# Patient Record
Sex: MALE | Race: OTHER | NOT HISPANIC OR LATINO | URBAN - METROPOLITAN AREA
[De-identification: names, ages, dates, MRNs, and addresses within clinical notes are randomized per-mention and may not be internally consistent; named-entity substitution may affect disease eponyms.]

---

## 2019-09-21 ENCOUNTER — EMERGENCY (EMERGENCY)
Facility: HOSPITAL | Age: 59
LOS: 1 days | Discharge: HOME | End: 2019-09-21
Attending: EMERGENCY MEDICINE
Payer: COMMERCIAL

## 2019-09-21 VITALS
HEIGHT: 66 IN | OXYGEN SATURATION: 98 % | HEART RATE: 105 BPM | DIASTOLIC BLOOD PRESSURE: 78 MMHG | SYSTOLIC BLOOD PRESSURE: 110 MMHG | WEIGHT: 167.99 LBS | RESPIRATION RATE: 17 BRPM | TEMPERATURE: 100 F

## 2019-09-21 LAB
ALBUMIN SERPL ELPH-MCNC: 4.6 G/DL — SIGNIFICANT CHANGE UP (ref 3.3–5)
ALP SERPL-CCNC: 37 U/L — LOW (ref 40–120)
ALT FLD-CCNC: 7 U/L — LOW (ref 10–45)
ANION GAP SERPL CALC-SCNC: 12 MMOL/L — SIGNIFICANT CHANGE UP (ref 5–17)
AST SERPL-CCNC: 12 U/L — SIGNIFICANT CHANGE UP (ref 10–40)
BASOPHILS # BLD AUTO: 0 K/UL — SIGNIFICANT CHANGE UP (ref 0–0.2)
BASOPHILS NFR BLD AUTO: 0.1 % — SIGNIFICANT CHANGE UP (ref 0–2)
BILIRUB SERPL-MCNC: 0.6 MG/DL — SIGNIFICANT CHANGE UP (ref 0.2–1.2)
BUN SERPL-MCNC: 12 MG/DL — SIGNIFICANT CHANGE UP (ref 7–23)
CALCIUM SERPL-MCNC: 10.2 MG/DL — SIGNIFICANT CHANGE UP (ref 8.4–10.5)
CHLORIDE SERPL-SCNC: 96 MMOL/L — SIGNIFICANT CHANGE UP (ref 96–108)
CO2 SERPL-SCNC: 27 MMOL/L — SIGNIFICANT CHANGE UP (ref 22–31)
CREAT SERPL-MCNC: 0.9 MG/DL — SIGNIFICANT CHANGE UP (ref 0.5–1.3)
D DIMER BLD IA.RAPID-MCNC: 802 NG/ML DDU — HIGH
EOSINOPHIL # BLD AUTO: 0.1 K/UL — SIGNIFICANT CHANGE UP (ref 0–0.5)
EOSINOPHIL NFR BLD AUTO: 0.9 % — SIGNIFICANT CHANGE UP (ref 0–6)
GLUCOSE SERPL-MCNC: 97 MG/DL — SIGNIFICANT CHANGE UP (ref 70–99)
HCT VFR BLD CALC: 43.6 % — SIGNIFICANT CHANGE UP (ref 39–50)
HGB BLD-MCNC: 14.3 G/DL — SIGNIFICANT CHANGE UP (ref 13–17)
LYMPHOCYTES # BLD AUTO: 1.3 K/UL — SIGNIFICANT CHANGE UP (ref 1–3.3)
LYMPHOCYTES # BLD AUTO: 15.2 % — SIGNIFICANT CHANGE UP (ref 13–44)
MCHC RBC-ENTMCNC: 29.3 PG — SIGNIFICANT CHANGE UP (ref 27–34)
MCHC RBC-ENTMCNC: 32.8 GM/DL — SIGNIFICANT CHANGE UP (ref 32–36)
MCV RBC AUTO: 89.4 FL — SIGNIFICANT CHANGE UP (ref 80–100)
MONOCYTES # BLD AUTO: 0.8 K/UL — SIGNIFICANT CHANGE UP (ref 0–0.9)
MONOCYTES NFR BLD AUTO: 9.2 % — SIGNIFICANT CHANGE UP (ref 2–14)
NEUTROPHILS # BLD AUTO: 6.6 K/UL — SIGNIFICANT CHANGE UP (ref 1.8–7.4)
NEUTROPHILS NFR BLD AUTO: 74.7 % — SIGNIFICANT CHANGE UP (ref 43–77)
PLATELET # BLD AUTO: 258 K/UL — SIGNIFICANT CHANGE UP (ref 150–400)
POTASSIUM SERPL-MCNC: 4.2 MMOL/L — SIGNIFICANT CHANGE UP (ref 3.5–5.3)
POTASSIUM SERPL-SCNC: 4.2 MMOL/L — SIGNIFICANT CHANGE UP (ref 3.5–5.3)
PROT SERPL-MCNC: 8 G/DL — SIGNIFICANT CHANGE UP (ref 6–8.3)
RBC # BLD: 4.88 M/UL — SIGNIFICANT CHANGE UP (ref 4.2–5.8)
RBC # FLD: 12 % — SIGNIFICANT CHANGE UP (ref 10.3–14.5)
SODIUM SERPL-SCNC: 135 MMOL/L — SIGNIFICANT CHANGE UP (ref 135–145)
TROPONIN T, HIGH SENSITIVITY RESULT: <6 NG/L — SIGNIFICANT CHANGE UP (ref 0–51)
WBC # BLD: 8.8 K/UL — SIGNIFICANT CHANGE UP (ref 3.8–10.5)
WBC # FLD AUTO: 8.8 K/UL — SIGNIFICANT CHANGE UP (ref 3.8–10.5)

## 2019-09-21 PROCEDURE — 71046 X-RAY EXAM CHEST 2 VIEWS: CPT | Mod: 26

## 2019-09-21 PROCEDURE — 93010 ELECTROCARDIOGRAM REPORT: CPT

## 2019-09-21 PROCEDURE — 99218: CPT

## 2019-09-21 PROCEDURE — 71275 CT ANGIOGRAPHY CHEST: CPT | Mod: 26

## 2019-09-21 PROCEDURE — 70450 CT HEAD/BRAIN W/O DYE: CPT | Mod: 26

## 2019-09-21 RX ORDER — ACETAMINOPHEN 500 MG
975 TABLET ORAL ONCE
Refills: 0 | Status: COMPLETED | OUTPATIENT
Start: 2019-09-21 | End: 2019-09-21

## 2019-09-21 RX ORDER — SODIUM CHLORIDE 9 MG/ML
1000 INJECTION INTRAMUSCULAR; INTRAVENOUS; SUBCUTANEOUS ONCE
Refills: 0 | Status: COMPLETED | OUTPATIENT
Start: 2019-09-21 | End: 2019-09-21

## 2019-09-21 RX ADMIN — Medication 975 MILLIGRAM(S): at 15:30

## 2019-09-21 RX ADMIN — SODIUM CHLORIDE 1000 MILLILITER(S): 9 INJECTION INTRAMUSCULAR; INTRAVENOUS; SUBCUTANEOUS at 15:02

## 2019-09-21 RX ADMIN — Medication 975 MILLIGRAM(S): at 14:30

## 2019-09-21 NOTE — ED CDU PROVIDER DISPOSITION NOTE - ATTENDING CONTRIBUTION TO CARE
Attending MD Murphy:   I personally have seen and examined this patient.  Physician assistant note reviewed and agree on plan of care and except where noted.  See below for details.     Seen in CDU 1    58M with no reported PMH sent to the CDU after presenting to the ED with syncopal episode.  Denies complaints overnight.  Denies chest pain, shortness of breath, palpitations. Denies abdominal pain, nausea, vomiting, diarrhea, blood in stools. Denies dysuria, hematuria, change in urinary habits including frequency, urgency. Denies fevers, chills, dizziness, weakness. Reports has had a 70lb weight loss but reports has been making lifestyle modifications like decreasing carb (rice) intake, increasing vegetable intake.  On re-evaluation, patient now reports he thinks he fell asleep on the toilet.  Reports he did not urinate or defecate but was seated on the toilet.  Reports he may have fallen asleep.  On exam, NAD, head NCAT, PERRL, FROM at neck, no tenderness to midline palpation, no stepoffs along length of spine, lungs CTAB with good inspiratory effort, +S1S2, no m/r/g, abdomen soft with +BS, NT, ND, no CVAT, moving all extremities with 5/5 strength bilateral upper and lower extremities, good and equal  strength bilaterally, no calf tenderness, swelling, erythema or warmth; A/P: 58M s/p syncopal episode, result of echo, labs and rads discussed with patient.  Concern for cancer given weight loss discussed, possibly explained by diet but not ruled out.  Discussion had with patient and three family members.  Follow up instructions given, importance of follow up emphasized, return to ED parameters reviewed and patient verbalized understanding.  All questions answered, all concerns addressed.

## 2019-09-21 NOTE — ED CDU PROVIDER DISPOSITION NOTE - CLINICAL COURSE
57 y/o male with no sig PMH presents to ED s/p fall at 2am this morning. Patient walked to the bathroom and states that he was trying to have a BM and the next thing he remembers was his wife helping him off of the floor. +LOC, states his wife was in the house and states that she said he was down for at most 5 minutes. Patient has not had episode before. States that he has unintentionally lost 70lbs over the last 4 months, saw his PMD recently who didn't think too much of it, per patient. Decreased appetite.   	+Intermittent frontal/ right temporal headache, has been taking Motrin, does not want pain medication now, as he denies current pain   Denies chest pain, SOB, abdominal pain, dizziness, nausea, vomiting, neck pain, visual changes, urinary symptoms, history of seizures.  ED Course: Labs non-actionable including troponin of 6, CTA negative for PE, CTH without any acute pathology. Pt sent to CDU for tele, frequent eval, and TTE.  CDU Course: Repeat troponin <6. TTE showed____. 57 y/o male with no sig PMH presents to ED s/p fall at 2am this morning. Patient walked to the bathroom and states that he was trying to have a BM and the next thing he remembers was his wife helping him off of the floor. +LOC, states his wife was in the house and states that she said he was down for at most 5 minutes. Patient has not had episode before. States that he has unintentionally lost 70lbs over the last 4 months, saw his PMD recently who didn't think too much of it, per patient. Decreased appetite.   Denies chest pain, SOB, abdominal pain, dizziness, nausea, vomiting, neck pain, visual changes, urinary symptoms, history of seizures.  ED Course: Labs non-actionable including troponin of 6, CTA negative for PE, CTH without any acute pathology. Pt sent to CDU for tele, frequent eval, and TTE.  CDU Course: Repeat troponin <6. TTE unremarkable.  Pt stable for d/c to f/u with PCP/cards as outpatient.

## 2019-09-21 NOTE — ED ADULT NURSE REASSESSMENT NOTE - COMFORT CARE
ambulated to bathroom/repositioned/darkened lights/po fluids offered/warm blanket provided/plan of care explained

## 2019-09-21 NOTE — ED CDU PROVIDER DISPOSITION NOTE - NSFOLLOWUPINSTRUCTIONS_ED_ALL_ED_FT
1. Follow up with your PMD in 1-2 days. If you do not have a PMD you may follow up with our general internal medicine clinic ([phone]) for continued care. Additionally please follow up with a cardiologist or cardiology clinic ([phone]) within 2-3 days.     2. Show copies of your reports given to you. Recommend Aspirin 81mg over the counter daily until further evaluation.  Take all of your other medications as previously prescribed.     3. If you develop any worsening or continued chest pain, shortness of breath, palpitations, weakness, nausea/vomiting, lightheadedness, or for any other concerning symptoms please return to the ED immediately. 1. Follow up with your Primary care provider in 1-2 days. If you do not have a PCP you may follow up with our general internal medicine clinic ([phone]) for continued care. Additionally please follow up with a cardiologist or [facility] cardiology clinic ([phone]) within 2-3 days.     2. Show copies of your reports given to you. Take all of your other medications as previously prescribed.     3. If you develop any chest pain, shortness of breath, palpitations, weakness, nausea/vomiting, lightheadedness, passing out, or for any other concerning symptoms please return to the ED immediately.

## 2019-09-21 NOTE — ED CDU PROVIDER INITIAL DAY NOTE - ATTENDING CONTRIBUTION TO CARE
ATTENDING, Slick JOHNSON: I have personally performed a face to face diagnostic evaluation on this patient.  I have reviewed the ACP note and agree with the history, exam, and plan of care, except as noted here. Progress notes and further evaluation to be reviewed by observation and discharging attending.

## 2019-09-21 NOTE — ED PROVIDER NOTE - OBJECTIVE STATEMENT
57 y/o male with no sig PMH presents to ED s/p fall at 2am this morning. Patient walked to the bathroom and states that he was trying to have a BM and the next thing he remembers was his wife helping him off of the floor. +LOC, states his wife was in the house and states that she said he was down for at most 5 minutes. Patient has not had episode before. States that he has unintentionally lost 70lbs over the last 4 months, saw his PMD recently who didn't think too much of it, per patient. Decreased appetite.   +Intermittent frontal/ right temporal headache, has been taking Motrin, does not want pain medication now, as he denies current pain   Denies chest pain, SOB, abdominal pain, dizziness, nausea, vomiting, neck pain, visual changes, urinary symptoms, history of seizures.

## 2019-09-21 NOTE — ED CDU PROVIDER INITIAL DAY NOTE - PROGRESS NOTE DETAILS
Pt seen at bedside. Pt in NAD, comfortable. VS stable from last reading. No events on tele. Pt has no complaints at this time. Pt tolerating PO, feels well. Will continue to monitor.

## 2019-09-21 NOTE — ED PROVIDER NOTE - NS ED ROS FT
Constitutional: No fever or chills +weight loss  Eyes: No visual changes  CV: No chest pain or lower extremity edema  Resp: No SOB no cough  GI: No abd pain. No nausea or vomiting.   : No dysuria, hematuria.   MSK: No musculoskeletal pain  Skin: No rash  Psych: No complaints   Neuro: +headache. No numbness or tingling. No weakness.

## 2019-09-21 NOTE — ED PROVIDER NOTE - ATTENDING CONTRIBUTION TO CARE
wt loss / syncope on toilet. no cp/sob  rrr / well appearing / clear lungs  ro acs / pe. dissection unlikely. concern for ca or other cause of wt loss - ekg w small qrs / no s/s of effusion. will place in cdu if all is neg.

## 2019-09-21 NOTE — ED ADULT NURSE NOTE - OBJECTIVE STATEMENT
59 y/o male denies PMH presents to ED reporting fall last night at 2AM with LOC. Pt reports sitting on toilet then falling off, not remembering anything except wife picking up pt from floor. Pt also reports weight loss of 70 pounds in 6 months. Pt reports weakness for the past three weeks and headaches. On exam, AOx3, speaking in complete sentences. PERRL 3mm, equal strength and sensation in all 4 extremities. Lung sounds CTA, NAD. Abdomen soft, non-tender, non-distended, normoactive bowel sounds. Pt denies CP, SOB, n/v/d, fever/chills, blurry vision, dizziness and lightheadedness at this time. Awaiting evaluation by MD. 59 y/o male denies PMH presents to ED reporting fall last night at 2AM with LOC. Pt reports sitting on toilet then falling off, not remembering anything except wife picking up pt from floor. Pt also reports weight loss of 70 pounds in 6 months. Pt reports weakness for the past three weeks and headaches. On exam, AOx3, speaking in complete sentences. PERRL 3mm, equal strength and sensation in all 4 extremities. Lung sounds CTA, NAD. Abdomen soft, non-tender, non-distended, normoactive bowel sounds. Pt denies CP, SOB, n/v/d, fever/chills, blurry vision, dizziness and lightheadedness at this time. CM in place NSR HR 94. Awaiting evaluation by MD.

## 2019-09-21 NOTE — ED CDU PROVIDER DISPOSITION NOTE - PATIENT PORTAL LINK FT
You can access the FollowMyHealth Patient Portal offered by [facility] by registering at the following website: [email or web address]. By joining [employer]’s FollowMyHealth portal, you will also be able to view your health information using other applications (apps) compatible with our system.

## 2019-09-21 NOTE — ED CDU PROVIDER INITIAL DAY NOTE - DETAILS
Syncope   -TELE  -[facility]  -[facility] EVAL  -ECHO  -CASE D/W ATTENDING

## 2019-09-21 NOTE — ED ADULT NURSE NOTE - NSIMPLEMENTINTERV_GEN_ALL_ED
Implemented All Fall Risk Interventions:  [place] to call system. Call bell, personal items and telephone within reach. Instruct patient to call for assistance. Room bathroom lighting operational. Non-slip footwear when patient is off stretcher. Physically safe environment: no spills, clutter or unnecessary equipment. Stretcher in lowest position, wheels locked, appropriate side rails in place. Provide visual cue, wrist band, yellow gown, etc. Monitor gait and stability. Monitor for mental status changes and reorient to person, place, and time. Review medications for side effects contributing to fall risk. Reinforce activity limits and safety measures with patient and family.

## 2019-09-21 NOTE — ED CDU PROVIDER INITIAL DAY NOTE - PHYSICAL EXAMINATION
GEN: Well Appearing, Nontoxic, NAD  HEENT: NC/AT, Symm Facies. EOMI  CV: +S1S2, RRR w/o m/g/r  RESP: CTAB w/o w/r/r  ABD: Soft, nt/nd.  EXT/MSK: No lower extremity edema or calf tenderness. FROMx4  SKIN: No erythema, lesions or rash  Neuro: Grossly intact, AOX3 with normal speech, Sensation intact, motor equal.

## 2019-09-22 VITALS
OXYGEN SATURATION: 96 % | SYSTOLIC BLOOD PRESSURE: 122 MMHG | DIASTOLIC BLOOD PRESSURE: 85 MMHG | RESPIRATION RATE: 18 BRPM | HEART RATE: 84 BPM | TEMPERATURE: 98 F

## 2019-09-22 PROCEDURE — 82962 GLUCOSE BLOOD TEST: CPT

## 2019-09-22 PROCEDURE — 93005 ELECTROCARDIOGRAM TRACING: CPT

## 2019-09-22 PROCEDURE — 70450 CT HEAD/BRAIN W/O DYE: CPT

## 2019-09-22 PROCEDURE — G0378: CPT

## 2019-09-22 PROCEDURE — 85379 FIBRIN DEGRADATION QUANT: CPT

## 2019-09-22 PROCEDURE — 85027 COMPLETE CBC AUTOMATED: CPT

## 2019-09-22 PROCEDURE — 84484 ASSAY OF TROPONIN QUANT: CPT

## 2019-09-22 PROCEDURE — 71275 CT ANGIOGRAPHY CHEST: CPT

## 2019-09-22 PROCEDURE — 93306 TTE W/DOPPLER COMPLETE: CPT | Mod: 26

## 2019-09-22 PROCEDURE — 93306 TTE W/DOPPLER COMPLETE: CPT

## 2019-09-22 PROCEDURE — 99217: CPT

## 2019-09-22 PROCEDURE — 99284 EMERGENCY DEPT VISIT MOD MDM: CPT | Mod: 25

## 2019-09-22 PROCEDURE — 71046 X-RAY EXAM CHEST 2 VIEWS: CPT

## 2019-09-22 PROCEDURE — 83880 ASSAY OF NATRIURETIC PEPTIDE: CPT

## 2019-09-22 PROCEDURE — 80053 COMPREHEN METABOLIC PANEL: CPT

## 2019-09-22 RX ORDER — IBUPROFEN 200 MG
600 TABLET ORAL ONCE
Refills: 0 | Status: COMPLETED | OUTPATIENT
Start: 2019-09-22 | End: 2019-09-22

## 2019-09-22 RX ADMIN — Medication 600 MILLIGRAM(S): at 04:11

## 2019-09-22 RX ADMIN — Medication 600 MILLIGRAM(S): at 03:50

## 2019-09-22 NOTE — ED CDU PROVIDER SUBSEQUENT DAY NOTE - PROGRESS NOTE DETAILS
Pt seen at bedside. Pt febrile 101.2'F, BP 94/63. Pt appears well,non-toxic. Pt in NAD, comfortable. No events on tele. Pt has no complaints at this time. Denies lightheadedness, dizziness, CP, chills, HA, abd pain, SOB. Unremarkable PE, GCS15, no JVD, RRR clear distinct S1 S2, no m/g/r, lungs CTAB, 2+ radial pulses. Will give motrin for fever and continue to monitor. Patient seen and evaluated at bedside, resting in bed comfortably in NAD. No complaints. VSS. No events on telemetry monitor. Denies any fever/chills, recent illness, dizziness, sore throat, nasal congestion, cough, CP, SOB, abd pain, n/v/d, urinary symptoms. TTE performed at bedside in CDU. Patient resting in bed comfortably in NAD. Most recent VSS. No events on telemetry monitor. TTE unremarkable. Pt seen and evaluated by Dr. Murphy. Stable for d/c to f/u with PCP/cards as outpatient. Patient seen and evaluated at bedside, resting in bed comfortably in NAD. No complaints. Denies any fever/chills, recent illness, dizziness, sore throat, nasal congestion, cough, CP, SOB, abd pain, n/v/d, urinary symptoms. VSS. Exam unremarkable. No LE edema, no calf ttp. No events on telemetry monitor.  TTE performed at bedside in CDU.

## 2019-09-22 NOTE — ED CDU PROVIDER SUBSEQUENT DAY NOTE - ATTENDING CONTRIBUTION TO CARE
Attending MD Murphy:   I personally have seen and examined this patient.  Physician assistant note reviewed and agree on plan of care and except where noted.  See below for details.     Seen in CDU 1    58M with no reported PMH     sent to the CDU after presenting to the ED with syncopal episode. Attending MD Murphy:   I personally have seen and examined this patient.  Physician assistant note reviewed and agree on plan of care and except where noted.  See below for details.     Seen in CDU 1    58M with no reported PMH sent to the CDU after presenting to the ED with syncopal episode.  Denies complaints overnight.  Denies chest pain, shortness of breath, palpitations. Denies abdominal pain, nausea, vomiting, diarrhea, blood in stools. Denies dysuria, hematuria, change in urinary habits including frequency, urgency. Denies fevers, chills, dizziness, weakness. Reports has had a 70lb weight loss but reports has been making lifestyle modifications like decreasing carb (rice) intake, increasing vegetable intake.  On exam, NAD, head NCAT, PERRL, FROM at neck, no tenderness to midline palpation, no stepoffs along length of spine, lungs CTAB with good inspiratory effort, +S1S2, no m/r/g, abdomen soft with +BS, NT, ND, no CVAT, moving all extremities with 5/5 strength bilateral upper and lower extremities, good and equal  strength bilaterally, no calf tenderness, swelling, erythema or warmth; A/P: 58M s/p syncopal episode, pending echo

## 2019-09-22 NOTE — ED CDU PROVIDER SUBSEQUENT DAY NOTE - HISTORY
Pt sleeping. VS stable from last reading. No events on tele. Will continue to monitor. -Robles Herman PA-C

## 2019-09-22 NOTE — ED CDU PROVIDER SUBSEQUENT DAY NOTE - PHYSICAL EXAMINATION
GEN: Pt in NAD, A&O x3.  PSYCH: Affect appropriate.  EYES: Sclera white w/o injection.   ENT: Head NCAT. Nose w/o deformity. No auricular TTP. MMM. Neck supple FROM.   RESP: No chest wall tenderness, CTA b/l, no wheezes, rales, or rhonchi.   CARDIAC: RRR, clear distinct S1, S2, no appreciable murmurs.  ABD: Abdomen soft, non-tender.  VASC: 2+ radial and dorsalis pedis pulses b/l. No edema or calf tenderness.  SKIN: No rashes on the trunk.

## 2019-09-22 NOTE — ED ADULT NURSE REASSESSMENT NOTE - NS ED NURSE REASSESS COMMENT FT1
Pt awaiting CTA at this time. Pt aware of plan of care at this time.
Pt awaiting transfer to CDU. SUNIL Del Angel contacted, will make RN aware when pt can be transported to CDU.
Pt reports h/a at this time, requesting PO Tylenol, SUNIL Wilikns aware.
Pt taken to CT scan by transporter at this time.
report received from day RN Vivian DANIELSON. Pt found resting in semi-rivero position, non-labored respirations. Pt denies dizziness, lightheadedness, chest pain or discomfort, difficulty breathing at this time. VS documented. Pt to be transported to CDU for continued observation and treatment. Pt left in position of comfort, will reassess
report taken from Germania RHODES. states pt had good night with no complaints. Will continue to monitor.
Received pt from MEAGAN Martines , received pt alert and responsive, oriented x4, denies any respiratory distress, SOB, or difficulty breathing. Pt transferred to CDU for syncopal episode, pt is currently asymptomatic with no c/o chest pain, pressure, SOB, diaphoresis, dizziness, lightheadedness, N/V, F/C, heart palpitations. On telemetry pt is SR hr: 70's.  IV in place, patent and free of signs of infiltration. V/S stable, pt afebrile, Pt educated on unit and unit rules, instructed patient to notify RN of any needed assistance, Pt verbalizes understanding, Call bell placed within reach. Safety maintained. Will continue to monitor. Pending TTE in AM pt aware of plan.

## 2019-11-05 ENCOUNTER — EMERGENCY (EMERGENCY)
Facility: HOSPITAL | Age: 59
LOS: 1 days | Discharge: HOME | End: 2019-11-05
Attending: EMERGENCY MEDICINE
Payer: COMMERCIAL

## 2019-11-05 VITALS
HEART RATE: 87 BPM | HEIGHT: 66 IN | SYSTOLIC BLOOD PRESSURE: 122 MMHG | OXYGEN SATURATION: 97 % | RESPIRATION RATE: 16 BRPM | TEMPERATURE: 98 F | WEIGHT: 173.06 LBS | DIASTOLIC BLOOD PRESSURE: 86 MMHG

## 2019-11-05 VITALS
HEART RATE: 82 BPM | DIASTOLIC BLOOD PRESSURE: 80 MMHG | OXYGEN SATURATION: 99 % | SYSTOLIC BLOOD PRESSURE: 119 MMHG | RESPIRATION RATE: 18 BRPM

## 2019-11-05 LAB
ALBUMIN SERPL ELPH-MCNC: 3.9 G/DL — SIGNIFICANT CHANGE UP (ref 3.5–5)
ALP SERPL-CCNC: 38 U/L — LOW (ref 40–120)
ALT FLD-CCNC: 17 U/L DA — SIGNIFICANT CHANGE UP (ref 10–60)
ANION GAP SERPL CALC-SCNC: 5 MMOL/L — SIGNIFICANT CHANGE UP (ref 5–17)
AST SERPL-CCNC: 8 U/L — LOW (ref 10–40)
BASOPHILS # BLD AUTO: 0.01 K/UL — SIGNIFICANT CHANGE UP (ref 0–0.2)
BASOPHILS NFR BLD AUTO: 0.1 % — SIGNIFICANT CHANGE UP (ref 0–2)
BILIRUB SERPL-MCNC: 0.4 MG/DL — SIGNIFICANT CHANGE UP (ref 0.2–1.2)
BUN SERPL-MCNC: 21 MG/DL — HIGH (ref 7–18)
CALCIUM SERPL-MCNC: 9.4 MG/DL — SIGNIFICANT CHANGE UP (ref 8.4–10.5)
CHLORIDE SERPL-SCNC: 100 MMOL/L — SIGNIFICANT CHANGE UP (ref 96–108)
CO2 SERPL-SCNC: 29 MMOL/L — SIGNIFICANT CHANGE UP (ref 22–31)
CREAT SERPL-MCNC: 0.9 MG/DL — SIGNIFICANT CHANGE UP (ref 0.5–1.3)
EOSINOPHIL # BLD AUTO: 0.05 K/UL — SIGNIFICANT CHANGE UP (ref 0–0.5)
EOSINOPHIL NFR BLD AUTO: 0.5 % — SIGNIFICANT CHANGE UP (ref 0–6)
GLUCOSE SERPL-MCNC: 112 MG/DL — HIGH (ref 70–99)
HCT VFR BLD CALC: 38.1 % — LOW (ref 39–50)
HGB BLD-MCNC: 12.7 G/DL — LOW (ref 13–17)
IMM GRANULOCYTES NFR BLD AUTO: 0.9 % — SIGNIFICANT CHANGE UP (ref 0–1.5)
LYMPHOCYTES # BLD AUTO: 0.81 K/UL — LOW (ref 1–3.3)
LYMPHOCYTES # BLD AUTO: 7.7 % — LOW (ref 13–44)
MCHC RBC-ENTMCNC: 29.2 PG — SIGNIFICANT CHANGE UP (ref 27–34)
MCHC RBC-ENTMCNC: 33.3 GM/DL — SIGNIFICANT CHANGE UP (ref 32–36)
MCV RBC AUTO: 87.6 FL — SIGNIFICANT CHANGE UP (ref 80–100)
MONOCYTES # BLD AUTO: 0.69 K/UL — SIGNIFICANT CHANGE UP (ref 0–0.9)
MONOCYTES NFR BLD AUTO: 6.6 % — SIGNIFICANT CHANGE UP (ref 2–14)
NEUTROPHILS # BLD AUTO: 8.83 K/UL — HIGH (ref 1.8–7.4)
NEUTROPHILS NFR BLD AUTO: 84.2 % — HIGH (ref 43–77)
NRBC # BLD: 0 /100 WBCS — SIGNIFICANT CHANGE UP (ref 0–0)
PLATELET # BLD AUTO: 207 K/UL — SIGNIFICANT CHANGE UP (ref 150–400)
POTASSIUM SERPL-MCNC: 4.3 MMOL/L — SIGNIFICANT CHANGE UP (ref 3.5–5.3)
POTASSIUM SERPL-SCNC: 4.3 MMOL/L — SIGNIFICANT CHANGE UP (ref 3.5–5.3)
PROT SERPL-MCNC: 7.8 G/DL — SIGNIFICANT CHANGE UP (ref 6–8.3)
RBC # BLD: 4.35 M/UL — SIGNIFICANT CHANGE UP (ref 4.2–5.8)
RBC # FLD: 12.3 % — SIGNIFICANT CHANGE UP (ref 10.3–14.5)
SODIUM SERPL-SCNC: 134 MMOL/L — LOW (ref 135–145)
TROPONIN I SERPL-MCNC: <0.015 NG/ML — SIGNIFICANT CHANGE UP (ref 0–0.04)
WBC # BLD: 10.48 K/UL — SIGNIFICANT CHANGE UP (ref 3.8–10.5)
WBC # FLD AUTO: 10.48 K/UL — SIGNIFICANT CHANGE UP (ref 3.8–10.5)

## 2019-11-05 PROCEDURE — 93005 ELECTROCARDIOGRAM TRACING: CPT

## 2019-11-05 PROCEDURE — 99283 EMERGENCY DEPT VISIT LOW MDM: CPT

## 2019-11-05 PROCEDURE — 36415 COLL VENOUS BLD VENIPUNCTURE: CPT

## 2019-11-05 PROCEDURE — 84484 ASSAY OF TROPONIN QUANT: CPT

## 2019-11-05 PROCEDURE — 85027 COMPLETE CBC AUTOMATED: CPT

## 2019-11-05 PROCEDURE — 80053 COMPREHEN METABOLIC PANEL: CPT

## 2019-11-05 PROCEDURE — 99284 EMERGENCY DEPT VISIT MOD MDM: CPT

## 2019-11-05 RX ORDER — SODIUM CHLORIDE 9 MG/ML
1000 INJECTION INTRAMUSCULAR; INTRAVENOUS; SUBCUTANEOUS ONCE
Refills: 0 | Status: COMPLETED | OUTPATIENT
Start: 2019-11-05 | End: 2019-11-05

## 2019-11-05 RX ADMIN — SODIUM CHLORIDE 1000 MILLILITER(S): 9 INJECTION INTRAMUSCULAR; INTRAVENOUS; SUBCUTANEOUS at 18:17

## 2019-11-05 NOTE — ED PROVIDER NOTE - PHYSICAL EXAMINATION
Afebrile, hemodynamically stable, saturating well  NAD, well appearing  Head NCAT  Pupils equal, EOMI, anicteric  MMM  No JVD  RRR, nml S1/S2, no m/r/g  Lungs CTAB, no w/r/r  Abd soft, NT, ND, nml BS, no rebound or guarding  AAO, CN's 3-12 grossly intact, motor 5/5 in all extremities  HARPER spontaneously, no leg cyanosis or edema, no extremity TTP/stepoff/deformity  Skin warm, well perfused, no rashes or hives

## 2019-11-05 NOTE — ED ADULT NURSE NOTE - OBJECTIVE STATEMENT
pt came in for c/o syncope. pt stated he was feeling lightheaded, leaned against the wall & passed out. pt stated he hit his head lightly on the ground and endorses mild HA. Denies cp, sob, dizziness, blurry vision. breathing unlabored. NAD.

## 2019-11-05 NOTE — ED PROVIDER NOTE - NSFOLLOWUPINSTRUCTIONS_ED_ALL_ED_FT
Please follow up with a cardiologist in 1-2 days.  Please return to the emergency department if you have more passing out, chest pain, trouble breathing, or any other symptoms.  Please keep well hydrated.

## 2019-11-05 NOTE — ED PROVIDER NOTE - CLINICAL SUMMARY MEDICAL DECISION MAKING FREE TEXT BOX
No arrhythmia, signs of aortic outflow obstruction, anemia or bleeding, gross electrolyte abnormalities, risk factors for DVT, symptoms of ACS, or signs of infection. No e/o extremity or other trauma. Pt with no e/o head trauma though reports this, no e/o elevated ICP. He declines repeat CT head today, understands concern for possible ICH and is AAO, well appearing, hemodynamically stable, with no distracting injuries, and declines this. No arrhythmia, signs of aortic outflow obstruction, anemia or bleeding, gross electrolyte abnormalities, risk factors for DVT, symptoms of ACS, or signs of infection. No e/o extremity or other trauma. Pt with no e/o head trauma though reports this, no e/o elevated ICP. He declines repeat CT head today, understands concern for possible ICH and is AAO, well appearing, hemodynamically stable, with no distracting injuries, and declines this. Patient is well appearing, NAD, afebrile, hemodynamically stable. Discharged with instructions in further symptomatic care, return precautions, and need for cardiology f/u and list for this f/u.

## 2019-11-05 NOTE — ED PROVIDER NOTE - PATIENT PORTAL LINK FT
You can access the FollowMyHealth Patient Portal offered by [facility] by registering at the following website: [email or web address]. By joining [employer]’s FollowMyHealth portal, you will also be able to view your health information using other applications (apps) compatible with our system.

## 2019-11-05 NOTE — ED PROVIDER NOTE - OBJECTIVE STATEMENT
58yoM previously healthy, on no meds, presents with syncope. States today he felt lightheaded and leaned against a wall, and then he passed out. States he lightly hit his head and has a mild headache only. Denies any other symptoms surrounding the event or now including CP, SOB, vomiting, diarrhea, black or bloody stool, abdominal pain, leg pain or swelling, recent long distance travel, alcohol use, or any other symptoms.  has been keeping well hydrated though not eating healthily. Was seen at [facility] he states last week for the identical symptoms and had negative CT head, ECG, labs, followed up with his PMD and is scheduled to see a GI doctor. 58yoM previously healthy, on no meds, presents with syncope. States today he felt lightheaded and leaned against a wall, and then he passed out. States he lightly hit his head and has a mild headache only. Denies any other symptoms surrounding the event or now including CP, SOB, vomiting, diarrhea, black or bloody stool, abdominal pain, leg pain or swelling, recent long distance travel, alcohol use, or any other symptoms.  has been keeping well hydrated though not eating healthily. Was seen at [facility] he states 1 month ago for the identical symptoms and had negative CT head, ECG, labs, followed up with his PMD and is scheduled to see a GI doctor.

## 2019-11-05 NOTE — ED ADULT NURSE NOTE - NSIMPLEMENTINTERV_GEN_ALL_ED
Implemented All Universal Safety Interventions:  [place] to call system. Call bell, personal items and telephone within reach. Instruct patient to call for assistance. Room bathroom lighting operational. Non-slip footwear when patient is off stretcher. Physically safe environment: no spills, clutter or unnecessary equipment. Stretcher in lowest position, wheels locked, appropriate side rails in place.

## 2020-01-01 ENCOUNTER — EMERGENCY (EMERGENCY)
Facility: HOSPITAL | Age: 60
LOS: 1 days | Discharge: HOME | End: 2020-01-01
Attending: EMERGENCY MEDICINE
Payer: COMMERCIAL

## 2020-01-01 VITALS
SYSTOLIC BLOOD PRESSURE: 146 MMHG | OXYGEN SATURATION: 100 % | WEIGHT: 175.93 LBS | RESPIRATION RATE: 21 BRPM | HEIGHT: 66 IN | HEART RATE: 109 BPM | DIASTOLIC BLOOD PRESSURE: 79 MMHG | TEMPERATURE: 99 F

## 2020-01-01 VITALS
DIASTOLIC BLOOD PRESSURE: 75 MMHG | OXYGEN SATURATION: 100 % | SYSTOLIC BLOOD PRESSURE: 120 MMHG | RESPIRATION RATE: 20 BRPM | HEART RATE: 88 BPM

## 2020-01-01 LAB
ALBUMIN SERPL ELPH-MCNC: 4.6 G/DL — SIGNIFICANT CHANGE UP (ref 3.3–5)
ALP SERPL-CCNC: 50 U/L — SIGNIFICANT CHANGE UP (ref 40–120)
ALT FLD-CCNC: 15 U/L — SIGNIFICANT CHANGE UP (ref 10–45)
ANION GAP SERPL CALC-SCNC: 16 MMOL/L — SIGNIFICANT CHANGE UP (ref 5–17)
AST SERPL-CCNC: 13 U/L — SIGNIFICANT CHANGE UP (ref 10–40)
BASOPHILS # BLD AUTO: 0.02 K/UL — SIGNIFICANT CHANGE UP (ref 0–0.2)
BASOPHILS NFR BLD AUTO: 0.2 % — SIGNIFICANT CHANGE UP (ref 0–2)
BILIRUB SERPL-MCNC: 0.3 MG/DL — SIGNIFICANT CHANGE UP (ref 0.2–1.2)
BUN SERPL-MCNC: 16 MG/DL — SIGNIFICANT CHANGE UP (ref 7–23)
CALCIUM SERPL-MCNC: 10.1 MG/DL — SIGNIFICANT CHANGE UP (ref 8.4–10.5)
CHLORIDE SERPL-SCNC: 100 MMOL/L — SIGNIFICANT CHANGE UP (ref 96–108)
CO2 SERPL-SCNC: 21 MMOL/L — LOW (ref 22–31)
CREAT SERPL-MCNC: 0.84 MG/DL — SIGNIFICANT CHANGE UP (ref 0.5–1.3)
EOSINOPHIL # BLD AUTO: 0.16 K/UL — SIGNIFICANT CHANGE UP (ref 0–0.5)
EOSINOPHIL NFR BLD AUTO: 1.8 % — SIGNIFICANT CHANGE UP (ref 0–6)
GLUCOSE SERPL-MCNC: 163 MG/DL — HIGH (ref 70–99)
HCT VFR BLD CALC: 42.2 % — SIGNIFICANT CHANGE UP (ref 39–50)
HGB BLD-MCNC: 14.1 G/DL — SIGNIFICANT CHANGE UP (ref 13–17)
IMM GRANULOCYTES NFR BLD AUTO: 0.6 % — SIGNIFICANT CHANGE UP (ref 0–1.5)
LYMPHOCYTES # BLD AUTO: 1.46 K/UL — SIGNIFICANT CHANGE UP (ref 1–3.3)
LYMPHOCYTES # BLD AUTO: 16.1 % — SIGNIFICANT CHANGE UP (ref 13–44)
MCHC RBC-ENTMCNC: 29.3 PG — SIGNIFICANT CHANGE UP (ref 27–34)
MCHC RBC-ENTMCNC: 33.4 GM/DL — SIGNIFICANT CHANGE UP (ref 32–36)
MCV RBC AUTO: 87.6 FL — SIGNIFICANT CHANGE UP (ref 80–100)
MONOCYTES # BLD AUTO: 0.78 K/UL — SIGNIFICANT CHANGE UP (ref 0–0.9)
MONOCYTES NFR BLD AUTO: 8.6 % — SIGNIFICANT CHANGE UP (ref 2–14)
NEUTROPHILS # BLD AUTO: 6.62 K/UL — SIGNIFICANT CHANGE UP (ref 1.8–7.4)
NEUTROPHILS NFR BLD AUTO: 72.7 % — SIGNIFICANT CHANGE UP (ref 43–77)
NRBC # BLD: 0 /100 WBCS — SIGNIFICANT CHANGE UP (ref 0–0)
PLATELET # BLD AUTO: 267 K/UL — SIGNIFICANT CHANGE UP (ref 150–400)
POTASSIUM SERPL-MCNC: 3.6 MMOL/L — SIGNIFICANT CHANGE UP (ref 3.5–5.3)
POTASSIUM SERPL-SCNC: 3.6 MMOL/L — SIGNIFICANT CHANGE UP (ref 3.5–5.3)
PROT SERPL-MCNC: 8.4 G/DL — HIGH (ref 6–8.3)
RBC # BLD: 4.82 M/UL — SIGNIFICANT CHANGE UP (ref 4.2–5.8)
RBC # FLD: 12.4 % — SIGNIFICANT CHANGE UP (ref 10.3–14.5)
SODIUM SERPL-SCNC: 137 MMOL/L — SIGNIFICANT CHANGE UP (ref 135–145)
TROPONIN T, HIGH SENSITIVITY RESULT: <6 NG/L — SIGNIFICANT CHANGE UP (ref 0–51)
WBC # BLD: 9.09 K/UL — SIGNIFICANT CHANGE UP (ref 3.8–10.5)
WBC # FLD AUTO: 9.09 K/UL — SIGNIFICANT CHANGE UP (ref 3.8–10.5)

## 2020-01-01 PROCEDURE — 93010 ELECTROCARDIOGRAM REPORT: CPT

## 2020-01-01 PROCEDURE — 71046 X-RAY EXAM CHEST 2 VIEWS: CPT

## 2020-01-01 PROCEDURE — 80053 COMPREHEN METABOLIC PANEL: CPT

## 2020-01-01 PROCEDURE — 71046 X-RAY EXAM CHEST 2 VIEWS: CPT | Mod: 26

## 2020-01-01 PROCEDURE — 99283 EMERGENCY DEPT VISIT LOW MDM: CPT | Mod: 25

## 2020-01-01 PROCEDURE — 85027 COMPLETE CBC AUTOMATED: CPT

## 2020-01-01 PROCEDURE — 84484 ASSAY OF TROPONIN QUANT: CPT

## 2020-01-01 PROCEDURE — 93005 ELECTROCARDIOGRAM TRACING: CPT

## 2020-01-01 PROCEDURE — 99285 EMERGENCY DEPT VISIT HI MDM: CPT

## 2020-01-01 NOTE — ED PROVIDER NOTE - OBJECTIVE STATEMENT
59y male presenting with intermittent SOB. Started 3days ago, he states he feels like he cannot catch a breath. No other associated symptoms, No cough, chest pain, fevers, chills, diaphoresis, n/v/d, numbness, tingling, abdominal pain, urinary symptoms. 59y male presenting with intermittent SOB. Started 3days ago, he states he feels like he cannot catch a breath. No other associated symptoms, No cough, chest pain, fevers, chills, diaphoresis, n/v/d, numbness, tingling, abdominal pain, urinary symptoms. His wife states that he has been very anxious and stressed about work and that when he is feeling the stress he has the SOB symptoms, states they improve when she takes him outside for walks or drives. States that he has lost weight recently and because of work stress, wakes up in the night talking about work and having difficulty catching his breath. Was prescribed medication for anxiety symptoms, he took a few times but stopped.

## 2020-01-01 NOTE — ED ADULT NURSE NOTE - NS ED NURSE LEVEL OF CONSCIOUSNESS MENTAL STATUS
[facility] 


 


Mono % (Auto)  10.6    (2.0-11.0)  %


 


Eos % (Auto)  2.4    (0.0-4.0)  %


 


Baso % (Auto)  0.9    (0.2-1.2)  %


 


Sodium   139   (136-145)  mmol/L


 


Potassium   3.4 L   (3.5-5.1)  mmol/L


 


Chloride   103   ()  mmol/L


 


Carbon Dioxide   23   (21-32)  mmol/L


 


Anion Gap   16.4   (10-20)  mmol/L


 


BUN   9   (7-18)  mg/dL


 


Creatinine   0.7   (0.70-1.30)  mg/dL


 


Est Cr Clr Drug Dosing   TNP   


 


Estimated GFR (MDRD)   > 60   


 


Glucose   95   ()  mg/dL


 


Lactic Acid    2.0  (0.4-2.0)  mmol/L


 


Calcium   8.7   (8.5-10.1)  mg/dL


 


Corrected Calcium   9.18   (8.5-10.1)  mg/dL


 


Total Bilirubin   0.8   (0.2-1.0)  mg/dL


 


AST   16   (15-37)  U/L


 


ALT   31   (16-63)  U/L


 


Alkaline Phosphatase   59   ()  U/L


 


C-Reactive Protein   0.5   (<=0.9)  mg/dL


 


Total Protein   7.8   (6.4-8.2)  g/dL


 


Albumin   3.4   (3.4-5.0)  g/dL


 


Globulin   4.4   


 


Albumin/Globulin Ratio   0.77   


 


Amylase   50   ()  U/L


 


Lipase   246   ()  U/L


 


Urine Color     (YELLOW)  


 


Urine Appearance     (CLEAR)  


 


Urine pH     (5.0-8.0)  


 


Ur Specific Gravity     


 


 


Mono % (








- Discharge Information


*PRESCRIPTION DRUG MONITORING PROGRAM REVIEWED*: Not Applicable


*COPY OF PRESCRIPTION DRUG MONITORING REPORT IN PATIENT JUAN: Not Applicable


Instructions:  Managing Your Hypertension, Hypertension


Referrals: 


Jim Colindres 
Alert/Awake

## 2020-01-01 NOTE — ED PROVIDER NOTE - CLINICAL SUMMARY MEDICAL DECISION MAKING FREE TEXT BOX
59y male with difficulty catching his breath, associated with work stress. Recent work up including CTA chest and echo. Concern for possible acs, anxiety symptoms. Will get cbc, cmp, trop, cxr, ekg.

## 2020-01-01 NOTE — ED PROVIDER NOTE - PROGRESS NOTE DETAILS
Montana Singh, PGY-2: patient feeling much better, no longer having difficulty breathing, HR in the 80's. Will dc pending cxr. Attending MD Murphy: Patient re-evaluated and feeling improved.  No acute issues at  this time.  Lab and radiology tests reviewed with patient and family.  Patient to follow up with Dr. De La Vega (cards) in 1-3 days.  Patient to follow up with PMD/Psych re: anxiety.  Patient stable for discharge. Follow up instructions given, importance of follow up emphasized, return to ED parameters reviewed and patient verbalized understanding.  All questions answered, all concerns addressed.

## 2020-01-01 NOTE — ED ADULT NURSE NOTE - NSIMPLEMENTINTERV_GEN_ALL_ED
Implemented All Universal Safety Interventions:  [place] to call system. Call bell, personal items and telephone within reach. Instruct patient to call for assistance. Room bathroom lighting operational. Non-slip footwear when patient is off stretcher. Physically safe environment: no spills, clutter or unnecessary equipment. Stretcher in lowest position, wheels locked, appropriate side rails in place.

## 2020-01-01 NOTE — ED PROVIDER NOTE - NSFOLLOWUPINSTRUCTIONS_ED_ALL_ED_FT
Please follow up with your primary doctor in the next 5-7 days.     Return to the emergency room if you have new or worsening symptoms. Please follow up with your cardiologist Dr. De La Vega in the next 1-3 days    Please follow up with your primary doctor or psych in the next 5-7 days for follow up with your anxiety.    Return to the emergency room if you have new or worsening symptoms.

## 2020-01-01 NOTE — ED ADULT TRIAGE NOTE - ARRIVAL INFO ADDITIONAL COMMENTS
patient responding verbally & appropriate but most information obtained from wife. "he's in a lot of stress"

## 2020-01-01 NOTE — ED PROVIDER NOTE - ATTENDING CONTRIBUTION TO CARE
Attending MD Murphy: I personally have seen and examined this patient.  Resident note reviewed and agree on plan of care and except where noted.  See below for details.     Seen in [place] [facility], accompanied by wife and son    59M with no reported PMH/PSH presents to the ED with shortness of breath.  Reports for the last three days has had intermittent episodes of inability to "catch a breath".  Reports that he has had previous episodes of same.  Wife reports that she suspects panic attacks as he has had them in the past and has markedly increased levels of anxiety and stress related to work.  Patient denies associated chest pain, palpitations.  Denies cough.  Reports symptoms improve with distraction, going for walk.  Reports that he has been prescribed anxiolytic but reports took only a few doses and discontinued.  Denies recent travel, hemoptysis, leg swelling, immobilization.  Denies abdominal pain, nausea, vomiting, diarrhea, blood in stools. Denies fevers, chills, dizziness, weakness. Denies dysuria, hematuria, change in urinary habits including frequency, urgency. On exam, anxious, NAD, head NCAT, PERRL, FROM at neck, no tenderness to midline palpation, no stepoffs along length of spine, lungs CTAB with good inspiratory effort, no wheezing, no rhonchi, no rales, no increased work of breathing, accessory muscle or retractions noted, +S1S2, no m/r/g, abdomen soft with +BS, NT, ND, no CVAT, moving all extremities with 5/5 strength bilateral upper and lower extremities, good and equal  strength bilaterally, no calf tenderness, swelling, erythema or warmth; A/P: 59M with shortness of breath, suspect anxiety related, will check CXR, labs, EKG to evaluate for other possible causes such as PNA or unstable angina, but lower suspicion

## 2020-01-01 NOTE — ED ADULT NURSE NOTE - OBJECTIVE STATEMENT
59 year old male presents to the ED via walk in with c/o panic attacks x 2 weeks. Patient endorses waking up frequently in the middle of the night with feelings of anxiety over work, tremulous and inability to catch his breath, pt states he feels better once he walks outside for fresh air. Denies c/o pain or discomfort at this time. RSR-ST on cardiac monitor. Comfort and safety measures maintained.

## 2020-01-01 NOTE — ED PROVIDER NOTE - PATIENT PORTAL LINK FT
You can access the FollowMyHealth Patient Portal offered by [facility] by registering at the following website: [email or web address]. By joining "[employer]"’s FollowMyHealth portal, you will also be able to view your health information using other applications (apps) compatible with our system.

## 2020-08-19 NOTE — ED ADULT TRIAGE NOTE - WEIGHT IN LBS
RN CLOSING NOTE: PATIENT REMAINS IN BED. NO SIGNS OF ACUTE DISTRESS NOTED AT THIS TIME. 
SINUS BEBE IN THE 50S ON BEDSIDE MONITOR. SAFETY MEASURES IMPLEMENTED, BED IN LOWEST 
POSITION, LOCKED, SIDE RAILS UP, CALL LIGHT WITHIN REACH. ENDORSED TO ARTURO REYNA FOR 
CONTINUITY OF CARE. 175.9

## 2020-12-18 NOTE — ED PROVIDER NOTE - CONSTITUTIONAL, MLM
PD HPI NVD





- Stated complaint


Stated Complaint: HIGH BLOOD SUGAR





- Chief complaint


Chief Complaint: General





- History obtained from


History obtained from: Patient





- History of Present Illness


Timing - onset: Today


Timing - duration: Days (1)


Timing - details: Abrupt onset (she states her sugars are usually okay, in the 

100s with highest recently 170s. This morning noted BS to be 300s. Went to 

clinic for PT and sugar there was 400s. She was feeling okay otherwise. Referred

to ER.)


Associated symptoms: No: Fever, Chest pain, Loss of appetite, Dysuria (but 

having some frequency of urine.)


Contributing factors: Diabetes.  No: Sick contact, Bad food, Recent antibiotics


Improved by: No: Laying still


Worsened by: No: Moving, Breathing


Similar symptoms before: Has not had sx before


Recently seen: Not recently seen





Review of Systems


Constitutional: denies: Fever, Chills


Nose: denies: Rhinorrhea / runny nose, Congestion


Throat: denies: Sore throat


Cardiac: denies: Chest pain / pressure, Palpitations


Respiratory: denies: Dyspnea, Cough


GI: denies: Abdominal Pain, Nausea, Vomiting, Diarrhea


: reports: Frequency.  denies: Dysuria, Discharge


Skin: denies: Rash


Neurologic: denies: Generalized weakness, Focal weakness, Numbness, Near 

syncope, Headache





PD PAST MEDICAL HISTORY





- Past Medical History


Past Medical History: Yes


Cardiovascular: Hypertension, High cholesterol, Peripheral Vascular Disease


Respiratory: Shortness of breath


Neuro: None


Endocrine/Autoimmune: Type 2 diabetes


GI: Colon polyps


: Incontinence


HEENT: Chronic vision loss, Other


Psych: Depression


Musculoskeletal: Osteoarthritis, Gout, Chronic back pain


Derm: Other





- Past Surgical History


Past Surgical History: Yes


General: Cholecystectomy, Appendectomy, Other





- Present Medications


Home Medications: 


                                Ambulatory Orders











 Medication  Instructions  Recorded  Confirmed


 


Insulin Aspart (Vial) [NovoLOG] 15 - 40 units SUBQ TIDWM 03/14/14 12/18/20


 


allopurinoL [Allopurinol] 100 mg PO DAILY 12/31/15 12/18/20


 


Insulin Glargine [Lantus Solostar] 30 units SQ BID 05/31/16 12/18/20


 


Tolterodine Tartrate [Tolterodine 4 mg PO DAILY PM 07/15/19 12/18/20





Tartrate ER]   


 


Ascorbic Acid [Vitamin C] 500 mg PO DAILY 05/22/20 12/18/20


 


Dulaglutide [Trulicity] 1.5 mg SQ OAW 05/22/20 12/18/20


 


Amlodipine Besylate 5 mg PO DAILY 10/29/20 12/18/20


 


Aspirin Chewable [[place] 81 mg PO DAILY PM 10/29/20 12/18/20





Aspirin]   


 


Clopidogrel [Plavix] 75 mg PO DAILY 10/29/20 12/18/20


 


Cyanocobalamin (Vitamin B-12) 1,000 mcg PO DAILY 10/29/20 12/18/20





[Vitamin B-12]   


 


Gabapentin [Neurontin] 300 mg PO DAILY PM 10/29/20 12/18/20


 


Rosuvastatin Calcium 40 mg PO DAILY PM 10/29/20 12/18/20


 


Multivitamin 1 tab PO DAILY 12/18/20 12/18/20


 


Nitroglycerin [Nitrostat] 1 tab PO PRN PRN 12/18/20 12/18/20


 


Spironolactone [Aldactone] 1 tab PO DAILY 12/18/20 12/18/20














- Allergies


Allergies/Adverse Reactions: 


                                    Allergies











Allergy/AdvReac Type Severity Reaction Status Date / Time


 


succinylcholine Allergy Unknown Respiratory Verified 12/18/20 13:44


 


losartan [From Cozaar] Allergy  Unknown Verified 12/18/20 13:44


 


tramadol Allergy  Unknown Verified 12/18/20 13:44


 


bacitracin AdvReac Unknown Rash Verified 12/18/20 13:44





[From Neosporin     





([id number])]     


 


bacitracin zinc * AdvReac Unknown Rash Verified 12/18/20 13:44





[From Neosporin     





([id number])]     


 


diphenhydramine HCl * AdvReac Unknown Edema Verified 12/18/20 13:44





[From Benadryl]     


 


neomycin sulfate * AdvReac Unknown Rash Verified 12/18/20 13:44





[From Neosporin     





([id number])]     


 


polymyxin B AdvReac Unknown Rash Verified 12/18/20 13:44





[From Neosporin     





([id number])]     


 


Sulfa (Sulfonamide AdvReac Unknown Rash Verified 12/18/20 13:44





Antibiotics)     














- Social History


Does the pt smoke?: No


Smoking Status: Never smoker


Does the pt drink ETOH?: No


Does the pt have substance abuse?: No





- Immunizations


Immunizations are current?: Yes





- POLST


Patient has POLST: No





PD ED PE NORMAL





- Vitals


Vital signs reviewed: Yes





- General


General: Alert and oriented X 3, No acute distress, Well developed/nourished





- HEENT


HEENT: Moist mucous membranes, Pharynx benign





- Neck


Neck: Supple, no meningeal sign, No adenopathy





- Cardiac


Cardiac: RRR, No murmur





- Respiratory


Respiratory: Clear bilaterally





- Abdomen


Abdomen: Normal bowel sounds, Soft, Non tender, Non distended





- Back


Back: No CVA TTP





- Derm


Derm: Normal color, Warm and dry





- Extremities


Extremities: Normal ROM s pain, No edema, No calf tenderness / cord





- Neuro


Neuro: Alert and oriented X 3, No motor deficit, Normal speech





- Psych


Psych: Normal mood, Normal affect





Results





- Vitals


Vitals: 


                               Vital Signs - 24 hr











  12/18/20 12/18/20 12/18/20





  13:36 15:11 15:55


 


Temperature 36.8 C  


 


Heart Rate 69 62 63


 


Respiratory 17 16 18





Rate   


 


Blood Pressure 133/54 H 140/60 H 133/77 H


 


O2 Saturation 100 98 95








                                     Oxygen











O2 Source                      Room air

















- Labs


Labs: 


                                Laboratory Tests











  12/18/20 12/18/20 12/18/20





  13:43 14:08 14:08


 


WBC   7.5 


 


RBC   4.79 


 


Hgb   13.5 


 


Hct   42.5 


 


MCV   88.7 


 


MCH   28.2 


 


MCHC   31.8 L 


 


RDW   13.2 


 


Plt Count   265 


 


MPV   9.8 


 


Neut # (Auto)   4.8 


 


Lymph # (Auto)   1.8 


 


Mono # (Auto)   0.5 


 


Eos # (Auto)   0.3 


 


Baso # (Auto)   0.0 


 


Absolute Nucleated RBC   0.00 


 


Nucleated RBC %   0.0 


 


Sodium    134 L


 


Potassium    4.1


 


Chloride    99 L


 


Carbon Dioxide    23


 


Anion Gap    12.0


 


BUN    23 H


 


Creatinine    1.1 H


 


Estimated GFR (MDRD)    48 L


 


Glucose    320 H


 


POC Whole Bld Glucose  358 H  


 


Calcium    9.9


 


Magnesium    2.3


 


Urine Color   


 


Urine Clarity   


 


Urine pH   


 


Ur Specific Gravity   


 


Urine Protein   


 


Urine Glucose (UA)   


 


Urine Ketones   


 


Urine Occult Blood   


 


Urine Nitrite   


 


Urine Bilirubin   


 


Urine Urobilinogen   


 


Ur Leukocyte Esterase   


 


Ur Microscopic Review   


 


Urine Culture Comments   


 


Serum Ketones    NEGATIVE














  12/18/20





  14:10


 


WBC 


 


RBC 


 


Hgb 


 


Hct 


 


MCV 


 


MCH 


 


MCHC 


 


RDW 


 


Plt Count 


 


MPV 


 


Neut # (Auto) 


 


Lymph # (Auto) 


 


Mono # (Auto) 


 


Eos # (Auto) 


 


Baso # (Auto) 


 


Absolute Nucleated RBC 


 


Nucleated RBC % 


 


Sodium 


 


Potassium 


 


Chloride 


 


Carbon Dioxide 


 


Anion Gap 


 


BUN 


 


Creatinine 


 


Estimated GFR (MDRD) 


 


Glucose 


 


POC Whole Bld Glucose 


 


Calcium 


 


Magnesium 


 


Urine Color  YELLOW


 


Urine Clarity  CLEAR


 


Urine pH  7.0


 


Ur Specific Gravity  1.020


 


Urine Protein  NEGATIVE


 


Urine Glucose (UA)  500 H


 


Urine Ketones  NEGATIVE


 


Urine Occult Blood  NEGATIVE


 


Urine Nitrite  NEGATIVE


 


Urine Bilirubin  NEGATIVE


 


Urine Urobilinogen  0.2 (NORMAL)


 


Ur Leukocyte Esterase  NEGATIVE


 


Ur Microscopic Review  NOT INDICATED


 


Urine Culture Comments  NOT INDICATED


 


Serum Ketones 














PD MEDICAL DECISION MAKING





- ED course


Complexity details: reviewed results, re-evaluated patient (feeling okay and 

FSBS into 200s on recheck. No acute findings on labs/UA. ), considered 

differential (sugars are elevated without obvious cause. No change diet nor med 

dose. Denies infectious symptoms. Can check labs and UA. ), d/w patient





Departure





- Departure


Disposition: 01 Home, Self Care


Clinical Impression: 


 Hyperglycemia





Condition: Stable


Record reviewed to determine appropriate education?: Yes


Comments: 


Your urine test is normal.  Your blood tests are good except for the elevated 

sugar.  No signs of more significant problem nor an infection to be causing the 

sugar to be high at this time.





Continue usual medications.  See how your sugars run over the next few days.  

Return if any worsening symptoms overall.


Discharge Date/Time: 12/18/20 15:55 normal... Well appearing, awake, alert, oriented to person, place, time/situation and in no apparent distress.

## 2021-10-05 ENCOUNTER — EMERGENCY (EMERGENCY)
Facility: HOSPITAL | Age: 61
LOS: 1 days | Discharge: HOME | End: 2021-10-05
Attending: EMERGENCY MEDICINE
Payer: MEDICAID

## 2021-10-05 VITALS
DIASTOLIC BLOOD PRESSURE: 85 MMHG | TEMPERATURE: 98 F | SYSTOLIC BLOOD PRESSURE: 124 MMHG | WEIGHT: 171.08 LBS | HEIGHT: 66 IN | OXYGEN SATURATION: 95 % | RESPIRATION RATE: 18 BRPM | HEART RATE: 116 BPM

## 2021-10-05 LAB
ALBUMIN SERPL ELPH-MCNC: 4.5 G/DL — SIGNIFICANT CHANGE UP (ref 3.3–5)
ALP SERPL-CCNC: 58 U/L — SIGNIFICANT CHANGE UP (ref 40–120)
ALT FLD-CCNC: 15 U/L — SIGNIFICANT CHANGE UP (ref 10–45)
ANION GAP SERPL CALC-SCNC: 16 MMOL/L — SIGNIFICANT CHANGE UP (ref 5–17)
AST SERPL-CCNC: 13 U/L — SIGNIFICANT CHANGE UP (ref 10–40)
BASOPHILS # BLD AUTO: 0.02 K/UL — SIGNIFICANT CHANGE UP (ref 0–0.2)
BASOPHILS NFR BLD AUTO: 0.2 % — SIGNIFICANT CHANGE UP (ref 0–2)
BILIRUB SERPL-MCNC: 0.6 MG/DL — SIGNIFICANT CHANGE UP (ref 0.2–1.2)
BUN SERPL-MCNC: 22 MG/DL — SIGNIFICANT CHANGE UP (ref 7–23)
CALCIUM SERPL-MCNC: 10 MG/DL — SIGNIFICANT CHANGE UP (ref 8.4–10.5)
CHLORIDE SERPL-SCNC: 99 MMOL/L — SIGNIFICANT CHANGE UP (ref 96–108)
CO2 SERPL-SCNC: 24 MMOL/L — SIGNIFICANT CHANGE UP (ref 22–31)
CREAT SERPL-MCNC: 1.03 MG/DL — SIGNIFICANT CHANGE UP (ref 0.5–1.3)
EOSINOPHIL # BLD AUTO: 0.14 K/UL — SIGNIFICANT CHANGE UP (ref 0–0.5)
EOSINOPHIL NFR BLD AUTO: 1.7 % — SIGNIFICANT CHANGE UP (ref 0–6)
GLUCOSE SERPL-MCNC: 136 MG/DL — HIGH (ref 70–99)
HCT VFR BLD CALC: 43.6 % — SIGNIFICANT CHANGE UP (ref 39–50)
HGB BLD-MCNC: 14.4 G/DL — SIGNIFICANT CHANGE UP (ref 13–17)
HIV 1 & 2 AB SERPL IA.RAPID: SIGNIFICANT CHANGE UP
IMM GRANULOCYTES NFR BLD AUTO: 0.2 % — SIGNIFICANT CHANGE UP (ref 0–1.5)
LYMPHOCYTES # BLD AUTO: 1.08 K/UL — SIGNIFICANT CHANGE UP (ref 1–3.3)
LYMPHOCYTES # BLD AUTO: 13.3 % — SIGNIFICANT CHANGE UP (ref 13–44)
MAGNESIUM SERPL-MCNC: 2.1 MG/DL — SIGNIFICANT CHANGE UP (ref 1.6–2.6)
MCHC RBC-ENTMCNC: 28.7 PG — SIGNIFICANT CHANGE UP (ref 27–34)
MCHC RBC-ENTMCNC: 33 GM/DL — SIGNIFICANT CHANGE UP (ref 32–36)
MCV RBC AUTO: 86.9 FL — SIGNIFICANT CHANGE UP (ref 80–100)
MONOCYTES # BLD AUTO: 0.66 K/UL — SIGNIFICANT CHANGE UP (ref 0–0.9)
MONOCYTES NFR BLD AUTO: 8.1 % — SIGNIFICANT CHANGE UP (ref 2–14)
NEUTROPHILS # BLD AUTO: 6.21 K/UL — SIGNIFICANT CHANGE UP (ref 1.8–7.4)
NEUTROPHILS NFR BLD AUTO: 76.5 % — SIGNIFICANT CHANGE UP (ref 43–77)
NRBC # BLD: 0 /100 WBCS — SIGNIFICANT CHANGE UP (ref 0–0)
PHOSPHATE SERPL-MCNC: 2.2 MG/DL — LOW (ref 2.5–4.5)
PLATELET # BLD AUTO: 247 K/UL — SIGNIFICANT CHANGE UP (ref 150–400)
POTASSIUM SERPL-MCNC: 4.1 MMOL/L — SIGNIFICANT CHANGE UP (ref 3.5–5.3)
POTASSIUM SERPL-SCNC: 4.1 MMOL/L — SIGNIFICANT CHANGE UP (ref 3.5–5.3)
PROT SERPL-MCNC: 8.3 G/DL — SIGNIFICANT CHANGE UP (ref 6–8.3)
RBC # BLD: 5.02 M/UL — SIGNIFICANT CHANGE UP (ref 4.2–5.8)
RBC # FLD: 12.6 % — SIGNIFICANT CHANGE UP (ref 10.3–14.5)
SODIUM SERPL-SCNC: 139 MMOL/L — SIGNIFICANT CHANGE UP (ref 135–145)
WBC # BLD: 8.13 K/UL — SIGNIFICANT CHANGE UP (ref 3.8–10.5)
WBC # FLD AUTO: 8.13 K/UL — SIGNIFICANT CHANGE UP (ref 3.8–10.5)

## 2021-10-05 PROCEDURE — 84484 ASSAY OF TROPONIN QUANT: CPT

## 2021-10-05 PROCEDURE — 86703 HIV-1/HIV-2 1 RESULT ANTBDY: CPT

## 2021-10-05 PROCEDURE — 93005 ELECTROCARDIOGRAM TRACING: CPT

## 2021-10-05 PROCEDURE — 80053 COMPREHEN METABOLIC PANEL: CPT

## 2021-10-05 PROCEDURE — 99285 EMERGENCY DEPT VISIT HI MDM: CPT

## 2021-10-05 PROCEDURE — 85025 COMPLETE CBC W/AUTO DIFF WBC: CPT

## 2021-10-05 PROCEDURE — 84100 ASSAY OF PHOSPHORUS: CPT

## 2021-10-05 PROCEDURE — 99283 EMERGENCY DEPT VISIT LOW MDM: CPT

## 2021-10-05 PROCEDURE — 83735 ASSAY OF MAGNESIUM: CPT

## 2021-10-05 RX ORDER — CHLORPROMAZINE HCL 10 MG
50 TABLET ORAL ONCE
Refills: 0 | Status: COMPLETED | OUTPATIENT
Start: 2021-10-05 | End: 2021-10-05

## 2021-10-05 RX ORDER — CHLORPROMAZINE HCL 10 MG
1 TABLET ORAL
Qty: 15 | Refills: 0
Start: 2021-10-05 | End: 2021-10-09

## 2021-10-05 RX ADMIN — Medication 50 MILLIGRAM(S): at 13:17

## 2021-10-05 NOTE — ED ADULT NURSE NOTE - NSIMPLEMENTINTERV_GEN_ALL_ED
Implemented All Fall Risk Interventions:  [place] to call system. Call bell, personal items and telephone within reach. Instruct patient to call for assistance. Room bathroom lighting operational. Non-slip footwear when patient is off stretcher. Physically safe environment: no spills, clutter or unnecessary equipment. Stretcher in lowest position, wheels locked, appropriate side rails in place. Provide visual cue, wrist band, yellow gown, etc. Monitor gait and stability. Monitor for mental status changes and reorient to person, place, and time. Review medications for side effects contributing to fall risk. Reinforce activity limits and safety measures with patient and family.

## 2021-10-05 NOTE — ED PROVIDER NOTE - NSFOLLOWUPINSTRUCTIONS_ED_ALL_ED_FT
Home Care:  keep your follow up appointment with your primary care doctor  See your primary care doctor within 24-48 hours.    Call your doctor or return to the emergency department if worse or:    There is temperature greater than 100.4  The neck becomes stiff or painful  Blurred vision, double vision, or eye pain occurs.  Trouble walking or maintaining balance occurs.  Dizziness, weakness or passing out occurs.  Confusion occurs.  Vomiting occurs  No improvement occurs in 24- 48 hours.

## 2021-10-05 NOTE — ED PROVIDER NOTE - CLINICAL SUMMARY MEDICAL DECISION MAKING FREE TEXT BOX
Nemes - 59yo M pmhx cerebral ataxia? presents to ED c/o hiccups that has been occurring for 4 days, intermittent but numerous episodes that has been constant. Patient wife at bedside endorsing history, states has tried taking Famotidine and Reglan with slight improvement of symptoms but hasn't stopped hiccuping for significant amount of time. Patient states called PCP  Dr. Hooper who recommended patient come to the emergency room for administration of medication to stop hiccups. Patient denies feeling light headed, blurry vision,  SOB, CP, palpitations, edema, n/v, d/c, AP, fever, chills, or nasal congestion.      Cardiology: Dr. De La Vega last visit December  PCP Sandie seen approx 1 week ago. Delmeres - 61yo M pmhx cerebral ataxia? presents to ED c/o hiccups that has been occurring for 4 days, intermittent but numerous episodes that has been intermittent but numerous episodes throughout the day. Patient wife at bedside endorsing history, states has tried taking Famotidine and Reglan with slight improvement of symptoms (Rx by PMD) but hasn't stopped hiccuping Patient denies feeling light headed, blurry vision,  SOB, CP, palpitations, edema, n/v, d/c, AP, fever, chills, or nasal congestion.    VS wnl except tacy to 116, uncomfortable, actively hiccuping on exam. Moist mucosae, pink conjunctivae. Neck supple, neuro grossly intact. Lungs clear, cardiac wnl, no JVD. Abdomen soft/NT, no CVAT. No pedal edema, no calf TTP.  Low suspicion for cardiac etiology. Will get electrolytes, cardiac w/u, treat w Thorazine, reevaluate, likely DC w further comprehensive outpatient w/u

## 2021-10-05 NOTE — ED ADULT NURSE NOTE - OBJECTIVE STATEMENT
59 yo M aaox3 c/o of hiccups onset on Friday. Prescribed reglan and pepcid outpatient. Seen outpatient and was recommended for MRI for "Cerebral ataxia". Pt denies cp dizziness or sob. IV line placed labs drawn and sent.  No head trauma. Awaiting provider eval.

## 2021-10-05 NOTE — ED PROVIDER NOTE - PATIENT PORTAL LINK FT
You can access the FollowMyHealth Patient Portal offered by [facility] by registering at the following website: [email or web address]. By joining [facility]’s FollowMyHealth portal, you will also be able to view your health information using other applications (apps) compatible with our system.

## 2021-10-05 NOTE — ED PROVIDER NOTE - OBJECTIVE STATEMENT
61 y/o M pmhx cerebral ataxia? presents to ED c/o hiccups that has been occurring since 10/1/21 that has been constant. Patient wife at bedside endorsing history, states has tried taking Famotidine and Reglan with slight improvement of symptoms but hasn't stopped hiccuping for significant amount of time. Patient states called PCP  Dr. Hooper who recommended patient come to the emergency room for administration of medication to stop hiccups. Patient denies feeling light headed, blurry vision,  SOB, CP, palpitations, edema, n/v, d/c, AP, fever, chills, or nasal congestion.      Cardiology: Dr. De La Vega last visit December  PCP Sandie seen approx 1 week ago.

## 2022-04-17 ENCOUNTER — INPATIENT (INPATIENT)
Facility: HOSPITAL | Age: 62
LOS: 25 days | Discharge: NURSING, REHAB OR LONG-TERM CARE | DRG: 870 | End: 2022-05-13
Attending: INTERNAL MEDICINE | Admitting: STUDENT IN AN ORGANIZED HEALTH CARE EDUCATION/TRAINING PROGRAM
Payer: MEDICAID

## 2022-04-17 VITALS — HEART RATE: 113 BPM | RESPIRATION RATE: 18 BRPM | TEMPERATURE: 97 F | OXYGEN SATURATION: 97 % | HEIGHT: 66 IN

## 2022-04-17 LAB
ALBUMIN SERPL ELPH-MCNC: 4.5 G/DL — SIGNIFICANT CHANGE UP (ref 3.3–5)
ALP SERPL-CCNC: 71 U/L — SIGNIFICANT CHANGE UP (ref 40–120)
ALT FLD-CCNC: 25 U/L — SIGNIFICANT CHANGE UP (ref 10–45)
ANION GAP SERPL CALC-SCNC: 20 MMOL/L — HIGH (ref 5–17)
APPEARANCE UR: ABNORMAL
APTT BLD: 34.3 SEC — SIGNIFICANT CHANGE UP (ref 27.5–35.5)
AST SERPL-CCNC: 25 U/L — SIGNIFICANT CHANGE UP (ref 10–40)
BACTERIA # UR AUTO: NEGATIVE — SIGNIFICANT CHANGE UP
BASE EXCESS BLDV CALC-SCNC: 3.7 MMOL/L — HIGH (ref -2–2)
BASOPHILS # BLD AUTO: 0.02 K/UL — SIGNIFICANT CHANGE UP (ref 0–0.2)
BASOPHILS NFR BLD AUTO: 0.2 % — SIGNIFICANT CHANGE UP (ref 0–2)
BILIRUB SERPL-MCNC: 0.5 MG/DL — SIGNIFICANT CHANGE UP (ref 0.2–1.2)
BILIRUB UR-MCNC: NEGATIVE — SIGNIFICANT CHANGE UP
BLD GP AB SCN SERPL QL: NEGATIVE — SIGNIFICANT CHANGE UP
BUN SERPL-MCNC: 26 MG/DL — HIGH (ref 7–23)
CA-I SERPL-SCNC: 1.3 MMOL/L — SIGNIFICANT CHANGE UP (ref 1.15–1.33)
CALCIUM SERPL-MCNC: 11 MG/DL — HIGH (ref 8.4–10.5)
CHLORIDE BLDV-SCNC: 101 MMOL/L — SIGNIFICANT CHANGE UP (ref 96–108)
CHLORIDE SERPL-SCNC: 99 MMOL/L — SIGNIFICANT CHANGE UP (ref 96–108)
CO2 BLDV-SCNC: 33 MMOL/L — HIGH (ref 22–26)
CO2 SERPL-SCNC: 24 MMOL/L — SIGNIFICANT CHANGE UP (ref 22–31)
COLOR SPEC: YELLOW — SIGNIFICANT CHANGE UP
CREAT SERPL-MCNC: 1.05 MG/DL — SIGNIFICANT CHANGE UP (ref 0.5–1.3)
DIFF PNL FLD: ABNORMAL
EGFR: 81 ML/MIN/1.73M2 — SIGNIFICANT CHANGE UP
EOSINOPHIL # BLD AUTO: 0.07 K/UL — SIGNIFICANT CHANGE UP (ref 0–0.5)
EOSINOPHIL NFR BLD AUTO: 0.7 % — SIGNIFICANT CHANGE UP (ref 0–6)
EPI CELLS # UR: 0 /HPF — SIGNIFICANT CHANGE UP
GAS PNL BLDV: 137 MMOL/L — SIGNIFICANT CHANGE UP (ref 136–145)
GAS PNL BLDV: SIGNIFICANT CHANGE UP
GAS PNL BLDV: SIGNIFICANT CHANGE UP
GLUCOSE BLDV-MCNC: 107 MG/DL — HIGH (ref 70–99)
GLUCOSE SERPL-MCNC: 106 MG/DL — HIGH (ref 70–99)
GLUCOSE UR QL: NEGATIVE — SIGNIFICANT CHANGE UP
HCO3 BLDV-SCNC: 31 MMOL/L — HIGH (ref 22–29)
HCT VFR BLD CALC: 47.4 % — SIGNIFICANT CHANGE UP (ref 39–50)
HCT VFR BLDA CALC: 44 % — SIGNIFICANT CHANGE UP (ref 39–51)
HGB BLD CALC-MCNC: 14.7 G/DL — SIGNIFICANT CHANGE UP (ref 12.6–17.4)
HGB BLD-MCNC: 15.8 G/DL — SIGNIFICANT CHANGE UP (ref 13–17)
IMM GRANULOCYTES NFR BLD AUTO: 0.5 % — SIGNIFICANT CHANGE UP (ref 0–1.5)
INR BLD: 1.07 RATIO — SIGNIFICANT CHANGE UP (ref 0.88–1.16)
KETONES UR-MCNC: ABNORMAL
LACTATE BLDV-MCNC: 3.7 MMOL/L — HIGH (ref 0.7–2)
LEUKOCYTE ESTERASE UR-ACNC: NEGATIVE — SIGNIFICANT CHANGE UP
LYMPHOCYTES # BLD AUTO: 1.04 K/UL — SIGNIFICANT CHANGE UP (ref 1–3.3)
LYMPHOCYTES # BLD AUTO: 11.1 % — LOW (ref 13–44)
MCHC RBC-ENTMCNC: 28.5 PG — SIGNIFICANT CHANGE UP (ref 27–34)
MCHC RBC-ENTMCNC: 33.3 GM/DL — SIGNIFICANT CHANGE UP (ref 32–36)
MCV RBC AUTO: 85.6 FL — SIGNIFICANT CHANGE UP (ref 80–100)
MONOCYTES # BLD AUTO: 0.65 K/UL — SIGNIFICANT CHANGE UP (ref 0–0.9)
MONOCYTES NFR BLD AUTO: 6.9 % — SIGNIFICANT CHANGE UP (ref 2–14)
NEUTROPHILS # BLD AUTO: 7.56 K/UL — HIGH (ref 1.8–7.4)
NEUTROPHILS NFR BLD AUTO: 80.6 % — HIGH (ref 43–77)
NITRITE UR-MCNC: NEGATIVE — SIGNIFICANT CHANGE UP
NRBC # BLD: 0 /100 WBCS — SIGNIFICANT CHANGE UP (ref 0–0)
PCO2 BLDV: 56 MMHG — HIGH (ref 42–55)
PH BLDV: 7.35 — SIGNIFICANT CHANGE UP (ref 7.32–7.43)
PH UR: 6.5 — SIGNIFICANT CHANGE UP (ref 5–8)
PLATELET # BLD AUTO: 230 K/UL — SIGNIFICANT CHANGE UP (ref 150–400)
PO2 BLDV: 26 MMHG — SIGNIFICANT CHANGE UP (ref 25–45)
POTASSIUM BLDV-SCNC: 4.7 MMOL/L — SIGNIFICANT CHANGE UP (ref 3.5–5.1)
POTASSIUM SERPL-MCNC: 4 MMOL/L — SIGNIFICANT CHANGE UP (ref 3.5–5.3)
POTASSIUM SERPL-SCNC: 4 MMOL/L — SIGNIFICANT CHANGE UP (ref 3.5–5.3)
PROT SERPL-MCNC: 8.8 G/DL — HIGH (ref 6–8.3)
PROT UR-MCNC: ABNORMAL
PROTHROM AB SERPL-ACNC: 12.4 SEC — SIGNIFICANT CHANGE UP (ref 10.5–13.4)
RBC # BLD: 5.54 M/UL — SIGNIFICANT CHANGE UP (ref 4.2–5.8)
RBC # FLD: 13.6 % — SIGNIFICANT CHANGE UP (ref 10.3–14.5)
RBC CASTS # UR COMP ASSIST: 195 /HPF — HIGH (ref 0–4)
RH IG SCN BLD-IMP: POSITIVE — SIGNIFICANT CHANGE UP
SAO2 % BLDV: 40.4 % — LOW (ref 67–88)
SODIUM SERPL-SCNC: 143 MMOL/L — SIGNIFICANT CHANGE UP (ref 135–145)
SP GR SPEC: 1.03 — HIGH (ref 1.01–1.02)
TROPONIN T, HIGH SENSITIVITY RESULT: 13 NG/L — SIGNIFICANT CHANGE UP (ref 0–51)
UROBILINOGEN FLD QL: NEGATIVE — SIGNIFICANT CHANGE UP
WBC # BLD: 9.39 K/UL — SIGNIFICANT CHANGE UP (ref 3.8–10.5)
WBC # FLD AUTO: 9.39 K/UL — SIGNIFICANT CHANGE UP (ref 3.8–10.5)
WBC UR QL: 4 /HPF — SIGNIFICANT CHANGE UP (ref 0–5)

## 2022-04-17 PROCEDURE — 71260 CT THORAX DX C+: CPT | Mod: 26,MA

## 2022-04-17 PROCEDURE — 74177 CT ABD & PELVIS W/CONTRAST: CPT | Mod: 26,MA

## 2022-04-17 PROCEDURE — 70498 CT ANGIOGRAPHY NECK: CPT | Mod: 26,MA

## 2022-04-17 PROCEDURE — 99285 EMERGENCY DEPT VISIT HI MDM: CPT

## 2022-04-17 PROCEDURE — 70496 CT ANGIOGRAPHY HEAD: CPT | Mod: 26,MA

## 2022-04-17 RX ORDER — SODIUM CHLORIDE 9 MG/ML
1000 INJECTION, SOLUTION INTRAVENOUS ONCE
Refills: 0 | Status: COMPLETED | OUTPATIENT
Start: 2022-04-17 | End: 2022-04-17

## 2022-04-17 RX ORDER — ONDANSETRON 8 MG/1
4 TABLET, FILM COATED ORAL ONCE
Refills: 0 | Status: COMPLETED | OUTPATIENT
Start: 2022-04-17 | End: 2022-04-17

## 2022-04-17 RX ADMIN — SODIUM CHLORIDE 1000 MILLILITER(S): 9 INJECTION, SOLUTION INTRAVENOUS at 22:27

## 2022-04-17 RX ADMIN — ONDANSETRON 4 MILLIGRAM(S): 8 TABLET, FILM COATED ORAL at 22:20

## 2022-04-17 NOTE — ED PROVIDER NOTE - PROGRESS NOTE DETAILS
Ford, PGY3 - PVR 425cc - coronel placed for retention Ford, PGY3 - pt reassessed, appears more awake, showed me 2 fingers after I tapped his hand. not following more complex commands or answering questions. Ford, PGY3 - pt desaturated to 88% on RA, improved to mid 90s on 1L NC.

## 2022-04-17 NOTE — ED ADULT NURSE NOTE - OBJECTIVE STATEMENT
62 YO male with PMH cerebellar ataxia, via walk in presenting with complaints of weakness. As per patient's wife, pt has had weakness for the last 3 days, associated with nausea, multiple episodes of NBNB emesis.   CODE STROKE ACTIVATED, then cancelled.   Pt Axox1, PERRL 3 mm. Lungs clear throughout bilateral, respirations even, & non-labored. S1S2 heard, pulses strong and equal bilaterally. Abdomen soft, non-tender, non-distended. Skin warm, dry, and intact. Pt placed in position of comfort. Pt educated on call bell system and provided call bell. Bed in lowest position, wheels locked, appropriate side rails raised. Pt denies needs at this time.

## 2022-04-17 NOTE — ED PROVIDER NOTE - CARE PLAN
1 Principal Discharge DX:	Altered mental status  Secondary Diagnosis:	Inability to walk   Principal Discharge DX:	Altered mental status  Secondary Diagnosis:	Pneumonia

## 2022-04-17 NOTE — ED ADULT NURSE REASSESSMENT NOTE - NS ED NURSE REASSESS COMMENT FT1
Indwelling urinary "coronel" catheter inserted using sterile technique. Procedure, risks, and benefits of catheter explained to patient, patient verbalized understanding. Second RN present to confirm sterility. Pt tolerated well. Urinary catheter drained clear ishmael urine, no clots visualized. Bedside drainage to gravity. Stat lock in place.

## 2022-04-17 NOTE — ED ADULT NURSE REASSESSMENT NOTE - NS ED NURSE REASSESS COMMENT FT1
While pt in CT scanner, pt vomiting. Pt returned to GREEN A and to be assessed further and returned to CT at later time after reassessment.

## 2022-04-17 NOTE — ED ADULT NURSE NOTE - IS THE PATIENT ABLE TO BE SCREENED?
From: Dex Manzanares  To: Trever Meza MD  Sent: 11/27/2018 5:14 AM CST  Subject: Meloxicam refill    Could you please start sending 90 refills. A 30 day refill costs me about $7.00 and a 90 day refill only cost me $8.00.  Thank you
Medication:Meloxicam    Last Seen:10/29/2018    Last Refilled:10/29/2018    Next Appointment:No follow up on file
Recommended follow up 3months. 90 day supply sent.
No

## 2022-04-17 NOTE — ED PROVIDER NOTE - OBJECTIVE STATEMENT
history provided by pt's wife at bedside - pt awake, moving all extremities spontaneously, not following commands or answering questions     62 y/o M PMH cerebellar ataxia, takes no medications presents to ED from home c/o worsening mental status over the last 3 days. wife states pt ambulates slowly at baseline and needs assistance but over the last 3 days has not been walking around at all and had a few episodes of dark vomit. Denies f/c, cough, congestion, falls or head injuries at home.

## 2022-04-17 NOTE — ED PROVIDER NOTE - CLINICAL SUMMARY MEDICAL DECISION MAKING FREE TEXT BOX
60 y/o M PMH cerebellar ataxia, takes no medications presents to ED from home c/o worsening mental status over the last 3 days +dark vomiting at home per wife. concern for GIB, encephalopathy, CVA, uremia, anemia. plan labs CTs ekg admit

## 2022-04-17 NOTE — ED PROVIDER NOTE - PHYSICAL EXAMINATION
Gen: well developed male, ill appearing   HEENT: NCAT, EOMI, no nasal discharge, mucous membranes moist pupils 3mm equal and reactive b/l. no signs of head trauma   CV: tachycardic, +S1/S2, no M/R/G +distal pulses 2+ intact and equal b/l all extremities   Resp: CTAB, no W/R/R  GI: Abdomen soft nondistended +NTTP +lower abdominal fullness   MSK: No open wounds, no bruising, no LE edema  Neuro: A&Ox0, following commands, moving all four extremities spontaneously  skin: warm, cap refill <2s

## 2022-04-17 NOTE — ED ADULT NURSE REASSESSMENT NOTE - NS ED NURSE REASSESS COMMENT FT1
While preparing for straight catheter procedure, pt began urinating. Clean catch obtained. Urinalysis and urine culture obtained and sent to the laboratory. Approximately 350 mL on bladder scanner. Plan of care to place indwelling catheter.

## 2022-04-17 NOTE — ED PROVIDER NOTE - ATTENDING CONTRIBUTION TO CARE
I have personally seen and examined this patient.  I have fully participated in the care of this patient. I performed a substantive portion of the visit including all aspects of the medical decision making. I have reviewed all pertinent clinical information, including history, physical exam, plan and the Resident’s note and agree except as noted. - MD Neal.    60 yo M, with ataxia, unknown etiology, now completely unambulatory x 2-3 wks, chronic appearance, not considered acute stroke, but given no ambulation, needs medical admission and likely replacement to RH,  labs, ct, ua, metabolic work up, admission.

## 2022-04-18 DIAGNOSIS — J69.0 PNEUMONITIS DUE TO INHALATION OF FOOD AND VOMIT: ICD-10-CM

## 2022-04-18 DIAGNOSIS — R41.82 ALTERED MENTAL STATUS, UNSPECIFIED: ICD-10-CM

## 2022-04-18 DIAGNOSIS — G93.40 ENCEPHALOPATHY, UNSPECIFIED: ICD-10-CM

## 2022-04-18 DIAGNOSIS — R29.818 OTHER SYMPTOMS AND SIGNS INVOLVING THE NERVOUS SYSTEM: ICD-10-CM

## 2022-04-18 DIAGNOSIS — Z29.9 ENCOUNTER FOR PROPHYLACTIC MEASURES, UNSPECIFIED: ICD-10-CM

## 2022-04-18 DIAGNOSIS — K20.90 ESOPHAGITIS, UNSPECIFIED WITHOUT BLEEDING: ICD-10-CM

## 2022-04-18 DIAGNOSIS — A41.9 SEPSIS, UNSPECIFIED ORGANISM: ICD-10-CM

## 2022-04-18 LAB
A1C WITH ESTIMATED AVERAGE GLUCOSE RESULT: 5.5 % — SIGNIFICANT CHANGE UP (ref 4–5.6)
ALBUMIN SERPL ELPH-MCNC: 3.7 G/DL — SIGNIFICANT CHANGE UP (ref 3.3–5)
ALP SERPL-CCNC: 54 U/L — SIGNIFICANT CHANGE UP (ref 40–120)
ALT FLD-CCNC: 20 U/L — SIGNIFICANT CHANGE UP (ref 10–45)
ANION GAP SERPL CALC-SCNC: 13 MMOL/L — SIGNIFICANT CHANGE UP (ref 5–17)
AST SERPL-CCNC: 17 U/L — SIGNIFICANT CHANGE UP (ref 10–40)
BASE EXCESS BLDV CALC-SCNC: 3.8 MMOL/L — HIGH (ref -2–2)
BASOPHILS # BLD AUTO: 0.01 K/UL — SIGNIFICANT CHANGE UP (ref 0–0.2)
BASOPHILS NFR BLD AUTO: 0.1 % — SIGNIFICANT CHANGE UP (ref 0–2)
BILIRUB SERPL-MCNC: 0.6 MG/DL — SIGNIFICANT CHANGE UP (ref 0.2–1.2)
BUN SERPL-MCNC: 20 MG/DL — SIGNIFICANT CHANGE UP (ref 7–23)
CA-I SERPL-SCNC: 1.25 MMOL/L — SIGNIFICANT CHANGE UP (ref 1.15–1.33)
CALCIUM SERPL-MCNC: 9.6 MG/DL — SIGNIFICANT CHANGE UP (ref 8.4–10.5)
CHLORIDE BLDV-SCNC: 102 MMOL/L — SIGNIFICANT CHANGE UP (ref 96–108)
CHLORIDE SERPL-SCNC: 104 MMOL/L — SIGNIFICANT CHANGE UP (ref 96–108)
CO2 BLDV-SCNC: 32 MMOL/L — HIGH (ref 22–26)
CO2 SERPL-SCNC: 23 MMOL/L — SIGNIFICANT CHANGE UP (ref 22–31)
CREAT SERPL-MCNC: 0.8 MG/DL — SIGNIFICANT CHANGE UP (ref 0.5–1.3)
EGFR: 101 ML/MIN/1.73M2 — SIGNIFICANT CHANGE UP
EOSINOPHIL # BLD AUTO: 0.05 K/UL — SIGNIFICANT CHANGE UP (ref 0–0.5)
EOSINOPHIL NFR BLD AUTO: 0.5 % — SIGNIFICANT CHANGE UP (ref 0–6)
ESTIMATED AVERAGE GLUCOSE: 111 MG/DL — SIGNIFICANT CHANGE UP (ref 68–114)
GAS PNL BLDV: 136 MMOL/L — SIGNIFICANT CHANGE UP (ref 136–145)
GAS PNL BLDV: SIGNIFICANT CHANGE UP
GAS PNL BLDV: SIGNIFICANT CHANGE UP
GLUCOSE BLDV-MCNC: 102 MG/DL — HIGH (ref 70–99)
GLUCOSE SERPL-MCNC: 92 MG/DL — SIGNIFICANT CHANGE UP (ref 70–99)
HCO3 BLDV-SCNC: 30 MMOL/L — HIGH (ref 22–29)
HCT VFR BLD CALC: 39.5 % — SIGNIFICANT CHANGE UP (ref 39–50)
HCT VFR BLDA CALC: 40 % — SIGNIFICANT CHANGE UP (ref 39–51)
HGB BLD CALC-MCNC: 13.2 G/DL — SIGNIFICANT CHANGE UP (ref 12.6–17.4)
HGB BLD-MCNC: 13.3 G/DL — SIGNIFICANT CHANGE UP (ref 13–17)
IMM GRANULOCYTES NFR BLD AUTO: 0.3 % — SIGNIFICANT CHANGE UP (ref 0–1.5)
LACTATE BLDV-MCNC: 1.4 MMOL/L — SIGNIFICANT CHANGE UP (ref 0.7–2)
LYMPHOCYTES # BLD AUTO: 0.99 K/UL — LOW (ref 1–3.3)
LYMPHOCYTES # BLD AUTO: 10 % — LOW (ref 13–44)
MCHC RBC-ENTMCNC: 28.6 PG — SIGNIFICANT CHANGE UP (ref 27–34)
MCHC RBC-ENTMCNC: 33.7 GM/DL — SIGNIFICANT CHANGE UP (ref 32–36)
MCV RBC AUTO: 84.9 FL — SIGNIFICANT CHANGE UP (ref 80–100)
MONOCYTES # BLD AUTO: 0.7 K/UL — SIGNIFICANT CHANGE UP (ref 0–0.9)
MONOCYTES NFR BLD AUTO: 7.1 % — SIGNIFICANT CHANGE UP (ref 2–14)
NEUTROPHILS # BLD AUTO: 8.09 K/UL — HIGH (ref 1.8–7.4)
NEUTROPHILS NFR BLD AUTO: 82 % — HIGH (ref 43–77)
NRBC # BLD: 0 /100 WBCS — SIGNIFICANT CHANGE UP (ref 0–0)
PCO2 BLDV: 54 MMHG — SIGNIFICANT CHANGE UP (ref 42–55)
PH BLDV: 7.36 — SIGNIFICANT CHANGE UP (ref 7.32–7.43)
PLATELET # BLD AUTO: 161 K/UL — SIGNIFICANT CHANGE UP (ref 150–400)
PO2 BLDV: 30 MMHG — SIGNIFICANT CHANGE UP (ref 25–45)
POTASSIUM BLDV-SCNC: 3.9 MMOL/L — SIGNIFICANT CHANGE UP (ref 3.5–5.1)
POTASSIUM SERPL-MCNC: 3.8 MMOL/L — SIGNIFICANT CHANGE UP (ref 3.5–5.3)
POTASSIUM SERPL-SCNC: 3.8 MMOL/L — SIGNIFICANT CHANGE UP (ref 3.5–5.3)
PROT SERPL-MCNC: 6.8 G/DL — SIGNIFICANT CHANGE UP (ref 6–8.3)
RBC # BLD: 4.65 M/UL — SIGNIFICANT CHANGE UP (ref 4.2–5.8)
RBC # FLD: 13.5 % — SIGNIFICANT CHANGE UP (ref 10.3–14.5)
SAO2 % BLDV: 46.7 % — LOW (ref 67–88)
SARS-COV-2 RNA SPEC QL NAA+PROBE: SIGNIFICANT CHANGE UP
SODIUM SERPL-SCNC: 140 MMOL/L — SIGNIFICANT CHANGE UP (ref 135–145)
TROPONIN T, HIGH SENSITIVITY RESULT: 11 NG/L — SIGNIFICANT CHANGE UP (ref 0–51)
TSH SERPL-MCNC: 2.89 UIU/ML — SIGNIFICANT CHANGE UP (ref 0.27–4.2)
WBC # BLD: 9.87 K/UL — SIGNIFICANT CHANGE UP (ref 3.8–10.5)
WBC # FLD AUTO: 9.87 K/UL — SIGNIFICANT CHANGE UP (ref 3.8–10.5)

## 2022-04-18 PROCEDURE — 93010 ELECTROCARDIOGRAM REPORT: CPT

## 2022-04-18 PROCEDURE — 99223 1ST HOSP IP/OBS HIGH 75: CPT

## 2022-04-18 PROCEDURE — 99233 SBSQ HOSP IP/OBS HIGH 50: CPT

## 2022-04-18 PROCEDURE — 12345: CPT | Mod: NC

## 2022-04-18 PROCEDURE — 99255 IP/OBS CONSLTJ NEW/EST HI 80: CPT

## 2022-04-18 RX ORDER — ATORVASTATIN CALCIUM 80 MG/1
20 TABLET, FILM COATED ORAL AT BEDTIME
Refills: 0 | Status: DISCONTINUED | OUTPATIENT
Start: 2022-04-18 | End: 2022-04-29

## 2022-04-18 RX ORDER — HEPARIN SODIUM 5000 [USP'U]/ML
5000 INJECTION INTRAVENOUS; SUBCUTANEOUS EVERY 8 HOURS
Refills: 0 | Status: DISCONTINUED | OUTPATIENT
Start: 2022-04-18 | End: 2022-04-21

## 2022-04-18 RX ORDER — SODIUM CHLORIDE 9 MG/ML
1000 INJECTION, SOLUTION INTRAVENOUS
Refills: 0 | Status: DISCONTINUED | OUTPATIENT
Start: 2022-04-18 | End: 2022-04-19

## 2022-04-18 RX ORDER — PANTOPRAZOLE SODIUM 20 MG/1
40 TABLET, DELAYED RELEASE ORAL DAILY
Refills: 0 | Status: DISCONTINUED | OUTPATIENT
Start: 2022-04-18 | End: 2022-04-27

## 2022-04-18 RX ORDER — LANOLIN ALCOHOL/MO/W.PET/CERES
3 CREAM (GRAM) TOPICAL AT BEDTIME
Refills: 0 | Status: DISCONTINUED | OUTPATIENT
Start: 2022-04-18 | End: 2022-04-22

## 2022-04-18 RX ORDER — AMPICILLIN SODIUM AND SULBACTAM SODIUM 250; 125 MG/ML; MG/ML
3 INJECTION, POWDER, FOR SUSPENSION INTRAMUSCULAR; INTRAVENOUS EVERY 6 HOURS
Refills: 0 | Status: DISCONTINUED | OUTPATIENT
Start: 2022-04-18 | End: 2022-04-19

## 2022-04-18 RX ORDER — PIPERACILLIN AND TAZOBACTAM 4; .5 G/20ML; G/20ML
3.38 INJECTION, POWDER, LYOPHILIZED, FOR SOLUTION INTRAVENOUS ONCE
Refills: 0 | Status: COMPLETED | OUTPATIENT
Start: 2022-04-18 | End: 2022-04-18

## 2022-04-18 RX ORDER — AMPICILLIN SODIUM AND SULBACTAM SODIUM 250; 125 MG/ML; MG/ML
3 INJECTION, POWDER, FOR SUSPENSION INTRAMUSCULAR; INTRAVENOUS ONCE
Refills: 0 | Status: COMPLETED | OUTPATIENT
Start: 2022-04-18 | End: 2022-04-18

## 2022-04-18 RX ORDER — ACETAMINOPHEN 500 MG
650 TABLET ORAL EVERY 6 HOURS
Refills: 0 | Status: DISCONTINUED | OUTPATIENT
Start: 2022-04-18 | End: 2022-04-22

## 2022-04-18 RX ORDER — ONDANSETRON 8 MG/1
4 TABLET, FILM COATED ORAL EVERY 8 HOURS
Refills: 0 | Status: DISCONTINUED | OUTPATIENT
Start: 2022-04-18 | End: 2022-05-07

## 2022-04-18 RX ORDER — AMPICILLIN SODIUM AND SULBACTAM SODIUM 250; 125 MG/ML; MG/ML
INJECTION, POWDER, FOR SUSPENSION INTRAMUSCULAR; INTRAVENOUS
Refills: 0 | Status: DISCONTINUED | OUTPATIENT
Start: 2022-04-18 | End: 2022-04-19

## 2022-04-18 RX ORDER — SODIUM CHLORIDE 9 MG/ML
1000 INJECTION INTRAMUSCULAR; INTRAVENOUS; SUBCUTANEOUS ONCE
Refills: 0 | Status: COMPLETED | OUTPATIENT
Start: 2022-04-18 | End: 2022-04-18

## 2022-04-18 RX ADMIN — Medication 10 MILLIGRAM(S): at 12:35

## 2022-04-18 RX ADMIN — PIPERACILLIN AND TAZOBACTAM 200 GRAM(S): 4; .5 INJECTION, POWDER, LYOPHILIZED, FOR SOLUTION INTRAVENOUS at 00:43

## 2022-04-18 RX ADMIN — AMPICILLIN SODIUM AND SULBACTAM SODIUM 200 GRAM(S): 250; 125 INJECTION, POWDER, FOR SUSPENSION INTRAMUSCULAR; INTRAVENOUS at 08:06

## 2022-04-18 RX ADMIN — AMPICILLIN SODIUM AND SULBACTAM SODIUM 200 GRAM(S): 250; 125 INJECTION, POWDER, FOR SUSPENSION INTRAMUSCULAR; INTRAVENOUS at 17:17

## 2022-04-18 RX ADMIN — HEPARIN SODIUM 5000 UNIT(S): 5000 INJECTION INTRAVENOUS; SUBCUTANEOUS at 12:35

## 2022-04-18 RX ADMIN — PANTOPRAZOLE SODIUM 40 MILLIGRAM(S): 20 TABLET, DELAYED RELEASE ORAL at 12:35

## 2022-04-18 RX ADMIN — HEPARIN SODIUM 5000 UNIT(S): 5000 INJECTION INTRAVENOUS; SUBCUTANEOUS at 22:52

## 2022-04-18 RX ADMIN — PIPERACILLIN AND TAZOBACTAM 3.38 GRAM(S): 4; .5 INJECTION, POWDER, LYOPHILIZED, FOR SOLUTION INTRAVENOUS at 01:15

## 2022-04-18 RX ADMIN — SODIUM CHLORIDE 30 MILLILITER(S): 9 INJECTION, SOLUTION INTRAVENOUS at 18:06

## 2022-04-18 RX ADMIN — AMPICILLIN SODIUM AND SULBACTAM SODIUM 200 GRAM(S): 250; 125 INJECTION, POWDER, FOR SUSPENSION INTRAMUSCULAR; INTRAVENOUS at 23:50

## 2022-04-18 RX ADMIN — SODIUM CHLORIDE 1000 MILLILITER(S): 9 INJECTION INTRAMUSCULAR; INTRAVENOUS; SUBCUTANEOUS at 01:40

## 2022-04-18 RX ADMIN — SODIUM CHLORIDE 1000 MILLILITER(S): 9 INJECTION, SOLUTION INTRAVENOUS at 00:00

## 2022-04-18 RX ADMIN — AMPICILLIN SODIUM AND SULBACTAM SODIUM 200 GRAM(S): 250; 125 INJECTION, POWDER, FOR SUSPENSION INTRAMUSCULAR; INTRAVENOUS at 12:36

## 2022-04-18 RX ADMIN — SODIUM CHLORIDE 1000 MILLILITER(S): 9 INJECTION INTRAMUSCULAR; INTRAVENOUS; SUBCUTANEOUS at 00:40

## 2022-04-18 NOTE — PATIENT PROFILE ADULT - FALL HARM RISK - HARM RISK INTERVENTIONS
Assistance OOB with selected safe patient handling equipment/Communicate Risk of Fall with Harm to all staff/Monitor for mental status changes/Monitor gait and stability/Provide patient with walking aids - walker, cane, crutches/Reinforce activity limits and safety measures with patient and family/Tailored Fall Risk Interventions/Use of alarms - bed, chair and/or voice tab/Visual Cue: Yellow wristband and red socks/Bed in lowest position, wheels locked, appropriate side rails in place/Call bell, personal items and telephone in reach/Instruct patient to call for assistance before getting out of bed or chair/Non-slip footwear when patient is out of bed/[place] to call system/Physically safe environment - no spills, clutter or unnecessary equipment/Purposeful Proactive Rounding/Room/bathroom lighting operational, light cord in reach/Unable to comprehend

## 2022-04-18 NOTE — PATIENT PROFILE ADULT - VISION (WITH CORRECTIVE LENSES IF THE PATIENT USUALLY WEARS THEM):
Pt AOx0-1 and confused, MARE/Normal vision: sees adequately in most situations; can see medication labels, newsprint

## 2022-04-18 NOTE — PHYSICAL THERAPY INITIAL EVALUATION ADULT - STRENGTHENING, PT EVAL
GOAL: Pt will improve BUE/BLE strength by 1 grade to assist with functional mobility/ADLs in 2 weeks.

## 2022-04-18 NOTE — PROGRESS NOTE ADULT - PROBLEM SELECTOR PLAN 1
sepsis vs worsening neurological disorder (h/o cerebral ataxia) did not have MRI outpt  Neuro consulted   sepsis workup as below   Utox pending sepsis vs worsening neurological disorder (h/o cerebral ataxia) did not have MRI outpt  Neuro consulted   sepsis workup as below   Utox pending   unclear why coronel placed in ED, no h/o urinary retention TOV today

## 2022-04-18 NOTE — PHYSICAL THERAPY INITIAL EVALUATION ADULT - IMPAIRMENTS CONTRIBUTING IMPAIRED BED MOBILITY, REHAB EVAL
ataxic/impaired balance/cognition/impaired motor control/impaired postural control/decreased ROM/decreased strength

## 2022-04-18 NOTE — CONSULT NOTE ADULT - TIME BILLING
Examining patient, obtaining additional history from his wife, obtaining results of MRI, discussing plan with neuro team

## 2022-04-18 NOTE — PROVIDER CONTACT NOTE (CHANGE IN STATUS NOTIFICATION) - ASSESSMENT
Pt is AOx0-1, sometimes oriented to name. Pt presents as confused, dx w/altered mental status. BP 99/66, HR 63, RR 20, SpO2 96% on 1LNC. Pt reacts to voice and touch, with illogical speech at times, but unable to follow commands.

## 2022-04-18 NOTE — H&P ADULT - PROBLEM SELECTOR PLAN 2
- patient with radiographic evidence of right sided pneumonia  - suspect aspiration due to multiple episodes of emesis in the setting of AMS  - will start Unasyn  - keep NPO for now pending S/S eval   - remainder of plan as above

## 2022-04-18 NOTE — PROGRESS NOTE ADULT - SUBJECTIVE AND OBJECTIVE BOX
Patient is a 61y old  Male who presents with a chief complaint of AMS 2/2 Pneumonia (2022 06:19)      SUBJECTIVE / OVERNIGHT EVENTS: Patient seen and examined at bedside. States that he feels ok, he is alert but confused, not answering questions appropriately. No acute events overnight     ROS:  All other review of systems negative    Allergies    No Known Allergies    Intolerances        MEDICATIONS  (STANDING):  ampicillin/sulbactam  IVPB      ampicillin/sulbactam  IVPB 3 Gram(s) IV Intermittent every 6 hours  atorvastatin 20 milliGRAM(s) Oral at bedtime  heparin   Injectable 5000 Unit(s) SubCutaneous every 8 hours  pantoprazole  Injectable 40 milliGRAM(s) IV Push daily    MEDICATIONS  (PRN):  acetaminophen     Tablet .. 650 milliGRAM(s) Oral every 6 hours PRN Temp greater or equal to 38C (100.4F), Mild Pain (1 - 3)  aluminum hydroxide/magnesium hydroxide/simethicone Suspension 30 milliLiter(s) Oral every 4 hours PRN Dyspepsia  melatonin 3 milliGRAM(s) Oral at bedtime PRN Insomnia  ondansetron Injectable 4 milliGRAM(s) IV Push every 8 hours PRN Nausea and/or Vomiting      Vital Signs Last 24 Hrs  T(C): 98.1 (2022 09:29), Max: 98.1 (2022 09:29)  T(F): 208.5 (2022 09:29), Max: 208.5 (2022 09:29)  HR: 87 (2022 09:29) (63 - 113)  BP: 105/64 (2022 09:29) (94/78 - 106/80)  BP(mean): 86 (2022 02:15) (71 - 90)  RR: 18 (2022 09:29) (16 - 23)  SpO2: 99% (2022 09:29) (88% - 100%)  CAPILLARY BLOOD GLUCOSE  106 (2022 21:13)      POCT Blood Glucose.: 106 mg/dL (2022 21:01)    I&O's Summary    2022 07:01  -  2022 07:00  --------------------------------------------------------  IN: 2000 mL / OUT: 1400 mL / NET: 600 mL    2022 07:01  -  2022 13:23  --------------------------------------------------------  IN: 0 mL / OUT: 100 mL / NET: -100 mL        PHYSICAL EXAM:  GENERAL: NAD, well-developed+ Hiccups  HEAD:  Atraumatic, Normocephalic  EYES: EOMI, PERRLA, conjunctiva and sclera clear  NECK: Supple, No JVD  CHEST/LUNG: Clear to auscultation bilaterally; No wheeze  HEART: Regular rate and rhythm; No murmurs, rubs, or gallops  ABDOMEN: Soft, Nontender, Nondistended; Bowel sounds present + coronel   EXTREMITIES:  2+ Peripheral Pulses, No clubbing, cyanosis, or edema  NEUROLOGY: AAOx1 ( self), non-focal  PSYCH: calm  SKIN: No rashes or lesions    LABS:                        13.3   9.87  )-----------( 161      ( 2022 07:13 )             39.5     04-18    140  |  104  |  20  ----------------------------<  92  3.8   |  23  |  0.80    Ca    9.6      2022 07:13    TPro  6.8  /  Alb  3.7  /  TBili  0.6  /  DBili  x   /  AST  17  /  ALT  20  /  AlkPhos  54  04-18    PT/INR - ( 2022 21:55 )   PT: 12.4 sec;   INR: 1.07 ratio         PTT - ( 2022 21:55 )  PTT:34.3 sec  CARDIAC MARKERS ( 2022 21:56 )  x     / x     / 37 U/L / x     / x          Urinalysis Basic - ( 2022 21:55 )    Color: Yellow / Appearance: Slightly Turbid / S.026 / pH: x  Gluc: x / Ketone: Small  / Bili: Negative / Urobili: Negative   Blood: x / Protein: 30 mg/dL / Nitrite: Negative   Leuk Esterase: Negative / RBC: 195 /hpf / WBC 4 /HPF   Sq Epi: x / Non Sq Epi: 0 /hpf / Bacteria: Negative        RADIOLOGY & ADDITIONAL TESTS:    Care Discussed with Consultants/Other Providers: Medicine ACP     Np upated patient's wife at bedside this morning

## 2022-04-18 NOTE — H&P ADULT - NSHPLABSRESULTS_GEN_ALL_CORE
Personally reviewed available labs, imaging and ekg  [1]  CBC Full  -  ( 17 Apr 2022 21:55 )  WBC Count : 9.39 K/uL  RBC Count : 5.54 M/uL  Hemoglobin : 15.8 g/dL  Hematocrit : 47.4 %  Platelet Count - Automated : 230 K/uL  Mean Cell Volume : 85.6 fl  Mean Cell Hemoglobin : 28.5 pg  Mean Cell Hemoglobin Concentration : 33.3 gm/dL  Auto Neutrophil # : 7.56 K/uL  Auto Lymphocyte # : 1.04 K/uL  Auto Monocyte # : 0.65 K/uL  Auto Eosinophil # : 0.07 K/uL  Auto Basophil # : 0.02 K/uL  Auto Neutrophil % : 80.6 %  Auto Lymphocyte % : 11.1 %  Auto Monocyte % : 6.9 %  Auto Eosinophil % : 0.7 %  Auto Basophil % : 0.2 %    04-17    143  |  99  |  26<H>  ----------------------------<  106<H>  4.0   |  24  |  1.05    Ca    11.0<H>      17 Apr 2022 21:56    TPro  8.8<H>  /  Alb  4.5  /  TBili  0.5  /  DBili  x   /  AST  25  /  ALT  25  /  AlkPhos  71  04-17    PT/INR - ( 17 Apr 2022 21:55 )   PT: 12.4 sec;   INR: 1.07 ratio         PTT - ( 17 Apr 2022 21:55 )  PTT:34.3 sec    Imaging:  [ 2] I independently reviewed CT of the chest which revealed patchy opacifications with air bronchograms in the right lung     [ 2] I independently reviewed CT head and no territorial hemorrhage is appreciated    EKG:  [2] I independently reviewed EKG/cardiac tracing and NSR with first degree AV block is appreciated

## 2022-04-18 NOTE — PROGRESS NOTE ADULT - ASSESSMENT
Patient is a 60 yo M with PMHx of cerebral ataxia who was brought to the ED due to AMS, found to be septic 2/2 right sided pneumonia

## 2022-04-18 NOTE — PHYSICAL THERAPY INITIAL EVALUATION ADULT - PERTINENT HX OF CURRENT PROBLEM, REHAB EVAL
Patient is a 60 yo M with PMHx of cerebellar ataxia who was brought to the ED due to AMS, found to be septic 2/2 right sided pneumonia

## 2022-04-18 NOTE — H&P ADULT - HISTORY OF PRESENT ILLNESS
Patient is a 62 yo M with PMHx of cerebral ataxia who was brought to the ED due to AMS. History is obtained from chart review and from patients wife as patient is not responding to questions. History is extremely limited. Approximately 1 week prior to presentation, patient began to experience progressive fatigue/weakness. Patients weakness progressed and he had an episode of incontinence. several days later. Two days prior to presentation, patient began to experience hiccoughs. Patient has history of unexplained hiccoughs which usually progress to episodes of emesis. Patient underwent a barium swallow study recently which was unremarkable. On the day of presentation, patient began to have multiple episodes of emesis, which prompted his wife to bring him to the ED. At baseline, patient requires a walker to ambulate and is able to communicate normally.     In the ED, patient noted to be tachycardic to 113 and tachypneic to 23. Labs significant for initial lactate of 3.7. A code stroke was called which was negative for acute intracranial processes. A CT of the chest and A/P was performed which revealed findings concerning for right sided pneumonia and esophagitis. Patient was given 2L IVF and a dose of Zosyn. He was subsequently admitted to medicine for further management.

## 2022-04-18 NOTE — H&P ADULT - PROBLEM SELECTOR PLAN 3
- patient with progressive AMS and difficulty ambulating   - as per patients wife, patient has followed with multiple neurologist who have wanted to obtain an MRI of the brain, however patient has not been able to tolerate  - Neuro consult in the AM  - will discuss the utility of inpatient MRI with anesthesia

## 2022-04-18 NOTE — H&P ADULT - ASSESSMENT
Patient is a 62 yo M with PMHx of cerebral ataxia who was brought to the ED due to AMS, found to be septic 2/2 right sided pneumonia

## 2022-04-18 NOTE — H&P ADULT - PROBLEM SELECTOR PLAN 1
- patient meets sepsis criteria with tachycardia and tachypnea  - 2/2 right sided pneumonia  - f/u blood and urine cx   - elevated lactate on admission resolved on repeat VBG  - plan as below

## 2022-04-18 NOTE — CONSULT NOTE ADULT - SUBJECTIVE AND OBJECTIVE BOX
HPI:  Patient is a 60 yo M with PMHx of cerebellar ataxia who was brought to the ED due to AMS. History is obtained from chart review and from patients wife as patient is not responding to questions. History is extremely limited. Approximately 1 week prior to presentation, patient began to experience progressive fatigue/weakness. Patients weakness progressed and he had an episode of incontinence. several days later. Two days prior to presentation, patient began to experience hiccups. Patient has history of unexplained hiccups which usually progress to episodes of emesis. Patient underwent a barium swallow study recently which was unremarkable. On the day of presentation, patient began to have multiple episodes of emesis, which prompted his wife to bring him to the ED. At baseline, patient requires a walker to ambulate and is able to communicate normally.     In the ED, patient noted to be tachycardic to 113 and tachypneic to 23. Labs significant for initial lactate of 3.7. A code stroke was called which was negative for acute intracranial processes. A CT of the chest and A/P was performed which revealed findings concerning for right sided pneumonia and esophagitis. Patient was given 2L IVF and a dose of Zosyn. He was subsequently admitted to medicine for further management.  (2022 06:19)      Neurology consulted for concern for new ataxia. Hx limited due to pt's AMS, pt AAOx1 (to self). Pt following some commands on exam. Per nursing staff who had spoken to wife, pt with new onset AMS, AAOx2 for the last 2 days (usually AAOx3). 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 Currently being followed by an outpatient neurologist.          REVIEW OF SYSTEMS limited due to AMS.     A 10-system ROS was performed and is negative except for those items noted above and/or in the HPI.    PAST MEDICAL & SURGICAL HISTORY:  H/O cerebellar ataxia    No significant past surgical history      FAMILY HISTORY:  FH: dementia (Mother)    FH: type 2 diabetes (Mother)    Family history of CVA (Father)      SOCIAL HISTORY:   T/E/D:   Occupation:   Lives with:     MEDICATIONS (HOME):  Home Medications:  atorvastatin 20 mg oral tablet: 1 tab(s) orally once a day (2022 06:28)  mirtazapine 15 mg oral tablet: 1 tab(s) orally once a day (at bedtime), As Needed (2022 06:28)    MEDICATIONS  (STANDING):  ampicillin/sulbactam  IVPB      ampicillin/sulbactam  IVPB 3 Gram(s) IV Intermittent every 6 hours  atorvastatin 20 milliGRAM(s) Oral at bedtime  heparin   Injectable 5000 Unit(s) SubCutaneous every 8 hours  pantoprazole  Injectable 40 milliGRAM(s) IV Push daily    MEDICATIONS  (PRN):  acetaminophen     Tablet .. 650 milliGRAM(s) Oral every 6 hours PRN Temp greater or equal to 38C (100.4F), Mild Pain (1 - 3)  aluminum hydroxide/magnesium hydroxide/simethicone Suspension 30 milliLiter(s) Oral every 4 hours PRN Dyspepsia  melatonin 3 milliGRAM(s) Oral at bedtime PRN Insomnia  ondansetron Injectable 4 milliGRAM(s) IV Push every 8 hours PRN Nausea and/or Vomiting    ALLERGIES/INTOLERANCES:  Allergies  No Known Allergies    Intolerances    VITALS & EXAMINATION:  Vital Signs Last 24 Hrs  T(C): 36.4 (2022 13:17), Max: 98.1 (2022 09:29)  T(F): 97.6 (2022 13:17), Max: 208.5 (2022 09:29)  HR: 76 (2022 13:17) (63 - 113)  BP: 97/58 (2022 13:17) (94/78 - 106/80)  BP(mean): 86 (2022 02:15) (71 - 90)  RR: 18 (2022 13:17) (16 - 23)  SpO2: 98% (2022 13:17) (88% - 100%)  General:  Constitutional: Obese Male, appears stated age, in no apparent distress including pain  Head: Normocephalic & atraumatic.  ENT: Patent ear canals, intact TM, mucus membranes moist & pink, neck supple, no lymphadenopathy.   Respiratory: Patent airway. All lung fields are clear to auscultation bilaterally.  Extremities: No cyanosis, clubbing, or edema.  Skin: No rashes, bruising, or discoloration.    Neurological (>12):  MS: Awake, alert, oriented to person. Normal affect. Follows some commands    Language: Speech is mostly incomprehensible. unable to name objects    CNs: PERRLA (R = 3mm, L = 3mm). Unable to fully assess CNs due to AMS, uncooperativeness. EOMI no nystagmus, Inability to fully assess facial symmetry due to uncooperativeness.No facial asymmetry b/l, full eye closure strength b/l. Gag reflex deferred.     Fundoscopic:  deferred    Motor: Normal muscle bulk & tone. No noticeable tremor or seizure.               Deltoid	Biceps	Triceps	Wrist	Finger ABd	   R	5	5	5	5	5		5 	  L	5	5	5	5	5		5    	H-Flex	H-Ext	H-ABd	H-ADd	K-Flex	K-Ext	D-Flex	P-Flex  R	5	5	5	5	5	5	5	5 	   L	5	5	5	5	5	5	5	5	     Sensation: Intact to LT/PP/Temp/Vibration/Position b/l throughout.     Cortical: Extinction on DSS (neglect): none    Reflexes:              Biceps(C5)       BR(C6)     Triceps(C7)               Patellar(L4)    Achilles(S1)    Plantar Resp  R	2	          2	             2		        2		    2		Down   L	2	          2	             2		        2		    2		Down     Coordination: unable to perform rapid-alt movements. + dysmetria to FTN/HTS    Gait: did not assess    LABORATORY:  CBC                       13.3   9.87  )-----------( 161      ( 2022 07:13 )             39.5     Chem 04-18    140  |  104  |  20  ----------------------------<  92  3.8   |  23  |  0.80    Ca    9.6      2022 07:13    TPro  6.8  /  Alb  3.7  /  TBili  0.6  /  DBili  x   /  AST  17  /  ALT  20  /  AlkPhos  54  04-18    LFTs LIVER FUNCTIONS - ( 2022 07:13 )  Alb: 3.7 g/dL / Pro: 6.8 g/dL / ALK PHOS: 54 U/L / ALT: 20 U/L / AST: 17 U/L / GGT: x           Coagulopathy PT/INR - ( 2022 21:55 )   PT: 12.4 sec;   INR: 1.07 ratio         PTT - ( 2022 21:55 )  PTT:34.3 sec  Lipid Panel   A1c   Cardiac enzymes CARDIAC MARKERS ( 2022 21:56 )  x     / x     / 37 U/L / x     / x          U/A Urinalysis Basic - ( 2022 21:55 )    Color: Yellow / Appearance: Slightly Turbid / S.026 / pH: x  Gluc: x / Ketone: Small  / Bili: Negative / Urobili: Negative   Blood: x / Protein: 30 mg/dL / Nitrite: Negative   Leuk Esterase: Negative / RBC: 195 /hpf / WBC 4 /HPF   Sq Epi: x / Non Sq Epi: 0 /hpf / Bacteria: Negative      CSF  Immunological  Other    STUDIES & IMAGING:  Studies (EKG, EEG, EMG, etc):     Radiology (XR, CT, MR, U/S, TTE/CRYSTAL):    ACC: [id number] EXAM:  CT ANGIO BRAIN (W)AW IC                        ACC: [id number] EXAM:  CT ANGIO NECK (W) IC                        ACC: [id number] EXAM:  CT BRAIN                          PROCEDURE DATE:  2022          INTERPRETATION:  INDICATION: Weakness. Worsening mental status over the   past 3 days.    TECHNIQUE:  Noncontrast CT of the brain was performed. Axial plane images were   reconstructed via bone and soft tissue kernel algorithms. Sagittal and   coronal plane images were reformatted from the axial dataset. CT   angiography of the head and neck was performed. Following the   administration of intravenous contrast scanning was performed from arch   to vertex. Thin axial plane images were reconstructed from the raw data.   Multiplanar MIP and/or 3D images were reformatted from the axial dataset.    90 cc of Omnipaque 300 were administered intravenously; 10 cc were   discarded.    COMPARISON STUDY: CT head 2019.    FINDINGS:    HEAD:  The study is slightly limitedby patient motion artifact.    No acute intracranial hemorrhage. No midline shift or acute mass effect.   Mild age related cerebral atrophy.  There is patchy white matter   hypoattenuation likely reflecting chronic microvascular ischemic change.   There is a prominent perivascular space versus chronic lacunar infarct in   the left inferior basal ganglia.    The paranasal sinuses are clear. The tympanomastoid cavities are   adequately pneumatized. The calvarium is intact.    CT ANGIOGRAPHY NECK:  No hemodynamically significant stenosis or dissection of the extracranial   vertebral, common or internal carotid arteries, bilaterally.    No stenosis or occlusion of the skull base internal carotid arteries or   dural segments of the vertebral arteries, bilaterally.    Please refer to CT chest performed on same day for discussion of   intrathoracic findings.    There are multilevel degenerative changes in the cervical spine.    CT ANGIOGRAPHY BRAIN:  No hemodynamically significant stenosis or occlusion of the intracranial   ICAs, bilaterally    Bilateral fetal PCAs. No hemodynamically significant stenosis or   occlusion of the ACAs, MCAs, or PCAs, bilaterally.    The anterior and posterior communicating arteries are patent.    No hemodynamically significant stenosis or occlusion of the basilar or   intracranial vertebral arteries.    No intracranial aneurysm or vascular malformation.    No evidence for dural venous sinus thrombosis.      IMPRESSION:  CT HEAD: No acute intracranial hemorrhage or mass effect.    CTA NECK: No hemodynamically significant stenosis by NASCET criteria,   dissection, or pseudoaneurysm.    CTA HEAD: No large vessel occlusion, significant stenosis, dissection, or   saccular aneurysm.       HPI:  Patient is a 60 yo M with PMHx of cerebellar ataxia who was brought to the ED due to AMS. History is obtained from chart review and from patients wife as patient is not responding to questions. History is extremely limited. Approximately 1 week prior to presentation, patient began to experience progressive fatigue/weakness. Patients weakness progressed and he had an episode of incontinence. several days later. Two days prior to presentation, patient began to experience hiccups. Patient has history of unexplained hiccups which usually progress to episodes of emesis. Patient underwent a barium swallow study recently which was unremarkable. On the day of presentation, patient began to have multiple episodes of emesis, which prompted his wife to bring him to the ED. At baseline, patient requires a walker to ambulate and is able to communicate normally.     In the ED, patient noted to be tachycardic to 113 and tachypneic to 23. Labs significant for initial lactate of 3.7. A code stroke was called which was negative for acute intracranial processes. A CT of the chest and A/P was performed which revealed findings concerning for right sided pneumonia and esophagitis. Patient was given 2L IVF and a dose of Zosyn. He was subsequently admitted to medicine for further management.  (2022 06:19)      Neurology consulted for concern for new ataxia. Hx limited due to pt's AMS, pt AAOx1 (to self). Pt following some commands on exam. Per nursing staff who had spoken to wife, pt with new onset AMS, AAOx2 for the last 2 days (usually AAOx3). 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 Currently being followed by an outpatient neurologist.          REVIEW OF SYSTEMS limited due to AMS.     A 10-system ROS was performed and is negative except for those items noted above and/or in the HPI.    PAST MEDICAL & SURGICAL HISTORY:  H/O cerebellar ataxia    No significant past surgical history      FAMILY HISTORY:  FH: dementia (Mother)    FH: type 2 diabetes (Mother)    Family history of CVA (Father)      SOCIAL HISTORY:   T/E/D:   Occupation:   Lives with:     MEDICATIONS (HOME):  Home Medications:  atorvastatin 20 mg oral tablet: 1 tab(s) orally once a day (2022 06:28)  mirtazapine 15 mg oral tablet: 1 tab(s) orally once a day (at bedtime), As Needed (2022 06:28)    MEDICATIONS  (STANDING):  ampicillin/sulbactam  IVPB      ampicillin/sulbactam  IVPB 3 Gram(s) IV Intermittent every 6 hours  atorvastatin 20 milliGRAM(s) Oral at bedtime  heparin   Injectable 5000 Unit(s) SubCutaneous every 8 hours  pantoprazole  Injectable 40 milliGRAM(s) IV Push daily    MEDICATIONS  (PRN):  acetaminophen     Tablet .. 650 milliGRAM(s) Oral every 6 hours PRN Temp greater or equal to 38C (100.4F), Mild Pain (1 - 3)  aluminum hydroxide/magnesium hydroxide/simethicone Suspension 30 milliLiter(s) Oral every 4 hours PRN Dyspepsia  melatonin 3 milliGRAM(s) Oral at bedtime PRN Insomnia  ondansetron Injectable 4 milliGRAM(s) IV Push every 8 hours PRN Nausea and/or Vomiting    ALLERGIES/INTOLERANCES:  Allergies  No Known Allergies    Intolerances    VITALS & EXAMINATION:  Vital Signs Last 24 Hrs  T(C): 36.4 (2022 13:17), Max: 98.1 (2022 09:29)  T(F): 97.6 (2022 13:17), Max: 208.5 (2022 09:29)  HR: 76 (2022 13:17) (63 - 113)  BP: 97/58 (2022 13:17) (94/78 - 106/80)  BP(mean): 86 (2022 02:15) (71 - 90)  RR: 18 (2022 13:17) (16 - 23)  SpO2: 98% (2022 13:17) (88% - 100%)  General:  Constitutional: Obese Male, appears stated age, in no apparent distress including pain  Head: Normocephalic & atraumatic.  ENT: Patent ear canals, intact TM, mucus membranes moist & pink, neck supple, no lymphadenopathy.   Respiratory: Patent airway. All lung fields are clear to auscultation bilaterally.  Extremities: No cyanosis, clubbing, or edema.  Skin: No rashes, bruising, or discoloration.    Neurological (>12):  MS: Awake, alert, oriented to person. Normal affect. Follows some commands    Language: Speech is mostly incomprehensible. unable to name objects    CNs: PERRLA (R = 3mm, L = 3mm). Unable to fully assess CNs due to AMS, uncooperativeness. EOMI no nystagmus, Inability to fully assess facial symmetry due to uncooperativeness. Nml full eye closure strength b/l. Gag reflex deferred.     Fundoscopic:  deferred    Motor: Normal muscle bulk & tone. No noticeable tremor or seizure.               Deltoid	Biceps	Triceps	Wrist	Finger ABd	   R	5	5	5	5	5		5 	  L	5	5	5	5	5		5    	H-Flex	H-Ext	H-ABd	H-ADd	K-Flex	K-Ext	D-Flex	P-Flex  R	5	5	5	5	5	5	5	5 	   L	5	5	5	5	5	5	5	5	     Sensation: unable to assess 2/2 AMS.     Cortical: Extinction on DSS (neglect): none    Reflexes:              Biceps(C5)       BR(C6)     Triceps(C7)               Patellar(L4)    Achilles(S1)    Plantar Resp  R	2	          2	             2		        2		    2		Down   L	2	          2	             2		        2		    2		Down     Coordination: unable to perform rapid-alt movements. + dysmetria to FTN/HTS    Gait: did not assess    LABORATORY:  CBC                       13.3   9.87  )-----------( 161      ( 2022 07:13 )             39.5     Chem 04-18    140  |  104  |  20  ----------------------------<  92  3.8   |  23  |  0.80    Ca    9.6      2022 07:13    TPro  6.8  /  Alb  3.7  /  TBili  0.6  /  DBili  x   /  AST  17  /  ALT  20  /  AlkPhos  54  04-18    LFTs LIVER FUNCTIONS - ( 2022 07:13 )  Alb: 3.7 g/dL / Pro: 6.8 g/dL / ALK PHOS: 54 U/L / ALT: 20 U/L / AST: 17 U/L / GGT: x           Coagulopathy PT/INR - ( 2022 21:55 )   PT: 12.4 sec;   INR: 1.07 ratio         PTT - ( 2022 21:55 )  PTT:34.3 sec  Lipid Panel   A1c   Cardiac enzymes CARDIAC MARKERS ( 2022 21:56 )  x     / x     / 37 U/L / x     / x          U/A Urinalysis Basic - ( 2022 21:55 )    Color: Yellow / Appearance: Slightly Turbid / S.026 / pH: x  Gluc: x / Ketone: Small  / Bili: Negative / Urobili: Negative   Blood: x / Protein: 30 mg/dL / Nitrite: Negative   Leuk Esterase: Negative / RBC: 195 /hpf / WBC 4 /HPF   Sq Epi: x / Non Sq Epi: 0 /hpf / Bacteria: Negative      CSF  Immunological  Other    STUDIES & IMAGING:  Studies (EKG, EEG, EMG, etc):     Radiology (XR, CT, MR, U/S, TTE/CRYSTAL):    ACC: [id number] EXAM:  CT ANGIO BRAIN (W) IC                        ACC: [id number] EXAM:  CT ANGIO NECK (W)[facility]                        ACC: [id number] EXAM:  CT BRAIN                          PROCEDURE DATE:  2022          INTERPRETATION:  INDICATION: Weakness. Worsening mental status over the   past 3 days.    TECHNIQUE:  Noncontrast CT of the brain was performed. Axial plane images were   reconstructed via bone and soft tissue kernel algorithms. Sagittal and   coronal plane images were reformatted from the axial dataset. CT   angiography of the head and neck was performed. Following the   administration of intravenous contrast scanning was performed from arch   to vertex. Thin axial plane images were reconstructed from the raw data.   Multiplanar MIP and/or 3D images were reformatted from the axial dataset.    90 cc of Omnipaque 300 were administered intravenously; 10 cc were   discarded.    COMPARISON STUDY: CT head 2019.    FINDINGS:    HEAD:  The study is slightly limitedby patient motion artifact.    No acute intracranial hemorrhage. No midline shift or acute mass effect.   Mild age related cerebral atrophy.  There is patchy white matter   hypoattenuation likely reflecting chronic microvascular ischemic change.   There is a prominent perivascular space versus chronic lacunar infarct in   the left inferior basal ganglia.    The paranasal sinuses are clear. The tympanomastoid cavities are   adequately pneumatized. The calvarium is intact.    CT ANGIOGRAPHY NECK:  No hemodynamically significant stenosis or dissection of the extracranial   vertebral, common or internal carotid arteries, bilaterally.    No stenosis or occlusion of the skull base internal carotid arteries or   dural segments of the vertebral arteries, bilaterally.    Please refer to CT chest performed on same day for discussion of   intrathoracic findings.    There are multilevel degenerative changes in the cervical spine.    CT ANGIOGRAPHY BRAIN:  No hemodynamically significant stenosis or occlusion of the intracranial   ICAs, bilaterally    Bilateral fetal PCAs. No hemodynamically significant stenosis or   occlusion of the ACAs, MCAs, or PCAs, bilaterally.    The anterior and posterior communicating arteries are patent.    No hemodynamically significant stenosis or occlusion of the basilar or   intracranial vertebral arteries.    No intracranial aneurysm or vascular malformation.    No evidence for dural venous sinus thrombosis.      IMPRESSION:  CT HEAD: No acute intracranial hemorrhage or mass effect.    CTA NECK: No hemodynamically significant stenosis by NASCET criteria,   dissection, or pseudoaneurysm.    CTA HEAD: No large vessel occlusion, significant stenosis, dissection, or   saccular aneurysm.       HPI:  Patient is a 62 yo M with PMHx of cerebellar ataxia who was brought to the ED due to AMS. History is obtained from chart review and from patients wife as patient is not responding to questions. History is extremely limited. Approximately 1 week prior to presentation, patient began to experience progressive fatigue/weakness. Patients weakness progressed and he had an episode of incontinence. several days later. Two days prior to presentation, patient began to experience hiccups. Patient has history of unexplained hiccups which usually progress to episodes of emesis. Patient underwent a barium swallow study recently which was unremarkable. On the day of presentation, patient began to have multiple episodes of emesis, which prompted his wife to bring him to the ED. At baseline, patient requires a walker to ambulate and is able to communicate normally.     In the ED, patient noted to be tachycardic to 113 and tachypneic to 23. Labs significant for initial lactate of 3.7. A code stroke was called which was negative for acute intracranial processes. A CT of the chest and A/P was performed which revealed findings concerning for right sided pneumonia and esophagitis. Patient was given 2L IVF and a dose of Zosyn. He was subsequently admitted to medicine for further management.  (2022 06:19)      Neurology consulted for concern for new ataxia. Hx limited due to pt's AMS, pt AAOx1 (to self). Pt following some commands on exam. Per nursing staff who had spoken to wife, pt with new onset AMS, AAOx2 for the last 2 days (usually AAOx3). 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 Currently being followed by an outpatient neurologist.      Collateral from the wife: The patient has had periods of AMS since the summer of . At baseline she is able to understand him and he communicates with her, but she notes that he has been nonsensical. He also explains seeing people and talking to people that are not there. This has been since the summer as well. His ataxic symptoms have been going on for 2 years, seeing multiple neurologists over the past 2 years. He was vomiting uncontrollably on Saturday night which is why she originally brought him in to the ED.     REVIEW OF SYSTEMS limited due to AMS.     A 10-system ROS was performed and is negative except for those items noted above and/or in the HPI.    PAST MEDICAL & SURGICAL HISTORY:  H/O cerebellar ataxia    No significant past surgical history      FAMILY HISTORY:  FH: dementia (Mother)    FH: type 2 diabetes (Mother)    Family history of CVA (Father)      SOCIAL HISTORY:   T/E/D:   Occupation:   Lives with:     MEDICATIONS (HOME):  Home Medications:  atorvastatin 20 mg oral tablet: 1 tab(s) orally once a day (2022 06:28)  mirtazapine 15 mg oral tablet: 1 tab(s) orally once a day (at bedtime), As Needed (2022 06:28)    MEDICATIONS  (STANDING):  ampicillin/sulbactam  IVPB      ampicillin/sulbactam  IVPB 3 Gram(s) IV Intermittent every 6 hours  atorvastatin 20 milliGRAM(s) Oral at bedtime  heparin   Injectable 5000 Unit(s) SubCutaneous every 8 hours  pantoprazole  Injectable 40 milliGRAM(s) IV Push daily    MEDICATIONS  (PRN):  acetaminophen     Tablet .. 650 milliGRAM(s) Oral every 6 hours PRN Temp greater or equal to 38C (100.4F), Mild Pain (1 - 3)  aluminum hydroxide/magnesium hydroxide/simethicone Suspension 30 milliLiter(s) Oral every 4 hours PRN Dyspepsia  melatonin 3 milliGRAM(s) Oral at bedtime PRN Insomnia  ondansetron Injectable 4 milliGRAM(s) IV Push every 8 hours PRN Nausea and/or Vomiting    ALLERGIES/INTOLERANCES:  Allergies  No Known Allergies    Intolerances    VITALS & EXAMINATION:  Vital Signs Last 24 Hrs  T(C): 36.4 (2022 13:17), Max: 98.1 (2022 09:29)  T(F): 97.6 (2022 13:17), Max: 208.5 (2022 09:29)  HR: 76 (2022 13:17) (63 - 113)  BP: 97/58 (2022 13:17) (94/78 - 106/80)  BP(mean): 86 (2022 02:15) (71 - 90)  RR: 18 (2022 13:17) (16 - 23)  SpO2: 98% (2022 13:17) (88% - 100%)  General:  Constitutional: Obese Male, appears stated age, in no apparent distress including pain  Head: Normocephalic & atraumatic.  ENT: Patent ear canals, intact TM, mucus membranes moist & pink, neck supple, no lymphadenopathy.   Respiratory: Patent airway. All lung fields are clear to auscultation bilaterally.  Extremities: No cyanosis, clubbing, or edema.  Skin: No rashes, bruising, or discoloration.    Neurological (>12):  MS: Awake, alert, oriented to person. Normal affect. Follows some commands    Language: Speech is mostly incomprehensible. unable to name objects    CNs: PERRLA (R = 3mm, L = 3mm). Unable to fully assess CNs due to AMS, uncooperativeness. EOMI no nystagmus, Inability to fully assess facial symmetry due to uncooperativeness. Nml full eye closure strength b/l. Gag reflex deferred.     Fundoscopic:  deferred    Motor: Normal muscle bulk & tone. No noticeable tremor or seizure.               Deltoid	Biceps	Triceps	Wrist	Finger ABd	   R	5	5	5	5	5		5 	  L	5	5	5	5	5		5    	H-Flex	H-Ext	H-ABd	H-ADd	K-Flex	K-Ext	D-Flex	P-Flex  R	5	5	5	5	5	5	5	5 	   L	5	5	5	5	5	5	5	5	     Sensation: unable to assess 2/2 AMS.     Cortical: Extinction on DSS (neglect): none    Reflexes:              Biceps(C5)       BR(C6)     Triceps(C7)               Patellar(L4)    Achilles(S1)    Plantar Resp  R	2	          2	             2		        2		    2		Down   L	2	          2	             2		        2		    2		Down     Coordination: unable to perform rapid-alt movements. + dysmetria to FTN/HTS    Gait: did not assess    LABORATORY:  CBC                       13.3   9.87  )-----------( 161      ( 2022 07:13 )             39.5     Chem 04-18    140  |  104  |  20  ----------------------------<  92  3.8   |  23  |  0.80    Ca    9.6      2022 07:13    TPro  6.8  /  Alb  3.7  /  TBili  0.6  /  DBili  x   /  AST  17  /  ALT  20  /  AlkPhos  54  04-18    LFTs LIVER FUNCTIONS - ( 2022 07:13 )  Alb: 3.7 g/dL / Pro: 6.8 g/dL / ALK PHOS: 54 U/L / ALT: 20 U/L / AST: 17 U/L / GGT: x           Coagulopathy PT/INR - ( 2022 21:55 )   PT: 12.4 sec;   INR: 1.07 ratio         PTT - ( 2022 21:55 )  PTT:34.3 sec  Lipid Panel   A1c   Cardiac enzymes CARDIAC MARKERS ( 2022 21:56 )  x     / x     / 37 U/L / x     / x          U/A Urinalysis Basic - ( 2022 21:55 )    Color: Yellow / Appearance: Slightly Turbid / S.026 / pH: x  Gluc: x / Ketone: Small  / Bili: Negative / Urobili: Negative   Blood: x / Protein: 30 mg/dL / Nitrite: Negative   Leuk Esterase: Negative / RBC: 195 /hpf / WBC 4 /HPF   Sq Epi: x / Non Sq Epi: 0 /hpf / Bacteria: Negative      CSF  Immunological  Other    STUDIES & IMAGING:  Studies (EKG, EEG, EMG, etc):     Radiology (XR, CT, MR, U/S, TTE/CRYSTAL):    ACC: [id number] EXAM:  CT ANGIO BRAIN (W) IC                        ACC: [id number] EXAM:  CT ANGIO NECK (W) IC                        ACC: [id number] EXAM:  CT BRAIN                          PROCEDURE DATE:  2022          INTERPRETATION:  INDICATION: Weakness. Worsening mental status over the   past 3 days.    TECHNIQUE:  Noncontrast CT of the brain was performed. Axial plane images were   reconstructed via bone and soft tissue kernel algorithms. Sagittal and   coronal plane images were reformatted from the axial dataset. CT   angiography of the head and neck was performed. Following the   administration of intravenous contrast scanning was performed from arch   to vertex. Thin axial plane images were reconstructed from the raw data.   Multiplanar MIP and/or 3D images were reformatted from the axial dataset.    90 cc of Omnipaque 300 were administered intravenously; 10 cc were   discarded.    COMPARISON STUDY: CT head 2019.    FINDINGS:    HEAD:  The study is slightly limitedby patient motion artifact.    No acute intracranial hemorrhage. No midline shift or acute mass effect.   Mild age related cerebral atrophy.  There is patchy white matter   hypoattenuation likely reflecting chronic microvascular ischemic change.   There is a prominent perivascular space versus chronic lacunar infarct in   the left inferior basal ganglia.    The paranasal sinuses are clear. The tympanomastoid cavities are   adequately pneumatized. The calvarium is intact.    CT ANGIOGRAPHY NECK:  No hemodynamically significant stenosis or dissection of the extracranial   vertebral, common or internal carotid arteries, bilaterally.    No stenosis or occlusion of the skull base internal carotid arteries or   dural segments of the vertebral arteries, bilaterally.    Please refer to CT chest performed on same day for discussion of   intrathoracic findings.    There are multilevel degenerative changes in the cervical spine.    CT ANGIOGRAPHY BRAIN:  No hemodynamically significant stenosis or occlusion of the intracranial   ICAs, bilaterally    Bilateral fetal PCAs. No hemodynamically significant stenosis or   occlusion of the ACAs, MCAs, or PCAs, bilaterally.    The anterior and posterior communicating arteries are patent.    No hemodynamically significant stenosis or occlusion of the basilar or   intracranial vertebral arteries.    No intracranial aneurysm or vascular malformation.    No evidence for dural venous sinus thrombosis.      IMPRESSION:  CT HEAD: No acute intracranial hemorrhage or mass effect.    CTA NECK: No hemodynamically significant stenosis by NASCET criteria,   dissection, or pseudoaneurysm.    CTA HEAD: No large vessel occlusion, significant stenosis, dissection, or   saccular aneurysm.       HPI:  Patient is a 60 yo M with PMHx of cerebellar ataxia who was brought to the ED due to AMS. History is obtained from chart review and from patients wife as patient is not responding to questions. History is extremely limited. Approximately 1 week prior to presentation, patient began to experience progressive fatigue/weakness. Patients weakness progressed and he had an episode of incontinence. several days later. Two days prior to presentation, patient began to experience hiccups. Patient has history of unexplained hiccups which usually progress to episodes of emesis. Patient underwent a barium swallow study recently which was unremarkable. On the day of presentation, patient began to have multiple episodes of emesis, which prompted his wife to bring him to the ED. At baseline, patient requires a walker to ambulate and is able to communicate normally.     In the ED, patient noted to be tachycardic to 113 and tachypneic to 23. Labs significant for initial lactate of 3.7. A code stroke was called which was negative for acute intracranial processes. A CT of the chest and A/P was performed which revealed findings concerning for right sided pneumonia and esophagitis. Patient was given 2L IVF and a dose of Zosyn. He was subsequently admitted to medicine for further management.  (2022 06:19)      Neurology consulted for concern for new ataxia. Hx limited due to pt's AMS, pt AAOx1 (to self). Pt following some commands on exam. Per nursing staff who had spoken to wife, pt with new onset AMS, AAOx2 for the last 2 days (usually AAOx3). 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 Currently being followed by an outpatient neurologist.      Collateral from the wife: The patient has had periods of AMS since the summer of . At baseline she is able to understand him and he communicates with her, but she notes that he has been nonsensical. He also explains seeing people and talking to people that are not there. This has been since the summer as well. His ataxic symptoms have been going on for 2 years, seeing multiple neurologists over the past 2 years. He was vomiting uncontrollably on Saturday night which is why she originally brought him in to the ED.     REVIEW OF SYSTEMS limited due to AMS.     A 10-system ROS was performed and is negative except for those items noted above and/or in the HPI.    PAST MEDICAL & SURGICAL HISTORY:  H/O cerebellar ataxia    No significant past surgical history      FAMILY HISTORY:  FH: dementia (Mother)    FH: type 2 diabetes (Mother)    Family history of CVA (Father)      SOCIAL HISTORY:   T/E/D:   Occupation:   Lives with:     MEDICATIONS (HOME):  Home Medications:  atorvastatin 20 mg oral tablet: 1 tab(s) orally once a day (2022 06:28)  mirtazapine 15 mg oral tablet: 1 tab(s) orally once a day (at bedtime), As Needed (2022 06:28)    MEDICATIONS  (STANDING):  ampicillin/sulbactam  IVPB      ampicillin/sulbactam  IVPB 3 Gram(s) IV Intermittent every 6 hours  atorvastatin 20 milliGRAM(s) Oral at bedtime  heparin   Injectable 5000 Unit(s) SubCutaneous every 8 hours  pantoprazole  Injectable 40 milliGRAM(s) IV Push daily    MEDICATIONS  (PRN):  acetaminophen     Tablet .. 650 milliGRAM(s) Oral every 6 hours PRN Temp greater or equal to 38C (100.4F), Mild Pain (1 - 3)  aluminum hydroxide/magnesium hydroxide/simethicone Suspension 30 milliLiter(s) Oral every 4 hours PRN Dyspepsia  melatonin 3 milliGRAM(s) Oral at bedtime PRN Insomnia  ondansetron Injectable 4 milliGRAM(s) IV Push every 8 hours PRN Nausea and/or Vomiting    ALLERGIES/INTOLERANCES:  Allergies  No Known Allergies    Intolerances    VITALS & EXAMINATION:  Vital Signs Last 24 Hrs  T(C): 36.4 (2022 13:17), Max: 98.1 (2022 09:29)  T(F): 97.6 (2022 13:17), Max: 208.5 (2022 09:29)  HR: 76 (2022 13:17) (63 - 113)  BP: 97/58 (2022 13:17) (94/78 - 106/80)  BP(mean): 86 (2022 02:15) (71 - 90)  RR: 18 (2022 13:17) (16 - 23)  SpO2: 98% (2022 13:17) (88% - 100%)  General:  Constitutional: Obese Male, appears stated age, in no apparent distress including pain  Head: Normocephalic & atraumatic.  ENT: Patent ear canals, intact TM, mucus membranes moist & pink, neck supple, no lymphadenopathy.   Respiratory: Patent airway. All lung fields are clear to auscultation bilaterally.  Extremities: No cyanosis, clubbing, or edema.  Skin: No rashes, bruising, or discoloration.    Neurological (>12):  MS: Awake, alert, oriented to person. Normal affect. Follows some commands    Language: Speech is mostly incomprehensible. unable to name objects    CNs: PERRLA (R = 3mm, L = 3mm). Unable to fully assess CNs due to AMS, uncooperativeness. EOMI no nystagmus, some r nasiolabial flattening. Nml full eye closure strength b/l. Gag reflex deferred.     Fundoscopic:  deferred    Motor: Normal muscle bulk & tone. No noticeable tremor or seizure.               Deltoid	Biceps	Triceps	Wrist	Finger ABd	   R	5	5	5	5	5		5 	  L	5	5	5	5	5		5    	H-Flex	H-Ext	H-ABd	H-ADd	K-Flex	K-Ext	D-Flex	P-Flex  R	5	5	5	5	5	5	5	5 	   L	5	5	5	5	5	5	5	5	     Sensation: unable to assess 2/2 AMS.     Cortical: Extinction on DSS (neglect): none    Reflexes:              Biceps(C5)       BR(C6)     Triceps(C7)               Patellar(L4)    Achilles(S1)    Plantar Resp  R	2	          2	             2		        2		    2		Down   L	2	          2	             2		        2		    2		Down     Coordination: unable to perform rapid-alt movements. + dysmetria to FTN/HTS    Gait: did not assess    LABORATORY:  CBC                       13.3   9.87  )-----------( 161      ( 2022 07:13 )             39.5     Chem 04-18    140  |  104  |  20  ----------------------------<  92  3.8   |  23  |  0.80    Ca    9.6      2022 07:13    TPro  6.8  /  Alb  3.7  /  TBili  0.6  /  DBili  x   /  AST  17  /  ALT  20  /  AlkPhos  54  04-18    LFTs LIVER FUNCTIONS - ( 2022 07:13 )  Alb: 3.7 g/dL / Pro: 6.8 g/dL / ALK PHOS: 54 U/L / ALT: 20 U/L / AST: 17 U/L / GGT: x           Coagulopathy PT/INR - ( 2022 21:55 )   PT: 12.4 sec;   INR: 1.07 ratio         PTT - ( 2022 21:55 )  PTT:34.3 sec  Lipid Panel   A1c   Cardiac enzymes CARDIAC MARKERS ( 2022 21:56 )  x     / x     / 37 U/L / x     / x          U/A Urinalysis Basic - ( 2022 21:55 )    Color: Yellow / Appearance: Slightly Turbid / S.026 / pH: x  Gluc: x / Ketone: Small  / Bili: Negative / Urobili: Negative   Blood: x / Protein: 30 mg/dL / Nitrite: Negative   Leuk Esterase: Negative / RBC: 195 /hpf / WBC 4 /HPF   Sq Epi: x / Non Sq Epi: 0 /hpf / Bacteria: Negative      CSF  Immunological  Other    STUDIES & IMAGING:  Studies (EKG, EEG, EMG, etc):     Radiology (XR, CT, MR, U/S, TTE/CRYSTAL):    ACC: [id number] EXAM:  CT ANGIO BRAIN (W)AW IC                        ACC: [id number] EXAM:  CT ANGIO NECK (W) IC                        ACC: [id number] EXAM:  CT BRAIN                          PROCEDURE DATE:  2022          INTERPRETATION:  INDICATION: Weakness. Worsening mental status over the   past 3 days.    TECHNIQUE:  Noncontrast CT of the brain was performed. Axial plane images were   reconstructed via bone and soft tissue kernel algorithms. Sagittal and   coronal plane images were reformatted from the axial dataset. CT   angiography of the head and neck was performed. Following the   administration of intravenous contrast scanning was performed from arch   to vertex. Thin axial plane images were reconstructed from the raw data.   Multiplanar MIP and/or 3D images were reformatted from the axial dataset.    90 cc of Omnipaque 300 were administered intravenously; 10 cc were   discarded.    COMPARISON STUDY: CT head 2019.    FINDINGS:    HEAD:  The study is slightly limitedby patient motion artifact.    No acute intracranial hemorrhage. No midline shift or acute mass effect.   Mild age related cerebral atrophy.  There is patchy white matter   hypoattenuation likely reflecting chronic microvascular ischemic change.   There is a prominent perivascular space versus chronic lacunar infarct in   the left inferior basal ganglia.    The paranasal sinuses are clear. The tympanomastoid cavities are   adequately pneumatized. The calvarium is intact.    CT ANGIOGRAPHY NECK:  No hemodynamically significant stenosis or dissection of the extracranial   vertebral, common or internal carotid arteries, bilaterally.    No stenosis or occlusion of the skull base internal carotid arteries or   dural segments of the vertebral arteries, bilaterally.    Please refer to CT chest performed on same day for discussion of   intrathoracic findings.    There are multilevel degenerative changes in the cervical spine.    CT ANGIOGRAPHY BRAIN:  No hemodynamically significant stenosis or occlusion of the intracranial   ICAs, bilaterally    Bilateral fetal PCAs. No hemodynamically significant stenosis or   occlusion of the ACAs, MCAs, or PCAs, bilaterally.    The anterior and posterior communicating arteries are patent.    No hemodynamically significant stenosis or occlusion of the basilar or   intracranial vertebral arteries.    No intracranial aneurysm or vascular malformation.    No evidence for dural venous sinus thrombosis.      IMPRESSION:  CT HEAD: No acute intracranial hemorrhage or mass effect.    CTA NECK: No hemodynamically significant stenosis by NASCET criteria,   dissection, or pseudoaneurysm.    CTA HEAD: No large vessel occlusion, significant stenosis, dissection, or   saccular aneurysm.       HPI:  Patient is a 62 yo M with PMHx of cerebellar ataxia who was brought to the ED due to AMS. History is obtained from chart review and from patients wife as patient is not responding to questions. History is extremely limited. Approximately 1 week prior to presentation, patient began to experience progressive fatigue/weakness. Patients weakness progressed and he had an episode of incontinence. several days later. Two days prior to presentation, patient began to experience hiccups. Patient has history of unexplained hiccups which usually progress to episodes of emesis. Patient underwent a barium swallow study recently which was unremarkable. On the day of presentation, patient began to have multiple episodes of emesis, which prompted his wife to bring him to the ED. At baseline, patient requires a walker to ambulate and is able to communicate normally.     In the ED, patient noted to be tachycardic to 113 and tachypneic to 23. Labs significant for initial lactate of 3.7. A code stroke was called which was negative for acute intracranial processes. A CT of the chest and A/P was performed which revealed findings concerning for right sided pneumonia and esophagitis. Patient was given 2L IVF and a dose of Zosyn. He was subsequently admitted to medicine for further management.  (2022 06:19)      Neurology consulted for concern for new ataxia. Hx limited due to pt's AMS, pt AAOx1 (to self). Pt following some commands on exam. Per nursing staff who had spoken to wife, pt with new onset AMS, AAOx2 for the last 2 days (usually AAOx3). 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 Currently being followed by an outpatient neurologist.      Collateral from the wife: The patient has had periods of AMS since the summer of . At baseline she is able to understand him and he communicates with her, but she notes that he has been nonsensical. He also explains seeing people and talking to people that are not there. This has been since the summer as well. His ataxic symptoms have been going on for 2 years, seeing multiple neurologists over the past 2 years. He was vomiting uncontrollably on Saturday night which is why she originally brought him in to the ED.     REVIEW OF SYSTEMS limited due to AMS.     A 10-system ROS was performed and is negative except for those items noted above and/or in the HPI.    PAST MEDICAL & SURGICAL HISTORY:  H/O cerebellar ataxia    No significant past surgical history      FAMILY HISTORY:  FH: dementia (Mother)    FH: type 2 diabetes (Mother)    Family history of CVA (Father)      SOCIAL HISTORY:   T/E/D:   Occupation:   Lives with:     MEDICATIONS (HOME):  Home Medications:  atorvastatin 20 mg oral tablet: 1 tab(s) orally once a day (2022 06:28)  mirtazapine 15 mg oral tablet: 1 tab(s) orally once a day (at bedtime), As Needed (2022 06:28)    MEDICATIONS  (STANDING):  ampicillin/sulbactam  IVPB      ampicillin/sulbactam  IVPB 3 Gram(s) IV Intermittent every 6 hours  atorvastatin 20 milliGRAM(s) Oral at bedtime  heparin   Injectable 5000 Unit(s) SubCutaneous every 8 hours  pantoprazole  Injectable 40 milliGRAM(s) IV Push daily    MEDICATIONS  (PRN):  acetaminophen     Tablet .. 650 milliGRAM(s) Oral every 6 hours PRN Temp greater or equal to 38C (100.4F), Mild Pain (1 - 3)  aluminum hydroxide/magnesium hydroxide/simethicone Suspension 30 milliLiter(s) Oral every 4 hours PRN Dyspepsia  melatonin 3 milliGRAM(s) Oral at bedtime PRN Insomnia  ondansetron Injectable 4 milliGRAM(s) IV Push every 8 hours PRN Nausea and/or Vomiting    ALLERGIES/INTOLERANCES:  Allergies  No Known Allergies    Intolerances    VITALS & EXAMINATION:  Vital Signs Last 24 Hrs  T(C): 36.4 (2022 13:17), Max: 98.1 (2022 09:29)  T(F): 97.6 (2022 13:17), Max: 208.5 (2022 09:29)  HR: 76 (2022 13:17) (63 - 113)  BP: 97/58 (2022 13:17) (94/78 - 106/80)  BP(mean): 86 (2022 02:15) (71 - 90)  RR: 18 (2022 13:17) (16 - 23)  SpO2: 98% (2022 13:17) (88% - 100%)  General:  Constitutional: Obese Male, appears stated age, in no apparent distress including pain  Head: Normocephalic & atraumatic.  ENT: Patent ear canals, intact TM, mucus membranes moist & pink, neck supple, no lymphadenopathy.   Respiratory: Patent airway. All lung fields are clear to auscultation bilaterally.  Extremities: No cyanosis, clubbing, or edema.  Skin: No rashes, bruising, or discoloration.  Psychiatric: pt appears to be responding to internal stimuli      Neurological (>12):  MS: Awake, alert, oriented to person. Normal affect. Follows some commands    Language: Speech is mostly incomprehensible. unable to name objects    CNs: PERRLA (R = 3mm, L = 3mm). Unable to fully assess CNs due to AMS, uncooperativeness. EOMI no nystagmus, some r nasiolabial flattening. Nml full eye closure strength b/l. Gag reflex deferred.     Fundoscopic:  deferred    Motor: Normal muscle bulk & tone. No noticeable tremor or seizure.               Deltoid	Biceps	Triceps	Wrist	Finger ABd	   R	5	5	5	5	5		5 	  L	5	5	5	5	5		5    	H-Flex	H-Ext	H-ABd	H-ADd	K-Flex	K-Ext	D-Flex	P-Flex  R	5	5	5	5	5	5	5	5 	   L	5	5	5	5	5	5	5	5	     Sensation: unable to assess 2/2 AMS.     Cortical: Extinction on DSS (neglect): none    Reflexes:              Biceps(C5)       BR(C6)     Triceps(C7)               Patellar(L4)    Achilles(S1)    Plantar Resp  R	2	          2	             2		        2		    2		Down   L	2	          2	             2		        2		    2		Down     Coordination: unable to perform rapid-alt movements. + dysmetria to FTN/HTS    Gait: did not assess    LABORATORY:  CBC                       13.3   9.87  )-----------( 161      ( 2022 07:13 )             39.5     Chem 04-18    140  |  104  |  20  ----------------------------<  92  3.8   |  23  |  0.80    Ca    9.6      2022 07:13    TPro  6.8  /  Alb  3.7  /  TBili  0.6  /  DBili  x   /  AST  17  /  ALT  20  /  AlkPhos  54  04-18    LFTs LIVER FUNCTIONS - ( 2022 07:13 )  Alb: 3.7 g/dL / Pro: 6.8 g/dL / ALK PHOS: 54 U/L / ALT: 20 U/L / AST: 17 U/L / GGT: x           Coagulopathy PT/INR - ( 2022 21:55 )   PT: 12.4 sec;   INR: 1.07 ratio         PTT - ( 2022 21:55 )  PTT:34.3 sec  Lipid Panel   A1c   Cardiac enzymes CARDIAC MARKERS ( 2022 21:56 )  x     / x     / 37 U/L / x     / x          U/A Urinalysis Basic - ( 2022 21:55 )    Color: Yellow / Appearance: Slightly Turbid / S.026 / pH: x  Gluc: x / Ketone: Small  / Bili: Negative / Urobili: Negative   Blood: x / Protein: 30 mg/dL / Nitrite: Negative   Leuk Esterase: Negative / RBC: 195 /hpf / WBC 4 /HPF   Sq Epi: x / Non Sq Epi: 0 /hpf / Bacteria: Negative      CSF  Immunological  Other    STUDIES & IMAGING:  Studies (EKG, EEG, EMG, etc):     Radiology (XR, CT, MR, U/S, TTE/CRYSTAL):    ACC: [id number] EXAM:  CT ANGIO BRAIN (W) IC                        ACC: [id number] EXAM:  CT ANGIO NECK (W)[facility]                        ACC: [id number] EXAM:  CT BRAIN                          PROCEDURE DATE:  2022          INTERPRETATION:  INDICATION: Weakness. Worsening mental status over the   past 3 days.    TECHNIQUE:  Noncontrast CT of the brain was performed. Axial plane images were   reconstructed via bone and soft tissue kernel algorithms. Sagittal and   coronal plane images were reformatted from the axial dataset. CT   angiography of the head and neck was performed. Following the   administration of intravenous contrast scanning was performed from arch   to vertex. Thin axial plane images were reconstructed from the raw data.   Multiplanar MIP and/or 3D images were reformatted from the axial dataset.    90 cc of Omnipaque 300 were administered intravenously; 10 cc were   discarded.    COMPARISON STUDY: CT head 2019.    FINDINGS:    HEAD:  The study is slightly limitedby patient motion artifact.    No acute intracranial hemorrhage. No midline shift or acute mass effect.   Mild age related cerebral atrophy.  There is patchy white matter   hypoattenuation likely reflecting chronic microvascular ischemic change.   There is a prominent perivascular space versus chronic lacunar infarct in   the left inferior basal ganglia.    The paranasal sinuses are clear. The tympanomastoid cavities are   adequately pneumatized. The calvarium is intact.    CT ANGIOGRAPHY NECK:  No hemodynamically significant stenosis or dissection of the extracranial   vertebral, common or internal carotid arteries, bilaterally.    No stenosis or occlusion of the skull base internal carotid arteries or   dural segments of the vertebral arteries, bilaterally.    Please refer to CT chest performed on same day for discussion of   intrathoracic findings.    There are multilevel degenerative changes in the cervical spine.    CT ANGIOGRAPHY BRAIN:  No hemodynamically significant stenosis or occlusion of the intracranial   ICAs, bilaterally    Bilateral fetal PCAs. No hemodynamically significant stenosis or   occlusion of the ACAs, MCAs, or PCAs, bilaterally.    The anterior and posterior communicating arteries are patent.    No hemodynamically significant stenosis or occlusion of the basilar or   intracranial vertebral arteries.    No intracranial aneurysm or vascular malformation.    No evidence for dural venous sinus thrombosis.      IMPRESSION:  CT HEAD: No acute intracranial hemorrhage or mass effect.    CTA NECK: No hemodynamically significant stenosis by NASCET criteria,   dissection, or pseudoaneurysm.    CTA HEAD: No large vessel occlusion, significant stenosis, dissection, or   saccular aneurysm.

## 2022-04-18 NOTE — H&P ADULT - [id number]_ALL_CORE_FT
FAMILY HISTORY:  Father  Still living? Unknown  Family history of CVA, Age at diagnosis: Age Unknown    Mother  Still living? No  FH: dementia, Age at diagnosis: Age Unknown  FH: type 2 diabetes, Age at diagnosis: Age Unknown

## 2022-04-18 NOTE — CHART NOTE - NSCHARTNOTEFT_GEN_A_CORE
Medicine PA Note     EDD VALDIVIA  MRN-[id number]  Allergies    No Known Allergies    Intolerances         Notified by RN for T-92.7F upon arrival to floor. Pt seen and evaluated at bedside. Pt able to open eyes to name, however unable to follow any simple commands. Unable to assess ROS due to Pt's altered mental status.     Vital Signs Last 24 Hrs  T(C): 33.7 (04-18-22 @ 04:58), Max: 36.3 (04-17-22 @ 20:58)  T(F): 92.6 (04-18-22 @ 04:58), Max: 97.3 (04-17-22 @ 20:58)  HR: 63 (04-18-22 @ 03:06) (63 - 113)  BP: 99/66 (04-18-22 @ 03:06) (94/78 - 106/80)  BP(mean): 86 (04-18-22 @ 02:15) (71 - 90)  RR: 20 (04-18-22 @ 03:06) (16 - 23)  SpO2: 96% (04-18-22 @ 03:06) (88% - 100%)                        15.8   9.39  )-----------( 230      ( 17 Apr 2022 21:55 )             47.4     04-17    143  |  99  |  26<H>  ----------------------------<  106<H>  4.0   |  24  |  1.05    Ca    11.0<H>      17 Apr 2022 21:56    TPro  8.8<H>  /  Alb  4.5  /  TBili  0.5  /  DBili  x   /  AST  25  /  ALT  25  /  AlkPhos  71  04-17    PT/INR - ( 17 Apr 2022 21:55 )   PT: 12.4 sec;   INR: 1.07 ratio         PTT - ( 17 Apr 2022 21:55 )  PTT:34.3 sec      PHYSICAL EXAM:  GENERAL: NAD, on lloyd hugger, ill-appearing  CHEST/LUNG: Clear to auscultation bilaterally; No wheezes, rhonchi or rales appreciated  HEART: Regular rate and rhythm; No murmurs, rubs, or gallops  ABDOMEN: Soft, Nontender, Nondistended; Bowel sounds present. No rebound, or guarding noted.  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 wife states pt ambulates slowly at baseline and needs assistance but over the last 3 days has not been walking around at all and had a few episodes of dark vomit. Pt now admitted to floor with temp 92.7F likely due to PNA.    Hypothermia likely in setting of infection  > VS hemodynamically stable aside from T-92.7F  > Pt placed on lolyd hugger, check temp q1 hours until normothermic  > x1 zosyn given in ED, will likely c/w  > Follow up BCx2/UCx sent 4/17  > Follow up TSH  > CTH noted from 4/17 with no acute pathology  > CT Chest noted from 4/17 concerning for R PNA  > Consider ID c/s in AM  > [facility] made aware of above events, follow up H+P  > Will endorse to AM team, attending to follow    Aleisha Barrios PA-C,   Department of Medicine

## 2022-04-18 NOTE — PATIENT PROFILE ADULT - NSPRESCRALCFREQ_GEN_A_NUR
[facility] encounter  HEMATOLOGY/ONCOLOGY OFFICE VISIT    CHIEF COMPLAINT:   Cancer (Metastatic prostate cancer)      HISTORY OF PRESENT ILLNESS:  Casey Enriquez Sr. is a 69 year old male who presents for evaluation of metastatic prostate cancer presenting with a spinal cord compression status post surgical decompression of the mid thoracic spine 10/20/2016 and radiation to T2-T8. Casey is ambulatory using a four-wheeled walker mostly an d a wheelchair at times for long distances. He has good urinary and bowel function and good strength. Appetite is good. He does have numbness of the entire left leg since the surgery on 10/20/2016 with soreness. He saw orthopedics, Dr. Adame, on 10/03/17 who reported that he is doing well from the surgery and will see him if there are any further concerns. Patient also feels tightness on his anterior abdomen due to the SC injections.     Interval History: Patient states he is doing well denied concerns. Denied any new bone pain, chest pain, shortness of breath, fever, chills, nausea, vomiting. Patient states appetite has improved, unable to obtain weight in clinic today. The rest of the 12 point review of systems noted in the chart.     MEDICATIONS / ALLERGIES:  Medications and allergies were reviewed and updated.    Review of systems:  All other systems are reviewed and are negative except as documented in the history of present illness.    PHYSICAL EXAM:  Vital Signs:   Oncology Encounter Vitals [06/28/19 1352]   ONC OP Encounter Vitals Group      /71      Pulse 83      Resp 18      Temp       Temp src       SpO2 94 %      Weight       Height       Pain Score 3-4      Pain Location       Pain Education?       BSA (Calculated - m2) - [employer]       BMI (Calculated)      ECOG Performance Status: 3 - Capable of only limited self-care, confined to bed or chair more than 50% of waking hours.  General: The patient is alert, well-developed, well-nourished, no  distress.  Skin: Without rash.  Neck: Supple with no significant adenopathy.  Eyes: Normal conjunctivae and sclerae.  ENT: Mucous membranes are moist.  Cardiovascular: Regular rate and rhythm, no murmurs, gallops or rubs.  Respiratory: Normal respiratory effort. Clear to auscultation posterior lung fields. No wheezes, rales, or rhonchi.  Abdomen: Abdomen is soft and non-tender with active bowel sounds.  Extremities: No clubbing, no cyanosis, no edema. Normal muscle tone and development bilaterally.  Lymph: No cervical, supraclavicular lymphadenopathy.  Neurologic: Motor strength weak, mobility via wheelchair.   Psychiatric: Cooperative. Appropriate mood and affect. Normal judgment.    DATA REVIEWED:  Laboratory Data:  Recent Labs   Lab 06/28/19  1350   WBC 7.5   RBC 3.50*   HGB 9.7*   HCT 31.8*   MCV 90.9      Absolute Neutrophil 5.1   Absolute Lymph 1.4   Absolute Mono 0.7   Absolute Eos 0.1   Absolute Baso 0.0     Recent Labs   Lab 06/28/19  1350  07/21/18  2025   Glucose 143*   < >  --    Sodium 139   < >  --    Potassium 4.3   < >  --    Chloride 101   < >  --    Carbon Dioxide 28   < >  --    BUN 12   < >  --    Creatinine 0.65*   < >  --    CALCIUM 10.0   < >  --    MAGNESIUM  --   --  1.8   TOTAL PROTEIN 7.8   < >  --    Albumin 3.1*   < >  --    AST/SGOT 13   < >  --    ALK PHOSPHATASE 95   < >  --    ALT/SGPT 14   < >  --     < > = values in this interval not displayed.     Recent Labs   Lab 06/28/19  1350   Anion Gap 14   GLOBULIN 4.7*   TOTAL BILIRUBIN 0.5       ASSESSMENT AND PLAN:  This is a 69 year old male with metastatic prostate cancer. Scans and blood work reviewed previously.  - S/p radiation to the back 10/2016. Completed 6 cycles of palliative chemotherapy with Taxotere.   - Currently on bicalutamide 50 mg daily; Zometa 4 mg q. 28 days and  Degarelix 40 mg SQ q. 28 days. Last given on 05/03/19.Started on Abiraterone and Prednisone, taking as prescribed per patient report.  - Anemia;  Hemoglobin is better.   - Patient and son (POA) wish to continue active treatment.   - Has completed physical therapy at his facility. He uses wheelchair for mobility.  - X ray of the left knee showed arthritis.  - Ordered bone scan  -continue same treatment  Follow up in 4 weeks  - .        Pt AOx0-1 and confused, MARE

## 2022-04-18 NOTE — CONSULT NOTE ADULT - ATTENDING COMMENTS
Complicated history with apparently some chronic ataxia for the past two years, with hiccups, and since the summer for 2021 more confusion, nonsensical speech, insomnia, and visual hallucinations. Now admitted with hiccups and emesis and found to have PNA. According to his wife, he has had some neurological workup including an MRI brain in January 2021 which did not show any specific abnormalities (mild white matter disease, and increase flow through foramen of [place]). Also had a T-spine lesion seen on a more recent MRI and followed by neurosurgery.     The examination was significant for poor attention (or possible attention to internal stimuli). He was unable to answer specific questions, and occasionally attempted speech with incomplete and very dysarthric sentences. Did not follow commands. Ataxia on FNF with right hand within the limits of the exam. EOMI, no nystagmus. No weakness and reacted to touch throughout. DTRs 1+ symmetric. Unable to get him up as he pushed himself back on the bed.    Impression: Complicated history of ataxia and hiccups with more recent psychiatric symptoms and perhaps acute worsening in setting of PNA. Wide differential diagnosis, including stroke (acute on chronic), inflammatory disease, demyelinating disease (less likely given prior MRI), paraneoplastic disease, prion disease. Psychiatric cause of the hallucinations less likely in my judgement.    Recommend:  - MRI brain and cervical spine +/- contrast  - EEG  - lab work per resident note    Depending on results further workup may be indicated.

## 2022-04-18 NOTE — CONSULT NOTE ADULT - ASSESSMENT
Assessment:   60 yo M with PMHx of cerebellar ataxia who was brought to the ED due to AMS. History is obtained from chart review and from patients wife as patient is not responding to questions. History is extremely limited. Approximately 1 week prior to presentation, patient began to experience progressive fatigue/weakness. Patients weakness progressed and he had an episode of incontinence. On initial assessment, hx limited due to AMS. Pt responding to some commands, normal reflexes and tone.     Plan:  [ ] Will obtain collateral from wife  [ ]  Assessment:   62 yo M with PMHx of cerebellar ataxia who was brought to the ED due to AMS. History is obtained from chart review and from patients wife as patient is not responding to questions. History is extremely limited. Approximately 1 week prior to presentation, patient began to experience progressive fatigue/weakness. Patients weakness progressed and he had an episode of incontinence. On initial assessment, hx limited due to AMS. Pt responding to some commands, normal reflexes and tone. 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     Plan:  [ ] Will obtain collateral from wife  [ ] consider MRI though may defer to outpatient due to chronicity of ataxia.   [ ] AMS likely in the setting of infectious etiology  Assessment:   62 yo M with PMHx of cerebellar ataxia who was brought to the ED due to AMS. History is obtained from chart review and from patients wife as patient is not responding to questions. History is extremely limited. Approximately 1 week prior to presentation, patient began to experience progressive fatigue/weakness. Patients weakness progressed and he had an episode of incontinence. On initial assessment, hx limited due to AMS. Pt responding to some commands, normal reflexes and tone. 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         Plan:  [ ] Will obtain collateral from wife  [ ] consider MRI though may defer to outpatient due to chronicity of ataxia.   [ ] AMS likely in the setting of infectious etiology  Assessment:   62 yo M with PMHx of cerebellar ataxia who was brought to the ED due to AMS. History is obtained from chart review and from patients wife as patient is not responding to questions. History is extremely limited. Approximately 1 week prior to presentation, patient began to experience progressive fatigue/weakness. Patients weakness progressed and he had an episode of incontinence. On initial assessment, hx limited due to AMS. Pt responding to some commands, normal reflexes and tone. 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 Pt with underlying chronic process with work up done for the last 2 years from [facility]. Pt with MRI of the brain done in Jan 2022 with non-specific diffuse white matter changes. Also with MRI of spine done 3 weeks ago with spinal lesion to t-spine (seen by Dr. Forman, though no further interventions at that time). Pt likely with acute worsening of a chronic process, consider in the setting of recent infection though cannot r/o cerebral vascular infarct, inflammatory disease, seizures. Also would consider other causes of neurocognitive decline, including underlying prion disease given rapid onset.     DDx: infectious/metabolic, cerebral vascular infarct, inflammatory disease, seizure.  cannot r/o neurocognitive decline 2/2 underlying prion disease vs dementia vs nph     Recommendations:  [ ] Would recommend MRI of the brain w/ and w/o contrast  [ ] routine EEG to r/o seizures   [ ] Would obtain paraneoplastic panel from blood, ceruplasmin, ammonia, B12, RPR, HIV, folate, TSH, TPO antibody  [ ] Consider further imaging of the spine  [ ] Consider LP if findings above inconclusive    Assessment:   62 yo M with PMHx of cerebellar ataxia who was brought to the ED due to AMS. History is obtained from chart review and from patients wife as patient is not responding to questions. History is extremely limited. Approximately 1 week prior to presentation, patient began to experience progressive fatigue/weakness. Patients weakness progressed and he had an episode of incontinence. On initial assessment, hx limited due to AMS. Pt responding to some commands, normal reflexes and tone, though incomprehensible speech, pt responding to internal stimuli on exam (concerns for hallucinations). 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 Pt with underlying chronic process with work up done for the last 2 years though from multiple different neurologists (Dr. Temo Zepeda, Dr. Saunders). Pt with MRI of the brain done in Jan 2022 with non-specific diffuse white matter changes. Also with MRI of spine done 3 weeks ago with spinal lesion to t-spine (seen by Dr. Forman, though no further interventions at that time). Pt likely with acute worsening of a chronic process, consider in the setting of recent infection though cannot r/o cerebral vascular infarct, inflammatory disease, seizures. Also would consider other causes of neurocognitive decline, including underlying prion disease given rapid onset.     DDx: infectious/metabolic, cerebral vascular infarct, inflammatory disease, seizure.  cannot r/o neurocognitive decline 2/2 underlying prion disease vs dementia vs nph     Recommendations:  [ ] Would recommend MRI of the brain w/ and w/o contrast  [ ] routine EEG to r/o seizures   [ ] Would obtain paraneoplastic panel from blood, ceruplasmin, ammonia, B12, RPR, HIV, folate, TSH, TPO antibody  [ ] Consider further imaging of the spine  [ ] Consider LP if findings above inconclusive    Assessment:   60 yo M with PMHx of cerebellar ataxia who was brought to the ED due to AMS. History is obtained from chart review and from patients wife as patient is not responding to questions. History is extremely limited. Approximately 1 week prior to presentation, patient began to experience progressive fatigue/weakness. Patients weakness progressed and he had an episode of incontinence. On initial assessment, hx limited due to AMS. Pt responding to some commands, normal reflexes and tone, though incomprehensible speech, pt responding to internal stimuli on exam (concerns for hallucinations). 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 Pt with underlying chronic process with work up done for the last 2 years though from multiple different neurologists (Dr. Temo Zepeda, Dr. Saunders). Pt with MRI of the brain done in Jan 2022 with non-specific diffuse white matter changes. Also with MRI of spine done 3 weeks ago with spinal lesion to t-spine (seen by Dr. Forman, though no further interventions at that time). Pt likely with acute worsening of a chronic process, consider in the setting of recent infection though cannot r/o cerebral vascular infarct, inflammatory disease, seizures. Also would consider other causes of neurocognitive decline, including underlying prion disease given rapid onset.     DDx: infectious/metabolic, cerebral vascular infarct, inflammatory disease, seizure.  cannot r/o neurocognitive decline 2/2 underlying prion disease vs dementia vs nph     Recommendations:  [ ] Would recommend MRI of the brain w/ and w/o contrast, c spine likely would need sedation under anesthesia   [ ] routine EEG to r/o seizures   [ ] Would obtain paraneoplastic panel from blood, ceruplasmin, ammonia, B12, RPR, HIV, folate, TSH, TPO antibody  [ ] Consider LP if findings above inconclusive    Assessment:   60 yo M with PMHx of cerebellar ataxia who was brought to the ED due to AMS. History is obtained from chart review and from patients wife as patient is not responding to questions. History is extremely limited. Approximately 1 week prior to presentation, patient began to experience progressive fatigue/weakness. Patients weakness progressed and he had an episode of incontinence. On initial assessment, hx limited due to AMS. Pt responding to some commands, normal reflexes and tone, though incomprehensible speech, pt responding to internal stimuli on exam (concerns for hallucinations). 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 Pt with underlying chronic process with work up done for the last 2 years though from multiple different neurologists (Dr. Temo Zepeda, Dr. Saunders). Pt with MRI of the brain done in Jan 2022 with non-specific diffuse white matter changes. Also with MRI of spine done 3 weeks ago with spinal lesion to t-spine (seen by Dr. Forman, though no further interventions at that time). Pt likely with acute worsening of a chronic process, consider in the setting of recent infection though cannot r/o cerebral vascular infarct, inflammatory disease, seizures. Also would consider other causes of neurocognitive decline, including underlying prion disease given rapid onset.     MRI Brain w/o contrast done at [facility] on 1/14/2022:  Findings- Ventricles, cisterns, and sulci are diffusely prominent  compatible with mild to moderate atrophy. Cerebral aqueduct is patent. Hyperdynamic flow voids at the foramen of [place]ro. Patchy nonspecific white matter dz most likely related to mild-to-moderate small vessel disease. no masses, mass seffect, hemorrhage, or cortical infarcts. normal vascular flow voides. pituitary gland unremarkable. brainstem and cerebellum are unremarkable. orbits are unremarkable. sinuses, mastoid air cells are clear.     DDx: infectious/metabolic, cerebral vascular infarct, inflammatory disease, seizure.  cannot r/o neurocognitive decline 2/2 underlying prion disease vs dementia vs nph     Recommendations:  [ ] Would recommend MRI of the brain w/ and w/o contrast, c spine likely would need sedation under anesthesia   [ ] routine EEG to r/o seizures   [ ] Would obtain paraneoplastic panel from blood, ceruplasmin, ammonia, B12, RPR, HIV, folate, TSH, TPO antibody  [ ] Consider LP if findings above inconclusive       Case discussed with Dr. Lima.  Assessment:   62 yo M with PMHx of cerebellar ataxia who was brought to the ED due to AMS. History is obtained from chart review and from patients wife as patient is not responding to questions. History is extremely limited. Approximately 1 week prior to presentation, patient began to experience progressive fatigue/weakness. Patients weakness progressed and he had an episode of incontinence. On initial assessment, hx limited due to AMS. Pt responding to some commands, normal reflexes and tone, though incomprehensible speech, pt responding to internal stimuli on exam (concerns for hallucinations). 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 Pt with underlying chronic process with work up done for the last 2 years though from multiple different neurologists (Dr. Temo Zepeda, Dr. Saunders). Pt with MRI of the brain done in Jan 2022 with non-specific diffuse white matter changes. Also with MRI of spine done 3 weeks ago with spinal lesion to t-spine (seen by Dr. Forman, though no further interventions at that time). Pt likely with acute worsening of a chronic process, consider in the setting of recent infection though cannot r/o cerebral vascular infarct, inflammatory disease, seizures. Also would consider other causes of neurocognitive decline, including underlying prion disease given rapid onset.     MRI Brain w/o contrast done at [facility] on 1/14/2022:  Findings- Ventricles, cisterns, and sulci are diffusely prominent  compatible with mild to moderate atrophy. Cerebral aqueduct is patent. Hyperdynamic flow voids at the foramen of [place]ro. Patchy nonspecific white matter dz most likely related to mild-to-moderate small vessel disease. no masses, mass seffect, hemorrhage, or cortical infarcts. normal vascular flow voides. pituitary gland unremarkable. brainstem and cerebellum are unremarkable. orbits are unremarkable. sinuses, mastoid air cells are clear.     DDx: infectious/metabolic, cerebral vascular infarct, inflammatory disease, seizure.  cannot r/o neurocognitive decline 2/2 underlying prion disease vs dementia vs nph     Recommendations:  [ ] Would recommend MRI of the brain w/ and w/o contrast, c spine likely would need sedation under anesthesia   [ ] routine EEG to r/o seizures   [ ] paraneoplastic panel from blood, copper, ceruplasmin, ammonia, B12, RPR, HIV, folate, TSH, TPO antibody  [ ] Consider LP if findings above inconclusive       Case discussed with Dr. Lima.

## 2022-04-18 NOTE — H&P ADULT - PROBLEM SELECTOR PLAN 4
- CT revealing signs concerning for esophagitis  - will start IV Protonix 40mg daily  - GI consult in the AM   - obtain S/S eval   - maintain NPO for now

## 2022-04-18 NOTE — PROVIDER CONTACT NOTE (CHANGE IN STATUS NOTIFICATION) - ACTION/TREATMENT ORDERED:
SUNIL Moraes aware, ordered hyperthermic blanket. Other provider/hospitalist at bedside to assess pt, and ordered an EKG, EKG done filed into pt binder. Hyperthermic blanket placed on pt.

## 2022-04-19 LAB
AMMONIA BLD-MCNC: 23 UMOL/L — SIGNIFICANT CHANGE UP (ref 11–55)
CERULOPLASMIN SERPL-MCNC: 24 MG/DL — SIGNIFICANT CHANGE UP (ref 15–30)
CULTURE RESULTS: SIGNIFICANT CHANGE UP
FOLATE SERPL-MCNC: 3.4 NG/ML — LOW
HCV AB S/CO SERPL IA: 0.17 S/CO — SIGNIFICANT CHANGE UP (ref 0–0.99)
HCV AB SERPL-IMP: SIGNIFICANT CHANGE UP
HIV 1+2 AB+HIV1 P24 AG SERPL QL IA: SIGNIFICANT CHANGE UP
SPECIMEN SOURCE: SIGNIFICANT CHANGE UP
T PALLIDUM AB TITR SER: NEGATIVE — SIGNIFICANT CHANGE UP
THYROPEROXIDASE AB SERPL-ACNC: <10 IU/ML — SIGNIFICANT CHANGE UP
TSH SERPL-MCNC: 1.46 UIU/ML — SIGNIFICANT CHANGE UP (ref 0.27–4.2)
VIT B12 SERPL-MCNC: >2000 PG/ML — HIGH (ref 232–1245)

## 2022-04-19 PROCEDURE — 95720 EEG PHY/QHP EA INCR W/VEEG: CPT

## 2022-04-19 PROCEDURE — 99233 SBSQ HOSP IP/OBS HIGH 50: CPT

## 2022-04-19 PROCEDURE — 99232 SBSQ HOSP IP/OBS MODERATE 35: CPT

## 2022-04-19 RX ORDER — SODIUM CHLORIDE 9 MG/ML
500 INJECTION INTRAMUSCULAR; INTRAVENOUS; SUBCUTANEOUS ONCE
Refills: 0 | Status: COMPLETED | OUTPATIENT
Start: 2022-04-19 | End: 2022-04-20

## 2022-04-19 RX ORDER — SODIUM CHLORIDE 9 MG/ML
1000 INJECTION, SOLUTION INTRAVENOUS
Refills: 0 | Status: DISCONTINUED | OUTPATIENT
Start: 2022-04-19 | End: 2022-04-21

## 2022-04-19 RX ORDER — PIPERACILLIN AND TAZOBACTAM 4; .5 G/20ML; G/20ML
3.38 INJECTION, POWDER, LYOPHILIZED, FOR SOLUTION INTRAVENOUS EVERY 8 HOURS
Refills: 0 | Status: DISCONTINUED | OUTPATIENT
Start: 2022-04-19 | End: 2022-04-21

## 2022-04-19 RX ORDER — FOLIC ACID 0.8 MG
1 TABLET ORAL DAILY
Refills: 0 | Status: DISCONTINUED | OUTPATIENT
Start: 2022-04-19 | End: 2022-04-23

## 2022-04-19 RX ORDER — SODIUM CHLORIDE 9 MG/ML
500 INJECTION INTRAMUSCULAR; INTRAVENOUS; SUBCUTANEOUS ONCE
Refills: 0 | Status: COMPLETED | OUTPATIENT
Start: 2022-04-19 | End: 2022-04-19

## 2022-04-19 RX ORDER — PIPERACILLIN AND TAZOBACTAM 4; .5 G/20ML; G/20ML
3.38 INJECTION, POWDER, LYOPHILIZED, FOR SOLUTION INTRAVENOUS ONCE
Refills: 0 | Status: COMPLETED | OUTPATIENT
Start: 2022-04-19 | End: 2022-04-19

## 2022-04-19 RX ADMIN — PANTOPRAZOLE SODIUM 40 MILLIGRAM(S): 20 TABLET, DELAYED RELEASE ORAL at 12:11

## 2022-04-19 RX ADMIN — SODIUM CHLORIDE 80 MILLILITER(S): 9 INJECTION, SOLUTION INTRAVENOUS at 18:28

## 2022-04-19 RX ADMIN — HEPARIN SODIUM 5000 UNIT(S): 5000 INJECTION INTRAVENOUS; SUBCUTANEOUS at 22:12

## 2022-04-19 RX ADMIN — PIPERACILLIN AND TAZOBACTAM 200 GRAM(S): 4; .5 INJECTION, POWDER, LYOPHILIZED, FOR SOLUTION INTRAVENOUS at 13:18

## 2022-04-19 RX ADMIN — HEPARIN SODIUM 5000 UNIT(S): 5000 INJECTION INTRAVENOUS; SUBCUTANEOUS at 06:38

## 2022-04-19 RX ADMIN — HEPARIN SODIUM 5000 UNIT(S): 5000 INJECTION INTRAVENOUS; SUBCUTANEOUS at 12:13

## 2022-04-19 RX ADMIN — AMPICILLIN SODIUM AND SULBACTAM SODIUM 200 GRAM(S): 250; 125 INJECTION, POWDER, FOR SUSPENSION INTRAMUSCULAR; INTRAVENOUS at 06:32

## 2022-04-19 RX ADMIN — PIPERACILLIN AND TAZOBACTAM 25 GRAM(S): 4; .5 INJECTION, POWDER, LYOPHILIZED, FOR SOLUTION INTRAVENOUS at 18:28

## 2022-04-19 RX ADMIN — SODIUM CHLORIDE 1000 MILLILITER(S): 9 INJECTION INTRAMUSCULAR; INTRAVENOUS; SUBCUTANEOUS at 23:04

## 2022-04-19 NOTE — PROGRESS NOTE ADULT - PROBLEM SELECTOR PLAN 1
sepsis vs worsening neurological disorder (h/o cerebral ataxia) did not have MRI outpt  Neuro consult appreciated: MRI c& head pending, labs requested ordered, will need LP if current work up inconclusive   s/p EEG pending results   sepsis workup as below   Utox neg  TOV started

## 2022-04-19 NOTE — PROGRESS NOTE ADULT - ASSESSMENT
Assessment:   60 yo M with PMHx of cerebellar ataxia who was brought to the ED due to AMS. History is obtained from chart review and from patients wife as patient is not responding to questions. History is extremely limited. Approximately 1 week prior to presentation, patient began to experience progressive fatigue/weakness. Patients weakness progressed and he had an episode of incontinence. On initial assessment, hx limited due to AMS. Pt responding to some commands, normal reflexes and tone, though incomprehensible speech, pt responding to internal stimuli on exam (concerns for hallucinations). 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 Pt with underlying chronic process with work up done for the last 2 years though from multiple different neurologists (Dr. Temo Zepeda, Dr. Saunders). Pt with MRI of the brain done in Jan 2022 with non-specific diffuse white matter changes. Also with MRI of spine done 3 weeks ago with spinal lesion to t-spine (seen by Dr. Forman, though no further interventions at that time). Pt likely with acute worsening of a chronic process, consider in the setting of recent infection though cannot r/o cerebral vascular infarct, inflammatory disease, seizures. Also would consider other causes of neurocognitive decline, including underlying prion disease given rapid onset.     MRI Brain w/o contrast done at [facility] on 1/14/2022:  Findings- Ventricles, cisterns, and sulci are diffusely prominent  compatible with mild to moderate atrophy. Cerebral aqueduct is patent. Hyperdynamic flow voids at the foramen of Monro. Patchy nonspecific white matter dz most likely related to mild-to-moderate small vessel disease. no masses, mass seffect, hemorrhage, or cortical infarcts. normal vascular flow voides. pituitary gland unremarkable. brainstem and cerebellum are unremarkable. orbits are unremarkable. sinuses, mastoid air cells are clear.     4/19: pt more lethargic appearing, some waxing and waning in mental status.     DDx: infectious/metabolic, cerebral vascular infarct, inflammatory disease, seizure.  cannot r/o neurocognitive decline 2/2 underlying prion disease vs dementia vs nph     Recommendations:  [ ] Plan for video EEG to r/o seizures today  [ ] MRI of the brain, c-spine w/wo contrast, may need sedation  [ ] paraneoplastic panel from blood, RHIANNON panel, copper, ceruplasmin, B12, RPR, HIV, folate, TSH, TPO antibody. NH3 wnl.   [ ] Consider LP if findings above inconclusive       Case discussed with Dr. Lima.  Assessment:   62 yo M with PMHx of cerebellar ataxia who was brought to the ED due to AMS. History is obtained from chart review and from patients wife as patient is not responding to questions. History is extremely limited. Approximately 1 week prior to presentation, patient began to experience progressive fatigue/weakness. Patients weakness progressed and he had an episode of incontinence. On initial assessment, hx limited due to AMS. Pt responding to some commands, normal reflexes and tone, though incomprehensible speech, pt responding to internal stimuli on exam (concerns for hallucinations). 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 Pt with underlying chronic process with work up done for the last 2 years though from multiple different neurologists (Dr. Temo Zepeda, Dr. Saunders). Pt with MRI of the brain done in Jan 2022 with non-specific diffuse white matter changes. Also with MRI of spine done 3 weeks ago with spinal lesion to t-spine (seen by Dr. Forman, though no further interventions at that time). Pt likely with acute worsening of a chronic process, consider in the setting of recent infection though cannot r/o cerebral vascular infarct, inflammatory disease, seizures. Also would consider other causes of neurocognitive decline, including underlying prion disease given rapid onset.     MRI Brain w/o contrast done at [facility] on 1/14/2022:  Findings- Ventricles, cisterns, and sulci are diffusely prominent  compatible with mild to moderate atrophy. Cerebral aqueduct is patent. Hyperdynamic flow voids at the foramen of Monro. Patchy nonspecific white matter dz most likely related to mild-to-moderate small vessel disease. no masses, mass seffect, hemorrhage, or cortical infarcts. normal vascular flow voides. pituitary gland unremarkable. brainstem and cerebellum are unremarkable. orbits are unremarkable. sinuses, mastoid air cells are clear.     4/19: pt more lethargic appearing, some waxing and waning in mental status.     DDx: infectious/metabolic, cerebral vascular infarct, inflammatory disease, seizure.  cannot r/o neurocognitive decline 2/2 underlying prion disease vs dementia vs nph     Recommendations:  [ ] Plan for video EEG to r/o seizures today  [ ] MRI of the brain, c-spine w/wo contrast, may need sedation  [ ] paraneoplastic panel from blood, autoimmune encephalopathy panel, copper, ceruplasmin, B12, RPR, HIV, folate, TSH, TPO antibody. NH3 wnl.   [ ] Consider LP if findings above inconclusive   [ ] will attempt to discuss with pt's PCP regarding chronicity of sx, previous work-up, management       Case discussed with Dr. Lima.  Assessment:   60 yo M with PMHx of cerebellar ataxia who was brought to the ED due to AMS. History is obtained from chart review and from patients wife as patient is not responding to questions. History is extremely limited. Approximately 1 week prior to presentation, patient began to experience progressive fatigue/weakness. Patients weakness progressed and he had an episode of incontinence. On initial assessment, hx limited due to AMS. Pt responding to some commands, normal reflexes and tone, though incomprehensible speech, pt responding to internal stimuli on exam (concerns for hallucinations). 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 Pt with underlying chronic process with work up done for the last 2 years though from multiple different neurologists (Dr. Temo Zepeda, Dr. Saunders). Pt with MRI of the brain done in Jan 2022 with non-specific diffuse white matter changes. Also with MRI of spine done 3 weeks ago with spinal lesion to t-spine (seen by Dr. Forman, though no further interventions at that time). Pt likely with acute worsening of a chronic process, consider in the setting of recent infection though cannot r/o cerebral vascular infarct, inflammatory disease, seizures. Also would consider other causes of neurocognitive decline, including underlying prion disease given rapid onset.     MRI Brain w/o contrast done at [facility] on 1/14/2022:  Findings- Ventricles, cisterns, and sulci are diffusely prominent  compatible with mild to moderate atrophy. Cerebral aqueduct is patent. Hyperdynamic flow voids at the foramen of Monro. Patchy nonspecific white matter dz most likely related to mild-to-moderate small vessel disease. no masses, mass seffect, hemorrhage, or cortical infarcts. normal vascular flow voides. pituitary gland unremarkable. brainstem and cerebellum are unremarkable. orbits are unremarkable. sinuses, mastoid air cells are clear.     4/19: pt more lethargic appearing, some waxing and waning in mental status.     DDx: infectious/metabolic, cerebral vascular infarct, inflammatory disease, seizure.  cannot r/o neurocognitive decline 2/2 underlying prion disease vs dementia vs nph     Recommendations:  [ ] Plan for video EEG to r/o seizures today  [ ] MRI of the brain, c-spine w/wo contrast, may need sedation  [ ] paraneoplastic panel from blood, autoimmune encephalopathy panel, copper,  RPR, HIV, TPO antibody pending. NH3 wnl. folate 3.4, b12 elevated. Ceruloplasmin 24. TSH 1.46  [ ] Consider LP if findings above inconclusive   [ ] will attempt to discuss with pt's PCP regarding chronicity of sx, previous work-up, management       Case discussed with Dr. Lima.  Assessment:   60 yo M with PMHx of cerebellar ataxia who was brought to the ED due to AMS. History is obtained from chart review and from patients wife as patient is not responding to questions. History is extremely limited. Approximately 1 week prior to presentation, patient began to experience progressive fatigue/weakness. Patients weakness progressed and he had an episode of incontinence. On initial assessment, hx limited due to AMS. Pt responding to some commands, normal reflexes and tone, though incomprehensible speech, pt responding to internal stimuli on exam (concerns for hallucinations). 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 Pt with underlying chronic process with work up done for the last 2 years though from multiple different neurologists (Dr. Temo Zepeda, Dr. Saunders). Pt with MRI of the brain done in Jan 2022 with non-specific diffuse white matter changes. Also with MRI of spine done 3 weeks ago with spinal lesion to t-spine (seen by Dr. Forman, though no further interventions at that time). Pt likely with acute worsening of a chronic process, consider in the setting of recent infection though cannot r/o cerebral vascular infarct, inflammatory disease, seizures. Also would consider other causes of neurocognitive decline, including underlying prion disease given rapid onset.     MRI Brain w/o contrast done at [facility] on 1/14/2022:  Findings- Ventricles, cisterns, and sulci are diffusely prominent  compatible with mild to moderate atrophy. Cerebral aqueduct is patent. Hyperdynamic flow voids at the foramen of Monro. Patchy nonspecific white matter dz most likely related to mild-to-moderate small vessel disease. no masses, mass seffect, hemorrhage, or cortical infarcts. normal vascular flow voides. pituitary gland unremarkable. brainstem and cerebellum are unremarkable. orbits are unremarkable. sinuses, mastoid air cells are clear.     4/19: pt more lethargic appearing, some waxing and waning in mental status.     DDx: infectious/metabolic, cerebral vascular infarct, inflammatory disease, seizure.  cannot r/o neurocognitive decline 2/2 underlying prion disease vs dementia vs nph     Recommendations:  [ ] Plan for video EEG to r/o seizures today  [ ] MRI of the brain, c-spine w/wo contrast, may need sedation  [ ] recommend obtaining B1, B3, vitamin D, vitamin E, MMA, homocysteine, paraneoplastic panel from blood, autoimmune encephalopathy panel, copper,  RPR, HIV, TPO antibody pending.  [x]  NH3 wnl. folate 3.4, b12 elevated. Ceruloplasmin 24. TSH 1.46  [ ] Consider LP if findings above inconclusive   [ ] will attempt to discuss with pt's PCP regarding chronicity of sx, previous work-up, management       Case discussed with Dr. Lima.

## 2022-04-19 NOTE — SWALLOW BEDSIDE ASSESSMENT ADULT - COMMENTS
In the ED, patient noted to be tachycardic to 113 and tachypneic to 23. Labs significant for initial lactate of 3.7. A code stroke was called which was negative for acute intracranial processes. A CT of the chest and A/P was performed which revealed findings concerning for right sided pneumonia and esophagitis. Patient was given 2L IVF and a dose of Zosyn. He was subsequently admitted to medicine for further management. Found to be septic 2/2 right sided pneumonia.  suspect aspiration due to multiple episodes of emesis in the setting of AMS.   CT revealing signs concerning for esophagitis  - will start IV Protonix 40mg daily  - GI consult in the AM   - obtain S/S eval   - maintain NPO for now.     Neurology consulted as patient with progressive AMS and difficulty ambulating -> DDx: infectious/metabolic, cerebral vascular infarct, inflammatory disease, seizure. Cannot r/o neurocognitive decline 2/2 underlying prion disease vs dementia vs nph In the ED, patient noted to be tachycardic to 113 and tachypneic to 23. Labs significant for initial lactate of 3.7. A code stroke was called which was negative for acute intracranial processes. A CT of the chest and A/P was performed which revealed findings concerning for right sided pneumonia and esophagitis. Patient was given 2L IVF and a dose of Zosyn. He was subsequently admitted to medicine for further management. Found to be septic 2/2 right sided pneumonia.  suspect aspiration due to multiple episodes of emesis in the setting of AMS.   CT revealing signs concerning for esophagitis  - will start IV Protonix 40mg daily  - GI consult in the AM   - obtain S/S eval   - maintain NPO for now.     Neurology consulted as patient with progressive AMS and difficulty ambulating -> DDx: infectious/metabolic, cerebral vascular infarct, inflammatory disease, seizure. Cannot r/o neurocognitive decline 2/2 underlying prion disease vs dementia vs nph    Swallow history: Pt is new to this service

## 2022-04-19 NOTE — PROGRESS NOTE ADULT - SUBJECTIVE AND OBJECTIVE BOX
MRN-[id number]    Subjective: NAEO. Pt more lethargic overnight and this am, per nursing staff.    Hx limited this am due to AMS. Pt arousable to sternal rub, able to state his name. Remains predominantly uncooperative with examination, though able to follow some at times.       PAST MEDICAL & SURGICAL HISTORY:  H/O cerebellar ataxia    No significant past surgical history      FAMILY HISTORY:  FH: dementia (Mother)    FH: type 2 diabetes (Mother)    Family history of CVA (Father)      Social Hx:  Nonsmoker, no drug or alcohol use    Home Medications:  atorvastatin 20 mg oral tablet: 1 tab(s) orally once a day (2022 06:28)  mirtazapine 15 mg oral tablet: 1 tab(s) orally once a day (at bedtime), As Needed (2022 06:28)    MEDICATIONS  (STANDING):  ampicillin/sulbactam  IVPB      ampicillin/sulbactam  IVPB 3 Gram(s) IV Intermittent every 6 hours  atorvastatin 20 milliGRAM(s) Oral at bedtime  dextrose 5% + sodium chloride 0.45%. 1000 milliLiter(s) (30 mL/Hr) IV Continuous <Continuous>  heparin   Injectable 5000 Unit(s) SubCutaneous every 8 hours  pantoprazole  Injectable 40 milliGRAM(s) IV Push daily    MEDICATIONS  (PRN):  acetaminophen     Tablet .. 650 milliGRAM(s) Oral every 6 hours PRN Temp greater or equal to 38C (100.4F), Mild Pain (1 - 3)  aluminum hydroxide/magnesium hydroxide/simethicone Suspension 30 milliLiter(s) Oral every 4 hours PRN Dyspepsia  melatonin 3 milliGRAM(s) Oral at bedtime PRN Insomnia  ondansetron Injectable 4 milliGRAM(s) IV Push every 8 hours PRN Nausea and/or Vomiting    Allergies  No Known Allergies    Intolerances      REVIEW OF SYSTEMS:  Limited due to AMS.     ROS: Pertinent positives above, all other ROS were reviewed and are negative.      Vital Signs Last 24 Hrs  T(C): 36.9 (2022 10:15), Max: 36.9 (2022 10:15)  T(F): 98.4 (2022 10:15), Max: 98.4 (2022 10:15)  HR: 98 (2022 10:15) (69 - 98)  BP: 93/59 (2022 10:15) (90/57 - 104/67)  BP(mean): --  RR: 18 (2022 10:15) (18 - 18)  SpO2: 96% (2022 10:15) (96% - 99%)    GENERAL EXAM:  Constitutional: awake and alert. lethargic appearing. NAD  HEENT: PERRLA, EOMI  Neck: Supple  Extremities: no edema, no cyanosis  Musculoskeletal: no joint swelling/tenderness, no abnormal movements  Skin: no rashes    NEUROLOGICAL EXAM:  MS: AAOX1 (to self), attends mainly to the right side. unable to assess memory. attention, language and fund of knowledge all limited  CN:  EOMI, PERRL, t/p midline, SCM/trap intact. mild nasolabial flattening  Motor: strength assessment limited due to pt's AMS, uncooperativeness. no rigidity.  Tone: normal. Bulk: normal. DTR 2+ symm.  Plantar flex b/l. Sensation: unable to assess  Coordination: unable to assess.   Gait:  unable to assess    NIHSS  mRS    Labs:   cbc                      13.3   9.87  )-----------( 161      ( 2022 07:13 )             39.5     [id number]    140  |  104  |  20  ----------------------------<  92  3.8   |  23  |  0.80    Ca    9.6      2022 07:13    TPro  6.8  /  Alb  3.7  /  TBili  0.6  /  DBili  x   /  AST  17  /  ALT  20  /  AlkPhos  54  04-18    CoagsPT/INR - ( 2022 21:55 )   PT: 12.4 sec;   INR: 1.07 ratio         PTT - ( 2022 21:55 )  PTT:34.3 sec  Lipids  A1C  Cardiac MarkersCARDIAC MARKERS ( 2022 21:56 )  x     / x     / 37 U/L / x     / x          LIVER FUNCTIONS - ( 2022 07:13 )  Alb: 3.7 g/dL / Pro: 6.8 g/dL / ALK PHOS: 54 U/L / ALT: 20 U/L / AST: 17 U/L / GGT: x           UAUrinalysis Basic - ( 2022 21:55 )    Color: Yellow / Appearance: Slightly Turbid / S.026 / pH: x  Gluc: x / Ketone: Small  / Bili: Negative / Urobili: Negative   Blood: x / Protein: 30 mg/dL / Nitrite: Negative   Leuk Esterase: Negative / RBC: 195 /hpf / WBC 4 /HPF   Sq Epi: x / Non Sq Epi: 0 /hpf / Bacteria: Negative      CSF  Immunological Labs    Radiology:  -Imaging from [facility], MRI Brain in 2022 with no acute findings. T spine with contusion likely determined to be 2/2 from prior fall [id number]    Subjective: NAEO. Pt more lethargic overnight and this am, per nursing staff.    Hx limited this am due to AMS. Pt arousable to sternal rub, able to state his name. Remains predominantly uncooperative with examination, though able to follow some at times.       PAST MEDICAL & SURGICAL HISTORY:  H/O cerebellar ataxia    No significant past surgical history      FAMILY HISTORY:  FH: dementia (Mother)    FH: type 2 diabetes (Mother)    Family history of CVA (Father)      Social Hx:  Nonsmoker, no drug or alcohol use    Home Medications:  atorvastatin 20 mg oral tablet: 1 tab(s) orally once a day (2022 06:28)  mirtazapine 15 mg oral tablet: 1 tab(s) orally once a day (at bedtime), As Needed (2022 06:28)    MEDICATIONS  (STANDING):  ampicillin/sulbactam  IVPB      ampicillin/sulbactam  IVPB 3 Gram(s) IV Intermittent every 6 hours  atorvastatin 20 milliGRAM(s) Oral at bedtime  dextrose 5% + sodium chloride 0.45%. 1000 milliLiter(s) (30 mL/Hr) IV Continuous <Continuous>  heparin   Injectable 5000 Unit(s) SubCutaneous every 8 hours  pantoprazole  Injectable 40 milliGRAM(s) IV Push daily    MEDICATIONS  (PRN):  acetaminophen     Tablet .. 650 milliGRAM(s) Oral every 6 hours PRN Temp greater or equal to 38C (100.4F), Mild Pain (1 - 3)  aluminum hydroxide/magnesium hydroxide/simethicone Suspension 30 milliLiter(s) Oral every 4 hours PRN Dyspepsia  melatonin 3 milliGRAM(s) Oral at bedtime PRN Insomnia  ondansetron Injectable 4 milliGRAM(s) IV Push every 8 hours PRN Nausea and/or Vomiting    Allergies  No Known Allergies    Intolerances      REVIEW OF SYSTEMS:  Limited due to AMS.     ROS: Pertinent positives above, all other ROS were reviewed and are negative.      Vital Signs Last 24 Hrs  T(C): 36.9 (2022 10:15), Max: 36.9 (2022 10:15)  T(F): 98.4 (2022 10:15), Max: 98.4 (2022 10:15)  HR: 98 (2022 10:15) (69 - 98)  BP: 93/59 (2022 10:15) (90/57 - 104/67)  BP(mean): --  RR: 18 (2022 10:15) (18 - 18)  SpO2: 96% (2022 10:15) (96% - 99%)    GENERAL EXAM:  Constitutional: awake and alert. lethargic appearing. NAD  HEENT: PERRLA, EOMI  Neck: Supple  Extremities: no edema, no cyanosis  Musculoskeletal: no joint swelling/tenderness, no abnormal movements  Skin: no rashes    NEUROLOGICAL EXAM:  MS: AAOX1 (to self), attends mainly to the right side. unable to assess memory. attention, language and fund of knowledge all limited  CN: unable to fully assess.  EOMI, PERRL, mild nasolabial flattening  Motor: strength assessment limited due to pt's AMS, uncooperativeness. no rigidity.  Tone: normal. Bulk: normal. DTR 2+ symm.  Plantar flex b/l. Sensation: unable to assess  Coordination: unable to assess.   Gait:  unable to assess    NIHSS  mRS    Labs:   cbc                      13.3   9.87  )-----------( 161      ( 2022 07:13 )             39.5     [id number]    140  |  104  |  20  ----------------------------<  92  3.8   |  23  |  0.80    Ca    9.6      2022 07:13    TPro  6.8  /  Alb  3.7  /  TBili  0.6  /  DBili  x   /  AST  17  /  ALT  20  /  AlkPhos  54  04-18    CoagsPT/INR - ( 2022 21:55 )   PT: 12.4 sec;   INR: 1.07 ratio         PTT - ( 2022 21:55 )  PTT:34.3 sec  Lipids  A1C  Cardiac MarkersCARDIAC MARKERS ( 2022 21:56 )  x     / x     / 37 U/L / x     / x          LIVER FUNCTIONS - ( 2022 07:13 )  Alb: 3.7 g/dL / Pro: 6.8 g/dL / ALK PHOS: 54 U/L / ALT: 20 U/L / AST: 17 U/L / GGT: x           UAUrinalysis Basic - ( 2022 21:55 )    Color: Yellow / Appearance: Slightly Turbid / S.026 / pH: x  Gluc: x / Ketone: Small  / Bili: Negative / Urobili: Negative   Blood: x / Protein: 30 mg/dL / Nitrite: Negative   Leuk Esterase: Negative / RBC: 195 /hpf / WBC 4 /HPF   Sq Epi: x / Non Sq Epi: 0 /hpf / Bacteria: Negative      CSF  Immunological Labs    Radiology:  -Imaging from [facility], MRI Brain in 2022 with no acute findings. T spine with contusion likely determined to be 2/2 from prior fall [id number]    Subjective: NAEO. Pt more lethargic overnight and this am, per nursing staff.    Hx limited this am due to AMS. Pt arousable to sternal rub, able to state his name. Remains predominantly uncooperative with examination, though able to follow some at times.       PAST MEDICAL & SURGICAL HISTORY:  H/O cerebellar ataxia    No significant past surgical history      FAMILY HISTORY:  FH: dementia (Mother)    FH: type 2 diabetes (Mother)    Family history of CVA (Father)      Social Hx:  Nonsmoker, no drug or alcohol use    Home Medications:  atorvastatin 20 mg oral tablet: 1 tab(s) orally once a day (2022 06:28)  mirtazapine 15 mg oral tablet: 1 tab(s) orally once a day (at bedtime), As Needed (2022 06:28)    MEDICATIONS  (STANDING):  ampicillin/sulbactam  IVPB      ampicillin/sulbactam  IVPB 3 Gram(s) IV Intermittent every 6 hours  atorvastatin 20 milliGRAM(s) Oral at bedtime  dextrose 5% + sodium chloride 0.45%. 1000 milliLiter(s) (30 mL/Hr) IV Continuous <Continuous>  heparin   Injectable 5000 Unit(s) SubCutaneous every 8 hours  pantoprazole  Injectable 40 milliGRAM(s) IV Push daily    MEDICATIONS  (PRN):  acetaminophen     Tablet .. 650 milliGRAM(s) Oral every 6 hours PRN Temp greater or equal to 38C (100.4F), Mild Pain (1 - 3)  aluminum hydroxide/magnesium hydroxide/simethicone Suspension 30 milliLiter(s) Oral every 4 hours PRN Dyspepsia  melatonin 3 milliGRAM(s) Oral at bedtime PRN Insomnia  ondansetron Injectable 4 milliGRAM(s) IV Push every 8 hours PRN Nausea and/or Vomiting    Allergies  No Known Allergies    Intolerances      REVIEW OF SYSTEMS:  Limited due to AMS.     ROS: Pertinent positives above, all other ROS were reviewed and are negative.      Vital Signs Last 24 Hrs  T(C): 36.9 (2022 10:15), Max: 36.9 (2022 10:15)  T(F): 98.4 (2022 10:15), Max: 98.4 (2022 10:15)  HR: 98 (2022 10:15) (69 - 98)  BP: 93/59 (2022 10:15) (90/57 - 104/67)  BP(mean): --  RR: 18 (2022 10:15) (18 - 18)  SpO2: 96% (2022 10:15) (96% - 99%)    GENERAL EXAM:  Constitutional: awake and alert. lethargic appearing. NAD  HEENT: PERRLA, EOMI  Neck: Supple  Extremities: no edema, no cyanosis  Musculoskeletal: no joint swelling/tenderness, no abnormal movements  Skin: no rashes    NEUROLOGICAL EXAM:  MS: AAOX1 (to self), attends mainly to the right side. unable to assess memory. attention, language and fund of knowledge all limited. follows some commands (able to show two fingers).  CN: unable to fully assess.  EOMI, PERRL, mild nasolabial flattening  Motor: strength assessment limited due to pt's AMS, uncooperativeness. no rigidity.  Tone: normal. Bulk: normal. DTR 2+ symm.  Plantar flex b/l. Sensation: unable to assess  Coordination: unable to assess.   Gait:  unable to assess    NIHSS  mRS    Labs:   cbc                      13.3   9.87  )-----------( 161      ( 2022 07:13 )             39.5     [id number]    140  |  104  |  20  ----------------------------<  92  3.8   |  23  |  0.80    Ca    9.6      2022 07:13    TPro  6.8  /  Alb  3.7  /  TBili  0.6  /  DBili  x   /  AST  17  /  ALT  20  /  AlkPhos  54  04-18    CoagsPT/INR - ( 2022 21:55 )   PT: 12.4 sec;   INR: 1.07 ratio         PTT - ( 2022 21:55 )  PTT:34.3 sec  Lipids  A1C  Cardiac MarkersCARDIAC MARKERS ( 2022 21:56 )  x     / x     / 37 U/L / x     / x          LIVER FUNCTIONS - ( 2022 07:13 )  Alb: 3.7 g/dL / Pro: 6.8 g/dL / ALK PHOS: 54 U/L / ALT: 20 U/L / AST: 17 U/L / GGT: x           UAUrinalysis Basic - ( 2022 21:55 )    Color: Yellow / Appearance: Slightly Turbid / S.026 / pH: x  Gluc: x / Ketone: Small  / Bili: Negative / Urobili: Negative   Blood: x / Protein: 30 mg/dL / Nitrite: Negative   Leuk Esterase: Negative / RBC: 195 /hpf / WBC 4 /HPF   Sq Epi: x / Non Sq Epi: 0 /hpf / Bacteria: Negative      CSF  Immunological Labs    Radiology:  -Imaging from [facility], MRI Brain in 2022 with no acute findings. T spine with contusion likely determined to be 2/2 from prior fall [id number]    Subjective: NAEO. Pt more lethargic overnight and this am, per nursing staff.    Hx limited this am due to AMS. Pt arousable to sternal rub, able to state his name. Remains predominantly uncooperative with examination, though able to follow some at times.       PAST MEDICAL & SURGICAL HISTORY:  H/O cerebellar ataxia    No significant past surgical history      FAMILY HISTORY:  FH: dementia (Mother)    FH: type 2 diabetes (Mother)    Family history of CVA (Father)      Social Hx:  Nonsmoker, no drug or alcohol use    Home Medications:  atorvastatin 20 mg oral tablet: 1 tab(s) orally once a day (2022 06:28)  mirtazapine 15 mg oral tablet: 1 tab(s) orally once a day (at bedtime), As Needed (2022 06:28)    MEDICATIONS  (STANDING):  ampicillin/sulbactam  IVPB      ampicillin/sulbactam  IVPB 3 Gram(s) IV Intermittent every 6 hours  atorvastatin 20 milliGRAM(s) Oral at bedtime  dextrose 5% + sodium chloride 0.45%. 1000 milliLiter(s) (30 mL/Hr) IV Continuous <Continuous>  heparin   Injectable 5000 Unit(s) SubCutaneous every 8 hours  pantoprazole  Injectable 40 milliGRAM(s) IV Push daily    MEDICATIONS  (PRN):  acetaminophen     Tablet .. 650 milliGRAM(s) Oral every 6 hours PRN Temp greater or equal to 38C (100.4F), Mild Pain (1 - 3)  aluminum hydroxide/magnesium hydroxide/simethicone Suspension 30 milliLiter(s) Oral every 4 hours PRN Dyspepsia  melatonin 3 milliGRAM(s) Oral at bedtime PRN Insomnia  ondansetron Injectable 4 milliGRAM(s) IV Push every 8 hours PRN Nausea and/or Vomiting    Allergies  No Known Allergies    Intolerances      REVIEW OF SYSTEMS:  Limited due to AMS.     ROS: Pertinent positives above, all other ROS were reviewed and are negative.      Vital Signs Last 24 Hrs  T(C): 36.9 (2022 10:15), Max: 36.9 (2022 10:15)  T(F): 98.4 (2022 10:15), Max: 98.4 (2022 10:15)  HR: 98 (2022 10:15) (69 - 98)  BP: 93/59 (2022 10:15) (90/57 - 104/67)  BP(mean): --  RR: 18 (2022 10:15) (18 - 18)  SpO2: 96% (2022 10:15) (96% - 99%)    GENERAL EXAM:  Constitutional: awake and alert. lethargic appearing. NAD  HEENT: PERRLA, EOMI  Neck: Supple  Extremities: no edema, no cyanosis  Musculoskeletal: no joint swelling/tenderness, no abnormal movements  Skin: no rashes    NEUROLOGICAL EXAM:  MS: AAOX1 (to self), attends mainly to the right side. unable to assess memory. attention, language and fund of knowledge all limited. follows some commands (able to show two fingers with right hand).  CN: unable to fully assess.  EOMI, PERRL, mild nasolabial flattening  Motor: strength assessment limited due to pt's AMS, uncooperativeness. no rigidity.  Tone: normal. Bulk: normal. DTR 2+ symm.  Plantar flex b/l. Sensation: unable to assess  Coordination: unable to assess.   Gait:  unable to assess    NIHSS  mRS    Labs:   cbc                      13.3   9.87  )-----------( 161      ( 2022 07:13 )             39.5     [id number]    140  |  104  |  20  ----------------------------<  92  3.8   |  23  |  0.80    Ca    9.6      2022 07:13    TPro  6.8  /  Alb  3.7  /  TBili  0.6  /  DBili  x   /  AST  17  /  ALT  20  /  AlkPhos  54  04-18    CoagsPT/INR - ( 2022 21:55 )   PT: 12.4 sec;   INR: 1.07 ratio         PTT - ( 2022 21:55 )  PTT:34.3 sec  Lipids  A1C  Cardiac MarkersCARDIAC MARKERS ( 2022 21:56 )  x     / x     / 37 U/L / x     / x          LIVER FUNCTIONS - ( 2022 07:13 )  Alb: 3.7 g/dL / Pro: 6.8 g/dL / ALK PHOS: 54 U/L / ALT: 20 U/L / AST: 17 U/L / GGT: x           UAUrinalysis Basic - ( 2022 21:55 )    Color: Yellow / Appearance: Slightly Turbid / S.026 / pH: x  Gluc: x / Ketone: Small  / Bili: Negative / Urobili: Negative   Blood: x / Protein: 30 mg/dL / Nitrite: Negative   Leuk Esterase: Negative / RBC: 195 /hpf / WBC 4 /HPF   Sq Epi: x / Non Sq Epi: 0 /hpf / Bacteria: Negative      CSF  Immunological Labs    Radiology:  -Imaging from [facility], MRI Brain in 2022 with no acute findings. T spine with contusion likely determined to be 2/2 from prior fall

## 2022-04-19 NOTE — SWALLOW BEDSIDE ASSESSMENT ADULT - SLP PERTINENT HISTORY OF CURRENT PROBLEM
Patient is a 62 yo M with PMHx of cerebral ataxia who was brought to the ED due to AMS. History is obtained from chart review and from patients wife as patient is not responding to questions. History is extremely limited. Approximately 1 week prior to presentation, patient began to experience progressive fatigue/weakness. Patients weakness progressed and he had an episode of incontinence several days later. Two days prior to presentation, patient began to experience hiccoughs. Patient has history of unexplained hiccoughs which usually progress to episodes of emesis. Patient underwent a barium swallow study recently which was unremarkable. On the day of presentation, patient began to have multiple episodes of emesis, which prompted his wife to bring him to the ED. At baseline, patient requires a walker to ambulate and is able to communicate normally.

## 2022-04-19 NOTE — CHART NOTE - NSCHARTNOTEFT_GEN_A_CORE
PA Medicine Note   Notified by RN patient's most recent blood pressure: 82/58. Patient seen and examined at bedside, lethargic A&Ox0 not following commands as per baseline.     Vital Signs Last 24 Hrs  T(C): 36.3 (19 Apr 2022 22:12), Max: 36.9 (19 Apr 2022 10:15)  T(F): 97.4 (19 Apr 2022 22:12), Max: 98.4 (19 Apr 2022 10:15)  HR: 64 (19 Apr 2022 22:12) (64 - 100)  BP: 82/58 (19 Apr 2022 22:12) (82/58 - 102/70)  BP(mean): --  RR: 18 (19 Apr 2022 22:12) (18 - 18)  SpO2: 99% (19 Apr 2022 22:12) (95% - 99%)      Labs:                          13.3   9.87  )-----------( 161      ( 18 Apr 2022 07:13 )             39.5     04-18    140  |  104  |  20  ----------------------------<  92  3.8   |  23  |  0.80    Ca    9.6      18 Apr 2022 07:13    TPro  6.8  /  Alb  3.7  /  TBili  0.6  /  DBili  x   /  AST  17  /  ALT  20  /  AlkPhos  54  04-18    Assessment & Plan:  Patient is a 61 year old male with a PMHx of cerebral ataxia who was brought to the ED due to AMS, found to be septic 2/2 right sided pneumonia, admitted for AMS workup  Now presenting with hypotension  #Hypotension   -  -  - Continue IV Zosyn for aspiration PNA  -    Kendal Sosa PA-C  Dept. of Medicine [facility] Medicine Note   Notified by RN patient's most recent blood pressure: 82/58. Patient seen and examined at bedside, lethargic A&Ox0 not following commands as per baseline.     Vital Signs Last 24 Hrs  T(C): 36.3 (19 Apr 2022 22:12), Max: 36.9 (19 Apr 2022 10:15)  T(F): 97.4 (19 Apr 2022 22:12), Max: 98.4 (19 Apr 2022 10:15)  HR: 64 (19 Apr 2022 22:12) (64 - 100)  BP: 82/58 (19 Apr 2022 22:12) (82/58 - 102/70)  BP(mean): --  RR: 18 (19 Apr 2022 22:12) (18 - 18)  SpO2: 99% (19 Apr 2022 22:12) (95% - 99%)      Labs:                          13.3   9.87  )-----------( 161      ( 18 Apr 2022 07:13 )             39.5     04-18    140  |  104  |  20  ----------------------------<  92  3.8   |  23  |  0.80    Ca    9.6      18 Apr 2022 07:13    TPro  6.8  /  Alb  3.7  /  TBili  0.6  /  DBili  x   /  AST  17  /  ALT  20  /  AlkPhos  54  04-18    Assessment & Plan:  Patient is a 61 year old male with a PMHx of cerebral ataxia who was brought to the ED due to AMS, found to be septic 2/2 right sided pneumonia, admitted for AMS workup  Now presenting with hypotension  #Hypotension   - IV Fluids 500 cc bolus given, will repeat blood pressure    - Will consider another bolus of blood pressure does not respond well to fluids   - Continue IV Zosyn for aspiration PNA  - Continue to monitor vital signs   - Discussed plan with hospitalist covering overnight Dr. Gutierrez, will consider MICU consult if blood pressure does not resolve with IVF     Kendal Sosa PA-C  Department of Medicine [facility] Medicine Note   Notified by RN patient's most recent blood pressure: 82/58. Patient seen and examined at bedside, lethargic A&Ox0 not following commands as per baseline.     Vital Signs Last 24 Hrs  T(C): 36.3 (19 Apr 2022 22:12), Max: 36.9 (19 Apr 2022 10:15)  T(F): 97.4 (19 Apr 2022 22:12), Max: 98.4 (19 Apr 2022 10:15)  HR: 64 (19 Apr 2022 22:12) (64 - 100)  BP: 82/58 (19 Apr 2022 22:12) (82/58 - 102/70)  BP(mean): --  RR: 18 (19 Apr 2022 22:12) (18 - 18)  SpO2: 99% (19 Apr 2022 22:12) (95% - 99%)      Labs:                          13.3   9.87  )-----------( 161      ( 18 Apr 2022 07:13 )             39.5     04-18    140  |  104  |  20  ----------------------------<  92  3.8   |  23  |  0.80    Ca    9.6      18 Apr 2022 07:13    TPro  6.8  /  Alb  3.7  /  TBili  0.6  /  DBili  x   /  AST  17  /  ALT  20  /  AlkPhos  54  04-18    Assessment & Plan:  Patient is a 61 year old male with a PMHx of cerebral ataxia who was brought to the ED due to AMS, found to be septic 2/2 right sided pneumonia, admitted for AMS workup  Now presenting with hypotension  #Hypotension   - IV Fluids 500 cc bolus given, will repeat blood pressure    - Will consider another bolus of blood pressure does not respond well to fluids   - Continue IV Zosyn for aspiration PNA  - Continue to monitor vital signs   - Will endorse overnight events to AM Medicine team   - Discussed plan with hospitalist covering overnight Dr. Gutierrez, will consider MICU consult if blood pressure does not resolve with IVF     Kendal Sosa PA-C  Department of Medicine PA Medicine Note   Notified by RN patient's most recent blood pressure: 82/58. Patient seen and examined at bedside, lethargic A&Ox0 not following commands as per baseline.     Vital Signs Last 24 Hrs  T(C): 36.3 (19 Apr 2022 22:12), Max: 36.9 (19 Apr 2022 10:15)  T(F): 97.4 (19 Apr 2022 22:12), Max: 98.4 (19 Apr 2022 10:15)  HR: 64 (19 Apr 2022 22:12) (64 - 100)  BP: 82/58 (19 Apr 2022 22:12) (82/58 - 102/70)  BP(mean): --  RR: 18 (19 Apr 2022 22:12) (18 - 18)  SpO2: 99% (19 Apr 2022 22:12) (95% - 99%)      Labs:                          13.3   9.87  )-----------( 161      ( 18 Apr 2022 07:13 )             39.5     04-18    140  |  104  |  20  ----------------------------<  92  3.8   |  23  |  0.80    Ca    9.6      18 Apr 2022 07:13    TPro  6.8  /  Alb  3.7  /  TBili  0.6  /  DBili  x   /  AST  17  /  ALT  20  /  AlkPhos  54  04-18    Assessment & Plan:  Patient is a 61 year old male with a PMHx of cerebral ataxia who was brought to the ED due to AMS, found to be septic 2/2 right sided pneumonia, admitted for AMS workup  Now presenting with hypotension.     #Hypotension   - IV Fluids 500 cc bolus given, will repeat blood pressure    - Will consider another bolus of blood pressure does not respond well to fluids   - Continue dextrose 5%+ sodium chloride .45% at 80 cc/hr while NPO   - Continue IV Zosyn for aspiration pneumonia   - Repeat lactate, LDH ordered   - Continue to monitor vital signs   - Will endorse overnight events to AM Medicine team   - Discussed plan with hospitalist covering overnight Dr. Gutierrez, will consider MICU consult if blood pressure does not resolve with IVF     Kendal Sosa PA-C  Department of Medicine [facility] Medicine Note   Notified by RN patient's most recent blood pressure: 82/58. Patient seen and examined at bedside, lethargic A&Ox0 not following commands as per baseline.     Vital Signs Last 24 Hrs  T(C): 36.3 (19 Apr 2022 22:12), Max: 36.9 (19 Apr 2022 10:15)  T(F): 97.4 (19 Apr 2022 22:12), Max: 98.4 (19 Apr 2022 10:15)  HR: 64 (19 Apr 2022 22:12) (64 - 100)  BP: 82/58 (19 Apr 2022 22:12) (82/58 - 102/70)  BP(mean): --  RR: 18 (19 Apr 2022 22:12) (18 - 18)  SpO2: 99% (19 Apr 2022 22:12) (95% - 99%)      Labs:                          13.3   9.87  )-----------( 161      ( 18 Apr 2022 07:13 )             39.5     04-18    140  |  104  |  20  ----------------------------<  92  3.8   |  23  |  0.80    Ca    9.6      18 Apr 2022 07:13    TPro  6.8  /  Alb  3.7  /  TBili  0.6  /  DBili  x   /  AST  17  /  ALT  20  /  AlkPhos  54  04-18    Assessment & Plan:  Patient is a 61 year old male with a PMHx of cerebral ataxia who was brought to the ED due to AMS, found to be septic 2/2 right sided pneumonia, admitted for AMS workup  Now presenting with hypotension.     #Hypotension   - IV Fluids 500 cc bolus given, will repeat blood pressure    - Will consider another bolus of blood pressure does not respond well to fluids   - Continue dextrose 5%+ sodium chloride .45% at 80 cc/hr while NPO   - Continue IV Zosyn for aspiration pneumonia   - Repeat lactate, LDH ordered   - Continue to monitor vital signs   - Will endorse overnight events to AM Medicine team  - Vanco x 1 dose given    - Discussed plan with hospitalist covering overnight Dr. Gutierrez, will consider MICU consult if blood pressure does not resolve with IVF     Kendal Sosa PA-C  Department of Medicine    ADDENDUM:  On telemetry noted to have a 3.1 second pause from sinus bradycardia. Reached out to house cardiology Dr. Resendez, will call EP in the AM.    - 12 lead EKG ordered & reviewed with cardiology, sinus bradycardia HR: 50   - Will continue to monitor on telemetry   - CMP, CBC, TSH ordered stat PA Medicine Note   Notified by RN patient's most recent blood pressure: 82/58. Patient seen and examined at bedside, lethargic A&Ox0 not following commands as per baseline.     Vital Signs Last 24 Hrs  T(C): 36.3 (19 Apr 2022 22:12), Max: 36.9 (19 Apr 2022 10:15)  T(F): 97.4 (19 Apr 2022 22:12), Max: 98.4 (19 Apr 2022 10:15)  HR: 64 (19 Apr 2022 22:12) (64 - 100)  BP: 82/58 (19 Apr 2022 22:12) (82/58 - 102/70)  BP(mean): --  RR: 18 (19 Apr 2022 22:12) (18 - 18)  SpO2: 99% (19 Apr 2022 22:12) (95% - 99%)      Labs:                          13.3   9.87  )-----------( 161      ( 18 Apr 2022 07:13 )             39.5     04-18    140  |  104  |  20  ----------------------------<  92  3.8   |  23  |  0.80    Ca    9.6      18 Apr 2022 07:13    TPro  6.8  /  Alb  3.7  /  TBili  0.6  /  DBili  x   /  AST  17  /  ALT  20  /  AlkPhos  54  04-18    Assessment & Plan:  Patient is a 61 year old male with a PMHx of cerebral ataxia who was brought to the ED due to AMS, found to be septic 2/2 right sided pneumonia, admitted for AMS workup  Now presenting with hypotension.     #Hypotension   - IV Fluids 500 cc bolus given, will repeat blood pressure    - Will consider another bolus of blood pressure does not respond well to fluids   - Continue dextrose 5%+ sodium chloride .45% at 80 cc/hr while NPO   - Continue IV Zosyn for aspiration pneumonia   - Repeat lactate, LDH ordered   - Continue to monitor vital signs   - Will endorse overnight events to AM Medicine team  - Vanco x 1 dose given    - Discussed plan with hospitalist covering overnight Dr. Gutierrez, will consider MICU consult if blood pressure does not resolve with IVF     Kendal Sosa PA-C  Department of Medicine    ADDENDUM:  On telemetry noted to have a 3.1 second pause from sinus bradycardia. Reached out to house cardiology Dr. Resendez, will call EP in the AM.    - 12 lead EKG ordered & reviewed with cardiology, sinus bradycardia HR: 50   - Will continue to monitor on telemetry   - CMP, CBC, TSH ordered stat. Potassium was supplemented PA Medicine Note   Notified by RN patient's most recent blood pressure: 82/58. Patient seen and examined at bedside, lethargic A&Ox0 not following commands as per baseline.     Vital Signs Last 24 Hrs  T(C): 36.3 (19 Apr 2022 22:12), Max: 36.9 (19 Apr 2022 10:15)  T(F): 97.4 (19 Apr 2022 22:12), Max: 98.4 (19 Apr 2022 10:15)  HR: 64 (19 Apr 2022 22:12) (64 - 100)  BP: 82/58 (19 Apr 2022 22:12) (82/58 - 102/70)  BP(mean): --  RR: 18 (19 Apr 2022 22:12) (18 - 18)  SpO2: 99% (19 Apr 2022 22:12) (95% - 99%)      Labs:                          13.3   9.87  )-----------( 161      ( 18 Apr 2022 07:13 )             39.5     04-18    140  |  104  |  20  ----------------------------<  92  3.8   |  23  |  0.80    Ca    9.6      18 Apr 2022 07:13    TPro  6.8  /  Alb  3.7  /  TBili  0.6  /  DBili  x   /  AST  17  /  ALT  20  /  AlkPhos  54  04-18    Assessment & Plan:  Patient is a 61 year old male with a PMHx of cerebral ataxia who was brought to the ED due to AMS, found to be septic 2/2 right sided pneumonia, admitted for AMS workup  Now presenting with hypotension.     #Hypotension   - IV Fluids 500 cc bolus given, will repeat blood pressure    - Will consider another bolus of blood pressure does not respond well to fluids   - Continue dextrose 5%+ sodium chloride .45% at 80 cc/hr while NPO   - Continue IV Zosyn for aspiration pneumonia   - Repeat lactate, LDH ordered   - Continue to monitor vital signs   - Vanco x 1 dose given    - Discussed plan with hospitalist covering overnight Dr. Gutierrez  - SHIRA consulted, pending there recommendations. Will endorse to AM Medicine team.     Kendal Sosa PA-C  Department of Medicine    ADDENDUM:  On telemetry noted to have a 3.1 second pause from sinus bradycardia. Reached out to house cardiology Dr. Resendez, will call EP in the AM.    - 12 lead EKG ordered & reviewed with cardiology, sinus bradycardia HR: 50   - Will continue to monitor on telemetry   - CMP, CBC, TSH ordered stat. Potassium was supplemented PA Medicine Note   Notified by RN patient's most recent blood pressure: 82/58. Patient seen and examined at bedside, lethargic A&Ox0 not following commands as per baseline.     Vital Signs Last 24 Hrs  T(C): 36.3 (19 Apr 2022 22:12), Max: 36.9 (19 Apr 2022 10:15)  T(F): 97.4 (19 Apr 2022 22:12), Max: 98.4 (19 Apr 2022 10:15)  HR: 64 (19 Apr 2022 22:12) (64 - 100)  BP: 82/58 (19 Apr 2022 22:12) (82/58 - 102/70)  BP(mean): --  RR: 18 (19 Apr 2022 22:12) (18 - 18)  SpO2: 99% (19 Apr 2022 22:12) (95% - 99%)      Labs:                          13.3   9.87  )-----------( 161      ( 18 Apr 2022 07:13 )             39.5     04-18    140  |  104  |  20  ----------------------------<  92  3.8   |  23  |  0.80    Ca    9.6      18 Apr 2022 07:13    TPro  6.8  /  Alb  3.7  /  TBili  0.6  /  DBili  x   /  AST  17  /  ALT  20  /  AlkPhos  54  04-18    Assessment & Plan:  Patient is a 61 year old male with a PMHx of cerebral ataxia who was brought to the ED due to AMS, found to be septic 2/2 right sided pneumonia, admitted for AMS workup  Now presenting with hypotension.     #Hypotension   - IV Fluids 1000cc bolus given overnight    - Will consider another bolus of blood pressure does not respond well to fluids   - Continue dextrose 5%+ sodium chloride .45% at 80 cc/hr while NPO   - Continue IV Zosyn for aspiration pneumonia   - Repeat lactate, LDH ordered   - Continue to monitor vital signs   - Vanco x 1 dose given    - Discussed plan with hospitalist covering overnight Dr. Gutierrez  - SHIRA consulted, pending their recommendations. Will endorse to AM Medicine team.     Kendal Sosa PA-C  Department of Medicine    ADDENDUM:  On telemetry noted to have a 3.1 second pause from sinus bradycardia. Reached out to house cardiology Dr. Dipti, will call EP in the AM.    - 12 lead EKG ordered & reviewed with cardiology, sinus bradycardia HR: 50   - Will continue to monitor on telemetry   - CMP, CBC, TSH ordered stat. Potassium was supplemented PA Medicine Note   Notified by RN patient's most recent blood pressure: 82/58. Patient seen and examined at bedside, lethargic A&Ox0 not following commands as per baseline.     Vital Signs Last 24 Hrs  T(C): 36.3 (19 Apr 2022 22:12), Max: 36.9 (19 Apr 2022 10:15)  T(F): 97.4 (19 Apr 2022 22:12), Max: 98.4 (19 Apr 2022 10:15)  HR: 64 (19 Apr 2022 22:12) (64 - 100)  BP: 82/58 (19 Apr 2022 22:12) (82/58 - 102/70)  BP(mean): --  RR: 18 (19 Apr 2022 22:12) (18 - 18)  SpO2: 99% (19 Apr 2022 22:12) (95% - 99%)      Labs:                          13.3   9.87  )-----------( 161      ( 18 Apr 2022 07:13 )             39.5     04-18    140  |  104  |  20  ----------------------------<  92  3.8   |  23  |  0.80    Ca    9.6      18 Apr 2022 07:13    TPro  6.8  /  Alb  3.7  /  TBili  0.6  /  DBili  x   /  AST  17  /  ALT  20  /  AlkPhos  54  04-18    Assessment & Plan:  Patient is a 61 year old male with a PMHx of cerebral ataxia who was brought to the ED due to AMS, found to be septic 2/2 right sided pneumonia, admitted for AMS workup  Now presenting with hypotension.     #Hypotension   - IV Fluids 1000cc bolus given overnight    - Will consider another bolus if blood pressure does not respond well to fluids   - Continue dextrose 5%+ sodium chloride .45% at 80 cc/hr while NPO   - Continue IV Zosyn for aspiration pneumonia   - Repeat lactate, LDH ordered   - Continue to monitor vital signs   - Vanco x 1 dose given    - Discussed plan with hospitalist covering overnight Dr. Gutierrez  - SHIRA consulted, pending their recommendations. Will endorse to AM Medicine team.     Kendla Sosa PA-C  Department of Medicine    ADDENDUM:  On telemetry noted to have a 3.1 second pause from sinus bradycardia. Reached out to house cardiology Dr. Dipti, will call EP in the AM.    - 12 lead EKG ordered & reviewed with cardiology, sinus bradycardia HR: 50   - Will continue to monitor on telemetry   - CMP, CBC, TSH ordered stat. Potassium was supplemented

## 2022-04-19 NOTE — PROGRESS NOTE ADULT - SUBJECTIVE AND OBJECTIVE BOX
Patient is a 61y old  Male who presents with a chief complaint of AMS 2/2 Pneumonia (2022 10:04)      SUBJECTIVE / OVERNIGHT EVENTS: Patient seen and examined at bedside. He appears more lethargic today, was hypothermic over night, improved this morning, noted to have increased upper airway secretions     ROS:  All other review of systems negative    Allergies    No Known Allergies    Intolerances        MEDICATIONS  (STANDING):  atorvastatin 20 milliGRAM(s) Oral at bedtime  dextrose 5% + sodium chloride 0.45%. 1000 milliLiter(s) (30 mL/Hr) IV Continuous <Continuous>  folic acid 1 milliGRAM(s) Oral daily  heparin   Injectable 5000 Unit(s) SubCutaneous every 8 hours  pantoprazole  Injectable 40 milliGRAM(s) IV Push daily  piperacillin/tazobactam IVPB. 3.375 Gram(s) IV Intermittent once  piperacillin/tazobactam IVPB.. 3.375 Gram(s) IV Intermittent every 8 hours    MEDICATIONS  (PRN):  acetaminophen     Tablet .. 650 milliGRAM(s) Oral every 6 hours PRN Temp greater or equal to 38C (100.4F), Mild Pain (1 - 3)  aluminum hydroxide/magnesium hydroxide/simethicone Suspension 30 milliLiter(s) Oral every 4 hours PRN Dyspepsia  melatonin 3 milliGRAM(s) Oral at bedtime PRN Insomnia  ondansetron Injectable 4 milliGRAM(s) IV Push every 8 hours PRN Nausea and/or Vomiting      Vital Signs Last 24 Hrs  T(C): 36.9 (2022 10:15), Max: 36.9 (2022 10:15)  T(F): 98.4 (2022 10:15), Max: 98.4 (2022 10:15)  HR: 98 (2022 10:15) (69 - 98)  BP: 93/59 (2022 10:15) (90/57 - 104/67)  BP(mean): --  RR: 18 (2022 10:15) (18 - 18)  SpO2: 96% (2022 10:15) (96% - 99%)  CAPILLARY BLOOD GLUCOSE        I&O's Summary    2022 07:01  -  2022 07:00  --------------------------------------------------------  IN: 360 mL / OUT: 800 mL / NET: -440 mL        PHYSICAL EXAM:  GENERAL: NAD, well-developed + 2L NC  HEAD:  Atraumatic, Normocephalic, increased secretions   EYES: EOMI, PERRLA, conjunctiva and sclera clear  NECK: Supple, No JVD  CHEST/LUNG: Clear to auscultation bilaterally; No wheeze  HEART: Regular rate and rhythm; No murmurs, rubs, or gallops  ABDOMEN: Soft, Nontender, Nondistended; Bowel sounds present  EXTREMITIES:  2+ Peripheral Pulses, No clubbing, cyanosis, or edema  NEUROLOGY: AAOx0, no following commands   PSYCH: calm      LABS:                        13.3   9.87  )-----------( 161      ( 2022 07:13 )             39.5     04-18    140  |  104  |  20  ----------------------------<  92  3.8   |  23  |  0.80    Ca    9.6      2022 07:13    TPro  6.8  /  Alb  3.7  /  TBili  0.6  /  DBili  x   /  AST  17  /  ALT  20  /  AlkPhos  54  04-18    PT/INR - ( 2022 21:55 )   PT: 12.4 sec;   INR: 1.07 ratio         PTT - ( 2022 21:55 )  PTT:34.3 sec  CARDIAC MARKERS ( 2022 21:56 )  x     / x     / 37 U/L / x     / x          Urinalysis Basic - ( 2022 21:55 )    Color: Yellow / Appearance: Slightly Turbid / S.026 / pH: x  Gluc: x / Ketone: Small  / Bili: Negative / Urobili: Negative   Blood: x / Protein: 30 mg/dL / Nitrite: Negative   Leuk Esterase: Negative / RBC: 195 /hpf / WBC 4 /HPF   Sq Epi: x / Non Sq Epi: 0 /hpf / Bacteria: Negative        RADIOLOGY & ADDITIONAL TESTS:    Consultant(s) Notes Reviewed:  neurology rec noted     Care Discussed with Consultants/Other Providers: Medicine ACP

## 2022-04-19 NOTE — CHART NOTE - NSCHARTNOTEFT_GEN_A_CORE
[facility]    ** PRELIMINARY EEG reviewed until  11:50    - Generalized background slowing.  - No seizures recorded so far.      Final report to be produced after completion of the study tomorrow morning.    -----------------------------  Parker Eldridge MD, ZULY  Epilepsy Fellow    --------------------------------  EEG Reading Room: [phone]  (weekdays)  On Call Service After Hours: [phone]

## 2022-04-20 DIAGNOSIS — I45.5 OTHER SPECIFIED HEART BLOCK: ICD-10-CM

## 2022-04-20 LAB
ALBUMIN SERPL ELPH-MCNC: 3.1 G/DL — LOW (ref 3.3–5)
ALP SERPL-CCNC: 42 U/L — SIGNIFICANT CHANGE UP (ref 40–120)
ALT FLD-CCNC: 14 U/L — SIGNIFICANT CHANGE UP (ref 10–45)
AMPHET UR-MCNC: NEGATIVE — SIGNIFICANT CHANGE UP
ANION GAP SERPL CALC-SCNC: 12 MMOL/L — SIGNIFICANT CHANGE UP (ref 5–17)
ANION GAP SERPL CALC-SCNC: 12 MMOL/L — SIGNIFICANT CHANGE UP (ref 5–17)
APPEARANCE UR: ABNORMAL
AST SERPL-CCNC: 13 U/L — SIGNIFICANT CHANGE UP (ref 10–40)
BACTERIA # UR AUTO: NEGATIVE — SIGNIFICANT CHANGE UP
BARBITURATES, URINE.: NEGATIVE — SIGNIFICANT CHANGE UP
BENZODIAZ UR-MCNC: NEGATIVE — SIGNIFICANT CHANGE UP
BILIRUB SERPL-MCNC: 0.5 MG/DL — SIGNIFICANT CHANGE UP (ref 0.2–1.2)
BILIRUB UR-MCNC: NEGATIVE — SIGNIFICANT CHANGE UP
BUN SERPL-MCNC: 14 MG/DL — SIGNIFICANT CHANGE UP (ref 7–23)
BUN SERPL-MCNC: 16 MG/DL — SIGNIFICANT CHANGE UP (ref 7–23)
CALCIUM SERPL-MCNC: 8.6 MG/DL — SIGNIFICANT CHANGE UP (ref 8.4–10.5)
CALCIUM SERPL-MCNC: 8.7 MG/DL — SIGNIFICANT CHANGE UP (ref 8.4–10.5)
CHLORIDE SERPL-SCNC: 107 MMOL/L — SIGNIFICANT CHANGE UP (ref 96–108)
CHLORIDE SERPL-SCNC: 107 MMOL/L — SIGNIFICANT CHANGE UP (ref 96–108)
CO2 SERPL-SCNC: 21 MMOL/L — LOW (ref 22–31)
CO2 SERPL-SCNC: 22 MMOL/L — SIGNIFICANT CHANGE UP (ref 22–31)
COCAINE METAB.OTHER UR-MCNC: NEGATIVE — SIGNIFICANT CHANGE UP
COLOR SPEC: YELLOW — SIGNIFICANT CHANGE UP
CREAT SERPL-MCNC: 0.91 MG/DL — SIGNIFICANT CHANGE UP (ref 0.5–1.3)
CREAT SERPL-MCNC: 0.92 MG/DL — SIGNIFICANT CHANGE UP (ref 0.5–1.3)
CREATININE, URINE THERAPEUTIC: 245.5 MG/DL — SIGNIFICANT CHANGE UP
DIFF PNL FLD: ABNORMAL
EGFR: 95 ML/MIN/1.73M2 — SIGNIFICANT CHANGE UP
EGFR: 96 ML/MIN/1.73M2 — SIGNIFICANT CHANGE UP
EPI CELLS # UR: 1 /HPF — SIGNIFICANT CHANGE UP
GLUCOSE SERPL-MCNC: 100 MG/DL — HIGH (ref 70–99)
GLUCOSE SERPL-MCNC: 99 MG/DL — SIGNIFICANT CHANGE UP (ref 70–99)
GLUCOSE UR QL: NEGATIVE — SIGNIFICANT CHANGE UP
HCT VFR BLD CALC: 32 % — LOW (ref 39–50)
HCT VFR BLD CALC: 32.2 % — LOW (ref 39–50)
HGB BLD-MCNC: 10.5 G/DL — LOW (ref 13–17)
HGB BLD-MCNC: 10.6 G/DL — LOW (ref 13–17)
HYALINE CASTS # UR AUTO: 1 /LPF — SIGNIFICANT CHANGE UP (ref 0–2)
KETONES UR-MCNC: NEGATIVE — SIGNIFICANT CHANGE UP
LACTATE SERPL-SCNC: 0.9 MMOL/L — SIGNIFICANT CHANGE UP (ref 0.7–2)
LDH SERPL L TO P-CCNC: 106 U/L — SIGNIFICANT CHANGE UP (ref 50–242)
LEUKOCYTE ESTERASE UR-ACNC: NEGATIVE — SIGNIFICANT CHANGE UP
MAGNESIUM SERPL-MCNC: 1.6 MG/DL — SIGNIFICANT CHANGE UP (ref 1.6–2.6)
MCHC RBC-ENTMCNC: 28.2 PG — SIGNIFICANT CHANGE UP (ref 27–34)
MCHC RBC-ENTMCNC: 28.8 PG — SIGNIFICANT CHANGE UP (ref 27–34)
MCHC RBC-ENTMCNC: 32.6 GM/DL — SIGNIFICANT CHANGE UP (ref 32–36)
MCHC RBC-ENTMCNC: 33.1 GM/DL — SIGNIFICANT CHANGE UP (ref 32–36)
MCV RBC AUTO: 86.3 FL — SIGNIFICANT CHANGE UP (ref 80–100)
MCV RBC AUTO: 87 FL — SIGNIFICANT CHANGE UP (ref 80–100)
METHADONE UR-MCNC: NEGATIVE — SIGNIFICANT CHANGE UP
METHAQUALONE UR QL: NEGATIVE — SIGNIFICANT CHANGE UP
METHAQUALONE UR-MCNC: NEGATIVE — SIGNIFICANT CHANGE UP
NITRITE UR-MCNC: NEGATIVE — SIGNIFICANT CHANGE UP
NRBC # BLD: 0 /100 WBCS — SIGNIFICANT CHANGE UP (ref 0–0)
NRBC # BLD: 0 /100 WBCS — SIGNIFICANT CHANGE UP (ref 0–0)
OPIATES UR-MCNC: NEGATIVE — SIGNIFICANT CHANGE UP
PCP UR-MCNC: NEGATIVE — SIGNIFICANT CHANGE UP
PH UR: 7 — SIGNIFICANT CHANGE UP (ref 5–8)
PLATELET # BLD AUTO: 122 K/UL — LOW (ref 150–400)
PLATELET # BLD AUTO: 145 K/UL — LOW (ref 150–400)
POTASSIUM SERPL-MCNC: 3.2 MMOL/L — LOW (ref 3.5–5.3)
POTASSIUM SERPL-MCNC: 3.3 MMOL/L — LOW (ref 3.5–5.3)
POTASSIUM SERPL-SCNC: 3.2 MMOL/L — LOW (ref 3.5–5.3)
POTASSIUM SERPL-SCNC: 3.3 MMOL/L — LOW (ref 3.5–5.3)
PROCALCITONIN SERPL-MCNC: 0.06 NG/ML — SIGNIFICANT CHANGE UP (ref 0.02–0.1)
PROPOXYPH UR QL: NEGATIVE — SIGNIFICANT CHANGE UP
PROT SERPL-MCNC: 5.8 G/DL — LOW (ref 6–8.3)
PROT UR-MCNC: ABNORMAL
RBC # BLD: 3.68 M/UL — LOW (ref 4.2–5.8)
RBC # BLD: 3.73 M/UL — LOW (ref 4.2–5.8)
RBC # FLD: 13.7 % — SIGNIFICANT CHANGE UP (ref 10.3–14.5)
RBC # FLD: 13.8 % — SIGNIFICANT CHANGE UP (ref 10.3–14.5)
RBC CASTS # UR COMP ASSIST: 192 /HPF — HIGH (ref 0–4)
SODIUM SERPL-SCNC: 140 MMOL/L — SIGNIFICANT CHANGE UP (ref 135–145)
SODIUM SERPL-SCNC: 141 MMOL/L — SIGNIFICANT CHANGE UP (ref 135–145)
SP GR SPEC: 1.01 — SIGNIFICANT CHANGE UP (ref 1.01–1.02)
THC UR QL: NEGATIVE — SIGNIFICANT CHANGE UP
TROPONIN T, HIGH SENSITIVITY RESULT: 14 NG/L — SIGNIFICANT CHANGE UP (ref 0–51)
TSH SERPL-MCNC: 1.24 UIU/ML — SIGNIFICANT CHANGE UP (ref 0.27–4.2)
UROBILINOGEN FLD QL: NEGATIVE — SIGNIFICANT CHANGE UP
WBC # BLD: 3.94 K/UL — SIGNIFICANT CHANGE UP (ref 3.8–10.5)
WBC # BLD: 4.7 K/UL — SIGNIFICANT CHANGE UP (ref 3.8–10.5)
WBC # FLD AUTO: 3.94 K/UL — SIGNIFICANT CHANGE UP (ref 3.8–10.5)
WBC # FLD AUTO: 4.7 K/UL — SIGNIFICANT CHANGE UP (ref 3.8–10.5)
WBC UR QL: 3 /HPF — SIGNIFICANT CHANGE UP (ref 0–5)

## 2022-04-20 PROCEDURE — 99232 SBSQ HOSP IP/OBS MODERATE 35: CPT

## 2022-04-20 PROCEDURE — 72156 MRI NECK SPINE W/O & W/DYE: CPT | Mod: 26

## 2022-04-20 PROCEDURE — 95718 EEG PHYS/QHP 2-12 HR W/VEEG: CPT

## 2022-04-20 PROCEDURE — 71045 X-RAY EXAM CHEST 1 VIEW: CPT | Mod: 26,77

## 2022-04-20 PROCEDURE — 99253 IP/OBS CNSLTJ NEW/EST LOW 45: CPT

## 2022-04-20 PROCEDURE — 99233 SBSQ HOSP IP/OBS HIGH 50: CPT

## 2022-04-20 PROCEDURE — 70553 MRI BRAIN STEM W/O & W/DYE: CPT | Mod: 26

## 2022-04-20 PROCEDURE — 93010 ELECTROCARDIOGRAM REPORT: CPT

## 2022-04-20 PROCEDURE — 71045 X-RAY EXAM CHEST 1 VIEW: CPT | Mod: 26

## 2022-04-20 RX ORDER — POTASSIUM CHLORIDE 20 MEQ
10 PACKET (EA) ORAL
Refills: 0 | Status: COMPLETED | OUTPATIENT
Start: 2022-04-20 | End: 2022-04-20

## 2022-04-20 RX ORDER — VANCOMYCIN HCL 1 G
1000 VIAL (EA) INTRAVENOUS ONCE
Refills: 0 | Status: COMPLETED | OUTPATIENT
Start: 2022-04-20 | End: 2022-04-20

## 2022-04-20 RX ORDER — VANCOMYCIN HCL 1 G
1000 VIAL (EA) INTRAVENOUS EVERY 12 HOURS
Refills: 0 | Status: DISCONTINUED | OUTPATIENT
Start: 2022-04-20 | End: 2022-04-25

## 2022-04-20 RX ORDER — SODIUM CHLORIDE 9 MG/ML
500 INJECTION INTRAMUSCULAR; INTRAVENOUS; SUBCUTANEOUS ONCE
Refills: 0 | Status: COMPLETED | OUTPATIENT
Start: 2022-04-20 | End: 2022-04-20

## 2022-04-20 RX ADMIN — PANTOPRAZOLE SODIUM 40 MILLIGRAM(S): 20 TABLET, DELAYED RELEASE ORAL at 12:08

## 2022-04-20 RX ADMIN — SODIUM CHLORIDE 80 MILLILITER(S): 9 INJECTION, SOLUTION INTRAVENOUS at 08:15

## 2022-04-20 RX ADMIN — Medication 100 MILLIEQUIVALENT(S): at 06:19

## 2022-04-20 RX ADMIN — PIPERACILLIN AND TAZOBACTAM 25 GRAM(S): 4; .5 INJECTION, POWDER, LYOPHILIZED, FOR SOLUTION INTRAVENOUS at 01:30

## 2022-04-20 RX ADMIN — PIPERACILLIN AND TAZOBACTAM 25 GRAM(S): 4; .5 INJECTION, POWDER, LYOPHILIZED, FOR SOLUTION INTRAVENOUS at 08:29

## 2022-04-20 RX ADMIN — SODIUM CHLORIDE 1000 MILLILITER(S): 9 INJECTION INTRAMUSCULAR; INTRAVENOUS; SUBCUTANEOUS at 06:21

## 2022-04-20 RX ADMIN — Medication 100 MILLIEQUIVALENT(S): at 12:02

## 2022-04-20 RX ADMIN — PIPERACILLIN AND TAZOBACTAM 25 GRAM(S): 4; .5 INJECTION, POWDER, LYOPHILIZED, FOR SOLUTION INTRAVENOUS at 17:46

## 2022-04-20 RX ADMIN — Medication 250 MILLIGRAM(S): at 17:41

## 2022-04-20 RX ADMIN — HEPARIN SODIUM 5000 UNIT(S): 5000 INJECTION INTRAVENOUS; SUBCUTANEOUS at 06:20

## 2022-04-20 RX ADMIN — SODIUM CHLORIDE 1000 MILLILITER(S): 9 INJECTION INTRAMUSCULAR; INTRAVENOUS; SUBCUTANEOUS at 00:09

## 2022-04-20 RX ADMIN — Medication 250 MILLIGRAM(S): at 03:12

## 2022-04-20 RX ADMIN — HEPARIN SODIUM 5000 UNIT(S): 5000 INJECTION INTRAVENOUS; SUBCUTANEOUS at 21:59

## 2022-04-20 RX ADMIN — Medication 100 MILLIEQUIVALENT(S): at 08:15

## 2022-04-20 NOTE — CONSULT NOTE ADULT - SUBJECTIVE AND OBJECTIVE BOX
MICU CONSULT NOTE    REASON FOR CONSULT: Hypotension    HPI: 62 yo M with PMHx of cerebral ataxia who was brought to the ED due to AMS. History is obtained from chart review and from patients wife as patient is not responding to questions. History is extremely limited. Approximately 1 week prior to presentation, patient began to experience progressive fatigue/weakness. Patients weakness progressed and he had an episode of incontinence. several days later. Two days prior to presentation, patient began to experience hiccoughs. Patient has history of unexplained hiccoughs which usually progress to episodes of emesis. Patient underwent a barium swallow study recently which was unremarkable. On the day of presentation, patient began to have multiple episodes of emesis, which prompted his wife to bring him to the ED. At baseline, patient requires a walker to ambulate and is able to communicate normally.     In the ED, patient noted to be tachycardic to 113 and tachypneic to 23. Labs significant for initial lactate of 3.7. A code stroke was called which was negative for acute intracranial processes. A CT of the chest and A/P was performed which revealed findings concerning for right sided pneumonia and esophagitis. Patient was given 2L IVF and a dose of Zosyn. He was subsequently admitted to medicine for further management.     For the last two nights patient intermittently hypotensive and hypothermic.IV Fluids 1000cc bolus given overnight. Is currently being treated for aspiration PNA with zosyn. Got a dose of vancomycin last night.    PAST MEDICAL & SURGICAL HISTORY:  H/O cerebellar ataxia    No significant past surgical history        FAMILY HISTORY:  FH: dementia (Mother)    FH: type 2 diabetes (Mother)    Family history of CVA (Father)        Allergies    No Known Allergies    Intolerances        HOME MEDICATIONS:  Home Medications:  atorvastatin 20 mg oral tablet: 1 tab(s) orally once a day (18 Apr 2022 06:28)  mirtazapine 15 mg oral tablet: 1 tab(s) orally once a day (at bedtime), As Needed (18 Apr 2022 06:28)      REVIEW OF SYSTEMS:  Constitutional: [ ] negative [ ] fevers [ ] chills [ ] weight loss [ ] weight gain  HEENT: [ ] negative [ ] dry eyes [ ] eye irritation [ ] postnasal drip [ ] nasal congestion  CV: [ ] negative  [ ] chest pain [ ] orthopnea [ ] palpitations [ ] murmur  Resp: [ ] negative [ ] cough [ ] shortness of breath [ ] dyspnea [ ] wheezing [ ] sputum [ ] hemoptysis  GI: [ ] negative [ ] nausea [ ] vomiting [ ] diarrhea [ ] constipation [ ] abd pain [ ] dysphagia   : [ ] negative [ ] dysuria [ ] nocturia [ ] hematuria [ ] increased urinary frequency  Musculoskeletal: [ ] negative [ ] back pain [ ] myalgias [ ] arthralgias [ ] fracture  Skin: [ ] negative [ ] rash [ ] itch  Neurological: [ ] negative [ ] headache [ ] dizziness [ ] syncope [ ] weakness [ ] numbness  Psychiatric: [ ] negative [ ] anxiety [ ] depression  Endocrine: [ ] negative [ ] diabetes [ ] thyroid problem  Hematologic/Lymphatic: [ ] negative [ ] anemia [ ] bleeding problem  Allergic/Immunologic: [ ] negative [ ] itchy eyes [ ] nasal discharge [ ] hives [ ] angioedema  [ ] All other systems negative  [x] Unable to assess ROS because baseline nonverbal.    OBJECTIVE:  ICU Vital Signs Last 24 Hrs  T(C): 37.9 (20 Apr 2022 05:46), Max: 37.9 (20 Apr 2022 05:46)  T(F): 100.3 (20 Apr 2022 05:46), Max: 100.3 (20 Apr 2022 05:46)  HR: 78 (20 Apr 2022 05:46) (52 - 100)  BP: 89/53 (20 Apr 2022 06:59) (82/58 - 97/63)  BP(mean): --  ABP: --  ABP(mean): --  RR: 18 (20 Apr 2022 05:46) (18 - 18)  SpO2: 95% (20 Apr 2022 05:46) (95% - 99%)        04-19 @ 07:01  -  04-20 @ 07:00  --------------------------------------------------------  IN: 2460 mL / OUT: 0 mL / NET: 2460 mL      CAPILLARY BLOOD GLUCOSE          PHYSICAL EXAM:  GENERAL: NAD, well-developed on NC  HEAD:  Atraumatic, Normocephalic, increased secretions   EYES: EOMI, PERRLA, conjunctiva and sclera clear  NECK: Supple, No JVD  CHEST/LUNG: Clear to auscultation bilaterally; No wheeze  HEART: Regular rate and rhythm; No murmurs, rubs, or gallops  ABDOMEN: Soft, Nontender, Nondistended; Bowel sounds present  EXTREMITIES:  2+ Peripheral Pulses, No clubbing, cyanosis, or edema  NEUROLOGY: AAOx1 to name. Follows commands such as HARPER, nodding head slightly.    PSYCH: calm  POCUS: grossly normal systolic function. Possible Lower lung consolidation. IVC normal in size, not collapsing    LINES:     HOSPITAL MEDICATIONS:  Standing Meds:  atorvastatin 20 milliGRAM(s) Oral at bedtime  dextrose 5% + sodium chloride 0.45%. 1000 milliLiter(s) IV Continuous <Continuous>  folic acid 1 milliGRAM(s) Oral daily  heparin   Injectable 5000 Unit(s) SubCutaneous every 8 hours  pantoprazole  Injectable 40 milliGRAM(s) IV Push daily  piperacillin/tazobactam IVPB.. 3.375 Gram(s) IV Intermittent every 8 hours  potassium chloride  10 mEq/100 mL IVPB 10 milliEquivalent(s) IV Intermittent every 1 hour      PRN Meds:  acetaminophen     Tablet .. 650 milliGRAM(s) Oral every 6 hours PRN  aluminum hydroxide/magnesium hydroxide/simethicone Suspension 30 milliLiter(s) Oral every 4 hours PRN  melatonin 3 milliGRAM(s) Oral at bedtime PRN  ondansetron Injectable 4 milliGRAM(s) IV Push every 8 hours PRN      LABS:                        10.6   3.94  )-----------( 122      ( 20 Apr 2022 03:13 )             32.0     Hgb Trend: 10.6<--, 13.3<--, 15.8<--  04-20    141  |  107  |  16  ----------------------------<  99  3.2<L>   |  22  |  0.91    Ca    8.6      20 Apr 2022 03:13    TPro  5.8<L>  /  Alb  3.1<L>  /  TBili  0.5  /  DBili  x   /  AST  13  /  ALT  14  /  AlkPhos  42  04-20    Creatinine Trend: 0.91<--, 0.80<--, 1.05<--            MICROBIOLOGY:     Culture - Urine (collected 18 Apr 2022 02:16)  Source: Catheterized Catheterized  Final Report (19 Apr 2022 10:17):    >=3 organisms. Probable collection contamination.    Culture - Blood (collected 18 Apr 2022 01:49)  Source: .Blood Blood  Preliminary Report (19 Apr 2022 02:01):    No growth to date.    Culture - Blood (collected 18 Apr 2022 01:49)  Source: .Blood Blood  Preliminary Report (19 Apr 2022 02:01):    No growth to date.        RADIOLOGY:  [x] Reviewed and interpreted by me  < from: CT Angio Neck w/ IV Cont (04.17.22 @ 22:51) >    IMPRESSION:  CT HEAD: No acute intracranial hemorrhage or mass effect.    CTA NECK: No hemodynamically significant stenosis by NASCET criteria,   dissection, or pseudoaneurysm.    CTA HEAD: No large vessel occlusion, significant stenosis, dissection, or   saccular aneurysm.    < end of copied text >  < from: CT Abdomen and Pelvis w/ IV Cont (04.17.22 @ 22:51) >  IMPRESSION:  Patchy tree-in-bud nodularity in the right lung, nonspecific, although   likely infectious versus inflammatory.    Diffuse circumferential thickening of the esophagus, most concerning for   esophagitis, however recommend clinical correlation and consider   outpatient endoscopy.    Cholelithiasis without CT evidence for acute cholecystitis.    Nonobstructing bilateral nephrolithiasis.    < end of copied text >    EKG:

## 2022-04-20 NOTE — PROGRESS NOTE ADULT - SUBJECTIVE AND OBJECTIVE BOX
Patient is a 61y old  Male who presents with a chief complaint of AMS 2/2 Pneumonia (20 Apr 2022 10:47)      SUBJECTIVE / OVERNIGHT EVENTS: Patient seen and examined at bedside. He is more alert today, able to states his name this morning, overnight became hypotensive and hypothermic s/p MICU consult.     ROS:  All other review of systems negative    Allergies    No Known Allergies    Intolerances        MEDICATIONS  (STANDING):  atorvastatin 20 milliGRAM(s) Oral at bedtime  dextrose 5% + sodium chloride 0.45%. 1000 milliLiter(s) (80 mL/Hr) IV Continuous <Continuous>  folic acid 1 milliGRAM(s) Oral daily  heparin   Injectable 5000 Unit(s) SubCutaneous every 8 hours  pantoprazole  Injectable 40 milliGRAM(s) IV Push daily  piperacillin/tazobactam IVPB.. 3.375 Gram(s) IV Intermittent every 8 hours  potassium chloride  10 mEq/100 mL IVPB 10 milliEquivalent(s) IV Intermittent every 1 hour    MEDICATIONS  (PRN):  acetaminophen     Tablet .. 650 milliGRAM(s) Oral every 6 hours PRN Temp greater or equal to 38C (100.4F), Mild Pain (1 - 3)  aluminum hydroxide/magnesium hydroxide/simethicone Suspension 30 milliLiter(s) Oral every 4 hours PRN Dyspepsia  melatonin 3 milliGRAM(s) Oral at bedtime PRN Insomnia  ondansetron Injectable 4 milliGRAM(s) IV Push every 8 hours PRN Nausea and/or Vomiting      Vital Signs Last 24 Hrs  T(C): 36.8 (20 Apr 2022 10:11), Max: 37.9 (20 Apr 2022 05:46)  T(F): 98.2 (20 Apr 2022 10:11), Max: 100.3 (20 Apr 2022 05:46)  HR: 65 (20 Apr 2022 10:11) (52 - 100)  BP: 92/55 (20 Apr 2022 10:11) (82/58 - 97/63)  BP(mean): --  RR: 18 (20 Apr 2022 10:11) (18 - 18)  SpO2: 97% (20 Apr 2022 10:11) (95% - 99%)  CAPILLARY BLOOD GLUCOSE        I&O's Summary    19 Apr 2022 07:01  -  20 Apr 2022 07:00  --------------------------------------------------------  IN: 2460 mL / OUT: 0 mL / NET: 2460 mL    20 Apr 2022 07:01  -  20 Apr 2022 11:46  --------------------------------------------------------  IN: 0 mL / OUT: 500 mL / NET: -500 mL        PHYSICAL EXAM:  GENERAL: NAD, well-developed  HEAD:  Atraumatic, Normocephalic  EYES: EOMI, PERRLA, conjunctiva and sclera clear  NECK: Supple, No JVD  CHEST/LUNG: Clear to auscultation bilaterally; No wheeze  HEART: Regular rate and rhythm; No murmurs, rubs, or gallops  ABDOMEN: Soft, Nontender, Nondistended; Bowel sounds present  EXTREMITIES:  2+ Peripheral Pulses, No clubbing, cyanosis, or edema  NEUROLOGY: AAOx1, not following commands   PSYCH: calm  SKIN: No rashes or lesions    LABS:                        10.5   4.70  )-----------( 145      ( 20 Apr 2022 08:58 )             32.2     04-20    140  |  107  |  14  ----------------------------<  100<H>  3.3<L>   |  21<L>  |  0.92    Ca    8.7      20 Apr 2022 08:58  Mg     1.6     04-20    TPro  5.8<L>  /  Alb  3.1<L>  /  TBili  0.5  /  DBili  x   /  AST  13  /  ALT  14  /  AlkPhos  42  04-20              RADIOLOGY & ADDITIONAL TESTS:    Care Discussed with Consultants/Other Providers: Medicine ACP    Case Discussed with son at bedside

## 2022-04-20 NOTE — CONSULT NOTE ADULT - ASSESSMENT
60 yo M with PMHx of cerebral ataxia who was brought to the ED due to AMS. Hx limited d/t AMS, acquired from wife and previous records. Approximately 1 week prior to presentation, patient began to experience progressive fatigue/weakness. Progressing to incontinence and hiccups with emesis. Pt has prior history of unexplained hiccups/emesis. Pt started on ABX for Aspiration PNA. Upon admission, pt found to have 3.1 second pause on telemetry overnight. EP consulted for management.     1. Pause on tele    - Continue to monitor on tele while inpatient. Currently NSR 60-70s. Pt had one episode of 3.1 second pause likely during sleep. Reviewed with EP attending, APC and sinus pause. No acute EP intervention at this time. EP to sign off.       ALEJANDRA Terrazas PA-C  [phone]

## 2022-04-20 NOTE — CONSULT NOTE ADULT - SUBJECTIVE AND OBJECTIVE BOX
CHIEF COMPLAINT: Pause on Tele    HISTORY OF PRESENT ILLNESS: 62 yo M with PMHx of cerebral ataxia who was brought to the ED due to AMS. Hx limited d/t AMS, acquired from wife and previous records. Approximately 1 week prior to presentation, patient began to experience progressive fatigue/weakness. Progressing to incontinence and hiccups with emesis. Pt has prior history of unexplained hiccups/emesis. Pt started on ABX for Aspiration PNA. Upon admission, pt found to have 3.1 second pause on telemetry overnight. EP consulted for management.     ALLERGIES:  No Known Allergies    MEDICATIONS:  heparin   Injectable 5000 Unit(s) SubCutaneous every 8 hours  piperacillin/tazobactam IVPB.. 3.375 Gram(s) IV Intermittent every 8 hours  vancomycin  IVPB 1000 milliGRAM(s) IV Intermittent every 12 hours  acetaminophen     Tablet .. 650 milliGRAM(s) Oral every 6 hours PRN  melatonin 3 milliGRAM(s) Oral at bedtime PRN  ondansetron Injectable 4 milliGRAM(s) IV Push every 8 hours PRN  aluminum hydroxide/magnesium hydroxide/simethicone Suspension 30 milliLiter(s) Oral every 4 hours PRN  pantoprazole  Injectable 40 milliGRAM(s) IV Push daily  atorvastatin 20 milliGRAM(s) Oral at bedtime  dextrose 5% + sodium chloride 0.45%. 1000 milliLiter(s) IV Continuous <Continuous>  folic acid 1 milliGRAM(s) Oral daily    PAST MEDICAL & SURGICAL HISTORY:  H/O cerebellar ataxia  No significant past surgical history    FAMILY HISTORY:  FH: dementia (Mother)  FH: type 2 diabetes (Mother)  Family history of CVA (Father)    SOCIAL HISTORY:    [X] Non-smoker  [ ] Illicit Drugs - denies  [ ] Alcohol - denies    REVIEW OF SYSTEMS:  See HPI. Otherwise, 10 point ROS done and otherwise negative.    PHYSICAL EXAM:  T(C): 36.7 (04-20-22 @ 16:40), Max: 37.9 (04-20-22 @ 05:46)  HR: 50 (04-20-22 @ 16:40) (50 - 78)  BP: 108/56 (04-20-22 @ 16:40) (82/58 - 108/56)  RR: 12 (04-20-22 @ 16:40) (12 - 18)  SpO2: 98% (04-20-22 @ 16:40) (95% - 99%)  Wt(kg): --  I&O's Summary    19 Apr 2022 07:01  -  20 Apr 2022 07:00  --------------------------------------------------------  IN: 2460 mL / OUT: 0 mL / NET: 2460 mL    20 Apr 2022 07:01  -  20 Apr 2022 19:04  --------------------------------------------------------  IN: 0 mL / OUT: 950 mL / NET: -950 mL    Appearance: Alert. NAD	  Cardiovascular: +S1S2 RRR no m/g/r  Respiratory: CTA B/L	  Psychiatry: A & O x 1, Mood & affect appropriate  Gastrointestinal:  Soft, NT. ND., + BS	  Extremities: No edema BLE  Vascular: Peripheral pulses palpable 2+ bilaterally    LABS:	 	    CBC Full  -  ( 20 Apr 2022 08:58 )  WBC Count : 4.70 K/uL  Hemoglobin : 10.5 g/dL  Hematocrit : 32.2 %  Platelet Count - Automated : 145 K/uL  Mean Cell Volume : 86.3 fl  Mean Cell Hemoglobin : 28.2 pg  Mean Cell Hemoglobin Concentration : 32.6 gm/dL  Auto Neutrophil # : x  Auto Lymphocyte # : x  Auto Monocyte # : x  Auto Eosinophil # : x  Auto Basophil # : x  Auto Neutrophil % : x  Auto Lymphocyte % : x  Auto Monocyte % : x  Auto Eosinophil % : x  Auto Basophil % : x    04-20    140  |  107  |  14  ----------------------------<  100<H>  3.3<L>   |  21<L>  |  0.92  04-20    141  |  107  |  16  ----------------------------<  99  3.2<L>   |  22  |  0.91    Ca    8.7      20 Apr 2022 08:58  Ca    8.6      20 Apr 2022 03:13  Mg     1.6     04-20    TPro  5.8<L>  /  Alb  3.1<L>  /  TBili  0.5  /  DBili  x   /  AST  13  /  ALT  14  /  AlkPhos  42  04-20    TSH: Thyroid Stimulating Hormone, Serum: 1.24 uIU/mL (04-20 @ 07:42)    TELEMETRY: NSR 60-70s	    ECG: reviewed SR 1st degree 	    RADIOLOGY:  ACC: [id number] EXAM:  XR CHEST PORTABLE URGENT 1V                        PROCEDURE DATE:  04/20/2022      INTERPRETATION:  INDICATION: Aspiration pneumonia.    COMPARISON: 1/1/20 plain chest. 4/17/22 CT scan of chest.    FINDINGS:  The study limited due to patient rotation.  Heart/Vascular: Cardiomediastinal silhouette is within normal limits.  Pulmonary: Grossly clear left lung. Increasing airspace patchiness in mid   and lower right lung likely due to multifocal pneumonia. No pleural   effusion or pneumothorax.  Bones: Degenerative changes of the thoracic spine.    Impression:  Increasing mid and lower right lung pneumonia c/w 4/17/22 CT scan of   chest.    --- End of Report ---    CARMEN ESQUIVEL MD; Attending Radiologist  This document has been electronically signed. Apr 20 2022  2:43PM    Previous ECHO:  Patient name: EDD VALDIVIA  YOB: 1960   Age: 58 (M)   MR#: [id number]  Study Date: 9/22/2019  Location: O/PSonographer: [facility]  Study quality: Technically good  Referring Physician: Kory Kruger MD  Blood Pressure: 94/63 mmHg  Height: 168 cm  Weight: 76 kg  BSA: 1.9 m2  Heart Rate: 75 mmHg  ------------------------------------------------------------------------  PROCEDURE: Transthoracic echocardiogram with 2-D, M-Mode  and complete spectral and color flow Doppler.  INDICATION: Syncope and collapse (R55)  ------------------------------------------------------------------------  MEASUREMENTS: (See below)  ------------------------------------------------------------------------  OBSERVATIONS:  Mitral Valve: Normal mitral valve. No mitral valve  regurgitation seen.  Aortic Root: Aortic Root: 3.2 cm.  The aortic root is  normal in size.  The aortic arch= 2.9cm.  Aortic Valve: Trileaflet aortic valve. There is no aortic  stenosis. No aortic valve regurgitation seen.  Left Atrium: Normal left atrium.  LA volume index = 22  cc/m2.  Left Ventricle: Normal left ventricular systolic function.  No segmental wall motion abnormalities.  The LVEF= 60-65%.  No regional wall motion abnormalities. Normal left  ventricular internal dimensions and wall thicknesses.  Right Heart: Normal right atrium.  The right atrial area= 11cm2. Normal right ventricular size  and function. Normal tricuspid valve. Minimal tricuspid  regurgitation. Normal pulmonic valve. Minimalpulmonic  regurgitation.  Pericardium/PleuraThere is no pericardial effusion.  Hemodynamic: The IVC is normal in size. Estimated right  ventricular systolic pressure equals 21 mm Hg, assuming  right atrial pressure equals 8 mm Hg, consistent with  normal pulmonary pressures.  ------------------------------------------------------------------------  CONCLUSIONS:  Normal biventricular function.  There is no significant valvular regurgitation or stenosis.  There is no pericardial effusion.  *** No previous Echo exam.  ------------------------------------------------------------------------  PROCEDURE DESCRIPTION: Transthoracic echocardiogram with  2-D, M-Mode and complete spectral and color flow Doppler.  ------------------------------------------------------------------------  ECHOCARDIOGRAPHIC EXAMINATION:  AORTIC ROOT:  Aortic Root (Leaflet): 3.1 cm  Aortic Root Index: 0.9  LEFT ATRIUM:  AP Dimensions: 3.2 cm  LA Volume Index: 22.00 cc/sqm  LA Volume: 40.7 cc  LEFT VENTRICLE:  LVIDd: 4.1 cm  LVIDs: 1.5 cm  Fraction Short: 62 %  IVS: 1.00  ILWT: 0.9 cm  RWT: 0.47  LV Mass: 134.0 gm  LV Mass Index: 72 gm/sqm  EF (Visual Estimate): 60-65%  HR and BP:  HR: 75 bpm  BP: 94/63  BSA: 1.86  ------------------------------------------------------------------------  HEMODYNAMICS:  RA Pressure Estimate: 8  PAS: 21  IVRT: 109 ms  ------------------------------------------------------------------------  COLOR FLOW and SPECIAL DOPPLER:  AORTIC VALVE:  ------------------------------------------------------------------------  DIASTOLIC FUNCTION:  DT:99 ms  E/A: 0.71  e' Septal: 8.0 cm/s  E/e' Septal: 6.2  e' Lateral: 13.0 cm/s  E/e' Lateral: 3.8  MV E wave: 0.5 m/s  MV A wave: 0.7 m/s  Septal a': 12.0 cm/s  Lateral a': 8.0 cm/s  ------------------------------------------------------------------------  Confirmed on  9/22/2019 - 11:35:25 by Jane Ramos M.D.  ------------------------------------------------------------------------

## 2022-04-20 NOTE — CONSULT NOTE ADULT - ATTENDING COMMENTS
62 yo M with PMHx of cerebral ataxia who was brought to the ED due to AMS, found to be septic 2/2 right sided pneumonia 2/2 acute on chronic aspiration.    Patient admitted to the floors, hypotermic, first round of infectious work up negative but with 3> organism in the urine. BP on admission with SBP in the 90's, no lactate. Overnight called for hypotension with SBP in the mid 80's improved with IVF. Currently BP is 90/54.    # Sepsis/hypotermia  # Hypotension  - improved with IVC, POCUS with normal cardiac function,   - Agree with infectious work up including a CXR, UA/Culture, blood cultures and c/w with broad spectrum abc with vanc and zosyn.  - Can give another 500cc LR, NGT placement for midodrine 10mg TID.   - please check cortisol level  - Not a MICU candidate at this time. If recurrent hypotension please call back MICU for vasopressors.     Dae Hyeon Kim MD-PGY7  Pulmonary Critical Care Fellow  Pager [phone] 62 yo M with PMHx of cerebral ataxia who was brought to the ED due to AMS, found to be septic 2/2 right sided pneumonia 2/2 acute on chronic aspiration.    Patient admitted to the floors, hypotermic, first round of infectious work up negative but with 3> organism in the urine. BP on admission with SBP in the 90's, no lactate. Overnight called for hypotension with SBP in the mid 80's improved with IVF. Currently BP is 90/54.    # Sepsis/hypotermia  # Hypotension  - improved with IVC, POCUS with normal cardiac function,   - Agree with infectious work up including a CXR, UA/Culture, blood cultures and c/w with broad spectrum abc with vanc and zosyn.  - Can give another 500cc LR, NGT placement for midodrine 10mg TID.   - please check cortisol level, serum lactate.   - Not a MICU candidate at this time. If recurrent hypotension please call back MICU for vasopressors.     Dae Hyeon Kim MD-PGY7  Pulmonary Critical Care Fellow  Pager [phone]

## 2022-04-20 NOTE — CONSULT NOTE ADULT - ASSESSMENT
62 yo M with PMHx of cerebral ataxia who was brought to the ED due to AMS, found to be septic 2/2 right sided pneumonia 2/2 acute on chronic aspiration. MICU consulted for soft pressures    Recommendations:  - Not a MICU candidate currently.   - Hypotension. Broad differential favor possibly 2/2 infection, PNA. Hypovolemia is also a consideration as has been NPO, although is on fluids.  - Would redo infectious workup given hypothermia and softer pressures: Repeat BCx, UA/UCx (original sample with multiple organisms/contaminant). Sputum Cx if able. Would repeat CXR.  - Check TSH and AM Cortisol. Lower concern for cardiogenic given grossly normal systolic function on POCUS. Lower suspicion for obstructive shock. clinically.   - Would give another 500cc fluid bolus. Place NG tube for midodrine 10 TID to start. Please call again if continues to have softer pressures despite interventions MAP goal >65.  - Trend I+O's.  - Check MRSA swab and urine legionella. Would empirically cover for now with vancomycin and zosyn.   - Patient Hgb from 13.3 to 10.6 today. Likely a component of dilutional, given fluids. Continue to monitor for signs and symptoms of bleed, mainly melena, given some findings on CT of esophageal thickening. Would check iron studies    Mack Henry  PGY-3

## 2022-04-20 NOTE — PROGRESS NOTE ADULT - PROBLEM SELECTOR PLAN 1
sepsis vs worsening neurological disorder (h/o cerebral ataxia) did not have MRI outpt  Neuro consult appreciated: MRI c& head pending, labs requested ordered, will need LP if current work up inconclusive   s/p EEG unremarkable, penidng vEEG   sepsis workup as below   Utox neg  failed TOV, coronel placed  MICU consult appreciated, s/p bolus c/w IVF, if become hypotensive again will reconsult MICU for poss pressors

## 2022-04-20 NOTE — EEG REPORT - NS EEG TEXT BOX
[facility]   COMPREHENSIVE EPILEPSY CENTER   REPORT OF LONG-TERM VIDEO EEG     [facility]: [address], [place] [address], Ph#: [phone]  [facility]: [address] [address] [place] [address], Ph#: [phone]  [facility]: [address] [place] [address], Ph#: [phone]    Patient Name: EDD VALDIVIA  Age and : 61y (11)  MRN #: [id number]  Location: [facility]    Referring Physician: Elaine Petit    Start Time/Date: 10:58 on 22  End Time/Date: 08:00 on 22  Duration: 21 h 2 min    _____________________________________________________________  STUDY INFORMATION    EEG Recording Technique:  The patient underwent continuous Video-EEG monitoring, using Telemetry System hardware on the XLTek Digital System. EEG and video data were stored on a computer hard drive with important events saved in digital archive files. The material was reviewed by a physician (electroencephalographer / epileptologist) on a daily basis. Rafael and seizure detection algorithms were utilized and reviewed. An EEG Technician attended to the patient, and was available throughout daytime work hours.  The epilepsy center neurologist was available in person or on call 24-hours per day.    EEG Placement and Labeling of Electrodes:  The EEG was performed utilizing 20 channel referential EEG connections (coronal over temporal over parasagittal montage) using all standard 10-20 electrode placements with EKG, with additional electrodes placed in the inferior temporal region using the modified 10-10 montage electrode placements for elective admissions, or if deemed necessary. Recording was at a sampling rate of 256 samples per second per channel. Time synchronized digital video recording was done simultaneously with EEG recording. A low light infrared camera was used for low light recording.     _____________________________________________________________  HISTORY    Patient is a 61y old  Male who presents with a chief complaint of AMS 2/2 Pneumonia (2022 07:15)      PERTINENT MEDICATION:  MEDICATIONS  (STANDING):  atorvastatin 20 milliGRAM(s) Oral at bedtime  dextrose 5% + sodium chloride 0.45%. 1000 milliLiter(s) (80 mL/Hr) IV Continuous <Continuous>  folic acid 1 milliGRAM(s) Oral daily  heparin   Injectable 5000 Unit(s) SubCutaneous every 8 hours  pantoprazole  Injectable 40 milliGRAM(s) IV Push daily  piperacillin/tazobactam IVPB.. 3.375 Gram(s) IV Intermittent every 8 hours  potassium chloride  10 mEq/100 mL IVPB 10 milliEquivalent(s) IV Intermittent every 1 hour    _____________________________________________________________  STUDY INTERPRETATION    Findings: The background was continuous, spontaneously variable and minimally reactive. No posterior dominant rhythm seen.    Background Slowing:  Diffuse theta and predominantly polymorphic delta slowing.    Focal Slowing:   None were present.    Sleep Background:  Drowsiness was characterized by fragmentation, attenuation, and slowing of the background activity.    Sleep was characterized by the presence of vertex waves, symmetric low amplitude sleep spindles and K-complexes.    Other Non-Epileptiform Findings:  None were present.    Interictal Epileptiform Activity:   None were present.    Events:  Clinical events: None recorded.  Seizures: None recorded.    Activation Procedures:   Hyperventilation was not performed.    Photic stimulation was not performed.     Artifacts:  Intermittent myogenic and movement artifacts were noted.    ECG:  The heart rate on single channel ECG was predominantly between 80-90 BPM.    _____________________________________________________________  EEG SUMMARY/CLASSIFICATION    Abnormal EEG   - Moderate to severe generalized slowing.    _____________________________________________________________  EEG IMPRESSION/CLINICAL CORRELATE    Abnormal EEG study.  Moderate to severe nonspecific diffuse or multifocal cerebral dysfunction.   No epileptiform pattern or seizure seen.    _____________________________________________________________    Quin June DO  Attending Physician  [facility]       [facility]   COMPREHENSIVE EPILEPSY CENTER   REPORT OF LONG-TERM VIDEO EEG     [facility]: [address], [place] [address], Ph#: [phone]  [facility]: [address] [address] [place] [address], Ph#: [phone]  [facility]: [address] [place] [address], Ph#: [phone]    Patient Name: EDD VALDIVIA  Age and : 61y (62)  MRN #: [id number]  Location: [facility]  916   Referring Physician: Elaine Petit    Start Time/Date: 10:58 on 22  End Time/Date: 13:47 on 22  Duration: 26 h 40 min    _____________________________________________________________  STUDY INFORMATION    EEG Recording Technique:  The patient underwent continuous Video-EEG monitoring, using Telemetry System hardware on the XLTek Digital System. EEG and video data were stored on a computer hard drive with important events saved in digital archive files. The material was reviewed by a physician (electroencephalographer / epileptologist) on a daily basis. Rafael and seizure detection algorithms were utilized and reviewed. An EEG Technician attended to the patient, and was available throughout daytime work hours.  The epilepsy center neurologist was available in person or on call 24-hours per day.    EEG Placement and Labeling of Electrodes:  The EEG was performed utilizing 20 channel referential EEG connections (coronal over temporal over parasagittal montage) using all standard 10-20 electrode placements with EKG, with additional electrodes placed in the inferior temporal region using the modified 10-10 montage electrode placements for elective admissions, or if deemed necessary. Recording was at a sampling rate of 256 samples per second per channel. Time synchronized digital video recording was done simultaneously with EEG recording. A low light infrared camera was used for low light recording.     _____________________________________________________________  HISTORY    Patient is a 61y old  Male who presents with a chief complaint of AMS 2/2 Pneumonia (2022 07:15)      PERTINENT MEDICATION:  MEDICATIONS  (STANDING):  atorvastatin 20 milliGRAM(s) Oral at bedtime  dextrose 5% + sodium chloride 0.45%. 1000 milliLiter(s) (80 mL/Hr) IV Continuous <Continuous>  folic acid 1 milliGRAM(s) Oral daily  heparin   Injectable 5000 Unit(s) SubCutaneous every 8 hours  pantoprazole  Injectable 40 milliGRAM(s) IV Push daily  piperacillin/tazobactam IVPB.. 3.375 Gram(s) IV Intermittent every 8 hours  potassium chloride  10 mEq/100 mL IVPB 10 milliEquivalent(s) IV Intermittent every 1 hour    _____________________________________________________________  STUDY INTERPRETATION    Findings: The background was continuous, spontaneously variable and minimally reactive. No posterior dominant rhythm seen.    Background Slowing:  Diffuse theta and predominantly polymorphic delta slowing.    Focal Slowing:   None were present.    Sleep Background:  Drowsiness was characterized by fragmentation, attenuation, and slowing of the background activity.    Sleep was characterized by the presence of vertex waves, symmetric low amplitude sleep spindles and K-complexes.    Other Non-Epileptiform Findings:  None were present.    Interictal Epileptiform Activity:   None were present.    Events:  Clinical events: None recorded.  Seizures: None recorded.    Activation Procedures:   Hyperventilation was not performed.    Photic stimulation was not performed.     Artifacts:  Intermittent myogenic and movement artifacts were noted.    ECG:  The heart rate on single channel ECG was predominantly between 80-90 BPM.    _____________________________________________________________  EEG SUMMARY/CLASSIFICATION    Abnormal EEG   - Moderate to severe generalized slowing.    _____________________________________________________________  EEG IMPRESSION/CLINICAL CORRELATE    Abnormal EEG study.  Moderate to severe nonspecific diffuse or multifocal cerebral dysfunction.   No epileptiform pattern or seizure seen.    _____________________________________________________________    Quin June DO  Attending Physician  [facility]

## 2022-04-20 NOTE — PROGRESS NOTE ADULT - SUBJECTIVE AND OBJECTIVE BOX
MRN-[id number]    Subjective: Patient was hypotensive and hypothermic overnight, MICU consult was performed and pt deemed not to be MICU candidate at this time. Hx continues to be limited due to AMS. EEG performed yesterday was normal.       PAST MEDICAL & SURGICAL HISTORY:  H/O cerebellar ataxia    No significant past surgical history      FAMILY HISTORY:  FH: dementia (Mother)    FH: type 2 diabetes (Mother)    Family history of CVA (Father)      Social Hx:  Nonsmoker, no drug or alcohol use    Home Medications:  atorvastatin 20 mg oral tablet: 1 tab(s) orally once a day (18 Apr 2022 06:28)  mirtazapine 15 mg oral tablet: 1 tab(s) orally once a day (at bedtime), As Needed (18 Apr 2022 06:28)    MEDICATIONS  (STANDING):  atorvastatin 20 milliGRAM(s) Oral at bedtime  dextrose 5% + sodium chloride 0.45%. 1000 milliLiter(s) (80 mL/Hr) IV Continuous <Continuous>  folic acid 1 milliGRAM(s) Oral daily  heparin   Injectable 5000 Unit(s) SubCutaneous every 8 hours  pantoprazole  Injectable 40 milliGRAM(s) IV Push daily  piperacillin/tazobactam IVPB.. 3.375 Gram(s) IV Intermittent every 8 hours  potassium chloride  10 mEq/100 mL IVPB 10 milliEquivalent(s) IV Intermittent every 1 hour    MEDICATIONS  (PRN):  acetaminophen     Tablet .. 650 milliGRAM(s) Oral every 6 hours PRN Temp greater or equal to 38C (100.4F), Mild Pain (1 - 3)  aluminum hydroxide/magnesium hydroxide/simethicone Suspension 30 milliLiter(s) Oral every 4 hours PRN Dyspepsia  melatonin 3 milliGRAM(s) Oral at bedtime PRN Insomnia  ondansetron Injectable 4 milliGRAM(s) IV Push every 8 hours PRN Nausea and/or Vomiting    Allergies  No Known Allergies    Intolerances      REVIEW OF SYSTEMS  Neurological: Patient continues to hiccup	    ROS: Pertinent positives above, all other ROS were reviewed and are negative.      Vital Signs Last 24 Hrs  T(C): 36.8 (20 Apr 2022 10:11), Max: 37.9 (20 Apr 2022 05:46)  T(F): 98.2 (20 Apr 2022 10:11), Max: 100.3 (20 Apr 2022 05:46)  HR: 65 (20 Apr 2022 10:11) (52 - 100)  BP: 92/55 (20 Apr 2022 10:11) (82/58 - 97/63)  BP(mean): --  RR: 18 (20 Apr 2022 10:11) (18 - 18)  SpO2: 97% (20 Apr 2022 10:11) (95% - 99%)    GENERAL EXAM:  Constitutional: awake. Oriented to person, but not place or time.   HEENT: PERRLA, EOMI.   Neck: Supple  Musculoskeletal: no joint swelling, no abnormal movements  Skin: no rashes    NEUROLOGICAL EXAM:  MS: AAOX1, speech is dysarthric attends more favorably to the right, unable to assess memory; pt able to correctly name objects today.   CN: EOMI, PEARLA, Noticeable left facial droop.   Motor: unable to assess full strength. Pt able to hold arms out bilaterally. Pt did not hold legs up. Tone: normal. Bulk: normal. DTR 2+ symm. Sensation: patient grimaces to painful stimuli.   Coordination: +dysmetria on right hand to FNF, unable to assess left hand as patient would not follow command.   Gait:  Pt unable to walk at this time.     Labs:   cbc                      10.5   4.70  )-----------( 145      ( 20 Apr 2022 08:58 )             32.2     [id number]    140  |  107  |  14  ----------------------------<  100<H>  3.3<L>   |  21<L>  |  0.92    Ca    8.7      20 Apr 2022 08:58  Mg     1.6     04-20    TPro  5.8<L>  /  Alb  3.1<L>  /  TBili  0.5  /  DBili  x   /  AST  13  /  ALT  14  /  AlkPhos  42  04-20    LIVER FUNCTIONS - ( 20 Apr 2022 03:13 )  Alb: 3.1 g/dL / Pro: 5.8 g/dL / ALK PHOS: 42 U/L / ALT: 14 U/L / AST: 13 U/L / GGT: x           Radiology:  -Imaging from [facility], MRI Brain in Jan 2022 with no acute findings. T spine with contusion likely determined to be 2/2 from prior fall   MRN-[id number]    Subjective: Patient was hypotensive and hypothermic overnight, MICU consult was performed and pt deemed not to be MICU candidate at this time. Hx continues to be limited due to AMS.       PAST MEDICAL & SURGICAL HISTORY:  H/O cerebellar ataxia    No significant past surgical history      FAMILY HISTORY:  FH: dementia (Mother)    FH: type 2 diabetes (Mother)    Family history of CVA (Father)      Social Hx:  Nonsmoker, no drug or alcohol use    Home Medications:  atorvastatin 20 mg oral tablet: 1 tab(s) orally once a day (18 Apr 2022 06:28)  mirtazapine 15 mg oral tablet: 1 tab(s) orally once a day (at bedtime), As Needed (18 Apr 2022 06:28)    MEDICATIONS  (STANDING):  atorvastatin 20 milliGRAM(s) Oral at bedtime  dextrose 5% + sodium chloride 0.45%. 1000 milliLiter(s) (80 mL/Hr) IV Continuous <Continuous>  folic acid 1 milliGRAM(s) Oral daily  heparin   Injectable 5000 Unit(s) SubCutaneous every 8 hours  pantoprazole  Injectable 40 milliGRAM(s) IV Push daily  piperacillin/tazobactam IVPB.. 3.375 Gram(s) IV Intermittent every 8 hours  potassium chloride  10 mEq/100 mL IVPB 10 milliEquivalent(s) IV Intermittent every 1 hour    MEDICATIONS  (PRN):  acetaminophen     Tablet .. 650 milliGRAM(s) Oral every 6 hours PRN Temp greater or equal to 38C (100.4F), Mild Pain (1 - 3)  aluminum hydroxide/magnesium hydroxide/simethicone Suspension 30 milliLiter(s) Oral every 4 hours PRN Dyspepsia  melatonin 3 milliGRAM(s) Oral at bedtime PRN Insomnia  ondansetron Injectable 4 milliGRAM(s) IV Push every 8 hours PRN Nausea and/or Vomiting    Allergies  No Known Allergies    Intolerances      REVIEW OF SYSTEMS  Neurological: Patient continues to hiccup	    ROS: Pertinent positives above, all other ROS were reviewed and are negative.      Vital Signs Last 24 Hrs  T(C): 36.8 (20 Apr 2022 10:11), Max: 37.9 (20 Apr 2022 05:46)  T(F): 98.2 (20 Apr 2022 10:11), Max: 100.3 (20 Apr 2022 05:46)  HR: 65 (20 Apr 2022 10:11) (52 - 100)  BP: 92/55 (20 Apr 2022 10:11) (82/58 - 97/63)  BP(mean): --  RR: 18 (20 Apr 2022 10:11) (18 - 18)  SpO2: 97% (20 Apr 2022 10:11) (95% - 99%)    GENERAL EXAM:  Constitutional: awake. Oriented to person, but not place or time.   HEENT: PERRLA, EOMI.   Neck: Supple  Musculoskeletal: no joint swelling, no abnormal movements  Skin: no rashes    NEUROLOGICAL EXAM:  MS: AAOX1, speech is dysarthric attends more favorably to the right, unable to assess memory; pt able to correctly name objects today.   CN: EOMI, PEARLA, Noticeable left facial droop.   Motor: unable to assess full strength. Pt able to hold arms out bilaterally. Pt did not hold legs up. Tone: normal. Bulk: normal. DTR 2+ symm. Sensation: patient grimaces to painful stimuli.   Coordination: +dysmetria on right hand to FNF, unable to assess left hand as patient would not follow command.   Gait:  Pt unable to walk at this time.     Labs:   cbc                      10.5   4.70  )-----------( 145      ( 20 Apr 2022 08:58 )             32.2     [id number]    140  |  107  |  14  ----------------------------<  100<H>  3.3<L>   |  21<L>  |  0.92    Ca    8.7      20 Apr 2022 08:58  Mg     1.6     04-20    TPro  5.8<L>  /  Alb  3.1<L>  /  TBili  0.5  /  DBili  x   /  AST  13  /  ALT  14  /  AlkPhos  42  04-20    LIVER FUNCTIONS - ( 20 Apr 2022 03:13 )  Alb: 3.1 g/dL / Pro: 5.8 g/dL / ALK PHOS: 42 U/L / ALT: 14 U/L / AST: 13 U/L / GGT: x           Radiology:  -Imaging from [facility], MRI Brain in Jan 2022 with no acute findings. T spine with contusion likely determined to be 2/2 from prior fall   MRN-[id number]    Subjective: Patient was hypotensive and hypothermic overnight, MICU consult was performed and pt deemed not to be MICU candidate at this time. Hx continues to be limited due to AMS.     Pt more awake this morning, able to state his name and follow some commands, otherwise hx limited.     PAST MEDICAL & SURGICAL HISTORY:  H/O cerebellar ataxia    No significant past surgical history      FAMILY HISTORY:  FH: dementia (Mother)    FH: type 2 diabetes (Mother)    Family history of CVA (Father)      Social Hx:  Nonsmoker, no drug or alcohol use    Home Medications:  atorvastatin 20 mg oral tablet: 1 tab(s) orally once a day (18 Apr 2022 06:28)  mirtazapine 15 mg oral tablet: 1 tab(s) orally once a day (at bedtime), As Needed (18 Apr 2022 06:28)    MEDICATIONS  (STANDING):  atorvastatin 20 milliGRAM(s) Oral at bedtime  dextrose 5% + sodium chloride 0.45%. 1000 milliLiter(s) (80 mL/Hr) IV Continuous <Continuous>  folic acid 1 milliGRAM(s) Oral daily  heparin   Injectable 5000 Unit(s) SubCutaneous every 8 hours  pantoprazole  Injectable 40 milliGRAM(s) IV Push daily  piperacillin/tazobactam IVPB.. 3.375 Gram(s) IV Intermittent every 8 hours  potassium chloride  10 mEq/100 mL IVPB 10 milliEquivalent(s) IV Intermittent every 1 hour    MEDICATIONS  (PRN):  acetaminophen     Tablet .. 650 milliGRAM(s) Oral every 6 hours PRN Temp greater or equal to 38C (100.4F), Mild Pain (1 - 3)  aluminum hydroxide/magnesium hydroxide/simethicone Suspension 30 milliLiter(s) Oral every 4 hours PRN Dyspepsia  melatonin 3 milliGRAM(s) Oral at bedtime PRN Insomnia  ondansetron Injectable 4 milliGRAM(s) IV Push every 8 hours PRN Nausea and/or Vomiting    Allergies  No Known Allergies    Intolerances      REVIEW OF SYSTEMS  Neurological: Patient continues to hiccup	    ROS: Pertinent positives above, all other ROS were reviewed and are negative.      Vital Signs Last 24 Hrs  T(C): 36.8 (20 Apr 2022 10:11), Max: 37.9 (20 Apr 2022 05:46)  T(F): 98.2 (20 Apr 2022 10:11), Max: 100.3 (20 Apr 2022 05:46)  HR: 65 (20 Apr 2022 10:11) (52 - 100)  BP: 92/55 (20 Apr 2022 10:11) (82/58 - 97/63)  BP(mean): --  RR: 18 (20 Apr 2022 10:11) (18 - 18)  SpO2: 97% (20 Apr 2022 10:11) (95% - 99%)    GENERAL EXAM:  Constitutional: awake. Oriented to person, but not place or time.   HEENT: PERRLA, EOMI.   Neck: Supple  Musculoskeletal: no joint swelling, no abnormal movements  Skin: no rashes    NEUROLOGICAL EXAM:  MS: AAOX1, speech is dysarthric attends more favorably to the right, unable to assess memory; pt unable to name objects. able to follow some commands, more alert than yesterday   CN: EOMI, PEARLA, Noticeable left facial droop. no nystagmus   Motor: unable to assess full strength though 5/5  strength b/l.  Pt able to hold arms out bilaterally. Pt did not hold legs up. Tone: normal. Bulk: normal. DTR 2+ symm. Sensation: patient grimaces to painful stimuli.   Coordination: +dysmetria on right hand to FNF, unable to assess left hand as patient would not follow command.   Gait:  Pt unable to walk at this time.     Labs:   cbc                      10.5   4.70  )-----------( 145      ( 20 Apr 2022 08:58 )             32.2     [id number]    140  |  107  |  14  ----------------------------<  100<H>  3.3<L>   |  21<L>  |  0.92    Ca    8.7      20 Apr 2022 08:58  Mg     1.6     04-20    TPro  5.8<L>  /  Alb  3.1<L>  /  TBili  0.5  /  DBili  x   /  AST  13  /  ALT  14  /  AlkPhos  42  04-20    LIVER FUNCTIONS - ( 20 Apr 2022 03:13 )  Alb: 3.1 g/dL / Pro: 5.8 g/dL / ALK PHOS: 42 U/L / ALT: 14 U/L / AST: 13 U/L / GGT: x           Radiology:  -Imaging from [facility], MRI Brain in Jan 2022 with no acute findings. T spine with contusion likely determined to be 2/2 from prior fall   [id number]    Subjective: Patient was hypotensive and hypothermic overnight, MICU consult was performed and pt deemed not to be MICU candidate at this time. Hx continues to be limited due to AMS.     Pt more awake this morning, able to state his name and follow some commands, otherwise hx limited.     Additional collateral attained from the patient's son: Patient was last normal in 2018. At this time he weighed 240 pounds, worked daily as a  and communicated normally. Of note there was a large stressor in the patient's life in the beginning of 2019, having to do with a land dispute in his home country ([place]). In 2019 he began having frequent syncopal episodes at work. Customers were contacting the family out of concern. He visited the ED multiple times, where cardiac workups were nonrevealing. He also had some forgetfulness throughout this time. He stopped working in mid-2019 because of these symptoms. In 2020, he continued to decline cognitively, but was able to communicate and was always alert and oriented. He also started having issues with his gait. His son described it as "stiff to walk" and "dragging his legs." He denied ever seeing a wide based gait. Over this time his appetite decreased and he would often vomit. He went to different neurologists over this time who attempted to get imaging, however, the patient would become very claustrophobic and imaging was unsuccessful. In 2021, the patient moved to [place] to be with the son. The son explains that the patient was improving while he was down there. He was eating more, playing cards every night and even able to walk down stairs. However, the son does explain the patient was having visual hallucinations in the context of severe sleep deprivation. He would say the TV is speaking to him or that there are people sitting next to him when they were not. He also describes a lack of awareness to use the bathroom. He explains that his father would not go to the bathroom unless reminded. In September of 2021, he returned to [place] to live with the wife again secondary to some financial issues. The wife explains from September until about 2 weeks ago he was able to shower, brush his teeth and converse appropriately, however, he still had some slurred speech, did not walk much and had consistent visual hallucinations. Over the past 2 weeks, he became progressively more confused, was unable to walk and ate very little. When asked if there were any changes, the wife explains they had construction going on in the house so she kept him in the bedroom all day with the door shut, so as to keep the dust away. He had several episodes of vomiting and loss of continence on Saturday. When the wife noticed him coughing more than usual, she brought him to the ED.     PAST MEDICAL & SURGICAL HISTORY:  H/O cerebellar ataxia    No significant past surgical history      FAMILY HISTORY:  FH: dementia (Mother)    FH: type 2 diabetes (Mother)    Family history of CVA (Father)      Social Hx:  Nonsmoker, no drug or alcohol use    Home Medications:  atorvastatin 20 mg oral tablet: 1 tab(s) orally once a day (18 Apr 2022 06:28)  mirtazapine 15 mg oral tablet: 1 tab(s) orally once a day (at bedtime), As Needed (18 Apr 2022 06:28)    MEDICATIONS  (STANDING):  atorvastatin 20 milliGRAM(s) Oral at bedtime  dextrose 5% + sodium chloride 0.45%. 1000 milliLiter(s) (80 mL/Hr) IV Continuous <Continuous>  folic acid 1 milliGRAM(s) Oral daily  heparin   Injectable 5000 Unit(s) SubCutaneous every 8 hours  pantoprazole  Injectable 40 milliGRAM(s) IV Push daily  piperacillin/tazobactam IVPB.. 3.375 Gram(s) IV Intermittent every 8 hours  potassium chloride  10 mEq/100 mL IVPB 10 milliEquivalent(s) IV Intermittent every 1 hour    MEDICATIONS  (PRN):  acetaminophen     Tablet .. 650 milliGRAM(s) Oral every 6 hours PRN Temp greater or equal to 38C (100.4F), Mild Pain (1 - 3)  aluminum hydroxide/magnesium hydroxide/simethicone Suspension 30 milliLiter(s) Oral every 4 hours PRN Dyspepsia  melatonin 3 milliGRAM(s) Oral at bedtime PRN Insomnia  ondansetron Injectable 4 milliGRAM(s) IV Push every 8 hours PRN Nausea and/or Vomiting    Allergies  No Known Allergies    Intolerances      REVIEW OF SYSTEMS  Neurological: Patient continues to hiccup	    ROS: Pertinent positives above, all other ROS were reviewed and are negative.      Vital Signs Last 24 Hrs  T(C): 36.8 (20 Apr 2022 10:11), Max: 37.9 (20 Apr 2022 05:46)  T(F): 98.2 (20 Apr 2022 10:11), Max: 100.3 (20 Apr 2022 05:46)  HR: 65 (20 Apr 2022 10:11) (52 - 100)  BP: 92/55 (20 Apr 2022 10:11) (82/58 - 97/63)  BP(mean): --  RR: 18 (20 Apr 2022 10:11) (18 - 18)  SpO2: 97% (20 Apr 2022 10:11) (95% - 99%)    GENERAL EXAM:  Constitutional: awake. Oriented to person, but not place or time.   HEENT: PERRLA, EOMI.   Neck: Supple  Musculoskeletal: no joint swelling, no abnormal movements  Skin: no rashes    NEUROLOGICAL EXAM:  MS: AAOX1, speech is dysarthric attends more favorably to the right, unable to assess memory; pt unable to name objects. able to follow some commands, more alert than yesterday   CN: EOMI, PEARLA, Noticeable left facial droop. no nystagmus   Motor: unable to assess full strength though 5/5  strength b/l.  Pt able to hold arms out bilaterally. Pt did not hold legs up. Tone: normal. Bulk: normal. DTR 2+ symm. Sensation: patient grimaces to painful stimuli.   Coordination: +dysmetria on right hand to FNF, unable to assess left hand as patient would not follow command.   Gait:  Pt unable to walk at this time.     Labs:   cbc                      10.5   4.70  )-----------( 145      ( 20 Apr 2022 08:58 )             32.2     [id number]    140  |  107  |  14  ----------------------------<  100<H>  3.3<L>   |  21<L>  |  0.92    Ca    8.7      20 Apr 2022 08:58  Mg     1.6     04-20    TPro  5.8<L>  /  Alb  3.1<L>  /  TBili  0.5  /  DBili  x   /  AST  13  /  ALT  14  /  AlkPhos  42  04-20    LIVER FUNCTIONS - ( 20 Apr 2022 03:13 )  Alb: 3.1 g/dL / Pro: 5.8 g/dL / ALK PHOS: 42 U/L / ALT: 14 U/L / AST: 13 U/L / GGT: x           Radiology:  -Imaging from [facility], MRI Brain in Jan 2022 with no acute findings. T spine with contusion likely determined to be 2/2 from prior fall   MRN-[id number]    Subjective: Patient was hypotensive and hypothermic overnight, MICU consult was performed and pt deemed not to be MICU candidate at this time. Hx continues to be limited due to AMS.     Pt more awake this morning, able to state his name and follow some commands, otherwise hx limited.     Additional collateral attained from the patient's son: Patient was last normal in 2018. At this time he weighed 240 pounds, worked daily as a  and communicated normally. Of note there was a large stressor in the patient's life in the beginning of 2019, having to do with a land dispute in his home country ([place]). In 2019 he began having frequent syncopal episodes at work. Customers were contacting the family out of concern. He visited the ED multiple times, where cardiac workups were nonrevealing. He also had some forgetfulness throughout this time. He stopped working in mid-2019 because of these symptoms. In 2020, he continued to decline cognitively, but was able to communicate and was always alert and oriented. He also started having issues with his gait. His son described it as "stiff to walk" and "dragging his legs." He denied ever seeing a wide based gait. Over this time his appetite decreased and he would often vomit. He went to different neurologists over this time who attempted to get imaging, however, the patient would become very claustrophobic and imaging was unsuccessful. In 2021, the patient moved to [place] to be with the son. The son explains that the patient was improving while he was down there. He was eating more, playing cards every night and even able to walk down stairs. However, the son does explain the patient was having visual hallucinations in the context of severe sleep deprivation. He would say the TV is speaking to him or that there are people sitting next to him when they were not. He also describes a lack of awareness to use the bathroom. He explains that his father would not go to the bathroom unless reminded. In September of 2021, he returned to [place] to live with the wife again secondary to some financial issues. The wife explains from September until about 2 weeks ago he was able to shower, brush his teeth and converse appropriately, however, he still had some slurred speech, did not walk much and had consistent visual hallucinations. Over the past 2 weeks, he became progressively more confused, was unable to walk and ate very little. When asked if there were any changes, the wife explains they had construction going on in the house so she kept him in the bedroom all day with the door shut, so as to keep the dust away. He had several episodes of vomiting and loss of continence on Saturday. When the wife noticed him coughing more than usual, she brought him to the ED.     PAST MEDICAL & SURGICAL HISTORY:  H/O cerebellar ataxia    No significant past surgical history      FAMILY HISTORY:  FH: dementia (Mother)    FH: type 2 diabetes (Mother)    Family history of CVA (Father)      Social Hx:  Nonsmoker, no drug or alcohol use    Home Medications:  atorvastatin 20 mg oral tablet: 1 tab(s) orally once a day (18 Apr 2022 06:28)  mirtazapine 15 mg oral tablet: 1 tab(s) orally once a day (at bedtime), As Needed (18 Apr 2022 06:28)    MEDICATIONS  (STANDING):  atorvastatin 20 milliGRAM(s) Oral at bedtime  dextrose 5% + sodium chloride 0.45%. 1000 milliLiter(s) (80 mL/Hr) IV Continuous <Continuous>  folic acid 1 milliGRAM(s) Oral daily  heparin   Injectable 5000 Unit(s) SubCutaneous every 8 hours  pantoprazole  Injectable 40 milliGRAM(s) IV Push daily  piperacillin/tazobactam IVPB.. 3.375 Gram(s) IV Intermittent every 8 hours  potassium chloride  10 mEq/100 mL IVPB 10 milliEquivalent(s) IV Intermittent every 1 hour    MEDICATIONS  (PRN):  acetaminophen     Tablet .. 650 milliGRAM(s) Oral every 6 hours PRN Temp greater or equal to 38C (100.4F), Mild Pain (1 - 3)  aluminum hydroxide/magnesium hydroxide/simethicone Suspension 30 milliLiter(s) Oral every 4 hours PRN Dyspepsia  melatonin 3 milliGRAM(s) Oral at bedtime PRN Insomnia  ondansetron Injectable 4 milliGRAM(s) IV Push every 8 hours PRN Nausea and/or Vomiting    Allergies  No Known Allergies    Intolerances      REVIEW OF SYSTEMS  Neurological: Patient continues to hiccup	    ROS: Pertinent positives above, all other ROS were reviewed and are negative.      Vital Signs Last 24 Hrs  T(C): 36.8 (20 Apr 2022 10:11), Max: 37.9 (20 Apr 2022 05:46)  T(F): 98.2 (20 Apr 2022 10:11), Max: 100.3 (20 Apr 2022 05:46)  HR: 65 (20 Apr 2022 10:11) (52 - 100)  BP: 92/55 (20 Apr 2022 10:11) (82/58 - 97/63)  BP(mean): --  RR: 18 (20 Apr 2022 10:11) (18 - 18)  SpO2: 97% (20 Apr 2022 10:11) (95% - 99%)    GENERAL EXAM:  Constitutional: awake. Oriented to person, but not place or time.   HEENT: PERRLA, EOMI.   Neck: Supple  Musculoskeletal: no joint swelling, no abnormal movements  Skin: no rashes    NEUROLOGICAL EXAM:  MS: More alert than yesterday, awake in bed and attends more favorably to the right.,Able to state name, speech is dysarthric. unable to assess memory; pt unable to name objects. Able to follow some commands (show two fingers with right hand),   CN: EOMI, PERRL, Noticeable left facial droop. no nystagmus   Motor: unable to assess full strength though 5/5  strength b/l.  Pt able to hold arms out bilaterally. Pt did not hold legs up. Tone: normal. Bulk: normal. DTR 2+ symmetric. Sensation: patient grimaces to painful stimuli.   Coordination: +dysmetria on right hand to FNF, unable to assess left hand as patient would not follow command.   Gait:  Pt unable to test walking at this time. Attempted to sit him up, but unsuccessful.     Labs:   cbc                      10.5   4.70  )-----------( 145      ( 20 Apr 2022 08:58 )             32.2     [id number]    140  |  107  |  14  ----------------------------<  100<H>  3.3<L>   |  21<L>  |  0.92    Ca    8.7      20 Apr 2022 08:58  Mg     1.6     04-20    TPro  5.8<L>  /  Alb  3.1<L>  /  TBili  0.5  /  DBili  x   /  AST  13  /  ALT  14  /  AlkPhos  42  04-20    LIVER FUNCTIONS - ( 20 Apr 2022 03:13 )  Alb: 3.1 g/dL / Pro: 5.8 g/dL / ALK PHOS: 42 U/L / ALT: 14 U/L / AST: 13 U/L / GGT: x           Radiology:  -Imaging from [facility], MRI Brain in Jan 2022 with no acute findings. T spine with contusion likely determined to be 2/2 from prior fall

## 2022-04-20 NOTE — PROGRESS NOTE ADULT - ASSESSMENT
62 yo M with PMHx of cerebellar ataxia who was brought to the ED due to AMS. History is obtained from chart review and from patients wife as patient is not responding to questions. History is extremely limited. Approximately 1 week prior to presentation, patient began to experience progressive fatigue/weakness. Patients weakness progressed and he had an episode of incontinence. On initial assessment, hx limited due to AMS. Pt responding to some commands, normal reflexes and tone, though incomprehensible speech, pt responding to internal stimuli on exam (concerns for hallucinations). 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 Pt with underlying chronic process with work up done for the last 2 years though from multiple different neurologists (Dr. Temo Zepeda, Dr. Saunders). Pt with MRI of the brain done in Jan 2022 with non-specific diffuse white matter changes. Also with MRI of spine done 3 weeks ago with spinal lesion to t-spine (seen by Dr. Forman, though no further interventions at that time). Pt likely with acute worsening of a chronic process, consider in the setting of recent infection though cannot r/o cerebral vascular infarct, inflammatory disease, seizures. Also would consider other causes of neurocognitive decline, including underlying prion disease given rapid onset.     MRI Brain w/o contrast done at [facility] on 1/14/2022:  Findings- Ventricles, cisterns, and sulci are diffusely prominent  compatible with mild to moderate atrophy. Cerebral aqueduct is patent. Hyperdynamic flow voids at the foramen of Monro. Patchy nonspecific white matter dz most likely related to mild-to-moderate small vessel disease. no masses, mass seffect, hemorrhage, or cortical infarcts. normal vascular flow voides. pituitary gland unremarkable. brainstem and cerebellum are unremarkable. orbits are unremarkable. sinuses, mastoid air cells are clear.     4/20: pt more awake today and following some commands.     DDx: infectious/metabolic, cerebral vascular infarct, inflammatory disease, seizure.  cannot r/o neurocognitive decline 2/2 underlying prion disease vs dementia vs nph     Recommendations:  [X ] Video EEG normal  [ ] Plan for MRI of the brain, c-spine w/wo contrast today, may need sedation  [X] Follow up B1, B3, vitamin D, vitamin E, MMA, homocysteine, paraneoplastic panel from blood, autoimmune encephalopathy panel, copper,  RPR, HIV, TPO antibody pending.  [x]  NH3 wnl. folate 3.4, b12 elevated. Ceruloplasmin 24. TSH 1.46  [ ] Plan for LP if MRI inconclusive  [ ] will attempt to discuss with pt's PCP regarding chronicity of sx, previous work-up, management       Case discussed with Dr. Lima.  60 yo M with PMHx of cerebellar ataxia who was brought to the ED due to AMS. History is obtained from chart review and from patients wife as patient is not responding to questions. History is extremely limited. Approximately 1 week prior to presentation, patient began to experience progressive fatigue/weakness. Patients weakness progressed and he had an episode of incontinence. On initial assessment, hx limited due to AMS. Pt responding to some commands, normal reflexes and tone, though incomprehensible speech, pt responding to internal stimuli on exam (concerns for hallucinations). 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 Pt with underlying chronic process with work up done for the last 2 years though from multiple different neurologists (Dr. Temo Zepeda, Dr. Saunders). Pt with MRI of the brain done in Jan 2022 with non-specific diffuse white matter changes. Also with MRI of spine done 3 weeks ago with spinal lesion to t-spine (seen by Dr. Forman, though no further interventions at that time). Pt likely with acute worsening of a chronic process, consider in the setting of recent infection though cannot r/o cerebral vascular infarct, inflammatory disease, seizures. Also would consider other causes of neurocognitive decline, including underlying prion disease given rapid onset.     MRI Brain w/o contrast done at [facility] on 1/14/2022:  Findings- Ventricles, cisterns, and sulci are diffusely prominent  compatible with mild to moderate atrophy. Cerebral aqueduct is patent. Hyperdynamic flow voids at the foramen of Monro. Patchy nonspecific white matter dz most likely related to mild-to-moderate small vessel disease. no masses, mass seffect, hemorrhage, or cortical infarcts. normal vascular flow voides. pituitary gland unremarkable. brainstem and cerebellum are unremarkable. orbits are unremarkable. sinuses, mastoid air cells are clear.     4/20: pt more awake today and following some commands.     DDx: infectious/metabolic, cerebral vascular infarct, inflammatory disease, seizure.  cannot r/o neurocognitive decline 2/2 underlying prion disease vs dementia vs nph     Recommendations:  [X ] Preliminary Video EEG normal, F/U final read.   [ ] Plan for MRI of the brain, c-spine w/wo contrast today, may need sedation  [X] Follow up B1, B3, vitamin D, vitamin E, MMA, homocysteine, paraneoplastic panel from blood, autoimmune encephalopathy panel, copper,  RPR, HIV, TPO antibody pending.  [x]  NH3 wnl. folate 3.4, b12 elevated. Ceruloplasmin 24. TSH 1.46  [ ] Plan for LP if MRI inconclusive  [ ] will attempt to discuss with pt's PCP regarding chronicity of sx, previous work-up, management       Case discussed with Dr. Lima.  62 yo M with PMHx of cerebellar ataxia who was brought to the ED due to AMS. History is obtained from chart review and from patients wife as patient is not responding to questions. History is extremely limited. Approximately 1 week prior to presentation, patient began to experience progressive fatigue/weakness. Patients weakness progressed and he had an episode of incontinence. On initial assessment, hx limited due to AMS. Pt responding to some commands, normal reflexes and tone, though incomprehensible speech, pt responding to internal stimuli on exam (concerns for hallucinations). 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 Pt with underlying chronic process with work up done for the last 2 years though from multiple different neurologists (Dr. Temo Zepeda, Dr. Saunders). Pt with MRI of the brain done in Jan 2022 with non-specific diffuse white matter changes. Also with MRI of spine done 3 weeks ago with spinal lesion to t-spine (seen by Dr. Forman, though no further interventions at that time). Pt likely with acute worsening of a chronic process, consider in the setting of recent infection though cannot r/o cerebral vascular infarct, inflammatory disease, seizures. Also would consider other causes of neurocognitive decline, including underlying prion disease given rapid onset.     MRI Brain w/o contrast done at [facility] on 1/14/2022:  Findings- Ventricles, cisterns, and sulci are diffusely prominent  compatible with mild to moderate atrophy. Cerebral aqueduct is patent. Hyperdynamic flow voids at the foramen of Monro. Patchy nonspecific white matter dz most likely related to mild-to-moderate small vessel disease. no masses, mass seffect, hemorrhage, or cortical infarcts. normal vascular flow voides. pituitary gland unremarkable. brainstem and cerebellum are unremarkable. orbits are unremarkable. sinuses, mastoid air cells are clear.     4/20: pt more awake today and following some commands. able to state his name, hold two fingers up.     DDx: infectious/metabolic, cerebral vascular infarct, inflammatory disease, seizure.  cannot r/o neurocognitive decline 2/2 underlying prion disease vs dementia vs nph     Recommendations:  [X ] Preliminary Video EEG normal, F/U final read.   [ ] Plan for MRI of the brain, c-spine w/wo contrast today, may need sedation  [ ] Follow up B1, B3, vitamin D, vitamin E, MMA, homocysteine, paraneoplastic panel from blood, autoimmune encephalopathy panel, copper,  RPR, HIV, TPO antibody pending.  [x]  NH3 wnl. folate 3.4, b12 elevated. Ceruloplasmin 24. TSH 1.46  [ ] Plan for LP given unclear etiology of current presentation  [ ] will attempt to discuss with pt's PCP regarding chronicity of sx, previous work-up, management       Case discussed with Dr. Lima.

## 2022-04-21 LAB
ALBUMIN SERPL ELPH-MCNC: 2.2 G/DL — LOW (ref 3.3–5)
ALBUMIN SERPL ELPH-MCNC: 2.3 G/DL — LOW (ref 3.3–5)
ALBUMIN SERPL ELPH-MCNC: 2.8 G/DL — LOW (ref 3.3–5)
ALP SERPL-CCNC: 34 U/L — LOW (ref 40–120)
ALP SERPL-CCNC: 35 U/L — LOW (ref 40–120)
ALP SERPL-CCNC: 42 U/L — SIGNIFICANT CHANGE UP (ref 40–120)
ALT FLD-CCNC: 12 U/L — SIGNIFICANT CHANGE UP (ref 10–45)
ALT FLD-CCNC: 9 U/L — LOW (ref 10–45)
ALT FLD-CCNC: 9 U/L — LOW (ref 10–45)
ANION GAP SERPL CALC-SCNC: 10 MMOL/L — SIGNIFICANT CHANGE UP (ref 5–17)
ANION GAP SERPL CALC-SCNC: 11 MMOL/L — SIGNIFICANT CHANGE UP (ref 5–17)
ANION GAP SERPL CALC-SCNC: 12 MMOL/L — SIGNIFICANT CHANGE UP (ref 5–17)
ANION GAP SERPL CALC-SCNC: 8 MMOL/L — SIGNIFICANT CHANGE UP (ref 5–17)
APTT BLD: 32.1 SEC — SIGNIFICANT CHANGE UP (ref 27.5–35.5)
APTT BLD: 36.6 SEC — HIGH (ref 27.5–35.5)
AST SERPL-CCNC: 10 U/L — SIGNIFICANT CHANGE UP (ref 10–40)
AST SERPL-CCNC: 10 U/L — SIGNIFICANT CHANGE UP (ref 10–40)
AST SERPL-CCNC: 13 U/L — SIGNIFICANT CHANGE UP (ref 10–40)
BASE EXCESS BLDV CALC-SCNC: -3.5 MMOL/L — LOW (ref -2–2)
BILIRUB SERPL-MCNC: 0.4 MG/DL — SIGNIFICANT CHANGE UP (ref 0.2–1.2)
BILIRUB SERPL-MCNC: 0.4 MG/DL — SIGNIFICANT CHANGE UP (ref 0.2–1.2)
BILIRUB SERPL-MCNC: 0.6 MG/DL — SIGNIFICANT CHANGE UP (ref 0.2–1.2)
BUN SERPL-MCNC: 5 MG/DL — LOW (ref 7–23)
BUN SERPL-MCNC: 6 MG/DL — LOW (ref 7–23)
BUN SERPL-MCNC: 7 MG/DL — SIGNIFICANT CHANGE UP (ref 7–23)
BUN SERPL-MCNC: 9 MG/DL — SIGNIFICANT CHANGE UP (ref 7–23)
CA-I SERPL-SCNC: 1.19 MMOL/L — SIGNIFICANT CHANGE UP (ref 1.15–1.33)
CALCIUM SERPL-MCNC: 7.4 MG/DL — LOW (ref 8.4–10.5)
CALCIUM SERPL-MCNC: 7.8 MG/DL — LOW (ref 8.4–10.5)
CALCIUM SERPL-MCNC: 7.8 MG/DL — LOW (ref 8.4–10.5)
CALCIUM SERPL-MCNC: 8 MG/DL — LOW (ref 8.4–10.5)
CHLORIDE BLDV-SCNC: 106 MMOL/L — SIGNIFICANT CHANGE UP (ref 96–108)
CHLORIDE SERPL-SCNC: 105 MMOL/L — SIGNIFICANT CHANGE UP (ref 96–108)
CHLORIDE SERPL-SCNC: 106 MMOL/L — SIGNIFICANT CHANGE UP (ref 96–108)
CHLORIDE SERPL-SCNC: 107 MMOL/L — SIGNIFICANT CHANGE UP (ref 96–108)
CHLORIDE SERPL-SCNC: 108 MMOL/L — SIGNIFICANT CHANGE UP (ref 96–108)
CO2 BLDV-SCNC: 24 MMOL/L — SIGNIFICANT CHANGE UP (ref 22–26)
CO2 SERPL-SCNC: 17 MMOL/L — LOW (ref 22–31)
CO2 SERPL-SCNC: 18 MMOL/L — LOW (ref 22–31)
CO2 SERPL-SCNC: 20 MMOL/L — LOW (ref 22–31)
CO2 SERPL-SCNC: 20 MMOL/L — LOW (ref 22–31)
CORTIS AM PEAK SERPL-MCNC: 6.4 UG/DL — SIGNIFICANT CHANGE UP (ref 6–18.4)
CREAT SERPL-MCNC: 0.71 MG/DL — SIGNIFICANT CHANGE UP (ref 0.5–1.3)
CREAT SERPL-MCNC: 0.74 MG/DL — SIGNIFICANT CHANGE UP (ref 0.5–1.3)
CREAT SERPL-MCNC: 0.75 MG/DL — SIGNIFICANT CHANGE UP (ref 0.5–1.3)
CREAT SERPL-MCNC: 0.75 MG/DL — SIGNIFICANT CHANGE UP (ref 0.5–1.3)
EGFR: 103 ML/MIN/1.73M2 — SIGNIFICANT CHANGE UP
EGFR: 103 ML/MIN/1.73M2 — SIGNIFICANT CHANGE UP
EGFR: 104 ML/MIN/1.73M2 — SIGNIFICANT CHANGE UP
EGFR: 105 ML/MIN/1.73M2 — SIGNIFICANT CHANGE UP
GAS PNL BLDA: SIGNIFICANT CHANGE UP
GAS PNL BLDV: 133 MMOL/L — LOW (ref 136–145)
GAS PNL BLDV: SIGNIFICANT CHANGE UP
GLUCOSE BLDC GLUCOMTR-MCNC: 128 MG/DL — HIGH (ref 70–99)
GLUCOSE BLDC GLUCOMTR-MCNC: 133 MG/DL — HIGH (ref 70–99)
GLUCOSE BLDV-MCNC: 119 MG/DL — HIGH (ref 70–99)
GLUCOSE SERPL-MCNC: 125 MG/DL — HIGH (ref 70–99)
GLUCOSE SERPL-MCNC: 128 MG/DL — HIGH (ref 70–99)
GLUCOSE SERPL-MCNC: 140 MG/DL — HIGH (ref 70–99)
GLUCOSE SERPL-MCNC: 151 MG/DL — HIGH (ref 70–99)
HCO3 BLDV-SCNC: 23 MMOL/L — SIGNIFICANT CHANGE UP (ref 22–29)
HCT VFR BLD CALC: 27.3 % — LOW (ref 39–50)
HCT VFR BLD CALC: 27.9 % — LOW (ref 39–50)
HCT VFR BLD CALC: 31.1 % — LOW (ref 39–50)
HCT VFR BLD CALC: 31.6 % — LOW (ref 39–50)
HCT VFR BLDA CALC: 28 % — LOW (ref 39–51)
HGB BLD CALC-MCNC: 9.3 G/DL — LOW (ref 12.6–17.4)
HGB BLD-MCNC: 10.2 G/DL — LOW (ref 13–17)
HGB BLD-MCNC: 10.3 G/DL — LOW (ref 13–17)
HGB BLD-MCNC: 9 G/DL — LOW (ref 13–17)
HGB BLD-MCNC: 9.3 G/DL — LOW (ref 13–17)
INR BLD: 1.31 RATIO — HIGH (ref 0.88–1.16)
INR BLD: 1.32 RATIO — HIGH (ref 0.88–1.16)
LACTATE BLDV-MCNC: 1.1 MMOL/L — SIGNIFICANT CHANGE UP (ref 0.7–2)
LACTATE SERPL-SCNC: 1.4 MMOL/L — SIGNIFICANT CHANGE UP (ref 0.7–2)
LEGIONELLA AG UR QL: NEGATIVE — SIGNIFICANT CHANGE UP
MAGNESIUM SERPL-MCNC: 1.3 MG/DL — LOW (ref 1.6–2.6)
MAGNESIUM SERPL-MCNC: 1.7 MG/DL — SIGNIFICANT CHANGE UP (ref 1.6–2.6)
MAGNESIUM SERPL-MCNC: 1.7 MG/DL — SIGNIFICANT CHANGE UP (ref 1.6–2.6)
MAGNESIUM SERPL-MCNC: 1.8 MG/DL — SIGNIFICANT CHANGE UP (ref 1.6–2.6)
MCHC RBC-ENTMCNC: 28.5 PG — SIGNIFICANT CHANGE UP (ref 27–34)
MCHC RBC-ENTMCNC: 28.6 PG — SIGNIFICANT CHANGE UP (ref 27–34)
MCHC RBC-ENTMCNC: 28.8 PG — SIGNIFICANT CHANGE UP (ref 27–34)
MCHC RBC-ENTMCNC: 28.9 PG — SIGNIFICANT CHANGE UP (ref 27–34)
MCHC RBC-ENTMCNC: 32.6 GM/DL — SIGNIFICANT CHANGE UP (ref 32–36)
MCHC RBC-ENTMCNC: 32.8 GM/DL — SIGNIFICANT CHANGE UP (ref 32–36)
MCHC RBC-ENTMCNC: 33 GM/DL — SIGNIFICANT CHANGE UP (ref 32–36)
MCHC RBC-ENTMCNC: 33.3 GM/DL — SIGNIFICANT CHANGE UP (ref 32–36)
MCV RBC AUTO: 86.4 FL — SIGNIFICANT CHANGE UP (ref 80–100)
MCV RBC AUTO: 86.9 FL — SIGNIFICANT CHANGE UP (ref 80–100)
MCV RBC AUTO: 87.8 FL — SIGNIFICANT CHANGE UP (ref 80–100)
MCV RBC AUTO: 87.8 FL — SIGNIFICANT CHANGE UP (ref 80–100)
MRSA PCR RESULT.: SIGNIFICANT CHANGE UP
NRBC # BLD: 0 /100 WBCS — SIGNIFICANT CHANGE UP (ref 0–0)
PCO2 BLDV: 45 MMHG — SIGNIFICANT CHANGE UP (ref 42–55)
PH BLDV: 7.31 — LOW (ref 7.32–7.43)
PHOSPHATE SERPL-MCNC: 1.8 MG/DL — LOW (ref 2.5–4.5)
PHOSPHATE SERPL-MCNC: 2.2 MG/DL — LOW (ref 2.5–4.5)
PHOSPHATE SERPL-MCNC: 2.5 MG/DL — SIGNIFICANT CHANGE UP (ref 2.5–4.5)
PHOSPHATE SERPL-MCNC: 3 MG/DL — SIGNIFICANT CHANGE UP (ref 2.5–4.5)
PLATELET # BLD AUTO: 105 K/UL — LOW (ref 150–400)
PLATELET # BLD AUTO: 107 K/UL — LOW (ref 150–400)
PLATELET # BLD AUTO: 138 K/UL — LOW (ref 150–400)
PLATELET # BLD AUTO: 97 K/UL — LOW (ref 150–400)
PO2 BLDV: 34 MMHG — SIGNIFICANT CHANGE UP (ref 25–45)
POTASSIUM BLDV-SCNC: 3.5 MMOL/L — SIGNIFICANT CHANGE UP (ref 3.5–5.1)
POTASSIUM SERPL-MCNC: 3.2 MMOL/L — LOW (ref 3.5–5.3)
POTASSIUM SERPL-MCNC: 3.6 MMOL/L — SIGNIFICANT CHANGE UP (ref 3.5–5.3)
POTASSIUM SERPL-MCNC: 3.6 MMOL/L — SIGNIFICANT CHANGE UP (ref 3.5–5.3)
POTASSIUM SERPL-MCNC: 3.7 MMOL/L — SIGNIFICANT CHANGE UP (ref 3.5–5.3)
POTASSIUM SERPL-SCNC: 3.2 MMOL/L — LOW (ref 3.5–5.3)
POTASSIUM SERPL-SCNC: 3.6 MMOL/L — SIGNIFICANT CHANGE UP (ref 3.5–5.3)
POTASSIUM SERPL-SCNC: 3.6 MMOL/L — SIGNIFICANT CHANGE UP (ref 3.5–5.3)
POTASSIUM SERPL-SCNC: 3.7 MMOL/L — SIGNIFICANT CHANGE UP (ref 3.5–5.3)
PROT SERPL-MCNC: 4.6 G/DL — LOW (ref 6–8.3)
PROT SERPL-MCNC: 4.7 G/DL — LOW (ref 6–8.3)
PROT SERPL-MCNC: 5.6 G/DL — LOW (ref 6–8.3)
PROTHROM AB SERPL-ACNC: 15.1 SEC — HIGH (ref 10.5–13.4)
PROTHROM AB SERPL-ACNC: 15.3 SEC — HIGH (ref 10.5–13.4)
RBC # BLD: 3.11 M/UL — LOW (ref 4.2–5.8)
RBC # BLD: 3.23 M/UL — LOW (ref 4.2–5.8)
RBC # BLD: 3.58 M/UL — LOW (ref 4.2–5.8)
RBC # BLD: 3.6 M/UL — LOW (ref 4.2–5.8)
RBC # FLD: 13.4 % — SIGNIFICANT CHANGE UP (ref 10.3–14.5)
RBC # FLD: 13.5 % — SIGNIFICANT CHANGE UP (ref 10.3–14.5)
RBC # FLD: 13.6 % — SIGNIFICANT CHANGE UP (ref 10.3–14.5)
RBC # FLD: 13.7 % — SIGNIFICANT CHANGE UP (ref 10.3–14.5)
S AUREUS DNA NOSE QL NAA+PROBE: DETECTED
SAO2 % BLDV: 57.6 % — LOW (ref 67–88)
SODIUM SERPL-SCNC: 135 MMOL/L — SIGNIFICANT CHANGE UP (ref 135–145)
SODIUM SERPL-SCNC: 135 MMOL/L — SIGNIFICANT CHANGE UP (ref 135–145)
SODIUM SERPL-SCNC: 136 MMOL/L — SIGNIFICANT CHANGE UP (ref 135–145)
SODIUM SERPL-SCNC: 136 MMOL/L — SIGNIFICANT CHANGE UP (ref 135–145)
T3 SERPL-MCNC: 48 NG/DL — LOW (ref 80–200)
TSH SERPL-MCNC: 1.58 UIU/ML — SIGNIFICANT CHANGE UP (ref 0.27–4.2)
VANCOMYCIN TROUGH SERPL-MCNC: 13.3 UG/ML — SIGNIFICANT CHANGE UP (ref 10–20)
WBC # BLD: 4.03 K/UL — SIGNIFICANT CHANGE UP (ref 3.8–10.5)
WBC # BLD: 4.06 K/UL — SIGNIFICANT CHANGE UP (ref 3.8–10.5)
WBC # BLD: 4.33 K/UL — SIGNIFICANT CHANGE UP (ref 3.8–10.5)
WBC # BLD: 7.04 K/UL — SIGNIFICANT CHANGE UP (ref 3.8–10.5)
WBC # FLD AUTO: 4.03 K/UL — SIGNIFICANT CHANGE UP (ref 3.8–10.5)
WBC # FLD AUTO: 4.06 K/UL — SIGNIFICANT CHANGE UP (ref 3.8–10.5)
WBC # FLD AUTO: 4.33 K/UL — SIGNIFICANT CHANGE UP (ref 3.8–10.5)
WBC # FLD AUTO: 7.04 K/UL — SIGNIFICANT CHANGE UP (ref 3.8–10.5)

## 2022-04-21 PROCEDURE — 71045 X-RAY EXAM CHEST 1 VIEW: CPT | Mod: 26

## 2022-04-21 PROCEDURE — 99255 IP/OBS CONSLTJ NEW/EST HI 80: CPT

## 2022-04-21 PROCEDURE — 99233 SBSQ HOSP IP/OBS HIGH 50: CPT

## 2022-04-21 PROCEDURE — 99291 CRITICAL CARE FIRST HOUR: CPT

## 2022-04-21 PROCEDURE — 93010 ELECTROCARDIOGRAM REPORT: CPT

## 2022-04-21 PROCEDURE — 99232 SBSQ HOSP IP/OBS MODERATE 35: CPT

## 2022-04-21 RX ORDER — HYDROMORPHONE HYDROCHLORIDE 2 MG/ML
0.5 INJECTION INTRAMUSCULAR; INTRAVENOUS; SUBCUTANEOUS ONCE
Refills: 0 | Status: DISCONTINUED | OUTPATIENT
Start: 2022-04-21 | End: 2022-04-21

## 2022-04-21 RX ORDER — MAGNESIUM SULFATE 500 MG/ML
2 VIAL (ML) INJECTION ONCE
Refills: 0 | Status: COMPLETED | OUTPATIENT
Start: 2022-04-21 | End: 2022-04-21

## 2022-04-21 RX ORDER — MAGNESIUM SULFATE 500 MG/ML
1 VIAL (ML) INJECTION ONCE
Refills: 0 | Status: COMPLETED | OUTPATIENT
Start: 2022-04-21 | End: 2022-04-21

## 2022-04-21 RX ORDER — SODIUM,POTASSIUM PHOSPHATES 278-250MG
1 POWDER IN PACKET (EA) ORAL EVERY 4 HOURS
Refills: 0 | Status: COMPLETED | OUTPATIENT
Start: 2022-04-21 | End: 2022-04-22

## 2022-04-21 RX ORDER — CEFTRIAXONE 500 MG/1
2000 INJECTION, POWDER, FOR SOLUTION INTRAMUSCULAR; INTRAVENOUS EVERY 12 HOURS
Refills: 0 | Status: DISCONTINUED | OUTPATIENT
Start: 2022-04-21 | End: 2022-04-25

## 2022-04-21 RX ORDER — SODIUM CHLORIDE 9 MG/ML
500 INJECTION INTRAMUSCULAR; INTRAVENOUS; SUBCUTANEOUS ONCE
Refills: 0 | Status: COMPLETED | OUTPATIENT
Start: 2022-04-21 | End: 2022-04-21

## 2022-04-21 RX ORDER — POTASSIUM PHOSPHATE, MONOBASIC POTASSIUM PHOSPHATE, DIBASIC 236; 224 MG/ML; MG/ML
15 INJECTION, SOLUTION INTRAVENOUS ONCE
Refills: 0 | Status: COMPLETED | OUTPATIENT
Start: 2022-04-21 | End: 2022-04-21

## 2022-04-21 RX ORDER — NOREPINEPHRINE BITARTRATE/D5W 8 MG/250ML
0.05 PLASTIC BAG, INJECTION (ML) INTRAVENOUS
Qty: 8 | Refills: 0 | Status: DISCONTINUED | OUTPATIENT
Start: 2022-04-21 | End: 2022-04-22

## 2022-04-21 RX ORDER — AMPICILLIN TRIHYDRATE 250 MG
2 CAPSULE ORAL EVERY 4 HOURS
Refills: 0 | Status: DISCONTINUED | OUTPATIENT
Start: 2022-04-21 | End: 2022-04-21

## 2022-04-21 RX ORDER — THIAMINE MONONITRATE (VIT B1) 100 MG
100 TABLET ORAL DAILY
Refills: 0 | Status: DISCONTINUED | OUTPATIENT
Start: 2022-04-21 | End: 2022-05-07

## 2022-04-21 RX ORDER — CHLORHEXIDINE GLUCONATE 213 G/1000ML
1 SOLUTION TOPICAL
Refills: 0 | Status: DISCONTINUED | OUTPATIENT
Start: 2022-04-21 | End: 2022-04-26

## 2022-04-21 RX ORDER — POTASSIUM CHLORIDE 20 MEQ
10 PACKET (EA) ORAL
Refills: 0 | Status: COMPLETED | OUTPATIENT
Start: 2022-04-21 | End: 2022-04-21

## 2022-04-21 RX ORDER — SODIUM CHLORIDE 9 MG/ML
1000 INJECTION, SOLUTION INTRAVENOUS
Refills: 0 | Status: DISCONTINUED | OUTPATIENT
Start: 2022-04-21 | End: 2022-04-22

## 2022-04-21 RX ORDER — AMPICILLIN TRIHYDRATE 250 MG
2 CAPSULE ORAL ONCE
Refills: 0 | Status: DISCONTINUED | OUTPATIENT
Start: 2022-04-21 | End: 2022-04-21

## 2022-04-21 RX ORDER — ACYCLOVIR SODIUM 500 MG
600 VIAL (EA) INTRAVENOUS ONCE
Refills: 0 | Status: COMPLETED | OUTPATIENT
Start: 2022-04-21 | End: 2022-04-21

## 2022-04-21 RX ORDER — SODIUM CHLORIDE 9 MG/ML
1000 INJECTION, SOLUTION INTRAVENOUS ONCE
Refills: 0 | Status: COMPLETED | OUTPATIENT
Start: 2022-04-21 | End: 2022-04-21

## 2022-04-21 RX ORDER — ALBUMIN HUMAN 25 %
250 VIAL (ML) INTRAVENOUS ONCE
Refills: 0 | Status: COMPLETED | OUTPATIENT
Start: 2022-04-21 | End: 2022-04-21

## 2022-04-21 RX ORDER — HYDROCORTISONE 20 MG
100 TABLET ORAL ONCE
Refills: 0 | Status: COMPLETED | OUTPATIENT
Start: 2022-04-21 | End: 2022-04-21

## 2022-04-21 RX ORDER — MIDODRINE HYDROCHLORIDE 2.5 MG/1
10 TABLET ORAL THREE TIMES A DAY
Refills: 0 | Status: DISCONTINUED | OUTPATIENT
Start: 2022-04-21 | End: 2022-04-21

## 2022-04-21 RX ORDER — AMPICILLIN TRIHYDRATE 250 MG
CAPSULE ORAL
Refills: 0 | Status: DISCONTINUED | OUTPATIENT
Start: 2022-04-21 | End: 2022-04-25

## 2022-04-21 RX ORDER — AMPICILLIN TRIHYDRATE 250 MG
CAPSULE ORAL
Refills: 0 | Status: DISCONTINUED | OUTPATIENT
Start: 2022-04-21 | End: 2022-04-21

## 2022-04-21 RX ORDER — ACYCLOVIR SODIUM 500 MG
600 VIAL (EA) INTRAVENOUS EVERY 8 HOURS
Refills: 0 | Status: DISCONTINUED | OUTPATIENT
Start: 2022-04-21 | End: 2022-04-25

## 2022-04-21 RX ORDER — AMPICILLIN TRIHYDRATE 250 MG
2 CAPSULE ORAL EVERY 4 HOURS
Refills: 0 | Status: DISCONTINUED | OUTPATIENT
Start: 2022-04-21 | End: 2022-04-25

## 2022-04-21 RX ORDER — ACYCLOVIR SODIUM 500 MG
VIAL (EA) INTRAVENOUS
Refills: 0 | Status: DISCONTINUED | OUTPATIENT
Start: 2022-04-21 | End: 2022-04-25

## 2022-04-21 RX ORDER — HYDROCORTISONE 20 MG
100 TABLET ORAL EVERY 8 HOURS
Refills: 0 | Status: DISCONTINUED | OUTPATIENT
Start: 2022-04-21 | End: 2022-04-25

## 2022-04-21 RX ORDER — AMPICILLIN TRIHYDRATE 250 MG
2 CAPSULE ORAL ONCE
Refills: 0 | Status: COMPLETED | OUTPATIENT
Start: 2022-04-21 | End: 2022-04-21

## 2022-04-21 RX ADMIN — Medication 112 MILLIGRAM(S): at 17:30

## 2022-04-21 RX ADMIN — Medication 1 TABLET(S): at 17:16

## 2022-04-21 RX ADMIN — Medication 125 MILLILITER(S): at 12:20

## 2022-04-21 RX ADMIN — PIPERACILLIN AND TAZOBACTAM 25 GRAM(S): 4; .5 INJECTION, POWDER, LYOPHILIZED, FOR SOLUTION INTRAVENOUS at 09:24

## 2022-04-21 RX ADMIN — HYDROMORPHONE HYDROCHLORIDE 0.5 MILLIGRAM(S): 2 INJECTION INTRAMUSCULAR; INTRAVENOUS; SUBCUTANEOUS at 16:05

## 2022-04-21 RX ADMIN — Medication 6.47 MICROGRAM(S)/KG/MIN: at 17:15

## 2022-04-21 RX ADMIN — Medication 1 MILLIGRAM(S): at 17:16

## 2022-04-21 RX ADMIN — Medication 100 MILLIGRAM(S): at 12:26

## 2022-04-21 RX ADMIN — Medication 1 PACKET(S): at 22:32

## 2022-04-21 RX ADMIN — Medication 25 GRAM(S): at 01:55

## 2022-04-21 RX ADMIN — ATORVASTATIN CALCIUM 20 MILLIGRAM(S): 80 TABLET, FILM COATED ORAL at 22:32

## 2022-04-21 RX ADMIN — CHLORHEXIDINE GLUCONATE 1 APPLICATION(S): 213 SOLUTION TOPICAL at 22:36

## 2022-04-21 RX ADMIN — Medication 100 GRAM(S): at 18:25

## 2022-04-21 RX ADMIN — Medication 100 MILLIEQUIVALENT(S): at 04:50

## 2022-04-21 RX ADMIN — MIDODRINE HYDROCHLORIDE 10 MILLIGRAM(S): 2.5 TABLET ORAL at 11:50

## 2022-04-21 RX ADMIN — Medication 100 MILLIEQUIVALENT(S): at 07:06

## 2022-04-21 RX ADMIN — HEPARIN SODIUM 5000 UNIT(S): 5000 INJECTION INTRAVENOUS; SUBCUTANEOUS at 05:37

## 2022-04-21 RX ADMIN — Medication 100 MILLIGRAM(S): at 17:16

## 2022-04-21 RX ADMIN — Medication 200 GRAM(S): at 22:21

## 2022-04-21 RX ADMIN — SODIUM CHLORIDE 1000 MILLILITER(S): 9 INJECTION, SOLUTION INTRAVENOUS at 02:25

## 2022-04-21 RX ADMIN — POTASSIUM PHOSPHATE, MONOBASIC POTASSIUM PHOSPHATE, DIBASIC 62.5 MILLIMOLE(S): 236; 224 INJECTION, SOLUTION INTRAVENOUS at 03:08

## 2022-04-21 RX ADMIN — HYDROMORPHONE HYDROCHLORIDE 0.5 MILLIGRAM(S): 2 INJECTION INTRAMUSCULAR; INTRAVENOUS; SUBCUTANEOUS at 15:50

## 2022-04-21 RX ADMIN — Medication 250 MILLIGRAM(S): at 05:37

## 2022-04-21 RX ADMIN — PIPERACILLIN AND TAZOBACTAM 25 GRAM(S): 4; .5 INJECTION, POWDER, LYOPHILIZED, FOR SOLUTION INTRAVENOUS at 00:53

## 2022-04-21 RX ADMIN — SODIUM CHLORIDE 100 MILLILITER(S): 9 INJECTION, SOLUTION INTRAVENOUS at 17:15

## 2022-04-21 RX ADMIN — Medication 100 MILLIEQUIVALENT(S): at 03:06

## 2022-04-21 RX ADMIN — SODIUM CHLORIDE 1000 MILLILITER(S): 9 INJECTION INTRAMUSCULAR; INTRAVENOUS; SUBCUTANEOUS at 11:51

## 2022-04-21 RX ADMIN — PANTOPRAZOLE SODIUM 40 MILLIGRAM(S): 20 TABLET, DELAYED RELEASE ORAL at 14:10

## 2022-04-21 RX ADMIN — CEFTRIAXONE 100 MILLIGRAM(S): 500 INJECTION, POWDER, FOR SOLUTION INTRAMUSCULAR; INTRAVENOUS at 14:09

## 2022-04-21 RX ADMIN — Medication 250 MILLIGRAM(S): at 18:25

## 2022-04-21 RX ADMIN — SODIUM CHLORIDE 100 MILLILITER(S): 9 INJECTION, SOLUTION INTRAVENOUS at 09:24

## 2022-04-21 RX ADMIN — Medication 200 GRAM(S): at 16:22

## 2022-04-21 RX ADMIN — Medication 1 PACKET(S): at 17:16

## 2022-04-21 RX ADMIN — Medication 112 MILLIGRAM(S): at 22:20

## 2022-04-21 NOTE — CONSULT NOTE ADULT - ASSESSMENT
Assessment:  61 yr old male with stated hx significant for cerebral ataxia who presented with one week progressive weakness/fatigue accompanied by incontinence and emesis. Pt found to have RUL/RLL asp pneum. Course c/b recurrent episodes of HypoTN, bradycardia, hypothermia requiring recurrent RRT's , now refractory to IVF requiring norepi gtt      Consult called for and now admitted to MICU for septic shock, possible adrenal insufficiency secondary possibly due to Asp pneum      Plan:    #Neuro:  hx cerebral ataxia, presented with weakness/fatique    -Neuro checks q 2 hrs and prn for changes  -activity as tolerated  -physical therapy consult when stable  -MRI 4/    #Pulm:    #CV:    #GI/:    #ID:    #FEN/ENDO/HEME:           Assessment:  61 yr old male with stated hx significant for cerebral ataxia who presented with one week progressive weakness/fatigue accompanied by incontinence and emesis. Pt found to have RUL/RLL asp pneum. 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 vs infectious meningitis  -consider LP to eval for bacterial/viral infection    #Pulm:    -Supplemental O2 prn to maintain SPO2 > 92%  -Bronchodilators q 6 hrs prn for sob/wheezes  -HOB >/= 30 degree angle    #CV: sinus bradycardia, HypoTN secondary to septic shock    -ECG now and q am x3  -Cardiac Enzymes now and q 8 hr x 3  -Place on externa pacing pads  -Norepi gtt - titrate to MAP 60-65 mmHg  -obtain TTE to eval RVFx/LVFx    #GI/:     -strict I & O's - keep even  -ANYI feed tube  -continue protonix 40 mg IVP qd    #ID:  RUL/RLL pneum, MRI with leptomeningeal enhancement    -continue ceftriaxone 2 gms IV aq 12 hrs  -continue vanco 1 gm IV q 12 hrs   -titrate vanco to trough of 15 - 20  -consider LP    #FEN/ENDO/HEME:   r/o adrenal insufficiency     -obtain CMP/Mg++/PO--4/CBC w diff/PT/PTT/INR now and q. a.m.  -abg prn   -Hydrocortisone 100 mg IV q 8 hrs  -POC glucose with ISS q 4 hrs - maintain glucose 140-160  -f/u cortisol level  -obtain TSH/Thyroxine/T3 levels             Assessment:  61 yr old male with stated hx significant for cerebral ataxia who presented with one week progressive weakness/fatigue accompanied by incontinence and emesis. Pt found to have RUL/RLL asp pneum. 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 vs infectious meningitis  -consider LP to eval for bacterial/viral infection    #Pulm:    -Supplemental O2 prn to maintain SPO2 > 92%  -Bronchodilators q 6 hrs prn for sob/wheezes  -HOB >/= 30 degree angle    #CV: sinus bradycardia, HypoTN secondary to septic shock    -ECG now and q am x3  -Cardiac Enzymes now and q 8 hr x 3  -Place on externa pacing pads  -Norepi gtt - titrate to MAP 60-65 mmHg  -obtain TTE to eval RVFx/LVFx    #GI/:     -strict I & O's - keep even  -ANYI feed tube  -continue protonix 40 mg IVP qd    #ID:  RUL/RLL pneum, MRI with leptomeningeal enhancement    -continue ceftriaxone 2 gms IV aq 12 hrs  -continue vanco 1 gm IV q 12 hrs   -titrate vanco to trough of 15 - 20  -consider LP  -consider acyclovir therapy    #FEN/ENDO/HEME:   r/o adrenal insufficiency     -obtain CMP/Mg++/PO--4/CBC w diff/PT/PTT/INR now and q. a.m.  -abg prn   -Hydrocortisone 100 mg IV q 8 hrs  -POC glucose with ISS q 4 hrs - maintain glucose 140-160  -f/u cortisol level  -obtain TSH/Thyroxine/T3 levels

## 2022-04-21 NOTE — DIETITIAN INITIAL EVALUATION ADULT - PERTINENT MEDS FT
MEDICATIONS  (STANDING):  atorvastatin 20 milliGRAM(s) Oral at bedtime  dextrose 5% + sodium chloride 0.45%. 1000 milliLiter(s) (100 mL/Hr) IV Continuous <Continuous>  folic acid 1 milliGRAM(s) Oral daily  heparin   Injectable 5000 Unit(s) SubCutaneous every 8 hours  pantoprazole  Injectable 40 milliGRAM(s) IV Push daily  piperacillin/tazobactam IVPB.. 3.375 Gram(s) IV Intermittent every 8 hours  vancomycin  IVPB 1000 milliGRAM(s) IV Intermittent every 12 hours    MEDICATIONS  (PRN):  acetaminophen     Tablet .. 650 milliGRAM(s) Oral every 6 hours PRN Temp greater or equal to 38C (100.4F), Mild Pain (1 - 3)  aluminum hydroxide/magnesium hydroxide/simethicone Suspension 30 milliLiter(s) Oral every 4 hours PRN Dyspepsia  melatonin 3 milliGRAM(s) Oral at bedtime PRN Insomnia  ondansetron Injectable 4 milliGRAM(s) IV Push every 8 hours PRN Nausea and/or Vomiting

## 2022-04-21 NOTE — DIETITIAN INITIAL EVALUATION ADULT - OTHER INFO
Weight: Weights per [facility]: 175lbs (1/1/20), 171lbs (10/26/21). Current dosing weight is 152lbs. Bed weight by RD noted as 161lbs--? accuracy.

## 2022-04-21 NOTE — OCCUPATIONAL THERAPY INITIAL EVALUATION ADULT - TRANSFER TRAINING, PT EVAL
Patient will transfer to toilet with DME as needed with minimal assistance in 4 weeks. Pt will perform sit <-> stand transfers with min A within 4 weeks

## 2022-04-21 NOTE — DIETITIAN INITIAL EVALUATION ADULT - PERTINENT LABORATORY DATA
04-21    135  |  105  |  7   ----------------------------<  140<H>  3.7   |  20<L>  |  0.75    Ca    8.0<L>      21 Apr 2022 07:52  Phos  3.0     04-21  Mg     1.8     04-21    TPro  4.7<L>  /  Alb  2.3<L>  /  TBili  0.4  /  DBili  x   /  AST  10  /  ALT  9<L>  /  AlkPhos  35<L>  04-21  POCT Blood Glucose.: 133 mg/dL (04-21-22 @ 00:17)  A1C with Estimated Average Glucose Result: 5.5 % (04-18-22 @ 09:56)

## 2022-04-21 NOTE — CHART NOTE - NSCHARTNOTEFT_GEN_A_CORE
: Christiano Stubbs    INDICATION: Assessment    PROCEDURE:  [xxxx ] LIMITED ECHO  [xxxx ] LIMITED CHEST  [ ] LIMITED RETROPERITONEAL  [ ] LIMITED ABDOMINAL  [ ] LIMITED DVT  [ ] NEEDLE GUIDANCE VASCULAR  [ ] NEEDLE GUIDANCE THORACENTESIS  [ ] NEEDLE GUIDANCE PARACENTESIS  [ ] NEEDLE GUIDANCE PERICARDIOCENTESIS  [ ] OTHER    FINDINGS:  A line predominant with focal B lines concentrated in anterior Lt lung field, small bilateral pleural effusions, consolidation appreciated in RLL field    diminished LVFx, VTI 15, RV slight less then LV with slight flattening of septum noted, IVC 1.6    INTERPRETATION:  Consolidation in RLL consistent with RLL pneum  diminished LVFx, RV slight less then LV, IVC of 1.6 : Christiano Stubbs    INDICATION: Shock    PROCEDURE:  [X] LIMITED ECHO  [X] LIMITED CHEST  [ ] LIMITED RETROPERITONEAL  [ ] LIMITED ABDOMINAL  [ ] LIMITED DVT  [ ] NEEDLE GUIDANCE VASCULAR  [ ] NEEDLE GUIDANCE THORACENTESIS  [ ] NEEDLE GUIDANCE PARACENTESIS  [ ] NEEDLE GUIDANCE PERICARDIOCENTESIS  [ ] OTHER    FINDINGS:  A line predominant with focal B lines concentrated in anterior Lt lung field, small bilateral pleural effusions, consolidation appreciated in RLL field    diminished LVFx, VTI 15, RV slight less then LV with slight flattening of septum noted, IVC 1.6    INTERPRETATION:  Consolidation in RLL consistent with RLL pneum  diminished LVFx, RV slight less then LV, IVC of 1.6    Attending Note: Agree with above. I was present through out the scan.

## 2022-04-21 NOTE — RAPID RESPONSE TEAM SUMMARY - NSADDTLFINDINGSRRT_GEN_ALL_CORE
On arrrival, pt rectal temp 96.4 F, BP 70s/50s, HR 60s --> 40s, RR 12 POX 96% 2L NC. Concern for sepsis in the setting of known PNA, ?meningitis although leptomeningeal enhancement could be related to metastatic disease. Pt received 3L IVF bolus via pressure bag along with albumin 5% 250 cc with no improvement in blood pressure. Also given hydrocortisone given concern for adrenal insufficiency. Pt then started on levophed gtt to maintain MAP >65. MICU called and accepted patient for further monitoring. Family updated at bedside.  On arrrival, pt rectal temp 96.4 F, BP 70s/50s, HR 60s --> 40s, RR 12 POX 96% 2L NC, fingerstick WNL. Pt nonverbal but able to follow commands, no change in mental status since admission as per primary team. Concern for sepsis in the setting of known PNA, ?meningitis although leptomeningeal enhancement could be related to metastatic disease. Pt received 3L IVF bolus via pressure bag along with albumin 5% 250 cc with no improvement in blood pressure. Also given hydrocortisone given concern for adrenal insufficiency. Pt then started on levophed gtt to maintain MAP >65. MICU called and accepted patient for further monitoring. Family updated at bedside.

## 2022-04-21 NOTE — OCCUPATIONAL THERAPY INITIAL EVALUATION ADULT - PRECAUTIONS/LIMITATIONS, REHAB EVAL
fatigue/weakness. Patients weakness progressed and he had an episode of incontinence. several days later. Two days prior to presentation, patient began to experience hiccoughs. Patient has history of unexplained hiccoughs which usually progress to episodes of emesis. Patient underwent a barium swallow study recently which was unremarkable. On the day of presentation, patient began to have multiple episodes of emesis, which prompted his wife to bring him to the ED. At baseline, patient requires a walker to ambulate and is able to communicate normally./fall precautions

## 2022-04-21 NOTE — PROGRESS NOTE ADULT - PROBLEM SELECTOR PLAN 4
- patient with progressive AMS and difficulty ambulating   - as per patients wife, patient has followed with multiple neurologist who have wanted to obtain an MRI of the brain, however patient has not been able to tolerate  - Neuro consulted- see above
- patient with progressive AMS and difficulty ambulating   - as per patients wife, patient has followed with multiple neurologist who have wanted to obtain an MRI of the brain, however patient has not been able to tolerate  - Neuro consulted- will discuss the utility of inpatient MRI with anesthesia

## 2022-04-21 NOTE — DIETITIAN INITIAL EVALUATION ADULT - ENERGY INTAKE
Poor (<50%) Pt NPO since admission (x 3 days), seen by SLP on 4/19, recommended to remain NPO. Pt currently receiving D5W. NGT placed on 4/20. Plan to started NGT tube feeds. Electrolytes low (potassium, magnesium phosphorus) being related.

## 2022-04-21 NOTE — DIETITIAN INITIAL EVALUATION ADULT - ORAL INTAKE PTA/DIET HISTORY
Pt noted with AMS, spoke with wife at bedside. Wife reports pt at baseline with variable PO intake, eats better some days and poor other days. States pt with worsening appetite over the last week, consuming very little.  NKFA.

## 2022-04-21 NOTE — PROCEDURE NOTE - ADDITIONAL PROCEDURE DETAILS
Attempted procedure in the lateral recumbent position at the L4/L5 space. Multiple attempts made and no return of CSF. Concern for possible dry tap. Will consult neuroradiology for repeat lumbar puncture.

## 2022-04-21 NOTE — DIETITIAN INITIAL EVALUATION ADULT - NSFNSNUTRCHEWSWALLOWFT_GEN_A_CORE
Per Wife, pt consumes regular diet at home with no chewing/swallowing difficulty noted. Wife says she has been giving him more liquids such as soup this past week due to pt feeling unwell.

## 2022-04-21 NOTE — CONSULT NOTE ADULT - SUBJECTIVE AND OBJECTIVE BOX
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 Pt received CT C/A/P 22 that demonstrated esophagitis, mucoid impaction of LLL, patchy tree in bud in RUL/RLL concerning for aspiration pneum, started on zosyn therapy with vanco x1. Pt in addition has received MRI of brain/cervical spine that showed extensive/diffuse leptomeningeal enhancement concerning for metastatic dz vs infectious meningitis     This short hospital course has been c/b  episodes of HypoTN, hypothermia, sinus bradycardia requiring RRT and MICU consult a.m. of 22. As pt demonstrated improvement with IVF therapy, pt was deemed not MICU candidate at that time. Course however further c/b recurrent RRT x2 this morning (22) for recurrent HypoTN, sinus bradycardia, hypothermia. Pt initially received IVF bolus with standing therapy with improvement in SBP however now refractory to 3 liters IVF bolus, 5% 250 cc albumin x1 requiring norepi gtt. Pt has also received hydrocortisone 100 mg IVP x 1.        Consult called for and now admitted to MICU for septic shock, possible adrenal insufficiency secondary possibly due to Asp pneum          PAST MEDICAL & SURGICAL HISTORY:  H/O cerebellar ataxia    No significant past surgical history        FAMILY HISTORY:  FH: dementia (Mother)    FH: type 2 diabetes (Mother)    Family history of CVA (Father)        SOCIAL HISTORY:  Smoking: [ ] Never Smoked [ ] Former Smoker (__ packs x ___ years) [ ] Current Smoker  (__ packs x ___ years)  Substance Use: [ ] Never Used [ ] Used ____  EtOH Use:  Marital Status: [ ] Single [ ]  [ ]  [ ]   Sexual History:   Occupation:  Recent Travel:  Country of Birth:  Advance Directives:    Allergies    No Known Allergies    Intolerances        HOME MEDICATIONS:    REVIEW OF SYSTEMS:            OBJECTIVE:  ICU Vital Signs Last 24 Hrs  T(C): 36.4 (2022 10:40), Max: 36.8 (2022 06:13)  T(F): 97.6 (2022 10:40), Max: 98.2 (2022 06:13)  HR: 54 (2022 10:40) (50 - 79)  BP: 73/54 (2022 10:40) (72/41 - 108/56)  BP(mean): --  ABP: --  ABP(mean): --  RR: 18 (2022 10:40) (12 - 22)  SpO2: 100% (2022 10:40) (97% - 100%)         @ 07:01  -   @ 07:00  --------------------------------------------------------  IN: 3990 mL / OUT: 2325 mL / NET: 1665 mL     @ 07:  -   @ 12:46  --------------------------------------------------------  IN: 0 mL / OUT: 350 mL / NET: -350 mL      CAPILLARY BLOOD GLUCOSE      POCT Blood Glucose.: 128 mg/dL (2022 11:30)      PHYSICAL EXAM:        HOSPITAL MEDICATIONS:  MEDICATIONS  (STANDING):  atorvastatin 20 milliGRAM(s) Oral at bedtime  cefTRIAXone   IVPB 2000 milliGRAM(s) IV Intermittent every 12 hours  dextrose 5% + sodium chloride 0.45%. 1000 milliLiter(s) (100 mL/Hr) IV Continuous <Continuous>  folic acid 1 milliGRAM(s) Oral daily  multivitamin 1 Tablet(s) Oral daily  pantoprazole  Injectable 40 milliGRAM(s) IV Push daily  thiamine 100 milliGRAM(s) Oral daily  vancomycin  IVPB 1000 milliGRAM(s) IV Intermittent every 12 hours    MEDICATIONS  (PRN):  acetaminophen     Tablet .. 650 milliGRAM(s) Oral every 6 hours PRN Temp greater or equal to 38C (100.4F), Mild Pain (1 - 3)  aluminum hydroxide/magnesium hydroxide/simethicone Suspension 30 milliLiter(s) Oral every 4 hours PRN Dyspepsia  melatonin 3 milliGRAM(s) Oral at bedtime PRN Insomnia  ondansetron Injectable 4 milliGRAM(s) IV Push every 8 hours PRN Nausea and/or Vomiting      LABS:                        9.0    4.06  )-----------( 105      ( 2022 12:08 )             27.3     Hgb Trend: 9.0<--, 9.3<--, 10.2<--, 10.5<--, 10.6<--      136  |  108  |  6<L>  ----------------------------<  125<H>  3.6   |  20<L>  |  0.74    Ca    7.4<L>      2022 12:08  Phos  2.5       Mg     1.7         TPro  4.6<L>  /  Alb  2.2<L>  /  TBili  0.4  /  DBili  x   /  AST  10  /  ALT  9<L>  /  AlkPhos  34<L>      Creatinine Trend: 0.74<--, 0.75<--, 0.71<--, 0.92<--, 0.91<--, 0.80<--  PT/INR - ( 2022 12:08 )   PT: 15.3 sec;   INR: 1.32 ratio         PTT - ( 2022 12:08 )  PTT:36.6 sec  Urinalysis Basic - ( 2022 17:45 )    Color: Yellow / Appearance: Slightly Turbid / S.013 / pH: x  Gluc: x / Ketone: Negative  / Bili: Negative / Urobili: Negative   Blood: x / Protein: Trace / Nitrite: Negative   Leuk Esterase: Negative / RBC: 192 /hpf / WBC 3 /HPF   Sq Epi: x / Non Sq Epi: 1 /hpf / Bacteria: Negative      Arterial Blood Gas:   @ 00:27  7.40/33/139/20/100.0/-3.8  ABG lactate: --    Venous Blood Gas:   @ 12:05  7.31/45/34/23/57.6  VBG Lactate: 1.1      MICROBIOLOGY:     RADIOLOGY:  [ ] Reviewed and interpreted by me    EKG:        Dieter ANP-BC (ext. 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 Pt received CT C/A/P 22 that demonstrated esophagitis, mucoid impaction of LLL, patchy tree in bud in RUL/RLL concerning for aspiration pneum, started on zosyn therapy with vanco x1. Pt in addition has received MRI of brain/cervical spine that showed extensive/diffuse leptomeningeal enhancement concerning for metastatic dz vs infectious meningitis     This short hospital course has been c/b  episodes of HypoTN, hypothermia, sinus bradycardia requiring RRT and MICU consult a.m. of 22. As pt demonstrated improvement with IVF therapy, pt was deemed not MICU candidate at that time. Course however further c/b recurrent RRT x2 this morning (22) for recurrent HypoTN, sinus bradycardia, hypothermia. Pt initially received IVF bolus with standing therapy with improvement in SBP however now refractory to 3 liters IVF bolus, 5% 250 cc albumin x1 requiring norepi gtt. Pt has also received hydrocortisone 100 mg IVP x 1.        Consult called for and now admitted to MICU for septic shock, possible adrenal insufficiency secondary possibly due to Asp pneum          PAST MEDICAL & SURGICAL HISTORY:  H/O cerebellar ataxia    No significant past surgical history        FAMILY HISTORY:  FH: dementia (Mother)    FH: type 2 diabetes (Mother)    Family history of CVA (Father)        SOCIAL HISTORY:  Smoking: [ ] Never Smoked [ ] Former Smoker (__ packs x ___ years) [ ] Current Smoker  (__ packs x ___ years)  Substance Use: [ ] Never Used [ ] Used ____  EtOH Use:  Marital Status: [ ] Single [ ]  [ ]  [ ]   Sexual History:   Occupation:  Recent Travel:  Country of Birth:  Advance Directives:    Allergies    No Known Allergies    Intolerances        HOME MEDICATIONS:    REVIEW OF SYSTEMS:  pt non verbal however answers simple questions by shaking head yes/no. denies sob, chest pain          OBJECTIVE:  ICU Vital Signs Last 24 Hrs  T(C): 36.4 (2022 10:40), Max: 36.8 (2022 06:13)  T(F): 97.6 (2022 10:40), Max: 98.2 (2022 06:13)  HR: 54 (2022 10:40) (50 - 79)  BP: 73/54 (2022 10:40) (72/41 - 108/56)  BP(mean): --  ABP: --  ABP(mean): --  RR: 18 (2022 10:40) (12 - 22)  SpO2: 100% (2022 10:40) (97% - 100%)        04 @ 07:  -   @ 07:00  --------------------------------------------------------  IN: 3990 mL / OUT: 2325 mL / NET: 1665 mL     @ 07:  -   @ 12:46  --------------------------------------------------------  IN: 0 mL / OUT: 350 mL / NET: -350 mL      CAPILLARY BLOOD GLUCOSE      POCT Blood Glucose.: 128 mg/dL (2022 11:30)    VITAL SIGNS AT TIME OF CONSULT  BP: 80/50  ; HR: 46 (sinus bradycardia)  ; RR: 24   ; SPO2: 98% ( 3 liters N/C)  ; Temp: 34.8 (R)      PHYSICAL EXAM:  GENERAL:   NAD, well-groomed, well-developed    HEAD:    Atraumatic, Normocephalic    EYES:   EOMI, PERRLA 3 mm, conjunctiva and sclera clear    ENMT:   No oropharyngeal exudates, erythema or lesions,  Moist mucous membranes    NECK:   Supple, no cervical lymphadenopathy, no JVD    NERVOUS SYSTEM:  Responsive to verbal  stimuli, focuses upon examiner, answers simple questions yes and no by shaking head ,follows simple commands to demonstrate digits, extremities, Alert & Oriented X2, CN II-XII intact, has spontaneous purposeful movement of  all extremities  ; Upper extremities  4/5; Lower extremities 3-4/5, full sensation to light touch     CHEST/LUNG:   utilizing 2 liters N/C  .Breath sounds bilaterally,  No crackles, rhonchi, wheezing, or rubs.     HEART:   cardiac monitor sinus bradycardia   ; S1/S2 No murmurs, rubs, or gallops    ABDOMEN:   Soft, Nontender, Nondistended; Bowel sounds present, Bladder non distended, non palpable    EXTREMITIES:   Pulses palpable radial pulses 2+ bilat, DP/PT 1+/1+ bilat, without clubbing, cyanosis. Digits warm to touch with good cap refill < 3 secs    SKIN:   warm, dry, intact, normal color, no rash or abnormal lesions, without palpable nodes      HOSPITAL MEDICATIONS:  MEDICATIONS  (STANDING):  atorvastatin 20 milliGRAM(s) Oral at bedtime  cefTRIAXone   IVPB 2000 milliGRAM(s) IV Intermittent every 12 hours  dextrose 5% + sodium chloride 0.45%. 1000 milliLiter(s) (100 mL/Hr) IV Continuous <Continuous>  folic acid 1 milliGRAM(s) Oral daily  multivitamin 1 Tablet(s) Oral daily  pantoprazole  Injectable 40 milliGRAM(s) IV Push daily  thiamine 100 milliGRAM(s) Oral daily  vancomycin  IVPB 1000 milliGRAM(s) IV Intermittent every 12 hours    MEDICATIONS  (PRN):  acetaminophen     Tablet .. 650 milliGRAM(s) Oral every 6 hours PRN Temp greater or equal to 38C (100.4F), Mild Pain (1 - 3)  aluminum hydroxide/magnesium hydroxide/simethicone Suspension 30 milliLiter(s) Oral every 4 hours PRN Dyspepsia  melatonin 3 milliGRAM(s) Oral at bedtime PRN Insomnia  ondansetron Injectable 4 milliGRAM(s) IV Push every 8 hours PRN Nausea and/or Vomiting      LABS:                        9.0    4.06  )-----------( 105      ( 2022 12:08 )             27.3     Hgb Trend: 9.0<--, 9.3<--, 10.2<--, 10.5<--, 10.6<--  04-21    136  |  108  |  6<L>  ----------------------------<  125<H>  3.6   |  20<L>  |  0.74    Ca    7.4<L>      2022 12:08  Phos  2.5       Mg     1.7         TPro  4.6<L>  /  Alb  2.2<L>  /  TBili  0.4  /  DBili  x   /  AST  10  /  ALT  9<L>  /  AlkPhos  34<L>      Creatinine Trend: 0.74<--, 0.75<--, 0.71<--, 0.92<--, 0.91<--, 0.80<--  PT/INR - ( 2022 12:08 )   PT: 15.3 sec;   INR: 1.32 ratio         PTT - ( 2022 12:08 )  PTT:36.6 sec  Urinalysis Basic - ( 2022 17:45 )    Color: Yellow / Appearance: Slightly Turbid / S.013 / pH: x  Gluc: x / Ketone: Negative  / Bili: Negative / Urobili: Negative   Blood: x / Protein: Trace / Nitrite: Negative   Leuk Esterase: Negative / RBC: 192 /hpf / WBC 3 /HPF   Sq Epi: x / Non Sq Epi: 1 /hpf / Bacteria: Negative      Arterial Blood Gas:   @ 00:27  7.40/33/139/20/100.0/-3.8  ABG lactate: --    Venous Blood Gas:   @ 12:05  7.31/45/34/23/57.6  VBG Lactate: 1.1      MICROBIOLOGY:     RADIOLOGY:  [ ] Reviewed and interpreted by me    EKG:        Dieter ANP-BC (ext. [id number])           CHIEF COMPLAINT:    HPI:  61 yr old male with PMHx cerebral ataxia over 2 yr period with no other known medical hx, at baseline pt ambulates with walker and make needs known, recent outpt MRI of brain 2022 and MRI of spine 3/2022 that demonstrated non specific diffuse white matter changes and T-spine lesion who originally presented to hospital evening of 22 from home with one week progressive weakness/fatigue accompanied by episodes of incontinence and emesis. Pt received CT C/A/P 22 that demonstrated esophagitis, mucoid impaction of LLL, patchy tree in bud in RUL/RLL concerning for aspiration pneum, received zosyn, with change to ceftiraxone and vanco therapy. Pt in addition has received MRI 22 of brain/cervical spine that showed extensive/diffuse leptomeningeal enhancement concerning for metastatic dz vs infectious meningitis     This short hospital course has been c/b  episodes of HypoTN, hypothermia, sinus bradycardia with 3.1 second pause requiring RRT and MICU consult a.m. of 22. As pt demonstrated improvement with IVF therapy, pt was deemed not MICU candidate at that time. Course however further c/b recurrent RRT x2 this morning (22) for recurrent HypoTN, sinus bradycardia, hypothermia. Pt initially received IVF bolus with standing therapy with improvement in SBP however now refractory to 3 liters IVF bolus, 5% 250 cc albumin x1 requiring norepi gtt. Pt has also received hydrocortisone 100 mg IVP x 1.        Consult called for and now admitted to MICU for septic shock, possible adrenal insufficiency secondary possibly due to Asp pneum          PAST MEDICAL & SURGICAL HISTORY:  H/O cerebellar ataxia    No significant past surgical history        FAMILY HISTORY:  FH: dementia (Mother)    FH: type 2 diabetes (Mother)    Family history of CVA (Father)        SOCIAL HISTORY:  Smoking: [ ] Never Smoked [ ] Former Smoker (__ packs x ___ years) [ ] Current Smoker  (__ packs x ___ years)  Substance Use: [ ] Never Used [ ] Used ____  EtOH Use:  Marital Status: [ ] Single [ ]  [ ]  [ ]   Sexual History:   Occupation:  Recent Travel:  Country of Birth:  Advance Directives:    Allergies    No Known Allergies    Intolerances        HOME MEDICATIONS:    REVIEW OF SYSTEMS:  pt non verbal however answers simple questions by shaking head yes/no. denies sob, chest pain          OBJECTIVE:  ICU Vital Signs Last 24 Hrs  T(C): 36.4 (2022 10:40), Max: 36.8 (2022 06:13)  T(F): 97.6 (2022 10:40), Max: 98.2 (2022 06:13)  HR: 54 (2022 10:40) (50 - 79)  BP: 73/54 (2022 10:40) (72/41 - 108/56)  BP(mean): --  ABP: --  ABP(mean): --  RR: 18 (2022 10:40) (12 - 22)  SpO2: 100% (2022 10:40) (97% - 100%)         @ 07:01  -   @ 07:00  --------------------------------------------------------  IN: 3990 mL / OUT: 2325 mL / NET: 1665 mL     @ 07:  -   @ 12:46  --------------------------------------------------------  IN: 0 mL / OUT: 350 mL / NET: -350 mL      CAPILLARY BLOOD GLUCOSE      POCT Blood Glucose.: 128 mg/dL (2022 11:30)    VITAL SIGNS AT TIME OF CONSULT  BP: 80/50  ; HR: 46 (sinus bradycardia)  ; RR: 24   ; SPO2: 98% ( 3 liters N/C)  ; Temp: 34.8 (R)      PHYSICAL EXAM:  GENERAL:   NAD, well-groomed, well-developed    HEAD:    Atraumatic, Normocephalic    EYES:   EOMI, PERRLA 3 mm, conjunctiva and sclera clear    ENMT:   No oropharyngeal exudates, erythema or lesions,  Moist mucous membranes    NECK:   Supple, no cervical lymphadenopathy, no JVD    NERVOUS SYSTEM:  Responsive to verbal  stimuli, focuses upon examiner, answers simple questions yes and no by shaking head ,follows simple commands to demonstrate digits, extremities, Alert & Oriented X2, CN II-XII intact, has spontaneous purposeful movement of  all extremities  ; Upper extremities  4/5; Lower extremities 3-4/5, full sensation to light touch     CHEST/LUNG:   utilizing 2 liters N/C  .Breath sounds bilaterally,  No crackles, rhonchi, wheezing, or rubs.     HEART:   cardiac monitor sinus bradycardia   ; S1/S2 No murmurs, rubs, or gallops    ABDOMEN:   Soft, Nontender, Nondistended; Bowel sounds present, Bladder non distended, non palpable    EXTREMITIES:   Pulses palpable radial pulses 2+ bilat, DP/PT 1+/1+ bilat, without clubbing, cyanosis. Digits warm to touch with good cap refill < 3 secs    SKIN:   warm, dry, intact, normal color, no rash or abnormal lesions, without palpable nodes      HOSPITAL MEDICATIONS:  MEDICATIONS  (STANDING):  atorvastatin 20 milliGRAM(s) Oral at bedtime  cefTRIAXone   IVPB 2000 milliGRAM(s) IV Intermittent every 12 hours  dextrose 5% + sodium chloride 0.45%. 1000 milliLiter(s) (100 mL/Hr) IV Continuous <Continuous>  folic acid 1 milliGRAM(s) Oral daily  multivitamin 1 Tablet(s) Oral daily  pantoprazole  Injectable 40 milliGRAM(s) IV Push daily  thiamine 100 milliGRAM(s) Oral daily  vancomycin  IVPB 1000 milliGRAM(s) IV Intermittent every 12 hours    MEDICATIONS  (PRN):  acetaminophen     Tablet .. 650 milliGRAM(s) Oral every 6 hours PRN Temp greater or equal to 38C (100.4F), Mild Pain (1 - 3)  aluminum hydroxide/magnesium hydroxide/simethicone Suspension 30 milliLiter(s) Oral every 4 hours PRN Dyspepsia  melatonin 3 milliGRAM(s) Oral at bedtime PRN Insomnia  ondansetron Injectable 4 milliGRAM(s) IV Push every 8 hours PRN Nausea and/or Vomiting      LABS:                        9.0    4.06  )-----------( 105      ( 2022 12:08 )             27.3     Hgb Trend: 9.0<--, 9.3<--, 10.2<--, 10.5<--, 10.6<--  04-    136  |  108  |  6<L>  ----------------------------<  125<H>  3.6   |  20<L>  |  0.74    Ca    7.4<L>      2022 12:08  Phos  2.5       Mg     1.7         TPro  4.6<L>  /  Alb  2.2<L>  /  TBili  0.4  /  DBili  x   /  AST  10  /  ALT  9<L>  /  AlkPhos  34<L>      Creatinine Trend: 0.74<--, 0.75<--, 0.71<--, 0.92<--, 0.91<--, 0.80<--  PT/INR - ( 2022 12:08 )   PT: 15.3 sec;   INR: 1.32 ratio         PTT - ( 2022 12:08 )  PTT:36.6 sec  Urinalysis Basic - ( 2022 17:45 )    Color: Yellow / Appearance: Slightly Turbid / S.013 / pH: x  Gluc: x / Ketone: Negative  / Bili: Negative / Urobili: Negative   Blood: x / Protein: Trace / Nitrite: Negative   Leuk Esterase: Negative / RBC: 192 /hpf / WBC 3 /HPF   Sq Epi: x / Non Sq Epi: 1 /hpf / Bacteria: Negative      Arterial Blood Gas:   @ 00:27  7.40/33/139/20/100.0/-3.8  ABG lactate: --    Venous Blood Gas:   @ 12:05  7.31/45/34/23/57.6  VBG Lactate: 1.1      MICROBIOLOGY:     RADIOLOGY:  [ ] Reviewed and interpreted by me    EKG:        Dieter ANP-BC (ext. 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 Pt received CT C/A/P 22 that demonstrated esophagitis, mucoid impaction of LLL, patchy tree in bud in RUL/RLL concerning for aspiration pneum, received zosyn, with change to ceftiraxone and vanco therapy. Pt in addition has received MRI 22 of brain/cervical spine that showed extensive/diffuse leptomeningeal enhancement concerning for metastatic dz vs infectious meningitis     This short hospital course has been c/b  episodes of HypoTN, hypothermia, sinus bradycardia with 3.1 second pause requiring RRT and MICU consult a.m. of 22. As pt demonstrated improvement with IVF therapy, pt was deemed not MICU candidate at that time. Course however further c/b recurrent RRT x2 this morning (22) for recurrent HypoTN, sinus bradycardia, hypothermia. Pt initially received IVF bolus with standing therapy with improvement in SBP however now refractory to 3 liters IVF bolus, 5% 250 cc albumin x1 requiring norepi gtt. Pt has also received hydrocortisone 100 mg IVP x 1. Baseline SBP         Consult called for and now admitted to MICU for septic shock, possible adrenal insufficiency secondary possibly due to Asp pneum          PAST MEDICAL & SURGICAL HISTORY:  H/O cerebellar ataxia    No significant past surgical history        FAMILY HISTORY:  FH: dementia (Mother)    FH: type 2 diabetes (Mother)    Family history of CVA (Father)        SOCIAL HISTORY:  Smoking: [ ] Never Smoked [ ] Former Smoker (__ packs x ___ years) [ ] Current Smoker  (__ packs x ___ years)  Substance Use: [ ] Never Used [ ] Used ____  EtOH Use:  Marital Status: [ ] Single [ ]  [ ]  [ ]   Sexual History:   Occupation:  Recent Travel:  Country of Birth:  Advance Directives:    Allergies    No Known Allergies    Intolerances        HOME MEDICATIONS:    REVIEW OF SYSTEMS:  pt non verbal however answers simple questions by shaking head yes/no. denies sob, chest pain          OBJECTIVE:  ICU Vital Signs Last 24 Hrs  T(C): 36.4 (2022 10:40), Max: 36.8 (2022 06:13)  T(F): 97.6 (2022 10:40), Max: 98.2 (2022 06:13)  HR: 54 (2022 10:40) (50 - 79)  BP: 73/54 (2022 10:40) (72/41 - 108/56)  BP(mean): --  ABP: --  ABP(mean): --  RR: 18 (2022 10:40) (12 - 22)  SpO2: 100% (2022 10:40) (97% - 100%)         @ 07:01  -   @ 07:00  --------------------------------------------------------  IN: 3990 mL / OUT: 2325 mL / NET: 1665 mL     @ 07: @ 12:46  --------------------------------------------------------  IN: 0 mL / OUT: 350 mL / NET: -350 mL      CAPILLARY BLOOD GLUCOSE      POCT Blood Glucose.: 128 mg/dL (2022 11:30)    VITAL SIGNS AT TIME OF CONSULT  BP: 80/50  ; HR: 46 (sinus bradycardia)  ; RR: 24   ; SPO2: 98% ( 3 liters N/C)  ; Temp: 34.8 (R)      PHYSICAL EXAM:  GENERAL:   NAD, well-groomed, well-developed    HEAD:    Atraumatic, Normocephalic    EYES:   EOMI, PERRLA 3 mm, conjunctiva and sclera clear    ENMT:   No oropharyngeal exudates, erythema or lesions,  Moist mucous membranes    NECK:   Supple, no cervical lymphadenopathy, no JVD    NERVOUS SYSTEM:  Responsive to verbal  stimuli, focuses upon examiner, answers simple questions yes and no by shaking head ,follows simple commands to demonstrate digits, extremities, Alert & Oriented X2, CN II-XII intact, has spontaneous purposeful movement of  all extremities  ; Upper extremities  4/5; Lower extremities 3-4/5, full sensation to light touch     CHEST/LUNG:   utilizing 2 liters N/C  .Breath sounds bilaterally,  No crackles, rhonchi, wheezing, or rubs.     HEART:   cardiac monitor sinus bradycardia   ; S1/S2 No murmurs, rubs, or gallops    ABDOMEN:   Soft, Nontender, Nondistended; Bowel sounds present, Bladder non distended, non palpable    EXTREMITIES:   Pulses palpable radial pulses 2+ bilat, DP/PT 1+/1+ bilat, without clubbing, cyanosis. Digits warm to touch with good cap refill < 3 secs    SKIN:   warm, dry, intact, normal color, no rash or abnormal lesions, without palpable nodes      HOSPITAL MEDICATIONS:  MEDICATIONS  (STANDING):  atorvastatin 20 milliGRAM(s) Oral at bedtime  cefTRIAXone   IVPB 2000 milliGRAM(s) IV Intermittent every 12 hours  dextrose 5% + sodium chloride 0.45%. 1000 milliLiter(s) (100 mL/Hr) IV Continuous <Continuous>  folic acid 1 milliGRAM(s) Oral daily  multivitamin 1 Tablet(s) Oral daily  pantoprazole  Injectable 40 milliGRAM(s) IV Push daily  thiamine 100 milliGRAM(s) Oral daily  vancomycin  IVPB 1000 milliGRAM(s) IV Intermittent every 12 hours    MEDICATIONS  (PRN):  acetaminophen     Tablet .. 650 milliGRAM(s) Oral every 6 hours PRN Temp greater or equal to 38C (100.4F), Mild Pain (1 - 3)  aluminum hydroxide/magnesium hydroxide/simethicone Suspension 30 milliLiter(s) Oral every 4 hours PRN Dyspepsia  melatonin 3 milliGRAM(s) Oral at bedtime PRN Insomnia  ondansetron Injectable 4 milliGRAM(s) IV Push every 8 hours PRN Nausea and/or Vomiting      LABS:                        9.0    4.06  )-----------( 105      ( 2022 12:08 )             27.3     Hgb Trend: 9.0<--, 9.3<--, 10.2<--, 10.5<--, 10.6<--  04-21    136  |  108  |  6<L>  ----------------------------<  125<H>  3.6   |  20<L>  |  0.74    Ca    7.4<L>      2022 12:08  Phos  2.5       Mg     1.7         TPro  4.6<L>  /  Alb  2.2<L>  /  TBili  0.4  /  DBili  x   /  AST  10  /  ALT  9<L>  /  AlkPhos  34<L>      Creatinine Trend: 0.74<--, 0.75<--, 0.71<--, 0.92<--, 0.91<--, 0.80<--  PT/INR - ( 2022 12:08 )   PT: 15.3 sec;   INR: 1.32 ratio         PTT - ( 2022 12:08 )  PTT:36.6 sec  Urinalysis Basic - ( 2022 17:45 )    Color: Yellow / Appearance: Slightly Turbid / S.013 / pH: x  Gluc: x / Ketone: Negative  / Bili: Negative / Urobili: Negative   Blood: x / Protein: Trace / Nitrite: Negative   Leuk Esterase: Negative / RBC: 192 /hpf / WBC 3 /HPF   Sq Epi: x / Non Sq Epi: 1 /hpf / Bacteria: Negative      Arterial Blood Gas:   @ 00:27  7.40/33/139/20/100.0/-3.8  ABG lactate: --    Venous Blood Gas:   @ 12:05  7.31/45/34/23/57.6  VBG Lactate: 1.1      MICROBIOLOGY:     RADIOLOGY:  [ ] Reviewed and interpreted by me    EKG:        Dieter ANP-BC (ext. [id number])

## 2022-04-21 NOTE — RAPID RESPONSE TEAM SUMMARY - NSMEDICATIONSRRT_GEN_ALL_CORE
- NS 3L IVF bolus  - Hydrocortisone 100 mg IV   - Levophed gtt  - NS 3L IVF bolus  - Hydrocortisone 100 mg IV   - Levophed gtt   - Gave a dose of amipicillin (pt already on vanc/CTX, and given a dose of zosyn prior)

## 2022-04-21 NOTE — CONSULT NOTE ADULT - SUBJECTIVE AND OBJECTIVE BOX
HPI:  Patient is a 60 yo M with PMHx of cerebral ataxia who was brought to the ED due to AMS. History is obtained from chart review and from patients wife as patient is not responding to questions. History is extremely limited. Approximately 1 week prior to presentation, patient began to experience progressive fatigue/weakness. Patients weakness progressed and he had an episode of incontinence and emesis. Patient underwent a barium swallow study recently which was unremarkable. On the day of presentation, patient began to have multiple episodes of emesis, which prompted his wife to bring him to the ED. At baseline, patient requires a walker to ambulate and is able to communicate normally.     In the ED, patient noted to be tachycardic to 113 and tachypneic to 23. Labs significant for initial lactate of 3.7. A code stroke was called which was negative for acute intracranial processes. A CT of the chest and A/P was performed which revealed findings concerning for right sided pneumonia and esophagitis. Patient was given 2L IVF and a dose of Zosyn. He was subsequently admitted to medicine for further management.      Today RRT called for hypotension and bradycardic given atropine. MRI with diffuse leptomeningeal enhancement. Now transferred to MICU, LP pending. No fevers, WBC WNL.      PAST MEDICAL & SURGICAL HISTORY:  H/O cerebellar ataxia    No significant past surgical history        Allergies    No Known Allergies    Intolerances    ANTIMICROBIALS:  acyclovir IVPB    acyclovir IVPB 600 every 8 hours  ampicillin  IVPB    ampicillin  IVPB 2 every 4 hours  cefTRIAXone   IVPB 2000 every 12 hours  vancomycin  IVPB 1000 every 12 hours    OTHER MEDS:  acetaminophen     Tablet .. 650 milliGRAM(s) Oral every 6 hours PRN  aluminum hydroxide/magnesium hydroxide/simethicone Suspension 30 milliLiter(s) Oral every 4 hours PRN  atorvastatin 20 milliGRAM(s) Oral at bedtime  chlorhexidine 4% Liquid 1 Application(s) Topical <User Schedule>  dextrose 5% + sodium chloride 0.45%. 1000 milliLiter(s) IV Continuous <Continuous>  folic acid 1 milliGRAM(s) Oral daily  hydrocortisone sodium succinate Injectable 100 milliGRAM(s) IV Push every 8 hours  magnesium sulfate  IVPB 1 Gram(s) IV Intermittent once  melatonin 3 milliGRAM(s) Oral at bedtime PRN  multivitamin 1 Tablet(s) Oral daily  norepinephrine Infusion 0.05 MICROgram(s)/kG/Min IV Continuous <Continuous>  ondansetron Injectable 4 milliGRAM(s) IV Push every 8 hours PRN  pantoprazole  Injectable 40 milliGRAM(s) IV Push daily  potassium phosphate / sodium phosphate Powder (PHOS-NaK) 1 Packet(s) Oral every 4 hours  thiamine 100 milliGRAM(s) Oral daily    SOCIAL HISTORY: Per chart, no smoking, alcohol, drug use. Pt unable to provide history due to ams.    FAMILY HISTORY:  FH: dementia (Mother)    FH: type 2 diabetes (Mother)    Family history of CVA (Father)        Drug Dosing Weight  Height (cm): 167.6 (2022 13:33)  Weight (kg): 69 (2022 13:33)  BMI (kg/m2): 24.6 (2022 13:33)  BSA (m2): 1.78 (2022 13:33)    PE:    Vital Signs Last 24 Hrs  T(C): 36.6 (2022 17:00), Max: 36.8 (2022 06:13)  T(F): 97.9 (2022 17:00), Max: 98.2 (2022 06:13)  HR: 50 (2022 17:30) (40 - 86)  BP: 116/68 (2022 17:15) (72/41 - 116/68)  BP(mean): 87 (2022 17:15) (69 - 88)  RR: 25 (2022 17:30) (11 - 31)  SpO2: 100% (2022 17:30) (96% - 100%)    Gen: Followed some commands, but otherwise was staring away  HEENT: NGT  CV: S1+S2 normal  Resp: Clear bilat, no resp distress  Abd: Soft, nontender, +BS  Ext: No LE edema, no wounds  : [place]  IV/Skin: No thrombophlebitis  Msk: No low back pain, no arthralgias, no joint swelling  Neuro: closed his eyes on command, otherwise, was not following most commands.    LABS:                          10.3   7.04  )-----------( 138      ( 2022 14:36 )             31.6       04-21    136  |  107  |  5<L>  ----------------------------<  151<H>  3.6   |  17<L>  |  0.75    Ca    7.8<L>      2022 14:36  Phos  2.2       Mg     1.7         TPro  5.6<L>  /  Alb  2.8<L>  /  TBili  0.6  /  DBili  x   /  AST  13  /  ALT  12  /  AlkPhos  42        Urinalysis Basic - ( 2022 17:45 )    Color: Yellow / Appearance: Slightly Turbid / S.013 / pH: x  Gluc: x / Ketone: Negative  / Bili: Negative / Urobili: Negative   Blood: x / Protein: Trace / Nitrite: Negative   Leuk Esterase: Negative / RBC: 192 /hpf / WBC 3 /HPF   Sq Epi: x / Non Sq Epi: 1 /hpf / Bacteria: Negative    MICROBIOLOGY:  v  Catheterized Catheterized  22   >=3 organisms. Probable collection contamination.  --  --      .Blood Blood  22   No growth to date.  --  --    RADIOLOGY:    < from: Xray Chest 1 View- PORTABLE-Urgent (Xray Chest 1 View- PORTABLE-Urgent .) (22 @ 13:17) >    IMPRESSION:  Slightly decreased patchy airspace opacities within the right lung.    Enteric tube terminates in stomach.    < end of copied text >    < from: Xray Chest 1 View- PORTABLE-Urgent (Xray Chest 1 View- PORTABLE-Urgent .) (22 @ 18:37) >    FINDINGS/  IMPRESSION: There is a nasogastric tube with its tip at the GE junction.   This needs to be advanced. Unchanged patchy airspace opacities within the   right lung.    < end of copied text >    < from: MR Head w/wo IV Cont (22 @ 16:50) >  IMPRESSION:    MRI BRAIN:  Extensive, diffuse leptomeningeal enhancement. Differential diagnosis   primarily includes leptomeningeal metastases and infectious meningitis.    Abnormal ependymal enhancement involving the bilateral frontal horns,   bilateral ventricular bodies, left temporal and occipital horn, and   fourth ventricle. Differential diagnosis includes ependymal metastases   and infectious meningitis.    Possible small foci of restricted diffusion in the bilateral superior   cerebral hemispheres. These may represent small acute infarcts or regions   of encephalitis.    Edema noted within the cerebellar vermis and mesial bilateral cerebellar   hemispheres.    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    Multilevel degenerative changes.    < end of copied text >

## 2022-04-21 NOTE — CONSULT NOTE ADULT - ATTENDING COMMENTS
Mr. Marlow is a 60 yo M with pmhx of cerebellar ataxia? - ambulates with walker, admitted on 4/18/22 with progressive lethargy, generalized weakness and new aphasia.  Per wife patient also with decreased PO intake followed by several episodes of NBNB emesis. Finding of circumferential esophagitis on CT and possible early signs of aspiration for which had been treated empirically.  Developed bradycardia, refractory hypotension and hypothermia in setting of new neurologic symptoms.  MRI demonstrated leptomeningeal enhancement.  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  Appreciate ID input.  Currently on levophed, bear hugger for hypothermia. Check cortisol level (TSH 1.24), as well as T3/T4.  Placed external pacing pads. EP consulted/following for 3sec pause.  Check TTE.  Plan discussed with wife.

## 2022-04-21 NOTE — OCCUPATIONAL THERAPY INITIAL EVALUATION ADULT - LIVES WITH, PROFILE
Pt lives in a pvt home with spouse and son. 5 steps to enter with handrail. Bed and bath 1st floor. + Rolling walker x 2 months. Owns straight cane and quad cane. Assist for ADLs and functional transfers from family. (-) /children/spouse

## 2022-04-21 NOTE — OCCUPATIONAL THERAPY INITIAL EVALUATION ADULT - ORIENTATION, REHAB EVAL
minimally verbal; severely disarthric/unable to assess when provided yes/no choices (unable to verbalize, pt intubated)/person/place

## 2022-04-21 NOTE — DIETITIAN INITIAL EVALUATION ADULT - ADD RECOMMEND
1) Recommend Jevity 1.2 @ 10ml/hr and advance by 10ml/hr q6hrs or as tolerated to goal rate of 65ml/hr x 24hrs. Regimen at goal to provide 1560ml total volume, 1872Kcal, 86.5gm protein and 1259ml free-water. Meets 27Kcal/kg, 1.25gm protein based on dosing weight of 69Kg. Defer additional free-water to team.   2) Pt at risk for refeeding syndrome, monitor electrolytes and replete as needed.  3)  Recommend addition of multivitamin and thiamin supplements  4) RD to remain available and follow-up as medically appropriate.

## 2022-04-21 NOTE — OCCUPATIONAL THERAPY INITIAL EVALUATION ADULT - NS ASR FOLLOW COMMAND OT EVAL
25% of the time/able to follow single-step instructions/speech unintelligible orally intubated, unable to verbalize but able to answer  yes/no questions/75% of the time/able to follow single-step instructions

## 2022-04-21 NOTE — PROGRESS NOTE ADULT - ASSESSMENT
62 yo M with PMHx of cerebellar ataxia who was brought to the ED due to AMS. History is obtained from chart review and from patients wife as patient is not responding to questions. History is extremely limited. Approximately 1 week prior to presentation, patient began to experience progressive fatigue/weakness. Patients weakness progressed and he had an episode of incontinence. On initial assessment, hx limited due to AMS. Pt responding to some commands, normal reflexes and tone, though incomprehensible speech, pt responding to internal stimuli on exam (concerns for hallucinations). 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 Pt with underlying chronic process with work up done for the last 2 years though from multiple different neurologists (Dr. Temo Zepeda, Dr. Saunders). Pt with MRI of the brain done in Jan 2022 with non-specific diffuse white matter changes. Also with MRI of spine done 3 weeks ago with spinal lesion to t-spine (seen by Dr. Forman, though no further interventions at that time). Pt likely with acute worsening of a chronic process, consider in the setting of recent infection though cannot r/o cerebral vascular infarct, inflammatory disease, seizures. Also would consider other causes of neurocognitive decline, including underlying prion disease given rapid onset.     MRI Brain w/o contrast done at [facility] on 1/14/2022:  Findings- Ventricles, cisterns, and sulci are diffusely prominent  compatible with mild to moderate atrophy. Cerebral aqueduct is patent. Hyperdynamic flow voids at the foramen of Monro. Patchy nonspecific white matter dz most likely related to mild-to-moderate small vessel disease. no masses, mass seffect, hemorrhage, or cortical infarcts. normal vascular flow voides. pituitary gland unremarkable. brainstem and cerebellum are unremarkable. orbits are unremarkable. sinuses, mastoid air cells are clear.     4/21: pt more awake today and following some commands. Able to state his name, hold two fingers up, name a watch.     DDx: infectious/metabolic, cerebral vascular infarct, inflammatory disease, seizure.  cannot r/o neurocognitive decline 2/2 underlying prion disease vs dementia vs nph     Recommendations:  [X ] Video EEG showed moderate to severe nonspecific diffuse or multifocal cerebral dysfunction. No epileptiform pattern or seizure seen.  [ ] MRI of the brain, c-spine w/wo contrast today performed yesterday 4/20, f/u final read.   [ ] Follow up B1, B3, vitamin D, vitamin E, MMA, homocysteine, paraneoplastic panel from blood, autoimmune encephalopathy panel, copper,  RPR, HIV,   [ ] Follow up heavy metal screen   [X] TPO antibody <10   [x]  NH3 wnl. folate 3.4, b12 elevated. Ceruloplasmin 24. TSH 1.46  [ ] Plan for LP with IR today        Case discussed with Dr. Lima. 62 yo M with PMHx of cerebellar ataxia who was brought to the ED due to AMS. History is obtained from chart review and from patients wife as patient is not responding to questions. History is extremely limited. Approximately 1 week prior to presentation, patient began to experience progressive fatigue/weakness. Patients weakness progressed and he had an episode of incontinence. On initial assessment, hx limited due to AMS. Pt responding to some commands, normal reflexes and tone, though incomprehensible speech, pt responding to internal stimuli on exam (concerns for hallucinations). 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 Pt with underlying chronic process with work up done for the last 2 years though from multiple different neurologists (Dr. Temo Zepeda, Dr. Saunders). Pt with MRI of the brain done in Jan 2022 with non-specific diffuse white matter changes. Also with MRI of spine done 3 weeks ago with spinal lesion to t-spine (seen by Dr. Forman, though no further interventions at that time). Pt likely with acute worsening of a chronic process, consider in the setting of recent infection though cannot r/o cerebral vascular infarct, inflammatory disease, seizures. Also would consider other causes of neurocognitive decline, including underlying prion disease given rapid onset.     MRI Brain w/o contrast done at [facility] on 1/14/2022:  Findings- Ventricles, cisterns, and sulci are diffusely prominent  compatible with mild to moderate atrophy. Cerebral aqueduct is patent. Hyperdynamic flow voids at the foramen of Monro. Patchy nonspecific white matter dz most likely related to mild-to-moderate small vessel disease. no masses, mass seffect, hemorrhage, or cortical infarcts. normal vascular flow voides. pituitary gland unremarkable. brainstem and cerebellum are unremarkable. orbits are unremarkable. sinuses, mastoid air cells are clear.     4/21: pt more awake today and following some commands. Able to state his name, hold two fingers up, name a watch.     DDx: infectious/metabolic, cerebral vascular infarct, inflammatory disease, seizure.  cannot r/o neurocognitive decline 2/2 underlying prion disease vs dementia vs nph     Recommendations:  [X ] Video EEG showed moderate to severe nonspecific diffuse or multifocal cerebral dysfunction. No epileptiform pattern or seizure seen.  [ ] MRI of the brain, c-spine w/wo contrast today performed yesterday 4/20, f/u final read.   [ ] Follow up B1, B3, vitamin D, vitamin E, MMA, homocysteine, paraneoplastic panel from blood, autoimmune encephalopathy panel, copper,  RPR, HIV,  [ ] Consider CT chest, abdomen, pelvis to assess for paraneoplastic sources   [ ] ID consult   [ ] Follow up heavy metal screen   [X] TPO antibody <10   [x]  NH3 wnl. folate 3.4, b12 elevated. Ceruloplasmin 24. TSH 1.46  [ ] Plan for LP with IR today        Case discussed with Dr. Lima. 62 yo M with PMHx of cerebellar ataxia who was brought to the ED due to AMS. History is obtained from chart review and from patients wife as patient is not responding to questions. History is extremely limited. Approximately 1 week prior to presentation, patient began to experience progressive fatigue/weakness. Patients weakness progressed and he had an episode of incontinence. On initial assessment, hx limited due to AMS. Pt responding to some commands, normal reflexes and tone, though incomprehensible speech, pt responding to internal stimuli on exam (concerns for hallucinations). 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 Pt with underlying chronic process with work up done for the last 2 years though from multiple different neurologists (Dr. Temo Zepeda, Dr. Saunders). Pt with MRI of the brain done in Jan 2022 with non-specific diffuse white matter changes. Also with MRI of spine done 3 weeks ago with spinal lesion to t-spine (seen by Dr. Forman, though no further interventions at that time). Pt likely with acute worsening of a chronic process, consider in the setting of recent infection though cannot r/o cerebral vascular infarct, inflammatory disease, seizures. Also would consider other causes of neurocognitive decline, including underlying prion disease given rapid onset.     MRI Brain w/o contrast done at [facility] on 1/14/2022:  Findings- Ventricles, cisterns, and sulci are diffusely prominent  compatible with mild to moderate atrophy. Cerebral aqueduct is patent. Hyperdynamic flow voids at the foramen of Monro. Patchy nonspecific white matter dz most likely related to mild-to-moderate small vessel disease. no masses, mass seffect, hemorrhage, or cortical infarcts. normal vascular flow voides. pituitary gland unremarkable. brainstem and cerebellum are unremarkable. orbits are unremarkable. sinuses, mastoid air cells are clear.     4/21: pt more awake today and following some commands. Able to state his name, hold two fingers up, name a watch.     DDx: infectious/metabolic, cerebral vascular infarct, inflammatory disease, seizure.  cannot r/o neurocognitive decline 2/2 underlying prion disease vs dementia vs nph     Recommendations:  [X ] Video EEG showed moderate to severe nonspecific diffuse or multifocal cerebral dysfunction. No epileptiform pattern or seizure seen.  [ ] MRI of the brain, c-spine w/wo contrast today performed yesterday 4/20, f/u final read, though somewhat limited due to motion.   [ ] Follow up B1, B3, vitamin D, vitamin E, MMA, homocysteine, paraneoplastic panel from blood, autoimmune encephalopathy panel, copper,  RPR, HIV, heavy metal screen  [ ] Consider ID consult   [X] TPO antibody <10   [x]  NH3 wnl. folate 3.4, b12 elevated. Ceruloplasmin 24. TSH 1.46  [ ] Plan for LP with IR         Case discussed with Dr. Lima. 60 yo M with PMHx of cerebellar ataxia who was brought to the ED due to AMS. History is obtained from chart review and from patients wife as patient is not responding to questions. History is extremely limited. Approximately 1 week prior to presentation, patient began to experience progressive fatigue/weakness. Patients weakness progressed and he had an episode of incontinence. On initial assessment, hx limited due to AMS. Pt responding to some commands, normal reflexes and tone, though incomprehensible speech, pt responding to internal stimuli on exam (concerns for hallucinations). 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 Pt with underlying chronic process with work up done for the last 2 years though from multiple different neurologists (Dr. Temo Zepeda, Dr. Saunders). Pt with MRI of the brain done in Jan 2022 with non-specific diffuse white matter changes. Also with MRI of spine done 3 weeks ago with spinal lesion to t-spine (seen by Dr. Forman, though no further interventions at that time). Pt likely with acute worsening of a chronic process, consider in the setting of recent infection though cannot r/o cerebral vascular infarct, inflammatory disease, seizures. Also would consider other causes of neurocognitive decline, including underlying prion disease given rapid onset.     MRI Brain w/o contrast done at [facility] on 1/14/2022:  Findings- Ventricles, cisterns, and sulci are diffusely prominent  compatible with mild to moderate atrophy. Cerebral aqueduct is patent. Hyperdynamic flow voids at the foramen of Monro. Patchy nonspecific white matter dz most likely related to mild-to-moderate small vessel disease. no masses, mass seffect, hemorrhage, or cortical infarcts. normal vascular flow voides. pituitary gland unremarkable. brainstem and cerebellum are unremarkable. orbits are unremarkable. sinuses, mastoid air cells are clear.     4/21: pt more awake today and following some commands. Able to state his name, hold two fingers up, name a watch.     DDx: infectious/metabolic, cerebral vascular infarct, inflammatory disease, seizure.  cannot r/o neurocognitive decline 2/2 underlying prion disease vs dementia vs nph     Recommendations:  [X ] Video EEG showed moderate to severe nonspecific diffuse or multifocal cerebral dysfunction. No epileptiform pattern or seizure seen.  [ ] MRI of the brain, c-spine w/wo contrast today performed yesterday 4/20, f/u final read, though somewhat limited due to motion.   [ ] Follow up B1, B3, vitamin D, vitamin E, MMA, homocysteine, paraneoplastic panel from blood, autoimmune encephalopathy panel, copper,  RPR, HIV, heavy metal screen, HSV, ACE, Lyme, CRP / ESR, CK  [ ] Consider ID consult   [X] TPO antibody <10   [x]  NH3 wnl. folate 3.4, b12 elevated. Ceruloplasmin 24. TSH 1.46  [ ] Plan for LP with IR         Case discussed with Dr. Lima. 60 yo M with PMHx of cerebellar ataxia who was brought to the ED due to AMS. History is obtained from chart review and from patients wife as patient is not responding to questions. History is extremely limited. Approximately 1 week prior to presentation, patient began to experience progressive fatigue/weakness. Patients weakness progressed and he had an episode of incontinence. On initial assessment, hx limited due to AMS. Pt responding to some commands, normal reflexes and tone, though incomprehensible speech, pt responding to internal stimuli on exam (concerns for hallucinations). 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 Pt with underlying chronic process with work up done for the last 2 years though from multiple different neurologists (Dr. Temo Zepeda, Dr. Saunders). Pt with MRI of the brain done in Jan 2022 with non-specific diffuse white matter changes. Also with MRI of spine done 3 weeks ago with spinal lesion to t-spine (seen by Dr. Forman, though no further interventions at that time). Pt likely with acute worsening of a chronic process, consider in the setting of recent infection though cannot r/o cerebral vascular infarct, inflammatory disease, seizures. Also would consider other causes of neurocognitive decline, including underlying prion disease given rapid onset.     MRI Brain w/o contrast done at [facility] on 1/14/2022:  Findings- Ventricles, cisterns, and sulci are diffusely prominent  compatible with mild to moderate atrophy. Cerebral aqueduct is patent. Hyperdynamic flow voids at the foramen of Monro. Patchy nonspecific white matter dz most likely related to mild-to-moderate small vessel disease. no masses, mass seffect, hemorrhage, or cortical infarcts. normal vascular flow voides. pituitary gland unremarkable. brainstem and cerebellum are unremarkable. orbits are unremarkable. sinuses, mastoid air cells are clear.     4/21: pt more awake today and following some commands. Able to state his name, hold two fingers up, name a watch.     DDx: infectious/metabolic, cerebral vascular infarct, inflammatory disease, seizure, mass.  cannot r/o neurocognitive decline 2/2 underlying prion disease vs dementia vs nph     Recommendations:  [X ] Video EEG showed moderate to severe nonspecific diffuse or multifocal cerebral dysfunction. No epileptiform pattern or seizure seen.  [ ] MRI of the brain, c-spine w/wo contrast today performed yesterday 4/20, f/u final read, though somewhat limited due to motion.   [ ] Follow up B1, B3, B6, vitamin D, vitamin E, MMA, homocysteine, paraneoplastic panel from blood, autoimmune encephalopathy panel, copper,  RPR, HIV, heavy metal screen, HSV, ACE, Lyme, CRP / ESR, CK, quant gold  [ ] Consider ID consult   [X] TPO antibody <10   [x]  NH3 wnl. folate 3.4, b12 elevated. Ceruloplasmin 24. TSH 1.46  [ ] Plan for LP with IR, likely today. f/u CSF cytology. **Ok to restart heparin dvt ppx after LP**     Case discussed with Dr. Lima. 62 yo M with PMHx of cerebellar ataxia who was brought to the ED due to AMS. History is obtained from chart review and from patients wife as patient is not responding to questions. Patient experiencing ataxia, cognitive deficits weight loss over the past 2 years. Approximately 1 week prior to current presentation, patient began to experience progressive fatigue/weakness, AMS. Pt responding to some commands, normal reflexes and tone, though incomprehensible speech, pt responding to internal stimuli on exam (concerns for hallucinations). 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 Pt likely with acute worsening of a chronic process, consider in the setting of recent infection though cannot r/o cerebral vascular infarct, inflammatory disease, seizures. Also would consider other causes of neurocognitive decline, including underlying prion disease, paraneoplastic syndromes, autoimmune / toxic causes.    4/21: pt more awake today and following some commands. Able to state his name, hold two fingers up, name a watch.     DDx: infectious/metabolic, cerebral vascular infarct, inflammatory disease, seizure, paraneoplastic syndrome.  cannot r/o neurocognitive decline 2/2 underlying prion disease vs dementia vs nph     Recommendations:  [X ] Video EEG showed moderate to severe nonspecific diffuse or multifocal cerebral dysfunction. No epileptiform pattern or seizure seen.  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 folate 3.4, b12 elevated. Ceruloplasmin 24. TSH 1.46  [ ] Plan for LP with IR, likely today. f/u CSF cytology. **Ok to restart heparin dvt ppx after LP**     Case discussed with Dr. Lima. 60 yo M with PMHx of cerebellar ataxia who was brought to the ED due to AMS. History is obtained from chart review and from patients wife as patient is not responding to questions. Patient experiencing ataxia, cognitive deficits weight loss over the past 2 years. Approximately 1 week prior to current presentation, patient began to experience progressive fatigue/weakness, AMS. Pt responding to some commands, normal reflexes and tone, though incomprehensible speech, pt responding to internal stimuli on exam (concerns for hallucinations). 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 Pt likely with acute worsening of a chronic process, consider in the setting of recent infection though cannot r/o cerebral vascular infarct, inflammatory disease, seizures. Also would consider other causes of neurocognitive decline, including underlying prion disease, paraneoplastic syndromes, autoimmune / toxic causes.    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 AAOx1 though still with speech difficulties. Able to state his name, hold two fingers up, name objects.     DDx: infectious/metabolic, cerebral vascular infarct, inflammatory disease, seizure, paraneoplastic syndrome.  cannot r/o neurocognitive decline 2/2 underlying prion disease vs dementia vs nph     Recommendations:  [ ] Plan for LP with IR, likely today. f/u CSF cytology. 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   [x] Video EEG showed moderate to severe nonspecific diffuse or multifocal cerebral dysfunction. No epileptiform pattern or seizure seen.  [x]  NH3 wnl. folate 3.4, b12 elevated. Ceruloplasmin 24. TSH 1.46, TPO antibody <10       Case discussed with Dr. Lima. 60 yo M with PMHx of cerebellar ataxia who was brought to the ED due to AMS. History is obtained from chart review and from patients wife as patient is not responding to questions. Patient experiencing ataxia, cognitive deficits weight loss over the past 2 years. Approximately 1 week prior to current presentation, patient began to experience progressive fatigue/weakness, AMS. Pt responding to some commands, normal reflexes and tone, though incomprehensible speech, pt responding to internal stimuli on exam (concerns for hallucinations). 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 Pt likely with acute worsening of a chronic process, consider in the setting of recent infection though cannot r/o cerebral vascular infarct, inflammatory disease, seizures. Also would consider other causes of neurocognitive decline, including underlying prion disease, paraneoplastic syndromes, autoimmune / toxic causes.    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 AAOx1 though still with speech difficulties. Able to state his name, hold two fingers up, name objects.     DDx: infectious/metabolic, cerebral vascular infarct, brain metastases (though no primary source identified at this time), inflammatory disease, seizure, paraneoplastic syndrome.  cannot r/o neurocognitive decline 2/2 underlying prion disease vs dementia vs nph     Recommendations:  [ ] Plan for LP with IR, likely today. f/u CSF cytology. 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   [x] Video EEG showed moderate to severe nonspecific diffuse or multifocal cerebral dysfunction. No epileptiform pattern or seizure seen.  [x]  NH3 wnl. folate 3.4, b12 elevated. Ceruloplasmin 24. TSH 1.46, TPO antibody <10       Case discussed with Dr. Lima.

## 2022-04-21 NOTE — PROGRESS NOTE ADULT - SUBJECTIVE AND OBJECTIVE BOX
MRN-[id number]    Subjective: Rapid response was called on pt last night due to hypotension (60s/40s). Patient also hypothermic and bradycardic. Atropine was given and bp normalized. EEP saw pt and recommended no interventions at this time. NG tube was placed.     Pt continues to be more awake and alert today, able to state his name and follow some commands, otherwise hx limited.     PAST MEDICAL & SURGICAL HISTORY:  H/O cerebellar ataxia    No significant past surgical history      FAMILY HISTORY:  FH: dementia (Mother)    FH: type 2 diabetes (Mother)    Family history of CVA (Father)      Social Hx:  Nonsmoker, no drug or alcohol use    Home Medications:  atorvastatin 20 mg oral tablet: 1 tab(s) orally once a day (2022 06:28)  mirtazapine 15 mg oral tablet: 1 tab(s) orally once a day (at bedtime), As Needed (2022 06:28)    MEDICATIONS  (STANDING):  atorvastatin 20 milliGRAM(s) Oral at bedtime  dextrose 5% + sodium chloride 0.45%. 1000 milliLiter(s) (100 mL/Hr) IV Continuous <Continuous>  folic acid 1 milliGRAM(s) Oral daily  heparin   Injectable 5000 Unit(s) SubCutaneous every 8 hours  pantoprazole  Injectable 40 milliGRAM(s) IV Push daily  piperacillin/tazobactam IVPB.. 3.375 Gram(s) IV Intermittent every 8 hours  vancomycin  IVPB 1000 milliGRAM(s) IV Intermittent every 12 hours    MEDICATIONS  (PRN):  acetaminophen     Tablet .. 650 milliGRAM(s) Oral every 6 hours PRN Temp greater or equal to 38C (100.4F), Mild Pain (1 - 3)  aluminum hydroxide/magnesium hydroxide/simethicone Suspension 30 milliLiter(s) Oral every 4 hours PRN Dyspepsia  melatonin 3 milliGRAM(s) Oral at bedtime PRN Insomnia  ondansetron Injectable 4 milliGRAM(s) IV Push every 8 hours PRN Nausea and/or Vomiting    Allergies  No Known Allergies    Intolerances    ROS: Pertinent positives above, all other ROS were reviewed and are negative.      Vital Signs Last 24 Hrs  T(C): 36.8 (2022 06:13), Max: 36.8 (2022 10:11)  T(F): 98.2 (2022 06:13), Max: 98.2 (2022 10:11)  HR: 79 (2022 06:13) (50 - 79)  BP: 96/62 (2022 06:13) (72/41 - 108/56)  BP(mean): --  RR: 20 (2022 06:13) (12 - 22)  SpO2: 97% (2022 06:13) (97% - 99%)    GENERAL EXAM:  Constitutional: awake and alert. NAD  HEENT: PERRLA, EOMI  Neck: Supple  Respiratory: Breath sounds are clear bilaterally  Cardiovascular: S1 and S2, regular / irregular rhythm  Gastrointestinal: soft, nontender  Extremities: no edema, no cyanosis  Vascular: no carotid bruits  Musculoskeletal: no joint swelling/tenderness, no abnormal movements  Skin: no rashes    NEUROLOGICAL EXAM:  MS: Oriented to person, not place or time, speech is severely dysarthric, attends b/l today, improved from yesterday; pt able to name watch, but not pen. Pt able to hold up 2 fingers with both hands today.   CN: EOMI, PERRL, likely left facial droop, t/p midline  Motor: Strength: 5/5  strength bilaterally. 4/5 in upper extremities 2/5 in bilateral legs. Tone: normal. Bulk: normal. DTR 2+ symm. Sensation: intact to pain.  Coordination: Ataxic to FNF on right side. Pt not compliant with left side. Left arm was ataxic when held up.   Gait:  Unable to assess gait 2/2 pt's weakness.     Labs:   cbc                      9.3    4.33  )-----------( 107      ( 2022 07:52 )             27.9     [id number]    135  |  105  |  7   ----------------------------<  140<H>  3.7   |  20<L>  |  0.75    Ca    8.0<L>      2022 07:52  Phos  3.0     -  Mg     1.8         TPro  4.7<L>  /  Alb  2.3<L>  /  TBili  0.4  /  DBili  x   /  AST  10  /  ALT  9<L>  /  AlkPhos  35<L>      LIVER FUNCTIONS - ( 2022 00:27 )  Alb: 2.3 g/dL / Pro: 4.7 g/dL / ALK PHOS: 35 U/L / ALT: 9 U/L / AST: 10 U/L / GGT: x           UAUrinalysis Basic - ( 2022 17:45 )    Color: Yellow / Appearance: Slightly Turbid / S.013 / pH: x  Gluc: x / Ketone: Negative  / Bili: Negative / Urobili: Negative   Blood: x / Protein: Trace / Nitrite: Negative   Leuk Esterase: Negative / RBC: 192 /hpf / WBC 3 /HPF   Sq Epi: x / Non Sq Epi: 1 /hpf / Bacteria: Negative      Radiology: MRI final read pending.  [id number]    Subjective: Rapid response was called on pt last night due to hypotension (60s/40s). Patient also hypothermic and bradycardic. Atropine was given and bp normalized. EEP saw pt and recommended no interventions at this time. NG tube was placed.     Pt continues to be more awake and alert today, able to state his name and follow some commands, otherwise hx limited.     PAST MEDICAL & SURGICAL HISTORY:  H/O cerebellar ataxia    No significant past surgical history      FAMILY HISTORY:  FH: dementia (Mother)    FH: type 2 diabetes (Mother)    Family history of CVA (Father)      Social Hx:  Nonsmoker, no drug or alcohol use    Home Medications:  atorvastatin 20 mg oral tablet: 1 tab(s) orally once a day (2022 06:28)  mirtazapine 15 mg oral tablet: 1 tab(s) orally once a day (at bedtime), As Needed (2022 06:28)    MEDICATIONS  (STANDING):  atorvastatin 20 milliGRAM(s) Oral at bedtime  dextrose 5% + sodium chloride 0.45%. 1000 milliLiter(s) (100 mL/Hr) IV Continuous <Continuous>  folic acid 1 milliGRAM(s) Oral daily  heparin   Injectable 5000 Unit(s) SubCutaneous every 8 hours  pantoprazole  Injectable 40 milliGRAM(s) IV Push daily  piperacillin/tazobactam IVPB.. 3.375 Gram(s) IV Intermittent every 8 hours  vancomycin  IVPB 1000 milliGRAM(s) IV Intermittent every 12 hours    MEDICATIONS  (PRN):  acetaminophen     Tablet .. 650 milliGRAM(s) Oral every 6 hours PRN Temp greater or equal to 38C (100.4F), Mild Pain (1 - 3)  aluminum hydroxide/magnesium hydroxide/simethicone Suspension 30 milliLiter(s) Oral every 4 hours PRN Dyspepsia  melatonin 3 milliGRAM(s) Oral at bedtime PRN Insomnia  ondansetron Injectable 4 milliGRAM(s) IV Push every 8 hours PRN Nausea and/or Vomiting    Allergies  No Known Allergies    Intolerances    ROS: Pertinent positives above, all other ROS were reviewed and are negative.      Vital Signs Last 24 Hrs  T(C): 36.8 (2022 06:13), Max: 36.8 (2022 10:11)  T(F): 98.2 (2022 06:13), Max: 98.2 (2022 10:11)  HR: 79 (2022 06:13) (50 - 79)  BP: 96/62 (2022 06:13) (72/41 - 108/56)  BP(mean): --  RR: 20 (2022 06:13) (12 - 22)  SpO2: 97% (2022 06:13) (97% - 99%)    GENERAL EXAM:  Constitutional: awake and alert. NAD  HEENT: PERRLA, EOMI  Neck: Supple  Respiratory: Breath sounds are clear bilaterally  Cardiovascular: S1 and S2, regular / irregular rhythm  Gastrointestinal: soft, nontender  Extremities: no edema, no cyanosis  Vascular: no carotid bruits  Musculoskeletal: no joint swelling/tenderness, no abnormal movements  Skin: no rashes    NEUROLOGICAL EXAM:  MS: Oriented to person, not place or time, speech is severely dysarthric, attends b/l today, improved from yesterday; pt able to name watch, pen. Pt able to hold up 2 fingers with both hands today.   CN: EOMI, PERRL, likely left facial droop, t/p midline  Motor: Strength: 5/5  strength bilaterally. 4/5 in upper extremities 2/5 in bilateral legs. Tone: normal. Bulk: normal. DTR 2+ symm. Sensation: intact to pain.  Coordination: Ataxic to FNF on right side. Pt not compliant with left side. Left arm was ataxic when held up.   Gait:  Unable to assess gait 2/2 pt's weakness.     Labs:   cbc                      9.3    4.33  )-----------( 107      ( 2022 07:52 )             27.9     [id number]    135  |  105  |  7   ----------------------------<  140<H>  3.7   |  20<L>  |  0.75    Ca    8.0<L>      2022 07:52  Phos  3.0       Mg     1.8         TPro  4.7<L>  /  Alb  2.3<L>  /  TBili  0.4  /  DBili  x   /  AST  10  /  ALT  9<L>  /  AlkPhos  35<L>      LIVER FUNCTIONS - ( 2022 00:27 )  Alb: 2.3 g/dL / Pro: 4.7 g/dL / ALK PHOS: 35 U/L / ALT: 9 U/L / AST: 10 U/L / GGT: x           UAUrinalysis Basic - ( 2022 17:45 )    Color: Yellow / Appearance: Slightly Turbid / S.013 / pH: x  Gluc: x / Ketone: Negative  / Bili: Negative / Urobili: Negative   Blood: x / Protein: Trace / Nitrite: Negative   Leuk Esterase: Negative / RBC: 192 /hpf / WBC 3 /HPF   Sq Epi: x / Non Sq Epi: 1 /hpf / Bacteria: Negative      Radiology: MRI final read pending.  [id number]    Subjective: Rapid response was called on pt last night due to hypotension (60s/40s). Patient also hypothermic and bradycardic. Atropine was given and bp normalized. EEP saw pt and recommended no interventions at this time. NG tube was placed.     Pt continues to be more awake and alert today, able to state his name and follow some commands, otherwise hx limited.     PAST MEDICAL & SURGICAL HISTORY:  H/O cerebellar ataxia    No significant past surgical history      FAMILY HISTORY:  FH: dementia (Mother)    FH: type 2 diabetes (Mother)    Family history of CVA (Father)      Social Hx:  Nonsmoker, no drug or alcohol use    Home Medications:  atorvastatin 20 mg oral tablet: 1 tab(s) orally once a day (2022 06:28)  mirtazapine 15 mg oral tablet: 1 tab(s) orally once a day (at bedtime), As Needed (2022 06:28)    MEDICATIONS  (STANDING):  atorvastatin 20 milliGRAM(s) Oral at bedtime  dextrose 5% + sodium chloride 0.45%. 1000 milliLiter(s) (100 mL/Hr) IV Continuous <Continuous>  folic acid 1 milliGRAM(s) Oral daily  heparin   Injectable 5000 Unit(s) SubCutaneous every 8 hours  pantoprazole  Injectable 40 milliGRAM(s) IV Push daily  piperacillin/tazobactam IVPB.. 3.375 Gram(s) IV Intermittent every 8 hours  vancomycin  IVPB 1000 milliGRAM(s) IV Intermittent every 12 hours    MEDICATIONS  (PRN):  acetaminophen     Tablet .. 650 milliGRAM(s) Oral every 6 hours PRN Temp greater or equal to 38C (100.4F), Mild Pain (1 - 3)  aluminum hydroxide/magnesium hydroxide/simethicone Suspension 30 milliLiter(s) Oral every 4 hours PRN Dyspepsia  melatonin 3 milliGRAM(s) Oral at bedtime PRN Insomnia  ondansetron Injectable 4 milliGRAM(s) IV Push every 8 hours PRN Nausea and/or Vomiting    Allergies  No Known Allergies    Intolerances    ROS: Pertinent positives above, all other ROS were reviewed and are negative.      Vital Signs Last 24 Hrs  T(C): 36.8 (2022 06:13), Max: 36.8 (2022 10:11)  T(F): 98.2 (2022 06:13), Max: 98.2 (2022 10:11)  HR: 79 (2022 06:13) (50 - 79)  BP: 96/62 (2022 06:13) (72/41 - 108/56)  BP(mean): --  RR: 20 (2022 06:13) (12 - 22)  SpO2: 97% (2022 06:13) (97% - 99%)    GENERAL EXAM:  Constitutional: awake and alert. NAD  HEENT: PERRLA, EOMI  Neck: Supple  Respiratory: Breath sounds are clear bilaterally  Cardiovascular: S1 and S2, regular / irregular rhythm  Gastrointestinal: soft, nontender  Extremities: no edema, no cyanosis  Vascular: no carotid bruits  Musculoskeletal: no joint swelling/tenderness, no abnormal movements  Skin: no rashes    NEUROLOGICAL EXAM:  MS: Oriented to person, not place or time, speech is severely dysarthric, attends b/l today, improved from yesterday; pt able to name watch, pen. Pt able to hold up 2 fingers with both hands today.   CN: EOMI, PERRL, likely left facial droop, t/p midline  Motor: Strength: 5/5  strength bilaterally. 4/5 in upper extremities 2/5 in bilateral legs. Tone: normal. Bulk: normal. DTR 2+ symm. Sensation: intact to pain.  Coordination: Ataxic to FNF on right side. Pt not compliant with left side. Left arm was ataxic when held up.   Gait:  Unable to assess gait 2/2 pt's weakness.     Labs:   cbc                      9.3    4.33  )-----------( 107      ( 2022 07:52 )             27.9     [id number]    135  |  105  |  7   ----------------------------<  140<H>  3.7   |  20<L>  |  0.75    Ca    8.0<L>      2022 07:52  Phos  3.0       Mg     1.8         TPro  4.7<L>  /  Alb  2.3<L>  /  TBili  0.4  /  DBili  x   /  AST  10  /  ALT  9<L>  /  AlkPhos  35<L>      LIVER FUNCTIONS - ( 2022 00:27 )  Alb: 2.3 g/dL / Pro: 4.7 g/dL / ALK PHOS: 35 U/L / ALT: 9 U/L / AST: 10 U/L / GGT: x           UAUrinalysis Basic - ( 2022 17:45 )    Color: Yellow / Appearance: Slightly Turbid / S.013 / pH: x  Gluc: x / Ketone: Negative  / Bili: Negative / Urobili: Negative   Blood: x / Protein: Trace / Nitrite: Negative   Leuk Esterase: Negative / RBC: 192 /hpf / WBC 3 /HPF   Sq Epi: x / Non Sq Epi: 1 /hpf / Bacteria: Negative      Radiology: MRI final read pending.  [id number]    Subjective: Rapid response was called on pt last night due to hypotension (60s/40s). Patient also hypothermic and bradycardic. Atropine was given and bp normalized. EEP saw pt and recommended no interventions at this time. NG tube was placed.     Pt continues to be more awake and alert today, able to state his name and follow some commands, otherwise hx limited.     PAST MEDICAL & SURGICAL HISTORY:  H/O cerebellar ataxia    No significant past surgical history      FAMILY HISTORY:  FH: dementia (Mother)    FH: type 2 diabetes (Mother)    Family history of CVA (Father)      Social Hx:  Nonsmoker, no drug or alcohol use    Home Medications:  atorvastatin 20 mg oral tablet: 1 tab(s) orally once a day (2022 06:28)  mirtazapine 15 mg oral tablet: 1 tab(s) orally once a day (at bedtime), As Needed (2022 06:28)    MEDICATIONS  (STANDING):  atorvastatin 20 milliGRAM(s) Oral at bedtime  dextrose 5% + sodium chloride 0.45%. 1000 milliLiter(s) (100 mL/Hr) IV Continuous <Continuous>  folic acid 1 milliGRAM(s) Oral daily  heparin   Injectable 5000 Unit(s) SubCutaneous every 8 hours  pantoprazole  Injectable 40 milliGRAM(s) IV Push daily  piperacillin/tazobactam IVPB.. 3.375 Gram(s) IV Intermittent every 8 hours  vancomycin  IVPB 1000 milliGRAM(s) IV Intermittent every 12 hours    MEDICATIONS  (PRN):  acetaminophen     Tablet .. 650 milliGRAM(s) Oral every 6 hours PRN Temp greater or equal to 38C (100.4F), Mild Pain (1 - 3)  aluminum hydroxide/magnesium hydroxide/simethicone Suspension 30 milliLiter(s) Oral every 4 hours PRN Dyspepsia  melatonin 3 milliGRAM(s) Oral at bedtime PRN Insomnia  ondansetron Injectable 4 milliGRAM(s) IV Push every 8 hours PRN Nausea and/or Vomiting    Allergies  No Known Allergies    Intolerances    ROS: Pertinent positives above, all other ROS were reviewed and are negative.      Vital Signs Last 24 Hrs  T(C): 36.8 (2022 06:13), Max: 36.8 (2022 10:11)  T(F): 98.2 (2022 06:13), Max: 98.2 (2022 10:11)  HR: 79 (2022 06:13) (50 - 79)  BP: 96/62 (2022 06:13) (72/41 - 108/56)  BP(mean): --  RR: 20 (2022 06:13) (12 - 22)  SpO2: 97% (2022 06:13) (97% - 99%)    GENERAL EXAM:  Constitutional: awake and alert. NAD  HEENT: PERRLA, EOMI  Neck: Supple  Respiratory: Breath sounds are clear bilaterally  Cardiovascular: S1 and S2, regular / irregular rhythm  Gastrointestinal: soft, nontender  Extremities: no edema, no cyanosis  Vascular: no carotid bruits  Musculoskeletal: no joint swelling/tenderness, no abnormal movements  Skin: no rashes    NEUROLOGICAL EXAM:  MS: Oriented to person, not place or time, speech is severely dysarthric, attends b/l today, improved from yesterday; pt able to name watch, pen. Pt able to hold up 2 fingers with both hands today.   CN: EOMI, PERRL, likely left facial droop, t/p midline  Motor: Strength: 5/5  strength bilaterally. 4/5 in upper extremities 2/5 in bilateral legs. Tone: normal. Bulk: normal. DTR 2+ symm. Sensation: intact to pain.  Coordination: Ataxic to FNF on right side. Pt not compliant with left side. Left arm was ataxic when held up.   Gait:  Unable to assess gait 2/2 pt's weakness.     Labs:   cbc                      9.3    4.33  )-----------( 107      ( 2022 07:52 )             27.9     [id number]    135  |  105  |  7   ----------------------------<  140<H>  3.7   |  20<L>  |  0.75    Ca    8.0<L>      2022 07:52  Phos  3.0       Mg     1.8         TPro  4.7<L>  /  Alb  2.3<L>  /  TBili  0.4  /  DBili  x   /  AST  10  /  ALT  9<L>  /  AlkPhos  35<L>      LIVER FUNCTIONS - ( 2022 00:27 )  Alb: 2.3 g/dL / Pro: 4.7 g/dL / ALK PHOS: 35 U/L / ALT: 9 U/L / AST: 10 U/L / GGT: x           UAUrinalysis Basic - ( 2022 17:45 )    Color: Yellow / Appearance: Slightly Turbid / S.013 / pH: x  Gluc: x / Ketone: Negative  / Bili: Negative / Urobili: Negative   Blood: x / Protein: Trace / Nitrite: Negative   Leuk Esterase: Negative / RBC: 192 /hpf / WBC 3 /HPF   Sq Epi: x / Non Sq Epi: 1 /hpf / Bacteria: Negative      Radiology:   IMPRESSION:    MRI BRAIN:  Extensive, diffuse leptomeningeal enhancement. Differential diagnosis   primarily includes leptomeningeal metastases and infectious meningitis.    Abnormal ependymal enhancement involving the bilateral frontal horns,   bilateral ventricular bodies, left temporal and occipital horn, and   fourth ventricle. Differential diagnosis includes ependymal metastases   and infectious meningitis.    Possible small foci of restricted diffusion in the bilateral superior   cerebral hemispheres. These may represent small acute infarcts or regions   of encephalitis.    Edema noted within the cerebellar vermis and mesial bilateral cerebellar   hemispheres.

## 2022-04-21 NOTE — OCCUPATIONAL THERAPY INITIAL EVALUATION ADULT - ADL RETRAINING, OT EVAL
Patient will dress lower body with minimal assistance, AE as needed in 4 weeks. Patient will dress upper body with minimal assistance in 4 weeks. Pt will perform oral care (S), AE as needed, within 4 weeks.

## 2022-04-21 NOTE — OCCUPATIONAL THERAPY INITIAL EVALUATION ADULT - REFERRAL TO ANOTHER SERVICE NEEDED, OT EVAL
MRI BRAIN 4/20-Extensive, diffuse leptomeningeal enhancement. Differential diagnosis primarily includes leptomeningeal metastases and infectious meningitis. Abnormal ependymal enhancement involving the bilateral frontal horns, bilateral ventricular bodies, left temporal and occipital horn, and fourth ventricle. Differential diagnosis includes ependymal metastases and infectious meningitis. Possible small foci of restricted diffusion in the bilateral superior cerebral hemispheres. These may represent small acute infarcts or regions of encephalitis. Edema noted within the cerebellar vermis and mesial bilateral cerebellar hemispheres. MRI CERVICAL SPINE: Diffuse leptomeningeal enhancement. This may represent the presence of leptomeningeal metastases or leptomeningeal infection. Multilevel degenerative changes.

## 2022-04-21 NOTE — PROGRESS NOTE ADULT - PROBLEM SELECTOR PLAN 6
noted to have 3 sec pause on tele overnight, cardiology consulted overnight , rec EP consult this morning  EKG reviewed sinus bradycardia  EP consulted f/u rec
- Diet: NPO as above  - DVT ppx: HSQ  - PT consult
noted to have 3 sec pause on tele overnight, cardiology consulted overnight , rec EP consult this morning  EKG reviewed sinus bradycardia  EP consulted f/u rec
- Diet: NPO as above  - DVT ppx: HSQ  - PT consult

## 2022-04-21 NOTE — OCCUPATIONAL THERAPY INITIAL EVALUATION ADULT - PERTINENT HX OF CURRENT PROBLEM, REHAB EVAL
60 yo M with PMHx of cerebral ataxia who was brought to the ED due to AMS. History is obtained from chart review and from patients wife as patient is not responding to questions. Approximately 1 week prior to presentation, patient began to experience progressive fatigue/weakness.

## 2022-04-21 NOTE — CHART NOTE - NSCHARTNOTEFT_GEN_A_CORE
4/21	MICU and ID consult called for hypotension and meningitis respectively, Albumin 5% 250 cc over an hr, NS bolus 500 cc ordered, spoke to Attending, possible LP by IR per Neurol . RRT also was called at the same time and was evaluated , bolus given, started with Midodrine, family (wife, son) were discussed everything.   He was found to have meningitis and started with vanco, ceftr and ID consult was called.  started with  NGT feeding ON HOLD  until confirmed by CXR, earlier iv albumin 5% 250 cc over an hr ordered. He will be transferred to MICU when a bed is available  Mai Willis ([place]) [employer] # [id number]

## 2022-04-21 NOTE — RAPID RESPONSE TEAM SUMMARY - NSOTHERINTERVENTIONSRRT_GEN_ALL_CORE
- Labs obtained (CBC, CMP, coags, VBG with lactate, and cortisol)   - Bare hugger placed  - Labs obtained (CBC, CMP, coags, VBG with lactate, and cortisol)   - Bare hugger placed   - Consider LP for further infectious workup

## 2022-04-21 NOTE — CONSULT NOTE ADULT - ASSESSMENT
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 In the ED stroke code was called negative for intracranial process. CT Chest with concern for pneumonia and esophagitis. Today RRT called for hypotension and bradycardia given atropine, now in ICU pending LP. 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     Recommend:  #Leptomeningeal enhancement, AMS  -Concerning for infectious meningitis vs metastatic disease vs paraneoplastic  -Continue ceftriaxone 2 grams IV q 12 hours and vancomycin 1 gram IV q 12 hours. Add ampicillin 2 grams IV q 4 hours and acyclovir 600 mg IV q 8 hours  -Gentle hydration while on iv acyclovir to prevent crystalluria  -Agree with LP - send for cell count with diff, csf pcr, bacterial cx, fungal culture, afb culture, encephalitis panel, paraneoplastic panel, cytopath, 14-3-3 tau protein.  -F/U blood cultures  -Droplet isolation x 24 hours     #Apsiration pna  -Continue current abx regimen  -Aspiration precautions    Casey Hutson MD  Available through [employer] Teams  If no response, or after 5pm/weekends, call [phone]    Discussed plan with ICU team.

## 2022-04-21 NOTE — PROVIDER CONTACT NOTE (OTHER) - SITUATION
Pt noted to have frequent 2-3 seconds pauses on tele  on assessment blood pressure 78/52, hr 54, O2 97 on 2 liters and temp 94.8 Pt noted to have frequent 2-3 seconds and heart rate 29 pauses on tele  on assessment blood pressure 78/52, hr 54, O2 97 on 2 liters and temp 94.8

## 2022-04-21 NOTE — PROVIDER CONTACT NOTE (OTHER) - REASON
Pt noted to have frequent 2-3 seconds pauses on tele Pt noted to have frequent 2-3 seconds heart pauses and heart rate 29 on tele

## 2022-04-21 NOTE — RAPID RESPONSE TEAM SUMMARY - NSSITUATIONBACKGROUNDRRT_GEN_ALL_CORE
RRT for hypotension 60/40s. Pt observed to be bradycardic to 30s. Atropine given and 1L of LR given with normalization of BP. Pt mentating well and smiling sp IVF. Additional 1L recommended sp rrt. Recommend cardiology cs for sinus node dysfunction. Recommend bare hugger for hypothermia and follow up pan labs.

## 2022-04-21 NOTE — PROGRESS NOTE ADULT - SUBJECTIVE AND OBJECTIVE BOX
Patient is a 61y old  Male who presents with a chief complaint of AMS 2/2 Pneumonia (2022 12:46)      SUBJECTIVE / OVERNIGHT EVENTS: Patient seen and examined at bedside. remains confused, alter not following commnads had RRT overnight for hypotension and bradycardia     ROS:  All other review of systems negative    Allergies    No Known Allergies    Intolerances        MEDICATIONS  (STANDING):  acyclovir IVPB      ampicillin  IVPB      atorvastatin 20 milliGRAM(s) Oral at bedtime  cefTRIAXone   IVPB 2000 milliGRAM(s) IV Intermittent every 12 hours  chlorhexidine 4% Liquid 1 Application(s) Topical <User Schedule>  dextrose 5% + sodium chloride 0.45%. 1000 milliLiter(s) (100 mL/Hr) IV Continuous <Continuous>  folic acid 1 milliGRAM(s) Oral daily  hydrocortisone sodium succinate Injectable 100 milliGRAM(s) IV Push every 8 hours  multivitamin 1 Tablet(s) Oral daily  norepinephrine Infusion 0.05 MICROgram(s)/kG/Min (6.47 mL/Hr) IV Continuous <Continuous>  pantoprazole  Injectable 40 milliGRAM(s) IV Push daily  thiamine 100 milliGRAM(s) Oral daily  vancomycin  IVPB 1000 milliGRAM(s) IV Intermittent every 12 hours    MEDICATIONS  (PRN):  acetaminophen     Tablet .. 650 milliGRAM(s) Oral every 6 hours PRN Temp greater or equal to 38C (100.4F), Mild Pain (1 - 3)  aluminum hydroxide/magnesium hydroxide/simethicone Suspension 30 milliLiter(s) Oral every 4 hours PRN Dyspepsia  melatonin 3 milliGRAM(s) Oral at bedtime PRN Insomnia  ondansetron Injectable 4 milliGRAM(s) IV Push every 8 hours PRN Nausea and/or Vomiting      Vital Signs Last 24 Hrs  T(C): 35.2 (2022 14:00), Max: 36.8 (2022 06:13)  T(F): 95.4 (2022 14:00), Max: 98.2 (2022 06:13)  HR: 44 (2022 14:45) (40 - 86)  BP: 106/65 (2022 14:45) (72/41 - 114/61)  BP(mean): 81 (2022 14:45) (69 - 82)  RR: 20 (2022 14:45) (11 - 22)  SpO2: 100% (2022 14:45) (97% - 100%)  CAPILLARY BLOOD GLUCOSE      POCT Blood Glucose.: 128 mg/dL (2022 11:30)  POCT Blood Glucose.: 133 mg/dL (2022 00:17)    I&O's Summary    2022 07:01  -  2022 07:00  --------------------------------------------------------  IN: 3990 mL / OUT: 2325 mL / NET: 1665 mL    2022 07:01  -  2022 15:40  --------------------------------------------------------  IN: 359 mL / OUT: 650 mL / NET: -291 mL        PHYSICAL EXAM:  GENERAL: NAD,+NGT & + 2L NC  HEAD:  Atraumatic, Normocephalic  EYES: EOMI, PERRLA, conjunctiva and sclera clear  NECK: Supple, No JVD  CHEST/LUNG: Clear to auscultation bilaterally; No wheeze  HEART: Regular rate and rhythm; No murmurs, rubs, or gallops  ABDOMEN: Soft, Nontender, Nondistended; Bowel sounds present  EXTREMITIES:  2+ Peripheral Pulses, No clubbing, cyanosis, or edema  NEUROLOGY: AAOx0, not following commands   PSYCH: calm  SKIN: No rashes or lesions    LABS:                        10.3   7.04  )-----------( 138      ( 2022 14:36 )             31.6     04-21    136  |  107  |  5<L>  ----------------------------<  151<H>  3.6   |  17<L>  |  0.75    Ca    7.8<L>      2022 14:36  Phos  2.2     04-21  Mg     1.7     04-21    TPro  5.6<L>  /  Alb  2.8<L>  /  TBili  0.6  /  DBili  x   /  AST  13  /  ALT  12  /  AlkPhos  42  04-21    PT/INR - ( 2022 14:36 )   PT: 15.1 sec;   INR: 1.31 ratio         PTT - ( 2022 14:36 )  PTT:32.1 sec      Urinalysis Basic - ( 2022 17:45 )    Color: Yellow / Appearance: Slightly Turbid / S.013 / pH: x  Gluc: x / Ketone: Negative  / Bili: Negative / Urobili: Negative   Blood: x / Protein: Trace / Nitrite: Negative   Leuk Esterase: Negative / RBC: 192 /hpf / WBC 3 /HPF   Sq Epi: x / Non Sq Epi: 1 /hpf / Bacteria: Negative        RADIOLOGY & ADDITIONAL TESTS:    MRI brain malignancy vs meningis     Care Discussed with Consultants/Other Providers: Medicine ACP    Case Discussed with wife at bedside

## 2022-04-21 NOTE — DIETITIAN NUTRITION RISK NOTIFICATION - OTHER RISK FACTORS
NOTE TO PROVIDER REQUESTING MEDICATION ORDERS:    Ella Palacios glucose are consistently elevated fasting    Current diabetes medications:  yes:     Diabetes Medication(s)     Insulin       insulin aspart (NOVOLOG FLEXPEN) 100 UNIT/ML pen    Take 4 units with breakfast, 11 units with lunch, and up to 16 units at dinner.  Max TDD= 35 units with prime      .       Recommend starting 0.1 unit(s)/kg NPH insulin at bedtime OR 0-0-0-6 units Novolin N insulin.       Patient follow up plan cde to call patient 2/9/21.      Orders pended. If in agreement, please note approval and sign pended orders, otherwise please indicate an alternate plan. Route back to writer to notify the patient.       Flory Gutierrez MS, RD, LD, CDE              
Orders signed.    Thanks so much!  Isabel Chaparro MD    
Not applicable

## 2022-04-21 NOTE — RAPID RESPONSE TEAM SUMMARY - NSSITUATIONBACKGROUNDRRT_GEN_ALL_CORE
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 Pt received CT C/A/P 4/17/22 that demonstrated esophagitis, mucoid impaction of LLL, patchy tree in bud in RUL/RLL concerning for aspiration pneum, started on zosyn therapy with vanco x1. Pt in addition has received MRI of brain/cervical spine that showed extensive/diffuse leptomeningeal enhancement concerning for metastatic dz vs infectious meningitis.     RRT called for hypotension to 60s/40s.  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 Pt received CT C/A/P 4/17/22 that demonstrated esophagitis, mucoid impaction of LLL, patchy tree in bud in RUL/RLL concerning for aspiration pneum, started on zosyn therapy with vanco x1. Pt in addition has received MRI of brain/cervical spine that showed extensive/diffuse leptomeningeal enhancement concerning for metastatic dz vs infectious meningitis.     RRT called for hypotension to 60s/40s and hypothermia. Pt s/p earlier RRT for hypotension as well.

## 2022-04-21 NOTE — CHART NOTE - NSCHARTNOTEFT_GEN_A_CORE
HPI:  Patient is a 60 yo M with PMHx of cerebral ataxia who was brought to the ED due to AMS. History is obtained from chart review and from patients wife as patient is not responding to questions. History is extremely limited. Approximately 1 week prior to presentation, patient began to experience progressive fatigue/weakness. Patients weakness progressed and he had an episode of incontinence. several days later. Two days prior to presentation, patient began to experience hiccoughs. Patient has history of unexplained hiccoughs which usually progress to episodes of emesis. Patient underwent a barium swallow study recently which was unremarkable. On the day of presentation, patient began to have multiple episodes of emesis, which prompted his wife to bring him to the ED. At baseline, patient requires a walker to ambulate and is able to communicate normally.     In the ED, patient noted to be tachycardic to 113 and tachypneic to 23. Labs significant for initial lactate of 3.7. A code stroke was called which was negative for acute intracranial processes. A CT of the chest and A/P was performed which revealed findings concerning for right sided pneumonia and esophagitis. Patient was given 2L IVF and a dose of Zosyn. He was subsequently admitted to medicine for further management.  (18 Apr 2022 06:19)       Patient is s/p RRT for hypotension and bradycardia . See RRT note  Post RRT, patient remained on floor.  Will f/u w/ rapid labs.  Discussed with RN  Case discussed w/ cards fellow overnight. Bradycardia likely 2/2 to medical issues ongoing such as hypothermia.  Case also discussed w/ MICU. Aware of patient from previous consult.  Would reconsult MICU if pt develops bradycardia/hypotension again.  Continue to monitor  Will endorse to AM team      Eulogio Wren PA-C  #[id number]  Dept of Medicine

## 2022-04-21 NOTE — OCCUPATIONAL THERAPY INITIAL EVALUATION ADULT - RANGE OF MOTION EXAMINATION, UPPER EXTREMITY
bilateral UE Active Assistive ROM was WFL  (within functional limits) bilateral UE Active ROM was WFL  (within functional limits)

## 2022-04-21 NOTE — PROGRESS NOTE ADULT - PROBLEM SELECTOR PLAN 3
- patient with radiographic evidence of right sided pneumonia  - suspect aspiration due to multiple episodes of emesis in the setting of AMS  - c/w zosyn   - started on vancomycin, f/u vanc troph before 4th dose    - check MRSA and urine legionella   - repeat Blood cx and urine cx  - repeat CXR ordered   - keep NPO for now given poor mental status  - placed NGT today 4/20  - satting well on 2L NC
- patient with radiographic evidence of right sided pneumonia  - suspect aspiration due to multiple episodes of emesis in the setting of AMS  - Unasyn d/c'ed given worsening lethargy today, started on zosyn   - keep NPO for now pending S/S eval  - currently on 2L NC satting well wean as tolerated
- patient with radiographic evidence of right sided pneumonia  - suspect aspiration due to multiple episodes of emesis in the setting of AMS  - c/w Unasyn  - keep NPO for now pending S/S eval  - currently on 1L NC satting well wean as tolerated
- patient with radiographic evidence of right sided pneumonia  - suspect aspiration due to multiple episodes of emesis in the setting of AMS  - started on CTX for meningitis   - started on vancomycin, f/u vanc troph before 4th dose    - check MRSA and urine legionella   - repeat Blood cx and urine cx  - repeat CXR ordered   - keep NPO for now given poor mental status  - placed NGT, further advanced today f/u repeat CXR  - satting well on 2L NC

## 2022-04-21 NOTE — PROGRESS NOTE ADULT - PROBLEM SELECTOR PROBLEM 4
Progressive neurological disorder

## 2022-04-21 NOTE — OCCUPATIONAL THERAPY INITIAL EVALUATION ADULT - ASR WT BEARING STATUS EVAL
In the ED, patient noted to be tachycardic to 113 and tachypneic to 23. Labs significant for initial lactate of 3.7. A code stroke was called which was negative for acute intracranial processes. A CT of the chest and A/P was performed which revealed findings concerning for right sided pneumonia and esophagitis. Patient was given 2L IVF and a dose of Zosyn. He was subsequently admitted to medicine for further management./no weight-bearing restrictions In the ED, patient noted to be tachycardic to 113 and tachypneic to 23. Labs significant for initial lactate of 3.7. A code stroke was called which was negative for acute intracranial processes. A CT of the chest and A/P was performed which revealed findings concerning for right sided pneumonia and esophagitis. Patient was given 2L IVF and a dose of Zosyn. He was subsequently admitted to medicine for further management.  Pt intubated 4/23 for worsening AMS. Remains intubated however off sedation 4/29./no weight-bearing restrictions

## 2022-04-21 NOTE — PROGRESS NOTE ADULT - PROBLEM SELECTOR PLAN 2
- patient meets sepsis criteria with tachycardia and tachypnea  - 2/2 right sided pneumonia  - f/u blood and urine cx   - lactate now wnl  - plan as below
- patient meets sepsis criteria with tachycardia and tachypnea  - 2/2 right sided pneumonia and now poss Meningitis, per neurology plan for LP by IR today    - started on CTX 2 g BID and acyclovir   - f/u blood NGTD and urine cx neg  - lactate now wnl  - plan as below
- patient meets sepsis criteria with tachycardia and tachypnea  - 2/2 right sided pneumonia  - f/u blood NGTD and urine cx neg  - lactate now wnl  - plan as below
- patient meets sepsis criteria with tachycardia and tachypnea  - 2/2 right sided pneumonia  - f/u blood NGTD and urine cx neg  - lactate now wnl  - plan as below

## 2022-04-21 NOTE — DIETITIAN INITIAL EVALUATION ADULT - REASON FOR ADMISSION
Altered mental status    Chart Reviewed, events noted. This is a "60 yo M with PMHx of cerebral ataxia who was brought to the ED due to AMS, found to be septic 2/2 right sided pneumonia"

## 2022-04-21 NOTE — OCCUPATIONAL THERAPY INITIAL EVALUATION ADULT - IMPAIRMENTS CONTRIBUTING IMPAIRED BED MOBILITY, REHAB EVAL
cognition/impaired coordination/decreased ROM/decreased strength impaired balance/cognition/impaired coordination/impaired postural control/decreased ROM/decreased strength

## 2022-04-21 NOTE — OCCUPATIONAL THERAPY INITIAL EVALUATION ADULT - ADDITIONAL COMMENTS
4/17 CT HEAD: No acute intracranial hemorrhage or mass effect. CTA NECK: No hemodynamically significant stenosis by NASCET criteria, dissection, or pseudoaneurysm. CTA HEAD: No large vessel occlusion, significant stenosis, dissection, or saccular aneurysm.  CT chest abdomen/pelvis 3/17-Patchy tree-in-bud nodularity in the right lung, nonspecific, although likely infectious versus inflammatory. Diffuse circumferential thickening of the esophagus, most concerning for esophagitis, however recommend clinical correlation and consider outpatient endoscopy. Cholelithiasis without CT evidence for acute cholecystitis. Nonobstructing bilateral nephrolithiasis.

## 2022-04-21 NOTE — OCCUPATIONAL THERAPY INITIAL EVALUATION ADULT - GENERAL OBSERVATIONS, REHAB EVAL
Patient received semi-supine in bed, +tele, pulse ox, +IV, sequentials donned Patient received semi-supine in bed, +tele, pulse ox, +IV, +orally intubated, +condom cath

## 2022-04-21 NOTE — PROGRESS NOTE ADULT - PROBLEM SELECTOR PLAN 1
sepsis vs worsening neurological disorder (h/o cerebral ataxia) did not have MRI outpt  Neuro consult appreciated: MRI c& head pending, labs requested ordered, will need LP if current work up inconclusive   s/p EEG unremarkable, penidng vEEG   sepsis workup as below   Utox neg  failed TOV, coronel placed  MICU consult: accepted for MICU transfer

## 2022-04-21 NOTE — PROGRESS NOTE ADULT - PROBLEM SELECTOR PLAN 5
- CT revealing signs concerning for esophagitis  - c/w start IV Protonix 40mg daily  - maintain NPO for now, NGT placement today   - consider EGD / GI f/u outpt
- CT revealing signs concerning for esophagitis  - c/w start IV Protonix 40mg daily  - obtain S/S eval   - maintain NPO for now  - consider EGD / GI f/u outpt
- CT revealing signs concerning for esophagitis  - c/w start IV Protonix 40mg daily  - obtain S/S eval   - maintain NPO for now  - consider EGD / GI f/u outpt
- CT revealing signs concerning for esophagitis  - c/w start IV Protonix 40mg daily  - maintain NPO for now, NGT placement today   - consider EGD / GI f/u outpt

## 2022-04-21 NOTE — OCCUPATIONAL THERAPY INITIAL EVALUATION ADULT - SHORT TERM MEMORY, REHAB EVAL
Unable to assess due to cognitive impairment To be further assess when able to verbalize.  Able to recall month after 5 minutes after being reoriented

## 2022-04-21 NOTE — DIETITIAN INITIAL EVALUATION ADULT - WEIGHT FOR BMI (KG)
airway patent/breath sounds equal/good air movement/respirations non-labored/clear to auscultation bilaterally/no chest wall tenderness/no intercostal retractions/no rales/no rhonchi/no subcutaneous emphysema/no wheezes 69 breath sounds equal/good air movement/respirations non-labored/clear to auscultation bilaterally/no chest wall tenderness/no intercostal retractions/no rales/no rhonchi/no subcutaneous emphysema/no wheezes

## 2022-04-21 NOTE — PROGRESS NOTE ADULT - PROBLEM SELECTOR PLAN 7
- Diet: NPO as above  - DVT ppx: HSQ  - PT consult pending patient is more stable     I update patient's wife in detail today 4/21, state patient is full code
- Diet: NPO as above  - DVT ppx: HSQ  - PT consult pending patient is more stable     I update patient's son at bedside in detail about current medical issues all questions were answered. 4/20

## 2022-04-22 ENCOUNTER — RESULT REVIEW (OUTPATIENT)
Age: 62
End: 2022-04-22

## 2022-04-22 LAB
ALBUMIN SERPL ELPH-MCNC: 2.6 G/DL — LOW (ref 3.3–5)
ALBUMIN SERPL ELPH-MCNC: 2.7 G/DL — LOW (ref 3.3–5)
ALBUMIN SERPL ELPH-MCNC: 2.7 G/DL — LOW (ref 3.3–5)
ALP SERPL-CCNC: 33 U/L — LOW (ref 40–120)
ALP SERPL-CCNC: 37 U/L — LOW (ref 40–120)
ALP SERPL-CCNC: 39 U/L — LOW (ref 40–120)
ALT FLD-CCNC: 11 U/L — SIGNIFICANT CHANGE UP (ref 10–45)
ALT FLD-CCNC: 12 U/L — SIGNIFICANT CHANGE UP (ref 10–45)
ALT FLD-CCNC: 13 U/L — SIGNIFICANT CHANGE UP (ref 10–45)
ANION GAP SERPL CALC-SCNC: 11 MMOL/L — SIGNIFICANT CHANGE UP (ref 5–17)
ANION GAP SERPL CALC-SCNC: 12 MMOL/L — SIGNIFICANT CHANGE UP (ref 5–17)
ANION GAP SERPL CALC-SCNC: 13 MMOL/L — SIGNIFICANT CHANGE UP (ref 5–17)
APPEARANCE CSF: CLEAR — SIGNIFICANT CHANGE UP
APTT BLD: 22.8 SEC — LOW (ref 27.5–35.5)
APTT BLD: 28.9 SEC — SIGNIFICANT CHANGE UP (ref 27.5–35.5)
AST SERPL-CCNC: 12 U/L — SIGNIFICANT CHANGE UP (ref 10–40)
AST SERPL-CCNC: 13 U/L — SIGNIFICANT CHANGE UP (ref 10–40)
AST SERPL-CCNC: 15 U/L — SIGNIFICANT CHANGE UP (ref 10–40)
BASE EXCESS BLDV CALC-SCNC: -12.9 MMOL/L — LOW (ref -2–2)
BASE EXCESS BLDV CALC-SCNC: -5.5 MMOL/L — LOW (ref -2–2)
BASOPHILS # BLD AUTO: 0 K/UL — SIGNIFICANT CHANGE UP (ref 0–0.2)
BASOPHILS NFR BLD AUTO: 0 % — SIGNIFICANT CHANGE UP (ref 0–2)
BILIRUB SERPL-MCNC: 0.2 MG/DL — SIGNIFICANT CHANGE UP (ref 0.2–1.2)
BLD GP AB SCN SERPL QL: NEGATIVE — SIGNIFICANT CHANGE UP
BLOOD GAS VENOUS - CREATININE: SIGNIFICANT CHANGE UP MG/DL (ref 0.5–1.3)
BUN SERPL-MCNC: <4 MG/DL — LOW (ref 7–23)
BURR CELLS BLD QL SMEAR: PRESENT — SIGNIFICANT CHANGE UP
CA-I SERPL-SCNC: 1.19 MMOL/L — SIGNIFICANT CHANGE UP (ref 1.15–1.33)
CA-I SERPL-SCNC: 1.23 MMOL/L — SIGNIFICANT CHANGE UP (ref 1.15–1.33)
CALCIUM SERPL-MCNC: 7.7 MG/DL — LOW (ref 8.4–10.5)
CALCIUM SERPL-MCNC: 7.9 MG/DL — LOW (ref 8.4–10.5)
CALCIUM SERPL-MCNC: 8.4 MG/DL — SIGNIFICANT CHANGE UP (ref 8.4–10.5)
CHLORIDE BLDV-SCNC: 104 MMOL/L — SIGNIFICANT CHANGE UP (ref 96–108)
CHLORIDE BLDV-SCNC: 107 MMOL/L — SIGNIFICANT CHANGE UP (ref 96–108)
CHLORIDE SERPL-SCNC: 105 MMOL/L — SIGNIFICANT CHANGE UP (ref 96–108)
CHLORIDE SERPL-SCNC: 111 MMOL/L — HIGH (ref 96–108)
CHLORIDE SERPL-SCNC: 111 MMOL/L — HIGH (ref 96–108)
CK MB BLD-MCNC: 5.9 % — HIGH (ref 0–3.5)
CK MB CFR SERPL CALC: 1.9 NG/ML — SIGNIFICANT CHANGE UP (ref 0–6.7)
CK MB CFR SERPL CALC: 2 NG/ML — SIGNIFICANT CHANGE UP (ref 0–6.7)
CK SERPL-CCNC: 31 U/L — SIGNIFICANT CHANGE UP (ref 30–200)
CK SERPL-CCNC: 34 U/L — SIGNIFICANT CHANGE UP (ref 30–200)
CO2 BLDV-SCNC: 14 MMOL/L — LOW (ref 22–26)
CO2 BLDV-SCNC: 21 MMOL/L — LOW (ref 22–26)
CO2 SERPL-SCNC: 15 MMOL/L — LOW (ref 22–31)
CO2 SERPL-SCNC: 16 MMOL/L — LOW (ref 22–31)
CO2 SERPL-SCNC: 18 MMOL/L — LOW (ref 22–31)
COLOR CSF: SIGNIFICANT CHANGE UP
COPPER SERPL-MCNC: 108 UG/DL — SIGNIFICANT CHANGE UP (ref 69–132)
CREAT SERPL-MCNC: 0.58 MG/DL — SIGNIFICANT CHANGE UP (ref 0.5–1.3)
CREAT SERPL-MCNC: 0.63 MG/DL — SIGNIFICANT CHANGE UP (ref 0.5–1.3)
CREAT SERPL-MCNC: 0.71 MG/DL — SIGNIFICANT CHANGE UP (ref 0.5–1.3)
CRP SERPL-MCNC: 25 MG/L — HIGH (ref 0–4)
CULTURE RESULTS: NO GROWTH — SIGNIFICANT CHANGE UP
EGFR: 105 ML/MIN/1.73M2 — SIGNIFICANT CHANGE UP
EGFR: 109 ML/MIN/1.73M2 — SIGNIFICANT CHANGE UP
EGFR: 112 ML/MIN/1.73M2 — SIGNIFICANT CHANGE UP
EOSINOPHIL # BLD AUTO: 0 K/UL — SIGNIFICANT CHANGE UP (ref 0–0.5)
EOSINOPHIL NFR BLD AUTO: 0 % — SIGNIFICANT CHANGE UP (ref 0–6)
ERYTHROCYTE [SEDIMENTATION RATE] IN BLOOD: 26 MM/HR — HIGH (ref 0–20)
GAS PNL BLDV: 131 MMOL/L — LOW (ref 136–145)
GAS PNL BLDV: 138 MMOL/L — SIGNIFICANT CHANGE UP (ref 136–145)
GAS PNL BLDV: SIGNIFICANT CHANGE UP
GLUCOSE BLDC GLUCOMTR-MCNC: 117 MG/DL — HIGH (ref 70–99)
GLUCOSE BLDC GLUCOMTR-MCNC: 210 MG/DL — HIGH (ref 70–99)
GLUCOSE BLDC GLUCOMTR-MCNC: 277 MG/DL — HIGH (ref 70–99)
GLUCOSE BLDC GLUCOMTR-MCNC: 82 MG/DL — SIGNIFICANT CHANGE UP (ref 70–99)
GLUCOSE BLDV-MCNC: 335 MG/DL — HIGH (ref 70–99)
GLUCOSE BLDV-MCNC: 476 MG/DL — CRITICAL HIGH (ref 70–99)
GLUCOSE CSF-MCNC: 54 MG/DL — SIGNIFICANT CHANGE UP (ref 40–70)
GLUCOSE SERPL-MCNC: 197 MG/DL — HIGH (ref 70–99)
GLUCOSE SERPL-MCNC: 338 MG/DL — HIGH (ref 70–99)
GLUCOSE SERPL-MCNC: 675 MG/DL — CRITICAL HIGH (ref 70–99)
GRAM STN FLD: SIGNIFICANT CHANGE UP
HCO3 BLDV-SCNC: 13 MMOL/L — LOW (ref 22–29)
HCO3 BLDV-SCNC: 20 MMOL/L — LOW (ref 22–29)
HCT VFR BLD CALC: 29 % — LOW (ref 39–50)
HCT VFR BLD CALC: 29.8 % — LOW (ref 39–50)
HCT VFR BLDA CALC: 27 % — LOW (ref 39–51)
HCT VFR BLDA CALC: 32 % — LOW (ref 39–51)
HCYS SERPL-MCNC: 6.9 UMOL/L — SIGNIFICANT CHANGE UP
HGB BLD CALC-MCNC: 10.6 G/DL — LOW (ref 12.6–17.4)
HGB BLD CALC-MCNC: 9.9 G/DL — LOW (ref 12.6–17.4)
HGB BLD-MCNC: 10.2 G/DL — LOW (ref 13–17)
HGB BLD-MCNC: 9.6 G/DL — LOW (ref 13–17)
HIV 1+2 AB+HIV1 P24 AG SERPL QL IA: SIGNIFICANT CHANGE UP
HOROWITZ INDEX BLDV+IHG-RTO: 21 — SIGNIFICANT CHANGE UP
INR BLD: 1.23 RATIO — HIGH (ref 0.88–1.16)
INR BLD: 1.29 RATIO — HIGH (ref 0.88–1.16)
LACTATE BLDV-MCNC: 1.5 MMOL/L — SIGNIFICANT CHANGE UP (ref 0.7–2)
LACTATE BLDV-MCNC: 9.6 MMOL/L — CRITICAL HIGH (ref 0.7–2)
LDH CSF L TO P-CCNC: 30 U/L — SIGNIFICANT CHANGE UP
LDH FLD-CCNC: 30 U/L — SIGNIFICANT CHANGE UP
LYMPHOCYTES # BLD AUTO: 0.12 K/UL — LOW (ref 1–3.3)
LYMPHOCYTES # BLD AUTO: 2.6 % — LOW (ref 13–44)
LYMPHOCYTES # CSF: 61 % — SIGNIFICANT CHANGE UP (ref 40–80)
MAGNESIUM SERPL-MCNC: 1.7 MG/DL — SIGNIFICANT CHANGE UP (ref 1.6–2.6)
MAGNESIUM SERPL-MCNC: 1.7 MG/DL — SIGNIFICANT CHANGE UP (ref 1.6–2.6)
MAGNESIUM SERPL-MCNC: 2 MG/DL — SIGNIFICANT CHANGE UP (ref 1.6–2.6)
MANUAL SMEAR VERIFICATION: SIGNIFICANT CHANGE UP
MCHC RBC-ENTMCNC: 28.9 PG — SIGNIFICANT CHANGE UP (ref 27–34)
MCHC RBC-ENTMCNC: 29.1 PG — SIGNIFICANT CHANGE UP (ref 27–34)
MCHC RBC-ENTMCNC: 33.1 GM/DL — SIGNIFICANT CHANGE UP (ref 32–36)
MCHC RBC-ENTMCNC: 34.2 GM/DL — SIGNIFICANT CHANGE UP (ref 32–36)
MCV RBC AUTO: 84.9 FL — SIGNIFICANT CHANGE UP (ref 80–100)
MCV RBC AUTO: 87.3 FL — SIGNIFICANT CHANGE UP (ref 80–100)
MONOCYTES # BLD AUTO: 0.21 K/UL — SIGNIFICANT CHANGE UP (ref 0–0.9)
MONOCYTES NFR BLD AUTO: 4.4 % — SIGNIFICANT CHANGE UP (ref 2–14)
MONOS+MACROS NFR CSF: 22 % — SIGNIFICANT CHANGE UP (ref 15–45)
NEUTROPHILS # BLD AUTO: 4.38 K/UL — SIGNIFICANT CHANGE UP (ref 1.8–7.4)
NEUTROPHILS # CSF: 17 % — HIGH (ref 0–6)
NEUTROPHILS NFR BLD AUTO: 93 % — HIGH (ref 43–77)
NRBC # BLD: 0 /100 WBCS — SIGNIFICANT CHANGE UP (ref 0–0)
NRBC NFR CSF: 43 /UL — HIGH (ref 0–5)
OTHER CELLS CSF MANUAL: 4.6 ML/DL — LOW (ref 18–22)
PCO2 BLDV: 31 MMHG — LOW (ref 42–55)
PCO2 BLDV: 39 MMHG — LOW (ref 42–55)
PH BLDV: 7.24 — LOW (ref 7.32–7.43)
PH BLDV: 7.32 — SIGNIFICANT CHANGE UP (ref 7.32–7.43)
PHOSPHATE SERPL-MCNC: 1.8 MG/DL — LOW (ref 2.5–4.5)
PHOSPHATE SERPL-MCNC: 2.3 MG/DL — LOW (ref 2.5–4.5)
PHOSPHATE SERPL-MCNC: 2.4 MG/DL — LOW (ref 2.5–4.5)
PLAT MORPH BLD: NORMAL — SIGNIFICANT CHANGE UP
PLATELET # BLD AUTO: 103 K/UL — LOW (ref 150–400)
PLATELET # BLD AUTO: 125 K/UL — LOW (ref 150–400)
PO2 BLDV: 21 MMHG — LOW (ref 25–45)
PO2 BLDV: 52 MMHG — HIGH (ref 25–45)
POIKILOCYTOSIS BLD QL AUTO: SIGNIFICANT CHANGE UP
POTASSIUM BLDV-SCNC: 2.1 MMOL/L — CRITICAL LOW (ref 3.5–5.1)
POTASSIUM BLDV-SCNC: 3.1 MMOL/L — LOW (ref 3.5–5.1)
POTASSIUM SERPL-MCNC: 3 MMOL/L — LOW (ref 3.5–5.3)
POTASSIUM SERPL-MCNC: 3.3 MMOL/L — LOW (ref 3.5–5.3)
POTASSIUM SERPL-MCNC: 3.6 MMOL/L — SIGNIFICANT CHANGE UP (ref 3.5–5.3)
POTASSIUM SERPL-SCNC: 3 MMOL/L — LOW (ref 3.5–5.3)
POTASSIUM SERPL-SCNC: 3.3 MMOL/L — LOW (ref 3.5–5.3)
POTASSIUM SERPL-SCNC: 3.6 MMOL/L — SIGNIFICANT CHANGE UP (ref 3.5–5.3)
PROT CSF-MCNC: 126 MG/DL — HIGH (ref 15–45)
PROT SERPL-MCNC: 5.1 G/DL — LOW (ref 6–8.3)
PROT SERPL-MCNC: 5.3 G/DL — LOW (ref 6–8.3)
PROT SERPL-MCNC: 5.5 G/DL — LOW (ref 6–8.3)
PROTHROM AB SERPL-ACNC: 14.2 SEC — HIGH (ref 10.5–13.4)
PROTHROM AB SERPL-ACNC: 14.9 SEC — HIGH (ref 10.5–13.4)
RBC # BLD: 3.32 M/UL — LOW (ref 4.2–5.8)
RBC # BLD: 3.51 M/UL — LOW (ref 4.2–5.8)
RBC # CSF: 9 /UL — HIGH (ref 0–0)
RBC # FLD: 13.7 % — SIGNIFICANT CHANGE UP (ref 10.3–14.5)
RBC # FLD: 13.8 % — SIGNIFICANT CHANGE UP (ref 10.3–14.5)
RBC BLD AUTO: ABNORMAL
RH IG SCN BLD-IMP: POSITIVE — SIGNIFICANT CHANGE UP
RHEUMATOID FACT SERPL-ACNC: <10 IU/ML — SIGNIFICANT CHANGE UP (ref 0–13)
SAO2 % BLDV: 33.2 % — LOW (ref 67–88)
SAO2 % BLDV: 81.9 % — SIGNIFICANT CHANGE UP (ref 67–88)
SODIUM SERPL-SCNC: 133 MMOL/L — LOW (ref 135–145)
SODIUM SERPL-SCNC: 139 MMOL/L — SIGNIFICANT CHANGE UP (ref 135–145)
SODIUM SERPL-SCNC: 140 MMOL/L — SIGNIFICANT CHANGE UP (ref 135–145)
SPECIMEN SOURCE: SIGNIFICANT CHANGE UP
SPECIMEN SOURCE: SIGNIFICANT CHANGE UP
THYROPEROXIDASE AB SERPL-ACNC: 11 IU/ML — SIGNIFICANT CHANGE UP
TROPONIN T, HIGH SENSITIVITY RESULT: 12 NG/L — SIGNIFICANT CHANGE UP (ref 0–51)
TROPONIN T, HIGH SENSITIVITY RESULT: 9 NG/L — SIGNIFICANT CHANGE UP (ref 0–51)
TUBE TYPE: SIGNIFICANT CHANGE UP
VIT D25+D1,25 OH+D1,25 PNL SERPL-MCNC: 67.4 PG/ML — SIGNIFICANT CHANGE UP (ref 19.9–79.3)
WBC # BLD: 4.71 K/UL — SIGNIFICANT CHANGE UP (ref 3.8–10.5)
WBC # BLD: 5.94 K/UL — SIGNIFICANT CHANGE UP (ref 3.8–10.5)
WBC # FLD AUTO: 4.71 K/UL — SIGNIFICANT CHANGE UP (ref 3.8–10.5)
WBC # FLD AUTO: 5.94 K/UL — SIGNIFICANT CHANGE UP (ref 3.8–10.5)

## 2022-04-22 PROCEDURE — 93010 ELECTROCARDIOGRAM REPORT: CPT | Mod: 77

## 2022-04-22 PROCEDURE — 99232 SBSQ HOSP IP/OBS MODERATE 35: CPT

## 2022-04-22 PROCEDURE — 99291 CRITICAL CARE FIRST HOUR: CPT

## 2022-04-22 PROCEDURE — 99233 SBSQ HOSP IP/OBS HIGH 50: CPT

## 2022-04-22 PROCEDURE — 93010 ELECTROCARDIOGRAM REPORT: CPT

## 2022-04-22 PROCEDURE — 88108 CYTOPATH CONCENTRATE TECH: CPT | Mod: 26

## 2022-04-22 PROCEDURE — 62328 DX LMBR SPI PNXR W/FLUOR/CT: CPT

## 2022-04-22 PROCEDURE — 93306 TTE W/DOPPLER COMPLETE: CPT | Mod: 26

## 2022-04-22 RX ORDER — EPINEPHRINE 0.3 MG/.3ML
0.05 INJECTION INTRAMUSCULAR; SUBCUTANEOUS
Qty: 4 | Refills: 0 | Status: DISCONTINUED | OUTPATIENT
Start: 2022-04-22 | End: 2022-04-25

## 2022-04-22 RX ORDER — POTASSIUM PHOSPHATE, MONOBASIC POTASSIUM PHOSPHATE, DIBASIC 236; 224 MG/ML; MG/ML
15 INJECTION, SOLUTION INTRAVENOUS ONCE
Refills: 0 | Status: COMPLETED | OUTPATIENT
Start: 2022-04-22 | End: 2022-04-23

## 2022-04-22 RX ORDER — INSULIN LISPRO 100/ML
VIAL (ML) SUBCUTANEOUS EVERY 4 HOURS
Refills: 0 | Status: DISCONTINUED | OUTPATIENT
Start: 2022-04-22 | End: 2022-04-23

## 2022-04-22 RX ORDER — POTASSIUM CHLORIDE 20 MEQ
10 PACKET (EA) ORAL
Refills: 0 | Status: COMPLETED | OUTPATIENT
Start: 2022-04-22 | End: 2022-04-23

## 2022-04-22 RX ORDER — SODIUM CHLORIDE 9 MG/ML
1000 INJECTION, SOLUTION INTRAVENOUS
Refills: 0 | Status: DISCONTINUED | OUTPATIENT
Start: 2022-04-22 | End: 2022-04-23

## 2022-04-22 RX ORDER — DEXTROSE 50 % IN WATER 50 %
25 SYRINGE (ML) INTRAVENOUS ONCE
Refills: 0 | Status: COMPLETED | OUTPATIENT
Start: 2022-04-22 | End: 2022-04-22

## 2022-04-22 RX ORDER — SODIUM CHLORIDE 9 MG/ML
1000 INJECTION, SOLUTION INTRAVENOUS ONCE
Refills: 0 | Status: COMPLETED | OUTPATIENT
Start: 2022-04-22 | End: 2022-04-22

## 2022-04-22 RX ORDER — DOPAMINE HYDROCHLORIDE 40 MG/ML
2 INJECTION, SOLUTION, CONCENTRATE INTRAVENOUS
Qty: 400 | Refills: 0 | Status: DISCONTINUED | OUTPATIENT
Start: 2022-04-22 | End: 2022-04-23

## 2022-04-22 RX ORDER — POTASSIUM CHLORIDE 20 MEQ
40 PACKET (EA) ORAL ONCE
Refills: 0 | Status: COMPLETED | OUTPATIENT
Start: 2022-04-22 | End: 2022-04-23

## 2022-04-22 RX ORDER — SODIUM CHLORIDE 9 MG/ML
1000 INJECTION, SOLUTION INTRAVENOUS ONCE
Refills: 0 | Status: DISCONTINUED | OUTPATIENT
Start: 2022-04-22 | End: 2022-04-22

## 2022-04-22 RX ORDER — MAGNESIUM SULFATE 500 MG/ML
2 VIAL (ML) INJECTION ONCE
Refills: 0 | Status: COMPLETED | OUTPATIENT
Start: 2022-04-22 | End: 2022-04-23

## 2022-04-22 RX ORDER — POTASSIUM PHOSPHATE, MONOBASIC POTASSIUM PHOSPHATE, DIBASIC 236; 224 MG/ML; MG/ML
30 INJECTION, SOLUTION INTRAVENOUS ONCE
Refills: 0 | Status: COMPLETED | OUTPATIENT
Start: 2022-04-22 | End: 2022-04-22

## 2022-04-22 RX ORDER — NOREPINEPHRINE BITARTRATE/D5W 8 MG/250ML
0.05 PLASTIC BAG, INJECTION (ML) INTRAVENOUS
Qty: 8 | Refills: 0 | Status: DISCONTINUED | OUTPATIENT
Start: 2022-04-22 | End: 2022-04-22

## 2022-04-22 RX ORDER — INSULIN LISPRO 100/ML
VIAL (ML) SUBCUTANEOUS EVERY 4 HOURS
Refills: 0 | Status: DISCONTINUED | OUTPATIENT
Start: 2022-04-22 | End: 2022-04-22

## 2022-04-22 RX ORDER — FLUDROCORTISONE ACETATE 0.1 MG/1
0.1 TABLET ORAL DAILY
Refills: 0 | Status: DISCONTINUED | OUTPATIENT
Start: 2022-04-22 | End: 2022-04-23

## 2022-04-22 RX ORDER — ALBUMIN HUMAN 25 %
250 VIAL (ML) INTRAVENOUS ONCE
Refills: 0 | Status: COMPLETED | OUTPATIENT
Start: 2022-04-22 | End: 2022-04-22

## 2022-04-22 RX ADMIN — Medication 112 MILLIGRAM(S): at 06:43

## 2022-04-22 RX ADMIN — POTASSIUM PHOSPHATE, MONOBASIC POTASSIUM PHOSPHATE, DIBASIC 83.33 MILLIMOLE(S): 236; 224 INJECTION, SOLUTION INTRAVENOUS at 04:25

## 2022-04-22 RX ADMIN — Medication 250 MILLIGRAM(S): at 17:10

## 2022-04-22 RX ADMIN — Medication 125 MILLILITER(S): at 11:43

## 2022-04-22 RX ADMIN — Medication 6: at 21:56

## 2022-04-22 RX ADMIN — Medication 200 GRAM(S): at 02:36

## 2022-04-22 RX ADMIN — Medication 1 MILLIGRAM(S): at 11:04

## 2022-04-22 RX ADMIN — EPINEPHRINE 12.9 MICROGRAM(S)/KG/MIN: 0.3 INJECTION INTRAMUSCULAR; SUBCUTANEOUS at 21:55

## 2022-04-22 RX ADMIN — Medication 112 MILLIGRAM(S): at 17:33

## 2022-04-22 RX ADMIN — CEFTRIAXONE 100 MILLIGRAM(S): 500 INJECTION, POWDER, FOR SOLUTION INTRAMUSCULAR; INTRAVENOUS at 11:40

## 2022-04-22 RX ADMIN — Medication 250 MILLIGRAM(S): at 06:45

## 2022-04-22 RX ADMIN — Medication 100 MILLIGRAM(S): at 11:04

## 2022-04-22 RX ADMIN — Medication 1 TABLET(S): at 11:05

## 2022-04-22 RX ADMIN — Medication 100 MILLIGRAM(S): at 22:24

## 2022-04-22 RX ADMIN — Medication 200 GRAM(S): at 09:11

## 2022-04-22 RX ADMIN — SODIUM CHLORIDE 100 MILLILITER(S): 9 INJECTION, SOLUTION INTRAVENOUS at 00:33

## 2022-04-22 RX ADMIN — CHLORHEXIDINE GLUCONATE 1 APPLICATION(S): 213 SOLUTION TOPICAL at 06:45

## 2022-04-22 RX ADMIN — Medication 200 GRAM(S): at 17:07

## 2022-04-22 RX ADMIN — PANTOPRAZOLE SODIUM 40 MILLIGRAM(S): 20 TABLET, DELAYED RELEASE ORAL at 11:04

## 2022-04-22 RX ADMIN — Medication 100 MILLIGRAM(S): at 14:00

## 2022-04-22 RX ADMIN — ATORVASTATIN CALCIUM 20 MILLIGRAM(S): 80 TABLET, FILM COATED ORAL at 22:24

## 2022-04-22 RX ADMIN — SODIUM CHLORIDE 100 MILLILITER(S): 9 INJECTION, SOLUTION INTRAVENOUS at 05:47

## 2022-04-22 RX ADMIN — CEFTRIAXONE 100 MILLIGRAM(S): 500 INJECTION, POWDER, FOR SOLUTION INTRAMUSCULAR; INTRAVENOUS at 00:33

## 2022-04-22 RX ADMIN — Medication 200 GRAM(S): at 14:00

## 2022-04-22 RX ADMIN — Medication 2: at 09:12

## 2022-04-22 RX ADMIN — Medication 112 MILLIGRAM(S): at 21:55

## 2022-04-22 RX ADMIN — FLUDROCORTISONE ACETATE 0.1 MILLIGRAM(S): 0.1 TABLET ORAL at 17:08

## 2022-04-22 RX ADMIN — Medication 25 MILLILITER(S): at 14:32

## 2022-04-22 RX ADMIN — Medication 200 GRAM(S): at 21:55

## 2022-04-22 RX ADMIN — Medication 6.47 MICROGRAM(S)/KG/MIN: at 04:25

## 2022-04-22 RX ADMIN — Medication 100 MILLIGRAM(S): at 05:47

## 2022-04-22 RX ADMIN — Medication 1 PACKET(S): at 02:36

## 2022-04-22 RX ADMIN — Medication 200 GRAM(S): at 06:05

## 2022-04-22 NOTE — PROGRESS NOTE ADULT - ASSESSMENT
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 Pt found to have RUL/RLL asp pneum. 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 for neuro radiology LP  -acyclovir 600 mg IV q 8 hrs started 4/21    #Pulm:    -Supplemental O2 prn to maintain SPO2 > 92%  -Bronchodilators q 6 hrs prn for sob/wheezes  -HOB >/= 30 degree angle    #CV: sinus bradycardia, HypoTN secondary to septic shock    -ECG now and q am x3  -Cardiac Enzymes now and q 8 hr x 3  -Place on externa pacing pads  -Norepi gtt - titrate to MAP 60-65 mmHg  -obtain TTE to eval RVFx/LVFx    #GI/:     -strict I & O's - keep even  -ANYI feed tube  -continue protonix 40 mg IVP qd    #ID:  RUL/RLL pneum, MRI with leptomeningeal enhancement    -continue ceftriaxone 2 gms IV aq 12 hrs  -continue vanco 1 gm IV q 12 hrs   -titrate vanco to trough of 15 - 20  -consider LP  -consider acyclovir therapy    #FEN/ENDO/HEME:   r/o adrenal insufficiency     -obtain CMP/Mg++/PO--4/CBC w diff/PT/PTT/INR now and q. a.m.  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 Pt found to have RUL/RLL asp pneum. 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 a.m.  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 Pt found to have RUL/RLL asp pneum. 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 a.m.  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 Pt found to have RUL/RLL asp pneum. 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 a.m.  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 Pt found to have RUL/RLL asp pneum. 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 a.m.  -abg prn   -Hydrocortisone 100 mg IV q 8 hrs  -POC glucose with ISS q 4 hrs - maintain glucose 140-160  -cortisol level 4/21/22 of 6.4  -lactated ringers @ 75 cc/hr x 10 hrs           61 yr old male with stated hx significant for cerebral ataxia who presented with one week progressive weakness/fatigue accompanied by incontinence and emesis. Pt found to have RUL/RLL asp pneum. 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 a.m.  -abg prn   -Hydrocortisone 100 mg IV q 8 hrs  -will add florinef 0.1 mg NGT qd  -POC glucose with ISS q 4 hrs - maintain glucose 140-160  -cortisol level 4/21/22 of 6.4  -lactated ringers @ 75 cc/hr x 10 hrs

## 2022-04-22 NOTE — PROGRESS NOTE ADULT - SUBJECTIVE AND OBJECTIVE BOX
N-[id number]    Subjective:      PAST MEDICAL & SURGICAL HISTORY:  H/O cerebellar ataxia    No significant past surgical history      FAMILY HISTORY:  FH: dementia (Mother)    FH: type 2 diabetes (Mother)    Family history of CVA (Father)      Social Hx:  Nonsmoker, no drug or alcohol use    Home Medications:  atorvastatin 20 mg oral tablet: 1 tab(s) orally once a day (2022 06:28)  mirtazapine 15 mg oral tablet: 1 tab(s) orally once a day (at bedtime), As Needed (2022 06:28)    MEDICATIONS  (STANDING):  acyclovir IVPB      acyclovir IVPB 600 milliGRAM(s) IV Intermittent every 8 hours  ampicillin  IVPB      ampicillin  IVPB 2 Gram(s) IV Intermittent every 4 hours  atorvastatin 20 milliGRAM(s) Oral at bedtime  cefTRIAXone   IVPB 2000 milliGRAM(s) IV Intermittent every 12 hours  chlorhexidine 4% Liquid 1 Application(s) Topical <User Schedule>  dextrose 5% + sodium chloride 0.45%. 1000 milliLiter(s) (100 mL/Hr) IV Continuous <Continuous>  folic acid 1 milliGRAM(s) Oral daily  hydrocortisone sodium succinate Injectable 100 milliGRAM(s) IV Push every 8 hours  insulin lispro (ADMELOG) corrective regimen sliding scale   SubCutaneous every 4 hours  multivitamin 1 Tablet(s) Oral daily  norepinephrine Infusion 0.05 MICROgram(s)/kG/Min (6.47 mL/Hr) IV Continuous <Continuous>  pantoprazole  Injectable 40 milliGRAM(s) IV Push daily  thiamine 100 milliGRAM(s) Oral daily  vancomycin  IVPB 1000 milliGRAM(s) IV Intermittent every 12 hours    MEDICATIONS  (PRN):  acetaminophen     Tablet .. 650 milliGRAM(s) Oral every 6 hours PRN Temp greater or equal to 38C (100.4F), Mild Pain (1 - 3)  aluminum hydroxide/magnesium hydroxide/simethicone Suspension 30 milliLiter(s) Oral every 4 hours PRN Dyspepsia  melatonin 3 milliGRAM(s) Oral at bedtime PRN Insomnia  ondansetron Injectable 4 milliGRAM(s) IV Push every 8 hours PRN Nausea and/or Vomiting    Allergies  No Known Allergies    Intolerances      REVIEW OF SYSTEMS  General:	  Skin/Breast:	  Ophthalmologic:  ENMT:	  Respiratory and Thorax:	  Cardiovascular:	  Gastrointestinal:	  Genitourinary:	  Musculoskeletal:	  Neurological:	  Psychiatric:	  Hematology/Lymphatics:	  Endocrine:	  Allergic/Immunologic:	    ROS: Pertinent positives above, all other ROS were reviewed and are negative.      Vital Signs Last 24 Hrs  T(C): 36.4 (2022 07:00), Max: 38 (2022 23:00)  T(F): 97.5 (2022 07:00), Max: 100.4 (2022 23:00)  HR: 43 (2022 09:00) (40 - 86)  BP: 104/67 (2022 09:00) (73/54 - 129/55)  BP(mean): 80 (2022 09:00) (59 - 88)  RR: 33 (2022 09:00) (11 - 36)  SpO2: 97% (2022 09:00) (94% - 100%)    GENERAL EXAM:  Constitutional: awake and alert. NAD  HEENT: PERRLA, EOMI  Neck: Supple  Respiratory: Breath sounds are clear bilaterally  Cardiovascular: S1 and S2, regular / irregular rhythm  Gastrointestinal: soft, nontender  Extremities: no edema, no cyanosis  Vascular: no carotid bruits  Musculoskeletal: no joint swelling/tenderness, no abnormal movements  Skin: no rashes    NEUROLOGICAL EXAM:  MS: AAOX3, fluent, attends b/l; recent and remote memory intact; normal attention, language and fund of knowledge.   CN: VFF, EOMI, PERRL, no VINNIE, no APD,  V1-3 intact, no facial asymmetry, t/p midline, SCM/trap intact.  Eyes-Fundi: no papilledema.  Motor: Strength: 5/5 4x. Tone: normal. Bulk: normal. DTR 2+ symm.  Plantar flex b/l. Sensation: intact to LT/PP/Vibration/Position/Temperature 4x.   Coordination: intact 4x.   Gait:  Romberg negative, pull test negative; walks with narrow base, pivots in 2 steps.    NIHSS  mRS    Labs:   cbc                      10.2   4.71  )-----------( 125      ( 2022 02:09 )             29.8     [id number]    140  |  111<H>  |  <4<L>  ----------------------------<  197<H>  3.6   |  18<L>  |  0.71    Ca    8.4      2022 02:09  Phos  1.8     -  Mg     2.0     -    TPro  5.5<L>  /  Alb  2.7<L>  /  TBili  0.2  /  DBili  x   /  AST  13  /  ALT  11  /  AlkPhos  39<L>  -    CoagsPT/INR - ( 2022 02:09 )   PT: 14.2 sec;   INR: 1.23 ratio         PTT - ( 2022 02:09 )  PTT:28.9 sec  Lipids  A1C  Cardiac MarkersCARDIAC MARKERS ( 2022 02:09 )  x     / x     / 34 U/L / x     / 2.0 ng/mL      LIVER FUNCTIONS - ( 2022 02:09 )  Alb: 2.7 g/dL / Pro: 5.5 g/dL / ALK PHOS: 39 U/L / ALT: 11 U/L / AST: 13 U/L / GGT: x           UAUrinalysis Basic - ( 2022 17:45 )    Color: Yellow / Appearance: Slightly Turbid / S.013 / pH: x  Gluc: x / Ketone: Negative  / Bili: Negative / Urobili: Negative   Blood: x / Protein: Trace / Nitrite: Negative   Leuk Esterase: Negative / RBC: 192 /hpf / WBC 3 /HPF   Sq Epi: x / Non Sq Epi: 1 /hpf / Bacteria: Negative      CSF  Immunological Labs    Radiology: [id number]    Subjective: No acute events over night. LP was attempted yesterday, unable to retrieve fluid. Pt less responsive today and follow fewer commands.       PAST MEDICAL & SURGICAL HISTORY:  H/O cerebellar ataxia    No significant past surgical history      FAMILY HISTORY:  FH: dementia (Mother)    FH: type 2 diabetes (Mother)    Family history of CVA (Father)      Social Hx:  Nonsmoker, no drug or alcohol use    Home Medications:  atorvastatin 20 mg oral tablet: 1 tab(s) orally once a day (2022 06:28)  mirtazapine 15 mg oral tablet: 1 tab(s) orally once a day (at bedtime), As Needed (2022 06:28)    MEDICATIONS  (STANDING):  acyclovir IVPB      acyclovir IVPB 600 milliGRAM(s) IV Intermittent every 8 hours  ampicillin  IVPB      ampicillin  IVPB 2 Gram(s) IV Intermittent every 4 hours  atorvastatin 20 milliGRAM(s) Oral at bedtime  cefTRIAXone   IVPB 2000 milliGRAM(s) IV Intermittent every 12 hours  chlorhexidine 4% Liquid 1 Application(s) Topical <User Schedule>  dextrose 5% + sodium chloride 0.45%. 1000 milliLiter(s) (100 mL/Hr) IV Continuous <Continuous>  folic acid 1 milliGRAM(s) Oral daily  hydrocortisone sodium succinate Injectable 100 milliGRAM(s) IV Push every 8 hours  insulin lispro (ADMELOG) corrective regimen sliding scale   SubCutaneous every 4 hours  multivitamin 1 Tablet(s) Oral daily  norepinephrine Infusion 0.05 MICROgram(s)/kG/Min (6.47 mL/Hr) IV Continuous <Continuous>  pantoprazole  Injectable 40 milliGRAM(s) IV Push daily  thiamine 100 milliGRAM(s) Oral daily  vancomycin  IVPB 1000 milliGRAM(s) IV Intermittent every 12 hours    MEDICATIONS  (PRN):  acetaminophen     Tablet .. 650 milliGRAM(s) Oral every 6 hours PRN Temp greater or equal to 38C (100.4F), Mild Pain (1 - 3)  aluminum hydroxide/magnesium hydroxide/simethicone Suspension 30 milliLiter(s) Oral every 4 hours PRN Dyspepsia  melatonin 3 milliGRAM(s) Oral at bedtime PRN Insomnia  ondansetron Injectable 4 milliGRAM(s) IV Push every 8 hours PRN Nausea and/or Vomiting    Allergies  No Known Allergies    Intolerances      REVIEW OF SYSTEM    ROS: Pertinent positives above, all other ROS were reviewed and are negative.      Vital Signs Last 24 Hrs  T(C): 36.4 (2022 07:00), Max: 38 (2022 23:00)  T(F): 97.5 (2022 07:00), Max: 100.4 (2022 23:00)  HR: 43 (2022 09:00) (40 - 86)  BP: 104/67 (2022 09:00) (73/54 - 129/55)  BP(mean): 80 (:00) (59 - 88)  RR: 33 (2022 09:00) (11 - 36)  SpO2: 97% (2022 09:00) (94% - 100%)    GENERAL EXAM:  Constitutional: Patient arousal to verbal stimuli, unable to keep eyes open.     NEUROLOGICAL EXAM:  MS: AAOX0, speech dysarthric, attends b/l; Pt able to show 2 fingers in right hand. Unable to state name. Patient appears to be responding to internal stimuli, for example shakes hands with the air, albeit eyes are close.   CN: Unable to assess visual fields due to patient not following commands. PERRLA   Motor: Unable to assess strength. Tone: normal. Bulk: normal. DTR 2+ symm.  Plantar flex b/l.   Coordination: Patient not responsive to commands.   Gait:  Unable to assess gait.     Labs:   cbc                      10.2   4.71  )-----------( 125      ( 2022 02:09 )             29.8     [id number]    140  |  111<H>  |  <4<L>  ----------------------------<  197<H>  3.6   |  18<L>  |  0.71    Ca    8.4      2022 02:09  Phos  1.8     04-22  Mg     2.0     04-22    TPro  5.5<L>  /  Alb  2.7<L>  /  TBili  0.2  /  DBili  x   /  AST  13  /  ALT  11  /  AlkPhos  39<L>  04-22    CoagsPT/INR - ( 2022 02:09 )   PT: 14.2 sec;   INR: 1.23 ratio         PTT - ( 2022 02:09 )  PTT:28.9 sec  Lipids  A1C  Cardiac MarkersCARDIAC MARKERS ( 2022 02:09 )  x     / x     / 34 U/L / x     / 2.0 ng/mL      LIVER FUNCTIONS - ( 2022 02:09 )  Alb: 2.7 g/dL / Pro: 5.5 g/dL / ALK PHOS: 39 U/L / ALT: 11 U/L / AST: 13 U/L / GGT: x           UAUrinalysis Basic - ( 2022 17:45 )    Color: Yellow / Appearance: Slightly Turbid / S.013 / pH: x  Gluc: x / Ketone: Negative  / Bili: Negative / Urobili: Negative   Blood: x / Protein: Trace / Nitrite: Negative   Leuk Esterase: Negative / RBC: 192 /hpf / WBC 3 /HPF   Sq Epi: x / Non Sq Epi: 1 /hpf / Bacteria: Negative    Radiology:  MRI BRAIN:  Extensive, diffuse leptomeningeal enhancement. Differential diagnosis   primarily includes leptomeningeal metastases and infectious meningitis.    Abnormal ependymal enhancement involving the bilateral frontal horns,   bilateral ventricular bodies, left temporal and occipital horn, and   fourth ventricle. Differential diagnosis includes ependymal metastases   and infectious meningitis.    Possible small foci of restricted diffusion in the bilateral superior   cerebral hemispheres. These may represent small acute infarcts or regions   of encephalitis.    Edema noted within the cerebellar vermis and mesial bilateral cerebellar   hemispheres.    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    Multilevel degenerative changes.   MRN-[id number]    Subjective: No acute events over night. LP was attempted yesterday, unable to retrieve fluid. Pt less responsive today and follow fewer commands.       PAST MEDICAL & SURGICAL HISTORY:  H/O cerebellar ataxia    No significant past surgical history      FAMILY HISTORY:  FH: dementia (Mother)    FH: type 2 diabetes (Mother)    Family history of CVA (Father)      Social Hx:  Nonsmoker, no drug or alcohol use    Home Medications:  atorvastatin 20 mg oral tablet: 1 tab(s) orally once a day (2022 06:28)  mirtazapine 15 mg oral tablet: 1 tab(s) orally once a day (at bedtime), As Needed (2022 06:28)    MEDICATIONS  (STANDING):  acyclovir IVPB      acyclovir IVPB 600 milliGRAM(s) IV Intermittent every 8 hours  ampicillin  IVPB      ampicillin  IVPB 2 Gram(s) IV Intermittent every 4 hours  atorvastatin 20 milliGRAM(s) Oral at bedtime  cefTRIAXone   IVPB 2000 milliGRAM(s) IV Intermittent every 12 hours  chlorhexidine 4% Liquid 1 Application(s) Topical <User Schedule>  dextrose 5% + sodium chloride 0.45%. 1000 milliLiter(s) (100 mL/Hr) IV Continuous <Continuous>  folic acid 1 milliGRAM(s) Oral daily  hydrocortisone sodium succinate Injectable 100 milliGRAM(s) IV Push every 8 hours  insulin lispro (ADMELOG) corrective regimen sliding scale   SubCutaneous every 4 hours  multivitamin 1 Tablet(s) Oral daily  norepinephrine Infusion 0.05 MICROgram(s)/kG/Min (6.47 mL/Hr) IV Continuous <Continuous>  pantoprazole  Injectable 40 milliGRAM(s) IV Push daily  thiamine 100 milliGRAM(s) Oral daily  vancomycin  IVPB 1000 milliGRAM(s) IV Intermittent every 12 hours    MEDICATIONS  (PRN):  acetaminophen     Tablet .. 650 milliGRAM(s) Oral every 6 hours PRN Temp greater or equal to 38C (100.4F), Mild Pain (1 - 3)  aluminum hydroxide/magnesium hydroxide/simethicone Suspension 30 milliLiter(s) Oral every 4 hours PRN Dyspepsia  melatonin 3 milliGRAM(s) Oral at bedtime PRN Insomnia  ondansetron Injectable 4 milliGRAM(s) IV Push every 8 hours PRN Nausea and/or Vomiting    Allergies  No Known Allergies    Intolerances      REVIEW OF SYSTEM    ROS: Pertinent positives above, all other ROS were reviewed and are negative.      Vital Signs Last 24 Hrs  T(C): 36.4 (2022 07:00), Max: 38 (2022 23:00)  T(F): 97.5 (2022 07:00), Max: 100.4 (2022 23:00)  HR: 43 (2022 09:00) (40 - 86)  BP: 104/67 (2022 09:00) (73/54 - 129/55)  BP(mean): 80 (:00) (59 - 88)  RR: 33 (2022 09:00) (11 - 36)  SpO2: 97% (2022 09:00) (94% - 100%)    GENERAL EXAM:  Constitutional: Patient arousal to verbal stimuli, unable to keep eyes open.     NEUROLOGICAL EXAM:  MS: AAOX0, speech dysarthric, attends b/l; Pt able to show 2 fingers in right hand. Unable to state name. Patient appears to be responding to internal stimuli, for example shakes hands with the air, albeit eyes are close.   CN: Unable to assess visual fields due to patient not following commands. PERRLA   Motor: Unable to assess strength. Tone: normal. Bulk: normal. DTR 2+ symm.  Plantar flex b/l.   Coordination: Patient not responsive to commands.   Gait:  Unable to assess gait.     Labs:   cbc                      10.2   4.71  )-----------( 125      ( 2022 02:09 )             29.8     [id number]    140  |  111<H>  |  <4<L>  ----------------------------<  197<H>  3.6   |  18<L>  |  0.71    Ca    8.4      2022 02:09  Phos  1.8     04-22  Mg     2.0     04-22    TPro  5.5<L>  /  Alb  2.7<L>  /  TBili  0.2  /  DBili  x   /  AST  13  /  ALT  11  /  AlkPhos  39<L>  04-22    CoagsPT/INR - ( 2022 02:09 )   PT: 14.2 sec;   INR: 1.23 ratio         PTT - ( 2022 02:09 )  PTT:28.9 sec  Lipids  A1C  Cardiac MarkersCARDIAC MARKERS ( 2022 02:09 )  x     / x     / 34 U/L / x     / 2.0 ng/mL      LIVER FUNCTIONS - ( 2022 02:09 )  Alb: 2.7 g/dL / Pro: 5.5 g/dL / ALK PHOS: 39 U/L / ALT: 11 U/L / AST: 13 U/L / GGT: x           UAUrinalysis Basic - ( 2022 17:45 )    Color: Yellow / Appearance: Slightly Turbid / S.013 / pH: x  Gluc: x / Ketone: Negative  / Bili: Negative / Urobili: Negative   Blood: x / Protein: Trace / Nitrite: Negative   Leuk Esterase: Negative / RBC: 192 /hpf / WBC 3 /HPF   Sq Epi: x / Non Sq Epi: 1 /hpf / Bacteria: Negative    Radiology:  MRI BRAIN:  Extensive, diffuse leptomeningeal enhancement. Differential diagnosis   primarily includes leptomeningeal metastases and infectious meningitis.    Abnormal ependymal enhancement involving the bilateral frontal horns,   bilateral ventricular bodies, left temporal and occipital horn, and   fourth ventricle. Differential diagnosis includes ependymal metastases   and infectious meningitis.    Possible small foci of restricted diffusion in the bilateral superior   cerebral hemispheres. These may represent small acute infarcts or regions   of encephalitis.    Edema noted within the cerebellar vermis and mesial bilateral cerebellar   hemispheres.    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    Multilevel degenerative changes.   MRN-[id number]    Subjective: No acute events over night. Pt transferred to the MICU, multiple hypotensive episodes, bradycardic. LP was attempted yesterday, unable to retrieve fluid. Pt less responsive today and follow fewer commands.       PAST MEDICAL & SURGICAL HISTORY:  H/O cerebellar ataxia    No significant past surgical history      FAMILY HISTORY:  FH: dementia (Mother)    FH: type 2 diabetes (Mother)    Family history of CVA (Father)      Social Hx:  Nonsmoker, no drug or alcohol use    Home Medications:  atorvastatin 20 mg oral tablet: 1 tab(s) orally once a day (2022 06:28)  mirtazapine 15 mg oral tablet: 1 tab(s) orally once a day (at bedtime), As Needed (2022 06:28)    MEDICATIONS  (STANDING):  acyclovir IVPB      acyclovir IVPB 600 milliGRAM(s) IV Intermittent every 8 hours  ampicillin  IVPB      ampicillin  IVPB 2 Gram(s) IV Intermittent every 4 hours  atorvastatin 20 milliGRAM(s) Oral at bedtime  cefTRIAXone   IVPB 2000 milliGRAM(s) IV Intermittent every 12 hours  chlorhexidine 4% Liquid 1 Application(s) Topical <User Schedule>  dextrose 5% + sodium chloride 0.45%. 1000 milliLiter(s) (100 mL/Hr) IV Continuous <Continuous>  folic acid 1 milliGRAM(s) Oral daily  hydrocortisone sodium succinate Injectable 100 milliGRAM(s) IV Push every 8 hours  insulin lispro (ADMELOG) corrective regimen sliding scale   SubCutaneous every 4 hours  multivitamin 1 Tablet(s) Oral daily  norepinephrine Infusion 0.05 MICROgram(s)/kG/Min (6.47 mL/Hr) IV Continuous <Continuous>  pantoprazole  Injectable 40 milliGRAM(s) IV Push daily  thiamine 100 milliGRAM(s) Oral daily  vancomycin  IVPB 1000 milliGRAM(s) IV Intermittent every 12 hours    MEDICATIONS  (PRN):  acetaminophen     Tablet .. 650 milliGRAM(s) Oral every 6 hours PRN Temp greater or equal to 38C (100.4F), Mild Pain (1 - 3)  aluminum hydroxide/magnesium hydroxide/simethicone Suspension 30 milliLiter(s) Oral every 4 hours PRN Dyspepsia  melatonin 3 milliGRAM(s) Oral at bedtime PRN Insomnia  ondansetron Injectable 4 milliGRAM(s) IV Push every 8 hours PRN Nausea and/or Vomiting    Allergies  No Known Allergies    Intolerances      REVIEW OF SYSTEM    ROS: Pertinent positives above, all other ROS were reviewed and are negative.      Vital Signs Last 24 Hrs  T(C): 36.4 (2022 07:00), Max: 38 (2022 23:00)  T(F): 97.5 (2022 07:00), Max: 100.4 (2022 23:00)  HR: 43 (2022 09:00) (40 - 86)  BP: 104/67 (2022 09:00) (73/54 - 129/55)  BP(mean): 80 (:00) (59 - 88)  RR: 33 (:00) (11 - 36)  SpO2: 97% (2022 09:00) (94% - 100%)    GENERAL EXAM:  Constitutional: Patient arousal to verbal stimuli, unable to keep eyes open.     NEUROLOGICAL EXAM:  MS: AAOX0, speech dysarthric, attends b/l; Pt able to show 2 fingers in right hand. Patient appears to be responding to internal stimuli, for example shakes hands with the air, albeit eyes are close.   CN: Unable to assess visual fields due to patient not following commands. PERRLA   Motor: 5/5  strength. Tone: normal. Bulk: normal. DTR 2+ symm.  Plantar flex b/l.   Coordination: limited  Gait:  Unable to assess gait.     Labs:   cbc                      10.2   4.71  )-----------( 125      ( 2022 02:09 )             29.8     [id number]    140  |  111<H>  |  <4<L>  ----------------------------<  197<H>  3.6   |  18<L>  |  0.71    Ca    8.4      2022 02:09  Phos  1.8     -  Mg     2.0     -22    TPro  5.5<L>  /  Alb  2.7<L>  /  TBili  0.2  /  DBili  x   /  AST  13  /  ALT  11  /  AlkPhos  39<L>  22    CoagsPT/INR - ( 2022 02:09 )   PT: 14.2 sec;   INR: 1.23 ratio         PTT - ( 2022 02:09 )  PTT:28.9 sec  Lipids  A1C  Cardiac MarkersCARDIAC MARKERS ( 2022 02:09 )  x     / x     / 34 U/L / x     / 2.0 ng/mL      LIVER FUNCTIONS - ( 2022 02:09 )  Alb: 2.7 g/dL / Pro: 5.5 g/dL / ALK PHOS: 39 U/L / ALT: 11 U/L / AST: 13 U/L / GGT: x           UAUrinalysis Basic - ( 2022 17:45 )    Color: Yellow / Appearance: Slightly Turbid / S.013 / pH: x  Gluc: x / Ketone: Negative  / Bili: Negative / Urobili: Negative   Blood: x / Protein: Trace / Nitrite: Negative   Leuk Esterase: Negative / RBC: 192 /hpf / WBC 3 /HPF   Sq Epi: x / Non Sq Epi: 1 /hpf / Bacteria: Negative    Radiology:  MRI BRAIN:  Extensive, diffuse leptomeningeal enhancement. Differential diagnosis   primarily includes leptomeningeal metastases and infectious meningitis.    Abnormal ependymal enhancement involving the bilateral frontal horns,   bilateral ventricular bodies, left temporal and occipital horn, and   fourth ventricle. Differential diagnosis includes ependymal metastases   and infectious meningitis.    Possible small foci of restricted diffusion in the bilateral superior   cerebral hemispheres. These may represent small acute infarcts or regions   of encephalitis.    Edema noted within the cerebellar vermis and mesial bilateral cerebellar   hemispheres.    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    Multilevel degenerative changes.   MRN-[id number]    Subjective: No acute events over night. Pt transferred to the MICU, multiple hypotensive episodes, bradycardic. LP was attempted yesterday, unable to retrieve fluid. Pt less responsive today and follow fewer commands.       PAST MEDICAL & SURGICAL HISTORY:  H/O cerebellar ataxia    No significant past surgical history      FAMILY HISTORY:  FH: dementia (Mother)    FH: type 2 diabetes (Mother)    Family history of CVA (Father)         Social Hx:  Nonsmoker, no drug or alcohol use    Home Medications:  atorvastatin 20 mg oral tablet: 1 tab(s) orally once a day (2022 06:28)  mirtazapine 15 mg oral tablet: 1 tab(s) orally once a day (at bedtime), As Needed (2022 06:28)    MEDICATIONS  (STANDING):  acyclovir IVPB      acyclovir IVPB 600 milliGRAM(s) IV Intermittent every 8 hours  ampicillin  IVPB      ampicillin  IVPB 2 Gram(s) IV Intermittent every 4 hours  atorvastatin 20 milliGRAM(s) Oral at bedtime  cefTRIAXone   IVPB 2000 milliGRAM(s) IV Intermittent every 12 hours  chlorhexidine 4% Liquid 1 Application(s) Topical <User Schedule>  dextrose 5% + sodium chloride 0.45%. 1000 milliLiter(s) (100 mL/Hr) IV Continuous <Continuous>  folic acid 1 milliGRAM(s) Oral daily  hydrocortisone sodium succinate Injectable 100 milliGRAM(s) IV Push every 8 hours  insulin lispro (ADMELOG) corrective regimen sliding scale   SubCutaneous every 4 hours  multivitamin 1 Tablet(s) Oral daily  norepinephrine Infusion 0.05 MICROgram(s)/kG/Min (6.47 mL/Hr) IV Continuous <Continuous>  pantoprazole  Injectable 40 milliGRAM(s) IV Push daily  thiamine 100 milliGRAM(s) Oral daily  vancomycin  IVPB 1000 milliGRAM(s) IV Intermittent every 12 hours    MEDICATIONS  (PRN):  acetaminophen     Tablet .. 650 milliGRAM(s) Oral every 6 hours PRN Temp greater or equal to 38C (100.4F), Mild Pain (1 - 3)  aluminum hydroxide/magnesium hydroxide/simethicone Suspension 30 milliLiter(s) Oral every 4 hours PRN Dyspepsia  melatonin 3 milliGRAM(s) Oral at bedtime PRN Insomnia  ondansetron Injectable 4 milliGRAM(s) IV Push every 8 hours PRN Nausea and/or Vomiting    Allergies  No Known Allergies    Intolerances      REVIEW OF SYSTEM    ROS: Pertinent positives above, all other ROS were reviewed and are negative.      Vital Signs Last 24 Hrs  T(C): 36.4 (2022 07:00), Max: 38 (2022 23:00)  T(F): 97.5 (2022 07:00), Max: 100.4 (2022 23:00)  HR: 43 (2022 09:00) (40 - 86)  BP: 104/67 (2022 09:00) (73/54 - 129/55)  BP(mean): 80 (:00) (59 - 88)  RR: 33 (:00) (11 - 36)  SpO2: 97% (2022 09:00) (94% - 100%)    GENERAL EXAM:  Constitutional: Patient arousal to verbal stimuli, unable to keep eyes open.     NEUROLOGICAL EXAM:  MS: AAOX0, speech dysarthric, attends b/l; Pt able to show 2 fingers in right hand. Patient appears to be responding to internal stimuli, for example shakes hands with the air, albeit eyes are close.   CN: Unable to assess visual fields due to patient not following commands. PERRLA   Motor: 5/5  strength. Tone: normal. Bulk: normal. DTR 2+ symm.  Plantar flex b/l.   Coordination: limited  Gait:  Unable to assess gait.     Labs:   cbc                      10.2   4.71  )-----------( 125      ( 2022 02:09 )             29.8     [id number]    140  |  111<H>  |  <4<L>  ----------------------------<  197<H>  3.6   |  18<L>  |  0.71    Ca    8.4      2022 02:09  Phos  1.8     -  Mg     2.0     -22    TPro  5.5<L>  /  Alb  2.7<L>  /  TBili  0.2  /  DBili  x   /  AST  13  /  ALT  11  /  AlkPhos  39<L>  22    CoagsPT/INR - ( 2022 02:09 )   PT: 14.2 sec;   INR: 1.23 ratio         PTT - ( 2022 02:09 )  PTT:28.9 sec  Lipids  A1C  Cardiac MarkersCARDIAC MARKERS ( 2022 02:09 )  x     / x     / 34 U/L / x     / 2.0 ng/mL      LIVER FUNCTIONS - ( 2022 02:09 )  Alb: 2.7 g/dL / Pro: 5.5 g/dL / ALK PHOS: 39 U/L / ALT: 11 U/L / AST: 13 U/L / GGT: x           UAUrinalysis Basic - ( 2022 17:45 )    Color: Yellow / Appearance: Slightly Turbid / S.013 / pH: x  Gluc: x / Ketone: Negative  / Bili: Negative / Urobili: Negative   Blood: x / Protein: Trace / Nitrite: Negative   Leuk Esterase: Negative / RBC: 192 /hpf / WBC 3 /HPF   Sq Epi: x / Non Sq Epi: 1 /hpf / Bacteria: Negative    Radiology:  MRI BRAIN:  Extensive, diffuse leptomeningeal enhancement. Differential diagnosis   primarily includes leptomeningeal metastases and infectious meningitis.    Abnormal ependymal enhancement involving the bilateral frontal horns,   bilateral ventricular bodies, left temporal and occipital horn, and   fourth ventricle. Differential diagnosis includes ependymal metastases   and infectious meningitis.    Possible small foci of restricted diffusion in the bilateral superior   cerebral hemispheres. These may represent small acute infarcts or regions   of encephalitis.    Edema noted within the cerebellar vermis and mesial bilateral cerebellar   hemispheres.    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    Multilevel degenerative changes.

## 2022-04-22 NOTE — PROGRESS NOTE ADULT - ASSESSMENT
60 yo M with PMHx of cerebellar ataxia who was brought to the ED due to AMS. History is obtained from chart review and from patients wife as patient is not responding to questions. Patient experiencing ataxia, cognitive deficits weight loss over the past 2 years. Approximately 1 week prior to current presentation, patient began to experience progressive fatigue/weakness, AMS. Pt responding to some commands, normal reflexes and tone, though incomprehensible speech, pt responding to internal stimuli on exam (concerns for hallucinations). 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 Pt likely with acute worsening of a chronic process, consider in the setting of recent infection though cannot r/o cerebral vascular infarct, inflammatory disease, seizures. Also would consider other causes of neurocognitive decline, including underlying prion disease, paraneoplastic syndromes, autoimmune / toxic causes.    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 Unable to state name, only able to show 2 fingers with left hand, not following any other commands.     DDx: infectious/metabolic, cerebral vascular infarct, brain metastases (though no primary source identified at this time), inflammatory disease, seizure, paraneoplastic syndrome. Cannot r/o neurocognitive decline 2/2 underlying prion disease vs dementia vs nph     Recommendations:  [ ] Plan for LP with IR, likely today. f/u CSF cytology. 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   [x] Video EEG showed moderate to severe nonspecific diffuse or multifocal cerebral dysfunction. No epileptiform pattern or seizure seen.  [x]  NH3 wnl. folate 3.4, b12 elevated. Ceruloplasmin 24. TSH 1.46, TPO antibody <10, copper 108 wnl, quant gold wnl Homocysteine 6.9 wnl, CRP  elevated at 25, ESR elevated at 26, RPR wnl, HIV wnl, ck 34 wnl,         Case discussed with Dr. Lima. 60 yo M with PMHx of cerebellar ataxia who was brought to the ED due to AMS. History is obtained from chart review and from patients wife as patient is not responding to questions. Patient experiencing ataxia, cognitive deficits weight loss over the past 2 years. Approximately 1 week prior to current presentation, patient began to experience progressive fatigue/weakness, AMS. Pt responding to some commands, normal reflexes and tone, though incomprehensible speech, pt responding to internal stimuli on exam (concerns for hallucinations). 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 Pt likely with acute worsening of a chronic process, consider in the setting of recent infection though cannot r/o cerebral vascular infarct, inflammatory disease, seizures. Also would consider other causes of neurocognitive decline, including underlying prion disease, paraneoplastic syndromes, autoimmune / toxic causes.    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 Unable to state name, only able to show 2 fingers with left hand, good  strength, some concerns for possible visual hallucinations.     DDx: infectious/metabolic, cerebral vascular infarct, brain metastases (though no primary source identified at this time), inflammatory disease, seizure, paraneoplastic syndrome. Cannot r/o neurocognitive decline 2/2 underlying prion disease vs dementia vs nph     Recommendations:  [ ] Plan for repeat LP with IR, likely today. f/u CSF cytology. 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   [x] Video EEG showed moderate to severe nonspecific diffuse or multifocal cerebral dysfunction. No epileptiform pattern or seizure seen.  [x]  NH3 wnl. folate 3.4, b12 elevated. Ceruloplasmin 24. TSH 1.46, TPO antibody <10, copper 108 wnl, quant gold wnl Homocysteine 6.9 wnl, CRP elevated at 25, ESR elevated at 26, RPR wnl, HIV wnl, ck 34 wnl  [ ] Could consider addition of Seroquel 12.5 mg qHS if insomnia (monitor EKG with QTc < 500).          Case discussed with Dr. Lima.

## 2022-04-22 NOTE — PROGRESS NOTE ADULT - SUBJECTIVE AND OBJECTIVE BOX
Clinical Indication: Leptomeningeal enhancement    PREPROCEDURE:    Patient presents for diagnostic lumbar puncture.  Risks and benefits were discussed with patient and/or health care proxy.  Risks include but are not limited to headache, bleeding, infection and nerve damage.    Patient and/or health care proxy understands and consents to procedure.    POSTPROCEDURE:    Lumbar puncture was performed at the L3-4  level using a 20 gauge needle using fluoroscopic guidance. 16  cc of CSF  was collected and hand delivered to the lab.    Patient tolerated the procedure well and left the department in stable condition.

## 2022-04-22 NOTE — PROGRESS NOTE ADULT - SUBJECTIVE AND OBJECTIVE BOX
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 Pt received CT C/A/P 22 that demonstrated esophagitis, mucoid impaction of LLL, patchy tree in bud in RUL/RLL concerning for aspiration pneum, received zosyn, with change to ceftiraxone and vanco therapy. Pt in addition has received MRI 22 of brain/cervical spine that showed extensive/diffuse leptomeningeal enhancement concerning for metastatic dz vs infectious meningitis     This short hospital course has been c/b  episodes of HypoTN, hypothermia, sinus bradycardia with 3.1 second pause requiring RRT and MICU consult a.m. of 22. As pt demonstrated improvement with IVF therapy, pt was deemed not MICU candidate at that time. Course however further c/b recurrent RRT x2  morning of 22 for recurrent HypoTN, sinus bradycardia, hypothermia. Pt initially received IVF bolus with standing therapy with improvement in SBP however now refractory to 3 liters IVF bolus, 5% 250 cc albumin x1 requiring norepi gtt. Pt received hydrocortisone 100 mg IVP x 1 at that time as well. Baseline SBP         Pt admitted to MICU for septic shock, possible adrenal insufficiency secondary possibly due to Asp pneum        Interval Events:      REVIEW OF SYSTEMS:          OBJECTIVE:  ICU Vital Signs Last 24 Hrs  T(C): 36 (2022 03:00), Max: 38 (2022 23:00)  T(F): 96.8 (2022 03:00), Max: 100.4 (2022 23:00)  HR: 56 (2022 04:30) (40 - 86)  BP: 94/59 (2022 04:30) (73/54 - 129/55)  BP(mean): 72 (2022 04:30) (59 - 88)  ABP: --  ABP(mean): --  RR: 29 (2022 04:30) (11 - 31)  SpO2: 97% (2022 04:30) (96% - 100%)         @ 07:01  -   @ 07:00  --------------------------------------------------------  IN: 3990 mL / OUT: 2325 mL / NET: 1665 mL     @ 07: @ 06:17  --------------------------------------------------------  IN: 2480.8 mL / OUT: 3855 mL / NET: -1374.2 mL      CAPILLARY BLOOD GLUCOSE      POCT Blood Glucose.: 128 mg/dL (2022 11:30)          PHYSICAL EXAM:          HOSPITAL MEDICATIONS:  MEDICATIONS  (STANDING):  acyclovir IVPB      acyclovir IVPB 600 milliGRAM(s) IV Intermittent every 8 hours  ampicillin  IVPB      ampicillin  IVPB 2 Gram(s) IV Intermittent every 4 hours  atorvastatin 20 milliGRAM(s) Oral at bedtime  cefTRIAXone   IVPB 2000 milliGRAM(s) IV Intermittent every 12 hours  chlorhexidine 4% Liquid 1 Application(s) Topical <User Schedule>  dextrose 5% + sodium chloride 0.45%. 1000 milliLiter(s) (100 mL/Hr) IV Continuous <Continuous>  folic acid 1 milliGRAM(s) Oral daily  hydrocortisone sodium succinate Injectable 100 milliGRAM(s) IV Push every 8 hours  multivitamin 1 Tablet(s) Oral daily  norepinephrine Infusion 0.05 MICROgram(s)/kG/Min (6.47 mL/Hr) IV Continuous <Continuous>  pantoprazole  Injectable 40 milliGRAM(s) IV Push daily  thiamine 100 milliGRAM(s) Oral daily  vancomycin  IVPB 1000 milliGRAM(s) IV Intermittent every 12 hours    MEDICATIONS  (PRN):  acetaminophen     Tablet .. 650 milliGRAM(s) Oral every 6 hours PRN Temp greater or equal to 38C (100.4F), Mild Pain (1 - 3)  aluminum hydroxide/magnesium hydroxide/simethicone Suspension 30 milliLiter(s) Oral every 4 hours PRN Dyspepsia  melatonin 3 milliGRAM(s) Oral at bedtime PRN Insomnia  ondansetron Injectable 4 milliGRAM(s) IV Push every 8 hours PRN Nausea and/or Vomiting      LABS:                        10.2   4.71  )-----------( 125      ( 2022 02:09 )             29.8     Hgb Trend: 10.2<--, 10.3<--, 9.0<--, 9.3<--, 10.2<--      140  |  111<H>  |  <4<L>  ----------------------------<  197<H>  3.6   |  18<L>  |  0.71    Ca    8.4      2022 02:09  Phos  1.8       Mg     2.0         TPro  5.5<L>  /  Alb  2.7<L>  /  TBili  0.2  /  DBili  x   /  AST  13  /  ALT  11  /  AlkPhos  39<L>      LIVER FUNCTIONS - ( 2022 02:09 )  Alb: 2.7 g/dL / Pro: 5.5 g/dL / ALK PHOS: 39 U/L / ALT: 11 U/L / AST: 13 U/L / GGT: x           Creatinine Trend: 0.71<--, 0.75<--, 0.74<--, 0.75<--, 0.71<--, 0.92<--  PT/INR - ( 2022 02:09 )   PT: 14.2 sec;   INR: 1.23 ratio         PTT - ( 2022 02:09 )  PTT:28.9 sec  Urinalysis Basic - ( 2022 17:45 )    Color: Yellow / Appearance: Slightly Turbid / S.013 / pH: x  Gluc: x / Ketone: Negative  / Bili: Negative / Urobili: Negative   Blood: x / Protein: Trace / Nitrite: Negative   Leuk Esterase: Negative / RBC: 192 /hpf / WBC 3 /HPF   Sq Epi: x / Non Sq Epi: 1 /hpf / Bacteria: Negative      Arterial Blood Gas:   @ 00:27  7.40/33/139/20/100.0/-3.8  ABG lactate: --    Venous Blood Gas:   @ 02:01  7.41/33/91/21/97.7  VBG Lactate: 1.5  Venous Blood Gas:   @ 12:05  7.31/45/34/23/57.6  VBG Lactate: 1.1      MICROBIOLOGY:     RADIOLOGY:  [ ] Reviewed and interpreted by me    EKG:      [facility] (ext [id number]) CHIEF COMPLAINT:  Patient is a 61y old  Male who presents with a chief complaint of AMS 2/2 Pneumonia (2022 17:37)    HPI:  61 yr old male with 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 Pt received CT C/A/P 22 that demonstrated esophagitis, mucoid impaction of LLL, patchy tree in bud in RUL/RLL concerning for aspiration pneum, received zosyn, with change to ceftiraxone and vanco therapy. Pt in addition has received MRI 22 of brain/cervical spine that showed extensive/diffuse leptomeningeal enhancement concerning for metastatic dz vs infectious meningitis     This short hospital course has been c/b  episodes of HypoTN, hypothermia, sinus bradycardia with 3.1 second pause requiring RRT and MICU consult a.m. of 22. As pt demonstrated improvement with IVF therapy, pt was deemed not MICU candidate at that time. Course however further c/b recurrent RRT x2  morning of 22 for recurrent HypoTN, sinus bradycardia, hypothermia. Pt initially received IVF bolus with standing therapy with improvement in SBP however now refractory to 3 liters IVF bolus, 5% 250 cc albumin x1 requiring norepi gtt. Pt received hydrocortisone 100 mg IVP x 1 at that time as well. Baseline SBP         Pt admitted to MICU for septic shock, possible adrenal insufficiency secondary possibly due to Asp pneum        Interval Events:      REVIEW OF SYSTEMS:  pt non verbal, follows commands, answers simple questions yes no by shaking head. denies sob, chest pain, headache        OBJECTIVE:  ICU Vital Signs Last 24 Hrs  T(C): 36 (2022 03:00), Max: 38 (2022 23:00)  T(F): 96.8 (2022 03:00), Max: 100.4 (2022 23:00)  HR: 56 (2022 04:30) (40 - 86)  BP: 94/59 (2022 04:30) (73/54 - 129/55)  BP(mean): 72 (2022 04:30) (59 - 88)  ABP: --  ABP(mean): --  RR: 29 (2022 04:30) (11 - 31)  SpO2: 97% (2022 04:30) (96% - 100%)        04 @ 07:01  -   @ 07:00  --------------------------------------------------------  IN: 3990 mL / OUT: 2325 mL / NET: 1665 mL     @ 07:01  -   @ 06:17  --------------------------------------------------------  IN: 2480.8 mL / OUT: 3855 mL / NET: -1374.2 mL      CAPILLARY BLOOD GLUCOSE      POCT Blood Glucose.: 128 mg/dL (2022 11:30)          PHYSICAL EXAM:  GENERAL:   NAD, well-groomed, well-developed    HEAD:    Atraumatic, Normocephalic    EYES:   EOMI, PERRLA 3 mm, conjunctiva and sclera clear    ENMT:   No oropharyngeal exudates, erythema or lesions,  Moist mucous membranes    NECK:   Supple, no cervical lymphadenopathy, no JVD    NERVOUS SYSTEM:  Responsive to verbal stimuli, focuses upon examiner, answers simple questions yes and no by shaking head yes/no   ,follows simple commands to demonstrate digits and extremities , Alert & Oriented X1 -2, shakes head affirmatively to choices of hospital,  CN II-XII intact, has spontaneous purposeful movement of  all extremities; Upper extremities  3-4/5; Lower extremities 2-3/5, full sensation to light touch     CHEST/LUNG:   utilizing 2 liters N/C   .Breath sounds bilaterally,  bibasilar crackles to 1/4 up, without rhonchi, wheezing, or rubs. POCUS A line predominant with focal B lines concentrated in anterior Lt lung field, small bilateral pleural effusions, consolidation appreciated in RLL field    HEART:   cardiac monitor sinus bradycardia without ectopy   ; S1/S2 No murmurs, rubs, or gallops, POCUS diminished LVFx, VTI 15, RV slight less then LV with slight flattening of septum noted, IVC 1.6    ABDOMEN:   Soft, Nontender, slight distended; Bowel sounds present, Bladder non distended, non palpable    EXTREMITIES:   Pulses palpable radial pulses 2+ bilat, DP/PT 2+/1+ bilat, without clubbing, cyanosis. Digits warm to touch with good cap refill < 3 secs    SKIN:   warm, dry, intact, normal color, no rash or abnormal lesions, without palpable nodes        HOSPITAL MEDICATIONS:  MEDICATIONS  (STANDING):  acyclovir IVPB      acyclovir IVPB 600 milliGRAM(s) IV Intermittent every 8 hours  ampicillin  IVPB      ampicillin  IVPB 2 Gram(s) IV Intermittent every 4 hours  atorvastatin 20 milliGRAM(s) Oral at bedtime  cefTRIAXone   IVPB 2000 milliGRAM(s) IV Intermittent every 12 hours  chlorhexidine 4% Liquid 1 Application(s) Topical <User Schedule>  dextrose 5% + sodium chloride 0.45%. 1000 milliLiter(s) (100 mL/Hr) IV Continuous <Continuous>  folic acid 1 milliGRAM(s) Oral daily  hydrocortisone sodium succinate Injectable 100 milliGRAM(s) IV Push every 8 hours  multivitamin 1 Tablet(s) Oral daily  norepinephrine Infusion 0.05 MICROgram(s)/kG/Min (6.47 mL/Hr) IV Continuous <Continuous>  pantoprazole  Injectable 40 milliGRAM(s) IV Push daily  thiamine 100 milliGRAM(s) Oral daily  vancomycin  IVPB 1000 milliGRAM(s) IV Intermittent every 12 hours    MEDICATIONS  (PRN):  acetaminophen     Tablet .. 650 milliGRAM(s) Oral every 6 hours PRN Temp greater or equal to 38C (100.4F), Mild Pain (1 - 3)  aluminum hydroxide/magnesium hydroxide/simethicone Suspension 30 milliLiter(s) Oral every 4 hours PRN Dyspepsia  melatonin 3 milliGRAM(s) Oral at bedtime PRN Insomnia  ondansetron Injectable 4 milliGRAM(s) IV Push every 8 hours PRN Nausea and/or Vomiting      LABS:                        10.2   4.71  )-----------( 125      ( 2022 02:09 )             29.8     Hgb Trend: 10.2<--, 10.3<--, 9.0<--, 9.3<--, 10.2<--      140  |  111<H>  |  <4<L>  ----------------------------<  197<H>  3.6   |  18<L>  |  0.71    Ca    8.4      2022 02:09  Phos  1.8       Mg     2.0         TPro  5.5<L>  /  Alb  2.7<L>  /  TBili  0.2  /  DBili  x   /  AST  13  /  ALT  11  /  AlkPhos  39<L>      LIVER FUNCTIONS - ( 2022 02:09 )  Alb: 2.7 g/dL / Pro: 5.5 g/dL / ALK PHOS: 39 U/L / ALT: 11 U/L / AST: 13 U/L / GGT: x           Creatinine Trend: 0.71<--, 0.75<--, 0.74<--, 0.75<--, 0.71<--, 0.92<--  PT/INR - ( 2022 02:09 )   PT: 14.2 sec;   INR: 1.23 ratio         PTT - ( 2022 02:09 )  PTT:28.9 sec  Urinalysis Basic - ( 2022 17:45 )    Color: Yellow / Appearance: Slightly Turbid / S.013 / pH: x  Gluc: x / Ketone: Negative  / Bili: Negative / Urobili: Negative   Blood: x / Protein: Trace / Nitrite: Negative   Leuk Esterase: Negative / RBC: 192 /hpf / WBC 3 /HPF   Sq Epi: x / Non Sq Epi: 1 /hpf / Bacteria: Negative      Arterial Blood Gas:   @ 00:27  7.40/33/139/20/100.0/-3.8  ABG lactate: --    Venous Blood Gas:   @ 02:01  7.41/33/91/21/97.7  VBG Lactate: 1.5  Venous Blood Gas:   @ 12:05  7.31/45/34/23/57.6  VBG Lactate: 1.1      MICROBIOLOGY:     RADIOLOGY:  [ ] Reviewed and interpreted by me    EKG:      Dieter ANP-BC (ext [id number]) CHIEF COMPLAINT:  Patient is a 61y old  Male who presents with a chief complaint of AMS 2/2 Pneumonia (2022 17:37)    HPI:  61 yr old male with 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 Pt received CT C/A/P 22 that demonstrated esophagitis, mucoid impaction of LLL, patchy tree in bud in RUL/RLL concerning for aspiration pneum, received zosyn, with change to ceftiraxone and vanco therapy. Pt in addition has received MRI 22 of brain/cervical spine that showed extensive/diffuse leptomeningeal enhancement concerning for metastatic dz vs infectious meningitis     This short hospital course has been c/b  episodes of HypoTN, hypothermia, sinus bradycardia with 3.1 second pause requiring RRT and MICU consult a.m. of 22. As pt demonstrated improvement with IVF therapy, pt was deemed not MICU candidate at that time. Course however further c/b recurrent RRT x2  morning of 22 for recurrent HypoTN, sinus bradycardia, hypothermia. Pt initially received IVF bolus with standing therapy with improvement in SBP however now refractory to 3 liters IVF bolus, 5% 250 cc albumin x1 requiring norepi gtt. Pt received hydrocortisone 100 mg IVP x 1 at that time as well. Baseline SBP         Pt admitted to MICU for septic shock, possible adrenal insufficiency secondary possibly due to Asp pneum        Interval Events:      REVIEW OF SYSTEMS:  pt non verbal, follows commands, answers simple questions yes no by shaking head. denies sob, chest pain, headache        OBJECTIVE:  ICU Vital Signs Last 24 Hrs  T(C): 36 (2022 03:00), Max: 38 (2022 23:00)  T(F): 96.8 (2022 03:00), Max: 100.4 (2022 23:00)  HR: 56 (2022 04:30) (40 - 86)  BP: 94/59 (2022 04:30) (73/54 - 129/55)  BP(mean): 72 (2022 04:30) (59 - 88)  ABP: --  ABP(mean): --  RR: 29 (2022 04:30) (11 - 31)  SpO2: 97% (2022 04:30) (96% - 100%)        04 @ 07:01  -   @ 07:00  --------------------------------------------------------  IN: 3990 mL / OUT: 2325 mL / NET: 1665 mL     @ 07:01  -   @ 06:17  --------------------------------------------------------  IN: 2480.8 mL / OUT: 3855 mL / NET: -1374.2 mL      CAPILLARY BLOOD GLUCOSE      POCT Blood Glucose.: 128 mg/dL (2022 11:30)          PHYSICAL EXAM:  GENERAL:   NAD, well-groomed, well-developed    HEAD:    Atraumatic, Normocephalic    EYES:   EOMI, PERRLA 3 mm, conjunctiva and sclera clear    ENMT:   No oropharyngeal exudates, erythema or lesions,  Moist mucous membranes    NECK:   Supple, no cervical lymphadenopathy, no JVD    NERVOUS SYSTEM:  Responsive to verbal stimuli, focuses upon examiner, answers simple questions yes and no by shaking head yes/no   ,follows simple commands to demonstrate digits and extremities , Alert & Oriented X1 -2, shakes head affirmatively to choices of hospital,  CN II-XII intact, has spontaneous purposeful movement of  all extremities; Upper extremities  3-4/5; Lower extremities 2-3/5, full sensation to light touch     CHEST/LUNG:   utilizing 2 liters N/C   .Breath sounds bilaterally,  bibasilar crackles to 1/4 up, without rhonchi, wheezing, or rubs. POCUS A line predominant with focal B lines concentrated in anterior Lt lung field, small bilateral pleural effusions, consolidation appreciated in RLL field    HEART:   cardiac monitor sinus bradycardia without ectopy   ; S1/S2 No murmurs, rubs, or gallops, POCUS diminished LVFx, VTI 15, RV slight less then LV with slight flattening of septum noted, IVC 1.6    ABDOMEN:   Soft, Nontender, slight distended; Bowel sounds present, Bladder non distended, non palpable    EXTREMITIES:   Pulses palpable radial pulses 2+ bilat, DP/PT 2+/1+ bilat, without clubbing, cyanosis. Digits warm to touch with good cap refill < 3 secs    SKIN:   warm, dry, intact, normal color, no rash or abnormal lesions, without palpable nodes, lumbar area without swelling, drainage, tenderness, pain, redness        HOSPITAL MEDICATIONS:  MEDICATIONS  (STANDING):  acyclovir IVPB      acyclovir IVPB 600 milliGRAM(s) IV Intermittent every 8 hours  ampicillin  IVPB      ampicillin  IVPB 2 Gram(s) IV Intermittent every 4 hours  atorvastatin 20 milliGRAM(s) Oral at bedtime  cefTRIAXone   IVPB 2000 milliGRAM(s) IV Intermittent every 12 hours  chlorhexidine 4% Liquid 1 Application(s) Topical <User Schedule>  dextrose 5% + sodium chloride 0.45%. 1000 milliLiter(s) (100 mL/Hr) IV Continuous <Continuous>  folic acid 1 milliGRAM(s) Oral daily  hydrocortisone sodium succinate Injectable 100 milliGRAM(s) IV Push every 8 hours  multivitamin 1 Tablet(s) Oral daily  norepinephrine Infusion 0.05 MICROgram(s)/kG/Min (6.47 mL/Hr) IV Continuous <Continuous>  pantoprazole  Injectable 40 milliGRAM(s) IV Push daily  thiamine 100 milliGRAM(s) Oral daily  vancomycin  IVPB 1000 milliGRAM(s) IV Intermittent every 12 hours    MEDICATIONS  (PRN):  acetaminophen     Tablet .. 650 milliGRAM(s) Oral every 6 hours PRN Temp greater or equal to 38C (100.4F), Mild Pain (1 - 3)  aluminum hydroxide/magnesium hydroxide/simethicone Suspension 30 milliLiter(s) Oral every 4 hours PRN Dyspepsia  melatonin 3 milliGRAM(s) Oral at bedtime PRN Insomnia  ondansetron Injectable 4 milliGRAM(s) IV Push every 8 hours PRN Nausea and/or Vomiting      LABS:                        10.2   4.71  )-----------( 125      ( 2022 02:09 )             29.8     Hgb Trend: 10.2<--, 10.3<--, 9.0<--, 9.3<--, 10.2<--      140  |  111<H>  |  <4<L>  ----------------------------<  197<H>  3.6   |  18<L>  |  0.71    Ca    8.4      2022 02:09  Phos  1.8       Mg     2.0         TPro  5.5<L>  /  Alb  2.7<L>  /  TBili  0.2  /  DBili  x   /  AST  13  /  ALT  11  /  AlkPhos  39<L>      LIVER FUNCTIONS - ( 2022 02:09 )  Alb: 2.7 g/dL / Pro: 5.5 g/dL / ALK PHOS: 39 U/L / ALT: 11 U/L / AST: 13 U/L / GGT: x           Creatinine Trend: 0.71<--, 0.75<--, 0.74<--, 0.75<--, 0.71<--, 0.92<--  PT/INR - ( 2022 02:09 )   PT: 14.2 sec;   INR: 1.23 ratio         PTT - ( 2022 02:09 )  PTT:28.9 sec  Urinalysis Basic - ( 2022 17:45 )    Color: Yellow / Appearance: Slightly Turbid / S.013 / pH: x  Gluc: x / Ketone: Negative  / Bili: Negative / Urobili: Negative   Blood: x / Protein: Trace / Nitrite: Negative   Leuk Esterase: Negative / RBC: 192 /hpf / WBC 3 /HPF   Sq Epi: x / Non Sq Epi: 1 /hpf / Bacteria: Negative      Arterial Blood Gas:   @ 00:27  7.40/33/139/20/100.0/-3.8  ABG lactate: --    Venous Blood Gas:   @ 02:01  7.41/33/91/21/97.7  VBG Lactate: 1.5  Venous Blood Gas:   @ 12:05  7.31/45/34/23/57.6  VBG Lactate: 1.1      MICROBIOLOGY:     RADIOLOGY:  [ ] Reviewed and interpreted by me    EKG:      Dieter ANP-BC (ext [id number])

## 2022-04-22 NOTE — PROGRESS NOTE ADULT - ASSESSMENT
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 In the ED stroke code was called negative for intracranial process. CT Chest with concern for pneumonia and esophagitis. Today RRT called for hypotension and bradycardia given atropine, now in ICU pending LP. 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 s/p LP with neuroradiology today - 16cc csf fluid collected.    Recommend:  #Leptomeningeal enhancement, AMS  -Concerning for infectious meningitis vs metastatic disease vs paraneoplastic  -Continue ceftriaxone 2 grams IV q 12 hours and vancomycin 1 gram IV q 12 hours. Add ampicillin 2 grams IV q 4 hours and acyclovir 600 mg IV q 8 hours  -Gentle hydration while on iv acyclovir to prevent crystalluria  -F/U LP results  -F/U blood cultures so far no growth  -Droplet isolation x 24 hours   -Will tailor abx based on LP results    #Apsiration pna  -Continue current abx regimen  -Aspiration precautions    Casey Hutson MD  Available through MS Teams  If no response, or after 5pm/weekends, call [phone]

## 2022-04-22 NOTE — PROGRESS NOTE ADULT - SUBJECTIVE AND OBJECTIVE BOX
CC: Patient is a 61y old  Male who presents with a chief complaint of AMS 2/2 Pneumonia (2022 15:23)    ID Following for concern for meningitis, leptomeningeal enhancement    Interval History/ROS: Patient s/p LP with neuroradiology - 16 cc csf fluid collected. Afebrile. WBC WNL.     Rest of ROS negative.    Allergies  No Known Allergies      ANTIMICROBIALS:  acyclovir IVPB    acyclovir IVPB 600 every 8 hours  ampicillin  IVPB    ampicillin  IVPB 2 every 4 hours  cefTRIAXone   IVPB 2000 every 12 hours  vancomycin  IVPB 1000 every 12 hours      OTHER MEDS:  acetaminophen     Tablet .. 650 milliGRAM(s) Oral every 6 hours PRN  aluminum hydroxide/magnesium hydroxide/simethicone Suspension 30 milliLiter(s) Oral every 4 hours PRN  atorvastatin 20 milliGRAM(s) Oral at bedtime  chlorhexidine 4% Liquid 1 Application(s) Topical <User Schedule>  dextrose 5% + sodium chloride 0.45%. 1000 milliLiter(s) IV Continuous <Continuous>  fludroCORTISONE 0.1 milliGRAM(s) Oral daily  folic acid 1 milliGRAM(s) Oral daily  hydrocortisone sodium succinate Injectable 100 milliGRAM(s) IV Push every 8 hours  insulin lispro (ADMELOG) corrective regimen sliding scale   SubCutaneous every 4 hours  melatonin 3 milliGRAM(s) Oral at bedtime PRN  multivitamin 1 Tablet(s) Oral daily  norepinephrine Infusion 0.05 MICROgram(s)/kG/Min IV Continuous <Continuous>  ondansetron Injectable 4 milliGRAM(s) IV Push every 8 hours PRN  pantoprazole  Injectable 40 milliGRAM(s) IV Push daily  thiamine 100 milliGRAM(s) Oral daily    PE:    Vital Signs Last 24 Hrs  T(C): 36.1 (2022 11:00), Max: 38 (2022 23:00)  T(F): 97 (2022 11:00), Max: 100.4 (2022 23:00)  HR: 39 (2022 15:45) (38 - 78)  BP: 96/60 (2022 15:45) (77/49 - 129/55)  BP(mean): 74 (2022 15:45) (59 - 88)  RR: 25 (2022 15:45) (14 - 36)  SpO2: 96% (2022 15:45) (85% - 100%)    Gen: Awake, NAD  CV: S1+S2 normal, no murmurs  Resp: Clear bilat, no resp distress  Abd: Soft, nontender, +BS  Ext: No LE edema, no wounds  : No Barnett  IV/Skin: No thrombophlebitis  Neuro: follows simple commands    LABS:                          10.2   4.71  )-----------( 125      ( 2022 02:09 )             29.8           140  |  111<H>  |  <4<L>  ----------------------------<  197<H>  3.6   |  18<L>  |  0.71    Ca    8.4      2022 02:09  Phos  1.8       Mg     2.0         TPro  5.5<L>  /  Alb  2.7<L>  /  TBili  0.2  /  DBili  x   /  AST  13  /  ALT  11  /  AlkPhos  39<L>        Urinalysis Basic - ( 2022 17:45 )    Color: Yellow / Appearance: Slightly Turbid / S.013 / pH: x  Gluc: x / Ketone: Negative  / Bili: Negative / Urobili: Negative   Blood: x / Protein: Trace / Nitrite: Negative   Leuk Esterase: Negative / RBC: 192 /hpf / WBC 3 /HPF   Sq Epi: x / Non Sq Epi: 1 /hpf / Bacteria: Negative        MICROBIOLOGY:  Vancomycin Level, Trough: 13.3 ug/mL (22 @ 17:12)  v  .Blood Blood-Peripheral  22   No growth to date.  --  --      Clean Catch Clean Catch (Midstream)  22   No growth  --  --      .Blood Blood-Peripheral  22   No growth to date.  --  --      Catheterized Catheterized  22   >=3 organisms. Probable collection contamination.  --  --      .Blood Blood  22   No growth to date.  --  --    RADIOLOGY:    < from: Xray Lumbar Puncture, Diagnostic (22 @ 15:19) >  IMPRESSION: Successful fluoroscopically guided lumbar puncture.    Fluoroscopic time  for the procedure was measured at less than 6 seconds.    < end of copied text >

## 2022-04-23 DIAGNOSIS — E87.0 HYPEROSMOLALITY AND HYPERNATREMIA: ICD-10-CM

## 2022-04-23 LAB
ALBUMIN SERPL ELPH-MCNC: 2.9 G/DL — LOW (ref 3.3–5)
ALBUMIN SERPL ELPH-MCNC: 3 G/DL — LOW (ref 3.3–5)
ALBUMIN SERPL ELPH-MCNC: 3 G/DL — LOW (ref 3.3–5)
ALBUMIN SERPL ELPH-MCNC: 3.2 G/DL — LOW (ref 3.3–5)
ALP SERPL-CCNC: 43 U/L — SIGNIFICANT CHANGE UP (ref 40–120)
ALP SERPL-CCNC: 44 U/L — SIGNIFICANT CHANGE UP (ref 40–120)
ALP SERPL-CCNC: 44 U/L — SIGNIFICANT CHANGE UP (ref 40–120)
ALP SERPL-CCNC: 45 U/L — SIGNIFICANT CHANGE UP (ref 40–120)
ALP SERPL-CCNC: 47 U/L — SIGNIFICANT CHANGE UP (ref 40–120)
ALP SERPL-CCNC: 48 U/L — SIGNIFICANT CHANGE UP (ref 40–120)
ALT FLD-CCNC: 14 U/L — SIGNIFICANT CHANGE UP (ref 10–45)
ALT FLD-CCNC: 15 U/L — SIGNIFICANT CHANGE UP (ref 10–45)
ALT FLD-CCNC: 15 U/L — SIGNIFICANT CHANGE UP (ref 10–45)
ALT FLD-CCNC: 16 U/L — SIGNIFICANT CHANGE UP (ref 10–45)
ANION GAP SERPL CALC-SCNC: 11 MMOL/L — SIGNIFICANT CHANGE UP (ref 5–17)
ANION GAP SERPL CALC-SCNC: 14 MMOL/L — SIGNIFICANT CHANGE UP (ref 5–17)
ANION GAP SERPL CALC-SCNC: 16 MMOL/L — SIGNIFICANT CHANGE UP (ref 5–17)
ANION GAP SERPL CALC-SCNC: 20 MMOL/L — HIGH (ref 5–17)
ANION GAP SERPL CALC-SCNC: 24 MMOL/L — HIGH (ref 5–17)
APPEARANCE UR: CLEAR — SIGNIFICANT CHANGE UP
APPEARANCE UR: CLEAR — SIGNIFICANT CHANGE UP
APTT BLD: 24.2 SEC — LOW (ref 27.5–35.5)
AST SERPL-CCNC: 11 U/L — SIGNIFICANT CHANGE UP (ref 10–40)
AST SERPL-CCNC: 12 U/L — SIGNIFICANT CHANGE UP (ref 10–40)
AST SERPL-CCNC: 13 U/L — SIGNIFICANT CHANGE UP (ref 10–40)
AST SERPL-CCNC: 16 U/L — SIGNIFICANT CHANGE UP (ref 10–40)
B-OH-BUTYR SERPL-SCNC: 0.6 MMOL/L — HIGH
BACTERIA # UR AUTO: NEGATIVE — SIGNIFICANT CHANGE UP
BACTERIA # UR AUTO: NEGATIVE — SIGNIFICANT CHANGE UP
BASE EXCESS BLDV CALC-SCNC: -0.4 MMOL/L — SIGNIFICANT CHANGE UP (ref -2–2)
BASE EXCESS BLDV CALC-SCNC: -14.8 MMOL/L — LOW (ref -2–2)
BASE EXCESS BLDV CALC-SCNC: -3.1 MMOL/L — LOW (ref -2–2)
BILIRUB SERPL-MCNC: 0.1 MG/DL — LOW (ref 0.2–1.2)
BILIRUB SERPL-MCNC: 0.1 MG/DL — LOW (ref 0.2–1.2)
BILIRUB SERPL-MCNC: 0.2 MG/DL — SIGNIFICANT CHANGE UP (ref 0.2–1.2)
BILIRUB UR-MCNC: NEGATIVE — SIGNIFICANT CHANGE UP
BILIRUB UR-MCNC: NEGATIVE — SIGNIFICANT CHANGE UP
BUN SERPL-MCNC: <4 MG/DL — LOW (ref 7–23)
CA-I SERPL-SCNC: 1.23 MMOL/L — SIGNIFICANT CHANGE UP (ref 1.15–1.33)
CA-I SERPL-SCNC: 1.24 MMOL/L — SIGNIFICANT CHANGE UP (ref 1.15–1.33)
CA-I SERPL-SCNC: 1.26 MMOL/L — SIGNIFICANT CHANGE UP (ref 1.15–1.33)
CALCIUM SERPL-MCNC: 8.2 MG/DL — LOW (ref 8.4–10.5)
CALCIUM SERPL-MCNC: 8.7 MG/DL — SIGNIFICANT CHANGE UP (ref 8.4–10.5)
CALCIUM SERPL-MCNC: 8.7 MG/DL — SIGNIFICANT CHANGE UP (ref 8.4–10.5)
CALCIUM SERPL-MCNC: 8.8 MG/DL — SIGNIFICANT CHANGE UP (ref 8.4–10.5)
CALCIUM SERPL-MCNC: 8.9 MG/DL — SIGNIFICANT CHANGE UP (ref 8.4–10.5)
CALCIUM SERPL-MCNC: 9.1 MG/DL — SIGNIFICANT CHANGE UP (ref 8.4–10.5)
CHLORIDE BLDV-SCNC: 107 MMOL/L — SIGNIFICANT CHANGE UP (ref 96–108)
CHLORIDE BLDV-SCNC: 110 MMOL/L — HIGH (ref 96–108)
CHLORIDE BLDV-SCNC: 114 MMOL/L — HIGH (ref 96–108)
CHLORIDE SERPL-SCNC: 109 MMOL/L — HIGH (ref 96–108)
CHLORIDE SERPL-SCNC: 110 MMOL/L — HIGH (ref 96–108)
CHLORIDE SERPL-SCNC: 113 MMOL/L — HIGH (ref 96–108)
CHLORIDE SERPL-SCNC: 114 MMOL/L — HIGH (ref 96–108)
CHLORIDE UR-SCNC: 73 MMOL/L — SIGNIFICANT CHANGE UP
CO2 BLDV-SCNC: 12 MMOL/L — LOW (ref 22–26)
CO2 BLDV-SCNC: 22 MMOL/L — SIGNIFICANT CHANGE UP (ref 22–26)
CO2 BLDV-SCNC: 25 MMOL/L — SIGNIFICANT CHANGE UP (ref 22–26)
CO2 SERPL-SCNC: 10 MMOL/L — CRITICAL LOW (ref 22–31)
CO2 SERPL-SCNC: 10 MMOL/L — CRITICAL LOW (ref 22–31)
CO2 SERPL-SCNC: 11 MMOL/L — LOW (ref 22–31)
CO2 SERPL-SCNC: 14 MMOL/L — LOW (ref 22–31)
CO2 SERPL-SCNC: 18 MMOL/L — LOW (ref 22–31)
CO2 SERPL-SCNC: 21 MMOL/L — LOW (ref 22–31)
COLOR SPEC: ABNORMAL
COLOR SPEC: COLORLESS — SIGNIFICANT CHANGE UP
CREAT ?TM UR-MCNC: 40 MG/DL — SIGNIFICANT CHANGE UP
CREAT SERPL-MCNC: 0.68 MG/DL — SIGNIFICANT CHANGE UP (ref 0.5–1.3)
CREAT SERPL-MCNC: 0.72 MG/DL — SIGNIFICANT CHANGE UP (ref 0.5–1.3)
CREAT SERPL-MCNC: 0.73 MG/DL — SIGNIFICANT CHANGE UP (ref 0.5–1.3)
CREAT SERPL-MCNC: 0.76 MG/DL — SIGNIFICANT CHANGE UP (ref 0.5–1.3)
CREAT SERPL-MCNC: 0.77 MG/DL — SIGNIFICANT CHANGE UP (ref 0.5–1.3)
CREAT SERPL-MCNC: 0.77 MG/DL — SIGNIFICANT CHANGE UP (ref 0.5–1.3)
CRYPTOC AG CSF-ACNC: NEGATIVE — SIGNIFICANT CHANGE UP
CSF PCR RESULT: SIGNIFICANT CHANGE UP
CULTURE RESULTS: SIGNIFICANT CHANGE UP
CULTURE RESULTS: SIGNIFICANT CHANGE UP
DIFF PNL FLD: ABNORMAL
DIFF PNL FLD: ABNORMAL
EGFR: 102 ML/MIN/1.73M2 — SIGNIFICANT CHANGE UP
EGFR: 102 ML/MIN/1.73M2 — SIGNIFICANT CHANGE UP
EGFR: 103 ML/MIN/1.73M2 — SIGNIFICANT CHANGE UP
EGFR: 104 ML/MIN/1.73M2 — SIGNIFICANT CHANGE UP
EGFR: 105 ML/MIN/1.73M2 — SIGNIFICANT CHANGE UP
EGFR: 106 ML/MIN/1.73M2 — SIGNIFICANT CHANGE UP
EPI CELLS # UR: 0 /HPF — SIGNIFICANT CHANGE UP
EPI CELLS # UR: 1 /HPF — SIGNIFICANT CHANGE UP
GAS PNL BLDA: SIGNIFICANT CHANGE UP
GAS PNL BLDV: 138 MMOL/L — SIGNIFICANT CHANGE UP (ref 136–145)
GAS PNL BLDV: 141 MMOL/L — SIGNIFICANT CHANGE UP (ref 136–145)
GAS PNL BLDV: 142 MMOL/L — SIGNIFICANT CHANGE UP (ref 136–145)
GAS PNL BLDV: SIGNIFICANT CHANGE UP
GLUCOSE BLDC GLUCOMTR-MCNC: 183 MG/DL — HIGH (ref 70–99)
GLUCOSE BLDC GLUCOMTR-MCNC: 196 MG/DL — HIGH (ref 70–99)
GLUCOSE BLDC GLUCOMTR-MCNC: 200 MG/DL — HIGH (ref 70–99)
GLUCOSE BLDC GLUCOMTR-MCNC: 218 MG/DL — HIGH (ref 70–99)
GLUCOSE BLDC GLUCOMTR-MCNC: 221 MG/DL — HIGH (ref 70–99)
GLUCOSE BLDC GLUCOMTR-MCNC: 222 MG/DL — HIGH (ref 70–99)
GLUCOSE BLDC GLUCOMTR-MCNC: 228 MG/DL — HIGH (ref 70–99)
GLUCOSE BLDC GLUCOMTR-MCNC: 255 MG/DL — HIGH (ref 70–99)
GLUCOSE BLDC GLUCOMTR-MCNC: 267 MG/DL — HIGH (ref 70–99)
GLUCOSE BLDC GLUCOMTR-MCNC: 305 MG/DL — HIGH (ref 70–99)
GLUCOSE BLDC GLUCOMTR-MCNC: 312 MG/DL — HIGH (ref 70–99)
GLUCOSE BLDC GLUCOMTR-MCNC: 331 MG/DL — HIGH (ref 70–99)
GLUCOSE BLDC GLUCOMTR-MCNC: 343 MG/DL — HIGH (ref 70–99)
GLUCOSE BLDC GLUCOMTR-MCNC: 347 MG/DL — HIGH (ref 70–99)
GLUCOSE BLDC GLUCOMTR-MCNC: 348 MG/DL — HIGH (ref 70–99)
GLUCOSE BLDV-MCNC: 182 MG/DL — HIGH (ref 70–99)
GLUCOSE BLDV-MCNC: 261 MG/DL — HIGH (ref 70–99)
GLUCOSE BLDV-MCNC: 362 MG/DL — HIGH (ref 70–99)
GLUCOSE SERPL-MCNC: 186 MG/DL — HIGH (ref 70–99)
GLUCOSE SERPL-MCNC: 263 MG/DL — HIGH (ref 70–99)
GLUCOSE SERPL-MCNC: 264 MG/DL — HIGH (ref 70–99)
GLUCOSE SERPL-MCNC: 341 MG/DL — HIGH (ref 70–99)
GLUCOSE SERPL-MCNC: 366 MG/DL — HIGH (ref 70–99)
GLUCOSE SERPL-MCNC: 439 MG/DL — HIGH (ref 70–99)
GLUCOSE UR QL: ABNORMAL
GLUCOSE UR QL: ABNORMAL
HCO3 BLDV-SCNC: 12 MMOL/L — LOW (ref 22–29)
HCO3 BLDV-SCNC: 21 MMOL/L — LOW (ref 22–29)
HCO3 BLDV-SCNC: 24 MMOL/L — SIGNIFICANT CHANGE UP (ref 22–29)
HCT VFR BLD CALC: 28.6 % — LOW (ref 39–50)
HCT VFR BLD CALC: 29.2 % — LOW (ref 39–50)
HCT VFR BLD CALC: 30.4 % — LOW (ref 39–50)
HCT VFR BLD CALC: 30.9 % — LOW (ref 39–50)
HCT VFR BLDA CALC: 29 % — LOW (ref 39–51)
HCT VFR BLDA CALC: 29 % — LOW (ref 39–51)
HCT VFR BLDA CALC: 32 % — LOW (ref 39–51)
HGB BLD CALC-MCNC: 10.8 G/DL — LOW (ref 12.6–17.4)
HGB BLD CALC-MCNC: 9.6 G/DL — LOW (ref 12.6–17.4)
HGB BLD CALC-MCNC: 9.7 G/DL — LOW (ref 12.6–17.4)
HGB BLD-MCNC: 10.1 G/DL — LOW (ref 13–17)
HGB BLD-MCNC: 10.3 G/DL — LOW (ref 13–17)
HGB BLD-MCNC: 9.5 G/DL — LOW (ref 13–17)
HGB BLD-MCNC: 9.9 G/DL — LOW (ref 13–17)
HYALINE CASTS # UR AUTO: 10 /LPF — HIGH (ref 0–2)
HYALINE CASTS # UR AUTO: 2 /LPF — SIGNIFICANT CHANGE UP (ref 0–2)
INR BLD: 1.19 RATIO — HIGH (ref 0.88–1.16)
KETONES UR-MCNC: SIGNIFICANT CHANGE UP
KETONES UR-MCNC: SIGNIFICANT CHANGE UP
LABORATORY COMMENT REPORT: SIGNIFICANT CHANGE UP
LACTATE BLDV-MCNC: 11 MMOL/L — CRITICAL HIGH (ref 0.7–2)
LACTATE BLDV-MCNC: 2.5 MMOL/L — HIGH (ref 0.7–2)
LACTATE BLDV-MCNC: 4 MMOL/L — CRITICAL HIGH (ref 0.7–2)
LEUKOCYTE ESTERASE UR-ACNC: NEGATIVE — SIGNIFICANT CHANGE UP
LEUKOCYTE ESTERASE UR-ACNC: NEGATIVE — SIGNIFICANT CHANGE UP
MAGNESIUM SERPL-MCNC: 1.6 MG/DL — SIGNIFICANT CHANGE UP (ref 1.6–2.6)
MAGNESIUM SERPL-MCNC: 1.7 MG/DL — SIGNIFICANT CHANGE UP (ref 1.6–2.6)
MAGNESIUM SERPL-MCNC: 2 MG/DL — SIGNIFICANT CHANGE UP (ref 1.6–2.6)
MAGNESIUM SERPL-MCNC: 2.1 MG/DL — SIGNIFICANT CHANGE UP (ref 1.6–2.6)
MAGNESIUM SERPL-MCNC: 2.1 MG/DL — SIGNIFICANT CHANGE UP (ref 1.6–2.6)
MCHC RBC-ENTMCNC: 28.7 PG — SIGNIFICANT CHANGE UP (ref 27–34)
MCHC RBC-ENTMCNC: 28.7 PG — SIGNIFICANT CHANGE UP (ref 27–34)
MCHC RBC-ENTMCNC: 28.8 PG — SIGNIFICANT CHANGE UP (ref 27–34)
MCHC RBC-ENTMCNC: 28.9 PG — SIGNIFICANT CHANGE UP (ref 27–34)
MCHC RBC-ENTMCNC: 33.2 GM/DL — SIGNIFICANT CHANGE UP (ref 32–36)
MCHC RBC-ENTMCNC: 33.2 GM/DL — SIGNIFICANT CHANGE UP (ref 32–36)
MCHC RBC-ENTMCNC: 33.3 GM/DL — SIGNIFICANT CHANGE UP (ref 32–36)
MCHC RBC-ENTMCNC: 33.9 GM/DL — SIGNIFICANT CHANGE UP (ref 32–36)
MCV RBC AUTO: 85.1 FL — SIGNIFICANT CHANGE UP (ref 80–100)
MCV RBC AUTO: 86.1 FL — SIGNIFICANT CHANGE UP (ref 80–100)
MCV RBC AUTO: 86.4 FL — SIGNIFICANT CHANGE UP (ref 80–100)
MCV RBC AUTO: 86.7 FL — SIGNIFICANT CHANGE UP (ref 80–100)
MRSA PCR RESULT.: SIGNIFICANT CHANGE UP
NITRITE UR-MCNC: NEGATIVE — SIGNIFICANT CHANGE UP
NITRITE UR-MCNC: NEGATIVE — SIGNIFICANT CHANGE UP
NRBC # BLD: 0 /100 WBCS — SIGNIFICANT CHANGE UP (ref 0–0)
OSMOLALITY SERPL: 307 MOSMOL/KG — HIGH (ref 280–301)
OSMOLALITY UR: 184 MOS/KG — LOW (ref 300–900)
PCO2 BLDV: 28 MMHG — LOW (ref 42–55)
PCO2 BLDV: 32 MMHG — LOW (ref 42–55)
PCO2 BLDV: 38 MMHG — LOW (ref 42–55)
PH BLDV: 7.22 — LOW (ref 7.32–7.43)
PH BLDV: 7.41 — SIGNIFICANT CHANGE UP (ref 7.32–7.43)
PH BLDV: 7.42 — SIGNIFICANT CHANGE UP (ref 7.32–7.43)
PH UR: 5 — SIGNIFICANT CHANGE UP (ref 5–8)
PH UR: 5 — SIGNIFICANT CHANGE UP (ref 5–8)
PHOSPHATE 24H UR-MCNC: 16.7 MG/DL — SIGNIFICANT CHANGE UP
PHOSPHATE SERPL-MCNC: 1.1 MG/DL — LOW (ref 2.5–4.5)
PHOSPHATE SERPL-MCNC: 1.4 MG/DL — LOW (ref 2.5–4.5)
PHOSPHATE SERPL-MCNC: 2.1 MG/DL — LOW (ref 2.5–4.5)
PHOSPHATE SERPL-MCNC: 2.8 MG/DL — SIGNIFICANT CHANGE UP (ref 2.5–4.5)
PHOSPHATE SERPL-MCNC: 2.8 MG/DL — SIGNIFICANT CHANGE UP (ref 2.5–4.5)
PLATELET # BLD AUTO: 209 K/UL — SIGNIFICANT CHANGE UP (ref 150–400)
PLATELET # BLD AUTO: 224 K/UL — SIGNIFICANT CHANGE UP (ref 150–400)
PLATELET # BLD AUTO: 230 K/UL — SIGNIFICANT CHANGE UP (ref 150–400)
PLATELET # BLD AUTO: 248 K/UL — SIGNIFICANT CHANGE UP (ref 150–400)
PO2 BLDV: 45 MMHG — SIGNIFICANT CHANGE UP (ref 25–45)
PO2 BLDV: 58 MMHG — HIGH (ref 25–45)
PO2 BLDV: 75 MMHG — HIGH (ref 25–45)
POTASSIUM BLDV-SCNC: 2.1 MMOL/L — CRITICAL LOW (ref 3.5–5.1)
POTASSIUM BLDV-SCNC: 4.3 MMOL/L — SIGNIFICANT CHANGE UP (ref 3.5–5.1)
POTASSIUM BLDV-SCNC: 4.4 MMOL/L — SIGNIFICANT CHANGE UP (ref 3.5–5.1)
POTASSIUM SERPL-MCNC: 2.5 MMOL/L — CRITICAL LOW (ref 3.5–5.3)
POTASSIUM SERPL-MCNC: 3.6 MMOL/L — SIGNIFICANT CHANGE UP (ref 3.5–5.3)
POTASSIUM SERPL-MCNC: 3.7 MMOL/L — SIGNIFICANT CHANGE UP (ref 3.5–5.3)
POTASSIUM SERPL-MCNC: 3.7 MMOL/L — SIGNIFICANT CHANGE UP (ref 3.5–5.3)
POTASSIUM SERPL-MCNC: 3.9 MMOL/L — SIGNIFICANT CHANGE UP (ref 3.5–5.3)
POTASSIUM SERPL-MCNC: 4.5 MMOL/L — SIGNIFICANT CHANGE UP (ref 3.5–5.3)
POTASSIUM SERPL-SCNC: 2.5 MMOL/L — CRITICAL LOW (ref 3.5–5.3)
POTASSIUM SERPL-SCNC: 3.6 MMOL/L — SIGNIFICANT CHANGE UP (ref 3.5–5.3)
POTASSIUM SERPL-SCNC: 3.7 MMOL/L — SIGNIFICANT CHANGE UP (ref 3.5–5.3)
POTASSIUM SERPL-SCNC: 3.7 MMOL/L — SIGNIFICANT CHANGE UP (ref 3.5–5.3)
POTASSIUM SERPL-SCNC: 3.9 MMOL/L — SIGNIFICANT CHANGE UP (ref 3.5–5.3)
POTASSIUM SERPL-SCNC: 4.5 MMOL/L — SIGNIFICANT CHANGE UP (ref 3.5–5.3)
POTASSIUM UR-SCNC: 47 MMOL/L — SIGNIFICANT CHANGE UP
PROT SERPL-MCNC: 5.4 G/DL — LOW (ref 6–8.3)
PROT SERPL-MCNC: 5.5 G/DL — LOW (ref 6–8.3)
PROT SERPL-MCNC: 5.5 G/DL — LOW (ref 6–8.3)
PROT SERPL-MCNC: 5.9 G/DL — LOW (ref 6–8.3)
PROT SERPL-MCNC: 5.9 G/DL — LOW (ref 6–8.3)
PROT SERPL-MCNC: 6.1 G/DL — SIGNIFICANT CHANGE UP (ref 6–8.3)
PROT UR-MCNC: ABNORMAL
PROT UR-MCNC: NEGATIVE — SIGNIFICANT CHANGE UP
PROTHROM AB SERPL-ACNC: 13.8 SEC — HIGH (ref 10.5–13.4)
RAPID RVP RESULT: SIGNIFICANT CHANGE UP
RBC # BLD: 3.3 M/UL — LOW (ref 4.2–5.8)
RBC # BLD: 3.43 M/UL — LOW (ref 4.2–5.8)
RBC # BLD: 3.52 M/UL — LOW (ref 4.2–5.8)
RBC # BLD: 3.59 M/UL — LOW (ref 4.2–5.8)
RBC # FLD: 13.9 % — SIGNIFICANT CHANGE UP (ref 10.3–14.5)
RBC # FLD: 14 % — SIGNIFICANT CHANGE UP (ref 10.3–14.5)
RBC # FLD: 14.1 % — SIGNIFICANT CHANGE UP (ref 10.3–14.5)
RBC # FLD: 14.1 % — SIGNIFICANT CHANGE UP (ref 10.3–14.5)
RBC CASTS # UR COMP ASSIST: 7 /HPF — HIGH (ref 0–4)
RBC CASTS # UR COMP ASSIST: 885 /HPF — HIGH (ref 0–4)
S AUREUS DNA NOSE QL NAA+PROBE: DETECTED
SAO2 % BLDV: 80.8 % — SIGNIFICANT CHANGE UP (ref 67–88)
SAO2 % BLDV: 92.8 % — HIGH (ref 67–88)
SAO2 % BLDV: 96 % — HIGH (ref 67–88)
SARS-COV-2 RNA SPEC QL NAA+PROBE: SIGNIFICANT CHANGE UP
SODIUM SERPL-SCNC: 142 MMOL/L — SIGNIFICANT CHANGE UP (ref 135–145)
SODIUM SERPL-SCNC: 142 MMOL/L — SIGNIFICANT CHANGE UP (ref 135–145)
SODIUM SERPL-SCNC: 144 MMOL/L — SIGNIFICANT CHANGE UP (ref 135–145)
SODIUM SERPL-SCNC: 144 MMOL/L — SIGNIFICANT CHANGE UP (ref 135–145)
SODIUM SERPL-SCNC: 145 MMOL/L — SIGNIFICANT CHANGE UP (ref 135–145)
SODIUM SERPL-SCNC: 147 MMOL/L — HIGH (ref 135–145)
SODIUM UR-SCNC: 76 MMOL/L — SIGNIFICANT CHANGE UP
SOURCE HSV 1/2: SIGNIFICANT CHANGE UP
SP GR SPEC: 1.01 — SIGNIFICANT CHANGE UP (ref 1.01–1.02)
SP GR SPEC: 1.04 — HIGH (ref 1.01–1.02)
SPECIMEN SOURCE: SIGNIFICANT CHANGE UP
SPECIMEN SOURCE: SIGNIFICANT CHANGE UP
UROBILINOGEN FLD QL: NEGATIVE — SIGNIFICANT CHANGE UP
UROBILINOGEN FLD QL: NEGATIVE — SIGNIFICANT CHANGE UP
UUN UR-MCNC: 112 MG/DL — SIGNIFICANT CHANGE UP
VANCOMYCIN TROUGH SERPL-MCNC: 15.3 UG/ML — SIGNIFICANT CHANGE UP (ref 10–20)
VANCOMYCIN TROUGH SERPL-MCNC: 19.4 UG/ML — SIGNIFICANT CHANGE UP (ref 10–20)
WBC # BLD: 16.43 K/UL — HIGH (ref 3.8–10.5)
WBC # BLD: 17.19 K/UL — HIGH (ref 3.8–10.5)
WBC # BLD: 18.9 K/UL — HIGH (ref 3.8–10.5)
WBC # BLD: 19.48 K/UL — HIGH (ref 3.8–10.5)
WBC # FLD AUTO: 16.43 K/UL — HIGH (ref 3.8–10.5)
WBC # FLD AUTO: 17.19 K/UL — HIGH (ref 3.8–10.5)
WBC # FLD AUTO: 18.9 K/UL — HIGH (ref 3.8–10.5)
WBC # FLD AUTO: 19.48 K/UL — HIGH (ref 3.8–10.5)
WBC UR QL: 1 /HPF — SIGNIFICANT CHANGE UP (ref 0–5)
WBC UR QL: 8 /HPF — HIGH (ref 0–5)

## 2022-04-23 PROCEDURE — 95720 EEG PHY/QHP EA INCR W/VEEG: CPT

## 2022-04-23 PROCEDURE — 99253 IP/OBS CNSLTJ NEW/EST LOW 45: CPT | Mod: GC

## 2022-04-23 PROCEDURE — 71045 X-RAY EXAM CHEST 1 VIEW: CPT | Mod: 26

## 2022-04-23 PROCEDURE — 74177 CT ABD & PELVIS W/CONTRAST: CPT | Mod: 26

## 2022-04-23 PROCEDURE — 70450 CT HEAD/BRAIN W/O DYE: CPT | Mod: 26

## 2022-04-23 PROCEDURE — 99232 SBSQ HOSP IP/OBS MODERATE 35: CPT

## 2022-04-23 PROCEDURE — 36556 INSERT NON-TUNNEL CV CATH: CPT

## 2022-04-23 PROCEDURE — 31500 INSERT EMERGENCY AIRWAY: CPT

## 2022-04-23 PROCEDURE — 99232 SBSQ HOSP IP/OBS MODERATE 35: CPT | Mod: GC

## 2022-04-23 PROCEDURE — 88189 FLOWCYTOMETRY/READ 16 & >: CPT

## 2022-04-23 RX ORDER — MIDAZOLAM HYDROCHLORIDE 1 MG/ML
4 INJECTION, SOLUTION INTRAMUSCULAR; INTRAVENOUS ONCE
Refills: 0 | Status: DISCONTINUED | OUTPATIENT
Start: 2022-04-23 | End: 2022-04-23

## 2022-04-23 RX ORDER — KETAMINE HYDROCHLORIDE 100 MG/ML
0.03 INJECTION INTRAMUSCULAR; INTRAVENOUS
Qty: 200 | Refills: 0 | Status: DISCONTINUED | OUTPATIENT
Start: 2022-04-23 | End: 2022-04-23

## 2022-04-23 RX ORDER — CHLORHEXIDINE GLUCONATE 213 G/1000ML
15 SOLUTION TOPICAL EVERY 12 HOURS
Refills: 0 | Status: DISCONTINUED | OUTPATIENT
Start: 2022-04-23 | End: 2022-04-26

## 2022-04-23 RX ORDER — SODIUM CHLORIDE 9 MG/ML
10 INJECTION INTRAMUSCULAR; INTRAVENOUS; SUBCUTANEOUS
Refills: 0 | Status: DISCONTINUED | OUTPATIENT
Start: 2022-04-23 | End: 2022-04-23

## 2022-04-23 RX ORDER — SODIUM CHLORIDE 9 MG/ML
1000 INJECTION, SOLUTION INTRAVENOUS
Refills: 0 | Status: DISCONTINUED | OUTPATIENT
Start: 2022-04-23 | End: 2022-04-23

## 2022-04-23 RX ORDER — KETAMINE HYDROCHLORIDE 100 MG/ML
80 INJECTION INTRAMUSCULAR; INTRAVENOUS ONCE
Refills: 0 | Status: DISCONTINUED | OUTPATIENT
Start: 2022-04-23 | End: 2022-04-23

## 2022-04-23 RX ORDER — HEPARIN SODIUM 5000 [USP'U]/ML
5000 INJECTION INTRAVENOUS; SUBCUTANEOUS EVERY 12 HOURS
Refills: 0 | Status: DISCONTINUED | OUTPATIENT
Start: 2022-04-23 | End: 2022-04-30

## 2022-04-23 RX ORDER — INSULIN HUMAN 100 [IU]/ML
1 INJECTION, SOLUTION SUBCUTANEOUS
Qty: 100 | Refills: 0 | Status: DISCONTINUED | OUTPATIENT
Start: 2022-04-23 | End: 2022-04-24

## 2022-04-23 RX ORDER — MAGNESIUM SULFATE 500 MG/ML
2 VIAL (ML) INJECTION ONCE
Refills: 0 | Status: COMPLETED | OUTPATIENT
Start: 2022-04-23 | End: 2022-04-23

## 2022-04-23 RX ORDER — HUMAN INSULIN 100 [IU]/ML
12 INJECTION, SUSPENSION SUBCUTANEOUS EVERY 6 HOURS
Refills: 0 | Status: DISCONTINUED | OUTPATIENT
Start: 2022-04-23 | End: 2022-04-25

## 2022-04-23 RX ORDER — DEXMEDETOMIDINE HYDROCHLORIDE IN 0.9% SODIUM CHLORIDE 4 UG/ML
0.01 INJECTION INTRAVENOUS
Qty: 200 | Refills: 0 | Status: DISCONTINUED | OUTPATIENT
Start: 2022-04-23 | End: 2022-04-23

## 2022-04-23 RX ORDER — POTASSIUM CHLORIDE 20 MEQ
40 PACKET (EA) ORAL ONCE
Refills: 0 | Status: COMPLETED | OUTPATIENT
Start: 2022-04-23 | End: 2022-04-23

## 2022-04-23 RX ORDER — SODIUM CHLORIDE 9 MG/ML
1000 INJECTION, SOLUTION INTRAVENOUS
Refills: 0 | Status: DISCONTINUED | OUTPATIENT
Start: 2022-04-23 | End: 2022-04-25

## 2022-04-23 RX ORDER — DEXTROSE 50 % IN WATER 50 %
50 SYRINGE (ML) INTRAVENOUS
Refills: 0 | Status: DISCONTINUED | OUTPATIENT
Start: 2022-04-23 | End: 2022-05-13

## 2022-04-23 RX ORDER — INSULIN HUMAN 100 [IU]/ML
5 INJECTION, SOLUTION SUBCUTANEOUS
Qty: 100 | Refills: 0 | Status: DISCONTINUED | OUTPATIENT
Start: 2022-04-23 | End: 2022-04-23

## 2022-04-23 RX ORDER — HUMAN INSULIN 100 [IU]/ML
12 INJECTION, SUSPENSION SUBCUTANEOUS ONCE
Refills: 0 | Status: DISCONTINUED | OUTPATIENT
Start: 2022-04-23 | End: 2022-04-24

## 2022-04-23 RX ORDER — FENTANYL CITRATE 50 UG/ML
100 INJECTION INTRAVENOUS ONCE
Refills: 0 | Status: DISCONTINUED | OUTPATIENT
Start: 2022-04-23 | End: 2022-04-23

## 2022-04-23 RX ORDER — INSULIN HUMAN 100 [IU]/ML
5 INJECTION, SOLUTION SUBCUTANEOUS ONCE
Refills: 0 | Status: DISCONTINUED | OUTPATIENT
Start: 2022-04-23 | End: 2022-04-23

## 2022-04-23 RX ORDER — INSULIN HUMAN 100 [IU]/ML
7 INJECTION, SOLUTION SUBCUTANEOUS ONCE
Refills: 0 | Status: COMPLETED | OUTPATIENT
Start: 2022-04-23 | End: 2022-04-23

## 2022-04-23 RX ORDER — IPRATROPIUM/ALBUTEROL SULFATE 18-103MCG
3 AEROSOL WITH ADAPTER (GRAM) INHALATION EVERY 6 HOURS
Refills: 0 | Status: DISCONTINUED | OUTPATIENT
Start: 2022-04-23 | End: 2022-05-07

## 2022-04-23 RX ORDER — FOLIC ACID 0.8 MG
1 TABLET ORAL DAILY
Refills: 0 | Status: DISCONTINUED | OUTPATIENT
Start: 2022-04-23 | End: 2022-05-07

## 2022-04-23 RX ORDER — POTASSIUM CHLORIDE 20 MEQ
20 PACKET (EA) ORAL
Refills: 0 | Status: COMPLETED | OUTPATIENT
Start: 2022-04-23 | End: 2022-04-23

## 2022-04-23 RX ORDER — HUMAN INSULIN 100 [IU]/ML
15 INJECTION, SUSPENSION SUBCUTANEOUS EVERY 6 HOURS
Refills: 0 | Status: DISCONTINUED | OUTPATIENT
Start: 2022-04-23 | End: 2022-04-23

## 2022-04-23 RX ORDER — DEXTROSE 50 % IN WATER 50 %
25 SYRINGE (ML) INTRAVENOUS
Refills: 0 | Status: DISCONTINUED | OUTPATIENT
Start: 2022-04-23 | End: 2022-05-13

## 2022-04-23 RX ORDER — VASOPRESSIN 20 [USP'U]/ML
0.04 INJECTION INTRAVENOUS
Qty: 50 | Refills: 0 | Status: DISCONTINUED | OUTPATIENT
Start: 2022-04-23 | End: 2022-04-25

## 2022-04-23 RX ORDER — PROPOFOL 10 MG/ML
25 INJECTION, EMULSION INTRAVENOUS
Qty: 1000 | Refills: 0 | Status: DISCONTINUED | OUTPATIENT
Start: 2022-04-23 | End: 2022-04-24

## 2022-04-23 RX ORDER — HUMAN INSULIN 100 [IU]/ML
15 INJECTION, SUSPENSION SUBCUTANEOUS DAILY
Refills: 0 | Status: DISCONTINUED | OUTPATIENT
Start: 2022-04-23 | End: 2022-04-23

## 2022-04-23 RX ORDER — SODIUM BICARBONATE 1 MEQ/ML
0.2 SYRINGE (ML) INTRAVENOUS
Qty: 150 | Refills: 0 | Status: DISCONTINUED | OUTPATIENT
Start: 2022-04-23 | End: 2022-04-23

## 2022-04-23 RX ORDER — POTASSIUM PHOSPHATE, MONOBASIC POTASSIUM PHOSPHATE, DIBASIC 236; 224 MG/ML; MG/ML
30 INJECTION, SOLUTION INTRAVENOUS ONCE
Refills: 0 | Status: COMPLETED | OUTPATIENT
Start: 2022-04-23 | End: 2022-04-23

## 2022-04-23 RX ORDER — POTASSIUM CHLORIDE 20 MEQ
10 PACKET (EA) ORAL
Refills: 0 | Status: COMPLETED | OUTPATIENT
Start: 2022-04-23 | End: 2022-04-23

## 2022-04-23 RX ORDER — PROPOFOL 10 MG/ML
25 INJECTION, EMULSION INTRAVENOUS
Qty: 1000 | Refills: 0 | Status: DISCONTINUED | OUTPATIENT
Start: 2022-04-23 | End: 2022-04-23

## 2022-04-23 RX ORDER — POTASSIUM CHLORIDE 20 MEQ
20 PACKET (EA) ORAL
Refills: 0 | Status: DISCONTINUED | OUTPATIENT
Start: 2022-04-23 | End: 2022-04-23

## 2022-04-23 RX ORDER — POTASSIUM PHOSPHATE, MONOBASIC POTASSIUM PHOSPHATE, DIBASIC 236; 224 MG/ML; MG/ML
15 INJECTION, SOLUTION INTRAVENOUS ONCE
Refills: 0 | Status: COMPLETED | OUTPATIENT
Start: 2022-04-23 | End: 2022-04-23

## 2022-04-23 RX ADMIN — PANTOPRAZOLE SODIUM 40 MILLIGRAM(S): 20 TABLET, DELAYED RELEASE ORAL at 11:29

## 2022-04-23 RX ADMIN — FENTANYL CITRATE 100 MICROGRAM(S): 50 INJECTION INTRAVENOUS at 01:28

## 2022-04-23 RX ADMIN — Medication 1 TABLET(S): at 11:29

## 2022-04-23 RX ADMIN — Medication 200 GRAM(S): at 17:32

## 2022-04-23 RX ADMIN — Medication 1 MILLIGRAM(S): at 11:31

## 2022-04-23 RX ADMIN — Medication 112 MILLIGRAM(S): at 09:01

## 2022-04-23 RX ADMIN — FLUDROCORTISONE ACETATE 0.1 MILLIGRAM(S): 0.1 TABLET ORAL at 06:14

## 2022-04-23 RX ADMIN — Medication 100 MILLIGRAM(S): at 14:05

## 2022-04-23 RX ADMIN — ATORVASTATIN CALCIUM 20 MILLIGRAM(S): 80 TABLET, FILM COATED ORAL at 21:58

## 2022-04-23 RX ADMIN — SODIUM CHLORIDE 1000 MILLILITER(S): 9 INJECTION, SOLUTION INTRAVENOUS at 00:30

## 2022-04-23 RX ADMIN — Medication 3 MILLILITER(S): at 17:09

## 2022-04-23 RX ADMIN — Medication 50 MILLIEQUIVALENT(S): at 05:00

## 2022-04-23 RX ADMIN — Medication 100 MILLIEQUIVALENT(S): at 11:37

## 2022-04-23 RX ADMIN — Medication 100 MEQ/KG/HR: at 20:40

## 2022-04-23 RX ADMIN — KETAMINE HYDROCHLORIDE 80 MILLIGRAM(S): 100 INJECTION INTRAMUSCULAR; INTRAVENOUS at 00:50

## 2022-04-23 RX ADMIN — Medication 100 MILLIEQUIVALENT(S): at 10:50

## 2022-04-23 RX ADMIN — PROPOFOL 11.4 MICROGRAM(S)/KG/MIN: 10 INJECTION, EMULSION INTRAVENOUS at 20:41

## 2022-04-23 RX ADMIN — POTASSIUM PHOSPHATE, MONOBASIC POTASSIUM PHOSPHATE, DIBASIC 83.33 MILLIMOLE(S): 236; 224 INJECTION, SOLUTION INTRAVENOUS at 11:37

## 2022-04-23 RX ADMIN — Medication 50 MILLIEQUIVALENT(S): at 04:43

## 2022-04-23 RX ADMIN — Medication 50 MILLIEQUIVALENT(S): at 02:00

## 2022-04-23 RX ADMIN — Medication 25 GRAM(S): at 20:41

## 2022-04-23 RX ADMIN — Medication 112 MILLIGRAM(S): at 17:10

## 2022-04-23 RX ADMIN — INSULIN HUMAN 1 UNIT(S)/HR: 100 INJECTION, SOLUTION SUBCUTANEOUS at 20:41

## 2022-04-23 RX ADMIN — CHLORHEXIDINE GLUCONATE 15 MILLILITER(S): 213 SOLUTION TOPICAL at 05:23

## 2022-04-23 RX ADMIN — Medication 3 MILLILITER(S): at 23:27

## 2022-04-23 RX ADMIN — Medication 40 MILLIEQUIVALENT(S): at 00:00

## 2022-04-23 RX ADMIN — CEFTRIAXONE 100 MILLIGRAM(S): 500 INJECTION, POWDER, FOR SOLUTION INTRAMUSCULAR; INTRAVENOUS at 23:24

## 2022-04-23 RX ADMIN — EPINEPHRINE 12.9 MICROGRAM(S)/KG/MIN: 0.3 INJECTION INTRAMUSCULAR; SUBCUTANEOUS at 20:40

## 2022-04-23 RX ADMIN — Medication 40 MILLIEQUIVALENT(S): at 04:42

## 2022-04-23 RX ADMIN — CHLORHEXIDINE GLUCONATE 15 MILLILITER(S): 213 SOLUTION TOPICAL at 17:11

## 2022-04-23 RX ADMIN — PROPOFOL 11.4 MICROGRAM(S)/KG/MIN: 10 INJECTION, EMULSION INTRAVENOUS at 08:55

## 2022-04-23 RX ADMIN — EPINEPHRINE 12.9 MICROGRAM(S)/KG/MIN: 0.3 INJECTION INTRAMUSCULAR; SUBCUTANEOUS at 08:56

## 2022-04-23 RX ADMIN — Medication 100 MILLIEQUIVALENT(S): at 00:01

## 2022-04-23 RX ADMIN — Medication 200 GRAM(S): at 06:14

## 2022-04-23 RX ADMIN — Medication 200 GRAM(S): at 10:05

## 2022-04-23 RX ADMIN — SODIUM CHLORIDE 100 MILLILITER(S): 9 INJECTION, SOLUTION INTRAVENOUS at 00:00

## 2022-04-23 RX ADMIN — SODIUM CHLORIDE 100 MILLILITER(S): 9 INJECTION, SOLUTION INTRAVENOUS at 08:02

## 2022-04-23 RX ADMIN — Medication 3 MILLILITER(S): at 12:16

## 2022-04-23 RX ADMIN — SODIUM CHLORIDE 100 MILLILITER(S): 9 INJECTION, SOLUTION INTRAVENOUS at 20:39

## 2022-04-23 RX ADMIN — POTASSIUM PHOSPHATE, MONOBASIC POTASSIUM PHOSPHATE, DIBASIC 62.5 MILLIMOLE(S): 236; 224 INJECTION, SOLUTION INTRAVENOUS at 00:00

## 2022-04-23 RX ADMIN — FENTANYL CITRATE 100 MICROGRAM(S): 50 INJECTION INTRAVENOUS at 00:52

## 2022-04-23 RX ADMIN — Medication 100 MILLIEQUIVALENT(S): at 09:55

## 2022-04-23 RX ADMIN — Medication 200 GRAM(S): at 02:00

## 2022-04-23 RX ADMIN — INSULIN HUMAN 7 UNIT(S): 100 INJECTION, SOLUTION SUBCUTANEOUS at 07:00

## 2022-04-23 RX ADMIN — CHLORHEXIDINE GLUCONATE 1 APPLICATION(S): 213 SOLUTION TOPICAL at 06:23

## 2022-04-23 RX ADMIN — Medication 250 MILLIGRAM(S): at 05:24

## 2022-04-23 RX ADMIN — Medication 100 MILLIGRAM(S): at 11:29

## 2022-04-23 RX ADMIN — Medication 100 MILLIGRAM(S): at 21:58

## 2022-04-23 RX ADMIN — Medication 100 MILLIGRAM(S): at 06:15

## 2022-04-23 RX ADMIN — Medication 200 GRAM(S): at 21:57

## 2022-04-23 RX ADMIN — VASOPRESSIN 2.4 UNIT(S)/MIN: 20 INJECTION INTRAVENOUS at 20:41

## 2022-04-23 RX ADMIN — HEPARIN SODIUM 5000 UNIT(S): 5000 INJECTION INTRAVENOUS; SUBCUTANEOUS at 17:10

## 2022-04-23 RX ADMIN — POTASSIUM PHOSPHATE, MONOBASIC POTASSIUM PHOSPHATE, DIBASIC 62.5 MILLIMOLE(S): 236; 224 INJECTION, SOLUTION INTRAVENOUS at 20:40

## 2022-04-23 RX ADMIN — CEFTRIAXONE 100 MILLIGRAM(S): 500 INJECTION, POWDER, FOR SOLUTION INTRAMUSCULAR; INTRAVENOUS at 04:29

## 2022-04-23 RX ADMIN — VASOPRESSIN 2.4 UNIT(S)/MIN: 20 INJECTION INTRAVENOUS at 08:30

## 2022-04-23 RX ADMIN — Medication 25 GRAM(S): at 00:00

## 2022-04-23 RX ADMIN — MIDAZOLAM HYDROCHLORIDE 4 MILLIGRAM(S): 1 INJECTION, SOLUTION INTRAMUSCULAR; INTRAVENOUS at 00:54

## 2022-04-23 RX ADMIN — Medication 100 MILLIEQUIVALENT(S): at 09:01

## 2022-04-23 RX ADMIN — CEFTRIAXONE 100 MILLIGRAM(S): 500 INJECTION, POWDER, FOR SOLUTION INTRAMUSCULAR; INTRAVENOUS at 11:28

## 2022-04-23 RX ADMIN — Medication 250 MILLIGRAM(S): at 18:01

## 2022-04-23 RX ADMIN — Medication 200 GRAM(S): at 14:05

## 2022-04-23 RX ADMIN — Medication 100 MEQ/KG/HR: at 08:02

## 2022-04-23 NOTE — PROGRESS NOTE ADULT - SUBJECTIVE AND OBJECTIVE BOX
Patient is a 61y old  Male who presents with a chief complaint of AMS 2/2 Pneumonia (2022 09:30)    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        Interval history:  pt now in MICU  remains unresponsive  secretions more in mouth than in ET tube  No other acute events      PAST MEDICAL & SURGICAL HISTORY:  H/O cerebellar ataxia    No significant past surgical history      Allergies    No Known Allergies    Intolerances      Antimicrobials:    acyclovir IVPB      acyclovir IVPB 600 milliGRAM(s) IV Intermittent every 8 hours  ampicillin  IVPB      ampicillin  IVPB 2 Gram(s) IV Intermittent every 4 hours  cefTRIAXone   IVPB 2000 milliGRAM(s) IV Intermittent every 12 hours  vancomycin  IVPB 1000 milliGRAM(s) IV Intermittent every 12 hours    MEDICATIONS  (STANDING):  acyclovir IVPB      acyclovir IVPB 600 milliGRAM(s) IV Intermittent every 8 hours  ampicillin  IVPB      ampicillin  IVPB 2 Gram(s) IV Intermittent every 4 hours  atorvastatin 20 milliGRAM(s) Oral at bedtime  cefTRIAXone   IVPB 2000 milliGRAM(s) IV Intermittent every 12 hours  chlorhexidine 0.12% Liquid 15 milliLiter(s) Oral Mucosa every 12 hours  chlorhexidine 4% Liquid 1 Application(s) Topical <User Schedule>  dextrose 50% Injectable 50 milliLiter(s) IV Push every 15 minutes  dextrose 50% Injectable 25 milliLiter(s) IV Push every 15 minutes  EPINEPHrine    Infusion 0.05 MICROgram(s)/kG/Min (12.9 mL/Hr) IV Continuous <Continuous>  fludroCORTISONE 0.1 milliGRAM(s) Oral daily  folic acid 1 milliGRAM(s) Oral daily  hydrocortisone sodium succinate Injectable 100 milliGRAM(s) IV Push every 8 hours  insulin regular Infusion 8 Unit(s)/Hr (8 mL/Hr) IV Continuous <Continuous>  lactated ringers. 1000 milliLiter(s) (100 mL/Hr) IV Continuous <Continuous>  multivitamin 1 Tablet(s) Oral daily  pantoprazole  Injectable 40 milliGRAM(s) IV Push daily  potassium chloride  10 mEq/100 mL IVPB 10 milliEquivalent(s) IV Intermittent every 1 hour  propofol Infusion 25 MICROgram(s)/kG/Min (11.4 mL/Hr) IV Continuous <Continuous>  sodium bicarbonate  Infusion 0.197 mEq/kG/Hr (100 mL/Hr) IV Continuous <Continuous>  thiamine 100 milliGRAM(s) Oral daily  vancomycin  IVPB 1000 milliGRAM(s) IV Intermittent every 12 hours  vasopressin Infusion 0.04 Unit(s)/Min (2.4 mL/Hr) IV Continuous <Continuous>      Vital Signs Last 24 Hrs  T(C): 35.8 (22 @ 07:00),   T(F): 96.4 (22 @ 07:00), )  HR: 103 (22 @ 08:40) (33 - 129)  BP: 154/74 (22 @ 07:00) (80/55 - 154/74)  BP(mean): 83 (22 @ 07:00) (61 - 104)  RR: 28 (22 @ 07:00) (6 - 41)  SpO2: 98% (22 @ 08:40) (80% - 100%)    Physical Exam:    Constitutional intubated    HEENT pupils small but reactive    No oral exudate or erythema    Neck supple no JVD or LN    Chest Good AE,CTA    CVS S1 S2     Abd soft   Ext No cyanosis clubbing or edema    IV site no erythema tenderness or discharge      Lab Data:                          10.3   19.48 )-----------( 248      ( 2022 06:25 )             30.9           147<H>  |  113<H>  |  <4<L>  ----------------------------<  439<H>  3.7   |  10<LL>  |  0.77    Ca    9.1      2022 06:25  Phos  2.8       Mg     2.1         TPro  6.1  /  Alb  3.2<L>  /  TBili  0.2  /  DBili  x   /  AST  13  /  ALT  16  /  AlkPhos  48        Urinalysis Basic - ( 2022 06:24 )    Color: Colorless / Appearance: Clear / S.010 / pH: x  Gluc: x / Ketone: Trace  / Bili: Negative / Urobili: Negative   Blood: x / Protein: Negative / Nitrite: Negative   Leuk Esterase: Negative / RBC: 7 /hpf / WBC 1 /HPF   Sq Epi: x / Non Sq Epi: 0 /hpf / Bacteria: Negative        .CSF CSF  22 --  --    polymorphonuclear leukocytes per low power field  No organisms seen  by cytocentrifuge      .Blood Blood-Peripheral  22   No growth to date.  --  --      Clean Catch Clean Catch (Midstream)  22   No growth  --  --      .Blood Blood-Peripheral  22   No growth to date.  --  --      Catheterized Catheterized  22   >=3 organisms. Probable collection contamination.  --  --      .Blood Blood  22   No Growth Final  --  --      < from: MR Head w/wo IV Cont (22 @ 16:50) >  IMPRESSION:    MRI BRAIN:  Extensive, diffuse leptomeningeal enhancement. Differential diagnosis   primarily includes leptomeningeal metastases and infectious meningitis.    Abnormal ependymal enhancement involving the bilateral frontal horns,   bilateral ventricular bodies, left temporal and occipital horn, and   fourth ventricle. Differential diagnosis includes ependymal metastases   and infectious meningitis.    Possible small foci of restricted diffusion in the bilateral superior   cerebral hemispheres. These may represent small acute infarcts or regions   of encephalitis.    Edema noted within the cerebellar vermis and mesial bilateral cerebellar   hemispheres.    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    Multilevel degenerative changes.    Dr. Puente discussed these findings with Dr. Morocho on 2022 10:29 AM   with read back.        --- End of Report ---            WONG LUIS MD; Attending Radiologist  This document has been electronically signed. 2022 10:34AM    < end of copied text >      < from: CT Abdomen and Pelvis w/ IV Cont (22 @ 03:34) >  ******PRELIMINARY REPORT******      ******PRELIMINARY REPORT******       ACC: [id number] EXAM:  CT ABDOMEN AND PELVIS IC                          PROCEDURE DATE:  2022    ******PRELIMINARY REPORT******      ******PRELIMINARY REPORT******           INTERPRETATION:  No emergent findings. Follow up full/official report in   the morning.        ******PRELIMINARY REPORT******      ******PRELIMINARY REPORT******       HERMILO RUBIO MD; Resident Radiologist    < end of copied text >    < from: CT Head No Cont (22 @ 03:25) >    IMPRESSION:   Mild periventricular white matter ischemia with tiny old   lacunar infarction in the LEFT cerebellum.    --- End of Report ---    < end of copied text >      Vancomycin Level, Trough: 13.3 ug/mL (22 @ 17:12)      WBC Count: 19.48 (22 @ 06:25)  WBC Count: 18.90 (22 @ 00:28)  WBC Count: 5.94 (22 @ 19:36)  WBC Count: 4.71 (22 @ 02:09)  WBC Count: 7.04 (22 @ 14:36)  WBC Count: 4.06 (22 @ 12:08)  WBC Count: 4.33 (22 @ 07:52)  WBC Count: 4.03 (22 @ 00:27)  WBC Count: 4.70 (22 @ 08:58)  WBC Count: 3.94 (22 @ 03:13)  WBC Count: 9.87 (22 @ 07:13)  WBC Count: 9.39 (22 @ 21:55)    Protein, CSF (22 @ 16:07)    Protein, CSF: 126 mg/dL    Glucose, CSF (22 @ 16:07)    Glucose, CSF: 54 mg/dL      Cerebrospinal Fluid Cell Count-1 (22 @ 16:07)    Tube Type: Tube 4    Total Nucleated Cell Count, CSF: 43 /uL    RBC Count - Spinal Fluid: 9 /uL    CSF Color: No Color    CSF Appearance: Clear    CSF Lymphocytes: 61 %    CSF Monocytes/Macrophages: 22 %    CSF Segmented Neutrophils: 17 %           Patient is a 61y old  Male who presents with a chief complaint of AMS 2/2 Pneumonia (2022 09:30)    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        Interval history:  pt now in MICU  remains unresponsive  secretions more in mouth than in ET tube  No other acute events      PAST MEDICAL & SURGICAL HISTORY:  H/O cerebellar ataxia    No significant past surgical history      Allergies    No Known Allergies    Intolerances      Antimicrobials:    acyclovir IVPB      acyclovir IVPB 600 milliGRAM(s) IV Intermittent every 8 hours  ampicillin  IVPB      ampicillin  IVPB 2 Gram(s) IV Intermittent every 4 hours  cefTRIAXone   IVPB 2000 milliGRAM(s) IV Intermittent every 12 hours  vancomycin  IVPB 1000 milliGRAM(s) IV Intermittent every 12 hours    MEDICATIONS  (STANDING):  acyclovir IVPB      acyclovir IVPB 600 milliGRAM(s) IV Intermittent every 8 hours  ampicillin  IVPB      ampicillin  IVPB 2 Gram(s) IV Intermittent every 4 hours  atorvastatin 20 milliGRAM(s) Oral at bedtime  cefTRIAXone   IVPB 2000 milliGRAM(s) IV Intermittent every 12 hours  chlorhexidine 0.12% Liquid 15 milliLiter(s) Oral Mucosa every 12 hours  chlorhexidine 4% Liquid 1 Application(s) Topical <User Schedule>  dextrose 50% Injectable 50 milliLiter(s) IV Push every 15 minutes  dextrose 50% Injectable 25 milliLiter(s) IV Push every 15 minutes  EPINEPHrine    Infusion 0.05 MICROgram(s)/kG/Min (12.9 mL/Hr) IV Continuous <Continuous>  fludroCORTISONE 0.1 milliGRAM(s) Oral daily  folic acid 1 milliGRAM(s) Oral daily  hydrocortisone sodium succinate Injectable 100 milliGRAM(s) IV Push every 8 hours  insulin regular Infusion 8 Unit(s)/Hr (8 mL/Hr) IV Continuous <Continuous>  lactated ringers. 1000 milliLiter(s) (100 mL/Hr) IV Continuous <Continuous>  multivitamin 1 Tablet(s) Oral daily  pantoprazole  Injectable 40 milliGRAM(s) IV Push daily  potassium chloride  10 mEq/100 mL IVPB 10 milliEquivalent(s) IV Intermittent every 1 hour  propofol Infusion 25 MICROgram(s)/kG/Min (11.4 mL/Hr) IV Continuous <Continuous>  sodium bicarbonate  Infusion 0.197 mEq/kG/Hr (100 mL/Hr) IV Continuous <Continuous>  thiamine 100 milliGRAM(s) Oral daily  vancomycin  IVPB 1000 milliGRAM(s) IV Intermittent every 12 hours  vasopressin Infusion 0.04 Unit(s)/Min (2.4 mL/Hr) IV Continuous <Continuous>      Vital Signs Last 24 Hrs  T(C): 35.8 (22 @ 07:00),   T(F): 96.4 (22 @ 07:00), )  HR: 103 (22 @ 08:40) (33 - 129)  BP: 154/74 (22 @ 07:00) (80/55 - 154/74)  BP(mean): 83 (22 @ 07:00) (61 - 104)  RR: 28 (22 @ 07:00) (6 - 41)  SpO2: 98% (22 @ 08:40) (80% - 100%)    Physical Exam:    Constitutional intubated    HEENT pupils small but reactive    No oral exudate or erythema    Neck supple no JVD or LN    Chest Good AE,CTA    CVS S1 S2     Abd soft   Ext No cyanosis clubbing or edema    IV site no erythema tenderness or discharge      Lab Data:                          10.3   19.48 )-----------( 248      ( 2022 06:25 )             30.9           147<H>  |  113<H>  |  <4<L>  ----------------------------<  439<H>  3.7   |  10<LL>  |  0.77    Ca    9.1      2022 06:25  Phos  2.8       Mg     2.1         TPro  6.1  /  Alb  3.2<L>  /  TBili  0.2  /  DBili  x   /  AST  13  /  ALT  16  /  AlkPhos  48    Vancomycin Level, Trough (22 @ 17:12)    Vancomycin Level, Trough: 13.3: Vancomycin trough levels should be rapidly reached and maintained at  15-20 ug/ml for life threatening MRSA  infections such as sepsis, endocarditis, osteomyelitis and pneumonia. A  first trough level should be drawn  before the 3rd or 4th dose.  Risk of renal toxicity is increased for levels >15 ug/ml, in patients on  other nephrotoxic drugs, who are  hemodynamically unstable, have unstable renal function, or are on  Vancomycin therapy for >14 days. Renal function with  creatinine levels should be monitored for those patients. ug/mL        Urinalysis Basic - ( 2022 06:24 )    Color: Colorless / Appearance: Clear / S.010 / pH: x  Gluc: x / Ketone: Trace  / Bili: Negative / Urobili: Negative   Blood: x / Protein: Negative / Nitrite: Negative   Leuk Esterase: Negative / RBC: 7 /hpf / WBC 1 /HPF   Sq Epi: x / Non Sq Epi: 0 /hpf / Bacteria: Negative        .CSF CSF  22 --  --    polymorphonuclear leukocytes per low power field  No organisms seen  by cytocentrifuge      .Blood Blood-Peripheral  22   No growth to date.  --  --      Clean Catch Clean Catch (Midstream)  22   No growth  --  --      .Blood Blood-Peripheral  22   No growth to date.  --  --      Catheterized Catheterized  22   >=3 organisms. Probable collection contamination.  --  --      .Blood Blood  22   No Growth Final  --  --      < from: MR Head w/wo IV Cont (22 @ 16:50) >  IMPRESSION:    MRI BRAIN:  Extensive, diffuse leptomeningeal enhancement. Differential diagnosis   primarily includes leptomeningeal metastases and infectious meningitis.    Abnormal ependymal enhancement involving the bilateral frontal horns,   bilateral ventricular bodies, left temporal and occipital horn, and   fourth ventricle. Differential diagnosis includes ependymal metastases   and infectious meningitis.    Possible small foci of restricted diffusion in the bilateral superior   cerebral hemispheres. These may represent small acute infarcts or regions   of encephalitis.    Edema noted within the cerebellar vermis and mesial bilateral cerebellar   hemispheres.    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    Multilevel degenerative changes.    Dr. Puente discussed these findings with Dr. Morocho on 2022 10:29 AM   with read back.        --- End of Report ---            WONG LUIS MD; Attending Radiologist  This document has been electronically signed. 2022 10:34AM    < end of copied text >      < from: CT Abdomen and Pelvis w/ IV Cont (22 @ 03:34) >  ******PRELIMINARY REPORT******      ******PRELIMINARY REPORT******       ACC: [id number] EXAM:  CT ABDOMEN AND PELVIS IC                          PROCEDURE DATE:  2022    ******PRELIMINARY REPORT******      ******PRELIMINARY REPORT******           INTERPRETATION:  No emergent findings. Follow up full/official report in   the morning.        ******PRELIMINARY REPORT******      ******PRELIMINARY REPORT******       HERMILO RUBIO MD; Resident Radiologist    < end of copied text >    < from: CT Head No Cont (22 @ 03:25) >    IMPRESSION:   Mild periventricular white matter ischemia with tiny old   lacunar infarction in the LEFT cerebellum.    --- End of Report ---    < end of copied text >      Vancomycin Level, Trough: 13.3 ug/mL (22 @ 17:12)      WBC Count: 19.48 (22 @ 06:25)  WBC Count: 18.90 (22 @ 00:28)  WBC Count: 5.94 (22 @ 19:36)  WBC Count: 4.71 (22 @ 02:09)  WBC Count: 7.04 (22 @ 14:36)  WBC Count: 4.06 (22 @ 12:08)  WBC Count: 4.33 (22 @ 07:52)  WBC Count: 4.03 (22 @ 00:27)  WBC Count: 4.70 (22 @ 08:58)  WBC Count: 3.94 (22 @ 03:13)  WBC Count: 9.87 (22 @ 07:13)  WBC Count: 9.39 (22 @ 21:55)    Protein, CSF (22 @ 16:07)    Protein, CSF: 126 mg/dL    Glucose, CSF (22 @ 16:07)    Glucose, CSF: 54 mg/dL      Cerebrospinal Fluid Cell Count-1 (22 @ 16:07)    Tube Type: Tube 4    Total Nucleated Cell Count, CSF: 43 /uL    RBC Count - Spinal Fluid: 9 /uL    CSF Color: No Color    CSF Appearance: Clear    CSF Lymphocytes: 61 %    CSF Monocytes/Macrophages: 22 %    CSF Segmented Neutrophils: 17 %           Patient is a 61y old  Male who presents with a chief complaint of AMS 2/2 Pneumonia (2022 09:30)    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        Interval history:  pt now in MICU  remains unresponsive  secretions more in mouth than in ET tube  No other acute events      PAST MEDICAL & SURGICAL HISTORY:  H/O cerebellar ataxia    No significant past surgical history      Allergies    No Known Allergies    Intolerances      Antimicrobials:    acyclovir IVPB      acyclovir IVPB 600 milliGRAM(s) IV Intermittent every 8 hours  ampicillin  IVPB      ampicillin  IVPB 2 Gram(s) IV Intermittent every 4 hours  cefTRIAXone   IVPB 2000 milliGRAM(s) IV Intermittent every 12 hours  vancomycin  IVPB 1000 milliGRAM(s) IV Intermittent every 12 hours    MEDICATIONS  (STANDING):  acyclovir IVPB      acyclovir IVPB 600 milliGRAM(s) IV Intermittent every 8 hours  ampicillin  IVPB      ampicillin  IVPB 2 Gram(s) IV Intermittent every 4 hours  atorvastatin 20 milliGRAM(s) Oral at bedtime  cefTRIAXone   IVPB 2000 milliGRAM(s) IV Intermittent every 12 hours  chlorhexidine 0.12% Liquid 15 milliLiter(s) Oral Mucosa every 12 hours  chlorhexidine 4% Liquid 1 Application(s) Topical <User Schedule>  dextrose 50% Injectable 50 milliLiter(s) IV Push every 15 minutes  dextrose 50% Injectable 25 milliLiter(s) IV Push every 15 minutes  EPINEPHrine    Infusion 0.05 MICROgram(s)/kG/Min (12.9 mL/Hr) IV Continuous <Continuous>  fludroCORTISONE 0.1 milliGRAM(s) Oral daily  folic acid 1 milliGRAM(s) Oral daily  hydrocortisone sodium succinate Injectable 100 milliGRAM(s) IV Push every 8 hours  insulin regular Infusion 8 Unit(s)/Hr (8 mL/Hr) IV Continuous <Continuous>  lactated ringers. 1000 milliLiter(s) (100 mL/Hr) IV Continuous <Continuous>  multivitamin 1 Tablet(s) Oral daily  pantoprazole  Injectable 40 milliGRAM(s) IV Push daily  potassium chloride  10 mEq/100 mL IVPB 10 milliEquivalent(s) IV Intermittent every 1 hour  propofol Infusion 25 MICROgram(s)/kG/Min (11.4 mL/Hr) IV Continuous <Continuous>  sodium bicarbonate  Infusion 0.197 mEq/kG/Hr (100 mL/Hr) IV Continuous <Continuous>  thiamine 100 milliGRAM(s) Oral daily  vancomycin  IVPB 1000 milliGRAM(s) IV Intermittent every 12 hours  vasopressin Infusion 0.04 Unit(s)/Min (2.4 mL/Hr) IV Continuous <Continuous>      Vital Signs Last 24 Hrs  T(C): 35.8 (22 @ 07:00),   T(F): 96.4 (22 @ 07:00), )  HR: 103 (22 @ 08:40) (33 - 129)  BP: 154/74 (22 @ 07:00) (80/55 - 154/74)  BP(mean): 83 (22 @ 07:00) (61 - 104)  RR: 28 (22 @ 07:00) (6 - 41)  SpO2: 98% (22 @ 08:40) (80% - 100%)    Physical Exam:    Constitutional intubated    HEENT pupils small but reactive    No oral exudate or erythema    Neck supple no JVD or LN    Chest Good AE,CTA    CVS S1 S2     Abd soft   Ext No cyanosis clubbing or edema    IV site no erythema tenderness or discharge      Lab Data:                          10.3   19.48 )-----------( 248      ( 2022 06:25 )             30.9           147<H>  |  113<H>  |  <4<L>  ----------------------------<  439<H>  3.7   |  10<LL>  |  0.77    Ca    9.1      2022 06:25  Phos  2.8       Mg     2.1         TPro  6.1  /  Alb  3.2<L>  /  TBili  0.2  /  DBili  x   /  AST  13  /  ALT  16  /  AlkPhos  48    Vancomycin Level, Trough (22 @ 17:12)    Vancomycin Level, Trough: 13.3: Vancomycin trough levels should be rapidly reached and maintained at  15-20 ug/ml for life threatening MRSA  infections such as sepsis, endocarditis, osteomyelitis and pneumonia. A  first trough level should be drawn  before the 3rd or 4th dose.  Risk of renal toxicity is increased for levels >15 ug/ml, in patients on  other nephrotoxic drugs, who are  hemodynamically unstable, have unstable renal function, or are on  Vancomycin therapy for >14 days. Renal function with  creatinine levels should be monitored for those patients. ug/mL    Blood Gas Arterial, Lactate (22 @ 10:18)    Blood Gas Arterial, Lactate: 8.3: TYPE:(C=Critical, N=Notification, A=Abnormal) C  TESTS: LACTATEWB  DATE/TIME CALLED: 2022 10:21:50 EDT  CALLED TO: WONG TOMLINSON MD  READ BACK (2 Patient Identifiers)(Y/N): Y  READ BACK VALUES (Y/N): Y  CALLED BY: YOSEF ROSAS mmol/L        Urinalysis Basic - ( 2022 06:24 )    Color: Colorless / Appearance: Clear / S.010 / pH: x  Gluc: x / Ketone: Trace  / Bili: Negative / Urobili: Negative   Blood: x / Protein: Negative / Nitrite: Negative   Leuk Esterase: Negative / RBC: 7 /hpf / WBC 1 /HPF   Sq Epi: x / Non Sq Epi: 0 /hpf / Bacteria: Negative    HIV-1/2 Antigen/Antibody Screen by [facility] (22 @ 09:20)    HIV-1/2 Combo Result: Nonreact: The HIV Ag/Ab Combo test performed screens for HIV-1 p24 antigen,  antibodies to HIV-1 (group M and group O), and antibodies to HIV-2. All  specimens repeatedly reactive will reflex to an HIV 1/2 antibody  confirmation and differentiation test. This assay detects p24 antigen  which may be present prior to the development of HIV antibodies,  therefore a reactive result with a negative HIV 1/2 AB Confirmation  should be followed up with HIV-1 RNA, HIV-2 RNA and repeat testing in 4-8  weeks. A nonreactive result does not preclude previous exposure to or  infection with HIV-1 or HIV-2.        .CSF CSF  22 --  --    polymorphonuclear leukocytes per low power field  No organisms seen  by cytocentrifuge      .Blood Blood-Peripheral  22   No growth to date.  --  --      Clean Catch Clean Catch (Midstream)  22   No growth  --  --      .Blood Blood-Peripheral  22   No growth to date.  --  --      Catheterized Catheterized  22   >=3 organisms. Probable collection contamination.  --  --      .Blood Blood  22   No Growth Final  --  --      < from: MR Head w/wo IV Cont (22 @ 16:50) >  IMPRESSION:    MRI BRAIN:  Extensive, diffuse leptomeningeal enhancement. Differential diagnosis   primarily includes leptomeningeal metastases and infectious meningitis.    Abnormal ependymal enhancement involving the bilateral frontal horns,   bilateral ventricular bodies, left temporal and occipital horn, and   fourth ventricle. Differential diagnosis includes ependymal metastases   and infectious meningitis.    Possible small foci of restricted diffusion in the bilateral superior   cerebral hemispheres. These may represent small acute infarcts or regions   of encephalitis.    Edema noted within the cerebellar vermis and mesial bilateral cerebellar   hemispheres.    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    Multilevel degenerative changes.    Dr. Puente discussed these findings with Dr. Morocho on 2022 10:29 AM   with read back.        --- End of Report ---            WONG LUIS MD; Attending Radiologist  This document has been electronically signed. 2022 10:34AM    < end of copied text >      < from: CT Abdomen and Pelvis w/ IV Cont (22 @ 03:34) >  ******PRELIMINARY REPORT******      ******PRELIMINARY REPORT******       ACC: [id number] EXAM:  CT ABDOMEN AND PELVIS IC                          PROCEDURE DATE:  2022    ******PRELIMINARY REPORT******      ******PRELIMINARY REPORT******           INTERPRETATION:  No emergent findings. Follow up full/official report in   the morning.        ******PRELIMINARY REPORT******      ******PRELIMINARY REPORT******       HERMILO RUBIO MD; Resident Radiologist    < end of copied text >    < from: CT Head No Cont (22 @ 03:25) >    IMPRESSION:   Mild periventricular white matter ischemia with tiny old   lacunar infarction in the LEFT cerebellum.    --- End of Report ---    < end of copied text >      Vancomycin Level, Trough: 13.3 ug/mL (22 @ 17:12)      WBC Count: 19.48 (22 @ 06:25)  WBC Count: 18.90 (04-23-22 @ 00:28)  WBC Count: 5.94 (22 @ 19:36)  WBC Count: 4.71 (22 @ 02:09)  WBC Count: 7.04 (22 @ 14:36)  WBC Count: 4.06 (22 @ 12:08)  WBC Count: 4.33 (22 @ 07:52)  WBC Count: 4.03 (22 @ 00:27)  WBC Count: 4.70 (22 @ 08:58)  WBC Count: 3.94 (22 @ 03:13)  WBC Count: 9.87 (22 @ 07:13)  WBC Count: 9.39 (22 @ 21:55)    Protein, CSF (22 @ 16:07)    Protein, CSF: 126 mg/dL    Glucose, CSF (22 @ 16:07)    Glucose, CSF: 54 mg/dL      Cerebrospinal Fluid Cell Count-1 (22 @ 16:07)    Tube Type: Tube 4    Total Nucleated Cell Count, CSF: 43 /uL    RBC Count - Spinal Fluid: 9 /uL    CSF Color: No Color    CSF Appearance: Clear    CSF Lymphocytes: 61 %    CSF Monocytes/Macrophages: 22 %    CSF Segmented Neutrophils: 17 %

## 2022-04-23 NOTE — PROGRESS NOTE ADULT - ASSESSMENT
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 In the ED stroke code was called negative for intracranial process. CT Chest with concern for pneumonia and esophagitis. Today RRT called for hypotension and bradycardia given atropine, now in ICU pending LP. 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 s/p LP with neuroradiology today - 16cc csf fluid collected.    Recommend:  #Leptomeningeal enhancement, AMS  -Concerning for infectious meningitis vs metastatic disease vs paraneoplastic  -Continue ceftriaxone 2 grams IV q 12 hours and vancomycin 1 gram IV q 12 hours. Add ampicillin 2 grams IV q 4 hours and acyclovir 600 mg IV q 8 hours  -Gentle hydration while on iv acyclovir to prevent crystalluria  -F/U LP results  -F/U blood cultures so far no growth  -Droplet isolation x 24 hours   -Will tailor abx based on LP results  - await CSF PCR and multiple other studies - such as heavy metal screens, flow cytometry, paraneoplastic ...  - for EEG    #Apsiration pna  -Continue current abx regimen  -Aspiration precautions      Eve Prado M.D. ,   please reach via teams   If no answer, or after 5PM/ weekends,  then please call  [phone]    Assessment and plan discussed with the primary team .    ID service will be covering over the weekend. Please call for acute issues or questions. 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 In the ED stroke code was called negative for intracranial process. CT Chest with concern for pneumonia and esophagitis. Today RRT called for hypotension and bradycardia given atropine, now in ICU pending LP. 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 s/p LP with neuroradiology today - 16cc csf fluid collected.    Recommend:  #Leptomeningeal enhancement, AMS  -Concerning for infectious meningitis vs metastatic disease vs paraneoplastic  -Continue ceftriaxone 2 grams IV q 12 hours and vancomycin 1 gram IV q 12 hours. Add ampicillin 2 grams IV q 4 hours and acyclovir 600 mg IV q 8 hours ( follow vancomycin level)  -Gentle hydration while on iv acyclovir to prevent crystalluria  -F/U LP results  -F/U blood cultures so far no growth  -Droplet isolation x 24 hours   -Will tailor abx based on LP results  - await CSF PCR and multiple other studies - such as heavy metal screens, flow cytometry, paraneoplastic ...  - for EEG    #Apsiration pna  -Continue current abx regimen  -Aspiration precautions      Eve Prado M.D. ,   please reach via teams   If no answer, or after 5PM/ weekends,  then please call  [phone]    Assessment and plan discussed with the primary team .    ID service will be covering over the weekend. Please call for acute issues or questions. 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 In the ED stroke code was called negative for intracranial process. CT Chest with concern for pneumonia and esophagitis. Today RRT called for hypotension and bradycardia given atropine, now in ICU pending LP. 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 s/p LP with neuroradiology today - 16cc csf fluid collected.  Pt likely with worsening , found to have extensive leptomeningeal enhancement and cerebellar edema concerning for neoplastic vs. infectious process. Also would consider other causes of neurocognitive decline, including underlying prion disease, paraneoplastic syndromes, autoimmune / toxic causes.  Recommend:  #Leptomeningeal enhancement, AMS  -Concerning for infectious meningitis vs metastatic disease vs paraneoplastic  -Continue ceftriaxone 2 grams IV q 12 hours and vancomycin 1 gram IV q 12 hours. Add ampicillin 2 grams IV q 4 hours and acyclovir 600 mg IV q 8 hours ( follow vancomycin level)  -Gentle hydration while on iv acyclovir to prevent crystalluria  -F/U LP results  -F/U blood cultures so far no growth  -Droplet isolation x 24 hours   -Will tailor abx based on LP results  - await CSF PCR and multiple other studies - such as heavy metal screens, flow cytometry, paraneoplastic ...  - for EEG    #Apsiration pna  -Continue current abx regimen  -Aspiration precautions      Eve Prado M.D. ,   please reach via teams   If no answer, or after 5PM/ weekends,  then please call  [phone]    Assessment and plan discussed with the primary team .    ID service will be covering over the weekend. Please call for acute issues or questions. [phone]

## 2022-04-23 NOTE — PROGRESS NOTE ADULT - ASSESSMENT
61M with PMHx of cerebellar ataxia presents with AMS. Patient experiencing ataxia, cognitive deficits, weight loss over the past 2 years. Approximately 1 week prior to presentation, patient began to experience progressive fatigue/weakness, AMS. On initial exam, pt responding to some commands, normal reflexes and tone, though incomprehensible speech, pt responding to internal stimuli on exam (concerns for hallucinations). 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 Pt's hospital course complicated by multiple episodes of hypotension, hypothermia, and sinus bradycardia w/ 3.1 second pause requiring transfer to MICU for septic shock, possible adrenal insufficiency 2/2 possibly aspiration PNA. 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 Pt intubated on 4/23 for airway protection. LP 4/23 notable for TNC 43.     Impression: Pt likely with acute worsening of a chronic process, found to have extensive leptomeningeal enhancement and cerebellar edema concerning for neoplastic vs. infectious process. Also would consider other causes of neurocognitive decline, including underlying prion disease, paraneoplastic syndromes, autoimmune / toxic causes.    Recommendations:  [ ] repeat vEEG  [ ] f/u CSF results  [x] CSF results: Protein 126, glucose 54, TNC 43, RBC 9. 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   [x] Video EEG showed moderate to severe nonspecific diffuse or multifocal cerebral dysfunction. No epileptiform pattern or seizure seen.  [x]  NH3 wnl. folate 3.4, b12 elevated. Ceruloplasmin 24. TSH 1.46, TPO antibody <10, copper 108 wnl, quant gold wnl Homocysteine 6.9 wnl, CRP elevated at 25, ESR elevated at 26, RPR wnl, HIV wnl, ck 34 wnl     Case seen and discussed with Dr. Lima. 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 Patient experiencing ataxia, cognitive deficits, weight loss over the past 2 years. Approximately 1 week prior to presentation, patient began to experience worsening fatigue/weakness, AMS. On initial exam, pt responding to some commands, normal reflexes and tone, though incomprehensible speech, pt responding to internal stimuli on exam (concerns for hallucinations). 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 Pt's hospital course complicated by multiple episodes of hypotension, hypothermia, and sinus bradycardia w/ 3.1 second pause requiring transfer to MICU on 4/21 for septic shock, possible adrenal insufficiency 2/2 possibly aspiration PNA; started on steroids. 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 Pt intubated on 4/23 for airway protection. LP 4/23 notable for TNC 43.     Impression: Pt likely with acute worsening of a chronic process, found to have extensive leptomeningeal enhancement and cerebellar edema concerning for neoplastic vs. infectious process. Also would consider other causes of neurocognitive decline, including underlying prion disease, paraneoplastic syndromes, autoimmune / toxic causes.    Recommendations:  [ ] repeat vEEG  [ ] f/u CSF results  [x] CSF results: Protein 126, glucose 54, TNC 43, RBC 9. 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   [x] Video EEG showed moderate to severe nonspecific diffuse or multifocal cerebral dysfunction. No epileptiform pattern or seizure seen.  [x]  NH3 wnl. folate 3.4, b12 elevated. Ceruloplasmin 24. TSH 1.46, TPO antibody <10, copper 108 wnl, quant gold wnl Homocysteine 6.9 wnl, CRP elevated at 25, ESR elevated at 26, RPR wnl, HIV wnl, ck 34 wnl     Case seen and discussed with Dr. Lima. 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 Patient experiencing ataxia, cognitive deficits, weight loss over the past 2 years. Approximately 1 week prior to presentation, patient began to experience worsening fatigue/weakness, AMS. On initial exam, pt responding to some commands, normal reflexes and tone, though incomprehensible speech, pt responding to internal stimuli on exam (concerns for hallucinations). 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 Pt's hospital course complicated by multiple episodes of hypotension, hypothermia, and sinus bradycardia w/ 3.1 second pause requiring transfer to MICU on 4/21 for septic shock, possible adrenal insufficiency 2/2 possibly aspiration PNA; started on steroids. 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 Pt intubated on 4/23 for airway protection. LP 4/23 notable for TNC 43.     Impression: Pt likely with acute worsening of a chronic process, found to have extensive leptomeningeal enhancement and cerebellar edema concerning for neoplastic vs. infectious process. Also would consider other causes of neurocognitive decline, including underlying prion disease, paraneoplastic syndromes, autoimmune / toxic causes.    Recommendations:  [ ] repeat vEEG  [ ] wean sedation as tolerated  [ ] f/u CSF results  [x] CSF results: Protein 126, glucose 54, TNC 43, RBC 9. 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   [x] Video EEG showed moderate to severe nonspecific diffuse or multifocal cerebral dysfunction. No epileptiform pattern or seizure seen.  [x]  NH3 wnl. folate 3.4, b12 elevated. Ceruloplasmin 24. TSH 1.46, TPO antibody <10, copper 108 wnl, quant gold wnl Homocysteine 6.9 wnl, CRP elevated at 25, ESR elevated at 26, RPR wnl, HIV wnl, ck 34 wnl     Case seen and discussed with Dr. Lima.

## 2022-04-23 NOTE — CHART NOTE - NSCHARTNOTEFT_GEN_A_CORE
[facility]PILEPSY [facility]    ** PRELIMINARY EEG reviewed until  13:35    - Diffuse attenuation and slowing of background activity, which may in part be due to sedation.  - No seizures recorded so far.    ** Please note: While on sedation, EEG would be of limited utility. Suggest wean sedation if possible, otherwise consider re-connecting eeg when weaning.     [facility] neuro consult service.    Final report to be produced after completion of the study tomorrow morning.    -----------------------------  Parker Eldridge MD, ZULY  Epilepsy Fellow    --------------------------------  EEG Reading Room: [phone]  (weekdays)  On Call Service After Hours: [phone]

## 2022-04-23 NOTE — PROCEDURE NOTE - NSPROCDETAILS_GEN_ALL_CORE
patient pre-oxygenated, tube inserted, placement confirmed
location identified, draped/prepped, sterile technique used, needle inserted/introduced
guidewire recovered/lumen(s) aspirated and flushed/sterile dressing applied/sterile technique, catheter placed/ultrasound guidance with use of sterile gel and probe cove

## 2022-04-23 NOTE — PROGRESS NOTE ADULT - ASSESSMENT
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 Pt found to have RUL/RLL asp pneum. 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 a.m.  -abg prn   -Hydrocortisone 100 mg IV q 8 hrs  -will add florinef 0.1 mg NGT qd  -POC glucose with ISS q 4 hrs - maintain glucose 140-160  -cortisol level 4/21/22 of 6.4  -lactated ringers @ 75 cc/hr x 10 hrs     61 yr old male with stated hx significant for cerebral ataxia who presented with one week progressive weakness/fatigue accompanied by incontinence and emesis. Pt found to have RUL/RLL asp pneum. 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 did LP  -EEG 24Hr r/o underlying seizure activity    #Pulm:   Intubated for AMS / and decompensation  c/w titrating down  / weaning vent according to clinical status  chess       -Supplemental O2 prn to maintain SPO2 > 92%  -Bronchodilators q 6 hrs prn for sob/wheezes  -HOB >/= 30 degree angle    #CV: sinus bradycardia, HypoTN secondary to septic shock    -ECG now and q am x3  -Cardiac Enzymes now and q 8 hr x 3  -Place on external pacing pads  -Norepi gtt - titrate to MAP 60-65 mmHg  -obtain TTE to eval RVFx/LVFx    #GI/:     -strict I & O's - keep even  -tube feed as tolerated  -ANYI feed tube  -continue protonix 40 mg IVP qd    #ID:  RUL/RLL pneum, MRI with leptomeningeal enhancement 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 a.m.  -abg prn   -Hydrocortisone 100 mg IV q 8 hrs  -will add florinef 0.1 mg NGT qd  -POC glucose with ISS q 4 hrs - maintain glucose 140-160  -cortisol level 4/21/22 of 6.4  -lactated ringers @ 75 cc/hr x 10 hrs     61 yr old male with stated hx significant for cerebral ataxia who presented with one week progressive weakness/fatigue accompanied by incontinence and emesis. Pt found to have RUL/RLL asp pneum. Course c/b recurrent episodes of HypoTN, bradycardia, hypothermia requiring recurrent RRT. The pt was admitted to the MICU w worsening mental status, in septic shock, and continued decompensating overnight 4/22-23 with worsening +AGMA, lactic acidosis, hyperglycemia, w worsening leukocytosis, and went into DI.     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 did LP- infectious work up negative thus far  -EEG 24Hr r/o underlying seizure activity    #Pulm: Intubated for AMS / and decompensation of overall condition  -c/w titrating down  / weaning vent according to clinical status- vent changes as per clinical evidence - blood gases / clinical status  -chest PT  -duonebs q6H  -aspiration precautions    #CV:   Admitted to MICu Sinus bradycardia, HypoTN secondary to septic shock  -c/w epinephrine and vasopressin  -ECG now and q am x3  -if pt improves consider changing to norepinephrine   -obtain TTE to eval RVFx/LVFx    #GI  -Start trickle feeds  -bowel regimen  -c/w PPI- protonix   -CT of the abd noted cholelithiasis w/o cholecystitis- consider abd / US       #Renal / Pre-renal however pt went into DI overnight- likely central DI, +AGMA- Serum HCO3 <10- slowly improving with aid of NaHCO3 infusion  -strict I & O's - keep even  -c/w vasopressin - consider d/c as U/O decreases   -r/o adrenal insuffiencey- cortisol level done prior to steroids and had been started on Florinef however Solu-cortef stress dose started - consider d/c florinef for now in the setting of pt receiving mineral-corticoids via Solu-cortef- for now   -c/w NaHCO3 infusion / check BMP q 6H for now    #ID:  RUL/RLL pneum, MRI with leptomeningeal enhancement 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 , tx to MICU in septic shock  -Urine legionella 4/21/22 - Neg  -MSSA PCR 4/21/22 - detected  -Blood Cx 4/21/22 - NGTD  -Urine Cx 4/18/22 - contaminated  -Blood Cx 4/18/22 - NGTD  -COVID-19 4/17/22 - Neg  -continue ceftriaxone 2 gms IV aq 12 hrs (started 4/21/22)  -continue vanco 1 gm IV q 12 hrs (started 4/20/22)  -Vanco as per level  -acyclovir 600 mg IV q 8 hrs started 4/21/22- c/w IV hydration to prevent renal crystallization   -ampicillin 2 gms IV q 4 hrs started 4/21/22  -f/u LP done by IR Neuroradiology- infectious workup negative thus far- c/w following up      Endo:- Hyperglycemia- beta hydroxy 0.6  -c/w Insulin gtt  -BG slowing improving  -c/w fs q 1H  -add K to IVF / continual replacements of K and Phos during day

## 2022-04-23 NOTE — PROCEDURE NOTE - NSNUMATTEMPT_GEN_A_CORE
· Present on admission, patient unvaccinated  Reports shortness of breath and chest pain at home  + sick contact with wife at home    Reported 90% on RA by EMS    Currently on MILD COVID pathway:  · CXR on admit with bibasilar infiltrates consistent with COVID-19 pneumonia  · Continue O2 supplement, goal >88% O2 sat  · Continue IV remdesivir, day 2 of 5  · Continue IV Decadron 6 mg daily, day 2 of 10  · Baricitinib not indicated  · Antibiotics not indicated given normal procalcitonin x2  · Continue lovenox (ddimer 0 71)  · Mucinex, tessalon, albuterol prn  · Encourage patient to self-prone, incentive spirometer, OOB as appropriate  · Current oxygenation 93% on 3 L   · Monitor inflammatory markers q 2-3 days  · D-dimer 0 71, CRP 84 , NT-,  Procalcitonin 0 23 ->0 25  · Leukopenia likely secondary to viral illness, monitor 1

## 2022-04-23 NOTE — PROGRESS NOTE ADULT - SUBJECTIVE AND OBJECTIVE BOX
SUBJECTIVE/INTERVAL HISTORY: Pt intubated overnight. Pt with worsening AMS, decompensating s/p intubation, worsening lactic acidosis, leukocytosis, hyperglycemia, now with anion gap, also with large urine output with urine osmol of 182.    PAST MEDICAL & SURGICAL HISTORY:  H/O cerebellar ataxia    No significant past surgical history      FAMILY HISTORY:  FH: dementia (Mother)    FH: type 2 diabetes (Mother)    Family history of CVA (Father)        MEDICATIONS (HOME):  Home Medications:  atorvastatin 20 mg oral tablet: 1 tab(s) orally once a day (2022 06:28)  mirtazapine 15 mg oral tablet: 1 tab(s) orally once a day (at bedtime), As Needed (2022 06:28)    MEDICATIONS  (STANDING):  acyclovir IVPB      acyclovir IVPB 600 milliGRAM(s) IV Intermittent every 8 hours  ampicillin  IVPB      ampicillin  IVPB 2 Gram(s) IV Intermittent every 4 hours  atorvastatin 20 milliGRAM(s) Oral at bedtime  cefTRIAXone   IVPB 2000 milliGRAM(s) IV Intermittent every 12 hours  chlorhexidine 0.12% Liquid 15 milliLiter(s) Oral Mucosa every 12 hours  chlorhexidine 4% Liquid 1 Application(s) Topical <User Schedule>  dextrose 50% Injectable 50 milliLiter(s) IV Push every 15 minutes  dextrose 50% Injectable 25 milliLiter(s) IV Push every 15 minutes  EPINEPHrine    Infusion 0.05 MICROgram(s)/kG/Min (12.9 mL/Hr) IV Continuous <Continuous>  fludroCORTISONE 0.1 milliGRAM(s) Oral daily  folic acid 1 milliGRAM(s) Oral daily  hydrocortisone sodium succinate Injectable 100 milliGRAM(s) IV Push every 8 hours  insulin regular Infusion 8 Unit(s)/Hr (8 mL/Hr) IV Continuous <Continuous>  lactated ringers. 1000 milliLiter(s) (100 mL/Hr) IV Continuous <Continuous>  multivitamin 1 Tablet(s) Oral daily  pantoprazole  Injectable 40 milliGRAM(s) IV Push daily  potassium chloride  10 mEq/100 mL IVPB 10 milliEquivalent(s) IV Intermittent every 1 hour  propofol Infusion 25 MICROgram(s)/kG/Min (11.4 mL/Hr) IV Continuous <Continuous>  sodium bicarbonate  Infusion 0.197 mEq/kG/Hr (100 mL/Hr) IV Continuous <Continuous>  thiamine 100 milliGRAM(s) Oral daily  vancomycin  IVPB 1000 milliGRAM(s) IV Intermittent every 12 hours  vasopressin Infusion 0.04 Unit(s)/Min (2.4 mL/Hr) IV Continuous <Continuous>    MEDICATIONS  (PRN):  aluminum hydroxide/magnesium hydroxide/simethicone Suspension 30 milliLiter(s) Oral every 4 hours PRN Dyspepsia  ondansetron Injectable 4 milliGRAM(s) IV Push every 8 hours PRN Nausea and/or Vomiting  sodium chloride 0.9% lock flush 10 milliLiter(s) IV Push every 1 hour PRN Pre/post blood products, medications, blood draw, and to maintain line patency    ALLERGIES/INTOLERANCES:  Allergies  No Known Allergies    Intolerances    VITALS & EXAMINATION:  Vital Signs Last 24 Hrs  T(C): 35.8 (2022 07:00), Max: 43 (2022 19:45)  T(F): 96.4 (2022 07:00), Max: 109.4 (2022 19:45)  HR: 103 (2022 08:40) (33 - 129)  BP: 154/74 (2022 07:00) (80/55 - 154/74)  BP(mean): 83 (2022 07:00) (61 - 104)  RR: 28 (2022 07:00) (6 - 41)  SpO2: 98% (2022 08:40) (80% - 100%)    General:  Constitutional: Male, appears stated age, in no apparent distress including pain  Head: Normocephalic & atraumatic.  Respiratory: ibtubated  Extremities: No cyanosis, clubbing, or edema.  Skin: No rashes, bruising, or discoloration.    Neuro: examined on propofol  MS: no response to verbal or noxious stimuli  Language: intubated  CNs: corneal reflex intact, face symmetric, rest of cranial nerves exam is limited by mental status  Motor: no response to noxious stimulation 0/5 throughout  Sensory: no response to noxious stimulation  Reflexes: b/l +Babinski  Coordination: unable to assess due to mental status  Gait: unable to assess due to mental status      LABORATORY:  CBC                       10.3   19.48 )-----------( 248      ( 2022 06:25 )             30.9     Chem 04-23    147<H>  |  113<H>  |  <4<L>  ----------------------------<  439<H>  3.7   |  10<LL>  |  0.77    Ca    9.1      2022 06:25  Phos  2.8       Mg     2.1         TPro  6.1  /  Alb  3.2<L>  /  TBili  0.2  /  DBili  x   /  AST  13  /  ALT  16  /  AlkPhos  48      LFTs LIVER FUNCTIONS - ( 2022 06:25 )  Alb: 3.2 g/dL / Pro: 6.1 g/dL / ALK PHOS: 48 U/L / ALT: 16 U/L / AST: 13 U/L / GGT: x           Coagulopathy PT/INR - ( 2022 19:36 )   PT: 14.9 sec;   INR: 1.29 ratio         PTT - ( 2022 19:36 )  PTT:22.8 sec  Lipid Panel   A1c   Cardiac enzymes CARDIAC MARKERS ( 2022 19:36 )  x     / x     / 31 U/L / x     / 1.9 ng/mL  CARDIAC MARKERS ( 2022 02:09 )  x     / x     / 34 U/L / x     / 2.0 ng/mL      U/A Urinalysis Basic - ( 2022 06:24 )    Color: Colorless / Appearance: Clear / S.010 / pH: x  Gluc: x / Ketone: Trace  / Bili: Negative / Urobili: Negative   Blood: x / Protein: Negative / Nitrite: Negative   Leuk Esterase: Negative / RBC: 7 /hpf / WBC 1 /HPF   Sq Epi: x / Non Sq Epi: 0 /hpf / Bacteria: Negative    Culture - CSF with Gram Stain . (22 @ 21:48)   Gram Stain:   polymorphonuclear leukocytes per low power field   No organisms seen   by cytocentrifuge   Lactate Dehydrogenase, CSF: 30:  Protein, CSF: 126 mg/dL (22 @ 16:07)   Glucose, CSF: 54 mg/dL (22 @ 16:07)   Cerebrospinal Fluid Cell Count-1 (22 @ 16:07)   Tube Type: Tube 4   Total Nucleated Cell Count, CSF: 43 /uL   RBC Count - Spinal Fluid: 9 /uL   CSF Color: No Color   CSF Appearance: Clear   CSF Lymphocytes: 61 %   CSF Monocytes/Macrophages: 22 %   CSF Segmented Neutrophils: 17 %     STUDIES & IMAGING:    Radiology (XR, CT, MR, U/S, TTE/CRYSTAL):  < from: MR Head w/wo IV Cont (22 @ 16:50) >  IMPRESSION:    MRI BRAIN:  Extensive, diffuse leptomeningeal enhancement. Differential diagnosis   primarily includes leptomeningeal metastases and infectious meningitis.    Abnormal ependymal enhancement involving the bilateral frontal horns,   bilateral ventricular bodies, left temporal and occipital horn, and   fourth ventricle. Differential diagnosis includes ependymal metastases   and infectious meningitis.    Possible small foci of restricted diffusion in the bilateral superior   cerebral hemispheres. These may represent small acute infarcts or regions   of encephalitis.    Edema noted within the cerebellar vermis and mesial bilateral cerebellar   hemispheres.    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    Multilevel degenerative changes.    < end of copied text >

## 2022-04-23 NOTE — CONSULT NOTE ADULT - ATTENDING COMMENTS
Brianna Scott MD  Off: [phone]  contact me on teams    (After 5 pm or on weekends please page the on-call fellow/attending, can check [email or web address] for schedule. Login is debbie lange, schedule under [facility] medicine, psych, derm) Hypernatremia from osmotic diuresis  Normal ph  No need for bicarb gtt  Please control hyperglycemia   Urine output not consistent with DI at present  Please reconsult as needed    Brianna Scott MD  Off: [phone]  contact me on teams    (After 5 pm or on weekends please page the on-call fellow/attending, can check [email or web address] for schedule. Login is debbie lange, schedule under [facility] medicine, psych, derm)

## 2022-04-23 NOTE — PROGRESS NOTE ADULT - SUBJECTIVE AND OBJECTIVE BOX
CHIEF COMPLAINT: AMS / Sepsis - r/o PNA    Interval Events: Pt with worsening AMS, decompensating s/p intubation, worsening lactic acidosis, leukocytosis, hyperglycemia, now with +AGMA w a serum HCO3 of <10, and appears to be in DI w a U. Osmol of 182- U. Lytes and serum Osmol pending. A CT of the brain / abd / pelvis done- official reports pending    REVIEW OF SYSTEMS:  Constitutional: [ ] fevers [ ] chills [ ] weight loss [ ] weight gain  HEENT: [ ] dry eyes [ ] eye irritation [ ] postnasal drip [ ] nasal congestion  CV: [ ] chest pain [ ] orthopnea [ ] palpitations [ ] murmur  Resp: [ ] cough [ ] shortness of breath [ ] dyspnea [ ] wheezing [ ] sputum [ ] hemoptysis  GI: [ ] nausea [ ] vomiting [ ] diarrhea [ ] constipation [ ] abd pain [ ] dysphagia   : [ ] dysuria [ ] nocturia [ ] hematuria [ ] increased urinary frequency  Musculoskeletal: [ ] back pain [ ] myalgias [ ] arthralgias [ ] fracture  Skin: [ ] rash [ ] itch  Neurological: [ ] headache [ ] dizziness [ ] syncope [ ] weakness [ ] numbness  Psychiatric: [ ] anxiety [ ] depression  Endocrine: [ ] diabetes [ ] thyroid problem  Hematologic/Lymphatic: [ ] anemia [ ] bleeding problem  Allergic/Immunologic: [ ] itchy eyes [ ] nasal discharge [ ] hives [ ] angioedema  [ ] All other systems negative  [ ] Unable to assess ROS because ________    OBJECTIVE:  ICU Vital Signs Last 24 Hrs  T(C): 35.8 (2022 07:00), Max: 43 (2022 19:45)  T(F): 96.4 (2022 07:00), Max: 109.4 (2022 19:45)  HR: 96 (2022 07:00) (33 - 129)  BP: 154/74 (2022 07:00) (80/55 - 154/74)  BP(mean): 83 (2022 07:00) (61 - 104)  ABP: --  ABP(mean): --  RR: 28 (2022 07:00) (6 - 41)  SpO2: 99% (2022 07:00) (80% - 100%)    Mode: AC/ CMV (Assist Control/ Continuous Mandatory Ventilation), RR (machine): 20, TV (machine): 450, FiO2: 30, PEEP: 5, ITime: 1, MAP: 10, PIP: 24  I & O's     @ 07:01  -   @ 07:00  --------------------------------------------------------  IN: 5744.4 mL / OUT: 4245 mL / NET: 1499.4 mL      CAPILLARY BLOOD GLUCOSE      POCT Blood Glucose.: 348 mg/dL (2022 07:53)      PHYSICAL EXAM:  General:   HEENT:   Lymph Nodes:  Neck:   Respiratory:   Cardiovascular:   Abdomen:   Extremities:   Skin:   Neurological:  Psychiatry:    LINES:    HOSPITAL MEDICATIONS:  MEDICATIONS  (STANDING):  acyclovir IVPB      acyclovir IVPB 600 milliGRAM(s) IV Intermittent every 8 hours  ampicillin  IVPB      ampicillin  IVPB 2 Gram(s) IV Intermittent every 4 hours  atorvastatin 20 milliGRAM(s) Oral at bedtime  cefTRIAXone   IVPB 2000 milliGRAM(s) IV Intermittent every 12 hours  chlorhexidine 0.12% Liquid 15 milliLiter(s) Oral Mucosa every 12 hours  chlorhexidine 4% Liquid 1 Application(s) Topical <User Schedule>  dextrose 50% Injectable 50 milliLiter(s) IV Push every 15 minutes  dextrose 50% Injectable 25 milliLiter(s) IV Push every 15 minutes  EPINEPHrine    Infusion 0.05 MICROgram(s)/kG/Min (12.9 mL/Hr) IV Continuous <Continuous>  fludroCORTISONE 0.1 milliGRAM(s) Oral daily  folic acid 1 milliGRAM(s) Oral daily  hydrocortisone sodium succinate Injectable 100 milliGRAM(s) IV Push every 8 hours  insulin regular Infusion 7 Unit(s)/Hr (7 mL/Hr) IV Continuous <Continuous>  lactated ringers. 1000 milliLiter(s) (100 mL/Hr) IV Continuous <Continuous>  multivitamin 1 Tablet(s) Oral daily  pantoprazole  Injectable 40 milliGRAM(s) IV Push daily  potassium chloride  10 mEq/100 mL IVPB 10 milliEquivalent(s) IV Intermittent every 1 hour  propofol Infusion 25 MICROgram(s)/kG/Min (11.4 mL/Hr) IV Continuous <Continuous>  sodium bicarbonate  Infusion 0.197 mEq/kG/Hr (100 mL/Hr) IV Continuous <Continuous>  thiamine 100 milliGRAM(s) Oral daily  vancomycin  IVPB 1000 milliGRAM(s) IV Intermittent every 12 hours  vasopressin Infusion 0.04 Unit(s)/Min (2.4 mL/Hr) IV Continuous <Continuous>    MEDICATIONS  (PRN):  aluminum hydroxide/magnesium hydroxide/simethicone Suspension 30 milliLiter(s) Oral every 4 hours PRN Dyspepsia  ondansetron Injectable 4 milliGRAM(s) IV Push every 8 hours PRN Nausea and/or Vomiting  sodium chloride 0.9% lock flush 10 milliLiter(s) IV Push every 1 hour PRN Pre/post blood products, medications, blood draw, and to maintain line patency      LABS:                        10.3   48 )-----------( 248      ( 2022 06:25 )             30.9     Hgb Trend: 10.3<--, 10.1<--, 9.6<--, 10.2<--, 10.3<--  04-23    147<H>  |  113<H>  |  <4<L>  ----------------------------<  439<H>  3.7   |  10<LL>  |  0.77    Ca    9.1      2022 06:25  Phos  2.3     04-22  Mg     2.1     04-23    TPro  6.1  /  Alb  3.2<L>  /  TBili  0.2  /  DBili  x   /  AST  13  /  ALT  16  /  AlkPhos  48  04-23    Creatinine Trend: 0.77<--, 0.68<--, 0.63<--, 0.58<--, 0.71<--, 0.75<--  PT/INR - ( 2022 19:36 )   PT: 14.9 sec;   INR: 1.29 ratio         PTT - ( 2022 19:36 )  PTT:22.8 sec  Urinalysis Basic - ( 2022 06:24 )    Color: Colorless / Appearance: Clear / S.010 / pH: x  Gluc: x / Ketone: Trace  / Bili: Negative / Urobili: Negative   Blood: x / Protein: Negative / Nitrite: Negative   Leuk Esterase: Negative / RBC: 7 /hpf / WBC 1 /HPF   Sq Epi: x / Non Sq Epi: 0 /hpf / Bacteria: Negative      Arterial Blood Gas:   @ 06:46  7.33/20/94/10/99.5/-13.6  ABG lactate: --  Arterial Blood Gas:   @ 02:32  7.26/24/157/11/99.9/-14.6  ABG lactate: --  Arterial Blood Gas:   @ 00:20  7.33/20/129/10/99.3/-13.6  ABG lactate: --    Venous Blood Gas:   @ 01:40  7.22/28/75/12/96.0  VBG Lactate: 11.0  Venous Blood Gas:   @ 23:11  7.24/31/52/13/81.9  VBG Lactate: 9.6  Venous Blood Gas:   @ 19:31  7.32/39/21/20/33.2  VBG Lactate: 1.5  Venous Blood Gas:   @ 02:01  7.41/33/91/21/97.7  VBG Lactate: 1.5  Venous Blood Gas:   @ 12:05  7.31/45/34/23/57.6  VBG Lactate: 1.1      MICROBIOLOGY:     RADIOLOGY:  [ ] Reviewed and interpreted by me    EKG: CHIEF COMPLAINT: AMS / Sepsis - r/o PNA    Interval Events: Pt with worsening AMS, decompensating s/p intubation, worsening lactic acidosis, leukocytosis, hyperglycemia, now with +AGMA w a serum HCO3 of <10, and appears to be in DI w a U. Osmol of 182- U. Lytes and serum Osmol pending. A CT of the brain / abd / pelvis done- official reports pending    s [ ] nasal discharge [ ] hives [ ] angioedema  [ ] All other systems negative  [ x] Unable to assess ROS because ___AMS / Sedated lightly_____    OBJECTIVE:  ICU Vital Signs Last 24 Hrs  T(C): 35.8 (2022 07:00), Max: 43 (2022 19:45)  T(F): 96.4 (2022 07:00), Max: 109.4 (2022 19:45)  HR: 96 (2022 07:00) (33 - 129)  BP: 154/74 (2022 07:00) (80/55 - 154/74)  BP(mean): 83 (2022 07:00) (61 - 104)  ABP: --  ABP(mean): --  RR: 28 (2022 07:00) (6 - 41)  SpO2: 99% (2022 07:00) (80% - 100%)    Mode: AC/ CMV (Assist Control/ Continuous Mandatory Ventilation), RR (machine): 20, TV (machine): 450, FiO2: 30, PEEP: 5, ITime: 1, MAP: 10, PIP: 24  I & O's     @ 07:01  -  - @ 07:00  --------------------------------------------------------  IN: 5744.4 mL / OUT: 4245 mL / NET: 1499.4 mL      CAPILLARY BLOOD GLUCOSE  POCT Blood Glucose.: 348 mg/dL (2022 07:53)      PHYSICAL EXAM:  General:   HEENT:   Lymph Nodes:  Neck:   Respiratory:   Cardiovascular:   Abdomen:   Extremities:   Skin:   Neurological:  Psychiatry:    LINES:    HOSPITAL MEDICATIONS:  MEDICATIONS  (STANDING):  acyclovir IVPB      acyclovir IVPB 600 milliGRAM(s) IV Intermittent every 8 hours  ampicillin  IVPB      ampicillin  IVPB 2 Gram(s) IV Intermittent every 4 hours  atorvastatin 20 milliGRAM(s) Oral at bedtime  cefTRIAXone   IVPB 2000 milliGRAM(s) IV Intermittent every 12 hours  chlorhexidine 0.12% Liquid 15 milliLiter(s) Oral Mucosa every 12 hours  chlorhexidine 4% Liquid 1 Application(s) Topical <User Schedule>  dextrose 50% Injectable 50 milliLiter(s) IV Push every 15 minutes  dextrose 50% Injectable 25 milliLiter(s) IV Push every 15 minutes  EPINEPHrine    Infusion 0.05 MICROgram(s)/kG/Min (12.9 mL/Hr) IV Continuous <Continuous>  fludroCORTISONE 0.1 milliGRAM(s) Oral daily  folic acid 1 milliGRAM(s) Oral daily  hydrocortisone sodium succinate Injectable 100 milliGRAM(s) IV Push every 8 hours  insulin regular Infusion 7 Unit(s)/Hr (7 mL/Hr) IV Continuous <Continuous>  lactated ringers. 1000 milliLiter(s) (100 mL/Hr) IV Continuous <Continuous>  multivitamin 1 Tablet(s) Oral daily  pantoprazole  Injectable 40 milliGRAM(s) IV Push daily  potassium chloride  10 mEq/100 mL IVPB 10 milliEquivalent(s) IV Intermittent every 1 hour  propofol Infusion 25 MICROgram(s)/kG/Min (11.4 mL/Hr) IV Continuous <Continuous>  sodium bicarbonate  Infusion 0.197 mEq/kG/Hr (100 mL/Hr) IV Continuous <Continuous>  thiamine 100 milliGRAM(s) Oral daily  vancomycin  IVPB 1000 milliGRAM(s) IV Intermittent every 12 hours  vasopressin Infusion 0.04 Unit(s)/Min (2.4 mL/Hr) IV Continuous <Continuous>    MEDICATIONS  (PRN):  aluminum hydroxide/magnesium hydroxide/simethicone Suspension 30 milliLiter(s) Oral every 4 hours PRN Dyspepsia  ondansetron Injectable 4 milliGRAM(s) IV Push every 8 hours PRN Nausea and/or Vomiting  sodium chloride 0.9% lock flush 10 milliLiter(s) IV Push every 1 hour PRN Pre/post blood products, medications, blood draw, and to maintain line patency      LABS:                        10.3   19.48 )-----------( 248      ( 2022 06:25 )             30.9     Hgb Trend: 10.3<--, 10.1<--, 9.6<--, 10.2<--, 10.3<--      147<H>  |  113<H>  |  <4<L>  ----------------------------<  439<H>  3.7   |  10<LL>  |  0.77    Ca    9.1      2022 06:25  Phos  2.3       Mg     2.1         TPro  6.1  /  Alb  3.2<L>  /  TBili  0.2  /  DBili  x   /  AST  13  /  ALT  16  /  AlkPhos  48      Creatinine Trend: 0.77<--, 0.68<--, 0.63<--, 0.58<--, 0.71<--, 0.75<--  PT/INR - ( 2022 19:36 )   PT: 14.9 sec;   INR: 1.29 ratio         PTT - ( 2022 19:36 )  PTT:22.8 sec  Urinalysis Basic - ( 2022 06:24 )    Color: Colorless / Appearance: Clear / S.010 / pH: x  Gluc: x / Ketone: Trace  / Bili: Negative / Urobili: Negative   Blood: x / Protein: Negative / Nitrite: Negative   Leuk Esterase: Negative / RBC: 7 /hpf / WBC 1 /HPF   Sq Epi: x / Non Sq Epi: 0 /hpf / Bacteria: Negative      Arterial Blood Gas:   @ 06:46  7.33/20/94/10/99.5/-13.6  ABG lactate: --  Arterial Blood Gas:   @ 02:32  7.26/24/157/11/99.9/-14.6  ABG lactate: --  Arterial Blood Gas:   @ 00:20  7.33/20/129/10/99.3/-13.6  ABG lactate: --    Venous Blood Gas:   @ 01:40  7.22/28/75/12/96.0  VBG Lactate: 11.0  Venous Blood Gas:   @ 23:11  7.24/31/52/13/81.9  VBG Lactate: 9.6  Venous Blood Gas:   @ 19:31  7.32/39/21/20/33.2  VBG Lactate: 1.5  Venous Blood Gas:   @ 02:01  7.41/33/91/21/97.7  VBG Lactate: 1.5  Venous Blood Gas:   @ 12:05  7.31/45/34/23/57.6  VBG Lactate: 1.1      MICROBIOLOGY:     Culture - CSF with Gram Stain (collected 2022 21:48)  Source: .CSF CSF  Gram Stain (2022 23:01):    polymorphonuclear leukocytes per low power field    No organisms seen    by cytocentrifuge  Preliminary Report (2022 14:36):    No growth    Culture - Blood (collected 2022 00:43)  Source: .Blood Blood-Peripheral  Preliminary Report (2022 01:02):    No growth to date.    collected 2022 00:32)  Source: Clean Catch Clean Catch (Midstream)  Final Report (2022 11:42):    No growth    Culture - Blood (collected 2022 00:30)  Source: .Blood Blood-Peripheral  Preliminary Report (2022 01:02):    No growth to date.      RADIOLOGY:    Culture - Urine (c< from: CT Abdomen and Pelvis w/ IV Cont (22 @ 03:34) >    ACC: [id number] EXAM:  CT ABDOMEN AND PELVIS IC                          PROCEDURE DATE:  2022      INTERPRETATION:  CLINICAL INFORMATION: Lactic acidosis, assess for bowel   pathology.    COMPARISON: CT abdomen pelvis 2022    CONTRAST/COMPLICATIONS:  IV Contrast: Omnipaque 350  90 cc administered   10 cc discarded  Oral Contrast: NONE  Complications: None reported at time of study completion    PROCEDURE:  CT of the Abdomen and Pelvis was performed.  Sagittal and coronal reformatswere performed.    FINDINGS:  LOWER CHEST: Small bilateral pleural effusions with adjacent bilateral   lower lobe compressive subsegmental atelectasis.    LIVER: Within normal limits.  BILE DUCTS: Normal caliber.  GALLBLADDER: Cholelithiasis.  SPLEEN: Within normal limits.  PANCREAS: Within normal limits.  ADRENALS: Within normal limits.  KIDNEYS/URETERS: 1.4 cm left upper pole cyst. Multiple nonobstructing   bilateral renal calculi, the largest of which measures 8 mm at the right   UPJ and 7 mm at the left UPJ. No hydronephrosis.    BLADDER: Barnett catheter in place with minimal intravesicular air.  REPRODUCTIVE ORGANS: Prostate within normal limits.    BOWEL: No bowel obstruction. Enteric tube terminates in the stomach. A   rectal tube is inplace. Appendix is normal.  PERITONEUM: Nonspecific trace fluid in the left paracolic gutter and   pelvis.  VESSELS: Atherosclerotic changes.  RETROPERITONEUM/LYMPH NODES: No lymphadenopathy.  ABDOMINAL WALL: Fat-containing left inguinal hernia. Mildsubcutaneous   edema..  BONES: Within normal limits.    IMPRESSION:  Small bilateral pleural effusions and trace peritoneal free fluid, likely   reflecting fluid overload status.    Bilateral nonobstructing renal calculi.    Cholelithiasis.        < from: CT Head No Cont (22 @ 03:25) >  ACC: [id number] EXAM:  CT BRAIN                        PROCEDURE DATE:  2022    INTERPRETATION:  CT head without IV contrast    CLINICAL INFORMATION: Altered mental status    TECHNIQUE: Contiguous axial 5 mm sections were obtained through the head.   Sagittal and coronal 2-D reformatted images were also obtained.   This   scan was performed using automatic exposure control (radiation dose   reduction software) to obtain a diagnostic image quality scan with   patient dose as low as reasonably achievable.    FINDINGS:   CT dated 2022 available for review.    The brain demonstrates mild periventricular white matter ischemia with   tiny old lacunar infarction in the LEFT cerebellum.   No acute cerebral   cortical infarct is seen.  No intracranial hemorrhage is found.  No mass   effect is found in the brain.    The ventricles, sulci and basal cisterns appear unremarkable.    The orbits are unremarkable.  The paranasal sinuses are clear.  The nasal   cavity appears intact.  The nasopharynx is symmetric.  The central skull   base, petrous temporal bones and calvarium remain intact.      IMPRESSION:   Mild periventricular white matter ischemia with tiny old   lacunar infarction in the LEFT cerebellum.       CHIEF COMPLAINT: AMS / Sepsis - r/o PNA    Interval Events: Pt with worsening AMS, decompensating s/p intubation, worsening lactic acidosis, leukocytosis, hyperglycemia, now with +AGMA w a serum HCO3 of <10, and appears to be in DI w a U. Osmol of 182- U. Lytes and serum Osmol pending. A CT of the brain / abd / pelvis done- official reports pending    s [ ] nasal discharge [ ] hives [ ] angioedema  [ ] All other systems negative  [ x] Unable to assess ROS because ___AMS / Sedated lightly_____    OBJECTIVE:  ICU Vital Signs Last 24 Hrs  T(C): 35.8 (2022 07:00), Max: 43 (2022 19:45)  T(F): 96.4 (2022 07:00), Max: 109.4 (2022 19:45)  HR: 96 (2022 07:00) (33 - 129)  BP: 154/74 (2022 07:00) (80/55 - 154/74)  BP(mean): 83 (2022 07:00) (61 - 104)  ABP: --  ABP(mean): --  RR: 28 (2022 07:00) (6 - 41)  SpO2: 99% (2022 07:00) (80% - 100%)    Mode: AC/ CMV (Assist Control/ Continuous Mandatory Ventilation), RR (machine): 20, TV (machine): 450, FiO2: 30, PEEP: 5, ITime: 1, MAP: 10, PIP: 24  I & O's     @ 07:01  -  - @ 07:00  --------------------------------------------------------  IN: 5744.4 mL / OUT: 4245 mL / NET: 1499.4 mL      CAPILLARY BLOOD GLUCOSE  POCT Blood Glucose.: 348 mg/dL (2022 07:53)    PHYSICAL EXAM:  GENERAL:  well-groomed, well-developed  HEENT:   Head: Atraumatic, Normocephalic,   EYES: EOMI, PERRLA 3 mm, conjunctiva and sclera clear  ENMT: No oropharyngeal exudates, erythema or lesions,  Moist mucous membranes  NECK: Supple, no cervical lymphadenopathy, no JVD  NERVOUS SYSTEM: PERRL, neg nystagmus, not following commands or withdrawing - +sedated, unable to determine mental acuity   CHEST/LUNG: Orally intubated, minimal secretions, svetlana equal BS, / chest excursion, svetlana course rhonchi  HEART:  SR without ectopy   ; S1/S2 RRR- No murmurs, rubs, or gallops  ABDOMEN: non distended, +hypoactive BS x 4, non tender on deep palp  EXTREMITIES: Pulses palpable radial pulses 2+ bilat, DP/PT 2+/1+ bilat, without clubbing, cyanosis. Digits warm to touch with good cap refill < 3 secs  SKIN:warm, dry, intact, normal color, no rash or abnormal lesions, without palpable nodes, lumbar area without swelling, drainage, tenderness, pain, redness    LINES: [facility], [facility]    HOSPITAL MEDICATIONS:  MEDICATIONS  (STANDING):  acyclovir IVPB      acyclovir IVPB 600 milliGRAM(s) IV Intermittent every 8 hours  ampicillin  IVPB      ampicillin  IVPB 2 Gram(s) IV Intermittent every 4 hours  atorvastatin 20 milliGRAM(s) Oral at bedtime  cefTRIAXone   IVPB 2000 milliGRAM(s) IV Intermittent every 12 hours  chlorhexidine 0.12% Liquid 15 milliLiter(s) Oral Mucosa every 12 hours  chlorhexidine 4% Liquid 1 Application(s) Topical <User Schedule>  dextrose 50% Injectable 50 milliLiter(s) IV Push every 15 minutes  dextrose 50% Injectable 25 milliLiter(s) IV Push every 15 minutes  EPINEPHrine    Infusion 0.05 MICROgram(s)/kG/Min (12.9 mL/Hr) IV Continuous <Continuous>  fludroCORTISONE 0.1 milliGRAM(s) Oral daily  folic acid 1 milliGRAM(s) Oral daily  hydrocortisone sodium succinate Injectable 100 milliGRAM(s) IV Push every 8 hours  insulin regular Infusion 7 Unit(s)/Hr (7 mL/Hr) IV Continuous <Continuous>  lactated ringers. 1000 milliLiter(s) (100 mL/Hr) IV Continuous <Continuous>  multivitamin 1 Tablet(s) Oral daily  pantoprazole  Injectable 40 milliGRAM(s) IV Push daily  potassium chloride  10 mEq/100 mL IVPB 10 milliEquivalent(s) IV Intermittent every 1 hour  propofol Infusion 25 MICROgram(s)/kG/Min (11.4 mL/Hr) IV Continuous <Continuous>  sodium bicarbonate  Infusion 0.197 mEq/kG/Hr (100 mL/Hr) IV Continuous <Continuous>  thiamine 100 milliGRAM(s) Oral daily  vancomycin  IVPB 1000 milliGRAM(s) IV Intermittent every 12 hours  vasopressin Infusion 0.04 Unit(s)/Min (2.4 mL/Hr) IV Continuous <Continuous>    MEDICATIONS  (PRN):  aluminum hydroxide/magnesium hydroxide/simethicone Suspension 30 milliLiter(s) Oral every 4 hours PRN Dyspepsia  ondansetron Injectable 4 milliGRAM(s) IV Push every 8 hours PRN Nausea and/or Vomiting  sodium chloride 0.9% lock flush 10 milliLiter(s) IV Push every 1 hour PRN Pre/post blood products, medications, blood draw, and to maintain line patency      LABS:                        10.3   19.48 )-----------( 248      ( 2022 06:25 )             30.9     Hgb Trend: 10.3<--, 10.1<--, 9.6<--, 10.2<--, 10.3<--  04-23    147<H>  |  113<H>  |  <4<L>  ----------------------------<  439<H>  3.7   |  10<LL>  |  0.77    Ca    9.1      2022 06:25  Phos  2.3     04-  Mg     2.1     -23    TPro  6.1  /  Alb  3.2<L>  /  TBili  0.2  /  DBili  x   /  AST  13  /  ALT  16  /  AlkPhos  48  04-23    Creatinine Trend: 0.77<--, 0.68<--, 0.63<--, 0.58<--, 0.71<--, 0.75<--  PT/INR - ( 2022 19:36 )   PT: 14.9 sec;   INR: 1.29 ratio         PTT - ( 2022 19:36 )  PTT:22.8 sec  Urinalysis Basic - ( 2022 06:24 )    Color: Colorless / Appearance: Clear / S.010 / pH: x  Gluc: x / Ketone: Trace  / Bili: Negative / Urobili: Negative   Blood: x / Protein: Negative / Nitrite: Negative   Leuk Esterase: Negative / RBC: 7 /hpf / WBC 1 /HPF   Sq Epi: x / Non Sq Epi: 0 /hpf / Bacteria: Negative      Arterial Blood Gas:   @ 06:46  7.33/20/94/10/99.5/-13.6  ABG lactate: --  Arterial Blood Gas:   @ 02:32  7.26/24/157/11/99.9/-14.6  ABG lactate: --  Arterial Blood Gas:   @ 00:20  7.33/20/129/10/99.3/-13.6  ABG lactate: --    Venous Blood Gas:   @ 01:40  7.22/28/75/12/96.0  VBG Lactate: 11.0  Venous Blood Gas:   @ 23:11  7.24/31/52/13/81.9  VBG Lactate: 9.6  Venous Blood Gas:   @ 19:31  7.32/39/21/20/33.2  VBG Lactate: 1.5  Venous Blood Gas:   @ 02:01  7.41/33/91/21/97.7  VBG Lactate: 1.5  Venous Blood Gas:   @ 12:05  7.31/45/34/23/57.6  VBG Lactate: 1.1      MICROBIOLOGY:     Culture - CSF with Gram Stain (collected 2022 21:48)  Source: .CSF CSF  Gram Stain (2022 23:01):    polymorphonuclear leukocytes per low power field    No organisms seen    by cytocentrifuge  Preliminary Report (2022 14:36):    No growth    Culture - Blood (collected 2022 00:43)  Source: .Blood Blood-Peripheral  Preliminary Report (2022 01:02):    No growth to date.    collected 2022 00:32)  Source: Clean Catch Clean Catch (Midstream)  Final Report (2022 11:42):    No growth    Culture - Blood (collected 2022 00:30)  Source: .Blood Blood-Peripheral  Preliminary Report (2022 01:02):    No growth to date.      RADIOLOGY:    Culture - Urine (c< from: CT Abdomen and Pelvis w/ IV Cont (22 @ 03:34) >    ACC: [id number] EXAM:  CT ABDOMEN AND PELVIS IC                          PROCEDURE DATE:  2022      INTERPRETATION:  CLINICAL INFORMATION: Lactic acidosis, assess for bowel   pathology.    COMPARISON: CT abdomen pelvis 2022    CONTRAST/COMPLICATIONS:  IV Contrast: Omnipaque 350  90 cc administered   10 cc discarded  Oral Contrast: NONE  Complications: None reported at time of study completion    PROCEDURE:  CT of the Abdomen and Pelvis was performed.  Sagittal and coronal reformatswere performed.    FINDINGS:  LOWER CHEST: Small bilateral pleural effusions with adjacent bilateral   lower lobe compressive subsegmental atelectasis.    LIVER: Within normal limits.  BILE DUCTS: Normal caliber.  GALLBLADDER: Cholelithiasis.  SPLEEN: Within normal limits.  PANCREAS: Within normal limits.  ADRENALS: Within normal limits.  KIDNEYS/URETERS: 1.4 cm left upper pole cyst. Multiple nonobstructing   bilateral renal calculi, the largest of which measures 8 mm at the right   UPJ and 7 mm at the left UPJ. No hydronephrosis.    BLADDER: Barnett catheter in place with minimal intravesicular air.  REPRODUCTIVE ORGANS: Prostate within normal limits.    BOWEL: No bowel obstruction. Enteric tube terminates in the stomach. A   rectal tube is inplace. Appendix is normal.  PERITONEUM: Nonspecific trace fluid in the left paracolic gutter and   pelvis.  VESSELS: Atherosclerotic changes.  RETROPERITONEUM/LYMPH NODES: No lymphadenopathy.  ABDOMINAL WALL: Fat-containing left inguinal hernia. Mildsubcutaneous   edema..  BONES: Within normal limits.    IMPRESSION:  Small bilateral pleural effusions and trace peritoneal free fluid, likely   reflecting fluid overload status.    Bilateral nonobstructing renal calculi.    Cholelithiasis.      < from: CT Head No Cont (22 @ 03:25) >  ACC: [id number] EXAM:  CT BRAIN                        PROCEDURE DATE:  2022    INTERPRETATION:  CT head without IV contrast    CLINICAL INFORMATION: Altered mental status    TECHNIQUE: Contiguous axial 5 mm sections were obtained through the head.   Sagittal and coronal 2-D reformatted images were also obtained.   This   scan was performed using automatic exposure control (radiation dose   reduction software) to obtain a diagnostic image quality scan with   patient dose as low as reasonably achievable.    FINDINGS:   CT dated 2022 available for review.    The brain demonstrates mild periventricular white matter ischemia with   tiny old lacunar infarction in the LEFT cerebellum.   No acute cerebral   cortical infarct is seen.  No intracranial hemorrhage is found.  No mass   effect is found in the brain.    The ventricles, sulci and basal cisterns appear unremarkable.    The orbits are unremarkable.  The paranasal sinuses are clear.  The nasal   cavity appears intact.  The nasopharynx is symmetric.  The central skull   base, petrous temporal bones and calvarium remain intact.      IMPRESSION:   Mild periventricular white matter ischemia with tiny old   lacunar infarction in the LEFT cerebellum.       CHIEF COMPLAINT: AMS / Sepsis - r/o PNA,     Interval Events: Pt with worsening AMS, decompensating s/p intubation, worsening lactic acidosis, leukocytosis, hyperglycemia, now with +AGMA w a serum HCO3 of <10, and appears to be in DI w a U. Osmol of 182- U. Lytes and serum Osmol pending. A CT of the brain / abd / pelvis done- official reports pending      OBJECTIVE:  ICU Vital Signs Last 24 Hrs  T(C): 35.8 (2022 07:00), Max: 43 (2022 19:45)  T(F): 96.4 (2022 07:00), Max: 109.4 (2022 19:45)  HR: 96 (2022 07:00) (33 - 129)  BP: 154/74 (2022 07:00) (80/55 - 154/74)  BP(mean): 83 (2022 07:00) (61 - 104)  ABP: --  ABP(mean): --  RR: 28 (2022 07:00) (6 - 41)  SpO2: 99% (2022 07:00) (80% - 100%)    Mode: AC/ CMV (Assist Control/ Continuous Mandatory Ventilation), RR (machine): 20, TV (machine): 450, FiO2: 30, PEEP: 5, ITime: 1, MAP: 10, PIP: 24  I & O's     @ 07:01  -  04-23 @ 07:00  --------------------------------------------------------  IN: 5744.4 mL / OUT: 4245 mL / NET: 1499.4 mL      CAPILLARY BLOOD GLUCOSE  POCT Blood Glucose.: 348 mg/dL (2022 07:53)    PHYSICAL EXAM:  GENERAL:  well-groomed, well-developed  HEENT:   Head: Atraumatic, Normocephalic,   EYES: EOMI, PERRLA 3 mm, conjunctiva and sclera clear  ENMT: No oropharyngeal exudates, erythema or lesions,  Moist mucous membranes  NECK: Supple, no cervical lymphadenopathy, no JVD  NERVOUS SYSTEM: PERRL, neg nystagmus, not following commands or withdrawing - +sedated, unable to determine mental acuity   CHEST/LUNG: Orally intubated, minimal secretions, svetlana equal BS, / chest excursion, svetlana course rhonchi  HEART:  SR without ectopy   ; S1/S2 RRR- No murmurs, rubs, or gallops  ABDOMEN: non distended, +hypoactive BS x 4, non tender on deep palp  EXTREMITIES: Pulses palpable radial pulses 2+ bilat, DP/PT 2+/1+ bilat, without clubbing, cyanosis. Digits warm to touch with good cap refill < 3 secs  SKIN:warm, dry, intact, normal color, no rash or abnormal lesions, without palpable nodes, lumbar area without swelling, drainage, tenderness, pain, redness    LINES: [facility], [facility] MEDICATIONS:  MEDICATIONS  (STANDING):  acyclovir IVPB      acyclovir IVPB 600 milliGRAM(s) IV Intermittent every 8 hours  ampicillin  IVPB      ampicillin  IVPB 2 Gram(s) IV Intermittent every 4 hours  atorvastatin 20 milliGRAM(s) Oral at bedtime  cefTRIAXone   IVPB 2000 milliGRAM(s) IV Intermittent every 12 hours  chlorhexidine 0.12% Liquid 15 milliLiter(s) Oral Mucosa every 12 hours  chlorhexidine 4% Liquid 1 Application(s) Topical <User Schedule>  dextrose 50% Injectable 50 milliLiter(s) IV Push every 15 minutes  dextrose 50% Injectable 25 milliLiter(s) IV Push every 15 minutes  EPINEPHrine    Infusion 0.05 MICROgram(s)/kG/Min (12.9 mL/Hr) IV Continuous <Continuous>  fludroCORTISONE 0.1 milliGRAM(s) Oral daily  folic acid 1 milliGRAM(s) Oral daily  hydrocortisone sodium succinate Injectable 100 milliGRAM(s) IV Push every 8 hours  insulin regular Infusion 7 Unit(s)/Hr (7 mL/Hr) IV Continuous <Continuous>  lactated ringers. 1000 milliLiter(s) (100 mL/Hr) IV Continuous <Continuous>  multivitamin 1 Tablet(s) Oral daily  pantoprazole  Injectable 40 milliGRAM(s) IV Push daily  potassium chloride  10 mEq/100 mL IVPB 10 milliEquivalent(s) IV Intermittent every 1 hour  propofol Infusion 25 MICROgram(s)/kG/Min (11.4 mL/Hr) IV Continuous <Continuous>  sodium bicarbonate  Infusion 0.197 mEq/kG/Hr (100 mL/Hr) IV Continuous <Continuous>  thiamine 100 milliGRAM(s) Oral daily  vancomycin  IVPB 1000 milliGRAM(s) IV Intermittent every 12 hours  vasopressin Infusion 0.04 Unit(s)/Min (2.4 mL/Hr) IV Continuous <Continuous>    MEDICATIONS  (PRN):  aluminum hydroxide/magnesium hydroxide/simethicone Suspension 30 milliLiter(s) Oral every 4 hours PRN Dyspepsia  ondansetron Injectable 4 milliGRAM(s) IV Push every 8 hours PRN Nausea and/or Vomiting  sodium chloride 0.9% lock flush 10 milliLiter(s) IV Push every 1 hour PRN Pre/post blood products, medications, blood draw, and to maintain line patency      LABS:                        10.3   19.48 )-----------( 248      ( 2022 06:25 )             30.9     Hgb Trend: 10.3<--, 10.1<--, 9.6<--, 10.2<--, 10.3<--  04-23    147<H>  |  113<H>  |  <4<L>  ----------------------------<  439<H>  3.7   |  10<LL>  |  0.77    Ca    9.1      2022 06:25  Phos  2.3     04-22  Mg     2.1     04-23    TPro  6.1  /  Alb  3.2<L>  /  TBili  0.2  /  DBili  x   /  AST  13  /  ALT  16  /  AlkPhos  48  04-23    Creatinine Trend: 0.77<--, 0.68<--, 0.63<--, 0.58<--, 0.71<--, 0.75<--  PT/INR - ( 2022 19:36 )   PT: 14.9 sec;   INR: 1.29 ratio         PTT - ( 2022 19:36 )  PTT:22.8 sec  Urinalysis Basic - ( 2022 06:24 )    Color: Colorless / Appearance: Clear / S.010 / pH: x  Gluc: x / Ketone: Trace  / Bili: Negative / Urobili: Negative   Blood: x / Protein: Negative / Nitrite: Negative   Leuk Esterase: Negative / RBC: 7 /hpf / WBC 1 /HPF   Sq Epi: x / Non Sq Epi: 0 /hpf / Bacteria: Negative      Arterial Blood Gas:   @ 06:46  7.33/20/94/10/99.5/-13.6  ABG lactate: --  Arterial Blood Gas:   @ 02:32  7.26/24/157/11/99.9/-14.6  ABG lactate: --  Arterial Blood Gas:   @ 00:20  7.33/20/129/10/99.3/-13.6  ABG lactate: --    Venous Blood Gas:   @ 01:40  7.22/28/75/12/96.0  VBG Lactate: 11.0  Venous Blood Gas:   @ 23:11  7.24/31/52/13/81.9  VBG Lactate: 9.6  Venous Blood Gas:   @ 19:31  7.32/39/21/20/33.2  VBG Lactate: 1.5  Venous Blood Gas:   @ 02:01  7.41/33/91/21/97.7  VBG Lactate: 1.5  Venous Blood Gas:   @ 12:05  7.31/45/34/23/57.6  VBG Lactate: 1.1      MICROBIOLOGY:     Culture - CSF with Gram Stain (collected 2022 21:48)  Source: .CSF CSF  Gram Stain (2022 23:01):    polymorphonuclear leukocytes per low power field    No organisms seen    by cytocentrifuge  Preliminary Report (2022 14:36):    No growth    Culture - Blood (collected 2022 00:43)  Source: .Blood Blood-Peripheral  Preliminary Report (2022 01:02):    No growth to date.    collected 2022 00:32)  Source: Clean Catch Clean Catch (Midstream)  Final Report (2022 11:42):    No growth    Culture - Blood (collected 2022 00:30)  Source: .Blood Blood-Peripheral  Preliminary Report (2022 01:02):    No growth to date.      RADIOLOGY:    Culture - Urine (c< from: CT Abdomen and Pelvis w/ IV Cont (22 @ 03:34) >    ACC: [id number] EXAM:  CT ABDOMEN AND PELVIS IC                          PROCEDURE DATE:  2022      INTERPRETATION:  CLINICAL INFORMATION: Lactic acidosis, assess for bowel   pathology.    COMPARISON: CT abdomen pelvis 2022    CONTRAST/COMPLICATIONS:  IV Contrast: Omnipaque 350  90 cc administered   10 cc discarded  Oral Contrast: NONE  Complications: None reported at time of study completion    PROCEDURE:  CT of the Abdomen and Pelvis was performed.  Sagittal and coronal reformatswere performed.    FINDINGS:  LOWER CHEST: Small bilateral pleural effusions with adjacent bilateral   lower lobe compressive subsegmental atelectasis.    LIVER: Within normal limits.  BILE DUCTS: Normal caliber.  GALLBLADDER: Cholelithiasis.  SPLEEN: Within normal limits.  PANCREAS: Within normal limits.  ADRENALS: Within normal limits.  KIDNEYS/URETERS: 1.4 cm left upper pole cyst. Multiple nonobstructing   bilateral renal calculi, the largest of which measures 8 mm at the right   UPJ and 7 mm at the left UPJ. No hydronephrosis.    BLADDER: Barnett catheter in place with minimal intravesicular air.  REPRODUCTIVE ORGANS: Prostate within normal limits.    BOWEL: No bowel obstruction. Enteric tube terminates in the stomach. A   rectal tube is inplace. Appendix is normal.  PERITONEUM: Nonspecific trace fluid in the left paracolic gutter and   pelvis.  VESSELS: Atherosclerotic changes.  RETROPERITONEUM/LYMPH NODES: No lymphadenopathy.  ABDOMINAL WALL: Fat-containing left inguinal hernia. Mildsubcutaneous   edema..  BONES: Within normal limits.    IMPRESSION:  Small bilateral pleural effusions and trace peritoneal free fluid, likely   reflecting fluid overload status.    Bilateral nonobstructing renal calculi.    Cholelithiasis.      < from: CT Head No Cont (22 @ 03:25) >  ACC: [id number] EXAM:  CT BRAIN                        PROCEDURE DATE:  2022    INTERPRETATION:  CT head without IV contrast    CLINICAL INFORMATION: Altered mental status    TECHNIQUE: Contiguous axial 5 mm sections were obtained through the head.   Sagittal and coronal 2-D reformatted images were also obtained.   This   scan was performed using automatic exposure control (radiation dose   reduction software) to obtain a diagnostic image quality scan with   patient dose as low as reasonably achievable.    FINDINGS:   CT dated 2022 available for review.    The brain demonstrates mild periventricular white matter ischemia with   tiny old lacunar infarction in the LEFT cerebellum.   No acute cerebral   cortical infarct is seen.  No intracranial hemorrhage is found.  No mass   effect is found in the brain.    The ventricles, sulci and basal cisterns appear unremarkable.    The orbits are unremarkable.  The paranasal sinuses are clear.  The nasal   cavity appears intact.  The nasopharynx is symmetric.  The central skull   base, petrous temporal bones and calvarium remain intact.      IMPRESSION:   Mild periventricular white matter ischemia with tiny old   lacunar infarction in the LEFT cerebellum.

## 2022-04-23 NOTE — CONSULT NOTE ADULT - SUBJECTIVE AND OBJECTIVE BOX
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 Pt. diagnosed with aspiration pneumonia on admission and initiated on ABx. Hospital course complicated by AMS with concerns over meningoencephalitis. Pt. currently in MICU intubated and in septic shock requiring vasopressor support. Pt. currently on high dose steroids as well for suspect adrenal insufficiency. Nephrology consultation requested for concerns over DI and hypernatremia.    Pt. seen and examined in MICU with family present at bedside. History obtained from provider hand-off and chart review. Pt. with no prior history of kidney disease. Overnight UOP ~ 300-500/hr , however this afternoon ~ 50-75 cc/hr.    PAST HISTORY  --------------------------------------------------------------------------------  PAST MEDICAL & SURGICAL HISTORY:  H/O cerebellar ataxia  No significant past surgical history    FAMILY HISTORY:  FH: dementia (Mother)  FH: type 2 diabetes (Mother)  Family history of CVA (Father)    PAST SOCIAL HISTORY: No tobacco use    ALLERGIES & MEDICATIONS  --------------------------------------------------------------------------------  Allergies    No Known Allergies    Intolerances    Standing Inpatient Medications  acyclovir IVPB      acyclovir IVPB 600 milliGRAM(s) IV Intermittent every 8 hours  albuterol/ipratropium for Nebulization 3 milliLiter(s) Nebulizer every 6 hours  ampicillin  IVPB      ampicillin  IVPB 2 Gram(s) IV Intermittent every 4 hours  atorvastatin 20 milliGRAM(s) Oral at bedtime  cefTRIAXone   IVPB 2000 milliGRAM(s) IV Intermittent every 12 hours  chlorhexidine 0.12% Liquid 15 milliLiter(s) Oral Mucosa every 12 hours  chlorhexidine 4% Liquid 1 Application(s) Topical <User Schedule>  dextrose 50% Injectable 50 milliLiter(s) IV Push every 15 minutes  dextrose 50% Injectable 25 milliLiter(s) IV Push every 15 minutes  EPINEPHrine    Infusion 0.05 MICROgram(s)/kG/Min IV Continuous <Continuous>  folic acid 1 milliGRAM(s) Oral daily  hydrocortisone sodium succinate Injectable 100 milliGRAM(s) IV Push every 8 hours  insulin regular Infusion 10 Unit(s)/Hr IV Continuous <Continuous>  lactated ringers. 1000 milliLiter(s) IV Continuous <Continuous>  multivitamin 1 Tablet(s) Oral daily  pantoprazole  Injectable 40 milliGRAM(s) IV Push daily  propofol Infusion 25 MICROgram(s)/kG/Min IV Continuous <Continuous>  sodium bicarbonate  Infusion 0.197 mEq/kG/Hr IV Continuous <Continuous>  thiamine 100 milliGRAM(s) Oral daily  vancomycin  IVPB 1000 milliGRAM(s) IV Intermittent every 12 hours  vasopressin Infusion 0.04 Unit(s)/Min IV Continuous <Continuous>    REVIEW OF SYSTEMS  --------------------------------------------------------------------------------  Unable to obtain due to medical condition    VITALS/PHYSICAL EXAM  --------------------------------------------------------------------------------  T(C): 36 (04-23-22 @ 13:00), Max: 43 (04-22-22 @ 19:45)  HR: 89 (04-23-22 @ 13:30) (33 - 129)  BP: 102/55 (04-23-22 @ 13:30) (80/55 - 154/74)  RR: 20 (04-23-22 @ 13:30) (6 - 41)  SpO2: 97% (04-23-22 @ 13:30) (80% - 100%)  Wt(kg): --  Height (cm): 167.6 (04-22-22 @ 21:00)  Weight (kg): 76.2 (04-22-22 @ 21:00)  BMI (kg/m2): 27.1 (04-22-22 @ 21:00)  BSA (m2): 1.86 (04-22-22 @ 21:00)    04-22-22 @ 07:01  -  04-23-22 @ 07:00  --------------------------------------------------------  IN: 5744.4 mL / OUT: 4245 mL / NET: 1499.4 mL    04-23-22 @ 07:01  -  04-23-22 @ 14:10  --------------------------------------------------------  IN: 2662.6 mL / OUT: 420 mL / NET: 2242.6 mL    Physical Exam:  	Gen: Ill appearing  	HEENT: Intubated  	Pulm: Fair air entry  	CV: S1S2  	Abd: Soft, +BS   	Ext: No LE edema B/L  	Neuro: Sedated   	Skin: Warm and dry    LABS/STUDIES  --------------------------------------------------------------------------------              9.9    17.19 >-----------<  230      [04-23-22 @ 10:29]              29.2     144  |  113  |  <4  ----------------------------<  366      [04-23-22 @ 10:29]  3.6   |  11  |  0.77        Ca     8.9     [04-23-22 @ 10:29]      Mg     2.0     [04-23-22 @ 10:29]      Phos  1.1     [04-23-22 @ 10:29]    TPro  5.9  /  Alb  3.0  /  TBili  0.1  /  DBili  x   /  AST  13  /  ALT  15  /  AlkPhos  47  [04-23-22 @ 10:29]  CK 31      [04-22-22 @ 19:36]  Serum Osmolality 307      [04-23-22 @ 10:29]    Creatinine Trend:  SCr 0.77 [04-23 @ 10:29]  SCr 0.77 [04-23 @ 06:25]  SCr 0.68 [04-23 @ 00:28]  SCr 0.63 [04-22 @ 19:36]  SCr 0.58 [04-22 @ 18:25]    Urinalysis - [04-23-22 @ 10:35]      Color Light Orange / Appearance Clear / SG 1.042 / pH 5.0      Gluc 1000 mg/dL / Ketone Trace  / Bili Negative / Urobili Negative       Blood Large / Protein 30 mg/dL / Leuk Est Negative / Nitrite Negative       / WBC 8 / Hyaline 10 / Gran  / Sq Epi  / Non Sq Epi 1 / Bacteria Negative    Urine Creatinine 40      [04-23-22 @ 10:35]  Urine Sodium 76      [04-23-22 @ 10:35]  Urine Potassium 47      [04-23-22 @ 10:35]  Urine Chloride 73      [04-23-22 @ 10:35]  Urine Osmolality 184      [04-23-22 @ 02:37]    TSH 1.58      [04-21-22 @ 16:54]    HCV 0.17, Nonreact      [04-19-22 @ 10:05]  HIV Nonreact      [04-22-22 @ 09:20]  HIV Nonreact      [10-05-21 @ 12:34]    Rheumatoid Factor <10      [04-22-22 @ 08:54]  Syphilis Screen (Treponema Pallidum Ab) Negative      [04-19-22 @ 10:03]

## 2022-04-23 NOTE — CONSULT NOTE ADULT - PROBLEM SELECTOR RECOMMENDATION 9
Pt. with mild hypernatremia in the setting of osmotic diuresis related to uncontrolled hyperglycemia. SNa elevated at 147 overnight with increased UOP (~500cc/hr), improved with better glycemic control, and free water replacement. SNa 144 this afternoon. UOP now reassuring. Noted UOSm of 184 overnight, which has increased to 300s.   Monitor SNa and continue free water replacement    Of note, pt. with increased anion gap acidosis in the setting of normal renal function, likely related to septic shock with ? DKA.    Monitor labs and UOP    Rigo Coronado  Nephrology Fellow  [phone]  (After 5pm or on weekends please page the on-call fellow)

## 2022-04-24 LAB
ALBUMIN SERPL ELPH-MCNC: 2.4 G/DL — LOW (ref 3.3–5)
ALBUMIN SERPL ELPH-MCNC: 2.7 G/DL — LOW (ref 3.3–5)
ALP SERPL-CCNC: 43 U/L — SIGNIFICANT CHANGE UP (ref 40–120)
ALP SERPL-CCNC: 44 U/L — SIGNIFICANT CHANGE UP (ref 40–120)
ALT FLD-CCNC: 15 U/L — SIGNIFICANT CHANGE UP (ref 10–45)
ALT FLD-CCNC: 15 U/L — SIGNIFICANT CHANGE UP (ref 10–45)
ANION GAP SERPL CALC-SCNC: 10 MMOL/L — SIGNIFICANT CHANGE UP (ref 5–17)
ANION GAP SERPL CALC-SCNC: 11 MMOL/L — SIGNIFICANT CHANGE UP (ref 5–17)
APTT BLD: 26.6 SEC — LOW (ref 27.5–35.5)
APTT BLD: 28.7 SEC — SIGNIFICANT CHANGE UP (ref 27.5–35.5)
AST SERPL-CCNC: 10 U/L — SIGNIFICANT CHANGE UP (ref 10–40)
AST SERPL-CCNC: 13 U/L — SIGNIFICANT CHANGE UP (ref 10–40)
BASE EXCESS BLDV CALC-SCNC: -0.8 MMOL/L — SIGNIFICANT CHANGE UP (ref -2–2)
BASOPHILS # BLD AUTO: 0.04 K/UL — SIGNIFICANT CHANGE UP (ref 0–0.2)
BASOPHILS NFR BLD AUTO: 0.2 % — SIGNIFICANT CHANGE UP (ref 0–2)
BILIRUB SERPL-MCNC: 0.1 MG/DL — LOW (ref 0.2–1.2)
BILIRUB SERPL-MCNC: 0.1 MG/DL — LOW (ref 0.2–1.2)
BUN SERPL-MCNC: 4 MG/DL — LOW (ref 7–23)
BUN SERPL-MCNC: <4 MG/DL — LOW (ref 7–23)
CA-I SERPL-SCNC: 1.17 MMOL/L — SIGNIFICANT CHANGE UP (ref 1.15–1.33)
CALCIUM SERPL-MCNC: 8 MG/DL — LOW (ref 8.4–10.5)
CALCIUM SERPL-MCNC: 8.3 MG/DL — LOW (ref 8.4–10.5)
CHLORIDE BLDV-SCNC: 108 MMOL/L — SIGNIFICANT CHANGE UP (ref 96–108)
CHLORIDE SERPL-SCNC: 107 MMOL/L — SIGNIFICANT CHANGE UP (ref 96–108)
CHLORIDE SERPL-SCNC: 110 MMOL/L — HIGH (ref 96–108)
CO2 BLDV-SCNC: 24 MMOL/L — SIGNIFICANT CHANGE UP (ref 22–26)
CO2 SERPL-SCNC: 21 MMOL/L — LOW (ref 22–31)
CO2 SERPL-SCNC: 22 MMOL/L — SIGNIFICANT CHANGE UP (ref 22–31)
CREAT SERPL-MCNC: 0.67 MG/DL — SIGNIFICANT CHANGE UP (ref 0.5–1.3)
CREAT SERPL-MCNC: 0.67 MG/DL — SIGNIFICANT CHANGE UP (ref 0.5–1.3)
EGFR: 107 ML/MIN/1.73M2 — SIGNIFICANT CHANGE UP
EGFR: 107 ML/MIN/1.73M2 — SIGNIFICANT CHANGE UP
EOSINOPHIL # BLD AUTO: 0 K/UL — SIGNIFICANT CHANGE UP (ref 0–0.5)
EOSINOPHIL NFR BLD AUTO: 0 % — SIGNIFICANT CHANGE UP (ref 0–6)
GAMMA INTERFERON BACKGROUND BLD IA-ACNC: 0.01 IU/ML — SIGNIFICANT CHANGE UP
GAS PNL BLDV: 137 MMOL/L — SIGNIFICANT CHANGE UP (ref 136–145)
GAS PNL BLDV: SIGNIFICANT CHANGE UP
GLUCOSE BLDC GLUCOMTR-MCNC: 143 MG/DL — HIGH (ref 70–99)
GLUCOSE BLDC GLUCOMTR-MCNC: 164 MG/DL — HIGH (ref 70–99)
GLUCOSE BLDC GLUCOMTR-MCNC: 182 MG/DL — HIGH (ref 70–99)
GLUCOSE BLDC GLUCOMTR-MCNC: 195 MG/DL — HIGH (ref 70–99)
GLUCOSE BLDC GLUCOMTR-MCNC: 207 MG/DL — HIGH (ref 70–99)
GLUCOSE BLDC GLUCOMTR-MCNC: 226 MG/DL — HIGH (ref 70–99)
GLUCOSE BLDC GLUCOMTR-MCNC: 98 MG/DL — SIGNIFICANT CHANGE UP (ref 70–99)
GLUCOSE BLDV-MCNC: 166 MG/DL — HIGH (ref 70–99)
GLUCOSE SERPL-MCNC: 144 MG/DL — HIGH (ref 70–99)
GLUCOSE SERPL-MCNC: 263 MG/DL — HIGH (ref 70–99)
HCO3 BLDV-SCNC: 23 MMOL/L — SIGNIFICANT CHANGE UP (ref 22–29)
HCT VFR BLD CALC: 25.1 % — LOW (ref 39–50)
HCT VFR BLD CALC: 27.3 % — LOW (ref 39–50)
HCT VFR BLDA CALC: 28 % — LOW (ref 39–51)
HGB BLD CALC-MCNC: 9.3 G/DL — LOW (ref 12.6–17.4)
HGB BLD-MCNC: 8.4 G/DL — LOW (ref 13–17)
HGB BLD-MCNC: 9.4 G/DL — LOW (ref 13–17)
IMM GRANULOCYTES NFR BLD AUTO: 2.2 % — HIGH (ref 0–1.5)
INR BLD: 1.12 RATIO — SIGNIFICANT CHANGE UP (ref 0.88–1.16)
INR BLD: 1.19 RATIO — HIGH (ref 0.88–1.16)
LACTATE BLDV-MCNC: 1.5 MMOL/L — SIGNIFICANT CHANGE UP (ref 0.7–2)
LYMPHOCYTES # BLD AUTO: 1.03 K/UL — SIGNIFICANT CHANGE UP (ref 1–3.3)
LYMPHOCYTES # BLD AUTO: 5.3 % — LOW (ref 13–44)
M TB IFN-G BLD-IMP: ABNORMAL
M TB IFN-G CD4+ BCKGRND COR BLD-ACNC: -0.01 IU/ML — SIGNIFICANT CHANGE UP
M TB IFN-G CD4+CD8+ BCKGRND COR BLD-ACNC: -0.01 IU/ML — SIGNIFICANT CHANGE UP
MAGNESIUM SERPL-MCNC: 1.9 MG/DL — SIGNIFICANT CHANGE UP (ref 1.6–2.6)
MAGNESIUM SERPL-MCNC: 2 MG/DL — SIGNIFICANT CHANGE UP (ref 1.6–2.6)
MCHC RBC-ENTMCNC: 28.7 PG — SIGNIFICANT CHANGE UP (ref 27–34)
MCHC RBC-ENTMCNC: 29 PG — SIGNIFICANT CHANGE UP (ref 27–34)
MCHC RBC-ENTMCNC: 33.5 GM/DL — SIGNIFICANT CHANGE UP (ref 32–36)
MCHC RBC-ENTMCNC: 34.4 GM/DL — SIGNIFICANT CHANGE UP (ref 32–36)
MCV RBC AUTO: 83.5 FL — SIGNIFICANT CHANGE UP (ref 80–100)
MCV RBC AUTO: 86.6 FL — SIGNIFICANT CHANGE UP (ref 80–100)
MONOCYTES # BLD AUTO: 1.54 K/UL — HIGH (ref 0–0.9)
MONOCYTES NFR BLD AUTO: 7.9 % — SIGNIFICANT CHANGE UP (ref 2–14)
NEUTROPHILS # BLD AUTO: 16.4 K/UL — HIGH (ref 1.8–7.4)
NEUTROPHILS NFR BLD AUTO: 84.4 % — HIGH (ref 43–77)
NRBC # BLD: 0 /100 WBCS — SIGNIFICANT CHANGE UP (ref 0–0)
NRBC # BLD: 0 /100 WBCS — SIGNIFICANT CHANGE UP (ref 0–0)
PCO2 BLDV: 35 MMHG — LOW (ref 42–55)
PH BLDV: 7.43 — SIGNIFICANT CHANGE UP (ref 7.32–7.43)
PHOSPHATE SERPL-MCNC: 3.1 MG/DL — SIGNIFICANT CHANGE UP (ref 2.5–4.5)
PHOSPHATE SERPL-MCNC: 3.3 MG/DL — SIGNIFICANT CHANGE UP (ref 2.5–4.5)
PLATELET # BLD AUTO: 114 K/UL — LOW (ref 150–400)
PLATELET # BLD AUTO: 155 K/UL — SIGNIFICANT CHANGE UP (ref 150–400)
PO2 BLDV: 64 MMHG — HIGH (ref 25–45)
POTASSIUM BLDV-SCNC: 4 MMOL/L — SIGNIFICANT CHANGE UP (ref 3.5–5.1)
POTASSIUM SERPL-MCNC: 4.3 MMOL/L — SIGNIFICANT CHANGE UP (ref 3.5–5.3)
POTASSIUM SERPL-MCNC: 4.6 MMOL/L — SIGNIFICANT CHANGE UP (ref 3.5–5.3)
POTASSIUM SERPL-SCNC: 4.3 MMOL/L — SIGNIFICANT CHANGE UP (ref 3.5–5.3)
POTASSIUM SERPL-SCNC: 4.6 MMOL/L — SIGNIFICANT CHANGE UP (ref 3.5–5.3)
PROT SERPL-MCNC: 4.8 G/DL — LOW (ref 6–8.3)
PROT SERPL-MCNC: 5.2 G/DL — LOW (ref 6–8.3)
PROTHROM AB SERPL-ACNC: 12.9 SEC — SIGNIFICANT CHANGE UP (ref 10.5–13.4)
PROTHROM AB SERPL-ACNC: 13.8 SEC — HIGH (ref 10.5–13.4)
QUANT TB PLUS MITOGEN MINUS NIL: 0.22 IU/ML — SIGNIFICANT CHANGE UP
RBC # BLD: 2.9 M/UL — LOW (ref 4.2–5.8)
RBC # BLD: 3.27 M/UL — LOW (ref 4.2–5.8)
RBC # FLD: 14.3 % — SIGNIFICANT CHANGE UP (ref 10.3–14.5)
RBC # FLD: 14.3 % — SIGNIFICANT CHANGE UP (ref 10.3–14.5)
SAO2 % BLDV: 95.7 % — HIGH (ref 67–88)
SODIUM SERPL-SCNC: 139 MMOL/L — SIGNIFICANT CHANGE UP (ref 135–145)
SODIUM SERPL-SCNC: 142 MMOL/L — SIGNIFICANT CHANGE UP (ref 135–145)
WBC # BLD: 15.32 K/UL — HIGH (ref 3.8–10.5)
WBC # BLD: 19.44 K/UL — HIGH (ref 3.8–10.5)
WBC # FLD AUTO: 15.32 K/UL — HIGH (ref 3.8–10.5)
WBC # FLD AUTO: 19.44 K/UL — HIGH (ref 3.8–10.5)

## 2022-04-24 PROCEDURE — 99232 SBSQ HOSP IP/OBS MODERATE 35: CPT

## 2022-04-24 PROCEDURE — 99291 CRITICAL CARE FIRST HOUR: CPT | Mod: GC

## 2022-04-24 PROCEDURE — 95720 EEG PHY/QHP EA INCR W/VEEG: CPT

## 2022-04-24 RX ORDER — INSULIN LISPRO 100/ML
VIAL (ML) SUBCUTANEOUS EVERY 6 HOURS
Refills: 0 | Status: DISCONTINUED | OUTPATIENT
Start: 2022-04-24 | End: 2022-05-03

## 2022-04-24 RX ORDER — HUMAN INSULIN 100 [IU]/ML
5 INJECTION, SUSPENSION SUBCUTANEOUS ONCE
Refills: 0 | Status: COMPLETED | OUTPATIENT
Start: 2022-04-24 | End: 2022-04-24

## 2022-04-24 RX ADMIN — Medication 1 MILLIGRAM(S): at 12:20

## 2022-04-24 RX ADMIN — Medication 250 MILLIGRAM(S): at 05:07

## 2022-04-24 RX ADMIN — Medication 100 MILLIGRAM(S): at 15:14

## 2022-04-24 RX ADMIN — Medication 3 MILLILITER(S): at 17:08

## 2022-04-24 RX ADMIN — Medication 200 GRAM(S): at 02:16

## 2022-04-24 RX ADMIN — SODIUM CHLORIDE 100 MILLILITER(S): 9 INJECTION, SOLUTION INTRAVENOUS at 10:07

## 2022-04-24 RX ADMIN — CHLORHEXIDINE GLUCONATE 15 MILLILITER(S): 213 SOLUTION TOPICAL at 17:44

## 2022-04-24 RX ADMIN — Medication 1 TABLET(S): at 12:20

## 2022-04-24 RX ADMIN — Medication 250 MILLIGRAM(S): at 17:43

## 2022-04-24 RX ADMIN — Medication 200 GRAM(S): at 10:39

## 2022-04-24 RX ADMIN — HUMAN INSULIN 5 UNIT(S): 100 INJECTION, SUSPENSION SUBCUTANEOUS at 11:30

## 2022-04-24 RX ADMIN — Medication 3 MILLILITER(S): at 23:55

## 2022-04-24 RX ADMIN — SODIUM CHLORIDE 100 MILLILITER(S): 9 INJECTION, SOLUTION INTRAVENOUS at 01:12

## 2022-04-24 RX ADMIN — CHLORHEXIDINE GLUCONATE 15 MILLILITER(S): 213 SOLUTION TOPICAL at 05:08

## 2022-04-24 RX ADMIN — Medication 200 GRAM(S): at 15:15

## 2022-04-24 RX ADMIN — SODIUM CHLORIDE 100 MILLILITER(S): 9 INJECTION, SOLUTION INTRAVENOUS at 21:27

## 2022-04-24 RX ADMIN — Medication 100 MILLIGRAM(S): at 21:29

## 2022-04-24 RX ADMIN — PROPOFOL 11.4 MICROGRAM(S)/KG/MIN: 10 INJECTION, EMULSION INTRAVENOUS at 10:13

## 2022-04-24 RX ADMIN — EPINEPHRINE 12.9 MICROGRAM(S)/KG/MIN: 0.3 INJECTION INTRAMUSCULAR; SUBCUTANEOUS at 10:12

## 2022-04-24 RX ADMIN — Medication 200 GRAM(S): at 05:07

## 2022-04-24 RX ADMIN — VASOPRESSIN 2.4 UNIT(S)/MIN: 20 INJECTION INTRAVENOUS at 10:12

## 2022-04-24 RX ADMIN — CHLORHEXIDINE GLUCONATE 1 APPLICATION(S): 213 SOLUTION TOPICAL at 06:01

## 2022-04-24 RX ADMIN — Medication 112 MILLIGRAM(S): at 02:15

## 2022-04-24 RX ADMIN — HEPARIN SODIUM 5000 UNIT(S): 5000 INJECTION INTRAVENOUS; SUBCUTANEOUS at 05:08

## 2022-04-24 RX ADMIN — Medication 1: at 05:09

## 2022-04-24 RX ADMIN — HUMAN INSULIN 12 UNIT(S): 100 INJECTION, SUSPENSION SUBCUTANEOUS at 05:09

## 2022-04-24 RX ADMIN — CEFTRIAXONE 100 MILLIGRAM(S): 500 INJECTION, POWDER, FOR SOLUTION INTRAMUSCULAR; INTRAVENOUS at 12:20

## 2022-04-24 RX ADMIN — PANTOPRAZOLE SODIUM 40 MILLIGRAM(S): 20 TABLET, DELAYED RELEASE ORAL at 12:20

## 2022-04-24 RX ADMIN — Medication 100 MILLIGRAM(S): at 12:21

## 2022-04-24 RX ADMIN — HEPARIN SODIUM 5000 UNIT(S): 5000 INJECTION INTRAVENOUS; SUBCUTANEOUS at 17:44

## 2022-04-24 RX ADMIN — Medication 112 MILLIGRAM(S): at 17:43

## 2022-04-24 RX ADMIN — Medication 200 GRAM(S): at 17:43

## 2022-04-24 RX ADMIN — Medication 200 GRAM(S): at 21:27

## 2022-04-24 RX ADMIN — Medication 3 MILLILITER(S): at 11:24

## 2022-04-24 RX ADMIN — VASOPRESSIN 2.4 UNIT(S)/MIN: 20 INJECTION INTRAVENOUS at 21:27

## 2022-04-24 RX ADMIN — Medication 100 MILLIGRAM(S): at 05:08

## 2022-04-24 RX ADMIN — ATORVASTATIN CALCIUM 20 MILLIGRAM(S): 80 TABLET, FILM COATED ORAL at 21:29

## 2022-04-24 RX ADMIN — HUMAN INSULIN 12 UNIT(S): 100 INJECTION, SUSPENSION SUBCUTANEOUS at 01:11

## 2022-04-24 RX ADMIN — Medication 3 MILLILITER(S): at 06:14

## 2022-04-24 RX ADMIN — Medication 112 MILLIGRAM(S): at 10:06

## 2022-04-24 NOTE — PROGRESS NOTE ADULT - SUBJECTIVE AND OBJECTIVE BOX
CHIEF COMPLAINT:    Interval Events:    REVIEW OF SYSTEMS:  Constitutional: [ ] fevers [ ] chills [ ] weight loss [ ] weight gain  HEENT: [ ] dry eyes [ ] eye irritation [ ] postnasal drip [ ] nasal congestion  CV: [ ] chest pain [ ] orthopnea [ ] palpitations [ ] murmur  Resp: [ ] cough [ ] shortness of breath [ ] dyspnea [ ] wheezing [ ] sputum [ ] hemoptysis  GI: [ ] nausea [ ] vomiting [ ] diarrhea [ ] constipation [ ] abd pain [ ] dysphagia   : [ ] dysuria [ ] nocturia [ ] hematuria [ ] increased urinary frequency  Musculoskeletal: [ ] back pain [ ] myalgias [ ] arthralgias [ ] fracture  Skin: [ ] rash [ ] itch  Neurological: [ ] headache [ ] dizziness [ ] syncope [ ] weakness [ ] numbness  Psychiatric: [ ] anxiety [ ] depression  Endocrine: [ ] diabetes [ ] thyroid problem  Hematologic/Lymphatic: [ ] anemia [ ] bleeding problem  Allergic/Immunologic: [ ] itchy eyes [ ] nasal discharge [ ] hives [ ] angioedema  [ ] All other systems negative  [ ] Unable to assess ROS because ________    OBJECTIVE:  ICU Vital Signs Last 24 Hrs  T(C): 12 (2022 06:00), Max: 37.6 (2022 16:00)  T(F): 53.6 (2022 06:00), Max: 99.7 (2022 16:00)  HR: 80 (2022 07:15) (55 - 108)  BP: 127/78 (2022 07:15) (92/59 - 162/67)  BP(mean): 96 (2022 07:15) (69 - 108)  ABP: --  ABP(mean): --  RR: 12 (2022 07:15) (12 - 32)  SpO2: 98% (2022 07:15) (94% - 100%)    Mode: AC/ CMV (Assist Control/ Continuous Mandatory Ventilation), RR (machine): 12, TV (machine): 400, FiO2: 21, PEEP: 5, ITime: 1, MAP: 8, PIP: 21  I & O's    - @ 07:01  -   @ 07:00  --------------------------------------------------------  IN: 8383 mL / OUT: 1995 mL / NET: 6388 mL      CAPILLARY BLOOD GLUCOSE      POCT Blood Glucose.: 164 mg/dL (2022 05:02)      PHYSICAL EXAM:  General:   HEENT:   Lymph Nodes:  Neck:   Respiratory:   Cardiovascular:   Abdomen:   Extremities:   Skin:   Neurological:  Psychiatry:    LINES:    HOSPITAL MEDICATIONS:  MEDICATIONS  (STANDING):  acyclovir IVPB      acyclovir IVPB 600 milliGRAM(s) IV Intermittent every 8 hours  albuterol/ipratropium for Nebulization 3 milliLiter(s) Nebulizer every 6 hours  ampicillin  IVPB 2 Gram(s) IV Intermittent every 4 hours  ampicillin  IVPB      atorvastatin 20 milliGRAM(s) Oral at bedtime  cefTRIAXone   IVPB 2000 milliGRAM(s) IV Intermittent every 12 hours  chlorhexidine 0.12% Liquid 15 milliLiter(s) Oral Mucosa every 12 hours  chlorhexidine 4% Liquid 1 Application(s) Topical <User Schedule>  dextrose 50% Injectable 50 milliLiter(s) IV Push every 15 minutes  dextrose 50% Injectable 25 milliLiter(s) IV Push every 15 minutes  EPINEPHrine    Infusion 0.05 MICROgram(s)/kG/Min (12.9 mL/Hr) IV Continuous <Continuous>  folic acid 1 milliGRAM(s) Oral daily  heparin   Injectable 5000 Unit(s) SubCutaneous every 12 hours  hydrocortisone sodium succinate Injectable 100 milliGRAM(s) IV Push every 8 hours  insulin lispro (ADMELOG) corrective regimen sliding scale   SubCutaneous every 6 hours  insulin NPH human recombinant 12 Unit(s) SubCutaneous once  insulin NPH human recombinant 12 Unit(s) SubCutaneous every 6 hours  lactated ringers. 1000 milliLiter(s) (100 mL/Hr) IV Continuous <Continuous>  multivitamin 1 Tablet(s) Oral daily  pantoprazole  Injectable 40 milliGRAM(s) IV Push daily  propofol Infusion 25 MICROgram(s)/kG/Min (11.4 mL/Hr) IV Continuous <Continuous>  thiamine 100 milliGRAM(s) Oral daily  vancomycin  IVPB 1000 milliGRAM(s) IV Intermittent every 12 hours  vasopressin Infusion 0.04 Unit(s)/Min (2.4 mL/Hr) IV Continuous <Continuous>    MEDICATIONS  (PRN):  aluminum hydroxide/magnesium hydroxide/simethicone Suspension 30 milliLiter(s) Oral every 4 hours PRN Dyspepsia  ondansetron Injectable 4 milliGRAM(s) IV Push every 8 hours PRN Nausea and/or Vomiting      LABS:                        9.4    19.44 )-----------( 155      ( 2022 23:51 )             27.3     Hgb Trend: 9.4<--, 9.5<--, 9.9<--, 10.3<--, 10.1<--      142  |  110<H>  |  <4<L>  ----------------------------<  263<H>  4.6   |  21<L>  |  0.67    Ca    8.3<L>      2022 23:51  Phos  3.3       Mg     2.0         TPro  5.2<L>  /  Alb  2.7<L>  /  TBili  0.1<L>  /  DBili  x   /  AST  13  /  ALT  15  /  AlkPhos  43      Creatinine Trend: 0.67<--, 0.72<--, 0.76<--, 0.73<--, 0.77<--, 0.77<--  PT/INR - ( 2022 23:51 )   PT: 13.8 sec;   INR: 1.19 ratio         PTT - ( 2022 23:51 )  PTT:26.6 sec  Urinalysis Basic - ( 2022 10:35 )    Color: Light Orange / Appearance: Clear / S.042 / pH: x  Gluc: x / Ketone: Trace  / Bili: Negative / Urobili: Negative   Blood: x / Protein: 30 mg/dL / Nitrite: Negative   Leuk Esterase: Negative / RBC: 885 /hpf / WBC 8 /HPF   Sq Epi: x / Non Sq Epi: 1 /hpf / Bacteria: Negative      Arterial Blood Gas:   @ 23:47  7.48/32/111/24/98.8/0.6  ABG lactate: --  Arterial Blood Gas:   @ 14:03  7.44/24/157/16/100.0/-6.0  ABG lactate: --  Arterial Blood Gas:   @ 12:00  7.41/23/133/15/99.2/-8.6  ABG lactate: --  Arterial Blood Gas:   @ 10:18  7.40/20/162/12/99.3/-10.6  ABG lactate: --  Arterial Blood Gas:   @ 06:46  7.33/20/94/10/99.5/-13.6  ABG lactate: --  Arterial Blood Gas:   @ 02:32  7.26/24/157/11/99.9/-14.6  ABG lactate: --  Arterial Blood Gas:   @ 00:20  7.33/20/129/10/99.3/-13.6  ABG lactate: --    Venous Blood Gas:   @ 05:09  7.43/35/64/23/95.7  VBG Lactate: 1.5  Venous Blood Gas:   @ 22:17  7.41/38/45/24/80.8  VBG Lactate: 2.5  Venous Blood Gas:   @ 17:41  7.42/32/58/21/92.8  VBG Lactate: 4.0  Venous Blood Gas:   @ 01:40  7.22/28/75/12/96.0  VBG Lactate: 11.0  Venous Blood Gas:   @ 23:11  7.24/31/52/13/81.9  VBG Lactate: 9.6  Venous Blood Gas:   @ 19:31  7.32/39/21/20/33.2  VBG Lactate: 1.5      MICROBIOLOGY:     RADIOLOGY:  [ ] Reviewed and interpreted by me    EKG: CHIEF COMPLAINT: AMS / Septic Shock vs Neurogenic Shock    Interval Events: Improved overall physiological presentation compared to yesterday morning. Improved HCO3- off NaHCO3 gtt- serum HCO3 now 21 from <10, Insulin gtt off @ 0300 and transitioned to 12U NPH w low dose ISS, epi gtt c/w being weaned down no on 0.03/ remains hypothermic, RR decreased to 12.    This is a 61yoM w a 2Yr Hx of ataxia, admitted to [facility]  w c/o progressively worsening weakness, fatigue, vomiting, and incontinence- found to have aspiration PNA, and tx to MICU  overnight for what now appears to probably have been Neurogenic Shock w a possible component of septic shock.      REVIEW OF SYSTEMS:  [ x] Unable to assess ROS because _AMS / sedated_______    OBJECTIVE:  ICU Vital Signs Last 24 Hrs  T(C): 12 (2022 06:00), Max: 37.6 (2022 16:00)  T(F): 53.6 (2022 06:00), Max: 99.7 (2022 16:00)  HR: 80 (2022 07:15) (55 - 108)  BP: 127/78 (2022 07:15) (92/59 - 162/67)  BP(mean): 96 (2022 07:15) (69 - 108)  ABP: --  ABP(mean): --  RR: 12 (2022 07:15) (12 - 32)  SpO2: 98% (2022 07:15) (94% - 100%)    Mode: AC/ CMV (Assist Control/ Continuous Mandatory Ventilation), RR (machine): 12, TV (machine): 400, FiO2: 21, PEEP: 5, ITime: 1, MAP: 8, PIP: 21  I & O's    - @ 07:01  -  -24 @ 07:00  --------------------------------------------------------  IN: 8383 mL / OUT: 1995 mL / NET: 6388 mL    CAPILLARY BLOOD GLUCOSE  POCT Blood Glucose.: 164 mg/dL (2022 05:02)    PHYSICAL EXAM:  General:   HEENT:   Lymph Nodes:  Neck:   Respiratory:   Cardiovascular:   Abdomen:   Extremities:   Skin:   Neurological:  Psychiatry:    LINES:    HOSPITAL MEDICATIONS:  MEDICATIONS  (STANDING):  acyclovir IVPB      acyclovir IVPB 600 milliGRAM(s) IV Intermittent every 8 hours  albuterol/ipratropium for Nebulization 3 milliLiter(s) Nebulizer every 6 hours  ampicillin  IVPB 2 Gram(s) IV Intermittent every 4 hours  ampicillin  IVPB      atorvastatin 20 milliGRAM(s) Oral at bedtime  cefTRIAXone   IVPB 2000 milliGRAM(s) IV Intermittent every 12 hours  chlorhexidine 0.12% Liquid 15 milliLiter(s) Oral Mucosa every 12 hours  chlorhexidine 4% Liquid 1 Application(s) Topical <User Schedule>  dextrose 50% Injectable 50 milliLiter(s) IV Push every 15 minutes  dextrose 50% Injectable 25 milliLiter(s) IV Push every 15 minutes  EPINEPHrine    Infusion 0.05 MICROgram(s)/kG/Min (12.9 mL/Hr) IV Continuous <Continuous>  folic acid 1 milliGRAM(s) Oral daily  heparin   Injectable 5000 Unit(s) SubCutaneous every 12 hours  hydrocortisone sodium succinate Injectable 100 milliGRAM(s) IV Push every 8 hours  insulin lispro (ADMELOG) corrective regimen sliding scale   SubCutaneous every 6 hours  insulin NPH human recombinant 12 Unit(s) SubCutaneous once  insulin NPH human recombinant 12 Unit(s) SubCutaneous every 6 hours  lactated ringers. 1000 milliLiter(s) (100 mL/Hr) IV Continuous <Continuous>  multivitamin 1 Tablet(s) Oral daily  pantoprazole  Injectable 40 milliGRAM(s) IV Push daily  propofol Infusion 25 MICROgram(s)/kG/Min (11.4 mL/Hr) IV Continuous <Continuous>  thiamine 100 milliGRAM(s) Oral daily  vancomycin  IVPB 1000 milliGRAM(s) IV Intermittent every 12 hours  vasopressin Infusion 0.04 Unit(s)/Min (2.4 mL/Hr) IV Continuous <Continuous>    MEDICATIONS  (PRN):  aluminum hydroxide/magnesium hydroxide/simethicone Suspension 30 milliLiter(s) Oral every 4 hours PRN Dyspepsia  ondansetron Injectable 4 milliGRAM(s) IV Push every 8 hours PRN Nausea and/or Vomiting      LABS:                        9.4    19.44 )-----------( 155      ( 2022 23:51 )             27.3     Hgb Trend: 9.4<--, 9.5<--, 9.9<--, 10.3<--, 10.1<--      142  |  110<H>  |  <4<L>  ----------------------------<  263<H>  4.6   |  21<L>  |  0.67    Ca    8.3<L>      2022 23:51  Phos  3.3       Mg     2.0         TPro  5.2<L>  /  Alb  2.7<L>  /  TBili  0.1<L>  /  DBili  x   /  AST  13  /  ALT  15  /  AlkPhos  43      Creatinine Trend: 0.67<--, 0.72<--, 0.76<--, 0.73<--, 0.77<--, 0.77<--  PT/INR - ( 2022 23:51 )   PT: 13.8 sec;   INR: 1.19 ratio         PTT - ( 2022 23:51 )  PTT:26.6 sec  Urinalysis Basic - ( 2022 10:35 )    Color: Light Orange / Appearance: Clear / S.042 / pH: x  Gluc: x / Ketone: Trace  / Bili: Negative / Urobili: Negative   Blood: x / Protein: 30 mg/dL / Nitrite: Negative   Leuk Esterase: Negative / RBC: 885 /hpf / WBC 8 /HPF   Sq Epi: x / Non Sq Epi: 1 /hpf / Bacteria: Negative      Arterial Blood Gas:   @ 23:47  7.48/32/111/24/98.8/0.6  ABG lactate: --  Arterial Blood Gas:   @ 14:03  7.44/24/157/16/100.0/-6.0  ABG lactate: --  Arterial Blood Gas:   @ 12:00  7.41/23/133/15/99.2/-8.6  ABG lactate: --  Arterial Blood Gas:   @ 10:18  7.40/20/162/12/99.3/-10.6  ABG lactate: --  Arterial Blood Gas:   @ 06:46  7.33/20/94/10/99.5/-13.6  ABG lactate: --  Arterial Blood Gas:   @ 02:32  7.26/24/157/11/99.9/-14.6  ABG lactate: --  Arterial Blood Gas:   @ 00:20  7.33/20/129/10/99.3/-13.6  ABG lactate: --    Venous Blood Gas:   @ 05:09  7.43/35/64/23/95.7  VBG Lactate: 1.5  Venous Blood Gas:   @ 22:17  7.41/38/45/24/80.8  VBG Lactate: 2.5  Venous Blood Gas:   @ 17:41  7.42/32/58/21/92.8  VBG Lactate: 4.0  Venous Blood Gas:   @ 01:40  7.22/28/75/12/96.0  VBG Lactate: 11.0  Venous Blood Gas:   @ 23:11  7.24/31/52/13/81.9  VBG Lactate: 9.6  Venous Blood Gas:   @ 19:31  7.32/39/21/20/33.2  VBG Lactate: 1.5      MICROBIOLOGY:     RADIOLOGY:  [ ] Reviewed and interpreted by me    EKG: CHIEF COMPLAINT: AMS / Septic Shock vs Neurogenic Shock    Interval Events: Improved overall physiological presentation compared to yesterday morning. Improved HCO3- off NaHCO3 gtt- serum HCO3 now 21 from <10, Insulin gtt off @ 0300 and transitioned to 12U NPH w low dose ISS, epi gtt c/w being weaned down no on 0.03/ remains hypothermic, RR decreased to 12.    This is a 61yoM w a 2Yr Hx of ataxia, admitted to NS  w c/o progressively worsening weakness, fatigue, vomiting, and incontinence- found to have aspiration PNA, and tx to MICU  overnight for what now appears to probably have been Neurogenic Shock w a possible component of septic shock.      REVIEW OF SYSTEMS:  [ x] Unable to assess ROS because _AMS / sedated_______    OBJECTIVE:  ICU Vital Signs Last 24 Hrs  T(C): 12 (2022 06:00), Max: 37.6 (2022 16:00)  T(F): 53.6 (2022 06:00), Max: 99.7 (2022 16:00)  HR: 80 (2022 07:15) (55 - 108)  BP: 127/78 (2022 07:15) (92/59 - 162/67)  BP(mean): 96 (2022 07:15) (69 - 108)  ABP: --  ABP(mean): --  RR: 12 (2022 07:15) (12 - 32)  SpO2: 98% (2022 07:15) (94% - 100%)    Mode: AC/ CMV (Assist Control/ Continuous Mandatory Ventilation), RR (machine): 12, TV (machine): 400, FiO2: 21, PEEP: 5, ITime: 1, MAP: 8, PIP: 21  I & O's    - @ 07:01  -  -24 @ 07:00  --------------------------------------------------------  IN: 8383 mL / OUT: 1995 mL / NET: 6388 mL    CAPILLARY BLOOD GLUCOSE  POCT Blood Glucose.: 164 mg/dL (2022 05:02)    PHYSICAL EXAM:  General: Well groomed and nourished  HEENT: normocephalic, atraumatic, sclera white, conjunctiva clear, trachea midline, oral mucosa pink / moist  Lymph Nodes: non palp  Neck: supple, neg JVD  Respiratory: svetlana equal BS, orally intubated on full vent support (VC), minimal non tenacious secretions orally / bare minimal via ETT,  Equal chest expansion, BCTA  Cardiovascular: S1S2 RRR, all pulses palp 2+4  Abdomen: softly distended, +BS x 4 hypoactive, neg grimace on deep palp, neg masses palapated  Extremities: trace edema / generalized  Skin: warm, acyanotic, dr marycruz  Neurological: +PERRRL w sluggish reaction, lightly sedated, not responsive as of  yet  Psychiatry: calm    LINES:  [facility] MEDICATIONS:  MEDICATIONS  (STANDING):  acyclovir IVPB      acyclovir IVPB 600 milliGRAM(s) IV Intermittent every 8 hours  albuterol/ipratropium for Nebulization 3 milliLiter(s) Nebulizer every 6 hours  ampicillin  IVPB 2 Gram(s) IV Intermittent every 4 hours  ampicillin  IVPB      atorvastatin 20 milliGRAM(s) Oral at bedtime  cefTRIAXone   IVPB 2000 milliGRAM(s) IV Intermittent every 12 hours  chlorhexidine 0.12% Liquid 15 milliLiter(s) Oral Mucosa every 12 hours  chlorhexidine 4% Liquid 1 Application(s) Topical <User Schedule>  dextrose 50% Injectable 50 milliLiter(s) IV Push every 15 minutes  dextrose 50% Injectable 25 milliLiter(s) IV Push every 15 minutes  EPINEPHrine    Infusion 0.05 MICROgram(s)/kG/Min (12.9 mL/Hr) IV Continuous <Continuous>  folic acid 1 milliGRAM(s) Oral daily  heparin   Injectable 5000 Unit(s) SubCutaneous every 12 hours  hydrocortisone sodium succinate Injectable 100 milliGRAM(s) IV Push every 8 hours  insulin lispro (ADMELOG) corrective regimen sliding scale   SubCutaneous every 6 hours  insulin NPH human recombinant 12 Unit(s) SubCutaneous once  insulin NPH human recombinant 12 Unit(s) SubCutaneous every 6 hours  lactated ringers. 1000 milliLiter(s) (100 mL/Hr) IV Continuous <Continuous>  multivitamin 1 Tablet(s) Oral daily  pantoprazole  Injectable 40 milliGRAM(s) IV Push daily  propofol Infusion 25 MICROgram(s)/kG/Min (11.4 mL/Hr) IV Continuous <Continuous>  thiamine 100 milliGRAM(s) Oral daily  vancomycin  IVPB 1000 milliGRAM(s) IV Intermittent every 12 hours  vasopressin Infusion 0.04 Unit(s)/Min (2.4 mL/Hr) IV Continuous <Continuous>    MEDICATIONS  (PRN):  aluminum hydroxide/magnesium hydroxide/simethicone Suspension 30 milliLiter(s) Oral every 4 hours PRN Dyspepsia  ondansetron Injectable 4 milliGRAM(s) IV Push every 8 hours PRN Nausea and/or Vomiting      LABS:                        9.4    19.44 )-----------( 155      ( 2022 23:51 )             27.3     Hgb Trend: 9.4<--, 9.5<--, 9.9<--, 10.3<--, 10.1<--  0423    142  |  110<H>  |  <4<L>  ----------------------------<  263<H>  4.6   |  21<L>  |  0.67    Ca    8.3<L>      2022 23:51  Phos  3.3     -  Mg     2.0     23    TPro  5.2<L>  /  Alb  2.7<L>  /  TBili  0.1<L>  /  DBili  x   /  AST  13  /  ALT  15  /  AlkPhos  43  04-23    Creatinine Trend: 0.67<--, 0.72<--, 0.76<--, 0.73<--, 0.77<--, 0.77<--  PT/INR - ( 2022 23:51 )   PT: 13.8 sec;   INR: 1.19 ratio         PTT - ( 2022 23:51 )  PTT:26.6 sec  Urinalysis Basic - ( 2022 10:35 )    Color: Light Orange / Appearance: Clear / S.042 / pH: x  Gluc: x / Ketone: Trace  / Bili: Negative / Urobili: Negative   Blood: x / Protein: 30 mg/dL / Nitrite: Negative   Leuk Esterase: Negative / RBC: 885 /hpf / WBC 8 /HPF   Sq Epi: x / Non Sq Epi: 1 /hpf / Bacteria: Negative      Arterial Blood Gas:   @ 23:47  7.48/32/111/24/98.8/0.6  ABG lactate: --  Arterial Blood Gas:   @ 14:03  7.44/24/157/16/100.0/-6.0  ABG lactate: --  Arterial Blood Gas:   @ 12:00  7.41/23/133/15/99.2/-8.6  ABG lactate: --  Arterial Blood Gas:   @ 10:18  7.40/20/162/12/99.3/-10.6  ABG lactate: --  Arterial Blood Gas:   @ 06:46  7.33/20/94/10/99.5/-13.6  ABG lactate: --  Arterial Blood Gas:   @ 02:32  7.26/24/157/11/99.9/-14.6  ABG lactate: --  Arterial Blood Gas:   @ 00:20  7.33/20/129/10/99.3/-13.6  ABG lactate: --    Venous Blood Gas:   @ 05:09  7.43/35/64/23/95.7  VBG Lactate: 1.5  Venous Blood Gas:   @ 22:17  7.41/38/45/24/80.8  VBG Lactate: 2.5  Venous Blood Gas:   @ 17:41  7.42/32/58/21/92.8  VBG Lactate: 4.0  Venous Blood Gas:   @ 01:40  7.22/28/75/12/96.0  VBG Lactate: 11.0  Venous Blood Gas:   @ 23:11  7.24/31/52/13/81.9  VBG Lactate: 9.6  Venous Blood Gas:   @ 19:31  7.32/39/21/20/33.2  VBG Lactate: 1.5      MICROBIOLOGY:     Culture - Blood (collected 2022 04:13)  Source: .Blood Blood-Peripheral  Preliminary Report (2022 05:01):    No growth to date.    Culture - Blood (collected 2022 04:13)  Source: .Blood Blood-Peripheral  Preliminary Report (2022 05:01):    No growth to date.    Culture - CSF with Gram Stain (collected 2022 21:48)  Source: .CSF CSF  Gram Stain (2022 23:01):    polymorphonuclear leukocytes per low power field    No organisms seen    by cytocentrifuge  Preliminary Report (2022 14:36):    No growth      RADIOLOGY:  < from: CT Abdomen and Pelvis w/ IV Cont (22 @ 03:34) >    ACC: [id number] EXAM:  CT ABDOMEN AND PELVIS IC                          PROCEDURE DATE:  2022          INTERPRETATION:  CLINICAL INFORMATION: Lactic acidosis, assess for bowel   pathology.    COMPARISON: CT abdomen pelvis 2022    CONTRAST/COMPLICATIONS:  IV Contrast: Omnipaque 350  90 cc administered   10 cc discarded  Oral Contrast: NONE  Complications: None reported at time of study completion    PROCEDURE:  CT of the Abdomen and Pelvis was performed.  Sagittal and coronal reformatswere performed.    FINDINGS:  LOWER CHEST: Small bilateral pleural effusions with adjacent bilateral   lower lobe compressive subsegmental atelectasis.    LIVER: Within normal limits.  BILE DUCTS: Normal caliber.  GALLBLADDER: Cholelithiasis.  SPLEEN: Within normal limits.  PANCREAS: Within normal limits.  ADRENALS: Within normal limits.  KIDNEYS/URETERS: 1.4 cm left upper pole cyst. Multiple nonobstructing   bilateral renal calculi, the largest of which measures 8 mm at the right   UPJ and 7 mm at the left UPJ. No hydronephrosis.    BLADDER: Barnett catheter in place with minimal intravesicular air.  REPRODUCTIVE ORGANS: Prostate within normal limits.    BOWEL: No bowel obstruction. Enteric tube terminates in the stomach. A   rectal tube is inplace. Appendix is normal.  PERITONEUM: Nonspecific trace fluid in the left paracolic gutter and   pelvis.  VESSELS: Atherosclerotic changes.  RETROPERITONEUM/LYMPH NODES: No lymphadenopathy.  ABDOMINAL WALL: Fat-containing left inguinal hernia. Mildsubcutaneous   edema..  BONES: Within normal limits.    IMPRESSION:  Small bilateral pleural effusions and trace peritoneal free fluid, likely   reflecting fluid overload status.    Bilateral nonobstructing renal calculi.    Cholelithiasis.      < end of copied text >  < from: CT Head No Cont (22 @ 03:25) >    ACC: [id number] EXAM:  CT BRAIN                          PROCEDURE DATE:  2022          INTERPRETATION:  CT head without IV contrast    CLINICAL INFORMATION: Altered mental status    TECHNIQUE: Contiguous axial 5 mm sections were obtained through the head.   Sagittal and coronal 2-D reformatted images were also obtained.   This   scan was performed using automatic exposure control (radiation dose   reduction software) to obtain a diagnostic image quality scan with   patient dose as low as reasonably achievable.    FINDINGS:   CT dated 2022 available for review.    The brain demonstrates mild periventricular white matter ischemia with   tiny old lacunar infarction in the LEFT cerebellum.   No acute cerebral   cortical infarct is seen.  No intracranial hemorrhage is found.  No mass   effect is found in the brain.    The ventricles, sulci and basal cisterns appear unremarkable.    The orbits are unremarkable.  The paranasal sinuses are clear.  The nasal   cavity appears intact.  The nasopharynx is symmetric.  The central skull   base, petrous temporal bones and calvarium remain intact.      IMPRESSION:   Mild periventricular white matter ischemia with tiny old   lacunar infarction in the LEFT cerebellum.    --- End of Report ---      < end of copied text >  < from: MR Head w/wo IV Cont (22 @ 16:50) >    ACC: [id number] EXAM:  MR SPINE CERVICAL WAW IC                        ACC: [id number] EXAM:  MR BRAIN WAW IC                          PROCEDURE DATE:  2022          INTERPRETATION:  Contrast-enhanced MRI of the brain and cervical spine    CLINICAL INDICATION: Altered mental status, ataxia    TECHNIQUE: Multiplanar, multisequence MR images of the brain and cervical   spine were obtained before and after the intravenous administration of   7.5 cc of Gadavist. 0 cc were discarded.    COMPARISON: CT brain 2022. CTA head and neck 2022.    FINDINGS:    MRI BRAIN:    Study is limited by motion.    Extensive, diffuse leptomeningeal enhancement.    Abnormal ependymal enhancement involving the bilateral frontal horns,   bilateral ventricular bodies, left temporal and occipital horn, and   fourth ventricle.    Possible small foci of restricted diffusion in the bilateral superior   cerebral hemispheres.    Mild hydrocephalus. No midline shift or effacement of basal cisterns.    Edema noted within the cerebellar vermis and mesial bilateral cerebellar   hemispheres.    Mild white matter microvascular ischemic disease.    Signal voids are seen within the major intracranial vessels consistent   with their patency.    The visualized paranasal sinuses and mastoid air cells are clear.    Orbits are unremarkable.    MRI CERVICAL SPINE:    Vertebral body height, marrow signal homogeneity, and facet alignment are   maintained throughout the visualized spinal segments.    No intervertebral disc space narrowing. Cervicomedullary junction   unremarkable.    No prevertebral or paravertebral edema.    No spinal cord compression or abnormal intrinsic cord signal.    Diffuse leptomeningeal enhancement.    C2-C3: Central disc protrusion reaches the cord. No neural foraminal   narrowing.    C3-C4: Broad-based disc protrusion asymmetric to the left reaches the   cord. Mild left neural foraminal narrowing.    C4-C5: Broad-based disc protrusion deforms the ventral thecal sac. No   neural foraminal narrowing.    C5-C6: Broad-based disc protrusion asymmetric to the left nearly reaches   the cord. Bilateral uncinate hypertrophy. Moderate bilateral neural   foraminal narrowing.    C6-C7: Broad-based disc protrusion nearly reaches the cord. Left uncinate   hypertrophy and mild to moderate left neural foraminal narrowing.    C7-T1: No spinal canal stenosis or neural foraminal narrowing.    IMPRESSION:    MRI BRAIN:  Extensive, diffuse leptomeningeal enhancement. Differential diagnosis   primarily includes leptomeningeal metastases and infectious meningitis.    Abnormal ependymal enhancement involving the bilateral frontal horns,   bilateral ventricular bodies, left temporal and occipital horn, and   fourth ventricle. Differential diagnosis includes ependymal metastases   and infectious meningitis.    Possible small foci of restricted diffusion in the bilateral superior   cerebral hemispheres. These may represent small acute infarcts or regions   of encephalitis.    Edema noted within the cerebellar vermis and mesial bilateral cerebellar   hemispheres.    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    Multilevel degenerative changes.    Dr. Puente discussed these findings with Dr. Morocho on 2022 10:29 AM   with read back.          EKG: CHIEF COMPLAINT: AMS / Septic Shock vs Neurogenic Shock    Interval Events: Improved overall physiological presentation compared to yesterday morning. Improved HCO3- off NaHCO3 gtt- serum HCO3 now 21 from <10, Insulin gtt off @ 0300 and transitioned to 12U NPH w low dose ISS, epi gtt c/w being weaned down no on 0.03/ remains hypothermic, RR decreased to 12.    This is a 61yoM w a 2Yr Hx of ataxia, admitted to NS  w c/o progressively worsening weakness, fatigue, vomiting, and incontinence- found to have aspiration PNA, and tx to MICU  overnight for what now appears to probably have been Neurogenic Shock w a possible component of septic shock.      REVIEW OF SYSTEMS:  [ x] Unable to assess ROS because _AMS / sedated______    OBJECTIVE:  ICU Vital Signs Last 24 Hrs  T(C): 12 (2022 06:00), Max: 37.6 (2022 16:00)  T(F): 53.6 (2022 06:00), Max: 99.7 (2022 16:00)  HR: 80 (2022 07:15) (55 - 108)  BP: 127/78 (2022 07:15) (92/59 - 162/67)  BP(mean): 96 (2022 07:15) (69 - 108)  ABP: --  ABP(mean): --  RR: 12 (2022 07:15) (12 - 32)  SpO2: 98% (2022 07:15) (94% - 100%)    Mode: AC/ CMV (Assist Control/ Continuous Mandatory Ventilation), RR (machine): 12, TV (machine): 400, FiO2: 21, PEEP: 5, ITime: 1, MAP: 8, PIP: 21  I & O's    - @ 07:01  -  04-24 @ 07:00  --------------------------------------------------------  IN: 8383 mL / OUT: 1995 mL / NET: 6388 mL    CAPILLARY BLOOD GLUCOSE  POCT Blood Glucose.: 164 mg/dL (2022 05:02)    PHYSICAL EXAM:  General: Well groomed and nourished  HEENT: normocephalic, atraumatic, sclera white, conjunctiva clear, trachea midline, oral mucosa pink / moist  Lymph Nodes: non palp  Neck: supple, neg JVD  Respiratory: svetlana equal BS, orally intubated on full vent support (VC), minimal non tenacious secretions orally / bare minimal via ETT,  Equal chest expansion, BCTA  Cardiovascular: S1S2 RRR, all pulses palp 2+4  Abdomen: softly distended, +BS x 4 hypoactive, neg grimace on deep palp, neg masses palapated  Extremities: trace edema / generalized  Skin: warm, acyanotic, dr marycruz  Neurological: +PERRRL w sluggish reaction, lightly sedated, not responsive as of  yet  Psychiatry: calm    LINES:  [facility] / [facility] MEDICATIONS:  MEDICATIONS  (STANDING):  acyclovir IVPB      acyclovir IVPB 600 milliGRAM(s) IV Intermittent every 8 hours  albuterol/ipratropium for Nebulization 3 milliLiter(s) Nebulizer every 6 hours  ampicillin  IVPB 2 Gram(s) IV Intermittent every 4 hours  ampicillin  IVPB      atorvastatin 20 milliGRAM(s) Oral at bedtime  cefTRIAXone   IVPB 2000 milliGRAM(s) IV Intermittent every 12 hours  chlorhexidine 0.12% Liquid 15 milliLiter(s) Oral Mucosa every 12 hours  chlorhexidine 4% Liquid 1 Application(s) Topical <User Schedule>  dextrose 50% Injectable 50 milliLiter(s) IV Push every 15 minutes  dextrose 50% Injectable 25 milliLiter(s) IV Push every 15 minutes  EPINEPHrine    Infusion 0.05 MICROgram(s)/kG/Min (12.9 mL/Hr) IV Continuous <Continuous>  folic acid 1 milliGRAM(s) Oral daily  heparin   Injectable 5000 Unit(s) SubCutaneous every 12 hours  hydrocortisone sodium succinate Injectable 100 milliGRAM(s) IV Push every 8 hours  insulin lispro (ADMELOG) corrective regimen sliding scale   SubCutaneous every 6 hours  insulin NPH human recombinant 12 Unit(s) SubCutaneous once  insulin NPH human recombinant 12 Unit(s) SubCutaneous every 6 hours  lactated ringers. 1000 milliLiter(s) (100 mL/Hr) IV Continuous <Continuous>  multivitamin 1 Tablet(s) Oral daily  pantoprazole  Injectable 40 milliGRAM(s) IV Push daily  propofol Infusion 25 MICROgram(s)/kG/Min (11.4 mL/Hr) IV Continuous <Continuous>  thiamine 100 milliGRAM(s) Oral daily  vancomycin  IVPB 1000 milliGRAM(s) IV Intermittent every 12 hours  vasopressin Infusion 0.04 Unit(s)/Min (2.4 mL/Hr) IV Continuous <Continuous>    MEDICATIONS  (PRN):  aluminum hydroxide/magnesium hydroxide/simethicone Suspension 30 milliLiter(s) Oral every 4 hours PRN Dyspepsia  ondansetron Injectable 4 milliGRAM(s) IV Push every 8 hours PRN Nausea and/or Vomiting      LABS:                        9.4    19.44 )-----------( 155      ( 2022 23:51 )             27.3     Hgb Trend: 9.4<--, 9.5<--, 9.9<--, 10.3<--, 10.1<--  0423    142  |  110<H>  |  <4<L>  ----------------------------<  263<H>  4.6   |  21<L>  |  0.67    Ca    8.3<L>      2022 23:51  Phos  3.3     -  Mg     2.0     23    TPro  5.2<L>  /  Alb  2.7<L>  /  TBili  0.1<L>  /  DBili  x   /  AST  13  /  ALT  15  /  AlkPhos  43  04-23    Creatinine Trend: 0.67<--, 0.72<--, 0.76<--, 0.73<--, 0.77<--, 0.77<--  PT/INR - ( 2022 23:51 )   PT: 13.8 sec;   INR: 1.19 ratio         PTT - ( 2022 23:51 )  PTT:26.6 sec  Urinalysis Basic - ( 2022 10:35 )    Color: Light Orange / Appearance: Clear / S.042 / pH: x  Gluc: x / Ketone: Trace  / Bili: Negative / Urobili: Negative   Blood: x / Protein: 30 mg/dL / Nitrite: Negative   Leuk Esterase: Negative / RBC: 885 /hpf / WBC 8 /HPF   Sq Epi: x / Non Sq Epi: 1 /hpf / Bacteria: Negative      Arterial Blood Gas:   @ 23:47  7.48/32/111/24/98.8/0.6  ABG lactate: --  Arterial Blood Gas:   @ 14:03  7.44/24/157/16/100.0/-6.0  ABG lactate: --  Arterial Blood Gas:   @ 12:00  7.41/23/133/15/99.2/-8.6  ABG lactate: --  Arterial Blood Gas:   @ 10:18  7.40/20/162/12/99.3/-10.6  ABG lactate: --  Arterial Blood Gas:   @ 06:46  7.33/20/94/10/99.5/-13.6  ABG lactate: --  Arterial Blood Gas:   @ 02:32  7.26/24/157/11/99.9/-14.6  ABG lactate: --  Arterial Blood Gas:   @ 00:20  7.33/20/129/10/99.3/-13.6  ABG lactate: --    Venous Blood Gas:   @ 05:09  7.43/35/64/23/95.7  VBG Lactate: 1.5  Venous Blood Gas:   @ 22:17  7.41/38/45/24/80.8  VBG Lactate: 2.5  Venous Blood Gas:   @ 17:41  7.42/32/58/21/92.8  VBG Lactate: 4.0  Venous Blood Gas:   @ 01:40  7.22/28/75/12/96.0  VBG Lactate: 11.0  Venous Blood Gas:   @ 23:11  7.24/31/52/13/81.9  VBG Lactate: 9.6  Venous Blood Gas:   @ 19:31  7.32/39/21/20/33.2  VBG Lactate: 1.5      MICROBIOLOGY:     Culture - Blood (collected 2022 04:13)  Source: .Blood Blood-Peripheral  Preliminary Report (2022 05:01):    No growth to date.    Culture - Blood (collected 2022 04:13)  Source: .Blood Blood-Peripheral  Preliminary Report (2022 05:01):    No growth to date.    Culture - CSF with Gram Stain (collected 2022 21:48)  Source: .CSF CSF  Gram Stain (2022 23:01):    polymorphonuclear leukocytes per low power field    No organisms seen    by cytocentrifuge  Preliminary Report (2022 14:36):    No growth      RADIOLOGY:  < from: CT Abdomen and Pelvis w/ IV Cont (22 @ 03:34) >    ACC: [id number] EXAM:  CT ABDOMEN AND PELVIS IC                          PROCEDURE DATE:  2022          INTERPRETATION:  CLINICAL INFORMATION: Lactic acidosis, assess for bowel   pathology.    COMPARISON: CT abdomen pelvis 2022    CONTRAST/COMPLICATIONS:  IV Contrast: Omnipaque 350  90 cc administered   10 cc discarded  Oral Contrast: NONE  Complications: None reported at time of study completion    PROCEDURE:  CT of the Abdomen and Pelvis was performed.  Sagittal and coronal reformatswere performed.    FINDINGS:  LOWER CHEST: Small bilateral pleural effusions with adjacent bilateral   lower lobe compressive subsegmental atelectasis.    LIVER: Within normal limits.  BILE DUCTS: Normal caliber.  GALLBLADDER: Cholelithiasis.  SPLEEN: Within normal limits.  PANCREAS: Within normal limits.  ADRENALS: Within normal limits.  KIDNEYS/URETERS: 1.4 cm left upper pole cyst. Multiple nonobstructing   bilateral renal calculi, the largest of which measures 8 mm at the right   UPJ and 7 mm at the left UPJ. No hydronephrosis.    BLADDER: Barnett catheter in place with minimal intravesicular air.  REPRODUCTIVE ORGANS: Prostate within normal limits.    BOWEL: No bowel obstruction. Enteric tube terminates in the stomach. A   rectal tube is inplace. Appendix is normal.  PERITONEUM: Nonspecific trace fluid in the left paracolic gutter and   pelvis.  VESSELS: Atherosclerotic changes.  RETROPERITONEUM/LYMPH NODES: No lymphadenopathy.  ABDOMINAL WALL: Fat-containing left inguinal hernia. Mildsubcutaneous   edema..  BONES: Within normal limits.    IMPRESSION:  Small bilateral pleural effusions and trace peritoneal free fluid, likely   reflecting fluid overload status.    Bilateral nonobstructing renal calculi.    Cholelithiasis.      < end of copied text >  < from: CT Head No Cont (22 @ 03:25) >    ACC: [id number] EXAM:  CT BRAIN                          PROCEDURE DATE:  2022          INTERPRETATION:  CT head without IV contrast    CLINICAL INFORMATION: Altered mental status    TECHNIQUE: Contiguous axial 5 mm sections were obtained through the head.   Sagittal and coronal 2-D reformatted images were also obtained.   This   scan was performed using automatic exposure control (radiation dose   reduction software) to obtain a diagnostic image quality scan with   patient dose as low as reasonably achievable.    FINDINGS:   CT dated 2022 available for review.    The brain demonstrates mild periventricular white matter ischemia with   tiny old lacunar infarction in the LEFT cerebellum.   No acute cerebral   cortical infarct is seen.  No intracranial hemorrhage is found.  No mass   effect is found in the brain.    The ventricles, sulci and basal cisterns appear unremarkable.    The orbits are unremarkable.  The paranasal sinuses are clear.  The nasal   cavity appears intact.  The nasopharynx is symmetric.  The central skull   base, petrous temporal bones and calvarium remain intact.      IMPRESSION:   Mild periventricular white matter ischemia with tiny old   lacunar infarction in the LEFT cerebellum.    --- End of Report ---      < end of copied text >  < from: MR Head w/wo IV Cont (22 @ 16:50) >    ACC: [id number] EXAM:  MR SPINE CERVICAL WAW IC                        ACC: [id number] EXAM:  MR BRAIN WAW IC                          PROCEDURE DATE:  2022          INTERPRETATION:  Contrast-enhanced MRI of the brain and cervical spine    CLINICAL INDICATION: Altered mental status, ataxia    TECHNIQUE: Multiplanar, multisequence MR images of the brain and cervical   spine were obtained before and after the intravenous administration of   7.5 cc of Gadavist. 0 cc were discarded.    COMPARISON: CT brain 2022. CTA head and neck 2022.    FINDINGS:    MRI BRAIN:    Study is limited by motion.    Extensive, diffuse leptomeningeal enhancement.    Abnormal ependymal enhancement involving the bilateral frontal horns,   bilateral ventricular bodies, left temporal and occipital horn, and   fourth ventricle.    Possible small foci of restricted diffusion in the bilateral superior   cerebral hemispheres.    Mild hydrocephalus. No midline shift or effacement of basal cisterns.    Edema noted within the cerebellar vermis and mesial bilateral cerebellar   hemispheres.    Mild white matter microvascular ischemic disease.    Signal voids are seen within the major intracranial vessels consistent   with their patency.    The visualized paranasal sinuses and mastoid air cells are clear.    Orbits are unremarkable.    MRI CERVICAL SPINE:    Vertebral body height, marrow signal homogeneity, and facet alignment are   maintained throughout the visualized spinal segments.    No intervertebral disc space narrowing. Cervicomedullary junction   unremarkable.    No prevertebral or paravertebral edema.    No spinal cord compression or abnormal intrinsic cord signal.    Diffuse leptomeningeal enhancement.    C2-C3: Central disc protrusion reaches the cord. No neural foraminal   narrowing.    C3-C4: Broad-based disc protrusion asymmetric to the left reaches the   cord. Mild left neural foraminal narrowing.    C4-C5: Broad-based disc protrusion deforms the ventral thecal sac. No   neural foraminal narrowing.    C5-C6: Broad-based disc protrusion asymmetric to the left nearly reaches   the cord. Bilateral uncinate hypertrophy. Moderate bilateral neural   foraminal narrowing.    C6-C7: Broad-based disc protrusion nearly reaches the cord. Left uncinate   hypertrophy and mild to moderate left neural foraminal narrowing.    C7-T1: No spinal canal stenosis or neural foraminal narrowing.    IMPRESSION:    MRI BRAIN:  Extensive, diffuse leptomeningeal enhancement. Differential diagnosis   primarily includes leptomeningeal metastases and infectious meningitis.    Abnormal ependymal enhancement involving the bilateral frontal horns,   bilateral ventricular bodies, left temporal and occipital horn, and   fourth ventricle. Differential diagnosis includes ependymal metastases   and infectious meningitis.    Possible small foci of restricted diffusion in the bilateral superior   cerebral hemispheres. These may represent small acute infarcts or regions   of encephalitis.    Edema noted within the cerebellar vermis and mesial bilateral cerebellar   hemispheres.    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    Multilevel degenerative changes.    Dr. Puente discussed these findings with Dr. Morocho on 2022 10:29 AM   with read back.          EKG:

## 2022-04-24 NOTE — PROGRESS NOTE ADULT - ASSESSMENT
61yoM w a 2yr Hx of Ataxia, noted bilateral lacunar infarcts, now noted in the L cerebellum. 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 The pt was admitted 4/18 w c/w of worsening weakness/fatigue, vomiting along with incontinence and was being treated for an aspiration PNA. The pt was admitted to the MICU s/p RRT on 4/22 w likely Septic Shock, probable Neurogenic Shock. 4/23 the pt c/w with decompensating worsening AMS s/p Intubation for airway protection, had significant +AGMA, with lactic acidosis, central DI, and Hyperglycemia. However the pt status has been slowly improving..    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  -Neuro checks q 2 hrs and prn for changes  -MRI 4/20/22 of brain/cervical spine that showed extensive/diffuse leptomeningeal enhancement concerning for metastatic dz vs infectious meningitis   -Neuro radiology I.R. did LP- infectious work up negative  w exception of lymph pleocytosis  -EEG 24Hr r/o underlying seizure activity lymph pleocytosis    #Pulm: Intubated for AMS / and decompensation of overall condition  -c/w titrating down  / weaning vent according to clinical status- vent changes as per clinical evidence - blood gases / clinical status  -chest PT  -duonebs q6H  -aspiration precautions    #CV:   Admitted to MICu Sinus bradycardia, HypoTN secondary to septic shock / Neogenic shock  -off epinephrine   -c/w vasopressin  -if pt improves consider changing to norepinephrine     #GI: tolerating trickle feels  -Increase feeds to goal of 50cc/hr glucerna   -bowel regimen  -c/w PPI- protonix   -CT of the abd noted cholelithiasis w/o cholecystitis- consider abd / US       #Renal / Pre-renal however pt went into DI resoving 2/2 central DI, +AGMA- Serum HCO3 <10- serum NaHCO3 infusion discontinued Serum HCO3 us 21 this am  -strict I & O's - keep even  -c/w vasopressin - consider d/c as U/O decreases   -r/o adrenal insuffiencey- cortisol level 6/4 pm 4/21 done prior to steroids and had been started on Florinef- which was discontinued 4/23  however Solu-cortef stress dose started - consider d/c florinef for now in the setting of pt receiving mineral-corticoids via Solu-cortef- for now   -c/w NaHCO3 infusion / check BMP q 6H for now    #ID:  RUL/RLL pneum, MRI with leptomeningeal enhancement 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 , tx to MICU in septic shock  -Urine legionella 4/21/22 - Neg  -MSSA PCR 4/21/22 - detected  -Blood Cx 4/21/22 - NGTD  -Urine Cx 4/18/22 - contaminated  -Blood Cx 4/18/22 - NGTD  -COVID-19 4/17/22 - Neg  -continue ceftriaxone 2 gms IV aq 12 hrs (started 4/21/22)  -continue vanco 1 gm IV q 12 hrs (started 4/20/22)  -Vanco as per level  -acyclovir 600 mg IV q 8 hrs started 4/21/22- c/w IV hydration to prevent renal crystallization   -ampicillin 2 gms IV q 4 hrs started 4/21/22  -f/u LP done by IR Neuroradiology- infectious workup negative thus far- c/w following up      Endo:- Hyperglycemia- beta hydroxy 0.6  -c/w Insulin gtt  -BG slowing improving  -c/w fs q 1H  -add K to IVF / continual replacements of K and Phos during day      Attending comments: 62 y/o M with PMH as above here with progressive weakness, admitted to the MICU with septic shock requiring vasopressor support. Then the patient became more altered and unresponsive requiring intubation and mechanical ventilation for airway protection.  Cont vasopressor support with goal MAP >65, wean as tolerated. Cont with empiric broad spectrum abx at this time.  LP with lymph pleocytosis, follow up additional studies.  Aspiration pneumonia, cont abx.  Cont stress dose steroids for possible adrenal insufficiency. On vasopressin for pressor requirements and DI. EEG and CTH with no acute findings.  Monitor UOP, concern for DI.  Patient examined and case reviewed in detail on bedside rounds. Frequent bedside visits with therapy change today.  Prognosis guarded.             61yoM w a 2yr Hx of Ataxia, noted bilateral lacunar infarcts, now noted in the L cerebellum. 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 The pt was admitted 4/18 w c/w of worsening weakness/fatigue, vomiting along with incontinence and was being treated for an aspiration PNA. The pt was admitted to the MICU s/p RRT on 4/22 w likely Septic Shock, probable Neurogenic Shock. 4/23 the pt c/w with decompensating worsening AMS s/p Intubation for airway protection, had significant +AGMA, with lactic acidosis, central DI, and Hyperglycemia. However the pt status has been slowly improving..    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  -Neuro checks q 2 hrs and prn for changes  -MRI 4/20/22 of brain/cervical spine that showed extensive/diffuse leptomeningeal enhancement concerning for metastatic dz vs infectious meningitis   -Neuro radiology I.R. did LP- infectious work up negative  w exception of lymph pleocytosis  -EEG 24Hr r/o underlying seizure activity lymph pleocytosis    #Pulm: Intubated for AMS / and decompensation of overall condition  -c/w titrating down  / weaning vent according to clinical status- vent changes as per clinical evidence - blood gases / clinical status  -chest PT  -duonebs q6H  -aspiration precautions    #CV:   Admitted to MICu Sinus bradycardia, HypoTN secondary to septic shock / Neogenic shock  -off epinephrine - continue to monitor-  -c/w vasopressin  -if pt improves consider changing to norepinephrine     #GI: tolerating trickle feels  -Increase feeds to goal of 50cc/hr glucerna   -bowel regimen  -c/w PPI- protonix   -CT of the abd noted cholelithiasis w/o cholecystitis- consider abd / US       #Renal / Pre-renal however pt went into DI resoving 2/2 central DI, +AGMA- Serum HCO3 <10- serum NaHCO3 infusion discontinued Serum HCO3 improved to 21 this am  -strict I & O's - keep even  -c/w vasopressin - consider d/c as U/O decreases / DI resolved  -r/o adrenal insuffiencey- cortisol level 6/4 pm 4/21 done prior to steroids and had been started on Florinef- which was discontinued 4/23- will c/w  however Solu-cortef     #ID:  RUL/RLL pneum, MRI with leptomeningeal enhancement 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 r/o infectious process vs malignancy   -Urine legionella 4/21/22 - Neg  -MSSA PCR 4/21/22 - detected  -Blood Cx 4/21/22 - NGTD  -Urine Cx 4/18/22 - contaminated  -Blood Cx 4/18/22 - NGTD  -COVID-19 4/17/22 - Neg  -continue ceftriaxone 2 gms IV aq 12 hrs (started 4/21/22)  -continue vanco 1 gm IV q 12 hrs (started 4/20/22)  -Vanco as per level  -acyclovir 600 mg IV q 8 hrs started 4/21/22- c/w IV hydration to prevent renal crystallization   -ampicillin 2 gms IV q 4 hrs started 4/21/22  -f/u LP done by IR Neuroradiology- infectious workup negative thus far- c/w following up      Endo:- Hyperglycemia- beta hydroxy 0.6 s/p Insulin gtt d/c early 4/23 and transitioned to NPH / ISS  -decrease NPH to 5U q6 / on tube feeds  -c/w low insulin sliding scale

## 2022-04-24 NOTE — PROGRESS NOTE ADULT - SUBJECTIVE AND OBJECTIVE BOX
Interval events: No acute overnight events, PT still on sedation being weaned off.  Per nursing, PT withdrawing to noxious stimuli but less on the R. side.     MEDICATIONS (HOME):  Home Medications:  atorvastatin 20 mg oral tablet: 1 tab(s) orally once a day (18 Apr 2022 06:28)  mirtazapine 15 mg oral tablet: 1 tab(s) orally once a day (at bedtime), As Needed (18 Apr 2022 06:28)    MEDICATIONS  (STANDING):  acyclovir IVPB      acyclovir IVPB 600 milliGRAM(s) IV Intermittent every 8 hours  albuterol/ipratropium for Nebulization 3 milliLiter(s) Nebulizer every 6 hours  ampicillin  IVPB 2 Gram(s) IV Intermittent every 4 hours  ampicillin  IVPB      atorvastatin 20 milliGRAM(s) Oral at bedtime  cefTRIAXone   IVPB 2000 milliGRAM(s) IV Intermittent every 12 hours  chlorhexidine 0.12% Liquid 15 milliLiter(s) Oral Mucosa every 12 hours  chlorhexidine 4% Liquid 1 Application(s) Topical <User Schedule>  dextrose 50% Injectable 50 milliLiter(s) IV Push every 15 minutes  dextrose 50% Injectable 25 milliLiter(s) IV Push every 15 minutes  EPINEPHrine    Infusion 0.05 MICROgram(s)/kG/Min (12.9 mL/Hr) IV Continuous <Continuous>  folic acid 1 milliGRAM(s) Oral daily  heparin   Injectable 5000 Unit(s) SubCutaneous every 12 hours  hydrocortisone sodium succinate Injectable 100 milliGRAM(s) IV Push every 8 hours  insulin lispro (ADMELOG) corrective regimen sliding scale   SubCutaneous every 6 hours  insulin NPH human recombinant 12 Unit(s) SubCutaneous once  insulin NPH human recombinant 12 Unit(s) SubCutaneous every 6 hours  lactated ringers. 1000 milliLiter(s) (100 mL/Hr) IV Continuous <Continuous>  multivitamin 1 Tablet(s) Oral daily  pantoprazole  Injectable 40 milliGRAM(s) IV Push daily  propofol Infusion 25 MICROgram(s)/kG/Min (11.4 mL/Hr) IV Continuous <Continuous>  thiamine 100 milliGRAM(s) Oral daily  vancomycin  IVPB 1000 milliGRAM(s) IV Intermittent every 12 hours  vasopressin Infusion 0.04 Unit(s)/Min (2.4 mL/Hr) IV Continuous <Continuous>    MEDICATIONS  (PRN):  aluminum hydroxide/magnesium hydroxide/simethicone Suspension 30 milliLiter(s) Oral every 4 hours PRN Dyspepsia  ondansetron Injectable 4 milliGRAM(s) IV Push every 8 hours PRN Nausea and/or Vomiting    VITALS & EXAMINATION:  Vital Signs Last 24 Hrs  T(C): 35.4 (24 Apr 2022 09:00), Max: 37.6 (23 Apr 2022 16:00)  T(F): 95.7 (24 Apr 2022 09:00), Max: 99.7 (23 Apr 2022 16:00)  HR: 79 (24 Apr 2022 09:20) (55 - 108)  BP: 95/51 (24 Apr 2022 09:00) (90/52 - 162/67)  BP(mean): 70 (24 Apr 2022 09:00) (64 - 108)  RR: 12 (24 Apr 2022 09:00) (12 - 32)  SpO2: 96% (24 Apr 2022 09:20) (94% - 100%)  General:  Constitutional: Obese Male, appears stated age, in no apparent distress including pain  Head: Normocephalic & atraumatic.  Neurological (>12):  MS: Intubated, sedated, brief grimace to noxious stimuli on the left arm and leg.     CNs: PERRL (2mm-->1mm)   Motor: No spontaneous movement of any extremity.  Triple flexion of left leg to noxious stimuli.   Sensation: No grimace to noxious stimuli                           9.4    19.44 )-----------( 155      ( 23 Apr 2022 23:51 )             27.3   04-23    142  |  110<H>  |  <4<L>  ----------------------------<  263<H>  4.6   |  21<L>  |  0.67    Ca    8.3<L>      23 Apr 2022 23:51  Phos  3.3     04-23  Mg     2.0     04-23    TPro  5.2<L>  /  Alb  2.7<L>  /  TBili  0.1<L>  /  DBili  x   /  AST  13  /  ALT  15  /  AlkPhos  43  04-23  Ammonia, Serum: 23 umol/L (04.19.22 @ 07:24)   04/23/22  ** PRELIMINARY EEG reviewed until  13:35  - Diffuse attenuation and slowing of background activity, which may in part be due to sedation.  - No seizures recorded so far.    ** Please note: While on sedation, EEG would be of limited utility. Suggest wean sedation if possible, otherwise consider re-connecting eeg when weaning. Abnormal EEG study.    04/20/22  Moderate to severe nonspecific diffuse or multifocal cerebral dysfunction.  No epileptiform pattern or seizure seen.    04/17/2022    CT HEAD: No acute intracranial hemorrhage or mass effect.    CTA NECK: No hemodynamically significant stenosis by NASCET criteria,   dissection, or pseudoaneurysm.    CTA HEAD: No large vessel occlusion, significant stenosis, dissection, or   saccular aneurysm.    MRI Brain w/o contrast done at [facility] on 1/14/2022:  Findings- Ventricles, cisterns, and sulci are diffusely prominent  compatible with mild to moderate atrophy. Cerebral aqueduct is patent. Hyperdynamic flow voids at the foramen of Monro. Patchy nonspecific white matter dz most likely related to mild-to-moderate small vessel disease. no masses, mass seffect, hemorrhage, or cortical infarcts. normal vascular flow voides. pituitary gland unremarkable. brainstem and cerebellum are unremarkable. orbits are unremarkable. sinuses, mastoid air cells are clear.

## 2022-04-24 NOTE — EEG REPORT - NS EEG TEXT BOX
[facility]   COMPREHENSIVE EPILEPSY CENTER   REPORT OF LONG-TERM VIDEO EEG     [facility]: [address], [place] [address], Ph#: [phone]  LI: [id number] [address] [place] [address], Ph#: [phone]  [facility]: [address] [place] [address], Ph#: [phone]    Patient Name: EDD VALDIVIA  Age and : 61y (62)  MRN #: [id number]  Location: [address]  Referring Physician: Elaine Petit    Start Time/Date: 11:10 on 22  End Time/Date: 08:00 on 22  Duration: 20 hours 40 min  _____________________________________________________________  STUDY INFORMATION    EEG Recording Technique:  The patient underwent continuous Video-EEG monitoring, using Telemetry System hardware on the XLTek Digital System. EEG and video data were stored on a computer hard drive with important events saved in digital archive files. The material was reviewed by a physician (electroencephalographer / epileptologist) on a daily basis. Rafael and seizure detection algorithms were utilized and reviewed. An EEG Technician attended to the patient, and was available throughout daytime work hours.  The epilepsy center neurologist was available in person or on call 24-hours per day.    EEG Placement and Labeling of Electrodes:  The EEG was performed utilizing 20 channel referential EEG connections (coronal over temporal over parasagittal montage) using all standard 10-20 electrode placements with EKG, with additional electrodes placed in the inferior temporal region using the modified 10-10 montage electrode placements for elective admissions, or if deemed necessary. Recording was at a sampling rate of 256 samples per second per channel. Time synchronized digital video recording was done simultaneously with EEG recording. A low light infrared camera was used for low light recording.     _____________________________________________________________  HISTORY    Patient is a 61y old  Male who presents with a chief complaint of AMS 2/2 Pneumonia (2022 07:15)      PERTINENT MEDICATION:  none.    _____________________________________________________________  STUDY INTERPRETATION    Findings: The background was continuous however diffusely suppressed and minimally reactive, comprising of irregular excess delta. No posterior dominant rhythm seen.    Focal Slowing:   None were present.    Sleep Background:  Drowsiness and sleep transients were not seen.    Other Non-Epileptiform Findings:  None were present.    Interictal Epileptiform Activity:   None were present.    Events:  Clinical events: None recorded.  Seizures: None recorded.    Activation Procedures:   Hyperventilation was not performed.    Photic stimulation was not performed.     Artifacts:  Intermittent myogenic and movement artifacts were noted.    ECG:  The heart rate on single channel ECG was predominantly between 80-90 BPM.    _____________________________________________________________  EEG SUMMARY/CLASSIFICATION    Abnormal EEG     - Suppressed background     _____________________________________________________________  EEG IMPRESSION/CLINICAL CORRELATE    Abnormal EEG study.    Severe nonspecific diffuse or multifocal cerebral dysfunction.   In the absence of sedation, voltage suppression maybe seen with acute to subacute cortical injury  No epileptiform patterns or seizures recorded x 1 day.    *** PRELIMINARY REPORT - PENDING EPILEPSY ATTENDING REVIEW ***  _____________________________________________________________    Parker Eldridge MD, ZULY  Fellow | [facility]       [facility] EEG Reading Room Ph#: [phone]  Epilepsy Answering Service after 5PM and before 8:30AM: Ph#: [phone].         [facility]   COMPREHENSIVE EPILEPSY CENTER   REPORT OF LONG-TERM VIDEO EEG     [facility]: [address], [place] [address], Ph#: [phone]  LI: [address] [address] [place] [address], Ph#: [phone]  [facility]: [address] [place] [address], Ph#: [phone]    Patient Name: EDD VALDIVIA  Age and : 61y (62)  MRN #: [id number]  Location: [address] [facility]  Referring Physician: Elaine Petit    Start Time/Date: 11:10 on 22  End Time/Date: 08:00 on 22  Duration: 20 hours 40 min  _____________________________________________________________  STUDY INFORMATION    EEG Recording Technique:  The patient underwent continuous Video-EEG monitoring, using Telemetry System hardware on the XLTek Digital System. EEG and video data were stored on a computer hard drive with important events saved in digital archive files. The material was reviewed by a physician (electroencephalographer / epileptologist) on a daily basis. Rafael and seizure detection algorithms were utilized and reviewed. An EEG Technician attended to the patient, and was available throughout daytime work hours.  The epilepsy center neurologist was available in person or on call 24-hours per day.    EEG Placement and Labeling of Electrodes:  The EEG was performed utilizing 20 channel referential EEG connections (coronal over temporal over parasagittal montage) using all standard 10-20 electrode placements with EKG, with additional electrodes placed in the inferior temporal region using the modified 10-10 montage electrode placements for elective admissions, or if deemed necessary. Recording was at a sampling rate of 256 samples per second per channel. Time synchronized digital video recording was done simultaneously with EEG recording. A low light infrared camera was used for low light recording.     _____________________________________________________________  HISTORY    Patient is a 61y old  Male who presents with a chief complaint of AMS 2/2 Pneumonia (2022 07:15)      PERTINENT MEDICATION:  none.    _____________________________________________________________  STUDY INTERPRETATION    Findings: The background was continuous however diffusely suppressed and minimally reactive, comprising of irregular excess delta. No posterior dominant rhythm seen.    Focal Slowing:   None were present.    Sleep Background:  Drowsiness and sleep transients were not seen.    Other Non-Epileptiform Findings:  At increased sensitivity of 3uV/mm, a burst-attenuation patter of activity can be seen.     Interictal Epileptiform Activity:   None were present.    Events:  Clinical events: None recorded.  Seizures: None recorded.    Activation Procedures:   Hyperventilation was not performed.    Photic stimulation was not performed.     Artifacts:  Intermittent myogenic and movement artifacts were noted.    ECG:  The heart rate on single channel ECG was predominantly between 80-90 BPM.    _____________________________________________________________  EEG SUMMARY/CLASSIFICATION    Abnormal EEG     - Suppressed background   - burst attenuation pattern    _____________________________________________________________  EEG IMPRESSION/CLINICAL CORRELATE    Abnormal EEG study.    Severe nonspecific diffuse or multifocal cerebral dysfunction.   In the absence of sedation, voltage suppression maybe seen with acute to subacute cortical injury  No epileptiform patterns or seizures recorded x 1 day.  _____________________________________________________________    Parker Eldridge MD, ZULY  Fellow | [facility]     Hiram Sharma MD PhD  Director, Epilepsy Division, [facility] EEG Reading Room Ph#: [phone]  Epilepsy Answering Service after 5PM and before 8:30AM: Ph#: [phone]

## 2022-04-24 NOTE — PROGRESS NOTE ADULT - TIME BILLING
Examination and discussion of plan with neuro and ICU team
Examination and discussion of plan with neuro team
Examining and discussing the plane with neuro team
examination and discussion of plan with neuro team
Examination and discussion of plan
Examination, reviewing images, discussion of plan with neuro team

## 2022-04-24 NOTE — PROGRESS NOTE ADULT - ASSESSMENT
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     Impression: Underlying cerebellar ataxia w/ rapid neurocognitive decline and severe worsening in last several days of undetermined etiology r/o autoimmune, paraneoplastic or rapid degenerative disease.     Recommendations:  [] Continue vEEG.    [] Wean off sedation and re-assess once more off sedation.   [] Pending f/u of CSF labs.     Case discussed with Dr. Lima.

## 2022-04-25 LAB
ACE SERPL-CCNC: 24 U/L — SIGNIFICANT CHANGE UP (ref 14–82)
ALBUMIN SERPL ELPH-MCNC: 2.6 G/DL — LOW (ref 3.3–5)
ALBUMIN SERPL ELPH-MCNC: 2.7 G/DL — LOW (ref 3.3–5)
ALP SERPL-CCNC: 54 U/L — SIGNIFICANT CHANGE UP (ref 40–120)
ALP SERPL-CCNC: 55 U/L — SIGNIFICANT CHANGE UP (ref 40–120)
ALT FLD-CCNC: 15 U/L — SIGNIFICANT CHANGE UP (ref 10–45)
ALT FLD-CCNC: 15 U/L — SIGNIFICANT CHANGE UP (ref 10–45)
ANION GAP SERPL CALC-SCNC: 11 MMOL/L — SIGNIFICANT CHANGE UP (ref 5–17)
ANION GAP SERPL CALC-SCNC: 11 MMOL/L — SIGNIFICANT CHANGE UP (ref 5–17)
ANION GAP SERPL CALC-SCNC: 8 MMOL/L — SIGNIFICANT CHANGE UP (ref 5–17)
ANTI-RIBONUCLEAR PROTEIN: <0.2 AI — SIGNIFICANT CHANGE UP
APTT BLD: 26 SEC — LOW (ref 27.5–35.5)
APTT BLD: 28.3 SEC — SIGNIFICANT CHANGE UP (ref 27.5–35.5)
ARSENIC SERPL-MCNC: <1 UG/L — SIGNIFICANT CHANGE UP (ref 0–9)
AST SERPL-CCNC: 11 U/L — SIGNIFICANT CHANGE UP (ref 10–40)
AST SERPL-CCNC: 13 U/L — SIGNIFICANT CHANGE UP (ref 10–40)
B BURGDOR C6 AB SER-ACNC: NEGATIVE — SIGNIFICANT CHANGE UP
B BURGDOR IGG+IGM SER-ACNC: 0.6 INDEX — SIGNIFICANT CHANGE UP (ref 0.01–0.89)
BASE EXCESS BLDV CALC-SCNC: 4.1 MMOL/L — HIGH (ref -2–2)
BASOPHILS # BLD AUTO: 0.02 K/UL — SIGNIFICANT CHANGE UP (ref 0–0.2)
BASOPHILS NFR BLD AUTO: 0.1 % — SIGNIFICANT CHANGE UP (ref 0–2)
BILIRUB SERPL-MCNC: 0.1 MG/DL — LOW (ref 0.2–1.2)
BILIRUB SERPL-MCNC: 0.1 MG/DL — LOW (ref 0.2–1.2)
BUN SERPL-MCNC: 11 MG/DL — SIGNIFICANT CHANGE UP (ref 7–23)
BUN SERPL-MCNC: 11 MG/DL — SIGNIFICANT CHANGE UP (ref 7–23)
BUN SERPL-MCNC: 9 MG/DL — SIGNIFICANT CHANGE UP (ref 7–23)
CADMIUM SERPL-MCNC: <0.5 UG/L — SIGNIFICANT CHANGE UP (ref 0–1.2)
CALCIUM SERPL-MCNC: 8.2 MG/DL — LOW (ref 8.4–10.5)
CALCIUM SERPL-MCNC: 8.3 MG/DL — LOW (ref 8.4–10.5)
CALCIUM SERPL-MCNC: 8.5 MG/DL — SIGNIFICANT CHANGE UP (ref 8.4–10.5)
CHLORIDE SERPL-SCNC: 103 MMOL/L — SIGNIFICANT CHANGE UP (ref 96–108)
CHLORIDE SERPL-SCNC: 109 MMOL/L — HIGH (ref 96–108)
CHLORIDE SERPL-SCNC: 110 MMOL/L — HIGH (ref 96–108)
CO2 BLDV-SCNC: 30 MMOL/L — HIGH (ref 22–26)
CO2 SERPL-SCNC: 22 MMOL/L — SIGNIFICANT CHANGE UP (ref 22–31)
CO2 SERPL-SCNC: 24 MMOL/L — SIGNIFICANT CHANGE UP (ref 22–31)
CO2 SERPL-SCNC: 25 MMOL/L — SIGNIFICANT CHANGE UP (ref 22–31)
CREAT SERPL-MCNC: 0.63 MG/DL — SIGNIFICANT CHANGE UP (ref 0.5–1.3)
CREAT SERPL-MCNC: 0.71 MG/DL — SIGNIFICANT CHANGE UP (ref 0.5–1.3)
CREAT SERPL-MCNC: 0.76 MG/DL — SIGNIFICANT CHANGE UP (ref 0.5–1.3)
CULTURE RESULTS: NO GROWTH — SIGNIFICANT CHANGE UP
DSDNA AB FLD-ACNC: <0.2 AI — SIGNIFICANT CHANGE UP
EGFR: 103 ML/MIN/1.73M2 — SIGNIFICANT CHANGE UP
EGFR: 105 ML/MIN/1.73M2 — SIGNIFICANT CHANGE UP
EGFR: 109 ML/MIN/1.73M2 — SIGNIFICANT CHANGE UP
ENA SCL70 AB SER-ACNC: 0.8 AI — SIGNIFICANT CHANGE UP
ENA SS-A AB FLD IA-ACNC: <0.2 AI — SIGNIFICANT CHANGE UP
EOSINOPHIL # BLD AUTO: 0.02 K/UL — SIGNIFICANT CHANGE UP (ref 0–0.5)
EOSINOPHIL NFR BLD AUTO: 0.1 % — SIGNIFICANT CHANGE UP (ref 0–6)
GAS PNL BLDA: SIGNIFICANT CHANGE UP
GAS PNL BLDV: SIGNIFICANT CHANGE UP
GLUCOSE BLDC GLUCOMTR-MCNC: 105 MG/DL — HIGH (ref 70–99)
GLUCOSE BLDC GLUCOMTR-MCNC: 115 MG/DL — HIGH (ref 70–99)
GLUCOSE BLDC GLUCOMTR-MCNC: 125 MG/DL — HIGH (ref 70–99)
GLUCOSE SERPL-MCNC: 115 MG/DL — HIGH (ref 70–99)
GLUCOSE SERPL-MCNC: 119 MG/DL — HIGH (ref 70–99)
GLUCOSE SERPL-MCNC: 119 MG/DL — HIGH (ref 70–99)
HCO3 BLDV-SCNC: 28 MMOL/L — SIGNIFICANT CHANGE UP (ref 22–29)
HCT VFR BLD CALC: 26.7 % — LOW (ref 39–50)
HGB BLD-MCNC: 8.8 G/DL — LOW (ref 13–17)
HOROWITZ INDEX BLDV+IHG-RTO: 21 — SIGNIFICANT CHANGE UP
IMM GRANULOCYTES NFR BLD AUTO: 1.7 % — HIGH (ref 0–1.5)
INR BLD: 0.98 RATIO — SIGNIFICANT CHANGE UP (ref 0.88–1.16)
LEAD BLD-MCNC: <1 UG/DL — SIGNIFICANT CHANGE UP (ref 0–4)
LYMPHOCYTES # BLD AUTO: 0.82 K/UL — LOW (ref 1–3.3)
LYMPHOCYTES # BLD AUTO: 4.7 % — LOW (ref 13–44)
MAGNESIUM SERPL-MCNC: 1.9 MG/DL — SIGNIFICANT CHANGE UP (ref 1.6–2.6)
MAGNESIUM SERPL-MCNC: 2.2 MG/DL — SIGNIFICANT CHANGE UP (ref 1.6–2.6)
MAGNESIUM SERPL-MCNC: 2.2 MG/DL — SIGNIFICANT CHANGE UP (ref 1.6–2.6)
MCHC RBC-ENTMCNC: 28.6 PG — SIGNIFICANT CHANGE UP (ref 27–34)
MCHC RBC-ENTMCNC: 33 GM/DL — SIGNIFICANT CHANGE UP (ref 32–36)
MCV RBC AUTO: 86.7 FL — SIGNIFICANT CHANGE UP (ref 80–100)
MERCURY SERPL-MCNC: <1 UG/L — SIGNIFICANT CHANGE UP (ref 0–14.9)
MONOCYTES # BLD AUTO: 0.73 K/UL — SIGNIFICANT CHANGE UP (ref 0–0.9)
MONOCYTES NFR BLD AUTO: 4.2 % — SIGNIFICANT CHANGE UP (ref 2–14)
NEUTROPHILS # BLD AUTO: 15.46 K/UL — HIGH (ref 1.8–7.4)
NEUTROPHILS NFR BLD AUTO: 89.2 % — HIGH (ref 43–77)
NON-GYNECOLOGICAL CYTOLOGY STUDY: SIGNIFICANT CHANGE UP
NRBC # BLD: 0 /100 WBCS — SIGNIFICANT CHANGE UP (ref 0–0)
PCO2 BLDV: 41 MMHG — LOW (ref 42–55)
PH BLDV: 7.45 — HIGH (ref 7.32–7.43)
PHOSPHATE SERPL-MCNC: 2.8 MG/DL — SIGNIFICANT CHANGE UP (ref 2.5–4.5)
PHOSPHATE SERPL-MCNC: 3.1 MG/DL — SIGNIFICANT CHANGE UP (ref 2.5–4.5)
PHOSPHATE SERPL-MCNC: 3.3 MG/DL — SIGNIFICANT CHANGE UP (ref 2.5–4.5)
PLATELET # BLD AUTO: 106 K/UL — LOW (ref 150–400)
PO2 BLDV: 62 MMHG — HIGH (ref 25–45)
POTASSIUM SERPL-MCNC: 4.2 MMOL/L — SIGNIFICANT CHANGE UP (ref 3.5–5.3)
POTASSIUM SERPL-MCNC: 4.3 MMOL/L — SIGNIFICANT CHANGE UP (ref 3.5–5.3)
POTASSIUM SERPL-MCNC: 4.4 MMOL/L — SIGNIFICANT CHANGE UP (ref 3.5–5.3)
POTASSIUM SERPL-SCNC: 4.2 MMOL/L — SIGNIFICANT CHANGE UP (ref 3.5–5.3)
POTASSIUM SERPL-SCNC: 4.3 MMOL/L — SIGNIFICANT CHANGE UP (ref 3.5–5.3)
POTASSIUM SERPL-SCNC: 4.4 MMOL/L — SIGNIFICANT CHANGE UP (ref 3.5–5.3)
PROT SERPL-MCNC: 5 G/DL — LOW (ref 6–8.3)
PROT SERPL-MCNC: 5.3 G/DL — LOW (ref 6–8.3)
PROTHROM AB SERPL-ACNC: 11.4 SEC — SIGNIFICANT CHANGE UP (ref 10.5–13.4)
RBC # BLD: 3.08 M/UL — LOW (ref 4.2–5.8)
RBC # FLD: 14.3 % — SIGNIFICANT CHANGE UP (ref 10.3–14.5)
SAO2 % BLDV: 93.4 % — HIGH (ref 67–88)
SODIUM SERPL-SCNC: 136 MMOL/L — SIGNIFICANT CHANGE UP (ref 135–145)
SODIUM SERPL-SCNC: 143 MMOL/L — SIGNIFICANT CHANGE UP (ref 135–145)
SODIUM SERPL-SCNC: 144 MMOL/L — SIGNIFICANT CHANGE UP (ref 135–145)
SPECIMEN SOURCE: SIGNIFICANT CHANGE UP
T4 SERPL-MCNC: 5.2 UG/DL — SIGNIFICANT CHANGE UP
VANCOMYCIN TROUGH SERPL-MCNC: 17.3 UG/ML — SIGNIFICANT CHANGE UP (ref 10–20)
VDRL CSF-TITR: SIGNIFICANT CHANGE UP
WBC # BLD: 17.34 K/UL — HIGH (ref 3.8–10.5)
WBC # FLD AUTO: 17.34 K/UL — HIGH (ref 3.8–10.5)

## 2022-04-25 PROCEDURE — 99232 SBSQ HOSP IP/OBS MODERATE 35: CPT

## 2022-04-25 PROCEDURE — 99291 CRITICAL CARE FIRST HOUR: CPT

## 2022-04-25 PROCEDURE — 95718 EEG PHYS/QHP 2-12 HR W/VEEG: CPT

## 2022-04-25 PROCEDURE — 99233 SBSQ HOSP IP/OBS HIGH 50: CPT

## 2022-04-25 PROCEDURE — 93010 ELECTROCARDIOGRAM REPORT: CPT

## 2022-04-25 RX ORDER — NOREPINEPHRINE BITARTRATE/D5W 8 MG/250ML
0.05 PLASTIC BAG, INJECTION (ML) INTRAVENOUS
Qty: 8 | Refills: 0 | Status: DISCONTINUED | OUTPATIENT
Start: 2022-04-25 | End: 2022-04-25

## 2022-04-25 RX ORDER — CEFTRIAXONE 500 MG/1
1000 INJECTION, POWDER, FOR SOLUTION INTRAMUSCULAR; INTRAVENOUS EVERY 24 HOURS
Refills: 0 | Status: DISCONTINUED | OUTPATIENT
Start: 2022-04-25 | End: 2022-04-26

## 2022-04-25 RX ORDER — HYDROCORTISONE 20 MG
50 TABLET ORAL EVERY 8 HOURS
Refills: 0 | Status: DISCONTINUED | OUTPATIENT
Start: 2022-04-25 | End: 2022-04-28

## 2022-04-25 RX ORDER — POLYETHYLENE GLYCOL 3350 17 G/17G
17 POWDER, FOR SOLUTION ORAL DAILY
Refills: 0 | Status: DISCONTINUED | OUTPATIENT
Start: 2022-04-25 | End: 2022-05-06

## 2022-04-25 RX ORDER — HUMAN INSULIN 100 [IU]/ML
6 INJECTION, SUSPENSION SUBCUTANEOUS EVERY 6 HOURS
Refills: 0 | Status: DISCONTINUED | OUTPATIENT
Start: 2022-04-25 | End: 2022-04-25

## 2022-04-25 RX ORDER — SENNA PLUS 8.6 MG/1
5 TABLET ORAL AT BEDTIME
Refills: 0 | Status: DISCONTINUED | OUTPATIENT
Start: 2022-04-25 | End: 2022-05-06

## 2022-04-25 RX ADMIN — Medication 3 MILLILITER(S): at 17:25

## 2022-04-25 RX ADMIN — ATORVASTATIN CALCIUM 20 MILLIGRAM(S): 80 TABLET, FILM COATED ORAL at 21:08

## 2022-04-25 RX ADMIN — Medication 100 MILLIGRAM(S): at 05:35

## 2022-04-25 RX ADMIN — Medication 1 MILLIGRAM(S): at 11:36

## 2022-04-25 RX ADMIN — Medication 200 GRAM(S): at 05:34

## 2022-04-25 RX ADMIN — PANTOPRAZOLE SODIUM 40 MILLIGRAM(S): 20 TABLET, DELAYED RELEASE ORAL at 11:37

## 2022-04-25 RX ADMIN — Medication 100 MILLIGRAM(S): at 11:37

## 2022-04-25 RX ADMIN — CEFTRIAXONE 100 MILLIGRAM(S): 500 INJECTION, POWDER, FOR SOLUTION INTRAMUSCULAR; INTRAVENOUS at 11:33

## 2022-04-25 RX ADMIN — CHLORHEXIDINE GLUCONATE 15 MILLILITER(S): 213 SOLUTION TOPICAL at 05:34

## 2022-04-25 RX ADMIN — Medication 3 MILLILITER(S): at 11:21

## 2022-04-25 RX ADMIN — CHLORHEXIDINE GLUCONATE 1 APPLICATION(S): 213 SOLUTION TOPICAL at 05:35

## 2022-04-25 RX ADMIN — Medication 3 MILLILITER(S): at 23:34

## 2022-04-25 RX ADMIN — Medication 200 GRAM(S): at 10:34

## 2022-04-25 RX ADMIN — Medication 200 GRAM(S): at 01:14

## 2022-04-25 RX ADMIN — Medication 50 MILLIGRAM(S): at 13:14

## 2022-04-25 RX ADMIN — HUMAN INSULIN 12 UNIT(S): 100 INJECTION, SUSPENSION SUBCUTANEOUS at 00:29

## 2022-04-25 RX ADMIN — Medication 112 MILLIGRAM(S): at 00:30

## 2022-04-25 RX ADMIN — Medication 112 MILLIGRAM(S): at 09:22

## 2022-04-25 RX ADMIN — CEFTRIAXONE 100 MILLIGRAM(S): 500 INJECTION, POWDER, FOR SOLUTION INTRAMUSCULAR; INTRAVENOUS at 00:30

## 2022-04-25 RX ADMIN — Medication 200 GRAM(S): at 13:15

## 2022-04-25 RX ADMIN — Medication 50 MILLIGRAM(S): at 21:09

## 2022-04-25 RX ADMIN — Medication 1 TABLET(S): at 11:36

## 2022-04-25 RX ADMIN — POLYETHYLENE GLYCOL 3350 17 GRAM(S): 17 POWDER, FOR SOLUTION ORAL at 11:37

## 2022-04-25 RX ADMIN — HEPARIN SODIUM 5000 UNIT(S): 5000 INJECTION INTRAVENOUS; SUBCUTANEOUS at 05:35

## 2022-04-25 RX ADMIN — Medication 250 MILLIGRAM(S): at 07:37

## 2022-04-25 RX ADMIN — CHLORHEXIDINE GLUCONATE 15 MILLILITER(S): 213 SOLUTION TOPICAL at 17:34

## 2022-04-25 RX ADMIN — Medication 3 MILLILITER(S): at 06:18

## 2022-04-25 RX ADMIN — HEPARIN SODIUM 5000 UNIT(S): 5000 INJECTION INTRAVENOUS; SUBCUTANEOUS at 17:33

## 2022-04-25 NOTE — CHART NOTE - NSCHARTNOTEFT_GEN_A_CORE
Nutrition Follow Up Note  Patient seen for: initial malnutrition follow up and MICU assessment. Chart reviewed and events noted.     Per Chart: Pt is a 60 y/o M with a 2yr Hx of Ataxia, noted bilateral lacunar infarcts, now noted in the L cerebellum. 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 The pt was admitted  w c/w of worsening weakness/fatigue, and was being treated for aspiration PNA.  The pt was admitted to the MICU s/p RRT on  w likely Septic Shock, probable Neurogenic Shock.  the pt c/w with decompensating worsening AMS s/p Intubation for airway protection, had significant +AGMA, with lactic acidosis, central DI, and Hyperglycemia.     Source: [] Patient       [x] Medical Record        [x] RN        [] Family at bedside       [] Other:    -If unable to interview patient: [x] Trach/Vent/BiPAP  [] Disoriented/confused/inappropriate to interview    Nutrition-Related Events:   - Pressors:  [] Yes    [x] No   - Propofol:  [] Yes    [x] No         - Rate: __mL/hr. If maintained x 24 hours, propofol will provide:     Diet Order:   Diet, NPO with Tube Feed:   Tube Feeding Modality: Nasogastric  Glucerna 1.5 Jere (GLUCERNA1.5RTH)  Total Volume for 24 Hours (mL): 1080  Intermittent  Starting Tube Feed Rate {mL per Hour}: 10  Until Goal Tube Feed Rate (mL per Hour): 60  Tube Feeding Hours ON: 18  Tube Feeding OFF (Hours): 6  Tube Feed Start Time: 11:00 (22)    EN Order Provides:    - Total volume: 1080 mL    - Kcal:   1620    (21 kcal/kg based on dosing wt 76.2 kG)    - Gm Protein: 89  (1.17 g/kg based on dosing wt 76.2 kG)    - mL free water: 820  Protein Modular(s) Provides: N/A  Current Pump Rate: 40 mL/hr (prior to feeds held per MICU 18 hr policy)   2-Day Average: 540 mL -> increasing to goal    Is current diet order appropriate/adequate? [] Yes  []  No:     Nutrition-related concerns:  - Intubated .   - Hyperglycemia during admission - on steroid and received dextrose 5% On NPH and sliding scale of insulin   - Noted cholelithiasis w/o cholecystitis  - Diagnosed with malnutrition during RD initial assessment and identified as refeeding risk. Ordered for folic acid, thiamine, and multivitamin    GI: No known acute GI issues. Last BM .   Bowel Regimen? [] Yes   [x] No  -> On antibiotics     Weights:   Daily Weight in k.2 ()  Dosing wt in k.2  No other wts to address.   RD will continue to trend as new wts available/able.     MEDICATIONS  (STANDING):  acyclovir IVPB      acyclovir IVPB 600 milliGRAM(s) IV Intermittent every 8 hours  ampicillin  IVPB 2 Gram(s) IV Intermittent every 4 hours  ampicillin  IVPB      atorvastatin 20 milliGRAM(s) Oral at bedtime  cefTRIAXone   IVPB 2000 milliGRAM(s) IV Intermittent every 12 hours  dextrose 50% Injectable 50 milliLiter(s) IV Push every 15 minutes  dextrose 50% Injectable 25 milliLiter(s) IV Push every 15 minutes  folic acid 1 milliGRAM(s) Oral daily  heparin   Injectable 5000 Unit(s) SubCutaneous every 12 hours  hydrocortisone sodium succinate Injectable 100 milliGRAM(s) IV Push every 8 hours  insulin lispro (ADMELOG) corrective regimen sliding scale   SubCutaneous every 6 hours  insulin NPH human recombinant 6 Unit(s) SubCutaneous every 6 hours  multivitamin 1 Tablet(s) Oral daily  norepinephrine Infusion 0.05 MICROgram(s)/kG/Min (7.14 mL/Hr) IV Continuous <Continuous>  pantoprazole  Injectable 40 milliGRAM(s) IV Push daily  thiamine 100 milliGRAM(s) Oral daily  vancomycin  IVPB 1000 milliGRAM(s) IV Intermittent every 12 hours    Pertinent Labs:  @ 00:26: Na 136, BUN 9, Cr 0.63, <H>, K+ 4.3, Phos 2.8, Mg 1.9, Alk Phos 54, ALT/SGPT 15, AST/SGOT 13   @ 11:33: Na 139, BUN 4<L>, Cr 0.67, <H>, K+ 4.3, Phos 3.1, Mg 1.9, Alk Phos 44, ALT/SGPT 15, AST/SGOT 10    A1C with Estimated Average Glucose Result: 5.5 % (22 @ 09:56)    Finger Sticks:  POCT Blood Glucose.: 105 mg/dL ( @ 05:21)  POCT Blood Glucose.: 98 mg/dL ( @ 17:38)  POCT Blood Glucose.: 143 mg/dL ( @ 11:25)    Skin per nursing documentation: No pressure injuries noted.  Edema per nursing documentation: +2 Alia. leg +3 Alia. hand    Based on dosing wt 76.2 kG with consideration for intubation and malnutrition   Estimated Energy Needs: (20-25 kcals/kG) 1808-4385 kcals   Estimated Protein Needs: (1.2-1.6 gm/kG) 91.4-122 gm   Fluid needs deferred to provider.   Buddy State Equation: 1486 kcals ()    Previous Nutrition Diagnosis: Severe acute malnutrition   Nutrition Diagnosis is: [x] ongoing  [] resolved [] not applicable     Nutrition Care Plan:  [x] In Progress  [] Achieved  [] Not applicable    New Nutrition Diagnosis: [x] Not applicable    Nutrition Interventions:     Education Provided:       [] Yes:  [x] No: Not appropriate at this time    Recommendations:      1) Advance feeds to Glucerna 1.5 at 50 mL/hr x18 hrs increasing by 10 mL/hr q 12 hrs (refeed risk) until goal rate 70 mL/hr x18 hrs to provide total volume 1260 mL, 1890 kcals, 104 gm protein, 956 mL free water. Meets 24.8 kcals/kG and 1.36 gm protein/kG based on dosing wt 76.2 kG.   -> Continue to trend K, Phos, and Mg and replete as needed/able for refeeding risk.  -> As medically feasible, continue to provide folic acid, thiamine, and multivitamin for refeeding risk.    Monitoring and Evaluation:   Continue to monitor nutritional intake, tolerance to diet prescription, weights, labs, skin integrity    RD remains available upon request and will follow up per protocol  Jelly Louie, DEREJE, MS, CDN, [facility] Pager #[phone]

## 2022-04-25 NOTE — PROGRESS NOTE ADULT - ASSESSMENT
61yoM w a 2yr Hx of Ataxia, noted bilateral lacunar infarcts, now noted in the L cerebellum. 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 The pt was admitted 4/18 w c/w of worsening weakness/fatigue, and was being treated for aspiration PNA.  The pt was admitted to the MICU s/p RRT on 4/22 w likely Septic Shock, probable Neurogenic Shock. 4/23 the pt c/w with decompensating worsening AMS s/p Intubation for airway protection, had significant +AGMA, with lactic acidosis, central DI, and Hyperglycemia. However the pt status has been slowly improving..    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  -Neuro checks q 2 hrs and prn for changes  -MRI 4/20/22 of brain/cervical spine that showed extensive/diffuse leptomeningeal enhancement concerning for metastatic dz vs infectious meningitis   -Neuro radiology I.R. did LP- infectious work up negative  w exception of lymph pleocytosis  -EEG 24Hr r/o underlying seizure activity lymph pleocytosis    #Pulm: Intubated for AMS / and decompensation of overall condition  -c/w titrating down  / weaning vent according to clinical status- vent changes as per clinical evidence - blood gases / clinical status  -chest PT  -duonebs q6H  -aspiration precautions    #CV:   Admitted to MICu Sinus bradycardia, HypoTN secondary to septic shock / Neogenic shock  -off epinephrine - continue to monitor- if needed start norepinehrine  -off vasopressin- was being used for DI more than its vasopressor effects    #GI: tolerating trickle feels  -Increase feeds to goal of 50cc/hr glucerna changed to 18H overnight  -bowel regimen  -c/w PPI- protonix   -CT of the abd noted cholelithiasis w/o cholecystitis- consider abd / US       #Renal / Pre-renal however pt went into DI resoving 2/2 central DI, +AGMA- Serum HCO3 <10- serum NaHCO3 infusion discontinued Serum HCO3 improved to 21 4/24 and off [id number]  -strict I & O's - keep even  -off of vasopressin - r/u ?DI resolved  -r/o adrenal insuffiencey- cortisol level 6/4 pm 4/21 done prior to steroids and had been started on Florinef- which was discontinued 4/23- will c/w  however Solu-cortef    #ID:  RUL/RLL pneum, MRI with leptomeningeal enhancement 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 r/o infectious process vs malignancy   -Urine legionella 4/21/22 - Neg  -MSSA PCR 4/21/22 - detected  -Blood Cx 4/21/22 - NGTD  -Urine Cx 4/18/22 - contaminated  -Blood Cx 4/18/22 - NGTD  -COVID-19 4/17/22 - Neg  -continue ceftriaxone 2 gms IV aq 12 hrs (started 4/21/22)  -continue vanco 1 gm IV q 12 hrs (started 4/20/22)  -Vanco as per level  -acyclovir 600 mg IV q 8 hrs started 4/21/22- c/w IV hydration to prevent renal crystallization   -ampicillin 2 gms IV q 4 hrs started 4/21/22  -f/u LP done by IR Neuroradiology- infectious workup negative thus far- c/w following up      Endo:- Hyperglycemia- beta hydroxy 0.6 s/p Insulin gtt d/c early 4/23 and transitioned to NPH / ISS  -decrease NPH to 5U q6 / on tube feeds  -c/w low insulin sliding scale           61yoM w a 2yr Hx of Ataxia, noted bilateral lacunar infarcts, now noted in the L cerebellum. 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 The pt was admitted 4/18 w c/w of worsening weakness/fatigue, and was being treated for aspiration PNA.  The pt was admitted to the MICU s/p RRT on 4/22 w likely Septic Shock, probable Neurogenic Shock. 4/23 the pt c/w with decompensating worsening AMS s/p Intubation for airway protection, had significant +AGMA, with lactic acidosis, central DI, and Hyperglycemia. However the pt status has been slowly improving..    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  -Neuro checks q 2 hrs and prn for changes  -MRI 4/20/22 of brain/cervical spine that showed extensive/diffuse leptomeningeal enhancement concerning for metastatic dz vs infectious meningitis   -Neuro radiology I.R. did LP- infectious work up negative  w exception of lymph pleocytosis  -EEG 24Hr r/o underlying seizure activity lymph pleocytosis- neg for seizures- d/c EEG - clarified with neurology  -f/u Neuro - ? Neuro sx consult to ? [facility]     #Pulm: Intubated for AMS / and decompensation of overall condition  -c/w titrating down  / weaning vent according to clinical status- vent changes as per clinical evidence - blood gases / clinical status  -chest PT  -duonebs q6H  -PS trials  -aspiration precautions    #CV:   Admitted to MICu Sinus bradycardia, HypoTN secondary to ? septic shock / ? Neogenic shock  -off epinephrine - continue to monitor- if needed start norepinehrine  -off vasopressin- was being used for DI off since this early am- now w high U/O- f/u Na / serum osmol    #GI:  -tolerating tube feeds to goal of 50cc/hr Glucerna x 18H  -bowel regimen  -c/w PPI- protonix   -CT of the abd noted cholelithiasis w/o cholecystitis- consider abd / US       #Renal /  was Pre-renal however pt went into ? DI - resolved 2/2 ? 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 r/o infectious process vs malignancy - LP neg for organisms / infection thus far  -Urine legionella 4/21/22 - Neg  -MSSA PCR 4/21/22 - detected  -Blood Cx 4/21/22 - NGTD  -Urine Cx 4/18/22 - contaminated  -Blood Cx 4/18/22 - NGTD  -COVID-19 4/17/22 - Neg  -continue ceftriaxone 2 gms IV aq 12 hrs (started 4/21/22)  -continue vanco 1 gm IV q 12 hrs (started 4/20/22)  -Vanco as per level  -acyclovir 600 mg IV q 8 hrs started 4/21/22- c/w IV hydration to prevent renal crystallization   -ampicillin 2 gms IV q 4 hrs started 4/21/22  -f/u LP done by IR Neuroradiology- infectious workup negative thus far- c/w following up  -f/u ID ? d/c abx covering for meninginitis prophylactically       Endo:- Hyperglycemia- beta hydroxy 0.6 s/p Insulin gtt d/c early 4/23 and transitioned to NPH / ISS- now with low normal levels of nph  -decrease NPH to 5U q6 / on tube feeds  -c/w low insulin sliding scale

## 2022-04-25 NOTE — PROGRESS NOTE ADULT - SUBJECTIVE AND OBJECTIVE BOX
CHIEF COMPLAINT: Aspiration PNA / AMS    Interval Events: Remains off of Propofol x 24hours now / +HARPER, Off o    REVIEW OF SYSTEMS:  Constitutional: [ ] fevers [ ] chills [ ] weight loss [ ] weight gain  HEENT: [ ] dry eyes [ ] eye irritation [ ] postnasal drip [ ] nasal congestion  CV: [ ] chest pain [ ] orthopnea [ ] palpitations [ ] murmur  Resp: [ ] cough [ ] shortness of breath [ ] dyspnea [ ] wheezing [ ] sputum [ ] hemoptysis  GI: [ ] nausea [ ] vomiting [ ] diarrhea [ ] constipation [ ] abd pain [ ] dysphagia   : [ ] dysuria [ ] nocturia [ ] hematuria [ ] increased urinary frequency  Musculoskeletal: [ ] back pain [ ] myalgias [ ] arthralgias [ ] fracture  Skin: [ ] rash [ ] itch  Neurological: [ ] headache [ ] dizziness [ ] syncope [ ] weakness [ ] numbness  Psychiatric: [ ] anxiety [ ] depression  Endocrine: [ ] diabetes [ ] thyroid problem  Hematologic/Lymphatic: [ ] anemia [ ] bleeding problem  Allergic/Immunologic: [ ] itchy eyes [ ] nasal discharge [ ] hives [ ] angioedema  [ ] All other systems negative  [ ] Unable to assess ROS because ________    OBJECTIVE:  ICU Vital Signs Last 24 Hrs  T(C): 36.3 (2022 05:00), Max: 36.8 (2022 00:00)  T(F): 97.3 (2022 05:00), Max: 98.2 (2022 00:00)  HR: 118 (2022 06:18) (72 - 121)  BP: 100/58 (2022 06:00) (79/52 - 133/71)  BP(mean): 73 (2022 06:00) (62 - 96)  ABP: --  ABP(mean): --  RR: 12 (2022 06:00) (12 - 29)  SpO2: 94% (2022 06:18) (94% - 100%)    Mode: AC/ CMV (Assist Control/ Continuous Mandatory Ventilation), RR (machine): 12, TV (machine): 400, FiO2: 21, PEEP: 5, ITime: 1, MAP: 8, PIP: 20  I & O's     @ 07: @ 07:00  --------------------------------------------------------  IN: 4309.3 mL / OUT: 1915 mL / NET: 2394.3 mL      CAPILLARY BLOOD GLUCOSE      POCT Blood Glucose.: 105 mg/dL (2022 05:21)      PHYSICAL EXAM:  General:   HEENT:   Lymph Nodes:  Neck:   Respiratory:   Cardiovascular:   Abdomen:   Extremities:   Skin:   Neurological:  Psychiatry:    LINES:    HOSPITAL MEDICATIONS:  MEDICATIONS  (STANDING):  acyclovir IVPB      acyclovir IVPB 600 milliGRAM(s) IV Intermittent every 8 hours  albuterol/ipratropium for Nebulization 3 milliLiter(s) Nebulizer every 6 hours  ampicillin  IVPB      ampicillin  IVPB 2 Gram(s) IV Intermittent every 4 hours  atorvastatin 20 milliGRAM(s) Oral at bedtime  cefTRIAXone   IVPB 2000 milliGRAM(s) IV Intermittent every 12 hours  chlorhexidine 0.12% Liquid 15 milliLiter(s) Oral Mucosa every 12 hours  chlorhexidine 4% Liquid 1 Application(s) Topical <User Schedule>  dextrose 50% Injectable 50 milliLiter(s) IV Push every 15 minutes  dextrose 50% Injectable 25 milliLiter(s) IV Push every 15 minutes  folic acid 1 milliGRAM(s) Oral daily  heparin   Injectable 5000 Unit(s) SubCutaneous every 12 hours  hydrocortisone sodium succinate Injectable 100 milliGRAM(s) IV Push every 8 hours  insulin lispro (ADMELOG) corrective regimen sliding scale   SubCutaneous every 6 hours  insulin NPH human recombinant 6 Unit(s) SubCutaneous every 6 hours  multivitamin 1 Tablet(s) Oral daily  norepinephrine Infusion 0.05 MICROgram(s)/kG/Min (7.14 mL/Hr) IV Continuous <Continuous>  pantoprazole  Injectable 40 milliGRAM(s) IV Push daily  thiamine 100 milliGRAM(s) Oral daily  vancomycin  IVPB 1000 milliGRAM(s) IV Intermittent every 12 hours    MEDICATIONS  (PRN):  aluminum hydroxide/magnesium hydroxide/simethicone Suspension 30 milliLiter(s) Oral every 4 hours PRN Dyspepsia  ondansetron Injectable 4 milliGRAM(s) IV Push every 8 hours PRN Nausea and/or Vomiting      LABS:                        8.8    17.34 )-----------( 106      ( 2022 00:27 )             26.7     Hgb Trend: 8.8<--, 8.4<--, 9.4<--, 9.5<--, 9.9<--      136  |  103  |  9   ----------------------------<  119<H>  4.3   |  22  |  0.63    Ca    8.2<L>      2022 00:26  Phos  2.8       Mg     1.9         TPro  5.0<L>  /  Alb  2.6<L>  /  TBili  0.1<L>  /  DBili  x   /  AST  13  /  ALT  15  /  AlkPhos  54      Creatinine Trend: 0.63<--, 0.67<--, 0.67<--, 0.72<--, 0.76<--, 0.73<--  PT/INR - ( 2022 11:33 )   PT: 12.9 sec;   INR: 1.12 ratio         PTT - ( 2022 00:26 )  PTT:26.0 sec  Urinalysis Basic - ( 2022 10:35 )    Color: Light Orange / Appearance: Clear / S.042 / pH: x  Gluc: x / Ketone: Trace  / Bili: Negative / Urobili: Negative   Blood: x / Protein: 30 mg/dL / Nitrite: Negative   Leuk Esterase: Negative / RBC: 885 /hpf / WBC 8 /HPF   Sq Epi: x / Non Sq Epi: 1 /hpf / Bacteria: Negative      Arterial Blood Gas:   @ 00:15  7.46/37/132/26/98.8/2.4  ABG lactate: --  Arterial Blood Gas:   @ 23:47  7.48/32/111/24/98.8/0.6  ABG lactate: --  Arterial Blood Gas:   @ 14:03  7.44/24/157/16/100.0/-6.0  ABG lactate: --  Arterial Blood Gas:   @ 12:00  7.41/23/133/15/99.2/-8.6  ABG lactate: --  Arterial Blood Gas:   @ 10:18  7.40/20/162/12/99.3/-10.6  ABG lactate: --    Venous Blood Gas:   @ 05:09  7.43/35/64/23/95.7  VBG Lactate: 1.5  Venous Blood Gas:   @ 22:17  7.41/38/45/24/80.8  VBG Lactate: 2.5  Venous Blood Gas:   @ 17:41  7.42/32/58/21/92.8  VBG Lactate: 4.0      MICROBIOLOGY:     RADIOLOGY:  [ ] Reviewed and interpreted by me    EKG: CHIEF COMPLAINT: Aspiration PNA / AMS  Interval Events: Remains off of Propofol x 24hours now / +HARPER, Off Vasopressin- u/o had decreased to 20-30 cc/h, IVF d/c overnight    HPI: This is a 61yoM w a 2yr Hx of ataxia w chronic bilateral lacunar infarcts who was admitted to NS  w c/o of worsening weakness, fatigue, incontinence along w emesis, and was admitted to be treated for Aspiration PNA. A RRT was called  for hypotension/bradycardia/hypothermia, and worsening AMS and admitted to the MICU for further management. A MRI of the brain and C-spine noted Diffuse Leptomeningeal enhancement w a r/o of metastasis vs infection; a LP non infectious thus far further results pending. The pt c/w decompensating on  with worsening MS was intubated for airway protection, +severe AGMA s/p NaHCO3 gtt, went into DI and hyperglycemia s/p insulin gtt, of all which have and / or resolving.      OBJECTIVE:  ICU Vital Signs Last 24 Hrs  T(C): 36.3 (2022 05:00), Max: 36.8 (2022 00:00)  T(F): 97.3 (2022 05:00), Max: 98.2 (2022 00:00)  HR: 118 (2022 06:18) (72 - 121)  BP: 100/58 (2022 06:00) (79/52 - 133/71)  BP(mean): 73 (2022 06:00) (62 - 96)  ABP: --  ABP(mean): --  RR: 12 (2022 06:00) (12 - 29)  SpO2: 94% (2022 06:18) (94% - 100%)    Mode: AC/ CMV (Assist Control/ Continuous Mandatory Ventilation), RR (machine): 12, TV (machine): 400, FiO2: 21, PEEP: 5, ITime: 1, MAP: 8, PIP: 20  I & O's    - @ 07:01  -   @ 07:00  --------------------------------------------------------  IN: 4309.3 mL / OUT: 1915 mL / NET: 2394.3 mL    CAPILLARY BLOOD GLUCOSE  POCT Blood Glucose.: 105 mg/dL (2022 05:21)    PHYSICAL EXAM:  General:   HEENT:   Lymph Nodes:  Neck:   Respiratory:   Cardiovascular:   Abdomen:   Extremities:   Skin:   Neurological:  Psychiatry:    LINES:  L IJ TLC / PIV    HOSPITAL MEDICATIONS:  MEDICATIONS  (STANDING):  acyclovir IVPB      acyclovir IVPB 600 milliGRAM(s) IV Intermittent every 8 hours  albuterol/ipratropium for Nebulization 3 milliLiter(s) Nebulizer every 6 hours  ampicillin  IVPB      ampicillin  IVPB 2 Gram(s) IV Intermittent every 4 hours  atorvastatin 20 milliGRAM(s) Oral at bedtime  cefTRIAXone   IVPB 2000 milliGRAM(s) IV Intermittent every 12 hours  chlorhexidine 0.12% Liquid 15 milliLiter(s) Oral Mucosa every 12 hours  chlorhexidine 4% Liquid 1 Application(s) Topical <User Schedule>  dextrose 50% Injectable 50 milliLiter(s) IV Push every 15 minutes  dextrose 50% Injectable 25 milliLiter(s) IV Push every 15 minutes  folic acid 1 milliGRAM(s) Oral daily  heparin   Injectable 5000 Unit(s) SubCutaneous every 12 hours  hydrocortisone sodium succinate Injectable 100 milliGRAM(s) IV Push every 8 hours  insulin lispro (ADMELOG) corrective regimen sliding scale   SubCutaneous every 6 hours  insulin NPH human recombinant 6 Unit(s) SubCutaneous every 6 hours  multivitamin 1 Tablet(s) Oral daily  norepinephrine Infusion 0.05 MICROgram(s)/kG/Min (7.14 mL/Hr) IV Continuous <Continuous>  pantoprazole  Injectable 40 milliGRAM(s) IV Push daily  thiamine 100 milliGRAM(s) Oral daily  vancomycin  IVPB 1000 milliGRAM(s) IV Intermittent every 12 hours    MEDICATIONS  (PRN):  aluminum hydroxide/magnesium hydroxide/simethicone Suspension 30 milliLiter(s) Oral every 4 hours PRN Dyspepsia  ondansetron Injectable 4 milliGRAM(s) IV Push every 8 hours PRN Nausea and/or Vomiting      LABS:                        8.8    17.34 )-----------( 106      ( 2022 00:27 )             26.7     Hgb Trend: 8.8<--, 8.4<--, 9.4<--, 9.5<--, 9.9<--      136  |  103  |  9   ----------------------------<  119<H>  4.3   |  22  |  0.63    Ca    8.2<L>      2022 00:26  Phos  2.8       Mg     1.9         TPro  5.0<L>  /  Alb  2.6<L>  /  TBili  0.1<L>  /  DBili  x   /  AST  13  /  ALT  15  /  AlkPhos  54      Creatinine Trend: 0.63<--, 0.67<--, 0.67<--, 0.72<--, 0.76<--, 0.73<--  PT/INR - ( 2022 11:33 )   PT: 12.9 sec;   INR: 1.12 ratio         PTT - ( 2022 00:26 )  PTT:26.0 sec  Urinalysis Basic - ( 2022 10:35 )    Color: Light Orange / Appearance: Clear / S.042 / pH: x  Gluc: x / Ketone: Trace  / Bili: Negative / Urobili: Negative   Blood: x / Protein: 30 mg/dL / Nitrite: Negative   Leuk Esterase: Negative / RBC: 885 /hpf / WBC 8 /HPF   Sq Epi: x / Non Sq Epi: 1 /hpf / Bacteria: Negative      Arterial Blood Gas:   @ 00:15  7.46/37/132/26/98.8/2.4  ABG lactate: --  Arterial Blood Gas:   @ 23:47  7.48/32/111/24/98.8/0.6  ABG lactate: --  Arterial Blood Gas:   @ 14:03  7.44/24/157/16/100.0/-6.0  ABG lactate: --  Arterial Blood Gas:   @ 12:00  7.41/23/133/15/99.2/-8.6  ABG lactate: --  Arterial Blood Gas:   @ 10:18  7.40/20/162/12/99.3/-10.6  ABG lactate: --    Venous Blood Gas:   @ 05:09  7.43/35/64/23/95.7  VBG Lactate: 1.5  Venous Blood Gas:   @ 22:17  7.41/38/45/24/80.8  VBG Lactate: 2.5  Venous Blood Gas:   @ 17:41  7.42/32/58/21/92.8  VBG Lactate: 4.0      MICROBIOLOGY:     Culture - Blood (collected 2022 04:13)  Source: .Blood Blood-Peripheral  Preliminary Report (2022 05:01):    No growth to date.    Culture - Blood (collected 2022 04:13)  Source: .Blood Blood-Peripheral  Preliminary Report (2022 05:01):    No growth to date.    Culture - CSF with Gram Stain (collected 2022 21:48)  Source: .CSF CSF  Gram Stain (2022 23:01):    polymorphonuclear leukocytes per low power field    No organisms seen    by cytocentrifuge  Preliminary Report (2022 14:36):    No growth      RADIOLOGY:  < from: CT Abdomen and Pelvis w/ IV Cont (22 @ 03:34) >  ACC: [id number] EXAM:  CT ABDOMEN AND PELVIS IC                        PROCEDURE DATE:  2022    INTERPRETATION:  CLINICAL INFORMATION: Lactic acidosis, assess for bowel   pathology.    COMPARISON: CT abdomen pelvis 2022  FINDINGS:  LOWER CHEST: Small bilateral pleural effusions with adjacent bilateral   lower lobe compressive subsegmental atelectasis.    LIVER: Within normal limits.  BILE DUCTS: Normal caliber.  GALLBLADDER: Cholelithiasis.  SPLEEN: Within normal limits.  PANCREAS: Within normal limits.  ADRENALS: Within normal limits.  KIDNEYS/URETERS: 1.4 cm left upper pole cyst. Multiple nonobstructing   bilateral renal calculi, the largest of which measures 8 mm at the right   UPJ and 7 mm at the left UPJ. No hydronephrosis.    BLADDER: Barnett catheter in place with minimal intravesicular air.  REPRODUCTIVE ORGANS: Prostate within normal limits.    BOWEL: No bowel obstruction. Enteric tube terminates in the stomach. A   rectal tube is inplace. Appendix is normal.  PERITONEUM: Nonspecific trace fluid in the left paracolic gutter and   pelvis.  VESSELS: Atherosclerotic changes.  RETROPERITONEUM/LYMPH NODES: No lymphadenopathy.  ABDOMINAL WALL: Fat-containing left inguinal hernia. Mildsubcutaneous   edema..  BONES: Within normal limits.    IMPRESSION:  Small bilateral pleural effusions and trace peritoneal free fluid, likely   reflecting fluid overload status.    Bilateral nonobstructing renal calculi.    Cholelithiasis.    < from: CT Head No Cont (22 @ 03:25) >  ACC: [id number] EXAM:  CT BRAIN                        PROCEDURE DATE:  2022    INTERPRETATION:  CT head without IV contrast  CLINICAL INFORMATION: Altered mental status    FINDINGS:   CT dated 2022 available for review.    The brain demonstrates mild periventricular white matter ischemia with   tiny old lacunar infarction in the LEFT cerebellum.   No acute cerebral   cortical infarct is seen.  No intracranial hemorrhage is found.  No mass   effect is found in the brain.    The ventricles, sulci and basal cisterns appear unremarkable.    The orbits are unremarkable.  The paranasal sinuses are clear.  The nasal   cavity appears intact.  The nasopharynx is symmetric.  The central skull   base, petrous temporal bones and calvarium remain intact.    IMPRESSION:   Mild periventricular white matter ischemia with tiny old   lacunar infarction in the LEFT cerebellum.    < from: MR Head w/wo IV Cont (22 @ 16:50) >  ACC: [id number] EXAM:  MR SPINE CERVICAL WAW IC                        ACC: [id number] EXAM:  MR BRAIN WAW IC                          PROCEDURE DATE:  2022      INTERPRETATION:  Contrast-enhanced MRI of the brain and cervical spine    CLINICAL INDICATION: Altered mental status, ataxia    COMPARISON: CT brain 2022. CTA head and neck 2022.    FINDINGS:    MRI BRAIN:    Study is limited by motion.    Extensive, diffuse leptomeningeal enhancement.    Abnormal ependymal enhancement involving the bilateral frontal horns,   bilateral ventricular bodies, left temporal and occipital horn, and   fourth ventricle.    Possible small foci of restricted diffusion in the bilateral superior   cerebral hemispheres.    Mild hydrocephalus. No midline shift or effacement of basal cisterns.    Edema noted within the cerebellar vermis and mesial bilateral cerebellar   hemispheres.    Mild white matter microvascular ischemic disease.    Signal voids are seen within the major intracranial vessels consistent   with their patency.    The visualized paranasal sinuses and mastoid air cells are clear.    Orbits are unremarkable.    MRI CERVICAL SPINE:  Vertebral body height, marrow signal homogeneity, and facet alignment are   maintained throughout the visualized spinal segments.    No intervertebral disc space narrowing. Cervicomedullary junction   unremarkable.    No prevertebral or paravertebral edema.    No spinal cord compression or abnormal intrinsic cord signal.    Diffuse leptomeningeal enhancement.    C2-C3: Central disc protrusion reaches the cord. No neural foraminal   narrowing.    C3-C4: Broad-based disc protrusion asymmetric to the left reaches the   cord. Mild left neural foraminal narrowing.    C4-C5: Broad-based disc protrusion deforms the ventral thecal sac. No   neural foraminal narrowing.    C5-C6: Broad-based disc protrusion asymmetric to the left nearly reaches   the cord. Bilateral uncinate hypertrophy. Moderate bilateral neural   foraminal narrowing.    C6-C7: Broad-based disc protrusion nearly reaches the cord. Left uncinate   hypertrophy and mild to moderate left neural foraminal narrowing.    C7-T1: No spinal canal stenosis or neural foraminal narrowing.    IMPRESSION:  MRI BRAIN:  Extensive, diffuse leptomeningeal enhancement. Differential diagnosis   primarily includes leptomeningeal metastases and infectious meningitis.    Abnormal ependymal enhancement involving the bilateral frontal horns,   bilateral ventricular bodies, left temporal and occipital horn, and   fourth ventricle. Differential diagnosis includes ependymal metastases   and infectious meningitis.    Possible small foci of restricted diffusion in the bilateral superior   cerebral hemispheres. These may represent small acute infarcts or regions   of encephalitis.    Edema noted within the cerebellar vermis and mesial bilateral cerebellar   hemispheres.    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    Multilevel degenerative changes.    Dr. Puente discussed these findings with Dr. Morocho on 2022 10:29 AM   with read back.        < from: Transthoracic Echocardiogram (19 @ 07:45) >  OBSERVATIONS:  Mitral Valve: Normal mitral valve. No mitral valve  regurgitation seen.  Aortic Root: Aortic Root: 3.2 cm.  The aortic root is  normal in size.  The aortic arch= 2.9cm.  Aortic Valve: Trileaflet aortic valve. There is no aortic  stenosis. No aortic valve regurgitation seen.  Left Atrium: Normal left atrium.  LA volume index = 22  cc/m2.  Left Ventricle: Normal left ventricular systolic function.  No segmental wall motion abnormalities.  The LVEF= 60-65%.  No regional wall motion abnormalities. Normal left  ventricular internal dimensions and wall thicknesses.  Right Heart: Normal right atrium.  The right atrial area= 11cm2. Normal right ventricular size  and function. Normal tricuspid valve. Minimal tricuspid  regurgitation. Normal pulmonic valve. Minimalpulmonic  regurgitation.  Pericardium/PleuraThere is no pericardial effusion.  Hemodynamic: The IVC is normal in size. Estimated right  ventricular systolic pressure equals 21 mm Hg, assuming  right atrial pressure equals 8 mm Hg, consistent with  normal pulmonary pressures.  ------------------------------------------------------------------------  CONCLUSIONS:  Normal biventricular function.  There is no significant valvular regurgitation or stenosis.  There is no pericardial effusion.  *** No previous Echo exam.  ------------------------------------------------------------------------  PROCEDURE DESCRIPTION: Transthoracic echocardiogram with  2-D, M-Mode and complete spectral and color flow Doppler.  ------------------------------------------------------------------------  ECHOCARDIOGRAPHIC EXAMINATION:  AORTIC ROOT:  Aortic Root (Leaflet): 3.1 cm  Aortic Root Index: 0.9  LEFT ATRIUM:  AP Dimensions: 3.2 cm  LA Volume Index: 22.00 cc/sqm  LA Volume: 40.7 cc  LEFT VENTRICLE:  LVIDd: 4.1 cm  LVIDs: 1.5 cm  Fraction Short: 62 %  IVS: 1.00  ILWT: 0.9 cm  RWT: 0.47  LV Mass: 134.0 gm  LV Mass Index: 72 gm/sqm  EF (Visual Estimate): 60-65%  HR and BP:  HR: 75 bpm  BP: 94/63  BSA: 1.86  ------------------------------------------------------------------------  HEMODYNAMICS:  RA Pressure Estimate: 8  PAS: 21  IVRT: 109 ms  ------------------------------------------------------------------------  COLOR FLOW and SPECIAL DOPPLER:  AORTIC VALVE:  ------------------------------------------------------------------------  DIASTOLIC FUNCTION:  DT:99 ms  E/A: 0.71  e' Septal: 8.0 cm/s  E/e' Septal: 6.2  e' Lateral: 13.0 cm/s  E/e' Lateral: 3.8  MV E wave: 0.5 m/s  MV A wave: 0.7 m/s  Septal a': 12.0 cm/s  Lateral a': 8.0 cm/s       CHIEF COMPLAINT: Aspiration PNA / AMS  Interval Events: Remains off of Propofol x 24hours now / +HARPER, Off Vasopressin- u/o had decreased to 20-30 cc/h, IVF d/c overnight    HPI: This is a 61yoM w a 2yr Hx of ataxia w chronic bilateral lacunar infarcts who was admitted to NS  w c/o of worsening weakness, fatigue, incontinence along w emesis, and was admitted to be treated for Aspiration PNA. A RRT was called  for hypotension/bradycardia/hypothermia, and worsening AMS and admitted to the MICU for further management. A MRI of the brain and C-spine noted Diffuse Leptomeningeal enhancement w a r/o of metastasis vs infection; a LP non infectious thus far further results pending. The pt c/w decompensating on  with worsening MS was intubated for airway protection, +severe AGMA s/p NaHCO3 gtt, went into DI and hyperglycemia s/p insulin gtt, of all which have and / or resolving.      OBJECTIVE:  ICU Vital Signs Last 24 Hrs  T(C): 36.3 (2022 05:00), Max: 36.8 (2022 00:00)  T(F): 97.3 (2022 05:00), Max: 98.2 (2022 00:00)  HR: 118 (2022 06:18) (72 - 121)  BP: 100/58 (2022 06:00) (79/52 - 133/71)  BP(mean): 73 (2022 06:00) (62 - 96)  ABP: --  ABP(mean): --  RR: 12 (2022 06:00) (12 - 29)  SpO2: 94% (2022 06:18) (94% - 100%)    Mode: AC/ CMV (Assist Control/ Continuous Mandatory Ventilation), RR (machine): 12, TV (machine): 400, FiO2: 21, PEEP: 5, ITime: 1, MAP: 8, PIP: 20  I & O's    - @ 07:01  -   @ 07:00  --------------------------------------------------------  IN: 4309.3 mL / OUT: 1915 mL / NET: 2394.3 mL    CAPILLARY BLOOD GLUCOSE  POCT Blood Glucose.: 105 mg/dL (2022 05:21)    PHYSICAL EXAM:  General: well groom and well nourished  HEENT: normocephalic, atraumatic, sclera white, conjunctiva clear, trachea midline   Lymph Nodes: non palp  Neck: supple, neg JVD  Respiratory: svetlana equal BS, BCTA, orally intubated, minimal secretions via ETT  Cardiovascular: S1S2 RRR, all pulses palp 2+/4  Abdomen: Non distended, +BS- hypoactive x 4, non tender - neg grimace on deep palp  Extremities: generalized edema  Skin: warm, acyanotic  Neurological: +PERRL, opens eyes on command at times, follows commands intermittently to squeeze hands- done weakly, does not move feet on command however withdraws to noxious stim BLE  Psychiatry: calm     LINES:  L IJ TLC / PIV    HOSPITAL MEDICATIONS:  MEDICATIONS  (STANDING):  acyclovir IVPB      acyclovir IVPB 600 milliGRAM(s) IV Intermittent every 8 hours  albuterol/ipratropium for Nebulization 3 milliLiter(s) Nebulizer every 6 hours  ampicillin  IVPB      ampicillin  IVPB 2 Gram(s) IV Intermittent every 4 hours  atorvastatin 20 milliGRAM(s) Oral at bedtime  cefTRIAXone   IVPB 2000 milliGRAM(s) IV Intermittent every 12 hours  chlorhexidine 0.12% Liquid 15 milliLiter(s) Oral Mucosa every 12 hours  chlorhexidine 4% Liquid 1 Application(s) Topical <User Schedule>  dextrose 50% Injectable 50 milliLiter(s) IV Push every 15 minutes  dextrose 50% Injectable 25 milliLiter(s) IV Push every 15 minutes  folic acid 1 milliGRAM(s) Oral daily  heparin   Injectable 5000 Unit(s) SubCutaneous every 12 hours  hydrocortisone sodium succinate Injectable 100 milliGRAM(s) IV Push every 8 hours  insulin lispro (ADMELOG) corrective regimen sliding scale   SubCutaneous every 6 hours  insulin NPH human recombinant 6 Unit(s) SubCutaneous every 6 hours  multivitamin 1 Tablet(s) Oral daily  norepinephrine Infusion 0.05 MICROgram(s)/kG/Min (7.14 mL/Hr) IV Continuous <Continuous>  pantoprazole  Injectable 40 milliGRAM(s) IV Push daily  thiamine 100 milliGRAM(s) Oral daily  vancomycin  IVPB 1000 milliGRAM(s) IV Intermittent every 12 hours    MEDICATIONS  (PRN):  aluminum hydroxide/magnesium hydroxide/simethicone Suspension 30 milliLiter(s) Oral every 4 hours PRN Dyspepsia  ondansetron Injectable 4 milliGRAM(s) IV Push every 8 hours PRN Nausea and/or Vomiting      LABS:                        8.8    17.34 )-----------( 106      ( 2022 00:27 )             26.7     Hgb Trend: 8.8<--, 8.4<--, 9.4<--, 9.5<--, 9.9<--      136  |  103  |  9   ----------------------------<  119<H>  4.3   |  22  |  0.63    Ca    8.2<L>      2022 00:26  Phos  2.8       Mg     1.9         TPro  5.0<L>  /  Alb  2.6<L>  /  TBili  0.1<L>  /  DBili  x   /  AST  13  /  ALT  15  /  AlkPhos  54      Creatinine Trend: 0.63<--, 0.67<--, 0.67<--, 0.72<--, 0.76<--, 0.73<--  PT/INR - ( 2022 11:33 )   PT: 12.9 sec;   INR: 1.12 ratio         PTT - ( 2022 00:26 )  PTT:26.0 sec  Urinalysis Basic - ( 2022 10:35 )    Color: Light Orange / Appearance: Clear / S.042 / pH: x  Gluc: x / Ketone: Trace  / Bili: Negative / Urobili: Negative   Blood: x / Protein: 30 mg/dL / Nitrite: Negative   Leuk Esterase: Negative / RBC: 885 /hpf / WBC 8 /HPF   Sq Epi: x / Non Sq Epi: 1 /hpf / Bacteria: Negative      Arterial Blood Gas:   @ 00:15  7.46/37/132/26/98.8/2.4  ABG lactate: --  Arterial Blood Gas:   @ 23:47  7.48/32/111/24/98.8/0.6  ABG lactate: --  Arterial Blood Gas:   @ 14:03  7.44/24/157/16/100.0/-6.0  ABG lactate: --  Arterial Blood Gas:   @ 12:00  7.41/23/133/15/99.2/-8.6  ABG lactate: --  Arterial Blood Gas:   @ 10:18  7.40/20/162/12/99.3/-10.6  ABG lactate: --    Venous Blood Gas:   @ 05:09  7.43/35/64/23/95.7  VBG Lactate: 1.5  Venous Blood Gas:   @ 22:17  7.41/38/45/24/80.8  VBG Lactate: 2.5  Venous Blood Gas:   @ 17:41  7.42/32/58/21/92.8  VBG Lactate: 4.0      MICROBIOLOGY:     Culture - Blood (collected 2022 04:13)  Source: .Blood Blood-Peripheral  Preliminary Report (2022 05:01):    No growth to date.    Culture - Blood (collected 2022 04:13)  Source: .Blood Blood-Peripheral  Preliminary Report (2022 05:01):    No growth to date.    Culture - CSF with Gram Stain (collected 2022 21:48)  Source: .CSF CSF  Gram Stain (2022 23:01):    polymorphonuclear leukocytes per low power field    No organisms seen    by cytocentrifuge  Preliminary Report (2022 14:36):    No growth      RADIOLOGY:  < from: CT Abdomen and Pelvis w/ IV Cont (22 @ 03:34) >  ACC: [id number] EXAM:  CT ABDOMEN AND PELVIS IC                        PROCEDURE DATE:  2022    INTERPRETATION:  CLINICAL INFORMATION: Lactic acidosis, assess for bowel   pathology.    COMPARISON: CT abdomen pelvis 2022  FINDINGS:  LOWER CHEST: Small bilateral pleural effusions with adjacent bilateral   lower lobe compressive subsegmental atelectasis.    LIVER: Within normal limits.  BILE DUCTS: Normal caliber.  GALLBLADDER: Cholelithiasis.  SPLEEN: Within normal limits.  PANCREAS: Within normal limits.  ADRENALS: Within normal limits.  KIDNEYS/URETERS: 1.4 cm left upper pole cyst. Multiple nonobstructing   bilateral renal calculi, the largest of which measures 8 mm at the right   UPJ and 7 mm at the left UPJ. No hydronephrosis.    BLADDER: Barnett catheter in place with minimal intravesicular air.  REPRODUCTIVE ORGANS: Prostate within normal limits.    BOWEL: No bowel obstruction. Enteric tube terminates in the stomach. A   rectal tube is inplace. Appendix is normal.  PERITONEUM: Nonspecific trace fluid in the left paracolic gutter and   pelvis.  VESSELS: Atherosclerotic changes.  RETROPERITONEUM/LYMPH NODES: No lymphadenopathy.  ABDOMINAL WALL: Fat-containing left inguinal hernia. Mildsubcutaneous   edema..  BONES: Within normal limits.    IMPRESSION:  Small bilateral pleural effusions and trace peritoneal free fluid, likely   reflecting fluid overload status.    Bilateral nonobstructing renal calculi.    Cholelithiasis.    < from: CT Head No Cont (22 @ 03:25) >  ACC: [id number] EXAM:  CT BRAIN                        PROCEDURE DATE:  2022    INTERPRETATION:  CT head without IV contrast  CLINICAL INFORMATION: Altered mental status    FINDINGS:   CT dated 2022 available for review.    The brain demonstrates mild periventricular white matter ischemia with   tiny old lacunar infarction in the LEFT cerebellum.   No acute cerebral   cortical infarct is seen.  No intracranial hemorrhage is found.  No mass   effect is found in the brain.    The ventricles, sulci and basal cisterns appear unremarkable.    The orbits are unremarkable.  The paranasal sinuses are clear.  The nasal   cavity appears intact.  The nasopharynx is symmetric.  The central skull   base, petrous temporal bones and calvarium remain intact.    IMPRESSION:   Mild periventricular white matter ischemia with tiny old   lacunar infarction in the LEFT cerebellum.    < from: MR Head w/wo IV Cont (22 @ 16:50) >  ACC: [id number] EXAM:  MR SPINE CERVICAL WAW IC                        ACC: [id number] EXAM:  MR BRAIN WAW IC                          PROCEDURE DATE:  2022      INTERPRETATION:  Contrast-enhanced MRI of the brain and cervical spine    CLINICAL INDICATION: Altered mental status, ataxia    COMPARISON: CT brain 2022. CTA head and neck 2022.    FINDINGS:    MRI BRAIN:    Study is limited by motion.    Extensive, diffuse leptomeningeal enhancement.    Abnormal ependymal enhancement involving the bilateral frontal horns,   bilateral ventricular bodies, left temporal and occipital horn, and   fourth ventricle.    Possible small foci of restricted diffusion in the bilateral superior   cerebral hemispheres.    Mild hydrocephalus. No midline shift or effacement of basal cisterns.    Edema noted within the cerebellar vermis and mesial bilateral cerebellar   hemispheres.    Mild white matter microvascular ischemic disease.    Signal voids are seen within the major intracranial vessels consistent   with their patency.    The visualized paranasal sinuses and mastoid air cells are clear.    Orbits are unremarkable.    MRI CERVICAL SPINE:  Vertebral body height, marrow signal homogeneity, and facet alignment are   maintained throughout the visualized spinal segments.    No intervertebral disc space narrowing. Cervicomedullary junction   unremarkable.    No prevertebral or paravertebral edema.    No spinal cord compression or abnormal intrinsic cord signal.    Diffuse leptomeningeal enhancement.    C2-C3: Central disc protrusion reaches the cord. No neural foraminal   narrowing.    C3-C4: Broad-based disc protrusion asymmetric to the left reaches the   cord. Mild left neural foraminal narrowing.    C4-C5: Broad-based disc protrusion deforms the ventral thecal sac. No   neural foraminal narrowing.    C5-C6: Broad-based disc protrusion asymmetric to the left nearly reaches   the cord. Bilateral uncinate hypertrophy. Moderate bilateral neural   foraminal narrowing.    C6-C7: Broad-based disc protrusion nearly reaches the cord. Left uncinate   hypertrophy and mild to moderate left neural foraminal narrowing.    C7-T1: No spinal canal stenosis or neural foraminal narrowing.    IMPRESSION:  MRI BRAIN:  Extensive, diffuse leptomeningeal enhancement. Differential diagnosis   primarily includes leptomeningeal metastases and infectious meningitis.    Abnormal ependymal enhancement involving the bilateral frontal horns,   bilateral ventricular bodies, left temporal and occipital horn, and   fourth ventricle. Differential diagnosis includes ependymal metastases   and infectious meningitis.    Possible small foci of restricted diffusion in the bilateral superior   cerebral hemispheres. These may represent small acute infarcts or regions   of encephalitis.    Edema noted within the cerebellar vermis and mesial bilateral cerebellar   hemispheres.    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    Multilevel degenerative changes.    Dr. Puente discussed these findings with Dr. Morocho on 2022 10:29 AM   with read back.        < from: Transthoracic Echocardiogram (19 @ 07:45) >  OBSERVATIONS:  Mitral Valve: Normal mitral valve. No mitral valve  regurgitation seen.  Aortic Root: Aortic Root: 3.2 cm.  The aortic root is  normal in size.  The aortic arch= 2.9cm.  Aortic Valve: Trileaflet aortic valve. There is no aortic  stenosis. No aortic valve regurgitation seen.  Left Atrium: Normal left atrium.  LA volume index = 22  cc/m2.  Left Ventricle: Normal left ventricular systolic function.  No segmental wall motion abnormalities.  The LVEF= 60-65%.  No regional wall motion abnormalities. Normal left  ventricular internal dimensions and wall thicknesses.  Right Heart: Normal right atrium.  The right atrial area= 11cm2. Normal right ventricular size  and function. Normal tricuspid valve. Minimal tricuspid  regurgitation. Normal pulmonic valve. Minimalpulmonic  regurgitation.  Pericardium/PleuraThere is no pericardial effusion.  Hemodynamic: The IVC is normal in size. Estimated right  ventricular systolic pressure equals 21 mm Hg, assuming  right atrial pressure equals 8 mm Hg, consistent with  normal pulmonary pressures.  ------------------------------------------------------------------------  CONCLUSIONS:  Normal biventricular function.  There is no significant valvular regurgitation or stenosis.  There is no pericardial effusion.  *** No previous Echo exam.  ------------------------------------------------------------------------  PROCEDURE DESCRIPTION: Transthoracic echocardiogram with  2-D, M-Mode and complete spectral and color flow Doppler.  ------------------------------------------------------------------------  ECHOCARDIOGRAPHIC EXAMINATION:  AORTIC ROOT:  Aortic Root (Leaflet): 3.1 cm  Aortic Root Index: 0.9  LEFT ATRIUM:  AP Dimensions: 3.2 cm  LA Volume Index: 22.00 cc/sqm  LA Volume: 40.7 cc  LEFT VENTRICLE:  LVIDd: 4.1 cm  LVIDs: 1.5 cm  Fraction Short: 62 %  IVS: 1.00  ILWT: 0.9 cm  RWT: 0.47  LV Mass: 134.0 gm  LV Mass Index: 72 gm/sqm  EF (Visual Estimate): 60-65%  HR and BP:  HR: 75 bpm  BP: 94/63  BSA: 1.86  ------------------------------------------------------------------------  HEMODYNAMICS:  RA Pressure Estimate: 8  PAS: 21  IVRT: 109 ms  ------------------------------------------------------------------------  COLOR FLOW and SPECIAL DOPPLER:  AORTIC VALVE:  ------------------------------------------------------------------------  DIASTOLIC FUNCTION:  DT:99 ms  E/A: 0.71  e' Septal: 8.0 cm/s  E/e' Septal: 6.2  e' Lateral: 13.0 cm/s  E/e' Lateral: 3.8  MV E wave: 0.5 m/s  MV A wave: 0.7 m/s  Septal a': 12.0 cm/s  Lateral a': 8.0 cm/s

## 2022-04-25 NOTE — CHART NOTE - NSCHARTNOTEFT_GEN_A_CORE
Left subclavian TLC discontinued, lumen was intact, pressure applied, no overt bleeding, DSD applied. The pt tolerated the procedure well and w/o any c

## 2022-04-25 NOTE — PROGRESS NOTE ADULT - SUBJECTIVE AND OBJECTIVE BOX
CC: Patient is a 61y old  Male who presents with a chief complaint of AMS 2/2 Pneumonia (25 Apr 2022 10:13)    ID following for AMS    Interval History/ROS: Patient remains in MICU, intubated, opens his eyes when name called. FiO2 21%. On warming blanket. No pressors.    Rest of ROS negative.    Allergies  No Known Allergies    ANTIMICROBIALS:  cefTRIAXone   IVPB 1000 every 24 hours    OTHER MEDS:  albuterol/ipratropium for Nebulization 3 milliLiter(s) Nebulizer every 6 hours  aluminum hydroxide/magnesium hydroxide/simethicone Suspension 30 milliLiter(s) Oral every 4 hours PRN  atorvastatin 20 milliGRAM(s) Oral at bedtime  chlorhexidine 0.12% Liquid 15 milliLiter(s) Oral Mucosa every 12 hours  chlorhexidine 4% Liquid 1 Application(s) Topical <User Schedule>  dextrose 50% Injectable 50 milliLiter(s) IV Push every 15 minutes  dextrose 50% Injectable 25 milliLiter(s) IV Push every 15 minutes  folic acid 1 milliGRAM(s) Oral daily  heparin   Injectable 5000 Unit(s) SubCutaneous every 12 hours  hydrocortisone sodium succinate Injectable 50 milliGRAM(s) IV Push every 8 hours  insulin lispro (ADMELOG) corrective regimen sliding scale   SubCutaneous every 6 hours  multivitamin 1 Tablet(s) Oral daily  ondansetron Injectable 4 milliGRAM(s) IV Push every 8 hours PRN  pantoprazole  Injectable 40 milliGRAM(s) IV Push daily  polyethylene glycol 3350 17 Gram(s) Oral daily  senna Syrup 5 milliLiter(s) Oral at bedtime  thiamine 100 milliGRAM(s) Oral daily    PE:    Vital Signs Last 24 Hrs  T(C): 36.2 (25 Apr 2022 16:00), Max: 43 (25 Apr 2022 15:00)  T(F): 97.2 (25 Apr 2022 16:00), Max: 109.4 (25 Apr 2022 15:00)  HR: 109 (25 Apr 2022 17:00) (76 - 120)  BP: 100/53 (25 Apr 2022 17:00) (79/52 - 132/60)  BP(mean): 72 (25 Apr 2022 17:00) (62 - 87)  RR: 12 (25 Apr 2022 17:00) (12 - 24)  SpO2: 96% (25 Apr 2022 17:00) (94% - 100%)    Gen: intubated in ICU, vented  HEENT: ET tube  CV: S1+S2 normal, no murmurs  Resp: Coarse breath sounds  Abd: Soft, nontender, +BS  Ext: No LE edema, no wounds  : +Barnett  IV/Skin: No thrombophlebitis  Neuro: opens eyes when name called    LABS:                          8.8    17.34 )-----------( 106      ( 25 Apr 2022 00:27 )             26.7       04-25    144  |  109<H>  |  11  ----------------------------<  115<H>  4.4   |  24  |  0.71    Ca    8.3<L>      25 Apr 2022 14:45  Phos  3.3     04-25  Mg     2.2     04-25    TPro  5.3<L>  /  Alb  2.7<L>  /  TBili  0.1<L>  /  DBili  x   /  AST  11  /  ALT  15  /  AlkPhos  55  04-25          MICROBIOLOGY:  Vancomycin Level, Trough: 17.3 ug/mL (04-25-22 @ 05:38)  v  .Blood Blood-Peripheral  04-23-22   No growth to date.  --  --      .CSF CSF  04-22-22   No growth  --    polymorphonuclear leukocytes per low power field  No organisms seen  by cytocentrifuge      .Blood Blood-Peripheral  04-21-22   No growth to date.  --  --      Clean Catch Clean Catch (Midstream)  04-21-22   No growth  --  --      .Blood Blood-Peripheral  04-21-22   No growth to date.  --  --      Catheterized Catheterized  04-18-22   >=3 organisms. Probable collection contamination.  --  --      .Blood Blood  04-18-22   No Growth Final  --  --    Rapid RVP Result: NotDetec (04-23 @ 06:20)  HSV 1/2 PCR: NotDetec (04-22 @ 21:26)    RADIOLOGY:    < from: CT Abdomen and Pelvis w/ IV Cont (04.23.22 @ 03:34) >  IMPRESSION:  Small bilateral pleural effusions and trace peritoneal free fluid, likely   reflecting fluid overload status.    Bilateral nonobstructing renal calculi.    Cholelithiasis.    < end of copied text >

## 2022-04-25 NOTE — EEG REPORT - NS EEG TEXT BOX
[facility]   COMPREHENSIVE EPILEPSY CENTER   REPORT OF LONG-TERM VIDEO EEG     [facility]: [address], [place] [address], Ph#: [phone]  [facility]: [address] [address] [place] [address], Ph#: [phone]  [facility]: [address] [place] [address], Ph#: [phone]    Patient Name: EDD VALDIVIA  Age and : 61y ([birth year])  MRN #: [id number]  Location: [place] [address]  Referring Physician: Elaine Petit    Start Time/Date: 08:00 on 22  End Time/Date: 08:00 on 22  Duration: 24 hours   _____________________________________________________________  STUDY INFORMATION    EEG Recording Technique:  The patient underwent continuous Video-EEG monitoring, using Telemetry System hardware on the XLTek Digital System. EEG and video data were stored on a computer hard drive with important events saved in digital archive files. The material was reviewed by a physician (electroencephalographer / epileptologist) on a daily basis. Rafael and seizure detection algorithms were utilized and reviewed. An EEG Technician attended to the patient, and was available throughout daytime work hours.  The epilepsy center neurologist was available in person or on call 24-hours per day.    EEG Placement and Labeling of Electrodes:  The EEG was performed utilizing 20 channel referential EEG connections (coronal over temporal over parasagittal montage) using all standard 10-20 electrode placements with EKG, with additional electrodes placed in the inferior temporal region using the modified 10-10 montage electrode placements for elective admissions, or if deemed necessary. Recording was at a sampling rate of 256 samples per second per channel. Time synchronized digital video recording was done simultaneously with EEG recording. A low light infrared camera was used for low light recording.     _____________________________________________________________  HISTORY    Patient is a 61y old  Male who presents with a chief complaint of AMS 2/2 Pneumonia (2022 07:15)      PERTINENT MEDICATION:  MEDICATIONS  (STANDING):  acyclovir IVPB 600 milliGRAM(s) IV Intermittent every 8 hours  albuterol/ipratropium for Nebulization 3 milliLiter(s) Nebulizer every 6 hours  ampicillin  IVPB 2 Gram(s) IV Intermittent every 4 hours  atorvastatin 20 milliGRAM(s) Oral at bedtime  cefTRIAXone   IVPB 2000 milliGRAM(s) IV Intermittent every 12 hours  folic acid 1 milliGRAM(s) Oral daily  heparin   Injectable 5000 Unit(s) SubCutaneous every 12 hours  hydrocortisone sodium succinate Injectable 100 milliGRAM(s) IV Push every 8 hours  insulin lispro (ADMELOG) corrective regimen sliding scale   SubCutaneous every 6 hours  insulin NPH human recombinant 6 Unit(s) SubCutaneous every 6 hours  multivitamin 1 Tablet(s) Oral daily  norepinephrine Infusion 0.05 MICROgram(s)/kG/Min (7.14 mL/Hr) IV Continuous <Continuous>  pantoprazole  Injectable 40 milliGRAM(s) IV Push daily  thiamine 100 milliGRAM(s) Oral daily  vancomycin  IVPB 1000 milliGRAM(s) IV Intermittent every 12 hours      _____________________________________________________________  STUDY INTERPRETATION    Findings: The background was initially discontinuous with suppression x 2-4 sec bursts of delta x 2-4 sec   After 10:58 EEG with more continuous  irregular excess delta. No posterior dominant rhythm seen.  Superimposed GPDs near 1.5hz, most prominent after transition from burst suppression    Focal Slowing:   None     Sleep Background:  Sleep transients were not seen.  EEG more suppressed in clinical sleep    Interictal Epileptiform Activity:   Superimposed blunt GPDs with triphasic morphology near 1.5hz, most prominent after transition from burst suppression, more apparent with arousal, less conspicuous with clinical drowsiness    Events:  Clinical events: With arousal some right hand movements noted per staff at bedside.  Appeared irregular at times, not clearly time locked to GPDs.  Seizures: None recorded.    Activation Procedures:   Hyperventilation was not performed.    Photic stimulation was not performed.     Artifacts:  Intermittent myogenic and movement artifacts were noted.    ECG:  The heart rate on single channel ECG was predominantly between 60-90 BPM irregular.    _____________________________________________________________  EEG SUMMARY/CLASSIFICATION    Abnormal EEG   - Suppressed background initially, then more severe slowing with superimposed blunt GPDs with triphasic morphology near 1.5hz, most prominent after transition from burst suppression, more apparent with arousal, less conspicuous with clinical drowsiness  - Some right hand movements noted, no definite correlation with abnormal findings on EEG    _____________________________________________________________  EEG IMPRESSION/CLINICAL CORRELATE    Abnormal EEG study.  Severe nonspecific diffuse or multifocal cerebral dysfunction, improved vs prior day.   GPDs with triphasic morphology may be associated with an increased risk for seizure, but are typically seen in the context of a relatively severe metabolic encephalopathic process.  No definite electrographic seizures.  ECG irregular.    MD KT Castellano  Director, Continuous EEG Monitoring Program, [facility]   and Epilepsy Fellowship ,   Department of Neurology, [facility]    [facility] EEG Reading Room Ph#: [phone]  Epilepsy Answering Service after 5PM and before 8:30AM: Ph#: [phone]   [facility]   COMPREHENSIVE EPILEPSY CENTER   REPORT OF LONG-TERM VIDEO EEG     [facility]: [address], [place] [address], Ph#: [phone]  [facility]: [address] [address] [place] [address], Ph#: [phone]  [facility]: [address] [place] [address], Ph#: [phone]    Patient Name: EDD VALDIVIA  Age and : 61y ([birth year])  MRN #: [id number]  Location: [place] [address]  Referring Physician: Elaine Petit    Start Time/Date: 08:00 on 22  End Time/Date: 08:00 on 22  Duration: 24 hours   _____________________________________________________________  STUDY INFORMATION    EEG Recording Technique:  The patient underwent continuous Video-EEG monitoring, using Telemetry System hardware on the XLTek Digital System. EEG and video data were stored on a computer hard drive with important events saved in digital archive files. The material was reviewed by a physician (electroencephalographer / epileptologist) on a daily basis. Rafael and seizure detection algorithms were utilized and reviewed. An EEG Technician attended to the patient, and was available throughout daytime work hours.  The epilepsy center neurologist was available in person or on call 24-hours per day.    EEG Placement and Labeling of Electrodes:  The EEG was performed utilizing 20 channel referential EEG connections (coronal over temporal over parasagittal montage) using all standard 10-20 electrode placements with EKG, with additional electrodes placed in the inferior temporal region using the modified 10-10 montage electrode placements for elective admissions, or if deemed necessary. Recording was at a sampling rate of 256 samples per second per channel. Time synchronized digital video recording was done simultaneously with EEG recording. A low light infrared camera was used for low light recording.     _____________________________________________________________  HISTORY    Patient is a 61y old  Male who presents with a chief complaint of AMS 2/2 Pneumonia (2022 07:15)      PERTINENT MEDICATION:  MEDICATIONS  (STANDING):  acyclovir IVPB 600 milliGRAM(s) IV Intermittent every 8 hours  albuterol/ipratropium for Nebulization 3 milliLiter(s) Nebulizer every 6 hours  ampicillin  IVPB 2 Gram(s) IV Intermittent every 4 hours  atorvastatin 20 milliGRAM(s) Oral at bedtime  cefTRIAXone   IVPB 2000 milliGRAM(s) IV Intermittent every 12 hours  folic acid 1 milliGRAM(s) Oral daily  heparin   Injectable 5000 Unit(s) SubCutaneous every 12 hours  hydrocortisone sodium succinate Injectable 100 milliGRAM(s) IV Push every 8 hours  insulin lispro (ADMELOG) corrective regimen sliding scale   SubCutaneous every 6 hours  insulin NPH human recombinant 6 Unit(s) SubCutaneous every 6 hours  multivitamin 1 Tablet(s) Oral daily  norepinephrine Infusion 0.05 MICROgram(s)/kG/Min (7.14 mL/Hr) IV Continuous <Continuous>  pantoprazole  Injectable 40 milliGRAM(s) IV Push daily  thiamine 100 milliGRAM(s) Oral daily  vancomycin  IVPB 1000 milliGRAM(s) IV Intermittent every 12 hours      _____________________________________________________________  STUDY INTERPRETATION    Findings: The background was initially discontinuous with suppression x 2-4 sec bursts of delta x 2-4 sec   After 10:58 EEG with more continuous  irregular excess delta. No posterior dominant rhythm seen.  Superimposed GPDs near 1.5hz, most prominent after transition from burst suppression    Focal Slowing:   None     Sleep Background:  Sleep transients were not seen.  EEG more suppressed in clinical sleep    Interictal Epileptiform Activity:   Superimposed blunt GPDs with triphasic morphology near 1.5hz, most prominent after transition from burst suppression, more apparent with arousal, less conspicuous with clinical drowsiness. GPDs less conspicuous in latter portions.     Events:  Clinical events: With arousal some right hand movements noted per staff at bedside.  Appeared irregular at times, not clearly time locked to GPDs.  Seizures: None recorded.    Activation Procedures:   Hyperventilation was not performed.    Photic stimulation was not performed.     Artifacts:  Intermittent myogenic and movement artifacts were noted.    ECG:  The heart rate on single channel ECG was predominantly between 60-90 BPM irregular.    _____________________________________________________________  EEG SUMMARY/CLASSIFICATION    Abnormal EEG   - Suppressed background initially, then more severe slowing with superimposed blunt GPDs with triphasic morphology near 1.5hz, most prominent after transition from burst suppression, more apparent with arousal, less conspicuous with clinical drowsiness. GPDs less conspicuous in latter portions.   - Some right hand movements noted, no definite correlation with abnormal findings on EEG    _____________________________________________________________  EEG IMPRESSION/CLINICAL CORRELATE    Abnormal EEG study.  Severe nonspecific diffuse or multifocal cerebral dysfunction, improved vs prior day.   GPDs with triphasic morphology may be associated with an increased risk for seizure, but are typically seen in the context of a relatively severe metabolic encephalopathic process.  GPDs less conspicuous in latter portions.   No definite electrographic seizures.  ECG irregular.    Benjamín Cazares MD FAKATYA  Director, Continuous EEG Monitoring Program, [facility]   and Epilepsy Fellowship ,   Department of Neurology, [facility] EEG Reading Room Ph#: [phone]  Epilepsy Answering Service after 5PM and before 8:30AM: Ph#: [phone]

## 2022-04-25 NOTE — PROGRESS NOTE ADULT - ASSESSMENT
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 In the ED stroke code was called negative for intracranial process. CT Chest with concern for pneumonia and esophagitis. Today RRT called for hypotension and bradycardia given atropine, now in ICU pending LP. 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 s/p LP with neuroradiology today - 16cc csf fluid collected.  Pt likely with worsening , found to have extensive leptomeningeal enhancement and cerebellar edema concerning for neoplastic vs. infectious process. Also would consider other causes of neurocognitive decline, including underlying prion disease, paraneoplastic syndromes, autoimmune / toxic causes.    Recommend:  #Leptomeningeal enhancement, AMS  -Concerning for infectious meningitis vs metastatic disease vs paraneoplastic  -So far CSF PCR, CSF HSV PCR and cultures are negative - can dc ampicillin, acyclovir, and vancomycin  -MRSA swab negative  -Continue ceftriaxone 1 grams IV q 24 hours for now   -Blood cultures no growth  - await CSF studies - such as heavy metal screens, flow cytometry, paraneoplastic   - F/U EEG/ neurology     #Apsiration pna  -Continue ceftriaxone  -Aspiration precautions      Casey Hutson MD  Available through MS Teams  If no response, or after 5pm/weekends, call [phone]       Discussed plan with ICU team.

## 2022-04-25 NOTE — PROGRESS NOTE ADULT - ASSESSMENT
Assessment:  Assessment: 60 yo male with a PMHx of cerebellar ataxia who was brought to the ED on 4/17 due to AMS preceded by 1 week of fatigue/weakness and episodic incontinence. Patient now intubated for worsening clinical level depressed consciousness w/ concern of possible encephalitis or prion disease w/ rapid cognitive decline. Now off sedation.    Impression: Underlying cerebellar ataxia w/ rapid neurocognitive decline and severe worsening in last several days of undetermined etiology r/o autoimmune, paraneoplastic or rapid degenerative disease.    Recommendations:  [] Neuro checks Q1HR.  [] Can DC EEG.  [] No AEDs.  [] Empiric antibiotics/antivirals per ID.  [x] 4/22 s/p LP- CSF glucose 54, protein 126 <H>, TNC 43 <H>, PCR neg, Cryptococcal antigen neg, Culture NGTD. 14-3-3 Protein Tau, ACE, Cytopathology, Encephalopathy panel, EBV, Lyme, VDRL, West Nile, Flow Cytometry pending.  [] ANCA, EMILIA, Anti-Ribonuclear Protein, Lyme, Neutrophil Cytoplasmic Ab, Paraneoplastic Panel, Scleroderma Abs, Sjogren's Syndrome Abs, Vitamin E, ACE, Heavy Metal Screen, MMA, B1, B3, B6, and Zinc pending.  [x] ESR 26 <H>, CRP 25 <H>, Quant Gold TB indeterminant, Vitamin D 67.4, B12 > 2000, Homocysteine 6.9, CPK 31, TSH 1.58, HbA1c 5.5, Copper 108.  [] If CSF Cytopathology in negative/indeterminant, then would consult neurosurgery for possible biopsy.  [] Quant Gold TB was indeterminant. Would resend.  [] Rest of care per MICU team.    Case seen and discussed with neurology attending Dr. Arrieta.

## 2022-04-26 LAB
ALBUMIN SERPL ELPH-MCNC: 2.8 G/DL — LOW (ref 3.3–5)
ALBUMIN SERPL ELPH-MCNC: 2.8 G/DL — LOW (ref 3.3–5)
ALP SERPL-CCNC: 55 U/L — SIGNIFICANT CHANGE UP (ref 40–120)
ALP SERPL-CCNC: 60 U/L — SIGNIFICANT CHANGE UP (ref 40–120)
ALT FLD-CCNC: 22 U/L — SIGNIFICANT CHANGE UP (ref 10–45)
ALT FLD-CCNC: 30 U/L — SIGNIFICANT CHANGE UP (ref 10–45)
ANION GAP SERPL CALC-SCNC: 10 MMOL/L — SIGNIFICANT CHANGE UP (ref 5–17)
ANION GAP SERPL CALC-SCNC: 10 MMOL/L — SIGNIFICANT CHANGE UP (ref 5–17)
APTT BLD: 26.5 SEC — LOW (ref 27.5–35.5)
APTT BLD: 37.6 SEC — HIGH (ref 27.5–35.5)
AST SERPL-CCNC: 24 U/L — SIGNIFICANT CHANGE UP (ref 10–40)
AST SERPL-CCNC: 25 U/L — SIGNIFICANT CHANGE UP (ref 10–40)
BILIRUB SERPL-MCNC: 0.1 MG/DL — LOW (ref 0.2–1.2)
BILIRUB SERPL-MCNC: <0.1 MG/DL — LOW (ref 0.2–1.2)
BUN SERPL-MCNC: 15 MG/DL — SIGNIFICANT CHANGE UP (ref 7–23)
BUN SERPL-MCNC: 17 MG/DL — SIGNIFICANT CHANGE UP (ref 7–23)
CALCIUM SERPL-MCNC: 8.5 MG/DL — SIGNIFICANT CHANGE UP (ref 8.4–10.5)
CALCIUM SERPL-MCNC: 8.6 MG/DL — SIGNIFICANT CHANGE UP (ref 8.4–10.5)
CHLORIDE SERPL-SCNC: 111 MMOL/L — HIGH (ref 96–108)
CHLORIDE SERPL-SCNC: 111 MMOL/L — HIGH (ref 96–108)
CO2 SERPL-SCNC: 25 MMOL/L — SIGNIFICANT CHANGE UP (ref 22–31)
CO2 SERPL-SCNC: 26 MMOL/L — SIGNIFICANT CHANGE UP (ref 22–31)
CREAT SERPL-MCNC: 0.65 MG/DL — SIGNIFICANT CHANGE UP (ref 0.5–1.3)
CREAT SERPL-MCNC: 0.77 MG/DL — SIGNIFICANT CHANGE UP (ref 0.5–1.3)
CULTURE RESULTS: SIGNIFICANT CHANGE UP
CULTURE RESULTS: SIGNIFICANT CHANGE UP
EBV PCR: SIGNIFICANT CHANGE UP IU/ML
EGFR: 102 ML/MIN/1.73M2 — SIGNIFICANT CHANGE UP
EGFR: 108 ML/MIN/1.73M2 — SIGNIFICANT CHANGE UP
GAS PNL BLDA: SIGNIFICANT CHANGE UP
GLUCOSE BLDC GLUCOMTR-MCNC: 108 MG/DL — HIGH (ref 70–99)
GLUCOSE BLDC GLUCOMTR-MCNC: 130 MG/DL — HIGH (ref 70–99)
GLUCOSE BLDC GLUCOMTR-MCNC: 134 MG/DL — HIGH (ref 70–99)
GLUCOSE SERPL-MCNC: 119 MG/DL — HIGH (ref 70–99)
GLUCOSE SERPL-MCNC: 131 MG/DL — HIGH (ref 70–99)
HCT VFR BLD CALC: 28.7 % — LOW (ref 39–50)
HGB BLD-MCNC: 9.4 G/DL — LOW (ref 13–17)
INNER EAR 68KD AB FLD QL: 7.7 U/L — HIGH (ref 0–3.1)
INR BLD: 1.01 RATIO — SIGNIFICANT CHANGE UP (ref 0.88–1.16)
INR BLD: 1.03 RATIO — SIGNIFICANT CHANGE UP (ref 0.88–1.16)
MAGNESIUM SERPL-MCNC: 2.2 MG/DL — SIGNIFICANT CHANGE UP (ref 1.6–2.6)
MAGNESIUM SERPL-MCNC: 2.2 MG/DL — SIGNIFICANT CHANGE UP (ref 1.6–2.6)
MCHC RBC-ENTMCNC: 28.9 PG — SIGNIFICANT CHANGE UP (ref 27–34)
MCHC RBC-ENTMCNC: 32.8 GM/DL — SIGNIFICANT CHANGE UP (ref 32–36)
MCV RBC AUTO: 88.3 FL — SIGNIFICANT CHANGE UP (ref 80–100)
NRBC # BLD: 0 /100 WBCS — SIGNIFICANT CHANGE UP (ref 0–0)
PARANEOPLASTIC AB PNL SER: SIGNIFICANT CHANGE UP
PHOSPHATE SERPL-MCNC: 2.8 MG/DL — SIGNIFICANT CHANGE UP (ref 2.5–4.5)
PHOSPHATE SERPL-MCNC: 3 MG/DL — SIGNIFICANT CHANGE UP (ref 2.5–4.5)
PLATELET # BLD AUTO: 121 K/UL — LOW (ref 150–400)
POTASSIUM SERPL-MCNC: 3.6 MMOL/L — SIGNIFICANT CHANGE UP (ref 3.5–5.3)
POTASSIUM SERPL-MCNC: 4 MMOL/L — SIGNIFICANT CHANGE UP (ref 3.5–5.3)
POTASSIUM SERPL-SCNC: 3.6 MMOL/L — SIGNIFICANT CHANGE UP (ref 3.5–5.3)
POTASSIUM SERPL-SCNC: 4 MMOL/L — SIGNIFICANT CHANGE UP (ref 3.5–5.3)
PROT SERPL-MCNC: 5.2 G/DL — LOW (ref 6–8.3)
PROT SERPL-MCNC: 5.4 G/DL — LOW (ref 6–8.3)
PROTHROM AB SERPL-ACNC: 11.7 SEC — SIGNIFICANT CHANGE UP (ref 10.5–13.4)
PROTHROM AB SERPL-ACNC: 11.8 SEC — SIGNIFICANT CHANGE UP (ref 10.5–13.4)
RBC # BLD: 3.25 M/UL — LOW (ref 4.2–5.8)
RBC # FLD: 14.1 % — SIGNIFICANT CHANGE UP (ref 10.3–14.5)
SODIUM SERPL-SCNC: 146 MMOL/L — HIGH (ref 135–145)
SODIUM SERPL-SCNC: 147 MMOL/L — HIGH (ref 135–145)
SPECIMEN SOURCE: SIGNIFICANT CHANGE UP
SPECIMEN SOURCE: SIGNIFICANT CHANGE UP
WBC # BLD: 15.83 K/UL — HIGH (ref 3.8–10.5)
WBC # FLD AUTO: 15.83 K/UL — HIGH (ref 3.8–10.5)

## 2022-04-26 PROCEDURE — 99232 SBSQ HOSP IP/OBS MODERATE 35: CPT

## 2022-04-26 PROCEDURE — 93321 DOPPLER ECHO F-UP/LMTD STD: CPT | Mod: 26

## 2022-04-26 PROCEDURE — 93308 TTE F-UP OR LMTD: CPT | Mod: 26

## 2022-04-26 PROCEDURE — 99291 CRITICAL CARE FIRST HOUR: CPT | Mod: GC

## 2022-04-26 RX ORDER — CHLORHEXIDINE GLUCONATE 213 G/1000ML
15 SOLUTION TOPICAL EVERY 12 HOURS
Refills: 0 | Status: DISCONTINUED | OUTPATIENT
Start: 2022-04-26 | End: 2022-05-03

## 2022-04-26 RX ORDER — MUPIROCIN 20 MG/G
1 OINTMENT TOPICAL
Refills: 0 | Status: COMPLETED | OUTPATIENT
Start: 2022-04-26 | End: 2022-05-01

## 2022-04-26 RX ADMIN — ATORVASTATIN CALCIUM 20 MILLIGRAM(S): 80 TABLET, FILM COATED ORAL at 21:12

## 2022-04-26 RX ADMIN — HEPARIN SODIUM 5000 UNIT(S): 5000 INJECTION INTRAVENOUS; SUBCUTANEOUS at 05:04

## 2022-04-26 RX ADMIN — CEFTRIAXONE 100 MILLIGRAM(S): 500 INJECTION, POWDER, FOR SOLUTION INTRAMUSCULAR; INTRAVENOUS at 11:57

## 2022-04-26 RX ADMIN — Medication 1 MILLIGRAM(S): at 11:56

## 2022-04-26 RX ADMIN — Medication 3 MILLILITER(S): at 05:35

## 2022-04-26 RX ADMIN — Medication 50 MILLIGRAM(S): at 14:14

## 2022-04-26 RX ADMIN — CHLORHEXIDINE GLUCONATE 15 MILLILITER(S): 213 SOLUTION TOPICAL at 05:04

## 2022-04-26 RX ADMIN — Medication 50 MILLIGRAM(S): at 05:04

## 2022-04-26 RX ADMIN — CHLORHEXIDINE GLUCONATE 15 MILLILITER(S): 213 SOLUTION TOPICAL at 17:03

## 2022-04-26 RX ADMIN — CHLORHEXIDINE GLUCONATE 1 APPLICATION(S): 213 SOLUTION TOPICAL at 05:04

## 2022-04-26 RX ADMIN — MUPIROCIN 1 APPLICATION(S): 20 OINTMENT TOPICAL at 17:03

## 2022-04-26 RX ADMIN — Medication 3 MILLILITER(S): at 23:08

## 2022-04-26 RX ADMIN — HEPARIN SODIUM 5000 UNIT(S): 5000 INJECTION INTRAVENOUS; SUBCUTANEOUS at 17:03

## 2022-04-26 RX ADMIN — Medication 3 MILLILITER(S): at 17:23

## 2022-04-26 RX ADMIN — Medication 1 TABLET(S): at 11:56

## 2022-04-26 RX ADMIN — Medication 50 MILLIGRAM(S): at 21:12

## 2022-04-26 RX ADMIN — PANTOPRAZOLE SODIUM 40 MILLIGRAM(S): 20 TABLET, DELAYED RELEASE ORAL at 11:57

## 2022-04-26 RX ADMIN — Medication 3 MILLILITER(S): at 12:04

## 2022-04-26 RX ADMIN — Medication 100 MILLIGRAM(S): at 11:56

## 2022-04-26 NOTE — PROGRESS NOTE ADULT - ASSESSMENT
Assessment and Plan:   · Assessment	  61yoM w a 2yr Hx of Ataxia, noted bilateral lacunar infarcts, now noted in the L cerebellum. 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 The pt was admitted 4/18 w c/w of worsening weakness/fatigue, and was being treated for aspiration PNA.  The pt was admitted to the MICU s/p RRT on 4/22 w likely Septic Shock, probable Neurogenic Shock. 4/23 the pt c/w with decompensating worsening AMS s/p Intubation for airway protection, had significant +AGMA, with lactic acidosis, ? central DI vs hyperglycemic diuresis , and Hyperglycemia. However the pt status has been slowly improving, auto diuresing, but remains in fairly controlled Afib.    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   -MRI 4/20/22 of brain/cervical spine that showed extensive/diffuse leptomeningeal enhancement concerning for metastatic dz vs low suspicion now of  infectious meningitis vs para Neoplastic - LP neg for infectious process thus far with exception of +Protein and Lymph Pleocytosis.  Negative seizure activity- all 24H EEG's neg  -C/W Neuro checks as per protocol  -f/u immunological workup labs  -f/u Neurology - consider BX by Neurosurgery    #Pulm: Intubated for AMS / and decompensation of overall condition / status  Pt c/w failing PS trials as he remains with AMS / lethargic   -chest PT  -duonebs q6H  -PS trials- as tolerated   -aspiration precautions    #CV: Admitted to MICu Sinus bradycardia, HypoTN secondary to ? septic shock / ? Neogenic shock.  C/W new onset Afib- self rate controlled   -off epinephrine - continue to monitor- if needed start norepinehrine  -off vasopressin- was being used for DI vs hyper diuresis 2/2 Hyperglycemia -off since this early 4/25- high U/O 4/25 likely 2/2 auto diuresing -   -New Afib <48H / self rate control:  ECHO ordered  consider anticoagulation - consider repeat CT of the brain before starting AC       #GI:  -tolerating tube feeds to goal of 50cc/hr Glucerna x 18H  -bowel regimen  -c/w PPI- protonix   -CT of the abd noted cholelithiasis w/o cholecystitis- consider abd / US       #Renal /  was Pre-renal however pt went into ? DI - resolved 2/2 ? 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 r/o infectious process vs malignancy - LP neg for organisms / infection thus far  -Urine legionella 4/21/22 - Neg  -MSSA PCR 4/21/22 - detected  -Blood Cx 4/21/22 - NGTD  -Urine Cx 4/18/22 - contaminated  -Blood Cx 4/18/22 - NGTD  -COVID-19 4/17/22 - Neg  -continue ceftriaxone 2 gms IV aq 12 hrs (started 4/21/22)  -continue vanco 1 gm IV q 12 hrs (started 4/20/22)  -Vanco as per level  -acyclovir 600 mg IV q 8 hrs started 4/21/22- c/w IV hydration to prevent renal crystallization   -ampicillin 2 gms IV q 4 hrs started 4/21/22  -f/u LP done by IR Neuroradiology- infectious workup negative thus far- c/w following up  -f/u ID ? d/c abx covering for meninginitis prophylactically       Endo:- Hyperglycemia- beta hydroxy 0.6 s/p Insulin gtt d/c early 4/23 and transitioned to NPH / ISS- now with low normal levels of nph  -decrease NPH to 5U q6 / on tube feeds  -c/w low insulin sliding scale   Assessment and Plan:   · Assessment	  61yoM w a 2yr Hx of Ataxia, noted bilateral lacunar infarcts, now noted in the L cerebellum. 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 The pt was admitted 4/18 w c/w of worsening weakness/fatigue, and was being treated for aspiration PNA.  The pt was admitted to the MICU s/p RRT on 4/22 w likely Septic Shock, probable Neurogenic Shock. 4/23 the pt c/w with decompensating worsening AMS s/p Intubation for airway protection, had significant +AGMA, with lactic acidosis, ? central DI vs hyperglycemic diuresis , and Hyperglycemia. However the pt status has been slowly improving, auto diuresing, but remains in fairly controlled Afib.    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   -MRI 4/20/22 of brain/cervical spine that showed extensive/diffuse leptomeningeal enhancement concerning for metastatic dz vs low suspicion now of  infectious meningitis vs para Neoplastic - LP neg for infectious process thus far with exception of +Protein and Lymph Pleocytosis.  Negative seizure activity- all 24H EEG's neg  -C/W Neuro checks as per protocol  -f/u immunological workup labs  -f/u Neurology - consider BX by Neurosurgery    #Pulm: Intubated for AMS / and decompensation of overall condition / status  Pt c/w failing PS trials as he remains with AMS / lethargic   -chest PT  -duonebs q6H  -PS trials- as tolerated   -aspiration precautions    #CV: Admitted to MICu Sinus bradycardia, HypoTN secondary to ? septic shock / ? Neogenic shock.  C/W new onset Afib- self rate controlled   -off epinephrine - continue to monitor- if needed start norepinehrine  -off vasopressin- was being used for DI vs hyper diuresis 2/2 Hyperglycemia -off since this early 4/25- high U/O 4/25 likely 2/2 auto diuresing -   -New Afib <48H / self rate control:  ECHO ordered  consider anticoagulation - consider repeat CT of the brain before starting AC   -consider Cardiology Consult  -Doppler BLE    #GI:  -tolerating tube feeds to goal of 50cc/hr Glucerna x 18H  -bowel regimen  -c/w PPI- protonix   -CT of the abd noted cholelithiasis w/o cholecystitis- consider abd / US       #Renal /  Pt had been Pre-renal however pt went into ? DI vs Hyperglycemic hyperdiuresis / osmotic diuresis, resolved +AGMA- Serum HCO3 <10- serum NaHCO3 infusion discontinued 4/24   -strict I & O's - keep even  -off of vasopressin 4/25 - s/p auto- diuresing yesterday- now w u/o 50cc/h  -r/o ? 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

## 2022-04-26 NOTE — PROGRESS NOTE ADULT - ASSESSMENT
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 In the ED stroke code was called negative for intracranial process. CT Chest with concern for pneumonia and esophagitis. Today RRT called for hypotension and bradycardia given atropine, now in ICU pending LP. 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 s/p LP with neuroradiology today - 16cc csf fluid collected.  Pt likely with worsening , found to have extensive leptomeningeal enhancement and cerebellar edema concerning for neoplastic vs. infectious process. Also would consider other causes of neurocognitive decline, including underlying prion disease, paraneoplastic syndromes, autoimmune / toxic causes.    Recommend:  #Leptomeningeal enhancement, AMS  -Infectious meningitis so far negative thus far,  possibly metastatic disease vs paraneoplastic  -CSF PCR, CSF HSV PCR and cultures are negative  -MRSA swab negative  -Blood cultures no growth  - F/U CSF studies - such as heavy metal screens, flow cytometry, paraneoplastic   - May need repeat LP for flow cytometry of CSF to evaluate for lymphoproliferative process  - Neurosx following, evaluating for brain biopsy    #Apsiration pna  -Continue ceftriaxone to complete 7 days of abx today  -Aspiration precautions      Casey Hutson MD  Available through MS Teams  If no response, or after 5pm/weekends, call [phone]       Discussed plan with ICU team.

## 2022-04-26 NOTE — PROGRESS NOTE ADULT - SUBJECTIVE AND OBJECTIVE BOX
CC: Patient is a 61y old  Male who presents with a chief complaint of AMS 2/2 Pneumonia (26 Apr 2022 16:26)    ID following for AMS    Interval History/ROS: Patient remains in MICU, intubated, vented, awakes when name called. No fevers. FiO2 21%.    Rest of ROS negative.    Allergies  No Known Allergies    ANTIMICROBIALS:  cefTRIAXone   IVPB 1000 every 24 hours    OTHER MEDS:  albuterol/ipratropium for Nebulization 3 milliLiter(s) Nebulizer every 6 hours  aluminum hydroxide/magnesium hydroxide/simethicone Suspension 30 milliLiter(s) Oral every 4 hours PRN  atorvastatin 20 milliGRAM(s) Oral at bedtime  chlorhexidine 0.12% Liquid 15 milliLiter(s) Oral Mucosa every 12 hours  chlorhexidine 4% Liquid 1 Application(s) Topical <User Schedule>  dextrose 50% Injectable 50 milliLiter(s) IV Push every 15 minutes  dextrose 50% Injectable 25 milliLiter(s) IV Push every 15 minutes  folic acid 1 milliGRAM(s) Oral daily  heparin   Injectable 5000 Unit(s) SubCutaneous every 12 hours  hydrocortisone sodium succinate Injectable 50 milliGRAM(s) IV Push every 8 hours  insulin lispro (ADMELOG) corrective regimen sliding scale   SubCutaneous every 6 hours  multivitamin 1 Tablet(s) Oral daily  mupirocin 2% Ointment 1 Application(s) Both Nostrils two times a day  ondansetron Injectable 4 milliGRAM(s) IV Push every 8 hours PRN  pantoprazole  Injectable 40 milliGRAM(s) IV Push daily  polyethylene glycol 3350 17 Gram(s) Oral daily  senna Syrup 5 milliLiter(s) Oral at bedtime  thiamine 100 milliGRAM(s) Oral daily    PE:    Vital Signs Last 24 Hrs  T(C): 35.8 (26 Apr 2022 17:00), Max: 43 (26 Apr 2022 17:00)  T(F): 109.4 (26 Apr 2022 17:00), Max: 109.4 (26 Apr 2022 17:00)  HR: 85 (26 Apr 2022 17:25) (69 - 119)  BP: 94/65 (26 Apr 2022 17:00) (92/62 - 135/60)  BP(mean): 75 (26 Apr 2022 17:00) (71 - 99)  RR: 12 (26 Apr 2022 17:00) (12 - 36)  SpO2: 94% (26 Apr 2022 17:25) (93% - 100%)    Gen: intubated in ICU, vented  HEENT: ET tube  CV: S1+S2 normal, no murmurs  Resp: Coarse breath sounds  Abd: Soft, nontender, +BS  Ext: No LE edema, no wounds  : no Barnett  IV/Skin: No thrombophlebitis  Neuro: opens eyes when name called    LABS:                          9.4    15.83 )-----------( 121      ( 26 Apr 2022 00:17 )             28.7       04-26    147<H>  |  111<H>  |  17  ----------------------------<  119<H>  3.6   |  26  |  0.65    Ca    8.5      26 Apr 2022 12:25  Phos  3.0     04-26  Mg     2.2     04-26    TPro  5.2<L>  /  Alb  2.8<L>  /  TBili  0.1<L>  /  DBili  x   /  AST  24  /  ALT  30  /  AlkPhos  55  04-26          MICROBIOLOGY:  v  .Blood Blood-Peripheral  04-23-22   No growth to date.  --  --      .CSF CSF  04-22-22   No growth  --    polymorphonuclear leukocytes per low power field  No organisms seen  by cytocentrifuge      .Blood Blood-Peripheral  04-21-22   No Growth Final  --  --      Clean Catch Clean Catch (Midstream)  04-21-22   No growth  --  --      .Blood Blood-Peripheral  04-21-22   No Growth Final  --  --      Catheterized Catheterized  04-18-22   >=3 organisms. Probable collection contamination.  --  --      .Blood Blood  04-18-22   No Growth Final  --  --    Rapid RVP Result: NotDetec (04-23 @ 06:20)  EBV PCR: NotDetec IU/mL (04-23 @ 02:51)  HSV 1/2 PCR: NotDetec (04-22 @ 21:26)    RADIOLOGY:    < from: CT Abdomen and Pelvis w/ IV Cont (04.23.22 @ 03:34) >    IMPRESSION:  Small bilateral pleural effusions and trace peritoneal free fluid, likely   reflecting fluid overload status.    Bilateral nonobstructing renal calculi.    Cholelithiasis.    < end of copied text >

## 2022-04-26 NOTE — CONSULT NOTE ADULT - ASSESSMENT
61M PHx 2y cerebellar ataxia, cognitive decline p/w x1w worsening AMS/fatigue , was on floor w/ PNA, RRT called for hypotension now in MICU. MRI diffuse leptomeningeal enhancement. CT C/A/P no malignancy. LP  W43, gram stain no orgs, Cx no growth, flow/cytology nondiagnostic. Intubated, EOS, perrl, tracks, intermittent FC, ox1 w/ choices, BUE AG, moves toes spont, hyperreflexic BUE no clonus/hoffmans  -Neurosurgery consulted for consideration of brain biopsy   -Afebrile, WBC elevated however patient is on hydrocortisone  -S/p acyclovir, vanc, ampicillin, now Rocephin  -Flow cytology from first LP “cannot exclude lymphoproliferative disorder”, cytometry pending  -Recommend repeating LP (sending for all infectious and malignancy workup including cytology/cytometry) and tapping pleural effusion for possible malignancy if possible   -If tests non-diagnostic will consider brain biopsy further  -Recommend holding all AC at this time   -Please reconsult neurosurgery when testing completed [id number]

## 2022-04-26 NOTE — CONSULT NOTE ADULT - SUBJECTIVE AND OBJECTIVE BOX
p ([id number])     HPI:  Patient is a 62 yo M with PMHx of cerebral ataxia who was brought to the ED due to AMS. History is obtained from chart review and from patients wife as patient is not responding to questions. History is extremely limited. Approximately 1 week prior to presentation, patient began to experience progressive fatigue/weakness. Patients weakness progressed and he had an episode of incontinence. several days later. Two days prior to presentation, patient began to experience hiccoughs. Patient has history of unexplained hiccoughs which usually progress to episodes of emesis. Patient underwent a barium swallow study recently which was unremarkable. On the day of presentation, patient began to have multiple episodes of emesis, which prompted his wife to bring him to the ED. At baseline, patient requires a walker to ambulate and is able to communicate normally.     In the ED, patient noted to be tachycardic to 113 and tachypneic to 23. Labs significant for initial lactate of 3.7. A code stroke was called which was negative for acute intracranial processes. A CT of the chest and A/P was performed which revealed findings concerning for right sided pneumonia and esophagitis. Patient was given 2L IVF and a dose of Zosyn. He was subsequently admitted to medicine for further management.  (2022 06:19)    (NSGY) MRI showed diffuse leptomeningeal enhancement. CT chest/abdomen/pelvis showed no concern for malignancy. LP  W43, gram stain no orgs, Cx no growth, flow/cytology nondiagnostic.     Exam:  Intubated, EOS, perrl, tracks, intermittent FC, ox1 w/ choices, BUE AG, moves toes spont, hyperreflexic BUE no clonus/hoffmans    --Anticoagulation:  heparin   Injectable 5000 Unit(s) SubCutaneous every 12 hours    =====================  PAST MEDICAL HISTORY   H/O cerebellar ataxia      PAST SURGICAL HISTORY   No significant past surgical history          MEDICATIONS:  Antibiotics:  cefTRIAXone   IVPB 1000 milliGRAM(s) IV Intermittent every 24 hours    Neuro:  ondansetron Injectable 4 milliGRAM(s) IV Push every 8 hours PRN    Other:  albuterol/ipratropium for Nebulization 3 milliLiter(s) Nebulizer every 6 hours  aluminum hydroxide/magnesium hydroxide/simethicone Suspension 30 milliLiter(s) Oral every 4 hours PRN  atorvastatin 20 milliGRAM(s) Oral at bedtime  dextrose 50% Injectable 50 milliLiter(s) IV Push every 15 minutes  dextrose 50% Injectable 25 milliLiter(s) IV Push every 15 minutes  folic acid 1 milliGRAM(s) Oral daily  hydrocortisone sodium succinate Injectable 50 milliGRAM(s) IV Push every 8 hours  insulin lispro (ADMELOG) corrective regimen sliding scale   SubCutaneous every 6 hours  multivitamin 1 Tablet(s) Oral daily  pantoprazole  Injectable 40 milliGRAM(s) IV Push daily  polyethylene glycol 3350 17 Gram(s) Oral daily  senna Syrup 5 milliLiter(s) Oral at bedtime  thiamine 100 milliGRAM(s) Oral daily      SOCIAL HISTORY:   Occupation:   Marital Status:     FAMILY HISTORY:  FH: dementia (Mother)    FH: type 2 diabetes (Mother)    Family history of CVA (Father)        ROS: Negative except per HPI    LABS:  PT/INR - ( 2022 12:25 )   PT: 11.8 sec;   INR: 1.03 ratio         PTT - ( 2022 12:25 )  PTT:26.5 sec                        9.4    15.83 )-----------( 121      ( 2022 00:17 )             28.7     04-26    147<H>  |  111<H>  |  17  ----------------------------<  119<H>  3.6   |  26  |  0.65    Ca    8.5      2022 12:25  Phos  3.0     04-26  Mg     2.2     04-26    TPro  5.2<L>  /  Alb  2.8<L>  /  TBili  0.1<L>  /  DBili  x   /  AST  24  /  ALT  30  /  AlkPhos  55  04-26

## 2022-04-26 NOTE — PROGRESS NOTE ADULT - SUBJECTIVE AND OBJECTIVE BOX
CHIEF COMPLAINT:    Interval Events: Failed PS c/w AMS. Remains in Afib    REVIEW OF SYSTEMS:  Constitutional: [ ] fevers [ ] chills [ ] weight loss [ ] weight gain  HEENT: [ ] dry eyes [ ] eye irritation [ ] postnasal drip [ ] nasal congestion  CV: [ ] chest pain [ ] orthopnea [ ] palpitations [ ] murmur  Resp: [ ] cough [ ] shortness of breath [ ] dyspnea [ ] wheezing [ ] sputum [ ] hemoptysis  GI: [ ] nausea [ ] vomiting [ ] diarrhea [ ] constipation [ ] abd pain [ ] dysphagia   : [ ] dysuria [ ] nocturia [ ] hematuria [ ] increased urinary frequency  Musculoskeletal: [ ] back pain [ ] myalgias [ ] arthralgias [ ] fracture  Skin: [ ] rash [ ] itch  Neurological: [ ] headache [ ] dizziness [ ] syncope [ ] weakness [ ] numbness  Psychiatric: [ ] anxiety [ ] depression  Endocrine: [ ] diabetes [ ] thyroid problem  Hematologic/Lymphatic: [ ] anemia [ ] bleeding problem  Allergic/Immunologic: [ ] itchy eyes [ ] nasal discharge [ ] hives [ ] angioedema  [ ] All other systems negative  [ ] Unable to assess ROS because ________    OBJECTIVE:  ICU Vital Signs Last 24 Hrs  T(C): 36.9 (26 Apr 2022 04:00), Max: 43 (25 Apr 2022 15:00)  T(F): 98.4 (26 Apr 2022 04:00), Max: 109.4 (25 Apr 2022 15:00)  HR: 100 (26 Apr 2022 05:42) (98 - 119)  BP: 119/65 (26 Apr 2022 05:00) (91/53 - 135/60)  BP(mean): 86 (26 Apr 2022 05:00) (67 - 87)  ABP: --  ABP(mean): --  RR: 14 (26 Apr 2022 05:00) (12 - 21)  SpO2: 95% (26 Apr 2022 05:42) (94% - 99%)    Mode: AC/ CMV (Assist Control/ Continuous Mandatory Ventilation), RR (machine): 12, TV (machine): 400, FiO2: 21, PEEP: 5, ITime: 1, MAP: 8, PIP: 22  I & O's    04-24 @ 07:01  -  04-25 @ 07:00  --------------------------------------------------------  IN: 4559.3 mL / OUT: 2265 mL / NET: 2294.3 mL    04-25 @ 07:01  -  04-26 @ 06:00  --------------------------------------------------------  IN: 1770 mL / OUT: 2320 mL / NET: -550 mL      CAPILLARY BLOOD GLUCOSE  POCT Blood Glucose.: 134 mg/dL (26 Apr 2022 04:54)      PHYSICAL EXAM:  General:   HEENT:   Lymph Nodes:  Neck:   Respiratory:   Cardiovascular:   Abdomen:   Extremities:   Skin:   Neurological:  Psychiatry:    LINES:    HOSPITAL MEDICATIONS:  MEDICATIONS  (STANDING):  albuterol/ipratropium for Nebulization 3 milliLiter(s) Nebulizer every 6 hours  atorvastatin 20 milliGRAM(s) Oral at bedtime  cefTRIAXone   IVPB 1000 milliGRAM(s) IV Intermittent every 24 hours  chlorhexidine 0.12% Liquid 15 milliLiter(s) Oral Mucosa every 12 hours  chlorhexidine 4% Liquid 1 Application(s) Topical <User Schedule>  dextrose 50% Injectable 50 milliLiter(s) IV Push every 15 minutes  dextrose 50% Injectable 25 milliLiter(s) IV Push every 15 minutes  folic acid 1 milliGRAM(s) Oral daily  heparin   Injectable 5000 Unit(s) SubCutaneous every 12 hours  hydrocortisone sodium succinate Injectable 50 milliGRAM(s) IV Push every 8 hours  insulin lispro (ADMELOG) corrective regimen sliding scale   SubCutaneous every 6 hours  multivitamin 1 Tablet(s) Oral daily  pantoprazole  Injectable 40 milliGRAM(s) IV Push daily  polyethylene glycol 3350 17 Gram(s) Oral daily  senna Syrup 5 milliLiter(s) Oral at bedtime  thiamine 100 milliGRAM(s) Oral daily    MEDICATIONS  (PRN):  aluminum hydroxide/magnesium hydroxide/simethicone Suspension 30 milliLiter(s) Oral every 4 hours PRN Dyspepsia  ondansetron Injectable 4 milliGRAM(s) IV Push every 8 hours PRN Nausea and/or Vomiting      LABS:                        9.4    15.83 )-----------( 121      ( 26 Apr 2022 00:17 )             28.7     Hgb Trend: 9.4<--, 8.8<--, 8.4<--, 9.4<--, 9.5<--  04-26    146<H>  |  111<H>  |  15  ----------------------------<  131<H>  4.0   |  25  |  0.77    Ca    8.6      26 Apr 2022 00:17  Phos  2.8     04-26  Mg     2.2     04-26    TPro  5.4<L>  /  Alb  2.8<L>  /  TBili  <0.1<L>  /  DBili  x   /  AST  25  /  ALT  22  /  AlkPhos  60  04-26    Creatinine Trend: 0.77<--, 0.71<--, 0.76<--, 0.63<--, 0.67<--, 0.67<--  PT/INR - ( 26 Apr 2022 00:17 )   PT: 11.7 sec;   INR: 1.01 ratio         PTT - ( 26 Apr 2022 00:17 )  PTT:37.6 sec    Arterial Blood Gas:  04-26 @ 00:00  7.48/40/83/30/99.1/5.8  ABG lactate: --  Arterial Blood Gas:  04-25 @ 00:15  7.46/37/132/26/98.8/2.4  ABG lactate: --    Venous Blood Gas:  04-25 @ 10:21  7.45/41/62/28/93.4  VBG Lactate: --      MICROBIOLOGY:   Staph Aureus PCR Result: Detected (04.23.22 @ 09:39) Culture --------------------   Blood (04.23.22 @ 04:13) ---------------------------------  Specimen Source: .Blood Blood-Peripheral x2  Culture Results:   No growth to date. Culture -   CSF with Gram Stain . (04.22.22 @ 21:48) --------------------------------------  Gram Stain:   No organisms seen  Specimen Source: .CSF CSF ----------------------------------------------------                                        Culture Results:   No growth Herpes Simplex Virus 1/2 PCR, CSF (04.22.22 @ 21:26)   Source HSV 1/2: CSF   HSV 1/2 PCR: [facility]:     CSF PCR Panel (04.22.22 @ 21:26)   CSF PCR Result: [facility]: The meningitis/encephalitis (ME) panel (CSFPCR) is a PCR based assay that   screens for: Escherichia coli; Haemophilus influenzae; Listeria   monocytogenes; Neisseria meningitidis; Streptococcus agalactiae;   Streptococcus pneumoniae; CMV; Enterovirus; HSV-1; HSV-2; Human   herpesvirus 6; Parechovirus; VZV and Cryptococcus. Result should be   interpreted in context of clinical presentation, imaging and other lab   tests. Positive predictive value may be lower in patients with normal CSF   chemistry and cell count. Legionella pneumophila Antigen,   Urine (04.21.22 @ 01:16)   Legionella Antigen, Urine: Negative: --------------------------------  Staph Aureus PCR Result: Detected (04.21.22 @ 00:50) --------------------------    RADIOLOGY:  < from: CT Abdomen and Pelvis w/ IV Cont (04.23.22 @ 03:34) >    ACC: [id number] EXAM:  CT ABDOMEN AND PELVIS IC                          PROCEDURE DATE:  04/23/2022      INTERPRETATION:  CLINICAL INFORMATION: Lactic acidosis, assess for bowel   pathology.    COMPARISON: CT abdomen pelvis 4/17/2022    CONTRAST/COMPLICATIONS:  IV Contrast: Omnipaque 350  90 cc administered   10 cc discarded  Oral Contrast: NONE  Complications: None reported at time of study completion    PROCEDURE:  CT of the Abdomen and Pelvis was performed.  Sagittal and coronal reformatswere performed.    FINDINGS:  LOWER CHEST: Small bilateral pleural effusions with adjacent bilateral   lower lobe compressive subsegmental atelectasis.    LIVER: Within normal limits.  BILE DUCTS: Normal caliber.  GALLBLADDER: Cholelithiasis.  SPLEEN: Within normal limits.  PANCREAS: Within normal limits.  ADRENALS: Within normal limits.  KIDNEYS/URETERS: 1.4 cm left upper pole cyst. Multiple nonobstructing   bilateral renal calculi, the largest of which measures 8 mm at the right   UPJ and 7 mm at the left UPJ. No hydronephrosis.    BLADDER: Barnett catheter in place with minimal intravesicular air.  REPRODUCTIVE ORGANS: Prostate within normal limits.    BOWEL: No bowel obstruction. Enteric tube terminates in the stomach. A   rectal tube is inplace. Appendix is normal.  PERITONEUM: Nonspecific trace fluid in the left paracolic gutter and   pelvis.  VESSELS: Atherosclerotic changes.  RETROPERITONEUM/LYMPH NODES: No lymphadenopathy.  ABDOMINAL WALL: Fat-containing left inguinal hernia. Mildsubcutaneous   edema..  BONES: Within normal limits.    IMPRESSION:  Small bilateral pleural effusions and trace peritoneal free fluid, likely   reflecting fluid overload status.    Bilateral nonobstructing renal calculi.    Cholelithiasis.      < from: CT Head No Cont (04.23.22 @ 03:25) >-----------------    ACC: [id number] EXAM:  CT BRAIN                          PROCEDURE DATE:  04/23/2022      INTERPRETATION:  CT head without IV contrast    CLINICAL INFORMATION: Altered mental status    TECHNIQUE: Contiguous axial 5 mm sections were obtained through the head.   Sagittal and coronal 2-D reformatted images were also obtained.   This   scan was performed using automatic exposure control (radiation dose   reduction software) to obtain a diagnostic image quality scan with   patient dose as low as reasonably achievable.    FINDINGS:   CT dated 04/17/2022 available for review.    The brain demonstrates mild periventricular white matter ischemia with   tiny old lacunar infarction in the LEFT cerebellum.   No acute cerebral   cortical infarct is seen.  No intracranial hemorrhage is found.  No mass   effect is found in the brain.    The ventricles, sulci and basal cisterns appear unremarkable.    The orbits are unremarkable.  The paranasal sinuses are clear.  The nasal   cavity appears intact.  The nasopharynx is symmetric.  The central skull   base, petrous temporal bones and calvarium remain intact.      IMPRESSION:   Mild periventricular white matter ischemia with tiny old   lacunar infarction in the LEFT cerebellum.      < from: CT Angio Neck w/ IV Cont (04.17.22 @ 22:51) >---------------------------    ACC: [id number] EXAM:  CT ANGIO BRAIN (W) IC                        ACC: [id number] EXAM:  CT ANGIO NECK (W) IC                        ACC: [id number] EXAM:  CT BRAIN                          PROCEDURE DATE:  04/17/2022      INTERPRETATION:  INDICATION: Weakness. Worsening mental status over the   past 3 days.    TECHNIQUE:  Noncontrast CT of the brain was performed. Axial plane images were   reconstructed via bone and soft tissue kernel algorithms. Sagittal and   coronal plane images were reformatted from the axial dataset. CT   angiography of the head and neck was performed. Following the   administration of intravenous contrast scanning was performed from arch   to vertex. Thin axial plane images were reconstructed from the raw data.   Multiplanar MIP and/or 3D images were reformatted from the axial dataset.      COMPARISON STUDY: CT head 09/21/2019.    FINDINGS:    HEAD:  The study is slightly limitedby patient motion artifact.    No acute intracranial hemorrhage. No midline shift or acute mass effect.   Mild age related cerebral atrophy.  There is patchy white matter   hypoattenuation likely reflecting chronic microvascular ischemic change.   There is a prominent perivascular space versus chronic lacunar infarct in   the left inferior basal ganglia.    The paranasal sinuses are clear. The tympanomastoid cavities are   adequately pneumatized. The calvarium is intact.    CT ANGIOGRAPHY NECK:  No hemodynamically significant stenosis or dissection of the extracranial   vertebral, common or internal carotid arteries, bilaterally.    No stenosis or occlusion of the skull base internal carotid arteries or   dural segments of the vertebral arteries, bilaterally.    Please refer to CT chest performed on same day for discussion of   intrathoracic findings.    There are multilevel degenerative changes in the cervical spine.    CT ANGIOGRAPHY BRAIN:  No hemodynamically significant stenosis or occlusion of the intracranial   ICAs, bilaterally    Bilateral fetal PCAs. No hemodynamically significant stenosis or   occlusion of the ACAs, MCAs, or PCAs, bilaterally.    The anterior and posterior communicating arteries are patent.    No hemodynamically significant stenosis or occlusion of the basilar or   intracranial vertebral arteries.    No intracranial aneurysm or vascular malformation.    No evidence for dural venous sinus thrombosis.      IMPRESSION:  CT HEAD: No acute intracranial hemorrhage or mass effect.    CTA NECK: No hemodynamically significant stenosis by NASCET criteria,   dissection, or pseudoaneurysm.    CTA HEAD: No large vessel occlusion, significant stenosis, dissection, or   saccular aneurysm.      < end of copied text >  < from: CT Abdomen and Pelvis w/ IV Cont (04.17.22 @ 22:51) >    ACC: [id number] EXAM:  CT ABDOMEN AND PELVIS IC                        ACC: [id number] EXAM:  CT CHEST IC                          PROCEDURE DATE:  04/17/2022          INTERPRETATION:  CLINICAL INFORMATION: Evaluate for pneumonia/lung   injury. Evaluate for pyelonephritis/colitis.    COMPARISON: CTA of the chest from 9/21/2019.    CONTRAST/COMPLICATIONS:  IV Contrast: Omnipaque 350  90 cc administered   10 cc discarded  Oral Contrast: NONE  Complications: None reported at time of study completion    PROCEDURE:  CT of the Chest, Abdomen and Pelvis was performed.  Sagittal and coronal reformats were performed.    FINDINGS:  The examination is limited by patient respiratory motion artifact.    CHEST:  LUNGS AND LARGE AIRWAYS: Evaluation of the lung parenchyma is limited by   respiratory motion artifact. Patent central airways. There are two   adjacent nodular opacities in the left lower lobe posteriorly measuring   up to 1.1 cm, which appear to represent mucoid impacted, dilated bronchi.   There is patchy tree-in-bud nodularity in the right upper and right lower   lobes, nonspecific, although likely infectious versus inflammatory.  PLEURA: No pleural effusion.  VESSELS: Within normal limits.  HEART: Heart size is normal. No pericardial effusion.  MEDIASTINUM AND DAVID: No lymphadenopathy. Stable prominent, but   nonenlarged, mediastinal lymph nodes. Diffuse circumferential wall   thickening of the esophagus  CHEST WALL AND LOWER NECK: Within normal limits.    ABDOMEN AND PELVIS:  LIVER:Within normal limits.  BILE DUCTS: Normal caliber.  GALLBLADDER: Cholelithiasis.  SPLEEN: Within normal limits.  PANCREAS: Within normal limits.  ADRENALS: Within normal limits.  KIDNEYS/URETERS: There is bilateral nephrolithiasis, including a 6 mm   right renal pelvis stone and a 3 mm left renal pelvis stone without ricardo   hydronephrosis. Simple left renal cyst. Few additional too small to   characterize bilateral renal hypodensities.    BLADDER: Within normal limits.  REPRODUCTIVE ORGANS: Prostate within normal limits.    BOWEL: No bowel obstruction. Appendix is normal. Colonic diverticulosis.  PERITONEUM: No ascites.  VESSELS: Atherosclerotic changes.  RETROPERITONEUM/LYMPH NODES: No lymphadenopathy.  ABDOMINAL WALL: Within normal limits.  BONES: Degenerative changes.    IMPRESSION:  Patchy tree-in-bud nodularity in the right lung, nonspecific, although   likely infectious versus inflammatory.    Diffuse circumferential thickening of the esophagus, most concerning for   esophagitis, however recommend clinical correlation and consider   outpatient endoscopy.    Cholelithiasis without CT evidence for acute cholecystitis.    Nonobstructing bilateral nephrolithiasis.              EKG: CHIEF COMPLAINT: Aspiration PNA / AMS    Interval Events: Failed PS c/w AMS. Remains in Afib-    HPI: This is a 61yoM w a 2yr Hx of ataxia w chronic bilateral lacunar infarcts who was admitted to [facility] 4/18 w c/o of worsening weakness, fatigue, incontinence along w emesis, and was admitted to be treated for Aspiration PNA. A RRT was called 4/21 for hypotension/bradycardia/hypothermia, and worsening AMS and admitted to the MICU for further management. A MRI of the brain and C-spine noted Diffuse Leptomeningeal enhancement w a r/o of metastasis vs infection; a LP non infectious thus far further results pending. The pt c/w decompensating on 4/23 with worsening MS was intubated for airway protection, +severe AGMA s/p NaHCO3 gtt, went into DI and hyperglycemia s/p insulin gtt, of all which have and / or resolving.      REVIEW OF SYSTEMS:  [x ] Unable to assess ROS because - not interactive-    OBJECTIVE:  ICU Vital Signs Last 24 Hrs  T(C): 36.9 (26 Apr 2022 04:00), Max: 43 (25 Apr 2022 15:00)  T(F): 98.4 (26 Apr 2022 04:00), Max: 109.4 (25 Apr 2022 15:00)  HR: 100 (26 Apr 2022 05:42) (98 - 119)  BP: 119/65 (26 Apr 2022 05:00) (91/53 - 135/60)  BP(mean): 86 (26 Apr 2022 05:00) (67 - 87)  ABP: --  ABP(mean): --  RR: 14 (26 Apr 2022 05:00) (12 - 21)  SpO2: 95% (26 Apr 2022 05:42) (94% - 99%)    Mode: AC/ CMV (Assist Control/ Continuous Mandatory Ventilation), RR (machine): 12, TV (machine): 400, FiO2: 21, PEEP: 5, ITime: 1, MAP: 8, PIP: 22  I & O's    04-24 @ 07:01  -  04-25 @ 07:00  --------------------------------------------------------  IN: 4559.3 mL / OUT: 2265 mL / NET: 2294.3 mL    04-25 @ 07:01  -  04-26 @ 06:00  --------------------------------------------------------  IN: 1770 mL / OUT: 2320 mL / NET: -550 mL    CAPILLARY BLOOD GLUCOSE  POCT Blood Glucose.: 134 mg/dL (26 Apr 2022 04:54)    PHYSICAL EXAM:  General: well groom and well nourished  HEENT: normocephalic, atraumatic, sclera white, conjunctiva clear, trachea midline   Lymph Nodes: non palp  Neck: supple, neg JVD  Respiratory: svetlana equal BS, BCTA, orally intubated, minimal secretions via ETT  Cardiovascular: S1S2 RRR, all pulses palp 2+/4  Abdomen: Non distended, +BS- hypoactive x 4, non tender - neg grimace on deep palp  Extremities: generalized edema  Skin: warm, acyanotic  Neurological: +PERRL, opens eyes on command at times, follows commands intermittently to squeeze hands- done weakly, does not move feet on command however withdraws to noxious stim BLE  Psychiatry: calm     LINES: [facility] MEDICATIONS:  MEDICATIONS  (STANDING):  albuterol/ipratropium for Nebulization 3 milliLiter(s) Nebulizer every 6 hours  atorvastatin 20 milliGRAM(s) Oral at bedtime  cefTRIAXone   IVPB 1000 milliGRAM(s) IV Intermittent every 24 hours  chlorhexidine 0.12% Liquid 15 milliLiter(s) Oral Mucosa every 12 hours  chlorhexidine 4% Liquid 1 Application(s) Topical <User Schedule>  dextrose 50% Injectable 50 milliLiter(s) IV Push every 15 minutes  dextrose 50% Injectable 25 milliLiter(s) IV Push every 15 minutes  folic acid 1 milliGRAM(s) Oral daily  heparin   Injectable 5000 Unit(s) SubCutaneous every 12 hours  hydrocortisone sodium succinate Injectable 50 milliGRAM(s) IV Push every 8 hours  insulin lispro (ADMELOG) corrective regimen sliding scale   SubCutaneous every 6 hours  multivitamin 1 Tablet(s) Oral daily  pantoprazole  Injectable 40 milliGRAM(s) IV Push daily  polyethylene glycol 3350 17 Gram(s) Oral daily  senna Syrup 5 milliLiter(s) Oral at bedtime  thiamine 100 milliGRAM(s) Oral daily    MEDICATIONS  (PRN):  aluminum hydroxide/magnesium hydroxide/simethicone Suspension 30 milliLiter(s) Oral every 4 hours PRN Dyspepsia  ondansetron Injectable 4 milliGRAM(s) IV Push every 8 hours PRN Nausea and/or Vomiting    LABS:                        9.4    15.83 )-----------( 121      ( 26 Apr 2022 00:17 )             28.7     Hgb Trend: 9.4<--, 8.8<--, 8.4<--, 9.4<--, 9.5<--  04-26    146<H>  |  111<H>  |  15  ----------------------------<  131<H>  4.0   |  25  |  0.77    Ca    8.6      26 Apr 2022 00:17  Phos  2.8     04-26  Mg     2.2     04-26    TPro  5.4<L>  /  Alb  2.8<L>  /  TBili  <0.1<L>  /  DBili  x   /  AST  25  /  ALT  22  /  AlkPhos  60  04-26    Creatinine Trend: 0.77<--, 0.71<--, 0.76<--, 0.63<--, 0.67<--, 0.67<--  PT/INR - ( 26 Apr 2022 00:17 )   PT: 11.7 sec;   INR: 1.01 ratio    PTT - ( 26 Apr 2022 00:17 )  PTT:37.6 sec    Arterial Blood Gas:  04-26 @ 00:00  7.48/40/83/30/99.1/5.8  ABG lactate: --  Arterial Blood Gas:  04-25 @ 00:15  7.46/37/132/26/98.8/2.4  ABG lactate: --    Venous Blood Gas:  04-25 @ 10:21  7.45/41/62/28/93.4  VBG Lactate: --    MICROBIOLOGY:   Staph Aureus PCR Result: Detected (04.23.22 @ 09:39) Culture --------------------   Blood (04.23.22 @ 04:13) ---------------------------------  Specimen Source: .Blood Blood-Peripheral x2  Culture Results:   No growth to date. Culture -   CSF with Gram Stain . (04.22.22 @ 21:48) --------------------------------------  Gram Stain:   No organisms seen  Specimen Source: .CSF CSF ----------------------------------------------------                                        Culture Results:   No growth Herpes Simplex Virus 1/2 PCR, CSF (04.22.22 @ 21:26)   Source HSV 1/2: CSF   HSV 1/2 PCR: [facility]:     CSF PCR Panel (04.22.22 @ 21:26)   CSF PCR Result: [facility]: The meningitis/encephalitis (ME) panel (CSFPCR) is a PCR based assay that   screens for: Escherichia coli; Haemophilus influenzae; Listeria   monocytogenes; Neisseria meningitidis; Streptococcus agalactiae;   Streptococcus pneumoniae; CMV; Enterovirus; HSV-1; HSV-2; Human   herpesvirus 6; Parechovirus; VZV and Cryptococcus. Result should be   interpreted in context of clinical presentation, imaging and other lab   tests. Positive predictive value may be lower in patients with normal CSF   chemistry and cell count. Legionella pneumophila Antigen,   Urine (04.21.22 @ 01:16)   Legionella Antigen, Urine: Negative: --------------------------------  Staph Aureus PCR Result: Detected (04.21.22 @ 00:50) --------------------------    RADIOLOGY:  < from: CT Abdomen and Pelvis w/ IV Cont (04.23.22 @ 03:34) >    ACC: [id number] EXAM:  CT ABDOMEN AND PELVIS IC                          PROCEDURE DATE:  04/23/2022      INTERPRETATION:  CLINICAL INFORMATION: Lactic acidosis, assess for bowel   pathology.    COMPARISON: CT abdomen pelvis 4/17/2022    CONTRAST/COMPLICATIONS:  IV Contrast: Omnipaque 350  90 cc administered   10 cc discarded  Oral Contrast: NONE  Complications: None reported at time of study completion    PROCEDURE:  CT of the Abdomen and Pelvis was performed.  Sagittal and coronal reformatswere performed.    FINDINGS:  LOWER CHEST: Small bilateral pleural effusions with adjacent bilateral   lower lobe compressive subsegmental atelectasis.    LIVER: Within normal limits.  BILE DUCTS: Normal caliber.  GALLBLADDER: Cholelithiasis.  SPLEEN: Within normal limits.  PANCREAS: Within normal limits.  ADRENALS: Within normal limits.  KIDNEYS/URETERS: 1.4 cm left upper pole cyst. Multiple non obstructing  ************  bilateral renal calculi, the largest of which measures 8 mm at the right   UPJ and 7 mm at the left UPJ. No hydronephrosis.  BLADDER: Barnett catheter in place with minimal intravesicular air.  REPRODUCTIVE ORGANS: Prostate within normal limits.  BOWEL: No bowel obstruction. Enteric tube terminates in the stomach. A   rectal tube is in place. Appendix is normal.  PERITONEUM: Nonspecific trace fluid in the left paracolic gutter and   pelvis.  VESSELS: Atherosclerotic changes.  RETROPERITONEUM/LYMPH NODES: No lymphadenopathy.  ABDOMINAL WALL: Fat-containing left inguinal hernia. Mild subcutaneous   edema..  BONES: Within normal limits.    IMPRESSION:  Small bilateral pleural effusions and trace peritoneal free fluid, likely   reflecting fluid overload status.    Bilateral non obstructing renal calculi.    Cholelithiasis.      < from: CT Head No Cont (04.23.22 @ 03:25) >-----------------  ACC: [id number] EXAM:  CT BRAIN                        PROCEDURE DATE:  04/23/2022    INTERPRETATION:  CT head without IV contrast  CLINICAL INFORMATION: Altered mental status  FINDINGS:   CT dated 04/17/2022 available for review.    The brain demonstrates mild periventricular white matter ischemia with ****************************  tiny old lacunar infarction in the LEFT cerebellum.   No acute cerebral   cortical infarct is seen.  No intracranial hemorrhage is found.  No mass   effect is found in the brain.  The ventricles, sulci and basal cisterns appear unremarkable.  The orbits are unremarkable.  The paranasal sinuses are clear.  The nasal   cavity appears intact.  The nasopharynx is symmetric.  The central skull   base, petrous temporal bones and calvarium remain intact.    IMPRESSION:   Mild periventricular white matter ischemia with tiny old   lacunar infarction in the LEFT cerebellum.      < from: CT Angio Neck w/ IV Cont (04.17.22 @ 22:51) >---------------------------  ACC: [id number] EXAM:  CT ANGIO BRAIN (W)AW IC                        ACC: [id number] EXAM:  CT ANGIO NECK (W)AW IC                        ACC: [id number] EXAM:  CT BRAIN                        PROCEDURE DATE:  04/17/2022    INTERPRETATION:  INDICATION: Weakness. Worsening mental status over the   past 3 days.  COMPARISON STUDY: CT head 09/21/2019.  FINDINGS:    HEAD:  The study is slightly limited by patient motion artifact.  No acute intracranial hemorrhage. No midline shift or acute mass effect.   Mild age related cerebral atrophy.  There is patchy white matter   hypoattenuation likely reflecting chronic microvascular ischemic change. ***********************************  There is a prominent perivascular space versus chronic lacunar infarct in   the left inferior basal ganglia.    The paranasal sinuses are clear. The tympanomastoid cavities are   adequately pneumatized. The calvarium is intact.    CT ANGIOGRAPHY NECK:  No hemodynamically significant stenosis or dissection of the extracranial   vertebral, common or internal carotid arteries, bilaterally.  No stenosis or occlusion of the skull base internal carotid arteries or   dural segments of the vertebral arteries, bilaterally.  Please refer to CT chest performed on same day for discussion of   intrathoracic findings.  There are multilevel degenerative changes in the cervical spine.    CT ANGIOGRAPHY BRAIN:  No hemodynamically significant stenosis or occlusion of the intracranial   ICAs, bilaterally  Bilateral fetal PCAs. No hemodynamically significant stenosis or   occlusion of the ACAs, MCAs, or PCAs, bilaterally.  The anterior and posterior communicating arteries are patent.  No hemodynamically significant stenosis or occlusion of the basilar or   intracranial vertebral arteries.  No intracranial aneurysm or vascular malformation.  No evidence for dural venous sinus thrombosis.      IMPRESSION:  CT HEAD: No acute intracranial hemorrhage or mass effect.  CTA NECK: No hemodynamically significant stenosis by NASCET criteria,   dissection, or pseudoaneurysm.  CTA HEAD: No large vessel occlusion, significant stenosis, dissection, or   saccular aneurysm.      < from: CT Abdomen and Pelvis w/ IV Cont (04.17.22 @ 22:51) >  ACC: [id number] EXAM:  CT ABDOMEN AND PELVIS IC                        ACC: [id number] EXAM:  CT CHEST IC                        PROCEDURE DATE:  04/17/2022    INTERPRETATION:  CLINICAL INFORMATION: Evaluate for pneumonia/lung   injury. Evaluate for pyelonephritis/colitis.    COMPARISON: CTA of the chest from 9/21/2019.  PROCEDURE:  CT of the Chest, Abdomen and Pelvis was performed.  Sagittal and coronal reformats were performed.  FINDINGS:  The examination is limited by patient respiratory motion artifact.    CHEST:  LUNGS AND LARGE AIRWAYS: Evaluation of the lung parenchyma is limited by   respiratory motion artifact. Patent central airways. There are two   adjacent nodular opacities in the left lower lobe posteriorly measuring ********************  up to 1.1 cm, which appear to represent mucoid impacted, dilated bronchi.   There is patchy tree-in-bud nodularity in the right upper and right lower   lobes, nonspecific, although likely infectious versus inflammatory.  PLEURA: No pleural effusion.  VESSELS: Within normal limits.  HEART: Heart size is normal. No pericardial effusion.  MEDIASTINUM AND DAVID: No lymphadenopathy. Stable prominent, but   nonenlarged, mediastinal lymph nodes. Diffuse circumferential wall ***********************  thickening of the esophagus  CHEST WALL AND LOWER NECK: Within normal limits.    ABDOMEN AND PELVIS:  LIVER:Within normal limits.  BILE DUCTS: Normal caliber.  GALLBLADDER: Cholelithiasis.*************************  SPLEEN: Within normal limits.  PANCREAS: Within normal limits.  ADRENALS: Within normal limits.  KIDNEYS/URETERS: There is bilateral nephrolithiasis, including a 6 mm ***************************  right renal pelvis stone and a 3 mm left renal pelvis stone without ricardo   hydronephrosis. Simple left renal cyst. Few additional too small to   characterize bilateral renal hypodensities.    BLADDER: Within normal limits.  REPRODUCTIVE ORGANS: Prostate within normal limits.  BOWEL: No bowel obstruction. Appendix is normal. Colonic diverticulosis.  PERITONEUM: No ascites.  VESSELS: Atherosclerotic changes.  RETROPERITONEUM/LYMPH NODES: No lymphadenopathy.  ABDOMINAL WALL: Within normal limits.  BONES: Degenerative changes.    IMPRESSION:  Patchy tree-in-bud nodularity in the right lung, nonspecific, although   likely infectious versus inflammatory.    Diffuse circumferential thickening of the esophagus, most concerning for   esophagitis, however recommend clinical correlation and consider   outpatient endoscopy.    Cholelithiasis without CT evidence for acute cholecystitis.***********************    Non obstructing bilateral nephrolithiasis.****************************      < from: TTE with Doppler (w/Cont) (04.22.22 @ 17:39) >  Dimensions:    Normal Values:  LA:     3.2    2.0 - 4.0 cm  Ao:     2.8    2.0 - 3.8 cm  SEPTUM: 0.9    0.6 - 1.2 cm  PWT:    0.8    0.6 - 1.1 cm  LVIDd:  3.4    3.0 - 5.6 cm  LVIDs:  1.8    1.8 - 4.0 cm  Derived variables:  LVMI: 48 g/m2  RWT: 0.47  EF (Visual Estimate): 70 %  Doppler Peak Velocity (m/sec): AoV=0.8  ------------------------------------------------------------------------  Observations:  Mitral Valve: Normal mitral valve.  Aortic Valve/Aorta: Calcified aortic valve with normal  opening.  Normal aortic root size.  Left Atrium: Normal left atrium.  Left Ventricle: Endocardial visualization enhanced with  intravenous injection of Ultrasonic Enhancing Agent  (Lumason).  Normal left ventricular internal dimensions and wall  thickness.  Normal left ventricular systolic function. No segmental  wall motion abnormalities.  Normal diastolic function.  Right Heart: Normal right atrium. Normal right ventricular  size and function.  Normal tricuspid valve. Mild tricuspid regurgitation.  Normal pulmonic valve.  Pericardium/Pleura: Trace pericardial effusion.  Hemodynamic: No evidence of pulmonary hypertension.  ------------------------------------------------------------------------  Conclusions:  Endocardial visualization enhanced with intravenous  injection of Ultrasonic Enhancing Agent (Lumason).  Normal left ventricular systolic function. No segmental  wall motion abnormalities.  ------------------------------------------------------------------------

## 2022-04-26 NOTE — EEG REPORT - NS EEG TEXT BOX
Health system   COMPREHENSIVE EPILEPSY CENTER   REPORT OF LONG-TERM VIDEO EEG     [facility]: [address], [place] [address], Ph#: [phone]  [place]: [id number] [address] [place] [id number], Ph#: [phone]  [facility]: [address] [place] [address], Ph#: [phone]    Patient Name: EDD VALDIVIA  Age and : 61y ([birth year])  MRN #: [id number]  Location: [address]  Referring Physician: Elaine Petit    Start Time/Date: 08:00 on 22  End Time/Date: 12:32 on 22  Duration: 4.5 hours   _____________________________________________________________  STUDY INFORMATION    EEG Recording Technique:  The patient underwent continuous Video-EEG monitoring, using Telemetry System hardware on the XLTek Digital System. EEG and video data were stored on a computer hard drive with important events saved in digital archive files. The material was reviewed by a physician (electroencephalographer / epileptologist) on a daily basis. Rafael and seizure detection algorithms were utilized and reviewed. An EEG Technician attended to the patient, and was available throughout daytime work hours.  The epilepsy center neurologist was available in person or on call 24-hours per day.    EEG Placement and Labeling of Electrodes:  The EEG was performed utilizing 20 channel referential EEG connections (coronal over temporal over parasagittal montage) using all standard 10-20 electrode placements with EKG, with additional electrodes placed in the inferior temporal region using the modified 10-10 montage electrode placements for elective admissions, or if deemed necessary. Recording was at a sampling rate of 256 samples per second per channel. Time synchronized digital video recording was done simultaneously with EEG recording. A low light infrared camera was used for low light recording.     _____________________________________________________________  HISTORY  Patient is a 61y old  Male who presents with a chief complaint of AMS 2/2 Pneumonia (2022 07:15)    PERTINENT MEDICATION:  MEDICATIONS  (STANDING):  acyclovir IVPB 600 milliGRAM(s) IV Intermittent every 8 hours  albuterol/ipratropium for Nebulization 3 milliLiter(s) Nebulizer every 6 hours  ampicillin  IVPB 2 Gram(s) IV Intermittent every 4 hours  atorvastatin 20 milliGRAM(s) Oral at bedtime  cefTRIAXone   IVPB 2000 milliGRAM(s) IV Intermittent every 12 hours  folic acid 1 milliGRAM(s) Oral daily  heparin   Injectable 5000 Unit(s) SubCutaneous every 12 hours  hydrocortisone sodium succinate Injectable 100 milliGRAM(s) IV Push every 8 hours  insulin lispro (ADMELOG) corrective regimen sliding scale   SubCutaneous every 6 hours  insulin NPH human recombinant 6 Unit(s) SubCutaneous every 6 hours  multivitamin 1 Tablet(s) Oral daily  norepinephrine Infusion 0.05 MICROgram(s)/kG/Min (7.14 mL/Hr) IV Continuous <Continuous>  pantoprazole  Injectable 40 milliGRAM(s) IV Push daily  thiamine 100 milliGRAM(s) Oral daily  vancomycin  IVPB 1000 milliGRAM(s) IV Intermittent every 12 hours  _____________________________________________________________  STUDY INTERPRETATION  Findings: The background was more continuous  irregular excess delta. No posterior dominant rhythm seen.  Superimposed intermittent GRDA near 1-1.5hz    Focal Slowing:  None     Sleep Background:  Sleep transients were not seen.  EEG more suppressed in clinical sleep    Interictal Epileptiform Activity:   None    Events:  Seizures: None recorded.    Activation Procedures:   Hyperventilation was not performed.    Photic stimulation was not performed.     Artifacts:  Intermittent myogenic and movement artifacts were noted.    ECG:  The heart rate on single channel ECG was predominantly between 60-90 BPM irregular.    _____________________________________________________________  EEG SUMMARY/CLASSIFICATION  Abnormal EEG   Moderate to severe slowing, GRDA  _____________________________________________________________  EEG IMPRESSION/CLINICAL CORRELATE    Abnormal EEG study.  Moderate to severe nonspecific diffuse or multifocal cerebral dysfunction, improved vs prior day.   No electrographic seizures.  ECG irregular.    Benjamín Cazares MD FAKATYA  Director, Continuous EEG Monitoring Program, [facility]   and Epilepsy Fellowship ,   Department of Neurology, [employer]    [facility] EEG Reading Room Ph#: [phone]  Epilepsy Answering Service after 5PM and before 8:30AM: Ph#: [phone]

## 2022-04-27 LAB
ALBUMIN SERPL ELPH-MCNC: 2.9 G/DL — LOW (ref 3.3–5)
ALP SERPL-CCNC: 56 U/L — SIGNIFICANT CHANGE UP (ref 40–120)
ALT FLD-CCNC: 37 U/L — SIGNIFICANT CHANGE UP (ref 10–45)
ANA PAT FLD IF-IMP: ABNORMAL
ANA TITR SER: ABNORMAL
ANION GAP SERPL CALC-SCNC: 9 MMOL/L — SIGNIFICANT CHANGE UP (ref 5–17)
APTT BLD: 26.2 SEC — LOW (ref 27.5–35.5)
AST SERPL-CCNC: 34 U/L — SIGNIFICANT CHANGE UP (ref 10–40)
AUTO DIFF PNL BLD: NEGATIVE — SIGNIFICANT CHANGE UP
B BURGDOR DNA SPEC QL NAA+PROBE: NEGATIVE — SIGNIFICANT CHANGE UP
BILIRUB SERPL-MCNC: 0.1 MG/DL — LOW (ref 0.2–1.2)
BUN SERPL-MCNC: 21 MG/DL — SIGNIFICANT CHANGE UP (ref 7–23)
C-ANCA SER-ACNC: NEGATIVE — SIGNIFICANT CHANGE UP
CALCIUM SERPL-MCNC: 8.4 MG/DL — SIGNIFICANT CHANGE UP (ref 8.4–10.5)
CHLORIDE SERPL-SCNC: 108 MMOL/L — SIGNIFICANT CHANGE UP (ref 96–108)
CO2 SERPL-SCNC: 26 MMOL/L — SIGNIFICANT CHANGE UP (ref 22–31)
CREAT SERPL-MCNC: 0.71 MG/DL — SIGNIFICANT CHANGE UP (ref 0.5–1.3)
EGFR: 105 ML/MIN/1.73M2 — SIGNIFICANT CHANGE UP
GAS PNL BLDA: SIGNIFICANT CHANGE UP
GLUCOSE BLDC GLUCOMTR-MCNC: 110 MG/DL — HIGH (ref 70–99)
GLUCOSE BLDC GLUCOMTR-MCNC: 115 MG/DL — HIGH (ref 70–99)
GLUCOSE BLDC GLUCOMTR-MCNC: 84 MG/DL — SIGNIFICANT CHANGE UP (ref 70–99)
GLUCOSE SERPL-MCNC: 127 MG/DL — HIGH (ref 70–99)
HCT VFR BLD CALC: 27.6 % — LOW (ref 39–50)
HGB BLD-MCNC: 9.1 G/DL — LOW (ref 13–17)
MAGNESIUM SERPL-MCNC: 2.3 MG/DL — SIGNIFICANT CHANGE UP (ref 1.6–2.6)
MCHC RBC-ENTMCNC: 29.3 PG — SIGNIFICANT CHANGE UP (ref 27–34)
MCHC RBC-ENTMCNC: 33 GM/DL — SIGNIFICANT CHANGE UP (ref 32–36)
MCV RBC AUTO: 88.7 FL — SIGNIFICANT CHANGE UP (ref 80–100)
MPO AB + PR3 PNL SER: SIGNIFICANT CHANGE UP
NRBC # BLD: 0 /100 WBCS — SIGNIFICANT CHANGE UP (ref 0–0)
P-ANCA SER-ACNC: NEGATIVE — SIGNIFICANT CHANGE UP
PHOSPHATE SERPL-MCNC: 2.5 MG/DL — SIGNIFICANT CHANGE UP (ref 2.5–4.5)
PLATELET # BLD AUTO: 135 K/UL — LOW (ref 150–400)
POTASSIUM SERPL-MCNC: 3.7 MMOL/L — SIGNIFICANT CHANGE UP (ref 3.5–5.3)
POTASSIUM SERPL-SCNC: 3.7 MMOL/L — SIGNIFICANT CHANGE UP (ref 3.5–5.3)
PROT SERPL-MCNC: 5.2 G/DL — LOW (ref 6–8.3)
RBC # BLD: 3.11 M/UL — LOW (ref 4.2–5.8)
RBC # FLD: 14.2 % — SIGNIFICANT CHANGE UP (ref 10.3–14.5)
SODIUM SERPL-SCNC: 143 MMOL/L — SIGNIFICANT CHANGE UP (ref 135–145)
WBC # BLD: 14.3 K/UL — HIGH (ref 3.8–10.5)
WBC # FLD AUTO: 14.3 K/UL — HIGH (ref 3.8–10.5)

## 2022-04-27 PROCEDURE — 99291 CRITICAL CARE FIRST HOUR: CPT

## 2022-04-27 PROCEDURE — 93970 EXTREMITY STUDY: CPT | Mod: 26

## 2022-04-27 RX ORDER — FENTANYL CITRATE 50 UG/ML
50 INJECTION INTRAVENOUS ONCE
Refills: 0 | Status: DISCONTINUED | OUTPATIENT
Start: 2022-04-27 | End: 2022-04-27

## 2022-04-27 RX ORDER — DEXMEDETOMIDINE HYDROCHLORIDE IN 0.9% SODIUM CHLORIDE 4 UG/ML
0.2 INJECTION INTRAVENOUS
Qty: 200 | Refills: 0 | Status: DISCONTINUED | OUTPATIENT
Start: 2022-04-27 | End: 2022-04-28

## 2022-04-27 RX ADMIN — Medication 3 MILLILITER(S): at 11:44

## 2022-04-27 RX ADMIN — Medication 1 TABLET(S): at 11:11

## 2022-04-27 RX ADMIN — Medication 50 MILLIGRAM(S): at 05:09

## 2022-04-27 RX ADMIN — ATORVASTATIN CALCIUM 20 MILLIGRAM(S): 80 TABLET, FILM COATED ORAL at 22:40

## 2022-04-27 RX ADMIN — HEPARIN SODIUM 5000 UNIT(S): 5000 INJECTION INTRAVENOUS; SUBCUTANEOUS at 18:15

## 2022-04-27 RX ADMIN — Medication 3 MILLILITER(S): at 05:32

## 2022-04-27 RX ADMIN — CHLORHEXIDINE GLUCONATE 15 MILLILITER(S): 213 SOLUTION TOPICAL at 05:07

## 2022-04-27 RX ADMIN — POLYETHYLENE GLYCOL 3350 17 GRAM(S): 17 POWDER, FOR SOLUTION ORAL at 11:11

## 2022-04-27 RX ADMIN — Medication 50 MILLIGRAM(S): at 13:18

## 2022-04-27 RX ADMIN — Medication 3 MILLILITER(S): at 23:16

## 2022-04-27 RX ADMIN — FENTANYL CITRATE 50 MICROGRAM(S): 50 INJECTION INTRAVENOUS at 14:15

## 2022-04-27 RX ADMIN — FENTANYL CITRATE 50 MICROGRAM(S): 50 INJECTION INTRAVENOUS at 14:00

## 2022-04-27 RX ADMIN — Medication 1 MILLIGRAM(S): at 11:11

## 2022-04-27 RX ADMIN — MUPIROCIN 1 APPLICATION(S): 20 OINTMENT TOPICAL at 05:07

## 2022-04-27 RX ADMIN — Medication 50 MILLIGRAM(S): at 22:40

## 2022-04-27 RX ADMIN — MUPIROCIN 1 APPLICATION(S): 20 OINTMENT TOPICAL at 18:16

## 2022-04-27 RX ADMIN — Medication 3 MILLILITER(S): at 17:40

## 2022-04-27 RX ADMIN — Medication 100 MILLIGRAM(S): at 11:12

## 2022-04-27 RX ADMIN — CHLORHEXIDINE GLUCONATE 15 MILLILITER(S): 213 SOLUTION TOPICAL at 18:16

## 2022-04-27 NOTE — PROGRESS NOTE ADULT - ASSESSMENT
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 Course c/b septic shock (resolved) r/o adrenal insufficiency, AMS requiring intubation for airway protection.  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    -Neuro checks q 2 hrs and prn for changes  -activity as tolerated  -physical therapy consult when stable  -s/p LP (4/23/22) - f/u results recommend by neurology -  (PCR neg, Cryptococcal antigen neg, Culture NGTD)   -obtained neuro surg consult for brain bx - see recs  -consider repeat LP    #PULM:   AMS, intubated for airway protection, asp pneum    -Mechanical Vent Therapy - titrate to maintain ph 7.35-7.45; PCO2 35-45; SPO2 > 92%  -Bronchodilator therapy q 6 hrs prn sob/wheezes  -Lung protective therapy    #CV:  resolved septic shock,  new onset afib     -rate controlled  -continue atorvastatin   -s/p norepi gtt (d/c 4/24/22)  -TTE 4/22/22 - EF 70%, mild tricuspid regurg, Nl RV/LV  -continue to monitor    #GI/:     -continue TF as tolerated  -strict I & O 's - keep even  -will d/c protonix    #ID: asp pneu, resolved septic shock, leptomeningeal enhancement    -s/p acyclovir (4/21/22 - 4/25/22)  -s/p ceftriaxone (4/21/22 -4/26/22)  -s/p ampicillin (4/21/22- 4/26/22)  -s/p zosyn (4/18/22 - 4/21/22)  -s/p vanco (4/20/22 - 4/25/22)  -MRSA PCR 4/23/22 - ND  -MSSA PCR 4/23/22 - detected  -Blood Cx 4/23/22 - NGTD  -EBV 4/23/22 - ND  -CSF 4/22/22 - No organisms seen  -CSF PCR 4/22/22 - ND  -see neuro notes for further CSF result  -continue mupirocin ointment x 5 days - d/c May 1st    #FEN/ENDO/HEME:    r/o adrenal insufficiency, r/o DI (resolved)    -obtain CMP/Mg++/PO--4/CBC w diff/PT/PTT/INR  q. a.m.  -continue hydrocortisone 50 mg IV q 8 hrs, decrease to 25 mg IV q 12 on 4/28/22  -POC glucose with ISS q 6 hrs - maintain glucose 140 -180  -s/p fludrocortisone   DVT prophylaxis with heparin subq             Assessment:  61 yr old male with stated hx significant for cerebral ataxia, chronic left cerebellum lacunar infarcts who presented with progressive weakness/fatigue accompanied with NBNB emesis found to have asp pneum, MRI findings of leptomeningeal enhancement concerning for infectious vs malignancy. Course c/b septic shock (resolved) r/o adrenal insufficiency, AMS requiring intubation for airway protection.  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    -Neuro checks q 2 hrs and prn for changes  -activity as tolerated  -physical therapy consult when stable  -s/p LP (4/23/22) - f/u results recommend by neurology -  (PCR neg, Cryptococcal antigen neg, Culture NGTD)   -obtained neuro surg consult for brain bx - see recs  -consider repeat LP    #PULM:   AMS, intubated for airway protection, asp pneum    -Mechanical Vent Therapy - titrate to maintain ph 7.35-7.45; PCO2 35-45; SPO2 > 92%  -Bronchodilator therapy q 6 hrs prn sob/wheezes  -Lung protective therapy  -PSV trials as tolerated    #CV:  resolved septic shock,  new onset afib     -rate controlled  -continue atorvastatin   -s/p norepi gtt (d/c 4/24/22)  -TTE 4/22/22 - EF 70%, mild tricuspid regurg, Nl RV/LV  -continue to monitor    #GI/:     -continue TF as tolerated  -strict I & O 's - keep even  -will d/c protonix    #ID: asp pneu, resolved septic shock, leptomeningeal enhancement    -s/p acyclovir (4/21/22 - 4/25/22)  -s/p ceftriaxone (4/21/22 -4/26/22)  -s/p ampicillin (4/21/22- 4/26/22)  -s/p zosyn (4/18/22 - 4/21/22)  -s/p vanco (4/20/22 - 4/25/22)  -MRSA PCR 4/23/22 - ND  -MSSA PCR 4/23/22 - detected  -Blood Cx 4/23/22 - NGTD  -EBV 4/23/22 - ND  -CSF 4/22/22 - No organisms seen  -CSF PCR 4/22/22 - ND  -see neuro notes for further CSF result  -continue mupirocin ointment x 5 days - d/c May 1st    #FEN/ENDO/HEME:    r/o adrenal insufficiency, r/o DI (resolved)    -obtain CMP/Mg++/PO--4/CBC w diff/PT/PTT/INR  q. a.m.  -continue hydrocortisone 50 mg IV q 8 hrs, decrease to 25 mg IV q 12 on 4/28/22  -POC glucose with ISS q 6 hrs - maintain glucose 140 -180  -s/p fludrocortisone   DVT prophylaxis with heparin subq

## 2022-04-27 NOTE — CHART NOTE - NSCHARTNOTEFT_GEN_A_CORE
LP attempted via strict sterile technique . Pt was placed Rt lateral with knees to chest, area cleansed with betadine solution swabs.  Unsuccessful x 3 attempts. Pt tolerated procedure. site without redness, infiltration, drainage. Dry sterile dressing applied to site

## 2022-04-27 NOTE — PROGRESS NOTE ADULT - SUBJECTIVE AND OBJECTIVE BOX
CHIEF COMPLAINT:  Patient is a 61y old  Male who presents with a chief complaint of AMS 2/2 Pneumonia (26 Apr 2022 17:43)    HPI:          Interval Events:      REVIEW OF SYSTEMS:          OBJECTIVE:  ICU Vital Signs Last 24 Hrs  T(C): 37 (27 Apr 2022 04:00), Max: 43 (26 Apr 2022 17:00)  T(F): 98.6 (27 Apr 2022 04:00), Max: 109.4 (26 Apr 2022 17:00)  HR: 95 (27 Apr 2022 04:00) (69 - 105)  BP: 103/55 (27 Apr 2022 04:00) (87/57 - 124/83)  BP(mean): 73 (27 Apr 2022 04:00) (66 - 99)  ABP: --  ABP(mean): --  RR: 12 (27 Apr 2022 04:00) (12 - 36)  SpO2: 96% (27 Apr 2022 04:00) (93% - 100%)    Mode: AC/ CMV (Assist Control/ Continuous Mandatory Ventilation), RR (machine): 12, TV (machine): 400, FiO2: 21, PEEP: 5, ITime: 1, MAP: 8, PIP: 20    04-25 @ 07:01 - 04-26 @ 07:00  --------------------------------------------------------  IN: 2020 mL / OUT: 2415 mL / NET: -395 mL    04-26 @ 07:01 - 04-27 @ 04:50  --------------------------------------------------------  IN: 2130 mL / OUT: 820 mL / NET: 1310 mL      CAPILLARY BLOOD GLUCOSE      POCT Blood Glucose.: 130 mg/dL (26 Apr 2022 17:02)          PHYSICAL EXAM:          HOSPITAL MEDICATIONS:  MEDICATIONS  (STANDING):  albuterol/ipratropium for Nebulization 3 milliLiter(s) Nebulizer every 6 hours  atorvastatin 20 milliGRAM(s) Oral at bedtime  chlorhexidine 0.12% Liquid 15 milliLiter(s) Oral Mucosa every 12 hours  dextrose 50% Injectable 50 milliLiter(s) IV Push every 15 minutes  dextrose 50% Injectable 25 milliLiter(s) IV Push every 15 minutes  folic acid 1 milliGRAM(s) Oral daily  heparin   Injectable 5000 Unit(s) SubCutaneous every 12 hours  hydrocortisone sodium succinate Injectable 50 milliGRAM(s) IV Push every 8 hours  insulin lispro (ADMELOG) corrective regimen sliding scale   SubCutaneous every 6 hours  multivitamin 1 Tablet(s) Oral daily  mupirocin 2% Ointment 1 Application(s) Both Nostrils two times a day  pantoprazole  Injectable 40 milliGRAM(s) IV Push daily  polyethylene glycol 3350 17 Gram(s) Oral daily  senna Syrup 5 milliLiter(s) Oral at bedtime  thiamine 100 milliGRAM(s) Oral daily    MEDICATIONS  (PRN):  aluminum hydroxide/magnesium hydroxide/simethicone Suspension 30 milliLiter(s) Oral every 4 hours PRN Dyspepsia  ondansetron Injectable 4 milliGRAM(s) IV Push every 8 hours PRN Nausea and/or Vomiting      LABS:                        9.1    14.30 )-----------( 135      ( 27 Apr 2022 00:13 )             27.6     Hgb Trend: 9.1<--, 9.4<--, 8.8<--, 8.4<--, 9.4<--  04-27    143  |  108  |  21  ----------------------------<  127<H>  3.7   |  26  |  0.71    Ca    8.4      27 Apr 2022 00:13  Phos  2.5     04-27  Mg     2.3     04-27    TPro  5.2<L>  /  Alb  2.9<L>  /  TBili  0.1<L>  /  DBili  x   /  AST  34  /  ALT  37  /  AlkPhos  56  04-27    LIVER FUNCTIONS - ( 27 Apr 2022 00:13 )  Alb: 2.9 g/dL / Pro: 5.2 g/dL / ALK PHOS: 56 U/L / ALT: 37 U/L / AST: 34 U/L / GGT: x           Creatinine Trend: 0.71<--, 0.65<--, 0.77<--, 0.71<--, 0.76<--, 0.63<--  PT/INR - ( 26 Apr 2022 12:25 )   PT: 11.8 sec;   INR: 1.03 ratio         PTT - ( 27 Apr 2022 00:13 )  PTT:26.2 sec    Arterial Blood Gas:  04-27 @ 00:09  7.49/41/77/31/97.5/7.2  ABG lactate: --  Arterial Blood Gas:  04-26 @ 00:00  7.48/40/83/30/99.1/5.8  ABG lactate: --    Venous Blood Gas:  04-25 @ 10:21  7.45/41/62/28/93.4  VBG Lactate: --      MICROBIOLOGY:     RADIOLOGY:  [ ] Reviewed and interpreted by me    EKG:      [facility] (ext [phone]) CHIEF COMPLAINT:  Patient is a 61y old  Male who presents with a chief complaint of AMS 2/2 Pneumonia (26 Apr 2022 17:43)    HPI:  61 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 Pt found to have circumferential esophagitis, mucoid impaction of LLL, asp pneum RUL/RLL (CT C/A/P 4/17/22), chronic left cerebellum lacunar infarcts (CT  4/17/22; 4/23/22), diffuse leptomeningeal enhancement (MRI 4/20/22) concerning for infection vs metastatic dz.      This hospital course initially c/b episodes of hypotension/hypothermia/bradycardia refractory to IVF therapy requiring vasopressor therapy (d/c'ed 4/24/22) and transfer to MICU 4/22/22 for septic shock secondary to asp pneum vs adrenal insufficiency, tx with steroids/antibx therapy (ceftriaxone 4/26/22) in addition to prophylactically tx for suspected infectious meningitis, eventually d/c'ed after  LP from 4/23/22 demonstrated neg findings. Course further c/b worsening AMS with AGMA (resolved) requiring intubation for airway protection (4/23/22), large UOP concerning for DI (now resolved). Pt remains intubated, responsive, with continued workup for metastatic/autoimmune/infectious processes                  Interval Events:      REVIEW OF SYSTEMS:          OBJECTIVE:  ICU Vital Signs Last 24 Hrs  T(C): 37 (27 Apr 2022 04:00), Max: 43 (26 Apr 2022 17:00)  T(F): 98.6 (27 Apr 2022 04:00), Max: 109.4 (26 Apr 2022 17:00)  HR: 95 (27 Apr 2022 04:00) (69 - 105)  BP: 103/55 (27 Apr 2022 04:00) (87/57 - 124/83)  BP(mean): 73 (27 Apr 2022 04:00) (66 - 99)  ABP: --  ABP(mean): --  RR: 12 (27 Apr 2022 04:00) (12 - 36)  SpO2: 96% (27 Apr 2022 04:00) (93% - 100%)    Mode: AC/ CMV (Assist Control/ Continuous Mandatory Ventilation), RR (machine): 12, TV (machine): 400, FiO2: 21, PEEP: 5, ITime: 1, MAP: 8, PIP: 20    04-25 @ 07:01 - 04-26 @ 07:00  --------------------------------------------------------  IN: 2020 mL / OUT: 2415 mL / NET: -395 mL    04-26 @ 07:01 - 04-27 @ 04:50  --------------------------------------------------------  IN: 2130 mL / OUT: 820 mL / NET: 1310 mL      CAPILLARY BLOOD GLUCOSE      POCT Blood Glucose.: 130 mg/dL (26 Apr 2022 17:02)          PHYSICAL EXAM:          HOSPITAL MEDICATIONS:  MEDICATIONS  (STANDING):  albuterol/ipratropium for Nebulization 3 milliLiter(s) Nebulizer every 6 hours  atorvastatin 20 milliGRAM(s) Oral at bedtime  chlorhexidine 0.12% Liquid 15 milliLiter(s) Oral Mucosa every 12 hours  dextrose 50% Injectable 50 milliLiter(s) IV Push every 15 minutes  dextrose 50% Injectable 25 milliLiter(s) IV Push every 15 minutes  folic acid 1 milliGRAM(s) Oral daily  heparin   Injectable 5000 Unit(s) SubCutaneous every 12 hours  hydrocortisone sodium succinate Injectable 50 milliGRAM(s) IV Push every 8 hours  insulin lispro (ADMELOG) corrective regimen sliding scale   SubCutaneous every 6 hours  multivitamin 1 Tablet(s) Oral daily  mupirocin 2% Ointment 1 Application(s) Both Nostrils two times a day  pantoprazole  Injectable 40 milliGRAM(s) IV Push daily  polyethylene glycol 3350 17 Gram(s) Oral daily  senna Syrup 5 milliLiter(s) Oral at bedtime  thiamine 100 milliGRAM(s) Oral daily    MEDICATIONS  (PRN):  aluminum hydroxide/magnesium hydroxide/simethicone Suspension 30 milliLiter(s) Oral every 4 hours PRN Dyspepsia  ondansetron Injectable 4 milliGRAM(s) IV Push every 8 hours PRN Nausea and/or Vomiting      LABS:                        9.1    14.30 )-----------( 135      ( 27 Apr 2022 00:13 )             27.6     Hgb Trend: 9.1<--, 9.4<--, 8.8<--, 8.4<--, 9.4<--  04-27    143  |  108  |  21  ----------------------------<  127<H>  3.7   |  26  |  0.71    Ca    8.4      27 Apr 2022 00:13  Phos  2.5     04-27  Mg     2.3     04-27    TPro  5.2<L>  /  Alb  2.9<L>  /  TBili  0.1<L>  /  DBili  x   /  AST  34  /  ALT  37  /  AlkPhos  56  04-27    LIVER FUNCTIONS - ( 27 Apr 2022 00:13 )  Alb: 2.9 g/dL / Pro: 5.2 g/dL / ALK PHOS: 56 U/L / ALT: 37 U/L / AST: 34 U/L / GGT: x           Creatinine Trend: 0.71<--, 0.65<--, 0.77<--, 0.71<--, 0.76<--, 0.63<--  PT/INR - ( 26 Apr 2022 12:25 )   PT: 11.8 sec;   INR: 1.03 ratio         PTT - ( 27 Apr 2022 00:13 )  PTT:26.2 sec    Arterial Blood Gas:  04-27 @ 00:09  7.49/41/77/31/97.5/7.2  ABG lactate: --  Arterial Blood Gas:  04-26 @ 00:00  7.48/40/83/30/99.1/5.8  ABG lactate: --    Venous Blood Gas:  04-25 @ 10:21  7.45/41/62/28/93.4  VBG Lactate: --      MICROBIOLOGY:     RADIOLOGY:  [ ] Reviewed and interpreted by me    EKG:      [facility]-[facility] (ext [phone]) CHIEF COMPLAINT:  Patient is a 61y old  Male who presents with a chief complaint of AMS 2/2 Pneumonia (26 Apr 2022 17:43)    HPI:  61 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 Pt found to have circumferential esophagitis, mucoid impaction of LLL, asp pneum RUL/RLL (CT C/A/P 4/17/22), chronic left cerebellum lacunar infarcts (CT  4/17/22; 4/23/22), diffuse leptomeningeal enhancement (MRI 4/20/22) concerning for infection vs metastatic dz.      This hospital course initially c/b episodes of hypotension/hypothermia/bradycardia refractory to IVF therapy requiring vasopressor therapy (d/c'ed 4/24/22) and transfer to MICU 4/22/22 for septic shock secondary to asp pneum vs adrenal insufficiency, tx with steroids/antibx therapy (ceftriaxone 4/26/22) in addition to prophylactically tx for suspected infectious meningitis, eventually d/c'ed after  LP from 4/23/22 demonstrated neg findings. Course further c/b worsening AMS with AGMA (resolved) requiring intubation for airway protection (4/23/22), large UOP concerning for DI (now resolved). Pt remains intubated, responsive, with continued workup for metastatic/autoimmune/infectious processes                  Interval Events:      REVIEW OF SYSTEMS:  intubated, slow to respond, answers simple questions by shaking head yes/no. denies sob/chest pain        OBJECTIVE:  ICU Vital Signs Last 24 Hrs  T(C): 37 (27 Apr 2022 04:00), Max: 43 (26 Apr 2022 17:00)  T(F): 98.6 (27 Apr 2022 04:00), Max: 109.4 (26 Apr 2022 17:00)  HR: 95 (27 Apr 2022 04:00) (69 - 105)  BP: 103/55 (27 Apr 2022 04:00) (87/57 - 124/83)  BP(mean): 73 (27 Apr 2022 04:00) (66 - 99)  ABP: --  ABP(mean): --  RR: 12 (27 Apr 2022 04:00) (12 - 36)  SpO2: 96% (27 Apr 2022 04:00) (93% - 100%)    Mode: AC/ CMV (Assist Control/ Continuous Mandatory Ventilation), RR (machine): 12, TV (machine): 400, FiO2: 21, PEEP: 5, ITime: 1, MAP: 8, PIP: 20    04-25 @ 07:01 - 04-26 @ 07:00  --------------------------------------------------------  IN: 2020 mL / OUT: 2415 mL / NET: -395 mL    04-26 @ 07:01 - 04-27 @ 04:50  --------------------------------------------------------  IN: 2130 mL / OUT: 820 mL / NET: 1310 mL      CAPILLARY BLOOD GLUCOSE      POCT Blood Glucose.: 130 mg/dL (26 Apr 2022 17:02)          PHYSICAL EXAM:  GENERAL:   NAD, well-groomed, well-developed    HEAD:    Atraumatic, Normocephalic    EYES:   PERRL 4 mm, conjunctiva and sclera clear    ENMT:   No oropharyngeal exudates, erythema or lesions,  Moist mucous membranes    NECK:   Supple, no cervical lymphadenopathy, no JVD    NERVOUS SYSTEM:  Responsive to verbal stimuli, focuses upon examiner, answers simple questions yes and no by shaking head  ,follows simple commands of demonstrating digits and extremities , lethargic,, CN II-XII intact, has spontaneous purposeful movement of  all extremities ; Upper extremities  4-5/5; Lower extremities 3/5, full sensation to light touch     CHEST/LUNG:   orally intubated, triggers vent by 12  Breaths, Pip 24   Ppl 14  DP 9  .Breath sounds bilaterally,  No crackles, diffuse rhonchi, without wheezing, or rubs. POCUS A line predominant with focal B lines concentrated in LLL field, small bilat pleural effusions, small areas of consolidation/bronchograms appreciated in bilat lower lung fields    HEART:   cardiac monitor Afib   ; S1/S2 No murmurs, rubs, or gallops, POCUS good LVFx RV<LV, VTI of 19, IVC 1.6    ABDOMEN:   Soft, Nontender, distended; Bowel sounds present, Bladder non distended, non palpable    EXTREMITIES:   Pulses palpable radial pulses 2+ bilat, DP/PT 2+/1+ bilat, without clubbing, cyanosis. Digits warm to touch with good cap refill < 3 secs, 1+-2+ bilat lower extremity edema    SKIN:   warm, dry, intact, normal color, no rash or abnormal lesions, without palpable nodes        HOSPITAL MEDICATIONS:  MEDICATIONS  (STANDING):  albuterol/ipratropium for Nebulization 3 milliLiter(s) Nebulizer every 6 hours  atorvastatin 20 milliGRAM(s) Oral at bedtime  chlorhexidine 0.12% Liquid 15 milliLiter(s) Oral Mucosa every 12 hours  dextrose 50% Injectable 50 milliLiter(s) IV Push every 15 minutes  dextrose 50% Injectable 25 milliLiter(s) IV Push every 15 minutes  folic acid 1 milliGRAM(s) Oral daily  heparin   Injectable 5000 Unit(s) SubCutaneous every 12 hours  hydrocortisone sodium succinate Injectable 50 milliGRAM(s) IV Push every 8 hours  insulin lispro (ADMELOG) corrective regimen sliding scale   SubCutaneous every 6 hours  multivitamin 1 Tablet(s) Oral daily  mupirocin 2% Ointment 1 Application(s) Both Nostrils two times a day  pantoprazole  Injectable 40 milliGRAM(s) IV Push daily  polyethylene glycol 3350 17 Gram(s) Oral daily  senna Syrup 5 milliLiter(s) Oral at bedtime  thiamine 100 milliGRAM(s) Oral daily    MEDICATIONS  (PRN):  aluminum hydroxide/magnesium hydroxide/simethicone Suspension 30 milliLiter(s) Oral every 4 hours PRN Dyspepsia  ondansetron Injectable 4 milliGRAM(s) IV Push every 8 hours PRN Nausea and/or Vomiting      LABS:                        9.1    14.30 )-----------( 135      ( 27 Apr 2022 00:13 )             27.6     Hgb Trend: 9.1<--, 9.4<--, 8.8<--, 8.4<--, 9.4<--  04-27    143  |  108  |  21  ----------------------------<  127<H>  3.7   |  26  |  0.71    Ca    8.4      27 Apr 2022 00:13  Phos  2.5     04-27  Mg     2.3     04-27    TPro  5.2<L>  /  Alb  2.9<L>  /  TBili  0.1<L>  /  DBili  x   /  AST  34  /  ALT  37  /  AlkPhos  56  04-27    LIVER FUNCTIONS - ( 27 Apr 2022 00:13 )  Alb: 2.9 g/dL / Pro: 5.2 g/dL / ALK PHOS: 56 U/L / ALT: 37 U/L / AST: 34 U/L / GGT: x           Creatinine Trend: 0.71<--, 0.65<--, 0.77<--, 0.71<--, 0.76<--, 0.63<--  PT/INR - ( 26 Apr 2022 12:25 )   PT: 11.8 sec;   INR: 1.03 ratio         PTT - ( 27 Apr 2022 00:13 )  PTT:26.2 sec    Arterial Blood Gas:  04-27 @ 00:09  7.49/41/77/31/97.5/7.2  ABG lactate: --  Arterial Blood Gas:  04-26 @ 00:00  7.48/40/83/30/99.1/5.8  ABG lactate: --    Venous Blood Gas:  04-25 @ 10:21  7.45/41/62/28/93.4  VBG Lactate: --      MICROBIOLOGY:     RADIOLOGY:  [ ] Reviewed and interpreted by me    EKG:      [facility] (ext [phone]) CHIEF COMPLAINT:  Patient is a 61y old  Male who presents with a chief complaint of AMS 2/2 Pneumonia (26 Apr 2022 17:43)    HPI:  61 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 Pt found to have circumferential esophagitis, mucoid impaction of LLL, asp pneum RUL/RLL (CT C/A/P 4/17/22), chronic left cerebellum lacunar infarcts (CT  4/17/22; 4/23/22), diffuse leptomeningeal enhancement (MRI 4/20/22) concerning for infection vs metastatic dz.      This hospital course initially c/b episodes of hypotension/hypothermia/bradycardia refractory to IVF therapy requiring vasopressor therapy (d/c'ed 4/24/22) and transfer to MICU 4/22/22 for septic shock secondary to asp pneum vs adrenal insufficiency (cortisol 6.4 4/21/22), tx with steroids/antibx therapy (ceftriaxone 4/26/22) in addition to prophylactically tx for suspected infectious meningitis, eventually d/c'ed after  LP from 4/23/22 demonstrated neg findings. Course further c/b worsening AMS with AGMA (resolved) requiring intubation for airway protection (4/23/22), large UOP concerning for DI (now resolved). Pt remains intubated, responsive, with continued workup for metastatic/autoimmune/infectious processes                  Interval Events:      REVIEW OF SYSTEMS:  intubated, slow to respond, answers simple questions by shaking head yes/no. denies sob/chest pain        OBJECTIVE:  ICU Vital Signs Last 24 Hrs  T(C): 37 (27 Apr 2022 04:00), Max: 43 (26 Apr 2022 17:00)  T(F): 98.6 (27 Apr 2022 04:00), Max: 109.4 (26 Apr 2022 17:00)  HR: 95 (27 Apr 2022 04:00) (69 - 105)  BP: 103/55 (27 Apr 2022 04:00) (87/57 - 124/83)  BP(mean): 73 (27 Apr 2022 04:00) (66 - 99)  ABP: --  ABP(mean): --  RR: 12 (27 Apr 2022 04:00) (12 - 36)  SpO2: 96% (27 Apr 2022 04:00) (93% - 100%)    Mode: AC/ CMV (Assist Control/ Continuous Mandatory Ventilation), RR (machine): 12, TV (machine): 400, FiO2: 21, PEEP: 5, ITime: 1, MAP: 8, PIP: 20    04-25 @ 07:01  -  04-26 @ 07:00  --------------------------------------------------------  IN: 2020 mL / OUT: 2415 mL / NET: -395 mL    04-26 @ 07:01 - 04-27 @ 04:50  --------------------------------------------------------  IN: 2130 mL / OUT: 820 mL / NET: 1310 mL      CAPILLARY BLOOD GLUCOSE      POCT Blood Glucose.: 130 mg/dL (26 Apr 2022 17:02)          PHYSICAL EXAM:  GENERAL:   NAD, well-groomed, well-developed    HEAD:    Atraumatic, Normocephalic    EYES:   PERRL 4 mm, conjunctiva and sclera clear    ENMT:   No oropharyngeal exudates, erythema or lesions,  Moist mucous membranes    NECK:   Supple, no cervical lymphadenopathy, no JVD    NERVOUS SYSTEM:  Responsive to verbal stimuli, focuses upon examiner, answers simple questions yes and no by shaking head  ,follows simple commands of demonstrating digits and extremities , lethargic,, CN II-XII intact, has spontaneous purposeful movement of  all extremities ; Upper extremities  4-5/5; Lower extremities 3/5, full sensation to light touch     CHEST/LUNG:   orally intubated, triggers vent by 12  Breaths, Pip 24   Ppl 14  DP 9  .Breath sounds bilaterally,  No crackles, diffuse rhonchi, without wheezing, or rubs. POCUS A line predominant with focal B lines concentrated in LLL field, small bilat pleural effusions, small areas of consolidation/bronchograms appreciated in bilat lower lung fields    HEART:   cardiac monitor Afib   ; S1/S2 No murmurs, rubs, or gallops, POCUS good LVFx RV<LV, VTI of 19, IVC 1.6    ABDOMEN:   Soft, Nontender, distended; Bowel sounds present, Bladder non distended, non palpable    EXTREMITIES:   Pulses palpable radial pulses 2+ bilat, DP/PT 2+/1+ bilat, without clubbing, cyanosis. Digits warm to touch with good cap refill < 3 secs, 1+-2+ bilat lower extremity edema    SKIN:   warm, dry, intact, normal color, no rash or abnormal lesions, without palpable nodes        HOSPITAL MEDICATIONS:  MEDICATIONS  (STANDING):  albuterol/ipratropium for Nebulization 3 milliLiter(s) Nebulizer every 6 hours  atorvastatin 20 milliGRAM(s) Oral at bedtime  chlorhexidine 0.12% Liquid 15 milliLiter(s) Oral Mucosa every 12 hours  dextrose 50% Injectable 50 milliLiter(s) IV Push every 15 minutes  dextrose 50% Injectable 25 milliLiter(s) IV Push every 15 minutes  folic acid 1 milliGRAM(s) Oral daily  heparin   Injectable 5000 Unit(s) SubCutaneous every 12 hours  hydrocortisone sodium succinate Injectable 50 milliGRAM(s) IV Push every 8 hours  insulin lispro (ADMELOG) corrective regimen sliding scale   SubCutaneous every 6 hours  multivitamin 1 Tablet(s) Oral daily  mupirocin 2% Ointment 1 Application(s) Both Nostrils two times a day  pantoprazole  Injectable 40 milliGRAM(s) IV Push daily  polyethylene glycol 3350 17 Gram(s) Oral daily  senna Syrup 5 milliLiter(s) Oral at bedtime  thiamine 100 milliGRAM(s) Oral daily    MEDICATIONS  (PRN):  aluminum hydroxide/magnesium hydroxide/simethicone Suspension 30 milliLiter(s) Oral every 4 hours PRN Dyspepsia  ondansetron Injectable 4 milliGRAM(s) IV Push every 8 hours PRN Nausea and/or Vomiting      LABS:                        9.1    14.30 )-----------( 135      ( 27 Apr 2022 00:13 )             27.6     Hgb Trend: 9.1<--, 9.4<--, 8.8<--, 8.4<--, 9.4<--  04-27    143  |  108  |  21  ----------------------------<  127<H>  3.7   |  26  |  0.71    Ca    8.4      27 Apr 2022 00:13  Phos  2.5     04-27  Mg     2.3     04-27    TPro  5.2<L>  /  Alb  2.9<L>  /  TBili  0.1<L>  /  DBili  x   /  AST  34  /  ALT  37  /  AlkPhos  56  04-27    LIVER FUNCTIONS - ( 27 Apr 2022 00:13 )  Alb: 2.9 g/dL / Pro: 5.2 g/dL / ALK PHOS: 56 U/L / ALT: 37 U/L / AST: 34 U/L / GGT: x           Creatinine Trend: 0.71<--, 0.65<--, 0.77<--, 0.71<--, 0.76<--, 0.63<--  PT/INR - ( 26 Apr 2022 12:25 )   PT: 11.8 sec;   INR: 1.03 ratio         PTT - ( 27 Apr 2022 00:13 )  PTT:26.2 sec    Arterial Blood Gas:  04-27 @ 00:09  7.49/41/77/31/97.5/7.2  ABG lactate: --  Arterial Blood Gas:  04-26 @ 00:00  7.48/40/83/30/99.1/5.8  ABG lactate: --    Venous Blood Gas:  04-25 @ 10:21  7.45/41/62/28/93.4  VBG Lactate: --      MICROBIOLOGY:     RADIOLOGY:  [ ] Reviewed and interpreted by me    EKG:      [facility] (ext [phone])

## 2022-04-27 NOTE — CHART NOTE - NSCHARTNOTEFT_GEN_A_CORE
:  Christiano Stubbs    INDICATION:  assessment    PROCEDURE:  [xxxx ] LIMITED ECHO  [xxxx ] LIMITED CHEST  [ ] LIMITED RETROPERITONEAL  [ ] LIMITED ABDOMINAL  [ ] LIMITED DVT  [ ] NEEDLE GUIDANCE VASCULAR  [ ] NEEDLE GUIDANCE THORACENTESIS  [ ] NEEDLE GUIDANCE PARACENTESIS  [ ] NEEDLE GUIDANCE PERICARDIOCENTESIS  [ ] OTHER    FINDINGS:  A line predominant with focal B lines concentrated in LLL field, small bilat pleural effusions, small areas of consolidation/bronchograms appreciated in bilat lower lung fields    good LVFx RV<LV, VTI of 19, IVC 1.6    INTERPRETATION:  A line predominant, small bilat pleural effusions, bilat lower lung field bronchograms in setting of resolving asp pneum    Good LVFx

## 2022-04-28 ENCOUNTER — RESULT REVIEW (OUTPATIENT)
Age: 62
End: 2022-04-28

## 2022-04-28 LAB
ALBUMIN SERPL ELPH-MCNC: 2.9 G/DL — LOW (ref 3.3–5)
ALP SERPL-CCNC: 61 U/L — SIGNIFICANT CHANGE UP (ref 40–120)
ALT FLD-CCNC: 55 U/L — HIGH (ref 10–45)
ANION GAP SERPL CALC-SCNC: 11 MMOL/L — SIGNIFICANT CHANGE UP (ref 5–17)
APPEARANCE CSF: CLEAR — SIGNIFICANT CHANGE UP
APTT BLD: 26 SEC — LOW (ref 27.5–35.5)
AST SERPL-CCNC: 38 U/L — SIGNIFICANT CHANGE UP (ref 10–40)
BASOPHILS # BLD AUTO: 0.02 K/UL — SIGNIFICANT CHANGE UP (ref 0–0.2)
BASOPHILS NFR BLD AUTO: 0.2 % — SIGNIFICANT CHANGE UP (ref 0–2)
BILIRUB SERPL-MCNC: 0.1 MG/DL — LOW (ref 0.2–1.2)
BUN SERPL-MCNC: 24 MG/DL — HIGH (ref 7–23)
CALCIUM SERPL-MCNC: 8.3 MG/DL — LOW (ref 8.4–10.5)
CHLORIDE SERPL-SCNC: 106 MMOL/L — SIGNIFICANT CHANGE UP (ref 96–108)
CO2 SERPL-SCNC: 25 MMOL/L — SIGNIFICANT CHANGE UP (ref 22–31)
COLOR CSF: SIGNIFICANT CHANGE UP
CREAT SERPL-MCNC: 0.58 MG/DL — SIGNIFICANT CHANGE UP (ref 0.5–1.3)
CRYPTOC AG CSF-ACNC: NEGATIVE — SIGNIFICANT CHANGE UP
CSF PCR RESULT: SIGNIFICANT CHANGE UP
CULTURE RESULTS: SIGNIFICANT CHANGE UP
CULTURE RESULTS: SIGNIFICANT CHANGE UP
EGFR: 112 ML/MIN/1.73M2 — SIGNIFICANT CHANGE UP
EOSINOPHIL # BLD AUTO: 0.2 K/UL — SIGNIFICANT CHANGE UP (ref 0–0.5)
EOSINOPHIL NFR BLD AUTO: 1.5 % — SIGNIFICANT CHANGE UP (ref 0–6)
GAS PNL BLDA: SIGNIFICANT CHANGE UP
GLUCOSE BLDC GLUCOMTR-MCNC: 108 MG/DL — HIGH (ref 70–99)
GLUCOSE BLDC GLUCOMTR-MCNC: 109 MG/DL — HIGH (ref 70–99)
GLUCOSE BLDC GLUCOMTR-MCNC: 95 MG/DL — SIGNIFICANT CHANGE UP (ref 70–99)
GLUCOSE BLDC GLUCOMTR-MCNC: 98 MG/DL — SIGNIFICANT CHANGE UP (ref 70–99)
GLUCOSE CSF-MCNC: 32 MG/DL — LOW (ref 40–70)
GLUCOSE SERPL-MCNC: 117 MG/DL — HIGH (ref 70–99)
GRAM STN FLD: SIGNIFICANT CHANGE UP
HCT VFR BLD CALC: 30.1 % — LOW (ref 39–50)
HGB BLD-MCNC: 9.5 G/DL — LOW (ref 13–17)
IMM GRANULOCYTES NFR BLD AUTO: 4.4 % — HIGH (ref 0–1.5)
INR BLD: 1.02 RATIO — SIGNIFICANT CHANGE UP (ref 0.88–1.16)
LABORATORY COMMENT REPORT: SIGNIFICANT CHANGE UP
LDH CSF L TO P-CCNC: 20 U/L — SIGNIFICANT CHANGE UP
LDH FLD-CCNC: 20 U/L — SIGNIFICANT CHANGE UP
LYMPHOCYTES # BLD AUTO: 1.15 K/UL — SIGNIFICANT CHANGE UP (ref 1–3.3)
LYMPHOCYTES # BLD AUTO: 8.6 % — LOW (ref 13–44)
LYMPHOCYTES # CSF: 95 % — HIGH (ref 40–80)
MAGNESIUM SERPL-MCNC: 2.2 MG/DL — SIGNIFICANT CHANGE UP (ref 1.6–2.6)
MCHC RBC-ENTMCNC: 28.4 PG — SIGNIFICANT CHANGE UP (ref 27–34)
MCHC RBC-ENTMCNC: 31.6 GM/DL — LOW (ref 32–36)
MCV RBC AUTO: 89.9 FL — SIGNIFICANT CHANGE UP (ref 80–100)
MONOCYTES # BLD AUTO: 0.67 K/UL — SIGNIFICANT CHANGE UP (ref 0–0.9)
MONOCYTES NFR BLD AUTO: 5 % — SIGNIFICANT CHANGE UP (ref 2–14)
MONOS+MACROS NFR CSF: 4 % — LOW (ref 15–45)
NEUTROPHILS # BLD AUTO: 10.68 K/UL — HIGH (ref 1.8–7.4)
NEUTROPHILS # CSF: 1 % — SIGNIFICANT CHANGE UP (ref 0–6)
NEUTROPHILS NFR BLD AUTO: 80.3 % — HIGH (ref 43–77)
NIGHT BLUE STAIN TISS: SIGNIFICANT CHANGE UP
NRBC # BLD: 0 /100 WBCS — SIGNIFICANT CHANGE UP (ref 0–0)
NRBC NFR CSF: 22 /UL — HIGH (ref 0–5)
PHOSPHATE SERPL-MCNC: 3 MG/DL — SIGNIFICANT CHANGE UP (ref 2.5–4.5)
PLATELET # BLD AUTO: 152 K/UL — SIGNIFICANT CHANGE UP (ref 150–400)
POTASSIUM SERPL-MCNC: 3.5 MMOL/L — SIGNIFICANT CHANGE UP (ref 3.5–5.3)
POTASSIUM SERPL-SCNC: 3.5 MMOL/L — SIGNIFICANT CHANGE UP (ref 3.5–5.3)
PROT CSF-MCNC: 50 MG/DL — HIGH (ref 15–45)
PROT SERPL-MCNC: 5.6 G/DL — LOW (ref 6–8.3)
PROTHROM AB SERPL-ACNC: 11.8 SEC — SIGNIFICANT CHANGE UP (ref 10.5–13.4)
RBC # BLD: 3.35 M/UL — LOW (ref 4.2–5.8)
RBC # CSF: 1 /UL — HIGH (ref 0–0)
RBC # FLD: 14.2 % — SIGNIFICANT CHANGE UP (ref 10.3–14.5)
SODIUM SERPL-SCNC: 142 MMOL/L — SIGNIFICANT CHANGE UP (ref 135–145)
SOURCE HSV 1/2: SIGNIFICANT CHANGE UP
SPECIMEN SOURCE: SIGNIFICANT CHANGE UP
TUBE TYPE: SIGNIFICANT CHANGE UP
WBC # BLD: 13.31 K/UL — HIGH (ref 3.8–10.5)
WBC # FLD AUTO: 13.31 K/UL — HIGH (ref 3.8–10.5)
WNV IGG CSF IA-ACNC: SIGNIFICANT CHANGE UP
WNV IGM CSF IA-ACNC: NEGATIVE — SIGNIFICANT CHANGE UP
ZINC SERPL-MCNC: 46 UG/DL — SIGNIFICANT CHANGE UP (ref 44–115)

## 2022-04-28 PROCEDURE — 99291 CRITICAL CARE FIRST HOUR: CPT

## 2022-04-28 PROCEDURE — 88189 FLOWCYTOMETRY/READ 16 & >: CPT

## 2022-04-28 PROCEDURE — 99232 SBSQ HOSP IP/OBS MODERATE 35: CPT

## 2022-04-28 PROCEDURE — 88108 CYTOPATH CONCENTRATE TECH: CPT | Mod: 26

## 2022-04-28 PROCEDURE — 62328 DX LMBR SPI PNXR W/FLUOR/CT: CPT

## 2022-04-28 RX ORDER — HYDROCORTISONE 20 MG
25 TABLET ORAL EVERY 8 HOURS
Refills: 0 | Status: DISCONTINUED | OUTPATIENT
Start: 2022-04-28 | End: 2022-04-30

## 2022-04-28 RX ORDER — TAMSULOSIN HYDROCHLORIDE 0.4 MG/1
0.4 CAPSULE ORAL AT BEDTIME
Refills: 0 | Status: DISCONTINUED | OUTPATIENT
Start: 2022-04-28 | End: 2022-04-28

## 2022-04-28 RX ORDER — DOXAZOSIN MESYLATE 4 MG
2 TABLET ORAL AT BEDTIME
Refills: 0 | Status: DISCONTINUED | OUTPATIENT
Start: 2022-04-28 | End: 2022-05-06

## 2022-04-28 RX ADMIN — Medication 25 MILLIGRAM(S): at 23:03

## 2022-04-28 RX ADMIN — Medication 50 MILLIGRAM(S): at 05:20

## 2022-04-28 RX ADMIN — Medication 1 MILLIGRAM(S): at 11:27

## 2022-04-28 RX ADMIN — Medication 3 MILLILITER(S): at 17:02

## 2022-04-28 RX ADMIN — ATORVASTATIN CALCIUM 20 MILLIGRAM(S): 80 TABLET, FILM COATED ORAL at 23:03

## 2022-04-28 RX ADMIN — CHLORHEXIDINE GLUCONATE 15 MILLILITER(S): 213 SOLUTION TOPICAL at 17:17

## 2022-04-28 RX ADMIN — CHLORHEXIDINE GLUCONATE 15 MILLILITER(S): 213 SOLUTION TOPICAL at 05:20

## 2022-04-28 RX ADMIN — Medication 25 MILLIGRAM(S): at 15:00

## 2022-04-28 RX ADMIN — MUPIROCIN 1 APPLICATION(S): 20 OINTMENT TOPICAL at 17:16

## 2022-04-28 RX ADMIN — Medication 2 MILLIGRAM(S): at 23:03

## 2022-04-28 RX ADMIN — Medication 3 MILLILITER(S): at 11:54

## 2022-04-28 RX ADMIN — SENNA PLUS 5 MILLILITER(S): 8.6 TABLET ORAL at 23:01

## 2022-04-28 RX ADMIN — Medication 100 MILLIGRAM(S): at 11:27

## 2022-04-28 RX ADMIN — Medication 3 MILLILITER(S): at 05:43

## 2022-04-28 RX ADMIN — Medication 1 TABLET(S): at 11:27

## 2022-04-28 RX ADMIN — HEPARIN SODIUM 5000 UNIT(S): 5000 INJECTION INTRAVENOUS; SUBCUTANEOUS at 17:17

## 2022-04-28 RX ADMIN — Medication 3 MILLILITER(S): at 23:22

## 2022-04-28 RX ADMIN — MUPIROCIN 1 APPLICATION(S): 20 OINTMENT TOPICAL at 05:20

## 2022-04-28 NOTE — PROGRESS NOTE ADULT - CRITICAL CARE ATTENDING COMMENT
60 y/o M with PMH as above here with progressive weakness, admitted to the MICU with septic shock requiring vasopressor support.  Overnight, the patient became more altered and unresponsive requiring intubation and mechanical ventilation for airway protection.  Cont vasopressor support with goal MAP >65, wean as tolerated. Cont with empiric broad spectrum abx at this time.  LP with lymph pleocytosis, follow up additional studies.  Aspiration pneumonia, cont abx.  Cont stress dose steroids for possible adrenal insufficiency.  EEG and CTH with no acute findings.  Monitor UOP, concern for DI.  Patient examined and case reviewed in detail on bedside rounds. Frequent bedside visits with therapy change today.  Prognosis guarded.
60 y/o M with PMH as above here with progressive weakness, admitted to the MICU with septic shock requiring vasopressor support. Then the patient became more altered and unresponsive requiring intubation and mechanical ventilation for airway protection.  Cont vasopressor support with goal MAP >65, wean as tolerated. Cont with empiric broad spectrum abx at this time.  LP with lymph pleocytosis, follow up additional studies.  Aspiration pneumonia, cont abx.  Cont stress dose steroids for possible adrenal insufficiency. On vasopressin for pressor requirements and DI. EEG and CTH with no acute findings.  Monitor UOP, concern for DI.  Patient examined and case reviewed in detail on bedside rounds. Frequent bedside visits with therapy change today.  Prognosis guarded.
61M h/o ataxia, b/l lacunar infarcts, L cerebellar infarct with recent MRI showing diffuse leptomeningeal enhancements admitted 4/18 for worsening weakness/fatigue, transferred to MICU on 4/22 s/p RRT for likely septic shock with possible adrenal insufficiency, subsequently intubated the next day for worsening AMS with improved hemodynamics now more responsive off sedation, with waxing/waning mental status.  - mental status improved today, following commands and more alert  - Neuro IR consulted for repeat LP per neurosurg recs prior to possible biopsy  - solu-cortef to 25mg q8hr x3 days, then continue wean  - slightly improved performance on SBT though with prolonged apneic episodes - continue SBT as tolerates  - rate controlled pAFib noted - plan for full AC pending biopsy plan  - hold dvt ppx for planned LP with IR today
61M h/o ataxia, b/l lacunar infarcts, L cerebellar infarct with recent MRI showing diffuse leptomeningeal enhancements admitted 4/18 for worsening weakness/fatigue, transferred to MICU on 4/22 s/p RRT for likely septic shock with possible adrenal insufficiency, subsequently intubated the next day for worsening AMS with improved hemodynamics now more responsive off sedation, with waxing/waning mental status.  - intermittently following commands, taking spontanous breaths on ventilator, but not sustaining efforts for SBT   - plan to repeat LP per neurosurg recs; flow cytology from first LP can't r/o lymphoproliferative disorder, cytometry pending  - continue wean of stress dose steroids  - vent adjusted given slight alkalosis - RR decreaed to 380, RR to 10; pleural effusions appear to small to sample on POCUS  - rate controlled pAFib noted - plan for full AC pending biopsy plan  - dvt ppx with HSQ (to be resumed post-LP)

## 2022-04-28 NOTE — PROGRESS NOTE ADULT - SUBJECTIVE AND OBJECTIVE BOX
Status post lumbar puncture at the L2/L3  level utilizing a 20g spinal needle.      22cccc of clear cerebral spinal fluid was obtained.    No immediate complications.

## 2022-04-28 NOTE — PROGRESS NOTE ADULT - ASSESSMENT
61 yr old male with stated hx significant for cerebral ataxia, chronic left cerebellum lacunar infarcts who presented with progressive weakness/fatigue accompanied with NBNB emesis found to have asp pneum, MRI findings of leptomeningeal enhancement concerning for infectious vs malignancy. Course c/b septic shock (resolved) r/o adrenal insufficiency, AMS requiring intubation for airway protection.  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    -Neuro checks q 2 hrs and prn for changes  -activity as tolerated  -physical therapy consult when stable  -s/p LP (4/23/22) - f/u results recommend by neurology -  (PCR neg, Cryptococcal antigen neg, Culture NGTD)   -obtained neuro surg consult for brain bx - see recs  -failed LP attempt 4/27/22 - will consider repeat today  -precedex gtt - titrate to RASS of 0 to -2    #PULM:   AMS, intubated for airway protection, asp pneum    -Mechanical Vent Therapy - titrate to maintain ph 7.35-7.45; PCO2 35-45; SPO2 > 92%  -Bronchodilator therapy q 6 hrs prn sob/wheezes  -Lung protective therapy  -PSV trials as tolerated    #CV:  resolved septic shock,  new onset afib     -rate controlled  -continue atorvastatin   -s/p norepi gtt (d/c 4/24/22)  -TTE 4/22/22 - EF 70%, mild tricuspid regurg, Nl RV/LV  -continue to monitor    #GI/:      -large NGT residuals   -consider trickle TF as tolerated  -strict I & O 's - keep even    #ID: asp pneu, resolved septic shock, leptomeningeal enhancement    -s/p acyclovir (4/21/22 - 4/25/22)  -s/p ceftriaxone (4/21/22 -4/26/22)  -s/p ampicillin (4/21/22- 4/26/22)  -s/p zosyn (4/18/22 - 4/21/22)  -s/p vanco (4/20/22 - 4/25/22)  -MRSA PCR 4/23/22 - ND  -MSSA PCR 4/23/22 - detected  -Blood Cx 4/23/22 - NGTD  -EBV 4/23/22 - ND  -CSF 4/22/22 - No organisms seen  -CSF PCR 4/22/22 - ND  -see neuro notes for further CSF result  -continue mupirocin ointment x 5 days - d/c May 1st    #FEN/ENDO/HEME:    r/o adrenal insufficiency, r/o DI (resolved)    -obtain CMP/Mg++/PO--4/CBC w diff/PT/PTT/INR  q. a.m.  -continue hydrocortisone 50 mg IV q 8 hrs, decrease to 25 mg IV q 12 on 4/28/22  -POC glucose with ISS q 6 hrs - maintain glucose 140 -180  -s/p fludrocortisone   -DVT prophylaxis with heparin subq     61 yr old male with stated hx significant for cerebral ataxia, chronic left cerebellum lacunar infarcts who presented with progressive weakness/fatigue accompanied with NBNB emesis found to have asp pneum, MRI findings of leptomeningeal enhancement concerning for infectious vs malignancy. Course c/b septic shock (resolved) r/o adrenal insufficiency, AMS requiring intubation for airway protection.  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    -Neuro checks q 2 hrs and prn for changes  -activity as tolerated  -physical therapy consult when stable  -s/p LP (4/23/22) - f/u results recommend by neurology -  (PCR neg, Cryptococcal antigen neg, Culture NGTD)   -obtained neuro surg consult for brain bx - see recs  -failed LP attempt 4/27/22 - will consider repeat today  -precedex gtt - titrate to RASS of 0 to -2    #PULM:   AMS, intubated for airway protection, asp pneum    -Mechanical Vent Therapy - titrate to maintain ph 7.35-7.45; PCO2 35-45; SPO2 > 92%  -Bronchodilator therapy q 6 hrs prn sob/wheezes  -Lung protective therapy  -PSV trials as tolerated    #CV:  resolved septic shock,  new onset afib     -rate controlled  -continue atorvastatin   -s/p norepi gtt (d/c 4/24/22)  -TTE 4/22/22 - EF 70%, mild tricuspid regurg, Nl RV/LV  -continue to monitor    #GI/:      -large NGT residuals   -consider trickle TF as tolerated  -strict I & O 's - keep even    #ID: asp pneu, resolved septic shock, leptomeningeal enhancement    -s/p acyclovir (4/21/22 - 4/25/22)  -s/p ceftriaxone (4/21/22 -4/26/22)  -s/p ampicillin (4/21/22- 4/26/22)  -s/p zosyn (4/18/22 - 4/21/22)  -s/p vanco (4/20/22 - 4/25/22)  -MRSA PCR 4/23/22 - ND  -MSSA PCR 4/23/22 - detected  -Blood Cx 4/23/22 - NGTD  -EBV 4/23/22 - ND  -CSF 4/22/22 - No organisms seen  -CSF PCR 4/22/22 - ND  -see neuro notes for further CSF result  -continue mupirocin ointment x 5 days - d/c May 1st    #FEN/ENDO/HEME:    r/o adrenal insufficiency, r/o DI (resolved)    -obtain CMP/Mg++/PO--4/CBC w diff/PT/PTT/INR  q. a.m.  -continue hydrocortisone  decrease to 25 mg IV q 12 on 4/28/22  -POC glucose with ISS q 6 hrs - maintain glucose 140 -180  -s/p fludrocortisone   -DVT prophylaxis with heparin subq     61 yr old male with stated hx significant for cerebral ataxia, chronic left cerebellum lacunar infarcts who presented with progressive weakness/fatigue accompanied with NBNB emesis found to have asp pneum, MRI findings of leptomeningeal enhancement concerning for infectious vs malignancy. Course c/b septic shock (resolved) r/o adrenal insufficiency, AMS requiring intubation for airway protection.  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    -Neuro checks q 2 hrs and prn for changes  -activity as tolerated  -physical therapy consult when stable  -s/p LP (4/23/22) - f/u results recommend by neurology -  (PCR neg, Cryptococcal antigen neg, Culture NGTD)   -obtained neuro surg consult for brain bx - see recs  -failed LP attempt 4/27/22 - will consider repeat today  -precedex gtt - titrate to RASS of 0 to -2    #PULM:   AMS, intubated for airway protection, asp pneum    -Mechanical Vent Therapy - titrate to maintain ph 7.35-7.45; PCO2 35-45; SPO2 > 92%  -Bronchodilator therapy q 6 hrs prn sob/wheezes  -Lung protective therapy  -PSV trials as tolerated    #CV:  resolved septic shock,  new onset afib     -rate controlled  -continue atorvastatin   -s/p norepi gtt (d/c 4/24/22)  -TTE 4/22/22 - EF 70%, mild tricuspid regurg, Nl RV/LV  -continue to monitor    #GI/:      -large NGT residuals   -consider trickle TF as tolerated  -strict I & O 's - keep even    #ID: asp pneu, resolved septic shock, leptomeningeal enhancement    -s/p acyclovir (4/21/22 - 4/25/22)  -s/p ceftriaxone (4/21/22 -4/26/22)  -s/p ampicillin (4/21/22- 4/26/22)  -s/p zosyn (4/18/22 - 4/21/22)  -s/p vanco (4/20/22 - 4/25/22)  -MRSA PCR 4/23/22 - ND  -MSSA PCR 4/23/22 - detected  -Blood Cx 4/23/22 - NGTD  -EBV 4/23/22 - ND  -CSF 4/22/22 - No organisms seen  -CSF PCR 4/22/22 - ND  -see neuro notes for further CSF result  -continue mupirocin ointment x 5 days - d/c May 1st    #FEN/ENDO/HEME:    r/o adrenal insufficiency, r/o DI (resolved)    -obtain CMP/Mg++/PO--4/CBC w diff/PT/PTT/INR  q. a.m.  -continue hydrocortisone  decrease to 25 mg IV q 8 on 4/28/22 - will decrease to 25 mg IV q 12 on 4/30/22  -POC glucose with ISS q 6 hrs - maintain glucose 140 -180  -s/p fludrocortisone   -DVT prophylaxis with heparin subq

## 2022-04-28 NOTE — PROGRESS NOTE ADULT - SUBJECTIVE AND OBJECTIVE BOX
CC: Patient is a 61y old  Male who presents with a chief complaint of AMS 2/2 Pneumonia (28 Apr 2022 14:55)    ID Following for AMS    Interval History/ROS: patient s/p LP today with neuroradiology. Remains vented, intubated. Not on pressors. Not on sedation. Hypthermia. Blood cultures from 4/26 so far no growth.    Rest of ROS negative.    Allergies  No Known Allergies      ANTIMICROBIALS:      OTHER MEDS:  albuterol/ipratropium for Nebulization 3 milliLiter(s) Nebulizer every 6 hours  aluminum hydroxide/magnesium hydroxide/simethicone Suspension 30 milliLiter(s) Oral every 4 hours PRN  atorvastatin 20 milliGRAM(s) Oral at bedtime  chlorhexidine 0.12% Liquid 15 milliLiter(s) Oral Mucosa every 12 hours  dextrose 50% Injectable 50 milliLiter(s) IV Push every 15 minutes  dextrose 50% Injectable 25 milliLiter(s) IV Push every 15 minutes  doxazosin 2 milliGRAM(s) Oral at bedtime  folic acid 1 milliGRAM(s) Oral daily  heparin   Injectable 5000 Unit(s) SubCutaneous every 12 hours  hydrocortisone sodium succinate Injectable 25 milliGRAM(s) IV Push every 8 hours  insulin lispro (ADMELOG) corrective regimen sliding scale   SubCutaneous every 6 hours  multivitamin 1 Tablet(s) Oral daily  mupirocin 2% Ointment 1 Application(s) Both Nostrils two times a day  ondansetron Injectable 4 milliGRAM(s) IV Push every 8 hours PRN  polyethylene glycol 3350 17 Gram(s) Oral daily  senna Syrup 5 milliLiter(s) Oral at bedtime  thiamine 100 milliGRAM(s) Oral daily      PE:    Vital Signs Last 24 Hrs  T(C): 35.9 (28 Apr 2022 17:00), Max: 36.8 (27 Apr 2022 20:00)  T(F): 96.6 (28 Apr 2022 17:00), Max: 98.2 (27 Apr 2022 20:00)  HR: 92 (28 Apr 2022 17:17) (69 - 101)  BP: 98/64 (28 Apr 2022 17:00) (96/69 - 142/75)  BP(mean): 75 (28 Apr 2022 17:00) (73 - 103)  RR: 10 (28 Apr 2022 17:00) (10 - 23)  SpO2: 98% (28 Apr 2022 17:17) (94% - 99%)    Gen: intubated in ICU, vented  HEENT: ET tube  CV: S1+S2 normal, no murmurs  Resp: Coarse breath sounds  Abd: Soft, nontender, +BS  Ext: No LE edema, no wounds  : no Barnett  IV/Skin: No thrombophlebitis      LABS:                          9.5    13.31 )-----------( 152      ( 28 Apr 2022 00:17 )             30.1       04-28    142  |  106  |  24<H>  ----------------------------<  117<H>  3.5   |  25  |  0.58    Ca    8.3<L>      28 Apr 2022 00:16  Phos  3.0     04-28  Mg     2.2     04-28    TPro  5.6<L>  /  Alb  2.9<L>  /  TBili  0.1<L>  /  DBili  x   /  AST  38  /  ALT  55<H>  /  AlkPhos  61  04-28          MICROBIOLOGY:  v  .CSF CSF  04-28-22 --  --    No polymorphonuclear leukocytes seen  No organisms seen  by cytocentrifuge      .Blood Blood-Peripheral  04-26-22   No growth to date.  --  --      .Blood Blood-Peripheral  04-23-22   No Growth Final  --  --      .CSF CSF  04-22-22   No growth  --    polymorphonuclear leukocytes per low power field  No organisms seen  by cytocentrifuge      .Blood Blood-Peripheral  04-21-22   No Growth Final  --  --      Clean Catch Clean Catch (Midstream)  04-21-22   No growth  --  --      .Blood Blood-Peripheral  04-21-22   No Growth Final  --  --      Catheterized Catheterized  04-18-22   >=3 organisms. Probable collection contamination.  --  --      .Blood Blood  04-18-22   No Growth Final  --  --    Rapid RVP Result: NotDetec (04-23 @ 06:20)  EBV PCR: NotDetec IU/mL (04-23 @ 02:51)  HSV 1/2 PCR: NotDetec (04-22 @ 21:26)    RADIOLOGY:    < from: [facility] Duplex Lower Ext Vein Scan, Bilat (04.27.22 @ 13:42) >  IMPRESSION:  No evidence of deep venous thrombosis in either lower extremity.      < end of copied text >

## 2022-04-28 NOTE — PROGRESS NOTE ADULT - ASSESSMENT
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 In the ED stroke code was called negative for intracranial process. CT Chest with concern for pneumonia and esophagitis. Today RRT called for hypotension and bradycardia given atropine, now in ICU pending LP. 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 s/p LP with neuroradiology today - 16cc csf fluid collected.  Pt likely with worsening , found to have extensive leptomeningeal enhancement and cerebellar edema concerning for neoplastic vs. infectious process. Also would consider other causes of neurocognitive decline, including underlying prion disease, paraneoplastic syndromes, autoimmune / toxic causes.    Recommend:  #Leptomeningeal enhancement, AMS  -Infectious meningitis so far negative thus far,  possibly metastatic disease vs paraneoplastic  -CSF PCR, CSF HSV PCR and cultures are negative  -MRSA swab negative  -Blood cultures no growth  -Repeat LP today - F/U CSF studies, including flow cytometry of CSF to evaluate for lymphoproliferative process  - Neurosx following, evaluating for brain biopsy  -f/u repeat blood cultures    #Apsiration pna  -Completed 7 days of abx on 4/26    Casey Hutson MD  Available through MS Teams  If no response, or after 5pm/weekends, call [phone]

## 2022-04-28 NOTE — PROGRESS NOTE ADULT - SUBJECTIVE AND OBJECTIVE BOX
CLINICAL INDICATION:     Patient presents for fluoroscopically guided lumbar puncture and intrathecal chemotherapy administration.      ]Risks and benefits were discussed with the patient and/or patient health care proxy.  Risks discussed included bleeding, infection, nerve damage, and headache.

## 2022-04-29 LAB
ALBUMIN CSF-MCNC: 19.3 MG/DL — SIGNIFICANT CHANGE UP (ref 14–25)
ALBUMIN SERPL ELPH-MCNC: 2.6 G/DL — LOW (ref 3.3–5)
ALBUMIN SERPL ELPH-MCNC: 2485 MG/DL — LOW (ref 3500–5200)
ALP SERPL-CCNC: 50 U/L — SIGNIFICANT CHANGE UP (ref 40–120)
ALT FLD-CCNC: 40 U/L — SIGNIFICANT CHANGE UP (ref 10–45)
AMPA-R AB CBA, CSF: NEGATIVE — SIGNIFICANT CHANGE UP
AMPHIPHYSIN AB TITR CSF: NEGATIVE TITER — SIGNIFICANT CHANGE UP
ANION GAP SERPL CALC-SCNC: 8 MMOL/L — SIGNIFICANT CHANGE UP (ref 5–17)
APTT BLD: 21.1 SEC — LOW (ref 27.5–35.5)
AST SERPL-CCNC: 24 U/L — SIGNIFICANT CHANGE UP (ref 10–40)
B BURGDOR AB CSF-ACNC: REACTIVE
BASOPHILS # BLD AUTO: 0.01 K/UL — SIGNIFICANT CHANGE UP (ref 0–0.2)
BASOPHILS NFR BLD AUTO: 0.1 % — SIGNIFICANT CHANGE UP (ref 0–2)
BILIRUB SERPL-MCNC: 0.2 MG/DL — SIGNIFICANT CHANGE UP (ref 0.2–1.2)
BUN SERPL-MCNC: 19 MG/DL — SIGNIFICANT CHANGE UP (ref 7–23)
CALCIUM SERPL-MCNC: 8.3 MG/DL — LOW (ref 8.4–10.5)
CASPR2-IGG CBA, CSF: NEGATIVE — SIGNIFICANT CHANGE UP
CHLORIDE SERPL-SCNC: 105 MMOL/L — SIGNIFICANT CHANGE UP (ref 96–108)
CO2 SERPL-SCNC: 27 MMOL/L — SIGNIFICANT CHANGE UP (ref 22–31)
CREAT SERPL-MCNC: 0.71 MG/DL — SIGNIFICANT CHANGE UP (ref 0.5–1.3)
CV2 IGG TITR CSF: NEGATIVE TITER — SIGNIFICANT CHANGE UP
DPPX ANTIBODY IFA, CSF: NEGATIVE — SIGNIFICANT CHANGE UP
EGFR: 105 ML/MIN/1.73M2 — SIGNIFICANT CHANGE UP
EOSINOPHIL # BLD AUTO: 0.09 K/UL — SIGNIFICANT CHANGE UP (ref 0–0.5)
EOSINOPHIL NFR BLD AUTO: 1 % — SIGNIFICANT CHANGE UP (ref 0–6)
GABA-B-R AB CBA, CSF: NEGATIVE — SIGNIFICANT CHANGE UP
GAD65 AB CSF-SCNC: 0 NMOL/L — SIGNIFICANT CHANGE UP
GAS PNL BLDA: SIGNIFICANT CHANGE UP
GFAP IFA, CSF: NEGATIVE — SIGNIFICANT CHANGE UP
GLIAL NUC TYPE 1 AB TITR CSF: NEGATIVE TITER — SIGNIFICANT CHANGE UP
GLUCOSE BLDC GLUCOMTR-MCNC: 112 MG/DL — HIGH (ref 70–99)
GLUCOSE BLDC GLUCOMTR-MCNC: 113 MG/DL — HIGH (ref 70–99)
GLUCOSE BLDC GLUCOMTR-MCNC: 122 MG/DL — HIGH (ref 70–99)
GLUCOSE BLDC GLUCOMTR-MCNC: 86 MG/DL — SIGNIFICANT CHANGE UP (ref 70–99)
GLUCOSE SERPL-MCNC: 107 MG/DL — HIGH (ref 70–99)
HCT VFR BLD CALC: 27.1 % — LOW (ref 39–50)
HGB BLD-MCNC: 8.6 G/DL — LOW (ref 13–17)
HU1 AB TITR CSF IF: NEGATIVE TITER — SIGNIFICANT CHANGE UP
HU2 AB TITR CSF IF: NEGATIVE TITER — SIGNIFICANT CHANGE UP
HU3 AB TITR CSF: NEGATIVE TITER — SIGNIFICANT CHANGE UP
IGG CSF-MCNC: 62.3 MG/DL — HIGH
IGG FLD-MCNC: 931 MG/DL — SIGNIFICANT CHANGE UP (ref 610–1660)
IGG SYNTH RATE SER+CSF CALC-MRATE: 292 MG/DAY — HIGH
IGG/ALB CLEAR SER+CSF-RTO: 8.6 — HIGH
IGG/ALB CSF: 3.23 RATIO — HIGH
IGG/ALB SER: 0.37 RATIO — SIGNIFICANT CHANGE UP
IGLON5 IFA, CSF: NEGATIVE — SIGNIFICANT CHANGE UP
IMM GRANULOCYTES NFR BLD AUTO: 4.1 % — HIGH (ref 0–1.5)
IMMUNOLOGIST REVIEW: SIGNIFICANT CHANGE UP
INR BLD: 1.09 RATIO — SIGNIFICANT CHANGE UP (ref 0.88–1.16)
LGI1-IGG CBA, CSF: NEGATIVE — SIGNIFICANT CHANGE UP
LYMPHOCYTES # BLD AUTO: 0.92 K/UL — LOW (ref 1–3.3)
LYMPHOCYTES # BLD AUTO: 10.1 % — LOW (ref 13–44)
MAGNESIUM SERPL-MCNC: 2.2 MG/DL — SIGNIFICANT CHANGE UP (ref 1.6–2.6)
MCHC RBC-ENTMCNC: 28.7 PG — SIGNIFICANT CHANGE UP (ref 27–34)
MCHC RBC-ENTMCNC: 31.7 GM/DL — LOW (ref 32–36)
MCV RBC AUTO: 90.3 FL — SIGNIFICANT CHANGE UP (ref 80–100)
MGLUR1 AB IFA, CSF: NEGATIVE — SIGNIFICANT CHANGE UP
MONOCYTES # BLD AUTO: 0.44 K/UL — SIGNIFICANT CHANGE UP (ref 0–0.9)
MONOCYTES NFR BLD AUTO: 4.8 % — SIGNIFICANT CHANGE UP (ref 2–14)
NEUTROPHILS # BLD AUTO: 7.26 K/UL — SIGNIFICANT CHANGE UP (ref 1.8–7.4)
NEUTROPHILS NFR BLD AUTO: 79.9 % — HIGH (ref 43–77)
NIF IFA, CSF: NEGATIVE — SIGNIFICANT CHANGE UP
NMDA-R AB CBA, CSF: NEGATIVE — SIGNIFICANT CHANGE UP
NON-GYNECOLOGICAL CYTOLOGY STUDY: SIGNIFICANT CHANGE UP
NRBC # BLD: 0 /100 WBCS — SIGNIFICANT CHANGE UP (ref 0–0)
PCA-TR AB TITR CSF: NEGATIVE TITER — SIGNIFICANT CHANGE UP
PHOSPHATE SERPL-MCNC: 3.3 MG/DL — SIGNIFICANT CHANGE UP (ref 2.5–4.5)
PLATELET # BLD AUTO: 145 K/UL — LOW (ref 150–400)
POTASSIUM SERPL-MCNC: 3.5 MMOL/L — SIGNIFICANT CHANGE UP (ref 3.5–5.3)
POTASSIUM SERPL-SCNC: 3.5 MMOL/L — SIGNIFICANT CHANGE UP (ref 3.5–5.3)
PROT SERPL-MCNC: 5.1 G/DL — LOW (ref 6–8.3)
PROTHROM AB SERPL-ACNC: 12.5 SEC — SIGNIFICANT CHANGE UP (ref 10.5–13.4)
PURKINJE CELL CYTOPLASMIC AB TYPE 2: NEGATIVE TITER — SIGNIFICANT CHANGE UP
PURKINJE CELLS AB TITR CSF IF: NEGATIVE TITER — SIGNIFICANT CHANGE UP
RBC # BLD: 3 M/UL — LOW (ref 4.2–5.8)
RBC # FLD: 14 % — SIGNIFICANT CHANGE UP (ref 10.3–14.5)
REFLEX ADDED: SIGNIFICANT CHANGE UP
SODIUM SERPL-SCNC: 140 MMOL/L — SIGNIFICANT CHANGE UP (ref 135–145)
TM INTERPRETATION: SIGNIFICANT CHANGE UP
WBC # BLD: 9.09 K/UL — SIGNIFICANT CHANGE UP (ref 3.8–10.5)
WBC # FLD AUTO: 9.09 K/UL — SIGNIFICANT CHANGE UP (ref 3.8–10.5)

## 2022-04-29 PROCEDURE — 99291 CRITICAL CARE FIRST HOUR: CPT | Mod: GC

## 2022-04-29 PROCEDURE — 99232 SBSQ HOSP IP/OBS MODERATE 35: CPT

## 2022-04-29 RX ORDER — POTASSIUM CHLORIDE 20 MEQ
40 PACKET (EA) ORAL ONCE
Refills: 0 | Status: COMPLETED | OUTPATIENT
Start: 2022-04-29 | End: 2022-04-29

## 2022-04-29 RX ORDER — ATORVASTATIN CALCIUM 80 MG/1
20 TABLET, FILM COATED ORAL AT BEDTIME
Refills: 0 | Status: DISCONTINUED | OUTPATIENT
Start: 2022-04-29 | End: 2022-05-13

## 2022-04-29 RX ORDER — SODIUM CHLORIDE 9 MG/ML
500 INJECTION, SOLUTION INTRAVENOUS ONCE
Refills: 0 | Status: COMPLETED | OUTPATIENT
Start: 2022-04-29 | End: 2022-04-29

## 2022-04-29 RX ADMIN — Medication 25 MILLIGRAM(S): at 22:02

## 2022-04-29 RX ADMIN — MUPIROCIN 1 APPLICATION(S): 20 OINTMENT TOPICAL at 05:14

## 2022-04-29 RX ADMIN — MUPIROCIN 1 APPLICATION(S): 20 OINTMENT TOPICAL at 18:11

## 2022-04-29 RX ADMIN — HEPARIN SODIUM 5000 UNIT(S): 5000 INJECTION INTRAVENOUS; SUBCUTANEOUS at 05:17

## 2022-04-29 RX ADMIN — Medication 40 MILLIEQUIVALENT(S): at 02:30

## 2022-04-29 RX ADMIN — Medication 1 MILLIGRAM(S): at 11:54

## 2022-04-29 RX ADMIN — SODIUM CHLORIDE 1000 MILLILITER(S): 9 INJECTION, SOLUTION INTRAVENOUS at 21:41

## 2022-04-29 RX ADMIN — Medication 100 MILLIGRAM(S): at 11:54

## 2022-04-29 RX ADMIN — SENNA PLUS 5 MILLILITER(S): 8.6 TABLET ORAL at 22:08

## 2022-04-29 RX ADMIN — HEPARIN SODIUM 5000 UNIT(S): 5000 INJECTION INTRAVENOUS; SUBCUTANEOUS at 18:12

## 2022-04-29 RX ADMIN — Medication 2 MILLIGRAM(S): at 22:03

## 2022-04-29 RX ADMIN — Medication 1 TABLET(S): at 11:54

## 2022-04-29 RX ADMIN — CHLORHEXIDINE GLUCONATE 15 MILLILITER(S): 213 SOLUTION TOPICAL at 18:16

## 2022-04-29 RX ADMIN — Medication 3 MILLILITER(S): at 05:35

## 2022-04-29 RX ADMIN — Medication 3 MILLILITER(S): at 11:14

## 2022-04-29 RX ADMIN — Medication 25 MILLIGRAM(S): at 05:21

## 2022-04-29 RX ADMIN — Medication 3 MILLILITER(S): at 23:07

## 2022-04-29 RX ADMIN — ATORVASTATIN CALCIUM 20 MILLIGRAM(S): 80 TABLET, FILM COATED ORAL at 22:02

## 2022-04-29 RX ADMIN — CHLORHEXIDINE GLUCONATE 15 MILLILITER(S): 213 SOLUTION TOPICAL at 05:15

## 2022-04-29 RX ADMIN — Medication 25 MILLIGRAM(S): at 16:18

## 2022-04-29 RX ADMIN — Medication 3 MILLILITER(S): at 17:54

## 2022-04-29 RX ADMIN — SODIUM CHLORIDE 1000 MILLILITER(S): 9 INJECTION, SOLUTION INTRAVENOUS at 22:10

## 2022-04-29 RX ADMIN — POLYETHYLENE GLYCOL 3350 17 GRAM(S): 17 POWDER, FOR SOLUTION ORAL at 11:53

## 2022-04-29 NOTE — PROGRESS NOTE ADULT - SUBJECTIVE AND OBJECTIVE BOX
MRN-[id number]    Subjective: 60 yo male seen and examined at bedside. Off sedation. No events/seizures overnight.     PAST MEDICAL & SURGICAL HISTORY:  H/O cerebellar ataxia    No significant past surgical history    FAMILY HISTORY:  FH: dementia (Mother)    FH: type 2 diabetes (Mother)    Family history of CVA (Father)    Social Hx:  Nonsmoker, no drug or alcohol use    Home Medications:  atorvastatin 20 mg oral tablet: 1 tab(s) orally once a day (2022 06:28)  mirtazapine 15 mg oral tablet: 1 tab(s) orally once a day (at bedtime), As Needed (2022 06:28)    MEDICATIONS  (STANDING):  albuterol/ipratropium for Nebulization 3 milliLiter(s) Nebulizer every 6 hours  atorvastatin 20 milliGRAM(s) Oral at bedtime  chlorhexidine 0.12% Liquid 15 milliLiter(s) Oral Mucosa every 12 hours  dextrose 50% Injectable 50 milliLiter(s) IV Push every 15 minutes  dextrose 50% Injectable 25 milliLiter(s) IV Push every 15 minutes  doxazosin 2 milliGRAM(s) Oral at bedtime  folic acid 1 milliGRAM(s) Oral daily  heparin   Injectable 5000 Unit(s) SubCutaneous every 12 hours  hydrocortisone sodium succinate Injectable 25 milliGRAM(s) IV Push every 8 hours  insulin lispro (ADMELOG) corrective regimen sliding scale   SubCutaneous every 6 hours  multivitamin 1 Tablet(s) Oral daily  mupirocin 2% Ointment 1 Application(s) Both Nostrils two times a day  polyethylene glycol 3350 17 Gram(s) Oral daily  senna Syrup 5 milliLiter(s) Oral at bedtime  thiamine 100 milliGRAM(s) Oral daily    Allergies  No Known Allergies	    ROS: patient is intubated with some altered mental status unable to fully assess     ICU Vital Signs Last 24 Hrs  T(C): 36.7 (2022 08:00), Max: 37.8 (2022 20:00)  T(F): 98.1 (2022 08:00), Max: 100 (2022 20:00)  HR: 65 (2022 13:00) (64 - 123)  BP: 99/65 (2022 13:00) (87/54 - 106/58)  BP(mean): 78 (2022 13:00) (66 - 80)  RR: 10 (2022 13:00) (10 - 11)  SpO2: 100% (2022 13:00) (92% - 100%)      GENERAL EXAM:  Constitutional: Lying in bed, NAD.  HEENT: Normocephalic. No scleral icterus.  Extremities: No edema.    NEUROLOGICAL EXAM: (Off sedation)  MS: Eyes open to verbal stimuli. Attentive. Alert. No verbal output (intubated). Able to answer yes or no with head nod.  Pt able to follow some simple commands - stick out tongue, track penlight with eyes, squeeze hand. No neglect  CN: PERRL. EOMI, no nystagmus. No facial asymmetry. Tongue midline.   Motor: moving all 4 extremities equally 3-/5 b/l throughout   Dysmetria: unable to perform   Tremor: No resting, postural or action tremor.  No myoclonus.  Sensation: intact to light touch  Reflexes:   Bicep (C5/C6): R 1+ --- L 1+   Brachioradialis (C5/C6) : R 1+ --- L 1+   Patella (L3/L4) : R 1+ --- L 1+   Babinski: mute     LABS:  cret                        8.6    9.09  )-----------( 145      ( 2022 00:36 )             27.1     04-    140  |  105  |  19  ----------------------------<  107<H>  3.5   |  27  |  0.71    Ca    8.3<L>      2022 00:36  Phos  3.3       Mg     2.2         TPro  5.1<L>  /  Alb  2.6<L>  /  TBili  0.2  /  DBili  x   /  AST  24  /  ALT  40  /  AlkPhos  50  -29    PT/INR - ( 2022 00:36 )   PT: 12.5 sec;   INR: 1.09 ratio      PTT - ( 2022 00:36 )  PTT:21.1 sec    LIVER FUNCTIONS - ( 2022 00:36 )  Alb: 2.6 g/dL / Pro: 5.1 g/dL / ALK PHOS: 50 U/L / ALT: 40 U/L / AST: 24 U/L / GGT: x           UA: Urinalysis Basic - ( 2022 10:35 )    Color: Light Orange / Appearance: Clear / S.042 / pH: x  Gluc: x / Ketone: Trace  / Bili: Negative / Urobili: Negative   Blood: x / Protein: 30 mg/dL / Nitrite: Negative   Leuk Esterase: Negative / RBC: 885 /hpf / WBC 8 /HPF   Sq Epi: x / Non Sq Epi: 1 /hpf / Bacteria: Negative    Radiology/EEGs:  - CT HEAD: No acute intracranial hemorrhage or mass effect.  - CTA NECK: No hemodynamically significant stenosis by NASCET criteria, dissection, or pseudoaneurysm.  - CTA HEAD: No large vessel occlusion, significant stenosis, dissection, or saccular aneurysm.  - MRI BRAIN: Extensive, diffuse leptomeningeal enhancement. Differential diagnosis primarily includes leptomeningeal metastases and infectious meningitis. Abnormal ependymal enhancement involving the bilateral frontal horns, bilateral ventricular bodies, left temporal and occipital horn, and fourth ventricle. Differential diagnosis includes ependymal metastases and infectious meningitis. Possible small foci of restricted diffusion in the bilateral superior cerebral hemispheres. These may represent small acute infarcts or regions of encephalitis. Edema noted within the cerebellar vermis and mesial bilateral cerebellar hemispheres.  - MRI CERVICAL SPINE: Diffuse leptomeningeal enhancement. This may represent the presence of leptomeningeal metastases or leptomeningeal infection. Multilevel degenerative changes.    - EEG SUMMARY/CLASSIFICATION:  Abnormal EEG   - Moderate to severe generalized slowing.  _____________________________________________________________  EEG IMPRESSION/CLINICAL CORRELATE:  Abnormal EEG study.  Moderate to severe nonspecific diffuse or multifocal cerebral dysfunction.   No epileptiform pattern or seizure seen.    - EEG SUMMARY/CLASSIFICATION:  Abnormal EEG   - Suppressed background   - burst attenuation pattern  _____________________________________________________________  EEG IMPRESSION/CLINICAL CORRELATE:  Abnormal EEG study.  Severe nonspecific diffuse or multifocal cerebral dysfunction.   In the absence of sedation, voltage suppression maybe seen with acute to subacute cortical injury  No epileptiform patterns or seizures recorded x 1 day.    - EEG SUMMARY/CLASSIFICATION:  Abnormal EEG   - Suppressed background initially, then more severe slowing with superimposed blunt GPDs with triphasic morphology near 1.5hz, most prominent after transition from burst suppression, more apparent with arousal, less conspicuous with clinical drowsiness. GPDs less conspicuous in latter portions.   - Some right hand movements noted, no definite correlation with abnormal findings on EEG  _____________________________________________________________  EEG IMPRESSION/CLINICAL CORRELATE:  Abnormal EEG study.  Severe nonspecific diffuse or multifocal cerebral dysfunction, improved vs prior day.   GPDs with triphasic morphology may be associated with an increased risk for seizure, but are typically seen in the context of a relatively severe metabolic encephalopathic process.  GPDs less conspicuous in latter portions.   No definite electrographic seizures.  ECG irregular.     EEG IMPRESSION/CLINICAL CORRELATE:   Abnormal EEG study.  Moderate to severe nonspecific diffuse or multifocal cerebral dysfunction, improved vs prior day.   No electrographic seizures.  ECG irregular.

## 2022-04-29 NOTE — PROGRESS NOTE ADULT - ASSESSMENT
Assessment: 62 yo male with a PMHx of cerebellar ataxia who was brought to the ED on 4/17 due to AMS preceded by 1 week of fatigue/weakness and episodic incontinence. Patient now intubated for worsening clinical level depressed consciousness w/ concern of possible encephalitis or prion disease w/ rapid cognitive decline. Now off sedation.    Impression: Underlying cerebellar ataxia w/ rapid neurocognitive decline and severe worsening in last several days of undetermined etiology r/o autoimmune, paraneoplastic or rapid degenerative disease.    Recommendations:  [] Neuro checks Q1HR.  [] Can DC EEG.  [] No AEDs.  [] Empiric antibiotics/antivirals per ID.  [x] 4/22 s/p LP- CSF glucose 54, protein 126 <H>, TNC 43 <H>, PCR neg, Cryptococcal antigen neg, Culture NGTD. 14-3-3 Protein Tau, ACE, Cytopathology, Encephalopathy panel, EBV, Lyme, VDRL, West Nile, Flow Cytometry pending.  [] ANCA, EMILIA, Anti-Ribonuclear Protein, Lyme, Neutrophil Cytoplasmic Ab, Paraneoplastic Panel, Scleroderma Abs, Sjogren's Syndrome Abs, Vitamin E, ACE, Heavy Metal Screen, MMA, B1, B3, B6, and Zinc pending.  [x] ESR 26 <H>, CRP 25 <H>, Quant Gold TB indeterminant, Vitamin D 67.4, B12 > 2000, Homocysteine 6.9, CPK 31, TSH 1.58, HbA1c 5.5, Copper 108.  [] If CSF Cytopathology in negative/indeterminant, then would consult neurosurgery for possible biopsy.  [] Quant Gold TB was indeterminant. Would resend.  [] Rest of care per MICU team.    Case seen and discussed with neurology attending Dr. Arrieta.

## 2022-04-29 NOTE — CHART NOTE - NSCHARTNOTEFT_GEN_A_CORE
Nutrition Follow Up Note  Patient seen for: malnutrition follow up.    Chart reviewed, events noted. Pt is a 61 yr old male with stated hx significant for cerebral ataxia, chronic left cerebellum lacunar infarcts who presented with progressive weakness/fatigue accompanied with NBNB emesis found to have asp pneum, MRI findings of leptomeningeal enhancement concerning for infectious vs malignancy. Course c/b septic shock (resolved) r/o adrenal insufficiency, AMS requiring intubation for airway protection.  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    Source: [] Patient       [x] EMR        [x] RN        [] Family at bedside       [] Other:    -If unable to interview patient: [x] Trach/Vent/BiPAP  [] Disoriented/confused/inappropriate to interview    Diet Order:   Diet, NPO with Tube Feed:   Tube Feeding Modality: Nasogastric  Glucerna 1.5 Jere (GLUCERNA1.5RTH)  Total Volume for 24 Hours (mL): 360  Intermittent  Starting Tube Feed Rate {mL per Hour}: 10  Until Goal Tube Feed Rate (mL per Hour): 20  Tube Feeding Hours ON: 18  Tube Feeding OFF (Hours): 6  Tube Feed Start Time: 11:00 (22)    - Is current order appropriate/adequate? [] Yes  [x]  No:     - EN Provision Per Flowsheets:    - : 180ml (feeds held for IR procedure, trickle feeds started)   - : 770ml   - : 1330ml   - : 1210ml   - : 720ml  5-Day EN average:  842ml, 1263kcal, 69g protein    - Nutrition-related concerns:   - Pt intubated, TF currently infusing at rate of 20ml/hr. Noted per flow sheets pt with residuals of 700ml on . TF held and started at trickle rate. Pt appears to be tolerating thus far with no episodes of emesis.   - Micronutrient supplementation: multivitamin, thiamine, folic acid.    - Sliding scale insulin ordered for glycemic management. Noted pt receiving Solu-Cortef injections.     GI:  Last BM 4/28 x 4, 4/27 x 3.   Bowel Regimen? [x] Yes   [] No    Weights: Dosing Weight: 76.2kg.  Daily Weight in k.7 (), Weight in k.2 ()    Nutritionally Pertinent MEDICATIONS  (STANDING):  atorvastatin  dextrose 50% Injectable  dextrose 50% Injectable  doxazosin  folic acid  hydrocortisone sodium succinate Injectable  insulin lispro (ADMELOG) corrective regimen sliding scale  multivitamin  polyethylene glycol 3350  senna Syrup  thiamine    Pertinent Labs:  @ 00:36: Na 140, BUN 19, Cr 0.71, <H>, K+ 3.5, Phos 3.3, Mg 2.2, Alk Phos 50, ALT/SGPT 40, AST/SGOT 24, HbA1c --    A1C with Estimated Average Glucose Result: 5.5 % (22 @ 09:56)    Finger Sticks:  POCT Blood Glucose.: 86 mg/dL ( @ 12:01)  POCT Blood Glucose.: 113 mg/dL ( @ 05:12)  POCT Blood Glucose.: 109 mg/dL ( @ 23:47)  POCT Blood Glucose.: 98 mg/dL ( @ 17:22)    Skin per nursing documentation: No noted pressure injuries as per documentation.   Edema: +1 bilateral hand    Based on dosing wt 76.2 kG with consideration for intubation and malnutrition   Estimated Energy Needs: (20-25 kcals/kG) 2161-9182 kcals   Estimated Protein Needs: (1.2-1.6 gm/kG) 91.4-122 gm   Fluid needs deferred to provider.   [facility] Equation: 1486 kcal ()    Previous Nutrition Diagnosis: Severe acute malnutrition   Nutrition Diagnosis is: [x] ongoing  [] resolved [] not applicable     New Nutrition Diagnosis: [x] Not applicable    Nutrition Care Plan:  [x] In Progress  [] Achieved  [] Not applicable    Nutrition Interventions:     Education Provided:       [] Yes:  [x] No: Not appropriate at this time.    Recommendations:      1. Continue feeds of Glucerna 1.5 at 20ml/hr advancing as tolerated by 10ml/hr until goal rate of 70ml/hr x 18hr achievable. Regimen to provide 1260 mL, 1890 kcal, 104g protein, 956 mL free water. Meets 24.8 kcal/kg and 1.36 gm protein/kg based on dosing wt 76.2 kG. Defer additional free water flushes to team.   -> Continue to trend K, Phos, and Mg and replete as needed/able for refeeding risk.  -> As medically feasible, continue to provide folic acid, thiamine, and multivitamin for refeeding risk.    Monitoring and Evaluation:   Continue to monitor nutritional intake, tolerance to diet prescription, weights, labs, skin integrity    RD remains available upon request and will follow up per protocol    Michelle Rothman MS, RD, CDN, [facility] #[phone] Nutrition Follow Up Note  Patient seen for: malnutrition follow up.    Chart reviewed, events noted. Pt is a 61 yr old male with stated hx significant for cerebral ataxia, chronic left cerebellum lacunar infarcts who presented with progressive weakness/fatigue accompanied with NBNB emesis found to have asp pneum, MRI findings of leptomeningeal enhancement concerning for infectious vs malignancy. Course c/b septic shock (resolved) r/o adrenal insufficiency, AMS requiring intubation for airway protection.  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    Source: [] Patient       [x] EMR        [x] RN        [] Family at bedside       [] Other:    -If unable to interview patient: [x] Trach/Vent/BiPAP  [] Disoriented/confused/inappropriate to interview    Diet Order:   Diet, NPO with Tube Feed:   Tube Feeding Modality: Nasogastric  Glucerna 1.5 Jere (GLUCERNA1.5RTH)  Total Volume for 24 Hours (mL): 360  Intermittent  Starting Tube Feed Rate {mL per Hour}: 10  Until Goal Tube Feed Rate (mL per Hour): 20  Tube Feeding Hours ON: 18  Tube Feeding OFF (Hours): 6  Tube Feed Start Time: 11:00 (22)    - Is current order appropriate/adequate? [] Yes  [x]  No:     - EN Provision Per Flowsheets:    - : 180ml (feeds held for IR procedure, trickle feeds started)   - : 770ml   - : 1330ml   - : 1210ml   - : 720ml  5-Day EN average:  842ml, 1263kcal, 69g protein    - Nutrition-related concerns:   - Pt intubated, TF currently infusing at rate of 20ml/hr. Noted per flow sheets pt with residuals of 700ml on . TF held and started at trickle rate. Pt appears to be tolerating thus far with no episodes of emesis.   - Micronutrient supplementation: multivitamin, thiamine, folic acid.    - Sliding scale insulin ordered for glycemic management. Noted pt receiving Solu-Cortef injections.     GI:  Last BM 4/28 x 4, 4/27 x 3.   Bowel Regimen? [x] Yes   [] No    Weights: Dosing Weight: 76.2kg.  Daily Weight in k.7 (), Weight in k.2 ()    Nutritionally Pertinent MEDICATIONS  (STANDING):  atorvastatin  dextrose 50% Injectable  dextrose 50% Injectable  doxazosin  folic acid  hydrocortisone sodium succinate Injectable  insulin lispro (ADMELOG) corrective regimen sliding scale  multivitamin  polyethylene glycol 3350  senna Syrup  thiamine    Pertinent Labs:  @ 00:36: Na 140, BUN 19, Cr 0.71, <H>, K+ 3.5, Phos 3.3, Mg 2.2, Alk Phos 50, ALT/SGPT 40, AST/SGOT 24, HbA1c --    A1C with Estimated Average Glucose Result: 5.5 % (22 @ 09:56)    Finger Sticks:  POCT Blood Glucose.: 86 mg/dL ( @ 12:01)  POCT Blood Glucose.: 113 mg/dL ( @ 05:12)  POCT Blood Glucose.: 109 mg/dL ( @ 23:47)  POCT Blood Glucose.: 98 mg/dL ( @ 17:22)    Skin per nursing documentation: No noted pressure injuries as per documentation.   Edema: +1 bilateral hand    Based on dosing wt 76.2 kG with consideration for intubation and malnutrition   Estimated Energy Needs: (20-25 kcals/kG) 7160-2504 kcals   Estimated Protein Needs: (1.2-1.6 gm/kG) 91.4-122 gm   Fluid needs deferred to provider.   [facility] Equation: 1486 kcal ()    Previous Nutrition Diagnosis: Severe acute malnutrition   Nutrition Diagnosis is: [x] ongoing  [] resolved [] not applicable     New Nutrition Diagnosis: [x] Not applicable    Nutrition Care Plan:  [x] In Progress  [] Achieved  [] Not applicable    Nutrition Interventions:     Education Provided:       [] Yes:  [x] No: Not appropriate at this time.    Recommendations:      1. Continue feeds of Glucerna 1.5 at 20ml/hr advancing as tolerated by 10ml/hr until goal rate of 70ml/hr x 18hr achievable. Regimen to provide 1260 mL, 1890 kcal, 104g protein, 956 mL free water. Meets 24.8 kcal/kg and 1.36 gm protein/kg based on dosing wt 76.2 kG. Defer additional free water flushes to team.   -> Continue to trend K, Phos, and Mg and replete as needed/able for refeeding risk.  -> As medically feasible, continue to provide folic acid, thiamine, and multivitamin for refeeding risk.  2. Consider motility agent if not otherwise medically contraindicated.   3. Monitor GI tolerance to feeds, RD remains available to adjust TF regimen/formulary as needed/upon request.     Monitoring and Evaluation:   Continue to monitor nutritional intake, tolerance to diet prescription, weights, labs, skin integrity    RD remains available upon request and will follow up per protocol    Michelle Rothman MS, RD, CDN, [facility] #[phone] Nutrition Follow Up Note  Patient seen for: malnutrition follow up.    Chart reviewed, events noted. Pt is a 61 yr old male with stated hx significant for cerebral ataxia, chronic left cerebellum lacunar infarcts who presented with progressive weakness/fatigue accompanied with NBNB emesis found to have asp pneum, MRI findings of leptomeningeal enhancement concerning for infectious vs malignancy. Course c/b septic shock (resolved) r/o adrenal insufficiency, AMS requiring intubation for airway protection.  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    Source: [] Patient       [x] EMR        [x] RN        [] Family at bedside       [] Other:    -If unable to interview patient: [x] Trach/Vent/BiPAP  [] Disoriented/confused/inappropriate to interview    Diet Order:   Diet, NPO with Tube Feed:   Tube Feeding Modality: Nasogastric  Glucerna 1.5 Jere (GLUCERNA1.5RTH)  Total Volume for 24 Hours (mL): 360  Intermittent  Starting Tube Feed Rate {mL per Hour}: 10  Until Goal Tube Feed Rate (mL per Hour): 20  Tube Feeding Hours ON: 18  Tube Feeding OFF (Hours): 6  Tube Feed Start Time: 11:00 (22)    - Is current order appropriate/adequate? [] Yes  [x]  No:     - EN Provision Per Flowsheets:    - : 180ml (feeds held for IR procedure, trickle feeds started)   - : 770ml   - : 1330ml   - : 1210ml   - : 720ml  5-Day EN average:  842ml, 1263kcal, 69g protein    - Nutrition-related concerns:   - Pt intubated, TF currently infusing at rate of 20ml/hr. Noted per flow sheets pt with residuals of 700ml on . TF held and started at trickle rate. Pt appears to be tolerating thus far with no episodes of emesis.   - Micronutrient supplementation: multivitamin, thiamine, folic acid.    - Sliding scale insulin ordered for glycemic management. Noted pt receiving Solu-Cortef injections.     GI:  Last BM 4/28 x 4, 4/27 x 3.   Bowel Regimen? [x] Yes   [] No    Weights: Dosing Weight: 76.2kg.  Daily Weight in k.7 (), Weight in k.2 ()    Nutritionally Pertinent MEDICATIONS  (STANDING):  atorvastatin  dextrose 50% Injectable  dextrose 50% Injectable  doxazosin  folic acid  hydrocortisone sodium succinate Injectable  insulin lispro (ADMELOG) corrective regimen sliding scale  multivitamin  polyethylene glycol 3350  senna Syrup  thiamine    Pertinent Labs:  @ 00:36: Na 140, BUN 19, Cr 0.71, <H>, K+ 3.5, Phos 3.3, Mg 2.2, Alk Phos 50, ALT/SGPT 40, AST/SGOT 24, HbA1c --    A1C with Estimated Average Glucose Result: 5.5 % (22 @ 09:56)    Finger Sticks:  POCT Blood Glucose.: 86 mg/dL ( @ 12:01)  POCT Blood Glucose.: 113 mg/dL ( @ 05:12)  POCT Blood Glucose.: 109 mg/dL ( @ 23:47)  POCT Blood Glucose.: 98 mg/dL ( @ 17:22)    Skin per nursing documentation: No noted pressure injuries as per documentation.   Edema: +1 bilateral hand    Based on dosing wt 76.2 kG with consideration for intubation and malnutrition   Estimated Energy Needs: (20-25 kcals/kG) 9115-6572 kcals   Estimated Protein Needs: (1.2-1.6 gm/kG) 91.4-122 gm   Fluid needs deferred to provider.   [facility] Equation: 1486 kcal ()    Previous Nutrition Diagnosis: Severe acute malnutrition   Nutrition Diagnosis is: [x] ongoing  [] resolved [] not applicable     New Nutrition Diagnosis: [x] Not applicable    Nutrition Care Plan:  [x] In Progress  [] Achieved  [] Not applicable    Nutrition Interventions:     Education Provided:       [] Yes:  [x] No: Not appropriate at this time.    Recommendations:      1. Continue feeds of Glucerna 1.5 at 20ml/hr advancing as tolerated by 10ml/hr until goal rate of 70ml/hr x 18hr achievable. Regimen to provide 1260 mL, 1890 kcal, 104g protein, 956 mL free water. Meets 24.8 kcal/kg and 1.36 gm protein/kg based on dosing wt 76.2 kG. Defer additional free water flushes to team.   -> If pt shows signs of intolerance of current EN regimen, recommend initiating feeds of Vital 1.5 at 20ml/hr advancing as tolerated to goal rate of 70ml/hr x 18hr to provide 1260ml, 1890kcal, 85g protein, 963ml free water.   -> Continue to trend K, Phos, and Mg and replete as needed/able for refeeding risk.  -> As medically feasible, continue to provide folic acid, thiamine, and multivitamin for refeeding risk.  2. Consider motility agent if not otherwise medically contraindicated.   3. Monitor GI tolerance to feeds, RD remains available to adjust TF regimen/formulary as needed/upon request.     Monitoring and Evaluation:   Continue to monitor nutritional intake, tolerance to diet prescription, weights, labs, skin integrity    RD remains available upon request and will follow up per protocol    Michelle Rothman MS, RD, CDN, [facility] #[phone]

## 2022-04-29 NOTE — PROGRESS NOTE ADULT - SUBJECTIVE AND OBJECTIVE BOX
INTERVAL HPI/OVERNIGHT EVENTS: -NAEON    SUBJECTIVE: Patient seen and examined at bedside.     CONSTITUTIONAL: No weakness, fevers or chills  EYES/ENT: No visual changes;  No vertigo or throat pain   NECK: No pain or stiffness  RESPIRATORY: No cough, wheezing, hemoptysis; No shortness of breath  CARDIOVASCULAR: No chest pain or palpitations  GASTROINTESTINAL: No abdominal or epigastric pain. No nausea, vomiting, or hematemesis; No diarrhea or constipation. No melena or hematochezia.  GENITOURINARY: No dysuria, frequency or hematuria  NEUROLOGICAL: No numbness or weakness  SKIN: No itching, rashes    OBJECTIVE:    VITAL SIGNS:  ICU Vital Signs Last 24 Hrs  T(C): 36.7 (29 Apr 2022 04:00), Max: 37.8 (28 Apr 2022 20:00)  T(F): 98.1 (29 Apr 2022 04:00), Max: 100 (28 Apr 2022 20:00)  HR: 65 (29 Apr 2022 07:00) (65 - 123)  BP: 99/56 (29 Apr 2022 07:00) (87/54 - 142/75)  BP(mean): 72 (29 Apr 2022 07:00) (66 - 102)  ABP: --  ABP(mean): --  RR: 10 (29 Apr 2022 07:00) (10 - 18)  SpO2: 96% (29 Apr 2022 07:00) (92% - 99%)    Mode: AC/ CMV (Assist Control/ Continuous Mandatory Ventilation), RR (machine): 10, TV (machine): 380, FiO2: 21, PEEP: 5, ITime: 1, MAP: 7, PIP: 17    04-28 @ 07:01  -  04-29 @ 07:00  --------------------------------------------------------  IN: 470 mL / OUT: 700 mL / NET: -230 mL      CAPILLARY BLOOD GLUCOSE      POCT Blood Glucose.: 113 mg/dL (29 Apr 2022 05:12)      PHYSICAL EXAM:    General: NAD  HEENT: NC/AT; PERRL, clear conjunctiva  Neck: supple  Respiratory: CTA b/l  Cardiovascular: +S1/S2; RRR  Abdomen: soft, NT/ND; +BS x4  Extremities: WWP, 2+ peripheral pulses b/l; no LE edema  Skin: normal color and turgor; no rash  Neurological:    MEDICATIONS:  MEDICATIONS  (STANDING):  albuterol/ipratropium for Nebulization 3 milliLiter(s) Nebulizer every 6 hours  atorvastatin 20 milliGRAM(s) Oral at bedtime  chlorhexidine 0.12% Liquid 15 milliLiter(s) Oral Mucosa every 12 hours  dextrose 50% Injectable 50 milliLiter(s) IV Push every 15 minutes  dextrose 50% Injectable 25 milliLiter(s) IV Push every 15 minutes  doxazosin 2 milliGRAM(s) Oral at bedtime  folic acid 1 milliGRAM(s) Oral daily  heparin   Injectable 5000 Unit(s) SubCutaneous every 12 hours  hydrocortisone sodium succinate Injectable 25 milliGRAM(s) IV Push every 8 hours  insulin lispro (ADMELOG) corrective regimen sliding scale   SubCutaneous every 6 hours  multivitamin 1 Tablet(s) Oral daily  mupirocin 2% Ointment 1 Application(s) Both Nostrils two times a day  polyethylene glycol 3350 17 Gram(s) Oral daily  senna Syrup 5 milliLiter(s) Oral at bedtime  thiamine 100 milliGRAM(s) Oral daily    MEDICATIONS  (PRN):  aluminum hydroxide/magnesium hydroxide/simethicone Suspension 30 milliLiter(s) Oral every 4 hours PRN Dyspepsia  ondansetron Injectable 4 milliGRAM(s) IV Push every 8 hours PRN Nausea and/or Vomiting      ALLERGIES:  Allergies    No Known Allergies    Intolerances        LABS:                        8.6    9.09  )-----------( 145      ( 29 Apr 2022 00:36 )             27.1     04-29    140  |  105  |  19  ----------------------------<  107<H>  3.5   |  27  |  0.71    Ca    8.3<L>      29 Apr 2022 00:36  Phos  3.3     04-29  Mg     2.2     04-29    TPro  5.1<L>  /  Alb  2.6<L>  /  TBili  0.2  /  DBili  x   /  AST  24  /  ALT  40  /  AlkPhos  50  04-29    PT/INR - ( 29 Apr 2022 00:36 )   PT: 12.5 sec;   INR: 1.09 ratio         PTT - ( 29 Apr 2022 00:36 )  PTT:21.1 sec      RADIOLOGY & ADDITIONAL TESTS: Reviewed. INTERVAL HPI/OVERNIGHT EVENTS: -NAEON    SUBJECTIVE: Patient seen and examined at bedside. Limited ROS obtained due to intubation, however patient endorses no pain and is comfortable.     OBJECTIVE:    VITAL SIGNS:  ICU Vital Signs Last 24 Hrs  T(C): 36.7 (29 Apr 2022 04:00), Max: 37.8 (28 Apr 2022 20:00)  T(F): 98.1 (29 Apr 2022 04:00), Max: 100 (28 Apr 2022 20:00)  HR: 65 (29 Apr 2022 07:00) (65 - 123)  BP: 99/56 (29 Apr 2022 07:00) (87/54 - 142/75)  BP(mean): 72 (29 Apr 2022 07:00) (66 - 102)  ABP: --  ABP(mean): --  RR: 10 (29 Apr 2022 07:00) (10 - 18)  SpO2: 96% (29 Apr 2022 07:00) (92% - 99%)    Mode: AC/ CMV (Assist Control/ Continuous Mandatory Ventilation), RR (machine): 10, TV (machine): 380, FiO2: 21, PEEP: 5, ITime: 1, MAP: 7, PIP: 17    04-28 @ 07:01  -  04-29 @ 07:00  --------------------------------------------------------  IN: 470 mL / OUT: 700 mL / NET: -230 mL      CAPILLARY BLOOD GLUCOSE      POCT Blood Glucose.: 113 mg/dL (29 Apr 2022 05:12)      PHYSICAL EXAM:    General: NAD  HEENT: NC/AT; PERRL, clear conjunctiva  Neck: supple  Respiratory: CTA b/l  Cardiovascular: +S1/S2; RRR  Abdomen: soft, NT/ND; +BS x4  Extremities: WWP, 2+ peripheral pulses b/l; no LE edema  Skin: normal color and turgor; no rash  Neurological: Responding to commands. Awake and alert. Moving all extremities.     MEDICATIONS:  MEDICATIONS  (STANDING):  albuterol/ipratropium for Nebulization 3 milliLiter(s) Nebulizer every 6 hours  atorvastatin 20 milliGRAM(s) Oral at bedtime  chlorhexidine 0.12% Liquid 15 milliLiter(s) Oral Mucosa every 12 hours  dextrose 50% Injectable 50 milliLiter(s) IV Push every 15 minutes  dextrose 50% Injectable 25 milliLiter(s) IV Push every 15 minutes  doxazosin 2 milliGRAM(s) Oral at bedtime  folic acid 1 milliGRAM(s) Oral daily  heparin   Injectable 5000 Unit(s) SubCutaneous every 12 hours  hydrocortisone sodium succinate Injectable 25 milliGRAM(s) IV Push every 8 hours  insulin lispro (ADMELOG) corrective regimen sliding scale   SubCutaneous every 6 hours  multivitamin 1 Tablet(s) Oral daily  mupirocin 2% Ointment 1 Application(s) Both Nostrils two times a day  polyethylene glycol 3350 17 Gram(s) Oral daily  senna Syrup 5 milliLiter(s) Oral at bedtime  thiamine 100 milliGRAM(s) Oral daily    MEDICATIONS  (PRN):  aluminum hydroxide/magnesium hydroxide/simethicone Suspension 30 milliLiter(s) Oral every 4 hours PRN Dyspepsia  ondansetron Injectable 4 milliGRAM(s) IV Push every 8 hours PRN Nausea and/or Vomiting      ALLERGIES:  Allergies    No Known Allergies    Intolerances        LABS:                        8.6    9.09  )-----------( 145      ( 29 Apr 2022 00:36 )             27.1     04-29    140  |  105  |  19  ----------------------------<  107<H>  3.5   |  27  |  0.71    Ca    8.3<L>      29 Apr 2022 00:36  Phos  3.3     04-29  Mg     2.2     04-29    TPro  5.1<L>  /  Alb  2.6<L>  /  TBili  0.2  /  DBili  x   /  AST  24  /  ALT  40  /  AlkPhos  50  04-29    PT/INR - ( 29 Apr 2022 00:36 )   PT: 12.5 sec;   INR: 1.09 ratio         PTT - ( 29 Apr 2022 00:36 )  PTT:21.1 sec      RADIOLOGY & ADDITIONAL TESTS: Reviewed.

## 2022-04-29 NOTE — PROGRESS NOTE ADULT - ASSESSMENT
61 yr old male with stated hx significant for cerebral ataxia, chronic left cerebellum lacunar infarcts who presented with progressive weakness/fatigue accompanied with NBNB emesis found to have asp pneum, MRI findings of leptomeningeal enhancement concerning for infectious vs malignancy. Course c/b septic shock (resolved) r/o adrenal insufficiency, AMS requiring intubation for airway protection.  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    -Neuro checks q 2 hrs and prn for changes  -activity as tolerated  -physical therapy consult when stable  -s/p LP (4/23/22) - f/u results recommend by neurology -  (PCR neg, Cryptococcal antigen neg, Culture NGTD)   -obtained neuro surg consult for brain bx - see recs  -failed LP attempt 4/27/22 - will consider repeat today  -precedex gtt - titrate to RASS of 0 to -2    #PULM:   AMS, intubated for airway protection, asp pneum    -Mechanical Vent Therapy - titrate to maintain ph 7.35-7.45; PCO2 35-45; SPO2 > 92%  -Bronchodilator therapy q 6 hrs prn sob/wheezes  -Lung protective therapy  -PSV trials as tolerated    #CV:  resolved septic shock,  new onset afib     -rate controlled  -continue atorvastatin   -s/p norepi gtt (d/c 4/24/22)  -TTE 4/22/22 - EF 70%, mild tricuspid regurg, Nl RV/LV  -continue to monitor    #GI/:      -large NGT residuals   -consider trickle TF as tolerated  -strict I & O 's - keep even    #ID: asp pneu, resolved septic shock, leptomeningeal enhancement    -s/p acyclovir (4/21/22 - 4/25/22)  -s/p ceftriaxone (4/21/22 -4/26/22)  -s/p ampicillin (4/21/22- 4/26/22)  -s/p zosyn (4/18/22 - 4/21/22)  -s/p vanco (4/20/22 - 4/25/22)  -MRSA PCR 4/23/22 - ND  -MSSA PCR 4/23/22 - detected  -Blood Cx 4/23/22 - NGTD  -EBV 4/23/22 - ND  -CSF 4/22/22 - No organisms seen  -CSF PCR 4/22/22 - ND  -see neuro notes for further CSF result  -continue mupirocin ointment x 5 days - d/c May 1st    #FEN/ENDO/HEME:    r/o adrenal insufficiency, r/o DI (resolved)    -obtain CMP/Mg++/PO--4/CBC w diff/PT/PTT/INR  q. a.m.  -continue hydrocortisone  decrease to 25 mg IV q 8 on 4/28/22 - will decrease to 25 mg IV q 12 on 4/30/22  -POC glucose with ISS q 6 hrs - maintain glucose 140 -180  -s/p fludrocortisone   -DVT prophylaxis with heparin subq     61 yr old male with stated hx significant for cerebral ataxia, chronic left cerebellum lacunar infarcts who presented with progressive weakness/fatigue accompanied with NBNB emesis found to have asp pneum, MRI findings of leptomeningeal enhancement concerning for infectious vs malignancy. Course c/b septic shock (resolved) r/o adrenal insufficiency, AMS requiring intubation for airway protection.  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    -Neuro checks q 2 hrs and prn for changes  -activity as tolerated  -physical therapy consult when stable  -s/p LP (4/23/22) - f/u results recommend by neurology -  (PCR neg, Cryptococcal antigen neg, Culture NGTD)   -obtained neuro surg consult for brain bx - see recs  -failed LP attempt 4/27/22 - repeated by IR as requested by NSurg, if flow cytometry negative reconsult neuro for biopsy  -precedex gtt - titrate to RASS of 0 to -2    #PULM:   AMS, intubated for airway protection, asp pneum    -Mechanical Vent Therapy - titrate to maintain ph 7.35-7.45; PCO2 35-45; SPO2 > 92%  -Bronchodilator therapy q 6 hrs prn sob/wheezes  -Lung protective therapy  -PSV trials as tolerated    #CV:  resolved septic shock,  new onset afib     -rate controlled  -continue atorvastatin   -s/p norepi gtt (d/c 4/24/22)  -TTE 4/22/22 - EF 70%, mild tricuspid regurg, Nl RV/LV  -continue to monitor    #GI/:      -large NGT residuals   -consider trickle TF as tolerated  -strict I & O 's - keep even    #ID: asp pneu, resolved septic shock, leptomeningeal enhancement    -s/p acyclovir (4/21/22 - 4/25/22)  -s/p ceftriaxone (4/21/22 -4/26/22)  -s/p ampicillin (4/21/22- 4/26/22)  -s/p zosyn (4/18/22 - 4/21/22)  -s/p vanco (4/20/22 - 4/25/22)  -MRSA PCR 4/23/22 - ND  -MSSA PCR 4/23/22 - detected  -Blood Cx 4/23/22 - NGTD  -EBV 4/23/22 - ND  -CSF 4/22/22 - No organisms seen  -CSF PCR 4/22/22 - ND  -see neuro notes for further CSF result  -continue mupirocin ointment x 5 days - d/c May 1st    #FEN/ENDO/HEME:    r/o adrenal insufficiency, r/o DI (resolved)    -obtain CMP/Mg++/PO--4/CBC w diff/PT/PTT/INR  q. a.m.  -continue hydrocortisone  decrease to 25 mg IV q 8 on 4/28/22 - will decrease to 25 mg IV q 12 on 4/30/22  -POC glucose with ISS q 6 hrs - maintain glucose 140 -180  -s/p fludrocortisone   -DVT prophylaxis with heparin subq

## 2022-04-30 LAB
ALBUMIN SERPL ELPH-MCNC: 2.8 G/DL — LOW (ref 3.3–5)
ALP SERPL-CCNC: 47 U/L — SIGNIFICANT CHANGE UP (ref 40–120)
ALT FLD-CCNC: 35 U/L — SIGNIFICANT CHANGE UP (ref 10–45)
ANION GAP SERPL CALC-SCNC: 10 MMOL/L — SIGNIFICANT CHANGE UP (ref 5–17)
APTT BLD: 26.8 SEC — LOW (ref 27.5–35.5)
AST SERPL-CCNC: 19 U/L — SIGNIFICANT CHANGE UP (ref 10–40)
BILIRUB SERPL-MCNC: 0.2 MG/DL — SIGNIFICANT CHANGE UP (ref 0.2–1.2)
BUN SERPL-MCNC: 20 MG/DL — SIGNIFICANT CHANGE UP (ref 7–23)
CALCIUM SERPL-MCNC: 8.4 MG/DL — SIGNIFICANT CHANGE UP (ref 8.4–10.5)
CHLORIDE SERPL-SCNC: 106 MMOL/L — SIGNIFICANT CHANGE UP (ref 96–108)
CO2 SERPL-SCNC: 26 MMOL/L — SIGNIFICANT CHANGE UP (ref 22–31)
CREAT SERPL-MCNC: 0.67 MG/DL — SIGNIFICANT CHANGE UP (ref 0.5–1.3)
EGFR: 107 ML/MIN/1.73M2 — SIGNIFICANT CHANGE UP
GLUCOSE BLDC GLUCOMTR-MCNC: 121 MG/DL — HIGH (ref 70–99)
GLUCOSE BLDC GLUCOMTR-MCNC: 126 MG/DL — HIGH (ref 70–99)
GLUCOSE BLDC GLUCOMTR-MCNC: 89 MG/DL — SIGNIFICANT CHANGE UP (ref 70–99)
GLUCOSE BLDC GLUCOMTR-MCNC: 98 MG/DL — SIGNIFICANT CHANGE UP (ref 70–99)
GLUCOSE SERPL-MCNC: 134 MG/DL — HIGH (ref 70–99)
HCT VFR BLD CALC: 26.4 % — LOW (ref 39–50)
HGB BLD-MCNC: 8.5 G/DL — LOW (ref 13–17)
INR BLD: 1.06 RATIO — SIGNIFICANT CHANGE UP (ref 0.88–1.16)
MAGNESIUM SERPL-MCNC: 2.2 MG/DL — SIGNIFICANT CHANGE UP (ref 1.6–2.6)
MCHC RBC-ENTMCNC: 29.2 PG — SIGNIFICANT CHANGE UP (ref 27–34)
MCHC RBC-ENTMCNC: 32.2 GM/DL — SIGNIFICANT CHANGE UP (ref 32–36)
MCV RBC AUTO: 90.7 FL — SIGNIFICANT CHANGE UP (ref 80–100)
NRBC # BLD: 0 /100 WBCS — SIGNIFICANT CHANGE UP (ref 0–0)
PHOSPHATE SERPL-MCNC: 3 MG/DL — SIGNIFICANT CHANGE UP (ref 2.5–4.5)
PLATELET # BLD AUTO: 142 K/UL — LOW (ref 150–400)
POTASSIUM SERPL-MCNC: 3.7 MMOL/L — SIGNIFICANT CHANGE UP (ref 3.5–5.3)
POTASSIUM SERPL-SCNC: 3.7 MMOL/L — SIGNIFICANT CHANGE UP (ref 3.5–5.3)
PROT CSF-MCNC: 50 MG/DL — HIGH (ref 15–45)
PROT SERPL-MCNC: 5.3 G/DL — LOW (ref 6–8.3)
PROTHROM AB SERPL-ACNC: 12.3 SEC — SIGNIFICANT CHANGE UP (ref 10.5–13.4)
RBC # BLD: 2.91 M/UL — LOW (ref 4.2–5.8)
RBC # FLD: 14.1 % — SIGNIFICANT CHANGE UP (ref 10.3–14.5)
SODIUM SERPL-SCNC: 142 MMOL/L — SIGNIFICANT CHANGE UP (ref 135–145)
WBC # BLD: 9.72 K/UL — SIGNIFICANT CHANGE UP (ref 3.8–10.5)
WBC # FLD AUTO: 9.72 K/UL — SIGNIFICANT CHANGE UP (ref 3.8–10.5)

## 2022-04-30 PROCEDURE — 99291 CRITICAL CARE FIRST HOUR: CPT | Mod: GC

## 2022-04-30 RX ORDER — HYDROCORTISONE 20 MG
25 TABLET ORAL EVERY 12 HOURS
Refills: 0 | Status: DISCONTINUED | OUTPATIENT
Start: 2022-04-30 | End: 2022-05-02

## 2022-04-30 RX ORDER — HEPARIN SODIUM 5000 [USP'U]/ML
5000 INJECTION INTRAVENOUS; SUBCUTANEOUS EVERY 8 HOURS
Refills: 0 | Status: DISCONTINUED | OUTPATIENT
Start: 2022-04-30 | End: 2022-05-13

## 2022-04-30 RX ORDER — MIDODRINE HYDROCHLORIDE 2.5 MG/1
10 TABLET ORAL ONCE
Refills: 0 | Status: COMPLETED | OUTPATIENT
Start: 2022-04-30 | End: 2022-04-30

## 2022-04-30 RX ORDER — MIDODRINE HYDROCHLORIDE 2.5 MG/1
10 TABLET ORAL EVERY 8 HOURS
Refills: 0 | Status: DISCONTINUED | OUTPATIENT
Start: 2022-04-30 | End: 2022-05-01

## 2022-04-30 RX ADMIN — MIDODRINE HYDROCHLORIDE 10 MILLIGRAM(S): 2.5 TABLET ORAL at 20:15

## 2022-04-30 RX ADMIN — ATORVASTATIN CALCIUM 20 MILLIGRAM(S): 80 TABLET, FILM COATED ORAL at 22:00

## 2022-04-30 RX ADMIN — Medication 100 MILLIGRAM(S): at 12:14

## 2022-04-30 RX ADMIN — HEPARIN SODIUM 5000 UNIT(S): 5000 INJECTION INTRAVENOUS; SUBCUTANEOUS at 17:23

## 2022-04-30 RX ADMIN — Medication 2 MILLIGRAM(S): at 22:00

## 2022-04-30 RX ADMIN — CHLORHEXIDINE GLUCONATE 15 MILLILITER(S): 213 SOLUTION TOPICAL at 05:16

## 2022-04-30 RX ADMIN — MUPIROCIN 1 APPLICATION(S): 20 OINTMENT TOPICAL at 05:23

## 2022-04-30 RX ADMIN — CHLORHEXIDINE GLUCONATE 15 MILLILITER(S): 213 SOLUTION TOPICAL at 17:24

## 2022-04-30 RX ADMIN — HEPARIN SODIUM 5000 UNIT(S): 5000 INJECTION INTRAVENOUS; SUBCUTANEOUS at 22:00

## 2022-04-30 RX ADMIN — Medication 3 MILLILITER(S): at 05:49

## 2022-04-30 RX ADMIN — Medication 25 MILLIGRAM(S): at 17:23

## 2022-04-30 RX ADMIN — Medication 3 MILLILITER(S): at 17:13

## 2022-04-30 RX ADMIN — Medication 3 MILLILITER(S): at 23:29

## 2022-04-30 RX ADMIN — HEPARIN SODIUM 5000 UNIT(S): 5000 INJECTION INTRAVENOUS; SUBCUTANEOUS at 06:00

## 2022-04-30 RX ADMIN — Medication 25 MILLIGRAM(S): at 06:00

## 2022-04-30 RX ADMIN — Medication 1 MILLIGRAM(S): at 12:14

## 2022-04-30 RX ADMIN — Medication 1 TABLET(S): at 12:14

## 2022-04-30 RX ADMIN — Medication 3 MILLILITER(S): at 11:18

## 2022-04-30 NOTE — CHART NOTE - NSCHARTNOTEFT_GEN_A_CORE
IMCOMPLETE    Chart reviewed.    Impression: Underlying cerebellar ataxia w/ rapid neurocognitive decline, unclear etiology, found to have diffuse leptomeningeal disease, possibly infectious vs. neoplastic. Ddx includes neurosarcoidosis (CSF w/ elevated ACE), Lyme meningitis (+CSF lyme abs)     Recommendations:  [] nsgy consulted for meningeal bx  [] ID consulted, appreciate recs  [x] LP 4/22: ACE 7.7 <H>; + CSF lyme abs  [x] cytopathology 4/22 - Increased number of large mononuclear cells in a background of few small lymphocytes, monocytes and neutrophils, cannot exclude a lymphoproliferative disorder versus an inflammatory process.   [x] cytopathology 4/29 - significant increased number of small lymphocytes with rare monocytes  [x] s/p 4/29 LP: Glucose 32 <L>, Protein 50 <H>, TNC 22 <H>  [x] s/p 4/22 LP: Glucose 54, Protein 126 <H>, TNC 43 <H>; PCR neg, Cryptococcal antigen neg, Culture NGTD.   [] 14-3-3 Protein Tau, ACE, Encephalopathy panel, EBV, Lyme, VDRL, West Nile, Flow Cytometry pending.  [x] Notable serum labs: ESR 26 <H>, CRP 25 <H>, EMILIA 1:320 speckled  [x] Serum labs wnl: Quant Gold TB indeterminant, Vitamin D 67.4, B12 > 2000, Homocysteine 6.9, CPK 31, TSH 1.58, HbA1c 5.5, Copper 108, P/C/atypical ANCA neg, Anti-Ribonuclear Protein <2, Lyme PCR and IgG/IgM ab neg, Neutrophil Cytoplasmic Ab neg, Scleroderma Abs neg, anti SSA and SSA <0.2, Zinc 46, heavy metal screen neg, ACE 24, Paraneoplastic Panel neg,   [x] Utox neg  [] Monitor off AEDs and EEG  [] f/u serum labs: vitamin E, MMA, B1, B3, B6  [] Quant Gold TB was indeterminant. Would resend. IMCOMPLETE    Chart reviewed.    Impression: Underlying cerebellar ataxia w/ rapid neurocognitive decline, unclear etiology, found to have diffuse leptomeningeal disease, possibly infectious vs. neoplastic; r/o autoimmune, paraneoplastic, or rapid degnerative disease. Ddx includes neurosarcoidosis (CSF w/ elevated ACE), Lyme meningitis (+CSF lyme abs)     Recommendations:  [] nsgy consulted for meningeal bx  [] ID consulted, appreciate recs  [] Quant Gold TB was indeterminant. Would resend.  [] f/u serum labs: vitamin E, MMA, B1, B3, B6  [] f/u CSF labs: 14-3-3 Protein Tau, ACE, ADmark phos-tau/total-tau, Lyme PCR and ab, AIE Encephalopathy panel, EBV, histoplasma antigen, GISSEL virus, VDRL, West Nile, Flow Cytometry, MOG ab, NMO ab, MBP, fungal clx 4/28, clx 4/28,   [x] Notable LP labs 4/22: ACE 7.7 <H>; + CSF lyme abs; Protein 126 <H>, TNC 43 <H>  [x] Notable LP labs 4/28: Glucose 32 <L>, Protein 50 <H>, TNC 22 <H>, IgG index 8.6 <H>  [x] Notable serum labs: ESR 26 <H>, CRP 25 <H>, EMILIA 1:320 speckled  [x] s/p 4/28 LP: Glucose 32 <L>, Protein 50 <H>, TNC 22 <H>; Cryptococcal antigen neg, PCR neg, HSV 1/2 neg,   [x] s/p 4/22 LP: Glucose 54, Protein 126 <H>, TNC 43 <H>; PCR neg, Cryptococcal antigen neg, HSV1/2 neg, clx no growth, EBV neg, AIE encephalopathy panel neg, WNV neg, AFB <5cc  [x] CSF cytopathology 4/22 - increased number of large mononuclear cells in a background of few small lymphocytes, monocytes and neutrophils, cannot exclude a lymphoproliferative disorder versus an inflammatory process.   [x] CSF cytopathology 4/29 - significant increased number of small lymphocytes with rare monocytes  [x] Serum labs wnl: Quant Gold TB indeterminant, Vitamin D 67.4, B12 > 2000, Homocysteine 6.9, CPK 31, TSH 1.58, HbA1c 5.5, Copper 108, P/C/atypical ANCA neg, Anti-Ribonuclear Protein <2, Lyme PCR and IgG/IgM ab neg, Neutrophil Cytoplasmic Ab neg, Scleroderma Abs neg, anti SSA and SSA <0.2, Zinc 46, heavy metal screen neg, ACE 24, Paraneoplastic Panel neg.  [x] Utox neg, UA neg  [x] Monitor off AEDs and EEG  [] rest of care per MICU Chart reviewed.    Impression: Underlying cerebellar ataxia w/ rapid neurocognitive decline; found to be persistently hypothermic and bradycardic now s/p empiric abx. Unclear etiology, found to have diffuse leptomeningeal disease, possibly infectious vs. neoplastic; r/o autoimmune, paraneoplastic, or rapid degenerative disease. Ddx includes neurosarcoidosis (CSF w/ elevated ACE), Lyme meningitis (+CSF lyme abs)     Recommendations:  [] nsgy consulted for meningeal bx  [] ID consulted, appreciate recs  [] Quant Gold TB was indeterminant. Would resend.  [] f/u serum labs: vitamin E, MMA, B1, B3, B6  [] f/u CSF labs: 14-3-3 Protein Tau, ACE, ADmark phos-tau/total-tau, Lyme PCR and ab, AIE Encephalopathy panel, EBV, histoplasma antigen, GISSEL virus, VDRL, West Nile, Flow Cytometry, MOG ab, NMO ab, MBP, fungal clx 4/28, clx 4/28,   [x] Notable LP labs 4/22: ACE 7.7 <H>; + CSF lyme abs; Protein 126 <H>, TNC 43 <H>  [x] Notable LP labs 4/28: Glucose 32 <L>, Protein 50 <H>, TNC 22 <H>, IgG index 8.6 <H>  [x] Notable serum labs: ESR 26 <H>, CRP 25 <H>, EMILIA 1:320 speckled  [x] s/p 4/28 LP: Glucose 32 <L>, Protein 50 <H>, TNC 22 <H>; Cryptococcal antigen neg, PCR neg, HSV 1/2 neg,   [x] s/p 4/22 LP: Glucose 54, Protein 126 <H>, TNC 43 <H>; PCR neg, Cryptococcal antigen neg, HSV1/2 neg, clx no growth, EBV neg, AIE encephalopathy panel neg, WNV neg, AFB <5cc  [x] CSF cytopathology 4/22 - increased number of large mononuclear cells in a background of few small lymphocytes, monocytes and neutrophils, cannot exclude a lymphoproliferative disorder versus an inflammatory process.   [x] CSF cytopathology 4/29 - significant increased number of small lymphocytes with rare monocytes  [x] Serum labs wnl: Quant Gold TB indeterminant, Vitamin D 67.4, B12 > 2000, Homocysteine 6.9, CPK 31, TSH 1.58, HbA1c 5.5, Copper 108, P/C/atypical ANCA neg, Anti-Ribonuclear Protein <2, Lyme PCR and IgG/IgM ab neg, Neutrophil Cytoplasmic Ab neg, Scleroderma Abs neg, anti SSA and SSA <0.2, Zinc 46, heavy metal screen neg, ACE 24, Paraneoplastic Panel neg.  [x] Utox neg, UA neg  [x] Monitor off AEDs and EEG  [x] s/p acyclovir (4/21/22 - 4/25/22), ceftriaxone (4/21/22 -4/26/22), ampicillin (4/21/22- 4/26/22), zosyn (4/18/22 - 4/21/22), vanco (4/20/22 - 4/25/22)  [] rest of care per MICU Chart reviewed.    Impression: Underlying cerebellar ataxia w/ rapid neurocognitive decline; found to be persistently hypothermic and bradycardic now s/p empiric abx. Unclear etiology, found to have diffuse leptomeningeal disease, possibly infectious vs. neoplastic; r/o autoimmune, paraneoplastic, or rapid degenerative disease. Ddx includes neurosarcoidosis (CSF w/ elevated ACE), Lyme meningitis (+CSF lyme abs)     Recommendations:  [] nsgy consulted for meningeal bx  [] ID consulted, appreciate recs  [] Quant Gold TB was indeterminant. Would resend.  [] f/u serum labs: vitamin E, MMA, B1, B3, B6  [] f/u CSF labs: 14-3-3 Protein Tau, ACE, ADmark phos-tau/total-tau, Lyme PCR and ab, AIE Encephalopathy panel, EBV, histoplasma antigen, GISSEL virus, VDRL, West Nile, Flow Cytometry, MOG ab, NMO ab, MBP, fungal clx 4/28, clx 4/28,   [x] Notable LP labs 4/22: ACE 7.7 <H>; + CSF lyme abs; Protein 126 <H>, TNC 43 <H>  [x] Notable LP labs 4/28: Glucose 32 <L>, Protein 50 <H>, TNC 22 <H>, IgG index 8.6 <H>  [x] Notable serum labs: ESR 26 <H>, CRP 25 <H>, EMILIA 1:320 speckled, folate 3.4 (repleted)  [x] s/p 4/28 LP: Glucose 32 <L>, Protein 50 <H>, TNC 22 <H>; Cryptococcal antigen neg, PCR neg, HSV 1/2 neg,   [x] s/p 4/22 LP: Glucose 54, Protein 126 <H>, TNC 43 <H>; PCR neg, Cryptococcal antigen neg, HSV1/2 neg, clx no growth, EBV neg, AIE encephalopathy panel neg, WNV neg, AFB <5cc  [x] CSF cytopathology 4/22 - increased number of large mononuclear cells in a background of few small lymphocytes, monocytes and neutrophils, cannot exclude a lymphoproliferative disorder versus an inflammatory process.   [x] CSF cytopathology 4/29 - significant increased number of small lymphocytes with rare monocytes  [x] Serum labs wnl: Quant Gold TB indeterminant, Vitamin D 67.4, B12 > 2000, Homocysteine 6.9, CPK 31, TSH 1.58, HbA1c 5.5, Copper 108, P/C/atypical ANCA neg, Anti-Ribonuclear Protein <2, Lyme PCR and IgG/IgM ab neg, Neutrophil Cytoplasmic Ab neg, Scleroderma Abs neg, anti SSA and SSA <0.2, Zinc 46, heavy metal screen neg, ACE 24, Paraneoplastic Panel neg.  [x] Utox neg, UA neg  [x] Monitor off AEDs and EEG  [x] s/p acyclovir (4/21/22 - 4/25/22), ceftriaxone (4/21/22 -4/26/22), ampicillin (4/21/22- 4/26/22), zosyn (4/18/22 - 4/21/22), vanco (4/20/22 - 4/25/22)  [] rest of care per MICU

## 2022-04-30 NOTE — PROGRESS NOTE ADULT - ASSESSMENT
61 yr old male with stated hx significant for cerebral ataxia, chronic left cerebellum lacunar infarcts who presented with progressive weakness/fatigue accompanied with NBNB emesis found to have asp pneum, MRI findings of leptomeningeal enhancement concerning for infectious vs malignancy. Course c/b septic shock (resolved) r/o adrenal insufficiency, AMS requiring intubation for airway protection.  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    -Neuro checks q 2 hrs and prn for changes  -activity as tolerated  -physical therapy consult when stable  -s/p LP (4/23/22) - f/u results recommend by neurology -  (PCR neg, Cryptococcal antigen neg, Culture NGTD)   -obtained neuro surg consult for brain bx - see recs  -failed LP attempt 4/27/22 - repeated by IR as requested by NSurg, if flow cytometry negative reconsult neuro for biopsy      #PULM:   AMS, intubated for airway protection, asp pneum    -Mechanical Vent Therapy - titrate to maintain ph 7.35-7.45; PCO2 35-45; SPO2 > 92%  -Bronchodilator therapy q 6 hrs prn sob/wheezes  -Lung protective therapy  -PSV trials as tolerated    #CV:  resolved septic shock,  new onset afib     -rate controlled  -continue atorvastatin   -s/p norepi gtt (d/c 4/24/22)  -TTE 4/22/22 - EF 70%, mild tricuspid regurg, Nl RV/LV  -continue to monitor    #GI/:      -large NGT residuals   -consider trickle TF as tolerated  -strict I & O 's - keep even    #ID: asp pneu, resolved septic shock, leptomeningeal enhancement    -s/p acyclovir (4/21/22 - 4/25/22)  -s/p ceftriaxone (4/21/22 -4/26/22)  -s/p ampicillin (4/21/22- 4/26/22)  -s/p zosyn (4/18/22 - 4/21/22)  -s/p vanco (4/20/22 - 4/25/22)  -MRSA PCR 4/23/22 - ND  -MSSA PCR 4/23/22 - detected  -Blood Cx 4/23/22 - NGTD  -EBV 4/23/22 - ND  -CSF 4/22/22 - No organisms seen  -CSF PCR 4/22/22 - ND  -see neuro notes for further CSF result  -continue mupirocin ointment x 5 days - d/c May 1st    #FEN/ENDO/HEME:    r/o adrenal insufficiency, r/o DI (resolved)    -obtain CMP/Mg++/PO--4/CBC w diff/PT/PTT/INR  q. a.m.  -continue hydrocortisone  decrease to 25 mg IV q 8 on 4/28/22 - will decrease to 25 mg IV q 12 on 4/30/22  -POC glucose with ISS q 6 hrs - maintain glucose 140 -180  -s/p fludrocortisone   -DVT prophylaxis with heparin subq

## 2022-04-30 NOTE — PROGRESS NOTE ADULT - SUBJECTIVE AND OBJECTIVE BOX
INTERVAL HPI/OVERNIGHT EVENTS: -hypotensive to MAPs in the low 60s, given 2x 500cc fluid boluses and responded appropriately. No change in mentation    SUBJECTIVE: Patient seen and examined at bedside.     CONSTITUTIONAL: No weakness, fevers or chills  EYES/ENT: No visual changes;  No vertigo or throat pain   NECK: No pain or stiffness  RESPIRATORY: No cough, wheezing, hemoptysis; No shortness of breath  CARDIOVASCULAR: No chest pain or palpitations  GASTROINTESTINAL: No abdominal or epigastric pain. No nausea, vomiting, or hematemesis; No diarrhea or constipation. No melena or hematochezia.  GENITOURINARY: No dysuria, frequency or hematuria  NEUROLOGICAL: No numbness or weakness  SKIN: No itching, rashes    OBJECTIVE:    VITAL SIGNS:  ICU Vital Signs Last 24 Hrs  T(C): 36.7 (30 Apr 2022 03:00), Max: 37 (29 Apr 2022 21:00)  T(F): 98.1 (30 Apr 2022 03:00), Max: 98.6 (29 Apr 2022 21:00)  HR: 68 (30 Apr 2022 06:01) (54 - 79)  BP: 97/58 (30 Apr 2022 06:00) (80/50 - 106/69)  BP(mean): 73 (30 Apr 2022 06:00) (60 - 82)  ABP: --  ABP(mean): --  RR: 10 (30 Apr 2022 06:00) (10 - 11)  SpO2: 95% (30 Apr 2022 06:01) (94% - 100%)    Mode: AC/ CMV (Assist Control/ Continuous Mandatory Ventilation), RR (machine): 10, TV (machine): 350, FiO2: 21, PEEP: 5, ITime: 1, MAP: 6, PIP: 15    04-29 @ 07:01  -  04-30 @ 07:00  --------------------------------------------------------  IN: 1790 mL / OUT: 800 mL / NET: 990 mL      CAPILLARY BLOOD GLUCOSE  121 (30 Apr 2022 05:00)      POCT Blood Glucose.: 121 mg/dL (30 Apr 2022 05:25)      PHYSICAL EXAM:    General: NAD  HEENT: NC/AT; PERRL, clear conjunctiva  Neck: supple  Respiratory: CTA b/l  Cardiovascular: +S1/S2; RRR  Abdomen: soft, NT/ND; +BS x4  Extremities: WWP, 2+ peripheral pulses b/l; no LE edema  Skin: normal color and turgor; no rash  Neurological:    MEDICATIONS:  MEDICATIONS  (STANDING):  albuterol/ipratropium for Nebulization 3 milliLiter(s) Nebulizer every 6 hours  atorvastatin 20 milliGRAM(s) Oral at bedtime  chlorhexidine 0.12% Liquid 15 milliLiter(s) Oral Mucosa every 12 hours  dextrose 50% Injectable 50 milliLiter(s) IV Push every 15 minutes  dextrose 50% Injectable 25 milliLiter(s) IV Push every 15 minutes  doxazosin 2 milliGRAM(s) Oral at bedtime  folic acid 1 milliGRAM(s) Oral daily  heparin   Injectable 5000 Unit(s) SubCutaneous every 12 hours  hydrocortisone sodium succinate Injectable 25 milliGRAM(s) IV Push every 8 hours  insulin lispro (ADMELOG) corrective regimen sliding scale   SubCutaneous every 6 hours  multivitamin 1 Tablet(s) Oral daily  mupirocin 2% Ointment 1 Application(s) Both Nostrils two times a day  polyethylene glycol 3350 17 Gram(s) Oral daily  senna Syrup 5 milliLiter(s) Oral at bedtime  thiamine 100 milliGRAM(s) Oral daily    MEDICATIONS  (PRN):  aluminum hydroxide/magnesium hydroxide/simethicone Suspension 30 milliLiter(s) Oral every 4 hours PRN Dyspepsia  ondansetron Injectable 4 milliGRAM(s) IV Push every 8 hours PRN Nausea and/or Vomiting      ALLERGIES:  Allergies    No Known Allergies    Intolerances        LABS:                        8.5    9.72  )-----------( 142      ( 30 Apr 2022 00:19 )             26.4     04-30    142  |  106  |  20  ----------------------------<  134<H>  3.7   |  26  |  0.67    Ca    8.4      30 Apr 2022 00:19  Phos  3.0     04-30  Mg     2.2     04-30    TPro  5.3<L>  /  Alb  2.8<L>  /  TBili  0.2  /  DBili  x   /  AST  19  /  ALT  35  /  AlkPhos  47  04-30    PT/INR - ( 30 Apr 2022 00:19 )   PT: 12.3 sec;   INR: 1.06 ratio         PTT - ( 30 Apr 2022 00:19 )  PTT:26.8 sec      RADIOLOGY & ADDITIONAL TESTS: Reviewed. INTERVAL HPI/OVERNIGHT EVENTS: -hypotensive to MAPs in the low 60s, given 2x 500cc fluid boluses and responded appropriately. No change in mentation    SUBJECTIVE: Patient seen and examined at bedside.     CONSTITUTIONAL: No weakness, fevers or chills  EYES/ENT: No visual changes;  No vertigo or throat pain   NECK: No pain or stiffness  RESPIRATORY: No cough, wheezing, hemoptysis; No shortness of breath  CARDIOVASCULAR: No chest pain or palpitations  GASTROINTESTINAL: No abdominal or epigastric pain. No nausea, vomiting, or hematemesis; No diarrhea or constipation. No melena or hematochezia.  GENITOURINARY: No dysuria, frequency or hematuria  NEUROLOGICAL: No numbness or weakness  SKIN: No itching, rashes    OBJECTIVE:    VITAL SIGNS:  ICU Vital Signs Last 24 Hrs  T(C): 36.7 (30 Apr 2022 03:00), Max: 37 (29 Apr 2022 21:00)  T(F): 98.1 (30 Apr 2022 03:00), Max: 98.6 (29 Apr 2022 21:00)  HR: 68 (30 Apr 2022 06:01) (54 - 79)  BP: 97/58 (30 Apr 2022 06:00) (80/50 - 106/69)  BP(mean): 73 (30 Apr 2022 06:00) (60 - 82)  ABP: --  ABP(mean): --  RR: 10 (30 Apr 2022 06:00) (10 - 11)  SpO2: 95% (30 Apr 2022 06:01) (94% - 100%)    Mode: AC/ CMV (Assist Control/ Continuous Mandatory Ventilation), RR (machine): 10, TV (machine): 350, FiO2: 21, PEEP: 5, ITime: 1, MAP: 6, PIP: 15    04-29 @ 07:01  -  04-30 @ 07:00  --------------------------------------------------------  IN: 1790 mL / OUT: 800 mL / NET: 990 mL      CAPILLARY BLOOD GLUCOSE  121 (30 Apr 2022 05:00)      POCT Blood Glucose.: 121 mg/dL (30 Apr 2022 05:25)      PHYSICAL EXAM:    General: NAD  HEENT: NC/AT; PERRL, clear conjunctiva  Neck: supple  Respiratory: CTA b/l  Cardiovascular: +S1/S2; RRR  Abdomen: soft, NT/ND; +BS x4  Extremities: WWP, 2+ peripheral pulses b/l; no LE edema  Skin: normal color and turgor; no rash  Neurological: responding to commands, moving all extremities, awake and alert    MEDICATIONS:  MEDICATIONS  (STANDING):  albuterol/ipratropium for Nebulization 3 milliLiter(s) Nebulizer every 6 hours  atorvastatin 20 milliGRAM(s) Oral at bedtime  chlorhexidine 0.12% Liquid 15 milliLiter(s) Oral Mucosa every 12 hours  dextrose 50% Injectable 50 milliLiter(s) IV Push every 15 minutes  dextrose 50% Injectable 25 milliLiter(s) IV Push every 15 minutes  doxazosin 2 milliGRAM(s) Oral at bedtime  folic acid 1 milliGRAM(s) Oral daily  heparin   Injectable 5000 Unit(s) SubCutaneous every 12 hours  hydrocortisone sodium succinate Injectable 25 milliGRAM(s) IV Push every 8 hours  insulin lispro (ADMELOG) corrective regimen sliding scale   SubCutaneous every 6 hours  multivitamin 1 Tablet(s) Oral daily  mupirocin 2% Ointment 1 Application(s) Both Nostrils two times a day  polyethylene glycol 3350 17 Gram(s) Oral daily  senna Syrup 5 milliLiter(s) Oral at bedtime  thiamine 100 milliGRAM(s) Oral daily    MEDICATIONS  (PRN):  aluminum hydroxide/magnesium hydroxide/simethicone Suspension 30 milliLiter(s) Oral every 4 hours PRN Dyspepsia  ondansetron Injectable 4 milliGRAM(s) IV Push every 8 hours PRN Nausea and/or Vomiting      ALLERGIES:  Allergies    No Known Allergies    Intolerances        LABS:                        8.5    9.72  )-----------( 142      ( 30 Apr 2022 00:19 )             26.4     04-30    142  |  106  |  20  ----------------------------<  134<H>  3.7   |  26  |  0.67    Ca    8.4      30 Apr 2022 00:19  Phos  3.0     04-30  Mg     2.2     04-30    TPro  5.3<L>  /  Alb  2.8<L>  /  TBili  0.2  /  DBili  x   /  AST  19  /  ALT  35  /  AlkPhos  47  04-30    PT/INR - ( 30 Apr 2022 00:19 )   PT: 12.3 sec;   INR: 1.06 ratio         PTT - ( 30 Apr 2022 00:19 )  PTT:26.8 sec      RADIOLOGY & ADDITIONAL TESTS: Reviewed.

## 2022-05-01 LAB
ALBUMIN SERPL ELPH-MCNC: 2.8 G/DL — LOW (ref 3.3–5)
ALP SERPL-CCNC: 48 U/L — SIGNIFICANT CHANGE UP (ref 40–120)
ALT FLD-CCNC: 34 U/L — SIGNIFICANT CHANGE UP (ref 10–45)
ANION GAP SERPL CALC-SCNC: 8 MMOL/L — SIGNIFICANT CHANGE UP (ref 5–17)
APTT BLD: 26.5 SEC — LOW (ref 27.5–35.5)
APTT BLD: 26.5 SEC — LOW (ref 27.5–35.5)
AST SERPL-CCNC: 24 U/L — SIGNIFICANT CHANGE UP (ref 10–40)
BASOPHILS # BLD AUTO: 0.01 K/UL — SIGNIFICANT CHANGE UP (ref 0–0.2)
BASOPHILS # BLD AUTO: 0.01 K/UL — SIGNIFICANT CHANGE UP (ref 0–0.2)
BASOPHILS NFR BLD AUTO: 0.1 % — SIGNIFICANT CHANGE UP (ref 0–2)
BASOPHILS NFR BLD AUTO: 0.1 % — SIGNIFICANT CHANGE UP (ref 0–2)
BILIRUB SERPL-MCNC: 0.2 MG/DL — SIGNIFICANT CHANGE UP (ref 0.2–1.2)
BUN SERPL-MCNC: 19 MG/DL — SIGNIFICANT CHANGE UP (ref 7–23)
CALCIUM SERPL-MCNC: 8.3 MG/DL — LOW (ref 8.4–10.5)
CHLORIDE SERPL-SCNC: 107 MMOL/L — SIGNIFICANT CHANGE UP (ref 96–108)
CO2 SERPL-SCNC: 27 MMOL/L — SIGNIFICANT CHANGE UP (ref 22–31)
CREAT SERPL-MCNC: 0.63 MG/DL — SIGNIFICANT CHANGE UP (ref 0.5–1.3)
CULTURE RESULTS: SIGNIFICANT CHANGE UP
EGFR: 109 ML/MIN/1.73M2 — SIGNIFICANT CHANGE UP
EOSINOPHIL # BLD AUTO: 0.11 K/UL — SIGNIFICANT CHANGE UP (ref 0–0.5)
EOSINOPHIL # BLD AUTO: 0.11 K/UL — SIGNIFICANT CHANGE UP (ref 0–0.5)
EOSINOPHIL NFR BLD AUTO: 1.3 % — SIGNIFICANT CHANGE UP (ref 0–6)
EOSINOPHIL NFR BLD AUTO: 1.4 % — SIGNIFICANT CHANGE UP (ref 0–6)
GLUCOSE BLDC GLUCOMTR-MCNC: 93 MG/DL — SIGNIFICANT CHANGE UP (ref 70–99)
GLUCOSE BLDC GLUCOMTR-MCNC: 99 MG/DL — SIGNIFICANT CHANGE UP (ref 70–99)
GLUCOSE BLDC GLUCOMTR-MCNC: 99 MG/DL — SIGNIFICANT CHANGE UP (ref 70–99)
GLUCOSE SERPL-MCNC: 127 MG/DL — HIGH (ref 70–99)
HCT VFR BLD CALC: 26.4 % — LOW (ref 39–50)
HCT VFR BLD CALC: 26.8 % — LOW (ref 39–50)
HGB BLD-MCNC: 8.3 G/DL — LOW (ref 13–17)
HGB BLD-MCNC: 8.5 G/DL — LOW (ref 13–17)
IMM GRANULOCYTES NFR BLD AUTO: 3.1 % — HIGH (ref 0–1.5)
IMM GRANULOCYTES NFR BLD AUTO: 3.2 % — HIGH (ref 0–1.5)
INR BLD: 1.05 RATIO — SIGNIFICANT CHANGE UP (ref 0.88–1.16)
INR BLD: 1.08 RATIO — SIGNIFICANT CHANGE UP (ref 0.88–1.16)
LYMPHOCYTES # BLD AUTO: 0.63 K/UL — LOW (ref 1–3.3)
LYMPHOCYTES # BLD AUTO: 0.69 K/UL — LOW (ref 1–3.3)
LYMPHOCYTES # BLD AUTO: 7.4 % — LOW (ref 13–44)
LYMPHOCYTES # BLD AUTO: 8.5 % — LOW (ref 13–44)
MAGNESIUM SERPL-MCNC: 2.2 MG/DL — SIGNIFICANT CHANGE UP (ref 1.6–2.6)
MCHC RBC-ENTMCNC: 28.8 PG — SIGNIFICANT CHANGE UP (ref 27–34)
MCHC RBC-ENTMCNC: 29.3 PG — SIGNIFICANT CHANGE UP (ref 27–34)
MCHC RBC-ENTMCNC: 31.4 GM/DL — LOW (ref 32–36)
MCHC RBC-ENTMCNC: 31.7 GM/DL — LOW (ref 32–36)
MCV RBC AUTO: 91.7 FL — SIGNIFICANT CHANGE UP (ref 80–100)
MCV RBC AUTO: 92.4 FL — SIGNIFICANT CHANGE UP (ref 80–100)
MONOCYTES # BLD AUTO: 0.43 K/UL — SIGNIFICANT CHANGE UP (ref 0–0.9)
MONOCYTES # BLD AUTO: 0.49 K/UL — SIGNIFICANT CHANGE UP (ref 0–0.9)
MONOCYTES NFR BLD AUTO: 5.3 % — SIGNIFICANT CHANGE UP (ref 2–14)
MONOCYTES NFR BLD AUTO: 5.8 % — SIGNIFICANT CHANGE UP (ref 2–14)
NEUTROPHILS # BLD AUTO: 6.64 K/UL — SIGNIFICANT CHANGE UP (ref 1.8–7.4)
NEUTROPHILS # BLD AUTO: 6.97 K/UL — SIGNIFICANT CHANGE UP (ref 1.8–7.4)
NEUTROPHILS NFR BLD AUTO: 81.5 % — HIGH (ref 43–77)
NEUTROPHILS NFR BLD AUTO: 82.3 % — HIGH (ref 43–77)
NRBC # BLD: 0 /100 WBCS — SIGNIFICANT CHANGE UP (ref 0–0)
NRBC # BLD: 0 /100 WBCS — SIGNIFICANT CHANGE UP (ref 0–0)
PHOSPHATE SERPL-MCNC: 3 MG/DL — SIGNIFICANT CHANGE UP (ref 2.5–4.5)
PLATELET # BLD AUTO: 151 K/UL — SIGNIFICANT CHANGE UP (ref 150–400)
PLATELET # BLD AUTO: 158 K/UL — SIGNIFICANT CHANGE UP (ref 150–400)
POTASSIUM SERPL-MCNC: 3.6 MMOL/L — SIGNIFICANT CHANGE UP (ref 3.5–5.3)
POTASSIUM SERPL-SCNC: 3.6 MMOL/L — SIGNIFICANT CHANGE UP (ref 3.5–5.3)
PROT SERPL-MCNC: 5.4 G/DL — LOW (ref 6–8.3)
PROTHROM AB SERPL-ACNC: 12.2 SEC — SIGNIFICANT CHANGE UP (ref 10.5–13.4)
PROTHROM AB SERPL-ACNC: 12.5 SEC — SIGNIFICANT CHANGE UP (ref 10.5–13.4)
RBC # BLD: 2.88 M/UL — LOW (ref 4.2–5.8)
RBC # BLD: 2.9 M/UL — LOW (ref 4.2–5.8)
RBC # FLD: 15 % — HIGH (ref 10.3–14.5)
RBC # FLD: 15.7 % — HIGH (ref 10.3–14.5)
SODIUM SERPL-SCNC: 142 MMOL/L — SIGNIFICANT CHANGE UP (ref 135–145)
SPECIMEN SOURCE: SIGNIFICANT CHANGE UP
WBC # BLD: 8.14 K/UL — SIGNIFICANT CHANGE UP (ref 3.8–10.5)
WBC # BLD: 8.47 K/UL — SIGNIFICANT CHANGE UP (ref 3.8–10.5)
WBC # FLD AUTO: 8.14 K/UL — SIGNIFICANT CHANGE UP (ref 3.8–10.5)
WBC # FLD AUTO: 8.47 K/UL — SIGNIFICANT CHANGE UP (ref 3.8–10.5)

## 2022-05-01 PROCEDURE — 99291 CRITICAL CARE FIRST HOUR: CPT | Mod: GC

## 2022-05-01 RX ORDER — ALBUMIN HUMAN 25 %
250 VIAL (ML) INTRAVENOUS ONCE
Refills: 0 | Status: COMPLETED | OUTPATIENT
Start: 2022-05-01 | End: 2022-05-01

## 2022-05-01 RX ORDER — SODIUM CHLORIDE 9 MG/ML
500 INJECTION, SOLUTION INTRAVENOUS ONCE
Refills: 0 | Status: COMPLETED | OUTPATIENT
Start: 2022-05-01 | End: 2022-05-01

## 2022-05-01 RX ADMIN — CHLORHEXIDINE GLUCONATE 15 MILLILITER(S): 213 SOLUTION TOPICAL at 05:37

## 2022-05-01 RX ADMIN — Medication 2 MILLIGRAM(S): at 21:37

## 2022-05-01 RX ADMIN — MUPIROCIN 1 APPLICATION(S): 20 OINTMENT TOPICAL at 05:38

## 2022-05-01 RX ADMIN — CHLORHEXIDINE GLUCONATE 15 MILLILITER(S): 213 SOLUTION TOPICAL at 17:12

## 2022-05-01 RX ADMIN — Medication 3 MILLILITER(S): at 05:03

## 2022-05-01 RX ADMIN — HEPARIN SODIUM 5000 UNIT(S): 5000 INJECTION INTRAVENOUS; SUBCUTANEOUS at 06:00

## 2022-05-01 RX ADMIN — Medication 3 MILLILITER(S): at 23:16

## 2022-05-01 RX ADMIN — Medication 3 MILLILITER(S): at 17:21

## 2022-05-01 RX ADMIN — Medication 25 MILLIGRAM(S): at 17:12

## 2022-05-01 RX ADMIN — Medication 1 TABLET(S): at 11:14

## 2022-05-01 RX ADMIN — Medication 3 MILLILITER(S): at 11:51

## 2022-05-01 RX ADMIN — Medication 25 MILLIGRAM(S): at 06:00

## 2022-05-01 RX ADMIN — Medication 100 MILLIGRAM(S): at 11:13

## 2022-05-01 RX ADMIN — MIDODRINE HYDROCHLORIDE 10 MILLIGRAM(S): 2.5 TABLET ORAL at 04:00

## 2022-05-01 RX ADMIN — Medication 1 MILLIGRAM(S): at 11:14

## 2022-05-01 RX ADMIN — ATORVASTATIN CALCIUM 20 MILLIGRAM(S): 80 TABLET, FILM COATED ORAL at 21:37

## 2022-05-01 RX ADMIN — HEPARIN SODIUM 5000 UNIT(S): 5000 INJECTION INTRAVENOUS; SUBCUTANEOUS at 21:37

## 2022-05-01 RX ADMIN — Medication 125 MILLILITER(S): at 21:57

## 2022-05-01 RX ADMIN — HEPARIN SODIUM 5000 UNIT(S): 5000 INJECTION INTRAVENOUS; SUBCUTANEOUS at 13:10

## 2022-05-01 RX ADMIN — SODIUM CHLORIDE 1500 MILLILITER(S): 9 INJECTION, SOLUTION INTRAVENOUS at 18:20

## 2022-05-01 NOTE — PROGRESS NOTE ADULT - ASSESSMENT
61 yr old male with stated hx significant for cerebral ataxia, chronic left cerebellum lacunar infarcts who presented with progressive weakness/fatigue accompanied with NBNB emesis found to have asp pneum, MRI findings of leptomeningeal enhancement concerning for infectious vs malignancy. Course c/b septic shock (resolved) r/o adrenal insufficiency, AMS requiring intubation for airway protection.  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    -Neuro checks q 2 hrs and prn for changes  -activity as tolerated  -physical therapy consult when stable  -s/p LP (4/23/22) - f/u results recommend by neurology -  (PCR neg, Cryptococcal antigen neg, Culture NGTD)   -obtained neuro surg consult for brain bx - see recs  -failed LP attempt 4/27/22 - repeated by IR as requested by NSurg, if flow cytometry negative reconsult neuro for biopsy  -precedex gtt - titrate to RASS of 0 to -2    #PULM:   AMS, intubated for airway protection, asp pneum    -Mechanical Vent Therapy - titrate to maintain ph 7.35-7.45; PCO2 35-45; SPO2 > 92%  -Bronchodilator therapy q 6 hrs prn sob/wheezes  -Lung protective therapy  -PSV trials as tolerated    #CV:  resolved septic shock,  new onset afib     -rate controlled  -continue atorvastatin   -s/p norepi gtt (d/c 4/24/22)  -TTE 4/22/22 - EF 70%, mild tricuspid regurg, Nl RV/LV  -continue to monitor    #GI/:      -large NGT residuals   -consider trickle TF as tolerated  -strict I & O 's - keep even    #ID: asp pneu, resolved septic shock, leptomeningeal enhancement    -s/p acyclovir (4/21/22 - 4/25/22)  -s/p ceftriaxone (4/21/22 -4/26/22)  -s/p ampicillin (4/21/22- 4/26/22)  -s/p zosyn (4/18/22 - 4/21/22)  -s/p vanco (4/20/22 - 4/25/22)  -MRSA PCR 4/23/22 - ND  -MSSA PCR 4/23/22 - detected  -Blood Cx 4/23/22 - NGTD  -EBV 4/23/22 - ND  -CSF 4/22/22 - No organisms seen  -CSF PCR 4/22/22 - ND  -see neuro notes for further CSF result  -continue mupirocin ointment x 5 days - d/c May 1st    #FEN/ENDO/HEME:    r/o adrenal insufficiency, r/o DI (resolved)    -obtain CMP/Mg++/PO--4/CBC w diff/PT/PTT/INR  q. a.m.  -continue hydrocortisone  decrease to 25 mg IV q 8 on 4/28/22 - will decrease to 25 mg IV q 12 on 4/30/22  -POC glucose with ISS q 6 hrs - maintain glucose 140 -180  -s/p fludrocortisone   -DVT prophylaxis with heparin subq     61 yr old male with stated hx significant for cerebral ataxia, chronic left cerebellum lacunar infarcts who presented with progressive weakness/fatigue accompanied with NBNB emesis found to have asp pneum, MRI findings of leptomeningeal enhancement concerning for infectious vs malignancy. Course c/b septic shock (resolved) r/o adrenal insufficiency, AMS requiring intubation for airway protection.  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    -Neuro checks q 2 hrs and prn for changes  -activity as tolerated  -physical therapy consult when stable  -off sedation, still pending CSF flow cytometry, wife is differing biopsy at this time    #PULM:   AMS, intubated for airway protection, asp pneum    -Mechanical Vent Therapy - titrate to maintain ph 7.35-7.45; PCO2 35-45; SPO2 > 92%  -Bronchodilator therapy q 6 hrs prn sob/wheezes  -Lung protective therapy  -PSV trials as tolerated, has difficulty initiating breaths   -Consider trach eval during the week    #CV:  resolved septic shock,  new onset afib     -rate controlled  -continue atorvastatin   -s/p norepi gtt (d/c 4/24/22)  -TTE 4/22/22 - EF 70%, mild tricuspid regurg, Nl RV/LV  -continue to monitor  - hold midodrine due to bradycardia    #GI/:      -large NGT residuals   -consider trickle TF as tolerated  -strict I & O 's - keep even    #ID: asp pneu, resolved septic shock, leptomeningeal enhancement    -s/p acyclovir (4/21/22 - 4/25/22)  -s/p ceftriaxone (4/21/22 -4/26/22)  -s/p ampicillin (4/21/22- 4/26/22)  -s/p zosyn (4/18/22 - 4/21/22)  -s/p vanco (4/20/22 - 4/25/22)  -MRSA PCR 4/23/22 - ND  -MSSA PCR 4/23/22 - detected  -Blood Cx 4/23/22 - NGTD  -EBV 4/23/22 - ND  -CSF 4/22/22 - No organisms seen  -CSF PCR 4/22/22 - ND  -see neuro notes for further CSF result  -continue mupirocin ointment x 5 days - d/c May 1st    #FEN/ENDO/HEME:    r/o adrenal insufficiency, r/o DI (resolved)    -obtain CMP/Mg++/PO--4/CBC w diff/PT/PTT/INR  q. a.m.  -continue hydrocortisone  decrease to 25 mg IV q 8 on 4/28/22 - will decrease to 25 mg IV q 12 on 4/30/22  -POC glucose with ISS q 6 hrs - maintain glucose 140 -180  -s/p fludrocortisone   -DVT prophylaxis with heparin subq

## 2022-05-01 NOTE — PROGRESS NOTE ADULT - SUBJECTIVE AND OBJECTIVE BOX
INTERVAL HPI/OVERNIGHT EVENTS:  -Started on midodrine 10q8    SUBJECTIVE: Patient seen and examined at bedside.       VITAL SIGNS:  ICU Vital Signs Last 24 Hrs  T(C): 37 (01 May 2022 03:00), Max: 37.2 (30 Apr 2022 23:30)  T(F): 98.6 (01 May 2022 03:00), Max: 99 (30 Apr 2022 23:30)  HR: 67 (01 May 2022 06:00) (54 - 101)  BP: 93/61 (01 May 2022 06:00) (81/53 - 122/71)  BP(mean): 73 (01 May 2022 06:00) (65 - 89)  ABP: --  ABP(mean): --  RR: 10 (01 May 2022 06:00) (10 - 16)  SpO2: 97% (01 May 2022 06:00) (95% - 100%)    Mode: AC/ CMV (Assist Control/ Continuous Mandatory Ventilation), RR (machine): 10, TV (machine): 350, FiO2: 21, PEEP: 5, ITime: 1, MAP: 6, PIP: 17  Plateau pressure:   P/F ratio:     04-30 @ 07:01  -  05-01 @ 07:00  --------------------------------------------------------  IN: 1195 mL / OUT: 1400 mL / NET: -205 mL      CAPILLARY BLOOD GLUCOSE  99 (01 May 2022 06:00)      POCT Blood Glucose.: 99 mg/dL (01 May 2022 06:03)    ECG:    PHYSICAL EXAM:    General:   HEENT:   Neck:   Respiratory:   Cardiovascular:   Abdomen:   Extremities:  Neurological:    MEDICATIONS:  MEDICATIONS  (STANDING):  albuterol/ipratropium for Nebulization 3 milliLiter(s) Nebulizer every 6 hours  atorvastatin 20 milliGRAM(s) Oral at bedtime  chlorhexidine 0.12% Liquid 15 milliLiter(s) Oral Mucosa every 12 hours  dextrose 50% Injectable 50 milliLiter(s) IV Push every 15 minutes  dextrose 50% Injectable 25 milliLiter(s) IV Push every 15 minutes  doxazosin 2 milliGRAM(s) Oral at bedtime  folic acid 1 milliGRAM(s) Oral daily  heparin   Injectable 5000 Unit(s) SubCutaneous every 8 hours  hydrocortisone sodium succinate Injectable 25 milliGRAM(s) IV Push every 12 hours  insulin lispro (ADMELOG) corrective regimen sliding scale   SubCutaneous every 6 hours  midodrine 10 milliGRAM(s) Oral every 8 hours  multivitamin 1 Tablet(s) Oral daily  polyethylene glycol 3350 17 Gram(s) Oral daily  senna Syrup 5 milliLiter(s) Oral at bedtime  thiamine 100 milliGRAM(s) Oral daily    MEDICATIONS  (PRN):  aluminum hydroxide/magnesium hydroxide/simethicone Suspension 30 milliLiter(s) Oral every 4 hours PRN Dyspepsia  ondansetron Injectable 4 milliGRAM(s) IV Push every 8 hours PRN Nausea and/or Vomiting      ALLERGIES:  Allergies    No Known Allergies    Intolerances        LABS:                        8.5    8.47  )-----------( 151      ( 01 May 2022 00:30 )             26.8     05-01    142  |  107  |  19  ----------------------------<  127<H>  3.6   |  27  |  0.63    Ca    8.3<L>      01 May 2022 00:30  Phos  3.0     05-01  Mg     2.2     05-01    TPro  5.4<L>  /  Alb  2.8<L>  /  TBili  0.2  /  DBili  x   /  AST  24  /  ALT  34  /  AlkPhos  48  05-01    PT/INR - ( 01 May 2022 00:30 )   PT: 12.2 sec;   INR: 1.05 ratio         PTT - ( 01 May 2022 00:30 )  PTT:26.5 sec      RADIOLOGY & ADDITIONAL TESTS: Reviewed.   INTERVAL HPI/OVERNIGHT EVENTS:  -Started on midodrine 10q8    SUBJECTIVE: Patient seen and examined at bedside.       VITAL SIGNS:  ICU Vital Signs Last 24 Hrs  T(C): 37 (01 May 2022 03:00), Max: 37.2 (30 Apr 2022 23:30)  T(F): 98.6 (01 May 2022 03:00), Max: 99 (30 Apr 2022 23:30)  HR: 67 (01 May 2022 06:00) (54 - 101)  BP: 93/61 (01 May 2022 06:00) (81/53 - 122/71)  BP(mean): 73 (01 May 2022 06:00) (65 - 89)  ABP: --  ABP(mean): --  RR: 10 (01 May 2022 06:00) (10 - 16)  SpO2: 97% (01 May 2022 06:00) (95% - 100%)    Mode: AC/ CMV (Assist Control/ Continuous Mandatory Ventilation), RR (machine): 10, TV (machine): 350, FiO2: 21, PEEP: 5, ITime: 1, MAP: 6, PIP: 17  Plateau pressure:   P/F ratio:     04-30 @ 07:01  -  05-01 @ 07:00  --------------------------------------------------------  IN: 1195 mL / OUT: 1400 mL / NET: -205 mL      CAPILLARY BLOOD GLUCOSE  99 (01 May 2022 06:00)      POCT Blood Glucose.: 99 mg/dL (01 May 2022 06:03)      PHYSICAL EXAM:    General: lethargic, NAD  Neck: supple   Respiratory: CTA b/l, no wheeze, rales, rhonchi  Cardiovascular: RRR, no murmur  Abdomen: soft, NT, ND  Extremities: no edema  Neurological: lethargic, follows commands    MEDICATIONS:  MEDICATIONS  (STANDING):  albuterol/ipratropium for Nebulization 3 milliLiter(s) Nebulizer every 6 hours  atorvastatin 20 milliGRAM(s) Oral at bedtime  chlorhexidine 0.12% Liquid 15 milliLiter(s) Oral Mucosa every 12 hours  dextrose 50% Injectable 50 milliLiter(s) IV Push every 15 minutes  dextrose 50% Injectable 25 milliLiter(s) IV Push every 15 minutes  doxazosin 2 milliGRAM(s) Oral at bedtime  folic acid 1 milliGRAM(s) Oral daily  heparin   Injectable 5000 Unit(s) SubCutaneous every 8 hours  hydrocortisone sodium succinate Injectable 25 milliGRAM(s) IV Push every 12 hours  insulin lispro (ADMELOG) corrective regimen sliding scale   SubCutaneous every 6 hours  midodrine 10 milliGRAM(s) Oral every 8 hours  multivitamin 1 Tablet(s) Oral daily  polyethylene glycol 3350 17 Gram(s) Oral daily  senna Syrup 5 milliLiter(s) Oral at bedtime  thiamine 100 milliGRAM(s) Oral daily    MEDICATIONS  (PRN):  aluminum hydroxide/magnesium hydroxide/simethicone Suspension 30 milliLiter(s) Oral every 4 hours PRN Dyspepsia  ondansetron Injectable 4 milliGRAM(s) IV Push every 8 hours PRN Nausea and/or Vomiting      ALLERGIES:  Allergies    No Known Allergies    Intolerances        LABS:                        8.5    8.47  )-----------( 151      ( 01 May 2022 00:30 )             26.8     05-01    142  |  107  |  19  ----------------------------<  127<H>  3.6   |  27  |  0.63    Ca    8.3<L>      01 May 2022 00:30  Phos  3.0     05-01  Mg     2.2     05-01    TPro  5.4<L>  /  Alb  2.8<L>  /  TBili  0.2  /  DBili  x   /  AST  24  /  ALT  34  /  AlkPhos  48  05-01    PT/INR - ( 01 May 2022 00:30 )   PT: 12.2 sec;   INR: 1.05 ratio         PTT - ( 01 May 2022 00:30 )  PTT:26.5 sec      RADIOLOGY & ADDITIONAL TESTS: Reviewed.

## 2022-05-02 LAB
ALBUMIN SERPL ELPH-MCNC: 2.8 G/DL — LOW (ref 3.3–5)
ALP SERPL-CCNC: 47 U/L — SIGNIFICANT CHANGE UP (ref 40–120)
ALT FLD-CCNC: 32 U/L — SIGNIFICANT CHANGE UP (ref 10–45)
ANION GAP SERPL CALC-SCNC: 10 MMOL/L — SIGNIFICANT CHANGE UP (ref 5–17)
AST SERPL-CCNC: 21 U/L — SIGNIFICANT CHANGE UP (ref 10–40)
BILIRUB SERPL-MCNC: 0.2 MG/DL — SIGNIFICANT CHANGE UP (ref 0.2–1.2)
BUN SERPL-MCNC: 18 MG/DL — SIGNIFICANT CHANGE UP (ref 7–23)
CALCIUM SERPL-MCNC: 8.2 MG/DL — LOW (ref 8.4–10.5)
CHLORIDE SERPL-SCNC: 107 MMOL/L — SIGNIFICANT CHANGE UP (ref 96–108)
CO2 SERPL-SCNC: 25 MMOL/L — SIGNIFICANT CHANGE UP (ref 22–31)
CREAT SERPL-MCNC: 0.6 MG/DL — SIGNIFICANT CHANGE UP (ref 0.5–1.3)
EGFR: 111 ML/MIN/1.73M2 — SIGNIFICANT CHANGE UP
GLUCOSE BLDC GLUCOMTR-MCNC: 101 MG/DL — HIGH (ref 70–99)
GLUCOSE BLDC GLUCOMTR-MCNC: 111 MG/DL — HIGH (ref 70–99)
GLUCOSE BLDC GLUCOMTR-MCNC: 82 MG/DL — SIGNIFICANT CHANGE UP (ref 70–99)
GLUCOSE SERPL-MCNC: 135 MG/DL — HIGH (ref 70–99)
MAGNESIUM SERPL-MCNC: 2.1 MG/DL — SIGNIFICANT CHANGE UP (ref 1.6–2.6)
METHYLMALONATE SERPL-SCNC: 70 NMOL/L — SIGNIFICANT CHANGE UP (ref 0–378)
NICOTINAMIDE: 7.8 NG/ML — SIGNIFICANT CHANGE UP (ref 5.2–72.1)
NICOTINIC ACID: <5 NG/ML — SIGNIFICANT CHANGE UP (ref 0–5)
PHOSPHATE SERPL-MCNC: 3.1 MG/DL — SIGNIFICANT CHANGE UP (ref 2.5–4.5)
POTASSIUM SERPL-MCNC: 4 MMOL/L — SIGNIFICANT CHANGE UP (ref 3.5–5.3)
POTASSIUM SERPL-SCNC: 4 MMOL/L — SIGNIFICANT CHANGE UP (ref 3.5–5.3)
PROT SERPL-MCNC: 5.3 G/DL — LOW (ref 6–8.3)
SODIUM SERPL-SCNC: 142 MMOL/L — SIGNIFICANT CHANGE UP (ref 135–145)

## 2022-05-02 PROCEDURE — 99291 CRITICAL CARE FIRST HOUR: CPT

## 2022-05-02 PROCEDURE — 99233 SBSQ HOSP IP/OBS HIGH 50: CPT | Mod: GC

## 2022-05-02 RX ORDER — HYDROCORTISONE 20 MG
25 TABLET ORAL EVERY 12 HOURS
Refills: 0 | Status: DISCONTINUED | OUTPATIENT
Start: 2022-05-02 | End: 2022-05-04

## 2022-05-02 RX ORDER — NOREPINEPHRINE BITARTRATE/D5W 8 MG/250ML
0.05 PLASTIC BAG, INJECTION (ML) INTRAVENOUS
Qty: 8 | Refills: 0 | Status: DISCONTINUED | OUTPATIENT
Start: 2022-05-02 | End: 2022-05-02

## 2022-05-02 RX ADMIN — CHLORHEXIDINE GLUCONATE 15 MILLILITER(S): 213 SOLUTION TOPICAL at 05:15

## 2022-05-02 RX ADMIN — HEPARIN SODIUM 5000 UNIT(S): 5000 INJECTION INTRAVENOUS; SUBCUTANEOUS at 13:04

## 2022-05-02 RX ADMIN — Medication 1 MILLIGRAM(S): at 11:11

## 2022-05-02 RX ADMIN — Medication 3 MILLILITER(S): at 11:24

## 2022-05-02 RX ADMIN — Medication 3 MILLILITER(S): at 17:19

## 2022-05-02 RX ADMIN — Medication 3 MILLILITER(S): at 05:21

## 2022-05-02 RX ADMIN — CHLORHEXIDINE GLUCONATE 15 MILLILITER(S): 213 SOLUTION TOPICAL at 17:21

## 2022-05-02 RX ADMIN — HEPARIN SODIUM 5000 UNIT(S): 5000 INJECTION INTRAVENOUS; SUBCUTANEOUS at 05:15

## 2022-05-02 RX ADMIN — Medication 3 MILLILITER(S): at 23:10

## 2022-05-02 RX ADMIN — Medication 100 MILLIGRAM(S): at 11:11

## 2022-05-02 RX ADMIN — Medication 1 TABLET(S): at 11:10

## 2022-05-02 RX ADMIN — HEPARIN SODIUM 5000 UNIT(S): 5000 INJECTION INTRAVENOUS; SUBCUTANEOUS at 21:20

## 2022-05-02 RX ADMIN — Medication 25 MILLIGRAM(S): at 05:16

## 2022-05-02 RX ADMIN — Medication 7.14 MICROGRAM(S)/KG/MIN: at 00:23

## 2022-05-02 RX ADMIN — Medication 2 MILLIGRAM(S): at 21:20

## 2022-05-02 RX ADMIN — Medication 25 MILLIGRAM(S): at 17:21

## 2022-05-02 RX ADMIN — ATORVASTATIN CALCIUM 20 MILLIGRAM(S): 80 TABLET, FILM COATED ORAL at 21:20

## 2022-05-02 NOTE — PROGRESS NOTE ADULT - SUBJECTIVE AND OBJECTIVE BOX
MRN-[id number]    Subjective: 60 yo male seen and examined at bedside. Off mechanical vent. No acute events overnight.     PAST MEDICAL & SURGICAL HISTORY:  H/O cerebellar ataxia    No significant past surgical history    FAMILY HISTORY:  FH: dementia (Mother)    FH: type 2 diabetes (Mother)    Family history of CVA (Father)    Social Hx:  Nonsmoker, no drug or alcohol use    Home Medications:  atorvastatin 20 mg oral tablet: 1 tab(s) orally once a day (2022 06:28)  mirtazapine 15 mg oral tablet: 1 tab(s) orally once a day (at bedtime), As Needed (2022 06:28)    MEDICATIONS  (STANDING):  albuterol/ipratropium for Nebulization 3 milliLiter(s) Nebulizer every 6 hours  atorvastatin 20 milliGRAM(s) Oral at bedtime  chlorhexidine 0.12% Liquid 15 milliLiter(s) Oral Mucosa every 12 hours  dextrose 50% Injectable 50 milliLiter(s) IV Push every 15 minutes  dextrose 50% Injectable 25 milliLiter(s) IV Push every 15 minutes  doxazosin 2 milliGRAM(s) Oral at bedtime  folic acid 1 milliGRAM(s) Oral daily  heparin   Injectable 5000 Unit(s) SubCutaneous every 12 hours  hydrocortisone sodium succinate Injectable 25 milliGRAM(s) IV Push every 8 hours  insulin lispro (ADMELOG) corrective regimen sliding scale   SubCutaneous every 6 hours  multivitamin 1 Tablet(s) Oral daily  mupirocin 2% Ointment 1 Application(s) Both Nostrils two times a day  polyethylene glycol 3350 17 Gram(s) Oral daily  senna Syrup 5 milliLiter(s) Oral at bedtime  thiamine 100 milliGRAM(s) Oral daily    Allergies  No Known Allergies	    ROS: patient is intubated with some altered mental status unable to fully assess     Vital Signs Last 24 Hrs  T(C): 36.5 (02 May 2022 08:00), Max: 36.8 (02 May 2022 00:00)  T(F): 97.7 (02 May 2022 08:00), Max: 98.2 (02 May 2022 00:00)  HR: 62 (02 May 2022 11:30) (51 - 81)  BP: 102/66 (02 May 2022 11:00) (79/51 - 113/68)  BP(mean): 79 (02 May 2022 11:00) (60 - 87)  RR: 11 (02 May 2022 11:30) (10 - 17)  SpO2: 98% (02 May 2022 11:30) (95% - 100%)      GENERAL EXAM:  Constitutional: Lying in bed, NAD.  HEENT: Normocephalic  Extremities: No edema.    NEUROLOGICAL EXAM: (Off sedation)  MS: Eyes open to verbal stimuli. Attentive. Alert. No verbal output (intubated). Able to answer yes or no with head nod.  Pt able to follow some simple commands - stick out tongue, track penlight with eyes, squeeze hand.  CN: PERRL. EOMI, no nystagmus. No facial asymmetry.   Motor: moving all 4 extremities equally 3-/5 b/l throughout   Dysmetria: unable to perform   Tremor: No resting, postural or action tremor.  No myoclonus.  Sensation: intact to light touch  Reflexes:   2+ throughout in UE's b/l and LE's b/l. Positive L Babinski. Mute on R foot.     LABS:                                                8.3    8.14  )-----------( 158      ( 01 May 2022 23:31 )             26.4       05-    142  |  107  |  18  ----------------------------<  135<H>  4.0   |  25  |  0.60    Ca    8.2<L>      01 May 2022 23:31  Phos  3.1     05-  Mg     2.1     -    TPro  5.3<L>  /  Alb  2.8<L>  /  TBili  0.2  /  DBili  x   /  AST  21  /  ALT  32  /  AlkPhos  47  05-      UA: Urinalysis Basic - ( 2022 10:35 )    Color: Light Orange / Appearance: Clear / S.042 / pH: x  Gluc: x / Ketone: Trace  / Bili: Negative / Urobili: Negative   Blood: x / Protein: 30 mg/dL / Nitrite: Negative   Leuk Esterase: Negative / RBC: 885 /hpf / WBC 8 /HPF   Sq Epi: x / Non Sq Epi: 1 /hpf / Bacteria: Negative    Radiology/EEGs:  - CT HEAD: No acute intracranial hemorrhage or mass effect.  - CTA NECK: No hemodynamically significant stenosis by NASCET criteria, dissection, or pseudoaneurysm.  - CTA HEAD: No large vessel occlusion, significant stenosis, dissection, or saccular aneurysm.  - MRI BRAIN: Extensive, diffuse leptomeningeal enhancement. Differential diagnosis primarily includes leptomeningeal metastases and infectious meningitis. Abnormal ependymal enhancement involving the bilateral frontal horns, bilateral ventricular bodies, left temporal and occipital horn, and fourth ventricle. Differential diagnosis includes ependymal metastases and infectious meningitis. Possible small foci of restricted diffusion in the bilateral superior cerebral hemispheres. These may represent small acute infarcts or regions of encephalitis. Edema noted within the cerebellar vermis and mesial bilateral cerebellar hemispheres.  - MRI CERVICAL SPINE: Diffuse leptomeningeal enhancement. This may represent the presence of leptomeningeal metastases or leptomeningeal infection. Multilevel degenerative changes.    - EEG SUMMARY/CLASSIFICATION:  Abnormal EEG   - Moderate to severe generalized slowing.  _____________________________________________________________  EEG IMPRESSION/CLINICAL CORRELATE:  Abnormal EEG study.  Moderate to severe nonspecific diffuse or multifocal cerebral dysfunction.   No epileptiform pattern or seizure seen.    - EEG SUMMARY/CLASSIFICATION:  Abnormal EEG   - Suppressed background   - burst attenuation pattern  _____________________________________________________________  EEG IMPRESSION/CLINICAL CORRELATE:  Abnormal EEG study.  Severe nonspecific diffuse or multifocal cerebral dysfunction.   In the absence of sedation, voltage suppression maybe seen with acute to subacute cortical injury  No epileptiform patterns or seizures recorded x 1 day.     EEG SUMMARY/CLASSIFICATION:  Abnormal EEG   - Suppressed background initially, then more severe slowing with superimposed blunt GPDs with triphasic morphology near 1.5hz, most prominent after transition from burst suppression, more apparent with arousal, less conspicuous with clinical drowsiness. GPDs less conspicuous in latter portions.   - Some right hand movements noted, no definite correlation with abnormal findings on EEG  _____________________________________________________________  EEG IMPRESSION/CLINICAL CORRELATE:  Abnormal EEG study.  Severe nonspecific diffuse or multifocal cerebral dysfunction, improved vs prior day.   GPDs with triphasic morphology may be associated with an increased risk for seizure, but are typically seen in the context of a relatively severe metabolic encephalopathic process.  GPDs less conspicuous in latter portions.   No definite electrographic seizures.  ECG irregular.     EEG IMPRESSION/CLINICAL CORRELATE:   Abnormal EEG study.  Moderate to severe nonspecific diffuse or multifocal cerebral dysfunction, improved vs prior day.   No electrographic seizures.  ECG irregular.     MRN-[id number]    Subjective: 62 yo male seen and examined at bedside. Off mechanical vent. No acute events overnight.     PAST MEDICAL & SURGICAL HISTORY:  H/O cerebellar ataxia    No significant past surgical history    FAMILY HISTORY:  FH: dementia (Mother)    FH: type 2 diabetes (Mother)    Family history of CVA (Father)    Social Hx:  Nonsmoker, no drug or alcohol use    Home Medications:  atorvastatin 20 mg oral tablet: 1 tab(s) orally once a day (2022 06:28)  mirtazapine 15 mg oral tablet: 1 tab(s) orally once a day (at bedtime), As Needed (2022 06:28)    MEDICATIONS  (STANDING):  albuterol/ipratropium for Nebulization 3 milliLiter(s) Nebulizer every 6 hours  atorvastatin 20 milliGRAM(s) Oral at bedtime  chlorhexidine 0.12% Liquid 15 milliLiter(s) Oral Mucosa every 12 hours  dextrose 50% Injectable 50 milliLiter(s) IV Push every 15 minutes  dextrose 50% Injectable 25 milliLiter(s) IV Push every 15 minutes  doxazosin 2 milliGRAM(s) Oral at bedtime  folic acid 1 milliGRAM(s) Oral daily  heparin   Injectable 5000 Unit(s) SubCutaneous every 12 hours  hydrocortisone sodium succinate Injectable 25 milliGRAM(s) IV Push every 8 hours  insulin lispro (ADMELOG) corrective regimen sliding scale   SubCutaneous every 6 hours  multivitamin 1 Tablet(s) Oral daily  mupirocin 2% Ointment 1 Application(s) Both Nostrils two times a day  polyethylene glycol 3350 17 Gram(s) Oral daily  senna Syrup 5 milliLiter(s) Oral at bedtime  thiamine 100 milliGRAM(s) Oral daily    Allergies  No Known Allergies	    ROS: patient is intubated with some altered mental status unable to fully assess     Vital Signs Last 24 Hrs  T(C): 36.5 (02 May 2022 08:00), Max: 36.8 (02 May 2022 00:00)  T(F): 97.7 (02 May 2022 08:00), Max: 98.2 (02 May 2022 00:00)  HR: 62 (02 May 2022 11:30) (51 - 81)  BP: 102/66 (02 May 2022 11:00) (79/51 - 113/68)  BP(mean): 79 (02 May 2022 11:00) (60 - 87)  RR: 11 (02 May 2022 11:30) (10 - 17)  SpO2: 98% (02 May 2022 11:30) (95% - 100%)      GENERAL EXAM:  Constitutional: Lying in bed, NAD.  HEENT: Normocephalic  Extremities: No edema.    NEUROLOGICAL EXAM: (Off sedation)  MS: Eyes open to verbal stimuli. Attentive. Alert. No verbal output (intubated). Able to answer yes or no with head nod.  Pt able to follow some simple commands - stick out tongue, track penlight with eyes, squeeze hand.  CN: PERRL. EOMI, no nystagmus. No facial asymmetry.   Motor: Able to lift UE's b/l  Dysmetria: unable to perform   Tremor: No resting, postural or action tremor.  No myoclonus.  Sensation: intact to light touch  Reflexes:   2+ throughout in UE's b/l and LE's b/l. Positive L Babinski. Mute on R foot.     LABS:                                                8.3    8.14  )-----------( 158      ( 01 May 2022 23:31 )             26.4       05-    142  |  107  |  18  ----------------------------<  135<H>  4.0   |  25  |  0.60    Ca    8.2<L>      01 May 2022 23:31  Phos  3.1     05-  Mg     2.1     05-    TPro  5.3<L>  /  Alb  2.8<L>  /  TBili  0.2  /  DBili  x   /  AST  21  /  ALT  32  /  AlkPhos  47  05-      UA: Urinalysis Basic - ( 2022 10:35 )    Color: Light Orange / Appearance: Clear / S.042 / pH: x  Gluc: x / Ketone: Trace  / Bili: Negative / Urobili: Negative   Blood: x / Protein: 30 mg/dL / Nitrite: Negative   Leuk Esterase: Negative / RBC: 885 /hpf / WBC 8 /HPF   Sq Epi: x / Non Sq Epi: 1 /hpf / Bacteria: Negative    Radiology/EEGs:  - CT HEAD: No acute intracranial hemorrhage or mass effect.  - CTA NECK: No hemodynamically significant stenosis by NASCET criteria, dissection, or pseudoaneurysm.  - CTA HEAD: No large vessel occlusion, significant stenosis, dissection, or saccular aneurysm.  - MRI BRAIN: Extensive, diffuse leptomeningeal enhancement. Differential diagnosis primarily includes leptomeningeal metastases and infectious meningitis. Abnormal ependymal enhancement involving the bilateral frontal horns, bilateral ventricular bodies, left temporal and occipital horn, and fourth ventricle. Differential diagnosis includes ependymal metastases and infectious meningitis. Possible small foci of restricted diffusion in the bilateral superior cerebral hemispheres. These may represent small acute infarcts or regions of encephalitis. Edema noted within the cerebellar vermis and mesial bilateral cerebellar hemispheres.  - MRI CERVICAL SPINE: Diffuse leptomeningeal enhancement. This may represent the presence of leptomeningeal metastases or leptomeningeal infection. Multilevel degenerative changes.    - EEG SUMMARY/CLASSIFICATION:  Abnormal EEG   - Moderate to severe generalized slowing.  _____________________________________________________________  EEG IMPRESSION/CLINICAL CORRELATE:  Abnormal EEG study.  Moderate to severe nonspecific diffuse or multifocal cerebral dysfunction.   No epileptiform pattern or seizure seen.    - EEG SUMMARY/CLASSIFICATION:  Abnormal EEG   - Suppressed background   - burst attenuation pattern  _____________________________________________________________  EEG IMPRESSION/CLINICAL CORRELATE:  Abnormal EEG study.  Severe nonspecific diffuse or multifocal cerebral dysfunction.   In the absence of sedation, voltage suppression maybe seen with acute to subacute cortical injury  No epileptiform patterns or seizures recorded x 1 day.     EEG SUMMARY/CLASSIFICATION:  Abnormal EEG   - Suppressed background initially, then more severe slowing with superimposed blunt GPDs with triphasic morphology near 1.5hz, most prominent after transition from burst suppression, more apparent with arousal, less conspicuous with clinical drowsiness. GPDs less conspicuous in latter portions.   - Some right hand movements noted, no definite correlation with abnormal findings on EEG  _____________________________________________________________  EEG IMPRESSION/CLINICAL CORRELATE:  Abnormal EEG study.  Severe nonspecific diffuse or multifocal cerebral dysfunction, improved vs prior day.   GPDs with triphasic morphology may be associated with an increased risk for seizure, but are typically seen in the context of a relatively severe metabolic encephalopathic process.  GPDs less conspicuous in latter portions.   No definite electrographic seizures.  ECG irregular.     EEG IMPRESSION/CLINICAL CORRELATE:   Abnormal EEG study.  Moderate to severe nonspecific diffuse or multifocal cerebral dysfunction, improved vs prior day.   No electrographic seizures.  ECG irregular.

## 2022-05-02 NOTE — PROGRESS NOTE ADULT - SUBJECTIVE AND OBJECTIVE BOX
INTERVAL HPI/OVERNIGHT EVENTS: Overnight patient was given 500cc fluids and 250cc albumin for soft BPs. Midodrine was stopped due to bradycardia.     SUBJECTIVE: Patient seen and examined at bedside.     OBJECTIVE:    VITAL SIGNS:  ICU Vital Signs Last 24 Hrs  T(C): 36.7 (02 May 2022 04:00), Max: 36.8 (02 May 2022 00:00)  T(F): 98.1 (02 May 2022 04:00), Max: 98.2 (02 May 2022 00:00)  HR: 66 (02 May 2022 06:45) (48 - 81)  BP: 100/61 (02 May 2022 06:45) (79/51 - 121/72)  BP(mean): 76 (02 May 2022 06:45) (60 - 92)  ABP: --  ABP(mean): --  RR: 11 (02 May 2022 06:45) (10 - 17)  SpO2: 98% (02 May 2022 06:45) (95% - 100%)    Mode: AC/ CMV (Assist Control/ Continuous Mandatory Ventilation), RR (machine): 10, TV (machine): 350, FiO2: 21, PEEP: 5, ITime: 1, MAP: 6, PIP: 17    05-01 @ 07:01  -  05-02 @ 07:00  --------------------------------------------------------  IN: 1651.2 mL / OUT: 1950 mL / NET: -298.8 mL      CAPILLARY BLOOD GLUCOSE  99 (01 May 2022 06:00)      POCT Blood Glucose.: 111 mg/dL (02 May 2022 05:19)      PHYSICAL EXAM:    General: lethargic, NAD  Neck: supple   Respiratory: CTA b/l, no wheeze, rales, rhonchi  Cardiovascular: RRR, no murmur  Abdomen: soft, NT, ND  Extremities: no edema  Neurological: lethargic, follows commands    MEDICATIONS:  MEDICATIONS  (STANDING):  albuterol/ipratropium for Nebulization 3 milliLiter(s) Nebulizer every 6 hours  atorvastatin 20 milliGRAM(s) Oral at bedtime  chlorhexidine 0.12% Liquid 15 milliLiter(s) Oral Mucosa every 12 hours  dextrose 50% Injectable 50 milliLiter(s) IV Push every 15 minutes  dextrose 50% Injectable 25 milliLiter(s) IV Push every 15 minutes  doxazosin 2 milliGRAM(s) Oral at bedtime  folic acid 1 milliGRAM(s) Oral daily  heparin   Injectable 5000 Unit(s) SubCutaneous every 8 hours  hydrocortisone sodium succinate Injectable 25 milliGRAM(s) IV Push every 12 hours  insulin lispro (ADMELOG) corrective regimen sliding scale   SubCutaneous every 6 hours  multivitamin 1 Tablet(s) Oral daily  norepinephrine Infusion 0.05 MICROgram(s)/kG/Min (7.14 mL/Hr) IV Continuous <Continuous>  polyethylene glycol 3350 17 Gram(s) Oral daily  senna Syrup 5 milliLiter(s) Oral at bedtime  thiamine 100 milliGRAM(s) Oral daily    MEDICATIONS  (PRN):  aluminum hydroxide/magnesium hydroxide/simethicone Suspension 30 milliLiter(s) Oral every 4 hours PRN Dyspepsia  ondansetron Injectable 4 milliGRAM(s) IV Push every 8 hours PRN Nausea and/or Vomiting      ALLERGIES:  Allergies    No Known Allergies    Intolerances        LABS:                        8.3    8.14  )-----------( 158      ( 01 May 2022 23:31 )             26.4     Hemoglobin: 8.3 g/dL (05-01 @ 23:31)  Hemoglobin: 8.5 g/dL (05-01 @ 00:30)  Hemoglobin: 8.5 g/dL (04-30 @ 00:19)  Hemoglobin: 8.6 g/dL (04-29 @ 00:36)  Hemoglobin: 9.5 g/dL (04-28 @ 00:17)    CBC Full  -  ( 01 May 2022 23:31 )  WBC Count : 8.14 K/uL  RBC Count : 2.88 M/uL  Hemoglobin : 8.3 g/dL  Hematocrit : 26.4 %  Platelet Count - Automated : 158 K/uL  Mean Cell Volume : 91.7 fl  Mean Cell Hemoglobin : 28.8 pg  Mean Cell Hemoglobin Concentration : 31.4 gm/dL  Auto Neutrophil # : 6.64 K/uL  Auto Lymphocyte # : 0.69 K/uL  Auto Monocyte # : 0.43 K/uL  Auto Eosinophil # : 0.11 K/uL  Auto Basophil # : 0.01 K/uL  Auto Neutrophil % : 81.5 %  Auto Lymphocyte % : 8.5 %  Auto Monocyte % : 5.3 %  Auto Eosinophil % : 1.4 %  Auto Basophil % : 0.1 %    05-01    142  |  107  |  18  ----------------------------<  135<H>  4.0   |  25  |  0.60    Ca    8.2<L>      01 May 2022 23:31  Phos  3.1     05-01  Mg     2.1     05-01    TPro  5.3<L>  /  Alb  2.8<L>  /  TBili  0.2  /  DBili  x   /  AST  21  /  ALT  32  /  AlkPhos  47  05-01    Creatinine Trend: 0.60<--, 0.63<--, 0.67<--, 0.71<--, 0.58<--, 0.71<--  LIVER FUNCTIONS - ( 01 May 2022 23:31 )  Alb: 2.8 g/dL / Pro: 5.3 g/dL / ALK PHOS: 47 U/L / ALT: 32 U/L / AST: 21 U/L / GGT: x           PT/INR - ( 01 May 2022 23:31 )   PT: 12.5 sec;   INR: 1.08 ratio         PTT - ( 01 May 2022 23:31 )  PTT:26.5 sec    hs Troponin:              CSF:    Total Nucleated Cell Count, CSF: 22 /uL (04-28-22 @ 15:20)  RBC Count - Spinal Fluid: 1 /uL (04-28-22 @ 15:20)  Total Nucleated Cell Count, CSF: 43 /uL (04-22-22 @ 16:07)  RBC Count - Spinal Fluid: 9 /uL (04-22-22 @ 16:07)          Protein, CSF: 50 mg/dL (04-28-22 @ 17:34)  Glucose, CSF: 32 mg/dL (04-28-22 @ 15:12)  Protein, CSF: 50 mg/dL (04-28-22 @ 15:12)  Glucose, CSF: 54 mg/dL (04-22-22 @ 16:07)  Protein, CSF: 126 mg/dL (04-22-22 @ 16:07)            EKG:   MICROBIOLOGY:    IMAGING:      Labs, imaging, EKG personally reviewed    RADIOLOGY & ADDITIONAL TESTS: Reviewed. INTERVAL HPI/OVERNIGHT EVENTS: Overnight patient was given 500cc fluids and 250cc albumin for soft BPs.     SUBJECTIVE: Patient seen and examined at bedside. Patient nods yes and no to questions. Denies pain, chest pain, shortness of breath at this time. Able to move all extremities.     OBJECTIVE:    VITAL SIGNS:  ICU Vital Signs Last 24 Hrs  T(C): 36.7 (02 May 2022 04:00), Max: 36.8 (02 May 2022 00:00)  T(F): 98.1 (02 May 2022 04:00), Max: 98.2 (02 May 2022 00:00)  HR: 66 (02 May 2022 06:45) (48 - 81)  BP: 100/61 (02 May 2022 06:45) (79/51 - 121/72)  BP(mean): 76 (02 May 2022 06:45) (60 - 92)  ABP: --  ABP(mean): --  RR: 11 (02 May 2022 06:45) (10 - 17)  SpO2: 98% (02 May 2022 06:45) (95% - 100%)    Mode: AC/ CMV (Assist Control/ Continuous Mandatory Ventilation), RR (machine): 10, TV (machine): 350, FiO2: 21, PEEP: 5, ITime: 1, MAP: 6, PIP: 17    05-01 @ 07:01  -  05-02 @ 07:00  --------------------------------------------------------  IN: 1651.2 mL / OUT: 1950 mL / NET: -298.8 mL      CAPILLARY BLOOD GLUCOSE  99 (01 May 2022 06:00)      POCT Blood Glucose.: 111 mg/dL (02 May 2022 05:19)      PHYSICAL EXAM:    General: able to nod yes/no to questions, no acute distress  Neck: supple   Respiratory: CTA b/l, no wheeze, rales, rhonchi  Cardiovascular: RRR, no murmur  Abdomen: soft, NT, ND  Extremities: no edema  Neurological: follows commands, moves all extremities     MEDICATIONS:  MEDICATIONS  (STANDING):  albuterol/ipratropium for Nebulization 3 milliLiter(s) Nebulizer every 6 hours  atorvastatin 20 milliGRAM(s) Oral at bedtime  chlorhexidine 0.12% Liquid 15 milliLiter(s) Oral Mucosa every 12 hours  dextrose 50% Injectable 50 milliLiter(s) IV Push every 15 minutes  dextrose 50% Injectable 25 milliLiter(s) IV Push every 15 minutes  doxazosin 2 milliGRAM(s) Oral at bedtime  folic acid 1 milliGRAM(s) Oral daily  heparin   Injectable 5000 Unit(s) SubCutaneous every 8 hours  hydrocortisone sodium succinate Injectable 25 milliGRAM(s) IV Push every 12 hours  insulin lispro (ADMELOG) corrective regimen sliding scale   SubCutaneous every 6 hours  multivitamin 1 Tablet(s) Oral daily  norepinephrine Infusion 0.05 MICROgram(s)/kG/Min (7.14 mL/Hr) IV Continuous <Continuous>  polyethylene glycol 3350 17 Gram(s) Oral daily  senna Syrup 5 milliLiter(s) Oral at bedtime  thiamine 100 milliGRAM(s) Oral daily    MEDICATIONS  (PRN):  aluminum hydroxide/magnesium hydroxide/simethicone Suspension 30 milliLiter(s) Oral every 4 hours PRN Dyspepsia  ondansetron Injectable 4 milliGRAM(s) IV Push every 8 hours PRN Nausea and/or Vomiting      ALLERGIES:  Allergies    No Known Allergies    Intolerances        LABS:                        8.3    8.14  )-----------( 158      ( 01 May 2022 23:31 )             26.4     Hemoglobin: 8.3 g/dL (05-01 @ 23:31)  Hemoglobin: 8.5 g/dL (05-01 @ 00:30)  Hemoglobin: 8.5 g/dL (04-30 @ 00:19)  Hemoglobin: 8.6 g/dL (04-29 @ 00:36)  Hemoglobin: 9.5 g/dL (04-28 @ 00:17)    CBC Full  -  ( 01 May 2022 23:31 )  WBC Count : 8.14 K/uL  RBC Count : 2.88 M/uL  Hemoglobin : 8.3 g/dL  Hematocrit : 26.4 %  Platelet Count - Automated : 158 K/uL  Mean Cell Volume : 91.7 fl  Mean Cell Hemoglobin : 28.8 pg  Mean Cell Hemoglobin Concentration : 31.4 gm/dL  Auto Neutrophil # : 6.64 K/uL  Auto Lymphocyte # : 0.69 K/uL  Auto Monocyte # : 0.43 K/uL  Auto Eosinophil # : 0.11 K/uL  Auto Basophil # : 0.01 K/uL  Auto Neutrophil % : 81.5 %  Auto Lymphocyte % : 8.5 %  Auto Monocyte % : 5.3 %  Auto Eosinophil % : 1.4 %  Auto Basophil % : 0.1 %    05-01    142  |  107  |  18  ----------------------------<  135<H>  4.0   |  25  |  0.60    Ca    8.2<L>      01 May 2022 23:31  Phos  3.1     05-01  Mg     2.1     05-01    TPro  5.3<L>  /  Alb  2.8<L>  /  TBili  0.2  /  DBili  x   /  AST  21  /  ALT  32  /  AlkPhos  47  05-01    Creatinine Trend: 0.60<--, 0.63<--, 0.67<--, 0.71<--, 0.58<--, 0.71<--  LIVER FUNCTIONS - ( 01 May 2022 23:31 )  Alb: 2.8 g/dL / Pro: 5.3 g/dL / ALK PHOS: 47 U/L / ALT: 32 U/L / AST: 21 U/L / GGT: x           PT/INR - ( 01 May 2022 23:31 )   PT: 12.5 sec;   INR: 1.08 ratio         PTT - ( 01 May 2022 23:31 )  PTT:26.5 sec    hs Troponin:              CSF:    Total Nucleated Cell Count, CSF: 22 /uL (04-28-22 @ 15:20)  RBC Count - Spinal Fluid: 1 /uL (04-28-22 @ 15:20)  Total Nucleated Cell Count, CSF: 43 /uL (04-22-22 @ 16:07)  RBC Count - Spinal Fluid: 9 /uL (04-22-22 @ 16:07)          Protein, CSF: 50 mg/dL (04-28-22 @ 17:34)  Glucose, CSF: 32 mg/dL (04-28-22 @ 15:12)  Protein, CSF: 50 mg/dL (04-28-22 @ 15:12)  Glucose, CSF: 54 mg/dL (04-22-22 @ 16:07)  Protein, CSF: 126 mg/dL (04-22-22 @ 16:07)        Labs, imaging, EKG personally reviewed    RADIOLOGY & ADDITIONAL TESTS: Reviewed.

## 2022-05-02 NOTE — PROGRESS NOTE ADULT - ASSESSMENT
61 yr old male with stated hx significant for cerebral ataxia, chronic left cerebellum lacunar infarcts who presented 4/18/22 with progressive weakness/fatigue accompanied with NBNB emesis found to have asp pneum, MRI findings of leptomeningeal enhancement concerning for infectious vs malignancy. Course c/b septic shock (resolved) r/o adrenal insufficiency, AMS requiring intubation for airway protection.  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  Ddx includes neurosarcoidosis (CSF w/ elevated ACE), Lyme meningitis (+CSF lyme abs)     -Neuro checks   -activity as tolerated  -physical therapy consulted, patient will participate when able   -off sedation, still pending CSF flow cytometry, wife is differing biopsy at this time    #PULM:   AMS, intubated for airway protection, asp pneum    -Mechanical Vent Therapy - titrate to maintain ph 7.35-7.45; PCO2 35-45; SPO2 > 92%  -Bronchodilator therapy q 6 hrs prn sob/wheezes  -Lung protective therapy  -PSV trials as tolerated, has difficulty initiating breaths   -Consider trach eval    #CV:  resolved septic shock,  new onset afib     -rate controlled  -continue atorvastatin   -s/p norepi gtt  -TTE 4/22/22 - EF 70%, mild tricuspid regurg, Nl RV/LV  -continue to monitor  - hold midodrine due to bradycardia    #GI/:      -large NGT residuals   -continue with tube feeds   -strict I & O 's     #ID: asp pneu, resolved septic shock, leptomeningeal enhancement    -s/p acyclovir (4/21/22 - 4/25/22)  -s/p ceftriaxone (4/21/22 -4/26/22)  -s/p ampicillin (4/21/22- 4/26/22)  -s/p zosyn (4/18/22 - 4/21/22)  -s/p vanco (4/20/22 - 4/25/22)  -MRSA PCR 4/23/22 - ND  -MSSA PCR 4/23/22 - detected  -Blood Cx 4/23/22 - NGTD  -EBV 4/23/22 - ND  -CSF 4/22/22 - No organisms seen  -CSF PCR 4/22/22 - ND  -see neuro notes for further CSF result  -s/p mupirocin ointment x 5 days    #FEN/ENDO/HEME:    r/o adrenal insufficiency, r/o DI (resolved)    -obtain CMP/Mg++/PO--4/CBC w diff/PT/PTT/INR  q. a.m.  -continue hydrocortisone  decrease to 25 mg IV q 8 on 4/28/22 - decreased to 25 mg IV q 12 on 4/30/22  -POC glucose with ISS q 6 hrs - maintain glucose 140 -180  -s/p fludrocortisone   -DVT prophylaxis with heparin subq     61 yr old male with stated hx significant for cerebral ataxia, chronic left cerebellum lacunar infarcts who presented 4/18/22 with progressive weakness/fatigue accompanied with NBNB emesis found to have asp pneum, MRI findings of leptomeningeal enhancement concerning for infectious vs malignancy. Course c/b septic shock (resolved) r/o adrenal insufficiency, AMS requiring intubation for airway protection.  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      -Neuro checks   -activity as tolerated  -physical therapy following   -off sedation, still pending CSF flow cytometry, wife previously did not want to pursue biopsy, continuing conversations     #PULM:   AMS, intubated for airway protection, asp pneum    -Mechanical Vent Therapy - titrate to maintain ph 7.35-7.45; PCO2 35-45; SPO2 > 92%  -Bronchodilator therapy q 6 hrs prn sob/wheezes  -Lung protective therapy  - patient with apnea on previous spontaneous breathing trials   -will ultrasound neck today for trach eval and discuss with family     #CV:  resolved septic shock,  new onset afib     -rate controlled  -continue atorvastatin   -s/p norepi gtt  -TTE 4/22/22 - EF 70%, mild tricuspid regurg, Nl RV/LV  - hold midodrine due to bradycardia    #GI/:      -continue with tube feeds   -strict I & O 's     #ID: asp pneu, resolved septic shock, leptomeningeal enhancement    -s/p acyclovir (4/21/22 - 4/25/22)  -s/p ceftriaxone (4/21/22 -4/26/22)  -s/p ampicillin (4/21/22- 4/26/22)  -s/p zosyn (4/18/22 - 4/21/22)  -s/p vanco (4/20/22 - 4/25/22)  -MRSA PCR 4/23/22 - ND  -MSSA PCR 4/23/22 - detected  -Blood Cx 4/23/22 - NGTD  -EBV 4/23/22 - ND  -CSF 4/22/22 - No organisms seen  -CSF PCR 4/22/22 - ND  -see neuro notes for further CSF result  -s/p mupirocin ointment x 5 days    #FEN/ENDO/HEME:    r/o adrenal insufficiency, r/o DI (resolved)    -obtain CMP/Mg++/PO--4/CBC w diff/PT/PTT/INR  q. a.m.  -continue hydrocortisone  decrease to 25 mg IV q 8 on 4/28/22 - decreased to 25 mg IV q 12 on 4/30/22  - will continue with steroids for now   -POC glucose with ISS q 6 hrs - maintain glucose 140 -180  -s/p fludrocortisone   -DVT prophylaxis with heparin subq

## 2022-05-02 NOTE — PROGRESS NOTE ADULT - ASSESSMENT
Assessment: 60 yo male with a PMHx of cerebellar ataxia who was brought to the ED on 4/17 due to AMS preceded by 1 week of fatigue/weakness and episodic incontinence. Patient now intubated for worsening clinical level depressed consciousness w/ concern of possible encephalitis or prion disease w/ rapid cognitive decline. Now off sedation.    Impression: Underlying cerebellar ataxia w/ rapid neurocognitive decline and severe worsening in last several days of undetermined etiology r/o autoimmune, paraneoplastic or rapid degenerative disease.    Recommendations:  [] Neuro checks Q1HR.  [] nsgy consulted for meningeal bx, to be further discussed w/ family in terms of overall GOC  [] ID c/s, appreciate recs  [] Quant Gold TB was indeterminant. Would resend.  [] f/u serum labs: vitamin E, MMA, B1, B3, B6  [] f/u CSF labs: 14-3-3 Protein Tau, ACE, ADmark phos-tau/total-tau, Lyme PCR and ab, AIE Encephalopathy panel, EBV, histoplasma antigen, GISSEL virus, VDRL, West Nile, Flow Cytometry, MOG ab, NMO ab, MBP, fungal clx 4/28, clx 4/28,   [x] Notable LP labs 4/22: ACE 7.7 <H>; + CSF lyme abs; Protein 126 <H>, TNC 43 <H>  [x] Notable LP labs 4/28: Glucose 32 <L>, Protein 50 <H>, TNC 22 <H>, IgG index 8.6 <H>  [x] Notable serum labs: ESR 26 <H>, CRP 25 <H>, EMILIA 1:320 speckled, folate 3.4 (repleted)  [x] s/p 4/28 LP: Glucose 32 <L>, Protein 50 <H>, TNC 22 <H>; Cryptococcal antigen neg, PCR neg, HSV 1/2 neg,   [x] s/p 4/22 LP: Glucose 54, Protein 126 <H>, TNC 43 <H>; PCR neg, Cryptococcal antigen neg, HSV1/2 neg, clx no growth, EBV neg, AIE encephalopathy panel neg, WNV neg, AFB <5cc  [x] CSF cytopathology 4/22 - increased number of large mononuclear cells in a background of few small lymphocytes, monocytes and neutrophils, cannot exclude a lymphoproliferative disorder versus an inflammatory process.   [x] CSF cytopathology 4/29 - significant increased number of small lymphocytes with rare monocytes  [x] Serum labs wnl: Quant Gold TB indeterminant, Vitamin D 67.4, B12 > 2000, Homocysteine 6.9, CPK 31, TSH 1.58, HbA1c 5.5, Copper 108, P/C/atypical ANCA neg, Anti-Ribonuclear Protein <2, Lyme PCR and IgG/IgM ab neg, Neutrophil Cytoplasmic Ab neg, Scleroderma Abs neg, anti SSA and SSA <0.2, Zinc 46, heavy metal screen neg, ACE 24, Paraneoplastic Panel neg.  [x] Utox neg, UA neg  [x] Monitor off AEDs and EEG  [x] s/p acyclovir (4/21/22 - 4/25/22), ceftriaxone (4/21/22 -4/26/22), ampicillin (4/21/22- 4/26/22), zosyn (4/18/22 - 4/21/22), vanco (4/20/22 - 4/25/22)  [x] rest of care per MICU.    Case seen and discussed with neurology attending Dr. Guerin.

## 2022-05-03 LAB
ALBUMIN SERPL ELPH-MCNC: 3 G/DL — LOW (ref 3.3–5)
ALP SERPL-CCNC: 46 U/L — SIGNIFICANT CHANGE UP (ref 40–120)
ALT FLD-CCNC: 32 U/L — SIGNIFICANT CHANGE UP (ref 10–45)
ANION GAP SERPL CALC-SCNC: 10 MMOL/L — SIGNIFICANT CHANGE UP (ref 5–17)
APPEARANCE UR: ABNORMAL
APTT BLD: 25.9 SEC — LOW (ref 27.5–35.5)
AST SERPL-CCNC: 21 U/L — SIGNIFICANT CHANGE UP (ref 10–40)
BACTERIA # UR AUTO: NEGATIVE — SIGNIFICANT CHANGE UP
BASE EXCESS BLDV CALC-SCNC: 5.7 MMOL/L — HIGH (ref -2–2)
BILIRUB SERPL-MCNC: 0.2 MG/DL — SIGNIFICANT CHANGE UP (ref 0.2–1.2)
BILIRUB UR-MCNC: NEGATIVE — SIGNIFICANT CHANGE UP
BUN SERPL-MCNC: 15 MG/DL — SIGNIFICANT CHANGE UP (ref 7–23)
CA-I SERPL-SCNC: 1.27 MMOL/L — SIGNIFICANT CHANGE UP (ref 1.15–1.33)
CALCIUM SERPL-MCNC: 8.7 MG/DL — SIGNIFICANT CHANGE UP (ref 8.4–10.5)
CHLORIDE BLDV-SCNC: 106 MMOL/L — SIGNIFICANT CHANGE UP (ref 96–108)
CHLORIDE SERPL-SCNC: 103 MMOL/L — SIGNIFICANT CHANGE UP (ref 96–108)
CO2 BLDV-SCNC: 33 MMOL/L — HIGH (ref 22–26)
CO2 SERPL-SCNC: 27 MMOL/L — SIGNIFICANT CHANGE UP (ref 22–31)
COLOR SPEC: ABNORMAL
CREAT SERPL-MCNC: 0.66 MG/DL — SIGNIFICANT CHANGE UP (ref 0.5–1.3)
DIFF PNL FLD: ABNORMAL
EGFR: 107 ML/MIN/1.73M2 — SIGNIFICANT CHANGE UP
EPI CELLS # UR: 5 /HPF — SIGNIFICANT CHANGE UP
GAMMA INTERFERON BACKGROUND BLD IA-ACNC: 0.02 IU/ML — SIGNIFICANT CHANGE UP
GAS PNL BLDV: 137 MMOL/L — SIGNIFICANT CHANGE UP (ref 136–145)
GAS PNL BLDV: SIGNIFICANT CHANGE UP
GAS PNL BLDV: SIGNIFICANT CHANGE UP
GLUCOSE BLDC GLUCOMTR-MCNC: 106 MG/DL — HIGH (ref 70–99)
GLUCOSE BLDC GLUCOMTR-MCNC: 111 MG/DL — HIGH (ref 70–99)
GLUCOSE BLDV-MCNC: 137 MG/DL — HIGH (ref 70–99)
GLUCOSE SERPL-MCNC: 139 MG/DL — HIGH (ref 70–99)
GLUCOSE UR QL: NEGATIVE — SIGNIFICANT CHANGE UP
HCO3 BLDV-SCNC: 31 MMOL/L — HIGH (ref 22–29)
HCT VFR BLD CALC: 28 % — LOW (ref 39–50)
HCT VFR BLDA CALC: 28 % — LOW (ref 39–51)
HGB BLD CALC-MCNC: 9.2 G/DL — LOW (ref 12.6–17.4)
HGB BLD-MCNC: 8.9 G/DL — LOW (ref 13–17)
HOROWITZ INDEX BLDV+IHG-RTO: 21 — SIGNIFICANT CHANGE UP
HYALINE CASTS # UR AUTO: 6 /LPF — HIGH (ref 0–2)
INR BLD: 1.1 RATIO — SIGNIFICANT CHANGE UP (ref 0.88–1.16)
KETONES UR-MCNC: NEGATIVE — SIGNIFICANT CHANGE UP
LACTATE BLDV-MCNC: 1.1 MMOL/L — SIGNIFICANT CHANGE UP (ref 0.7–2)
LEUKOCYTE ESTERASE UR-ACNC: NEGATIVE — SIGNIFICANT CHANGE UP
M TB IFN-G BLD-IMP: ABNORMAL
M TB IFN-G CD4+ BCKGRND COR BLD-ACNC: 0.01 IU/ML — SIGNIFICANT CHANGE UP
M TB IFN-G CD4+CD8+ BCKGRND COR BLD-ACNC: 0.01 IU/ML — SIGNIFICANT CHANGE UP
MAGNESIUM SERPL-MCNC: 2.1 MG/DL — SIGNIFICANT CHANGE UP (ref 1.6–2.6)
MCHC RBC-ENTMCNC: 29.2 PG — SIGNIFICANT CHANGE UP (ref 27–34)
MCHC RBC-ENTMCNC: 31.8 GM/DL — LOW (ref 32–36)
MCV RBC AUTO: 91.8 FL — SIGNIFICANT CHANGE UP (ref 80–100)
NITRITE UR-MCNC: NEGATIVE — SIGNIFICANT CHANGE UP
NRBC # BLD: 0 /100 WBCS — SIGNIFICANT CHANGE UP (ref 0–0)
OLIGOCLONAL BANDS CSF ELPH-IMP: PRESENT
OLIGOCLONAL BANDS CSF ELPH-IMP: SIGNIFICANT CHANGE UP
PCO2 BLDV: 49 MMHG — SIGNIFICANT CHANGE UP (ref 42–55)
PH BLDV: 7.41 — SIGNIFICANT CHANGE UP (ref 7.32–7.43)
PH UR: 8 — SIGNIFICANT CHANGE UP (ref 5–8)
PHOSPHATE SERPL-MCNC: 3.3 MG/DL — SIGNIFICANT CHANGE UP (ref 2.5–4.5)
PLATELET # BLD AUTO: 172 K/UL — SIGNIFICANT CHANGE UP (ref 150–400)
PO2 BLDV: 48 MMHG — HIGH (ref 25–45)
POTASSIUM BLDV-SCNC: 4.1 MMOL/L — SIGNIFICANT CHANGE UP (ref 3.5–5.1)
POTASSIUM SERPL-MCNC: 4.1 MMOL/L — SIGNIFICANT CHANGE UP (ref 3.5–5.3)
POTASSIUM SERPL-SCNC: 4.1 MMOL/L — SIGNIFICANT CHANGE UP (ref 3.5–5.3)
PROT SERPL-MCNC: 5.6 G/DL — LOW (ref 6–8.3)
PROT UR-MCNC: ABNORMAL
PROTHROM AB SERPL-ACNC: 12.7 SEC — SIGNIFICANT CHANGE UP (ref 10.5–13.4)
QUANT TB PLUS MITOGEN MINUS NIL: 0.47 IU/ML — SIGNIFICANT CHANGE UP
RBC # BLD: 3.05 M/UL — LOW (ref 4.2–5.8)
RBC # FLD: 16 % — HIGH (ref 10.3–14.5)
RBC CASTS # UR COMP ASSIST: 68 /HPF — HIGH (ref 0–4)
SAO2 % BLDV: 83 % — SIGNIFICANT CHANGE UP (ref 67–88)
SODIUM SERPL-SCNC: 140 MMOL/L — SIGNIFICANT CHANGE UP (ref 135–145)
SP GR SPEC: 1.02 — SIGNIFICANT CHANGE UP (ref 1.01–1.02)
UROBILINOGEN FLD QL: NEGATIVE — SIGNIFICANT CHANGE UP
VDRL CSF-TITR: SIGNIFICANT CHANGE UP
WBC # BLD: 7.5 K/UL — SIGNIFICANT CHANGE UP (ref 3.8–10.5)
WBC # FLD AUTO: 7.5 K/UL — SIGNIFICANT CHANGE UP (ref 3.8–10.5)
WBC UR QL: 4 /HPF — SIGNIFICANT CHANGE UP (ref 0–5)

## 2022-05-03 PROCEDURE — 99291 CRITICAL CARE FIRST HOUR: CPT

## 2022-05-03 PROCEDURE — 71045 X-RAY EXAM CHEST 1 VIEW: CPT | Mod: 26

## 2022-05-03 RX ORDER — FUROSEMIDE 40 MG
20 TABLET ORAL ONCE
Refills: 0 | Status: COMPLETED | OUTPATIENT
Start: 2022-05-03 | End: 2022-05-03

## 2022-05-03 RX ADMIN — HEPARIN SODIUM 5000 UNIT(S): 5000 INJECTION INTRAVENOUS; SUBCUTANEOUS at 05:21

## 2022-05-03 RX ADMIN — Medication 20 MILLIGRAM(S): at 11:45

## 2022-05-03 RX ADMIN — Medication 25 MILLIGRAM(S): at 17:25

## 2022-05-03 RX ADMIN — Medication 3 MILLILITER(S): at 17:13

## 2022-05-03 RX ADMIN — Medication 3 MILLILITER(S): at 05:20

## 2022-05-03 RX ADMIN — Medication 1 TABLET(S): at 11:46

## 2022-05-03 RX ADMIN — Medication 3 MILLILITER(S): at 23:14

## 2022-05-03 RX ADMIN — Medication 100 MILLIGRAM(S): at 11:46

## 2022-05-03 RX ADMIN — Medication 25 MILLIGRAM(S): at 05:22

## 2022-05-03 RX ADMIN — Medication 3 MILLILITER(S): at 11:10

## 2022-05-03 RX ADMIN — ATORVASTATIN CALCIUM 20 MILLIGRAM(S): 80 TABLET, FILM COATED ORAL at 21:12

## 2022-05-03 RX ADMIN — Medication 1 MILLIGRAM(S): at 11:47

## 2022-05-03 RX ADMIN — HEPARIN SODIUM 5000 UNIT(S): 5000 INJECTION INTRAVENOUS; SUBCUTANEOUS at 13:24

## 2022-05-03 RX ADMIN — HEPARIN SODIUM 5000 UNIT(S): 5000 INJECTION INTRAVENOUS; SUBCUTANEOUS at 21:13

## 2022-05-03 RX ADMIN — CHLORHEXIDINE GLUCONATE 15 MILLILITER(S): 213 SOLUTION TOPICAL at 05:22

## 2022-05-03 NOTE — PROGRESS NOTE ADULT - SUBJECTIVE AND OBJECTIVE BOX
PROGRESS NOTE:     Patient is a 61y old  Male who presents with a chief complaint of AMS 2/2 Pneumonia (02 May 2022 11:38)      SUBJECTIVE / OVERNIGHT EVENTS:    ADDITIONAL REVIEW OF SYSTEMS:    MEDICATIONS  (STANDING):  albuterol/ipratropium for Nebulization 3 milliLiter(s) Nebulizer every 6 hours  atorvastatin 20 milliGRAM(s) Oral at bedtime  chlorhexidine 0.12% Liquid 15 milliLiter(s) Oral Mucosa every 12 hours  dextrose 50% Injectable 50 milliLiter(s) IV Push every 15 minutes  dextrose 50% Injectable 25 milliLiter(s) IV Push every 15 minutes  doxazosin 2 milliGRAM(s) Oral at bedtime  folic acid 1 milliGRAM(s) Oral daily  heparin   Injectable 5000 Unit(s) SubCutaneous every 8 hours  hydrocortisone sodium succinate Injectable 25 milliGRAM(s) IV Push every 12 hours  insulin lispro (ADMELOG) corrective regimen sliding scale   SubCutaneous every 6 hours  multivitamin 1 Tablet(s) Oral daily  polyethylene glycol 3350 17 Gram(s) Oral daily  senna Syrup 5 milliLiter(s) Oral at bedtime  thiamine 100 milliGRAM(s) Oral daily    MEDICATIONS  (PRN):  aluminum hydroxide/magnesium hydroxide/simethicone Suspension 30 milliLiter(s) Oral every 4 hours PRN Dyspepsia  ondansetron Injectable 4 milliGRAM(s) IV Push every 8 hours PRN Nausea and/or Vomiting      CAPILLARY BLOOD GLUCOSE      POCT Blood Glucose.: 111 mg/dL (03 May 2022 05:19)  POCT Blood Glucose.: 82 mg/dL (02 May 2022 17:00)  POCT Blood Glucose.: 101 mg/dL (02 May 2022 11:09)    I&O's Summary    02 May 2022 07:01  -  03 May 2022 07:00  --------------------------------------------------------  IN: 700 mL / OUT: 0 mL / NET: 700 mL        PHYSICAL EXAM:  Vital Signs Last 24 Hrs  T(C): 36.1 (03 May 2022 04:00), Max: 36.5 (02 May 2022 08:00)  T(F): 97 (03 May 2022 04:00), Max: 97.7 (02 May 2022 08:00)  HR: 55 (03 May 2022 07:00) (50 - 82)  BP: 104/61 (03 May 2022 07:00) (80/53 - 119/54)  BP(mean): 76 (03 May 2022 07:00) (64 - 85)  RR: 12 (03 May 2022 07:00) (10 - 19)  SpO2: 98% (03 May 2022 07:00) (96% - 100%)    CONSTITUTIONAL: NAD, well-developed  RESPIRATORY: Normal respiratory effort; lungs are clear to auscultation bilaterally  CARDIOVASCULAR: Regular rate and rhythm, normal S1 and S2, no murmur/rub/gallop; No lower extremity edema; Peripheral pulses are 2+ bilaterally  ABDOMEN: Nontender to palpation, normoactive bowel sounds, no rebound/guarding  MUSCULOSKELETAL: no clubbing or cyanosis of digits; no joint swelling or tenderness to palpation  PSYCH: A+O to person, place, and time; affect appropriate    LABS:                        8.9    7.50  )-----------( 172      ( 03 May 2022 00:32 )             28.0     05-03    140  |  103  |  15  ----------------------------<  139<H>  4.1   |  27  |  0.66    Ca    8.7      03 May 2022 00:32  Phos  3.3     05-03  Mg     2.1     05-03    TPro  5.6<L>  /  Alb  3.0<L>  /  TBili  0.2  /  DBili  x   /  AST  21  /  ALT  32  /  AlkPhos  46  05-03    PT/INR - ( 03 May 2022 00:33 )   PT: 12.7 sec;   INR: 1.10 ratio         PTT - ( 03 May 2022 00:33 )  PTT:25.9 sec            RADIOLOGY & ADDITIONAL TESTS:  Results Reviewed:   Imaging Personally Reviewed:  Electrocardiogram Personally Reviewed:    COORDINATION OF CARE:  Care Discussed with Consultants/Other Providers [Y/N]:  Prior or Outpatient Records Reviewed [Y/N]:       INTERVAL HPI/ OVERNIGHT EVENTS: Overnight patient hypothermic to 96.4. Blood cultures drawn.     SUBJECTIVE: Patient seen and examined at bedside. Patient nods that he feels well. No complaints at this time.     OBJECTIVE:     VITAL SIGNS:  ICU Vital Signs Last 24 Hrs  T(C): 36 (03 May 2022 12:00), Max: 36.1 (02 May 2022 16:00)  T(F): 96.8 (03 May 2022 12:00), Max: 97 (02 May 2022 16:00)  HR: 55 (03 May 2022 13:00) (50 - 82)  BP: 108/71 (03 May 2022 13:00) (80/53 - 121/74)  BP(mean): 85 (03 May 2022 13:00) (64 - 93)  ABP: --  ABP(mean): --  RR: 20 (03 May 2022 13:00) (10 - 20)  SpO2: 100% (03 May 2022 13:00) (96% - 100%)    Mode: CPAP with PS, FiO2: 21, PEEP: 5, PS: 5, MAP: 7     @ 07:  -  03 @ 07:00  --------------------------------------------------------  IN: 700 mL / OUT: 0 mL / NET: 700 mL    03 @ 07:  -  03 @ 13:45  --------------------------------------------------------  IN: 0 mL / OUT: 200 mL / NET: -200 mL      CAPILLARY BLOOD GLUCOSE      POCT Blood Glucose.: 106 mg/dL (03 May 2022 11:43)      PHYSICAL EXAM:    General: able to nod yes/no to questions, no acute distress  Neck: supple   Respiratory: CTA b/l, no wheeze, rales, rhonchi  Cardiovascular: RRR, no murmur  Abdomen: soft, NT, ND  Extremities: lower extremity edema, stable from prior   Neurological: follows commands, moves all extremities     MEDICATIONS:  MEDICATIONS  (STANDING):  albuterol/ipratropium for Nebulization 3 milliLiter(s) Nebulizer every 6 hours  atorvastatin 20 milliGRAM(s) Oral at bedtime  dextrose 50% Injectable 50 milliLiter(s) IV Push every 15 minutes  dextrose 50% Injectable 25 milliLiter(s) IV Push every 15 minutes  doxazosin 2 milliGRAM(s) Oral at bedtime  folic acid 1 milliGRAM(s) Oral daily  heparin   Injectable 5000 Unit(s) SubCutaneous every 8 hours  hydrocortisone sodium succinate Injectable 25 milliGRAM(s) IV Push every 12 hours  insulin lispro (ADMELOG) corrective regimen sliding scale   SubCutaneous every 6 hours  multivitamin 1 Tablet(s) Oral daily  polyethylene glycol 3350 17 Gram(s) Oral daily  senna Syrup 5 milliLiter(s) Oral at bedtime  thiamine 100 milliGRAM(s) Oral daily    MEDICATIONS  (PRN):  aluminum hydroxide/magnesium hydroxide/simethicone Suspension 30 milliLiter(s) Oral every 4 hours PRN Dyspepsia  ondansetron Injectable 4 milliGRAM(s) IV Push every 8 hours PRN Nausea and/or Vomiting      ALLERGIES:  Allergies    No Known Allergies    Intolerances        LABS:                        8.9    7.50  )-----------( 172      ( 03 May 2022 00:32 )             28.0     Hemoglobin: 8.9 g/dL ( @ 00:32)  Hemoglobin: 8.3 g/dL ( @ 23:31)  Hemoglobin: 8.5 g/dL ( @ 00:30)  Hemoglobin: 8.5 g/dL ( @ 00:19)  Hemoglobin: 8.6 g/dL ( @ 00:36)    CBC Full  -  ( 03 May 2022 00:32 )  WBC Count : 7.50 K/uL  RBC Count : 3.05 M/uL  Hemoglobin : 8.9 g/dL  Hematocrit : 28.0 %  Platelet Count - Automated : 172 K/uL  Mean Cell Volume : 91.8 fl  Mean Cell Hemoglobin : 29.2 pg  Mean Cell Hemoglobin Concentration : 31.8 gm/dL  Auto Neutrophil # : x  Auto Lymphocyte # : x  Auto Monocyte # : x  Auto Eosinophil # : x  Auto Basophil # : x  Auto Neutrophil % : x  Auto Lymphocyte % : x  Auto Monocyte % : x  Auto Eosinophil % : x  Auto Basophil % : x    05-    140  |  103  |  15  ----------------------------<  139<H>  4.1   |  27  |  0.66    Ca    8.7      03 May 2022 00:32  Phos  3.3       Mg     2.1         TPro  5.6<L>  /  Alb  3.0<L>  /  TBili  0.2  /  DBili  x   /  AST  21  /  ALT  32  /  AlkPhos  46      Creatinine Trend: 0.66<--, 0.60<--, 0.63<--, 0.67<--, 0.71<--, 0.58<--  LIVER FUNCTIONS - ( 03 May 2022 00:32 )  Alb: 3.0 g/dL / Pro: 5.6 g/dL / ALK PHOS: 46 U/L / ALT: 32 U/L / AST: 21 U/L / GGT: x           PT/INR - ( 03 May 2022 00:33 )   PT: 12.7 sec;   INR: 1.10 ratio         PTT - ( 03 May 2022 00:33 )  PTT:25.9 sec    hs Troponin:        00:21 - VBG - pH: 7.41  | pCO2: 49    | pO2: 48    | Lactate: 1.1        Urinalysis Basic - ( 03 May 2022 12:45 )    Color: Light Orange / Appearance: Turbid / S.018 / pH: x  Gluc: x / Ketone: Negative  / Bili: Negative / Urobili: Negative   Blood: x / Protein: 30 mg/dL / Nitrite: Negative   Leuk Esterase: Negative / RBC: 68 /hpf / WBC 4 /HPF   Sq Epi: x / Non Sq Epi: 5 /hpf / Bacteria: Negative      CSF:    Total Nucleated Cell Count, CSF: 22 /uL (22 @ 15:20)  RBC Count - Spinal Fluid: 1 /uL (22 @ 15:20)          Protein, CSF: 50 mg/dL (22 @ 17:34)  Glucose, CSF: 32 mg/dL (22 @ 15:12)  Protein, CSF: 50 mg/dL (22 @ 15:12)          Labs, imaging, EKG personally reviewed    RADIOLOGY & ADDITIONAL TESTS: Reviewed.

## 2022-05-03 NOTE — AIRWAY REMOVAL NOTE INFANT/ NICU - ARTIFICAL AIRWAY REMOVAL COMMENTS
Written order for extubation verified. The patient was identified by full name and birth date compared to the identification band. Present during the procedure was MEAGAN Hall.

## 2022-05-03 NOTE — PROGRESS NOTE ADULT - CRITICAL CARE SERVICES PROVIDED
Patient is critically ill, requiring critical care services.

## 2022-05-03 NOTE — PROGRESS NOTE ADULT - ASSESSMENT
61 yr old male with stated hx significant for cerebral ataxia, chronic left cerebellum lacunar infarcts who presented 4/18/22 with progressive weakness/fatigue accompanied with NBNB emesis found to have asp pneum, MRI findings of leptomeningeal enhancement concerning for infectious vs malignancy. Course c/b septic shock (resolved) r/o adrenal insufficiency, AMS requiring intubation for airway protection.  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      -Neuro checks   -activity as tolerated  -physical therapy following   -off sedation, still pending CSF flow cytometry, wife previously did not want to pursue biopsy, continuing conversations     #PULM:   AMS, intubated for airway protection, asp pneum    -Mechanical Vent Therapy - titrate to maintain ph 7.35-7.45; PCO2 35-45; SPO2 > 92%  -Bronchodilator therapy q 6 hrs prn sob/wheezes  -Lung protective therapy  - patient with apnea on previous spontaneous breathing trials   -will ultrasound neck today for trach eval and discuss with family     #CV:  resolved septic shock,  new onset afib     -rate controlled  -continue atorvastatin   -s/p norepi gtt  -TTE 4/22/22 - EF 70%, mild tricuspid regurg, Nl RV/LV  - hold midodrine due to bradycardia    #GI/:      -continue with tube feeds   -strict I & O 's     #ID: asp pneu, resolved septic shock, leptomeningeal enhancement    -s/p acyclovir (4/21/22 - 4/25/22)  -s/p ceftriaxone (4/21/22 -4/26/22)  -s/p ampicillin (4/21/22- 4/26/22)  -s/p zosyn (4/18/22 - 4/21/22)  -s/p vanco (4/20/22 - 4/25/22)  -MRSA PCR 4/23/22 - ND  -MSSA PCR 4/23/22 - detected  -Blood Cx 4/23/22 - NGTD  -EBV 4/23/22 - ND  -CSF 4/22/22 - No organisms seen  -CSF PCR 4/22/22 - ND  -see neuro notes for further CSF result  -s/p mupirocin ointment x 5 days    #FEN/ENDO/HEME:    r/o adrenal insufficiency, r/o DI (resolved)    -obtain CMP/Mg++/PO--4/CBC w diff/PT/PTT/INR  q. a.m.  -continue hydrocortisone  decrease to 25 mg IV q 8 on 4/28/22 - decreased to 25 mg IV q 12 on 4/30/22  - will continue with steroids for now   -POC glucose with ISS q 6 hrs - maintain glucose 140 -180  -s/p fludrocortisone   -DVT prophylaxis with heparin subq     61 yr old male with stated hx significant for cerebral ataxia, chronic left cerebellum lacunar infarcts who presented 4/18/22 with progressive weakness/fatigue accompanied with NBNB emesis found to have asp pneum, MRI findings of leptomeningeal enhancement concerning for infectious vs malignancy. Course c/b septic shock (resolved) r/o adrenal insufficiency, AMS requiring intubation for airway protection.  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      -Neuro checks   -activity as tolerated  -physical therapy following   -off sedation, still pending CSF flow cytometry, wife does not want to pursue brain biopsy, continuing conversations     #PULM:   AMS, intubated for airway protection, asp pneum    -Mechanical Vent Therapy - titrate to maintain ph 7.35-7.45; PCO2 35-45; SPO2 > 92%  -Bronchodilator therapy q 6 hrs prn sob/wheezes  -Lung protective therapy  - plan for extubation today, doing well on CPAP trial     #CV:  resolved septic shock,  new onset afib     -rate controlled  -continue atorvastatin   -TTE 4/22/22 - EF 70%, mild tricuspid regurg, Nl RV/LV    #GI/:    -continue with tube feeds   -strict I & O 's     #ID: asp pneu, resolved septic shock, leptomeningeal enhancement    - overnight patient hypothermic, blood cultures pending   - will obtain UA and chest x ray     #FEN/ENDO/HEME:    r/o adrenal insufficiency, r/o DI (resolved)    -obtain CMP/Mg++/PO--4/CBC w diff/PT/PTT/INR  q. a.m.  -continue hydrocortisone  decrease to 25 mg IV q 8 on 4/28/22 - decreased to 25 mg IV q 12 on 4/30/22  - will continue with steroids for now   -POC glucose with ISS q 6 hrs - maintain glucose 140 -180  -DVT prophylaxis with heparin subq

## 2022-05-04 LAB
14-3-3 AG CSF-MCNC: SIGNIFICANT CHANGE UP
A-TOCOPHEROL VIT E SERPL-MCNC: 6.2 MG/L — LOW (ref 9–29)
ALBUMIN SERPL ELPH-MCNC: 3 G/DL — LOW (ref 3.3–5)
ALP SERPL-CCNC: 46 U/L — SIGNIFICANT CHANGE UP (ref 40–120)
ALT FLD-CCNC: 36 U/L — SIGNIFICANT CHANGE UP (ref 10–45)
ANION GAP SERPL CALC-SCNC: 10 MMOL/L — SIGNIFICANT CHANGE UP (ref 5–17)
APTT BLD: 25.3 SEC — LOW (ref 27.5–35.5)
AST SERPL-CCNC: 27 U/L — SIGNIFICANT CHANGE UP (ref 10–40)
BASE EXCESS BLDV CALC-SCNC: 6.2 MMOL/L — HIGH (ref -2–2)
BASOPHILS # BLD AUTO: 0.01 K/UL — SIGNIFICANT CHANGE UP (ref 0–0.2)
BASOPHILS NFR BLD AUTO: 0.1 % — SIGNIFICANT CHANGE UP (ref 0–2)
BETA+GAMMA TOCOPHEROL SERPL-MCNC: 0.3 MG/L — LOW (ref 0.5–4.9)
BILIRUB SERPL-MCNC: 0.2 MG/DL — SIGNIFICANT CHANGE UP (ref 0.2–1.2)
BUN SERPL-MCNC: 15 MG/DL — SIGNIFICANT CHANGE UP (ref 7–23)
CA-I SERPL-SCNC: 1.24 MMOL/L — SIGNIFICANT CHANGE UP (ref 1.15–1.33)
CALCIUM SERPL-MCNC: 8.5 MG/DL — SIGNIFICANT CHANGE UP (ref 8.4–10.5)
CHLORIDE BLDV-SCNC: 102 MMOL/L — SIGNIFICANT CHANGE UP (ref 96–108)
CHLORIDE SERPL-SCNC: 104 MMOL/L — SIGNIFICANT CHANGE UP (ref 96–108)
CO2 BLDV-SCNC: 33 MMOL/L — HIGH (ref 22–26)
CO2 SERPL-SCNC: 26 MMOL/L — SIGNIFICANT CHANGE UP (ref 22–31)
CREAT SERPL-MCNC: 0.69 MG/DL — SIGNIFICANT CHANGE UP (ref 0.5–1.3)
EGFR: 106 ML/MIN/1.73M2 — SIGNIFICANT CHANGE UP
EOSINOPHIL # BLD AUTO: 0.16 K/UL — SIGNIFICANT CHANGE UP (ref 0–0.5)
EOSINOPHIL NFR BLD AUTO: 2.4 % — SIGNIFICANT CHANGE UP (ref 0–6)
GAS PNL BLDV: 139 MMOL/L — SIGNIFICANT CHANGE UP (ref 136–145)
GAS PNL BLDV: SIGNIFICANT CHANGE UP
GAS PNL BLDV: SIGNIFICANT CHANGE UP
GLUCOSE BLDC GLUCOMTR-MCNC: 80 MG/DL — SIGNIFICANT CHANGE UP (ref 70–99)
GLUCOSE BLDV-MCNC: 91 MG/DL — SIGNIFICANT CHANGE UP (ref 70–99)
GLUCOSE SERPL-MCNC: 100 MG/DL — HIGH (ref 70–99)
H CAPSUL AG CSF IA-MCNC: SIGNIFICANT CHANGE UP
HCO3 BLDV-SCNC: 32 MMOL/L — HIGH (ref 22–29)
HCT VFR BLD CALC: 28.6 % — LOW (ref 39–50)
HCT VFR BLDA CALC: 28 % — LOW (ref 39–51)
HGB BLD CALC-MCNC: 9.3 G/DL — LOW (ref 12.6–17.4)
HGB BLD-MCNC: 8.9 G/DL — LOW (ref 13–17)
HOROWITZ INDEX BLDV+IHG-RTO: 40 — SIGNIFICANT CHANGE UP
IMM GRANULOCYTES NFR BLD AUTO: 2.1 % — HIGH (ref 0–1.5)
INR BLD: 1.1 RATIO — SIGNIFICANT CHANGE UP (ref 0.88–1.16)
LACTATE BLDV-MCNC: 0.9 MMOL/L — SIGNIFICANT CHANGE UP (ref 0.7–2)
LYMPHOCYTES # BLD AUTO: 0.84 K/UL — LOW (ref 1–3.3)
LYMPHOCYTES # BLD AUTO: 12.4 % — LOW (ref 13–44)
MAGNESIUM SERPL-MCNC: 2.1 MG/DL — SIGNIFICANT CHANGE UP (ref 1.6–2.6)
MCHC RBC-ENTMCNC: 29.2 PG — SIGNIFICANT CHANGE UP (ref 27–34)
MCHC RBC-ENTMCNC: 31.1 GM/DL — LOW (ref 32–36)
MCV RBC AUTO: 93.8 FL — SIGNIFICANT CHANGE UP (ref 80–100)
MONOCYTES # BLD AUTO: 0.46 K/UL — SIGNIFICANT CHANGE UP (ref 0–0.9)
MONOCYTES NFR BLD AUTO: 6.8 % — SIGNIFICANT CHANGE UP (ref 2–14)
NEUTROPHILS # BLD AUTO: 5.14 K/UL — SIGNIFICANT CHANGE UP (ref 1.8–7.4)
NEUTROPHILS NFR BLD AUTO: 76.2 % — SIGNIFICANT CHANGE UP (ref 43–77)
NRBC # BLD: 0 /100 WBCS — SIGNIFICANT CHANGE UP (ref 0–0)
PCO2 BLDV: 50 MMHG — SIGNIFICANT CHANGE UP (ref 42–55)
PH BLDV: 7.41 — SIGNIFICANT CHANGE UP (ref 7.32–7.43)
PHOSPHATE SERPL-MCNC: 3.3 MG/DL — SIGNIFICANT CHANGE UP (ref 2.5–4.5)
PLATELET # BLD AUTO: 191 K/UL — SIGNIFICANT CHANGE UP (ref 150–400)
PO2 BLDV: 47 MMHG — HIGH (ref 25–45)
POTASSIUM BLDV-SCNC: 3.5 MMOL/L — SIGNIFICANT CHANGE UP (ref 3.5–5.1)
POTASSIUM SERPL-MCNC: 3.6 MMOL/L — SIGNIFICANT CHANGE UP (ref 3.5–5.3)
POTASSIUM SERPL-SCNC: 3.6 MMOL/L — SIGNIFICANT CHANGE UP (ref 3.5–5.3)
PROT SERPL-MCNC: 5.7 G/DL — LOW (ref 6–8.3)
PROTHROM AB SERPL-ACNC: 12.8 SEC — SIGNIFICANT CHANGE UP (ref 10.5–13.4)
RBC # BLD: 3.05 M/UL — LOW (ref 4.2–5.8)
RBC # FLD: 16.3 % — HIGH (ref 10.3–14.5)
SAO2 % BLDV: 81.8 % — SIGNIFICANT CHANGE UP (ref 67–88)
SODIUM SERPL-SCNC: 140 MMOL/L — SIGNIFICANT CHANGE UP (ref 135–145)
TM INTERPRETATION: SIGNIFICANT CHANGE UP
VIT B1 SERPL-MCNC: 75.6 NMOL/L — SIGNIFICANT CHANGE UP (ref 66.5–200)
WBC # BLD: 6.75 K/UL — SIGNIFICANT CHANGE UP (ref 3.8–10.5)
WBC # FLD AUTO: 6.75 K/UL — SIGNIFICANT CHANGE UP (ref 3.8–10.5)

## 2022-05-04 PROCEDURE — 99233 SBSQ HOSP IP/OBS HIGH 50: CPT

## 2022-05-04 RX ORDER — FUROSEMIDE 40 MG
40 TABLET ORAL ONCE
Refills: 0 | Status: COMPLETED | OUTPATIENT
Start: 2022-05-04 | End: 2022-05-04

## 2022-05-04 RX ORDER — HYDROCORTISONE 20 MG
25 TABLET ORAL EVERY 24 HOURS
Refills: 0 | Status: DISCONTINUED | OUTPATIENT
Start: 2022-05-04 | End: 2022-05-07

## 2022-05-04 RX ADMIN — HEPARIN SODIUM 5000 UNIT(S): 5000 INJECTION INTRAVENOUS; SUBCUTANEOUS at 05:02

## 2022-05-04 RX ADMIN — Medication 3 MILLILITER(S): at 17:36

## 2022-05-04 RX ADMIN — Medication 3 MILLILITER(S): at 11:33

## 2022-05-04 RX ADMIN — Medication 100 MILLIGRAM(S): at 11:59

## 2022-05-04 RX ADMIN — Medication 40 MILLIGRAM(S): at 12:31

## 2022-05-04 RX ADMIN — Medication 1 MILLIGRAM(S): at 11:59

## 2022-05-04 RX ADMIN — HEPARIN SODIUM 5000 UNIT(S): 5000 INJECTION INTRAVENOUS; SUBCUTANEOUS at 14:24

## 2022-05-04 RX ADMIN — Medication 25 MILLIGRAM(S): at 05:01

## 2022-05-04 RX ADMIN — Medication 3 MILLILITER(S): at 05:30

## 2022-05-04 RX ADMIN — Medication 1 TABLET(S): at 11:59

## 2022-05-04 RX ADMIN — HEPARIN SODIUM 5000 UNIT(S): 5000 INJECTION INTRAVENOUS; SUBCUTANEOUS at 21:55

## 2022-05-04 NOTE — CHART NOTE - NSCHARTNOTEFT_GEN_A_CORE
Nutrition Follow Up Note  Patient seen for: malnutrition follow up.    Chart reviewed, events noted. Pt is a 61 yr old male with stated hx significant for cerebral ataxia, chronic left cerebellum lacunar infarcts who presented 23 with progressive weakness/fatigue accompanied with NBNB emesis found to have asp pneum, MRI findings of leptomeningeal enhancement concerning for infectious vs malignancy. Course c/b septic shock (resolved) r/o adrenal insufficiency, AMS requiring intubation for airway protection.  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    Source: [x] Patient       [x] EMR        [x] RN        [x] Family at bedside       [] Other:    -If unable to interview patient: [] Trach/Vent/BiPAP  [] Disoriented/confused/inappropriate to interview    Diet, NPO:   Except Medications (22 @ 11:52)    - EN Provision Per Flow sheets: Pt previously receiving EN of Glucerna 1.5 at 70ml/hr x 18hr.   - 5/3: held for CPAP   - : 700ml   - : 1260ml   - : 905ml (held for possible extubation)   - : 670ml  5-Day EN average: 487ml, 731kcal, 40g protein    - Nutrition-related concerns:   - Pt extubated 5/3, previously receiving soft and bite sized diet - now NPO pending formal SLP evaluation. Currently on HFNC.    - Per son at bedside pt reported being hungry this afternoon.   - Micronutrient supplementation: multivitamin, thiamine, folic acid.    - Sliding scale insulin ordered for glycemic management. Noted pt receiving Solu-Cortef injections.     GI: Pt denies any GI distress. Last BM 5/3 x 1.   Bowel Regimen? [x] Yes   [] No    Weights: Dosing Weight: 76.2kg.  Daily Weight in k (), Weight in k.2 (), Weight in k.8 (), Daily Weight in k.7 (), Weight in k.2 ()  weight elevated likely 2/2 fluid retention, will continue to monitor    MEDICATIONS  (STANDING):  albuterol/ipratropium for Nebulization 3 milliLiter(s) Nebulizer every 6 hours  atorvastatin 20 milliGRAM(s) Oral at bedtime  dextrose 50% Injectable 50 milliLiter(s) IV Push every 15 minutes  dextrose 50% Injectable 25 milliLiter(s) IV Push every 15 minutes  doxazosin 2 milliGRAM(s) Oral at bedtime  folic acid 1 milliGRAM(s) Oral daily  heparin   Injectable 5000 Unit(s) SubCutaneous every 8 hours  hydrocortisone sodium succinate Injectable 25 milliGRAM(s) IV Push every 24 hours  multivitamin 1 Tablet(s) Oral daily  polyethylene glycol 3350 17 Gram(s) Oral daily  senna Syrup 5 milliLiter(s) Oral at bedtime  thiamine 100 milliGRAM(s) Oral daily    MEDICATIONS  (PRN):  aluminum hydroxide/magnesium hydroxide/simethicone Suspension 30 milliLiter(s) Oral every 4 hours PRN Dyspepsia  ondansetron Injectable 4 milliGRAM(s) IV Push every 8 hours PRN Nausea and/or Vomiting    Pertinent Labs:  @ 00:23: Na 140, BUN 15, Cr 0.69, <H>, K+ 3.6, Phos 3.3, Mg 2.1, Alk Phos 46, ALT/SGPT 36, AST/SGOT 27, HbA1c --    A1C with Estimated Average Glucose Result: 5.5 % (22 @ 09:56)    Finger Sticks: n/a    Skin per nursing documentation: No noted pressure injuries as per documentation.   Edema: +1 generalized    Based on dosing wt 76.2 kG adjusted with consideration for extubation and malnutrition   Estimated Energy Needs: (25-30 kcals/kG) 1905 - 2286kcals   Estimated Protein Needs: (1.2-1.6 gm/kG) 91.4-122 gm   Fluid needs deferred to provider.     Previous Nutrition Diagnosis: Severe acute malnutrition   Nutrition Diagnosis is: [x] ongoing  [] resolved [] not applicable     New Nutrition Diagnosis: [x] Not applicable    Nutrition Care Plan:  [x] In Progress  [] Achieved  [] Not applicable    Nutrition Interventions:     Education Provided:       [x] Yes: Discussed rationale/importance of swallow evaluation to assess chewing/swallowing capability with pt and son at bedside. Discussed option of oral nutrition supplements when/if pt able to tolerate PO - pt and son amenable to trialing when the time comes. Made aware RD remains available.     Recommendations:      1. Defer diet advancement to team/SLP. When/if pt able to take PO, recommend diet free of therapeutic restriction. Monitor BG levels for need to restrict carbohydrate.   - Monitor need for oral nutrition supplements. Consider Ensure Enlive 2x daily (700 desmond and 40 gm protein).  2. In case non-oral means of nutrition required, would recommend initiating feeds of Glucerna 1.5 with goal rate of 75ml x 18hr to provide 1350ml total volume, 2025kcal, 111g protein, 1025ml free water. Regimen to meet based on dosing wt 76.2k.5kcal/kg, 1.45g/kg protein.  3. Continue micronutrient supplementation as medically feasible.     Monitoring and Evaluation:   Continue to monitor nutritional intake, tolerance to diet prescription, weights, labs, skin integrity    RD remains available upon request and will follow up per protocol    Michelle Rothman MS, RD, CDN, [facility] #[phone]

## 2022-05-04 NOTE — PROGRESS NOTE ADULT - SUBJECTIVE AND OBJECTIVE BOX
PROGRESS NOTE:     Patient is a 61y old  Male who presents with a chief complaint of AMS 2/2 Pneumonia (03 May 2022 07:41)      SUBJECTIVE / OVERNIGHT EVENTS:    ADDITIONAL REVIEW OF SYSTEMS:    MEDICATIONS  (STANDING):  albuterol/ipratropium for Nebulization 3 milliLiter(s) Nebulizer every 6 hours  atorvastatin 20 milliGRAM(s) Oral at bedtime  dextrose 50% Injectable 50 milliLiter(s) IV Push every 15 minutes  dextrose 50% Injectable 25 milliLiter(s) IV Push every 15 minutes  doxazosin 2 milliGRAM(s) Oral at bedtime  folic acid 1 milliGRAM(s) Oral daily  heparin   Injectable 5000 Unit(s) SubCutaneous every 8 hours  hydrocortisone sodium succinate Injectable 25 milliGRAM(s) IV Push every 12 hours  multivitamin 1 Tablet(s) Oral daily  polyethylene glycol 3350 17 Gram(s) Oral daily  senna Syrup 5 milliLiter(s) Oral at bedtime  thiamine 100 milliGRAM(s) Oral daily    MEDICATIONS  (PRN):  aluminum hydroxide/magnesium hydroxide/simethicone Suspension 30 milliLiter(s) Oral every 4 hours PRN Dyspepsia  ondansetron Injectable 4 milliGRAM(s) IV Push every 8 hours PRN Nausea and/or Vomiting      CAPILLARY BLOOD GLUCOSE      POCT Blood Glucose.: 106 mg/dL (03 May 2022 11:43)    I&O's Summary    03 May 2022 07:01  -  04 May 2022 07:00  --------------------------------------------------------  IN: 110 mL / OUT: 200 mL / NET: -90 mL        PHYSICAL EXAM:  Vital Signs Last 24 Hrs  T(C): 36 (04 May 2022 04:00), Max: 36.6 (04 May 2022 00:00)  T(F): 96.8 (04 May 2022 04:00), Max: 97.9 (04 May 2022 00:00)  HR: 51 (04 May 2022 06:00) (48 - 74)  BP: 100/60 (04 May 2022 06:00) (82/54 - 121/74)  BP(mean): 74 (04 May 2022 06:00) (63 - 93)  RR: 18 (04 May 2022 06:00) (11 - 28)  SpO2: 100% (04 May 2022 06:00) (96% - 100%)    CONSTITUTIONAL: NAD, well-developed  RESPIRATORY: Normal respiratory effort; lungs are clear to auscultation bilaterally  CARDIOVASCULAR: Regular rate and rhythm, normal S1 and S2, no murmur/rub/gallop; No lower extremity edema; Peripheral pulses are 2+ bilaterally  ABDOMEN: Nontender to palpation, normoactive bowel sounds, no rebound/guarding  MUSCULOSKELETAL: no clubbing or cyanosis of digits; no joint swelling or tenderness to palpation  PSYCH: A+O to person, place, and time; affect appropriate    LABS:                        8.9    6.75  )-----------( 191      ( 04 May 2022 00:24 )             28.6     05-04    140  |  104  |  15  ----------------------------<  100<H>  3.6   |  26  |  0.69    Ca    8.5      04 May 2022 00:23  Phos  3.3     05-04  Mg     2.1     05-04    TPro  5.7<L>  /  Alb  3.0<L>  /  TBili  0.2  /  DBili  x   /  AST  27  /  ALT  36  /  AlkPhos  46  05-04    PT/INR - ( 04 May 2022 00:24 )   PT: 12.8 sec;   INR: 1.10 ratio         PTT - ( 04 May 2022 00:24 )  PTT:25.3 sec      Urinalysis Basic - ( 03 May 2022 12:45 )    Color: Light Orange / Appearance: Turbid / S.018 / pH: x  Gluc: x / Ketone: Negative  / Bili: Negative / Urobili: Negative   Blood: x / Protein: 30 mg/dL / Nitrite: Negative   Leuk Esterase: Negative / RBC: 68 /hpf / WBC 4 /HPF   Sq Epi: x / Non Sq Epi: 5 /hpf / Bacteria: Negative        Culture - Blood (collected 03 May 2022 00:01)  Source: .Blood Blood-Peripheral  Preliminary Report (04 May 2022 03:02):    No growth to date.    Culture - Blood (collected 03 May 2022 00:00)  Source: .Blood Blood-Peripheral  Preliminary Report (04 May 2022 03:02):    No growth to date.        RADIOLOGY & ADDITIONAL TESTS:  Results Reviewed:   Imaging Personally Reviewed:  Electrocardiogram Personally Reviewed:    COORDINATION OF CARE:  Care Discussed with Consultants/Other Providers [Y/N]:  Prior or Outpatient Records Reviewed [Y/N]:   PROGRESS NOTE:     Patient is a 61y old  Male who presents with a chief complaint of AMS 2/2 Pneumonia (03 May 2022 07:41)      SUBJECTIVE / OVERNIGHT EVENTS: No acute events overnight.     ADDITIONAL REVIEW OF SYSTEMS:    MEDICATIONS  (STANDING):  albuterol/ipratropium for Nebulization 3 milliLiter(s) Nebulizer every 6 hours  atorvastatin 20 milliGRAM(s) Oral at bedtime  dextrose 50% Injectable 50 milliLiter(s) IV Push every 15 minutes  dextrose 50% Injectable 25 milliLiter(s) IV Push every 15 minutes  doxazosin 2 milliGRAM(s) Oral at bedtime  folic acid 1 milliGRAM(s) Oral daily  heparin   Injectable 5000 Unit(s) SubCutaneous every 8 hours  hydrocortisone sodium succinate Injectable 25 milliGRAM(s) IV Push every 12 hours  multivitamin 1 Tablet(s) Oral daily  polyethylene glycol 3350 17 Gram(s) Oral daily  senna Syrup 5 milliLiter(s) Oral at bedtime  thiamine 100 milliGRAM(s) Oral daily    MEDICATIONS  (PRN):  aluminum hydroxide/magnesium hydroxide/simethicone Suspension 30 milliLiter(s) Oral every 4 hours PRN Dyspepsia  ondansetron Injectable 4 milliGRAM(s) IV Push every 8 hours PRN Nausea and/or Vomiting      CAPILLARY BLOOD GLUCOSE      POCT Blood Glucose.: 106 mg/dL (03 May 2022 11:43)    I&O's Summary    03 May 2022 07:01  -  04 May 2022 07:00  --------------------------------------------------------  IN: 110 mL / OUT: 200 mL / NET: -90 mL        PHYSICAL EXAM:  Vital Signs Last 24 Hrs  T(C): 36 (04 May 2022 04:00), Max: 36.6 (04 May 2022 00:00)  T(F): 96.8 (04 May 2022 04:00), Max: 97.9 (04 May 2022 00:00)  HR: 51 (04 May 2022 06:00) (48 - 74)  BP: 100/60 (04 May 2022 06:00) (82/54 - 121/74)  BP(mean): 74 (04 May 2022 06:00) (63 - 93)  RR: 18 (04 May 2022 06:00) (11 - 28)  SpO2: 100% (04 May 2022 06:00) (96% - 100%)    CONSTITUTIONAL: NAD, well-developed  RESPIRATORY: Normal respiratory effort; lungs are clear to auscultation bilaterally  CARDIOVASCULAR: Regular rate and rhythm, normal S1 and S2, no murmur/rub/gallop; No lower extremity edema; Peripheral pulses are 2+ bilaterally  ABDOMEN: Nontender to palpation, normoactive bowel sounds, no rebound/guarding  MUSCULOSKELETAL: no clubbing or cyanosis of digits; no joint swelling or tenderness to palpation  PSYCH: A+O to person, place, and time; affect appropriate    LABS:                        8.9    6.75  )-----------( 191      ( 04 May 2022 00:24 )             28.6     05-04    140  |  104  |  15  ----------------------------<  100<H>  3.6   |  26  |  0.69    Ca    8.5      04 May 2022 00:23  Phos  3.3     05-04  Mg     2.1     05-04    TPro  5.7<L>  /  Alb  3.0<L>  /  TBili  0.2  /  DBili  x   /  AST  27  /  ALT  36  /  AlkPhos  46  05-04    PT/INR - ( 04 May 2022 00:24 )   PT: 12.8 sec;   INR: 1.10 ratio         PTT - ( 04 May 2022 00:24 )  PTT:25.3 sec      Urinalysis Basic - ( 03 May 2022 12:45 )    Color: Light Orange / Appearance: Turbid / S.018 / pH: x  Gluc: x / Ketone: Negative  / Bili: Negative / Urobili: Negative   Blood: x / Protein: 30 mg/dL / Nitrite: Negative   Leuk Esterase: Negative / RBC: 68 /hpf / WBC 4 /HPF   Sq Epi: x / Non Sq Epi: 5 /hpf / Bacteria: Negative        Culture - Blood (collected 03 May 2022 00:01)  Source: .Blood Blood-Peripheral  Preliminary Report (04 May 2022 03:02):    No growth to date.    Culture - Blood (collected 03 May 2022 00:00)  Source: .Blood Blood-Peripheral  Preliminary Report (04 May 2022 03:02):    No growth to date.        RADIOLOGY & ADDITIONAL TESTS:  Results Reviewed:   Imaging Personally Reviewed:  Electrocardiogram Personally Reviewed:    COORDINATION OF CARE:  Care Discussed with Consultants/Other Providers [Y/N]:  Prior or Outpatient Records Reviewed [Y/N]:       INTERVAL HPI/OVERNIGHT EVENTS: No acute events overnight.     SUBJECTIVE: Patient seen and examined at bedside. Patient extubated to [facility]. Feels well this AM.     OBJECTIVE    VITAL SIGNS:  ICU Vital Signs Last 24 Hrs  T(C): 37.1 (04 May 2022 12:00), Max: 37.3 (04 May 2022 09:00)  T(F): 98.8 (04 May 2022 12:00), Max: 99.1 (04 May 2022 09:00)  HR: 53 (04 May 2022 12:02) (48 - 74)  BP: 89/56 (04 May 2022 12:00) (82/54 - 113/68)  BP(mean): 68 (04 May 2022 12:00) (63 - 86)  ABP: --  ABP(mean): --  RR: 20 (04 May 2022 12:00) (13 - 32)  SpO2: 100% (04 May 2022 12:02) (96% - 100%)        03 @ 07:  -  04 @ 07:00  --------------------------------------------------------  IN: 110 mL / OUT: 200 mL / NET: -90 mL     @ 07:01  -  -04 @ 13:35  --------------------------------------------------------  IN: 50 mL / OUT: 0 mL / NET: 50 mL      CAPILLARY BLOOD GLUCOSE      POCT Blood Glucose.: 106 mg/dL (03 May 2022 11:43)      PHYSICAL EXAM:    General: no acute distress  Neck: supple   Respiratory: CTA b/l, no wheeze, rales, rhonchi  Cardiovascular: RRR, no murmur  Abdomen: soft, NT, ND  Extremities: lower extremity edema, stable from prior   Neurological: follows commands, moves all extremities     MEDICATIONS:  MEDICATIONS  (STANDING):  albuterol/ipratropium for Nebulization 3 milliLiter(s) Nebulizer every 6 hours  atorvastatin 20 milliGRAM(s) Oral at bedtime  dextrose 50% Injectable 50 milliLiter(s) IV Push every 15 minutes  dextrose 50% Injectable 25 milliLiter(s) IV Push every 15 minutes  doxazosin 2 milliGRAM(s) Oral at bedtime  folic acid 1 milliGRAM(s) Oral daily  heparin   Injectable 5000 Unit(s) SubCutaneous every 8 hours  hydrocortisone sodium succinate Injectable 25 milliGRAM(s) IV Push every 24 hours  multivitamin 1 Tablet(s) Oral daily  polyethylene glycol 3350 17 Gram(s) Oral daily  senna Syrup 5 milliLiter(s) Oral at bedtime  thiamine 100 milliGRAM(s) Oral daily    MEDICATIONS  (PRN):  aluminum hydroxide/magnesium hydroxide/simethicone Suspension 30 milliLiter(s) Oral every 4 hours PRN Dyspepsia  ondansetron Injectable 4 milliGRAM(s) IV Push every 8 hours PRN Nausea and/or Vomiting      ALLERGIES:  Allergies    No Known Allergies    Intolerances        LABS:                        8.9    6.75  )-----------( 191      ( 04 May 2022 00:24 )             28.6     Hemoglobin: 8.9 g/dL ( @ 00:24)  Hemoglobin: 8.9 g/dL ( @ 00:32)  Hemoglobin: 8.3 g/dL ( @ 23:31)  Hemoglobin: 8.5 g/dL ( @ 00:30)  Hemoglobin: 8.5 g/dL ( @ 00:19)    CBC Full  -  ( 04 May 2022 00:24 )  WBC Count : 6.75 K/uL  RBC Count : 3.05 M/uL  Hemoglobin : 8.9 g/dL  Hematocrit : 28.6 %  Platelet Count - Automated : 191 K/uL  Mean Cell Volume : 93.8 fl  Mean Cell Hemoglobin : 29.2 pg  Mean Cell Hemoglobin Concentration : 31.1 gm/dL  Auto Neutrophil # : 5.14 K/uL  Auto Lymphocyte # : 0.84 K/uL  Auto Monocyte # : 0.46 K/uL  Auto Eosinophil # : 0.16 K/uL  Auto Basophil # : 0.01 K/uL  Auto Neutrophil % : 76.2 %  Auto Lymphocyte % : 12.4 %  Auto Monocyte % : 6.8 %  Auto Eosinophil % : 2.4 %  Auto Basophil % : 0.1 %        140  |  104  |  15  ----------------------------<  100<H>  3.6   |  26  |  0.69    Ca    8.5      04 May 2022 00:23  Phos  3.3     05-  Mg     2.1     05-    TPro  5.7<L>  /  Alb  3.0<L>  /  TBili  0.2  /  DBili  x   /  AST  27  /  ALT  36  /  AlkPhos  46  05-04    Creatinine Trend: 0.69<--, 0.66<--, 0.60<--, 0.63<--, 0.67<--, 0.71<--  LIVER FUNCTIONS - ( 04 May 2022 00:23 )  Alb: 3.0 g/dL / Pro: 5.7 g/dL / ALK PHOS: 46 U/L / ALT: 36 U/L / AST: 27 U/L / GGT: x           PT/INR - ( 04 May 2022 00:24 )   PT: 12.8 sec;   INR: 1.10 ratio         PTT - ( 04 May 2022 00:24 )  PTT:25.3 sec    hs Troponin:            Urinalysis Basic - ( 03 May 2022 12:45 )    Color: Light Orange / Appearance: Turbid / S.018 / pH: x  Gluc: x / Ketone: Negative  / Bili: Negative / Urobili: Negative   Blood: x / Protein: 30 mg/dL / Nitrite: Negative   Leuk Esterase: Negative / RBC: 68 /hpf / WBC 4 /HPF   Sq Epi: x / Non Sq Epi: 5 /hpf / Bacteria: Negative      CSF:    Total Nucleated Cell Count, CSF: 22 /uL (22 @ 15:20)  RBC Count - Spinal Fluid: 1 /uL (22 @ 15:20)          Protein, CSF: 50 mg/dL (22 @ 17:34)  Glucose, CSF: 32 mg/dL (22 @ 15:12)  Protein, CSF: 50 mg/dL (22 @ 15:12)            EKG:   MICROBIOLOGY:    Culture - Blood (collected 03 May 2022 00:01)  Source: .Blood Blood-Peripheral  Preliminary Report (04 May 2022 03:02):    No growth to date.    Culture - Blood (collected 03 May 2022 00:00)  Source: .Blood Blood-Peripheral  Preliminary Report (04 May 2022 03:02):    No growth to date.      IMAGING:      Labs, imaging, EKG personally reviewed    RADIOLOGY & ADDITIONAL TESTS: Reviewed.

## 2022-05-04 NOTE — PROGRESS NOTE ADULT - ASSESSMENT
61 yr old male with stated hx significant for cerebral ataxia, chronic left cerebellum lacunar infarcts who presented 4/18/22 with progressive weakness/fatigue accompanied with NBNB emesis found to have asp pneum, MRI findings of leptomeningeal enhancement concerning for infectious vs malignancy. Course c/b septic shock (resolved) r/o adrenal insufficiency, AMS requiring intubation for airway protection.  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      -Neuro checks   -activity as tolerated  -physical therapy following   -off sedation, still pending CSF flow cytometry, wife does not want to pursue brain biopsy, continuing conversations     #PULM:   AMS, intubated for airway protection, asp pneum    -Mechanical Vent Therapy - titrate to maintain ph 7.35-7.45; PCO2 35-45; SPO2 > 92%  -Bronchodilator therapy q 6 hrs prn sob/wheezes  -Lung protective therapy  - plan for extubation today, doing well on CPAP trial     #CV:  resolved septic shock,  new onset afib     -rate controlled  -continue atorvastatin   -TTE 4/22/22 - EF 70%, mild tricuspid regurg, Nl RV/LV    #GI/:    -continue with tube feeds   -strict I & O 's     #ID: asp pneu, resolved septic shock, leptomeningeal enhancement    - overnight patient hypothermic, blood cultures pending   - will obtain UA and chest x ray     #FEN/ENDO/HEME:    r/o adrenal insufficiency, r/o DI (resolved)    -obtain CMP/Mg++/PO--4/CBC w diff/PT/PTT/INR  q. a.m.  -continue hydrocortisone  decrease to 25 mg IV q 8 on 4/28/22 - decreased to 25 mg IV q 12 on 4/30/22  - will continue with steroids for now   -POC glucose with ISS q 6 hrs - maintain glucose 140 -180  -DVT prophylaxis with heparin subq     61 yr old male with stated hx significant for cerebral ataxia, chronic left cerebellum lacunar infarcts who presented 4/18/22 with progressive weakness/fatigue accompanied with NBNB emesis found to have asp pneum, MRI findings of leptomeningeal enhancement concerning for infectious vs malignancy. Course c/b septic shock (resolved) r/o adrenal insufficiency, AMS requiring intubation for airway protection.  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      -Neuro checks   -activity as tolerated  -physical therapy following   -wife does not want to pursue brain biopsy, continuing conversations   -quant gold indeterminate, repeating study   -flow cytometry from 4/28 pending     #PULM:   AMS, intubated for airway protection, asp pneum    -Extubated 5/4 to HFNC   -Bronchodilator therapy q 6 hrs prn sob/wheezes    #CV:  resolved septic shock,  new onset afib     -rate controlled  -continue atorvastatin   -TTE 4/22/22 - EF 70%, mild tricuspid regurg, Nl RV/LV    #GI/:    -soft and bite sized diet   - formal S+S ordered     #ID: asp pneu, resolved septic shock, leptomeningeal enhancement    - Patient hypothermic overnight 5/2-5/3   - blood cultures NGTD   - UA negative, CXR read pending     #FEN/ENDO/HEME:    r/o adrenal insufficiency, r/o DI (resolved)    -hydrocortisone decreased to 25 mg IV q 8 on 4/28/22 - decreased to 25 mg IV q 12 on 4/30/22  - will continue with steroids for now   -DVT prophylaxis with heparin subq     61 yr old male with stated hx significant for cerebral ataxia, chronic left cerebellum lacunar infarcts who presented 4/18/22 with progressive weakness/fatigue accompanied with NBNB emesis found to have asp pneum, MRI findings of leptomeningeal enhancement concerning for infectious vs malignancy. Course c/b septic shock (resolved) r/o adrenal insufficiency, AMS requiring intubation for airway protection.  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      -Neuro checks   -activity as tolerated  -physical therapy following   -wife does not want to pursue brain biopsy, continuing conversations   -quant gold indeterminate, repeating study   -flow cytometry from 4/28 pending     #PULM:   AMS, intubated for airway protection, asp pneum    -Extubated 5/4 to HFNC   -Bronchodilator therapy q 6 hrs prn sob/wheezes    #CV:  resolved septic shock,  new onset afib     -rate controlled  -continue atorvastatin   -TTE 4/22/22 - EF 70%, mild tricuspid regurg, Nl RV/LV    #GI/:    -NPO  - formal S+S ordered     #ID: asp pneu, resolved septic shock, leptomeningeal enhancement    - Patient hypothermic overnight 5/2-5/3   - blood cultures NGTD   - UA negative, CXR read pending     #FEN/ENDO/HEME:    r/o adrenal insufficiency, r/o DI (resolved)    -hydrocortisone decreased to 25 mg IV q 8 on 4/28/22 - decreased to 25 mg IV q 12 on 4/30/22  - decreased steroids to daily 5/4   -DVT prophylaxis with heparin subq

## 2022-05-05 LAB
ALBUMIN SERPL ELPH-MCNC: 3.3 G/DL — SIGNIFICANT CHANGE UP (ref 3.3–5)
ALP SERPL-CCNC: 50 U/L — SIGNIFICANT CHANGE UP (ref 40–120)
ALT FLD-CCNC: 47 U/L — HIGH (ref 10–45)
ANION GAP SERPL CALC-SCNC: 12 MMOL/L — SIGNIFICANT CHANGE UP (ref 5–17)
APTT BLD: 30 SEC — SIGNIFICANT CHANGE UP (ref 27.5–35.5)
AST SERPL-CCNC: 36 U/L — SIGNIFICANT CHANGE UP (ref 10–40)
BASE EXCESS BLDV CALC-SCNC: 10.7 MMOL/L — HIGH (ref -2–2)
BASOPHILS # BLD AUTO: 0.02 K/UL — SIGNIFICANT CHANGE UP (ref 0–0.2)
BASOPHILS NFR BLD AUTO: 0.3 % — SIGNIFICANT CHANGE UP (ref 0–2)
BILIRUB SERPL-MCNC: 0.3 MG/DL — SIGNIFICANT CHANGE UP (ref 0.2–1.2)
BUN SERPL-MCNC: 12 MG/DL — SIGNIFICANT CHANGE UP (ref 7–23)
CA-I SERPL-SCNC: 1.22 MMOL/L — SIGNIFICANT CHANGE UP (ref 1.15–1.33)
CALCIUM SERPL-MCNC: 9.2 MG/DL — SIGNIFICANT CHANGE UP (ref 8.4–10.5)
CHLORIDE BLDV-SCNC: 103 MMOL/L — SIGNIFICANT CHANGE UP (ref 96–108)
CHLORIDE SERPL-SCNC: 101 MMOL/L — SIGNIFICANT CHANGE UP (ref 96–108)
CO2 BLDV-SCNC: 38 MMOL/L — HIGH (ref 22–26)
CO2 SERPL-SCNC: 28 MMOL/L — SIGNIFICANT CHANGE UP (ref 22–31)
CREAT SERPL-MCNC: 0.74 MG/DL — SIGNIFICANT CHANGE UP (ref 0.5–1.3)
EGFR: 104 ML/MIN/1.73M2 — SIGNIFICANT CHANGE UP
EOSINOPHIL # BLD AUTO: 0.17 K/UL — SIGNIFICANT CHANGE UP (ref 0–0.5)
EOSINOPHIL NFR BLD AUTO: 2.8 % — SIGNIFICANT CHANGE UP (ref 0–6)
GAMMA INTERFERON BACKGROUND BLD IA-ACNC: 0.02 IU/ML — SIGNIFICANT CHANGE UP
GAS PNL BLDV: 137 MMOL/L — SIGNIFICANT CHANGE UP (ref 136–145)
GAS PNL BLDV: SIGNIFICANT CHANGE UP
GAS PNL BLDV: SIGNIFICANT CHANGE UP
GLUCOSE BLDC GLUCOMTR-MCNC: 102 MG/DL — HIGH (ref 70–99)
GLUCOSE BLDC GLUCOMTR-MCNC: 106 MG/DL — HIGH (ref 70–99)
GLUCOSE BLDC GLUCOMTR-MCNC: 152 MG/DL — HIGH (ref 70–99)
GLUCOSE BLDC GLUCOMTR-MCNC: 70 MG/DL — SIGNIFICANT CHANGE UP (ref 70–99)
GLUCOSE BLDC GLUCOMTR-MCNC: 84 MG/DL — SIGNIFICANT CHANGE UP (ref 70–99)
GLUCOSE BLDV-MCNC: 85 MG/DL — SIGNIFICANT CHANGE UP (ref 70–99)
GLUCOSE SERPL-MCNC: 88 MG/DL — SIGNIFICANT CHANGE UP (ref 70–99)
HCO3 BLDV-SCNC: 36 MMOL/L — HIGH (ref 22–29)
HCT VFR BLD CALC: 30.6 % — LOW (ref 39–50)
HCT VFR BLDA CALC: 29 % — LOW (ref 39–51)
HGB BLD CALC-MCNC: 9.6 G/DL — LOW (ref 12.6–17.4)
HGB BLD-MCNC: 9.5 G/DL — LOW (ref 13–17)
HOROWITZ INDEX BLDV+IHG-RTO: 21 — SIGNIFICANT CHANGE UP
IMM GRANULOCYTES NFR BLD AUTO: 1.8 % — HIGH (ref 0–1.5)
INR BLD: 1.15 RATIO — SIGNIFICANT CHANGE UP (ref 0.88–1.16)
LACTATE BLDV-MCNC: 0.7 MMOL/L — SIGNIFICANT CHANGE UP (ref 0.7–2)
LYMPHOCYTES # BLD AUTO: 1.15 K/UL — SIGNIFICANT CHANGE UP (ref 1–3.3)
LYMPHOCYTES # BLD AUTO: 19.2 % — SIGNIFICANT CHANGE UP (ref 13–44)
M TB IFN-G BLD-IMP: NEGATIVE — SIGNIFICANT CHANGE UP
M TB IFN-G CD4+ BCKGRND COR BLD-ACNC: 0.01 IU/ML — SIGNIFICANT CHANGE UP
M TB IFN-G CD4+CD8+ BCKGRND COR BLD-ACNC: 0.01 IU/ML — SIGNIFICANT CHANGE UP
MAGNESIUM SERPL-MCNC: 2.1 MG/DL — SIGNIFICANT CHANGE UP (ref 1.6–2.6)
MCHC RBC-ENTMCNC: 29.1 PG — SIGNIFICANT CHANGE UP (ref 27–34)
MCHC RBC-ENTMCNC: 31 GM/DL — LOW (ref 32–36)
MCV RBC AUTO: 93.6 FL — SIGNIFICANT CHANGE UP (ref 80–100)
MONOCYTES # BLD AUTO: 0.45 K/UL — SIGNIFICANT CHANGE UP (ref 0–0.9)
MONOCYTES NFR BLD AUTO: 7.5 % — SIGNIFICANT CHANGE UP (ref 2–14)
NEUTROPHILS # BLD AUTO: 4.09 K/UL — SIGNIFICANT CHANGE UP (ref 1.8–7.4)
NEUTROPHILS NFR BLD AUTO: 68.4 % — SIGNIFICANT CHANGE UP (ref 43–77)
NRBC # BLD: 0 /100 WBCS — SIGNIFICANT CHANGE UP (ref 0–0)
PCO2 BLDV: 52 MMHG — SIGNIFICANT CHANGE UP (ref 42–55)
PH BLDV: 7.45 — HIGH (ref 7.32–7.43)
PHOSPHATE SERPL-MCNC: 3.7 MG/DL — SIGNIFICANT CHANGE UP (ref 2.5–4.5)
PLATELET # BLD AUTO: 194 K/UL — SIGNIFICANT CHANGE UP (ref 150–400)
PO2 BLDV: 42 MMHG — SIGNIFICANT CHANGE UP (ref 25–45)
POTASSIUM BLDV-SCNC: 3.4 MMOL/L — LOW (ref 3.5–5.1)
POTASSIUM SERPL-MCNC: 3.4 MMOL/L — LOW (ref 3.5–5.3)
POTASSIUM SERPL-SCNC: 3.4 MMOL/L — LOW (ref 3.5–5.3)
PROT SERPL-MCNC: 6.2 G/DL — SIGNIFICANT CHANGE UP (ref 6–8.3)
PROTHROM AB SERPL-ACNC: 13.2 SEC — SIGNIFICANT CHANGE UP (ref 10.5–13.4)
QUANT TB PLUS MITOGEN MINUS NIL: 1.38 IU/ML — SIGNIFICANT CHANGE UP
RBC # BLD: 3.27 M/UL — LOW (ref 4.2–5.8)
RBC # FLD: 16.6 % — HIGH (ref 10.3–14.5)
SAO2 % BLDV: 75.2 % — SIGNIFICANT CHANGE UP (ref 67–88)
SODIUM SERPL-SCNC: 141 MMOL/L — SIGNIFICANT CHANGE UP (ref 135–145)
WBC # BLD: 5.99 K/UL — SIGNIFICANT CHANGE UP (ref 3.8–10.5)
WBC # FLD AUTO: 5.99 K/UL — SIGNIFICANT CHANGE UP (ref 3.8–10.5)

## 2022-05-05 PROCEDURE — 99233 SBSQ HOSP IP/OBS HIGH 50: CPT

## 2022-05-05 PROCEDURE — 99233 SBSQ HOSP IP/OBS HIGH 50: CPT | Mod: GC

## 2022-05-05 RX ORDER — SODIUM CHLORIDE 9 MG/ML
1000 INJECTION, SOLUTION INTRAVENOUS
Refills: 0 | Status: DISCONTINUED | OUTPATIENT
Start: 2022-05-05 | End: 2022-05-05

## 2022-05-05 RX ORDER — DEXTROSE 50 % IN WATER 50 %
25 SYRINGE (ML) INTRAVENOUS ONCE
Refills: 0 | Status: COMPLETED | OUTPATIENT
Start: 2022-05-05 | End: 2022-05-05

## 2022-05-05 RX ORDER — FUROSEMIDE 40 MG
20 TABLET ORAL ONCE
Refills: 0 | Status: COMPLETED | OUTPATIENT
Start: 2022-05-05 | End: 2022-05-05

## 2022-05-05 RX ORDER — POTASSIUM CHLORIDE 20 MEQ
10 PACKET (EA) ORAL
Refills: 0 | Status: COMPLETED | OUTPATIENT
Start: 2022-05-05 | End: 2022-05-05

## 2022-05-05 RX ORDER — POTASSIUM CHLORIDE 20 MEQ
40 PACKET (EA) ORAL ONCE
Refills: 0 | Status: DISCONTINUED | OUTPATIENT
Start: 2022-05-05 | End: 2022-05-05

## 2022-05-05 RX ORDER — POTASSIUM CHLORIDE 20 MEQ
10 PACKET (EA) ORAL ONCE
Refills: 0 | Status: COMPLETED | OUTPATIENT
Start: 2022-05-05 | End: 2022-05-05

## 2022-05-05 RX ADMIN — Medication 3 MILLILITER(S): at 17:40

## 2022-05-05 RX ADMIN — Medication 25 MILLILITER(S): at 06:15

## 2022-05-05 RX ADMIN — Medication 1 MILLIGRAM(S): at 12:35

## 2022-05-05 RX ADMIN — SODIUM CHLORIDE 50 MILLILITER(S): 9 INJECTION, SOLUTION INTRAVENOUS at 09:12

## 2022-05-05 RX ADMIN — HEPARIN SODIUM 5000 UNIT(S): 5000 INJECTION INTRAVENOUS; SUBCUTANEOUS at 22:40

## 2022-05-05 RX ADMIN — HEPARIN SODIUM 5000 UNIT(S): 5000 INJECTION INTRAVENOUS; SUBCUTANEOUS at 05:50

## 2022-05-05 RX ADMIN — Medication 1 TABLET(S): at 12:35

## 2022-05-05 RX ADMIN — Medication 100 MILLIEQUIVALENT(S): at 10:37

## 2022-05-05 RX ADMIN — Medication 20 MILLIGRAM(S): at 12:36

## 2022-05-05 RX ADMIN — HEPARIN SODIUM 5000 UNIT(S): 5000 INJECTION INTRAVENOUS; SUBCUTANEOUS at 15:49

## 2022-05-05 RX ADMIN — Medication 3 MILLILITER(S): at 11:56

## 2022-05-05 RX ADMIN — Medication 25 MILLIGRAM(S): at 05:49

## 2022-05-05 RX ADMIN — Medication 100 MILLIGRAM(S): at 12:35

## 2022-05-05 RX ADMIN — Medication 100 MILLIEQUIVALENT(S): at 09:11

## 2022-05-05 RX ADMIN — Medication 100 MILLIEQUIVALENT(S): at 08:06

## 2022-05-05 RX ADMIN — Medication 50 MILLIEQUIVALENT(S): at 02:32

## 2022-05-05 NOTE — PROGRESS NOTE ADULT - SUBJECTIVE AND OBJECTIVE BOX
INTERVAL HPI/OVERNIGHT EVENTS:    SUBJECTIVE: Patient seen and examined at bedside.     OBJECTIVE:    VITAL SIGNS:  ICU Vital Signs Last 24 Hrs  T(C): 37.2 (05 May 2022 06:00), Max: 43 (04 May 2022 18:00)  T(F): 99 (05 May 2022 06:00), Max: 109.4 (04 May 2022 18:00)  HR: 69 (05 May 2022 06:00) (45 - 70)  BP: 98/54 (05 May 2022 06:00) (81/54 - 107/63)  BP(mean): 71 (05 May 2022 06:00) (64 - 82)  ABP: --  ABP(mean): --  RR: 24 (05 May 2022 06:00) (14 - 32)  SpO2: 98% (05 May 2022 06:00) (98% - 100%)         @ 07:01  -  -05 @ 07:00  --------------------------------------------------------  IN: 150 mL / OUT: 3150 mL / NET: -3000 mL      CAPILLARY BLOOD GLUCOSE      POCT Blood Glucose.: 152 mg/dL (05 May 2022 06:33)      PHYSICAL EXAM:    General: NAD  HEENT: NC/AT; PERRL, clear conjunctiva  Neck: supple  Respiratory: CTA b/l  Cardiovascular: +S1/S2; RRR  Abdomen: soft, NT/ND; +BS x4  Extremities: WWP, 2+ peripheral pulses b/l; no LE edema  Skin: normal color and turgor; no rash  Neurological:    MEDICATIONS:  MEDICATIONS  (STANDING):  albuterol/ipratropium for Nebulization 3 milliLiter(s) Nebulizer every 6 hours  atorvastatin 20 milliGRAM(s) Oral at bedtime  dextrose 50% Injectable 50 milliLiter(s) IV Push every 15 minutes  dextrose 50% Injectable 25 milliLiter(s) IV Push every 15 minutes  doxazosin 2 milliGRAM(s) Oral at bedtime  folic acid 1 milliGRAM(s) Oral daily  heparin   Injectable 5000 Unit(s) SubCutaneous every 8 hours  hydrocortisone sodium succinate Injectable 25 milliGRAM(s) IV Push every 24 hours  multivitamin 1 Tablet(s) Oral daily  polyethylene glycol 3350 17 Gram(s) Oral daily  senna Syrup 5 milliLiter(s) Oral at bedtime  thiamine 100 milliGRAM(s) Oral daily    MEDICATIONS  (PRN):  aluminum hydroxide/magnesium hydroxide/simethicone Suspension 30 milliLiter(s) Oral every 4 hours PRN Dyspepsia  ondansetron Injectable 4 milliGRAM(s) IV Push every 8 hours PRN Nausea and/or Vomiting      ALLERGIES:  Allergies    No Known Allergies    Intolerances        LABS:                        9.5    5.99  )-----------( 194      ( 05 May 2022 01:24 )             30.6     Hemoglobin: 9.5 g/dL ( @ 01:24)  Hemoglobin: 8.9 g/dL ( @ 00:24)  Hemoglobin: 8.9 g/dL ( @ 00:32)  Hemoglobin: 8.3 g/dL ( @ 23:31)  Hemoglobin: 8.5 g/dL ( @ 00:30)    CBC Full  -  ( 05 May 2022 01:24 )  WBC Count : 5.99 K/uL  RBC Count : 3.27 M/uL  Hemoglobin : 9.5 g/dL  Hematocrit : 30.6 %  Platelet Count - Automated : 194 K/uL  Mean Cell Volume : 93.6 fl  Mean Cell Hemoglobin : 29.1 pg  Mean Cell Hemoglobin Concentration : 31.0 gm/dL  Auto Neutrophil # : 4.09 K/uL  Auto Lymphocyte # : 1.15 K/uL  Auto Monocyte # : 0.45 K/uL  Auto Eosinophil # : 0.17 K/uL  Auto Basophil # : 0.02 K/uL  Auto Neutrophil % : 68.4 %  Auto Lymphocyte % : 19.2 %  Auto Monocyte % : 7.5 %  Auto Eosinophil % : 2.8 %  Auto Basophil % : 0.3 %        141  |  101  |  12  ----------------------------<  88  3.4<L>   |  28  |  0.74    Ca    9.2      05 May 2022 01:24  Phos  3.7     05-05  Mg     2.1     05-05    TPro  6.2  /  Alb  3.3  /  TBili  0.3  /  DBili  x   /  AST  36  /  ALT  47<H>  /  AlkPhos  50      Creatinine Trend: 0.74<--, 0.69<--, 0.66<--, 0.60<--, 0.63<--, 0.67<--  LIVER FUNCTIONS - ( 05 May 2022 01:24 )  Alb: 3.3 g/dL / Pro: 6.2 g/dL / ALK PHOS: 50 U/L / ALT: 47 U/L / AST: 36 U/L / GGT: x           PT/INR - ( 05 May 2022 01:24 )   PT: 13.2 sec;   INR: 1.15 ratio         PTT - ( 05 May 2022 01:24 )  PTT:30.0 sec    hs Troponin:        :05 - VBG - pH: 7.45  | pCO2: 52    | pO2: 42    | Lactate: 0.7        Urinalysis Basic - ( 03 May 2022 12:45 )    Color: Light Orange / Appearance: Turbid / S.018 / pH: x  Gluc: x / Ketone: Negative  / Bili: Negative / Urobili: Negative   Blood: x / Protein: 30 mg/dL / Nitrite: Negative   Leuk Esterase: Negative / RBC: 68 /hpf / WBC 4 /HPF   Sq Epi: x / Non Sq Epi: 5 /hpf / Bacteria: Negative      CSF:    Total Nucleated Cell Count, CSF: 22 /uL (22 @ 15:20)  RBC Count - Spinal Fluid: 1 /uL (22 @ 15:20)          Protein, CSF: 50 mg/dL (22 @ 17:34)  Glucose, CSF: 32 mg/dL (22 @ 15:12)  Protein, CSF: 50 mg/dL (22 @ 15:12)            EKG:   MICROBIOLOGY:    Culture - Blood (collected 03 May 2022 00:01)  Source: .Blood Blood-Peripheral  Preliminary Report (04 May 2022 03:02):    No growth to date.    Culture - Blood (collected 03 May 2022 00:00)  Source: .Blood Blood-Peripheral  Preliminary Report (04 May 2022 03:02):    No growth to date.      IMAGING:      Labs, imaging, EKG personally reviewed    RADIOLOGY & ADDITIONAL TESTS: Reviewed. INTERVAL HPI/OVERNIGHT EVENTS: D50 overnight for glucose of 70.     SUBJECTIVE: Patient seen and examined at bedside. Patient alert this AM, no complaints at this time.     OBJECTIVE    VITAL SIGNS:  ICU Vital Signs Last 24 Hrs  T(C): 37.2 (05 May 2022 06:00), Max: 43 (04 May 2022 18:00)  T(F): 99 (05 May 2022 06:00), Max: 109.4 (04 May 2022 18:00)  HR: 69 (05 May 2022 06:00) (45 - 70)  BP: 98/54 (05 May 2022 06:00) (81/54 - 107/63)  BP(mean): 71 (05 May 2022 06:00) (64 - 82)  ABP: --  ABP(mean): --  RR: 24 (05 May 2022 06:00) (14 - 32)  SpO2: 98% (05 May 2022 06:00) (98% - 100%)        -04 @ 07:01  -  05-05 @ 07:00  --------------------------------------------------------  IN: 150 mL / OUT: 3150 mL / NET: -3000 mL      CAPILLARY BLOOD GLUCOSE      POCT Blood Glucose.: 152 mg/dL (05 May 2022 06:33)      PHYSICAL EXAM:    General: NAD  General: no acute distress  Neck: supple   Respiratory: CTA b/l, no wheeze, rales, rhonchi  Cardiovascular: RRR, no murmur  Abdomen: soft, NT, ND  Extremities: lower extremity edema  Neurological: follows commands, moves all extremities     MEDICATIONS:  MEDICATIONS  (STANDING):  albuterol/ipratropium for Nebulization 3 milliLiter(s) Nebulizer every 6 hours  atorvastatin 20 milliGRAM(s) Oral at bedtime  dextrose 50% Injectable 50 milliLiter(s) IV Push every 15 minutes  dextrose 50% Injectable 25 milliLiter(s) IV Push every 15 minutes  doxazosin 2 milliGRAM(s) Oral at bedtime  folic acid 1 milliGRAM(s) Oral daily  heparin   Injectable 5000 Unit(s) SubCutaneous every 8 hours  hydrocortisone sodium succinate Injectable 25 milliGRAM(s) IV Push every 24 hours  multivitamin 1 Tablet(s) Oral daily  polyethylene glycol 3350 17 Gram(s) Oral daily  senna Syrup 5 milliLiter(s) Oral at bedtime  thiamine 100 milliGRAM(s) Oral daily    MEDICATIONS  (PRN):  aluminum hydroxide/magnesium hydroxide/simethicone Suspension 30 milliLiter(s) Oral every 4 hours PRN Dyspepsia  ondansetron Injectable 4 milliGRAM(s) IV Push every 8 hours PRN Nausea and/or Vomiting      ALLERGIES:  Allergies    No Known Allergies    Intolerances        LABS:                        9.5    5.99  )-----------( 194      ( 05 May 2022 01:24 )             30.6     Hemoglobin: 9.5 g/dL ( @ 01:24)  Hemoglobin: 8.9 g/dL ( @ 00:24)  Hemoglobin: 8.9 g/dL ( @ 00:32)  Hemoglobin: 8.3 g/dL ( @ 23:31)  Hemoglobin: 8.5 g/dL ( @ 00:30)    CBC Full  -  ( 05 May 2022 01:24 )  WBC Count : 5.99 K/uL  RBC Count : 3.27 M/uL  Hemoglobin : 9.5 g/dL  Hematocrit : 30.6 %  Platelet Count - Automated : 194 K/uL  Mean Cell Volume : 93.6 fl  Mean Cell Hemoglobin : 29.1 pg  Mean Cell Hemoglobin Concentration : 31.0 gm/dL  Auto Neutrophil # : 4.09 K/uL  Auto Lymphocyte # : 1.15 K/uL  Auto Monocyte # : 0.45 K/uL  Auto Eosinophil # : 0.17 K/uL  Auto Basophil # : 0.02 K/uL  Auto Neutrophil % : 68.4 %  Auto Lymphocyte % : 19.2 %  Auto Monocyte % : 7.5 %  Auto Eosinophil % : 2.8 %  Auto Basophil % : 0.3 %        141  |  101  |  12  ----------------------------<  88  3.4<L>   |  28  |  0.74    Ca    9.2      05 May 2022 01:24  Phos  3.7     05-05  Mg     2.1     05-05    TPro  6.2  /  Alb  3.3  /  TBili  0.3  /  DBili  x   /  AST  36  /  ALT  47<H>  /  AlkPhos  50      Creatinine Trend: 0.74<--, 0.69<--, 0.66<--, 0.60<--, 0.63<--, 0.67<--  LIVER FUNCTIONS - ( 05 May 2022 01:24 )  Alb: 3.3 g/dL / Pro: 6.2 g/dL / ALK PHOS: 50 U/L / ALT: 47 U/L / AST: 36 U/L / GGT: x           PT/INR - ( 05 May 2022 01:24 )   PT: 13.2 sec;   INR: 1.15 ratio         PTT - ( 05 May 2022 01:24 )  PTT:30.0 sec    hs Troponin:        :05 - VBG - pH: 7.45  | pCO2: 52    | pO2: 42    | Lactate: 0.7        Urinalysis Basic - ( 03 May 2022 12:45 )    Color: Light Orange / Appearance: Turbid / S.018 / pH: x  Gluc: x / Ketone: Negative  / Bili: Negative / Urobili: Negative   Blood: x / Protein: 30 mg/dL / Nitrite: Negative   Leuk Esterase: Negative / RBC: 68 /hpf / WBC 4 /HPF   Sq Epi: x / Non Sq Epi: 5 /hpf / Bacteria: Negative      CSF:    Total Nucleated Cell Count, CSF: 22 /uL (22 @ 15:20)  RBC Count - Spinal Fluid: 1 /uL (22 @ 15:20)          Protein, CSF: 50 mg/dL (22 @ 17:34)  Glucose, CSF: 32 mg/dL (22 @ 15:12)  Protein, CSF: 50 mg/dL (22 @ 15:12)            EKG:   MICROBIOLOGY:    Culture - Blood (collected 03 May 2022 00:01)  Source: .Blood Blood-Peripheral  Preliminary Report (04 May 2022 03:02):    No growth to date.    Culture - Blood (collected 03 May 2022 00:00)  Source: .Blood Blood-Peripheral  Preliminary Report (04 May 2022 03:02):    No growth to date.      IMAGING:      Labs, imaging, EKG personally reviewed    RADIOLOGY & ADDITIONAL TESTS: Reviewed.

## 2022-05-05 NOTE — SWALLOW BEDSIDE ASSESSMENT ADULT - ASR SWALLOW RECOMMEND DIAG
MD, PLEASE ENTER ORDER FOR MODIFIED BARIUM SWALLOW STUDY (ENTER UNDER RADIOLOGY EXAMS THE FOLLOWING WAY: GO TO THE BROWSER AND TYPE IN XRAY, THEN HIT THE SPACEBAR, AND TYPE IN THE LETTER M.)  WILL SCHEDULE EXAM UPON RECEIPT IN RADIOLOGY./VFSS/MBS
Not a candidate for instrumental exam at this time

## 2022-05-05 NOTE — CHART NOTE - NSCHARTNOTEFT_GEN_A_CORE
Endocrine consulted for hypothermia and bradycardia with normal TSH and persistent symptoms despite having been on high dose steroids. Based on this history there is no evidence of adrenal or thyroid cause to hypothermia and bradycardia. No further endocrine workup recommended at this time.      Alfredo Navarrete D.O  [phone]

## 2022-05-05 NOTE — PROGRESS NOTE ADULT - ATTENDING SUPERVISION STATEMENT
Resident
Resident/Student
Resident
Resident

## 2022-05-05 NOTE — PROGRESS NOTE ADULT - ATTENDING COMMENTS
Interval History as per resident note, personally verified by me. Patient has been extubated without acute events. Wife is declining meningeal biopsy at this time. No new focal neurologic deficits reported.    GTT's: None    MS - AAO x2 (person, place only), speech with bradyphrenia and hypophonia but otherwise fluent without dysarthria, follows simple commands, rep/naming intact. Attn/conc, recent and remote memory, fund of knowledge dec  CN - PERRL, EOMI, VFF, face sens/str intact b/l except for R NLFF, hearing intact to conversation b/l, tongue/palate midline, trap 5/5 b/l  Motor - Normal bulk and tone. BUE's at least 4-4+/5, BLE's 5/5 and symmetric  Sens - LT/temp intact all  DTR's - 2+ all except for 0+ AJ b/l, neutral R and upgoing L plantar response  Coord - Grossly appropriate for level of strength  Gait and station - Due to fall risk/safety concerns did not assess    A/P:  Encephalopathy  Leptomeningeal disease  PNA  Cerebellar ataxia    - Leptomeningeal inflammation/disease (with active enhancement) is more consistent with neoplastic process as opposed to infectious at this point. Work-up thus far has been unrevealing but pending cytology/flow cytometry results from repeat LP on 4/28. Clinically he has improved without new focal neurologic deficits but unable to be extubated due to recurrent apneic events  - Flow cytometry results non-specific and unrevealing. Wife currently declining meningeal biopsy. Would have primary team continue to further discuss with her to determine GOC  - Appreciate neurosurgery input. Oncology as needed (would likely only be helpful once definitive diagnosis made, likely with biopsy)  - Continue to address above medical problems, as you are doing  - Will sign off, please call with additional questions or concerns
61M h/o ataxia, b/l lacunar infarcts, L cerebellar infarct with recent MRI showing diffuse leptomeningeal enhancements admitted 4/18 for worsening weakness/fatigue, transferred to MICU on 4/22 s/p RRT for likely septic shock with possible adrenal insufficiency, subsequently intubated 4/23 for worsening AMS now with improving mental status off sedation though difficulty weaning from vent 2/2 likely central apnea   - mental status continues to improve, alert and interactive  - s/p repeat LP yesterday, f/u studies and neurosurg recs  - initial LP with +ACE, serum ACE wnl - per neuro would still require biopsy to r/o sarcoid  - ongoing stress dose steroid wean  - slightly improved performance on SBT though with prolonged apneic episodes, takes breaths on command but then has prolonged time of apnea, likely central origin - continue SBT as tolerates though broached topic of potential trach with wife if remains unable to further wean    - dvt ppx with hsq  - PT for mobilization
Interval History as per resident note, personally verified by me. Patient remains intubated and is apneic during episodes of SBT but improved off sedation. Wife is declining meningeal biopsy at this time. No new focal neurologic deficits reported.    GTT's: None    MS - Intubated, not sedated, AO x1, no speech output but can nod head appropriate to most "yes"/"no" questions, follows simple commands. Due to clinical condition unable to assess (MARE) rep/naming, attn/conc, recent and remote memory, fund of knowledge  CN - PERRL, EOMI, VFF, face sens/str intact b/l, hearing intact to conversation b/l, tongue midline, trap 5/5 b/l. MARE palate  Motor - Normal bulk and tone. BUE's at least 4/5, BLE's at least 4+/5  Sens - LT/temp intact all  DTR's - 2+ all except for 0+ AJ b/l, neutral R and upgoing L plantar response  Coord - Grossly appropriate for level of strength  Gait and station - MARE    A/P:  Encephalopathy  Leptomeningeal disease  PNA  Cerebellar ataxia    - Leptomeningeal inflammation/disease (with active enhancement) is more consistent with neoplastic process as opposed to infectious at this point. Work-up thus far has been unrevealing but pending cytology/flow cytometry results from repeat LP on 4/28. Clinically he has improved without new focal neurologic deficits but unable to be extubated due to recurrent apneic events  - Wife currently declining meningeal biopsy. Would have primary team further discuss with her to determine GOC  - Appreciate neurosurgery input. Oncology as needed (would likely only be helpful once definitive diagnosis made, likely with biopsy)  - Continue to address above medical problems, as you are doing  - Will continue to follow patient peripherally with you, please call with additional questions or concerns
1. Acute hypoxemic respiratory failure. Pt intubated for change of mental status. Continue  current AC vent  settings. Pt still with periods off apnea. Pt will need tracheostomy. Discussed with pt and his wife. They have  agreed.  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 . Pt with periods of apnea. Repeat LP negative to date. Await flow cytology. May need brain bx but pt's wife opposed at this point.  3. FEN : continue tube feeds.
Progressive disorder with ataxia and hallucinations, now with acute decline. MRI shows posterior fossa leptomeningeal enhancement, LP consistent with inflammatory or neoplastic disease, and some infections. Most CSF studies are pending.  Exam limited by sedation, minimize as much as tolerated. Antimicrobial treatment per ID.
62 y/o man with hx for 2 years of ataxia and cognitive decline p/w acute worsening of mental status. Intubated for airway protection. Pt off sedation, he wakens to tactile stimuli, does not maintain eyes, some of this is volitional. He moves hands and feet, reflexes are brisk throughout, with toes downgoing bilaterally. His MRI brain showed diffuse leptomeningeal enhancement. LP performed with elevated cell count and protein, pending results of cytology and flow to look for malignant cells. There is no sign of other occult malignancy on screening CT scans of the body. As long as he is not worsening he will likely need a biopsy to make the diagnosis.  EEG with pattern consistent with encephalopathy.  Discussed with the MICU team.  Can discontinue EEG.
Mr. Rojas is a 62 yo M with PMHx cerebellar ataxia admitted with acute onset of FTT and progressive neurological deficits including weakness and aphasia progressing to respiratory failure with deteriorating mental status.  CT head demonstrated leptomeningeal enhancement concerning for infectious vs malignant process.   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 Neurology recommending brain bx. Question benefit of repeat Brain MRI to evaluate for persistent LM enhancement? Will discuss with neuro.  Also, would send peripheral flow cytometry.  #Adrenal insufficiency - very slow taper of Hydrocortisone; consult Endocrinology given very low cortisol level, persistent bradycardia and hypothermia suggestive of persistent AI, may benefit from stim test  #Weakness - started PT/OT and actively participating able to speak softly/smile with me post-extubation which is evidence of improving strength, improving distal/proximal strength B/L UEs and distal LEs . Notable right facial droop, right proximal LE weakness but decreased sensation over LUE/LLE relative to right  #Volume overload - continue gentle diuresis for today  #Nutrition - needs official swallow eval,currently NPO with aspiration precautions.    Would stepdown to telemetry floor.
Mr. Rojas is a 62 yo M with PMHx cerebellar ataxia admitted with acute onset of FTT and progressive neurological deficits including weakness and aphasia progressing to respiratory failure with deteriorating mental status.  CT head demonstrated leptomeningeal enhancement concerning for infectious vs malignant process.   #Acute hypoxemic respiratory failure - remains on AC vent  settings. Pt still with periods off apnea. Pt will need tracheostomy. Discussed with pt and his wife. They have  agreed.  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 . Pt with periods of apnea. Repeat LP negative to date. Await flow cytology. Neurology recommending brain bx but pt's wife opposed at this point - will discuss further to determine goals of care.  #Adrenal insufficiency - continue HC for now given borderline low BPs and evidence of volume overload on exam (no further boluses).  #Weakness - start PT/OT
Patient with seeming acute worsening of a chronic process, found to have extensive leptomeningeal enhancement and cerebellar edema concerning for neoplastic vs. infectious process. Also would consider other causes of neurocognitive decline, including underlying prion disease, paraneoplastic syndromes, autoimmune / toxic causes. LP shows lymphocytic pleocytosis, other studies pending. He also has become medically unstable, I am uncertain if this is part of the same process.    Would repeat EEG, treat infection as you are, minimize sedation.
1. Acute hypoxemic respiratory failure. Pt intubated for change of mental status. Continue  current AC vent  settings. Pt still with periods off apnea. Pt will need tracheostomy. Discussed with pt and his wife. They have  agreed.  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 . Pt with periods of apnea. Repeat LP negative to date. Await flow cytology. May need brain bx but pt's wife opposed at this point.  3. FEN : continue tube feeds.
Mr. Rojas is a 60 yo M with PMHx cerebellar ataxia admitted with acute onset of FTT and progressive neurological deficits including weakness and aphasia progressing to respiratory failure with deteriorating mental status.  CT head demonstrated leptomeningeal enhancement concerning for infectious vs malignant process.   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 Neurology recommending brain bx - discussed further with wife and 1 of 3 sons that we will await flow cytometry results and additional autoimmune workup and if inconclusive should consider risks/benefits of brain biopsy for a potentially intervenable diagnosis pending clinical status of patient  #Adrenal insufficiency - continue HC for now given borderline low BPs and evidence of volume overload on exam (no further boluses).  #Weakness - started PT/OT and actively participating using distal UEs, able to speak softly/smile with me post-extubation which is evidence of improving strength
Today he appeared more lethargic than yesterday, but not seemingly hallucinating. EOMI, no nystagmus. Able to state name, but spoke no other sentences today, and show two fingers, though he only did with right hand. No clear ataxia today. Persistent facial asymmetry. Strength symmetric. Too lethargic to get up.     Lab studies with low folate, otherwise unremarkable. Hypothermia to 94.     This remains a challenging case with broad differential. Would expand lab studies to include autoimmune encephalitis and nutritional labs. MRI pending for tomorrow, will get EEG today
Mr. Rojas is a 62 yo M with PMHx cerebellar ataxia admitted with acute onset of FTT and progressive neurological deficits including weakness and aphasia progressing to respiratory failure with deteriorating mental status.  CT head demonstrated leptomeningeal enhancement concerning for infectious vs malignant process.   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 Neurology recommending brain bx - discussed further with wife and 1 of 3 sons that we will await flow cytometry results and additional autoimmune workup and if inconclusive should consider risks/benefits of brain biopsy for a potentially intervenable diagnosis pending clinical status of patient  #Adrenal insufficiency - taper Hydrocortisone  #Weakness - started PT/OT and actively participating able to speak softly/smile with me post-extubation which is evidence of improving strength, improving distal/proximal strength B/L UEs and distal LEs .   #Volume overload - diurese  #Nutrition - needs official swallow eval,currently NPO with aspiration precautions.    If clinical status continues to improved should be ready for stepdown to telemetry unit.
See additional history obtained from family. There appears to be a gait disorder with ataxia  and syncope for the past 4 years, with more recent visual hallucinations. That history might be suggestive of a synucleinopathy such as MSA-C, but would not explain the apparent relative hemineglect or the transient improvement in 2021. If all the currently planned workup is negative, dopaminergic imaging might be a next step, but this would not be done as inpatient. First, will need MR imaging and LP.  He also may have some nutritional deficiencies as he has not been eating much. Would recommend supplementing with banana bag (and check thiamine levels first).
Encephalopathy, ataxia, and hallucinations of unclear etiology. MRI shows diffuse posterior fossa enhancement. More lethargic today, in setting of infection. Attends to left less again.     LP to be done under IR. If safe from a cardiac point, can add quetiapine 12.5 mg at night to help sleep and perhaps hallucinations
More alert and interactive today. Followed commands with both sides, and attended to both sides. Able to state name and name some objects, but very hypophonic. Ataxia in both arms (which was harder to evaluate on the left before due to the neglect).     MRI does show posterior fossa leptomeningeal enhancement. While infection is a possibility, that is hard to reconcile with the time course of months to years (unless this is a new process now), and the improving mentals status. I am concerned that his is neoplastic/paraneoplastic or inflammatory process. Will need LP as outlined above

## 2022-05-05 NOTE — SWALLOW BEDSIDE ASSESSMENT ADULT - ASR SWALLOW ASPIRATION MONITOR
Monitor for s/s aspiration/laryngeal penetration. If noted:  D/C p.o. intake, provide non-oral nutrition/hydration/meds, and contact this service @ [phone]/change of breathing pattern/cough/gurgly voice/fever/pneumonia/throat clearing/upper respiratory infection
change of breathing pattern/cough/gurgly voice/fever/pneumonia/throat clearing/upper respiratory infection

## 2022-05-05 NOTE — SWALLOW BEDSIDE ASSESSMENT ADULT - ADDITIONAL RECOMMENDATIONS
Maintain good oral hygiene.
Goals:  1. Pt will tolerate recommended diet with no overt, clinical s/s of aspiration.

## 2022-05-05 NOTE — SWALLOW BEDSIDE ASSESSMENT ADULT - ASPIRATION PRECAUTIONS
Strict aspiration and reflux precautions!/yes
Aspiration precautions for secretions/ if enteral feeds are initiated/yes

## 2022-05-05 NOTE — SWALLOW BEDSIDE ASSESSMENT ADULT - SPECIFY REASON(S)
To assess swallow function and rule out dysphagia. Passed dysphagia screen 5/3.
To subjectively assess swallow function, r/o dysphagia

## 2022-05-05 NOTE — CHART NOTE - NSCHARTNOTEFT_GEN_A_CORE
MAR MICU TRANSFER NOTE    Please refer to MICU transfer note for full details.    Briefly, this is a 61 yr old male with stated hx significant for cerebral ataxia, chronic left cerebellum lacunar infarcts who presented 4/18/22 with progressive weakness/fatigue accompanied with NBNB emesis found to have asp pneum, MRI findings of leptomeningeal enhancement concerning for infectious vs malignancy. Course c/b septic shock (resolved), adrenal insufficiency, AMS requiring intubation for airway protection.   While in the MICU patient has had 2 LPs which were unrevealing and per neurology flow cytometry was nonspecific. Neurology would like to proceed with meningeal biopsy but family would not like to pursue biopsy. Patient was extubated on 5/3 to [facility] and is now saturating well on room air. Speech and swallow evaluation pending.         Vital Signs Last 24 Hrs  T(C): 35.8 (05 May 2022 18:00), Max: 37.3 (05 May 2022 07:00)  T(F): 96.4 (05 May 2022 18:00), Max: 99.1 (05 May 2022 07:00)  HR: 53 (05 May 2022 17:15) (53 - 70)  BP: 101/61 (05 May 2022 17:00) (81/54 - 118/68)  BP(mean): 75 (05 May 2022 17:00) (64 - 86)  RR: 17 (05 May 2022 17:00) (14 - 25)  SpO2: 100% (05 May 2022 17:15) (98% - 100%)  I&O's Summary    04 May 2022 07:01  -  05 May 2022 07:00  --------------------------------------------------------  IN: 150 mL / OUT: 3150 mL / NET: -3000 mL    05 May 2022 07:01  -  05 May 2022 18:03  --------------------------------------------------------  IN: 650 mL / OUT: 2000 mL / NET: -1350 mL      Allergies    No Known Allergies    Intolerances      MEDICATIONS  (STANDING):  albuterol/ipratropium for Nebulization 3 milliLiter(s) Nebulizer every 6 hours  atorvastatin 20 milliGRAM(s) Oral at bedtime  dextrose 50% Injectable 50 milliLiter(s) IV Push every 15 minutes  dextrose 50% Injectable 25 milliLiter(s) IV Push every 15 minutes  doxazosin 2 milliGRAM(s) Oral at bedtime  folic acid 1 milliGRAM(s) Oral daily  heparin   Injectable 5000 Unit(s) SubCutaneous every 8 hours  hydrocortisone sodium succinate Injectable 25 milliGRAM(s) IV Push every 24 hours  multivitamin 1 Tablet(s) Oral daily  polyethylene glycol 3350 17 Gram(s) Oral daily  senna Syrup 5 milliLiter(s) Oral at bedtime  thiamine 100 milliGRAM(s) Oral daily    MEDICATIONS  (PRN):  aluminum hydroxide/magnesium hydroxide/simethicone Suspension 30 milliLiter(s) Oral every 4 hours PRN Dyspepsia  ondansetron Injectable 4 milliGRAM(s) IV Push every 8 hours PRN Nausea and/or Vomiting                                  9.5    5.99  )-----------( 194      ( 05 May 2022 01:24 )             30.6     05-05    141  |  101  |  12  ----------------------------<  88  3.4<L>   |  28  |  0.74    Ca    9.2      05 May 2022 01:24  Phos  3.7     05-05  Mg     2.1     05-05    TPro  6.2  /  Alb  3.3  /  TBili  0.3  /  DBili  x   /  AST  36  /  ALT  47<H>  /  AlkPhos  50  05-05    PT/INR - ( 05 May 2022 01:24 )   PT: 13.2 sec;   INR: 1.15 ratio         PTT - ( 05 May 2022 01:24 )  PTT:30.0 sec        ASSESSMENT & PLAN:   61 yr old male with stated hx significant for cerebral ataxia, chronic left cerebellum lacunar infarcts who presented 4/18/22 with progressive weakness/fatigue accompanied with NBNB emesis found to have asp pneum, MRI findings of leptomeningeal enhancement concerning for infectious vs malignancy. Course c/b septic shock (resolved), adrenal insufficiency, AMS requiring intubation for airway protection.      #NEURO: cerebral ataxia, Lt cerebellar chronic lacunar infarcts, leptomeningeal enhancement concerning for infectious/metastatic disease.      -Neuro checks   -physical therapy following   -wife does not want to pursue brain biopsy, continuing conversations   -quant gold indeterminate, awaiting repeat study   -per neuro flow cytometry results nonspecific     #PULM: AMS, intubated for airway protection, asp pneum    -Extubated 5/4 to HFNC, now on room air   -Bronchodilator therapy q 6 hrs prn sob/wheezes    #CV: resolved septic shock, new onset afib, now sinus     -continue atorvastatin   -TTE 4/22/22 - EF 70%, mild tricuspid regurg, Nl RV/LV    #GI/:    -NPO  - formal S+S ordered     #ID: asp pneu, resolved septic shock, leptomeningeal enhancement    - Patient hypothermic overnight 5/2-5/3   - blood cultures NGTD   - UA negative, CXR clear lungs     #FEN/ENDO/HEME:    r/o adrenal insufficiency, r/o DI (resolved)    - hydrocortisone decreased to 25 mg IV q 8 on 4/28/22 - decreased to 25 mg IV q 12 on 4/30/22  - decreased steroids to daily 5/4   -DVT prophylaxis with heparin subq        FOR FOLLOW UP:  [ ] MBS 5/6   [ ] family currently would not like to pursue brain biopsy, continuing conversations.              Mack Henry MD  PGY-3 | Internal Medicine

## 2022-05-05 NOTE — SWALLOW BEDSIDE ASSESSMENT ADULT - PHARYNGEAL PHASE
latent swallow response, palpable hyolaryngeal elevation. Delayed throat clearing s/p mildly thick liquids. No further overt s/s aspiration observed.

## 2022-05-05 NOTE — PROGRESS NOTE ADULT - SUBJECTIVE AND OBJECTIVE BOX
MRN-[id number]    Subjective: 60 yo male seen and examined at bedside. Extubated. No acute events overnight.     PAST MEDICAL & SURGICAL HISTORY:  H/O cerebellar ataxia    No significant past surgical history    FAMILY HISTORY:  FH: dementia (Mother)    FH: type 2 diabetes (Mother)    Family history of CVA (Father)    Social Hx:  Nonsmoker, no drug or alcohol use    Home Medications:  atorvastatin 20 mg oral tablet: 1 tab(s) orally once a day (2022 06:28)  mirtazapine 15 mg oral tablet: 1 tab(s) orally once a day (at bedtime), As Needed (2022 06:28)    MEDICATIONS  (STANDING):  albuterol/ipratropium for Nebulization 3 milliLiter(s) Nebulizer every 6 hours  atorvastatin 20 milliGRAM(s) Oral at bedtime  chlorhexidine 0.12% Liquid 15 milliLiter(s) Oral Mucosa every 12 hours  dextrose 50% Injectable 50 milliLiter(s) IV Push every 15 minutes  dextrose 50% Injectable 25 milliLiter(s) IV Push every 15 minutes  doxazosin 2 milliGRAM(s) Oral at bedtime  folic acid 1 milliGRAM(s) Oral daily  heparin   Injectable 5000 Unit(s) SubCutaneous every 12 hours  hydrocortisone sodium succinate Injectable 25 milliGRAM(s) IV Push every 8 hours  insulin lispro (ADMELOG) corrective regimen sliding scale   SubCutaneous every 6 hours  multivitamin 1 Tablet(s) Oral daily  mupirocin 2% Ointment 1 Application(s) Both Nostrils two times a day  polyethylene glycol 3350 17 Gram(s) Oral daily  senna Syrup 5 milliLiter(s) Oral at bedtime  thiamine 100 milliGRAM(s) Oral daily    Allergies  No Known Allergies	    ROS: patient is intubated with some altered mental status unable to fully assess     Vital Signs Last 24 Hrs  T(C): 37.2 (05 May 2022 08:00), Max: 43 (04 May 2022 18:00)  T(F): 99 (05 May 2022 08:00), Max: 109.4 (04 May 2022 18:00)  HR: 60 (05 May 2022 10:00) (45 - 70)  BP: 114/68 (05 May 2022 10:00) (81/54 - 118/68)  BP(mean): 86 (05 May 2022 10:00) (64 - 86)  RR: 20 (05 May 2022 10:00) (14 - 32)  SpO2: 100% (05 May 2022 10:00) (98% - 100%)      GENERAL EXAM:  Constitutional: Lying in bed, NAD.  HEENT: Normocephalic    NEUROLOGICAL EXAM:  MS: Extubated. Awake. Attentive. Alert. Able to answer 'hospital.'  Pt able to follow some simple commands w/ strength testing in  and PF b/l  CN: PERRL. No facial asymmetry.   Motor: Able to lift UE's b/l  Tremor: No resting, postural or action tremor.  No myoclonus.  Sensation: intact to light touch      LABS:                                                                     9.5    5.99  )-----------( 194      ( 05 May 2022 01:24 )             30.6     05-    141  |  101  |  12  ----------------------------<  88  3.4<L>   |  28  |  0.74    Ca    9.2      05 May 2022 01:24  Phos  3.7     05-  Mg     2.1     -    TPro  6.2  /  Alb  3.3  /  TBili  0.3  /  DBili  x   /  AST  36  /  ALT  47<H>  /  AlkPhos  50  05-05        UA: Urinalysis Basic - ( 2022 10:35 )    Color: Light Orange / Appearance: Clear / S.042 / pH: x  Gluc: x / Ketone: Trace  / Bili: Negative / Urobili: Negative   Blood: x / Protein: 30 mg/dL / Nitrite: Negative   Leuk Esterase: Negative / RBC: 885 /hpf / WBC 8 /HPF   Sq Epi: x / Non Sq Epi: 1 /hpf / Bacteria: Negative    Radiology/EEGs:  - CT HEAD: No acute intracranial hemorrhage or mass effect.  - CTA NECK: No hemodynamically significant stenosis by NASCET criteria, dissection, or pseudoaneurysm.  - CTA HEAD: No large vessel occlusion, significant stenosis, dissection, or saccular aneurysm.  - MRI BRAIN: Extensive, diffuse leptomeningeal enhancement. Differential diagnosis primarily includes leptomeningeal metastases and infectious meningitis. Abnormal ependymal enhancement involving the bilateral frontal horns, bilateral ventricular bodies, left temporal and occipital horn, and fourth ventricle. Differential diagnosis includes ependymal metastases and infectious meningitis. Possible small foci of restricted diffusion in the bilateral superior cerebral hemispheres. These may represent small acute infarcts or regions of encephalitis. Edema noted within the cerebellar vermis and mesial bilateral cerebellar hemispheres.  - MRI CERVICAL SPINE: Diffuse leptomeningeal enhancement. This may represent the presence of leptomeningeal metastases or leptomeningeal infection. Multilevel degenerative changes.    - EEG SUMMARY/CLASSIFICATION:  Abnormal EEG   - Moderate to severe generalized slowing.  _____________________________________________________________  EEG IMPRESSION/CLINICAL CORRELATE:  Abnormal EEG study.  Moderate to severe nonspecific diffuse or multifocal cerebral dysfunction.   No epileptiform pattern or seizure seen.    - EEG SUMMARY/CLASSIFICATION:  Abnormal EEG   - Suppressed background   - burst attenuation pattern  _____________________________________________________________  EEG IMPRESSION/CLINICAL CORRELATE:  Abnormal EEG study.  Severe nonspecific diffuse or multifocal cerebral dysfunction.   In the absence of sedation, voltage suppression maybe seen with acute to subacute cortical injury  No epileptiform patterns or seizures recorded x 1 day.    - EEG SUMMARY/CLASSIFICATION:  Abnormal EEG   - Suppressed background initially, then more severe slowing with superimposed blunt GPDs with triphasic morphology near 1.5hz, most prominent after transition from burst suppression, more apparent with arousal, less conspicuous with clinical drowsiness. GPDs less conspicuous in latter portions.   - Some right hand movements noted, no definite correlation with abnormal findings on EEG  _____________________________________________________________  EEG IMPRESSION/CLINICAL CORRELATE:  Abnormal EEG study.  Severe nonspecific diffuse or multifocal cerebral dysfunction, improved vs prior day.   GPDs with triphasic morphology may be associated with an increased risk for seizure, but are typically seen in the context of a relatively severe metabolic encephalopathic process.  GPDs less conspicuous in latter portions.   No definite electrographic seizures.  ECG irregular.     EEG IMPRESSION/CLINICAL CORRELATE:   Abnormal EEG study.  Moderate to severe nonspecific diffuse or multifocal cerebral dysfunction, improved vs prior day.   No electrographic seizures.  ECG irregular.     [id number]    Subjective: 62 yo male seen and examined at bedside. Extubated. No acute events overnight.     PAST MEDICAL & SURGICAL HISTORY:  H/O cerebellar ataxia    No significant past surgical history    FAMILY HISTORY:  FH: dementia (Mother)    FH: type 2 diabetes (Mother)    Family history of CVA (Father)    Social Hx:  Nonsmoker, no drug or alcohol use    Home Medications:  atorvastatin 20 mg oral tablet: 1 tab(s) orally once a day (2022 06:28)  mirtazapine 15 mg oral tablet: 1 tab(s) orally once a day (at bedtime), As Needed (2022 06:28)    MEDICATIONS  (STANDING):  albuterol/ipratropium for Nebulization 3 milliLiter(s) Nebulizer every 6 hours  atorvastatin 20 milliGRAM(s) Oral at bedtime  chlorhexidine 0.12% Liquid 15 milliLiter(s) Oral Mucosa every 12 hours  dextrose 50% Injectable 50 milliLiter(s) IV Push every 15 minutes  dextrose 50% Injectable 25 milliLiter(s) IV Push every 15 minutes  doxazosin 2 milliGRAM(s) Oral at bedtime  folic acid 1 milliGRAM(s) Oral daily  heparin   Injectable 5000 Unit(s) SubCutaneous every 12 hours  hydrocortisone sodium succinate Injectable 25 milliGRAM(s) IV Push every 8 hours  insulin lispro (ADMELOG) corrective regimen sliding scale   SubCutaneous every 6 hours  multivitamin 1 Tablet(s) Oral daily  mupirocin 2% Ointment 1 Application(s) Both Nostrils two times a day  polyethylene glycol 3350 17 Gram(s) Oral daily  senna Syrup 5 milliLiter(s) Oral at bedtime  thiamine 100 milliGRAM(s) Oral daily    Allergies  No Known Allergies	    ROS: All systems negative except as documented in Subjective/Interval History    Vital Signs Last 24 Hrs  T(C): 37.2 (05 May 2022 08:00), Max: 43 (04 May 2022 18:00)  T(F): 99 (05 May 2022 08:00), Max: 109.4 (04 May 2022 18:00)  HR: 60 (05 May 2022 10:00) (45 - 70)  BP: 114/68 (05 May 2022 10:00) (81/54 - 118/68)  BP(mean): 86 (05 May 2022 10:00) (64 - 86)  RR: 20 (05 May 2022 10:00) (14 - 32)  SpO2: 100% (05 May 2022 10:00) (98% - 100%)      GENERAL EXAM:  Constitutional: Lying in bed, NAD.  HEENT: Normocephalic    NEUROLOGICAL EXAM:  MS: Extubated. Awake. Attentive. Alert. Able to answer 'hospital.'  Pt able to follow some simple commands w/ strength testing in  and PF b/l  CN: PERRL. No facial asymmetry.   Motor: Able to lift UE's b/l  Tremor: No resting, postural or action tremor.  No myoclonus.  Sensation: intact to light touch      LABS:                                                                     9.5    5.99  )-----------( 194      ( 05 May 2022 01:24 )             30.6     05-    141  |  101  |  12  ----------------------------<  88  3.4<L>   |  28  |  0.74    Ca    9.2      05 May 2022 01:24  Phos  3.7     05-  Mg     2.1     -    TPro  6.2  /  Alb  3.3  /  TBili  0.3  /  DBili  x   /  AST  36  /  ALT  47<H>  /  AlkPhos  50  05-05        UA: Urinalysis Basic - ( 2022 10:35 )    Color: Light Orange / Appearance: Clear / S.042 / pH: x  Gluc: x / Ketone: Trace  / Bili: Negative / Urobili: Negative   Blood: x / Protein: 30 mg/dL / Nitrite: Negative   Leuk Esterase: Negative / RBC: 885 /hpf / WBC 8 /HPF   Sq Epi: x / Non Sq Epi: 1 /hpf / Bacteria: Negative    Radiology/EEGs:  - CT HEAD: No acute intracranial hemorrhage or mass effect.  - CTA NECK: No hemodynamically significant stenosis by NASCET criteria, dissection, or pseudoaneurysm.  - CTA HEAD: No large vessel occlusion, significant stenosis, dissection, or saccular aneurysm.  - MRI BRAIN: Extensive, diffuse leptomeningeal enhancement. Differential diagnosis primarily includes leptomeningeal metastases and infectious meningitis. Abnormal ependymal enhancement involving the bilateral frontal horns, bilateral ventricular bodies, left temporal and occipital horn, and fourth ventricle. Differential diagnosis includes ependymal metastases and infectious meningitis. Possible small foci of restricted diffusion in the bilateral superior cerebral hemispheres. These may represent small acute infarcts or regions of encephalitis. Edema noted within the cerebellar vermis and mesial bilateral cerebellar hemispheres.  - MRI CERVICAL SPINE: Diffuse leptomeningeal enhancement. This may represent the presence of leptomeningeal metastases or leptomeningeal infection. Multilevel degenerative changes.    - EEG SUMMARY/CLASSIFICATION:  Abnormal EEG   - Moderate to severe generalized slowing.  _____________________________________________________________  EEG IMPRESSION/CLINICAL CORRELATE:  Abnormal EEG study.  Moderate to severe nonspecific diffuse or multifocal cerebral dysfunction.   No epileptiform pattern or seizure seen.    - EEG SUMMARY/CLASSIFICATION:  Abnormal EEG   - Suppressed background   - burst attenuation pattern  _____________________________________________________________  EEG IMPRESSION/CLINICAL CORRELATE:  Abnormal EEG study.  Severe nonspecific diffuse or multifocal cerebral dysfunction.   In the absence of sedation, voltage suppression maybe seen with acute to subacute cortical injury  No epileptiform patterns or seizures recorded x 1 day.    - EEG SUMMARY/CLASSIFICATION:  Abnormal EEG   - Suppressed background initially, then more severe slowing with superimposed blunt GPDs with triphasic morphology near 1.5hz, most prominent after transition from burst suppression, more apparent with arousal, less conspicuous with clinical drowsiness. GPDs less conspicuous in latter portions.   - Some right hand movements noted, no definite correlation with abnormal findings on EEG  _____________________________________________________________  EEG IMPRESSION/CLINICAL CORRELATE:  Abnormal EEG study.  Severe nonspecific diffuse or multifocal cerebral dysfunction, improved vs prior day.   GPDs with triphasic morphology may be associated with an increased risk for seizure, but are typically seen in the context of a relatively severe metabolic encephalopathic process.  GPDs less conspicuous in latter portions.   No definite electrographic seizures.  ECG irregular.     EEG IMPRESSION/CLINICAL CORRELATE:   Abnormal EEG study.  Moderate to severe nonspecific diffuse or multifocal cerebral dysfunction, improved vs prior day.   No electrographic seizures.  ECG irregular.

## 2022-05-05 NOTE — SWALLOW BEDSIDE ASSESSMENT ADULT - SWALLOW EVAL: DIAGNOSIS
61 yr old male with PMHx of cerebral ataxia, chronic left cerebellum lacunar infarcts who presented 4/18/22 with progressive weakness/fatigue accompanied with NBNB emesis found to have asp pneum, MRI findings of leptomeningeal enhancement concerning for infectious vs malignancy. Patient presents with evidence of an oropharyngeal dysphagia remarkable for reduced bolus formation, latent swallow response, palpable hyolaryngeal elevation, and delayed throat clearing s/p mildly thick liquids. No further overt s/s aspiration observed throughout trials administered.
60 yo M with PMHx of cerebral ataxia who was brought to the ED due to AMS, found to be septic 2/2 right sided pneumonia, suspect aspiration due to multiple episodes of emesis. Pt presents with insufficient mentation to support PO intake, as pt unable to sustain alertness despite verbal/ tactile stimulation.

## 2022-05-05 NOTE — SWALLOW BEDSIDE ASSESSMENT ADULT - COMMENTS
History continued:  4/23 CT HEAD IMPRESSION: Mild periventricular white matter ischemia with tiny old lacunar infarction in the LEFT cerebellum.  4/23 CT ABDOMEN IMPRESSION: Small bilateral pleural effusions and trace peritoneal free fluid, likely reflecting fluid overload status. Bilateral nonobstructing renal calculi. Cholelithiasis.  5/3 CXR IMPRESSION: Clear lungs.  5/5 WBC 5.99    *Seen History continued:  Neurology impression: Underlying cerebellar ataxia w/ rapid neurocognitive decline of undetermined etiology r/o autoimmune, paraneoplastic or rapid degenerative disease.   4/23 CT HEAD IMPRESSION: Mild periventricular white matter ischemia with tiny old lacunar infarction in the LEFT cerebellum.  4/23 CT ABDOMEN IMPRESSION: Small bilateral pleural effusions and trace peritoneal free fluid, likely reflecting fluid overload status. Bilateral nonobstructing renal calculi. Cholelithiasis.  5/3 CXR IMPRESSION: Clear lungs.  5/5 WBC 5.99    *Seen by this service 4/19 for bedside swallow evaluation while on medicine floor, recommended NPO, with non-oral nutrition/hydration/medications due to insufficient mentation to support PO intake, as pt unable to sustain alertness despite verbal/ tactile stimulation.

## 2022-05-05 NOTE — PROGRESS NOTE ADULT - ASSESSMENT
61 yr old male with stated hx significant for cerebral ataxia, chronic left cerebellum lacunar infarcts who presented 4/18/22 with progressive weakness/fatigue accompanied with NBNB emesis found to have asp pneum, MRI findings of leptomeningeal enhancement concerning for infectious vs malignancy. Course c/b septic shock (resolved) r/o adrenal insufficiency, AMS requiring intubation for airway protection.  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      -Neuro checks   -activity as tolerated  -physical therapy following   -wife does not want to pursue brain biopsy, continuing conversations   -quant gold indeterminate  -awaiting neuro comments on flow cytometry results     #PULM:   AMS, intubated for airway protection, asp pneum    -Extubated 5/4 to HFNC, now on room air   -Bronchodilator therapy q 6 hrs prn sob/wheezes    #CV:  resolved septic shock,  new onset afib     -rate controlled  -continue atorvastatin   -TTE 4/22/22 - EF 70%, mild tricuspid regurg, Nl RV/LV    #GI/:    -NPO  - formal S+S ordered     #ID: asp pneu, resolved septic shock, leptomeningeal enhancement    - Patient hypothermic overnight 5/2-5/3   - blood cultures NGTD   - UA negative, CXR clear lungs     #FEN/ENDO/HEME:    r/o adrenal insufficiency, r/o DI (resolved)    -hydrocortisone decreased to 25 mg IV q 8 on 4/28/22 - decreased to 25 mg IV q 12 on 4/30/22  - decreased steroids to daily 5/4   -DVT prophylaxis with heparin subq     61 yr old male with stated hx significant for cerebral ataxia, chronic left cerebellum lacunar infarcts who presented 4/18/22 with progressive weakness/fatigue accompanied with NBNB emesis found to have asp pneum, MRI findings of leptomeningeal enhancement concerning for infectious vs malignancy. Course c/b septic shock (resolved) r/o adrenal insufficiency, AMS requiring intubation for airway protection.  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      -Neuro checks   -physical therapy following   -wife does not want to pursue brain biopsy, continuing conversations   -quant gold indeterminate, awaiting repeat study   -per neuro flow cytometry results nonspecific     #PULM:   AMS, intubated for airway protection, asp pneum    -Extubated 5/4 to HFNC, now on room air   -Bronchodilator therapy q 6 hrs prn sob/wheezes    #CV:  resolved septic shock,  new onset afib     -rate controlled  -continue atorvastatin   -TTE 4/22/22 - EF 70%, mild tricuspid regurg, Nl RV/LV    #GI/:    -NPO  - formal S+S ordered     #ID: asp pneu, resolved septic shock, leptomeningeal enhancement    - Patient hypothermic overnight 5/2-5/3   - blood cultures NGTD   - UA negative, CXR clear lungs     #FEN/ENDO/HEME:    r/o adrenal insufficiency, r/o DI (resolved)    -hydrocortisone decreased to 25 mg IV q 8 on 4/28/22 - decreased to 25 mg IV q 12 on 4/30/22  - decreased steroids to daily 5/4   - will obtain endocrinology consult given hypothermia and bradycardia   -DVT prophylaxis with heparin subq     61 yr old male with stated hx significant for cerebral ataxia, chronic left cerebellum lacunar infarcts who presented 4/18/22 with progressive weakness/fatigue accompanied with NBNB emesis found to have asp pneum, MRI findings of leptomeningeal enhancement concerning for infectious vs malignancy. Course c/b septic shock (resolved) r/o adrenal insufficiency, AMS requiring intubation for airway protection.  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    #NEURO: cerebral ataxia, Lt cerebellar chronic lacunar infarcts, leptomeningeal enhancement concerning for infectious/metastatic disease.      -Neuro checks   -physical therapy following   -wife does not want to pursue brain biopsy, continuing conversations   -quant gold indeterminate, awaiting repeat study   -per neuro flow cytometry results nonspecific     #PULM: AMS, intubated for airway protection, asp pneum    -Extubated 5/4 to HFNC, now on room air   -Bronchodilator therapy q 6 hrs prn sob/wheezes    #CV: resolved septic shock, new onset afib, now sinus     -continue atorvastatin   -TTE 4/22/22 - EF 70%, mild tricuspid regurg, Nl RV/LV    #GI/:    -NPO  - formal S+S ordered     #ID: asp pneu, resolved septic shock, leptomeningeal enhancement    - Patient hypothermic overnight 5/2-5/3   - blood cultures NGTD   - UA negative, CXR clear lungs     #FEN/ENDO/HEME:    r/o adrenal insufficiency, r/o DI (resolved)    - hydrocortisone decreased to 25 mg IV q 8 on 4/28/22 - decreased to 25 mg IV q 12 on 4/30/22  - decreased steroids to daily 5/4   -DVT prophylaxis with heparin subq

## 2022-05-05 NOTE — SWALLOW BEDSIDE ASSESSMENT ADULT - H & P REVIEW
yes
61 yr old male with stated hx significant for cerebral ataxia, chronic left cerebellum lacunar infarcts who presented 4/18/22 with progressive weakness/fatigue accompanied with NBNB emesis found to have asp pneum, MRI findings of leptomeningeal enhancement concerning for infectious vs malignancy. Course c/b septic shock (resolved) r/o adrenal insufficiency, 4/21 s/p x2 RRT for hypotension and hypothermia, transferred to MICU. AMS requiring intubation for airway protection 4/23 - Extubated 5/3. 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 Neurology: cerebral ataxia, Lt cerebellar chronic lacunar infarcts, leptomeningeal enhancement concerning for infectious/metastatic disease, Neuro checks, physical therapy following, wife does not want to pursue brain biopsy, continuing conversations. Pulmonology: AMS, intubated for airway protection, asp pneum, Extubated 5/4 to [facility], now on room air, Bronchodilator therapy q 6 hrs prn sob/wheezes. CV:  resolved septic shock,  new onset afib, rate controlled, continue atorvastatin, TTE 4/22/22 - EF 70%, mild tricuspid regurg, Nl RV/LV. GI/: NPO, formal S+S ordered. ID: asp pneu, resolved septic shock, leptomeningeal enhancement, Patient hypothermic overnight 5/2-5/3, blood cultures NGTD, UA negative, CXR clear lungs. FEN/ENDO/HEME:  r/o adrenal insufficiency, r/o DI (resolved), hydrocortisone decreased to 25 mg IV q 8 on 4/28/22 - decreased to 25 mg IV q 12 on 4/30/22, decreased steroids to daily 5/4, will obtain endocrinology consult given hypothermia and bradycardia, DVT prophylaxis with heparin subq./yes

## 2022-05-05 NOTE — SWALLOW BEDSIDE ASSESSMENT ADULT - SWALLOW EVAL: CURRENT DIET
NPO except medications (Started on soft-bite sized/thin liquids on 5/3 s/p passing dysphagia screen - NPO except medications (Started on soft-bite sized/thin liquids on 5/3 s/p passing dysphagia screen, no reason documentation for change to NPO) NPO except medications (Started on soft-bite sized/thin liquids on 5/3 s/p passing dysphagia screen, although made NPO as team requesting formal swallow evaluation)

## 2022-05-05 NOTE — SWALLOW BEDSIDE ASSESSMENT ADULT - SLP GENERAL OBSERVATIONS
Pt encountered in bed, asleep, on 2L NC. vEEG tech at bedside. Pt lethargic, briefly rouses to sternal rub but does not sustain alertness. Nonverbal. Oriented x0. Unable to follow commands/ participate with this assessment.
Patient encountered upright in bed, on room air, awake/alert, + icu monitoring VSS, hypophonic at start of evaluation but eventually achieved adequate phonation with intermittent hypophonia, ?at baseline due to neuro history. Patient with strong volitional cough prior to PO intake, slightly wet, although cleared with volitional cough and re-swallow. Not re-duplicated throughout evaluation. Patient able to follow 80% of directives for purpose of exam and answer simple questions.

## 2022-05-05 NOTE — CHART NOTE - NSCHARTNOTEFT_GEN_A_CORE
MICU Transfer Note    Transfer from: MICU    Transfer to: (  ) Medicine    (X) Telemetry     (   ) RCU        (    ) Palliative         (   ) Stroke Unit          (   ) __________________    Accepting Physician: Dr. Romero   Signout given to:     HPI/CICU COURSE:          ASSESSMENT & PLAN:             FOR FOLLOW UP: MICU Transfer Note    Transfer from: MICU    Transfer to: (  ) Medicine    (X) Telemetry     (   ) RCU        (    ) Palliative         (   ) Stroke Unit          (   ) __________________    Accepting Physician: Dr. Romero   Signout given to:     HPI/CICU COURSE:    61 yr old male with stated hx significant for cerebral ataxia, chronic left cerebellum lacunar infarcts who presented 4/18/22 with progressive weakness/fatigue accompanied with NBNB emesis found to have asp pneum, MRI findings of leptomeningeal enhancement concerning for infectious vs malignancy. Course c/b septic shock (resolved) r/o adrenal insufficiency, AMS requiring intubation for airway protection.  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 Patient has had 2 LPs which were unrevealing and per neurology flow cytometry was nonspecific. Neurology would like to proceed with meningeal biopsy but family would not like to pursue biopsy           ASSESSMENT & PLAN:             FOR FOLLOW UP: MICU Transfer Note    Transfer from: MICU    Transfer to: (  ) Medicine    (X) Telemetry     (   ) RCU        (    ) Palliative         (   ) Stroke Unit          (   ) __________________    Accepting Physician: Dr. Romero   Signout given to:     HPI/CICU COURSE:    61 yr old male with stated hx significant for cerebral ataxia, chronic left cerebellum lacunar infarcts who presented 4/18/22 with progressive weakness/fatigue accompanied with NBNB emesis found to have asp pneum, MRI findings of leptomeningeal enhancement concerning for infectious vs malignancy. Course c/b septic shock (resolved) r/o adrenal insufficiency, AMS requiring intubation for airway protection.  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 Patient has had 2 LPs which were unrevealing and per neurology flow cytometry was nonspecific. Neurology would like to proceed with meningeal biopsy but family would not like to pursue biopsy. Patient was extubated on 5/3 to HFNC and is now saturating well on room air. Speech and swallow evaluation pending.       ASSESSMENT & PLAN:   61 yr old male with stated hx significant for cerebral ataxia, chronic left cerebellum lacunar infarcts who presented 4/18/22 with progressive weakness/fatigue accompanied with NBNB emesis found to have asp pneum, MRI findings of leptomeningeal enhancement concerning for infectious vs malignancy. Course c/b septic shock (resolved) r/o adrenal insufficiency, AMS requiring intubation for airway protection.  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    #NEURO: cerebral ataxia, Lt cerebellar chronic lacunar infarcts, leptomeningeal enhancement concerning for infectious/metastatic disease.      -Neuro checks   -physical therapy following   -wife does not want to pursue brain biopsy, continuing conversations   -quant gold indeterminate, awaiting repeat study   -per neuro flow cytometry results nonspecific     #PULM: AMS, intubated for airway protection, asp pneum    -Extubated 5/4 to HFNC, now on room air   -Bronchodilator therapy q 6 hrs prn sob/wheezes    #CV: resolved septic shock, new onset afib     -rate controlled  -continue atorvastatin   -TTE 4/22/22 - EF 70%, mild tricuspid regurg, Nl RV/LV    #GI/:    -NPO  - formal S+S ordered     #ID: asp pneu, resolved septic shock, leptomeningeal enhancement    - Patient hypothermic overnight 5/2-5/3   - blood cultures NGTD   - UA negative, CXR clear lungs     #FEN/ENDO/HEME:    r/o adrenal insufficiency, r/o DI (resolved)    -hydrocortisone decreased to 25 mg IV q 8 on 4/28/22 - decreased to 25 mg IV q 12 on 4/30/22  - decreased steroids to daily 5/4   - will obtain endocrinology consult given hypothermia and bradycardia   -DVT prophylaxis with heparin subq        FOR FOLLOW UP:  [ ] Speech and swallow   [ ] family currently would not like to pursue brain biopsy, continuing conversations MICU Transfer Note    Transfer from: MICU    Transfer to: (  ) Medicine    (X) Telemetry     (   ) RCU        (    ) Palliative         (   ) Stroke Unit          (   ) __________________    Accepting Physician: Dr. Romero   Signout given to:     HPI/CICU COURSE:    61 yr old male with stated hx significant for cerebral ataxia, chronic left cerebellum lacunar infarcts who presented 4/18/22 with progressive weakness/fatigue accompanied with NBNB emesis found to have asp pneum, MRI findings of leptomeningeal enhancement concerning for infectious vs malignancy. Course c/b septic shock (resolved), adrenal insufficiency, AMS requiring intubation for airway protection.   While in the MICU patient has had 2 LPs which were unrevealing and per neurology flow cytometry was nonspecific. Neurology would like to proceed with meningeal biopsy but family would not like to pursue biopsy. Patient was extubated on 5/3 to HFNC and is now saturating well on room air. Speech and swallow evaluation pending.       ASSESSMENT & PLAN:   61 yr old male with stated hx significant for cerebral ataxia, chronic left cerebellum lacunar infarcts who presented 4/18/22 with progressive weakness/fatigue accompanied with NBNB emesis found to have asp pneum, MRI findings of leptomeningeal enhancement concerning for infectious vs malignancy. Course c/b septic shock (resolved), adrenal insufficiency, AMS requiring intubation for airway protection.      #NEURO: cerebral ataxia, Lt cerebellar chronic lacunar infarcts, leptomeningeal enhancement concerning for infectious/metastatic disease.      -Neuro checks   -physical therapy following   -wife does not want to pursue brain biopsy, continuing conversations   -quant gold indeterminate, awaiting repeat study   -per neuro flow cytometry results nonspecific     #PULM: AMS, intubated for airway protection, asp pneum    -Extubated 5/4 to HFNC, now on room air   -Bronchodilator therapy q 6 hrs prn sob/wheezes    #CV: resolved septic shock, new onset afib, now sinus     -continue atorvastatin   -TTE 4/22/22 - EF 70%, mild tricuspid regurg, Nl RV/LV    #GI/:    -NPO  - formal S+S ordered     #ID: asp pneu, resolved septic shock, leptomeningeal enhancement    - Patient hypothermic overnight 5/2-5/3   - blood cultures NGTD   - UA negative, CXR clear lungs     #FEN/ENDO/HEME:    r/o adrenal insufficiency, r/o DI (resolved)    - hydrocortisone decreased to 25 mg IV q 8 on 4/28/22 - decreased to 25 mg IV q 12 on 4/30/22  - decreased steroids to daily 5/4   -DVT prophylaxis with heparin subq        FOR FOLLOW UP:  [ ] Speech and swallow   [ ] family currently would not like to pursue brain biopsy, continuing conversations MICU Transfer Note    Transfer from: MICU    Transfer to: (  ) Medicine    (X) Telemetry     (   ) RCU        (    ) Palliative         (   ) Stroke Unit          (   ) __________________    Accepting Physician: Dr. Romero   Signout given to:     HPI/CICU COURSE:    61 yr old male with stated hx significant for cerebral ataxia, chronic left cerebellum lacunar infarcts who presented 4/18/22 with progressive weakness/fatigue accompanied with NBNB emesis found to have asp pneum, MRI findings of leptomeningeal enhancement concerning for infectious vs malignancy. Course c/b septic shock (resolved), adrenal insufficiency, AMS requiring intubation for airway protection.   While in the MICU patient has had 2 LPs which were unrevealing and per neurology flow cytometry was nonspecific. Neurology would like to proceed with meningeal biopsy but family would not like to pursue biopsy. Patient was extubated on 5/3 to HFNC and is now saturating well on room air. Speech and swallow evaluation pending.       ASSESSMENT & PLAN:   61 yr old male with stated hx significant for cerebral ataxia, chronic left cerebellum lacunar infarcts who presented 4/18/22 with progressive weakness/fatigue accompanied with NBNB emesis found to have asp pneum, MRI findings of leptomeningeal enhancement concerning for infectious vs malignancy. Course c/b septic shock (resolved), adrenal insufficiency, AMS requiring intubation for airway protection.      #NEURO: cerebral ataxia, Lt cerebellar chronic lacunar infarcts, leptomeningeal enhancement concerning for infectious/metastatic disease.      -Neuro checks   -physical therapy following   -wife does not want to pursue brain biopsy, continuing conversations   -quant gold indeterminate, awaiting repeat study   -per neuro flow cytometry results nonspecific     #PULM: AMS, intubated for airway protection, asp pneum    -Extubated 5/4 to HFNC, now on room air   -Bronchodilator therapy q 6 hrs prn sob/wheezes    #CV: resolved septic shock, new onset afib, now sinus     -continue atorvastatin   -TTE 4/22/22 - EF 70%, mild tricuspid regurg, Nl RV/LV    #GI/:    -NPO  - formal S+S ordered     #ID: asp pneu, resolved septic shock, leptomeningeal enhancement    - Patient hypothermic overnight 5/2-5/3   - blood cultures NGTD   - UA negative, CXR clear lungs     #FEN/ENDO/HEME:    r/o adrenal insufficiency, r/o DI (resolved)    - hydrocortisone decreased to 25 mg IV q 8 on 4/28/22 - decreased to 25 mg IV q 12 on 4/30/22  - decreased steroids to daily 5/4   -DVT prophylaxis with heparin subq        FOR FOLLOW UP:  [ ] PAULA 5/6   [ ] family currently would not like to pursue brain biopsy, continuing conversations

## 2022-05-05 NOTE — PROGRESS NOTE ADULT - ASSESSMENT
Assessment: 60 yo male with a PMHx of cerebellar ataxia who was brought to the ED on 4/17 due to AMS preceded by 1 week of fatigue/weakness and episodic incontinence. Patient now intubated for worsening clinical level depressed consciousness w/ concern of possible encephalitis or prion disease w/ rapid cognitive decline. Now off sedation.    Impression: Underlying cerebellar ataxia w/ rapid neurocognitive decline of undetermined etiology r/o autoimmune, paraneoplastic or rapid degenerative disease.     Recommendations:  [x] No further workup at this time from a neurology standpoint  [x] Repeat flow cytometry 4/28 showing nonspecific results 'degenerated sample, small lymphocytes'  [x] nsgy consulted for meningeal bx, family refusing at this time  [x] ID c/s, appreciate recs  [x] Quant Gold TB was indeterminant. Would resend.  [x] f/u serum labs: vitamin E, MMA, B1, B3, B6  [x] f/u CSF labs: 14-3-3 Protein Tau, ACE, ADmark phos-tau/total-tau, Lyme PCR and ab, AIE Encephalopathy panel, EBV, histoplasma antigen, GISSEL virus, VDRL, West Nile, Flow Cytometry, MOG ab, NMO ab, MBP, fungal clx 4/28, clx 4/28,   [x] Notable LP labs 4/22: ACE 7.7 <H>; + CSF lyme abs; Protein 126 <H>, TNC 43 <H>  [x] Notable LP labs 4/28: Glucose 32 <L>, Protein 50 <H>, TNC 22 <H>, IgG index 8.6 <H>  [x] Notable serum labs: ESR 26 <H>, CRP 25 <H>, EMILIA 1:320 speckled, folate 3.4 (repleted)  [x] s/p 4/28 LP: Glucose 32 <L>, Protein 50 <H>, TNC 22 <H>; Cryptococcal antigen neg, PCR neg, HSV 1/2 neg,   [x] s/p 4/22 LP: Glucose 54, Protein 126 <H>, TNC 43 <H>; PCR neg, Cryptococcal antigen neg, HSV1/2 neg, clx no growth, EBV neg, AIE encephalopathy panel neg, WNV neg, AFB <5cc  [x] CSF cytopathology 4/22 - increased number of large mononuclear cells in a background of few small lymphocytes, monocytes and neutrophils, cannot exclude a lymphoproliferative disorder versus an inflammatory process.   [x] CSF cytopathology 4/29 - significant increased number of small lymphocytes with rare monocytes  [x] Serum labs wnl: Quant Gold TB indeterminant, Vitamin D 67.4, B12 > 2000, Homocysteine 6.9, CPK 31, TSH 1.58, HbA1c 5.5, Copper 108, P/C/atypical ANCA neg, Anti-Ribonuclear Protein <2, Lyme PCR and IgG/IgM ab neg, Neutrophil Cytoplasmic Ab neg, Scleroderma Abs neg, anti SSA and SSA <0.2, Zinc 46, heavy metal screen neg, ACE 24, Paraneoplastic Panel neg.  [x] Utox neg, UA neg  [x] Monitor off AEDs and EEG  [x] s/p acyclovir (4/21/22 - 4/25/22), ceftriaxone (4/21/22 -4/26/22), ampicillin (4/21/22- 4/26/22), zosyn (4/18/22 - 4/21/22), vanco (4/20/22 - 4/25/22)  [x] rest of care per MICU    Case seen and discussed with neurology attending Dr. Guerin.

## 2022-05-05 NOTE — SWALLOW BEDSIDE ASSESSMENT ADULT - SWALLOW EVAL: RECOMMENDED DIET
NPO, with non-oral nutrition/hydration/medications. Consider short-term alternate means of nutrition/hydration/medications
Puree/moderately thick liquids

## 2022-05-05 NOTE — SWALLOW BEDSIDE ASSESSMENT ADULT - SWALLOW EVAL: RECOMMENDED FEEDING/EATING TECHNIQUES
check mouth frequently for oral residue/pocketing/crush medication (when feasible)/maintain upright posture during/after eating for 30 mins/no straws/oral hygiene/small sips/bites

## 2022-05-06 LAB
ALBUMIN SERPL ELPH-MCNC: 3.4 G/DL — SIGNIFICANT CHANGE UP (ref 3.3–5)
ALP SERPL-CCNC: 49 U/L — SIGNIFICANT CHANGE UP (ref 40–120)
ALT FLD-CCNC: 45 U/L — SIGNIFICANT CHANGE UP (ref 10–45)
ANION GAP SERPL CALC-SCNC: 12 MMOL/L — SIGNIFICANT CHANGE UP (ref 5–17)
APTT BLD: 35.8 SEC — HIGH (ref 27.5–35.5)
AST SERPL-CCNC: 27 U/L — SIGNIFICANT CHANGE UP (ref 10–40)
BASOPHILS # BLD AUTO: 0.02 K/UL — SIGNIFICANT CHANGE UP (ref 0–0.2)
BASOPHILS NFR BLD AUTO: 0.4 % — SIGNIFICANT CHANGE UP (ref 0–2)
BILIRUB SERPL-MCNC: 0.4 MG/DL — SIGNIFICANT CHANGE UP (ref 0.2–1.2)
BUN SERPL-MCNC: 9 MG/DL — SIGNIFICANT CHANGE UP (ref 7–23)
CALCIUM SERPL-MCNC: 9.2 MG/DL — SIGNIFICANT CHANGE UP (ref 8.4–10.5)
CHLORIDE SERPL-SCNC: 98 MMOL/L — SIGNIFICANT CHANGE UP (ref 96–108)
CO2 SERPL-SCNC: 28 MMOL/L — SIGNIFICANT CHANGE UP (ref 22–31)
CREAT SERPL-MCNC: 0.63 MG/DL — SIGNIFICANT CHANGE UP (ref 0.5–1.3)
EGFR: 109 ML/MIN/1.73M2 — SIGNIFICANT CHANGE UP
EOSINOPHIL # BLD AUTO: 0.2 K/UL — SIGNIFICANT CHANGE UP (ref 0–0.5)
EOSINOPHIL NFR BLD AUTO: 4 % — SIGNIFICANT CHANGE UP (ref 0–6)
GLUCOSE BLDC GLUCOMTR-MCNC: 86 MG/DL — SIGNIFICANT CHANGE UP (ref 70–99)
GLUCOSE BLDC GLUCOMTR-MCNC: 91 MG/DL — SIGNIFICANT CHANGE UP (ref 70–99)
GLUCOSE BLDC GLUCOMTR-MCNC: 91 MG/DL — SIGNIFICANT CHANGE UP (ref 70–99)
GLUCOSE BLDC GLUCOMTR-MCNC: 92 MG/DL — SIGNIFICANT CHANGE UP (ref 70–99)
GLUCOSE SERPL-MCNC: 79 MG/DL — SIGNIFICANT CHANGE UP (ref 70–99)
HCT VFR BLD CALC: 33.8 % — LOW (ref 39–50)
HGB BLD-MCNC: 10.8 G/DL — LOW (ref 13–17)
IMM GRANULOCYTES NFR BLD AUTO: 1.6 % — HIGH (ref 0–1.5)
INR BLD: 1.1 RATIO — SIGNIFICANT CHANGE UP (ref 0.88–1.16)
LYMPHOCYTES # BLD AUTO: 0.86 K/UL — LOW (ref 1–3.3)
LYMPHOCYTES # BLD AUTO: 17.2 % — SIGNIFICANT CHANGE UP (ref 13–44)
MAGNESIUM SERPL-MCNC: 2.1 MG/DL — SIGNIFICANT CHANGE UP (ref 1.6–2.6)
MCHC RBC-ENTMCNC: 29.8 PG — SIGNIFICANT CHANGE UP (ref 27–34)
MCHC RBC-ENTMCNC: 32 GM/DL — SIGNIFICANT CHANGE UP (ref 32–36)
MCV RBC AUTO: 93.1 FL — SIGNIFICANT CHANGE UP (ref 80–100)
MONOCYTES # BLD AUTO: 0.42 K/UL — SIGNIFICANT CHANGE UP (ref 0–0.9)
MONOCYTES NFR BLD AUTO: 8.4 % — SIGNIFICANT CHANGE UP (ref 2–14)
NEUTROPHILS # BLD AUTO: 3.42 K/UL — SIGNIFICANT CHANGE UP (ref 1.8–7.4)
NEUTROPHILS NFR BLD AUTO: 68.4 % — SIGNIFICANT CHANGE UP (ref 43–77)
NRBC # BLD: 0 /100 WBCS — SIGNIFICANT CHANGE UP (ref 0–0)
PHOSPHATE SERPL-MCNC: 3.6 MG/DL — SIGNIFICANT CHANGE UP (ref 2.5–4.5)
PLATELET # BLD AUTO: 191 K/UL — SIGNIFICANT CHANGE UP (ref 150–400)
POTASSIUM SERPL-MCNC: 3.4 MMOL/L — LOW (ref 3.5–5.3)
POTASSIUM SERPL-SCNC: 3.4 MMOL/L — LOW (ref 3.5–5.3)
PROT SERPL-MCNC: 6.1 G/DL — SIGNIFICANT CHANGE UP (ref 6–8.3)
PROTHROM AB SERPL-ACNC: 12.7 SEC — SIGNIFICANT CHANGE UP (ref 10.5–13.4)
RBC # BLD: 3.63 M/UL — LOW (ref 4.2–5.8)
RBC # FLD: 16.7 % — HIGH (ref 10.3–14.5)
SARS-COV-2 RNA SPEC QL NAA+PROBE: SIGNIFICANT CHANGE UP
SODIUM SERPL-SCNC: 138 MMOL/L — SIGNIFICANT CHANGE UP (ref 135–145)
WBC # BLD: 5 K/UL — SIGNIFICANT CHANGE UP (ref 3.8–10.5)
WBC # FLD AUTO: 5 K/UL — SIGNIFICANT CHANGE UP (ref 3.8–10.5)

## 2022-05-06 PROCEDURE — 99232 SBSQ HOSP IP/OBS MODERATE 35: CPT

## 2022-05-06 PROCEDURE — 88189 FLOWCYTOMETRY/READ 16 & >: CPT

## 2022-05-06 PROCEDURE — 99233 SBSQ HOSP IP/OBS HIGH 50: CPT

## 2022-05-06 PROCEDURE — 74230 X-RAY XM SWLNG FUNCJ C+: CPT | Mod: 26

## 2022-05-06 PROCEDURE — 99254 IP/OBS CNSLTJ NEW/EST MOD 60: CPT

## 2022-05-06 RX ORDER — POTASSIUM CHLORIDE 20 MEQ
40 PACKET (EA) ORAL ONCE
Refills: 0 | Status: DISCONTINUED | OUTPATIENT
Start: 2022-05-06 | End: 2022-05-06

## 2022-05-06 RX ORDER — SODIUM CHLORIDE 9 MG/ML
1000 INJECTION INTRAMUSCULAR; INTRAVENOUS; SUBCUTANEOUS
Refills: 0 | Status: DISCONTINUED | OUTPATIENT
Start: 2022-05-06 | End: 2022-05-08

## 2022-05-06 RX ORDER — POTASSIUM CHLORIDE 20 MEQ
10 PACKET (EA) ORAL
Refills: 0 | Status: COMPLETED | OUTPATIENT
Start: 2022-05-06 | End: 2022-05-06

## 2022-05-06 RX ORDER — PANTOPRAZOLE SODIUM 20 MG/1
40 TABLET, DELAYED RELEASE ORAL DAILY
Refills: 0 | Status: DISCONTINUED | OUTPATIENT
Start: 2022-05-06 | End: 2022-05-07

## 2022-05-06 RX ADMIN — HEPARIN SODIUM 5000 UNIT(S): 5000 INJECTION INTRAVENOUS; SUBCUTANEOUS at 06:33

## 2022-05-06 RX ADMIN — Medication 100 MILLIEQUIVALENT(S): at 15:00

## 2022-05-06 RX ADMIN — Medication 100 MILLIEQUIVALENT(S): at 12:36

## 2022-05-06 RX ADMIN — Medication 3 MILLILITER(S): at 06:34

## 2022-05-06 RX ADMIN — SODIUM CHLORIDE 80 MILLILITER(S): 9 INJECTION INTRAMUSCULAR; INTRAVENOUS; SUBCUTANEOUS at 06:34

## 2022-05-06 RX ADMIN — Medication 100 MILLIEQUIVALENT(S): at 17:23

## 2022-05-06 RX ADMIN — Medication 1 TABLET(S): at 17:27

## 2022-05-06 RX ADMIN — PANTOPRAZOLE SODIUM 40 MILLIGRAM(S): 20 TABLET, DELAYED RELEASE ORAL at 17:43

## 2022-05-06 RX ADMIN — Medication 3 MILLILITER(S): at 17:25

## 2022-05-06 RX ADMIN — Medication 100 MILLIGRAM(S): at 17:26

## 2022-05-06 RX ADMIN — Medication 25 MILLIGRAM(S): at 06:33

## 2022-05-06 RX ADMIN — HEPARIN SODIUM 5000 UNIT(S): 5000 INJECTION INTRAVENOUS; SUBCUTANEOUS at 21:34

## 2022-05-06 RX ADMIN — Medication 1 MILLIGRAM(S): at 17:26

## 2022-05-06 RX ADMIN — SODIUM CHLORIDE 80 MILLILITER(S): 9 INJECTION INTRAMUSCULAR; INTRAVENOUS; SUBCUTANEOUS at 21:34

## 2022-05-06 RX ADMIN — Medication 3 MILLILITER(S): at 01:59

## 2022-05-06 RX ADMIN — ATORVASTATIN CALCIUM 20 MILLIGRAM(S): 80 TABLET, FILM COATED ORAL at 21:34

## 2022-05-06 NOTE — CONSULT NOTE ADULT - ASSESSMENT
Patient is a 62 yo M with PMHx of cerebral ataxia who was brought to the ED due to AMS. History is obtained from chart review and from patients wife as patient is not responding to questions. History is extremely limited. Approximately 1 week prior to presentation, patient began to experience progressive fatigue/weakness. Patients weakness progressed and he had an episode of incontinence. several days later. Two days prior to presentation, patient began to experience hiccoughs. Patient has history of unexplained hiccoughs which usually progress to episodes of emesis. Patient underwent a barium swallow study recently which was unremarkable. On the day of presentation, patient began to have multiple episodes of emesis, which prompted his wife to bring him to the ED. At baseline, patient requires a walker to ambulate and is able to communicate normally.   In the ED, patient noted to be tachycardic to 113 and tachypneic to 23. Labs significant for initial lactate of 3.7. A code stroke was called which was negative for acute intracranial processes. A CT of the chest and A/P was performed which revealed findings concerning for right sided pneumonia and esophagitis. Patient was given 2L IVF and a dose of Zosyn. He was subsequently admitted to medicine for further management  pt with sig medical and cardiac hx.  events has noted  neuro and id noted  may need AC if no contraindication with a.fib as far as if cleared by neuro  will adjust cardiac meds  abx

## 2022-05-06 NOTE — CONSULT NOTE ADULT - CONSULT REQUESTED BY NAME
Rock
Dr. Moreno
Medicine NP
Kendal BARBER
MICU
Omari Fair
med team
ANT Wilkerson MD
Medicine
dr corcoran

## 2022-05-06 NOTE — PROGRESS NOTE ADULT - SUBJECTIVE AND OBJECTIVE BOX
CC: Patient is a 61y old  Male who presents with a chief complaint of AMS 2/2 Pneumonia (06 May 2022 16:03)    ID following for AMS    Interval History/ROS: Patient now transferred to floors, no new complaints. No fevers.     Rest of ROS negative.    Allergies  No Known Allergies    ANTIMICROBIALS:      OTHER MEDS:  albuterol/ipratropium for Nebulization 3 milliLiter(s) Nebulizer every 6 hours  atorvastatin 20 milliGRAM(s) Oral at bedtime  dextrose 50% Injectable 50 milliLiter(s) IV Push every 15 minutes  dextrose 50% Injectable 25 milliLiter(s) IV Push every 15 minutes  folic acid 1 milliGRAM(s) Oral daily  heparin   Injectable 5000 Unit(s) SubCutaneous every 8 hours  hydrocortisone sodium succinate Injectable 25 milliGRAM(s) IV Push every 24 hours  multivitamin 1 Tablet(s) Oral daily  ondansetron Injectable 4 milliGRAM(s) IV Push every 8 hours PRN  pantoprazole  Injectable 40 milliGRAM(s) IV Push daily  potassium chloride  10 mEq/100 mL IVPB 10 milliEquivalent(s) IV Intermittent every 1 hour  sodium chloride 0.9%. 1000 milliLiter(s) IV Continuous <Continuous>  thiamine 100 milliGRAM(s) Oral daily    PE:    Vital Signs Last 24 Hrs  T(C): 37 (06 May 2022 16:15), Max: 43 (05 May 2022 18:00)  T(F): 98.6 (06 May 2022 16:15), Max: 109.4 (05 May 2022 18:00)  HR: 91 (06 May 2022 16:15) (47 - 91)  BP: 99/66 (06 May 2022 16:15) (90/52 - 132/84)  BP(mean): 66 (05 May 2022 18:00) (66 - 75)  RR: 18 (06 May 2022 16:15) (17 - 18)  SpO2: 99% (06 May 2022 16:15) (97% - 100%)    Gen: Awake, alert, NAD  CV: S1+S2 normal, no murmurs  Resp: Clear bilat, no resp distress  Abd: Soft, nontender, +BS  Ext: No LE edema, no wounds  : No Barnett  IV/Skin: No thrombophlebitis  Neuro: no focal deficits    LABS:                          10.8   5.00  )-----------( 191      ( 06 May 2022 07:37 )             33.8       05-06    138  |  98  |  9   ----------------------------<  79  3.4<L>   |  28  |  0.63    Ca    9.2      06 May 2022 07:35  Phos  3.6     05-06  Mg     2.1     05-06    TPro  6.1  /  Alb  3.4  /  TBili  0.4  /  DBili  x   /  AST  27  /  ALT  45  /  AlkPhos  49  05-06          MICROBIOLOGY:  v  .Blood Blood-Peripheral  05-03-22   No growth to date.  --  --      .Blood Blood-Peripheral  05-03-22   No growth to date.  --  --      .CSF CSF  04-28-22   Culture is being performed.  --    No polymorphonuclear leukocytes seen  No organisms seen  by cytocentrifuge      .Blood Blood-Peripheral  04-26-22   No Growth Final  --  --      .Blood Blood-Peripheral  04-23-22   No Growth Final  --  --      .CSF CSF  04-22-22   No growth  --    polymorphonuclear leukocytes per low power field  No organisms seen  by cytocentrifuge      .Blood Blood-Peripheral  04-21-22   No Growth Final  --  --      Clean Catch Clean Catch (Midstream)  04-21-22   No growth  --  --      .Blood Blood-Peripheral  04-21-22   No Growth Final  --  --      Catheterized Catheterized  04-18-22   >=3 organisms. Probable collection contamination.  --  --      .Blood Blood  04-18-22   No Growth Final  --  --    RADIOLOGY:    < from: Xray Chest 1 View- PORTABLE-Urgent (Xray Chest 1 View- PORTABLE-Urgent .) (05.03.22 @ 12:40) >  IMPRESSION:  Clear lungs.    < end of copied text >

## 2022-05-06 NOTE — CONSULT NOTE ADULT - CONSULT REASON
Consideration for brain biopsy
hypotension
AMS
bradycardia
anemia, dysphagia
Pause on Tele
new ataxia
Acidosis, Hypernatremia
recurrent HypoTN
Abnormal MRI

## 2022-05-06 NOTE — SWALLOW VFSS/MBS ASSESSMENT ADULT - ORAL PHASE COMMENTS
Oral phase significant for intermittent lingual pumping and prolonged oral transit time. Oral phase significant for intermittent lingual pumping and prolonged oral transit time.   *Of note, improved control of less viscosities, ?related to behavioral component Oral phase significant for intermittent lingual pumping and prolonged oral transit time.  *Of note, improved control of less viscosities, ?related to behavioral component

## 2022-05-06 NOTE — SWALLOW VFSS/MBS ASSESSMENT ADULT - RECOMMENDED FEEDING/EATING TECHNIQUES
allow for swallow between intakes/check mouth frequently for oral residue/pocketing/crush medication (when feasible)/maintain upright posture during/after eating for 30 mins/oral hygiene/position upright (90 degrees)/provide rest periods between swallows/small sips/bites

## 2022-05-06 NOTE — CONSULT NOTE ADULT - PROVIDER SPECIALTY LIST ADULT
MICU
Neurology
Heme/Onc
MICU
Neurosurgery
Cardiology
Electrophysiology
Gastroenterology
Infectious Disease
Nephrology

## 2022-05-06 NOTE — SWALLOW VFSS/MBS ASSESSMENT ADULT - DIAGNOSTIC IMPRESSIONS
61 yr old male with PMHx of cerebellar ataxia, chronic left cerebellum lacunar infarcts who presented 4/18/22 with progressive weakness/fatigue accompanied with NBNB emesis found to have asp pneum, MRI findings of leptomeningeal enhancement concerning for infectious vs malignancy. Patient seen for an MBS today to rule out aspiration. Patient presents with a moderate oral phase and mild pharyngeal phase dysphagia, likely acute-on-chronic, related to cerebellar ataxia + chronic left cerebellum lacunar infarcts, intubation x10 days, admission for asp PNA suspect related to emesis, and overall deconditioning. The oral phase is significant for intermittent lingual pumping, intermittent prolonged oral transit time, and reduced bolus control resulting in spillover for more viscous textures, ?related to behavioral component. The pharyngeal phase is significant for a mild latent swallow response, reduced hyolaryngeal elevation, full epiglottic retroflexion, and trace-mild silent laryngeal penetration during the swallow for all consistencies administered. However, due to patient's waxing/wanning levels of alertness, cognitive deficits, and prolonged oral phase, would recommend a puree and mildly thick liquids at this time. Patient will benefit from breaks in between trials due to prolonged oral phase. Patient is a candidate for diet advancement at the bedside as overall mentation improves. Swallow prognosis is good given age, ability to intermittently feed self, and good family support. 61 yr old male with PMHx of cerebellar ataxia, chronic left cerebellum lacunar infarcts who presented 4/18/22 with progressive weakness/fatigue accompanied with NBNB emesis found to have asp pneum, MRI findings of leptomeningeal enhancement concerning for infectious vs malignancy. Patient seen for an MBS today to rule out aspiration. Patient presents with a moderate oral phase and mild pharyngeal phase dysphagia, likely acute-on-chronic, related to cerebellar ataxia + chronic left cerebellum lacunar infarcts, intubation x10 days, admission for asp PNA suspect related to emesis, and overall deconditioning. The oral phase is significant for intermittent lingual pumping, intermittent prolonged oral transit time, and reduced bolus control resulting in spillover for more viscous textures, ?related to behavioral component. The pharyngeal phase is significant for a mild latent swallow response, reduced hyolaryngeal elevation, full epiglottic retroflexion, and trace-mild silent laryngeal penetration during the swallow for all consistencies administered. However, due to patient's waxing/wanning levels of alertness, cognitive deficits, and prolonged oral phase, would recommend a puree and mildly thick liquids at this time. Patient will benefit from breaks in between trials due to prolonged oral phase. Patient is a candidate for diet advancement at the bedside as overall mentation improves. Swallow prognosis is good given age, ability to intermittently feed self, and good family support.    Disorders: reduced lingual strength/control, reduced hyolaryngeal elevation, reduced supraglottic sensation.

## 2022-05-06 NOTE — SWALLOW VFSS/MBS ASSESSMENT ADULT - ADDITIONAL RECOMMENDATIONS
Goals:  1. Pt/family/caregiver will demonstrate understanding and carryover of dysphagia management (safe swallow guidelines, compensatory strategies, dysphagia diet).  2. Pt will tolerate recommended diet with no overt, clinical s/s of aspiration.

## 2022-05-06 NOTE — CONSULT NOTE ADULT - SUBJECTIVE AND OBJECTIVE BOX
Patient is a 61y old  Male who presents with a chief complaint of AMS 2/2 Pneumonia (05 May 2022 10:50)      HPI:  60 y/o M--followed by his PCP above--patient initially admitted to the Hospitalist Service on 4/18/22 in he setting of an undifferentiated cerebral ataxia, history of chronic LEFT cerebellum lacunar infarcts with N/V with a RIGHT lower lobe pneumonia and undifferentiated esophagitis and cholelithiasis on CTT chest with initial CTT head /angiogram nondiagnostic, nondiagnostic EEG, with evaluation on 4/20/22 following persistent fluctuating delirium, and episodes of hypotension, seen on 4/20/22 by the MICU and not accepted to the MICU at that time--MRI brain with diffuse leptomeningeal disease, EPS evaluation for a transient pause on telemetry, on 4/21/22 with two RRT episodes for hypotension, hypothermia and bradycardia, accepted to the MICU with antibiotic coverage broadened for bacterial meningitis, following ID evaluation. on 4/22/22 seen by Neuroradiology for LP following unsuccessful attempts on the MICU, seen by nephrology for an osmotic diuresis and hypernatremia, and patient was intubated at 3AM on 4/23/22 for airway protection in the MICU.  ON 4/25/22 patient's ampicillin, acyclovir and vancomycin were discontinued per ID and maintained on IV Rocephin for pneumonia.  Patient was seen on 4/26/22 by NS for consideration for a biopsy in the setting of the leptomeningeal enhancement, but spouse declining as such.    Patient maintained off midodrine due to bradycardia.  S/P extubation on 5/3/22 with patient on tube feedings via NGT until 5/4/22 with NGT discontinued in the MICU, seen 5/5/22 by S/S with patient placed on thickened PO intake.    Patient seen by endocrinology and felt NOT to be adrenal insufficiency.  Patient's primary RN at bedside reports that patient does not tolerate patient's medications with the thickened pureed.   Unable to obtain history from patient.       Seen as follow up by SLP 5/5 with Puree diet and plans for MBS    PAST MEDICAL & SURGICAL HISTORY:  H/O cerebellar ataxia        Allergies  No Known Allergies      MEDICATIONS  (STANDING):  albuterol/ipratropium for Nebulization 3 milliLiter(s) Nebulizer every 6 hours  atorvastatin 20 milliGRAM(s) Oral at bedtime  dextrose 50% Injectable 50 milliLiter(s) IV Push every 15 minutes  dextrose 50% Injectable 25 milliLiter(s) IV Push every 15 minutes  folic acid 1 milliGRAM(s) Oral daily  heparin   Injectable 5000 Unit(s) SubCutaneous every 8 hours  hydrocortisone sodium succinate Injectable 25 milliGRAM(s) IV Push every 24 hours  multivitamin 1 Tablet(s) Oral daily  potassium chloride    Tablet ER 40 milliEquivalent(s) Oral once  sodium chloride 0.9%. 1000 milliLiter(s) (80 mL/Hr) IV Continuous <Continuous>  thiamine 100 milliGRAM(s) Oral daily    MEDICATIONS  (PRN):  ondansetron Injectable 4 milliGRAM(s) IV Push every 8 hours PRN Nausea and/or Vomiting      Social History:    no reported tobacco use      Family History   IBD (  ) Yes   ( X ) No  GI Malignancy (  )  Yes    ( X ) No  FH: dementia (Mother)  FH: type 2 diabetes (Mother)  Family history of CVA (Father)      Advanced Directives: (  X   ) None    (      ) DNR    (     ) DNI    (     ) Health Care Proxy:     Review of Systems:  unable to obtain from pt      Vital Signs Last 24 Hrs  T(C): 36.3 (06 May 2022 10:00), Max: 43 (05 May 2022 18:00) ?error  VS reviewed   T(F): 97.3 (06 May 2022 10:00), Max: 109.4 (05 May 2022 18:00)  HR: 47 (06 May 2022 10:00) (47 - 68)  BP: 100/62 (06 May 2022 10:00) (90/52 - 132/84)  BP(mean): 66 (05 May 2022 18:00) (66 - 85)  RR: 18 (06 May 2022 10:00) (14 - 22)  SpO2: 98% (06 May 2022 10:00) (97% - 100%)    PHYSICAL EXAM:    Constitutional: appears comfortable briefly opens eyes to voice/touch  smiles.  no verbal responses  Neck: No LAD, no JVD  Respiratory: grossly clear   no accessory muscle use  Cardiovascular: S1 and S2, lukas no murmur  Gastrointestinal: BS+, soft, NT/ND, neg HSM  Extremities: No peripheral edema, neg clubbing, cyanosis  Vascular: 2+ peripheral pulses  Neurological: briefly opens eyes to voice/touch  smiles.  no verbal responses no focal asymmetry  Skin: No rashes        LABS:                        10.8   5.00  )-----------( 191      ( 06 May 2022 07:37 )             33.8     05-06    138  |  98  |  9   ----------------------------<  79  3.4<L>   |  28  |  0.63    Ca    9.2      06 May 2022 07:35  Phos  3.6     05-06  Mg     2.1     05-06    TPro  6.1  /  Alb  3.4  /  TBili  0.4  /  DBili  x   /  AST  27  /  ALT  45  /  AlkPhos  49  05-06    PT/INR - ( 06 May 2022 07:30 )   PT: 12.7 sec;   INR: 1.10 ratio         PTT - ( 06 May 2022 07:30 )  PTT:35.8 sec    Thyroid Stimulating Hormone, Serum: 1.58 uIU/mL (04.21.22 @ 16:54)   Folate, Serum: 3.4 ng/mL (04.19.22 @ 09:27)  Vitamin B12, Serum: >2000 pg/mL (04.19.22 @ 09:27)   Ceruloplasmin, Serum: 24 mg/dL (04.19.22 @ 09:27)       RADIOLOGY & ADDITIONAL TESTS:  ACC: [id number] EXAM:  XR CHEST PORTABLE URGENT 1V                        PROCEDURE DATE:  05/03/2022        INTERPRETATION:  EXAMINATION: XR CHEST URGENT    CLINICAL INDICATION: Hypothermia    TECHNIQUE: Single frontal portable view of the chestfrom 5/3/2022 12:40   PM    COMPARISON: Chest x-ray 4/23/2022.    FINDINGS:  Enteric tube, coursing below the diaphragm, with tip in the proximal   stomach.    The heart size is normal.  The lungs are clear.  There is no pneumothorax or pleural effusion.    IMPRESSION:  Clear lungs.      ACC: [id number] EXAM:  MR SPINE CERVICAL WAW IC                        ACC: [id number] EXAM:  MR BRAIN WAW IC                          PROCEDURE DATE:  04/20/2022          INTERPRETATION:  Contrast-enhanced MRI of the brain and cervical spine    CLINICAL INDICATION: Altered mental status, ataxia    TECHNIQUE: Multiplanar, multisequence MR images of the brain and cervical   spine were obtained before and after the intravenous administration of   7.5 cc of Gadavist. 0 cc were discarded.    COMPARISON: CT brain 4/17/2022. CTA head and neck 4/17/2022.    FINDINGS:    MRI BRAIN:    Study is limited by motion.    Extensive, diffuse leptomeningeal enhancement.    Abnormal ependymal enhancement involving the bilateral frontal horns,   bilateral ventricular bodies, left temporal and occipital horn, and   fourth ventricle.    Possible small foci of restricted diffusion in the bilateral superior   cerebral hemispheres.    Mild hydrocephalus. No midline shift or effacement of basal cisterns.    Edema noted within the cerebellar vermis and mesial bilateral cerebellar   hemispheres.    Mild white matter microvascular ischemic disease.    Signal voids are seen within the major intracranial vessels consistent   with their patency.    The visualized paranasal sinuses and mastoid air cells are clear.    Orbits are unremarkable.    MRI CERVICAL SPINE:    Vertebral body height, marrow signal homogeneity, and facet alignment are   maintained throughout the visualized spinal segments.    No intervertebral disc space narrowing. Cervicomedullary junction   unremarkable.    No prevertebral or paravertebral edema.    No spinal cord compression or abnormal intrinsic cord signal.    Diffuse leptomeningeal enhancement.    C2-C3: Central disc protrusion reaches the cord. No neural foraminal   narrowing.    C3-C4: Broad-based disc protrusion asymmetric to the left reaches the   cord. Mild left neural foraminal narrowing.    C4-C5: Broad-based disc protrusion deforms the ventral thecal sac. No   neural foraminal narrowing.    C5-C6: Broad-based disc protrusion asymmetric to the left nearly reaches   the cord. Bilateral uncinate hypertrophy. Moderate bilateral neural   foraminal narrowing.    C6-C7: Broad-based disc protrusion nearly reaches the cord. Left uncinate   hypertrophy and mild to moderate left neural foraminal narrowing.    C7-T1: No spinal canal stenosis or neural foraminal narrowing.    IMPRESSION:    MRI BRAIN:  Extensive, diffuse leptomeningeal enhancement. Differential diagnosis   primarily includes leptomeningeal metastases and infectious meningitis.    Abnormal ependymal enhancement involving the bilateral frontal horns,   bilateral ventricular bodies, left temporal and occipital horn, and   fourth ventricle. Differential diagnosis includes ependymal metastases   and infectious meningitis.    Possible small foci of restricted diffusion in the bilateral superior   cerebral hemispheres. These may represent small acute infarcts or regions   of encephalitis.    Edema noted within the cerebellar vermis and mesial bilateral cerebellar   hemispheres.    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    Multilevel degenerative changes.      ACC: [id number] EXAM:  CT ABDOMEN AND PELVIS IC                        PROCEDURE DATE:  04/23/2022      INTERPRETATION:  CLINICAL INFORMATION: Lactic acidosis, assess for bowel   pathology.    COMPARISON: CT abdomen pelvis 4/17/2022    CONTRAST/COMPLICATIONS:  IV Contrast: Omnipaque 350  90 cc administered   10 cc discarded  Oral Contrast: NONE  Complications: None reported at time of study completion    PROCEDURE:  CT of the Abdomen and Pelvis was performed.  Sagittal and coronal reformatswere performed.    FINDINGS:  LOWER CHEST: Small bilateral pleural effusions with adjacent bilateral   lower lobe compressive subsegmental atelectasis.    LIVER: Within normal limits.  BILE DUCTS: Normal caliber.  GALLBLADDER: Cholelithiasis.  SPLEEN: Within normal limits.  PANCREAS: Within normal limits.  ADRENALS: Within normal limits.  KIDNEYS/URETERS: 1.4 cm left upper pole cyst. Multiple nonobstructing   bilateral renal calculi, the largest of which measures 8 mm at the right   UPJ and 7 mm at the left UPJ. No hydronephrosis.    BLADDER: Barnett catheter in place with minimal intravesicular air.  REPRODUCTIVE ORGANS: Prostate within normal limits.    BOWEL: No bowel obstruction. Enteric tube terminates in the stomach. A   rectal tube is inplace. Appendix is normal.  PERITONEUM: Nonspecific trace fluid in the left paracolic gutter and   pelvis.  VESSELS: Atherosclerotic changes.  RETROPERITONEUM/LYMPH NODES: No lymphadenopathy.  ABDOMINAL WALL: Fat-containing left inguinal hernia. Mildsubcutaneous   edema..  BONES: Within normal limits.    IMPRESSION:  Small bilateral pleural effusions and trace peritoneal free fluid, likely   reflecting fluid overload status.    Bilateral nonobstructing renal calculi.    Cholelithiasis.

## 2022-05-06 NOTE — PROVIDER CONTACT NOTE (OTHER) - SITUATION
Patient is on tele monitoring and has been bradycardic this shift. PA told me to notify for bradycardia less than 45. patient went lukas to 44 - he is asymptomatic and responsive upon assessment .

## 2022-05-06 NOTE — PROGRESS NOTE ADULT - ASSESSMENT
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 In the ED stroke code was called negative for intracranial process. CT Chest with concern for pneumonia and esophagitis. Today RRT called for hypotension and bradycardia given atropine, now in ICU pending LP. 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 s/p LP with neuroradiology today - 16cc csf fluid collected.  Pt likely with worsening , found to have extensive leptomeningeal enhancement and cerebellar edema concerning for neoplastic vs. infectious process. Also would consider other causes of neurocognitive decline, including underlying prion disease, paraneoplastic syndromes, autoimmune / toxic causes. Family declined brain biopsy with neurosurgery.    Recommend:  #Leptomeningeal enhancement, AMS  -Infectious meningitis so far negative thus far,  possibly metastatic disease vs paraneoplastic - flow cytometry pending  -Blood cultures no growth    #Apsiration pna  -Completed 7 days of abx on 4/26    Casey Hutson MD  Available through MS Teams  If no response, or after 5pm/weekends, call [phone]    PLease call with questions.

## 2022-05-06 NOTE — CHART NOTE - NSCHARTNOTESELECT_GEN_ALL_CORE
Hospital Medicine Transfer Accept Note/Somatosensory Potential Hospital Medicine Transfer Accept Note/Transfer Note

## 2022-05-06 NOTE — SWALLOW VFSS/MBS ASSESSMENT ADULT - PHARYNGEAL PHASE COMMENTS
*Of note, single cough noted off fluoroscopy, when turned back on - no material in laryngeal vestibule or below vocal cords. Suspect unrelated to PO.

## 2022-05-06 NOTE — PROVIDER CONTACT NOTE (OTHER) - SITUATION
Patient was transferred from MICU today. patient is currently on tele monitoring and was bradycardic 48/49.

## 2022-05-06 NOTE — SWALLOW VFSS/MBS ASSESSMENT ADULT - ORAL PHASE
Mild oral stasis/Delayed oral transit time/Uncontrolled bolus / spillover in erik-pharynx/Uncontrolled bolus / spillover in hypopharynx Mild oral stasis/Uncontrolled bolus / spillover in erik-pharynx/Uncontrolled bolus / spillover in hypopharynx Mild oral stasis

## 2022-05-06 NOTE — CHART NOTE - NSCHARTNOTEFT_GEN_A_CORE
Informed by RN earlier today that pt with brief episodes of bradycardia 40-50. Pt seen laying in bed asymptomatic.  Vital Signs Last 24 Hrs  T(C): 37 (06 May 2022 16:15), Max: 37 (06 May 2022 16:15)  T(F): 98.6 (06 May 2022 16:15), Max: 98.6 (06 May 2022 16:15)  HR: 91 (06 May 2022 16:15) (47 - 91)  BP: 99/66 (06 May 2022 16:15) (99/66 - 132/84)  BP(mean): --  RR: 18 (06 May 2022 16:15) (17 - 18)  SpO2: 99% (06 May 2022 16:15) (97% - 99%)  In discussion with Dr. Wilkerson, will continue to monitor for now. Pt is not on any cardiac medications.   NUNU Pratt NP

## 2022-05-06 NOTE — PROVIDER CONTACT NOTE (OTHER) - SITUATION
patient was transferred from the MICU today. patient previously had NG tube and diet was told to be MOD THICK liquids - patient unable to swallow, scheduled for MBS tomorrow

## 2022-05-06 NOTE — CONSULT NOTE ADULT - REASON FOR ADMISSION
AMS 2/2 Pneumonia

## 2022-05-06 NOTE — SWALLOW VFSS/MBS ASSESSMENT ADULT - LARYNGEAL PENETRATION DURING THE SWALLOW - SILENT
Trace shallow silent laryngeal penetration during the swallow over the AE folds with complete retrieval. Trace-mild shallow silent laryngeal penetration during the swallow over the AE folds and intermittently over the laryngeal surface of the epiglottis with complete retrieval. Mild silent laryngeal penetration over the laryngeal surface of the epiglottis and AE folds with complete retrieval.

## 2022-05-06 NOTE — CONSULT NOTE ADULT - CONSULT REQUESTED DATE/TIME
23-Apr-2022 12:09
06-May-2022 16:03
20-Apr-2022 16:00
06-May-2022 10:59
06-May-2022
21-Apr-2022 12:46
21-Apr-2022 17:38
26-Apr-2022 16:26
20-Apr-2022 07:16
18-Apr-2022 14:27

## 2022-05-06 NOTE — SWALLOW VFSS/MBS ASSESSMENT ADULT - COMMENTS
History continued:  Neurology impression: Underlying cerebellar ataxia w/ rapid neurocognitive decline of undetermined etiology r/o autoimmune, paraneoplastic or rapid degenerative disease.   4/23 CT HEAD IMPRESSION: Mild periventricular white matter ischemia with tiny old lacunar infarction in the LEFT cerebellum.  4/23 CT ABDOMEN IMPRESSION: Small bilateral pleural effusions and trace peritoneal free fluid, likely reflecting fluid overload status. Bilateral nonobstructing renal calculi. Cholelithiasis.  5/3 CXR IMPRESSION: Clear lungs.  5/5 WBC 5.99    *Seen by this service 4/19 for bedside swallow evaluation while on medicine floor, recommended NPO, with non-oral nutrition/hydration/medications due to insufficient mentation to support PO intake, as pt unable to sustain alertness despite verbal/ tactile stimulation.  Seen 5/5 for a repeat bedside swallow evaluation with recommendations for puree/moderately thick liquids and MBS to rule out aspiration.

## 2022-05-06 NOTE — SWALLOW VFSS/MBS ASSESSMENT ADULT - H & P REVIEW
61 yr old male with stated hx significant for cerebral ataxia, chronic left cerebellum lacunar infarcts who presented 4/18/22 with progressive weakness/fatigue accompanied with NBNB emesis found to have asp pneum, MRI findings of leptomeningeal enhancement concerning for infectious vs malignancy. Course c/b septic shock (resolved) r/o adrenal insufficiency, 4/21 s/p x2 RRT for hypotension and hypothermia, transferred to MICU. AMS requiring intubation for airway protection 4/23 - Extubated 5/3. 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 Neurology: cerebral ataxia, Lt cerebellar chronic lacunar infarcts, leptomeningeal enhancement concerning for infectious/metastatic disease, Neuro checks, physical therapy following, wife does not want to pursue brain biopsy, continuing conversations. Pulmonology: AMS, intubated for airway protection, asp pneum, Extubated 5/4 to [facility], now on room air, Bronchodilator therapy q 6 hrs prn sob/wheezes. CV:  resolved septic shock,  new onset afib, rate controlled, continue atorvastatin, TTE 4/22/22 - EF 70%, mild tricuspid regurg, Nl RV/LV. GI/: NPO, formal S+S ordered. ID: asp pneu, resolved septic shock, leptomeningeal enhancement, Patient hypothermic overnight 5/2-5/3, blood cultures NGTD, UA negative, CXR clear lungs. FEN/ENDO/HEME:  r/o adrenal insufficiency, r/o DI (resolved), hydrocortisone decreased to 25 mg IV q 8 on 4/28/22 - decreased to 25 mg IV q 12 on 4/30/22, decreased steroids to daily 5/4, will obtain endocrinology consult given hypothermia and bradycardia, DVT prophylaxis with heparin subq./yes

## 2022-05-06 NOTE — CONSULT NOTE ADULT - ASSESSMENT
Assessment: 60 yo male with a PMHx of cerebellar ataxia who was brought to the ED on 4/17 due to AMS preceded by 1 week of fatigue/weakness and episodic incontinence. Patient now intubated for worsening clinical level depressed consciousness w/ concern of possible encephalitis or prion disease w/ rapid cognitive decline. Now off sedation.    Impression: Underlying cerebellar ataxia w/ rapid neurocognitive decline of undetermined etiology r/o autoimmune, paraneoplastic or rapid degenerative disease.     Recommendations:  [x] No further workup at this time from a neurology standpoint  [x] Repeat flow cytometry 4/28 showing nonspecific results 'degenerated sample, small lymphocytes'  [x] nsgy consulted for meningeal bx, family refusing at this time 61 year old male brought in on 4/18/22 with AMS, cerebral ataxia b and emesis at home, usually requires a walker and was able to communicate at home. In the ED stroke code was called negative for intracranial process. CT Chest with concern for pneumonia and esophagitis. Today RRT called for hypotension and bradycardia given atropine, was in MICU and needed intubation. Now extubated and transferred to floor.  MRI brain had extensive, diffuse leptomeningeal enhancement, and ependymal mets vs infectious meningitis, small acute infarcts or regions of encephalitis. Had LP by neuroradiology. Results as detailed were non diagnostic including with flowcytometry. CT chest abdomen pelvis was also non contributory for dx of malignancy. Id followed and infectious meningitis w/u was negative.   Family declined brain biopsy with neurosurgery.  Impression: Underlying cerebellar ataxia w/ rapid neurocognitive decline of undetermined etiology r/o autoimmune, paraneoplastic/neoplastic or rapid degenerative disease.   Since family has refused NS bx, further evaluation on hold.  If patient actually has (looks unlikely) malignant leptomeningeal disease, prognosis is dismal even with treatment.   Hence management to continue as per medicine, neurology, ID

## 2022-05-06 NOTE — CONSULT NOTE ADULT - NS ATTEND AMEND GEN_ALL_CORE FT
Agree with above note. Briefly, Pt. is a 61 yr old male with stated hx significant for cerebral ataxia, chronic left cerebellum lacunar infarcts who presented 4/18/22 with progressive weakness/fatigue accompanied with NBNB emesis found to have asp pneum, MRI findings of leptomeningeal enhancement concerning for infectious vs malignancy. Course c/b septic shock (resolved), adrenal insufficiency, AMS requiring intubation for airway protection.  We were consulted for anemia, and dysphagia.  His anemia is normocytic, without evidence of overt GI blood loss. Please check iron indices, b12 and folate levels. Also check H. pylori stool test and celiac serologies vs. malabsorption from malignancy.      Austin Skaggs MD  [facility]
seen and agree  asymptomatic pasues while sleeping  no indication for pacemaker

## 2022-05-06 NOTE — CHART NOTE - NSCHARTNOTEFT_GEN_A_CORE
5/6/2022    0436  Hospital Medicine Transfer Accept Note-- NIGHT HOSPITALIST: Yolanda Peralta MD (PCP)     5/6/2022    0436  Hospital Medicine Transfer Accept Note-- NIGHT HOSPITALIST:  Patient UNKNOWN to me previously, assigned to me at this point by the MICU to accept transfer to the medical [facility] for this 60 y/o M--followed by his PCP above--patient initially admitted to the Hospitalist Service on 4/18/22 in he setting of an undifferentiated cerebral ataxia, history of chronic LEFT cerebellum lacunar infarcts with N/V with a RIGHT lower lobe pneumonia and undifferentiated esophagitis and cholelithiasis on CTT chest with initial CTT head /angiogram nondiagnostic, nondiagnostic EEG, with evaluation on 4/20/22 following persistent fluctuating delirium, and episodes of hypotension, seen on 4/20/22 by the MICU and not accepted to the MICU at that time--MRI brain with diffuse leptomeningeal disease, EPS evaluation for a transient pause on telemetry, on 4/21/22 with two RRT episodes for hypotension, hypothermia and bradycardia, accepted to the MICU with antibiotic coverage broadened for bacterial meningitis, following ID evaluation. on 4/22/22 seen by Neuroradiology for LP following unsuccessful attempts on the MICU, seen by nephrology for an osmotic diuresis and hypernatremia, and patient was intubated at 3AM on 4/23/22 for airway protection in the MICU.  ON 4/25/22 patient's ampicillin, acyclovir and vancomycin were discontinued per ID and maintained on IV Rocephin for pneumonia.  Patient was seen on 4/26/22 by NS for consideratio for a biopsy in the setting of the leptomeningeal enhancement, but spouse declining as such.    Patient maintained off midodrine due to bradycardia.  S/P extubation on 5/3/22 with patient on tube feedings via NGT until 5/4/22 with NGT discontinued in the MICU, seen 5/5/22 by S/S with patient placed on thickened PO intake.  Patient seen by endocrinology and felt NOT to be adrenal insufficiency.  Patient's primary RN at bedside reports that patient does not tolerate patient's medications with the thickened pureed.   Unable to obtain history from patient.   The patient awakens with examiner with voice, light touch.   Interacts with examiner with simple affirmative or negative responses but with poor short term recall.    Unable to obtain ROS due to confusional state. Yolanda Peralta MD (PCP)     5/6/2022    0436  Hospital Medicine Transfer Accept Note-- NIGHT HOSPITALIST:  Patient UNKNOWN to me previously, assigned to me at this point by the MICU to accept transfer to the medical [facility] for this 62 y/o M--followed by his PCP above--patient initially admitted to the Hospitalist Service on 4/18/22 in he setting of an undifferentiated cerebral ataxia, history of chronic LEFT cerebellum lacunar infarcts with N/V with a RIGHT lower lobe pneumonia and undifferentiated esophagitis and cholelithiasis on CTT chest with initial CTT head /angiogram nondiagnostic, nondiagnostic EEG, with evaluation on 4/20/22 following persistent fluctuating delirium, and episodes of hypotension, seen on 4/20/22 by the MICU and not accepted to the MICU at that time--MRI brain with diffuse leptomeningeal disease, EPS evaluation for a transient pause on telemetry, on 4/21/22 with two RRT episodes for hypotension, hypothermia and bradycardia, accepted to the MICU with antibiotic coverage broadened for bacterial meningitis, following ID evaluation. on 4/22/22 seen by Neuroradiology for LP following unsuccessful attempts on the MICU, seen by nephrology for an osmotic diuresis and hypernatremia, and patient was intubated at 3AM on 4/23/22 for airway protection in the MICU.  ON 4/25/22 patient's ampicillin, acyclovir and vancomycin were discontinued per ID and maintained on IV Rocephin for pneumonia.  Patient was seen on 4/26/22 by NS for consideratio for a biopsy in the setting of the leptomeningeal enhancement, but spouse declining as such.    Patient maintained off midodrine due to bradycardia.  S/P extubation on 5/3/22 with patient on tube feedings via NGT until 5/4/22 with NGT discontinued in the MICU, seen 5/5/22 by S/S with patient placed on thickened PO intake.  Patient seen by endocrinology and felt NOT to be adrenal insufficiency.  Patient's primary RN at bedside reports that patient does not tolerate patient's medications with the thickened pureed.   Unable to obtain history from patient.   The patient awakens with examiner with voice, light touch.   Interacts with examiner with simple affirmative or negative responses but with poor short term recall.    Unable to obtain ROS due to confusional state.    Medex:  --Heparin 5000U SQ Q8H  --Hydrocortisone 25 mg q24H IV  --Lipitor 20 mg via NGT   --Cardura 2 mg QHS via NGT  --Folate  --MVI  --Thiamine 100 mg via NGT    Physical exam with a middle aged, chronically ill, nontoxic cachectic M Yolanda Peralta MD (PCP)     5/6/2022    0436  Hospital Medicine Transfer Accept Note-- NIGHT HOSPITALIST:  Patient UNKNOWN to me previously, assigned to me at this point by the MICU to accept transfer to the medical [facility] for this 62 y/o M--followed by his PCP above--patient initially admitted to the Hospitalist Service on 4/18/22 in he setting of an undifferentiated cerebral ataxia, history of chronic LEFT cerebellum lacunar infarcts with N/V with a RIGHT lower lobe pneumonia and undifferentiated esophagitis and cholelithiasis on CTT chest with initial CTT head /angiogram nondiagnostic, nondiagnostic EEG, with evaluation on 4/20/22 following persistent fluctuating delirium, and episodes of hypotension, seen on 4/20/22 by the MICU and not accepted to the MICU at that time--MRI brain with diffuse leptomeningeal disease, EPS evaluation for a transient pause on telemetry, on 4/21/22 with two RRT episodes for hypotension, hypothermia and bradycardia, accepted to the MICU with antibiotic coverage broadened for bacterial meningitis, following ID evaluation. on 4/22/22 seen by Neuroradiology for LP following unsuccessful attempts on the MICU, seen by nephrology for an osmotic diuresis and hypernatremia, and patient was intubated at 3AM on 4/23/22 for airway protection in the MICU.  ON 4/25/22 patient's ampicillin, acyclovir and vancomycin were discontinued per ID and maintained on IV Rocephin for pneumonia.  Patient was seen on 4/26/22 by NS for consideratio for a biopsy in the setting of the leptomeningeal enhancement, but spouse declining as such.    Patient maintained off midodrine due to bradycardia.  S/P extubation on 5/3/22 with patient on tube feedings via NGT until 5/4/22 with NGT discontinued in the MICU, seen 5/5/22 by S/S with patient placed on thickened PO intake.  Patient seen by endocrinology and felt NOT to be adrenal insufficiency.  Patient's primary RN at bedside reports that patient does not tolerate patient's medications with the thickened pureed.   Unable to obtain history from patient.   The patient awakens with examiner with voice, light touch.   Interacts with examiner with simple affirmative or negative responses but with poor short term recall.    Unable to obtain ROS due to confusional state.    Medex:  --Heparin 5000U SQ Q8H  --Hydrocortisone 25 mg q24H IV  --Lipitor 20 mg via NGT   --Cardura 2 mg QHS via NGT  --Folate  --MVI  --Thiamine 100 mg via NGT    Physical exam with a middle aged, chronically ill, nontoxic cachectic M    Tm 97.4F.    HR  50     RR 14   /67    98% on RA  HEENT<PERRL< EOMI  Neck supple, no JVD  Chest poor effort, grossly clear  Cor bradycardia, no rub.  Abdomen soft, scaphoid, normal bowel sounds, no rebound, nontender  skin dry  Ext poor nail hygiene, no ulcers, diffuse sarcopenia.  Neurologic Alert to self, needs prompting.  Poor short term recall.  Follows simple commands.  No gross dysphonia.  UE/LE 5/5.  Gait NOT tested.    labs from 5/5/22:  WBC 5.9   normal differential  Hgb 9.5  Platelets of 194K.    Random glucose of 88  K+  3.4   Cr 0.7  Alb 3.3    4/17/22  COVID-19 PCR>>negative.  Blood cultures negative x 2  4/25/22 echo>>nondiagnostic.    Chest radiograph from 5/3/22 reviewed with enteric tube in stomach, no acute infiltrate or effusion.    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  Seen by NS with spouse currently reluctant to proceed with possible biopsy.   Patient's RN reports that patient is NOT tolerating current pureed PO diet>>NPO for now    Will have S/S reevaluate.  Follow FS.   May need NGT replaced.    Unclear to the ongoing indication for Cardura (risk for hypotension)>>will HOLD.    Would consider formal cardiology evaluation for persistent bradycardia.    R2 pads at bedside.    Spouse aware of course and agrees with plan/care to date.   Given patient's comorbidities, patient's long term prognosis is guarded. Yolanda Peralta MD (PCP)     5/6/2022    0436  Hospital Medicine Transfer Accept Note-- NIGHT HOSPITALIST:  Patient UNKNOWN to me previously, assigned to me at this point by the MICU to accept transfer to the medical [facility]ales for this 60 y/o M--followed by his PCP above--patient initially admitted to the Hospitalist Service on 4/18/22 in he setting of an undifferentiated cerebral ataxia, history of chronic LEFT cerebellum lacunar infarcts with N/V with a RIGHT lower lobe pneumonia and undifferentiated esophagitis and cholelithiasis on CTT chest with initial CTT head /angiogram nondiagnostic, nondiagnostic EEG, with evaluation on 4/20/22 following persistent fluctuating delirium, and episodes of hypotension, seen on 4/20/22 by the MICU and not accepted to the MICU at that time--MRI brain with diffuse leptomeningeal disease, EPS evaluation for a transient pause on telemetry, on 4/21/22 with two RRT episodes for hypotension, hypothermia and bradycardia, accepted to the MICU with antibiotic coverage broadened for bacterial meningitis, following ID evaluation. on 4/22/22 seen by Neuroradiology for LP following unsuccessful attempts on the MICU, seen by nephrology for an osmotic diuresis and hypernatremia, and patient was intubated at 3AM on 4/23/22 for airway protection in the MICU.  ON 4/25/22 patient's ampicillin, acyclovir and vancomycin were discontinued per ID and maintained on IV Rocephin for pneumonia.  Patient was seen on 4/26/22 by NS for consideratio for a biopsy in the setting of the leptomeningeal enhancement, but spouse declining as such.    Patient maintained off midodrine due to bradycardia.  S/P extubation on 5/3/22 with patient on tube feedings via NGT until 5/4/22 with NGT discontinued in the MICU, seen 5/5/22 by S/S with patient placed on thickened PO intake.  Patient seen by endocrinology and felt NOT to be adrenal insufficiency.  Patient's primary RN at bedside reports that patient does not tolerate patient's medications with the thickened pureed.   Unable to obtain history from patient.   The patient awakens with examiner with voice, light touch.   Interacts with examiner with simple affirmative or negative responses but with poor short term recall.    Unable to obtain ROS due to confusional state.    Medex:  --Heparin 5000U SQ Q8H  --Hydrocortisone 25 mg q24H IV  --Lipitor 20 mg via NGT   --Cardura 2 mg QHS via NGT  --Folate  --MVI  --Thiamine 100 mg via NGT    Physical exam with a middle aged, chronically ill, nontoxic cachectic M    Tm 97.4F.    HR  50     RR 14   /67    98% on RA  HEENT<PERRL< EOMI  Neck supple, no JVD  Chest poor effort, grossly clear  Cor bradycardia, no rub.  Abdomen soft, scaphoid, normal bowel sounds, no rebound, nontender  skin dry  Ext poor nail hygiene, no ulcers, diffuse sarcopenia.  Neurologic Alert to self, needs prompting.  Poor short term recall.  Follows simple commands.  No gross dysphonia.  UE/LE 5/5.  Gait NOT tested.    labs from 5/5/22:  WBC 5.9   normal differential  Hgb 9.5  Platelets of 194K.    Random glucose of 88  K+  3.4   Cr 0.7  Alb 3.3    4/17/22  COVID-19 PCR>>negative.  Blood cultures negative x 2  4/25/22 echo>>nondiagnostic.    Chest radiograph from 5/3/22 reviewed with enteric tube in stomach, no acute infiltrate or effusion.    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  Seen by NS with spouse currently reluctant to proceed with possible biopsy.   Patient's RN reports that patient is NOT tolerating current pureed PO diet>>NPO for now    Will have S/S reevaluate.  Follow FS.   May need NGT replaced.    Unclear to the ongoing indication for Cardura (risk for hypotension)>>will HOLD.  Will provide maintenance IVF NS for now.    Would consider formal cardiology evaluation in the AM for persistent bradycardia.    R2 pads at bedside.    Spouse aware of course and agrees with plan/care to date.   Given patient's comorbidities, patient's long term prognosis is guarded.    Plan:  1--    Delirium--undifferentiated leptomeningeal disease.   Spouse still apparently reluctant for a definitive diagnosis.  Day Provider to review with spouse further considerations with patient still does not have a definitive diagnosis.    2--  For MBS.  NPO for now.  FS S/S.    S/S following.    Follow FS to monitor for hypoglycemia.    3--  S/P septic shock.   Unclear to the ongoing indications for Cardura with risk for hypotension>>will HOLD.  IVF NS for maintenance.    4--  On pharmacologic DVT prophylaxis.    NIGHT HOSPITALIST:   Spouse aware of course and agrees with plan/care to date.  Given patient's comorbidities, patient's long term prognosis is guarded.   Care reviewed with covering NP/PA for endorsement to my physician colleagues this AM.    Clay Gonzalez MD  Available on [employer] Teams until 5/6/22  0800

## 2022-05-06 NOTE — CONSULT NOTE ADULT - SUBJECTIVE AND OBJECTIVE BOX
CHIEF COMPLAINT:Patient is a 61y old  Male who presents with a chief complaint of AMS 2/2 Pneumonia (06 May 2022 16:27)      HPI:  Patient is a 62 yo M with PMHx of cerebral ataxia who was brought to the ED due to AMS. History is obtained from chart review and from patients wife as patient is not responding to questions. History is extremely limited. Approximately 1 week prior to presentation, patient began to experience progressive fatigue/weakness. Patients weakness progressed and he had an episode of incontinence. several days later. Two days prior to presentation, patient began to experience hiccoughs. Patient has history of unexplained hiccoughs which usually progress to episodes of emesis. Patient underwent a barium swallow study recently which was unremarkable. On the day of presentation, patient began to have multiple episodes of emesis, which prompted his wife to bring him to the ED. At baseline, patient requires a walker to ambulate and is able to communicate normally.   In the ED, patient noted to be tachycardic to 113 and tachypneic to 23. Labs significant for initial lactate of 3.7. A code stroke was called which was negative for acute intracranial processes. A CT of the chest and A/P was performed which revealed findings concerning for right sided pneumonia and esophagitis. Patient was given 2L IVF and a dose of Zosyn. He was subsequently admitted to medicine for further management        PAST MEDICAL & SURGICAL HISTORY:  H/O cerebellar ataxia    No significant past surgical history        MEDICATIONS  (STANDING):  albuterol/ipratropium for Nebulization 3 milliLiter(s) Nebulizer every 6 hours  atorvastatin 20 milliGRAM(s) Oral at bedtime  dextrose 50% Injectable 50 milliLiter(s) IV Push every 15 minutes  dextrose 50% Injectable 25 milliLiter(s) IV Push every 15 minutes  folic acid 1 milliGRAM(s) Oral daily  heparin   Injectable 5000 Unit(s) SubCutaneous every 8 hours  hydrocortisone sodium succinate Injectable 25 milliGRAM(s) IV Push every 24 hours  multivitamin 1 Tablet(s) Oral daily  pantoprazole  Injectable 40 milliGRAM(s) IV Push daily  sodium chloride 0.9%. 1000 milliLiter(s) (80 mL/Hr) IV Continuous <Continuous>  thiamine 100 milliGRAM(s) Oral daily    MEDICATIONS  (PRN):  ondansetron Injectable 4 milliGRAM(s) IV Push every 8 hours PRN Nausea and/or Vomiting      FAMILY HISTORY:  FH: dementia (Mother)    FH: type 2 diabetes (Mother)    Family history of CVA (Father)        SOCIAL HISTORY:    [x ] Non-smoker  [ ] Smoker  [ ] Alcohol    Allergies    No Known Allergies    Intolerances    	    REVIEW OF SYSTEMS:  CONSTITUTIONAL: No fever, weight loss, or fatigue  EYES: No eye pain, visual disturbances, or discharge  ENT:  No difficulty hearing, tinnitus, vertigo; No sinus or throat pain  NECK: No pain or stiffness  RESPIRATORY: No cough, wheezing, chills or hemoptysis; +Shortness of Breath  CARDIOVASCULAR: No chest pain, palpitations, passing out, dizziness, or leg swelling  GASTROINTESTINAL: No abdominal or epigastric pain. No nausea, vomiting, or hematemesis; No diarrhea or constipation. No melena or hematochezia.  GENITOURINARY: No dysuria, frequency, hematuria, or incontinence  NEUROLOGICAL: No headaches, memory loss, loss of strength, numbness, or tremors  SKIN: No itching, burning, rashes, or lesions   LYMPH Nodes: No enlarged glands  ENDOCRINE: No heat or cold intolerance; No hair loss  MUSCULOSKELETAL: No joint pain or swelling; No muscle, back, or extremity pain  PSYCHIATRIC: No depression, anxiety, mood swings, or difficulty sleeping  HEME/LYMPH: No easy bruising, or bleeding gums  ALLERGY AND IMMUNOLOGIC: No hives or eczema	    [ ] All others negative	  [ ] Unable to obtain    PHYSICAL EXAM:  T(C): 37 (05-06-22 @ 16:15), Max: 37 (05-06-22 @ 16:15)  HR: 91 (05-06-22 @ 16:15) (47 - 91)  BP: 99/66 (05-06-22 @ 16:15) (99/66 - 132/84)  RR: 18 (05-06-22 @ 16:15) (17 - 18)  SpO2: 99% (05-06-22 @ 16:15) (97% - 99%)  Wt(kg): --  I&O's Summary    05 May 2022 07:01  -  06 May 2022 07:00  --------------------------------------------------------  IN: 650 mL / OUT: 2275 mL / NET: -1625 mL    06 May 2022 07:01  -  06 May 2022 18:51  --------------------------------------------------------  IN: 240 mL / OUT: 0 mL / NET: 240 mL        Appearance: Normal	  HEENT:   Normal oral mucosa, PERRL, EOMI	  Lymphatic: No lymphadenopathy  Cardiovascular: Normal S1 S2, No JVD, + murmurs, No edema  Respiratory: rhonchi  Psychiatry: A & O x 3, Mood & affect appropriate  Gastrointestinal:  Soft, Non-tender, + BS	  Skin: No rashes, No ecchymoses, No cyanosis	  Neurologic: Non-focal  Extremities: Normal range of motion, No clubbing, cyanosis or edema  Vascular: Peripheral pulses palpable 2+ bilaterally    TELEMETRY: 	    ECG:  	  RADIOLOGY:  OTHER: 	  	  LABS:	 	    CARDIAC MARKERS:                              10.8   5.00  )-----------( 191      ( 06 May 2022 07:37 )             33.8     05-06    138  |  98  |  9   ----------------------------<  79  3.4<L>   |  28  |  0.63    Ca    9.2      06 May 2022 07:35  Phos  3.6     05-06  Mg     2.1     05-06    TPro  6.1  /  Alb  3.4  /  TBili  0.4  /  DBili  x   /  AST  27  /  ALT  45  /  AlkPhos  49  05-06    proBNP:   Lipid Profile:   HgA1c:   TSH:   PT/INR - ( 06 May 2022 07:30 )   PT: 12.7 sec;   INR: 1.10 ratio         PTT - ( 06 May 2022 07:30 )  PTT:35.8 sec    PREVIOUS DIAGNOSTIC TESTING:    < from: 12 Lead ECG (04.25.22 @ 16:47) >  Diagnosis Line ATRIAL FIBRILLATION WITH RAPID VENTRICULAR RESPONSE  LOW VOLTAGE QRS  T WAVE ABNORMALITY, CONSIDER ANTERIOR ISCHEMIA  ABNORMAL ECG  WHEN COMPARED WITH ECG OF 22-APR-2022 19:25,  SIGNIFICANT CHANGES HAVE OCCURRED  RHYTHM HAS CHANGED    < from: Limited Transthoracic Echo (w/Cont) (04.26.22 @ 17:36) >  Mitral Valve: Normal mitral valve.  Aortic Valve/Aorta:  Normal aortic root.  Left Atrium: Normal left atrium.  Left Ventricle: Normal left ventricular systolic function.   Endocardial visualization enhanced with intravenous  injection of Ultrasonic Enhancing Agent (Lumason). No left  ventricular thrombus.  Right Heart: Normal right ventricular size and function.  Pericardium/Pleura: No pericardial effusion seen.  Hemodynamic: Estimated right ventricular systolic pressure  equals 25 mm Hg, assuming right atrial pressure equals 8 mm  Hg, consistent with normal pulmonary pressures.  ------------------------------------------------------------------------  Conclusions:  1. Normal left ventricular systolic function.   Endocardial  visualization enhanced with intravenous injection of  Ultrasonic Enhancing Agent (Lumason).  2. Normal right ventricular size and function.    Thyroid Stimulating Hormone, Serum (04.21.22 @ 16:54)    Thyroid Stimulating Hormone, Serum: 1.58 uIU/mL    < from: CT Abdomen and Pelvis w/ IV Cont (04.23.22 @ 03:34) >  Small bilateral pleural effusions and trace peritoneal free fluid, likely   reflecting fluid overload status.    Bilateral nonobstructing renal calculi.    Cholelithiasis.    While in the MICU patient has had 2 LPs which were unrevealing and per neurology flow cytometry was nonspecific. Neurology would like to proceed with meningeal biopsy but family would not like to pursue biopsy. Patient was extubated on 5/3 to [facility] and is now saturating well on room air.     < from: MR Head w/wo IV Cont (04.20.22 @ 16:50) >  Diffuse leptomeningeal enhancement. This may represent the presence of   leptomeningeal metastases or leptomeningeal infection.    Multilevel degenerative changes.

## 2022-05-06 NOTE — CONSULT NOTE ADULT - ASSESSMENT
61 yr old male with stated hx significant for cerebral ataxia, chronic left cerebellum lacunar infarcts who presented 4/18/22 with progressive weakness/fatigue accompanied with NBNB emesis found to have asp pneum, MRI findings of leptomeningeal enhancement concerning for infectious vs malignancy. Course c/b septic shock (resolved), adrenal insufficiency, AMS requiring intubation for airway protection.      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    wife does not want to pursue brain biopsy, continuing conversations   Infectious meningitis so far negative thus far,  possibly metastatic disease vs paraneoplastic  -w/u and management per primary team and Neuro    #Aspiration pneum -Completed 7 days of abx on 4/26 per ID recs  Extubated 5/4 to [facility], now on room air   -diet per SLP recs  -Await MBS. If fails, will need to replace NGT and have discussion with family re: possibility of PEG vs comfort feeds    #Anemia (normocytic) without overt/brisk GI bleed  +Folate deficiency  CT AP this admission without acute GI pathology  no B12 deficiency  -can check iron indices (will order)  -continue folic acid supplementation  -add PPI given pt on IV steroids (will order)    Please call over weekend prn with acute GI concerns   GI service : [phone]    Will follow with you  Thank you for the courtesy of this consult.    Justin Abreu PA-C    [facility]  [phone]  After hours and weekend coverage [phone]

## 2022-05-06 NOTE — CONSULT NOTE ADULT - SUBJECTIVE AND OBJECTIVE BOX
HPI:  Patient is a 60 yo M with PMHx of cerebral ataxia who was admitted 4/18/22 due to AMS. Approximately 1 week prior to presentation, patient began to experience progressive fatigue/weakness. Patients weakness progressed and he had an episode of incontinence. several days later. Two days prior to presentation, patient began to experience hiccoughs. Patient has history of unexplained hiccoughs which usually progress to episodes of emesis. Patient underwent a barium swallow study recently which was unremarkable. On the day of presentation, patient began to have multiple episodes of emesis, which prompted his wife to bring him to the ED. At baseline, patient requires a walker to ambulate and is able to communicate normally.     In the ED, patient noted to be tachycardic to 113 and tachypneic to 23. Labs significant for initial lactate of 3.7. A code stroke was called which was negative for acute intracranial processes. A CT of the chest and A/P was performed which revealed findings concerning for right sided pneumonia and esophagitis. Patient was given 2L IVF and a dose of Zosyn. He was subsequently admitted to medicine for further management.    Hospital course: -patient initially admitted to the Hospitalist Service on 4/18/22 in he setting of an undifferentiated cerebral ataxia, history of chronic LEFT cerebellum lacunar infarcts with N/V with a RIGHT lower lobe pneumonia and undifferentiated esophagitis and cholelithiasis on CTT chest with initial CTT head /angiogram nondiagnostic, nondiagnostic EEG, with evaluation on 4/20/22 following persistent fluctuating delirium, and episodes of hypotension, seen on 4/20/22 by the MICU and not accepted to the MICU at that time--MRI brain with diffuse leptomeningeal disease, EPS evaluation for a transient pause on telemetry, on 4/21/22 with two RRT episodes for hypotension, hypothermia and bradycardia, accepted to the MICU with antibiotic coverage broadened for bacterial meningitis, following ID evaluation. on 4/22/22 seen by Neuroradiology for LP following unsuccessful attempts on the MICU, seen by nephrology for an osmotic diuresis and hypernatremia, and patient was intubated at 3AM on 4/23/22 for airway protection in the MICU.  ON 4/25/22 patient's ampicillin, acyclovir and vancomycin were discontinued per ID and maintained on IV Rocephin for pneumonia.  Patient was seen on 4/26/22 by NS for consideration for a biopsy in the setting of the leptomeningeal enhancement, but spouse declining as such.    Patient maintained off midodrine due to bradycardia.  S/P extubation on 5/3/22 with patient on tube feedings via NGT until 5/4/22 with NGT discontinued in the MICU, seen 5/5/22 by S/S with patient placed on thickened PO intake.    Patient seen by endocrinology and felt NOT to be adrenal insufficiency.  Patient's primary RN at bedside reports that patient does not tolerate patient's medications with the thickened pureed.   Unable to obtain history from patient.     PAST MEDICAL & SURGICAL HISTORY:  H/O cerebellar ataxia    No significant past surgical history        No Known Allergies      FAMILY HISTORY:  FH: dementia (Mother)    FH: type 2 diabetes (Mother)    Family history of CVA (Father)      Social History:  Patients wife denies any history of tobacco, EtOH, or recreational drug use. (18 Apr 2022 06:19)    Medications:  albuterol/ipratropium for Nebulization 3 milliLiter(s) Nebulizer every 6 hours  atorvastatin 20 milliGRAM(s) Oral at bedtime  dextrose 50% Injectable 50 milliLiter(s) IV Push every 15 minutes  dextrose 50% Injectable 25 milliLiter(s) IV Push every 15 minutes  folic acid 1 milliGRAM(s) Oral daily  heparin   Injectable 5000 Unit(s) SubCutaneous every 8 hours  hydrocortisone sodium succinate Injectable 25 milliGRAM(s) IV Push every 24 hours  multivitamin 1 Tablet(s) Oral daily  ondansetron Injectable 4 milliGRAM(s) IV Push every 8 hours PRN Nausea and/or Vomiting  pantoprazole  Injectable 40 milliGRAM(s) IV Push daily  potassium chloride  10 mEq/100 mL IVPB 10 milliEquivalent(s) IV Intermittent every 1 hour  sodium chloride 0.9%. 1000 milliLiter(s) IV Continuous <Continuous>  thiamine 100 milliGRAM(s) Oral daily    Labs:                        10.8   5.00  )-----------( 191      ( 06 May 2022 07:37 )             33.8     05-06    138  |  98  |  9   ----------------------------<  79  3.4<L>   |  28  |  0.63    Ca    9.2      06 May 2022 07:35  Phos  3.6     05-06  Mg     2.1     05-06    TPro  6.1  /  Alb  3.4  /  TBili  0.4  /  DBili  x   /  AST  27  /  ALT  45  /  AlkPhos  49  05-06    Radiology:             ROS:  No pain, no fever  No lumps in neck or pain  No Sob or chest pain  No palpitations   No abdominal pain. No change in bowel habit. No blood in stools  No weakness in extremities  No leg swelling  Rest of comprehensive ROS was negative    Vital Signs Last 24 Hrs  T(C): 36.3 (06 May 2022 10:00), Max: 43 (05 May 2022 18:00)  T(F): 97.3 (06 May 2022 10:00), Max: 109.4 (05 May 2022 18:00)  HR: 47 (06 May 2022 10:00) (47 - 65)  BP: 100/62 (06 May 2022 10:00) (90/52 - 132/84)  BP(mean): 66 (05 May 2022 18:00) (66 - 75)  RR: 18 (06 May 2022 10:00) (17 - 18)  SpO2: 98% (06 May 2022 10:00) (97% - 100%)    Physical Exam:  Patient comfortable  AXOX3  Neck supple, no LN's  Chest: bilateral breath sounds, no wheeze or rales  CVS: regular heart rate without murmur  Abdomen: soft, BS+, no massess or organomegaly  CNS: no gross deficit  Musculoskeletal: Normal range of motion  Skin: no rash       HPI:  Patient is a 62 yo M with PMHx of cerebral ataxia who was admitted 4/18/22 due to AMS. Approximately 1 week prior to presentation, patient began to experience progressive fatigue/weakness. Patients weakness progressed and he had an episode of incontinence. several days later. Two days prior to presentation, patient began to experience hiccoughs. Patient has history of unexplained hiccoughs which usually progress to episodes of emesis. Patient underwent a barium swallow study recently which was unremarkable. On the day of presentation, patient began to have multiple episodes of emesis, which prompted his wife to bring him to the ED. At baseline, patient requires a walker to ambulate and is able to communicate normally.     In the ED, patient noted to be tachycardic to 113 and tachypneic to 23. Labs significant for initial lactate of 3.7. A code stroke was called which was negative for acute intracranial processes. A CT of the chest and A/P was performed which revealed findings concerning for right sided pneumonia and esophagitis. Patient was given 2L IVF and a dose of Zosyn. He was subsequently admitted to medicine for further management.    Hospital course: -patient initially admitted to the Hospitalist Service on 4/18/22 in he setting of an undifferentiated cerebral ataxia, history of chronic LEFT cerebellum lacunar infarcts with N/V with a RIGHT lower lobe pneumonia and undifferentiated esophagitis and cholelithiasis on CTT chest with initial CTT head /angiogram nondiagnostic, nondiagnostic EEG, with evaluation on 4/20/22 following persistent fluctuating delirium, and episodes of hypotension, seen on 4/20/22 by the MICU and not accepted to the MICU at that time--MRI brain with diffuse leptomeningeal disease, EPS evaluation for a transient pause on telemetry, on 4/21/22 with two RRT episodes for hypotension, hypothermia and bradycardia, accepted to the MICU with antibiotic coverage broadened for bacterial meningitis, following ID evaluation. on 4/22/22 seen by Neuroradiology for LP following unsuccessful attempts on the MICU, seen by nephrology for an osmotic diuresis and hypernatremia, and patient was intubated at 3AM on 4/23/22 for airway protection in the MICU.  ON 4/25/22 patient's ampicillin, acyclovir and vancomycin were discontinued per ID and maintained on IV Rocephin for pneumonia.  Patient was seen on 4/26/22 by NS for consideration for a biopsy in the setting of the leptomeningeal enhancement, but spouse declining as such.    Patient maintained off midodrine due to bradycardia.  S/P extubation on 5/3/22 with patient on tube feedings via NGT until 5/4/22 with NGT discontinued in the MICU, seen 5/5/22 by S/S with patient placed on thickened PO intake.    Patient seen by endocrinology and felt NOT to be adrenal insufficiency.  Patient's primary RN at bedside reports that patient does not tolerate patient's medications with the thickened pureed.   Unable to obtain history from patient.     PAST MEDICAL & SURGICAL HISTORY:  H/O cerebellar ataxia    No significant past surgical history        No Known Allergies      FAMILY HISTORY:  FH: dementia (Mother)    FH: type 2 diabetes (Mother)    Family history of CVA (Father)      Social History:  Patients wife denies any history of tobacco, EtOH, or recreational drug use. (18 Apr 2022 06:19)    Medications:  albuterol/ipratropium for Nebulization 3 milliLiter(s) Nebulizer every 6 hours  atorvastatin 20 milliGRAM(s) Oral at bedtime  dextrose 50% Injectable 50 milliLiter(s) IV Push every 15 minutes  dextrose 50% Injectable 25 milliLiter(s) IV Push every 15 minutes  folic acid 1 milliGRAM(s) Oral daily  heparin   Injectable 5000 Unit(s) SubCutaneous every 8 hours  hydrocortisone sodium succinate Injectable 25 milliGRAM(s) IV Push every 24 hours  multivitamin 1 Tablet(s) Oral daily  ondansetron Injectable 4 milliGRAM(s) IV Push every 8 hours PRN Nausea and/or Vomiting  pantoprazole  Injectable 40 milliGRAM(s) IV Push daily  potassium chloride  10 mEq/100 mL IVPB 10 milliEquivalent(s) IV Intermittent every 1 hour  sodium chloride 0.9%. 1000 milliLiter(s) IV Continuous <Continuous>  thiamine 100 milliGRAM(s) Oral daily    Labs:                        10.8   5.00  )-----------( 191      ( 06 May 2022 07:37 )             33.8     05-06    138  |  98  |  9   ----------------------------<  79  3.4<L>   |  28  |  0.63    Ca    9.2      06 May 2022 07:35  Phos  3.6     05-06  Mg     2.1     05-06    TPro  6.1  /  Alb  3.4  /  TBili  0.4  /  DBili  x   /  AST  27  /  ALT  45  /  AlkPhos  49  05-06  SIGNIFICANT FINDINGS. 4/28/22 LPProtein Electrophoresis, CSF (04.28.22 @ 17:34)   Protein, CSF: 50 mg/dL   Oligoclonal Bands, CSF: Present: Number of unique CSF oligoclonal bands = 4   Criteria for the PRESENCE of CSF Oligoclonal Bands:   Isoelectric focusing/immunofixation revealed two or more unique   oligoclonal bands in the CSF which are not present in the serum.   CSF is used in the diagnosis of multiple sclerosis (MS) by identifying   increased intrathecal IgG synthesis qualitatively (i.e., oligoclonal   bands). Oligoclonal bands are present in approximately 95 percent of   patients with multiple sclerosis but may also be present in the CSF from   patients with other neurologic diseases including infectious,   inflammatory, cerebrovascular, and paraneoplastic disorders.   CSF Interpretation: Oligoclonal bands present in cerebrospinal fluid indicative of   intrathecal IgG synthesis; no band(s) or non-matching band(s) detected in   serum.   Cytology slide shows significant increased number of small lymphocytes   with rare monocytes.   Recommend clinical correlation and repeat sample including material for   flow cytometry studies   as clinically indicated.   DIAGNOSIS:   - Cerebrospinal fluid:   - The lymphocyte immunophenotypic findings show predominantly heterogeneous population of T   cells and rare polytypic B cells   nsgy consulted for meningeal bx, family refusing at this time  Radiology:     < from: MR Cervical Spine w/wo IV Cont (04.20.22 @ 16:49) >  IMPRESSION:    MRI BRAIN:  Extensive, diffuse leptomeningeal enhancement. Differential diagnosis   primarily includes leptomeningeal metastases and infectious meningitis.    Abnormal ependymal enhancement involving the bilateral frontal horns,   bilateral ventricular bodies, left temporal and occipital horn, and   fourth ventricle. Differential diagnosis includes ependymal metastases   and infectious meningitis.    Possible small foci of restricted diffusion in the bilateral superior   cerebral hemispheres. These may represent small acute infarcts or regions   of encephalitis.    Edema noted within the cerebellar vermis and mesial bilateral cerebellar   hemispheres.    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    Multilevel degenerative changes.    Dr. Puente discussed these findings with Dr. Morocho on 4/21/2022 10:29 AM   with read back.    < end of copied text >    < from: CT Chest w/ IV Cont (04.17.22 @ 22:51) >  IMPRESSION:  Patchy tree-in-bud nodularity in the right lung, nonspecific, although   likely infectious versus inflammatory.    Diffuse circumferential thickening of the esophagus, most concerning for   esophagitis, however recommend clinical correlation and consider   outpatient endoscopy.    Cholelithiasis without CT evidence for acute cholecystitis.    Nonobstructing bilateral nephrolithiasis.        < end of copied text >        ROS:  No pain, no fever  No lumps in neck or pain  No Sob or chest pain  No palpitations   No abdominal pain. No change in bowel habit. No blood in stools  No weakness in extremities  No leg swelling  Rest of comprehensive ROS was negative    Vital Signs Last 24 Hrs  T(C): 36.3 (06 May 2022 10:00), Max: 43 (05 May 2022 18:00)  T(F): 97.3 (06 May 2022 10:00), Max: 109.4 (05 May 2022 18:00)  HR: 47 (06 May 2022 10:00) (47 - 65)  BP: 100/62 (06 May 2022 10:00) (90/52 - 132/84)  BP(mean): 66 (05 May 2022 18:00) (66 - 75)  RR: 18 (06 May 2022 10:00) (17 - 18)  SpO2: 98% (06 May 2022 10:00) (97% - 100%)    Physical Exam:  Patient comfortable  AXOX3  Neck supple, no LN's  Chest: bilateral breath sounds, no wheeze or rales  CVS: regular heart rate without murmur  Abdomen: soft, BS+, no massess or organomegaly  CNS: no gross deficit  Musculoskeletal: Normal range of motion  Skin: no rash       HPI:  Patient is a 60 yo M with PMHx of cerebral ataxia who was admitted 4/18/22 due to AMS.  At baseline, patient requires a walker to ambulate and is able to communicate normally.   In the ED, patient noted to be tachycardic to 113 and tachypneic to 23. Labs significant for initial lactate of 3.7. A code stroke was called which was negative for acute intracranial processes. A CT of the chest and A/P was performed which revealed findings concerning for right sided pneumonia and esophagitis. Patient was given 2L IVF and a dose of Zosyn. He was subsequently admitted to medicine for further management.    Hospital course: -patient initially admitted to the Hospitalist Service on 4/18/22 in he setting of an undifferentiated cerebral ataxia, history of chronic LEFT cerebellum lacunar infarcts with N/V with a RIGHT lower lobe pneumonia and undifferentiated esophagitis and cholelithiasis on CTT chest with initial CTT head /angiogram nondiagnostic, nondiagnostic EEG, with evaluation on 4/20/22 following persistent fluctuating delirium, and episodes of hypotension, seen on 4/20/22 by the MICU and not accepted to the MICU at that time--MRI brain with diffuse leptomeningeal disease, EPS evaluation for a transient pause on telemetry, on 4/21/22 with two RRT episodes for hypotension, hypothermia and bradycardia, accepted to the MICU with antibiotic coverage broadened for bacterial meningitis, following ID evaluation. on 4/22/22 seen by Neuroradiology for LP following unsuccessful attempts on the MICU, seen by nephrology for an osmotic diuresis and hypernatremia, and patient was intubated at 3AM on 4/23/22 for airway protection in the MICU.  ON 4/25/22 patient's ampicillin, acyclovir and vancomycin were discontinued per ID and maintained on IV Rocephin for pneumonia.  Patient was seen on 4/26/22 by NS for consideration for a biopsy in the setting of the leptomeningeal enhancement, but spouse declining as such.    Patient maintained off midodrine due to bradycardia.  S/P extubation on 5/3/22 with patient on tube feedings via NGT until 5/4/22 with NGT discontinued in the MICU, seen 5/5/22 by S/S with patient placed on thickened PO intake.      PAST MEDICAL & SURGICAL HISTORY:  H/O cerebellar ataxia    No significant past surgical history        No Known Allergies      FAMILY HISTORY:  FH: dementia (Mother)    FH: type 2 diabetes (Mother)    Family history of CVA (Father)      Social History:  Patients wife denies any history of tobacco, EtOH, or recreational drug use. (18 Apr 2022 06:19)    Medications:  albuterol/ipratropium for Nebulization 3 milliLiter(s) Nebulizer every 6 hours  atorvastatin 20 milliGRAM(s) Oral at bedtime  dextrose 50% Injectable 50 milliLiter(s) IV Push every 15 minutes  dextrose 50% Injectable 25 milliLiter(s) IV Push every 15 minutes  folic acid 1 milliGRAM(s) Oral daily  heparin   Injectable 5000 Unit(s) SubCutaneous every 8 hours  hydrocortisone sodium succinate Injectable 25 milliGRAM(s) IV Push every 24 hours  multivitamin 1 Tablet(s) Oral daily  ondansetron Injectable 4 milliGRAM(s) IV Push every 8 hours PRN Nausea and/or Vomiting  pantoprazole  Injectable 40 milliGRAM(s) IV Push daily  potassium chloride  10 mEq/100 mL IVPB 10 milliEquivalent(s) IV Intermittent every 1 hour  sodium chloride 0.9%. 1000 milliLiter(s) IV Continuous <Continuous>  thiamine 100 milliGRAM(s) Oral daily    Labs:                        10.8   5.00  )-----------( 191      ( 06 May 2022 07:37 )             33.8     05-06    138  |  98  |  9   ----------------------------<  79  3.4<L>   |  28  |  0.63    Ca    9.2      06 May 2022 07:35  Phos  3.6     05-06  Mg     2.1     05-06    TPro  6.1  /  Alb  3.4  /  TBili  0.4  /  DBili  x   /  AST  27  /  ALT  45  /  AlkPhos  49  05-06  SIGNIFICANT FINDINGS. 4/28/22 LPProtein Electrophoresis, CSF (04.28.22 @ 17:34)   Protein, CSF: 50 mg/dL   Oligoclonal Bands, CSF: Present: Number of unique CSF oligoclonal bands = 4   Criteria for the PRESENCE of CSF Oligoclonal Bands:   Isoelectric focusing/immunofixation revealed two or more unique   oligoclonal bands in the CSF which are not present in the serum.   CSF is used in the diagnosis of multiple sclerosis (MS) by identifying   increased intrathecal IgG synthesis qualitatively (i.e., oligoclonal   bands). Oligoclonal bands are present in approximately 95 percent of   patients with multiple sclerosis but may also be present in the CSF from   patients with other neurologic diseases including infectious,   inflammatory, cerebrovascular, and paraneoplastic disorders.   CSF Interpretation: Oligoclonal bands present in cerebrospinal fluid indicative of   intrathecal IgG synthesis; no band(s) or non-matching band(s) detected in   serum.   Cytology slide shows significant increased number of small lymphocytes   with rare monocytes.   Recommend clinical correlation and repeat sample including material for   flow cytometry studies   as clinically indicated.   DIAGNOSIS:   - Cerebrospinal fluid:   - The lymphocyte immunophenotypic findings show predominantly heterogeneous population of T   cells and rare polytypic B cells   nsgy consulted for meningeal bx, family refusing at this time  Radiology:     < from: MR Cervical Spine w/wo IV Cont (04.20.22 @ 16:49) >  IMPRESSION:    MRI BRAIN:  Extensive, diffuse leptomeningeal enhancement. Differential diagnosis   primarily includes leptomeningeal metastases and infectious meningitis.    Abnormal ependymal enhancement involving the bilateral frontal horns,   bilateral ventricular bodies, left temporal and occipital horn, and   fourth ventricle. Differential diagnosis includes ependymal metastases   and infectious meningitis.    Possible small foci of restricted diffusion in the bilateral superior   cerebral hemispheres. These may represent small acute infarcts or regions   of encephalitis.    Edema noted within the cerebellar vermis and mesial bilateral cerebellar   hemispheres.    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    Multilevel degenerative changes.    Dr. Puente discussed these findings with Dr. Morocho on 4/21/2022 10:29 AM   with read back.    < end of copied text >    < from: CT Chest w/ IV Cont (04.17.22 @ 22:51) >  IMPRESSION:  Patchy tree-in-bud nodularity in the right lung, nonspecific, although   likely infectious versus inflammatory.    Diffuse circumferential thickening of the esophagus, most concerning for   esophagitis, however recommend clinical correlation and consider   outpatient endoscopy.    Cholelithiasis without CT evidence for acute cholecystitis.    Nonobstructing bilateral nephrolithiasis.        < end of copied text >        ROS:  No pain, no fever  No lumps in neck or pain  No Sob or chest pain  No palpitations   No abdominal pain. No change in bowel habit. No blood in stools  General weakness. Patient answers simple questions  No leg swelling  Rest of comprehensive ROS was negative    Vital Signs Last 24 Hrs  T(C): 36.3 (06 May 2022 10:00), Max: 43 (05 May 2022 18:00)  T(F): 97.3 (06 May 2022 10:00), Max: 109.4 (05 May 2022 18:00)  HR: 47 (06 May 2022 10:00) (47 - 65)  BP: 100/62 (06 May 2022 10:00) (90/52 - 132/84)  BP(mean): 66 (05 May 2022 18:00) (66 - 75)  RR: 18 (06 May 2022 10:00) (17 - 18)  SpO2: 98% (06 May 2022 10:00) (97% - 100%)    Physical Exam:  Patient comfortable  Alert  Neck supple, no LN's  Chest: bilateral breath sounds, no wheeze or rales  CVS: S1S2  Abdomen: soft, BS+, no massess or organomegaly  CNS: Answers simple question, moves extremities  Musculoskeletal: Normal range of motion  Skin: no rash

## 2022-05-06 NOTE — SWALLOW VFSS/MBS ASSESSMENT ADULT - MODE OF PRESENTATION
cup/spoon/fed by clinician cup/spoon/straw/self fed/fed by clinician cup/spoon/self fed/fed by clinician spoon/fed by clinician

## 2022-05-06 NOTE — SWALLOW VFSS/MBS ASSESSMENT ADULT - NS SWALLOW VFSS REC ASPIR MON
Monitor for s/s aspiration/laryngeal penetration. If noted:  D/C p.o. intake, provide non-oral nutrition/hydration/meds, and contact this service @ [phone]/change of breathing pattern/cough/gurgly voice/fever/pneumonia/throat clearing/upper respiratory infection

## 2022-05-07 LAB
ANION GAP SERPL CALC-SCNC: 11 MMOL/L — SIGNIFICANT CHANGE UP (ref 5–17)
APPEARANCE UR: CLEAR — SIGNIFICANT CHANGE UP
BILIRUB UR-MCNC: NEGATIVE — SIGNIFICANT CHANGE UP
BUN SERPL-MCNC: 8 MG/DL — SIGNIFICANT CHANGE UP (ref 7–23)
CALCIUM SERPL-MCNC: 9.1 MG/DL — SIGNIFICANT CHANGE UP (ref 8.4–10.5)
CHLORIDE SERPL-SCNC: 104 MMOL/L — SIGNIFICANT CHANGE UP (ref 96–108)
CO2 SERPL-SCNC: 24 MMOL/L — SIGNIFICANT CHANGE UP (ref 22–31)
COLOR SPEC: SIGNIFICANT CHANGE UP
CREAT SERPL-MCNC: 0.51 MG/DL — SIGNIFICANT CHANGE UP (ref 0.5–1.3)
DIFF PNL FLD: NEGATIVE — SIGNIFICANT CHANGE UP
EGFR: 116 ML/MIN/1.73M2 — SIGNIFICANT CHANGE UP
FERRITIN SERPL-MCNC: 382 NG/ML — SIGNIFICANT CHANGE UP (ref 30–400)
GLUCOSE BLDC GLUCOMTR-MCNC: 100 MG/DL — HIGH (ref 70–99)
GLUCOSE BLDC GLUCOMTR-MCNC: 103 MG/DL — HIGH (ref 70–99)
GLUCOSE BLDC GLUCOMTR-MCNC: 78 MG/DL — SIGNIFICANT CHANGE UP (ref 70–99)
GLUCOSE BLDC GLUCOMTR-MCNC: 99 MG/DL — SIGNIFICANT CHANGE UP (ref 70–99)
GLUCOSE SERPL-MCNC: 96 MG/DL — SIGNIFICANT CHANGE UP (ref 70–99)
GLUCOSE UR QL: NEGATIVE — SIGNIFICANT CHANGE UP
HCT VFR BLD CALC: 32.7 % — LOW (ref 39–50)
HGB BLD-MCNC: 10.6 G/DL — LOW (ref 13–17)
IRON SATN MFR SERPL: 35 % — SIGNIFICANT CHANGE UP (ref 16–55)
IRON SATN MFR SERPL: 77 UG/DL — SIGNIFICANT CHANGE UP (ref 45–165)
KETONES UR-MCNC: NEGATIVE — SIGNIFICANT CHANGE UP
LEUKOCYTE ESTERASE UR-ACNC: NEGATIVE — SIGNIFICANT CHANGE UP
MCHC RBC-ENTMCNC: 30 PG — SIGNIFICANT CHANGE UP (ref 27–34)
MCHC RBC-ENTMCNC: 32.4 GM/DL — SIGNIFICANT CHANGE UP (ref 32–36)
MCV RBC AUTO: 92.6 FL — SIGNIFICANT CHANGE UP (ref 80–100)
NITRITE UR-MCNC: NEGATIVE — SIGNIFICANT CHANGE UP
NRBC # BLD: 0 /100 WBCS — SIGNIFICANT CHANGE UP (ref 0–0)
PH UR: 8 — SIGNIFICANT CHANGE UP (ref 5–8)
PLATELET # BLD AUTO: 173 K/UL — SIGNIFICANT CHANGE UP (ref 150–400)
POTASSIUM SERPL-MCNC: 3.7 MMOL/L — SIGNIFICANT CHANGE UP (ref 3.5–5.3)
POTASSIUM SERPL-SCNC: 3.7 MMOL/L — SIGNIFICANT CHANGE UP (ref 3.5–5.3)
PROT UR-MCNC: NEGATIVE — SIGNIFICANT CHANGE UP
PYRIDOXAL PHOS SERPL-MCNC: 1.9 UG/L — LOW (ref 3.4–65.2)
RBC # BLD: 3.53 M/UL — LOW (ref 4.2–5.8)
RBC # FLD: 16.8 % — HIGH (ref 10.3–14.5)
SODIUM SERPL-SCNC: 139 MMOL/L — SIGNIFICANT CHANGE UP (ref 135–145)
SP GR SPEC: 1.01 — LOW (ref 1.01–1.02)
TIBC SERPL-MCNC: 222 UG/DL — SIGNIFICANT CHANGE UP (ref 220–430)
UIBC SERPL-MCNC: 146 UG/DL — SIGNIFICANT CHANGE UP (ref 110–370)
UROBILINOGEN FLD QL: NEGATIVE — SIGNIFICANT CHANGE UP
WBC # BLD: 4.09 K/UL — SIGNIFICANT CHANGE UP (ref 3.8–10.5)
WBC # FLD AUTO: 4.09 K/UL — SIGNIFICANT CHANGE UP (ref 3.8–10.5)

## 2022-05-07 PROCEDURE — 71045 X-RAY EXAM CHEST 1 VIEW: CPT | Mod: 26

## 2022-05-07 RX ORDER — ACETAMINOPHEN 500 MG
650 TABLET ORAL EVERY 6 HOURS
Refills: 0 | Status: DISCONTINUED | OUTPATIENT
Start: 2022-05-07 | End: 2022-05-13

## 2022-05-07 RX ADMIN — HEPARIN SODIUM 5000 UNIT(S): 5000 INJECTION INTRAVENOUS; SUBCUTANEOUS at 06:55

## 2022-05-07 RX ADMIN — SODIUM CHLORIDE 80 MILLILITER(S): 9 INJECTION INTRAMUSCULAR; INTRAVENOUS; SUBCUTANEOUS at 15:20

## 2022-05-07 RX ADMIN — HEPARIN SODIUM 5000 UNIT(S): 5000 INJECTION INTRAVENOUS; SUBCUTANEOUS at 15:13

## 2022-05-07 RX ADMIN — HEPARIN SODIUM 5000 UNIT(S): 5000 INJECTION INTRAVENOUS; SUBCUTANEOUS at 21:25

## 2022-05-07 RX ADMIN — SODIUM CHLORIDE 80 MILLILITER(S): 9 INJECTION INTRAMUSCULAR; INTRAVENOUS; SUBCUTANEOUS at 21:25

## 2022-05-07 RX ADMIN — Medication 25 MILLIGRAM(S): at 06:56

## 2022-05-07 RX ADMIN — Medication 3 MILLILITER(S): at 06:56

## 2022-05-07 RX ADMIN — ATORVASTATIN CALCIUM 20 MILLIGRAM(S): 80 TABLET, FILM COATED ORAL at 21:24

## 2022-05-07 NOTE — PROVIDER CONTACT NOTE (OTHER) - BACKGROUND
pmhx of cerebral ataxia, chronic left cerebellum infarcts, presents with progressive weakness, MRI - leptomeningeal enhancement infectious vs malignancy, lyme meningitis, aspiration pneumonia
Patient pmhx of cerebral ataxia, chronic left cerebellum lacunar infarcts, MR findings of leptomeningeal enhancement concerning for infectious vs malignancy, course c/b septic shock. lyme meningitis
Pt admitted for AMS and decreased PO intake. PMH of cerebellar Ataxia.
patient pmhx cerebral ataxia, chronic left cerebellum lacunar infarcts. found to have aspiration pneumonia. MRI findings leptomeningeal enhancement infectious vs. malignancy, AMS requiring intubation.
pmhx of cerebral ataxia, chronic left cerebellum lacunar infarcts, aspiration pneumonia, AMS requiring intubation for airway protection. 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
Right lower lobe pneumonia, altered mental status
Admitted for AMS and decreased PO Intake. PMH of Cerebellar Ataxia.
Orthopedic

## 2022-05-07 NOTE — PROGRESS NOTE ADULT - SUBJECTIVE AND OBJECTIVE BOX
temp noted    REVIEW OF SYSTEMS:  GEN: no fever,    no chills  RESP: no SOB,   no cough  CVS: no chest pain,   no palpitations  GI: no abdominal pain,   no nausea,   no vomiting,   no constipation,   no diarrhea  : no dysuria,   no frequency  NEURO: no headache,   no dizziness  PSYCH: no depression,   not anxious  Derm : no rash    MEDICATIONS  (STANDING):  albuterol/ipratropium for Nebulization 3 milliLiter(s) Nebulizer every 6 hours  atorvastatin 20 milliGRAM(s) Oral at bedtime  dextrose 50% Injectable 50 milliLiter(s) IV Push every 15 minutes  dextrose 50% Injectable 25 milliLiter(s) IV Push every 15 minutes  folic acid 1 milliGRAM(s) Oral daily  heparin   Injectable 5000 Unit(s) SubCutaneous every 8 hours  hydrocortisone sodium succinate Injectable 25 milliGRAM(s) IV Push every 24 hours  multivitamin 1 Tablet(s) Oral daily  pantoprazole  Injectable 40 milliGRAM(s) IV Push daily  sodium chloride 0.9%. 1000 milliLiter(s) (80 mL/Hr) IV Continuous <Continuous>  thiamine 100 milliGRAM(s) Oral daily    MEDICATIONS  (PRN):  ondansetron Injectable 4 milliGRAM(s) IV Push every 8 hours PRN Nausea and/or Vomiting      Vital Signs Last 24 Hrs  T(C): 34.7 (07 May 2022 05:00), Max: 37 (06 May 2022 16:15)  T(F): 94.5 (07 May 2022 05:00), Max: 98.6 (06 May 2022 16:15)  HR: 49 (07 May 2022 04:31) (47 - 91)  BP: 105/70 (07 May 2022 04:31) (99/66 - 105/70)  BP(mean): --  RR: 17 (07 May 2022 04:31) (17 - 18)  SpO2: 98% (07 May 2022 04:31) (98% - 99%)  CAPILLARY BLOOD GLUCOSE      POCT Blood Glucose.: 100 mg/dL (07 May 2022 06:02)  POCT Blood Glucose.: 103 mg/dL (07 May 2022 00:24)  POCT Blood Glucose.: 86 mg/dL (06 May 2022 17:22)  POCT Blood Glucose.: 91 mg/dL (06 May 2022 11:42)    I&O's Summary    06 May 2022 07:01  -  07 May 2022 07:00  --------------------------------------------------------  IN: 2300 mL / OUT: 1100 mL / NET: 1200 mL        PHYSICAL EXAM:  HEAD:  Atraumatic, Normocephalic  NECK: Supple, No   JVD  CHEST/LUNG:   no     rales,     no,    rhonchi  HEART: Regular rate and rhythm;         murmur  ABDOMEN: Soft, Nontender, ;   EXTREMITIES:  no      edema  NEUROLOGY:  alert    LABS:                        10.6   4.09  )-----------( 173      ( 07 May 2022 06:50 )             32.7     05-06    138  |  98  |  9   ----------------------------<  79  3.4<L>   |  28  |  0.63    Ca    9.2      06 May 2022 07:35  Phos  3.6     05-06  Mg     2.1     05-06    TPro  6.1  /  Alb  3.4  /  TBili  0.4  /  DBili  x   /  AST  27  /  ALT  45  /  AlkPhos  49  05-06    PT/INR - ( 06 May 2022 07:30 )   PT: 12.7 sec;   INR: 1.10 ratio         PTT - ( 06 May 2022 07:30 )  PTT:35.8 sec      Urinalysis Basic - ( 07 May 2022 06:49 )    Color: Light Yellow / Appearance: Clear / S.009 / pH: x  Gluc: x / Ketone: Negative  / Bili: Negative / Urobili: Negative   Blood: x / Protein: Negative / Nitrite: Negative   Leuk Esterase: Negative / RBC: x / WBC x   Sq Epi: x / Non Sq Epi: x / Bacteria: x              Thyroid Stimulating Hormone, Serum: 1.58 uIU/mL ( @ 16:54)          Consultant(s) Notes Reviewed:      Care Discussed with Consultants/Other Providers:

## 2022-05-07 NOTE — PROGRESS NOTE ADULT - SUBJECTIVE AND OBJECTIVE BOX
CARDIOLOGY     PROGRESS  NOTE   ________________________________________________    CHIEF COMPLAINT:Patient is a 61y old  Male who presents with a chief complaint of AMS 2/2 Pneumonia (07 May 2022 07:24)  no complain.  	  REVIEW OF SYSTEMS:  CONSTITUTIONAL: No fever, weight loss, or fatigue  EYES: No eye pain, visual disturbances, or discharge  ENT:  No difficulty hearing, tinnitus, vertigo; No sinus or throat pain  NECK: No pain or stiffness  RESPIRATORY: No cough, wheezing, chills or hemoptysis; No Shortness of Breath  CARDIOVASCULAR: No chest pain, palpitations, passing out, dizziness, or leg swelling  GASTROINTESTINAL: No abdominal or epigastric pain. No nausea, vomiting, or hematemesis; No diarrhea or constipation. No melena or hematochezia.  GENITOURINARY: No dysuria, frequency, hematuria, or incontinence  NEUROLOGICAL: No headaches, memory loss, loss of strength, numbness, or tremors  SKIN: No itching, burning, rashes, or lesions   LYMPH Nodes: No enlarged glands  ENDOCRINE: No heat or cold intolerance; No hair loss  MUSCULOSKELETAL: No joint pain or swelling; No muscle, back, or extremity pain  PSYCHIATRIC: No depression, anxiety, mood swings, or difficulty sleeping  HEME/LYMPH: No easy bruising, or bleeding gums  ALLERGY AND IMMUNOLOGIC: No hives or eczema	    [ ] All others negative	  [x ] Unable to obtain    PHYSICAL EXAM:  T(C): 36.2 (05-07-22 @ 07:59), Max: 37 (05-06-22 @ 16:15)  HR: 56 (05-07-22 @ 07:59) (49 - 91)  BP: 102/61 (05-07-22 @ 07:59) (97/59 - 105/70)  RR: 18 (05-07-22 @ 07:59) (17 - 18)  SpO2: 96% (05-07-22 @ 07:59) (96% - 99%)  Wt(kg): --  I&O's Summary    06 May 2022 07:01  -  07 May 2022 07:00  --------------------------------------------------------  IN: 2300 mL / OUT: 1100 mL / NET: 1200 mL        Appearance: Normal	  HEENT:   Normal oral mucosa, PERRL, EOMI	  Lymphatic: No lymphadenopathy  Cardiovascular: Normal S1 S2, No JVD, + murmurs, No edema  Respiratory: Lungs clear to auscultation	  Psychiatry: A & O x 3, Mood & affect appropriate  Gastrointestinal:  Soft, Non-tender, + BS	  Skin: No rashes, No ecchymoses, No cyanosis	  Neurologic: Non-focal  Extremities: Normal range of motion, No clubbing, cyanosis or edema  Vascular: Peripheral pulses palpable 2+ bilaterally    MEDICATIONS  (STANDING):  atorvastatin 20 milliGRAM(s) Oral at bedtime  dextrose 50% Injectable 50 milliLiter(s) IV Push every 15 minutes  dextrose 50% Injectable 25 milliLiter(s) IV Push every 15 minutes  heparin   Injectable 5000 Unit(s) SubCutaneous every 8 hours  sodium chloride 0.9%. 1000 milliLiter(s) (80 mL/Hr) IV Continuous <Continuous>      TELEMETRY: 	    ECG:  	  RADIOLOGY:  OTHER: 	  	  LABS:	 	    CARDIAC MARKERS:                                10.6   4.09  )-----------( 173      ( 07 May 2022 06:50 )             32.7     05-07    139  |  104  |  8   ----------------------------<  96  3.7   |  24  |  0.51    Ca    9.1      07 May 2022 06:50  Phos  3.6     05-06  Mg     2.1     05-06    TPro  6.1  /  Alb  3.4  /  TBili  0.4  /  DBili  x   /  AST  27  /  ALT  45  /  AlkPhos  49  05-06    proBNP:   Lipid Profile:   HgA1c:   TSH: Thyroid Stimulating Hormone, Serum: 1.58 uIU/mL (04-21 @ 16:54)  Thyroid Stimulating Hormone, Serum: 1.24 uIU/mL (04-20 @ 07:42)  Thyroid Stimulating Hormone, Serum: 1.46 uIU/mL (04-19 @ 09:27)  Thyroid Stimulating Hormone, Serum: 2.89 uIU/mL (04-18 @ 01:39)    PT/INR - ( 06 May 2022 07:30 )   PT: 12.7 sec;   INR: 1.10 ratio         PTT - ( 06 May 2022 07:30 )  PTT:35.8 sec      Assessment and plan  ---------------------------  Patient is a 60 yo M with PMHx of cerebral ataxia who was brought to the ED due to AMS. History is obtained from chart review and from patients wife as patient is not responding to questions. History is extremely limited. Approximately 1 week prior to presentation, patient began to experience progressive fatigue/weakness. Patients weakness progressed and he had an episode of incontinence. several days later. Two days prior to presentation, patient began to experience hiccoughs. Patient has history of unexplained hiccoughs which usually progress to episodes of emesis. Patient underwent a barium swallow study recently which was unremarkable. On the day of presentation, patient began to have multiple episodes of emesis, which prompted his wife to bring him to the ED. At baseline, patient requires a walker to ambulate and is able to communicate normally.   In the ED, patient noted to be tachycardic to 113 and tachypneic to 23. Labs significant for initial lactate of 3.7. A code stroke was called which was negative for acute intracranial processes. A CT of the chest and A/P was performed which revealed findings concerning for right sided pneumonia and esophagitis. Patient was given 2L IVF and a dose of Zosyn. He was subsequently admitted to medicine for further management  pt with sig medical and cardiac hx.  events has noted  neuro and id noted  may need AC if no contraindication with a.fib as far as if cleared by neuro  will adjust cardiac meds  abx

## 2022-05-07 NOTE — PROVIDER CONTACT NOTE (OTHER) - SITUATION
Patient this AM- temp taken rectally is 94.5 and hr 45. Patient sleeping but does open eyes to verbal cues. Patient continues to be on the lloyd hugger. NP updated on patient status.

## 2022-05-07 NOTE — CHART NOTE - NSCHARTNOTEFT_GEN_A_CORE
EDD VALDIVIA    Notified by RN patient with rectal temperature 94.5.        Interventions taken     Pan culture, UA and CXR stat to r/o sepsis.  warm blanket  may need ID consult   f/u labs result  cont assess and monitor  f/u with am team.                    Janae Vincent ANP-BC  Spectralink #[id number] EDD VALDIVIA    Notified by RN patient with rectal temperature 94.5. Seen at bed side in NAD. Lethargic and orientated to name and place, no c/o cp or sob. SBP 90's.        Interventions taken     Pan culture, UA and CXR stat to r/o sepsis.  warm blanket  may need ID consult   f/u labs result  cont assess and monitor  f/u with am team.                    Janae Vincent ANP-BC  Spectralink #[id number]

## 2022-05-07 NOTE — PROGRESS NOTE ADULT - ASSESSMENT
62 yo male      with a PMHx of cerebellar ataxia     who was brought to the ED on 4/17 due to AMS preceded by 1 week of fatigue/weakness and episodic incontinence.      was  intubated for worsening clinical level depressed consciousness w/ concern of possible encephalitis or prion disease w/ rapid cognitive decline.     per  neuro,  pt has underlying cerebellar ataxia w/ rapid neurocognitive decline of undetermined etiology. and  infectious  and  malignancy  was  ruled  outt/  and  per   neuro  note ,   No further workup at this time from a neurology standpoint   flow cytometry 4/28 showing nonspecific results 'degenerated sample, small lymphocytes'   nsgy consulted for meningeal bx, family refusing    CSF cytopathology 4/22 - increased number of large mononuclear cells in a background of few small lymphocytes, monocytes and neutrophils, cannot exclude a lymphoproliferative disorder versus an inflammatory process.    CSF cytopathology 4/29 - significant increased number of small lymphocytes with rare monocytes     h/o  cerebellar  ataxia,  with  no defined  cause  found  pt  is  alert,  ablke  to  speak and  move  his  limbs   pt  with  bradycardia, ?  central, seen  by  card/  echo  on 4/2022,  normal  ef    now  with  hypothermia, follow  bcx,   f/p  by  house  ID  prior  CT  c/ap,  no  malignant  process   most  of  these  issues, do  not  have   a good  rx   plan,  if  cx  are negative,  then  start   d/c plallning next  week         rad           62 yo male      with a PMHx of cerebellar ataxia     who was brought to the ED on 4/17 due to AMS preceded by 1 week of fatigue/weakness and episodic incontinence.      was  intubated for worsening clinical level depressed consciousness w/ concern of possible encephalitis or prion disease w/ rapid cognitive decline.     per  neuro,  pt has underlying cerebellar ataxia w/ rapid neurocognitive decline of undetermined etiology. and  infectious  and  malignancy  was  ruled  outt/  and  per   neuro  note ,   No further workup at this time from a neurology standpoint   flow cytometry 4/28 showing nonspecific results 'degenerated sample, small lymphocytes'   nsgy consulted for meningeal bx, family refusing    CSF cytopathology 4/22 - increased number of large mononuclear cells in a background of few small lymphocytes, monocytes and neutrophils, cannot exclude a lymphoproliferative disorder versus an inflammatory process.    CSF cytopathology 4/29 - significant increased number of small lymphocytes with rare monocytes     h/o  cerebellar  ataxia,  with  no defined  cause  found  had  extensive  w/p  in icu.  with normal  cortisol  level  pt  is  alert,  ablke  to  speak and  move  his  limbs   pt  with  bradycardia, ?  central, seen  by  card/  echo  on 4/2022,  normal  ef    now  with  hypothermia, follow  bcx,   f/p  by  house  ID  prior  CT  c/ap,  no  malignant  process   most  of  these  issues, do  not  have   a good  rx   plan,  if  cx  are negative,  then  start   d/c planning next  week         rad

## 2022-05-08 LAB
CULTURE RESULTS: SIGNIFICANT CHANGE UP
CULTURE RESULTS: SIGNIFICANT CHANGE UP
GLUCOSE BLDC GLUCOMTR-MCNC: 101 MG/DL — HIGH (ref 70–99)
GLUCOSE BLDC GLUCOMTR-MCNC: 102 MG/DL — HIGH (ref 70–99)
GLUCOSE BLDC GLUCOMTR-MCNC: 76 MG/DL — SIGNIFICANT CHANGE UP (ref 70–99)
GLUCOSE BLDC GLUCOMTR-MCNC: 76 MG/DL — SIGNIFICANT CHANGE UP (ref 70–99)
GLUCOSE BLDC GLUCOMTR-MCNC: 96 MG/DL — SIGNIFICANT CHANGE UP (ref 70–99)
SPECIMEN SOURCE: SIGNIFICANT CHANGE UP
SPECIMEN SOURCE: SIGNIFICANT CHANGE UP

## 2022-05-08 RX ORDER — SODIUM CHLORIDE 9 MG/ML
1000 INJECTION, SOLUTION INTRAVENOUS
Refills: 0 | Status: DISCONTINUED | OUTPATIENT
Start: 2022-05-08 | End: 2022-05-13

## 2022-05-08 RX ADMIN — ATORVASTATIN CALCIUM 20 MILLIGRAM(S): 80 TABLET, FILM COATED ORAL at 21:30

## 2022-05-08 RX ADMIN — HEPARIN SODIUM 5000 UNIT(S): 5000 INJECTION INTRAVENOUS; SUBCUTANEOUS at 05:18

## 2022-05-08 RX ADMIN — SODIUM CHLORIDE 80 MILLILITER(S): 9 INJECTION, SOLUTION INTRAVENOUS at 09:53

## 2022-05-08 RX ADMIN — HEPARIN SODIUM 5000 UNIT(S): 5000 INJECTION INTRAVENOUS; SUBCUTANEOUS at 14:02

## 2022-05-08 RX ADMIN — HEPARIN SODIUM 5000 UNIT(S): 5000 INJECTION INTRAVENOUS; SUBCUTANEOUS at 21:30

## 2022-05-08 RX ADMIN — SODIUM CHLORIDE 80 MILLILITER(S): 9 INJECTION, SOLUTION INTRAVENOUS at 21:36

## 2022-05-08 NOTE — PROGRESS NOTE ADULT - ASSESSMENT
62 yo male      with a PMHx of cerebellar ataxia     who was brought to the ED on 4/17 due to AMS preceded by 1 week of fatigue/weakness and episodic incontinence.      was  intubated for worsening clinical level depressed consciousness w/ cognitive decline.     per  neuro,  pt has underlying cerebellar ataxia w/ rapid neurocognitive decline of undetermined etiology.   and  infectious  and  malignancy  was  ruled  outt/  and  per   neuro  note ,no further workup at this time from a neurology standpoint   flow cytometry 4/28 showing nonspecific results 'degenerated sample, small lymphocytes'   nsgy consulted for meningeal bx, family refusing    CSF cytopathology 4/22 - increased number of large mononuclear cells in a background of few small lymphocytes, monocytes and neutrophils, cannot exclude a lymphoproliferative disorder versus an inflammatory process.    CSF cytopathology 4/29 - significant increased number of small lymphocytes with rare monocytes/ seen by  oncology  dr de leon,  no  intervention     h/o  cerebellar  ataxia,  with  no defined  cause  found  had  extensive  w/p  in icu.  with normal  cortisol  level  pt  is  alert,  able   to  speak,  comprehend  and  move  his  limbs   pt  with  bradycardia, ?  central, seen  by  card/  echo  on 4/2022,  normal  ef   now  with  hypothermia, follow  bcx,   f/p  by  house  ID  prior  CT  c/ap,  no  malignant  process   most  of  these  issues, do  not  have   a good  rx  per  swallow  eval, on  modified  diet/  fs  noted  endo  dr ovalle  bcx  still  pending   plan,   discussed  with wife  at  length,  if  cx  are negative,  then  start   d/c planning next  week/  wige  wants  rehab

## 2022-05-08 NOTE — PROGRESS NOTE ADULT - SUBJECTIVE AND OBJECTIVE BOX
no  fevefrs    REVIEW OF SYSTEMS:  GEN: no fever,    no chills  RESP: no SOB,   no cough  CVS: no chest pain,   no palpitations  GI: no abdominal pain,   no nausea,   no vomiting,   no constipation,   no diarrhea  : no dysuria,   no frequency  NEURO: no headache,   no dizziness  PSYCH: no depression,   not anxious  Derm : no rash    MEDICATIONS  (STANDING):  atorvastatin 20 milliGRAM(s) Oral at bedtime  dextrose 5% + sodium chloride 0.9%. 1000 milliLiter(s) (80 mL/Hr) IV Continuous <Continuous>  dextrose 50% Injectable 50 milliLiter(s) IV Push every 15 minutes  dextrose 50% Injectable 25 milliLiter(s) IV Push every 15 minutes  heparin   Injectable 5000 Unit(s) SubCutaneous every 8 hours    MEDICATIONS  (PRN):  acetaminophen     Tablet .. 650 milliGRAM(s) Oral every 6 hours PRN Temp greater or equal to 38C (100.4F), Mild Pain (1 - 3)      Vital Signs Last 24 Hrs  T(C): 36.4 (08 May 2022 06:00), Max: 36.9 (07 May 2022 20:38)  T(F): 97.6 (08 May 2022 06:00), Max: 98.4 (07 May 2022 20:38)  HR: 59 (07 May 2022 23:58) (45 - 77)  BP: 102/68 (07 May 2022 23:58) (102/68 - 109/75)  BP(mean): 66 (07 May 2022 16:29) (66 - 66)  RR: 18 (07 May 2022 23:58) (18 - 18)  SpO2: 98% (07 May 2022 20:38) (98% - 99%)  CAPILLARY BLOOD GLUCOSE      POCT Blood Glucose.: 76 mg/dL (08 May 2022 05:51)  POCT Blood Glucose.: 78 mg/dL (07 May 2022 23:50)  POCT Blood Glucose.: 99 mg/dL (07 May 2022 11:59)    I&O's Summary    07 May 2022 07:01  -  08 May 2022 07:00  --------------------------------------------------------  IN: 1120 mL / OUT: 3200 mL / NET: -2080 mL        PHYSICAL EXAM:  HEAD:  Atraumatic, Normocephalic  NECK: Supple, No   JVD  CHEST/LUNG:   no     rales,     no,    rhonchi  HEART: Regular rate and rhythm;         murmur  ABDOMEN: Soft, Nontender, ;   EXTREMITIES:   no     edema  NEUROLOGY:  alert    LABS:                        10.6   4.09  )-----------( 173      ( 07 May 2022 06:50 )             32.7     05-    139  |  104  |  8   ----------------------------<  96  3.7   |  24  |  0.51    Ca    9.1      07 May 2022 06:50            Urinalysis Basic - ( 07 May 2022 06:49 )    Color: Light Yellow / Appearance: Clear / S.009 / pH: x  Gluc: x / Ketone: Negative  / Bili: Negative / Urobili: Negative   Blood: x / Protein: Negative / Nitrite: Negative   Leuk Esterase: Negative / RBC: x / WBC x   Sq Epi: x / Non Sq Epi: x / Bacteria: x              Thyroid Stimulating Hormone, Serum: 1.58 uIU/mL ( @ 16:54)          Consultant(s) Notes Reviewed:      Care Discussed with Consultants/Other Providers:

## 2022-05-08 NOTE — PROGRESS NOTE ADULT - SUBJECTIVE AND OBJECTIVE BOX
CARDIOLOGY     PROGRESS  NOTE   ________________________________________________    CHIEF COMPLAINT:Patient is a 61y old  Male who presents with a chief complaint of AMS 2/2 Pneumonia (08 May 2022 08:23)  no complain.  	  REVIEW OF SYSTEMS:  CONSTITUTIONAL: No fever, weight loss, or fatigue  EYES: No eye pain, visual disturbances, or discharge  ENT:  No difficulty hearing, tinnitus, vertigo; No sinus or throat pain  NECK: No pain or stiffness  RESPIRATORY: No cough, wheezing, chills or hemoptysis; No Shortness of Breath  CARDIOVASCULAR: No chest pain, palpitations, passing out, dizziness, or leg swelling  GASTROINTESTINAL: No abdominal or epigastric pain. No nausea, vomiting, or hematemesis; No diarrhea or constipation. No melena or hematochezia.  GENITOURINARY: No dysuria, frequency, hematuria, or incontinence  NEUROLOGICAL: No headaches, memory loss, loss of strength, numbness, or tremors  SKIN: No itching, burning, rashes, or lesions   LYMPH Nodes: No enlarged glands  ENDOCRINE: No heat or cold intolerance; No hair loss  MUSCULOSKELETAL: No joint pain or swelling; No muscle, back, or extremity pain  PSYCHIATRIC: No depression, anxiety, mood swings, or difficulty sleeping  HEME/LYMPH: No easy bruising, or bleeding gums  ALLERGY AND IMMUNOLOGIC: No hives or eczema	    [ ] All others negative	  [ ] Unable to obtain    PHYSICAL EXAM:  T(C): 36.5 (05-08-22 @ 10:00), Max: 36.9 (05-07-22 @ 20:38)  HR: 65 (05-08-22 @ 09:05) (59 - 77)  BP: 107/70 (05-08-22 @ 09:05) (102/68 - 109/75)  RR: 18 (05-08-22 @ 09:05) (18 - 18)  SpO2: 97% (05-08-22 @ 09:05) (97% - 99%)  Wt(kg): --  I&O's Summary    07 May 2022 07:01  -  08 May 2022 07:00  --------------------------------------------------------  IN: 1120 mL / OUT: 3200 mL / NET: -2080 mL        Appearance: Normal	  HEENT:   Normal oral mucosa, PERRL, EOMI	  Lymphatic: No lymphadenopathy  Cardiovascular: Normal S1 S2, No JVD, + murmurs, No edema  Respiratory: Lungs clear to auscultation	  Psychiatry: A & O x 3, Mood & affect appropriate  Gastrointestinal:  Soft, Non-tender, + BS	  Skin: No rashes, No ecchymoses, No cyanosis	  Neurologic: Non-focal  Extremities: Normal range of motion, No clubbing, cyanosis or edema  Vascular: Peripheral pulses palpable 2+ bilaterally    MEDICATIONS  (STANDING):  atorvastatin 20 milliGRAM(s) Oral at bedtime  dextrose 5% + sodium chloride 0.9%. 1000 milliLiter(s) (80 mL/Hr) IV Continuous <Continuous>  dextrose 50% Injectable 50 milliLiter(s) IV Push every 15 minutes  dextrose 50% Injectable 25 milliLiter(s) IV Push every 15 minutes  heparin   Injectable 5000 Unit(s) SubCutaneous every 8 hours      TELEMETRY: 	    ECG:  	  RADIOLOGY:  OTHER: 	  	  LABS:	 	    CARDIAC MARKERS:                                10.6   4.09  )-----------( 173      ( 07 May 2022 06:50 )             32.7     05-07    139  |  104  |  8   ----------------------------<  96  3.7   |  24  |  0.51    Ca    9.1      07 May 2022 06:50      proBNP:   Lipid Profile:   HgA1c:   TSH: Thyroid Stimulating Hormone, Serum: 1.58 uIU/mL (04-21 @ 16:54)  Thyroid Stimulating Hormone, Serum: 1.24 uIU/mL (04-20 @ 07:42)  Thyroid Stimulating Hormone, Serum: 1.46 uIU/mL (04-19 @ 09:27)  Thyroid Stimulating Hormone, Serum: 2.89 uIU/mL (04-18 @ 01:39)      < from: Limited Transthoracic Echo (w/Cont) (04.26.22 @ 17:36) >  Mitral Valve: Normal mitral valve.  Aortic Valve/Aorta:  Normal aortic root.  Left Atrium: Normal left atrium.  Left Ventricle: Normal left ventricular systolic function.   Endocardial visualization enhanced with intravenous  injection of Ultrasonic Enhancing Agent (Lumason). No left  ventricular thrombus.  Right Heart: Normal right ventricular size and function.  Pericardium/Pleura: No pericardial effusion seen.  Hemodynamic: Estimated right ventricular systolic pressure  equals 25 mm Hg, assuming right atrial pressure equals 8 mm  Hg, consistent with normal pulmonary pressures.  ------------------------------------------------------------------------  Conclusions:  1. Normal left ventricular systolic function.   Endocardial  visualization enhanced with intravenous injection of  Ultrasonic Enhancing Agent (Lumason).  2. Normal right ventricular size and function   < from: 12 Lead ECG (04.25.22 @ 16:47) >  Diagnosis Line ATRIAL FIBRILLATION WITH RAPID VENTRICULAR RESPONSE  LOW VOLTAGE QRS  T WAVE ABNORMALITY, CONSIDER ANTERIOR ISCHEMIA  ABNORMAL ECG  WHEN COMPARED WITH ECG OF 22-APR-2022 19:25,  SIGNIFICANT CHANGES HAVE OCCURRED  RHYTHM HAS CHANGED        < from: 12 Lead ECG (04.22.22 @ 19:25) >  Diagnosis Line MARKED SINUS BRADYCARDIA WITH 1ST DEGREE A-V BLOCK  NONSPECIFIC T WAVE ABNORMALITY  ABNORMAL ECG  WHEN COMPARED WITH ECG OF 22-APR-2022 03:39 NO SIGNIFICANT CHANGE WAS FOUND        Assessment and plan  ---------------------------  Patient is a 62 yo M with PMHx of cerebral ataxia who was brought to the ED due to AMS. History is obtained from chart review and from patients wife as patient is not responding to questions. History is extremely limited. Approximately 1 week prior to presentation, patient began to experience progressive fatigue/weakness. Patients weakness progressed and he had an episode of incontinence. several days later. Two days prior to presentation, patient began to experience hiccoughs. Patient has history of unexplained hiccoughs which usually progress to episodes of emesis. Patient underwent a barium swallow study recently which was unremarkable. On the day of presentation, patient began to have multiple episodes of emesis, which prompted his wife to bring him to the ED. At baseline, patient requires a walker to ambulate and is able to communicate normally.   In the ED, patient noted to be tachycardic to 113 and tachypneic to 23. Labs significant for initial lactate of 3.7. A code stroke was called which was negative for acute intracranial processes. A CT of the chest and A/P was performed which revealed findings concerning for right sided pneumonia and esophagitis. Patient was given 2L IVF and a dose of Zosyn. He was subsequently admitted to medicine for further management  pt with sig medical and cardiac hx.  events has noted  neuro and id noted  may need AC if no contraindication with a.fib as far as if cleared by neuro  will adjust cardiac meds  no aggressive work up as per wife

## 2022-05-09 LAB
GLUCOSE BLDC GLUCOMTR-MCNC: 110 MG/DL — HIGH (ref 70–99)
GLUCOSE BLDC GLUCOMTR-MCNC: 117 MG/DL — HIGH (ref 70–99)
GLUCOSE BLDC GLUCOMTR-MCNC: 96 MG/DL — SIGNIFICANT CHANGE UP (ref 70–99)
GLUCOSE BLDC GLUCOMTR-MCNC: 97 MG/DL — SIGNIFICANT CHANGE UP (ref 70–99)
MOG AB CSF QL CBA IFA: NEGATIVE — SIGNIFICANT CHANGE UP

## 2022-05-09 PROCEDURE — 99232 SBSQ HOSP IP/OBS MODERATE 35: CPT

## 2022-05-09 RX ORDER — SODIUM CHLORIDE 9 MG/ML
1000 INJECTION, SOLUTION INTRAVENOUS
Refills: 0 | Status: DISCONTINUED | OUTPATIENT
Start: 2022-05-09 | End: 2022-05-13

## 2022-05-09 RX ORDER — DEXTROSE 50 % IN WATER 50 %
15 SYRINGE (ML) INTRAVENOUS ONCE
Refills: 0 | Status: DISCONTINUED | OUTPATIENT
Start: 2022-05-09 | End: 2022-05-13

## 2022-05-09 RX ORDER — GLUCAGON INJECTION, SOLUTION 0.5 MG/.1ML
1 INJECTION, SOLUTION SUBCUTANEOUS ONCE
Refills: 0 | Status: DISCONTINUED | OUTPATIENT
Start: 2022-05-09 | End: 2022-05-13

## 2022-05-09 RX ORDER — INSULIN LISPRO 100/ML
VIAL (ML) SUBCUTANEOUS
Refills: 0 | Status: DISCONTINUED | OUTPATIENT
Start: 2022-05-09 | End: 2022-05-13

## 2022-05-09 RX ADMIN — HEPARIN SODIUM 5000 UNIT(S): 5000 INJECTION INTRAVENOUS; SUBCUTANEOUS at 16:05

## 2022-05-09 RX ADMIN — ATORVASTATIN CALCIUM 20 MILLIGRAM(S): 80 TABLET, FILM COATED ORAL at 21:54

## 2022-05-09 RX ADMIN — SODIUM CHLORIDE 80 MILLILITER(S): 9 INJECTION, SOLUTION INTRAVENOUS at 21:54

## 2022-05-09 RX ADMIN — HEPARIN SODIUM 5000 UNIT(S): 5000 INJECTION INTRAVENOUS; SUBCUTANEOUS at 06:26

## 2022-05-09 RX ADMIN — SODIUM CHLORIDE 80 MILLILITER(S): 9 INJECTION, SOLUTION INTRAVENOUS at 12:35

## 2022-05-09 RX ADMIN — HEPARIN SODIUM 5000 UNIT(S): 5000 INJECTION INTRAVENOUS; SUBCUTANEOUS at 21:54

## 2022-05-09 NOTE — PROGRESS NOTE ADULT - SUBJECTIVE AND OBJECTIVE BOX
CARDIOLOGY     PROGRESS  NOTE   ________________________________________________    CHIEF COMPLAINT:Patient is a 61y old  Male who presents with a chief complaint of AMS 2/2 Pneumonia (09 May 2022 09:50)  no complain.  	  REVIEW OF SYSTEMS:  CONSTITUTIONAL: No fever, weight loss, or fatigue  EYES: No eye pain, visual disturbances, or discharge  ENT:  No difficulty hearing, tinnitus, vertigo; No sinus or throat pain  NECK: No pain or stiffness  RESPIRATORY: No cough, wheezing, chills or hemoptysis; No Shortness of Breath  CARDIOVASCULAR: No chest pain, palpitations, passing out, dizziness, or leg swelling  GASTROINTESTINAL: No abdominal or epigastric pain. No nausea, vomiting, or hematemesis; No diarrhea or constipation. No melena or hematochezia.  GENITOURINARY: No dysuria, frequency, hematuria, or incontinence  NEUROLOGICAL: No headaches, memory loss, loss of strength, numbness, or tremors  SKIN: No itching, burning, rashes, or lesions   LYMPH Nodes: No enlarged glands  ENDOCRINE: No heat or cold intolerance; No hair loss  MUSCULOSKELETAL: No joint pain or swelling; No muscle, back, or extremity pain  PSYCHIATRIC: No depression, anxiety, mood swings, or difficulty sleeping  HEME/LYMPH: No easy bruising, or bleeding gums  ALLERGY AND IMMUNOLOGIC: No hives or eczema	    [ ] All others negative	  [x ] Unable to obtain    PHYSICAL EXAM:  T(C): 36.4 (05-09-22 @ 09:43), Max: 36.8 (05-08-22 @ 11:00)  HR: 53 (05-09-22 @ 09:43) (53 - 70)  BP: 122/64 (05-09-22 @ 09:43) (111/73 - 126/83)  RR: 18 (05-09-22 @ 09:43) (18 - 18)  SpO2: 99% (05-09-22 @ 09:43) (98% - 100%)  Wt(kg): --  I&O's Summary    08 May 2022 07:01  -  09 May 2022 07:00  --------------------------------------------------------  IN: 960 mL / OUT: 1750 mL / NET: -790 mL        Appearance: Normal	  HEENT:   Normal oral mucosa, PERRL, EOMI	  Lymphatic: No lymphadenopathy  Cardiovascular: Normal S1 S2, No JVD, + murmurs, No edema  Respiratory: rhonchi  Psychiatry: A & O x 3, Mood & affect appropriate  Gastrointestinal:  Soft, Non-tender, + BS	  Skin: No rashes, No ecchymoses, No cyanosis	  Neurologic: Non-focal  Extremities: Normal range of motion, No clubbing, cyanosis or edema  Vascular: Peripheral pulses palpable 2+ bilaterally    MEDICATIONS  (STANDING):  atorvastatin 20 milliGRAM(s) Oral at bedtime  dextrose 5% + sodium chloride 0.9%. 1000 milliLiter(s) (80 mL/Hr) IV Continuous <Continuous>  dextrose 50% Injectable 50 milliLiter(s) IV Push every 15 minutes  dextrose 50% Injectable 25 milliLiter(s) IV Push every 15 minutes  heparin   Injectable 5000 Unit(s) SubCutaneous every 8 hours      TELEMETRY: 	    ECG:  	  RADIOLOGY:  OTHER: 	  	  LABS:	 	    CARDIAC MARKERS:                  proBNP:   Lipid Profile:   HgA1c:   TSH: Thyroid Stimulating Hormone, Serum: 1.58 uIU/mL (04-21 @ 16:54)  Thyroid Stimulating Hormone, Serum: 1.24 uIU/mL (04-20 @ 07:42)  Thyroid Stimulating Hormone, Serum: 1.46 uIU/mL (04-19 @ 09:27)  Thyroid Stimulating Hormone, Serum: 2.89 uIU/mL (04-18 @ 01:39)          Assessment and plan  ---------------------------  Patient is a 60 yo M with PMHx of cerebral ataxia who was brought to the ED due to AMS. History is obtained from chart review and from patients wife as patient is not responding to questions. History is extremely limited. Approximately 1 week prior to presentation, patient began to experience progressive fatigue/weakness. Patients weakness progressed and he had an episode of incontinence. several days later. Two days prior to presentation, patient began to experience hiccoughs. Patient has history of unexplained hiccoughs which usually progress to episodes of emesis. Patient underwent a barium swallow study recently which was unremarkable. On the day of presentation, patient began to have multiple episodes of emesis, which prompted his wife to bring him to the ED. At baseline, patient requires a walker to ambulate and is able to communicate normally.   In the ED, patient noted to be tachycardic to 113 and tachypneic to 23. Labs significant for initial lactate of 3.7. A code stroke was called which was negative for acute intracranial processes. A CT of the chest and A/P was performed which revealed findings concerning for right sided pneumonia and esophagitis. Patient was given 2L IVF and a dose of Zosyn. He was subsequently admitted to medicine for further management  pt with sig medical and cardiac hx.  events has noted  neuro and id noted  may need AC if no contraindication with a.fib as far as if cleared by neuro  will adjust cardiac meds  no aggressive work up as per wife  dc planning as per medicine

## 2022-05-09 NOTE — PROGRESS NOTE ADULT - ASSESSMENT
61 yr old male with stated hx significant for cerebral ataxia, chronic left cerebellum lacunar infarcts who presented 4/18/22 with progressive weakness/fatigue accompanied with NBNB emesis found to have asp pneum, MRI findings of leptomeningeal enhancement concerning for infectious vs malignancy. Course c/b septic shock (resolved), adrenal insufficiency, AMS requiring intubation for airway protection.      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    wife does not want to pursue brain biopsy, continuing conversations   Infectious meningitis so far negative thus far,  possibly metastatic disease vs paraneoplastic  -w/u and management per primary team and Neuro    #Aspiration pneum -Completed 7 days of abx on 4/26 per ID recs  Extubated 5/4 to HFNC, now on room air   -diet per SLP recs; assist with feeds and aspiration precautions    #Anemia (normocytic) without overt/brisk GI bleed  +Folate deficiency  CT AP this admission without acute GI pathology  no B12 deficiency  iron indices not c/w Iron deficiency  -continue folic acid supplementation    Stable GI issues at this time  Will Sign off care. Please recall prn for GI concerns.  Thank You.      Justin Abreu PA-C    [facility]  [phone]  After hours and weekend coverage [phone]

## 2022-05-09 NOTE — PROGRESS NOTE ADULT - NS ATTEND AMEND GEN_ALL_CORE FT
61 y M with ataxia, septicemia and no further GI issues  agree with DC plan from GI point
Agree with note as above.
61M h/o ataxia, b/l lacunar infarcts, L cerebellar infarct with recent MRI showing diffuse leptomeningeal enhancements admitted 4/18 for worsening weakness/fatigue, transferred to MICU on 4/22 s/p RRT for likely septic shock with possible adrenal insufficiency, subsequently intubated the next day for worsening AMS with improved hemodynamics but remains minimally responsive off sedation with EEG c/w encpehalopathy.  - intermittently following commands, remains apneic on SBT - will f/u with neuro regarding thoughts regarding biopsy and any additional imaging given no return of mental status off propofol > 48hrs; f/u remainder of paraneoplsatic studies a  - continue wean of stress dose steroids, will further decrease dose tomorrow  - rate controlled pAFib noted - plan for full AC pending biopsy plan  - intubated for airway protection 4/23, again with no spontaneous breaths on SBT today, continue trials as tolerated  - UOP now normalized, Na slightly uptrending - started on free water, trend daily  - dvt ppx with HSQ
61M h/o ataxia x2 yrs, b/l lacunar infarcts, L cerebellar infarct with recent MRI showing diffuse leptomeningeal enhancements admitted 4/18 for worsening weakness/fatigue, transferred to MICU on 4/22 s/p RRT for likely septic shock with possible adrenal insufficiency, subsequently intubated the next day for worsening AMS with improved hemodynamics but remains minimally responsive off sedation with EEG c/w encpehalopathy.  - EEG c/w encephalopathy, no further evaluation required per neuro  - s/p LP, awaiting results of full paraneoplastic panel; if negative and wakes up may require biopsy with NSx per neuro recs  - continue emperic coverage for viral/bacterial meningitis for now, will d/w ID overall length of course  - hemodynamically stable off pressor support, begin weaning stress dose steroids  - intubated for airway protection 4/23, no spontaneous breaths on SBT this AM, continue trials as tolerated  - episode of DI now resolved, UOP now again noted to be 200-300 this AM - trend BMP q8hr today, monitor UOP - consider ddavp if continues to be elevated  - dvt ppx with HSQ

## 2022-05-09 NOTE — PROGRESS NOTE ADULT - SUBJECTIVE AND OBJECTIVE BOX
INTERVAL HPI/OVERNIGHT EVENTS:  more alert  tolerating PO puree with mildly thickened fluids per SLP recs per nursing and family report - assistance with all feeds  awaiting rehab placement    no fever or chills    MEDICATIONS  (STANDING):  atorvastatin 20 milliGRAM(s) Oral at bedtime  dextrose 5% + sodium chloride 0.9%. 1000 milliLiter(s) (80 mL/Hr) IV Continuous <Continuous>  dextrose 50% Injectable 50 milliLiter(s) IV Push every 15 minutes  dextrose 50% Injectable 25 milliLiter(s) IV Push every 15 minutes  heparin   Injectable 5000 Unit(s) SubCutaneous every 8 hours    MEDICATIONS  (PRN):  acetaminophen     Tablet .. 650 milliGRAM(s) Oral every 6 hours PRN Temp greater or equal to 38C (100.4F), Mild Pain (1 - 3)      Allergies  No Known Allergies        Review of Systems:  limited, see HPI    Vital Signs Last 24 Hrs  T(C): 36.4 (09 May 2022 09:43), Max: 36.8 (08 May 2022 15:34)  T(F): 97.6 (09 May 2022 09:43), Max: 98.3 (08 May 2022 15:34)  HR: 66 (09 May 2022 13:45) (53 - 70)  BP: 120/68 (09 May 2022 13:45) (111/73 - 126/83)  BP(mean): --  RR: 18 (09 May 2022 09:43) (18 - 18)  SpO2: 99% (09 May 2022 09:43) (98% - 100%)    PHYSICAL EXAM:  Constitutional: appears comfortable opens eyes and smiles, nods appropriately to questions; son at bedside.  Neck: No LAD, no JVD  Respiratory: grossly clear   no accessory muscle use  Cardiovascular: S1 and S2, lukas no murmur  Gastrointestinal: BS+, soft, NT/ND, neg HSM  Extremities: No peripheral edema, neg clubbing, cyanosis  Vascular: 2+ peripheral pulses  Neurological: opens eyes and smiles, nods appropriately to questions.  no verbal responses no focal asymmetry  Skin: No rashes    LABS:        Iron with Total Binding Capacity in AM (05.07.22 @ 06:50)   Iron - Total Binding Capacity.: 222 ug/dL   % Saturation, Iron: 35 %   Iron Total, Serum: 77 ug/dL   Unsaturated Iron Binding Capacity: 146 ug/dL   Ferritin, Serum: 382 ng/mL (05.07.22 @ 06:50)   Vitamin B12, Serum: >2000 pg/mL (04.19.22 @ 09:27)   Folate, Serum: 3.4 ng/mL (04.19.22 @ 09:27)       RADIOLOGY & ADDITIONAL TESTS:

## 2022-05-09 NOTE — PROGRESS NOTE ADULT - SUBJECTIVE AND OBJECTIVE BOX
afberile    REVIEW OF SYSTEMS:  GEN: no fever,    no chills  RESP: no SOB,   no cough  CVS: no chest pain,   no palpitations  GI: no abdominal pain,   no nausea,   no vomiting,   no constipation,   no diarrhea  : no dysuria,   no frequency  NEURO: no headache,   no dizziness  PSYCH: no depression,   not anxious  Derm : no rash    MEDICATIONS  (STANDING):  atorvastatin 20 milliGRAM(s) Oral at bedtime  dextrose 5% + sodium chloride 0.9%. 1000 milliLiter(s) (80 mL/Hr) IV Continuous <Continuous>  dextrose 50% Injectable 50 milliLiter(s) IV Push every 15 minutes  dextrose 50% Injectable 25 milliLiter(s) IV Push every 15 minutes  heparin   Injectable 5000 Unit(s) SubCutaneous every 8 hours    MEDICATIONS  (PRN):  acetaminophen     Tablet .. 650 milliGRAM(s) Oral every 6 hours PRN Temp greater or equal to 38C (100.4F), Mild Pain (1 - 3)      Vital Signs Last 24 Hrs  T(C): 36.4 (09 May 2022 09:43), Max: 36.8 (08 May 2022 11:00)  T(F): 97.6 (09 May 2022 09:43), Max: 98.3 (08 May 2022 11:00)  HR: 53 (09 May 2022 09:43) (53 - 70)  BP: 122/64 (09 May 2022 09:43) (111/73 - 126/83)  BP(mean): --  RR: 18 (09 May 2022 09:43) (18 - 18)  SpO2: 99% (09 May 2022 09:43) (98% - 100%)  CAPILLARY BLOOD GLUCOSE      POCT Blood Glucose.: 117 mg/dL (09 May 2022 07:41)  POCT Blood Glucose.: 101 mg/dL (08 May 2022 21:51)  POCT Blood Glucose.: 102 mg/dL (08 May 2022 16:48)  POCT Blood Glucose.: 96 mg/dL (08 May 2022 11:45)  POCT Blood Glucose.: 76 mg/dL (08 May 2022 09:58)    I&O's Summary    08 May 2022 07:01  -  09 May 2022 07:00  --------------------------------------------------------  IN: 960 mL / OUT: 1750 mL / NET: -790 mL        PHYSICAL EXAM:  HEAD:  Atraumatic, Normocephalic  NECK: Supple, No   JVD  CHEST/LUNG:   no     rales,     no,    rhonchi  HEART: Regular rate and rhythm;         murmur  ABDOMEN: Soft, Nontender, ;   EXTREMITIES:  no      edema  NEUROLOGY:  alert    LABS:                          Thyroid Stimulating Hormone, Serum: 1.58 uIU/mL (04-21 @ 16:54)          Consultant(s) Notes Reviewed:      Care Discussed with Consultants/Other Providers:

## 2022-05-10 LAB
APPEARANCE UR: CLEAR — SIGNIFICANT CHANGE UP
BILIRUB UR-MCNC: NEGATIVE — SIGNIFICANT CHANGE UP
COLOR SPEC: SIGNIFICANT CHANGE UP
DIFF PNL FLD: NEGATIVE — SIGNIFICANT CHANGE UP
GLUCOSE BLDC GLUCOMTR-MCNC: 110 MG/DL — HIGH (ref 70–99)
GLUCOSE BLDC GLUCOMTR-MCNC: 113 MG/DL — HIGH (ref 70–99)
GLUCOSE BLDC GLUCOMTR-MCNC: 89 MG/DL — SIGNIFICANT CHANGE UP (ref 70–99)
GLUCOSE BLDC GLUCOMTR-MCNC: 89 MG/DL — SIGNIFICANT CHANGE UP (ref 70–99)
GLUCOSE UR QL: NEGATIVE — SIGNIFICANT CHANGE UP
HCT VFR BLD CALC: 34.2 % — LOW (ref 39–50)
HGB BLD-MCNC: 11 G/DL — LOW (ref 13–17)
KETONES UR-MCNC: NEGATIVE — SIGNIFICANT CHANGE UP
LEUKOCYTE ESTERASE UR-ACNC: NEGATIVE — SIGNIFICANT CHANGE UP
MCHC RBC-ENTMCNC: 29.5 PG — SIGNIFICANT CHANGE UP (ref 27–34)
MCHC RBC-ENTMCNC: 32.2 GM/DL — SIGNIFICANT CHANGE UP (ref 32–36)
MCV RBC AUTO: 91.7 FL — SIGNIFICANT CHANGE UP (ref 80–100)
NITRITE UR-MCNC: NEGATIVE — SIGNIFICANT CHANGE UP
NRBC # BLD: 0 /100 WBCS — SIGNIFICANT CHANGE UP (ref 0–0)
PH UR: 7.5 — SIGNIFICANT CHANGE UP (ref 5–8)
PLATELET # BLD AUTO: 148 K/UL — LOW (ref 150–400)
PROT UR-MCNC: NEGATIVE — SIGNIFICANT CHANGE UP
RBC # BLD: 3.73 M/UL — LOW (ref 4.2–5.8)
RBC # FLD: 17.2 % — HIGH (ref 10.3–14.5)
SP GR SPEC: 1.01 — LOW (ref 1.01–1.02)
UROBILINOGEN FLD QL: NEGATIVE — SIGNIFICANT CHANGE UP
WBC # BLD: 3.57 K/UL — LOW (ref 3.8–10.5)
WBC # FLD AUTO: 3.57 K/UL — LOW (ref 3.8–10.5)

## 2022-05-10 PROCEDURE — 99232 SBSQ HOSP IP/OBS MODERATE 35: CPT | Mod: GC

## 2022-05-10 RX ADMIN — HEPARIN SODIUM 5000 UNIT(S): 5000 INJECTION INTRAVENOUS; SUBCUTANEOUS at 21:25

## 2022-05-10 RX ADMIN — HEPARIN SODIUM 5000 UNIT(S): 5000 INJECTION INTRAVENOUS; SUBCUTANEOUS at 05:37

## 2022-05-10 RX ADMIN — SODIUM CHLORIDE 80 MILLILITER(S): 9 INJECTION, SOLUTION INTRAVENOUS at 05:37

## 2022-05-10 RX ADMIN — SODIUM CHLORIDE 80 MILLILITER(S): 9 INJECTION, SOLUTION INTRAVENOUS at 21:25

## 2022-05-10 RX ADMIN — ATORVASTATIN CALCIUM 20 MILLIGRAM(S): 80 TABLET, FILM COATED ORAL at 21:25

## 2022-05-10 RX ADMIN — SODIUM CHLORIDE 80 MILLILITER(S): 9 INJECTION, SOLUTION INTRAVENOUS at 12:19

## 2022-05-10 RX ADMIN — HEPARIN SODIUM 5000 UNIT(S): 5000 INJECTION INTRAVENOUS; SUBCUTANEOUS at 14:48

## 2022-05-10 NOTE — PROGRESS NOTE ADULT - ASSESSMENT
61 m with cerebral ataxia, admitted 4/18 for AMS and emesis, CT Chest with concern for pneumonia and esophagitis, had RRT for hypotension, bradycardia and MICU admission, has had intermittent hypothermia and bradycardia since admission  MRI brain with extensive, diffuse leptomeningeal enhancement, and ependymal mets vs infectious meningitis, small acute infarcts or regions of encephalitis. s/p LP which was negative for infectious causes, pending cytology, as per neuro more likely to be neoplastic but wife declined meningeal biopsy for now  s/p a course of ceftriaxone for possible aspiration now off with intermittent hypothermia and bradycardia      #Leptomeningeal enhancement, AMS, s/p LP low glucose 32  but lymphocytic, positive oligoclonal bands, negative PCR and AIE panel, lyme CSF Ab positive but serum Ab negative ?neoplastic, wife declined meningeal biopsy  bradycardia and hypothermia which has been present intermittently since admission, all cultures negative and last CXR also negative    * f/u the blood cx  * monitor off antibiotics    The above assessment and plan was discussed with the primary team    Jessi Shi MD  contact on teams  After 5pm and on weekends call [phone]

## 2022-05-10 NOTE — PROGRESS NOTE ADULT - SUBJECTIVE AND OBJECTIVE BOX
afberile  REVIEW OF SYSTEMS:  GEN: no fever,    no chills  RESP: no SOB,   no cough  CVS: no chest pain,   no palpitations  GI: no abdominal pain,   no nausea,   no vomiting,   no constipation,   no diarrhea  : no dysuria,   no frequency  NEURO: no headache,   no dizziness  PSYCH: no depression,   not anxious  Derm : no rash    MEDICATIONS  (STANDING):  atorvastatin 20 milliGRAM(s) Oral at bedtime  dextrose 5% + sodium chloride 0.9%. 1000 milliLiter(s) (80 mL/Hr) IV Continuous <Continuous>  dextrose 5%. 1000 milliLiter(s) (50 mL/Hr) IV Continuous <Continuous>  dextrose 5%. 1000 milliLiter(s) (100 mL/Hr) IV Continuous <Continuous>  dextrose 50% Injectable 50 milliLiter(s) IV Push every 15 minutes  dextrose 50% Injectable 25 milliLiter(s) IV Push every 15 minutes  glucagon  Injectable 1 milliGRAM(s) IntraMuscular once  heparin   Injectable 5000 Unit(s) SubCutaneous every 8 hours  insulin lispro (ADMELOG) corrective regimen sliding scale   SubCutaneous three times a day before meals    MEDICATIONS  (PRN):  acetaminophen     Tablet .. 650 milliGRAM(s) Oral every 6 hours PRN Temp greater or equal to 38C (100.4F), Mild Pain (1 - 3)  dextrose Oral Gel 15 Gram(s) Oral once PRN Blood Glucose LESS THAN 70 milliGRAM(s)/deciliter      Vital Signs Last 24 Hrs  T(C): 35.4 (10 May 2022 06:00), Max: 36.4 (09 May 2022 09:43)  T(F): 95.7 (10 May 2022 06:00), Max: 97.6 (09 May 2022 09:43)  HR: 59 (10 May 2022 05:00) (51 - 75)  BP: 104/69 (10 May 2022 05:00) (104/69 - 159/74)  BP(mean): --  RR: 18 (10 May 2022 05:00) (18 - 18)  SpO2: 100% (10 May 2022 05:00) (99% - 100%)  CAPILLARY BLOOD GLUCOSE      POCT Blood Glucose.: 113 mg/dL (10 May 2022 07:59)  POCT Blood Glucose.: 110 mg/dL (09 May 2022 21:20)  POCT Blood Glucose.: 97 mg/dL (09 May 2022 16:52)  POCT Blood Glucose.: 96 mg/dL (09 May 2022 11:42)    I&O's Summary    09 May 2022 07:01  -  10 May 2022 07:00  --------------------------------------------------------  IN: 1160 mL / OUT: 1710 mL / NET: -550 mL        PHYSICAL EXAM:  HEAD:  Atraumatic, Normocephalic  NECK: Supple, No   JVD  CHEST/LUNG:   no     rales,     no,    rhonchi  HEART: Regular rate and rhythm;         murmur  ABDOMEN: Soft, Nontender, ;   EXTREMITIES:   no     edema  NEUROLOGY:  alert    LABS:                Urinalysis Basic - ( 10 May 2022 06:26 )    Color: Light Yellow / Appearance: Clear / S.009 / pH: x  Gluc: x / Ketone: Negative  / Bili: Negative / Urobili: Negative   Blood: x / Protein: Negative / Nitrite: Negative   Leuk Esterase: Negative / RBC: x / WBC x   Sq Epi: x / Non Sq Epi: x / Bacteria: x              Thyroid Stimulating Hormone, Serum: 1.58 uIU/mL ( @ 16:54)          Consultant(s) Notes Reviewed:      Care Discussed with Consultants/Other Providers:

## 2022-05-10 NOTE — PROGRESS NOTE ADULT - ASSESSMENT
62 yo male      with a PMHx of cerebellar ataxia     who was brought to the ED on 4/17 due to AMS preceded by 1 week of fatigue/weakness and episodic incontinence.      was  intubated for worsening clinical level depressed consciousness w/ cognitive decline.     per  neuro,  pt has underlying cerebellar ataxia w/ rapid neurocognitive decline of undetermined etiology.   and  infectious  and  malignancy  was  ruled  outt/  and  per   neuro  note ,no further workup at this time from a neurology standpoint   flow cytometry 4/28 showing nonspecific results 'degenerated sample, small lymphocytes'   nsgy consulted for meningeal bx, family refusing    CSF cytopathology 4/22 - increased number of large mononuclear cells in a background of few small lymphocytes, monocytes and neutrophils, cannot exclude a lymphoproliferative disorder versus an inflammatory process.    CSF cytopathology 4/29 - significant increased number of small lymphocytes with rare monocytes/ seen by  oncology  dr de leon,  no  intervention     h/o  cerebellar  ataxia,  with  no defined  cause  found  had  extensive  w/p  in icu.  with normal  cortisol  level  pt  is  alert,  able   to  speak,  comprehend  and  move  his  limbs   pt  with  bradycardia, ?  central, seen  by  card/  echo  on 4/2022,  normal  ef   now  with  hypothermia, follow  bcx,   f/p  by  house  ID  prior  CT  c/ap,  no  malignant  process   most  of  these  issues, do  not  have   a good  rx  per  swallow  eval, on  modified  diet/  fs  noted  endo  dr ovalle  bcx   are negative   plan,   discussed  with wife  at  length,    wife  wants  rehab   [facility] gracia ,and  then proceed  with   d/c  to rehab/ team  aware   pt with intermittent  periods   of hypothermia,  suspect  from central process.  not infectious  mediated/  with  no  good  rx for this

## 2022-05-10 NOTE — PROVIDER CONTACT NOTE (OTHER) - SITUATION
PCA unable to read a temp. rectal temp taken. pt rectal temp 94.3. received pt from day shift with no bear hugger on . pt last temp was 97.1 oral at 00:23

## 2022-05-10 NOTE — PROVIDER CONTACT NOTE (OTHER) - RECOMMENDATIONS
Make provider aware. Provider should asses the patient and order EKG.
[facility]
Make provider aware. Order fluid bolus. Consult ICU.

## 2022-05-10 NOTE — PROGRESS NOTE ADULT - SUBJECTIVE AND OBJECTIVE BOX
Follow Up:  hypothermia    Interval History: pt again with hypothermia and bradycardia but no other symptoms    ROS:      All other systems negative    Constitutional: no fever  Cardiovascular:  no chest pain  Respiratory:  no SOB, no cough  GI:  no abd pain, no vomiting, no diarrhea  urinary: no dysuria, no hematuria, no flank pain  musculoskeletal:  no joint pain, no joint swelling  skin:  no rash  neurology:  no headache, no seizure      Allergies  No Known Allergies        ANTIMICROBIALS:      OTHER MEDS:  acetaminophen     Tablet .. 650 milliGRAM(s) Oral every 6 hours PRN  atorvastatin 20 milliGRAM(s) Oral at bedtime  dextrose 5% + sodium chloride 0.9%. 1000 milliLiter(s) IV Continuous <Continuous>  dextrose 5%. 1000 milliLiter(s) IV Continuous <Continuous>  dextrose 5%. 1000 milliLiter(s) IV Continuous <Continuous>  dextrose 50% Injectable 50 milliLiter(s) IV Push every 15 minutes  dextrose 50% Injectable 25 milliLiter(s) IV Push every 15 minutes  dextrose Oral Gel 15 Gram(s) Oral once PRN  glucagon  Injectable 1 milliGRAM(s) IntraMuscular once  heparin   Injectable 5000 Unit(s) SubCutaneous every 8 hours  insulin lispro (ADMELOG) corrective regimen sliding scale   SubCutaneous three times a day before meals      Vital Signs Last 24 Hrs  T(C): 35.8 (10 May 2022 12:01), Max: 36.3 (09 May 2022 20:17)  T(F): 96.5 (10 May 2022 12:01), Max: 97.4 (09 May 2022 20:17)  HR: 51 (10 May 2022 12:01) (47 - 75)  BP: 109/76 (10 May 2022 12:01) (96/56 - 159/74)  BP(mean): --  RR: 18 (10 May 2022 12:01) (18 - 18)  SpO2: 99% (10 May 2022 12:01) (97% - 100%)    Physical Exam:  General:    NAD,  non toxic  Cardio:  lukas   Respiratory:    clear b/l,    no wheezing  abd:     soft,   BS +,   no tenderness  :   no CVAT,  no suprapubic tenderness,   no  coronel  Musculoskeletal:   no joint swelling  vascular: no phlebitis  Skin:    no rash                          11.0   3.57  )-----------( 148      ( 10 May 2022 07:14 )             34.2               Urinalysis Basic - ( 10 May 2022 06:26 )    Color: Light Yellow / Appearance: Clear / S.009 / pH: x  Gluc: x / Ketone: Negative  / Bili: Negative / Urobili: Negative   Blood: x / Protein: Negative / Nitrite: Negative   Leuk Esterase: Negative / RBC: x / WBC x   Sq Epi: x / Non Sq Epi: x / Bacteria: x        MICROBIOLOGY:  v  .Blood Blood-Peripheral  22   No growth to date.  --  --      .Blood Blood-Peripheral  22   No growth to date.  --  --      .Blood Blood-Peripheral  22   No Growth Final  --  --      .Blood Blood-Peripheral  22   No Growth Final  --  --      .CSF CSF  22   No growth at 1 week.  --    No polymorphonuclear leukocytes seen  No organisms seen  by cytocentrifuge      .Blood Blood-Peripheral  22   No Growth Final  --  --      .Blood Blood-Peripheral  22   No Growth Final  --  --      .CSF CSF  22   No growth  --    polymorphonuclear leukocytes per low power field  No organisms seen  by cytocentrifuge      .Blood Blood-Peripheral  22   No Growth Final  --  --      Clean Catch Clean Catch (Midstream)  22   No growth  --  --      .Blood Blood-Peripheral  22   No Growth Final  --  --      Catheterized Catheterized  22   >=3 organisms. Probable collection contamination.  --  --      .Blood Blood  22   No Growth Final  --  --                RADIOLOGY:  Images independently visualized and reviewed personally, findings as below  < from: Xray Chest 1 View- PORTABLE-Urgent (Xray Chest 1 View- PORTABLE-Urgent .) (22 @ 05:47) >    Findings/  Impression: The heart is unremarkable. The lungs are clear. No acute   osseous abnormality.    < end of copied text >

## 2022-05-10 NOTE — PROGRESS NOTE ADULT - SUBJECTIVE AND OBJECTIVE BOX
CARDIOLOGY     PROGRESS  NOTE   ________________________________________________    CHIEF COMPLAINT:Patient is a 61y old  Male who presents with a chief complaint of AMS 2/2 Pneumonia (10 May 2022 08:25)  no complain.  	  REVIEW OF SYSTEMS:  CONSTITUTIONAL: No fever, weight loss, or fatigue  EYES: No eye pain, visual disturbances, or discharge  ENT:  No difficulty hearing, tinnitus, vertigo; No sinus or throat pain  NECK: No pain or stiffness  RESPIRATORY: No cough, wheezing, chills or hemoptysis; No Shortness of Breath  CARDIOVASCULAR: No chest pain, palpitations, passing out, dizziness, or leg swelling  GASTROINTESTINAL: No abdominal or epigastric pain. No nausea, vomiting, or hematemesis; No diarrhea or constipation. No melena or hematochezia.  GENITOURINARY: No dysuria, frequency, hematuria, or incontinence  NEUROLOGICAL: No headaches, memory loss, loss of strength, numbness, or tremors  SKIN: No itching, burning, rashes, or lesions   LYMPH Nodes: No enlarged glands  ENDOCRINE: No heat or cold intolerance; No hair loss  MUSCULOSKELETAL: No joint pain or swelling; No muscle, back, or extremity pain  PSYCHIATRIC: No depression, anxiety, mood swings, or difficulty sleeping  HEME/LYMPH: No easy bruising, or bleeding gums  ALLERGY AND IMMUNOLOGIC: No hives or eczema	    [ ] All others negative	  [ ] Unable to obtain    PHYSICAL EXAM:  T(C): 35.4 (05-10-22 @ 06:00), Max: 36.4 (05-09-22 @ 09:43)  HR: 59 (05-10-22 @ 05:00) (51 - 75)  BP: 104/69 (05-10-22 @ 05:00) (104/69 - 159/74)  RR: 18 (05-10-22 @ 05:00) (18 - 18)  SpO2: 100% (05-10-22 @ 05:00) (99% - 100%)  Wt(kg): --  I&O's Summary    09 May 2022 07:01  -  10 May 2022 07:00  --------------------------------------------------------  IN: 1160 mL / OUT: 1710 mL / NET: -550 mL        Appearance: Normal	  HEENT:   Normal oral mucosa, PERRL, EOMI	  Lymphatic: No lymphadenopathy  Cardiovascular: Normal S1 S2, No JVD, + murmurs, No edema  Respiratory: rhonchi  Gastrointestinal:  Soft, Non-tender, + BS	  Skin: No rashes, No ecchymoses, No cyanosis	  Neurologic: Non-focal  Extremities: Normal range of motion, No clubbing, cyanosis or edema  Vascular: Peripheral pulses palpable 2+ bilaterally    MEDICATIONS  (STANDING):  atorvastatin 20 milliGRAM(s) Oral at bedtime  dextrose 5% + sodium chloride 0.9%. 1000 milliLiter(s) (80 mL/Hr) IV Continuous <Continuous>  dextrose 5%. 1000 milliLiter(s) (50 mL/Hr) IV Continuous <Continuous>  dextrose 5%. 1000 milliLiter(s) (100 mL/Hr) IV Continuous <Continuous>  dextrose 50% Injectable 50 milliLiter(s) IV Push every 15 minutes  dextrose 50% Injectable 25 milliLiter(s) IV Push every 15 minutes  glucagon  Injectable 1 milliGRAM(s) IntraMuscular once  heparin   Injectable 5000 Unit(s) SubCutaneous every 8 hours  insulin lispro (ADMELOG) corrective regimen sliding scale   SubCutaneous three times a day before meals      TELEMETRY: 	    ECG:  	  RADIOLOGY:  OTHER: 	  	  LABS:	 	    CARDIAC MARKERS:                                11.0   3.57  )-----------( 148      ( 10 May 2022 07:14 )             34.2           proBNP:   Lipid Profile:   HgA1c:   TSH: Thyroid Stimulating Hormone, Serum: 1.58 uIU/mL (04-21 @ 16:54)  Thyroid Stimulating Hormone, Serum: 1.24 uIU/mL (04-20 @ 07:42)  Thyroid Stimulating Hormone, Serum: 1.46 uIU/mL (04-19 @ 09:27)  Thyroid Stimulating Hormone, Serum: 2.89 uIU/mL (04-18 @ 01:39)          Assessment and plan  ---------------------------  Patient is a 60 yo M with PMHx of cerebral ataxia who was brought to the ED due to AMS. History is obtained from chart review and from patients wife as patient is not responding to questions. History is extremely limited. Approximately 1 week prior to presentation, patient began to experience progressive fatigue/weakness. Patients weakness progressed and he had an episode of incontinence. several days later. Two days prior to presentation, patient began to experience hiccoughs. Patient has history of unexplained hiccoughs which usually progress to episodes of emesis. Patient underwent a barium swallow study recently which was unremarkable. On the day of presentation, patient began to have multiple episodes of emesis, which prompted his wife to bring him to the ED. At baseline, patient requires a walker to ambulate and is able to communicate normally.   In the ED, patient noted to be tachycardic to 113 and tachypneic to 23. Labs significant for initial lactate of 3.7. A code stroke was called which was negative for acute intracranial processes. A CT of the chest and A/P was performed which revealed findings concerning for right sided pneumonia and esophagitis. Patient was given 2L IVF and a dose of Zosyn. He was subsequently admitted to medicine for further management  pt with sig medical and cardiac hx.  events has noted  neuro and id noted  may need AC if no contraindication with a.fib as far as if cleared by neuro  will adjust cardiac meds  no aggressive work up as per wife  dc planning as per medicine

## 2022-05-11 ENCOUNTER — TRANSCRIPTION ENCOUNTER (OUTPATIENT)
Age: 62
End: 2022-05-11

## 2022-05-11 LAB
GLUCOSE BLDC GLUCOMTR-MCNC: 103 MG/DL — HIGH (ref 70–99)
GLUCOSE BLDC GLUCOMTR-MCNC: 105 MG/DL — HIGH (ref 70–99)
GLUCOSE BLDC GLUCOMTR-MCNC: 84 MG/DL — SIGNIFICANT CHANGE UP (ref 70–99)
GLUCOSE BLDC GLUCOMTR-MCNC: 91 MG/DL — SIGNIFICANT CHANGE UP (ref 70–99)
TM INTERPRETATION: SIGNIFICANT CHANGE UP

## 2022-05-11 PROCEDURE — 99232 SBSQ HOSP IP/OBS MODERATE 35: CPT

## 2022-05-11 RX ADMIN — HEPARIN SODIUM 5000 UNIT(S): 5000 INJECTION INTRAVENOUS; SUBCUTANEOUS at 13:31

## 2022-05-11 RX ADMIN — SODIUM CHLORIDE 80 MILLILITER(S): 9 INJECTION, SOLUTION INTRAVENOUS at 06:19

## 2022-05-11 RX ADMIN — ATORVASTATIN CALCIUM 20 MILLIGRAM(S): 80 TABLET, FILM COATED ORAL at 22:09

## 2022-05-11 RX ADMIN — HEPARIN SODIUM 5000 UNIT(S): 5000 INJECTION INTRAVENOUS; SUBCUTANEOUS at 06:19

## 2022-05-11 RX ADMIN — HEPARIN SODIUM 5000 UNIT(S): 5000 INJECTION INTRAVENOUS; SUBCUTANEOUS at 22:09

## 2022-05-11 RX ADMIN — SODIUM CHLORIDE 80 MILLILITER(S): 9 INJECTION, SOLUTION INTRAVENOUS at 22:08

## 2022-05-11 RX ADMIN — SODIUM CHLORIDE 80 MILLILITER(S): 9 INJECTION, SOLUTION INTRAVENOUS at 13:31

## 2022-05-11 NOTE — DISCHARGE NOTE PROVIDER - DETAILS OF MALNUTRITION DIAGNOSIS/DIAGNOSES
This patient has been assessed with a concern for Malnutrition and was treated during this hospitalization for the following Nutrition diagnosis/diagnoses:     -  04/21/2022: Severe protein-calorie malnutrition

## 2022-05-11 NOTE — PROGRESS NOTE ADULT - ASSESSMENT
61 m with cerebral ataxia, admitted 4/18 for AMS and emesis, CT Chest with concern for pneumonia and esophagitis, had RRT for hypotension, bradycardia and MICU admission, has had intermittent hypothermia and bradycardia since admission  MRI brain with extensive, diffuse leptomeningeal enhancement, and ependymal mets vs infectious meningitis, small acute infarcts or regions of encephalitis. s/p LP which was negative for infectious causes, pending cytology, as per neuro more likely to be neoplastic but wife declined meningeal biopsy for now  s/p a course of ceftriaxone for possible aspiration now off with intermittent hypothermia and bradycardia      #Leptomeningeal enhancement, AMS, s/p LP low glucose 32  but lymphocytic, positive oligoclonal bands, negative PCR and AIE panel, lyme CSF Ab positive but serum Ab negative ?neoplastic, wife declined meningeal biopsy  bradycardia and hypothermia which has been present intermittently since admission, all cultures negative and last CXR also negative    * blood cx 5/7 negative and repeat 5/10 also negative  * monitor off antibiotics    The above assessment and plan was discussed with the primary team    Jessi Shi MD  contact on teams  After 5pm and on weekends call [phone]

## 2022-05-11 NOTE — PROGRESS NOTE ADULT - SUBJECTIVE AND OBJECTIVE BOX
[facility]  REVIEW OF SYSTEMS:  GEN: no fever,    no chills  RESP: no SOB,   no cough  CVS: no chest pain,   no palpitations  GI: no abdominal pain,   no nausea,   no vomiting,   no constipation,   no diarrhea  : no dysuria,   no frequency  NEURO: no headache,   no dizziness  PSYCH: no depression,   not anxious  Derm : no rash    MEDICATIONS  (STANDING):  atorvastatin 20 milliGRAM(s) Oral at bedtime  dextrose 5% + sodium chloride 0.9%. 1000 milliLiter(s) (80 mL/Hr) IV Continuous <Continuous>  dextrose 5%. 1000 milliLiter(s) (50 mL/Hr) IV Continuous <Continuous>  dextrose 5%. 1000 milliLiter(s) (100 mL/Hr) IV Continuous <Continuous>  dextrose 50% Injectable 50 milliLiter(s) IV Push every 15 minutes  dextrose 50% Injectable 25 milliLiter(s) IV Push every 15 minutes  glucagon  Injectable 1 milliGRAM(s) IntraMuscular once  heparin   Injectable 5000 Unit(s) SubCutaneous every 8 hours  insulin lispro (ADMELOG) corrective regimen sliding scale   SubCutaneous three times a day before meals    MEDICATIONS  (PRN):  acetaminophen     Tablet .. 650 milliGRAM(s) Oral every 6 hours PRN Temp greater or equal to 38C (100.4F), Mild Pain (1 - 3)  dextrose Oral Gel 15 Gram(s) Oral once PRN Blood Glucose LESS THAN 70 milliGRAM(s)/deciliter      Vital Signs Last 24 Hrs  T(C): 36.6 (11 May 2022 06:23), Max: 36.9 (10 May 2022 20:00)  T(F): 97.9 (11 May 2022 06:23), Max: 98.4 (10 May 2022 20:00)  HR: 53 (11 May 2022 04:36) (47 - 63)  BP: 118/63 (11 May 2022 04:36) (96/56 - 118/63)  BP(mean): --  RR: 16 (11 May 2022 04:36) (16 - 18)  SpO2: 99% (11 May 2022 04:36) (92% - 100%)  CAPILLARY BLOOD GLUCOSE      POCT Blood Glucose.: 103 mg/dL (11 May 2022 07:51)  POCT Blood Glucose.: 89 mg/dL (10 May 2022 21:17)  POCT Blood Glucose.: 89 mg/dL (10 May 2022 17:02)  POCT Blood Glucose.: 110 mg/dL (10 May 2022 12:09)    I&O's Summary    10 May 2022 07:01  -  11 May 2022 07:00  --------------------------------------------------------  IN: 0 mL / OUT: 1500 mL / NET: -1500 mL        PHYSICAL EXAM:  HEAD:  Atraumatic, Normocephalic  NECK: Supple, No   JVD  CHEST/LUNG:   no     rales,     no,    rhonchi  HEART: Regular rate and rhythm;         murmur  ABDOMEN: Soft, Nontender, ;   EXTREMITIES:    no    edema  NEUROLOGY:  alert    LABS:                        11.0   3.57  )-----------( 148      ( 10 May 2022 07:14 )             34.2                 Urinalysis Basic - ( 10 May 2022 06:26 )    Color: Light Yellow / Appearance: Clear / S.009 / pH: x  Gluc: x / Ketone: Negative  / Bili: Negative / Urobili: Negative   Blood: x / Protein: Negative / Nitrite: Negative   Leuk Esterase: Negative / RBC: x / WBC x   Sq Epi: x / Non Sq Epi: x / Bacteria: x              Thyroid Stimulating Hormone, Serum: 1.58 uIU/mL ( @ 16:54)          Consultant(s) Notes Reviewed:      Care Discussed with Consultants/Other Providers:

## 2022-05-11 NOTE — PROGRESS NOTE ADULT - SUBJECTIVE AND OBJECTIVE BOX
Follow Up:  hypothermia    Interval History: blood cx negative, no acute issuses    ROS:      All other systems negative    Constitutional: no fever  Cardiovascular:  no chest pain  Respiratory:  no SOB, no cough  GI:  no abd pain, no vomiting, no diarrhea  urinary: no dysuria, no hematuria, no flank pain  musculoskeletal:  no joint pain, no joint swelling  skin:  no rash  neurology:  no headache, no seizure      Allergies  No Known Allergies        ANTIMICROBIALS:      OTHER MEDS:  acetaminophen     Tablet .. 650 milliGRAM(s) Oral every 6 hours PRN  atorvastatin 20 milliGRAM(s) Oral at bedtime  dextrose 5% + sodium chloride 0.9%. 1000 milliLiter(s) IV Continuous <Continuous>  dextrose 5%. 1000 milliLiter(s) IV Continuous <Continuous>  dextrose 5%. 1000 milliLiter(s) IV Continuous <Continuous>  dextrose 50% Injectable 50 milliLiter(s) IV Push every 15 minutes  dextrose 50% Injectable 25 milliLiter(s) IV Push every 15 minutes  dextrose Oral Gel 15 Gram(s) Oral once PRN  glucagon  Injectable 1 milliGRAM(s) IntraMuscular once  heparin   Injectable 5000 Unit(s) SubCutaneous every 8 hours  insulin lispro (ADMELOG) corrective regimen sliding scale   SubCutaneous three times a day before meals      Vital Signs Last 24 Hrs  T(C): 36.8 (11 May 2022 08:56), Max: 36.9 (10 May 2022 20:00)  T(F): 98.3 (11 May 2022 08:56), Max: 98.4 (10 May 2022 20:00)  HR: 58 (11 May 2022 08:56) (53 - 63)  BP: 93/62 (11 May 2022 08:56) (93/62 - 118/63)  BP(mean): --  RR: 18 (11 May 2022 08:56) (16 - 18)  SpO2: 98% (11 May 2022 08:56) (92% - 99%)    Physical Exam:  General:    NAD,  non toxic  Cardio:  lukas   Respiratory:    clear b/l,    no wheezing  abd:     soft,   BS +,   no tenderness  :   no CVAT,  no suprapubic tenderness,   no  coronel  Musculoskeletal:   no joint swelling  vascular: no phlebitis  Skin:    no rash                        11.0   3.57  )-----------( 148      ( 10 May 2022 07:14 )             34.2               Urinalysis Basic - ( 10 May 2022 06:26 )    Color: Light Yellow / Appearance: Clear / S.009 / pH: x  Gluc: x / Ketone: Negative  / Bili: Negative / Urobili: Negative   Blood: x / Protein: Negative / Nitrite: Negative   Leuk Esterase: Negative / RBC: x / WBC x   Sq Epi: x / Non Sq Epi: x / Bacteria: x        MICROBIOLOGY:  v  .Blood [facility]  05-10-22   No growth to date.  --  --      .Blood Blood-Peripheral  22   No growth to date.  --  --      .Blood Blood-Peripheral  22   No growth to date.  --  --      .Blood Blood-Peripheral  22   No Growth Final  --  --      .Blood Blood-Peripheral  22   No Growth Final  --  --      .CSF CSF  22   No growth at 1 week.  --    No polymorphonuclear leukocytes seen  No organisms seen  by cytocentrifuge      .Blood Blood-Peripheral  22   No Growth Final  --  --      .Blood Blood-Peripheral  22   No Growth Final  --  --      .CSF CSF  22   No growth  --    polymorphonuclear leukocytes per low power field  No organisms seen  by cytocentrifuge      .Blood Blood-Peripheral  22   No Growth Final  --  --      Clean Catch Clean Catch (Midstream)  22   No growth  --  --      .Blood Blood-Peripheral  22   No Growth Final  --  --      Catheterized Catheterized  22   >=3 organisms. Probable collection contamination.  --  --      .Blood Blood  22   No Growth Final  --  --                RADIOLOGY:  Images independently visualized and reviewed personally, findings as below  < from: Xray Chest 1 View- PORTABLE-Urgent (Xray Chest 1 View- PORTABLE-Urgent .) (22 @ 05:47) >  Findings/  Impression: The heart is unremarkable. The lungs are clear. No acute   osseous abnormality.    < end of copied text >

## 2022-05-11 NOTE — DISCHARGE NOTE PROVIDER - CARE PROVIDER_API CALL
Yolanda Peralta)  Internal Medicine  [address] [place] [address]  Phone: [phone]  Fax: [phone]  Follow Up Time:

## 2022-05-11 NOTE — PROGRESS NOTE ADULT - SUBJECTIVE AND OBJECTIVE BOX
CARDIOLOGY     PROGRESS  NOTE   ________________________________________________    CHIEF COMPLAINT:Patient is a 61y old  Male who presents with a chief complaint of AMS 2/2 Pneumonia (11 May 2022 08:32)  no complain.  	  REVIEW OF SYSTEMS:  CONSTITUTIONAL: No fever, weight loss, or fatigue  EYES: No eye pain, visual disturbances, or discharge  ENT:  No difficulty hearing, tinnitus, vertigo; No sinus or throat pain  NECK: No pain or stiffness  RESPIRATORY: No cough, wheezing, chills or hemoptysis; No Shortness of Breath  CARDIOVASCULAR: No chest pain, palpitations, passing out, dizziness, or leg swelling  GASTROINTESTINAL: No abdominal or epigastric pain. No nausea, vomiting, or hematemesis; No diarrhea or constipation. No melena or hematochezia.  GENITOURINARY: No dysuria, frequency, hematuria, or incontinence  NEUROLOGICAL: No headaches, memory loss, loss of strength, numbness, or tremors  SKIN: No itching, burning, rashes, or lesions   LYMPH Nodes: No enlarged glands  ENDOCRINE: No heat or cold intolerance; No hair loss  MUSCULOSKELETAL: No joint pain or swelling; No muscle, back, or extremity pain  PSYCHIATRIC: No depression, anxiety, mood swings, or difficulty sleeping  HEME/LYMPH: No easy bruising, or bleeding gums  ALLERGY AND IMMUNOLOGIC: No hives or eczema	    [ ] All others negative	  [ ] Unable to obtain    PHYSICAL EXAM:  T(C): 36.8 (05-11-22 @ 08:56), Max: 36.9 (05-10-22 @ 20:00)  HR: 58 (05-11-22 @ 08:56) (50 - 63)  BP: 93/62 (05-11-22 @ 08:56) (93/62 - 118/63)  RR: 18 (05-11-22 @ 08:56) (16 - 18)  SpO2: 98% (05-11-22 @ 08:56) (92% - 99%)  Wt(kg): --  I&O's Summary    10 May 2022 07:01  -  11 May 2022 07:00  --------------------------------------------------------  IN: 0 mL / OUT: 1500 mL / NET: -1500 mL        Appearance: Normal	  HEENT:   Normal oral mucosa, PERRL, EOMI	  Lymphatic: No lymphadenopathy  Cardiovascular: Normal S1 S2, No JVD, + murmurs, No edema  Respiratory:rhonchi  Psychiatry: A & O x 3, Mood & affect appropriate  Gastrointestinal:  Soft, Non-tender, + BS	  Skin: No rashes, No ecchymoses, No cyanosis	  Neurologic: Non-focal  Extremities: Normal range of motion, No clubbing, cyanosis or edema  Vascular: Peripheral pulses palpable 2+ bilaterally    MEDICATIONS  (STANDING):  atorvastatin 20 milliGRAM(s) Oral at bedtime  dextrose 5% + sodium chloride 0.9%. 1000 milliLiter(s) (80 mL/Hr) IV Continuous <Continuous>  dextrose 5%. 1000 milliLiter(s) (50 mL/Hr) IV Continuous <Continuous>  dextrose 5%. 1000 milliLiter(s) (100 mL/Hr) IV Continuous <Continuous>  dextrose 50% Injectable 50 milliLiter(s) IV Push every 15 minutes  dextrose 50% Injectable 25 milliLiter(s) IV Push every 15 minutes  glucagon  Injectable 1 milliGRAM(s) IntraMuscular once  heparin   Injectable 5000 Unit(s) SubCutaneous every 8 hours  insulin lispro (ADMELOG) corrective regimen sliding scale   SubCutaneous three times a day before meals      TELEMETRY: 	    ECG:  	  RADIOLOGY:  OTHER: 	  	  LABS:	 	    CARDIAC MARKERS:                                11.0   3.57  )-----------( 148      ( 10 May 2022 07:14 )             34.2           proBNP:   Lipid Profile:   HgA1c:   TSH: Thyroid Stimulating Hormone, Serum: 1.58 uIU/mL (04-21 @ 16:54)  Thyroid Stimulating Hormone, Serum: 1.24 uIU/mL (04-20 @ 07:42)  Thyroid Stimulating Hormone, Serum: 1.46 uIU/mL (04-19 @ 09:27)  Thyroid Stimulating Hormone, Serum: 2.89 uIU/mL (04-18 @ 01:39)    Notes: Chart reviewed. Pt discussed in rounds as prepare for dc. Spoke with  spouse Antwan to discuss [facility]. per spouse requested [facility]. Advised spouse kimberly  send referral to facility but will have to send put a global for [facility]. Spose  verbalized understanding. Pt medically accepted to [facility].  per spouse will review facilities with [facility] and will let  know  later today. Will request auth once family confirms facility.  will  remain available.      Assessment and plan  ---------------------------  Patient is a 62 yo M with PMHx of cerebral ataxia who was brought to the ED due to AMS. History is obtained from chart review and from patients wife as patient is not responding to questions. History is extremely limited. Approximately 1 week prior to presentation, patient began to experience progressive fatigue/weakness. Patients weakness progressed and he had an episode of incontinence. several days later. Two days prior to presentation, patient began to experience hiccoughs. Patient has history of unexplained hiccoughs which usually progress to episodes of emesis. Patient underwent a barium swallow study recently which was unremarkable. On the day of presentation, patient began to have multiple episodes of emesis, which prompted his wife to bring him to the ED. At baseline, patient requires a walker to ambulate and is able to communicate normally.   In the ED, patient noted to be tachycardic to 113 and tachypneic to 23. Labs significant for initial lactate of 3.7. A code stroke was called which was negative for acute intracranial processes. A CT of the chest and A/P was performed which revealed findings concerning for right sided pneumonia and esophagitis. Patient was given 2L IVF and a dose of Zosyn. He was subsequently admitted to medicine for further management  pt with sig medical and cardiac hx.  events has noted  neuro and id noted  may need AC if no contraindication with a.fib as far as if cleared by neuro  will adjust cardiac meds  no aggressive work up as per wife  dc planning as per medicine

## 2022-05-11 NOTE — DISCHARGE NOTE PROVIDER - HOSPITAL COURSE
· Assessment	    62 yo male      with a PMHx of cerebellar ataxia     who was brought to the ED on 4/17 due to AMS preceded by 1 week of fatigue/weakness and episodic incontinence.      was  intubated for worsening clinical level depressed consciousness w/ cognitive decline.     per  neuro,  pt has underlying cerebellar ataxia w/ rapid neurocognitive decline of undetermined etiology.   and  infectious  and  malignancy  was  ruled  outt/  and  per   neuro  note ,no further workup at this time from a neurology standpoint   flow cytometry 4/28 showing nonspecific results 'degenerated sample, small lymphocytes'   nsgy consulted for meningeal bx, family refusing    CSF cytopathology 4/22 - increased number of large mononuclear cells in a background of few small lymphocytes, monocytes and neutrophils, cannot exclude a lymphoproliferative disorder versus an inflammatory process.    CSF cytopathology 4/29 - significant increased number of small lymphocytes with rare monocytes/ seen by  oncology  dr de leon,  no  intervention     h/o  cerebellar  ataxia,  with  no defined  cause  found  had  extensive  w/p  in icu.  with normal  cortisol  level  pt  is  alert,  able   to  speak,  comprehend  and  move  his  limbs   pt  with  bradycardia, ?  central, seen  by  card/  echo  on 4/2022,  normal  ef   now  with  hypothermia, follow  bcx,   f/p  by  house  ID  prior  CT  c/ap,  no  malignant  process   most  of  these  issues, do  not  have   a good  rx  per  swallow  eval, on  modified  diet/  fs  noted  endo  dr ovalle  bcx   are negative   plan,   discussed  with wife  at  length,    wife  wants  rehab  pt with intermittent  periods   of hypothermia,  suspect  from central process.  not infectious  mediated/  with  no  good  rx for this  cleared  by ID.  may proceed  with   d/c   to  rehab    d/c  celso grajeda                     · Assessment	    62 yo male      with a PMHx of cerebellar ataxia     who was brought to the ED on 4/17 due to AMS preceded by 1 week of fatigue/weakness and episodic incontinence.      was  intubated for worsening clinical level depressed consciousness w/ cognitive decline.     per  neuro,  pt has underlying cerebellar ataxia w/ rapid neurocognitive decline of undetermined etiology.   and  infectious  and  malignancy  was  ruled  outt/  and  per   neuro  note ,no further workup at this time from a neurology standpoint   flow cytometry 4/28 showing nonspecific results 'degenerated sample, small lymphocytes'   nsgy consulted for meningeal bx, family refusing    CSF cytopathology 4/22 - increased number of large mononuclear cells in a background of few small lymphocytes, monocytes and neutrophils, cannot exclude a lymphoproliferative disorder versus an inflammatory process.    CSF cytopathology 4/29 - significant increased number of small lymphocytes with rare monocytes/ seen by  oncology  dr de leon,  no  intervention     h/o  cerebellar  ataxia,  with  no defined  cause  found  had  extensive  w/p  in icu.  with normal  cortisol  level  pt  is  alert,  able   to  speak,  comprehend  and  move  his  limbs   pt  with  bradycardia, ?  central, seen  by  card/  echo  on 4/2022,  normal  ef   now  with  hypothermia, follow  bcx,   f/p  by  house  ID  prior  CT  c/ap,  no  malignant  process   most  of  these  issues, do  not  have   a good  rx  per  swallow  eval, on  modified  diet/  fs  noted  endo  dr ovalle  bcx   are negative   plan,   discussed  with wife  at  length,    wife  wants  rehab  pt with intermittent  periods   of hypothermia,  suspect  from central process.  not infectious  mediated/  with  no  good  rx for this  cleared  by ID.  may proceed  with   d/c   to  rehab    d/c  orde r written  EEG was abnormal with severe cerebral dysfunction, extensive w/u done , he was seen by neurol, NS, Neuroradiology, MICU, ID, OT, GI, Card and renal. He is cleared by Attending to d/c to [facility] when bed is available and authorized , spoke to Attending.                 	    60 yo male with a PMHx of cerebellar ataxia who was brought to the ED on 4/17 due to AMS preceded by 1 week of fatigue/weakness and episodic   incontinence.   was  intubated for worsening clinical level depressed consciousness w/ cognitive decline.   per  neuro,  pt has underlying cerebellar ataxia w/ rapid neurocognitive decline of undetermined etiology and  infectious  and  malignancy  was    ruled  out and  per   neuro  note ,no further workup at this time from a neurology standpoint .flow cytometry 4/28 showing nonspecific results   'degenerated sample, small lymphocytes'. Neuro Sx  consulted for meningeal bx, but family refused .   CSF cytopathology 4/22 - increased number of large mononuclear cells in a background of few small lymphocytes, monocytes and neutrophils,   cannot exclude a lymphoproliferative disorder versus an inflammatory process.    CSF cytopathology 4/29 - significant increased number of small lymphocytes with rare monocytes/ seen by  oncology  dr de leon,  no  intervention.     H/O  cerebellar  ataxia,  with  no defined  cause  found  had  extensive  w/p  in icu.  with normal  cortisol  level  pt  is  alert,  able   to  speak,  comprehend  and  move  his  limbs   pt  with  bradycardia, ?  central, seen  by  card/  echo  on 4/2022,  normal  ef   now  with  hypothermia, follow  bcx,   f/p  by  house  ID  prior  CT  c/ap,  no  malignant  process   most  of  these  issues, do  not  have   a good  rx  per  swallow  eval, on  modified  diet/  fs  noted  endo  dr ovalle  bcx   are negative   plan,   discussed  with wife  at  length,    wife  wants  rehab  pt with intermittent  periods   of hypothermia,  suspect  from central process.  not infectious  mediated/  with  no  good  rx for this  cleared  by ID.  may proceed  with   d/c   to  rehab  EEG was abnormal with severe cerebral dysfunction, extensive w/u done , he was seen by neurol, NS, Neuroradiology, MICU, ID, OT, GI, Card and   renal. He is cleared by Attending to d/c to [facility] when bed is available and authorized , spoke to Attending.

## 2022-05-11 NOTE — PROGRESS NOTE ADULT - ASSESSMENT
60 yo male      with a PMHx of cerebellar ataxia     who was brought to the ED on 4/17 due to AMS preceded by 1 week of fatigue/weakness and episodic incontinence.      was  intubated for worsening clinical level depressed consciousness w/ cognitive decline.     per  neuro,  pt has underlying cerebellar ataxia w/ rapid neurocognitive decline of undetermined etiology.   and  infectious  and  malignancy  was  ruled  outt/  and  per   neuro  note ,no further workup at this time from a neurology standpoint   flow cytometry 4/28 showing nonspecific results 'degenerated sample, small lymphocytes'   nsgy consulted for meningeal bx, family refusing    CSF cytopathology 4/22 - increased number of large mononuclear cells in a background of few small lymphocytes, monocytes and neutrophils, cannot exclude a lymphoproliferative disorder versus an inflammatory process.    CSF cytopathology 4/29 - significant increased number of small lymphocytes with rare monocytes/ seen by  oncology  dr de leon,  no  intervention     h/o  cerebellar  ataxia,  with  no defined  cause  found  had  extensive  w/p  in icu.  with normal  cortisol  level  pt  is  alert,  able   to  speak,  comprehend  and  move  his  limbs   pt  with  bradycardia, ?  central, seen  by  card/  echo  on 4/2022,  normal  ef   now  with  hypothermia, follow  bcx,   f/p  by  house  ID  prior  CT  c/ap,  no  malignant  process   most  of  these  issues, do  not  have   a good  rx  per  swallow  eval, on  modified  diet/  fs  noted  endo  dr ovalle  bcx   are negative   plan,   discussed  with wife  at  length,    wife  wants  rehab  pt with intermittent  periods   of hypothermia,  suspect  from central process.  not infectious  mediated/  with  no  good  rx for this  cleared  by ID.  may proceed  with   d/c   to  rehab    d/c  celso grajeda

## 2022-05-11 NOTE — DISCHARGE NOTE PROVIDER - NSDCCPCAREPLAN_GEN_ALL_CORE_FT
Wound Care  Date/Time: 5/21/2020 6:32 PM  Performed by: Oly Martins RN  Authorized by: Jermain Tejada MD     Dressing:     Dressing applied:  Telfa pad, tube gauze and Gelfoam small  Comments:      bacatracin oint  Gelfoam adaptic and tube dressing applied    To the finger instructed on wound care    And follow up  As directed       PRINCIPAL DISCHARGE DIAGNOSIS  Diagnosis: Altered mental status  Assessment and Plan of Treatment: stable, d/c to rehab      SECONDARY DISCHARGE DIAGNOSES  Diagnosis: Cerebral ataxia  Assessment and Plan of Treatment: stable    Diagnosis: Pneumonia  Assessment and Plan of Treatment: resolved     PRINCIPAL DISCHARGE DIAGNOSIS  Diagnosis: Altered mental status  Assessment and Plan of Treatment: stable, d/c to rehab      SECONDARY DISCHARGE DIAGNOSES  Diagnosis: Pneumonia  Assessment and Plan of Treatment: resolved    Diagnosis: Pneumonia, aspiration  Assessment and Plan of Treatment:     Diagnosis: Cerebral ataxia  Assessment and Plan of Treatment: stable    Diagnosis: Hypothermia due to non-environmental cause  Assessment and Plan of Treatment:      PRINCIPAL DISCHARGE DIAGNOSIS  Diagnosis: Altered mental status  Assessment and Plan of Treatment: 62 yo male with a PMHx of cerebellar ataxia who was brought to the ED on 4/17 due to AMS preceded by 1 week of fatigue/weakness and episodic incontinence.   was  intubated for worsening clinical level depressed consciousness w/ cognitive decline. Extubated on 5/4.   per  neuro,  pt has underlying cerebellar ataxia w/ rapid neurocognitive decline of undetermined etiology and  infectious  and  malignancy  was    ruled  out and  per   neuro  note ,no further workup at this time from a neurology standpoint .flow cytometry 4/28 showing nonspecific results   'degenerated sample, small lymphocytes'. Neuro Sx  consulted for meningeal bx, but family refused .   CSF cytopathology 4/22 - increased number of large mononuclear cells in a background of few small lymphocytes, monocytes and neutrophils,   cannot exclude a lymphoproliferative disorder versus an inflammatory process.    CSF cytopathology 4/29 - significant increased number of small lymphocytes with rare monocytes/ seen by  oncology  dr de leon,  no  intervention.   H/O  cerebellar  ataxia,  with  no defined  cause  found.      SECONDARY DISCHARGE DIAGNOSES  Diagnosis: Pneumonia  Assessment and Plan of Treatment: S/P 7 day course of Abx    Diagnosis: Cerebral ataxia  Assessment and Plan of Treatment: stable    Diagnosis: Hypothermia due to non-environmental cause  Assessment and Plan of Treatment: hypothermia of unknown origin.  as per ID no infection.   prior  CT  Chest/A/P  no  malignant  process

## 2022-05-11 NOTE — DISCHARGE NOTE PROVIDER - NSDCMRMEDTOKEN_GEN_ALL_CORE_FT
atorvastatin 20 mg oral tablet: 1 tab(s) orally once a day  mirtazapine 15 mg oral tablet: 1 tab(s) orally once a day (at bedtime), As Needed   atorvastatin 20 mg oral tablet: 1 tab(s) orally once a day

## 2022-05-12 DIAGNOSIS — G11.9 HEREDITARY ATAXIA, UNSPECIFIED: ICD-10-CM

## 2022-05-12 LAB
AQP4 H2O CHANNEL IGG CSF QL: NEGATIVE — SIGNIFICANT CHANGE UP
C NEOFORM RRNA SPEC NAA+PROBE-ACNC: SIGNIFICANT CHANGE UP
CMV DNA CSF QL NAA+PROBE: SIGNIFICANT CHANGE UP
CULTURE RESULTS: SIGNIFICANT CHANGE UP
CULTURE RESULTS: SIGNIFICANT CHANGE UP
E COLI K1 DNA CSF QL NAA+NON-PROBE: SIGNIFICANT CHANGE UP
ESCHERICHIA COLI K1: SIGNIFICANT CHANGE UP
EV RNA CSF QL NAA+PROBE: SIGNIFICANT CHANGE UP
GLUCOSE BLDC GLUCOMTR-MCNC: 100 MG/DL — HIGH (ref 70–99)
GLUCOSE BLDC GLUCOMTR-MCNC: 101 MG/DL — HIGH (ref 70–99)
GLUCOSE BLDC GLUCOMTR-MCNC: 106 MG/DL — HIGH (ref 70–99)
GLUCOSE BLDC GLUCOMTR-MCNC: 95 MG/DL — SIGNIFICANT CHANGE UP (ref 70–99)
GP B STREP DNA SPEC QL NAA+PROBE: SIGNIFICANT CHANGE UP
HAEM INFLU DNA SPEC QL NAA+PROBE: SIGNIFICANT CHANGE UP
HHV6 DNA CSF QL NAA+PROBE: SIGNIFICANT CHANGE UP
HSV1 DNA CSF QL NAA+PROBE: SIGNIFICANT CHANGE UP
HSV2 DNA CSF QL NAA+PROBE: SIGNIFICANT CHANGE UP
IMMUNOLOGIST REVIEW: SIGNIFICANT CHANGE UP
L MONOCYTOG DNA SPEC QL NAA+PROBE: SIGNIFICANT CHANGE UP
N MEN DNA SPEC QL NAA+PROBE: SIGNIFICANT CHANGE UP
PARECHOVIRUS A RNA SPEC QL NAA+PROBE: SIGNIFICANT CHANGE UP
S PNEUM DNA SPEC QL NAA+PROBE: SIGNIFICANT CHANGE UP
SARS-COV-2 RNA SPEC QL NAA+PROBE: SIGNIFICANT CHANGE UP
SPECIMEN SOURCE: SIGNIFICANT CHANGE UP
SPECIMEN SOURCE: SIGNIFICANT CHANGE UP
VZV DNA CSF QL NAA+PROBE: SIGNIFICANT CHANGE UP

## 2022-05-12 RX ORDER — MIRTAZAPINE 45 MG/1
1 TABLET, ORALLY DISINTEGRATING ORAL
Qty: 0 | Refills: 0 | DISCHARGE

## 2022-05-12 RX ORDER — CHLORHEXIDINE GLUCONATE 213 G/1000ML
1 SOLUTION TOPICAL DAILY
Refills: 0 | Status: DISCONTINUED | OUTPATIENT
Start: 2022-05-12 | End: 2022-05-13

## 2022-05-12 RX ADMIN — HEPARIN SODIUM 5000 UNIT(S): 5000 INJECTION INTRAVENOUS; SUBCUTANEOUS at 21:31

## 2022-05-12 RX ADMIN — CHLORHEXIDINE GLUCONATE 1 APPLICATION(S): 213 SOLUTION TOPICAL at 11:26

## 2022-05-12 RX ADMIN — SODIUM CHLORIDE 80 MILLILITER(S): 9 INJECTION, SOLUTION INTRAVENOUS at 11:27

## 2022-05-12 RX ADMIN — ATORVASTATIN CALCIUM 20 MILLIGRAM(S): 80 TABLET, FILM COATED ORAL at 21:30

## 2022-05-12 RX ADMIN — SODIUM CHLORIDE 80 MILLILITER(S): 9 INJECTION, SOLUTION INTRAVENOUS at 05:21

## 2022-05-12 RX ADMIN — SODIUM CHLORIDE 80 MILLILITER(S): 9 INJECTION, SOLUTION INTRAVENOUS at 21:31

## 2022-05-12 RX ADMIN — HEPARIN SODIUM 5000 UNIT(S): 5000 INJECTION INTRAVENOUS; SUBCUTANEOUS at 05:21

## 2022-05-12 NOTE — PROVIDER CONTACT NOTE (OTHER) - NAME OF MD/NP/PA/DO NOTIFIED:
MARIA D EJESUS Toro
SUNIL Soler
SUNIL Willis
NP Cindy Reyes
SUNIL Gutierrez
SUNIL Sosa
SUNIL Brooks
SUNIL Sosa
SUNIL Vincent

## 2022-05-12 NOTE — PROGRESS NOTE ADULT - ASSESSMENT
60 yo male      with a PMHx of cerebellar ataxia     who was brought to the ED on 4/17 due to AMS preceded by 1 week of fatigue/weakness and episodic incontinence.      was  intubated for worsening clinical level depressed consciousness w/ cognitive decline.     per  neuro,  pt has underlying cerebellar ataxia w/ rapid neurocognitive decline of undetermined etiology.   and  infectious  and  malignancy  was  ruled  outt/  and  per   neuro  note ,no further workup at this time from a neurology standpoint   flow cytometry 4/28 showing nonspecific results 'degenerated sample, small lymphocytes'   nsgy consulted for meningeal bx, family refusing    CSF cytopathology 4/22 - increased number of large mononuclear cells in a background of few small lymphocytes, monocytes and neutrophils, cannot exclude a lymphoproliferative disorder versus an inflammatory process.    CSF cytopathology 4/29 - significant increased number of small lymphocytes with rare monocytes/ seen by  oncology  dr de leon,  no  intervention     h/o  cerebellar  ataxia,  with  no defined  cause  found  had  extensive  w/p  in icu.  with normal  cortisol  level  pt  is  alert,  able   to  speak,  comprehend  and  move  his  limbs   pt  with  bradycardia, ?  central, seen  by  card/  echo  on 4/2022,  normal  ef   now  with  hypothermia, follow  bcx,   f/p  by  house  ID  prior  CT  c/ap,  no  malignant  process   most  of  these  issues, do  not  have   a good  rx  per  swallow  eval, on  modified  diet/  fs  noted  endo  dr ovalle  bcx   are negative   plan,   discussed  with wife  at  length,    wife  wants  rehab  pt with intermittent  periods   of hypothermia,  suspect  from central process.  not infectious  mediated/  with  no  good  rx for this  cleared  by ID.  may proceed  with   d/c   to  rehab    d/c  celso grajeda.  still  awiating  d/c

## 2022-05-12 NOTE — PROGRESS NOTE ADULT - SUBJECTIVE AND OBJECTIVE BOX
afebrile  REVIEW OF SYSTEMS:  GEN: no fever,    no chills  RESP: no SOB,   no cough  CVS: no chest pain,   no palpitations  GI: no abdominal pain,   no nausea,   no vomiting,   no constipation,   no diarrhea  : no dysuria,   no frequency  NEURO: no headache,   no dizziness  PSYCH: no depression,   not anxious  Derm : no rash    MEDICATIONS  (STANDING):  atorvastatin 20 milliGRAM(s) Oral at bedtime  dextrose 5% + sodium chloride 0.9%. 1000 milliLiter(s) (80 mL/Hr) IV Continuous <Continuous>  dextrose 5%. 1000 milliLiter(s) (50 mL/Hr) IV Continuous <Continuous>  dextrose 5%. 1000 milliLiter(s) (100 mL/Hr) IV Continuous <Continuous>  dextrose 50% Injectable 50 milliLiter(s) IV Push every 15 minutes  dextrose 50% Injectable 25 milliLiter(s) IV Push every 15 minutes  glucagon  Injectable 1 milliGRAM(s) IntraMuscular once  heparin   Injectable 5000 Unit(s) SubCutaneous every 8 hours  insulin lispro (ADMELOG) corrective regimen sliding scale   SubCutaneous three times a day before meals    MEDICATIONS  (PRN):  acetaminophen     Tablet .. 650 milliGRAM(s) Oral every 6 hours PRN Temp greater or equal to 38C (100.4F), Mild Pain (1 - 3)  dextrose Oral Gel 15 Gram(s) Oral once PRN Blood Glucose LESS THAN 70 milliGRAM(s)/deciliter      Vital Signs Last 24 Hrs  T(C): 36.6 (12 May 2022 04:18), Max: 37.4 (11 May 2022 23:34)  T(F): 97.9 (12 May 2022 04:18), Max: 99.3 (11 May 2022 23:34)  HR: 55 (12 May 2022 04:18) (55 - 68)  BP: 110/72 (12 May 2022 04:18) (93/62 - 127/67)  BP(mean): --  RR: 18 (12 May 2022 04:18) (17 - 18)  SpO2: 97% (12 May 2022 04:18) (95% - 98%)  CAPILLARY BLOOD GLUCOSE      POCT Blood Glucose.: 91 mg/dL (11 May 2022 21:47)  POCT Blood Glucose.: 84 mg/dL (11 May 2022 16:48)  POCT Blood Glucose.: 105 mg/dL (11 May 2022 11:58)  POCT Blood Glucose.: 103 mg/dL (11 May 2022 07:51)    I&O's Summary    11 May 2022 07:01  -  12 May 2022 07:00  --------------------------------------------------------  IN: 960 mL / OUT: 2450 mL / NET: -1490 mL        PHYSICAL EXAM:  HEAD:  Atraumatic, Normocephalic  NECK: Supple, No   JVD  CHEST/LUNG:   no     rales,     no,    rhonchi  HEART: Regular rate and rhythm;         murmur  ABDOMEN: Soft, Nontender, ;   EXTREMITIES:     no   edema  NEUROLOGY:  alert    LABS:                          Thyroid Stimulating Hormone, Serum: 1.58 uIU/mL (04-21 @ 16:54)          Consultant(s) Notes Reviewed:      Care Discussed with Consultants/Other Providers:

## 2022-05-12 NOTE — PROVIDER CONTACT NOTE (OTHER) - DATE AND TIME:
07-May-2022 09:30
07-May-2022 05:00
10-May-2022 05:20
12-May-2022 14:32
05-May-2022 22:00
06-May-2022 03:30
06-May-2022 21:30
19-Apr-2022 22:10
11-May-2022 23:40
21-Apr-2022 11:30
20-Apr-2022 01:34

## 2022-05-12 NOTE — PROVIDER CONTACT NOTE (OTHER) - ACTION/TREATMENT ORDERED:
SUNIL Gutierrez says we can take the pt off the warming blanket for 1 hour and then resume the blanket
SUNIL Sosa was made aware and came to asses the patient. EKG was ordered and thermo blanket was applied. IV Potassium was also ordered due to low Potassium levels. Pt is on remote tele.
MARIA DE JESUS Toro notified that pt was placed back on bear hugger to maintain temp. Blood cultures ordered as well as CBC and UA.
SUNIL Brooks notified and aware - approved it is okay to not administer 22:00 medications
SUNIL Brooks notified and aware. PA came to assess and did not implement further interventions at this time - will continue to monitor on Tele
SUNIL Brooks notified and aware/ no interventions initiated - I will continue to monitor
SUNIL Sosa was made aware and ordered 500cc fluid bolus. Will reassess Pt.
RRT  Cardiology consult
SUNIL Vincent notified and aware - STAT labs ordered and sent - warming blanket applied to patient and will continue to monitor temperature

## 2022-05-12 NOTE — PROGRESS NOTE ADULT - SUBJECTIVE AND OBJECTIVE BOX
CARDIOLOGY     PROGRESS  NOTE   ________________________________________________    CHIEF COMPLAINT:Patient is a 61y old  Male who presents with a chief complaint of AMS 2/2 Pneumonia (12 May 2022 07:23)  no complain.  	  REVIEW OF SYSTEMS:  CONSTITUTIONAL: No fever, weight loss, or fatigue  EYES: No eye pain, visual disturbances, or discharge  ENT:  No difficulty hearing, tinnitus, vertigo; No sinus or throat pain  NECK: No pain or stiffness  RESPIRATORY: No cough, wheezing, chills or hemoptysis; No Shortness of Breath  CARDIOVASCULAR: No chest pain, palpitations, passing out, dizziness, or leg swelling  GASTROINTESTINAL: No abdominal or epigastric pain. No nausea, vomiting, or hematemesis; No diarrhea or constipation. No melena or hematochezia.  GENITOURINARY: No dysuria, frequency, hematuria, or incontinence  NEUROLOGICAL: No headaches, memory loss, loss of strength, numbness, or tremors  SKIN: No itching, burning, rashes, or lesions   LYMPH Nodes: No enlarged glands  ENDOCRINE: No heat or cold intolerance; No hair loss  MUSCULOSKELETAL: No joint pain or swelling; No muscle, back, or extremity pain  PSYCHIATRIC: No depression, anxiety, mood swings, or difficulty sleeping  HEME/LYMPH: No easy bruising, or bleeding gums  ALLERGY AND IMMUNOLOGIC: No hives or eczema	    [ ] All others negative	  [x ] Unable to obtain    PHYSICAL EXAM:  T(C): 36.6 (05-12-22 @ 04:18), Max: 37.4 (05-11-22 @ 23:34)  HR: 55 (05-12-22 @ 04:18) (55 - 68)  BP: 110/72 (05-12-22 @ 04:18) (96/61 - 127/67)  RR: 18 (05-12-22 @ 04:18) (17 - 18)  SpO2: 97% (05-12-22 @ 04:18) (95% - 98%)  Wt(kg): --  I&O's Summary    11 May 2022 07:01  -  12 May 2022 07:00  --------------------------------------------------------  IN: 2120 mL / OUT: 2850 mL / NET: -730 mL        Appearance: Normal	  HEENT:   Normal oral mucosa, PERRL, EOMI	  Lymphatic: No lymphadenopathy  Cardiovascular: Normal S1 S2, No JVD, = murmurs, No edema  Respiratory: Lungs clear to auscultation	  Psychiatry: A & O x 3, Mood & affect appropriate  Gastrointestinal:  Soft, Non-tender, + BS	  Skin: No rashes, No ecchymoses, No cyanosis	  Neurologic: Non-focal  Extremities: Normal range of motion, No clubbing, cyanosis or edema  Vascular: Peripheral pulses palpable 2+ bilaterally    MEDICATIONS  (STANDING):  atorvastatin 20 milliGRAM(s) Oral at bedtime  chlorhexidine 2% Cloths 1 Application(s) Topical daily  dextrose 5% + sodium chloride 0.9%. 1000 milliLiter(s) (80 mL/Hr) IV Continuous <Continuous>  dextrose 5%. 1000 milliLiter(s) (50 mL/Hr) IV Continuous <Continuous>  dextrose 5%. 1000 milliLiter(s) (100 mL/Hr) IV Continuous <Continuous>  dextrose 50% Injectable 50 milliLiter(s) IV Push every 15 minutes  dextrose 50% Injectable 25 milliLiter(s) IV Push every 15 minutes  glucagon  Injectable 1 milliGRAM(s) IntraMuscular once  heparin   Injectable 5000 Unit(s) SubCutaneous every 8 hours  insulin lispro (ADMELOG) corrective regimen sliding scale   SubCutaneous three times a day before meals      TELEMETRY: 	    ECG:  	  RADIOLOGY:  OTHER: 	  	  LABS:	 	    CARDIAC MARKERS:                  proBNP:   Lipid Profile:   HgA1c:   TSH: Thyroid Stimulating Hormone, Serum: 1.58 uIU/mL (04-21 @ 16:54)  Thyroid Stimulating Hormone, Serum: 1.24 uIU/mL (04-20 @ 07:42)  Thyroid Stimulating Hormone, Serum: 1.46 uIU/mL (04-19 @ 09:27)  Thyroid Stimulating Hormone, Serum: 2.89 uIU/mL (04-18 @ 01:39)          Assessment and plan  ---------------------------  Patient is a 62 yo M with PMHx of cerebral ataxia who was brought to the ED due to AMS. History is obtained from chart review and from patients wife as patient is not responding to questions. History is extremely limited. Approximately 1 week prior to presentation, patient began to experience progressive fatigue/weakness. Patients weakness progressed and he had an episode of incontinence. several days later. Two days prior to presentation, patient began to experience hiccoughs. Patient has history of unexplained hiccoughs which usually progress to episodes of emesis. Patient underwent a barium swallow study recently which was unremarkable. On the day of presentation, patient began to have multiple episodes of emesis, which prompted his wife to bring him to the ED. At baseline, patient requires a walker to ambulate and is able to communicate normally.   In the ED, patient noted to be tachycardic to 113 and tachypneic to 23. Labs significant for initial lactate of 3.7. A code stroke was called which was negative for acute intracranial processes. A CT of the chest and A/P was performed which revealed findings concerning for right sided pneumonia and esophagitis. Patient was given 2L IVF and a dose of Zosyn. He was subsequently admitted to medicine for further management  pt with sig medical and cardiac hx.  events has noted  neuro and id noted  may need AC if no contraindication with a.fib as far as if cleared by neuro  will adjust cardiac meds  no aggressive work up as per wife  dc planning as per medicine  fu as out pt

## 2022-05-12 NOTE — PROVIDER CONTACT NOTE (OTHER) - ASSESSMENT
Pt is AOx1 and lethargic. BP is 82/58. All other VS stable.
patient is asymptomatic bradycardic. patient's vital signs are stable> patient is alert and oriented
patient was transferred from the MICU today. patient previously had NG tube and diet was told to be MOD THICK liquids - patient unable to swallow meds in apple sauce, he is scheduled for MBS tomorrow
patient went lukas to 44 - he is asymptomatic and responsive upon assessment .
VSS, pt currently afebrile. pt denies SOB, chest pain and n/v/d
all other VSS. pt denies SOB, chest pain and n/v/d
patient is alert and responsive when spoken to. patients vitals are stable except temperature. patient is asymptomatic and has no complaints.
No sign or symptoms of chest pain or respiratory distress noted.   on assessment blood pressure 78/52, hr 54, O2 97 on 2 liters and temp 94.8
Pt is AOX1. Pt is lethargic and requires stimulation to arouse but this has been patients baseline. BP is 90/60, HR in the 50's and rectal temp shows 95.5.

## 2022-05-13 ENCOUNTER — TRANSCRIPTION ENCOUNTER (OUTPATIENT)
Age: 62
End: 2022-05-13

## 2022-05-13 VITALS
HEART RATE: 64 BPM | RESPIRATION RATE: 18 BRPM | SYSTOLIC BLOOD PRESSURE: 111 MMHG | OXYGEN SATURATION: 100 % | DIASTOLIC BLOOD PRESSURE: 77 MMHG | TEMPERATURE: 99 F

## 2022-05-13 LAB
GLUCOSE BLDC GLUCOMTR-MCNC: 103 MG/DL — HIGH (ref 70–99)
GLUCOSE BLDC GLUCOMTR-MCNC: 116 MG/DL — HIGH (ref 70–99)
GLUCOSE BLDC GLUCOMTR-MCNC: 82 MG/DL — SIGNIFICANT CHANGE UP (ref 70–99)

## 2022-05-13 RX ADMIN — HEPARIN SODIUM 5000 UNIT(S): 5000 INJECTION INTRAVENOUS; SUBCUTANEOUS at 05:27

## 2022-05-13 RX ADMIN — CHLORHEXIDINE GLUCONATE 1 APPLICATION(S): 213 SOLUTION TOPICAL at 14:10

## 2022-05-13 RX ADMIN — HEPARIN SODIUM 5000 UNIT(S): 5000 INJECTION INTRAVENOUS; SUBCUTANEOUS at 14:11

## 2022-05-13 NOTE — DISCHARGE NOTE NURSING/CASE MANAGEMENT/SOCIAL WORK - PATIENT PORTAL LINK FT
You can access the FollowMyHealth Patient Portal offered by [facility] by registering at the following website: [email or web address]. By joining [employer]’s FollowMyHealth portal, you will also be able to view your health information using other applications (apps) compatible with our system.

## 2022-05-13 NOTE — DISCHARGE NOTE NURSING/CASE MANAGEMENT/SOCIAL WORK - NSDCPEFALRISK_GEN_ALL_CORE
For information on Fall & Injury Prevention, visit: [email or web address] OR  [email or web address] OR  https://www.cdc.gov/steadi/patient.html

## 2022-05-13 NOTE — DISCHARGE NOTE NURSING/CASE MANAGEMENT/SOCIAL WORK - HISTORY OF COVID-19 VACCINATION
Would not check [facility] if on OCPs. Defer to OBGYN.   Thanks.    no changes at this time. Vaccine status unknown

## 2022-05-13 NOTE — PROGRESS NOTE ADULT - SUBJECTIVE AND OBJECTIVE BOX
afberile  REVIEW OF SYSTEMS:  GEN: no fever,    no chills  RESP: no SOB,   no cough  CVS: no chest pain,   no palpitations  GI: no abdominal pain,   no nausea,   no vomiting,   no constipation,   no diarrhea  : no dysuria,   no frequency  NEURO: no headache,   no dizziness  PSYCH: no depression,   not anxious  Derm : no rash    MEDICATIONS  (STANDING):  atorvastatin 20 milliGRAM(s) Oral at bedtime  chlorhexidine 2% Cloths 1 Application(s) Topical daily  dextrose 5% + sodium chloride 0.9%. 1000 milliLiter(s) (80 mL/Hr) IV Continuous <Continuous>  dextrose 5%. 1000 milliLiter(s) (50 mL/Hr) IV Continuous <Continuous>  dextrose 5%. 1000 milliLiter(s) (100 mL/Hr) IV Continuous <Continuous>  dextrose 50% Injectable 50 milliLiter(s) IV Push every 15 minutes  dextrose 50% Injectable 25 milliLiter(s) IV Push every 15 minutes  glucagon  Injectable 1 milliGRAM(s) IntraMuscular once  heparin   Injectable 5000 Unit(s) SubCutaneous every 8 hours  insulin lispro (ADMELOG) corrective regimen sliding scale   SubCutaneous three times a day before meals    MEDICATIONS  (PRN):  acetaminophen     Tablet .. 650 milliGRAM(s) Oral every 6 hours PRN Temp greater or equal to 38C (100.4F), Mild Pain (1 - 3)  dextrose Oral Gel 15 Gram(s) Oral once PRN Blood Glucose LESS THAN 70 milliGRAM(s)/deciliter      Vital Signs Last 24 Hrs  T(C): 36.5 (13 May 2022 05:02), Max: 37.7 (13 May 2022 00:00)  T(F): 97.7 (13 May 2022 05:02), Max: 99.9 (13 May 2022 00:00)  HR: 56 (13 May 2022 05:02) (56 - 61)  BP: 91/62 (13 May 2022 05:02) (91/60 - 112/74)  BP(mean): --  RR: 18 (13 May 2022 05:02) (18 - 18)  SpO2: 98% (13 May 2022 05:02) (96% - 100%)  CAPILLARY BLOOD GLUCOSE      POCT Blood Glucose.: 116 mg/dL (13 May 2022 08:05)  POCT Blood Glucose.: 95 mg/dL (12 May 2022 21:19)  POCT Blood Glucose.: 106 mg/dL (12 May 2022 16:49)  POCT Blood Glucose.: 101 mg/dL (12 May 2022 11:46)    I&O's Summary    12 May 2022 07:01  -  13 May 2022 07:00  --------------------------------------------------------  IN: 480 mL / OUT: 2200 mL / NET: -1720 mL        PHYSICAL EXAM:  HEAD:  Atraumatic, Normocephalic  NECK: Supple, No   JVD  CHEST/LUNG:   no     rales,     no,    rhonchi  HEART: Regular rate and rhythm;         murmur  ABDOMEN: Soft, Nontender, ;   EXTREMITIES:    no    edema  NEUROLOGY:  alert    LABS:                          Thyroid Stimulating Hormone, Serum: 1.58 uIU/mL (04-21 @ 16:54)          Consultant(s) Notes Reviewed:      Care Discussed with Consultants/Other Providers:

## 2022-05-13 NOTE — PROGRESS NOTE ADULT - ASSESSMENT
60 yo male      with a PMHx of cerebellar ataxia     who was brought to the ED on 4/17 due to AMS preceded by 1 week of fatigue/weakness and episodic incontinence.      was  intubated for worsening clinical level depressed consciousness w/ cognitive decline.     per  neuro,  pt has underlying cerebellar ataxia w/ rapid neurocognitive decline of undetermined etiology.   and  infectious  and  malignancy  was  ruled  outt/  and  per   neuro  note ,no further workup at this time from a neurology standpoint   flow cytometry 4/28 showing nonspecific results 'degenerated sample, small lymphocytes'   nsgy consulted for meningeal bx, family refusing    CSF cytopathology 4/22 - increased number of large mononuclear cells in a background of few small lymphocytes, monocytes and neutrophils, cannot exclude a lymphoproliferative disorder versus an inflammatory process.    CSF cytopathology 4/29 - significant increased number of small lymphocytes with rare monocytes/ seen by  oncology  dr de leon,  no  intervention     h/o  cerebellar  ataxia,  with  no defined  cause  found  had  extensive  w/p  in icu.  with normal  cortisol  level  pt  is  alert,  able   to  speak,  comprehend  and  move  his  limbs   pt  with  bradycardia, ?  central, seen  by  card/  echo  on 4/2022,  normal  ef   now  with  hypothermia, follow  bcx,   f/p  by  house  ID  prior  CT  c/ap,  no  malignant  process   most  of  these  issues, do  not  have   a good  rx  per  swallow  eval, on  modified  diet/  fs  noted  endo  dr ovalle  bcx   are negative   plan,   discussed  with wife  at  length,    wife  wants  rehab  pt with intermittent  periods   of hypothermia,  suspect  from central process.  not infectious  mediated/  with  no  good  rx for this  cleared  by ID.  may proceed  with   d/c   to  rehab    d/c  orde r written.  still  awaiting   d/c/  team rodolfo                   60 yo male      with a PMHx of cerebellar ataxia     who was brought to the ED on 4/17 due to AMS preceded by 1 week of fatigue/weakness and episodic incontinence.      was  intubated for worsening clinical level depressed consciousness w/ cognitive decline.     per  neuro,  pt has underlying cerebellar ataxia w/ rapid neurocognitive decline of undetermined etiology.   and  infectious  and  malignancy  was  ruled  outt/  and  per   neuro  note ,no further workup at this time from a neurology standpoint   flow cytometry 4/28 showing nonspecific results 'degenerated sample, small lymphocytes'   nsgy consulted for meningeal bx, family refusing    CSF cytopathology 4/22 - increased number of large mononuclear cells in a background of few small lymphocytes, monocytes and neutrophils, cannot exclude a lymphoproliferative disorder versus an inflammatory process.    CSF cytopathology 4/29 - significant increased number of small lymphocytes with rare monocytes/ seen by  oncology  dr de leon,  no  intervention     h/o  cerebellar  ataxia,  with  no defined  cause  found  had  extensive  w/p  in icu.  with normal  cortisol  level  pt  is  alert,  able   to  speak,  comprehend  and  move  his  limbs   pt  with  bradycardia, ?  central, seen  by  card/  echo  on 4/2022,  normal  ef   now  with  hypothermia, follow  bcx,   f/p  by  house  ID  prior  CT  c/ap,  no  malignant  process   most  of  these  issues, do  not  have   a good  rx  per  swallow  eval, on  modified  diet/  fs  noted  endo  dr ovalle  bcx   are negative   plan,   discussed  with wife  at  length,    wife  wants  rehab  pt with intermittent  periods   of hypothermia,  suspect  from central process.  not infectious  mediated/  with  no  good  rx for this  cleared  by ID.  pt may proceed  with   d/c   to  rehab    d/c  order  written.  still  awaiting   d/c/  team aware   per  . awiating auth

## 2022-05-13 NOTE — PROGRESS NOTE ADULT - PROVIDER SPECIALTY LIST ADULT
Cardiology
Hospitalist
Internal Medicine
Internal Medicine
MICU
Neurology
Neurology
Cardiology
Infectious Disease
Internal Medicine
MIC[facility]
Neurology
Neuroradiology
Cardiology
Cardiology
Infectious Disease
Internal Medicine
MICU
Neurology
Gastroenterology
Infectious Disease
Infectious Disease
Internal Medicine
MICU
Internal Medicine
Internal Medicine
MICU
MICU
Neurology
Hospitalist

## 2022-05-13 NOTE — PROGRESS NOTE ADULT - NUTRITIONAL ASSESSMENT
This patient has been assessed with a concern for Malnutrition and has been determined to have a diagnosis/diagnoses of Severe protein-calorie malnutrition.    This patient is being managed with:   Diet NPO with Tube Feed-  Tube Feeding Modality: Nasogastric  Glucerna 1.5 Jere (GLUCERNA1.5RTH)  Total Volume for 24 Hours (mL): 1260  Intermittent  Starting Tube Feed Rate {mL per Hour}: 10  Until Goal Tube Feed Rate (mL per Hour): 70  Tube Feeding Hours ON: 18  Tube Feeding OFF (Hours): 6  Tube Feed Start Time: 11:00  Entered: Apr 25 2022  3:06PM    
This patient has been assessed with a concern for Malnutrition and has been determined to have a diagnosis/diagnoses of Severe protein-calorie malnutrition.    This patient is being managed with:   Diet NPO with Tube Feed-  Tube Feeding Modality: Nasogastric  Glucerna 1.5 Jere (GLUCERNA1.5RTH)  Total Volume for 24 Hours (mL): 1260  Intermittent  Starting Tube Feed Rate {mL per Hour}: 20  Increase Tube Feed Rate by (mL): 10    Every 6 hours  Until Goal Tube Feed Rate (mL per Hour): 70  Tube Feeding Hours ON: 18  Tube Feeding OFF (Hours): 6  Tube Feed Start Time: 15:00  Entered: Apr 29 2022  3:26PM    
This patient has been assessed with a concern for Malnutrition and has been determined to have a diagnosis/diagnoses of Severe protein-calorie malnutrition.    This patient is being managed with:   Diet NPO with Tube Feed-  Tube Feeding Modality: Nasogastric  Glucerna 1.5 Jere (GLUCERNA1.5RTH)  Total Volume for 24 Hours (mL): 1260  Intermittent  Starting Tube Feed Rate {mL per Hour}: 20  Increase Tube Feed Rate by (mL): 10    Every 6 hours  Until Goal Tube Feed Rate (mL per Hour): 70  Tube Feeding Hours ON: 18  Tube Feeding OFF (Hours): 6  Tube Feed Start Time: 15:00  Entered: Apr 29 2022  3:26PM    
This patient has been assessed with a concern for Malnutrition and has been determined to have a diagnosis/diagnoses of Severe protein-calorie malnutrition.    This patient is being managed with:   Diet Pureed-  Mildly Thick Liquids (MILDTHICKLIQS)  Entered: May  6 2022  4:10PM    
This patient has been assessed with a concern for Malnutrition and has been determined to have a diagnosis/diagnoses of Severe protein-calorie malnutrition.    This patient is being managed with:   Diet Pureed-  Mildly Thick Liquids (MILDTHICKLIQS)  Entered: May  6 2022  4:10PM    
This patient has been assessed with a concern for Malnutrition and has been determined to have a diagnosis/diagnoses of Severe protein-calorie malnutrition.    This patient is being managed with:   Diet NPO with Tube Feed-  Tube Feeding Modality: Nasogastric  Glucerna 1.5 Jere (GLUCERNA1.5RTH)  Total Volume for 24 Hours (mL): 1260  Intermittent  Starting Tube Feed Rate {mL per Hour}: 10  Until Goal Tube Feed Rate (mL per Hour): 70  Tube Feeding Hours ON: 18  Tube Feeding OFF (Hours): 6  Tube Feed Start Time: 11:00  Entered: Apr 25 2022  3:06PM    
This patient has been assessed with a concern for Malnutrition and has been determined to have a diagnosis/diagnoses of Severe protein-calorie malnutrition.    This patient is being managed with:   Diet NPO with Tube Feed-  Tube Feeding Modality: Nasogastric  Glucerna 1.5 Jere (GLUCERNA1.5RTH)  Total Volume for 24 Hours (mL): 1260  Intermittent  Starting Tube Feed Rate {mL per Hour}: 20  Increase Tube Feed Rate by (mL): 10    Every 6 hours  Until Goal Tube Feed Rate (mL per Hour): 70  Tube Feeding Hours ON: 18  Tube Feeding OFF (Hours): 6  Tube Feed Start Time: 15:00  Entered: Apr 29 2022  3:26PM    
This patient has been assessed with a concern for Malnutrition and has been determined to have a diagnosis/diagnoses of Severe protein-calorie malnutrition.    This patient is being managed with:   Diet NPO with Tube Feed-  Tube Feeding Modality: Nasogastric  Glucerna 1.5 Jere (GLUCERNA1.5RTH)  Total Volume for 24 Hours (mL): 360  Intermittent  Starting Tube Feed Rate {mL per Hour}: 10  Until Goal Tube Feed Rate (mL per Hour): 20  Tube Feeding Hours ON: 18  Tube Feeding OFF (Hours): 6  Tube Feed Start Time: 11:00  Entered: Apr 28 2022  7:44PM    
This patient has been assessed with a concern for Malnutrition and has been determined to have a diagnosis/diagnoses of Severe protein-calorie malnutrition.    This patient is being managed with:   Diet NPO with Tube Feed-  Tube Feeding Modality: Nasogastric  Glucerna 1.5 Jere (GLUCERNA1.5RTH)  Total Volume for 24 Hours (mL): 360  Intermittent  Starting Tube Feed Rate {mL per Hour}: 10  Until Goal Tube Feed Rate (mL per Hour): 20  Tube Feeding Hours ON: 18  Tube Feeding OFF (Hours): 6  Tube Feed Start Time: 11:00  Entered: Apr 28 2022  7:44PM    
This patient has been assessed with a concern for Malnutrition and has been determined to have a diagnosis/diagnoses of Severe protein-calorie malnutrition.    This patient is being managed with:   Diet Pureed-  Mildly Thick Liquids (MILDTHICKLIQS)  Entered: May  6 2022  4:10PM    
This patient has been assessed with a concern for Malnutrition and has been determined to have a diagnosis/diagnoses of Severe protein-calorie malnutrition.    This patient is being managed with:   Diet NPO with Tube Feed-  Tube Feeding Modality: Nasogastric  Glucerna 1.5 Jere (GLUCERNA1.5RTH)  Total Volume for 24 Hours (mL): 1260  Intermittent  Starting Tube Feed Rate {mL per Hour}: 20  Increase Tube Feed Rate by (mL): 10    Every 6 hours  Until Goal Tube Feed Rate (mL per Hour): 70  Tube Feeding Hours ON: 18  Tube Feeding OFF (Hours): 6  Tube Feed Start Time: 15:00  Entered: Apr 29 2022  3:26PM    
This patient has been assessed with a concern for Malnutrition and has been determined to have a diagnosis/diagnoses of Severe protein-calorie malnutrition.    This patient is being managed with:   Diet NPO with Tube Feed-  Tube Feeding Modality: Nasogastric  Glucerna 1.5 Jere (GLUCERNA1.5RTH)  Total Volume for 24 Hours (mL): 480  Continuous  Starting Tube Feed Rate {mL per Hour}: 10  Increase Tube Feed Rate by (mL): 10     Every 4 hours  Until Goal Tube Feed Rate (mL per Hour): 20  Tube Feed Duration (in Hours): 24  Tube Feed Start Time: 13:00  Entered: Apr 23 2022 12:11PM    
This patient has been assessed with a concern for Malnutrition and has been determined to have a diagnosis/diagnoses of Severe protein-calorie malnutrition.    This patient is being managed with:   Diet NPO-  Except Medications  Entered: Apr 23 2022  6:35AM    
This patient has been assessed with a concern for Malnutrition and has been determined to have a diagnosis/diagnoses of Severe protein-calorie malnutrition.    This patient is being managed with:   Diet Pureed-  Mildly Thick Liquids (MILDTHICKLIQS)  Entered: May  6 2022  4:10PM    
This patient has been assessed with a concern for Malnutrition and has been determined to have a diagnosis/diagnoses of Severe protein-calorie malnutrition.    This patient is being managed with:   Diet NPO with Tube Feed-  Tube Feeding Modality: Nasogastric  Glucerna 1.5 Jere (GLUCERNA1.5RTH)  Total Volume for 24 Hours (mL): 1080  Intermittent  Starting Tube Feed Rate {mL per Hour}: 10  Until Goal Tube Feed Rate (mL per Hour): 60  Tube Feeding Hours ON: 18  Tube Feeding OFF (Hours): 6  Tube Feed Start Time: 11:00  Entered: Apr 25 2022  4:46AM    
This patient has been assessed with a concern for Malnutrition and has been determined to have a diagnosis/diagnoses of Severe protein-calorie malnutrition.    This patient is being managed with:   Diet NPO with Tube Feed-  Tube Feeding Modality: Nasogastric  Glucerna 1.5 Jere (GLUCERNA1.5RTH)  Total Volume for 24 Hours (mL): 1260  Intermittent  Starting Tube Feed Rate {mL per Hour}: 10  Until Goal Tube Feed Rate (mL per Hour): 70  Tube Feeding Hours ON: 18  Tube Feeding OFF (Hours): 6  Tube Feed Start Time: 11:00  Entered: Apr 25 2022  3:06PM    
This patient has been assessed with a concern for Malnutrition and has been determined to have a diagnosis/diagnoses of Severe protein-calorie malnutrition.    This patient is being managed with:   Diet Pureed-  Mildly Thick Liquids (MILDTHICKLIQS)  Entered: May  6 2022  4:10PM    
This patient has been assessed with a concern for Malnutrition and has been determined to have a diagnosis/diagnoses of Severe protein-calorie malnutrition.    This patient is being managed with:   Diet Soft and Bite Sized-  Entered: May  3 2022  1:43PM    
This patient has been assessed with a concern for Malnutrition and has been determined to have a diagnosis/diagnoses of Severe protein-calorie malnutrition.    This patient is being managed with:   Diet NPO-  Entered: Apr 21 2022 12:21PM    
This patient has been assessed with a concern for Malnutrition and has been determined to have a diagnosis/diagnoses of Severe protein-calorie malnutrition.    This patient is being managed with:   Diet NPO-  Except Medications  Entered: May  4 2022 11:52AM    
This patient has been assessed with a concern for Malnutrition and has been determined to have a diagnosis/diagnoses of Severe protein-calorie malnutrition.    This patient is being managed with:   Diet Pureed-  Mildly Thick Liquids (MILDTHICKLIQS)  Entered: May  6 2022  4:10PM    
This patient has been assessed with a concern for Malnutrition and has been determined to have a diagnosis/diagnoses of Severe protein-calorie malnutrition.    This patient is being managed with:   Diet Pureed-  Mildly Thick Liquids (MILDTHICKLIQS)  Entered: May  6 2022  4:10PM    
This patient has been assessed with a concern for Malnutrition and has been determined to have a diagnosis/diagnoses of Severe protein-calorie malnutrition.    This patient is being managed with:   Diet NPO-  Entered: Apr 21 2022 12:21PM

## 2022-05-13 NOTE — PROGRESS NOTE ADULT - REASON FOR ADMISSION
AMS 2/2 Pneumonia

## 2022-05-13 NOTE — PROGRESS NOTE ADULT - SUBJECTIVE AND OBJECTIVE BOX
CARDIOLOGY     PROGRESS  NOTE   ________________________________________________    CHIEF COMPLAINT:Patient is a 61y old  Male who presents with a chief complaint of AMS 2/2 Pneumonia (13 May 2022 08:24)  no complain.  	  REVIEW OF SYSTEMS:  CONSTITUTIONAL: No fever, weight loss, or fatigue  EYES: No eye pain, visual disturbances, or discharge  ENT:  No difficulty hearing, tinnitus, vertigo; No sinus or throat pain  NECK: No pain or stiffness  RESPIRATORY: No cough, wheezing, chills or hemoptysis; No Shortness of Breath  CARDIOVASCULAR: No chest pain, palpitations, passing out, dizziness, or leg swelling  GASTROINTESTINAL: No abdominal or epigastric pain. No nausea, vomiting, or hematemesis; No diarrhea or constipation. No melena or hematochezia.  GENITOURINARY: No dysuria, frequency, hematuria, or incontinence  NEUROLOGICAL: No headaches, memory loss, loss of strength, numbness, or tremors  SKIN: No itching, burning, rashes, or lesions   LYMPH Nodes: No enlarged glands  ENDOCRINE: No heat or cold intolerance; No hair loss  MUSCULOSKELETAL: No joint pain or swelling; No muscle, back, or extremity pain  PSYCHIATRIC: No depression, anxiety, mood swings, or difficulty sleeping  HEME/LYMPH: No easy bruising, or bleeding gums  ALLERGY AND IMMUNOLOGIC: No hives or eczema	    [ ] All others negative	  [x ] Unable to obtain    PHYSICAL EXAM:  T(C): 36.5 (05-13-22 @ 05:02), Max: 37.7 (05-13-22 @ 00:00)  HR: 56 (05-13-22 @ 05:02) (56 - 61)  BP: 91/62 (05-13-22 @ 05:02) (91/60 - 112/74)  RR: 18 (05-13-22 @ 05:02) (18 - 18)  SpO2: 98% (05-13-22 @ 05:02) (96% - 100%)  Wt(kg): --  I&O's Summary    12 May 2022 07:01  -  13 May 2022 07:00  --------------------------------------------------------  IN: 480 mL / OUT: 2200 mL / NET: -1720 mL        Appearance: Normal	  HEENT:   Normal oral mucosa, PERRL, EOMI	  Lymphatic: No lymphadenopathy  Cardiovascular: Normal S1 S2, No JVD, + murmurs, No edema  Respiratory: Lungs clear to auscultation	  Psychiatry: A & O x 3, Mood & affect appropriate  Gastrointestinal:  Soft, Non-tender, + BS	  Skin: No rashes, No ecchymoses, No cyanosis	  Neurologic: Non-focal  Extremities: Normal range of motion, No clubbing, cyanosis or edema  Vascular: Peripheral pulses palpable 2+ bilaterally    MEDICATIONS  (STANDING):  atorvastatin 20 milliGRAM(s) Oral at bedtime  chlorhexidine 2% Cloths 1 Application(s) Topical daily  dextrose 5% + sodium chloride 0.9%. 1000 milliLiter(s) (80 mL/Hr) IV Continuous <Continuous>  dextrose 5%. 1000 milliLiter(s) (50 mL/Hr) IV Continuous <Continuous>  dextrose 5%. 1000 milliLiter(s) (100 mL/Hr) IV Continuous <Continuous>  dextrose 50% Injectable 50 milliLiter(s) IV Push every 15 minutes  dextrose 50% Injectable 25 milliLiter(s) IV Push every 15 minutes  glucagon  Injectable 1 milliGRAM(s) IntraMuscular once  heparin   Injectable 5000 Unit(s) SubCutaneous every 8 hours  insulin lispro (ADMELOG) corrective regimen sliding scale   SubCutaneous three times a day before meals      TELEMETRY: 	    ECG:  	  RADIOLOGY:  OTHER: 	  	  LABS:	 	    CARDIAC MARKERS:                  proBNP:   Lipid Profile:   HgA1c:   TSH: Thyroid Stimulating Hormone, Serum: 1.58 uIU/mL (04-21 @ 16:54)  Thyroid Stimulating Hormone, Serum: 1.24 uIU/mL (04-20 @ 07:42)  Thyroid Stimulating Hormone, Serum: 1.46 uIU/mL (04-19 @ 09:27)  Thyroid Stimulating Hormone, Serum: 2.89 uIU/mL (04-18 @ 01:39)          Assessment and plan  ---------------------------  Patient is a 62 yo M with PMHx of cerebral ataxia who was brought to the ED due to AMS. History is obtained from chart review and from patients wife as patient is not responding to questions. History is extremely limited. Approximately 1 week prior to presentation, patient began to experience progressive fatigue/weakness. Patients weakness progressed and he had an episode of incontinence. several days later. Two days prior to presentation, patient began to experience hiccoughs. Patient has history of unexplained hiccoughs which usually progress to episodes of emesis. Patient underwent a barium swallow study recently which was unremarkable. On the day of presentation, patient began to have multiple episodes of emesis, which prompted his wife to bring him to the ED. At baseline, patient requires a walker to ambulate and is able to communicate normally.   In the ED, patient noted to be tachycardic to 113 and tachypneic to 23. Labs significant for initial lactate of 3.7. A code stroke was called which was negative for acute intracranial processes. A CT of the chest and A/P was performed which revealed findings concerning for right sided pneumonia and esophagitis. Patient was given 2L IVF and a dose of Zosyn. He was subsequently admitted to medicine for further management  pt with sig medical and cardiac hx.  events has noted  neuro and id noted  may need AC if no contraindication with a.fib as far as if cleared by neuro  will adjust cardiac meds  no aggressive work up as per wife  dc planning as per medicine  fu as out pt

## 2022-05-15 LAB
CULTURE RESULTS: SIGNIFICANT CHANGE UP
CULTURE RESULTS: SIGNIFICANT CHANGE UP
SPECIMEN SOURCE: SIGNIFICANT CHANGE UP
SPECIMEN SOURCE: SIGNIFICANT CHANGE UP

## 2022-05-28 LAB
CULTURE RESULTS: SIGNIFICANT CHANGE UP
SPECIMEN SOURCE: SIGNIFICANT CHANGE UP

## 2022-06-15 ENCOUNTER — INPATIENT (INPATIENT)
Facility: HOSPITAL | Age: 62
LOS: 11 days | Discharge: HOME HEALTH | DRG: 689 | End: 2022-06-27
Attending: INTERNAL MEDICINE | Admitting: INTERNAL MEDICINE
Payer: MEDICAID

## 2022-06-15 VITALS
HEIGHT: 66 IN | TEMPERATURE: 98 F | HEART RATE: 92 BPM | DIASTOLIC BLOOD PRESSURE: 80 MMHG | OXYGEN SATURATION: 96 % | WEIGHT: 149.91 LBS | SYSTOLIC BLOOD PRESSURE: 108 MMHG | RESPIRATION RATE: 18 BRPM

## 2022-06-15 DIAGNOSIS — Z29.9 ENCOUNTER FOR PROPHYLACTIC MEASURES, UNSPECIFIED: ICD-10-CM

## 2022-06-15 DIAGNOSIS — R05.9 COUGH, UNSPECIFIED: ICD-10-CM

## 2022-06-15 DIAGNOSIS — R09.89 OTHER SPECIFIED SYMPTOMS AND SIGNS INVOLVING THE CIRCULATORY AND RESPIRATORY SYSTEMS: ICD-10-CM

## 2022-06-15 DIAGNOSIS — G11.9 HEREDITARY ATAXIA, UNSPECIFIED: ICD-10-CM

## 2022-06-15 DIAGNOSIS — R07.9 CHEST PAIN, UNSPECIFIED: ICD-10-CM

## 2022-06-15 DIAGNOSIS — G93.40 ENCEPHALOPATHY, UNSPECIFIED: ICD-10-CM

## 2022-06-15 DIAGNOSIS — R41.82 ALTERED MENTAL STATUS, UNSPECIFIED: ICD-10-CM

## 2022-06-15 LAB
ALBUMIN SERPL ELPH-MCNC: 4.3 G/DL — SIGNIFICANT CHANGE UP (ref 3.3–5)
ALP SERPL-CCNC: 75 U/L — SIGNIFICANT CHANGE UP (ref 40–120)
ALT FLD-CCNC: 29 U/L — SIGNIFICANT CHANGE UP (ref 10–45)
AMMONIA BLD-MCNC: 22 UMOL/L — SIGNIFICANT CHANGE UP (ref 11–55)
ANION GAP SERPL CALC-SCNC: 15 MMOL/L — SIGNIFICANT CHANGE UP (ref 5–17)
APPEARANCE UR: ABNORMAL
AST SERPL-CCNC: 24 U/L — SIGNIFICANT CHANGE UP (ref 10–40)
BASOPHILS # BLD AUTO: 0.01 K/UL — SIGNIFICANT CHANGE UP (ref 0–0.2)
BASOPHILS NFR BLD AUTO: 0.2 % — SIGNIFICANT CHANGE UP (ref 0–2)
BILIRUB SERPL-MCNC: 0.5 MG/DL — SIGNIFICANT CHANGE UP (ref 0.2–1.2)
BILIRUB UR-MCNC: NEGATIVE — SIGNIFICANT CHANGE UP
BUN SERPL-MCNC: 16 MG/DL — SIGNIFICANT CHANGE UP (ref 7–23)
CALCIUM SERPL-MCNC: 10.9 MG/DL — HIGH (ref 8.4–10.5)
CHLORIDE SERPL-SCNC: 100 MMOL/L — SIGNIFICANT CHANGE UP (ref 96–108)
CO2 SERPL-SCNC: 25 MMOL/L — SIGNIFICANT CHANGE UP (ref 22–31)
COLOR SPEC: SIGNIFICANT CHANGE UP
CREAT SERPL-MCNC: 0.76 MG/DL — SIGNIFICANT CHANGE UP (ref 0.5–1.3)
DIFF PNL FLD: ABNORMAL
EGFR: 103 ML/MIN/1.73M2 — SIGNIFICANT CHANGE UP
EOSINOPHIL # BLD AUTO: 0.15 K/UL — SIGNIFICANT CHANGE UP (ref 0–0.5)
EOSINOPHIL NFR BLD AUTO: 2.6 % — SIGNIFICANT CHANGE UP (ref 0–6)
GAS PNL BLDV: SIGNIFICANT CHANGE UP
GLUCOSE SERPL-MCNC: 83 MG/DL — SIGNIFICANT CHANGE UP (ref 70–99)
GLUCOSE UR QL: NEGATIVE — SIGNIFICANT CHANGE UP
HCT VFR BLD CALC: 48.7 % — SIGNIFICANT CHANGE UP (ref 39–50)
HGB BLD-MCNC: 15.3 G/DL — SIGNIFICANT CHANGE UP (ref 13–17)
IMM GRANULOCYTES NFR BLD AUTO: 0.4 % — SIGNIFICANT CHANGE UP (ref 0–1.5)
KETONES UR-MCNC: NEGATIVE — SIGNIFICANT CHANGE UP
LEUKOCYTE ESTERASE UR-ACNC: NEGATIVE — SIGNIFICANT CHANGE UP
LYMPHOCYTES # BLD AUTO: 1.04 K/UL — SIGNIFICANT CHANGE UP (ref 1–3.3)
LYMPHOCYTES # BLD AUTO: 18.3 % — SIGNIFICANT CHANGE UP (ref 13–44)
MAGNESIUM SERPL-MCNC: 1.8 MG/DL — SIGNIFICANT CHANGE UP (ref 1.6–2.6)
MCHC RBC-ENTMCNC: 27.9 PG — SIGNIFICANT CHANGE UP (ref 27–34)
MCHC RBC-ENTMCNC: 31.4 GM/DL — LOW (ref 32–36)
MCV RBC AUTO: 88.7 FL — SIGNIFICANT CHANGE UP (ref 80–100)
MONOCYTES # BLD AUTO: 0.73 K/UL — SIGNIFICANT CHANGE UP (ref 0–0.9)
MONOCYTES NFR BLD AUTO: 12.8 % — SIGNIFICANT CHANGE UP (ref 2–14)
NEUTROPHILS # BLD AUTO: 3.74 K/UL — SIGNIFICANT CHANGE UP (ref 1.8–7.4)
NEUTROPHILS NFR BLD AUTO: 65.7 % — SIGNIFICANT CHANGE UP (ref 43–77)
NITRITE UR-MCNC: NEGATIVE — SIGNIFICANT CHANGE UP
NRBC # BLD: 0 /100 WBCS — SIGNIFICANT CHANGE UP (ref 0–0)
PCP SPEC-MCNC: SIGNIFICANT CHANGE UP
PH UR: 7 — SIGNIFICANT CHANGE UP (ref 5–8)
PHOSPHATE SERPL-MCNC: 3.3 MG/DL — SIGNIFICANT CHANGE UP (ref 2.5–4.5)
PLATELET # BLD AUTO: 141 K/UL — LOW (ref 150–400)
POTASSIUM SERPL-MCNC: 4.3 MMOL/L — SIGNIFICANT CHANGE UP (ref 3.5–5.3)
POTASSIUM SERPL-SCNC: 4.3 MMOL/L — SIGNIFICANT CHANGE UP (ref 3.5–5.3)
PROT SERPL-MCNC: 8.8 G/DL — HIGH (ref 6–8.3)
PROT UR-MCNC: NEGATIVE — SIGNIFICANT CHANGE UP
RAPID RVP RESULT: SIGNIFICANT CHANGE UP
RBC # BLD: 5.49 M/UL — SIGNIFICANT CHANGE UP (ref 4.2–5.8)
RBC # FLD: 14.2 % — SIGNIFICANT CHANGE UP (ref 10.3–14.5)
SARS-COV-2 RNA SPEC QL NAA+PROBE: SIGNIFICANT CHANGE UP
SODIUM SERPL-SCNC: 140 MMOL/L — SIGNIFICANT CHANGE UP (ref 135–145)
SP GR SPEC: 1.01 — SIGNIFICANT CHANGE UP (ref 1.01–1.02)
UROBILINOGEN FLD QL: NEGATIVE — SIGNIFICANT CHANGE UP
WBC # BLD: 5.69 K/UL — SIGNIFICANT CHANGE UP (ref 3.8–10.5)
WBC # FLD AUTO: 5.69 K/UL — SIGNIFICANT CHANGE UP (ref 3.8–10.5)

## 2022-06-15 PROCEDURE — 70450 CT HEAD/BRAIN W/O DYE: CPT | Mod: 26,MA

## 2022-06-15 PROCEDURE — 71045 X-RAY EXAM CHEST 1 VIEW: CPT | Mod: 26

## 2022-06-15 PROCEDURE — 99285 EMERGENCY DEPT VISIT HI MDM: CPT

## 2022-06-15 PROCEDURE — 71250 CT THORAX DX C-: CPT | Mod: 26

## 2022-06-15 PROCEDURE — 93010 ELECTROCARDIOGRAM REPORT: CPT

## 2022-06-15 PROCEDURE — 99223 1ST HOSP IP/OBS HIGH 75: CPT

## 2022-06-15 RX ORDER — SODIUM CHLORIDE 9 MG/ML
1000 INJECTION, SOLUTION INTRAVENOUS ONCE
Refills: 0 | Status: COMPLETED | OUTPATIENT
Start: 2022-06-15 | End: 2022-06-15

## 2022-06-15 RX ORDER — ACETAMINOPHEN 500 MG
650 TABLET ORAL EVERY 6 HOURS
Refills: 0 | Status: DISCONTINUED | OUTPATIENT
Start: 2022-06-15 | End: 2022-06-27

## 2022-06-15 RX ORDER — ENOXAPARIN SODIUM 100 MG/ML
40 INJECTION SUBCUTANEOUS EVERY 24 HOURS
Refills: 0 | Status: DISCONTINUED | OUTPATIENT
Start: 2022-06-15 | End: 2022-06-27

## 2022-06-15 RX ORDER — LANOLIN ALCOHOL/MO/W.PET/CERES
3 CREAM (GRAM) TOPICAL AT BEDTIME
Refills: 0 | Status: DISCONTINUED | OUTPATIENT
Start: 2022-06-15 | End: 2022-06-16

## 2022-06-15 RX ORDER — ONDANSETRON 8 MG/1
4 TABLET, FILM COATED ORAL EVERY 8 HOURS
Refills: 0 | Status: DISCONTINUED | OUTPATIENT
Start: 2022-06-15 | End: 2022-06-16

## 2022-06-15 RX ORDER — ASPIRIN/CALCIUM CARB/MAGNESIUM 324 MG
81 TABLET ORAL DAILY
Refills: 0 | Status: DISCONTINUED | OUTPATIENT
Start: 2022-06-15 | End: 2022-06-15

## 2022-06-15 RX ADMIN — SODIUM CHLORIDE 1000 MILLILITER(S): 9 INJECTION, SOLUTION INTRAVENOUS at 21:03

## 2022-06-15 NOTE — ED PROVIDER NOTE - CLINICAL SUMMARY MEDICAL DECISION MAKING FREE TEXT BOX
61M PMH cerebellar atixa recent admit 4/18-5/13 iso decline in neurocognitive fn, weakness, including MICU intubated for airway protection seemingly w/o clear dx, dc'd to [facility] now returning iso cough, worsened mental status and weakness. VS wnl, exam notable for AOx1 (self), weak b/l LE, mildly dry appearing MM. Will obtain labs, imaging (CXR, CTH), EKG, and giving 1L LR. 61M PMH cerebellar ataxia recent admit 4/18-5/13 iso decline in neurocognitive fn, weakness, including MICU intubated for airway protection seemingly w/o clear dx, dc'd to [facility] now returning iso cough, worsened mental status, dec po and weakness. VS wnl, exam notable for AOx1 (self), weak b/l LE, mildly dry appearing MM. Possibly infection, aspiration, electrolyte abnormality, progression of undefined neurocognitive d/o. Will obtain labs, imaging (CXR, CTH), EKG, and giving 1L LR. TBA.

## 2022-06-15 NOTE — ED ADULT NURSE NOTE - NSIMPLEMENTINTERV_GEN_ALL_ED
Implemented All Fall Risk Interventions:  [place] to call system. Call bell, personal items and telephone within reach. Instruct patient to call for assistance. Room bathroom lighting operational. Non-slip footwear when patient is off stretcher. Physically safe environment: no spills, clutter or unnecessary equipment. Stretcher in lowest position, wheels locked, appropriate side rails in place. Provide visual cue, wrist band, yellow gown, etc. Monitor gait and stability. Monitor for mental status changes and reorient to person, place, and time. Review medications for side effects contributing to fall risk. Reinforce activity limits and safety measures with patient and family.

## 2022-06-15 NOTE — ED PROVIDER NOTE - PROGRESS NOTE DETAILS
MD Meek PGY-2: thrombocytopenia 141, VBG 7.36/66/29/37 w/ lactate wnl 1.5, hyperCa2+ 10.9, sprotein 8.8; CTH chronic ischemic changes in b/l hemispheres, no acute infarct, hemorrhage, or mass. Ventricles prominent raising possibility communicating hydrocephalus (similar appearance prior CT). Recs for MR further assessment; CXR clear lungs. Endorsed to hospitalist Dr. Deidre Wilkerson, who accepts pt to medicine service for further eval/mgt. Case and plan d/w Dr. Oconnell

## 2022-06-15 NOTE — ED ADULT NURSE REASSESSMENT NOTE - NS ED NURSE REASSESS COMMENT FT1
Patient straight cathed for urine using sterile technique. Second RN Chely present to confirm sterility. Explained procedure as it was being done - Pt tolerated procedure well. Sterile specimens collected and sent to lab as ordered. Drained aprox 250 ml of urine- yellow color with visible sediment. Patient cleaned, turned, and repositioned. Comfort and safety provided. Straight cath verbally ordered by MD Foster at bedside. Patient straight cathed for urine using sterile technique. Second RN Chely present to confirm sterility. Explained procedure as it was being done - Pt tolerated procedure well. Sterile specimens collected and sent to lab as ordered. Drained aprox 250 ml of urine- yellow color with visible sediment. Patient cleaned, turned, and repositioned. Comfort and safety provided.

## 2022-06-15 NOTE — ED PROVIDER NOTE - PHYSICAL EXAMINATION
PHYSICAL EXAM:  GENERAL: Thin-appearing man, resting comfortably in ED stretcher in NAD, well-groomed, participates in exam to limited extent  HEAD: Atraumatic, Normocephalic +black discoloration of head (wife attributes to hair dye she applied recently)  EYES: PERRL, conjunctiva and sclera clear however difficult to assess iso pt participation  ENMT: No tonsillar erythema, exudates, or enlargement; +MM mildly dry  NECK: Supple  NERVOUS SYSTEM: Alert & Oriented X1 (self), somnolent appearing, UE strength 5/5, LE strength 4/5   CHEST/LUNG: No rales, rhonchi, wheezing, or rubs +course breath sounds b/l  HEART: Regular rate and rhythm; No murmurs, rubs, or gallops +distant sounds  ABDOMEN: Soft, Nontender, Nondistended; Bowel sounds present. No guarding, rebound tenderness, or rigidity.  EXTREMITIES: 2+ Peripheral Pulses, No edema

## 2022-06-15 NOTE — ED ADULT NURSE NOTE - OBJECTIVE STATEMENT
62 yo male presents to ED from nursing home d/t increased weakness, cough, and AMS.   Pt was in MICU one month ago for cerebellar ataxia and pneumonia- and was intubated during stay. Wife states patient became increasingly confused and weak 3 days ago. Wife states prior to 3 days ago, pt was verbal and AOX4. Pt AOX1, dysarthric, visibly lethargic, breathing spontaneous and unlabored on room air, connected to cardiac monitor (NSR 70s/80s). Abdomen soft, nontender, nondistended. Pt appears restless and attempting to pull out wires. Wife at bedside involved in care. Bed locked and lowered. Comfort and safety maintained. 62 yo male PMH cerebral ataxia, CVA, HLD presents to ED from [facility] d/t increased weakness, cough, and AMS. Wife at bedside states patient became increasingly confused and weak for past 3 days. Per wife, pt AOX4 at baseline. Pt was intubated in MICU one month ago for pneumonia.. Pt AOX1, dysarthric, lethargic, breathing spontaneous and unlabored on room air, connected to cardiac monitor (NSR 70s/80s). Abdomen soft, nontender, nondistended. Pt appears restless and attempting to pull out wires. Skin clean, dry, and intact. Wife at bedside involved in care. Bed locked and lowered. Comfort and safety maintained. 62 yo male PMH cerebral ataxia, CVA, HLD presents to ED from [facility] d/t increased weakness, cough, and AMS. Wife at bedside states patient became increasingly confused and weak for past 3 days. Per wife, pt AOX4 at baseline. Pt was intubated in MICU one month ago for pneumonia. Pt AOX1, dysarthric, , breathing spontaneous and unlabored on room air, connected to cardiac monitor (NSR 70s/80s). Abdomen soft, nontender, nondistended. Pt appears restless and attempting to pull out wires. Skin clean, dry, and intact. Wife at bedside involved in care. Bed locked and lowered. Comfort and safety maintained. 60 yo male PMH cerebral ataxia, CVA, HLD presents to ED from [facility] d/t increased weakness, cough, and AMS. Wife at bedside states patient became increasingly confused and weak for past 3 days. Per wife, pt AOX4 at baseline. Pt was intubated in MICU one month ago for pneumonia. Pt AOX1, dysarthric, struggles to follow commands, breathing spontaneous and unlabored on room air, connected to cardiac monitor (NSR 70s/80s). Abdomen soft, nontender, nondistended. Pt appears restless and attempting to pull out wires. Skin clean, dry, and intact. Wife at bedside involved in care. Bed locked and lowered. Comfort and safety maintained.

## 2022-06-15 NOTE — H&P ADULT - PROBLEM SELECTOR PLAN 3
No defined cause. Recent extensive neurologic, ID and endocrine work up on prior admission. Wife declined brain biopsy that admission.  -Consider neurology consult  -see above

## 2022-06-15 NOTE — H&P ADULT - PROBLEM SELECTOR PLAN 2
CXR w/o clear etiology. Concern for aspiration given mental status changes.  -Will order CT chest non-con  -F/u procalcitonin  -F/u RVP  -Trend cbc and fever curve  -Speech and swallow consult

## 2022-06-15 NOTE — ED PROVIDER NOTE - NS ED ROS FT
REVIEW OF SYSTEMS: (per wife at bedside, given pt alteration in mental status)  CONSTITUTIONAL: No fever or chills  EYES: No visual disturbances or eye pain  ENMT: No sinus or throat pain  RESPIRATORY: No shortness of breath +cough  CARDIOVASCULAR: No chest pain, palpitations, or dizziness  GASTROINTESTINAL: No abdominal or epigastric pain. No diarrhea or constipation. No melena or hematochezia.   GENITOURINARY: No dysuria or hematuria  NEUROLOGICAL: No headaches, numbness +weakness, alteration in mental status (less conversive)  MUSCULOSKELETAL: No joint pain or swelling; No muscle, back, or extremity pain

## 2022-06-15 NOTE — ED PROVIDER NOTE - OBJECTIVE STATEMENT
61M PMH cerebellar ataxia recent admit 4/18-5/13 iso AMS, weakness, incontinence course c/b MICU stay intubated for airway protection. Condition seemingly attributed to rapid neurocognitive decline of cerebellar ataxia, family refused meningeal bx at that time, as well as bradycardia, hypothermia presumed central in etiology, dc'd to [facility] now returning iso cough, weakness, decline in mental status per wife at bedside. Otherwise denies complaints. 61M PMH cerebellar ataxia recent admit 4/18-5/13 iso AMS, weakness, incontinence course c/b MICU stay intubated for airway protection. Condition seemingly attributed to rapid neurocognitive decline of cerebellar ataxia, family refused meningeal bx at that time, as well as bradycardia, hypothermia presumed central in etiology, dc'd to [facility] now returning iso cough, weakness, dec po, decline in baseline of poor mental status per wife at bedside. Otherwise denies complaints.

## 2022-06-15 NOTE — H&P ADULT - NSHPADDITIONALINFOADULT_GEN_ALL_CORE
I was asked to see this patient by the hospitalist in charge. Dr. Wilkerson to assume care for patient in AM and thereafter

## 2022-06-15 NOTE — H&P ADULT - PROBLEM SELECTOR PLAN 1
Pt awake and alert but not responding to questions, slurring speech. Note baseline CT head. Will cont. work up for possible metabolic etiologies. UA also negative  -CT chest non-con ordered  -Continuous pulse oximetry given recent admission to MICU after intubation for airway protection  -Consider neurology consult in AM  -Aspiration and falls precautions  -F/u urine culture and syphilis work up.

## 2022-06-15 NOTE — H&P ADULT - ASSESSMENT
61M w/ hx of Cerebellar ataxia recent prolonged admission including MICU stay/ intubation from 4/18-5/13 for AMS and airway protection p/w acute encephalopathy and cough

## 2022-06-15 NOTE — H&P ADULT - NSHPPHYSICALEXAM_GEN_ALL_CORE
Vital Signs Last 24 Hrs  T(C): 36.4 (06-15-22 @ 19:05), Max: 36.4 (06-15-22 @ 19:05)  T(F): 97.6 (06-15-22 @ 19:05), Max: 97.6 (06-15-22 @ 19:05)  HR: 73 (06-15-22 @ 21:07) (73 - 92)  BP: 114/90 (06-15-22 @ 21:07) (108/80 - 114/90)  BP(mean): --  RR: 16 (06-15-22 @ 21:07) (16 - 18)  SpO2: 97% (06-15-22 @ 21:07) (96% - 97%)

## 2022-06-16 LAB
ALBUMIN SERPL ELPH-MCNC: 4.1 G/DL — SIGNIFICANT CHANGE UP (ref 3.3–5)
ALP SERPL-CCNC: 68 U/L — SIGNIFICANT CHANGE UP (ref 40–120)
ALT FLD-CCNC: 24 U/L — SIGNIFICANT CHANGE UP (ref 10–45)
ANION GAP SERPL CALC-SCNC: 12 MMOL/L — SIGNIFICANT CHANGE UP (ref 5–17)
AST SERPL-CCNC: 20 U/L — SIGNIFICANT CHANGE UP (ref 10–40)
BASOPHILS # BLD AUTO: 0.02 K/UL — SIGNIFICANT CHANGE UP (ref 0–0.2)
BASOPHILS NFR BLD AUTO: 0.2 % — SIGNIFICANT CHANGE UP (ref 0–2)
BILIRUB SERPL-MCNC: 0.6 MG/DL — SIGNIFICANT CHANGE UP (ref 0.2–1.2)
BUN SERPL-MCNC: 17 MG/DL — SIGNIFICANT CHANGE UP (ref 7–23)
CALCIUM SERPL-MCNC: 10.4 MG/DL — SIGNIFICANT CHANGE UP (ref 8.4–10.5)
CHLORIDE SERPL-SCNC: 100 MMOL/L — SIGNIFICANT CHANGE UP (ref 96–108)
CO2 SERPL-SCNC: 27 MMOL/L — SIGNIFICANT CHANGE UP (ref 22–31)
CREAT SERPL-MCNC: 0.73 MG/DL — SIGNIFICANT CHANGE UP (ref 0.5–1.3)
EGFR: 104 ML/MIN/1.73M2 — SIGNIFICANT CHANGE UP
EOSINOPHIL # BLD AUTO: 0.12 K/UL — SIGNIFICANT CHANGE UP (ref 0–0.5)
EOSINOPHIL NFR BLD AUTO: 1.3 % — SIGNIFICANT CHANGE UP (ref 0–6)
GLUCOSE BLDC GLUCOMTR-MCNC: 111 MG/DL — HIGH (ref 70–99)
GLUCOSE BLDC GLUCOMTR-MCNC: 126 MG/DL — HIGH (ref 70–99)
GLUCOSE BLDC GLUCOMTR-MCNC: 77 MG/DL — SIGNIFICANT CHANGE UP (ref 70–99)
GLUCOSE BLDC GLUCOMTR-MCNC: 83 MG/DL — SIGNIFICANT CHANGE UP (ref 70–99)
GLUCOSE SERPL-MCNC: 85 MG/DL — SIGNIFICANT CHANGE UP (ref 70–99)
HCT VFR BLD CALC: 43 % — SIGNIFICANT CHANGE UP (ref 39–50)
HGB BLD-MCNC: 13.9 G/DL — SIGNIFICANT CHANGE UP (ref 13–17)
IMM GRANULOCYTES NFR BLD AUTO: 0.3 % — SIGNIFICANT CHANGE UP (ref 0–1.5)
LYMPHOCYTES # BLD AUTO: 1.57 K/UL — SIGNIFICANT CHANGE UP (ref 1–3.3)
LYMPHOCYTES # BLD AUTO: 17.2 % — SIGNIFICANT CHANGE UP (ref 13–44)
MAGNESIUM SERPL-MCNC: 1.6 MG/DL — SIGNIFICANT CHANGE UP (ref 1.6–2.6)
MCHC RBC-ENTMCNC: 28.4 PG — SIGNIFICANT CHANGE UP (ref 27–34)
MCHC RBC-ENTMCNC: 32.3 GM/DL — SIGNIFICANT CHANGE UP (ref 32–36)
MCV RBC AUTO: 87.8 FL — SIGNIFICANT CHANGE UP (ref 80–100)
MONOCYTES # BLD AUTO: 0.91 K/UL — HIGH (ref 0–0.9)
MONOCYTES NFR BLD AUTO: 10 % — SIGNIFICANT CHANGE UP (ref 2–14)
NEUTROPHILS # BLD AUTO: 6.47 K/UL — SIGNIFICANT CHANGE UP (ref 1.8–7.4)
NEUTROPHILS NFR BLD AUTO: 71 % — SIGNIFICANT CHANGE UP (ref 43–77)
NRBC # BLD: 0 /100 WBCS — SIGNIFICANT CHANGE UP (ref 0–0)
PLATELET # BLD AUTO: 153 K/UL — SIGNIFICANT CHANGE UP (ref 150–400)
POTASSIUM SERPL-MCNC: 4 MMOL/L — SIGNIFICANT CHANGE UP (ref 3.5–5.3)
POTASSIUM SERPL-SCNC: 4 MMOL/L — SIGNIFICANT CHANGE UP (ref 3.5–5.3)
PROCALCITONIN SERPL-MCNC: 0.07 NG/ML — SIGNIFICANT CHANGE UP (ref 0.02–0.1)
PROT SERPL-MCNC: 8 G/DL — SIGNIFICANT CHANGE UP (ref 6–8.3)
RBC # BLD: 4.9 M/UL — SIGNIFICANT CHANGE UP (ref 4.2–5.8)
RBC # FLD: 14.3 % — SIGNIFICANT CHANGE UP (ref 10.3–14.5)
SODIUM SERPL-SCNC: 139 MMOL/L — SIGNIFICANT CHANGE UP (ref 135–145)
T PALLIDUM AB TITR SER: NEGATIVE — SIGNIFICANT CHANGE UP
WBC # BLD: 9.12 K/UL — SIGNIFICANT CHANGE UP (ref 3.8–10.5)
WBC # FLD AUTO: 9.12 K/UL — SIGNIFICANT CHANGE UP (ref 3.8–10.5)

## 2022-06-16 PROCEDURE — 99232 SBSQ HOSP IP/OBS MODERATE 35: CPT

## 2022-06-16 RX ORDER — SODIUM CHLORIDE 9 MG/ML
1000 INJECTION, SOLUTION INTRAVENOUS
Refills: 0 | Status: DISCONTINUED | OUTPATIENT
Start: 2022-06-16 | End: 2022-06-18

## 2022-06-16 RX ORDER — SODIUM CHLORIDE 9 MG/ML
500 INJECTION INTRAMUSCULAR; INTRAVENOUS; SUBCUTANEOUS ONCE
Refills: 0 | Status: COMPLETED | OUTPATIENT
Start: 2022-06-16 | End: 2022-06-16

## 2022-06-16 RX ORDER — INFLUENZA VIRUS VACCINE 15; 15; 15; 15 UG/.5ML; UG/.5ML; UG/.5ML; UG/.5ML
0.5 SUSPENSION INTRAMUSCULAR ONCE
Refills: 0 | Status: DISCONTINUED | OUTPATIENT
Start: 2022-06-16 | End: 2022-06-27

## 2022-06-16 RX ADMIN — SODIUM CHLORIDE 80 MILLILITER(S): 9 INJECTION, SOLUTION INTRAVENOUS at 08:12

## 2022-06-16 RX ADMIN — SODIUM CHLORIDE 500 MILLILITER(S): 9 INJECTION INTRAMUSCULAR; INTRAVENOUS; SUBCUTANEOUS at 06:32

## 2022-06-16 RX ADMIN — ENOXAPARIN SODIUM 40 MILLIGRAM(S): 100 INJECTION SUBCUTANEOUS at 06:14

## 2022-06-16 RX ADMIN — ONDANSETRON 4 MILLIGRAM(S): 8 TABLET, FILM COATED ORAL at 06:14

## 2022-06-16 NOTE — ED ADULT NURSE REASSESSMENT NOTE - NS ED NURSE REASSESS COMMENT FT1
Pt placed on bear hugger warming blanket. Pt placed on bear hugger warming blanket and connected to continuous pulse ox.

## 2022-06-16 NOTE — PROGRESS NOTE ADULT - SUBJECTIVE AND OBJECTIVE BOX
afberile    REVIEW OF SYSTEMS:  GEN: no fever,    no chills  RESP: no SOB,   no cough  CVS: no chest pain,   no palpitations  GI: no abdominal pain,   no nausea,   no vomiting,   no constipation,   no diarrhea  : no dysuria,   no frequency  NEURO: no headache,   no dizziness  PSYCH: no depression,   not anxious  Derm : no rash    MEDICATIONS  (STANDING):  enoxaparin Injectable 40 milliGRAM(s) SubCutaneous every 24 hours    MEDICATIONS  (PRN):  acetaminophen     Tablet .. 650 milliGRAM(s) Oral every 6 hours PRN Temp greater or equal to 38C (100.4F), Mild Pain (1 - 3)  melatonin 3 milliGRAM(s) Oral at bedtime PRN Insomnia  ondansetron Injectable 4 milliGRAM(s) IV Push every 8 hours PRN Nausea and/or Vomiting      Vital Signs Last 24 Hrs  T(C): 37.2 (2022 05:55), Max: 37.2 (2022 05:55)  T(F): 99 (2022 05:55), Max: 99 (2022 05:55)  HR: 125 (2022 05:55) (73 - 125)  BP: 105/79 (2022 05:40) (103/74 - 131/71)  BP(mean): 84 (2022 02:25) (84 - 84)  RR: 18 (2022 05:40) (16 - 22)  SpO2: 95% (2022 05:40) (95% - 98%)  CAPILLARY BLOOD GLUCOSE      POCT Blood Glucose.: 94 mg/dL (15 Benigno 2022 19:23)    I&O's Summary      PHYSICAL EXAM:  HEAD:  Atraumatic, Normocephalic  NECK: Supple, No   JVD  CHEST/LUNG:   no     rales,     no,    rhonchi  HEART: Regular rate and rhythm;         murmur  ABDOMEN: Soft, Nontender, ;   EXTREMITIES:     no   edema  NEUROLOGY:  alert    LABS:                        13.9   9.12  )-----------( 153      ( 2022 07:00 )             43.0     06-15    140  |  100  |  16  ----------------------------<  83  4.3   |  25  |  0.76    Ca    10.9<H>      15 Benigno 2022 20:20  Phos  3.3     06-15  Mg     1.8     -15    TPro  8.8<H>  /  Alb  4.3  /  TBili  0.5  /  DBili  x   /  AST  24  /  ALT  29  /  AlkPhos  75  -15          Urinalysis Basic - ( 15 Benigno 2022 20:17 )    Color: Light Yellow / Appearance: Turbid / S.015 / pH: x  Gluc: x / Ketone: Negative  / Bili: Negative / Urobili: Negative   Blood: x / Protein: Negative / Nitrite: Negative   Leuk Esterase: Negative / RBC: 10 /hpf / WBC 1 /HPF   Sq Epi: x / Non Sq Epi: 0 /hpf / Bacteria: Negative          06-15 @ 19:55  4.3  29      Thyroid Stimulating Hormone, Serum: 1.58 uIU/mL ( @ 16:54)          Consultant(s) Notes Reviewed:      Care Discussed with Consultants/Other Providers:

## 2022-06-16 NOTE — SWALLOW BEDSIDE ASSESSMENT ADULT - SLP PERTINENT HISTORY OF CURRENT PROBLEM
61M w/ hx of Cerebellar ataxia recent prolonged admission including MICU stay/ intubation from 4/18-5/13 for AMS and airway protection p/w AMS and cough. There was component of rapid neurocognitive decline w/ cerebellar ataxia and possible meningeal findings. Family refused meningeal biopsy. Endocrine work up for hypothermia that admission also unremarkable. As per wife, pt seemed closer to baseline and was able to answer appropriately by time of discharge to rehab. Pt has since been at rehab, starting roughly 4 days ago pt's wife noticed pt becoming more altered and lethargic. Has had decreased PO on pureed diet from discharge. Wife has also noticed wet cough around some time period. When wife felt these symptoms continued to worsen and rehab continued to make no interventions she sent him to hospital for further evaluation.

## 2022-06-16 NOTE — SWALLOW BEDSIDE ASSESSMENT ADULT - PHARYNGEAL PHASE
Delayed pharyngeal swallow/Decreased laryngeal elevation wet vocal quality and cough x 1 upon conclusion of PO trials (s/p thick puree tspns); increased delay of pharyngeal swallow perceived with thick purees/Delayed pharyngeal swallow/Decreased laryngeal elevation

## 2022-06-16 NOTE — SWALLOW BEDSIDE ASSESSMENT ADULT - ADDITIONAL RECOMMENDATIONS
Swallowing therapy f/u to determine Pt's candidacy for MBS vs FEES to objectively assess pharyngeal swallow and r/o aspiration as Pt's mentation improves.  If MD wishes to provide PO meds, suggest meds are crushed and provided with tspn of applesauce.

## 2022-06-16 NOTE — PATIENT PROFILE ADULT - FALL HARM RISK - HARM RISK INTERVENTIONS

## 2022-06-16 NOTE — SWALLOW BEDSIDE ASSESSMENT ADULT - COMMENTS
6/15/22 CT head; IMPRESSION: 1) chronic ischemic changes in both hemispheres. No acute infarct, hemorrhage, or mass.  2) ventricles are prominent, raising the possibility of communicating hydrocephalus. Similar appearance was noted on the prior CT.  Follow-up MR imaging of the brain recommended for further assessment.  Per H&P: CXR w/o clear etiology. Concern for aspiration given mental status changes. MD to order CT chest non-con. Cerebellar ataxia: No defined cause. Recent extensive neurologic, ID and endocrine work up on prior admission. Wife declined brain biopsy that admission.     Pt known to this dept from previous admission; May 6, 2022; MBS recd puree with mildly thickened liquids.

## 2022-06-16 NOTE — CONSULT NOTE ADULT - SUBJECTIVE AND OBJECTIVE BOX
CHIEF COMPLAINT:Patient is a 61y old  Male who presents with a chief complaint of AMS (16 Jun 2022 07:21)      HPI:  61M w/ hx of Cerebellar ataxia recent prolonged admission including MICU stay/ intubation from 4/18-5/13 for AMS and airway protection p/w AMS and cough. There was component of rapid neurocognitive decline w/ cerebellar ataxia and possible meningeal findings. Family refused meningeal biopsy. Endocrine work up for hypothermia that admission also unremarkable. As per wife, pt seemed closer to baseline and was able to answer appropriately by time of discharge to rehab. Pt has since been at rehab, starting roughly 4 days ago pt's wife noticed pt becoming more altered and lethargic. Has had decreased PO on pureed diet from discharge. Wife has also noticed wet cough around some time period. When wife felt these symptoms continued to worsen and rehab continued to make no interventions she sent him to hospital for further evaluation.  In ER: Given 1L LR.       PAST MEDICAL & SURGICAL HISTORY:  H/O cerebellar ataxia      No significant past surgical history          MEDICATIONS  (STANDING):  dextrose 5% + sodium chloride 0.45%. 1000 milliLiter(s) (80 mL/Hr) IV Continuous <Continuous>  enoxaparin Injectable 40 milliGRAM(s) SubCutaneous every 24 hours    MEDICATIONS  (PRN):  acetaminophen     Tablet .. 650 milliGRAM(s) Oral every 6 hours PRN Temp greater or equal to 38C (100.4F), Mild Pain (1 - 3)      FAMILY HISTORY:  FH: dementia (Mother)    FH: type 2 diabetes (Mother)    Family history of CVA (Father)        SOCIAL HISTORY:    [x ] Non-smoker  [ ] Smoker  [ ] Alcohol    Allergies    No Known Allergies    Intolerances    	    REVIEW OF SYSTEMS:  CONSTITUTIONAL: No fever, weight loss, or fatigue  EYES: No eye pain, visual disturbances, or discharge  ENT:  No difficulty hearing, tinnitus, vertigo; No sinus or throat pain  NECK: No pain or stiffness  RESPIRATORY: No cough, wheezing, chills or hemoptysis; No Shortness of Breath  CARDIOVASCULAR: No chest pain, palpitations, passing out, dizziness, or leg swelling  GASTROINTESTINAL: No abdominal or epigastric pain. No nausea, vomiting, or hematemesis; No diarrhea or constipation. No melena or hematochezia.  GENITOURINARY: No dysuria, frequency, hematuria, or incontinence  NEUROLOGICAL: No headaches, memory loss, loss of strength, numbness, or tremors  SKIN: No itching, burning, rashes, or lesions   LYMPH Nodes: No enlarged glands  ENDOCRINE: No heat or cold intolerance; No hair loss  MUSCULOSKELETAL: No joint pain or swelling; No muscle, back, or extremity pain  PSYCHIATRIC: No depression, anxiety, mood swings, or difficulty sleeping  HEME/LYMPH: No easy bruising, or bleeding gums  ALLERGY AND IMMUNOLOGIC: No hives or eczema	    [ ] All others negative	  [x ] Unable to obtain    PHYSICAL EXAM:  T(C): 34.5 (06-16-22 @ 17:02), Max: 37.2 (06-16-22 @ 05:55)  HR: 60 (06-16-22 @ 17:02) (60 - 125)  BP: 106/71 (06-16-22 @ 17:02) (103/74 - 131/71)  RR: 18 (06-16-22 @ 17:02) (16 - 22)  SpO2: 95% (06-16-22 @ 17:02) (94% - 98%)  Wt(kg): --  I&O's Summary      Appearance: Normal	  HEENT:   Normal oral mucosa, PERRL, EOMI	  Lymphatic: No lymphadenopathy  Cardiovascular: Normal S1 S2, No JVD, + murmurs, No edema  Respiratory: Lungs clear to auscultation	  Psychiatry: ?  Gastrointestinal:  Soft, Non-tender, + BS	  Skin: No rashes, No ecchymoses, No cyanosis	  Neurologic: ?  Extremities: Normal range of motion, No clubbing, cyanosis or edema  Vascular: Peripheral pulses palpable 2+ bilaterally    TELEMETRY: 	    ECG:  	  RADIOLOGY:  OTHER: 	  	  LABS:	 	    CARDIAC MARKERS:                              13.9   9.12  )-----------( 153      ( 16 Jun 2022 07:00 )             43.0     06-16    139  |  100  |  17  ----------------------------<  85  4.0   |  27  |  0.73    Ca    10.4      16 Jun 2022 07:00  Phos  3.3     06-15  Mg     1.6     06-16    TPro  8.0  /  Alb  4.1  /  TBili  0.6  /  DBili  x   /  AST  20  /  ALT  24  /  AlkPhos  68  06-16    proBNP:   Lipid Profile:   HgA1c:   TSH:       PREVIOUS DIAGNOSTIC TESTING:     < from: 12 Lead ECG (04.25.22 @ 16:47) >  Diagnosis Line ATRIAL FIBRILLATION WITH RAPID VENTRICULAR RESPONSE  LOW VOLTAGE QRS  T WAVE ABNORMALITY, CONSIDER ANTERIOR ISCHEMIA  ABNORMAL ECG  WHEN COMPARED WITH ECG OF 22-APR-2022 19:25,  SIGNIFICANT CHANGES HAVE OCCURRED  RHYTHM HAS CHANGED    < from: TTE with Doppler (w/Cont) (04.22.22 @ 17:39) >  Mitral Valve: Normal mitral valve.  Aortic Valve/Aorta: Calcified aortic valve with normal  opening.  Normal aortic root size.  Left Atrium: Normal left atrium.  Left Ventricle: Endocardial visualization enhanced with  intravenous injection of Ultrasonic Enhancing Agent  (Lumason).  Normal left ventricular internal dimensions and wall  thickness.  Normal left ventricular systolic function. No segmental  wall motion abnormalities.  Normal diastolic function.  Right Heart: Normal right atrium. Normal right ventricular  size and function.  Normal tricuspid valve. Mild tricuspid regurgitation.  Normal pulmonic valve.  Pericardium/Pleura: Trace pericardial effusion.  Hemodynamic: No evidence of pulmonary hypertension.  ------------------------------------------------------------------------  Conclusions:  Endocardial visualization enhanced with intravenous  injection of Ultrasonic Enhancing Agent (Lumason).  Normal leftventricular systolic function. No segmental  wall motion abnormalities.    < from: CT Chest No Cont (06.15.22 @ 23:45) >  IMPRESSION:  Bilateral lower lobe tree-in-bud opacities, right greater than left,   differential includes inflammatory versus infectious bronchiolitis.    Stable likely enlarged intrathoracic lymph nodes.      pt with sig medical and cardiac hx.  events has noted  neuro and id noted  may need AC if no contraindication with a.fib as far as if cleared by neuro  will adjust cardiac meds  no aggressive work up as per wife  dc planning as per medicine  fu as out pt

## 2022-06-16 NOTE — SWALLOW BEDSIDE ASSESSMENT ADULT - SWALLOW EVAL: DIAGNOSIS
Pt is a 62 y/o male with hx of Cerebellar ataxia and dysphagia, recent prolonged admission including MICU stay/ intubation from 4/18-5/13 for AMS and airway protection,  now adm w/ acute encephalopathy and cough who presents with oropharyngeal dysphagia. Orientation to bolus and oral awareness are impaired.  Oral grading, formation and control of bolus are impaired with suspected a-p spillage of bolus in setting of delayed pharyngeal swallow (up to 30 seconds).  Laryngeal elevation palpated and may be decreased.  Wet vocal quality with cough noted upon initial phonation prior to PO trials and may be indicative of reduced management of secretions.  In addition, +s/s of laryngeal penetration/aspiration evident following puree trials x 1.  Pt also presents with cognitive linguistic deficits.

## 2022-06-16 NOTE — PROVIDER CONTACT NOTE (OTHER) - ACTION/TREATMENT ORDERED:
PA aware, do rectal temp as per PA. [[id number]] [[id number]],[[id number]],[[id number]],[[id number]],[[id number]]

## 2022-06-16 NOTE — PROVIDER CONTACT NOTE (OTHER) - ACTION/TREATMENT ORDERED:
PA aware, keep on bear hugger and recheck rectal temp in 1 hour. Continue to monitor and notify of any changes PA aware, keep on bear hugger until temp is 98F, recheck rectal temp in 1 hour. Continue to monitor and notify of any changes

## 2022-06-16 NOTE — PROGRESS NOTE ADULT - ASSESSMENT
60 yo male      with a PMHx of cerebellar ataxia    prior  visit  to  ED on 4/17/22 due to AMS preceded by 1 week of fatigue/weakness and episodic incontinence.      was  intubated for worsening clinical level depressed consciousness w/ cognitive decline.  per  neuro,  pt has underlying cerebellar ataxia w/ rapid neurocognitive decline of undetermined etiology.   and  infectious  and  malignancy  was  ruled  outt/  and  per   neuro  note ,no further workup at this time from a neurology standpoint   flow cytometry 4/28 showing nonspecific results 'degenerated sample, small lymphocytes'   nsgy consulted for meningeal bx, family refusing    CSF cytopathology 4/22 - increased number of large mononuclear cells in a background of few small lymphocytes, monocytes and neutrophils, cannot exclude a lymphoproliferative disorder versus an inflammatory process.    CSF cytopathology 4/29 - significant increased number of small lymphocytes with rare monocytes/ seen by  oncology  dr de leon,  no  intervention      now  admitted    c/o  ams,  a s begore   h/o  cerebellar  ataxia,  with  no defined  cause  found   pt  with  bradycardia, ?  central, seen  by  card/  echo  on 4/2022,  normal  ef   now  with  hypothermia,   as  before  pt  has  h/o intermittent   hypothermia,  not reated  to  any   infectious   process.  rather  , it  seems to be  dysautonomia/  with  h/o normal  cortosol level  prior  CT  c/ap,  no  malignant  process   most  of  these  issues, do  not  have   a good  rx  per  swallow  eval, was  on  modified  diet/  fs  noted  endo  dr ovalle  pt with intermittent  periods   of hypothermia,  suspect  from central process.  not infectious  mediated/  with  no  good  rx for this  normal  wbc/  npo  per  admitting team/  swallow  eval/ normal  cxr                     62 yo male      with a PMHx of cerebellar ataxia    prior  visit  to  ED on 4/17/22 due to AMS preceded by 1 week of fatigue/weakness and episodic incontinence.      was  intubated for worsening clinical level depressed consciousness w/ cognitive decline.  per  neuro,  pt has underlying cerebellar ataxia w/ rapid neurocognitive decline of undetermined etiology.   and  infectious  and  malignancy  was  ruled  outt/  and  per   neuro  note ,no further workup at this time from a neurology standpoint   flow cytometry 4/28 showing nonspecific results 'degenerated sample, small lymphocytes'   nsgy consulted for meningeal bx, family refusing    CSF cytopathology 4/22 - increased number of large mononuclear cells in a background of few small lymphocytes, monocytes and neutrophils, cannot exclude a lymphoproliferative disorder versus an inflammatory process.    CSF cytopathology 4/29 - significant increased number of small lymphocytes with rare monocytes/ seen by  oncology  dr de leon,  no  intervention      now  admitted    c/o  ams,  a s begore   h/o  cerebellar  ataxia,  with  no defined  cause  found   pt  with  bradycardia, ?  central, seen  by  card/  echo  on 4/2022,  normal  ef   now  with  hypothermia,   as  before  pt  has  h/o intermittent   hypothermia,  not reated  to  any   infectious   process.  rather  , it  seems to be  dysautonomia/  with  h/o normal  cortosol level  prior  CT  c/ap,  no  malignant  process   most  of  these  issues, do  not  have   a good  rx  per  swallow  eval, was  on  modified  diet/  fs  noted  endo  dr ovalle  pt with intermittent  periods   of hypothermia,  suspect  from central process.  not infectious  mediated/  with  no  good  rx for this  normal  wbc/  npo  per  admitting team/  swallow  eval/ normal  cxr  arousable, not communicative/   which is  about his  base line  with few   alert periods

## 2022-06-16 NOTE — SWALLOW BEDSIDE ASSESSMENT ADULT - DIET PRIOR TO ADMI
Pt wife reports the Pt has not eaten since 6/13/33 when he ate a few tspns of pureed food; +mildly thickened liquids pta.

## 2022-06-16 NOTE — ED ADULT NURSE REASSESSMENT NOTE - NS ED NURSE REASSESS COMMENT FT1
Pt found to have rectal temp 93.7. YOSELIN [place] [id number] called and made aware. To order bear hugger warming blanket.

## 2022-06-16 NOTE — SWALLOW BEDSIDE ASSESSMENT ADULT - SLP GENERAL OBSERVATIONS
Pt asleep on stretcher in [facility] and easily aroused to verbal stimulation; SP02 98% on room air.  Pt remained primarily non-verbal although he was stimulable to produce a few words and to imitate some words.  Upon initial phonation, wet vocal quality perceived with coughing noted.  Pt followed simple self centered commands inconsistently and remained cooperative during assessment.

## 2022-06-16 NOTE — CHART NOTE - NSCHARTNOTEFT_GEN_A_CORE
CC: temperature 93.7F      HPI:  Called by RN to evaluate patient for temperature 93.7F rectally. Patient seen and assessed at bedside in [facility] section of ED, bed 11. Patient is alert, awake, NAD. Of note, patient is unable to appropriatley respond to questions secondary to underlying dementia but is able to follow commands.         ROS:  Unable to get accurate ROS due to dementia      PAST MEDICAL & SURGICAL HISTORY:  H/O cerebellar ataxia  No significant past surgical history          Vital Signs Last 24 Hrs  T(C): 34.3 (16 Jun 2022 00:04), Max: 36.4 (15 Benigno 2022 19:05)  T(F): 93.7 (16 Jun 2022 00:04), Max: 97.6 (15 Benigno 2022 19:05)  HR: 75 (16 Jun 2022 00:04) (73 - 92)  BP: 112/78 (16 Jun 2022 00:04) (108/80 - 114/90)  RR: 22 (16 Jun 2022 00:04) (16 - 22)  SpO2: 97% (16 Jun 2022 00:04) (96% - 97%)      Physical Exam:  General: WN/WD male laying in bed, NAD, nontoxic appearing, following commands  Head:  NC/AT  CV: RRR, S1S2   Respiratory: CTA B/L, nonlabored on room air  MSK: No BLLE edema, + peripheral pulses  Skin: (+) warm, dry         Labs:                        15.3   5.69  )-----------( 141      ( 15 Benigno 2022 20:20 )             48.7     06-15    140  |  100  |  16  ----------------------------<  83  4.3   |  25  |  0.76    Ca    10.9<H>      15 Jun 2022 20:20  Phos  3.3     06-15  Mg     1.8     06-15    TPro  8.8<H>  /  Alb  4.3  /  TBili  0.5  /  DBili  x   /  AST  24  /  ALT  29  /  AlkPhos  75  06-15      Urinalysis Basic - ( 06-15 @ 20:17 )  Color: Negative / Appearance: Negative / SG: Many amorphous precipitates seen / pH: Negative  Gluc: Trace / Ketone: Light Yellow  / Bili: Few / Urobili: 10   Blood: 0 / Protein: 3 / Nitrite: Negative   Leuk Esterase: Negative / RBC: 1.015 / WBC --   Sq Epi: Negative / Non Sq Epi: 10 / Bacteria: 7.0          Radiology:  < from: CT Head No Cont (06.15.22 @ 20:33) >  1)  chronic ischemic changes in both hemispheres. No acute infarct,   hemorrhage, or mass.  2)  ventricles are prominent, raising the possibility of communicating   hydrocephalus. Similar appearance was noted on the prior CT.  Follow-up MR imaging of the brain recommended for further assessment.  < end of copied text >              Assessment & Plan:  61M PMH cerebellar ataxia recent admit 4/18-5/13 iso AMS, weakness, incontinence course c/b MICU stay intubated for airway protection. Condition seemingly attributed to rapid neurocognitive decline of cerebellar ataxia, family refused meningeal bx at that time, as well as bradycardia, hypothermia presumed central in etiology, dc'd to [facility] now returning iso cough, weakness, decline in mental status per wife at bedside.  Patient now presenting acutely with temperature 93.7F rectally. Chart review noted with hypothermia episodes last admission.       #Hypothermia of unclear etiology- r/o infectious etiology  -Vital signs hemodynamically stable  -Labs without leukocytosis, lactate 1.5   -Mendel hugger initiated  -BCx x2 ordered  -UCx collected 6/15, in lab  -COVID PCR/RVP (6/15) negative  -CXR (6/15) with "clear lungs"  -Will monitor off antibiotics at this time; plan to initiate broad-spectrum antibiotics if patient becomes hemodynamically unstable  -Vital signs Q4h  -Consider ID evaluation if indicated  -Will continue to closely monitor patient/vitals  -Will discuss with day team, attending to follow       Ida Toro PA-C  Dept of Medicine  [id number]

## 2022-06-17 DIAGNOSIS — R68.0 HYPOTHERMIA, NOT ASSOCIATED WITH LOW ENVIRONMENTAL TEMPERATURE: ICD-10-CM

## 2022-06-17 DIAGNOSIS — Z29.9 ENCOUNTER FOR PROPHYLACTIC MEASURES, UNSPECIFIED: ICD-10-CM

## 2022-06-17 LAB
APPEARANCE UR: CLEAR — SIGNIFICANT CHANGE UP
BACTERIA # UR AUTO: ABNORMAL
BILIRUB UR-MCNC: NEGATIVE — SIGNIFICANT CHANGE UP
COLOR SPEC: SIGNIFICANT CHANGE UP
CORTIS AM PEAK SERPL-MCNC: 15.6 UG/DL — SIGNIFICANT CHANGE UP (ref 6–18.4)
CULTURE RESULTS: NO GROWTH — SIGNIFICANT CHANGE UP
DIFF PNL FLD: ABNORMAL
EPI CELLS # UR: 1 — SIGNIFICANT CHANGE UP
FOLATE SERPL-MCNC: 15.2 NG/ML — SIGNIFICANT CHANGE UP
GLUCOSE BLDC GLUCOMTR-MCNC: 110 MG/DL — HIGH (ref 70–99)
GLUCOSE BLDC GLUCOMTR-MCNC: 132 MG/DL — HIGH (ref 70–99)
GLUCOSE BLDC GLUCOMTR-MCNC: 69 MG/DL — LOW (ref 70–99)
GLUCOSE UR QL: NEGATIVE — SIGNIFICANT CHANGE UP
HYALINE CASTS # UR AUTO: 0 /LPF — SIGNIFICANT CHANGE UP (ref 0–2)
KETONES UR-MCNC: NEGATIVE — SIGNIFICANT CHANGE UP
LEUKOCYTE ESTERASE UR-ACNC: ABNORMAL
NITRITE UR-MCNC: NEGATIVE — SIGNIFICANT CHANGE UP
PH UR: 8 — SIGNIFICANT CHANGE UP (ref 5–8)
PROT UR-MCNC: ABNORMAL
RBC CASTS # UR COMP ASSIST: >50 /HPF — HIGH (ref 0–4)
SP GR SPEC: 1.02 — SIGNIFICANT CHANGE UP (ref 1.01–1.02)
SPECIMEN SOURCE: SIGNIFICANT CHANGE UP
UROBILINOGEN FLD QL: NEGATIVE — SIGNIFICANT CHANGE UP
VIT B12 SERPL-MCNC: >2000 PG/ML — HIGH (ref 232–1245)
WBC UR QL: >50

## 2022-06-17 PROCEDURE — 99255 IP/OBS CONSLTJ NEW/EST HI 80: CPT

## 2022-06-17 PROCEDURE — 99233 SBSQ HOSP IP/OBS HIGH 50: CPT

## 2022-06-17 RX ORDER — CEFTRIAXONE 500 MG/1
INJECTION, POWDER, FOR SOLUTION INTRAMUSCULAR; INTRAVENOUS
Refills: 0 | Status: COMPLETED | OUTPATIENT
Start: 2022-06-17 | End: 2022-06-21

## 2022-06-17 RX ORDER — SODIUM CHLORIDE 9 MG/ML
1000 INJECTION, SOLUTION INTRAVENOUS
Refills: 0 | Status: DISCONTINUED | OUTPATIENT
Start: 2022-06-17 | End: 2022-06-27

## 2022-06-17 RX ORDER — DEXTROSE 50 % IN WATER 50 %
15 SYRINGE (ML) INTRAVENOUS ONCE
Refills: 0 | Status: DISCONTINUED | OUTPATIENT
Start: 2022-06-17 | End: 2022-06-27

## 2022-06-17 RX ORDER — DEXTROSE 50 % IN WATER 50 %
25 SYRINGE (ML) INTRAVENOUS ONCE
Refills: 0 | Status: DISCONTINUED | OUTPATIENT
Start: 2022-06-17 | End: 2022-06-27

## 2022-06-17 RX ORDER — CEFTRIAXONE 500 MG/1
1000 INJECTION, POWDER, FOR SOLUTION INTRAMUSCULAR; INTRAVENOUS EVERY 24 HOURS
Refills: 0 | Status: COMPLETED | OUTPATIENT
Start: 2022-06-18 | End: 2022-06-20

## 2022-06-17 RX ORDER — DEXTROSE 50 % IN WATER 50 %
12.5 SYRINGE (ML) INTRAVENOUS ONCE
Refills: 0 | Status: DISCONTINUED | OUTPATIENT
Start: 2022-06-17 | End: 2022-06-27

## 2022-06-17 RX ORDER — CEFTRIAXONE 500 MG/1
1000 INJECTION, POWDER, FOR SOLUTION INTRAMUSCULAR; INTRAVENOUS ONCE
Refills: 0 | Status: COMPLETED | OUTPATIENT
Start: 2022-06-17 | End: 2022-06-17

## 2022-06-17 RX ORDER — GLUCAGON INJECTION, SOLUTION 0.5 MG/.1ML
1 INJECTION, SOLUTION SUBCUTANEOUS ONCE
Refills: 0 | Status: DISCONTINUED | OUTPATIENT
Start: 2022-06-17 | End: 2022-06-27

## 2022-06-17 RX ADMIN — SODIUM CHLORIDE 80 MILLILITER(S): 9 INJECTION, SOLUTION INTRAVENOUS at 11:36

## 2022-06-17 RX ADMIN — CEFTRIAXONE 100 MILLIGRAM(S): 500 INJECTION, POWDER, FOR SOLUTION INTRAMUSCULAR; INTRAVENOUS at 17:25

## 2022-06-17 RX ADMIN — ENOXAPARIN SODIUM 40 MILLIGRAM(S): 100 INJECTION SUBCUTANEOUS at 06:01

## 2022-06-17 NOTE — CONSULT NOTE ADULT - ATTENDING COMMENTS
61 M w/ hx of Cerebellar ataxia recent prolonged admission including MICU stay/ intubation from 4/18-5/13 for AMS and airway protection p/w AMS and cough  Hypothermia, no leukocytosis  On prior eval had multiple episodes of hypothermia  UA+, UCX neg  Complains of dysuria--unclear reliability  Overall,  1) Hypothermia  - Unclear if metabolic or if due to underlying infection; longstanding, chronic process  - Trend temperatures  2) Abnormal UA  - UCX NGTD, complains of dysuria  - Ceftriaxone 1g q 24  - F/U pending repeat UCX  3) AMS  - Uncertain baseline, AOx2?  - Monitor to normal    Isaias Buckley MD  Contact on TEAMS messaging from 9am - 5pm  From 5pm-9am, and on weekends call [phone] 61 M w/ hx of Cerebellar ataxia recent prolonged admission including MICU stay/ intubation from 4/18-5/13 for AMS and airway protection p/w AMS and cough  Hypothermia, no leukocytosis  On prior eval had multiple episodes of hypothermia  UA+, UCX neg  Complains of dysuria--unclear reliability  Overall,  1) Hypothermia  - Unclear if metabolic or if due to underlying infection; longstanding, chronic process  - Trend temperatures  2) Abnormal UA  - UCX NGTD, complains of dysuria  - Ceftriaxone 1g q 24  - F/U pending repeat UCX  3) AMS  - Uncertain baseline, AOx2?  - Monitor to normal    Isaias Buckley MD  Contact on TEAMS messaging from 9am - 5pm  From 5pm-9am, and on weekends call [phone]    I was physically present for the key portions of the evaluation and management service provided. I saw and examined the patient. I agree with the above history, physical, and plan except for any discrepancies which I have documented in “Attending Statements.” Please refer to “Attending Statements” for final plan.

## 2022-06-17 NOTE — CONSULT NOTE ADULT - ASSESSMENT
61M w/ hx of Cerebellar ataxia recent prolonged admission including MICU stay/ intubation from 4/18-5/13 for AMS and airway protection p/w AMS and cough. There was component of rapid neurocognitive decline w/ cerebellar ataxia and possible meningeal findings. Family refused meningeal biopsy. Endocrine work up for hypothermia that admission also unremarkable. As per wife, pt seemed closer to baseline and was able to answer appropriately by time of discharge to rehab. Pt has since been at rehab, starting roughly 4 days ago pt's wife noticed pt becoming more altered and lethargic. Has had decreased PO on pureed diet from discharge. Wife has also noticed wet cough around some time period. When wife felt these symptoms continued to worsen and rehab continued to make no interventions she sent him to hospital for further evaluation.  In ER: Given 1L LR.   pt is well known to me from the previous admission with hx of a.fib who presented with similar complain and change of mental status  will call wife to get a history  pt refused meningeal biopsy  agree with hydration  will add midodrine if needed  neuro consult  cortisol level  will check old record  hx of a.fib ?AC  
62 y/o M PMHx cerebellar ataxia with recent prolonged admission including MICU with intubation, presented with AMS. Intermittent hypothermia since admission. Now endorsing some dysuria, with UA showing pyuria.     Impression:  #Dysuria, Pyuria - will treat for UTI  #Hypothermia - unlikely to be related to infection, has had long-standing hypothermia    Recs:  - start ceftriaxone 1G IV q24H for possible UTI  - f/u urine culture  - monitor temp    Kyle Aponte MD  Infectious Disease Fellow  Available on [employer] Teams  Before 9AM or after 5PM: [phone]

## 2022-06-17 NOTE — PROGRESS NOTE ADULT - PROBLEM SELECTOR PLAN 1
Pt awake and alert but not responding to questions, slurring speech. Note baseline CT head. Will cont. work up for possible metabolic etiologies. UA also negative  -CT chest showed b/l lower lobe tree-in-bud opacities R>L- inflammatory vs infectiou bronchiolitis, stable enlarged intrathoracic lymph nodes  -Continuous pulse oximetry given recent admission to MICU after intubation for airway protection  -Aspiration and falls precautions  - F/u B12, folate, TSH  -F/u urine culture, BCx Pt awake and alert but not responding to questions, slurring speech. Note baseline CT head. Will cont. work up for possible metabolic etiologies. UA also negative  -CT chest showed b/l lower lobe tree-in-bud opacities R>L- inflammatory vs infectiou bronchiolitis, stable enlarged intrathoracic lymph nodes  -Continuous pulse oximetry given recent admission to MICU after intubation for airway protection  -Aspiration and falls precautions  - Syphillis screen negative   - F/u B12, folate, TSH  -F/u urine culture, BCx Pt awake and alert but not responding to questions, slurring speech. Note baseline CT head. Will cont. work up for possible metabolic etiologies. UA also negative  -CT chest showed b/l lower lobe tree-in-bud opacities R>L- inflammatory vs infectiou bronchiolitis, stable enlarged intrathoracic lymph nodes  -Continuous pulse oximetry given recent admission to MICU after intubation for airway protection  -Aspiration and falls precautions  - Syphillis screen negative   - F/u B12, folate, TSH  - UCx negative, BCx NGTD

## 2022-06-17 NOTE — PROGRESS NOTE ADULT - PROBLEM SELECTOR PLAN 2
CXR w/o clear etiology. Concern for aspiration given mental status changes.  -CT chest non-con showed b/l lower lobe tree-in-bud opacities R>L- inflammatory vs infectiou bronchiolitis, stable enlarged intrathoracic lymph nodes  -Procal 0.07  -Rapid RVP wnl  -Trend cbc and fever curve  -NPO for now per S+S CXR w/o clear etiology. Concern for aspiration given mental status changes.  - cough improving  -CT chest non-con showed b/l lower lobe tree-in-bud opacities R>L- inflammatory vs infectiou bronchiolitis, stable enlarged intrathoracic lymph nodes  -Procal 0.07  -Rapid RVP wnl  -Trend cbc and fever curve  -NPO for now per S+S CXR w/o clear etiology. Concern for aspiration given mental status changes.  - cough improving  -CT chest non-con showed b/l lower lobe tree-in-bud opacities R>L- inflammatory vs infectiou bronchiolitis, stable enlarged intrathoracic lymph nodes  -Procal 0.07  -Rapid RVP wnl  -Trend cbc and fever curve  -Diet: Pureed with mildly thick liquids

## 2022-06-17 NOTE — PROGRESS NOTE ADULT - ASSESSMENT
61M w/ hx of Cerebellar ataxia recent prolonged admission including MICU stay/ intubation from 4/18-5/13 for AMS and airway protection p/w acute encephalopathy and cough, with known Hx of hypothermia thought be dysautonomia, cardiology following

## 2022-06-17 NOTE — PROGRESS NOTE ADULT - SUBJECTIVE AND OBJECTIVE BOX
CARDIOLOGY     PROGRESS  NOTE   ________________________________________________    CHIEF COMPLAINT:Patient is a 61y old  Male who presents with a chief complaint of AMS (17 Jun 2022 06:25)  no complain.  	  REVIEW OF SYSTEMS:  CONSTITUTIONAL: No fever, weight loss, or fatigue  EYES: No eye pain, visual disturbances, or discharge  ENT:  No difficulty hearing, tinnitus, vertigo; No sinus or throat pain  NECK: No pain or stiffness  RESPIRATORY: No cough, wheezing, chills or hemoptysis; No Shortness of Breath  CARDIOVASCULAR: No chest pain, palpitations, passing out, dizziness, or leg swelling  GASTROINTESTINAL: No abdominal or epigastric pain. No nausea, vomiting, or hematemesis; No diarrhea or constipation. No melena or hematochezia.  GENITOURINARY: No dysuria, frequency, hematuria, or incontinence  NEUROLOGICAL: No headaches, memory loss, loss of strength, numbness, or tremors  SKIN: No itching, burning, rashes, or lesions   LYMPH Nodes: No enlarged glands  ENDOCRINE: No heat or cold intolerance; No hair loss  MUSCULOSKELETAL: No joint pain or swelling; No muscle, back, or extremity pain  PSYCHIATRIC: No depression, anxiety, mood swings, or difficulty sleeping  HEME/LYMPH: No easy bruising, or bleeding gums  ALLERGY AND IMMUNOLOGIC: No hives or eczema	    [ ] All others negative	  [ x] Unable to obtain    PHYSICAL EXAM:  T(C): 36.2 (06-17-22 @ 04:45), Max: 37 (06-16-22 @ 21:43)  HR: 62 (06-17-22 @ 04:45) (60 - 111)  BP: 105/68 (06-17-22 @ 04:45) (100/61 - 112/69)  RR: 18 (06-17-22 @ 04:45) (18 - 18)  SpO2: 96% (06-17-22 @ 04:45) (94% - 96%)  Wt(kg): --  I&O's Summary      Appearance: Normal	  HEENT:   Normal oral mucosa, PERRL, EOMI	  Lymphatic: No lymphadenopathy  Cardiovascular: Normal S1 S2, No JVD, + murmurs, No edema  Respiratory: Lungs clear to auscultation	  Psychiatry: sleeping  Gastrointestinal:  Soft, Non-tender, + BS	  Skin: No rashes, No ecchymoses, No cyanosis	  Extremities: Normal range of motion, No clubbing, cyanosis or edema  Vascular: Peripheral pulses palpable 2+ bilaterally    MEDICATIONS  (STANDING):  dextrose 5% + sodium chloride 0.45%. 1000 milliLiter(s) (80 mL/Hr) IV Continuous <Continuous>  dextrose 5%. 1000 milliLiter(s) (100 mL/Hr) IV Continuous <Continuous>  dextrose 5%. 1000 milliLiter(s) (50 mL/Hr) IV Continuous <Continuous>  dextrose 50% Injectable 25 Gram(s) IV Push once  dextrose 50% Injectable 12.5 Gram(s) IV Push once  dextrose 50% Injectable 25 Gram(s) IV Push once  enoxaparin Injectable 40 milliGRAM(s) SubCutaneous every 24 hours  glucagon  Injectable 1 milliGRAM(s) IntraMuscular once  influenza   Vaccine 0.5 milliLiter(s) IntraMuscular once      TELEMETRY: 	    ECG:  	  RADIOLOGY:  OTHER: 	  	  LABS:	 	    CARDIAC MARKERS:                                13.9   9.12  )-----------( 153      ( 16 Jun 2022 07:00 )             43.0     06-16    139  |  100  |  17  ----------------------------<  85  4.0   |  27  |  0.73    Ca    10.4      16 Jun 2022 07:00  Phos  3.3     06-15  Mg     1.6     06-16    TPro  8.0  /  Alb  4.1  /  TBili  0.6  /  DBili  x   /  AST  20  /  ALT  24  /  AlkPhos  68  06-16    proBNP:   Lipid Profile:   HgA1c:   TSH:         Assessment and plan  ---------------------------  61M w/ hx of Cerebellar ataxia recent prolonged admission including MICU stay/ intubation from 4/18-5/13 for AMS and airway protection p/w AMS and cough. There was component of rapid neurocognitive decline w/ cerebellar ataxia and possible meningeal findings. Family refused meningeal biopsy. Endocrine work up for hypothermia that admission also unremarkable. As per wife, pt seemed closer to baseline and was able to answer appropriately by time of discharge to rehab. Pt has since been at rehab, starting roughly 4 days ago pt's wife noticed pt becoming more altered and lethargic. Has had decreased PO on pureed diet from discharge. Wife has also noticed wet cough around some time period. When wife felt these symptoms continued to worsen and rehab continued to make no interventions she sent him to hospital for further evaluation.  In ER: Given 1L LR.   pt is well known to me from the previous admission with hx of a.fib who presented with similar complain and change of mental status  will call wife to get a history  pt refused meningeal biopsy  agree with hydration  will add midodrine if needed  neuro consult  cortisol level  will check old record  hx of a.fib ?AC    	         Admitted

## 2022-06-17 NOTE — PROGRESS NOTE ADULT - NSPROGADDITIONALINFOA_GEN_ALL_CORE
Agree with above plan. Resident note reviewed in full. Agree with above plan. Resident note reviewed in full.  Monitor on Puree diet.

## 2022-06-17 NOTE — PROGRESS NOTE ADULT - PROBLEM SELECTOR PLAN 3
- Patient with hypothermia, known to be hypothermic previously  - Likely central process, dysautonomia   - Warming blankets as needed  - F/u infectious w/u  - Monitor off Antibiotics

## 2022-06-17 NOTE — CHART NOTE - NSCHARTNOTEFT_GEN_A_CORE
**FULL NOTE TO FOLLOW **FULL NOTE TO FOLLOW  61M w/ hx of Cerebellar ataxia recent prolonged admission including MICU stay/ intubation from 4/18-5/13 for AMS and airway protection p/w acute encephalopathy and cough, with known Hx of hypothermia thought be dysautonomia.  CXR w/o clear etiology. Concern for aspiration given mental status changes.  - cough improving  -CT chest non-con showed b/l lower lobe tree-in-bud opacities R>L- inflammatory vs infectiou bronchiolitis, stable enlarged intrathoracic lymph nodes    Pt is known to this service. Seen for MBS on 5/6/22, with no aspiration observed, however, "due to patient's waxing/wanning levels of alertness, cognitive deficits, and prolonged oral phase, recommend a puree and mildly thick liquids. Patient will benefit from breaks in between trials due to prolonged oral phase."     On this admission, seen for initial bedside swallow evaluation on 6/16/22, with recommendations for NPO, with non-oral nutrition/hydration/medications.     Pt seen for re-evaluation of swallow function on this date. Encountered patient in bed, awake, on room air. VSS. Pt alert, oriented to self, place, and current month, though easily distractible and frequently with latency in response time. Able to follow some simple directives. Oral cavity clear/ clean. No signs of reduced secretion management. Pt provided with PO trials of thin puree, thick puree, and mildly thick liquids via teaspoon. Swallow function characterized by adequate orientation to the feeding task/ stripping of the utensil, delayed oral transit with intermittent bolus holding (~30 seconds) and suspected delay in pharyngeal swallow trigger. No overt signs of laryngeal penetration/aspiration. No oral residue or pocketing. Pt left in NAD.     Impressions: Pt presents with a moderate oral and suspected mild pharyngeal dysphagia, superimposed upon cognitive deficits. Pt appears safe to tolerate a conservative dysphagia diet, however remains at risk for malnutrition/dehydration given inefficiency of swallow.     Recommendations:  1. Initiate a puree diet with mildly thick liquids via teaspoon.  2. 100% assistance - allow time between swallows/ ensure patient has swallowed before presenting a bolus   3. Monitor for changet in mentation and/ or s/s aspiration/laryngeal penetration. If noted:  D/C p.o. intake, provide non-oral nutrition/hydration/meds, and contact this service @ [phone]   4. Consult Registered Dietician/ consider calorie count   5. This service will continue to follow     Angela Schaefer CCC-SLP pgr #[phone] 61M w/ hx of Cerebellar ataxia recent prolonged admission including MICU stay/ intubation from 4/18-5/13 for AMS and airway protection p/w acute encephalopathy and cough, with known Hx of hypothermia thought be dysautonomia.  CXR w/o clear etiology. Concern for aspiration given mental status changes.  - cough improving  -CT chest non-con showed b/l lower lobe tree-in-bud opacities R>L- inflammatory vs infectiou bronchiolitis, stable enlarged intrathoracic lymph nodes    Pt is known to this service. Seen for MBS on 5/6/22, with no aspiration observed, however, "due to patient's waxing/wanning levels of alertness, cognitive deficits, and prolonged oral phase, recommend a puree and mildly thick liquids. Patient will benefit from breaks in between trials due to prolonged oral phase."     On this admission, seen for initial bedside swallow evaluation on 6/16/22, with recommendations for NPO, with non-oral nutrition/hydration/medications.     Pt seen for re-evaluation of swallow function on this date. Encountered patient in bed, awake, on room air. VSS. Pt alert, oriented to self, place, and current month, though easily distractible and frequently with latency in response time. Able to follow some simple directives. Oral cavity clear/ clean. No signs of reduced secretion management. Pt provided with PO trials of thin puree, thick puree, and mildly thick liquids via teaspoon. Swallow function characterized by adequate orientation to the feeding task/ stripping of the utensil, delayed oral transit with intermittent bolus holding (~30 seconds) and suspected delay in pharyngeal swallow trigger. No overt signs of laryngeal penetration/aspiration. No oral residue or pocketing. Pt left in NAD.     Impressions: Pt presents with a moderate oral and suspected mild pharyngeal dysphagia, superimposed upon cognitive deficits. Pt appears safe to tolerate a conservative dysphagia diet, however remains at risk for malnutrition/dehydration given inefficiency of swallow.     Recommendations:  1. Initiate a puree diet with mildly thick liquids via teaspoon.  2. 100% assistance - allow time between swallows/ ensure patient has swallowed before presenting a bolus   3. Monitor for changet in mentation and/ or s/s aspiration/laryngeal penetration. If noted:  D/C p.o. intake, provide non-oral nutrition/hydration/meds, and contact this service @ [phone]   4. Consult Registered Dietician/ consider calorie count   5. This service will continue to follow     MD Gastelum notified via Teams     Angela Schaefer, CCC-SLP Teams or [facility] #[phone]

## 2022-06-17 NOTE — CONSULT NOTE ADULT - SUBJECTIVE AND OBJECTIVE BOX
Patient is a 61y old  Male who presents with a chief complaint of AMS (2022 07:47)    HPI:  61M w/ hx of Cerebellar ataxia recent prolonged admission including MICU stay/ intubation from - for AMS and airway protection p/w AMS and cough. There was component of rapid neurocognitive decline w/ cerebellar ataxia and possible meningeal findings. Family refused meningeal biopsy. Endocrine work up for hypothermia that admission also unremarkable. As per wife, pt seemed closer to baseline and was able to answer appropriately by time of discharge to rehab. Pt has since been at rehab, starting roughly 4 days ago pt's wife noticed pt becoming more altered and lethargic. Has had decreased PO on pureed diet from discharge. Wife has also noticed wet cough around some time period. When wife felt these symptoms continued to worsen and rehab continued to make no interventions she sent him to hospital for further evaluation.    In ER: Given 1L LR (15 Benigno 2022 22:41)       REVIEW OF SYSTEMS  [  ] ROS unobtainable because:    [  ] All other systems negative except as noted below    Constitutional:  [ ] fever [ ] chills  [ ] weight loss  [ ]night sweat  [ ]poor appetite/PO intake [ ]fatigue   Skin:  [ ] rash [ ] phlebitis	  Eyes: [ ] icterus [ ] pain  [ ] discharge	  ENMT: [ ] sore throat  [ ] thrush [ ] ulcers [ ] exudates [ ]anosmia  Respiratory: [ ] dyspnea [ ] hemoptysis [ ] cough [ ] sputum	  Cardiovascular:  [ ] chest pain [ ] palpitations [ ] edema	  Gastrointestinal:  [ ] nausea [ ] vomiting [ ] diarrhea [ ] constipation [ ] pain	  Genitourinary:  [ ] dysuria [ ] frequency [ ] hematuria [ ] discharge [ ] flank pain  [ ] incontinence  Musculoskeletal:  [ ] myalgias [ ] arthralgias [ ] arthritis  [ ] back pain  Neurological:  [ ] headache [ ] weakness [ ] seizures  [ ] confusion/altered mental status    prior hospital charts reviewed [V]  primary team notes reviewed [V]  other consultant notes reviewed [V]    PAST MEDICAL & SURGICAL HISTORY:  H/O cerebellar ataxia      No significant past surgical history          FAMILY HISTORY:  FH: dementia (Mother)    FH: type 2 diabetes (Mother)    Family history of CVA (Father)        SOCIAL HISTORY:  - Denied smoking/vaping/alcohol/recreational drug use    Allergies  No Known Allergies        ANTIMICROBIALS:      ANTIMICROBIALS (past 90 days):   MEDICATIONS  (STANDING):        MEDICATIONS  (STANDING):  acetaminophen     Tablet .. 650 every 6 hours PRN  dextrose 50% Injectable 25 once  dextrose 50% Injectable 12.5 once  dextrose 50% Injectable 25 once  dextrose Oral Gel 15 once PRN  enoxaparin Injectable 40 every 24 hours  glucagon  Injectable 1 once  influenza   Vaccine 0.5 once      VITALS:  Vital Signs Last 24 Hrs  T(F): 93.3 (22 @ 12:52), Max: 99 (22 @ 05:55)  Vital Signs Last 24 Hrs  HR: 56 (22 @ 12:52) (56 - 91)  BP: 106/72 (22 @ 12:52) (100/61 - 107/75)  RR: 18 (22 @ 12:52)  SpO2: 100% (22 @ 12:52) (95% - 100%)  Wt(kg): --    PHYSICAL EXAM:  Constitutional: non-toxic, no distress  HEAD/EYES: anicteric, no conjunctival injection  ENT:  supple, no thrush  Cardiovascular:   normal S1/S2, no murmur, no edema  Respiratory:  clear BS bilaterally, no wheezes, no rales  GI:  soft, non-tender, normal bowel sounds  :  no coronel, no CVA tenderness  Musculoskeletal:  no synovitis, normal ROM  Neurologic: awake and alert,  no focal findings  Skin:  no rash, no erythema, no phlebitis  Heme/Onc: no lymphadenopathy   Psychiatric:  awake, alert, appropriate mood      Labs:                        13.9   9.12  )-----------( 153      ( 2022 07:00 )             43.0         139  |  100  |  17  ----------------------------<  85  4.0   |  27  |  0.73    Ca    10.4      2022 07:00  Phos  3.3     06-15  Mg     1.6         TPro  8.0  /  Alb  4.1  /  TBili  0.6  /  DBili  x   /  AST  20  /  ALT  24  /  AlkPhos  68        WBC Trend:  WBC Count: 9.12 (22 @ 07:00)  WBC Count: 5.69 (06-15-22 @ 20:20)    Auto Neutrophil #: 6.47 K/uL (22 @ 07:00)  Auto Neutrophil #: 3.74 K/uL (06-15-22 @ 20:20)  Auto Neutrophil #: 3.42 K/uL (22 @ 07:37)  Auto Neutrophil #: 4.09 K/uL (22 @ 01:24)  Auto Neutrophil #: 5.14 K/uL (22 @ 00:24)    Auto Eosinophil %: 1.3 % (22 @ 07:00)  Auto Eosinophil %: 2.6 % (06-15-22 @ 20:20)    Urinalysis Basic - ( 2022 11:28 )    Color: LIGHT BROWN / Appearance: Clear / S.017 / pH: x  Gluc: x / Ketone: Negative  / Bili: Negative / Urobili: Negative   Blood: x / Protein: 300 mg/dL / Nitrite: Negative   Leuk Esterase: Moderate / RBC: >50 /hpf / WBC >50   Sq Epi: x / Non Sq Epi: 1 / Bacteria: Many        MICROBIOLOGY:    MRSA PCR Result.: NotDetec (22 @ 09:39)  MRSA PCR Result.: NotDetec (22 @ 00:50)      Culture - Blood (collected 2022 00:30)  Source: .Blood Blood-Peripheral  Preliminary Report:    No growth to date.    Culture - Blood (collected 2022 00:15)  Source: .Blood Blood-Peripheral  Preliminary Report:    No growth to date.    Culture - Urine (collected 15 Benigno 2022 20:20)  Source: Clean Catch Clean Catch (Midstream)  Final Report:    No growth    Culture - Blood (collected 10 May 2022 07:19)  Source: .Blood Blood-Peripheral  Final Report:    No Growth Final    Culture - Blood (collected 10 May 2022 07:19)  Source: .Blood Blood-Peripheral  Final Report:    No Growth Final    Culture - Blood (collected 07 May 2022 10:29)  Source: .Blood Blood-Peripheral  Final Report:    No Growth Final    Culture - Blood (collected 07 May 2022 05:56)  Source: .Blood Blood-Peripheral  Final Report:    No Growth Final    Culture - Blood (collected 03 May 2022 00:01)  Source: .Blood Blood-Peripheral  Final Report:    No Growth Final    Culture - Blood (collected 03 May 2022 00:00)  Source: .Blood Blood-Peripheral  Final Report:    No Growth Final    Culture - Fungal, CSF (collected 2022 17:56)  Source: .CSF CSF  Final Report:    No fungus isolated after 4 weeks.      HIV-1/2 Combo Result: Nonreact (22 @ 09:20)  HIV-1/2 Combo Result: Nonreact (22 @ 09:16)        Treponema Pallidum Antibody Interpretation: Negative (06-15-22 @ 20:20)              Rapid RVP Result: NotDetec (06-15 @ 21:02)    COVID-19 PCR: NotDetec (22 @ 07:20)  COVID-19 PCR: NotDetec (22 @ 08:39)          RADIOLOGY:  imaging below personally reviewed   Patient is a 61y old  Male who presents with a chief complaint of AMS (2022 07:47)    HPI:  61M w/ hx of Cerebellar ataxia recent prolonged admission including MICU stay/ intubation from - for AMS and airway protection p/w AMS and cough. There was component of rapid neurocognitive decline w/ cerebellar ataxia and possible meningeal findings. Family refused meningeal biopsy. Endocrine work up for hypothermia that admission also unremarkable. As per wife, pt seemed closer to baseline and was able to answer appropriately by time of discharge to rehab. Pt has since been at rehab, starting roughly 4 days ago pt's wife noticed pt becoming more altered and lethargic. Has had decreased PO on pureed diet from discharge. Wife has also noticed wet cough around some time period. When wife felt these symptoms continued to worsen and rehab continued to make no interventions she sent him to hospital for further evaluation.  In ER: Given 1L LR (15 Benigno 2022 22:41)    ID consulted for hypothermia and positive UA. Endorses mild dysuria, unclear chronicity as patient is poor historian. No diarrhea, abd pain, N/V, or SOB.        REVIEW OF SYSTEMS  [  ] ROS unobtainable because:    [ x ] All other systems negative except as noted below    Constitutional:  [ ] fever [ ] chills  [ ] weight loss  [ ]night sweat  [ ]poor appetite/PO intake [ ]fatigue   Skin:  [ ] rash [ ] phlebitis	  Eyes: [ ] icterus [ ] pain  [ ] discharge	  ENMT: [ ] sore throat  [ ] thrush [ ] ulcers [ ] exudates [ ]anosmia  Respiratory: [ ] dyspnea [ ] hemoptysis [ ] cough [ ] sputum	  Cardiovascular:  [ ] chest pain [ ] palpitations [ ] edema	  Gastrointestinal:  [ ] nausea [ ] vomiting [ ] diarrhea [ ] constipation [ ] pain	  Genitourinary:  [ ] dysuria [ ] frequency [ ] hematuria [ ] discharge [ ] flank pain  [ ] incontinence  Musculoskeletal:  [ ] myalgias [ ] arthralgias [ ] arthritis  [ ] back pain  Neurological:  [ ] headache [ ] weakness [ ] seizures  [ ] confusion/altered mental status    prior hospital charts reviewed [V]  primary team notes reviewed [V]  other consultant notes reviewed [V]    PAST MEDICAL & SURGICAL HISTORY:  H/O cerebellar ataxia  No significant past surgical history    FAMILY HISTORY:  FH: dementia (Mother)  FH: type 2 diabetes (Mother)  Family history of CVA (Father)    SOCIAL HISTORY:  presented from rehab    Allergies  No Known Allergies      ANTIMICROBIALS:      ANTIMICROBIALS (past 90 days):   MEDICATIONS  (STANDING):      MEDICATIONS  (STANDING):  acetaminophen     Tablet .. 650 every 6 hours PRN  dextrose 50% Injectable 25 once  dextrose 50% Injectable 12.5 once  dextrose 50% Injectable 25 once  dextrose Oral Gel 15 once PRN  enoxaparin Injectable 40 every 24 hours  glucagon  Injectable 1 once  influenza   Vaccine 0.5 once      VITALS:  Vital Signs Last 24 Hrs  T(F): 93.3 (22 @ 12:52), Max: 99 (22 @ 05:55)  Vital Signs Last 24 Hrs  HR: 56 (22 @ 12:52) (56 - 91)  BP: 106/72 (22 @ 12:52) (100/61 - 107/75)  RR: 18 (22 @ 12:52)  SpO2: 100% (22 @ 12:52) (95% - 100%)  Wt(kg): --    PHYSICAL EXAM:  Constitutional: non-toxic, no distress  HEAD/EYES: anicteric, no conjunctival injection  ENT:  no thrush  Cardiovascular:   normal S1/S2  Respiratory:  clear BS bilaterally  GI:  soft, non-tender, +bowel sounds  :  no coronel, no CVA tenderness  Musculoskeletal:  contracted UEs  Neurologic: awake, verbal, AAOx2  Skin:  no rash, no erythema, no phlebitis  Heme/Onc: no lymphadenopathy   Psychiatric:  paucity of speech      Labs:                        13.9   9.12  )-----------( 153      ( 2022 07:00 )             43.0         139  |  100  |  17  ----------------------------<  85  4.0   |  27  |  0.73    Ca    10.4      2022 07:00  Phos  3.3     -15  Mg     1.6         TPro  8.0  /  Alb  4.1  /  TBili  0.6  /  DBili  x   /  AST  20  /  ALT  24  /  AlkPhos  68  -      WBC Trend:  WBC Count: 9.12 (22 @ 07:00)  WBC Count: 5.69 (06-15-22 @ 20:20)    Auto Neutrophil #: 6.47 K/uL (22 @ 07:00)  Auto Neutrophil #: 3.74 K/uL (06-15-22 @ 20:20)  Auto Neutrophil #: 3.42 K/uL (22 @ 07:37)  Auto Neutrophil #: 4.09 K/uL (22 @ 01:24)  Auto Neutrophil #: 5.14 K/uL (22 @ 00:24)    Auto Eosinophil %: 1.3 % (22 @ 07:00)  Auto Eosinophil %: 2.6 % (06-15-22 @ 20:20)    Urinalysis Basic - ( 2022 11:28 )    Color: LIGHT BROWN / Appearance: Clear / S.017 / pH: x  Gluc: x / Ketone: Negative  / Bili: Negative / Urobili: Negative   Blood: x / Protein: 300 mg/dL / Nitrite: Negative   Leuk Esterase: Moderate / RBC: >50 /hpf / WBC >50   Sq Epi: x / Non Sq Epi: 1 / Bacteria: Many        MICROBIOLOGY:    MRSA PCR Result.: NotDetec (22 @ 09:39)  MRSA PCR Result.: NotDetec (22 @ 00:50)      Culture - Blood (collected 2022 00:30)  Source: .Blood Blood-Peripheral  Preliminary Report:    No growth to date.    Culture - Blood (collected 2022 00:15)  Source: .Blood Blood-Peripheral  Preliminary Report:    No growth to date.    Culture - Urine (collected 15 Benigno 2022 20:20)  Source: Clean Catch Clean Catch (Midstream)  Final Report:    No growth    Culture - Blood (collected 10 May 2022 07:19)  Source: .Blood Blood-Peripheral  Final Report:    No Growth Final    Culture - Blood (collected 10 May 2022 07:19)  Source: .Blood Blood-Peripheral  Final Report:    No Growth Final    Culture - Blood (collected 07 May 2022 10:29)  Source: .Blood Blood-Peripheral  Final Report:    No Growth Final    Culture - Blood (collected 07 May 2022 05:56)  Source: .Blood Blood-Peripheral  Final Report:    No Growth Final    Culture - Blood (collected 03 May 2022 00:01)  Source: .Blood Blood-Peripheral  Final Report:    No Growth Final    Culture - Blood (collected 03 May 2022 00:00)  Source: .Blood Blood-Peripheral  Final Report:    No Growth Final    Culture - Fungal, CSF (collected 2022 17:56)  Source: .CSF CSF  Final Report:    No fungus isolated after 4 weeks.    HIV-1/2 Combo Result: Nonreact (22 @ 09:20)  HIV-1/2 Combo Result: Nonreact (22 @ 09:16)    Treponema Pallidum Antibody Interpretation: Negative (06-15-22 @ 20:20)    Rapid RVP Result: NotDetec (06-15 @ 21:02)    COVID-19 PCR: NotDetec (22 @ 07:20)  COVID-19 PCR: NotDetec (22 @ 08:39)      RADIOLOGY:  imaging below personally reviewed  < from: CT Chest No Cont (06.15.22 @ 23:45) >  IMPRESSION:  Bilateral lower lobe tree-in-bud opacities, right greater than left,   differential includes inflammatory versus infectious bronchiolitis.  Stable likely enlarged intrathoracic lymph nodes.  < end of copied text >

## 2022-06-17 NOTE — PROGRESS NOTE ADULT - PROBLEM SELECTOR PLAN 4
No defined cause. Recent extensive neurologic, ID and endocrine work up on prior admission. Wife declined brain biopsy that admission.

## 2022-06-17 NOTE — PROGRESS NOTE ADULT - PROBLEM SELECTOR PLAN 5
DVT PPx: Lovenox    Diet: NPO per S+S    Dispo: Pending clinical course DVT PPx: Lovenox    Diet: Pureed with mildly thick liquids    Dispo: Pending clinical course

## 2022-06-18 LAB
ANION GAP SERPL CALC-SCNC: 10 MMOL/L — SIGNIFICANT CHANGE UP (ref 5–17)
BUN SERPL-MCNC: 8 MG/DL — SIGNIFICANT CHANGE UP (ref 7–23)
CALCIUM SERPL-MCNC: 9.3 MG/DL — SIGNIFICANT CHANGE UP (ref 8.4–10.5)
CHLORIDE SERPL-SCNC: 99 MMOL/L — SIGNIFICANT CHANGE UP (ref 96–108)
CO2 SERPL-SCNC: 25 MMOL/L — SIGNIFICANT CHANGE UP (ref 22–31)
CREAT SERPL-MCNC: 0.63 MG/DL — SIGNIFICANT CHANGE UP (ref 0.5–1.3)
CULTURE RESULTS: SIGNIFICANT CHANGE UP
CULTURE RESULTS: SIGNIFICANT CHANGE UP
EGFR: 109 ML/MIN/1.73M2 — SIGNIFICANT CHANGE UP
GLUCOSE SERPL-MCNC: 104 MG/DL — HIGH (ref 70–99)
HCT VFR BLD CALC: 35.6 % — LOW (ref 39–50)
HGB BLD-MCNC: 11.9 G/DL — LOW (ref 13–17)
MAGNESIUM SERPL-MCNC: 1.4 MG/DL — LOW (ref 1.6–2.6)
MCHC RBC-ENTMCNC: 28.4 PG — SIGNIFICANT CHANGE UP (ref 27–34)
MCHC RBC-ENTMCNC: 33.4 GM/DL — SIGNIFICANT CHANGE UP (ref 32–36)
MCV RBC AUTO: 85 FL — SIGNIFICANT CHANGE UP (ref 80–100)
NRBC # BLD: 0 /100 WBCS — SIGNIFICANT CHANGE UP (ref 0–0)
PHOSPHATE SERPL-MCNC: 2.4 MG/DL — LOW (ref 2.5–4.5)
PLATELET # BLD AUTO: 118 K/UL — LOW (ref 150–400)
POTASSIUM SERPL-MCNC: 3.3 MMOL/L — LOW (ref 3.5–5.3)
POTASSIUM SERPL-SCNC: 3.3 MMOL/L — LOW (ref 3.5–5.3)
RBC # BLD: 4.19 M/UL — LOW (ref 4.2–5.8)
RBC # FLD: 14.1 % — SIGNIFICANT CHANGE UP (ref 10.3–14.5)
SODIUM SERPL-SCNC: 134 MMOL/L — LOW (ref 135–145)
SPECIMEN SOURCE: SIGNIFICANT CHANGE UP
SPECIMEN SOURCE: SIGNIFICANT CHANGE UP
TSH SERPL-MCNC: 1.6 UIU/ML — SIGNIFICANT CHANGE UP (ref 0.27–4.2)
WBC # BLD: 9.53 K/UL — SIGNIFICANT CHANGE UP (ref 3.8–10.5)
WBC # FLD AUTO: 9.53 K/UL — SIGNIFICANT CHANGE UP (ref 3.8–10.5)

## 2022-06-18 RX ORDER — MAGNESIUM SULFATE 500 MG/ML
2 VIAL (ML) INJECTION ONCE
Refills: 0 | Status: COMPLETED | OUTPATIENT
Start: 2022-06-18 | End: 2022-06-18

## 2022-06-18 RX ORDER — POTASSIUM PHOSPHATE, MONOBASIC POTASSIUM PHOSPHATE, DIBASIC 236; 224 MG/ML; MG/ML
30 INJECTION, SOLUTION INTRAVENOUS ONCE
Refills: 0 | Status: COMPLETED | OUTPATIENT
Start: 2022-06-18 | End: 2022-06-18

## 2022-06-18 RX ADMIN — POTASSIUM PHOSPHATE, MONOBASIC POTASSIUM PHOSPHATE, DIBASIC 83.33 MILLIMOLE(S): 236; 224 INJECTION, SOLUTION INTRAVENOUS at 15:13

## 2022-06-18 RX ADMIN — ENOXAPARIN SODIUM 40 MILLIGRAM(S): 100 INJECTION SUBCUTANEOUS at 06:18

## 2022-06-18 RX ADMIN — Medication 25 GRAM(S): at 12:58

## 2022-06-18 RX ADMIN — CEFTRIAXONE 100 MILLIGRAM(S): 500 INJECTION, POWDER, FOR SOLUTION INTRAMUSCULAR; INTRAVENOUS at 16:22

## 2022-06-18 NOTE — PHYSICAL THERAPY INITIAL EVALUATION ADULT - ADDITIONAL COMMENTS
Prior to admission pt was in rehab. pt lives at home with wife Antwan contacted at [phone]. In rehab wife stated he requires assistance to help him from bed to wheelchair, was unable to walk at rehab and used a wheelchair as primary mobility.

## 2022-06-18 NOTE — PROGRESS NOTE ADULT - ASSESSMENT
61M w/ hx of Cerebellar ataxia recent prolonged admission including MICU stay/ intubation from 4/18-5/13 for AMS and airway protection p/w acute encephalopathy and cough, with known Hx of hypothermia thought be dysautonomia, cardiology following, found to have UA c/f UTI, repeat UCx pending, on CTX, ID following

## 2022-06-18 NOTE — PROGRESS NOTE ADULT - NSPROGADDITIONALINFOA_GEN_ALL_CORE
Agree with above. IV Ceftriaxone for cyctitis. Puree diet. Discharge planning after weekend. Agree with above. IV Ceftriaxone for cyctitis for three days total. Puree diet. To rehab early next week. Stop IVF. No labs necessary on Sunday.

## 2022-06-18 NOTE — PROGRESS NOTE ADULT - PROBLEM SELECTOR PLAN 2
CXR w/o clear etiology. Concern for aspiration given mental status changes.  - cough improving  -CT chest non-con showed b/l lower lobe tree-in-bud opacities R>L- inflammatory vs infectiou bronchiolitis, stable enlarged intrathoracic lymph nodes  -Procal 0.07  -Rapid RVP wnl  -Trend cbc and fever curve  -Diet: Pureed with mildly thick liquids

## 2022-06-18 NOTE — PHYSICAL THERAPY INITIAL EVALUATION ADULT - DISCHARGE DISPOSITION, PT EVAL
anticipating [facility] pending further functional assessment/rehabilitation facility [facility]/rehabilitation facility

## 2022-06-18 NOTE — PROGRESS NOTE ADULT - PROBLEM SELECTOR PLAN 3
- Patient with hypothermia, known to be hypothermic previously  - Likely central process, dysautonomia   - Warming blankets as needed  - UCx negative, BCx NGTD  - UA suggestive of UTI  - Start CTX per ID 6/17-   - F/u repeat UCx

## 2022-06-18 NOTE — PHYSICAL THERAPY INITIAL EVALUATION ADULT - PERTINENT HX OF CURRENT PROBLEM, REHAB EVAL
61M w/ hx of Cerebellar ataxia recent prolonged admission including MICU stay/ intubation from 4/18-5/13 for AMS and airway protection p/w acute encephalopathy and cough. Hosp course: 6/15 chronic ischemic changes in both hemispheres. No acute infarct, hemorrhage, or mass. ventricles are prominent, raising the possibility of communicating hydrocephalus. CXR negative. CT Chest Bilateral lower lobe tree-in-bud opacities, right greater than left.

## 2022-06-18 NOTE — PROGRESS NOTE ADULT - SUBJECTIVE AND OBJECTIVE BOX
CARDIOLOGY     PROGRESS  NOTE   ________________________________________________    CHIEF COMPLAINT:Patient is a 61y old  Male who presents with a chief complaint of AMS (18 Jun 2022 06:35)  no complain.  	  REVIEW OF SYSTEMS:  CONSTITUTIONAL: No fever, weight loss, or fatigue  EYES: No eye pain, visual disturbances, or discharge  ENT:  No difficulty hearing, tinnitus, vertigo; No sinus or throat pain  NECK: No pain or stiffness  RESPIRATORY: No cough, wheezing, chills or hemoptysis; No Shortness of Breath  CARDIOVASCULAR: No chest pain, palpitations, passing out, dizziness, or leg swelling  GASTROINTESTINAL: No abdominal or epigastric pain. No nausea, vomiting, or hematemesis; No diarrhea or constipation. No melena or hematochezia.  GENITOURINARY: No dysuria, frequency, hematuria, or incontinence  NEUROLOGICAL: No headaches, memory loss, loss of strength, numbness, or tremors  SKIN: No itching, burning, rashes, or lesions   LYMPH Nodes: No enlarged glands  ENDOCRINE: No heat or cold intolerance; No hair loss  MUSCULOSKELETAL: No joint pain or swelling; No muscle, back, or extremity pain  PSYCHIATRIC: No depression, anxiety, mood swings, or difficulty sleeping  HEME/LYMPH: No easy bruising, or bleeding gums  ALLERGY AND IMMUNOLOGIC: No hives or eczema	    [ ] All others negative	  [ ] Unable to obtain    PHYSICAL EXAM:  T(C): 36.2 (06-18-22 @ 03:28), Max: 36.7 (06-17-22 @ 18:40)  HR: 78 (06-18-22 @ 03:28) (56 - 84)  BP: 102/61 (06-18-22 @ 03:28) (98/61 - 107/73)  RR: 18 (06-18-22 @ 03:28) (16 - 18)  SpO2: 97% (06-18-22 @ 03:28) (96% - 100%)  Wt(kg): --  I&O's Summary    17 Jun 2022 07:01  -  18 Jun 2022 07:00  --------------------------------------------------------  IN: 0 mL / OUT: 700 mL / NET: -700 mL        Appearance: Normal	  HEENT:   Normal oral mucosa, PERRL, EOMI	  Lymphatic: No lymphadenopathy  Cardiovascular: Normal S1 S2, No JVD, + murmurs, No edema  Respiratory: Lungs clear to auscultation	  Gastrointestinal:  Soft, Non-tender, + BS	  Skin: No rashes, No ecchymoses, No cyanosis	  Neurologic: Non-focal  Extremities: Normal range of motion, No clubbing, cyanosis or edema  Vascular: Peripheral pulses palpable 2+ bilaterally    MEDICATIONS  (STANDING):  cefTRIAXone   IVPB      cefTRIAXone   IVPB 1000 milliGRAM(s) IV Intermittent every 24 hours  dextrose 5% + sodium chloride 0.45%. 1000 milliLiter(s) (80 mL/Hr) IV Continuous <Continuous>  dextrose 5%. 1000 milliLiter(s) (100 mL/Hr) IV Continuous <Continuous>  dextrose 5%. 1000 milliLiter(s) (50 mL/Hr) IV Continuous <Continuous>  dextrose 50% Injectable 25 Gram(s) IV Push once  dextrose 50% Injectable 12.5 Gram(s) IV Push once  dextrose 50% Injectable 25 Gram(s) IV Push once  enoxaparin Injectable 40 milliGRAM(s) SubCutaneous every 24 hours  glucagon  Injectable 1 milliGRAM(s) IntraMuscular once  influenza   Vaccine 0.5 milliLiter(s) IntraMuscular once      TELEMETRY: 	    ECG:  	  RADIOLOGY:  OTHER: 	  	  LABS:	 	    CARDIAC MARKERS:                  proBNP:   Lipid Profile:   HgA1c:   TSH:         Assessment and plan  ---------------------------  61M w/ hx of Cerebellar ataxia recent prolonged admission including MICU stay/ intubation from 4/18-5/13 for AMS and airway protection p/w AMS and cough. There was component of rapid neurocognitive decline w/ cerebellar ataxia and possible meningeal findings. Family refused meningeal biopsy. Endocrine work up for hypothermia that admission also unremarkable. As per wife, pt seemed closer to baseline and was able to answer appropriately by time of discharge to rehab. Pt has since been at rehab, starting roughly 4 days ago pt's wife noticed pt becoming more altered and lethargic. Has had decreased PO on pureed diet from discharge. Wife has also noticed wet cough around some time period. When wife felt these symptoms continued to worsen and rehab continued to make no interventions she sent him to hospital for further evaluation.  In ER: Given 1L LR.   pt is well known to me from the previous admission with hx of a.fib who presented with similar complain and change of mental status  will call wife to get a history  pt refused meningeal biopsy  agree with hydration  will add midodrine if needed  cortisol level  will check old record  hx of a.fib ?AC  doing better continue current meds

## 2022-06-18 NOTE — PHYSICAL THERAPY INITIAL EVALUATION ADULT - ACTIVE RANGE OF MOTION EXAMINATION, REHAB EVAL
MARE BLE as pt not following commands/bilateral upper extremity Active ROM was WFL (within functional limits)

## 2022-06-18 NOTE — PHYSICAL THERAPY INITIAL EVALUATION ADULT - TRANSFER TRAINING, PT EVAL
GOAL: Patient will perform sit to stand transfers with modA with least restrictive assistive device in 2 weeks with proper hand placement

## 2022-06-18 NOTE — PHYSICAL THERAPY INITIAL EVALUATION ADULT - PATIENT/FAMILY/SIGNIFICANT OTHER GOALS STATEMENT, PT EVAL
[facility] Emergency Department  [facility] [address] [id number]  Phone: [phone]  Fax: [phone]               May 7, 2019    Patient: Ellie Hernandez   YOB: 1980   Date of Visit: 5/7/2019       To Whom It May Concern:    Meaghan Schumacher was seen and treated in our emergency department on 5/7/2019. He may return back to work 5/9/19.       Sincerely,       Nurse,        Signature:__________________________________
get stronger

## 2022-06-18 NOTE — CHART NOTE - NSCHARTNOTEFT_GEN_A_CORE
Based on patient's diagnoses of Cerebellar Ataxia and deconditioning, patient needs claudia lift to transfer from bed to chair and chair to bed    Miguel Angel Gastelum MD

## 2022-06-18 NOTE — PROGRESS NOTE ADULT - PROBLEM SELECTOR PLAN 1
Pt awake and alert but not responding to questions, slurring speech. Note baseline CT head. Will cont. work up for possible metabolic etiologies. UA also negative  -CT chest showed b/l lower lobe tree-in-bud opacities R>L- inflammatory vs infectiou bronchiolitis, stable enlarged intrathoracic lymph nodes  -Continuous pulse oximetry given recent admission to MICU after intubation for airway protection  -Aspiration and falls precautions  - Syphillis screen negative   - F/u B12, folate, TSH  - UCx negative, BCx NGTD  - UA suggestive of UTI, on CTX per ID  - F/u repeat UCx

## 2022-06-18 NOTE — PROGRESS NOTE ADULT - SUBJECTIVE AND OBJECTIVE BOX
DATE OF SERVICE: 22 @ 06:35    Patient is a 61y old  Male who presents with a chief complaint of AMS (2022 14:32)      SUBJECTIVE / OVERNIGHT EVENTS: NAEO. Patient afebrile o/n. Patient AOx1 this AM. Denies CP, SOB, fevers/chills, N/V/D, dysuria.     MEDICATIONS  (STANDING):  cefTRIAXone   IVPB      cefTRIAXone   IVPB 1000 milliGRAM(s) IV Intermittent every 24 hours  dextrose 5% + sodium chloride 0.45%. 1000 milliLiter(s) (80 mL/Hr) IV Continuous <Continuous>  dextrose 5%. 1000 milliLiter(s) (100 mL/Hr) IV Continuous <Continuous>  dextrose 5%. 1000 milliLiter(s) (50 mL/Hr) IV Continuous <Continuous>  dextrose 50% Injectable 25 Gram(s) IV Push once  dextrose 50% Injectable 12.5 Gram(s) IV Push once  dextrose 50% Injectable 25 Gram(s) IV Push once  enoxaparin Injectable 40 milliGRAM(s) SubCutaneous every 24 hours  glucagon  Injectable 1 milliGRAM(s) IntraMuscular once  influenza   Vaccine 0.5 milliLiter(s) IntraMuscular once    MEDICATIONS  (PRN):  acetaminophen     Tablet .. 650 milliGRAM(s) Oral every 6 hours PRN Temp greater or equal to 38C (100.4F), Mild Pain (1 - 3)  dextrose Oral Gel 15 Gram(s) Oral once PRN Blood Glucose LESS THAN 70 milliGRAM(s)/deciliter      Vital Signs Last 24 Hrs  T(C): 36.2 (2022 03:28), Max: 36.7 (2022 18:40)  T(F): 97.2 (2022 03:28), Max: 98.1 (2022 18:40)  HR: 78 (2022 03:28) (56 - 84)  BP: 102/61 (2022 03:28) (98/61 - 107/73)  BP(mean): --  RR: 18 (2022 03:28) (16 - 18)  SpO2: 97% (2022 03:28) (96% - 100%)  CAPILLARY BLOOD GLUCOSE      POCT Blood Glucose.: 132 mg/dL (2022 12:17)  POCT Blood Glucose.: 110 mg/dL (2022 06:39)    I&O's Summary    2022 07:01  -  2022 06:35  --------------------------------------------------------  IN: 0 mL / OUT: 700 mL / NET: -700 mL        PHYSICAL EXAM:  GENERAL: NAD  HEAD:  Atraumatic, Normocephalic  EYES: EOMI, PERRLA, conjunctiva and sclera clear  NECK: Supple, No JVD  CHEST/LUNG: Clear to auscultation bilaterally; No wheeze  HEART: Regular rate and rhythm; No murmurs, rubs, or gallops  ABDOMEN: Soft, Nontender, Nondistended; Bowel sounds present  EXTREMITIES:  2+ Peripheral Pulses, No clubbing, cyanosis, or edema  PSYCH: AAOx1  NEUROLOGY: non-focal, reflexes intact b/l, negative Valverde's sign, down going toes w/ Babinski  SKIN: No rashes or lesions      LABS:                        13.9   9.12  )-----------( 153      ( 2022 07:00 )             43.0     06-16    139  |  100  |  17  ----------------------------<  85  4.0   |  27  |  0.73    Ca    10.4      2022 07:00  Mg     1.6     -    TPro  8.0  /  Alb  4.1  /  TBili  0.6  /  DBili  x   /  AST  20  /  ALT  24  /  AlkPhos  68  06-16          Urinalysis Basic - ( 2022 11:28 )    Color: LIGHT BROWN / Appearance: Clear / S.017 / pH: x  Gluc: x / Ketone: Negative  / Bili: Negative / Urobili: Negative   Blood: x / Protein: 300 mg/dL / Nitrite: Negative   Leuk Esterase: Moderate / RBC: >50 /hpf / WBC >50   Sq Epi: x / Non Sq Epi: 1 / Bacteria: Many        RADIOLOGY & ADDITIONAL TESTS:    Imaging Personally Reviewed:    Consultant(s) Notes Reviewed:      Care Discussed with Consultants/Other Providers:

## 2022-06-19 RX ORDER — MIDODRINE HYDROCHLORIDE 2.5 MG/1
2.5 TABLET ORAL EVERY 8 HOURS
Refills: 0 | Status: DISCONTINUED | OUTPATIENT
Start: 2022-06-19 | End: 2022-06-19

## 2022-06-19 RX ORDER — THIAMINE MONONITRATE (VIT B1) 100 MG
100 TABLET ORAL DAILY
Refills: 0 | Status: DISCONTINUED | OUTPATIENT
Start: 2022-06-19 | End: 2022-06-27

## 2022-06-19 RX ORDER — MIDODRINE HYDROCHLORIDE 2.5 MG/1
5 TABLET ORAL EVERY 8 HOURS
Refills: 0 | Status: DISCONTINUED | OUTPATIENT
Start: 2022-06-19 | End: 2022-06-20

## 2022-06-19 RX ADMIN — ENOXAPARIN SODIUM 40 MILLIGRAM(S): 100 INJECTION SUBCUTANEOUS at 05:41

## 2022-06-19 RX ADMIN — CEFTRIAXONE 100 MILLIGRAM(S): 500 INJECTION, POWDER, FOR SOLUTION INTRAMUSCULAR; INTRAVENOUS at 16:47

## 2022-06-19 RX ADMIN — Medication 1 TABLET(S): at 16:47

## 2022-06-19 RX ADMIN — MIDODRINE HYDROCHLORIDE 5 MILLIGRAM(S): 2.5 TABLET ORAL at 13:17

## 2022-06-19 RX ADMIN — MIDODRINE HYDROCHLORIDE 5 MILLIGRAM(S): 2.5 TABLET ORAL at 21:19

## 2022-06-19 RX ADMIN — Medication 100 MILLIGRAM(S): at 16:46

## 2022-06-19 NOTE — DIETITIAN INITIAL EVALUATION ADULT - REASON FOR ADMISSION
"61M w/ hx of Cerebellar ataxia recent prolonged admission including MICU stay/ intubation from 4/18-5/13 for AMS and airway protection p/w acute encephalopathy and cough, with known Hx of hypothermia thought be dysautonomia, cardiology following, found to have UA c/f UTI, repeat UCx pending, on CTX, ID following "

## 2022-06-19 NOTE — PROGRESS NOTE ADULT - SUBJECTIVE AND OBJECTIVE BOX
CARDIOLOGY     PROGRESS  NOTE   ________________________________________________    CHIEF COMPLAINT:Patient is a 61y old  Male who presents with a chief complaint of AMS (19 Jun 2022 08:36)  no complain/ sleeping.  	  REVIEW OF SYSTEMS:  CONSTITUTIONAL: No fever, weight loss, or fatigue  EYES: No eye pain, visual disturbances, or discharge  ENT:  No difficulty hearing, tinnitus, vertigo; No sinus or throat pain  NECK: No pain or stiffness  RESPIRATORY: No cough, wheezing, chills or hemoptysis; No Shortness of Breath  CARDIOVASCULAR: No chest pain, palpitations, passing out, dizziness, or leg swelling  GASTROINTESTINAL: No abdominal or epigastric pain. No nausea, vomiting, or hematemesis; No diarrhea or constipation. No melena or hematochezia.  GENITOURINARY: No dysuria, frequency, hematuria, or incontinence  NEUROLOGICAL: No headaches, memory loss, loss of strength, numbness, or tremors  SKIN: No itching, burning, rashes, or lesions   LYMPH Nodes: No enlarged glands  ENDOCRINE: No heat or cold intolerance; No hair loss  MUSCULOSKELETAL: No joint pain or swelling; No muscle, back, or extremity pain  PSYCHIATRIC: No depression, anxiety, mood swings, or difficulty sleeping  HEME/LYMPH: No easy bruising, or bleeding gums  ALLERGY AND IMMUNOLOGIC: No hives or eczema	    [ ] All others negative	  [x] Unable to obtain    PHYSICAL EXAM:  T(C): 36.8 (06-19-22 @ 04:37), Max: 37.1 (06-18-22 @ 14:00)  HR: 77 (06-19-22 @ 04:37) (61 - 100)  BP: 91/62 (06-19-22 @ 04:37) (91/62 - 120/85)  RR: 16 (06-19-22 @ 04:37) (16 - 18)  SpO2: 97% (06-19-22 @ 04:37) (97% - 100%)  Wt(kg): --  I&O's Summary    18 Jun 2022 07:01  -  19 Jun 2022 07:00  --------------------------------------------------------  IN: 332 mL / OUT: 900 mL / NET: -568 mL        Appearance: Normal	  HEENT:   Normal oral mucosa, PERRL, EOMI	  Lymphatic: No lymphadenopathy  Cardiovascular: Normal S1 S2, No JVD, + murmurs, No edema  Respiratory: Lungs clear to auscultation	  Gastrointestinal:  Soft, Non-tender, + BS	  Skin: No rashes, No ecchymoses, No cyanosis	  Neurologic: Non-focal  Extremities: Normal range of motion, No clubbing, cyanosis or edema  Vascular: Peripheral pulses palpable 2+ bilaterally    MEDICATIONS  (STANDING):  cefTRIAXone   IVPB      cefTRIAXone   IVPB 1000 milliGRAM(s) IV Intermittent every 24 hours  dextrose 5%. 1000 milliLiter(s) (100 mL/Hr) IV Continuous <Continuous>  dextrose 5%. 1000 milliLiter(s) (50 mL/Hr) IV Continuous <Continuous>  dextrose 50% Injectable 25 Gram(s) IV Push once  dextrose 50% Injectable 12.5 Gram(s) IV Push once  dextrose 50% Injectable 25 Gram(s) IV Push once  enoxaparin Injectable 40 milliGRAM(s) SubCutaneous every 24 hours  glucagon  Injectable 1 milliGRAM(s) IntraMuscular once  influenza   Vaccine 0.5 milliLiter(s) IntraMuscular once  midodrine 2.5 milliGRAM(s) Oral every 8 hours      TELEMETRY: 	    ECG:  	  RADIOLOGY:  OTHER: 	  	  LABS:	 	    CARDIAC MARKERS:                                11.9   9.53  )-----------( 118      ( 18 Jun 2022 11:19 )             35.6     06-18    134<L>  |  99  |  8   ----------------------------<  104<H>  3.3<L>   |  25  |  0.63    Ca    9.3      18 Jun 2022 11:19  Phos  2.4     06-18  Mg     1.4     06-18      proBNP:   Lipid Profile:   HgA1c:   TSH: Thyroid Stimulating Hormone, Serum: 1.60 uIU/mL (06-18 @ 11:19)          Assessment and plan  ---------------------------  61M w/ hx of Cerebellar ataxia recent prolonged admission including MICU stay/ intubation from 4/18-5/13 for AMS and airway protection p/w AMS and cough. There was component of rapid neurocognitive decline w/ cerebellar ataxia and possible meningeal findings. Family refused meningeal biopsy. Endocrine work up for hypothermia that admission also unremarkable. As per wife, pt seemed closer to baseline and was able to answer appropriately by time of discharge to rehab. Pt has since been at rehab, starting roughly 4 days ago pt's wife noticed pt becoming more altered and lethargic. Has had decreased PO on pureed diet from discharge. Wife has also noticed wet cough around some time period. When wife felt these symptoms continued to worsen and rehab continued to make no interventions she sent him to hospital for further evaluation.  In ER: Given 1L LR.   pt is well known to me from the previous admission with hx of a.fib who presented with similar complain and change of mental status  will call wife to get a history  pt refused meningeal biopsy  agree with hydration  will add midodrine if needed  cortisol level  will check old record  hx of a.fib ?AC  doing better continue current meds  increase midodrine to 5 mg tid

## 2022-06-19 NOTE — PROGRESS NOTE ADULT - SUBJECTIVE AND OBJECTIVE BOX
PROGRESS NOTE:     Patient is a 61y old  Male who presents with a chief complaint of AMS (2022 08:35)      SUBJECTIVE / OVERNIGHT EVENTS:     REVIEW OF SYSTEMS:    CONSTITUTIONAL: No weakness, fevers or chills  EYES/ENT: No visual changes;  No vertigo or throat pain   NECK: No pain or stiffness  RESPIRATORY: No cough, wheezing, hemoptysis; No shortness of breath  CARDIOVASCULAR: No chest pain or palpitations  GASTROINTESTINAL: No abdominal or epigastric pain. No nausea, vomiting, or hematemesis; No diarrhea or constipation. No melena or hematochezia.  GENITOURINARY: No dysuria, frequency or hematuria  NEUROLOGICAL: No numbness or weakness  SKIN: No itching, rashes    MEDICATIONS  (STANDING):  cefTRIAXone   IVPB      cefTRIAXone   IVPB 1000 milliGRAM(s) IV Intermittent every 24 hours  dextrose 5%. 1000 milliLiter(s) (100 mL/Hr) IV Continuous <Continuous>  dextrose 5%. 1000 milliLiter(s) (50 mL/Hr) IV Continuous <Continuous>  dextrose 50% Injectable 25 Gram(s) IV Push once  dextrose 50% Injectable 12.5 Gram(s) IV Push once  dextrose 50% Injectable 25 Gram(s) IV Push once  enoxaparin Injectable 40 milliGRAM(s) SubCutaneous every 24 hours  glucagon  Injectable 1 milliGRAM(s) IntraMuscular once  influenza   Vaccine 0.5 milliLiter(s) IntraMuscular once    MEDICATIONS  (PRN):  acetaminophen     Tablet .. 650 milliGRAM(s) Oral every 6 hours PRN Temp greater or equal to 38C (100.4F), Mild Pain (1 - 3)  dextrose Oral Gel 15 Gram(s) Oral once PRN Blood Glucose LESS THAN 70 milliGRAM(s)/deciliter      CAPILLARY BLOOD GLUCOSE        I&O's Summary    2022 07:01  -  2022 07:00  --------------------------------------------------------  IN: 332 mL / OUT: 900 mL / NET: -568 mL        PHYSICAL EXAM:  Vital Signs Last 24 Hrs  T(C): 36.8 (2022 04:37), Max: 37.1 (2022 14:00)  T(F): 98.3 (2022 04:37), Max: 98.7 (2022 14:00)  HR: 77 (2022 04:37) (61 - 100)  BP: 91/62 (2022 04:37) (91/62 - 120/85)  BP(mean): --  RR: 16 (2022 04:37) (16 - 18)  SpO2: 97% (2022 04:37) (97% - 100%)    CONSTITUTIONAL: NAD, well-developed  RESPIRATORY: Normal respiratory effort; lungs are clear to auscultation bilaterally  CARDIOVASCULAR: Regular rate and rhythm, normal S1 and S2, no murmur/rub/gallop; No lower extremity edema; Peripheral pulses are 2+ bilaterally  ABDOMEN: Nontender to palpation, normoactive bowel sounds, no rebound/guarding; No hepatosplenomegaly  MUSCLOSKELETAL: no clubbing or cyanosis of digits; no joint swelling or tenderness to palpation  PSYCH: A+O to person, place, and time; affect appropriate  NEURO: Non-focal, no tremors  SKIN: No rashes    LABS:                        11.9   9.53  )-----------( 118      ( 2022 11:19 )             35.6     06-18    134<L>  |  99  |  8   ----------------------------<  104<H>  3.3<L>   |  25  |  0.63    Ca    9.3      2022 11:19  Phos  2.4     06-18  Mg     1.4     06-18            Urinalysis Basic - ( 2022 11:28 )    Color: LIGHT BROWN / Appearance: Clear / S.017 / pH: x  Gluc: x / Ketone: Negative  / Bili: Negative / Urobili: Negative   Blood: x / Protein: 300 mg/dL / Nitrite: Negative   Leuk Esterase: Moderate / RBC: >50 /hpf / WBC >50   Sq Epi: x / Non Sq Epi: 1 / Bacteria: Many        Culture - Urine (collected 2022 11:28)  Source: Clean Catch Clean Catch (Midstream)  Final Report (2022 17:03):    Culture grew 3 or more types of organisms which indicate    collection contamination; consider recollection only if clinically    indicated.        RADIOLOGY & ADDITIONAL TESTS:  No new imaging or tests    COORDINATION OF CARE:  Care Discussed with Consultants/Other Providers [Y/N]:  Prior or Outpatient Records Reviewed [Y/N]:   PROGRESS NOTE:     Patient is a 61y old  Male who presents with a chief complaint of AMS (2022 08:35)      SUBJECTIVE / OVERNIGHT EVENTS: Hypothermic overnight, and was given warming blanket.  Unable to obtain ROS due to patient's poor mental status.       MEDICATIONS  (STANDING):  cefTRIAXone   IVPB      cefTRIAXone   IVPB 1000 milliGRAM(s) IV Intermittent every 24 hours  dextrose 5%. 1000 milliLiter(s) (100 mL/Hr) IV Continuous <Continuous>  dextrose 5%. 1000 milliLiter(s) (50 mL/Hr) IV Continuous <Continuous>  dextrose 50% Injectable 25 Gram(s) IV Push once  dextrose 50% Injectable 12.5 Gram(s) IV Push once  dextrose 50% Injectable 25 Gram(s) IV Push once  enoxaparin Injectable 40 milliGRAM(s) SubCutaneous every 24 hours  glucagon  Injectable 1 milliGRAM(s) IntraMuscular once  influenza   Vaccine 0.5 milliLiter(s) IntraMuscular once    MEDICATIONS  (PRN):  acetaminophen     Tablet .. 650 milliGRAM(s) Oral every 6 hours PRN Temp greater or equal to 38C (100.4F), Mild Pain (1 - 3)  dextrose Oral Gel 15 Gram(s) Oral once PRN Blood Glucose LESS THAN 70 milliGRAM(s)/deciliter      CAPILLARY BLOOD GLUCOSE        I&O's Summary    2022 07:01  -  2022 07:00  --------------------------------------------------------  IN: 332 mL / OUT: 900 mL / NET: -568 mL        PHYSICAL EXAM:  Vital Signs Last 24 Hrs  T(C): 36.8 (2022 04:37), Max: 37.1 (2022 14:00)  T(F): 98.3 (2022 04:37), Max: 98.7 (2022 14:00)  HR: 77 (2022 04:37) (61 - 100)  BP: 91/62 (2022 04:37) (91/62 - 120/85)  BP(mean): --  RR: 16 (2022 04:37) (16 - 18)  SpO2: 97% (2022 04:37) (97% - 100%)    CONSTITUTIONAL: Sleeping  RESPIRATORY: Normal respiratory effort; lungs are clear to auscultation bilaterally except coarse upper airway breath sounds  CARDIOVASCULAR: Regular rate and rhythm, normal S1 and S2, no murmur/rub/gallop; No lower extremity edema  ABDOMEN: Nontender to palpation, normoactive bowel sounds, no rebound/guarding; No hepatosplenomegaly  MUSCULOSKELETAL: no clubbing or cyanosis of digits; no joint swelling or tenderness to palpation  PSYCH: Sleeping, and not following commands  NEURO: Unable to fully assess due to mental status. Grossly non-focal  SKIN: No rashes    LABS:                        11.9   9.53  )-----------( 118      ( 2022 11:19 )             35.6     06-18    134<L>  |  99  |  8   ----------------------------<  104<H>  3.3<L>   |  25  |  0.63    Ca    9.3      2022 11:19  Phos  2.4     06-18  Mg     1.4     06-18            Urinalysis Basic - ( 2022 11:28 )    Color: LIGHT BROWN / Appearance: Clear / S.017 / pH: x  Gluc: x / Ketone: Negative  / Bili: Negative / Urobili: Negative   Blood: x / Protein: 300 mg/dL / Nitrite: Negative   Leuk Esterase: Moderate / RBC: >50 /hpf / WBC >50   Sq Epi: x / Non Sq Epi: 1 / Bacteria: Many        Culture - Urine (collected 2022 11:28)  Source: Clean Catch Clean Catch (Midstream)  Final Report (2022 17:03):    Culture grew 3 or more types of organisms which indicate    collection contamination; consider recollection only if clinically    indicated.

## 2022-06-19 NOTE — PROGRESS NOTE ADULT - PROBLEM SELECTOR PLAN 1
Pt awake and alert but not responding to questions, slurring speech. Note baseline CT head. Will cont. work up for possible metabolic etiologies. UA also negative  -CT chest showed b/l lower lobe tree-in-bud opacities R>L- inflammatory vs infectiou bronchiolitis, stable enlarged intrathoracic lymph nodes  -Continuous pulse oximetry given recent admission to MICU after intubation for airway protection  -Aspiration and falls precautions  - Syphillis screen negative   - F/u B12, folate, TSH  - UCx negative, BCx NGTD  - UA suggestive of UTI, on CTX per ID  - F/u repeat UCx Pt awake and alert but not responding to questions, slurring speech. Note baseline CT head. Will cont. work up for possible metabolic etiologies. UA also negative  -CT chest showed b/l lower lobe tree-in-bud opacities R>L- inflammatory vs infectiou bronchiolitis, stable enlarged intrathoracic lymph nodes  -Continuous pulse oximetry given recent admission to MICU after intubation for airway protection  -Aspiration and falls precautions  - Syphillis screen negative   - F/u B12, folate, TSH  - UCx negative, BCx NGTD  - UA suggestive of UTI, on CTX per ID  - Repeat UCx contaminated

## 2022-06-19 NOTE — DIETITIAN INITIAL EVALUATION ADULT - OTHER INFO
Weight hx: Per prior RD note (4/21/22), "Weights per [facility]: 175lbs (1/1/20), 171lbs (10/26/21)," dosing wt during last admission was 152 lbs. Dosing wt this admission is 138 lbs, indicating significant wt loss x2 months.    Pt is at risk per aspiration per documentation.     Pt with hypokalemia, hypomagnesemia, hypophosphatemia on 6/18 s/p electrolyte supplementation. No new labs available today. Weight hx: Per prior RD note (4/21/22), "Weights per [facility]: 175lbs (1/1/20), 171lbs (10/26/21)," dosing wt during last admission was 152 lbs. Dosing wt this admission is 138 lbs, indicating significant wt loss x2 months. Wife endorses wt loss as well.    Pt is at risk per aspiration per documentation.     Pt with hypokalemia, hypomagnesemia, hypophosphatemia on 6/18 s/p electrolyte supplementation. No new labs available today.

## 2022-06-19 NOTE — DIETITIAN INITIAL EVALUATION ADULT - ENERGY INTAKE
Poor (<50%) Pt seen by SLP this admission with most recent recommendation for purees, with mildly thick liquids on 6/17.  Per RN, the pt has not been swallowing food today, or yesterday. He holds foods in his mouth and they fall out. Did swallow very little food yesterday.  Pt seen by SLP this admission with most recent recommendation for purees, with mildly thick liquids on 6/17.  Per RN, the pt has not been swallowing food today, or yesterday. He holds foods in his mouth and they fall out. Obtained pt's food preferences from his wife to help increase PO intake.

## 2022-06-19 NOTE — DIETITIAN INITIAL EVALUATION ADULT - PERTINENT LABORATORY DATA
06-18    134<L>  |  99  |  8   ----------------------------<  104<H>  3.3<L>   |  25  |  0.63    Ca    9.3      18 Jun 2022 11:19  Phos  2.4     06-18  Mg     1.4     06-18    A1C with Estimated Average Glucose Result: 5.5 % (04-18-22 @ 09:56)

## 2022-06-19 NOTE — PROGRESS NOTE ADULT - PROBLEM SELECTOR PLAN 3
- Patient with hypothermia, known to be hypothermic previously  - Likely central process, dysautonomia   - Warming blankets as needed  - UCx negative, BCx NGTD  - UA suggestive of UTI  - Start CTX per ID 6/17-   - F/u repeat UCx - Patient with hypothermia, known to be hypothermic previously  - Likely central process, dysautonomia   - Warming blankets as needed  - UCx negative, BCx NGTD  - UA suggestive of UTI  - Start CTX per ID 6/17-   - Repeat Ucx contaminated

## 2022-06-19 NOTE — DIETITIAN INITIAL EVALUATION ADULT - ORAL INTAKE PTA/DIET HISTORY
Pt admitted from [facility] with poor PO intake. Pt admitted from [facility] with poor PO intake. Pt's wife reports pt dislikes pureed foods and has not been eating them. He was drinking the Ensure provided to him at [facility] and sometimes eats pureed foods brought from home. She states he is a very slow eater, needs encouragement. MBS performed on 5/6 with recommendation for Puree/mildly thick liquids, so pt has been on this diet x6 weeks.

## 2022-06-19 NOTE — DIETITIAN NUTRITION RISK NOTIFICATION - TREATMENT: THE FOLLOWING DIET HAS BEEN RECOMMENDED
Diet, Pureed:   Mildly Thick Liquids (MILDTHICKLIQS)  Supplement Feeding Modality:  Oral  Ensure Enlive Cans or Servings Per Day:  1       Frequency:  Three Times a day  Ensure Pudding Cans or Servings Per Day:  1       Frequency:  Three Times a day (06-19-22 @ 15:31) [Pending Verification By Attending]  Diet, Regular:   Pureed (PUREED)  Mildly Thick Liquids (MILDTHICKLIQS) (06-17-22 @ 11:28) [Active]

## 2022-06-19 NOTE — DIETITIAN INITIAL EVALUATION ADULT - ADD RECOMMEND
1) At risk for refeeding syndrome. Please add Multivitamin and thiamine 100mg daily supplementation. 2) Textures of diet as per SLP. Continue no therapeutic dietary restrictions. Please add Ensure Enlive TID, and Ensure Pudding TID. 3) RD to add: fortified cream of rice, yogurts, mashed sweet potato to trays, other dietary preferences taken and adjusted in CBORD. 4) If poor PO intake of pureed foods continues, would consider discussion of NGT placement with family, and if within GOC, calorie count. Pt with poor PO intake >1 month. 5) Monitor PO intake, GI tolerance, skin integrity and labs. RD remains available if needed.

## 2022-06-19 NOTE — PROGRESS NOTE ADULT - ASSESSMENT
60 yo male      PMHx of cerebellar ataxia    prior  visit  to  ED on 4/17/22 due to AMS ,  was  intubated for depressed consciousness w/ cognitive decline.  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    infectious  and  malignancy  was  ruled  outt/  and  per   neuro  note ,no further workup     flow cytometry 4/28 showing nonspecific results 'degenerated sample, small lymphocytes'   nsgy consulted for meningeal bx, family refusing    CSF cytopathology 4/22 - increased number of large mononuclear cells in a background of few small lymphocytes, monocytes and neutrophils, cannot exclude a lymphoproliferative disorder versus an inflammatory process.    CSF cytopathology 4/29 - significant increased number of small lymphocytes with rare monocytes/ seen by  oncology  dr de leon,  no  intervention      now  admitted    c/o  ams, as  before   h/o  cerebellar  ataxia,  with  no defined  cause  found   pt  with  bradycardia, ?  central, seen  by  card/  echo  on 4/2022,  normal  ef   now  with  hypothermia,   as  before  pt  has  h/o intermittent   hypothermia,  not related  to  any   infectious   process.  rather  , it  seems to be  dysautonomia/  with  h/o normal  cortisol level  prior  CT  c/ap,  no  malignant  process   most  of  these  issues, do  not  have   a good  rx  per  swallow  eval, was  on  modified  diet/  fs  noted  endo  dr ovalle  on feeds  per  swallow  eval   arousable, not communicative/   which is  about his  base line  with few  periods  of  alertness  on iv  rocephin per  dr mclean/ remains  aspiration  risk.  given  bed bound   status  and  altered  baseline  mental  status

## 2022-06-19 NOTE — DIETITIAN INITIAL EVALUATION ADULT - PERTINENT MEDS FT
MEDICATIONS  (STANDING):  cefTRIAXone   IVPB      cefTRIAXone   IVPB 1000 milliGRAM(s) IV Intermittent every 24 hours  dextrose 5%. 1000 milliLiter(s) (50 mL/Hr) IV Continuous <Continuous>  dextrose 5%. 1000 milliLiter(s) (100 mL/Hr) IV Continuous <Continuous>  dextrose 50% Injectable 25 Gram(s) IV Push once  dextrose 50% Injectable 12.5 Gram(s) IV Push once  dextrose 50% Injectable 25 Gram(s) IV Push once  enoxaparin Injectable 40 milliGRAM(s) SubCutaneous every 24 hours  glucagon  Injectable 1 milliGRAM(s) IntraMuscular once  influenza   Vaccine 0.5 milliLiter(s) IntraMuscular once  midodrine 5 milliGRAM(s) Oral every 8 hours    MEDICATIONS  (PRN):  acetaminophen     Tablet .. 650 milliGRAM(s) Oral every 6 hours PRN Temp greater or equal to 38C (100.4F), Mild Pain (1 - 3)  dextrose Oral Gel 15 Gram(s) Oral once PRN Blood Glucose LESS THAN 70 milliGRAM(s)/deciliter

## 2022-06-19 NOTE — PROGRESS NOTE ADULT - SUBJECTIVE AND OBJECTIVE BOX
[facility]  REVIEW OF SYSTEMS:  GEN: no fever,    no chills  RESP: no SOB,   no cough  CVS: no chest pain,   no palpitations  GI: no abdominal pain,   no nausea,   no vomiting,   no constipation,   no diarrhea  : no dysuria,   no frequency  NEURO: no headache,   no dizziness  PSYCH: no depression,   not anxious  Derm : no rash    MEDICATIONS  (STANDING):  cefTRIAXone   IVPB      cefTRIAXone   IVPB 1000 milliGRAM(s) IV Intermittent every 24 hours  dextrose 5%. 1000 milliLiter(s) (100 mL/Hr) IV Continuous <Continuous>  dextrose 5%. 1000 milliLiter(s) (50 mL/Hr) IV Continuous <Continuous>  dextrose 50% Injectable 25 Gram(s) IV Push once  dextrose 50% Injectable 12.5 Gram(s) IV Push once  dextrose 50% Injectable 25 Gram(s) IV Push once  enoxaparin Injectable 40 milliGRAM(s) SubCutaneous every 24 hours  glucagon  Injectable 1 milliGRAM(s) IntraMuscular once  influenza   Vaccine 0.5 milliLiter(s) IntraMuscular once    MEDICATIONS  (PRN):  acetaminophen     Tablet .. 650 milliGRAM(s) Oral every 6 hours PRN Temp greater or equal to 38C (100.4F), Mild Pain (1 - 3)  dextrose Oral Gel 15 Gram(s) Oral once PRN Blood Glucose LESS THAN 70 milliGRAM(s)/deciliter      Vital Signs Last 24 Hrs  T(C): 36.8 (2022 04:37), Max: 37.1 (2022 14:00)  T(F): 98.3 (2022 04:37), Max: 98.7 (2022 14:00)  HR: 77 (2022 04:37) (61 - 100)  BP: 91/62 (2022 04:37) (91/62 - 120/85)  BP(mean): --  RR: 16 (2022 04:37) (16 - 18)  SpO2: 97% (2022 04:37) (97% - 100%)  CAPILLARY BLOOD GLUCOSE        I&O's Summary    2022 07:01  -  2022 07:00  --------------------------------------------------------  IN: 332 mL / OUT: 900 mL / NET: -568 mL        PHYSICAL EXAM:  HEAD:  Atraumatic, Normocephalic  NECK: Supple, No   JVD  CHEST/LUNG:   no     rales,     no,    rhonchi  HEART: Regular rate and rhythm;         murmur  ABDOMEN: Soft, Nontender, ;   EXTREMITIES:     no   edema  NEUROLOGY:  alert    LABS:                        11.9   9.53  )-----------( 118      ( 2022 11:19 )             35.6     -18    134<L>  |  99  |  8   ----------------------------<  104<H>  3.3<L>   |  25  |  0.63    Ca    9.3      2022 11:19  Phos  2.4       Mg     1.4     18            Urinalysis Basic - ( 2022 11:28 )    Color: LIGHT BROWN / Appearance: Clear / S.017 / pH: x  Gluc: x / Ketone: Negative  / Bili: Negative / Urobili: Negative   Blood: x / Protein: 300 mg/dL / Nitrite: Negative   Leuk Esterase: Moderate / RBC: >50 /hpf / WBC >50   Sq Epi: x / Non Sq Epi: 1 / Bacteria: Many              Thyroid Stimulating Hormone, Serum: 1.60 uIU/mL ( @ 11:19)          Consultant(s) Notes Reviewed:      Care Discussed with Consultants/Other Providers:

## 2022-06-19 NOTE — DIETITIAN INITIAL EVALUATION ADULT - REASON INDICATOR FOR ASSESSMENT
"At risk for malnutrition, delayed swallow, evaluated by speech and swallow..."  Source: RN, EMR, prior RD notes "At risk for malnutrition, delayed swallow, evaluated by speech and swallow..."  Source: RN, EMR, prior RD notes, pt's wife Antwan [phone]

## 2022-06-20 LAB
ANION GAP SERPL CALC-SCNC: 13 MMOL/L — SIGNIFICANT CHANGE UP (ref 5–17)
BUN SERPL-MCNC: 6 MG/DL — LOW (ref 7–23)
CALCIUM SERPL-MCNC: 9.8 MG/DL — SIGNIFICANT CHANGE UP (ref 8.4–10.5)
CHLORIDE SERPL-SCNC: 100 MMOL/L — SIGNIFICANT CHANGE UP (ref 96–108)
CO2 SERPL-SCNC: 23 MMOL/L — SIGNIFICANT CHANGE UP (ref 22–31)
CREAT SERPL-MCNC: 0.58 MG/DL — SIGNIFICANT CHANGE UP (ref 0.5–1.3)
EGFR: 112 ML/MIN/1.73M2 — SIGNIFICANT CHANGE UP
GLUCOSE SERPL-MCNC: 66 MG/DL — LOW (ref 70–99)
HCT VFR BLD CALC: 40.4 % — SIGNIFICANT CHANGE UP (ref 39–50)
HGB BLD-MCNC: 13.4 G/DL — SIGNIFICANT CHANGE UP (ref 13–17)
MAGNESIUM SERPL-MCNC: 1.8 MG/DL — SIGNIFICANT CHANGE UP (ref 1.6–2.6)
MCHC RBC-ENTMCNC: 28.5 PG — SIGNIFICANT CHANGE UP (ref 27–34)
MCHC RBC-ENTMCNC: 33.2 GM/DL — SIGNIFICANT CHANGE UP (ref 32–36)
MCV RBC AUTO: 85.8 FL — SIGNIFICANT CHANGE UP (ref 80–100)
NRBC # BLD: 0 /100 WBCS — SIGNIFICANT CHANGE UP (ref 0–0)
PHOSPHATE SERPL-MCNC: 2.9 MG/DL — SIGNIFICANT CHANGE UP (ref 2.5–4.5)
PLATELET # BLD AUTO: 135 K/UL — LOW (ref 150–400)
POTASSIUM SERPL-MCNC: 4 MMOL/L — SIGNIFICANT CHANGE UP (ref 3.5–5.3)
POTASSIUM SERPL-SCNC: 4 MMOL/L — SIGNIFICANT CHANGE UP (ref 3.5–5.3)
RBC # BLD: 4.71 M/UL — SIGNIFICANT CHANGE UP (ref 4.2–5.8)
RBC # FLD: 14.1 % — SIGNIFICANT CHANGE UP (ref 10.3–14.5)
SODIUM SERPL-SCNC: 136 MMOL/L — SIGNIFICANT CHANGE UP (ref 135–145)
WBC # BLD: 5.12 K/UL — SIGNIFICANT CHANGE UP (ref 3.8–10.5)
WBC # FLD AUTO: 5.12 K/UL — SIGNIFICANT CHANGE UP (ref 3.8–10.5)

## 2022-06-20 PROCEDURE — 99232 SBSQ HOSP IP/OBS MODERATE 35: CPT

## 2022-06-20 RX ORDER — MIDODRINE HYDROCHLORIDE 2.5 MG/1
5 TABLET ORAL EVERY 8 HOURS
Refills: 0 | Status: DISCONTINUED | OUTPATIENT
Start: 2022-06-20 | End: 2022-06-20

## 2022-06-20 RX ORDER — MIDODRINE HYDROCHLORIDE 2.5 MG/1
5 TABLET ORAL EVERY 8 HOURS
Refills: 0 | Status: DISCONTINUED | OUTPATIENT
Start: 2022-06-20 | End: 2022-06-24

## 2022-06-20 RX ORDER — SODIUM CHLORIDE 9 MG/ML
1000 INJECTION, SOLUTION INTRAVENOUS
Refills: 0 | Status: COMPLETED | OUTPATIENT
Start: 2022-06-20 | End: 2022-06-20

## 2022-06-20 RX ADMIN — MIDODRINE HYDROCHLORIDE 5 MILLIGRAM(S): 2.5 TABLET ORAL at 22:19

## 2022-06-20 RX ADMIN — ENOXAPARIN SODIUM 40 MILLIGRAM(S): 100 INJECTION SUBCUTANEOUS at 05:38

## 2022-06-20 RX ADMIN — MIDODRINE HYDROCHLORIDE 5 MILLIGRAM(S): 2.5 TABLET ORAL at 14:56

## 2022-06-20 RX ADMIN — Medication 100 MILLIGRAM(S): at 11:50

## 2022-06-20 RX ADMIN — SODIUM CHLORIDE 75 MILLILITER(S): 9 INJECTION, SOLUTION INTRAVENOUS at 11:00

## 2022-06-20 RX ADMIN — CEFTRIAXONE 100 MILLIGRAM(S): 500 INJECTION, POWDER, FOR SOLUTION INTRAMUSCULAR; INTRAVENOUS at 17:46

## 2022-06-20 RX ADMIN — Medication 1 TABLET(S): at 11:49

## 2022-06-20 RX ADMIN — Medication 100 MILLIGRAM(S): at 14:56

## 2022-06-20 NOTE — CHART NOTE - NSCHARTNOTEFT_GEN_A_CORE
61M w/ hx of Cerebellar ataxia recent prolonged admission including MICU stay/ intubation from 4/18-5/13 for AMS and airway protection p/w acute encephalopathy and cough, with known Hx of hypothermia thought be dysautonomia.  CXR w/o clear etiology. Concern for aspiration given mental status changes.  - cough improving  -CT chest non-con showed b/l lower lobe tree-in-bud opacities R>L- inflammatory vs infectiou bronchiolitis, stable enlarged intrathoracic lymph nodes    Pt is known to this service. Seen for MBS on 5/6/22, with no aspiration observed, however, "due to patient's waxing/waning levels of alertness, cognitive deficits, and prolonged oral phase, recommend a puree and mildly thick liquids. Patient will benefit from breaks in between trials due to prolonged oral phase."     On this admission, seen most recently for bedside swallow evaluation on 6/17/22, with recommendations for a puree diet with moderately thick liquids.     MD Gastelum requesting follow-up on this date as patient reportedly with wet cough during meals, diet changed to NPO except medications.    Pt seen for re-evaluation. Encountered patient in bed, awake, on room air. VSS. Pt alert, oriented to self, place, and current month, though easily distractible and occasionally with latency in response time. Able to follow some simple directives. Oral cavity clear/ clean. No signs of reduced secretion management. Pt provided with PO trials of thin puree, thick puree, moderately thick, and mildly thick liquids via teaspoon/ cup/ straw. Pt able to self-administer PO trials with minimal assitance. Swallow function characterized by adequate orientation to the feeding task/ stripping of the utensil, delayed oral transit with intermittent/ inconsistent bolus holding (~30 seconds) and suspected delay in pharyngeal swallow trigger. No overt signs of laryngeal penetration/aspiration. Pt appeared to benefit from verbal cues to swallow. No oral residue or pocketing. Pt left in NAD.     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 While pt appears safe to tolerate a conservative dysphagia diet, he remains at risk for malnutrition/dehydration given inefficiency of swallow; additionally, he requires careful, slow handing feeding to safely tolerate PO intake.    Recommendations:  1. Puree diet with moderately thick liquids via teaspoon.  2. 100% assistance - allow time between swallows/ ensure patient has swallowed before presenting a bolus - provide verbal cues to swallow, encourage self-feeding as able   3. Monitor for changes in mentation and/ or s/s aspiration/laryngeal penetration. If noted:  D/C p.o. intake, provide non-oral nutrition/hydration/meds, and contact this service @ [phone]   4. Consider calorie count to determine whether patient is able to meet his nutritional needs by mouth   5. This service will continue to follow. May consider MBS if there is further concern for aspiration.     Angela Schaefer CCC-SLP pgr #[phone] 61M w/ hx of Cerebellar ataxia recent prolonged admission including MICU stay/ intubation from 4/18-5/13 for AMS and airway protection p/w acute encephalopathy and cough, with known Hx of hypothermia thought be dysautonomia.  CXR w/o clear etiology. Concern for aspiration given mental status changes.  - cough improving  -CT chest non-con showed b/l lower lobe tree-in-bud opacities R>L- inflammatory vs infectiou bronchiolitis, stable enlarged intrathoracic lymph nodes    Pt is known to this service. Seen for MBS on 5/6/22, with no aspiration observed, however, "due to patient's waxing/waning levels of alertness, cognitive deficits, and prolonged oral phase, recommend a puree and mildly thick liquids. Patient will benefit from breaks in between trials due to prolonged oral phase."     On this admission, seen most recently for bedside swallow evaluation on 6/17/22, with recommendations for a puree diet with moderately thick liquids.     MD Gastelum requesting follow-up on this date as patient reportedly with wet cough during meals, diet changed to NPO except medications.    Pt seen for re-evaluation. Encountered patient in bed, awake, on room air. VSS. Pt alert, oriented to self, place, and current month, though easily distractible and occasionally with latency in response time. Able to follow some simple directives. Oral cavity clear/ clean. No signs of reduced secretion management. Pt provided with PO trials of thin puree, thick puree, moderately thick, and mildly thick liquids via teaspoon/ cup/ straw. Pt able to self-administer PO trials with minimal assitance. Swallow function characterized by adequate orientation to the feeding task/ stripping of the utensil, delayed oral transit with intermittent/ inconsistent bolus holding (~30 seconds) and suspected delay in pharyngeal swallow trigger. No overt signs of laryngeal penetration/aspiration. Pt appeared to benefit from verbal cues to swallow. No oral residue or pocketing. Pt left in NAD.     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 While pt appears safe to tolerate a conservative dysphagia diet, he remains at risk for malnutrition/dehydration given inefficiency of swallow; additionally, he requires careful, slow handing feeding to safely tolerate PO intake.    Recommendations:  1. Puree diet with moderately thick liquids via teaspoon.  2. 100% assistance - allow time between swallows/ ensure patient has swallowed before presenting a bolus - provide verbal cues to swallow, encourage self-feeding as able   3. Monitor for changes in mentation and/ or s/s aspiration/laryngeal penetration. If noted:  D/C p.o. intake, provide non-oral nutrition/hydration/meds, and contact this service @ [phone]   4. Consider calorie count to determine whether patient is able to meet his nutritional needs by mouth   5. This service will continue to follow. May consider MBS if there is further concern for aspiration.     Findings/recs communicated to MD Nirav Schaefer, CCC-SLP pgr #[phone] 61M w/ hx of Cerebellar ataxia recent prolonged admission including MICU stay/ intubation from 4/18-5/13 for AMS and airway protection p/w acute encephalopathy and cough, with known Hx of hypothermia thought be dysautonomia.  CXR w/o clear etiology. Concern for aspiration given mental status changes.  - cough improving  -CT chest non-con showed b/l lower lobe tree-in-bud opacities R>L- inflammatory vs infectiou bronchiolitis, stable enlarged intrathoracic lymph nodes    Pt is known to this service. Seen for MBS on 5/6/22, with no aspiration observed, however, "due to patient's waxing/waning levels of alertness, cognitive deficits, and prolonged oral phase, recommend a puree and mildly thick liquids. Patient will benefit from breaks in between trials due to prolonged oral phase."     On this admission, seen most recently for bedside swallow evaluation on 6/17/22, with recommendations for a puree diet with moderately thick liquids.     MD Gastelum requesting follow-up on this date as patient reportedly with wet cough during meals, diet changed to NPO except medications.    Pt seen for re-evaluation. Encountered patient in bed, awake, on room air. VSS. Pt alert, oriented to self, place, and current month, though easily distractible and occasionally with latency in response time. Able to follow some simple directives. Oral cavity clear/ clean. No signs of reduced secretion management. Pt provided with PO trials of thin puree, thick puree, moderately thick, and mildly thick liquids via teaspoon/ cup/ straw. Pt able to self-administer PO trials with minimal assitance. Swallow function characterized by adequate orientation to the feeding task/ stripping of the utensil, delayed oral transit with intermittent/ inconsistent bolus holding (~30 seconds) and suspected delay in pharyngeal swallow trigger. No overt signs of laryngeal penetration/aspiration. Pt appeared to benefit from verbal cues to swallow. No oral residue or pocketing. Pt left in NAD.     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 While pt appears safe to tolerate a conservative dysphagia diet, he remains at risk for malnutrition/dehydration given inefficiency of swallow; additionally, he requires careful, slow handing feeding to safely tolerate PO intake.    Recommendations:  1. Puree diet with mildly thick liquids via teaspoon.  2. 100% assistance - allow time between swallows/ ensure patient has swallowed before presenting a bolus - provide verbal cues to swallow, encourage self-feeding as able   3. Monitor for changes in mentation and/ or s/s aspiration/laryngeal penetration. If noted:  D/C p.o. intake, provide non-oral nutrition/hydration/meds, and contact this service @ [phone]   4. Consider calorie count to determine whether patient is able to meet his nutritional needs by mouth   5. This service will continue to follow. May consider MBS if there is further concern for aspiration.     Findings/recs communicated to MD Nirav Schaefer, CCC-SLP pgr #[phone] 61M w/ hx of Cerebellar ataxia recent prolonged admission including MICU stay/ intubation from 4/18-5/13 for AMS and airway protection p/w acute encephalopathy and cough, with known Hx of hypothermia thought be dysautonomia.  CXR w/o clear etiology. Concern for aspiration given mental status changes.  - cough improving  -CT chest non-con showed b/l lower lobe tree-in-bud opacities R>L- inflammatory vs infectiou bronchiolitis, stable enlarged intrathoracic lymph nodes    Pt is known to this service. Seen for MBS on 5/6/22, with no aspiration observed, however, "due to patient's waxing/waning levels of alertness, cognitive deficits, and prolonged oral phase, recommend a puree and mildly thick liquids. Patient will benefit from breaks in between trials due to prolonged oral phase."     On this admission, seen most recently for bedside swallow evaluation on 6/17/22, with recommendations for a puree diet with mildly thick liquids.     MD Gastelum requesting follow-up on this date as patient reportedly with wet cough during meals, diet changed to NPO except medications.    Pt seen for re-evaluation. Encountered patient in bed, awake, on room air. VSS. Pt alert, oriented to self, place, and current month, though easily distractible and occasionally with latency in response time. Able to follow some simple directives. Oral cavity clear/ clean. No signs of reduced secretion management. Pt provided with PO trials of thin puree, thick puree, moderately thick, and mildly thick liquids via teaspoon/ cup/ straw. Pt able to self-administer PO trials with minimal assitance. Swallow function characterized by adequate orientation to the feeding task/ stripping of the utensil, delayed oral transit with intermittent/ inconsistent bolus holding (~30 seconds) and suspected delay in pharyngeal swallow trigger. No overt signs of laryngeal penetration/aspiration. Pt appeared to benefit from verbal cues to swallow. No oral residue or pocketing. Pt left in NAD.     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 While pt appears safe to tolerate a conservative dysphagia diet, he remains at risk for malnutrition/dehydration given inefficiency of swallow; additionally, he requires careful, slow handing feeding to safely tolerate PO intake.    Recommendations:  1. Puree diet with mildly thick liquids via teaspoon.  2. 100% assistance - allow time between swallows/ ensure patient has swallowed before presenting a bolus - provide verbal cues to swallow, encourage self-feeding as able   3. Monitor for changes in mentation and/ or s/s aspiration/laryngeal penetration. If noted:  D/C p.o. intake, provide non-oral nutrition/hydration/meds, and contact this service @ [phone]   4. Consider calorie count to determine whether patient is able to meet his nutritional needs by mouth   5. This service will continue to follow. May consider MBS if there is further concern for aspiration.     Findings/recs communicated to MD Nirav Schaefer, CCC-SLP pgr #[phone]

## 2022-06-20 NOTE — PROGRESS NOTE ADULT - PROBLEM SELECTOR PLAN 3
- Patient with hypothermia, known to be hypothermic previously  - Likely central process, dysautonomia   - Warming blankets as needed  - UCx negative, BCx NGTD  - UA suggestive of UTI  - Start CTX per ID 6/17-   - Repeat Ucx contaminated

## 2022-06-20 NOTE — PROGRESS NOTE ADULT - ASSESSMENT
61 M w/ hx of Cerebellar ataxia recent prolonged admission including MICU stay/ intubation from 4/18-5/13 for AMS and airway protection p/w AMS and cough  Hypothermia, no leukocytosis  On prior eval had multiple episodes of hypothermia  UA+, UCX neg  Complains of dysuria--unclear reliability  Overall,  1) Hypothermia  - Unclear if metabolic or if due to underlying infection; longstanding, chronic process  - Trend temperatures  2) Abnormal UA  - UCX NGTD, complains of dysuria  - Ceftriaxone 1g q 24, 5-7 days then DC  - Monitor for clinical response  3) AMS  - Uncertain baseline, AOx2?  - Monitor to normal    Isaias Buckley MD  Contact on TEAMS messaging from 9am - 5pm  From 5pm-9am, and on weekends call [phone]

## 2022-06-20 NOTE — PROGRESS NOTE ADULT - SUBJECTIVE AND OBJECTIVE BOX
DATE OF SERVICE: 06-20-22 @ 06:39    Patient is a 61y old  Male who presents with a chief complaint of AMS (20 Jun 2022 06:24)      SUBJECTIVE / OVERNIGHT EVENTS: NAEO. Patient afebrile o/n, hypothermic to 94F o/n. Patient feels    MEDICATIONS  (STANDING):  cefTRIAXone   IVPB      cefTRIAXone   IVPB 1000 milliGRAM(s) IV Intermittent every 24 hours  dextrose 5%. 1000 milliLiter(s) (100 mL/Hr) IV Continuous <Continuous>  dextrose 5%. 1000 milliLiter(s) (50 mL/Hr) IV Continuous <Continuous>  dextrose 50% Injectable 25 Gram(s) IV Push once  dextrose 50% Injectable 12.5 Gram(s) IV Push once  dextrose 50% Injectable 25 Gram(s) IV Push once  enoxaparin Injectable 40 milliGRAM(s) SubCutaneous every 24 hours  glucagon  Injectable 1 milliGRAM(s) IntraMuscular once  influenza   Vaccine 0.5 milliLiter(s) IntraMuscular once  midodrine 5 milliGRAM(s) Oral every 8 hours  multivitamin 1 Tablet(s) Oral daily  thiamine 100 milliGRAM(s) Oral daily    MEDICATIONS  (PRN):  acetaminophen     Tablet .. 650 milliGRAM(s) Oral every 6 hours PRN Temp greater or equal to 38C (100.4F), Mild Pain (1 - 3)  dextrose Oral Gel 15 Gram(s) Oral once PRN Blood Glucose LESS THAN 70 milliGRAM(s)/deciliter      Vital Signs Last 24 Hrs  T(C): 36.3 (20 Jun 2022 04:30), Max: 36.8 (19 Jun 2022 14:45)  T(F): 97.3 (20 Jun 2022 04:30), Max: 98.2 (19 Jun 2022 14:45)  HR: 56 (20 Jun 2022 05:44) (50 - 68)  BP: 113/73 (20 Jun 2022 04:30) (98/66 - 113/73)  BP(mean): --  RR: 18 (20 Jun 2022 04:30) (18 - 18)  SpO2: 98% (20 Jun 2022 04:30) (95% - 98%)  CAPILLARY BLOOD GLUCOSE        I&O's Summary    18 Jun 2022 07:01  -  19 Jun 2022 07:00  --------------------------------------------------------  IN: 332 mL / OUT: 900 mL / NET: -568 mL    19 Jun 2022 07:01  -  20 Jun 2022 06:39  --------------------------------------------------------  IN: 0 mL / OUT: 1450 mL / NET: -1450 mL        PHYSICAL EXAM:  GENERAL: NAD  HEAD:  Atraumatic, Normocephalic  EYES: EOMI, PERRLA, conjunctiva and sclera clear  NECK: Supple, No JVD  CHEST/LUNG: Clear to auscultation bilaterally; No wheeze  HEART: Regular rate and rhythm; No murmurs, rubs, or gallops  ABDOMEN: Soft, Nontender, Nondistended; Bowel sounds present  EXTREMITIES:  2+ Peripheral Pulses, No clubbing, cyanosis, or edema  PSYCH: AAOx1  NEUROLOGY: non-focal, reflexes intact b/l, negative Valverde's sign, down going toes w/ Babinski  SKIN: No rashes or lesions    LABS:                        11.9   9.53  )-----------( 118      ( 18 Jun 2022 11:19 )             35.6     06-18    134<L>  |  99  |  8   ----------------------------<  104<H>  3.3<L>   |  25  |  0.63    Ca    9.3      18 Jun 2022 11:19  Phos  2.4     06-18  Mg     1.4     06-18                RADIOLOGY & ADDITIONAL TESTS:    Imaging Personally Reviewed:    Consultant(s) Notes Reviewed:      Care Discussed with Consultants/Other Providers:   DATE OF SERVICE: 06-20-22 @ 06:39    Patient is a 61y old  Male who presents with a chief complaint of AMS (20 Jun 2022 06:24)      SUBJECTIVE / OVERNIGHT EVENTS: NAEO. Patient afebrile o/n, hypothermic to 94F o/n. Patient feels "good" this AM. AOx2 to self, place "hospital." Denies CP, fevers/chills, N/V/D.     MEDICATIONS  (STANDING):  cefTRIAXone   IVPB      cefTRIAXone   IVPB 1000 milliGRAM(s) IV Intermittent every 24 hours  dextrose 5%. 1000 milliLiter(s) (100 mL/Hr) IV Continuous <Continuous>  dextrose 5%. 1000 milliLiter(s) (50 mL/Hr) IV Continuous <Continuous>  dextrose 50% Injectable 25 Gram(s) IV Push once  dextrose 50% Injectable 12.5 Gram(s) IV Push once  dextrose 50% Injectable 25 Gram(s) IV Push once  enoxaparin Injectable 40 milliGRAM(s) SubCutaneous every 24 hours  glucagon  Injectable 1 milliGRAM(s) IntraMuscular once  influenza   Vaccine 0.5 milliLiter(s) IntraMuscular once  midodrine 5 milliGRAM(s) Oral every 8 hours  multivitamin 1 Tablet(s) Oral daily  thiamine 100 milliGRAM(s) Oral daily    MEDICATIONS  (PRN):  acetaminophen     Tablet .. 650 milliGRAM(s) Oral every 6 hours PRN Temp greater or equal to 38C (100.4F), Mild Pain (1 - 3)  dextrose Oral Gel 15 Gram(s) Oral once PRN Blood Glucose LESS THAN 70 milliGRAM(s)/deciliter      Vital Signs Last 24 Hrs  T(C): 36.3 (20 Jun 2022 04:30), Max: 36.8 (19 Jun 2022 14:45)  T(F): 97.3 (20 Jun 2022 04:30), Max: 98.2 (19 Jun 2022 14:45)  HR: 56 (20 Jun 2022 05:44) (50 - 68)  BP: 113/73 (20 Jun 2022 04:30) (98/66 - 113/73)  BP(mean): --  RR: 18 (20 Jun 2022 04:30) (18 - 18)  SpO2: 98% (20 Jun 2022 04:30) (95% - 98%)  CAPILLARY BLOOD GLUCOSE        I&O's Summary    18 Jun 2022 07:01  -  19 Jun 2022 07:00  --------------------------------------------------------  IN: 332 mL / OUT: 900 mL / NET: -568 mL    19 Jun 2022 07:01  -  20 Jun 2022 06:39  --------------------------------------------------------  IN: 0 mL / OUT: 1450 mL / NET: -1450 mL        PHYSICAL EXAM:  GENERAL: NAD  HEAD:  Atraumatic, Normocephalic  EYES: EOMI, PERRLA, conjunctiva and sclera clear  NECK: Supple, No JVD  CHEST/LUNG: Clear to auscultation bilaterally; No wheeze  HEART: Regular rate and rhythm; No murmurs, rubs, or gallops  ABDOMEN: Soft, Nontender, Nondistended; Bowel sounds present  EXTREMITIES:  2+ Peripheral Pulses, No clubbing, cyanosis, or edema  PSYCH: AAOx1  NEUROLOGY: non-focal, reflexes intact b/l, negative Valverde's sign, down going toes w/ Babinski  SKIN: No rashes or lesions    LABS:                        11.9   9.53  )-----------( 118      ( 18 Jun 2022 11:19 )             35.6     06-18    134<L>  |  99  |  8   ----------------------------<  104<H>  3.3<L>   |  25  |  0.63    Ca    9.3      18 Jun 2022 11:19  Phos  2.4     06-18  Mg     1.4     06-18                RADIOLOGY & ADDITIONAL TESTS:    Imaging Personally Reviewed:    Consultant(s) Notes Reviewed:      Care Discussed with Consultants/Other Providers:

## 2022-06-20 NOTE — PROGRESS NOTE ADULT - SUBJECTIVE AND OBJECTIVE BOX
CARDIOLOGY     PROGRESS  NOTE   ________________________________________________    CHIEF COMPLAINT:Patient is a 61y old  Male who presents with a chief complaint of AMS (20 Jun 2022 06:39)  no complain.  	  REVIEW OF SYSTEMS:  CONSTITUTIONAL: No fever, weight loss, or fatigue  EYES: No eye pain, visual disturbances, or discharge  ENT:  No difficulty hearing, tinnitus, vertigo; No sinus or throat pain  NECK: No pain or stiffness  RESPIRATORY: No cough, wheezing, chills or hemoptysis; No Shortness of Breath  CARDIOVASCULAR: No chest pain, palpitations, passing out, dizziness, or leg swelling  GASTROINTESTINAL: No abdominal or epigastric pain. No nausea, vomiting, or hematemesis; No diarrhea or constipation. No melena or hematochezia.  GENITOURINARY: No dysuria, frequency, hematuria, or incontinence  NEUROLOGICAL: No headaches, memory loss, loss of strength, numbness, or tremors  SKIN: No itching, burning, rashes, or lesions   LYMPH Nodes: No enlarged glands  ENDOCRINE: No heat or cold intolerance; No hair loss  MUSCULOSKELETAL: No joint pain or swelling; No muscle, back, or extremity pain  PSYCHIATRIC: No depression, anxiety, mood swings, or difficulty sleeping  HEME/LYMPH: No easy bruising, or bleeding gums  ALLERGY AND IMMUNOLOGIC: No hives or eczema	    [ ] All others negative	  [x ] Unable to obtain    PHYSICAL EXAM:  T(C): 36.3 (06-20-22 @ 04:30), Max: 36.8 (06-19-22 @ 14:45)  HR: 56 (06-20-22 @ 05:44) (50 - 68)  BP: 113/73 (06-20-22 @ 04:30) (98/66 - 113/73)  RR: 18 (06-20-22 @ 04:30) (18 - 18)  SpO2: 98% (06-20-22 @ 04:30) (95% - 98%)  Wt(kg): --  I&O's Summary    19 Jun 2022 07:01  -  20 Jun 2022 07:00  --------------------------------------------------------  IN: 0 mL / OUT: 1450 mL / NET: -1450 mL        Appearance: Normal	  HEENT:   Normal oral mucosa, PERRL, EOMI	  Lymphatic: No lymphadenopathy  Cardiovascular: Normal S1 S2, No JVD, + murmurs, No edema  Respiratory: Lungs clear to auscultation	  Psychiatry: A & O x 3, Mood & affect appropriate  Gastrointestinal:  Soft, Non-tender, + BS	  Skin: No rashes, No ecchymoses, No cyanosis	  Neurologic: Non-focal  Extremities: Normal range of motion, No clubbing, cyanosis or edema  Vascular: Peripheral pulses palpable 2+ bilaterally    MEDICATIONS  (STANDING):  cefTRIAXone   IVPB      cefTRIAXone   IVPB 1000 milliGRAM(s) IV Intermittent every 24 hours  dextrose 5%. 1000 milliLiter(s) (100 mL/Hr) IV Continuous <Continuous>  dextrose 5%. 1000 milliLiter(s) (50 mL/Hr) IV Continuous <Continuous>  dextrose 50% Injectable 25 Gram(s) IV Push once  dextrose 50% Injectable 12.5 Gram(s) IV Push once  dextrose 50% Injectable 25 Gram(s) IV Push once  enoxaparin Injectable 40 milliGRAM(s) SubCutaneous every 24 hours  glucagon  Injectable 1 milliGRAM(s) IntraMuscular once  influenza   Vaccine 0.5 milliLiter(s) IntraMuscular once  midodrine 5 milliGRAM(s) Oral every 8 hours  multivitamin 1 Tablet(s) Oral daily  thiamine 100 milliGRAM(s) Oral daily      TELEMETRY: 	    ECG:  	  RADIOLOGY:  OTHER: 	  	  LABS:	 	    CARDIAC MARKERS:                                13.4   5.12  )-----------( 135      ( 20 Jun 2022 07:21 )             40.4     06-20    136  |  100  |  6<L>  ----------------------------<  66<L>  4.0   |  23  |  0.58    Ca    9.8      20 Jun 2022 07:29  Phos  2.9     06-20  Mg     1.8     06-20      proBNP:   Lipid Profile:   HgA1c:   TSH: Thyroid Stimulating Hormone, Serum: 1.60 uIU/mL (06-18 @ 11:19)          Assessment and plan  ---------------------------  61M w/ hx of Cerebellar ataxia recent prolonged admission including MICU stay/ intubation from 4/18-5/13 for AMS and airway protection p/w AMS and cough. There was component of rapid neurocognitive decline w/ cerebellar ataxia and possible meningeal findings. Family refused meningeal biopsy. Endocrine work up for hypothermia that admission also unremarkable. As per wife, pt seemed closer to baseline and was able to answer appropriately by time of discharge to rehab. Pt has since been at rehab, starting roughly 4 days ago pt's wife noticed pt becoming more altered and lethargic. Has had decreased PO on pureed diet from discharge. Wife has also noticed wet cough around some time period. When wife felt these symptoms continued to worsen and rehab continued to make no interventions she sent him to hospital for further evaluation.  In ER: Given 1L LR.   pt is well known to me from the previous admission with hx of a.fib who presented with similar complain and change of mental status  will call wife to get a history  pt refused meningeal biopsy  agree with hydration  will add midodrine if needed  cortisol level  will check old record  hx of a.fib ?AC  doing better continue current meds  increase midodrine to 5 mg tid

## 2022-06-20 NOTE — PROGRESS NOTE ADULT - PROBLEM SELECTOR PLAN 1
Pt awake and alert but not responding to questions, slurring speech. Note baseline CT head. Will cont. work up for possible metabolic etiologies. UA also negative  -CT chest showed b/l lower lobe tree-in-bud opacities R>L- inflammatory vs infectiou bronchiolitis, stable enlarged intrathoracic lymph nodes  -Continuous pulse oximetry given recent admission to MICU after intubation for airway protection  -Aspiration and falls precautions  - Syphillis screen negative   - F/u B12, folate, TSH  - UCx negative, BCx NGTD  - UA suggestive of UTI, on CTX per ID  - Repeat UCx contaminated Pt awake and alert but not responding to questions, slurring speech. Note baseline CT head. Will cont. work up for possible metabolic etiologies. UA also negative  -CT chest showed b/l lower lobe tree-in-bud opacities R>L- inflammatory vs infectiou bronchiolitis, stable enlarged intrathoracic lymph nodes  -Continuous pulse oximetry given recent admission to MICU after intubation for airway protection  -Aspiration and falls precautions  - Syphillis screen negative   - F/u B12, folate, TSH  - UCx negative, BCx NGTD  - UA suggestive of UTI, on CTX per ID 6/18-6/21  - Repeat UCx contaminated Pt awake and alert but not responding to questions, slurring speech. Note baseline CT head. Will cont. work up for possible metabolic etiologies. UA also negative  -CT chest showed b/l lower lobe tree-in-bud opacities R>L- inflammatory vs infectiou bronchiolitis, stable enlarged intrathoracic lymph nodes  -Continuous pulse oximetry given recent admission to MICU after intubation for airway protection  -Aspiration and falls precautions  - Syphillis screen negative   - F/u B12, folate, TSH  - UCx negative, BCx NGTD  - UA suggestive of UTI, on CTX per ID 6/17-6/21  - Repeat UCx contaminated

## 2022-06-20 NOTE — PROGRESS NOTE ADULT - SUBJECTIVE AND OBJECTIVE BOX
afberile    REVIEW OF SYSTEMS:  GEN: no fever,    no chills  RESP: no SOB,   no cough  CVS: no chest pain,   no palpitations  GI: no abdominal pain,   no nausea,   no vomiting,   no constipation,   no diarrhea  : no dysuria,   no frequency  NEURO: no headache,   no dizziness  PSYCH: no depression,   not anxious  Derm : no rash    MEDICATIONS  (STANDING):  cefTRIAXone   IVPB      cefTRIAXone   IVPB 1000 milliGRAM(s) IV Intermittent every 24 hours  dextrose 5%. 1000 milliLiter(s) (100 mL/Hr) IV Continuous <Continuous>  dextrose 5%. 1000 milliLiter(s) (50 mL/Hr) IV Continuous <Continuous>  dextrose 50% Injectable 25 Gram(s) IV Push once  dextrose 50% Injectable 12.5 Gram(s) IV Push once  dextrose 50% Injectable 25 Gram(s) IV Push once  enoxaparin Injectable 40 milliGRAM(s) SubCutaneous every 24 hours  glucagon  Injectable 1 milliGRAM(s) IntraMuscular once  influenza   Vaccine 0.5 milliLiter(s) IntraMuscular once  midodrine 5 milliGRAM(s) Oral every 8 hours  multivitamin 1 Tablet(s) Oral daily  thiamine 100 milliGRAM(s) Oral daily    MEDICATIONS  (PRN):  acetaminophen     Tablet .. 650 milliGRAM(s) Oral every 6 hours PRN Temp greater or equal to 38C (100.4F), Mild Pain (1 - 3)  dextrose Oral Gel 15 Gram(s) Oral once PRN Blood Glucose LESS THAN 70 milliGRAM(s)/deciliter      Vital Signs Last 24 Hrs  T(C): 36.3 (20 Jun 2022 04:30), Max: 36.8 (19 Jun 2022 14:45)  T(F): 97.3 (20 Jun 2022 04:30), Max: 98.2 (19 Jun 2022 14:45)  HR: 56 (20 Jun 2022 05:44) (50 - 68)  BP: 113/73 (20 Jun 2022 04:30) (98/66 - 113/73)  BP(mean): --  RR: 18 (20 Jun 2022 04:30) (18 - 18)  SpO2: 98% (20 Jun 2022 04:30) (95% - 98%)  CAPILLARY BLOOD GLUCOSE        I&O's Summary    18 Jun 2022 07:01  -  19 Jun 2022 07:00  --------------------------------------------------------  IN: 332 mL / OUT: 900 mL / NET: -568 mL    19 Jun 2022 07:01  -  20 Jun 2022 06:25  --------------------------------------------------------  IN: 0 mL / OUT: 1250 mL / NET: -1250 mL        PHYSICAL EXAM:  HEAD:  Atraumatic, Normocephalic  NECK: Supple, No   JVD  CHEST/LUNG:   no     rales,     no,    rhonchi  HEART: Regular rate and rhythm;         murmur  ABDOMEN: Soft, Nontender, ;   EXTREMITIES:   no     edema  NEUROLOGY:  somnolent    LABS:                        11.9   9.53  )-----------( 118      ( 18 Jun 2022 11:19 )             35.6     06-18    134<L>  |  99  |  8   ----------------------------<  104<H>  3.3<L>   |  25  |  0.63    Ca    9.3      18 Jun 2022 11:19  Phos  2.4     06-18  Mg     1.4     06-18                      Thyroid Stimulating Hormone, Serum: 1.60 uIU/mL (06-18 @ 11:19)          Consultant(s) Notes Reviewed:      Care Discussed with Consultants/Other Providers:

## 2022-06-20 NOTE — PROGRESS NOTE ADULT - PROBLEM SELECTOR PLAN 4
VIRTUAL NURSE:  Cued into patient's room.  Patient not responding to introduction and permission inquiry.  Patient resting comfortably in bed with eyes closed; respirations even and unlabored.  No distress noted.   No defined cause. Recent extensive neurologic, ID and endocrine work up on prior admission. Wife declined brain biopsy that admission.

## 2022-06-20 NOTE — PROGRESS NOTE ADULT - SUBJECTIVE AND OBJECTIVE BOX
CC: F/U for UTI    Saw/spoke to patient. No fevers, no chills. No new complaints.    Allergies  No Known Allergies    ANTIMICROBIALS:  cefTRIAXone   IVPB    cefTRIAXone   IVPB 1000 every 24 hours    PE:    Vital Signs Last 24 Hrs  T(C): 36.3 (20 Jun 2022 04:30), Max: 36.7 (19 Jun 2022 21:01)  T(F): 97.3 (20 Jun 2022 04:30), Max: 98 (19 Jun 2022 21:01)  HR: 57 (20 Jun 2022 14:59) (50 - 68)  BP: 117/79 (20 Jun 2022 14:59) (105/78 - 117/79)  RR: 18 (20 Jun 2022 14:25) (18 - 18)  SpO2: 98% (20 Jun 2022 14:25) (95% - 98%)    Gen: AOx3, NAD, non-toxic  Resp: Breathing comfortably, RA    LABS:                        13.4   5.12  )-----------( 135      ( 20 Jun 2022 07:21 )             40.4     06-20    136  |  100  |  6<L>  ----------------------------<  66<L>  4.0   |  23  |  0.58    Ca    9.8      20 Jun 2022 07:29  Phos  2.9     06-20  Mg     1.8     06-20    MICROBIOLOGY:    Clean Catch Clean Catch (Midstream)  06-17-22   Culture grew 3 or more types of organisms which indicate  collection contamination; consider recollection only if clinically  indicated.  --  --    .Blood Blood-Peripheral  06-16-22   No growth to date.  --  --    .Blood Blood-Peripheral  06-16-22   No growth to date.  --  --    Clean Catch Clean Catch (Midstream)  06-15-22   No growth  --  --    Rapid RVP Result: NotDetec (06-15 @ 21:02)    (otherwise reviewed)    RADIOLOGY:    6/15 CT:    IMPRESSION:  Bilateral lower lobe tree-in-bud opacities, right greater than left,   differential includes inflammatory versus infectious bronchiolitis.    Stable likely enlarged intrathoracic lymph nodes.

## 2022-06-20 NOTE — PROGRESS NOTE ADULT - ASSESSMENT
61M w/ hx of Cerebellar ataxia recent prolonged admission including MICU stay/ intubation from 4/18-5/13 for AMS and airway protection p/w acute encephalopathy and cough, with known Hx of hypothermia thought be dysautonomia, cardiology following, found to have UA c/f UTI, repeat UCx showed >3 organsisms likely contaminated, on CTX, ID following

## 2022-06-20 NOTE — PROGRESS NOTE ADULT - ASSESSMENT
62 yo male      PMHx of cerebellar ataxia    prior  visit  to  ED on 4/17/22 due to AMS ,  was  intubated for depressed consciousness w/ cognitive decline.  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    infectious  and  malignancy  was  ruled  outt/  and  per   neuro  note ,no further workup     flow cytometry 4/28 showing nonspecific results 'degenerated sample, small lymphocytes'   nsgy consulted for meningeal bx, family refusing    CSF cytopathology 4/22 - increased number of large mononuclear cells in a background of few small lymphocytes, monocytes and neutrophils, cannot exclude a lymphoproliferative disorder versus an inflammatory process.    CSF cytopathology 4/29 - significant increased number of small lymphocytes with rare monocytes/ seen by  oncology  dr de leon,  no  intervention      now  admitted    c/o  ams, as  before   h/o  cerebellar  ataxia,  with  no defined  cause  found   pt  with  bradycardia, ?  central, seen  by  card/  echo  on 4/2022,  normal  ef   now  with  hypothermia,   as  before  pt  has  h/o intermittent   hypothermia,  not related  to  any   infectious   process.  rather  , it  seems to be  dysautonomia/  with  h/o normal  cortisol level  prior  CT  c/ap,  no  malignant  process   most  of  these  issues, do  not  have   a good  rx  per  swallow  eval, was  on  modified  diet/  fs  noted  endo  dr ovalle  on feeds  per  swallow  eval   arousable, not communicative/   which is  about his  base line  with few  periods  of  alertness  on iv  rocephin per  dr mclean/ remains  aspiration  risk.  given  bed bound   status  and  altered  baseline  mental  status  bcx and  ucx are  negative/ arousable, ans   sometimes  able  to verbalize. /  baseline

## 2022-06-21 ENCOUNTER — TRANSCRIPTION ENCOUNTER (OUTPATIENT)
Age: 62
End: 2022-06-21

## 2022-06-21 LAB
ALBUMIN SERPL ELPH-MCNC: 3.7 G/DL — SIGNIFICANT CHANGE UP (ref 3.3–5)
ALP SERPL-CCNC: 68 U/L — SIGNIFICANT CHANGE UP (ref 40–120)
ALT FLD-CCNC: 18 U/L — SIGNIFICANT CHANGE UP (ref 10–45)
ANION GAP SERPL CALC-SCNC: 10 MMOL/L — SIGNIFICANT CHANGE UP (ref 5–17)
AST SERPL-CCNC: 13 U/L — SIGNIFICANT CHANGE UP (ref 10–40)
BASOPHILS # BLD AUTO: 0.01 K/UL — SIGNIFICANT CHANGE UP (ref 0–0.2)
BASOPHILS NFR BLD AUTO: 0.2 % — SIGNIFICANT CHANGE UP (ref 0–2)
BILIRUB SERPL-MCNC: 0.3 MG/DL — SIGNIFICANT CHANGE UP (ref 0.2–1.2)
BUN SERPL-MCNC: 9 MG/DL — SIGNIFICANT CHANGE UP (ref 7–23)
CALCIUM SERPL-MCNC: 10.1 MG/DL — SIGNIFICANT CHANGE UP (ref 8.4–10.5)
CHLORIDE SERPL-SCNC: 99 MMOL/L — SIGNIFICANT CHANGE UP (ref 96–108)
CO2 SERPL-SCNC: 27 MMOL/L — SIGNIFICANT CHANGE UP (ref 22–31)
CREAT SERPL-MCNC: 0.7 MG/DL — SIGNIFICANT CHANGE UP (ref 0.5–1.3)
CULTURE RESULTS: SIGNIFICANT CHANGE UP
CULTURE RESULTS: SIGNIFICANT CHANGE UP
EGFR: 105 ML/MIN/1.73M2 — SIGNIFICANT CHANGE UP
EOSINOPHIL # BLD AUTO: 0.14 K/UL — SIGNIFICANT CHANGE UP (ref 0–0.5)
EOSINOPHIL NFR BLD AUTO: 3.1 % — SIGNIFICANT CHANGE UP (ref 0–6)
GLUCOSE SERPL-MCNC: 71 MG/DL — SIGNIFICANT CHANGE UP (ref 70–99)
HCT VFR BLD CALC: 41.3 % — SIGNIFICANT CHANGE UP (ref 39–50)
HGB BLD-MCNC: 13.7 G/DL — SIGNIFICANT CHANGE UP (ref 13–17)
IMM GRANULOCYTES NFR BLD AUTO: 0.4 % — SIGNIFICANT CHANGE UP (ref 0–1.5)
LYMPHOCYTES # BLD AUTO: 0.77 K/UL — LOW (ref 1–3.3)
LYMPHOCYTES # BLD AUTO: 17.3 % — SIGNIFICANT CHANGE UP (ref 13–44)
MCHC RBC-ENTMCNC: 28.2 PG — SIGNIFICANT CHANGE UP (ref 27–34)
MCHC RBC-ENTMCNC: 33.2 GM/DL — SIGNIFICANT CHANGE UP (ref 32–36)
MCV RBC AUTO: 85 FL — SIGNIFICANT CHANGE UP (ref 80–100)
MONOCYTES # BLD AUTO: 0.44 K/UL — SIGNIFICANT CHANGE UP (ref 0–0.9)
MONOCYTES NFR BLD AUTO: 9.9 % — SIGNIFICANT CHANGE UP (ref 2–14)
NEUTROPHILS # BLD AUTO: 3.08 K/UL — SIGNIFICANT CHANGE UP (ref 1.8–7.4)
NEUTROPHILS NFR BLD AUTO: 69.1 % — SIGNIFICANT CHANGE UP (ref 43–77)
NRBC # BLD: 0 /100 WBCS — SIGNIFICANT CHANGE UP (ref 0–0)
PLATELET # BLD AUTO: 140 K/UL — LOW (ref 150–400)
POTASSIUM SERPL-MCNC: 4.3 MMOL/L — SIGNIFICANT CHANGE UP (ref 3.5–5.3)
POTASSIUM SERPL-SCNC: 4.3 MMOL/L — SIGNIFICANT CHANGE UP (ref 3.5–5.3)
PROT SERPL-MCNC: 7.7 G/DL — SIGNIFICANT CHANGE UP (ref 6–8.3)
RBC # BLD: 4.86 M/UL — SIGNIFICANT CHANGE UP (ref 4.2–5.8)
RBC # FLD: 14 % — SIGNIFICANT CHANGE UP (ref 10.3–14.5)
SODIUM SERPL-SCNC: 136 MMOL/L — SIGNIFICANT CHANGE UP (ref 135–145)
SPECIMEN SOURCE: SIGNIFICANT CHANGE UP
SPECIMEN SOURCE: SIGNIFICANT CHANGE UP
WBC # BLD: 4.46 K/UL — SIGNIFICANT CHANGE UP (ref 3.8–10.5)
WBC # FLD AUTO: 4.46 K/UL — SIGNIFICANT CHANGE UP (ref 3.8–10.5)

## 2022-06-21 PROCEDURE — 99232 SBSQ HOSP IP/OBS MODERATE 35: CPT

## 2022-06-21 RX ORDER — SODIUM CHLORIDE 9 MG/ML
1000 INJECTION, SOLUTION INTRAVENOUS
Refills: 0 | Status: DISCONTINUED | OUTPATIENT
Start: 2022-06-21 | End: 2022-06-22

## 2022-06-21 RX ORDER — CEFTRIAXONE 500 MG/1
1000 INJECTION, POWDER, FOR SOLUTION INTRAMUSCULAR; INTRAVENOUS EVERY 24 HOURS
Refills: 0 | Status: COMPLETED | OUTPATIENT
Start: 2022-06-21 | End: 2022-06-21

## 2022-06-21 RX ORDER — SODIUM CHLORIDE 9 MG/ML
1000 INJECTION, SOLUTION INTRAVENOUS
Refills: 0 | Status: DISCONTINUED | OUTPATIENT
Start: 2022-06-21 | End: 2022-06-21

## 2022-06-21 RX ADMIN — SODIUM CHLORIDE 75 MILLILITER(S): 9 INJECTION, SOLUTION INTRAVENOUS at 20:21

## 2022-06-21 RX ADMIN — CEFTRIAXONE 100 MILLIGRAM(S): 500 INJECTION, POWDER, FOR SOLUTION INTRAMUSCULAR; INTRAVENOUS at 17:40

## 2022-06-21 RX ADMIN — SODIUM CHLORIDE 75 MILLILITER(S): 9 INJECTION, SOLUTION INTRAVENOUS at 08:17

## 2022-06-21 RX ADMIN — SODIUM CHLORIDE 75 MILLILITER(S): 9 INJECTION, SOLUTION INTRAVENOUS at 22:44

## 2022-06-21 RX ADMIN — ENOXAPARIN SODIUM 40 MILLIGRAM(S): 100 INJECTION SUBCUTANEOUS at 06:06

## 2022-06-21 RX ADMIN — Medication 1 TABLET(S): at 12:18

## 2022-06-21 RX ADMIN — Medication 100 MILLIGRAM(S): at 12:17

## 2022-06-21 NOTE — PROGRESS NOTE ADULT - SUBJECTIVE AND OBJECTIVE BOX
CARDIOLOGY     PROGRESS  NOTE   ________________________________________________    CHIEF COMPLAINT:Patient is a 61y old  Male who presents with a chief complaint of AMS (21 Jun 2022 06:55)  no complain.  	  REVIEW OF SYSTEMS:  CONSTITUTIONAL: No fever, weight loss, or fatigue  EYES: No eye pain, visual disturbances, or discharge  ENT:  No difficulty hearing, tinnitus, vertigo; No sinus or throat pain  NECK: No pain or stiffness  RESPIRATORY: No cough, wheezing, chills or hemoptysis; No Shortness of Breath  CARDIOVASCULAR: No chest pain, palpitations, passing out, dizziness, or leg swelling  GASTROINTESTINAL: No abdominal or epigastric pain. No nausea, vomiting, or hematemesis; No diarrhea or constipation. No melena or hematochezia.  GENITOURINARY: No dysuria, frequency, hematuria, or incontinence  NEUROLOGICAL: No headaches, memory loss, loss of strength, numbness, or tremors  SKIN: No itching, burning, rashes, or lesions   LYMPH Nodes: No enlarged glands  ENDOCRINE: No heat or cold intolerance; No hair loss  MUSCULOSKELETAL: No joint pain or swelling; No muscle, back, or extremity pain  PSYCHIATRIC: No depression, anxiety, mood swings, or difficulty sleeping  HEME/LYMPH: No easy bruising, or bleeding gums  ALLERGY AND IMMUNOLOGIC: No hives or eczema	    [ ] All others negative	  [x ] Unable to obtain    PHYSICAL EXAM:  T(C): 36.3 (06-21-22 @ 04:28), Max: 36.8 (06-20-22 @ 21:24)  HR: 50 (06-21-22 @ 06:05) (49 - 58)  BP: 114/75 (06-21-22 @ 06:05) (105/78 - 117/79)  RR: 18 (06-21-22 @ 06:05) (18 - 18)  SpO2: 98% (06-21-22 @ 06:05) (98% - 100%)  Wt(kg): --  I&O's Summary    20 Jun 2022 07:01  -  21 Jun 2022 07:00  --------------------------------------------------------  IN: 1025 mL / OUT: 0 mL / NET: 1025 mL        Appearance: Normal	  HEENT:   Normal oral mucosa, PERRL, EOMI	  Lymphatic: No lymphadenopathy  Cardiovascular: Normal S1 S2, No JVD, +murmurs, No edema  Respiratory: Lungs clear to auscultation	  Gastrointestinal:  Soft, Non-tender, + BS	  Skin: No rashes, No ecchymoses, No cyanosis	  Extremities: Normal range of motion, No clubbing, cyanosis or edema  Vascular: Peripheral pulses palpable 2+ bilaterally    MEDICATIONS  (STANDING):  dextrose 5% + sodium chloride 0.9%. 1000 milliLiter(s) (75 mL/Hr) IV Continuous <Continuous>  dextrose 5%. 1000 milliLiter(s) (100 mL/Hr) IV Continuous <Continuous>  dextrose 5%. 1000 milliLiter(s) (50 mL/Hr) IV Continuous <Continuous>  dextrose 50% Injectable 25 Gram(s) IV Push once  dextrose 50% Injectable 12.5 Gram(s) IV Push once  dextrose 50% Injectable 25 Gram(s) IV Push once  enoxaparin Injectable 40 milliGRAM(s) SubCutaneous every 24 hours  glucagon  Injectable 1 milliGRAM(s) IntraMuscular once  influenza   Vaccine 0.5 milliLiter(s) IntraMuscular once  midodrine 5 milliGRAM(s) Oral every 8 hours  multivitamin 1 Tablet(s) Oral daily  thiamine 100 milliGRAM(s) Oral daily      TELEMETRY: 	    ECG:  	  RADIOLOGY:  OTHER: 	  	  LABS:	 	    CARDIAC MARKERS:                                13.4   5.12  )-----------( 135      ( 20 Jun 2022 07:21 )             40.4     06-20    136  |  100  |  6<L>  ----------------------------<  66<L>  4.0   |  23  |  0.58    Ca    9.8      20 Jun 2022 07:29  Phos  2.9     06-20  Mg     1.8     06-20      proBNP:   Lipid Profile:   HgA1c:   TSH: Thyroid Stimulating Hormone, Serum: 1.60 uIU/mL (06-18 @ 11:19)      Cortisol AM, Serum . (06.17.22 @ 08:45)    Cortisol AM, Serum: 15.6 ug/dL        Assessment and plan  ---------------------------  61M w/ hx of Cerebellar ataxia recent prolonged admission including MICU stay/ intubation from 4/18-5/13 for AMS and airway protection p/w AMS and cough. There was component of rapid neurocognitive decline w/ cerebellar ataxia and possible meningeal findings. Family refused meningeal biopsy. Endocrine work up for hypothermia that admission also unremarkable. As per wife, pt seemed closer to baseline and was able to answer appropriately by time of discharge to rehab. Pt has since been at rehab, starting roughly 4 days ago pt's wife noticed pt becoming more altered and lethargic. Has had decreased PO on pureed diet from discharge. Wife has also noticed wet cough around some time period. When wife felt these symptoms continued to worsen and rehab continued to make no interventions she sent him to hospital for further evaluation.  In ER: Given 1L LR.   pt is well known to me from the previous admission with hx of a.fib who presented with similar complain and change of mental status  will call wife to get a history  pt refused meningeal biopsy  agree with hydration  will add midodrine if needed  cortisol level noted, normal  will check old record  hx of a.fib ?AC  doing better continue current meds  increase midodrine to 5 mg tid

## 2022-06-21 NOTE — DISCHARGE NOTE PROVIDER - DETAILS OF MALNUTRITION DIAGNOSIS/DIAGNOSES
This patient has been assessed with a concern for Malnutrition and was treated during this hospitalization for the following Nutrition diagnosis/diagnoses:     -  06/19/2022: Severe protein-calorie malnutrition

## 2022-06-21 NOTE — DISCHARGE NOTE PROVIDER - NSDCFUSCHEDAPPT_GEN_ALL_CORE_FT
Abimael Daigle  [facility]  UROLOGY [address]  Scheduled Appointment: 08/08/2022    Pablito Trotter  [facility]  [facility] [address] [facility]  Scheduled Appointment: 10/07/2022

## 2022-06-21 NOTE — PROGRESS NOTE ADULT - SUBJECTIVE AND OBJECTIVE BOX
CC: F/U for UTI    Saw/spoke to patient. No fevers, no chills. No new complaints.    Allergies  No Known Allergies    ANTIMICROBIALS:  cefTRIAXone   IVPB 1000 every 24 hours    PE:    Vital Signs Last 24 Hrs  T(C): 36.7 (21 Jun 2022 13:56), Max: 36.8 (20 Jun 2022 21:24)  T(F): 98.1 (21 Jun 2022 13:56), Max: 98.2 (20 Jun 2022 21:24)  HR: 54 (21 Jun 2022 13:58) (49 - 58)  BP: 104/64 (21 Jun 2022 13:58) (93/61 - 115/74)  RR: 18 (21 Jun 2022 13:56) (18 - 18)  SpO2: 98% (21 Jun 2022 13:56) (98% - 100%)    Gen: AOx1-2, NAD, non-toxic  Resp: Breathing comfortably, RA    LABS:                        13.7   4.46  )-----------( 140      ( 21 Jun 2022 07:20 )             41.3     06-21    136  |  99  |  9   ----------------------------<  71  4.3   |  27  |  0.70    Ca    10.1      21 Jun 2022 07:20  Phos  2.9     06-20  Mg     1.8     06-20    TPro  7.7  /  Alb  3.7  /  TBili  0.3  /  DBili  x   /  AST  13  /  ALT  18  /  AlkPhos  68  06-21    MICROBIOLOGY:    Clean Catch Clean Catch (Midstream)  06-17-22   Culture grew 3 or more types of organisms which indicate  collection contamination; consider recollection only if clinically  indicated.  --  --    .Blood Blood-Peripheral  06-16-22 No Growth Final  --  --    .Blood Blood-Peripheral  06-16-22   No Growth Final  --  --    Clean Catch Clean Catch (Midstream)  06-15-22   No growth  --  --    Rapid RVP Result: NotDetec (06-15 @ 21:02)    RADIOLOGY:    CT    IMPRESSION:  Bilateral lower lobe tree-in-bud opacities, right greater than left,   differential includes inflammatory versus infectious bronchiolitis.    Stable likely enlarged intrathoracic lymph nodes.

## 2022-06-21 NOTE — PROGRESS NOTE ADULT - SUBJECTIVE AND OBJECTIVE BOX
afberile  REVIEW OF SYSTEMS:  GEN: no fever,    no chills  RESP: no SOB,   no cough  CVS: no chest pain,   no palpitations  GI: no abdominal pain,   no nausea,   no vomiting,   no constipation,   no diarrhea  : no dysuria,   no frequency  NEURO: no headache,   no dizziness  PSYCH: no depression,   not anxious  Derm : no rash    MEDICATIONS  (STANDING):  dextrose 5% + sodium chloride 0.9%. 1000 milliLiter(s) (75 mL/Hr) IV Continuous <Continuous>  dextrose 5%. 1000 milliLiter(s) (100 mL/Hr) IV Continuous <Continuous>  dextrose 5%. 1000 milliLiter(s) (50 mL/Hr) IV Continuous <Continuous>  dextrose 50% Injectable 25 Gram(s) IV Push once  dextrose 50% Injectable 12.5 Gram(s) IV Push once  dextrose 50% Injectable 25 Gram(s) IV Push once  enoxaparin Injectable 40 milliGRAM(s) SubCutaneous every 24 hours  glucagon  Injectable 1 milliGRAM(s) IntraMuscular once  influenza   Vaccine 0.5 milliLiter(s) IntraMuscular once  midodrine 5 milliGRAM(s) Oral every 8 hours  multivitamin 1 Tablet(s) Oral daily  thiamine 100 milliGRAM(s) Oral daily    MEDICATIONS  (PRN):  acetaminophen     Tablet .. 650 milliGRAM(s) Oral every 6 hours PRN Temp greater or equal to 38C (100.4F), Mild Pain (1 - 3)  dextrose Oral Gel 15 Gram(s) Oral once PRN Blood Glucose LESS THAN 70 milliGRAM(s)/deciliter      Vital Signs Last 24 Hrs  T(C): 36.3 (21 Jun 2022 04:28), Max: 36.8 (20 Jun 2022 21:24)  T(F): 97.4 (21 Jun 2022 04:28), Max: 98.2 (20 Jun 2022 21:24)  HR: 50 (21 Jun 2022 06:05) (49 - 58)  BP: 114/75 (21 Jun 2022 06:05) (105/78 - 117/79)  BP(mean): --  RR: 18 (21 Jun 2022 06:05) (18 - 18)  SpO2: 98% (21 Jun 2022 06:05) (98% - 100%)  CAPILLARY BLOOD GLUCOSE        I&O's Summary    19 Jun 2022 07:01  -  20 Jun 2022 07:00  --------------------------------------------------------  IN: 0 mL / OUT: 1450 mL / NET: -1450 mL    20 Jun 2022 07:01  -  21 Jun 2022 06:55  --------------------------------------------------------  IN: 1025 mL / OUT: 0 mL / NET: 1025 mL        PHYSICAL EXAM:  HEAD:  Atraumatic, Normocephalic  NECK: Supple, No   JVD  CHEST/LUNG:   no     rales,     no,    rhonchi  HEART: Regular rate and rhythm;         murmur  ABDOMEN: Soft, Nontender, ;   EXTREMITIES:     no   edema  NEUROLOGY:  alert    LABS:                        13.4   5.12  )-----------( 135      ( 20 Jun 2022 07:21 )             40.4     06-20    136  |  100  |  6<L>  ----------------------------<  66<L>  4.0   |  23  |  0.58    Ca    9.8      20 Jun 2022 07:29  Phos  2.9     06-20  Mg     1.8     06-20                      Thyroid Stimulating Hormone, Serum: 1.60 uIU/mL (06-18 @ 11:19)          Consultant(s) Notes Reviewed:      Care Discussed with Consultants/Other Providers:

## 2022-06-21 NOTE — DISCHARGE NOTE PROVIDER - HOSPITAL COURSE
61M w/ hx of Cerebellar ataxia recent prolonged admission including MICU stay/ intubation from 4/18-5/13 for AMS and airway protection p/w AMS and cough. There was component of rapid neurocognitive decline w/ cerebellar ataxia and possible meningeal findings. Family refused meningeal biopsy. Endocrine work up for hypothermia that admission also unremarkable. As per wife, pt seemed closer to baseline and was able to answer appropriately by time of discharge to rehab. Pt has since been at rehab, starting roughly 4 days ago pt's wife noticed pt becoming more altered and lethargic. Has had decreased PO on pureed diet from discharge. Wife has also noticed wet cough around some time period. When wife felt these symptoms continued to worsen and rehab continued to make no interventions she sent him to hospital for further evaluation.  .  In ER: Given 1L LR    Hospital Course:  Patient had Urinalysis suggestive of UTI, ID recommended CTX x 5 days. Course of Abx completed, patient remained afebrile and without hypothermia x 48 hrs. Midodrine was started for orthostatic hypotension per cardiology. The patient's mental status showed improvement and patient became more conversive AOx2.    On day of discharge, patient was clinically stable, to be discharged home w/ home health. Patient to follow up with PCP after discharge.   61M w/ hx of Cerebellar ataxia recent prolonged admission including MICU stay/ intubation from 4/18-5/13 for AMS and airway protection p/w AMS and cough. There was component of rapid neurocognitive decline w/ cerebellar ataxia and possible meningeal findings. Family refused meningeal biopsy. Endocrine work up for hypothermia that admission also unremarkable. As per wife, pt seemed closer to baseline and was able to answer appropriately by time of discharge to rehab. Pt has since been at rehab, starting roughly 4 days ago pt's wife noticed pt becoming more altered and lethargic. Has had decreased PO on pureed diet from discharge. Wife has also noticed wet cough around some time period. When wife felt these symptoms continued to worsen and rehab continued to make no interventions she sent him to hospital for further evaluation.  .  In ER: Given 1L LR    Hospital Course:  Patient had Urinalysis suggestive of UTI, ID recommended CTX x 5 days. Course of Abx completed, patient remained afebrile and without hypothermia x 48 hrs. Midodrine was started for orthostatic hypotension per cardiology. BP improved and it was discontinued before discharged. The patient's mental status showed improvement and patient became more conversive AOx2.    On day of discharge, patient was clinically stable, to be discharged home w/ home health. Patient to follow up with PCP after discharge.   61 year old male PMH of Cerebellar ataxia recent prolonged admission including MICU stay/ intubation from 4/18-5/13 for AMS and airway protection p/w AMS and cough. There was component of rapid neurocognitive decline w/ cerebellar ataxia and possible meningeal findings. Family refused meningeal biopsy. Endocrine work up for hypothermia that admission also unremarkable. As per wife, pt seemed closer to baseline and was able to answer appropriately by time of discharge to rehab. Pt has since been at rehab, starting roughly 4 days ago pt's wife noticed pt becoming more altered and lethargic. Has had decreased PO on pureed diet from discharge. Wife has also noticed wet cough around some time period. When wife felt these symptoms continued to worsen and rehab continued to make no interventions she sent him to hospital for further evaluation.  .  In ER: Given 1L LR    Hospital Course:  Patient had Urinalysis suggestive of UTI, ID recommended CTX x 5 days. Course of Abx completed, patient remained afebrile and without hypothermia x 48 hrs. Midodrine was started for orthostatic hypotension per cardiology. BP improved and it was discontinued before discharged. The patient's mental status showed improvement and patient became more conversive AOx2.    On day of discharge, patient was clinically stable, to be discharged home w/ home health. Patient to follow up with PCP after discharge. See med list.    61 year old male PMH of Cerebellar ataxia recent prolonged admission including MICU stay/ intubation from 4/18-5/13 for AMS and airway protection p/w AMS and cough. There was component of rapid neurocognitive decline w/ cerebellar ataxia and possible meningeal findings. Family refused meningeal biopsy. Endocrine work up for hypothermia that admission also unremarkable. As per wife, pt seemed closer to baseline and was able to answer appropriately by time of discharge to rehab. Pt has since been at rehab, starting roughly 4 days ago pt's wife noticed pt becoming more altered and lethargic. Has had decreased PO on pureed diet from discharge. Wife has also noticed wet cough around some time period. When wife felt these symptoms continued to worsen and rehab continued to make no interventions she sent him to hospital for further evaluation.  .  In ER: Given 1L LR    Hospital Course:  Patient had Urinalysis suggestive of UTI, ID recommended CTX x 5 days. Course of Abx completed, patient remained afebrile and without hypothermia x 48 hrs. Midodrine was started for orthostatic hypotension per cardiology. BP improved and it was discontinued before discharged. The patient's mental status showed improvement and patient became more conversive AOx2.    On day of discharge, patient was clinically stable, to be discharged home w/ home health. Patient to follow up with PCP after discharge. See med list. Addendum: Patient is functional quadraplegic.

## 2022-06-21 NOTE — DISCHARGE NOTE PROVIDER - NSDCCPCAREPLAN_GEN_ALL_CORE_FT
PRINCIPAL DISCHARGE DIAGNOSIS  Diagnosis: Altered mental status  Assessment and Plan of Treatment: You came from rehab because you had become confused, and were having wet cough. There are many reasons a person can become confused including infection, neurologic problems, being in unfamiliar environements. We tested your urine, and the analysis was suggestive of infection. The infectious disease doctors recommended antibiotics. You completed your course of antibiotics and started to feel a little better. The speech pathologists saw you and recommended continuing your pureed diet. Please continue taking your medications as prescribed. Please follow up with your primary care provider within 2 weeks.       PRINCIPAL DISCHARGE DIAGNOSIS  Diagnosis: Altered mental status  Assessment and Plan of Treatment: You came from rehab because you had become confused, and were having wet cough. There are many reasons a person can become confused including infection, neurologic problems, being in unfamiliar environements. We tested your urine, and the analysis was suggestive of infection. The infectious disease doctors recommended antibiotics. You completed your course of antibiotics and started to feel a little better. The speech pathologists saw you and recommended continuing your pureed diet. Please continue taking your medications as prescribed. Please follow up with your primary care provider within 2 weeks.      SECONDARY DISCHARGE DIAGNOSES  Diagnosis: Orthostatic hypotension  Assessment and Plan of Treatment: You were found to have low blood pressures during your hospital stay. You have a history of cerebellar ataxia. Sometimes problems in the brain can cause an impaired regulation of blood pressure. We started a medication called midodrine to help maintain your blood pressures. Please take your medications as prescribed.     PRINCIPAL DISCHARGE DIAGNOSIS  Diagnosis: Altered mental status  Assessment and Plan of Treatment: You came from rehab because you had become confused, and were having wet cough. There are many reasons a person can become confused including infection, neurologic problems, being in unfamiliar environements. We tested your urine, and the analysis was suggestive of infection. The infectious disease doctors recommended antibiotics. You completed your course of antibiotics and started to feel a little better. The speech pathologists saw you and recommended continuing your pureed diet. Please continue taking your medications as prescribed. Please follow up with your primary care provider within 2 weeks.      SECONDARY DISCHARGE DIAGNOSES  Diagnosis: Orthostatic hypotension  Assessment and Plan of Treatment: You were found to have low blood pressures during your hospital stay. You have a history of cerebellar ataxia. Sometimes problems in the brain can cause an impaired regulation of blood pressure. We started a medication called midodrine to help maintain your blood pressures briefly. Midodrine was discontinued on discharge. Please follow-up with PCP for further management of your orthostatic hypotension

## 2022-06-21 NOTE — DISCHARGE NOTE PROVIDER - NSDCMRMEDTOKEN_GEN_ALL_CORE_FT
aspirin 81 mg oral delayed release tablet: 1 tab(s) orally once a day  atorvastatin 10 mg oral tablet: 1 tab(s) orally once a day  Commode: Commode    Dx: Cerebellar Ataxia  ICD-10 G32.81  Vicente Lift: Vicente Lift    Dx: Cerebellar Ataxia  ICD-10 G32.81  Multiple Vitamins oral tablet: 1 tab(s) orally once a day   aspirin 81 mg oral delayed release tablet: 1 tab(s) orally once a day  atorvastatin 10 mg oral tablet: 1 tab(s) orally once a day  Commode: Commode    Dx: Cerebellar Ataxia  ICD-10 G32.81  Vicente Lift: Vicente Lift    Dx: Cerebellar Ataxia  ICD-10 G32.81  midodrine 5 mg oral tablet: 1 tab(s) orally every 8 hours  Multiple Vitamins oral tablet: 1 tab(s) orally once a day  thiamine 100 mg oral tablet: 1 tab(s) orally once a day   aspirin 81 mg oral delayed release tablet: 1 tab(s) orally once a day  atorvastatin 10 mg oral tablet: 1 tab(s) orally once a day  Commode: Commode    Dx: Cerebellar Ataxia  ICD-10 G32.81  Vicente Lift: Vicente Lift    Dx: Cerebellar Ataxia  ICD-10 G32.81  Multiple Vitamins oral tablet: 1 tab(s) orally once a day  thiamine 100 mg oral tablet: 1 tab(s) orally once a day   ascorbic acid 500 mg oral tablet: 1 tab(s) orally once a day  fludrocortisone 0.1 mg oral tablet: 1 tab(s) orally 2 times a day  midodrine 5 mg oral tablet: 1 tab(s) orally 3 times a day  Multiple Vitamins oral tablet: 1 tab(s) orally once a day  senna leaf extract oral tablet: 2 tab(s) orally once a day (at bedtime)  thiamine 100 mg oral tablet: 1 tab(s) orally once a day

## 2022-06-21 NOTE — PROGRESS NOTE ADULT - PROBLEM SELECTOR PLAN 5
DVT PPx: Lovenox    Diet: Pureed with mildly thick liquids per S+S    Dispo: Pending clinical course

## 2022-06-21 NOTE — PROGRESS NOTE ADULT - ASSESSMENT
61 M w/ hx of Cerebellar ataxia recent prolonged admission including MICU stay/ intubation from 4/18-5/13 for AMS and airway protection p/w AMS and cough  Hypothermia, no leukocytosis  On prior eval had multiple episodes of hypothermia  UA+, UCX neg  Complains of dysuria--unclear reliability  Overall,  1) Hypothermia  - Unclear if metabolic or if due to underlying infection; longstanding, chronic process  - Trend temperatures  2) Abnormal UA  - UCX NGTD, complains of dysuria  - Ceftriaxone 1g q 24, 5-7 days then DC  - Monitor for clinical response  3) AMS  - Uncertain baseline, AOx2?  - Monitor to normal    Isaias Buckley MD  Contact on TEAMS messaging from 9am - 5pm  From 5pm-9am, and on weekends call [phone]

## 2022-06-21 NOTE — PROGRESS NOTE ADULT - SUBJECTIVE AND OBJECTIVE BOX
DATE OF SERVICE: 06-21-22 @ 06:47    Patient is a 61y old  Male who presents with a chief complaint of AMS (20 Jun 2022 09:08)      SUBJECTIVE / OVERNIGHT EVENTS: NAEO. Patient afebrile o/n. Patient AOx1 this AM to self. Patient denies CP, SOB, fevers/chills, N/V/D    MEDICATIONS  (STANDING):  dextrose 5% + sodium chloride 0.9%. 1000 milliLiter(s) (75 mL/Hr) IV Continuous <Continuous>  dextrose 5%. 1000 milliLiter(s) (100 mL/Hr) IV Continuous <Continuous>  dextrose 5%. 1000 milliLiter(s) (50 mL/Hr) IV Continuous <Continuous>  dextrose 50% Injectable 25 Gram(s) IV Push once  dextrose 50% Injectable 12.5 Gram(s) IV Push once  dextrose 50% Injectable 25 Gram(s) IV Push once  enoxaparin Injectable 40 milliGRAM(s) SubCutaneous every 24 hours  glucagon  Injectable 1 milliGRAM(s) IntraMuscular once  influenza   Vaccine 0.5 milliLiter(s) IntraMuscular once  midodrine 5 milliGRAM(s) Oral every 8 hours  multivitamin 1 Tablet(s) Oral daily  thiamine 100 milliGRAM(s) Oral daily    MEDICATIONS  (PRN):  acetaminophen     Tablet .. 650 milliGRAM(s) Oral every 6 hours PRN Temp greater or equal to 38C (100.4F), Mild Pain (1 - 3)  dextrose Oral Gel 15 Gram(s) Oral once PRN Blood Glucose LESS THAN 70 milliGRAM(s)/deciliter      Vital Signs Last 24 Hrs  T(C): 36.3 (21 Jun 2022 04:28), Max: 36.8 (20 Jun 2022 21:24)  T(F): 97.4 (21 Jun 2022 04:28), Max: 98.2 (20 Jun 2022 21:24)  HR: 50 (21 Jun 2022 06:05) (49 - 58)  BP: 114/75 (21 Jun 2022 06:05) (105/78 - 117/79)  BP(mean): --  RR: 18 (21 Jun 2022 06:05) (18 - 18)  SpO2: 98% (21 Jun 2022 06:05) (98% - 100%)  CAPILLARY BLOOD GLUCOSE        I&O's Summary    19 Jun 2022 07:01  -  20 Jun 2022 07:00  --------------------------------------------------------  IN: 0 mL / OUT: 1450 mL / NET: -1450 mL    20 Jun 2022 07:01  -  21 Jun 2022 06:47  --------------------------------------------------------  IN: 1025 mL / OUT: 0 mL / NET: 1025 mL        PHYSICAL EXAM:  GENERAL: NAD  HEAD:  Atraumatic, Normocephalic  EYES: EOMI, PERRLA, conjunctiva and sclera clear  NECK: Supple, No JVD  CHEST/LUNG: Clear to auscultation bilaterally; No wheeze  HEART: Regular rate and rhythm; No murmurs, rubs, or gallops  ABDOMEN: Soft, Nontender, Nondistended; Bowel sounds present  EXTREMITIES:  2+ Peripheral Pulses, No clubbing, cyanosis, or edema  PSYCH: AAOx1  NEUROLOGY: non-focal, reflexes intact b/l, negative Valverde's sign, down going toes w/ Babinski  SKIN: No rashes or lesions    LABS:                        13.4   5.12  )-----------( 135      ( 20 Jun 2022 07:21 )             40.4     06-20    136  |  100  |  6<L>  ----------------------------<  66<L>  4.0   |  23  |  0.58    Ca    9.8      20 Jun 2022 07:29  Phos  2.9     06-20  Mg     1.8     06-20                RADIOLOGY & ADDITIONAL TESTS:    Imaging Personally Reviewed:    Consultant(s) Notes Reviewed:      Care Discussed with Consultants/Other Providers:

## 2022-06-21 NOTE — PROGRESS NOTE ADULT - ASSESSMENT
60 yo male      PMHx of cerebellar ataxia    prior  visit  to  ED on 4/17/22 due to AMS ,  was  intubated for depressed consciousness w/ cognitive decline.  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    infectious  and  malignancy  was  ruled  outt/  and  per   neuro  note ,no further workup     flow cytometry 4/28 showing nonspecific results 'degenerated sample, small lymphocytes'   nsgy consulted for meningeal bx, family refusing    CSF cytopathology 4/22 - increased number of large mononuclear cells in a background of few small lymphocytes, monocytes and neutrophils, cannot exclude a lymphoproliferative disorder versus an inflammatory process.    CSF cytopathology 4/29 - significant increased number of small lymphocytes with rare monocytes/ seen by  oncology  dr de leon,  no  intervention      now  admitted    c/o  ams, as  before   h/o  cerebellar  ataxia,  with  no defined  cause  found   pt  with  bradycardia, ?  central, seen  by  card/  echo  on 4/2022,  normal  ef   now  with  hypothermia,   as  before  pt  has  h/o intermittent   hypothermia,  not related  to  any   infectious   process.  rather  , it  seems to be  dysautonomia/  with  h/o normal  cortisol level  prior  CT  c/ap,  no  malignant  process   most  of  these  issues, do  not  have   a good  rx  per  swallow  eval, was  on  modified  diet/  fs  noted  endo  dr ovalle  on feeds  per  swallow  eval   arousable, not communicative/   which is  about his  base line  with few  periods  of  alertness    was  iv  rocephin per  dr mclean/ remains  aspiration  risk.  given  bed bound   status  and  altered  baseline  mental  status  bcx and  ucx are  negative/ arousable, ans   sometimes  able  to verbalize. /this is hid   baseline  start   d/c [planning                     62 yo male      PMHx of cerebellar ataxia    prior  visit  to  ED on 4/17/22 due to AMS ,  was  intubated for depressed consciousness w/ cognitive decline.  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    infectious  and  malignancy  was  ruled  outt/  and  per   neuro  note ,no further workup     flow cytometry 4/28 showing nonspecific results 'degenerated sample, small lymphocytes'   nsgy consulted for meningeal bx, family refusing    CSF cytopathology 4/22 - increased number of large mononuclear cells in a background of few small lymphocytes, monocytes and neutrophils, cannot exclude a lymphoproliferative disorder versus an inflammatory process.    CSF cytopathology 4/29 - significant increased number of small lymphocytes with rare monocytes/ seen by  oncology  dr de leon,  no  intervention      now  admitted    c/o  ams, as  before   h/o  cerebellar  ataxia,  with  no defined  cause  found   pt  with  bradycardia, ?  central, seen  by  card/  echo  on 4/2022,  normal  ef   now  with  hypothermia,   as  before  pt  has  h/o intermittent   hypothermia,  not related  to  any   infectious   process.  rather  , it  seems to be  dysautonomia/  with  h/o normal  cortisol level  prior  CT  c/ap,  no  malignant  process   most  of  these  issues, do  not  have   a good  rx  per  swallow  eval, was  on  modified  diet/  fs  noted  endo  dr ovalle  on feeds  per  swallow  eval   arousable, not communicative/   which is  about his  base line  with few  periods  of  alertness    was  iv  rocephin per  dr mclean/ remains  aspiration  risk.  given  bed bound   status  and  altered  baseline  mental  status  bcx and  ucx are  negative/ arousable, ans   sometimes  able  to verbalize. /this is hid   baseline  labs  pending /  not hypothermic  today

## 2022-06-21 NOTE — PROGRESS NOTE ADULT - PROBLEM SELECTOR PLAN 1
Pt awake and alert but not responding to questions, slurring speech. Note baseline CT head. Will cont. work up for possible metabolic etiologies. UA also negative  -CT chest showed b/l lower lobe tree-in-bud opacities R>L- inflammatory vs infectiou bronchiolitis, stable enlarged intrathoracic lymph nodes  -Continuous pulse oximetry given recent admission to MICU after intubation for airway protection  -Aspiration and falls precautions  - Syphillis screen negative   - F/u B12, folate, TSH  - UCx negative, BCx NGTD  - UA suggestive of UTI, on CTX per ID 6/17-6/21  - Repeat UCx contaminated

## 2022-06-22 LAB — SARS-COV-2 RNA SPEC QL NAA+PROBE: SIGNIFICANT CHANGE UP

## 2022-06-22 PROCEDURE — 99232 SBSQ HOSP IP/OBS MODERATE 35: CPT

## 2022-06-22 RX ORDER — THIAMINE MONONITRATE (VIT B1) 100 MG
1 TABLET ORAL
Qty: 30 | Refills: 0
Start: 2022-06-22 | End: 2022-07-21

## 2022-06-22 RX ORDER — MIDODRINE HYDROCHLORIDE 2.5 MG/1
1 TABLET ORAL
Qty: 90 | Refills: 0
Start: 2022-06-22 | End: 2022-07-21

## 2022-06-22 RX ADMIN — MIDODRINE HYDROCHLORIDE 5 MILLIGRAM(S): 2.5 TABLET ORAL at 15:03

## 2022-06-22 RX ADMIN — Medication 1 TABLET(S): at 12:47

## 2022-06-22 RX ADMIN — Medication 100 MILLIGRAM(S): at 12:47

## 2022-06-22 RX ADMIN — ENOXAPARIN SODIUM 40 MILLIGRAM(S): 100 INJECTION SUBCUTANEOUS at 05:58

## 2022-06-22 RX ADMIN — MIDODRINE HYDROCHLORIDE 5 MILLIGRAM(S): 2.5 TABLET ORAL at 22:08

## 2022-06-22 RX ADMIN — MIDODRINE HYDROCHLORIDE 5 MILLIGRAM(S): 2.5 TABLET ORAL at 05:58

## 2022-06-22 NOTE — PROGRESS NOTE ADULT - ASSESSMENT
61M w/ hx of Cerebellar ataxia recent prolonged admission including MICU stay/ intubation from 4/18-5/13 for AMS and airway protection p/w acute encephalopathy and cough, with known Hx of hypothermia thought be dysautonomia, cardiology following, found to have UA c/f UTI, repeat UCx showed >3 organsisms likely contaminated, s/p 5 day course of CTX

## 2022-06-22 NOTE — PROGRESS NOTE ADULT - ASSESSMENT
61 M w/ hx of Cerebellar ataxia recent prolonged admission including MICU stay/ intubation from 4/18-5/13 for AMS and airway protection p/w AMS and cough  Hypothermia, no leukocytosis  On prior eval had multiple episodes of hypothermia  UA+, UCX neg  Complains of dysuria--unclear reliability  Improved, back to baseline  Overall,  1) Hypothermia  - Unclear if metabolic or if due to underlying infection; longstanding, chronic process  - Trend temperatures  2) Abnormal UA  - UCX NGTD, complains of dysuria  - S/p 5 day Ceftriaxone  - Monitor for clinical response  3) AMS  - Uncertain baseline, AOx2?  - Monitor to normal    Signing off. Please call with further questions or change in status.    Isaias Buckley MD  Contact on TEAMS messaging from 9am - 5pm  From 5pm-9am, and on weekends call [phone]

## 2022-06-22 NOTE — PROGRESS NOTE ADULT - NSPROGADDITIONALINFOA_GEN_ALL_CORE
Stable on 5 René. Completed ABX for UTI. Stop IVF, advance Puree diet, VSS, Needs rehab. Stable on 5 René. Completed ABX for UTI. Stop IVF, advance Puree diet, VSS, Needs home with hoya lift and cammode.     Spoke to house staff. No coronel in place.

## 2022-06-22 NOTE — PROGRESS NOTE ADULT - SUBJECTIVE AND OBJECTIVE BOX
CC: F/U for UTI    Saw/spoke to patient. Unchanged. No new events.    Allergies  No Known Allergies    ANTIMICROBIALS:      PE:    Vital Signs Last 24 Hrs  T(C): 36.7 (22 Jun 2022 12:13), Max: 36.8 (21 Jun 2022 22:11)  T(F): 98 (22 Jun 2022 12:13), Max: 98.2 (21 Jun 2022 22:11)  HR: 52 (22 Jun 2022 12:13) (51 - 57)  BP: 120/77 (22 Jun 2022 12:13) (93/61 - 120/77)  RR: 18 (22 Jun 2022 12:13) (18 - 18)  SpO2: 100% (22 Jun 2022 12:13) (98% - 100%)    Gen: AOx3, NAD, non-toxic  Resp: Breathing comfortably, RA    LABS:                        13.7   4.46  )-----------( 140      ( 21 Jun 2022 07:20 )             41.3     06-21    136  |  99  |  9   ----------------------------<  71  4.3   |  27  |  0.70    Ca    10.1      21 Jun 2022 07:20    TPro  7.7  /  Alb  3.7  /  TBili  0.3  /  DBili  x   /  AST  13  /  ALT  18  /  AlkPhos  68  06-21    MICROBIOLOGY:    Clean Catch Clean Catch (Midstream)  06-17-22   Culture grew 3 or more types of organisms which indicate  collection contamination; consider recollection only if clinically  indicated.  --  --    Rapid RVP Result: NotDetec (06-15 @ 21:02)    (otherwise reviewed)    RADIOLOGY:    6/15 CT:    IMPRESSION:  Bilateral lower lobe tree-in-bud opacities, right greater than left,   differential includes inflammatory versus infectious bronchiolitis.    Stable likely enlarged intrathoracic lymph nodes.

## 2022-06-22 NOTE — PROGRESS NOTE ADULT - SUBJECTIVE AND OBJECTIVE BOX
DATE OF SERVICE: 06-22-22 @ 08:53    Patient is a 61y old  Male who presents with a chief complaint of AMS (22 Jun 2022 08:14)      SUBJECTIVE / OVERNIGHT EVENTS: NAEO. Patient afebrile o/n. Patient AOx2 this AM to [facility], hospital. Denies CP, SOB, fevers/chills, N/V/D.     MEDICATIONS  (STANDING):  dextrose 5% + sodium chloride 0.9%. 1000 milliLiter(s) (75 mL/Hr) IV Continuous <Continuous>  dextrose 5%. 1000 milliLiter(s) (100 mL/Hr) IV Continuous <Continuous>  dextrose 5%. 1000 milliLiter(s) (50 mL/Hr) IV Continuous <Continuous>  dextrose 50% Injectable 25 Gram(s) IV Push once  dextrose 50% Injectable 12.5 Gram(s) IV Push once  dextrose 50% Injectable 25 Gram(s) IV Push once  enoxaparin Injectable 40 milliGRAM(s) SubCutaneous every 24 hours  glucagon  Injectable 1 milliGRAM(s) IntraMuscular once  influenza   Vaccine 0.5 milliLiter(s) IntraMuscular once  midodrine 5 milliGRAM(s) Oral every 8 hours  multivitamin 1 Tablet(s) Oral daily  thiamine 100 milliGRAM(s) Oral daily    MEDICATIONS  (PRN):  acetaminophen     Tablet .. 650 milliGRAM(s) Oral every 6 hours PRN Temp greater or equal to 38C (100.4F), Mild Pain (1 - 3)  dextrose Oral Gel 15 Gram(s) Oral once PRN Blood Glucose LESS THAN 70 milliGRAM(s)/deciliter      Vital Signs Last 24 Hrs  T(C): 36.7 (22 Jun 2022 04:53), Max: 36.8 (21 Jun 2022 22:11)  T(F): 98.1 (22 Jun 2022 04:53), Max: 98.2 (21 Jun 2022 22:11)  HR: 51 (22 Jun 2022 04:53) (51 - 57)  BP: 117/64 (22 Jun 2022 04:53) (93/61 - 117/64)  BP(mean): --  RR: 18 (22 Jun 2022 04:53) (18 - 18)  SpO2: 100% (22 Jun 2022 04:53) (98% - 100%)  CAPILLARY BLOOD GLUCOSE        I&O's Summary    21 Jun 2022 07:01  -  22 Jun 2022 07:00  --------------------------------------------------------  IN: 750 mL / OUT: 500 mL / NET: 250 mL        PHYSICAL EXAM:  GENERAL: NAD  HEAD:  Atraumatic, Normocephalic  EYES: EOMI, PERRLA, conjunctiva and sclera clear  NECK: Supple, No JVD  CHEST/LUNG: Clear to auscultation bilaterally; No wheeze  HEART: Regular rate and rhythm; No murmurs, rubs, or gallops  ABDOMEN: Soft, Nontender, Nondistended; Bowel sounds present  EXTREMITIES:  2+ Peripheral Pulses, No clubbing, cyanosis, or edema  PSYCH: AAOx2 (self, hospital)  NEUROLOGY: non-focal, reflexes intact b/l, negative Valverde's sign, down going toes w/ Babinski  SKIN: No rashes or lesions    LABS:                        13.7   4.46  )-----------( 140      ( 21 Jun 2022 07:20 )             41.3     06-21    136  |  99  |  9   ----------------------------<  71  4.3   |  27  |  0.70    Ca    10.1      21 Jun 2022 07:20    TPro  7.7  /  Alb  3.7  /  TBili  0.3  /  DBili  x   /  AST  13  /  ALT  18  /  AlkPhos  68  06-21              RADIOLOGY & ADDITIONAL TESTS:    Imaging Personally Reviewed:    Consultant(s) Notes Reviewed:      Care Discussed with Consultants/Other Providers:

## 2022-06-22 NOTE — PROGRESS NOTE ADULT - SUBJECTIVE AND OBJECTIVE BOX
CARDIOLOGY     PROGRESS  NOTE   ________________________________________________    CHIEF COMPLAINT:Patient is a 61y old  Male who presents with a chief complaint of AMS (21 Jun 2022 10:12)  no complain.  	  REVIEW OF SYSTEMS:  CONSTITUTIONAL: No fever, weight loss, or fatigue  EYES: No eye pain, visual disturbances, or discharge  ENT:  No difficulty hearing, tinnitus, vertigo; No sinus or throat pain  NECK: No pain or stiffness  RESPIRATORY: No cough, wheezing, chills or hemoptysis; No Shortness of Breath  CARDIOVASCULAR: No chest pain, palpitations, passing out, dizziness, or leg swelling  GASTROINTESTINAL: No abdominal or epigastric pain. No nausea, vomiting, or hematemesis; No diarrhea or constipation. No melena or hematochezia.  GENITOURINARY: No dysuria, frequency, hematuria, or incontinence  NEUROLOGICAL: No headaches, memory loss, loss of strength, numbness, or tremors  SKIN: No itching, burning, rashes, or lesions   LYMPH Nodes: No enlarged glands  ENDOCRINE: No heat or cold intolerance; No hair loss  MUSCULOSKELETAL: No joint pain or swelling; No muscle, back, or extremity pain  PSYCHIATRIC: No depression, anxiety, mood swings, or difficulty sleeping  HEME/LYMPH: No easy bruising, or bleeding gums  ALLERGY AND IMMUNOLOGIC: No hives or eczema	    [ ] All others negative	  [ ] Unable to obtain    PHYSICAL EXAM:  T(C): 36.7 (06-22-22 @ 04:53), Max: 36.8 (06-21-22 @ 22:11)  HR: 51 (06-22-22 @ 04:53) (51 - 57)  BP: 117/64 (06-22-22 @ 04:53) (93/61 - 117/64)  RR: 18 (06-22-22 @ 04:53) (18 - 18)  SpO2: 100% (06-22-22 @ 04:53) (98% - 100%)  Wt(kg): --  I&O's Summary    21 Jun 2022 07:01  -  22 Jun 2022 07:00  --------------------------------------------------------  IN: 750 mL / OUT: 500 mL / NET: 250 mL        Appearance: Normal	  HEENT:   Normal oral mucosa, PERRL, EOMI	  Lymphatic: No lymphadenopathy  Cardiovascular: Normal S1 S2, No JVD,  + murmurs, No edema  Respiratory: Lungs clear to auscultation	  Gastrointestinal:  Soft, Non-tender, + BS	  Skin: No rashes, No ecchymoses, No cyanosis	  Neurologic: Non-focal  Extremities: Normal range of motion, No clubbing, cyanosis or edema  Vascular: Peripheral pulses palpable 2+ bilaterally    MEDICATIONS  (STANDING):  dextrose 5% + sodium chloride 0.9%. 1000 milliLiter(s) (75 mL/Hr) IV Continuous <Continuous>  dextrose 5%. 1000 milliLiter(s) (100 mL/Hr) IV Continuous <Continuous>  dextrose 5%. 1000 milliLiter(s) (50 mL/Hr) IV Continuous <Continuous>  dextrose 50% Injectable 25 Gram(s) IV Push once  dextrose 50% Injectable 12.5 Gram(s) IV Push once  dextrose 50% Injectable 25 Gram(s) IV Push once  enoxaparin Injectable 40 milliGRAM(s) SubCutaneous every 24 hours  glucagon  Injectable 1 milliGRAM(s) IntraMuscular once  influenza   Vaccine 0.5 milliLiter(s) IntraMuscular once  midodrine 5 milliGRAM(s) Oral every 8 hours  multivitamin 1 Tablet(s) Oral daily  thiamine 100 milliGRAM(s) Oral daily      TELEMETRY: 	    ECG:  	  RADIOLOGY:  OTHER: 	  	  LABS:	 	    CARDIAC MARKERS:                                13.7   4.46  )-----------( 140      ( 21 Jun 2022 07:20 )             41.3     06-21    136  |  99  |  9   ----------------------------<  71  4.3   |  27  |  0.70    Ca    10.1      21 Jun 2022 07:20    TPro  7.7  /  Alb  3.7  /  TBili  0.3  /  DBili  x   /  AST  13  /  ALT  18  /  AlkPhos  68  06-21    proBNP:   Lipid Profile:   HgA1c:   TSH: Thyroid Stimulating Hormone, Serum: 1.60 uIU/mL (06-18 @ 11:19)          Assessment and plan  ---------------------------  61M w/ hx of Cerebellar ataxia recent prolonged admission including MICU stay/ intubation from 4/18-5/13 for AMS and airway protection p/w AMS and cough. There was component of rapid neurocognitive decline w/ cerebellar ataxia and possible meningeal findings. Family refused meningeal biopsy. Endocrine work up for hypothermia that admission also unremarkable. As per wife, pt seemed closer to baseline and was able to answer appropriately by time of discharge to rehab. Pt has since been at rehab, starting roughly 4 days ago pt's wife noticed pt becoming more altered and lethargic. Has had decreased PO on pureed diet from discharge. Wife has also noticed wet cough around some time period. When wife felt these symptoms continued to worsen and rehab continued to make no interventions she sent him to hospital for further evaluation.  In ER: Given 1L LR.   pt is well known to me from the previous admission with hx of a.fib who presented with similar complain and change of mental status  will call wife to get a history  pt refused meningeal biopsy  agree with hydration  will add midodrine if needed  cortisol level noted, normal  will check old record  hx of a.fib ?AC  doing better continue current meds  increase midodrine to 5 mg tid, bp is much better controlled

## 2022-06-23 LAB
GLUCOSE BLDC GLUCOMTR-MCNC: 101 MG/DL — HIGH (ref 70–99)
GLUCOSE BLDC GLUCOMTR-MCNC: 117 MG/DL — HIGH (ref 70–99)
GLUCOSE BLDC GLUCOMTR-MCNC: 77 MG/DL — SIGNIFICANT CHANGE UP (ref 70–99)

## 2022-06-23 RX ORDER — SODIUM CHLORIDE 9 MG/ML
1000 INJECTION, SOLUTION INTRAVENOUS
Refills: 0 | Status: COMPLETED | OUTPATIENT
Start: 2022-06-23 | End: 2022-06-24

## 2022-06-23 RX ADMIN — ENOXAPARIN SODIUM 40 MILLIGRAM(S): 100 INJECTION SUBCUTANEOUS at 05:41

## 2022-06-23 RX ADMIN — Medication 100 MILLIGRAM(S): at 22:18

## 2022-06-23 RX ADMIN — MIDODRINE HYDROCHLORIDE 5 MILLIGRAM(S): 2.5 TABLET ORAL at 22:18

## 2022-06-23 RX ADMIN — SODIUM CHLORIDE 100 MILLILITER(S): 9 INJECTION, SOLUTION INTRAVENOUS at 22:18

## 2022-06-23 RX ADMIN — SODIUM CHLORIDE 100 MILLILITER(S): 9 INJECTION, SOLUTION INTRAVENOUS at 16:11

## 2022-06-23 NOTE — PROGRESS NOTE ADULT - PROBLEM SELECTOR PLAN 2
CXR w/o clear etiology. Concern for aspiration given mental status changes.  - cough improving  -CT chest non-con showed b/l lower lobe tree-in-bud opacities R>L- inflammatory vs infectious bronchiolitis, stable enlarged intrathoracic lymph nodes  -Procal 0.07  -Rapid RVP wnl  -Trend cbc and fever curve  -Diet: Pureed with mildly thick liquids

## 2022-06-23 NOTE — PROGRESS NOTE ADULT - SUBJECTIVE AND OBJECTIVE BOX
CARDIOLOGY     PROGRESS  NOTE   ________________________________________________    CHIEF COMPLAINT:Patient is a 61y old  Male who presents with a chief complaint of AMS (22 Jun 2022 08:53)  no complain.  	  REVIEW OF SYSTEMS:  CONSTITUTIONAL: No fever, weight loss, or fatigue  EYES: No eye pain, visual disturbances, or discharge  ENT:  No difficulty hearing, tinnitus, vertigo; No sinus or throat pain  NECK: No pain or stiffness  RESPIRATORY: No cough, wheezing, chills or hemoptysis; No Shortness of Breath  CARDIOVASCULAR: No chest pain, palpitations, passing out, dizziness, or leg swelling  GASTROINTESTINAL: No abdominal or epigastric pain. No nausea, vomiting, or hematemesis; No diarrhea or constipation. No melena or hematochezia.  GENITOURINARY: No dysuria, frequency, hematuria, or incontinence  NEUROLOGICAL: No headaches, memory loss, loss of strength, numbness, or tremors  SKIN: No itching, burning, rashes, or lesions   LYMPH Nodes: No enlarged glands  ENDOCRINE: No heat or cold intolerance; No hair loss  MUSCULOSKELETAL: No joint pain or swelling; No muscle, back, or extremity pain  PSYCHIATRIC: No depression, anxiety, mood swings, or difficulty sleeping  HEME/LYMPH: No easy bruising, or bleeding gums  ALLERGY AND IMMUNOLOGIC: No hives or eczema	    [ ] All others negative	  [ ] Unable to obtain    PHYSICAL EXAM:  T(C): 34.3 (06-23-22 @ 06:18), Max: 36.8 (06-22-22 @ 21:56)  HR: 55 (06-23-22 @ 04:01) (52 - 56)  BP: 124/94 (06-23-22 @ 04:01) (103/66 - 124/94)  RR: 18 (06-23-22 @ 04:01) (18 - 18)  SpO2: 99% (06-23-22 @ 04:01) (97% - 100%)  Wt(kg): --  I&O's Summary    22 Jun 2022 07:01  -  23 Jun 2022 07:00  --------------------------------------------------------  IN: 380 mL / OUT: 700 mL / NET: -320 mL        Appearance: Normal	  HEENT:   Normal oral mucosa, PERRL, EOMI	  Lymphatic: No lymphadenopathy  Cardiovascular: Normal S1 S2, No JVD, + murmurs, No edema  Respiratory: Lungs clear to auscultation	  Psychiatry: A & O x 3, Mood & affect appropriate  Gastrointestinal:  Soft, Non-tender, + BS	  Skin: No rashes, No ecchymoses, No cyanosis	  Neurologic: Non-focal  Extremities: Normal range of motion, No clubbing, cyanosis or edema  Vascular: Peripheral pulses palpable 2+ bilaterally    MEDICATIONS  (STANDING):  dextrose 5%. 1000 milliLiter(s) (100 mL/Hr) IV Continuous <Continuous>  dextrose 5%. 1000 milliLiter(s) (50 mL/Hr) IV Continuous <Continuous>  dextrose 50% Injectable 25 Gram(s) IV Push once  dextrose 50% Injectable 12.5 Gram(s) IV Push once  dextrose 50% Injectable 25 Gram(s) IV Push once  enoxaparin Injectable 40 milliGRAM(s) SubCutaneous every 24 hours  glucagon  Injectable 1 milliGRAM(s) IntraMuscular once  influenza   Vaccine 0.5 milliLiter(s) IntraMuscular once  midodrine 5 milliGRAM(s) Oral every 8 hours  multivitamin 1 Tablet(s) Oral daily  thiamine 100 milliGRAM(s) Oral daily      TELEMETRY: 	    ECG:  	  RADIOLOGY:  OTHER: 	  	  LABS:	 	    CARDIAC MARKERS:                  proBNP:   Lipid Profile:   HgA1c:   TSH: Thyroid Stimulating Hormone, Serum: 1.60 uIU/mL (06-18 @ 11:19)          Assessment and plan  ---------------------------  61M w/ hx of Cerebellar ataxia recent prolonged admission including MICU stay/ intubation from 4/18-5/13 for AMS and airway protection p/w AMS and cough. There was component of rapid neurocognitive decline w/ cerebellar ataxia and possible meningeal findings. Family refused meningeal biopsy. Endocrine work up for hypothermia that admission also unremarkable. As per wife, pt seemed closer to baseline and was able to answer appropriately by time of discharge to rehab. Pt has since been at rehab, starting roughly 4 days ago pt's wife noticed pt becoming more altered and lethargic. Has had decreased PO on pureed diet from discharge. Wife has also noticed wet cough around some time period. When wife felt these symptoms continued to worsen and rehab continued to make no interventions she sent him to hospital for further evaluation.  In ER: Given 1L LR.   pt is well known to me from the previous admission with hx of a.fib who presented with similar complain and change of mental status  will call wife to get a history  pt refused meningeal biopsy  agree with hydration  will add midodrine if needed  cortisol level noted, normal  will check old record  hx of a.fib ?AC  doing better continue current meds  increase midodrine to 5 mg tid, bp is much better controlled, do not increase the dose  bradycardia asymptomatic

## 2022-06-23 NOTE — PROGRESS NOTE ADULT - PROBLEM SELECTOR PLAN 1
Pt awake and alert but not responding to questions, slurring speech. Note baseline CT head. Will cont. work up for possible metabolic etiologies. UA also negative  -CT chest showed b/l lower lobe tree-in-bud opacities R>L- inflammatory vs infectious bronchiolitis, stable enlarged intrathoracic lymph nodes  -Continuous pulse oximetry given recent admission to MICU after intubation for airway protection  -Aspiration and falls precautions  - Syphillis screen negative   - F/u B12, folate, TSH  - UCx negative, BCx NGTD  - UA suggestive of UTI, completed 5 day course of CTX   - Repeat UCx contaminated

## 2022-06-23 NOTE — PROGRESS NOTE ADULT - SUBJECTIVE AND OBJECTIVE BOX
Patient is a 61y old  Male who presents with a chief complaint of AMS (23 Jun 2022 07:25)      SUBJECTIVE / OVERNIGHT EVENTS: No acute events overnight.     MEDICATIONS  (STANDING):  dextrose 5%. 1000 milliLiter(s) (100 mL/Hr) IV Continuous <Continuous>  dextrose 5%. 1000 milliLiter(s) (50 mL/Hr) IV Continuous <Continuous>  dextrose 50% Injectable 25 Gram(s) IV Push once  dextrose 50% Injectable 12.5 Gram(s) IV Push once  dextrose 50% Injectable 25 Gram(s) IV Push once  enoxaparin Injectable 40 milliGRAM(s) SubCutaneous every 24 hours  glucagon  Injectable 1 milliGRAM(s) IntraMuscular once  influenza   Vaccine 0.5 milliLiter(s) IntraMuscular once  midodrine 5 milliGRAM(s) Oral every 8 hours  multivitamin 1 Tablet(s) Oral daily  thiamine 100 milliGRAM(s) Oral daily    MEDICATIONS  (PRN):  acetaminophen     Tablet .. 650 milliGRAM(s) Oral every 6 hours PRN Temp greater or equal to 38C (100.4F), Mild Pain (1 - 3)  dextrose Oral Gel 15 Gram(s) Oral once PRN Blood Glucose LESS THAN 70 milliGRAM(s)/deciliter      Vital Signs Last 24 Hrs  T(C): 34.3 (23 Jun 2022 06:18), Max: 36.8 (22 Jun 2022 21:56)  T(F): 93.8 (23 Jun 2022 06:18), Max: 98.3 (22 Jun 2022 21:56)  HR: 55 (23 Jun 2022 04:01) (52 - 56)  BP: 124/94 (23 Jun 2022 04:01) (103/66 - 124/94)  BP(mean): --  RR: 18 (23 Jun 2022 04:01) (18 - 18)  SpO2: 99% (23 Jun 2022 04:01) (97% - 100%)  CAPILLARY BLOOD GLUCOSE        I&O's Summary    22 Jun 2022 07:01  -  23 Jun 2022 07:00  --------------------------------------------------------  IN: 380 mL / OUT: 700 mL / NET: -320 mL        PHYSICAL EXAM:  GENERAL: NAD  HEAD:  Atraumatic, Normocephalic  EYES: EOMI, PERRLA, conjunctiva and sclera clear  NECK: Supple, No JVD  CHEST/LUNG: Clear to auscultation bilaterally; No wheeze  HEART: Regular rate and rhythm; No murmurs, rubs, or gallops  ABDOMEN: Soft, Nontender, Nondistended; Bowel sounds present  EXTREMITIES:  2+ Peripheral Pulses, No clubbing, cyanosis, or edema  PSYCH: AAOx2 (self, hospital)  NEUROLOGY: non-focal, reflexes intact b/l, negative Valverde's sign, down going toes w/ Babinski  SKIN: No rashes or lesions     LABS:                        RADIOLOGY & ADDITIONAL TESTS:    Imaging Personally Reviewed:    Consultant(s) Notes Reviewed:      Care Discussed with Consultants/Other Providers:

## 2022-06-23 NOTE — PROGRESS NOTE ADULT - NSPROGADDITIONALINFOA_GEN_ALL_CORE
Stable for discharge home, waiting for DME to be delivered. No active infections,  alert, comfortable. No coronel in place.

## 2022-06-24 LAB
ALBUMIN SERPL ELPH-MCNC: 3.7 G/DL — SIGNIFICANT CHANGE UP (ref 3.3–5)
ALP SERPL-CCNC: 64 U/L — SIGNIFICANT CHANGE UP (ref 40–120)
ALT FLD-CCNC: 21 U/L — SIGNIFICANT CHANGE UP (ref 10–45)
ANION GAP SERPL CALC-SCNC: 10 MMOL/L — SIGNIFICANT CHANGE UP (ref 5–17)
AST SERPL-CCNC: 18 U/L — SIGNIFICANT CHANGE UP (ref 10–40)
BASOPHILS # BLD AUTO: 0.02 K/UL — SIGNIFICANT CHANGE UP (ref 0–0.2)
BASOPHILS NFR BLD AUTO: 0.3 % — SIGNIFICANT CHANGE UP (ref 0–2)
BILIRUB SERPL-MCNC: 0.3 MG/DL — SIGNIFICANT CHANGE UP (ref 0.2–1.2)
BUN SERPL-MCNC: 7 MG/DL — SIGNIFICANT CHANGE UP (ref 7–23)
CALCIUM SERPL-MCNC: 10.4 MG/DL — SIGNIFICANT CHANGE UP (ref 8.4–10.5)
CHLORIDE SERPL-SCNC: 99 MMOL/L — SIGNIFICANT CHANGE UP (ref 96–108)
CO2 SERPL-SCNC: 30 MMOL/L — SIGNIFICANT CHANGE UP (ref 22–31)
CREAT SERPL-MCNC: 0.63 MG/DL — SIGNIFICANT CHANGE UP (ref 0.5–1.3)
EGFR: 109 ML/MIN/1.73M2 — SIGNIFICANT CHANGE UP
EOSINOPHIL # BLD AUTO: 0.13 K/UL — SIGNIFICANT CHANGE UP (ref 0–0.5)
EOSINOPHIL NFR BLD AUTO: 1.8 % — SIGNIFICANT CHANGE UP (ref 0–6)
GLUCOSE BLDC GLUCOMTR-MCNC: 75 MG/DL — SIGNIFICANT CHANGE UP (ref 70–99)
GLUCOSE BLDC GLUCOMTR-MCNC: 83 MG/DL — SIGNIFICANT CHANGE UP (ref 70–99)
GLUCOSE BLDC GLUCOMTR-MCNC: 85 MG/DL — SIGNIFICANT CHANGE UP (ref 70–99)
GLUCOSE BLDC GLUCOMTR-MCNC: 94 MG/DL — SIGNIFICANT CHANGE UP (ref 70–99)
GLUCOSE SERPL-MCNC: 70 MG/DL — SIGNIFICANT CHANGE UP (ref 70–99)
HCT VFR BLD CALC: 41.8 % — SIGNIFICANT CHANGE UP (ref 39–50)
HGB BLD-MCNC: 14 G/DL — SIGNIFICANT CHANGE UP (ref 13–17)
IMM GRANULOCYTES NFR BLD AUTO: 0.8 % — SIGNIFICANT CHANGE UP (ref 0–1.5)
LYMPHOCYTES # BLD AUTO: 1.25 K/UL — SIGNIFICANT CHANGE UP (ref 1–3.3)
LYMPHOCYTES # BLD AUTO: 17.6 % — SIGNIFICANT CHANGE UP (ref 13–44)
MCHC RBC-ENTMCNC: 28.2 PG — SIGNIFICANT CHANGE UP (ref 27–34)
MCHC RBC-ENTMCNC: 33.5 GM/DL — SIGNIFICANT CHANGE UP (ref 32–36)
MCV RBC AUTO: 84.3 FL — SIGNIFICANT CHANGE UP (ref 80–100)
MONOCYTES # BLD AUTO: 0.67 K/UL — SIGNIFICANT CHANGE UP (ref 0–0.9)
MONOCYTES NFR BLD AUTO: 9.4 % — SIGNIFICANT CHANGE UP (ref 2–14)
NEUTROPHILS # BLD AUTO: 4.96 K/UL — SIGNIFICANT CHANGE UP (ref 1.8–7.4)
NEUTROPHILS NFR BLD AUTO: 70.1 % — SIGNIFICANT CHANGE UP (ref 43–77)
NRBC # BLD: 0 /100 WBCS — SIGNIFICANT CHANGE UP (ref 0–0)
PLATELET # BLD AUTO: 153 K/UL — SIGNIFICANT CHANGE UP (ref 150–400)
POTASSIUM SERPL-MCNC: 3.9 MMOL/L — SIGNIFICANT CHANGE UP (ref 3.5–5.3)
POTASSIUM SERPL-SCNC: 3.9 MMOL/L — SIGNIFICANT CHANGE UP (ref 3.5–5.3)
PROT SERPL-MCNC: 7.4 G/DL — SIGNIFICANT CHANGE UP (ref 6–8.3)
RBC # BLD: 4.96 M/UL — SIGNIFICANT CHANGE UP (ref 4.2–5.8)
RBC # FLD: 14.5 % — SIGNIFICANT CHANGE UP (ref 10.3–14.5)
SODIUM SERPL-SCNC: 139 MMOL/L — SIGNIFICANT CHANGE UP (ref 135–145)
WBC # BLD: 7.09 K/UL — SIGNIFICANT CHANGE UP (ref 3.8–10.5)
WBC # FLD AUTO: 7.09 K/UL — SIGNIFICANT CHANGE UP (ref 3.8–10.5)

## 2022-06-24 RX ORDER — LANOLIN ALCOHOL/MO/W.PET/CERES
3 CREAM (GRAM) TOPICAL AT BEDTIME
Refills: 0 | Status: DISCONTINUED | OUTPATIENT
Start: 2022-06-24 | End: 2022-06-27

## 2022-06-24 RX ORDER — MIDODRINE HYDROCHLORIDE 2.5 MG/1
10 TABLET ORAL THREE TIMES A DAY
Refills: 0 | Status: DISCONTINUED | OUTPATIENT
Start: 2022-06-24 | End: 2022-06-27

## 2022-06-24 RX ADMIN — Medication 100 MILLIGRAM(S): at 11:26

## 2022-06-24 RX ADMIN — MIDODRINE HYDROCHLORIDE 10 MILLIGRAM(S): 2.5 TABLET ORAL at 18:45

## 2022-06-24 RX ADMIN — ENOXAPARIN SODIUM 40 MILLIGRAM(S): 100 INJECTION SUBCUTANEOUS at 06:34

## 2022-06-24 RX ADMIN — MIDODRINE HYDROCHLORIDE 5 MILLIGRAM(S): 2.5 TABLET ORAL at 06:34

## 2022-06-24 RX ADMIN — Medication 1 TABLET(S): at 11:26

## 2022-06-24 RX ADMIN — MIDODRINE HYDROCHLORIDE 10 MILLIGRAM(S): 2.5 TABLET ORAL at 12:59

## 2022-06-24 RX ADMIN — Medication 3 MILLIGRAM(S): at 21:54

## 2022-06-24 NOTE — PROGRESS NOTE ADULT - SUBJECTIVE AND OBJECTIVE BOX
CARDIOLOGY     PROGRESS  NOTE   ________________________________________________    CHIEF COMPLAINT:Patient is a 61y old  Male who presents with a chief complaint of AMS (24 Jun 2022 06:52)  no complain.  	  REVIEW OF SYSTEMS:  CONSTITUTIONAL: No fever, weight loss, or fatigue  EYES: No eye pain, visual disturbances, or discharge  ENT:  No difficulty hearing, tinnitus, vertigo; No sinus or throat pain  NECK: No pain or stiffness  RESPIRATORY: No cough, wheezing, chills or hemoptysis; No Shortness of Breath  CARDIOVASCULAR: No chest pain, palpitations, passing out, dizziness, or leg swelling  GASTROINTESTINAL: No abdominal or epigastric pain. No nausea, vomiting, or hematemesis; No diarrhea or constipation. No melena or hematochezia.  GENITOURINARY: No dysuria, frequency, hematuria, or incontinence  NEUROLOGICAL: No headaches, memory loss, loss of strength, numbness, or tremors  SKIN: No itching, burning, rashes, or lesions   LYMPH Nodes: No enlarged glands  ENDOCRINE: No heat or cold intolerance; No hair loss  MUSCULOSKELETAL: No joint pain or swelling; No muscle, back, or extremity pain  PSYCHIATRIC: No depression, anxiety, mood swings, or difficulty sleeping  HEME/LYMPH: No easy bruising, or bleeding gums  ALLERGY AND IMMUNOLOGIC: No hives or eczema	    [ ] All others negative	  [ ] Unable to obtain    PHYSICAL EXAM:  T(C): 36.2 (06-24-22 @ 06:56), Max: 36.8 (06-23-22 @ 16:17)  HR: 63 (06-24-22 @ 04:11) (60 - 84)  BP: 111/73 (06-24-22 @ 04:11) (97/68 - 115/67)  RR: 18 (06-24-22 @ 04:11) (16 - 18)  SpO2: 96% (06-24-22 @ 04:11) (96% - 99%)  Wt(kg): --  I&O's Summary    23 Jun 2022 07:01  -  24 Jun 2022 07:00  --------------------------------------------------------  IN: 440 mL / OUT: 1550 mL / NET: -1110 mL        Appearance: Normal	  HEENT:   Normal oral mucosa, PERRL, EOMI	  Lymphatic: No lymphadenopathy  Cardiovascular: Normal S1 S2, No JVD, + murmurs, No edema  Respiratory: Lungs clear to auscultation	  Psychiatry: A & O x 3, Mood & affect appropriate  Gastrointestinal:  Soft, Non-tender, + BS	  Skin: No rashes, No ecchymoses, No cyanosis	  Neurologic: Non-focal  Extremities: Normal range of motion, No clubbing, cyanosis or edema  Vascular: Peripheral pulses palpable 2+ bilaterally    MEDICATIONS  (STANDING):  dextrose 5% + lactated ringers. 1000 milliLiter(s) (100 mL/Hr) IV Continuous <Continuous>  dextrose 5%. 1000 milliLiter(s) (100 mL/Hr) IV Continuous <Continuous>  dextrose 5%. 1000 milliLiter(s) (50 mL/Hr) IV Continuous <Continuous>  dextrose 50% Injectable 25 Gram(s) IV Push once  dextrose 50% Injectable 12.5 Gram(s) IV Push once  dextrose 50% Injectable 25 Gram(s) IV Push once  enoxaparin Injectable 40 milliGRAM(s) SubCutaneous every 24 hours  glucagon  Injectable 1 milliGRAM(s) IntraMuscular once  influenza   Vaccine 0.5 milliLiter(s) IntraMuscular once  midodrine 5 milliGRAM(s) Oral every 8 hours  multivitamin 1 Tablet(s) Oral daily  thiamine 100 milliGRAM(s) Oral daily      TELEMETRY: 	    ECG:  	  RADIOLOGY:  OTHER: 	  	  LABS:	 	    CARDIAC MARKERS:                                14.0   7.09  )-----------( 153      ( 24 Jun 2022 07:01 )             41.8           proBNP:   Lipid Profile:   HgA1c:   TSH: Thyroid Stimulating Hormone, Serum: 1.60 uIU/mL (06-18 @ 11:19)          Assessment and plan  ---------------------------  61M w/ hx of Cerebellar ataxia recent prolonged admission including MICU stay/ intubation from 4/18-5/13 for AMS and airway protection p/w AMS and cough. There was component of rapid neurocognitive decline w/ cerebellar ataxia and possible meningeal findings. Family refused meningeal biopsy. Endocrine work up for hypothermia that admission also unremarkable. As per wife, pt seemed closer to baseline and was able to answer appropriately by time of discharge to rehab. Pt has since been at rehab, starting roughly 4 days ago pt's wife noticed pt becoming more altered and lethargic. Has had decreased PO on pureed diet from discharge. Wife has also noticed wet cough around some time period. When wife felt these symptoms continued to worsen and rehab continued to make no interventions she sent him to hospital for further evaluation.  In ER: Given 1L LR.   pt is well known to me from the previous admission with hx of a.fib who presented with similar complain and change of mental status  will call wife to get a history  pt refused meningeal biopsy  agree with hydration  will add midodrine if needed  cortisol level noted, normal  will check old record  hx of a.fib ?AC  doing better continue current meds  increase midodrine to 5 mg tid, bp is much better controlled, do not increase the dose  bradycardia asymptomatic  may dc cardiac wise

## 2022-06-24 NOTE — PROGRESS NOTE ADULT - NSPROGADDITIONALINFOA_GEN_ALL_CORE
Stable for discharge once all DME is delivered. No coronel in place, family wants home PT. Stable for discharge once all DME is delivered. No coronel in place, family wants home PT. Can increase midodrine to 10 mg tid.

## 2022-06-24 NOTE — PROGRESS NOTE ADULT - SUBJECTIVE AND OBJECTIVE BOX
DATE OF SERVICE: 06-24-22 @ 06:53    Patient is a 61y old  Male who presents with a chief complaint of AMS (23 Jun 2022 08:16)      SUBJECTIVE / OVERNIGHT EVENTS: NAEO. Patient afebrile o/n. Patient AOx2 this Am to person, hospital. Denies CP, SOB, fevers/chills, N/V/D    MEDICATIONS  (STANDING):  dextrose 5% + lactated ringers. 1000 milliLiter(s) (100 mL/Hr) IV Continuous <Continuous>  dextrose 5%. 1000 milliLiter(s) (100 mL/Hr) IV Continuous <Continuous>  dextrose 5%. 1000 milliLiter(s) (50 mL/Hr) IV Continuous <Continuous>  dextrose 50% Injectable 25 Gram(s) IV Push once  dextrose 50% Injectable 12.5 Gram(s) IV Push once  dextrose 50% Injectable 25 Gram(s) IV Push once  enoxaparin Injectable 40 milliGRAM(s) SubCutaneous every 24 hours  glucagon  Injectable 1 milliGRAM(s) IntraMuscular once  influenza   Vaccine 0.5 milliLiter(s) IntraMuscular once  midodrine 5 milliGRAM(s) Oral every 8 hours  multivitamin 1 Tablet(s) Oral daily  thiamine 100 milliGRAM(s) Oral daily    MEDICATIONS  (PRN):  acetaminophen     Tablet .. 650 milliGRAM(s) Oral every 6 hours PRN Temp greater or equal to 38C (100.4F), Mild Pain (1 - 3)  dextrose Oral Gel 15 Gram(s) Oral once PRN Blood Glucose LESS THAN 70 milliGRAM(s)/deciliter  guaiFENesin Oral Liquid (Sugar-Free) 100 milliGRAM(s) Oral every 6 hours PRN Cough      Vital Signs Last 24 Hrs  T(C): 35.7 (24 Jun 2022 05:28), Max: 36.8 (23 Jun 2022 16:17)  T(F): 96.2 (24 Jun 2022 05:28), Max: 98.2 (23 Jun 2022 16:17)  HR: 63 (24 Jun 2022 04:11) (60 - 84)  BP: 111/73 (24 Jun 2022 04:11) (97/68 - 115/67)  BP(mean): --  RR: 18 (24 Jun 2022 04:11) (16 - 18)  SpO2: 96% (24 Jun 2022 04:11) (96% - 99%)  CAPILLARY BLOOD GLUCOSE      POCT Blood Glucose.: 117 mg/dL (23 Jun 2022 21:48)  POCT Blood Glucose.: 101 mg/dL (23 Jun 2022 17:06)  POCT Blood Glucose.: 77 mg/dL (23 Jun 2022 14:06)    I&O's Summary    22 Jun 2022 07:01  -  23 Jun 2022 07:00  --------------------------------------------------------  IN: 380 mL / OUT: 700 mL / NET: -320 mL    23 Jun 2022 07:01  -  24 Jun 2022 06:53  --------------------------------------------------------  IN: 440 mL / OUT: 1550 mL / NET: -1110 mL        PHYSICAL EXAM:  GENERAL: NAD  HEAD:  Atraumatic, Normocephalic  EYES: EOMI, PERRLA, conjunctiva and sclera clear  NECK: Supple, No JVD  CHEST/LUNG: Clear to auscultation bilaterally; No wheeze  HEART: Regular rate and rhythm; No murmurs, rubs, or gallops  ABDOMEN: Soft, Nontender, Nondistended; Bowel sounds present  EXTREMITIES:  2+ Peripheral Pulses, No clubbing, cyanosis, or edema  PSYCH: AAOx2 (self, hospital)  NEUROLOGY: non-focal, reflexes intact b/l, negative Valverde's sign, down going toes w/ Babinski  SKIN: No rashes or lesions    LABS:                    RADIOLOGY & ADDITIONAL TESTS:    Imaging Personally Reviewed:    Consultant(s) Notes Reviewed:      Care Discussed with Consultants/Other Providers:

## 2022-06-24 NOTE — PROGRESS NOTE ADULT - ASSESSMENT
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

## 2022-06-24 NOTE — PROGRESS NOTE ADULT - PROBLEM SELECTOR PLAN 5
DVT PPx: Lovenox    Diet: Pureed with mildly thick liquids per S+S    Dispo: Pending DME delivery to home

## 2022-06-25 LAB — GLUCOSE BLDC GLUCOMTR-MCNC: 71 MG/DL — SIGNIFICANT CHANGE UP (ref 70–99)

## 2022-06-25 PROCEDURE — 71045 X-RAY EXAM CHEST 1 VIEW: CPT | Mod: 26

## 2022-06-25 RX ADMIN — MIDODRINE HYDROCHLORIDE 10 MILLIGRAM(S): 2.5 TABLET ORAL at 17:54

## 2022-06-25 RX ADMIN — ENOXAPARIN SODIUM 40 MILLIGRAM(S): 100 INJECTION SUBCUTANEOUS at 05:16

## 2022-06-25 RX ADMIN — Medication 3 MILLIGRAM(S): at 22:00

## 2022-06-25 RX ADMIN — Medication 100 MILLIGRAM(S): at 04:33

## 2022-06-25 RX ADMIN — Medication 100 MILLIGRAM(S): at 18:48

## 2022-06-25 RX ADMIN — Medication 100 MILLIGRAM(S): at 11:04

## 2022-06-25 RX ADMIN — MIDODRINE HYDROCHLORIDE 10 MILLIGRAM(S): 2.5 TABLET ORAL at 04:33

## 2022-06-25 RX ADMIN — MIDODRINE HYDROCHLORIDE 10 MILLIGRAM(S): 2.5 TABLET ORAL at 11:05

## 2022-06-25 RX ADMIN — Medication 1 TABLET(S): at 11:05

## 2022-06-25 NOTE — PROGRESS NOTE ADULT - ASSESSMENT
62 yo male      PMHx of cerebellar ataxia    prior  visit  to  ED on 4/17/22 due to AMS ,  was  intubated for depressed consciousness w/ cognitive decline.  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    infectious  and  malignancy  was  ruled  outt/  and  per   neuro  note ,no further workup     flow cytometry 4/28 showing nonspecific results 'degenerated sample, small lymphocytes'   nsgy consulted for meningeal bx, family refusing    CSF cytopathology 4/22 - increased number of large mononuclear cells in a background of few small lymphocytes, monocytes and neutrophils, cannot exclude a lymphoproliferative disorder versus an inflammatory process.    CSF cytopathology 4/29 - significant increased number of small lymphocytes with rare monocytes/ seen by  oncology  dr de leon,  no  intervention      now  admitted    c/o  ams, as  before   h/o  cerebellar  ataxia,  with  no defined  cause  found   pt  with  bradycardia, ?  central, seen  by  card/  echo  on 4/2022,  normal  ef   now  with  hypothermia,   as  before  pt  has  h/o intermittent   hypothermia,  not related  to  any   infectious   process.  rather  , it  seems to be  dysautonomia/  with  h/o normal  cortisol level  prior  CT  c/ap,  no  malignant  process   most  of  these  issues, do  not  have   a good  rx  per  swallow  eval, was  on  modified  diet/  fs  noted  endo  dr ovalle  on feeds  per  swallow  eval   arousable, not communicative/   which is  about his  base line  with few  periods  of  alertness    was  iv  rocephin per  dr mclean/ remains  aspiration  risk.  given  bed bound   status  and  altered  baseline  mental  status  bcx and  ucx are  negative/ arousable, ans   sometimes  able  to verbalize. /this is hid   baseline   pt stable/  start  d/c  planning                     60 yo male      PMHx of cerebellar ataxia    prior  visit  to  ED on 4/17/22 due to AMS ,  was  intubated for depressed consciousness w/ cognitive decline.  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    infectious  and  malignancy  was  ruled  outt/  and  per   neuro  note ,no further workup     flow cytometry 4/28 showing nonspecific results 'degenerated sample, small lymphocytes'   nsgy consulted for meningeal bx, family refusing    CSF cytopathology 4/22 - increased number of large mononuclear cells in a background of few small lymphocytes, monocytes and neutrophils, cannot exclude a lymphoproliferative disorder versus an inflammatory process.    CSF cytopathology 4/29 - significant increased number of small lymphocytes with rare monocytes/ seen by  oncology  dr de leon,  no  intervention      now  admitted    c/o  ams, as  before   h/o  cerebellar  ataxia,  with  no defined  cause  found   pt  with  bradycardia, ?  central, seen  by  card/  echo  on 4/2022,  normal  ef   now  with  hypothermia,   as  before  pt  has  h/o intermittent   hypothermia,  not related  to  any   infectious   process.  rather  , it  seems to be  dysautonomia/  with  h/o normal  cortisol level  prior  CT  c/ap,  no  malignant  process   most  of  these  issues, do  not  have   a good  rx  per  swallow  eval, was  on  modified  diet/  fs  noted  endo  dr ovalle  on feeds  per  swallow  eval   arousable, not communicative/   which is  about his  base line  with few  periods  of  alertness    was  iv  rocephin per  dr mclean/ remains  aspiration  risk.  given  bed bound   status  and  altered  baseline  mental  status  bcx and  ucx are  negative/ arousable, ans   sometimes  able  to verbalize. /this is hid   baseline  wife  wants  ng tube  feeds. per  dr latham/  vera s not  want peg

## 2022-06-25 NOTE — PROGRESS NOTE ADULT - SUBJECTIVE AND OBJECTIVE BOX
PROGRESS NOTE:   Authored by Dr. Aaron Badillo MD (PGY-1). Pager [facility] [phone] / [facility]     Patient is a 61y old  Male who presents with a chief complaint of AMS (24 Jun 2022 07:47)      SUBJECTIVE / OVERNIGHT EVENTS:  No acute events overnight.     ADDITIONAL REVIEW OF SYSTEMS:  Patient denies fevers, chills, chest pain, shortness of breath, nausea, abdominal pain, diarrhea, constipation, dysuria, leg swelling, headache, light headedness.    MEDICATIONS  (STANDING):  dextrose 5% + lactated ringers. 1000 milliLiter(s) (100 mL/Hr) IV Continuous <Continuous>  dextrose 5%. 1000 milliLiter(s) (100 mL/Hr) IV Continuous <Continuous>  dextrose 5%. 1000 milliLiter(s) (50 mL/Hr) IV Continuous <Continuous>  dextrose 50% Injectable 25 Gram(s) IV Push once  dextrose 50% Injectable 12.5 Gram(s) IV Push once  dextrose 50% Injectable 25 Gram(s) IV Push once  enoxaparin Injectable 40 milliGRAM(s) SubCutaneous every 24 hours  glucagon  Injectable 1 milliGRAM(s) IntraMuscular once  influenza   Vaccine 0.5 milliLiter(s) IntraMuscular once  melatonin 3 milliGRAM(s) Oral at bedtime  midodrine. 10 milliGRAM(s) Oral three times a day  multivitamin 1 Tablet(s) Oral daily  thiamine 100 milliGRAM(s) Oral daily    MEDICATIONS  (PRN):  acetaminophen     Tablet .. 650 milliGRAM(s) Oral every 6 hours PRN Temp greater or equal to 38C (100.4F), Mild Pain (1 - 3)  dextrose Oral Gel 15 Gram(s) Oral once PRN Blood Glucose LESS THAN 70 milliGRAM(s)/deciliter  guaiFENesin Oral Liquid (Sugar-Free) 100 milliGRAM(s) Oral every 6 hours PRN Cough      CAPILLARY BLOOD GLUCOSE      POCT Blood Glucose.: 94 mg/dL (24 Jun 2022 22:08)  POCT Blood Glucose.: 83 mg/dL (24 Jun 2022 17:37)  POCT Blood Glucose.: 85 mg/dL (24 Jun 2022 13:27)  POCT Blood Glucose.: 75 mg/dL (24 Jun 2022 09:07)    I&O's Summary    23 Jun 2022 07:01  -  24 Jun 2022 07:00  --------------------------------------------------------  IN: 440 mL / OUT: 1550 mL / NET: -1110 mL    24 Jun 2022 07:01  -  25 Jun 2022 06:50  --------------------------------------------------------  IN: 220 mL / OUT: 650 mL / NET: -430 mL        PHYSICAL EXAM:  Vital Signs Last 24 Hrs  T(C): 36.3 (25 Jun 2022 04:09), Max: 37.1 (24 Jun 2022 13:44)  T(F): 97.4 (25 Jun 2022 04:09), Max: 98.8 (24 Jun 2022 13:44)  HR: 73 (25 Jun 2022 06:39) (67 - 81)  BP: 97/75 (25 Jun 2022 06:39) (89/66 - 105/67)  BP(mean): --  RR: 18 (25 Jun 2022 04:09) (18 - 18)  SpO2: 95% (25 Jun 2022 04:09) (95% - 98%)    CONSTITUTIONAL: NAD, well-developed  RESPIRATORY: Normal respiratory effort; lungs are clear to auscultation bilaterally  CARDIOVASCULAR: Regular rate and rhythm, normal S1 and S2, no murmur/rub/gallop; No lower extremity edema; Peripheral pulses are 2+ bilaterally  ABDOMEN: Nontender to palpation, normoactive bowel sounds, no rebound/guarding; No hepatosplenomegaly  MUSCLOSKELETAL: no clubbing or cyanosis of digits; no joint swelling or tenderness to palpation  PSYCH: A+O to person, place, and time; affect appropriate    LABS:                        14.0   7.09  )-----------( 153      ( 24 Jun 2022 07:01 )             41.8     06-24    139  |  99  |  7   ----------------------------<  70  3.9   |  30  |  0.63    Ca    10.4      24 Jun 2022 07:03    TPro  7.4  /  Alb  3.7  /  TBili  0.3  /  DBili  x   /  AST  18  /  ALT  21  /  AlkPhos  64  06-24                Tele Reviewed:    RADIOLOGY & ADDITIONAL TESTS:  Results Reviewed:   Imaging Personally Reviewed:  Electrocardiogram Personally Reviewed:     PROGRESS NOTE:   Authored by Dr. Aaron Badillo MD (PGY-1). Pager [facility] [phone] / [facility]     Patient is a 61y old  Male who presents with a chief complaint of AMS (24 Jun 2022 07:47)      SUBJECTIVE / OVERNIGHT EVENTS:  Midodrine given early due to blood pressure systolic 86. NG tube placed today, position confirmed. Will start tube feeds tomorrow    ADDITIONAL REVIEW OF SYSTEMS:  Patient denies fevers, chills, chest pain, shortness of breath, nausea, abdominal pain, diarrhea, constipation, dysuria, leg swelling, headache, light headedness.    MEDICATIONS  (STANDING):  dextrose 5% + lactated ringers. 1000 milliLiter(s) (100 mL/Hr) IV Continuous <Continuous>  dextrose 5%. 1000 milliLiter(s) (100 mL/Hr) IV Continuous <Continuous>  dextrose 5%. 1000 milliLiter(s) (50 mL/Hr) IV Continuous <Continuous>  dextrose 50% Injectable 25 Gram(s) IV Push once  dextrose 50% Injectable 12.5 Gram(s) IV Push once  dextrose 50% Injectable 25 Gram(s) IV Push once  enoxaparin Injectable 40 milliGRAM(s) SubCutaneous every 24 hours  glucagon  Injectable 1 milliGRAM(s) IntraMuscular once  influenza   Vaccine 0.5 milliLiter(s) IntraMuscular once  melatonin 3 milliGRAM(s) Oral at bedtime  midodrine. 10 milliGRAM(s) Oral three times a day  multivitamin 1 Tablet(s) Oral daily  thiamine 100 milliGRAM(s) Oral daily    MEDICATIONS  (PRN):  acetaminophen     Tablet .. 650 milliGRAM(s) Oral every 6 hours PRN Temp greater or equal to 38C (100.4F), Mild Pain (1 - 3)  dextrose Oral Gel 15 Gram(s) Oral once PRN Blood Glucose LESS THAN 70 milliGRAM(s)/deciliter  guaiFENesin Oral Liquid (Sugar-Free) 100 milliGRAM(s) Oral every 6 hours PRN Cough      CAPILLARY BLOOD GLUCOSE      POCT Blood Glucose.: 94 mg/dL (24 Jun 2022 22:08)  POCT Blood Glucose.: 83 mg/dL (24 Jun 2022 17:37)  POCT Blood Glucose.: 85 mg/dL (24 Jun 2022 13:27)  POCT Blood Glucose.: 75 mg/dL (24 Jun 2022 09:07)    I&O's Summary    23 Jun 2022 07:01  -  24 Jun 2022 07:00  --------------------------------------------------------  IN: 440 mL / OUT: 1550 mL / NET: -1110 mL    24 Jun 2022 07:01  -  25 Jun 2022 06:50  --------------------------------------------------------  IN: 220 mL / OUT: 650 mL / NET: -430 mL        PHYSICAL EXAM:  Vital Signs Last 24 Hrs  T(C): 36.3 (25 Jun 2022 04:09), Max: 37.1 (24 Jun 2022 13:44)  T(F): 97.4 (25 Jun 2022 04:09), Max: 98.8 (24 Jun 2022 13:44)  HR: 73 (25 Jun 2022 06:39) (67 - 81)  BP: 97/75 (25 Jun 2022 06:39) (89/66 - 105/67)  BP(mean): --  RR: 18 (25 Jun 2022 04:09) (18 - 18)  SpO2: 95% (25 Jun 2022 04:09) (95% - 98%)    CONSTITUTIONAL: NAD, well-developed  RESPIRATORY: Normal respiratory effort; lungs are clear to auscultation bilaterally  CARDIOVASCULAR: Regular rate and rhythm, normal S1 and S2, no murmur/rub/gallop; No lower extremity edema; Peripheral pulses are 2+ bilaterally  ABDOMEN: Nontender to palpation, normoactive bowel sounds, no rebound/guarding; No hepatosplenomegaly  MUSCLOSKELETAL: no clubbing or cyanosis of digits; no joint swelling or tenderness to palpation  PSYCH: A+O to person, place, and time; affect appropriate    LABS:                        14.0   7.09  )-----------( 153      ( 24 Jun 2022 07:01 )             41.8     06-24    139  |  99  |  7   ----------------------------<  70  3.9   |  30  |  0.63    Ca    10.4      24 Jun 2022 07:03    TPro  7.4  /  Alb  3.7  /  TBili  0.3  /  DBili  x   /  AST  18  /  ALT  21  /  AlkPhos  64  06-24

## 2022-06-25 NOTE — PROGRESS NOTE ADULT - SUBJECTIVE AND OBJECTIVE BOX
CARDIOLOGY     PROGRESS  NOTE   ________________________________________________    CHIEF COMPLAINT:Patient is a 61y old  Male who presents with a chief complaint of AMS (25 Jun 2022 06:50)  no complain.  	  REVIEW OF SYSTEMS:  CONSTITUTIONAL: No fever, weight loss, or fatigue  EYES: No eye pain, visual disturbances, or discharge  ENT:  No difficulty hearing, tinnitus, vertigo; No sinus or throat pain  NECK: No pain or stiffness  RESPIRATORY: No cough, wheezing, chills or hemoptysis; No Shortness of Breath  CARDIOVASCULAR: No chest pain, palpitations, passing out, dizziness, or leg swelling  GASTROINTESTINAL: No abdominal or epigastric pain. No nausea, vomiting, or hematemesis; No diarrhea or constipation. No melena or hematochezia.  GENITOURINARY: No dysuria, frequency, hematuria, or incontinence  NEUROLOGICAL: No headaches, memory loss, loss of strength, numbness, or tremors  SKIN: No itching, burning, rashes, or lesions   LYMPH Nodes: No enlarged glands  ENDOCRINE: No heat or cold intolerance; No hair loss  MUSCULOSKELETAL: No joint pain or swelling; No muscle, back, or extremity pain  PSYCHIATRIC: No depression, anxiety, mood swings, or difficulty sleeping  HEME/LYMPH: No easy bruising, or bleeding gums  ALLERGY AND IMMUNOLOGIC: No hives or eczema	    [ ] All others negative	  [ ] Unable to obtain    PHYSICAL EXAM:  T(C): 36.3 (06-25-22 @ 04:09), Max: 37.1 (06-24-22 @ 13:44)  HR: 73 (06-25-22 @ 06:39) (70 - 81)  BP: 97/75 (06-25-22 @ 06:39) (89/66 - 99/68)  RR: 18 (06-25-22 @ 04:09) (18 - 18)  SpO2: 95% (06-25-22 @ 04:09) (95% - 98%)  Wt(kg): --  I&O's Summary    24 Jun 2022 07:01  -  25 Jun 2022 07:00  --------------------------------------------------------  IN: 220 mL / OUT: 650 mL / NET: -430 mL        Appearance: Normal	  HEENT:   Normal oral mucosa, PERRL, EOMI	  Lymphatic: No lymphadenopathy  Cardiovascular: Normal S1 S2, No JVD, + murmurs, No edema  Respiratory: rhonchi  Psychiatry: A & O x 3, Mood & affect appropriate  Gastrointestinal:  Soft, Non-tender, + BS	  Skin: No rashes, No ecchymoses, No cyanosis	  Neurologic: Non-focal  Extremities: Normal range of motion, No clubbing, cyanosis or edema  Vascular: Peripheral pulses palpable 2+ bilaterally    MEDICATIONS  (STANDING):  dextrose 5% + lactated ringers. 1000 milliLiter(s) (100 mL/Hr) IV Continuous <Continuous>  dextrose 5%. 1000 milliLiter(s) (100 mL/Hr) IV Continuous <Continuous>  dextrose 5%. 1000 milliLiter(s) (50 mL/Hr) IV Continuous <Continuous>  dextrose 50% Injectable 25 Gram(s) IV Push once  dextrose 50% Injectable 12.5 Gram(s) IV Push once  dextrose 50% Injectable 25 Gram(s) IV Push once  enoxaparin Injectable 40 milliGRAM(s) SubCutaneous every 24 hours  glucagon  Injectable 1 milliGRAM(s) IntraMuscular once  influenza   Vaccine 0.5 milliLiter(s) IntraMuscular once  melatonin 3 milliGRAM(s) Oral at bedtime  midodrine. 10 milliGRAM(s) Oral three times a day  multivitamin 1 Tablet(s) Oral daily  thiamine 100 milliGRAM(s) Oral daily      TELEMETRY: 	    ECG:  	  RADIOLOGY:  OTHER: 	  	  LABS:	 	    CARDIAC MARKERS:                                14.0   7.09  )-----------( 153      ( 24 Jun 2022 07:01 )             41.8     06-24    139  |  99  |  7   ----------------------------<  70  3.9   |  30  |  0.63    Ca    10.4      24 Jun 2022 07:03    TPro  7.4  /  Alb  3.7  /  TBili  0.3  /  DBili  x   /  AST  18  /  ALT  21  /  AlkPhos  64  06-24    proBNP:   Lipid Profile:   HgA1c:   TSH: Thyroid Stimulating Hormone, Serum: 1.60 uIU/mL (06-18 @ 11:19)          Assessment and plan  ---------------------------  61M w/ hx of Cerebellar ataxia recent prolonged admission including MICU stay/ intubation from 4/18-5/13 for AMS and airway protection p/w AMS and cough. There was component of rapid neurocognitive decline w/ cerebellar ataxia and possible meningeal findings. Family refused meningeal biopsy. Endocrine work up for hypothermia that admission also unremarkable. As per wife, pt seemed closer to baseline and was able to answer appropriately by time of discharge to rehab. Pt has since been at rehab, starting roughly 4 days ago pt's wife noticed pt becoming more altered and lethargic. Has had decreased PO on pureed diet from discharge. Wife has also noticed wet cough around some time period. When wife felt these symptoms continued to worsen and rehab continued to make no interventions she sent him to hospital for further evaluation.  In ER: Given 1L LR.   pt is well known to me from the previous admission with hx of a.fib who presented with similar complain and change of mental status  will call wife to get a history  pt refused meningeal biopsy  agree with hydration  will add midodrine if needed  cortisol level noted, normal  will check old record  hx of a.fib ?AC  doing better continue current meds  increase midodrine to 10 mg tid, bp is much better controlled  bradycardia asymptomatic  pt with sig deconditioning  increase fluid/ salt intake

## 2022-06-25 NOTE — PROGRESS NOTE ADULT - PROBLEM SELECTOR PLAN 3
- Patient with hypothermia, known to be hypothermic previously  - Likely central process, dysautonomia   - Warming blankets as needed  - UCx negative, BCx NGTD  - UA suggestive of UTI  - Start CTX per ID 6/17-   - Repeat Ucx contaminated - Patient with hypothermia, known to be hypothermic previously  - Likely central process, dysautonomia   - Warming blankets as needed  - UCx negative, BCx NGTD  - UA 6/17 suggestive of contamination

## 2022-06-25 NOTE — PROGRESS NOTE ADULT - SUBJECTIVE AND OBJECTIVE BOX
afberile  REVIEW OF SYSTEMS:  GEN: no fever,    no chills  RESP: no SOB,   no cough  CVS: no chest pain,   no palpitations  GI: no abdominal pain,   no nausea,   no vomiting,   no constipation,   no diarrhea  : no dysuria,   no frequency  NEURO: no headache,   no dizziness  PSYCH: no depression,   not anxious  Derm : no rash    MEDICATIONS  (STANDING):  dextrose 5% + lactated ringers. 1000 milliLiter(s) (100 mL/Hr) IV Continuous <Continuous>  dextrose 5%. 1000 milliLiter(s) (100 mL/Hr) IV Continuous <Continuous>  dextrose 5%. 1000 milliLiter(s) (50 mL/Hr) IV Continuous <Continuous>  dextrose 50% Injectable 25 Gram(s) IV Push once  dextrose 50% Injectable 12.5 Gram(s) IV Push once  dextrose 50% Injectable 25 Gram(s) IV Push once  enoxaparin Injectable 40 milliGRAM(s) SubCutaneous every 24 hours  glucagon  Injectable 1 milliGRAM(s) IntraMuscular once  influenza   Vaccine 0.5 milliLiter(s) IntraMuscular once  melatonin 3 milliGRAM(s) Oral at bedtime  midodrine. 10 milliGRAM(s) Oral three times a day  multivitamin 1 Tablet(s) Oral daily  thiamine 100 milliGRAM(s) Oral daily    MEDICATIONS  (PRN):  acetaminophen     Tablet .. 650 milliGRAM(s) Oral every 6 hours PRN Temp greater or equal to 38C (100.4F), Mild Pain (1 - 3)  dextrose Oral Gel 15 Gram(s) Oral once PRN Blood Glucose LESS THAN 70 milliGRAM(s)/deciliter  guaiFENesin Oral Liquid (Sugar-Free) 100 milliGRAM(s) Oral every 6 hours PRN Cough      Vital Signs Last 24 Hrs  T(C): 36.2 (25 Jun 2022 11:01), Max: 37.1 (24 Jun 2022 13:44)  T(F): 97.1 (25 Jun 2022 11:01), Max: 98.8 (24 Jun 2022 13:44)  HR: 77 (25 Jun 2022 11:01) (70 - 81)  BP: 100/68 (25 Jun 2022 11:01) (89/66 - 100/68)  BP(mean): --  RR: 16 (25 Jun 2022 11:01) (16 - 18)  SpO2: 96% (25 Jun 2022 11:01) (95% - 98%)  CAPILLARY BLOOD GLUCOSE      POCT Blood Glucose.: 94 mg/dL (24 Jun 2022 22:08)  POCT Blood Glucose.: 83 mg/dL (24 Jun 2022 17:37)  POCT Blood Glucose.: 85 mg/dL (24 Jun 2022 13:27)    I&O's Summary    24 Jun 2022 07:01  -  25 Jun 2022 07:00  --------------------------------------------------------  IN: 220 mL / OUT: 650 mL / NET: -430 mL        PHYSICAL EXAM:  HEAD:  Atraumatic, Normocephalic  NECK: Supple, No   JVD  CHEST/LUNG:   no     rales,     no,    rhonchi  HEART: Regular rate and rhythm;         murmur  ABDOMEN: Soft, Nontender, ;   EXTREMITIES:  no      edema  NEUROLOGY:  alert    LABS:                        14.0   7.09  )-----------( 153      ( 24 Jun 2022 07:01 )             41.8     06-24    139  |  99  |  7   ----------------------------<  70  3.9   |  30  |  0.63    Ca    10.4      24 Jun 2022 07:03    TPro  7.4  /  Alb  3.7  /  TBili  0.3  /  DBili  x   /  AST  18  /  ALT  21  /  AlkPhos  64  06-24                    Thyroid Stimulating Hormone, Serum: 1.60 uIU/mL (06-18 @ 11:19)          Consultant(s) Notes Reviewed:      Care Discussed with Consultants/Other Providers:

## 2022-06-25 NOTE — PROGRESS NOTE ADULT - PROBLEM SELECTOR PLAN 2
CXR w/o clear etiology. Concern for aspiration given mental status changes.  - cough improving  -CT chest non-con showed b/l lower lobe tree-in-bud opacities R>L- inflammatory vs infectious bronchiolitis, stable enlarged intrathoracic lymph nodes  -Procal 0.07  -Rapid RVP wnl  -Trend cbc and fever curve  -Diet: Pureed with mildly thick liquids CXR w/o clear etiology. Concern for aspiration given mental status changes.  - cough improving  -CT chest non-con showed b/l lower lobe tree-in-bud opacities R>L- inflammatory vs infectious bronchiolitis, stable enlarged intrathoracic lymph nodes  -Procal 0.07  -Rapid RVP wnl  -Trend cbc and fever curve  -Diet: Tube feeds as per nutrition recs to be started 6/26. Free water as well. NG tube for 5 days  - Guaifenesin for cough

## 2022-06-26 LAB
ANION GAP SERPL CALC-SCNC: 10 MMOL/L — SIGNIFICANT CHANGE UP (ref 5–17)
BUN SERPL-MCNC: 15 MG/DL — SIGNIFICANT CHANGE UP (ref 7–23)
CALCIUM SERPL-MCNC: 10.4 MG/DL — SIGNIFICANT CHANGE UP (ref 8.4–10.5)
CHLORIDE SERPL-SCNC: 97 MMOL/L — SIGNIFICANT CHANGE UP (ref 96–108)
CO2 SERPL-SCNC: 30 MMOL/L — SIGNIFICANT CHANGE UP (ref 22–31)
CREAT SERPL-MCNC: 0.82 MG/DL — SIGNIFICANT CHANGE UP (ref 0.5–1.3)
EGFR: 101 ML/MIN/1.73M2 — SIGNIFICANT CHANGE UP
GLUCOSE BLDC GLUCOMTR-MCNC: 114 MG/DL — HIGH (ref 70–99)
GLUCOSE BLDC GLUCOMTR-MCNC: 121 MG/DL — HIGH (ref 70–99)
GLUCOSE BLDC GLUCOMTR-MCNC: 77 MG/DL — SIGNIFICANT CHANGE UP (ref 70–99)
GLUCOSE BLDC GLUCOMTR-MCNC: 78 MG/DL — SIGNIFICANT CHANGE UP (ref 70–99)
GLUCOSE BLDC GLUCOMTR-MCNC: 79 MG/DL — SIGNIFICANT CHANGE UP (ref 70–99)
GLUCOSE SERPL-MCNC: 82 MG/DL — SIGNIFICANT CHANGE UP (ref 70–99)
HCT VFR BLD CALC: 43 % — SIGNIFICANT CHANGE UP (ref 39–50)
HGB BLD-MCNC: 14.1 G/DL — SIGNIFICANT CHANGE UP (ref 13–17)
MAGNESIUM SERPL-MCNC: 1.8 MG/DL — SIGNIFICANT CHANGE UP (ref 1.6–2.6)
MCHC RBC-ENTMCNC: 28.1 PG — SIGNIFICANT CHANGE UP (ref 27–34)
MCHC RBC-ENTMCNC: 32.8 GM/DL — SIGNIFICANT CHANGE UP (ref 32–36)
MCV RBC AUTO: 85.8 FL — SIGNIFICANT CHANGE UP (ref 80–100)
NRBC # BLD: 0 /100 WBCS — SIGNIFICANT CHANGE UP (ref 0–0)
PHOSPHATE SERPL-MCNC: 3.6 MG/DL — SIGNIFICANT CHANGE UP (ref 2.5–4.5)
PLATELET # BLD AUTO: 181 K/UL — SIGNIFICANT CHANGE UP (ref 150–400)
POTASSIUM SERPL-MCNC: 3.9 MMOL/L — SIGNIFICANT CHANGE UP (ref 3.5–5.3)
POTASSIUM SERPL-SCNC: 3.9 MMOL/L — SIGNIFICANT CHANGE UP (ref 3.5–5.3)
RBC # BLD: 5.01 M/UL — SIGNIFICANT CHANGE UP (ref 4.2–5.8)
RBC # FLD: 13.9 % — SIGNIFICANT CHANGE UP (ref 10.3–14.5)
SODIUM SERPL-SCNC: 137 MMOL/L — SIGNIFICANT CHANGE UP (ref 135–145)
WBC # BLD: 9.26 K/UL — SIGNIFICANT CHANGE UP (ref 3.8–10.5)
WBC # FLD AUTO: 9.26 K/UL — SIGNIFICANT CHANGE UP (ref 3.8–10.5)

## 2022-06-26 PROCEDURE — 71045 X-RAY EXAM CHEST 1 VIEW: CPT | Mod: 26

## 2022-06-26 RX ORDER — SODIUM CHLORIDE 9 MG/ML
1000 INJECTION, SOLUTION INTRAVENOUS
Refills: 0 | Status: DISCONTINUED | OUTPATIENT
Start: 2022-06-26 | End: 2022-06-27

## 2022-06-26 RX ADMIN — SODIUM CHLORIDE 75 MILLILITER(S): 9 INJECTION, SOLUTION INTRAVENOUS at 18:30

## 2022-06-26 RX ADMIN — ENOXAPARIN SODIUM 40 MILLIGRAM(S): 100 INJECTION SUBCUTANEOUS at 05:48

## 2022-06-26 RX ADMIN — Medication 100 MILLIGRAM(S): at 12:27

## 2022-06-26 RX ADMIN — MIDODRINE HYDROCHLORIDE 10 MILLIGRAM(S): 2.5 TABLET ORAL at 12:26

## 2022-06-26 RX ADMIN — Medication 3 MILLIGRAM(S): at 21:02

## 2022-06-26 RX ADMIN — Medication 1 TABLET(S): at 12:26

## 2022-06-26 RX ADMIN — SODIUM CHLORIDE 100 MILLILITER(S): 9 INJECTION, SOLUTION INTRAVENOUS at 12:19

## 2022-06-26 RX ADMIN — MIDODRINE HYDROCHLORIDE 10 MILLIGRAM(S): 2.5 TABLET ORAL at 17:45

## 2022-06-26 RX ADMIN — MIDODRINE HYDROCHLORIDE 10 MILLIGRAM(S): 2.5 TABLET ORAL at 05:47

## 2022-06-26 NOTE — PROVIDER CONTACT NOTE (OTHER) - ASSESSMENT
Pt AOx1, no distress noted. temp 98.5 orally. Bear hugger removed. Pt HR sustaining 125. Other VSS.
Pt AOx1, opens eyes spontaneously. Pt already on bear hugger. Rectal temp previously 93.7 in ED, no 94.5 rectally. Other VSS, in no acute distress. No chills noted.
cough and poor po intake during breakfast
pt coughing and has hiccups
no s/s of distress
Rectal temp - 94.1, other VSS. Denies symptoms.

## 2022-06-26 NOTE — PROGRESS NOTE ADULT - ASSESSMENT
62 yo male      PMHx of cerebellar ataxia    prior  visit  to  ED on 4/17/22 due to AMS ,  was  intubated for depressed consciousness w/ cognitive decline.  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    infectious  and  malignancy  was  ruled  outt/  and  per   neuro  note ,no further workup     flow cytometry 4/28 showing nonspecific results 'degenerated sample, small lymphocytes'   nsgy consulted for meningeal bx, family refusing    CSF cytopathology 4/22 - increased number of large mononuclear cells in a background of few small lymphocytes, monocytes and neutrophils, cannot exclude a lymphoproliferative disorder versus an inflammatory process.    CSF cytopathology 4/29 - significant increased number of small lymphocytes with rare monocytes/ seen by  oncology  dr de leon,  no  intervention      now  admitted    c/o  ams, as  before   h/o  cerebellar  ataxia,  with  no defined  cause  found   pt  with  bradycardia, ?  central, seen  by  card/  echo  on 4/2022,  normal  ef   now  with  hypothermia,   as  before  pt  has  h/o intermittent   hypothermia,  not related  to  any   infectious   process.  rather  , it  seems to be  dysautonomia/  with  h/o normal  cortisol level  prior  CT  c/ap,  no  malignant  process   most  of  these  issues, do  not  have   a good  rx  per  swallow  eval, was  on  modified  diet/  fs  noted  endo  dr ovalle  on feeds  per  swallow  eval   arousable, not communicative/   which is  about his  base line  with few  periods  of  alertness    was  iv  rocephin per  dr mclean/ remains  aspiration  risk.  given  bed bound   status  and  altered  baseline  mental  status  bcx and  ucx are  negative/ arousable, ans   sometimes  able  to verbalize. /this is hid   baseline  wife  wants  ng tube  feeds. per  dr latham/  vera s not  want peg   on ng tube  feeds/  per  wife

## 2022-06-26 NOTE — PROVIDER CONTACT NOTE (OTHER) - NAME OF MD/NP/PA/DO NOTIFIED:
Dr. Gibson
Kyle JOHNSON
MD Miguel Angel Gastelum
SUNIL Prieto
SUNIL Prieto
Callie JOHNSON
SUNIL Blevins

## 2022-06-26 NOTE — PROGRESS NOTE ADULT - SUBJECTIVE AND OBJECTIVE BOX
CARDIOLOGY     PROGRESS  NOTE   ________________________________________________    CHIEF COMPLAINT:Patient is a 61y old  Male who presents with a chief complaint of AMS (26 Jun 2022 06:39)  no complain.  	  REVIEW OF SYSTEMS:  CONSTITUTIONAL: No fever, weight loss, or fatigue  EYES: No eye pain, visual disturbances, or discharge  ENT:  No difficulty hearing, tinnitus, vertigo; No sinus or throat pain  NECK: No pain or stiffness  RESPIRATORY: No cough, wheezing, chills or hemoptysis; No Shortness of Breath  CARDIOVASCULAR: No chest pain, palpitations, passing out, dizziness, or leg swelling  GASTROINTESTINAL: No abdominal or epigastric pain. No nausea, vomiting, or hematemesis; No diarrhea or constipation. No melena or hematochezia.  GENITOURINARY: No dysuria, frequency, hematuria, or incontinence  NEUROLOGICAL: No headaches, memory loss, loss of strength, numbness, or tremors  SKIN: No itching, burning, rashes, or lesions   LYMPH Nodes: No enlarged glands  ENDOCRINE: No heat or cold intolerance; No hair loss  MUSCULOSKELETAL: No joint pain or swelling; No muscle, back, or extremity pain  PSYCHIATRIC: No depression, anxiety, mood swings, or difficulty sleeping  HEME/LYMPH: No easy bruising, or bleeding gums  ALLERGY AND IMMUNOLOGIC: No hives or eczema	    [ ] All others negative	  [ ] Unable to obtain    PHYSICAL EXAM:  T(C): 36.4 (06-26-22 @ 04:18), Max: 36.9 (06-25-22 @ 21:41)  HR: 75 (06-26-22 @ 04:18) (65 - 77)  BP: 93/64 (06-26-22 @ 04:18) (93/64 - 104/67)  RR: 18 (06-26-22 @ 04:18) (16 - 18)  SpO2: 98% (06-26-22 @ 04:18) (96% - 98%)  Wt(kg): --  I&O's Summary    25 Jun 2022 07:01  -  26 Jun 2022 07:00  --------------------------------------------------------  IN: 0 mL / OUT: 150 mL / NET: -150 mL        Appearance: Normal	  HEENT:   Normal oral mucosa, PERRL, EOMI	  Lymphatic: No lymphadenopathy  Cardiovascular: Normal S1 S2, No JVD, + murmurs, No edema  Respiratory: rhonchi  Psychiatry: A & O x 3, Mood & affect appropriate  Gastrointestinal:  Soft, Non-tender, + BS	  Skin: No rashes, No ecchymoses, No cyanosis	  Neurologic: Non-focal  Extremities: Normal range of motion, No clubbing, cyanosis or edema  Vascular: Peripheral pulses palpable 2+ bilaterally    MEDICATIONS  (STANDING):  dextrose 5% + lactated ringers. 1000 milliLiter(s) (100 mL/Hr) IV Continuous <Continuous>  dextrose 5%. 1000 milliLiter(s) (100 mL/Hr) IV Continuous <Continuous>  dextrose 5%. 1000 milliLiter(s) (50 mL/Hr) IV Continuous <Continuous>  dextrose 50% Injectable 25 Gram(s) IV Push once  dextrose 50% Injectable 12.5 Gram(s) IV Push once  dextrose 50% Injectable 25 Gram(s) IV Push once  enoxaparin Injectable 40 milliGRAM(s) SubCutaneous every 24 hours  glucagon  Injectable 1 milliGRAM(s) IntraMuscular once  influenza   Vaccine 0.5 milliLiter(s) IntraMuscular once  melatonin 3 milliGRAM(s) Oral at bedtime  midodrine. 10 milliGRAM(s) Oral three times a day  multivitamin 1 Tablet(s) Oral daily  thiamine 100 milliGRAM(s) Oral daily      TELEMETRY: 	    ECG:  	  RADIOLOGY:  OTHER: 	  	  LABS:	 	    CARDIAC MARKERS:                                14.1   9.26  )-----------( 181      ( 26 Jun 2022 06:39 )             43.0     06-26    137  |  97  |  15  ----------------------------<  82  3.9   |  30  |  0.82    Ca    10.4      26 Jun 2022 06:34  Phos  3.6     06-26  Mg     1.8     06-26      proBNP:   Lipid Profile:   HgA1c:   TSH: Thyroid Stimulating Hormone, Serum: 1.60 uIU/mL (06-18 @ 11:19)          Assessment and plan  ---------------------------  61M w/ hx of Cerebellar ataxia recent prolonged admission including MICU stay/ intubation from 4/18-5/13 for AMS and airway protection p/w AMS and cough. There was component of rapid neurocognitive decline w/ cerebellar ataxia and possible meningeal findings. Family refused meningeal biopsy. Endocrine work up for hypothermia that admission also unremarkable. As per wife, pt seemed closer to baseline and was able to answer appropriately by time of discharge to rehab. Pt has since been at rehab, starting roughly 4 days ago pt's wife noticed pt becoming more altered and lethargic. Has had decreased PO on pureed diet from discharge. Wife has also noticed wet cough around some time period. When wife felt these symptoms continued to worsen and rehab continued to make no interventions she sent him to hospital for further evaluation.  In ER: Given 1L LR.   pt is well known to me from the previous admission with hx of a.fib who presented with similar complain and change of mental status  will call wife to get a history  pt refused meningeal biopsy  agree with hydration  will add midodrine if needed  cortisol level noted, normal  will check old record  hx of a.fib ?AC  doing better continue current meds  increase midodrine to 10 mg tid, bp is much better controlled  bradycardia asymptomatic  pt with sig deconditioning  increase fluid/ salt intake  check cortisol level

## 2022-06-26 NOTE — PROGRESS NOTE ADULT - PROBLEM SELECTOR PLAN 1
Pt awake and alert but not responding to questions, slurring speech. Note baseline CT head. Will cont. work up for possible metabolic etiologies. UA also negative  -CT chest showed b/l lower lobe tree-in-bud opacities R>L- inflammatory vs infectious bronchiolitis, stable enlarged intrathoracic lymph nodes  -Continuous pulse oximetry given recent admission to MICU after intubation for airway protection  -Aspiration and falls precautions  - Syphillis screen negative   - F/u B12, folate, TSH  - UCx negative, BCx NGTD  - UA suggestive of UTI, completed 5 day course of CTX   - Repeat UCx contaminated Pt arousable. Difficult to understand with slurred speech Able to follow commands. Note baseline CT head. Will cont. work up for possible metabolic etiologies. UA also negative  -CT chest showed b/l lower lobe tree-in-bud opacities R>L- inflammatory vs infectious bronchiolitis, stable enlarged intrathoracic lymph nodes  -Continuous pulse oximetry given recent admission to MICU after intubation for airway protection  -Aspiration and falls precautions  - Syphillis screen negative   - F/u B12, folate, TSH  - UCx negative, BCx NGTD  - UA suggestive of UTI, completed 5 day course of CTX   - Repeat UCx contaminated Pt arousable. Difficult to understand with slurred speech Able to follow commands. Note baseline CT head. Will cont. work up for possible metabolic etiologies. UA also negative  -CT chest showed b/l lower lobe tree-in-bud opacities R>L- inflammatory vs infectious bronchiolitis, stable enlarged intrathoracic lymph nodes  -Continuous pulse oximetry given recent admission to MICU after intubation for airway protection  -Aspiration and falls precautions  - Syphillis screen negative   - F/u B12, folate, TSH  - UCx negative, BCx NGTD  - UA suggestive of UTI, completed 5 day course of CTX   - Repeat UCx contaminated  - per cardiology will order cortisol level

## 2022-06-26 NOTE — PROGRESS NOTE ADULT - PROBLEM SELECTOR PLAN 2
CXR w/o clear etiology. Concern for aspiration given mental status changes.  - cough improving  -CT chest non-con showed b/l lower lobe tree-in-bud opacities R>L- inflammatory vs infectious bronchiolitis, stable enlarged intrathoracic lymph nodes  -Procal 0.07  -Rapid RVP wnl  -Trend cbc and fever curve  -Diet: Tube feeds as per nutrition recs to be started 6/26. Free water as well. NG tube for 5 days  - Guaifenesin for cough CXR w/o clear etiology. Concern for aspiration given mental status changes.  - cough improving  -CT chest non-con showed b/l lower lobe tree-in-bud opacities R>L- inflammatory vs infectious bronchiolitis, stable enlarged intrathoracic lymph nodes  -Procal 0.07  -Rapid RVP wnl  -Trend cbc and fever curve  -Diet: Back to pureed diet. IV fluids started D5 LR at 75mL/hr

## 2022-06-26 NOTE — PROGRESS NOTE ADULT - PROBLEM SELECTOR PROBLEM 1
Encephalopathy acute

## 2022-06-26 NOTE — PROVIDER CONTACT NOTE (OTHER) - REASON
pt's NG tube displaced, pt coughing and has hiccups
Pt. has dry cough
wet cough during breakfast, patient with poor PO intake
Pt hypothermic
Pt tachycardiac
pt has blood tinged urine during incontinence care
rectal temp - 94.1

## 2022-06-26 NOTE — PROVIDER CONTACT NOTE (OTHER) - DATE AND TIME:
19-Jun-2022 17:35
16-Jun-2022 03:30
16-Jun-2022 05:40
16-Jun-2022 17:10
17-Jun-2022 06:45
26-Jun-2022 02:18
20-Jun-2022 10:46

## 2022-06-26 NOTE — PROGRESS NOTE ADULT - SUBJECTIVE AND OBJECTIVE BOX
afberile  REVIEW OF SYSTEMS:  GEN: no fever,    no chills  RESP: no SOB,   no cough  CVS: no chest pain,   no palpitations  GI: no abdominal pain,   no nausea,   no vomiting,   no constipation,   no diarrhea  : no dysuria,   no frequency  NEURO: no headache,   no dizziness  PSYCH: no depression,   not anxious  Derm : no rash    MEDICATIONS  (STANDING):  dextrose 5% + lactated ringers. 1000 milliLiter(s) (100 mL/Hr) IV Continuous <Continuous>  dextrose 5%. 1000 milliLiter(s) (100 mL/Hr) IV Continuous <Continuous>  dextrose 5%. 1000 milliLiter(s) (50 mL/Hr) IV Continuous <Continuous>  dextrose 50% Injectable 25 Gram(s) IV Push once  dextrose 50% Injectable 12.5 Gram(s) IV Push once  dextrose 50% Injectable 25 Gram(s) IV Push once  enoxaparin Injectable 40 milliGRAM(s) SubCutaneous every 24 hours  glucagon  Injectable 1 milliGRAM(s) IntraMuscular once  influenza   Vaccine 0.5 milliLiter(s) IntraMuscular once  melatonin 3 milliGRAM(s) Oral at bedtime  midodrine. 10 milliGRAM(s) Oral three times a day  multivitamin 1 Tablet(s) Oral daily  thiamine 100 milliGRAM(s) Oral daily    MEDICATIONS  (PRN):  acetaminophen     Tablet .. 650 milliGRAM(s) Oral every 6 hours PRN Temp greater or equal to 38C (100.4F), Mild Pain (1 - 3)  dextrose Oral Gel 15 Gram(s) Oral once PRN Blood Glucose LESS THAN 70 milliGRAM(s)/deciliter  guaiFENesin Oral Liquid (Sugar-Free) 100 milliGRAM(s) Oral every 6 hours PRN Cough      Vital Signs Last 24 Hrs  T(C): 36.4 (26 Jun 2022 04:18), Max: 36.9 (25 Jun 2022 21:41)  T(F): 97.5 (26 Jun 2022 04:18), Max: 98.5 (25 Jun 2022 21:41)  HR: 75 (26 Jun 2022 04:18) (65 - 77)  BP: 93/64 (26 Jun 2022 04:18) (93/64 - 104/67)  BP(mean): --  RR: 18 (26 Jun 2022 04:18) (16 - 18)  SpO2: 98% (26 Jun 2022 04:18) (96% - 98%)  CAPILLARY BLOOD GLUCOSE      POCT Blood Glucose.: 78 mg/dL (26 Jun 2022 04:16)  POCT Blood Glucose.: 71 mg/dL (25 Jun 2022 22:06)    I&O's Summary    25 Jun 2022 07:01  -  26 Jun 2022 07:00  --------------------------------------------------------  IN: 0 mL / OUT: 150 mL / NET: -150 mL        PHYSICAL EXAM:  HEAD:  Atraumatic, Normocephalic  NECK: Supple, No   JVD  CHEST/LUNG:   no     rales,     no,    rhonchi  HEART: Regular rate and rhythm;         murmur  ABDOMEN: Soft, Nontender, ;   EXTREMITIES:      no  edema  NEUROLOGY:  alert    LABS:                        14.1   9.26  )-----------( 181      ( 26 Jun 2022 06:39 )             43.0     06-26    137  |  97  |  15  ----------------------------<  82  3.9   |  30  |  0.82    Ca    10.4      26 Jun 2022 06:34  Phos  3.6     06-26  Mg     1.8     06-26                      Thyroid Stimulating Hormone, Serum: 1.60 uIU/mL (06-18 @ 11:19)          Consultant(s) Notes Reviewed:      Care Discussed with Consultants/Other Providers:

## 2022-06-26 NOTE — PROVIDER CONTACT NOTE (OTHER) - BACKGROUND
61 y M. Admitted for decline in mental status, HX of cerebellar ataxia. Pt hypothermic overnight.
Pt admitted for AMS. Hx of cerebellar ataxia.
Pt admitted with AMS. Hx of cerebellar ataxia
admitted with AMS
pt admitted for AMS
pt admitted for AMS, last UA from2 days ago had red blood cells in it

## 2022-06-26 NOTE — PROGRESS NOTE ADULT - PROBLEM SELECTOR PLAN 3
- Patient with hypothermia, known to be hypothermic previously  - Likely central process, dysautonomia   - Warming blankets as needed  - UCx negative, BCx NGTD  - UA 6/17 suggestive of contamination

## 2022-06-26 NOTE — PROGRESS NOTE ADULT - PROBLEM SELECTOR PROBLEM 4
Cerebellar ataxia

## 2022-06-26 NOTE — PROGRESS NOTE ADULT - PROBLEM SELECTOR PLAN 4
No defined cause. Recent extensive neurologic, ID and endocrine work up on prior admission. Wife declined brain biopsy that admission. - No defined cause. Recent extensive neurologic, ID and endocrine work up on prior admission. Wife declined brain biopsy that admission.  - C discussion with attending 6/27

## 2022-06-26 NOTE — PROGRESS NOTE ADULT - NUTRITIONAL ASSESSMENT
This patient has been assessed with a concern for Malnutrition and has been determined to have a diagnosis/diagnoses of Severe protein-calorie malnutrition.    This patient is being managed with:   Diet NPO with Tube Feed-  Tube Feeding Modality: Nasogastric  Glucerna 1.5 Jere (GLUCERNA1.5RTH)  Total Volume for 24 Hours (mL): 960  Continuous  Starting Tube Feed Rate {mL per Hour}: 10  Increase Tube Feed Rate by (mL): 10     Every 4 hours  Until Goal Tube Feed Rate (mL per Hour): 40  Tube Feed Duration (in Hours): 24  Tube Feed Start Time: 21:00  Entered: Jun 25 2022  9:31PM    
This patient has been assessed with a concern for Malnutrition and has been determined to have a diagnosis/diagnoses of Severe protein-calorie malnutrition.    This patient is being managed with:   Diet Pureed-  Mildly Thick Liquids (MILDTHICKLIQS)  Supplement Feeding Modality:  Oral  Ensure Enlive Cans or Servings Per Day:  1       Frequency:  Three Times a day  Ensure Pudding Cans or Servings Per Day:  1       Frequency:  Three Times a day  Entered: Jun 20 2022  4:41PM    
This patient has been assessed with a concern for Malnutrition and has been determined to have a diagnosis/diagnoses of Severe protein-calorie malnutrition.    This patient is being managed with:   Diet Pureed-  Mildly Thick Liquids (MILDTHICKLIQS)  Entered: May  6 2022  4:10PM    
This patient has been assessed with a concern for Malnutrition and has been determined to have a diagnosis/diagnoses of Severe protein-calorie malnutrition.    This patient is being managed with:   Diet Pureed-  Mildly Thick Liquids (MILDTHICKLIQS)  Entered: May  6 2022  4:10PM    
This patient has been assessed with a concern for Malnutrition and has been determined to have a diagnosis/diagnoses of Severe protein-calorie malnutrition.    This patient is being managed with:   Diet Pureed-  Mildly Thick Liquids (MILDTHICKLIQS)  Supplement Feeding Modality:  Oral  Ensure Enlive Cans or Servings Per Day:  1       Frequency:  Three Times a day  Ensure Pudding Cans or Servings Per Day:  1       Frequency:  Three Times a day  Entered: Jun 19 2022  3:31PM    
This patient has been assessed with a concern for Malnutrition and has been determined to have a diagnosis/diagnoses of Severe protein-calorie malnutrition.    This patient is being managed with:   Diet Pureed-  Mildly Thick Liquids (MILDTHICKLIQS)  Supplement Feeding Modality:  Oral  Ensure Enlive Cans or Servings Per Day:  1       Frequency:  Three Times a day  Ensure Pudding Cans or Servings Per Day:  1       Frequency:  Three Times a day  Entered: Jun 20 2022  4:41PM    
This patient has been assessed with a concern for Malnutrition and has been determined to have a diagnosis/diagnoses of Severe protein-calorie malnutrition.    This patient is being managed with:   Diet NPO with Tube Feed-  Tube Feeding Modality: Nasogastric  Glucerna 1.5 Jere (GLUCERNA1.5RTH)  Total Volume for 24 Hours (mL): 960  Continuous  Starting Tube Feed Rate {mL per Hour}: 10  Increase Tube Feed Rate by (mL): 10     Every 4 hours  Until Goal Tube Feed Rate (mL per Hour): 40  Tube Feed Duration (in Hours): 24  Tube Feed Start Time: 21:00  Entered: Jun 25 2022  9:31PM    
This patient has been assessed with a concern for Malnutrition and has been determined to have a diagnosis/diagnoses of Severe protein-calorie malnutrition.    This patient is being managed with:   Diet Pureed-  Mildly Thick Liquids (MILDTHICKLIQS)  Supplement Feeding Modality:  Oral  Ensure Enlive Cans or Servings Per Day:  1       Frequency:  Three Times a day  Ensure Pudding Cans or Servings Per Day:  1       Frequency:  Three Times a day  Entered: Jun 20 2022  4:41PM    
This patient has been assessed with a concern for Malnutrition and has been determined to have a diagnosis/diagnoses of Severe protein-calorie malnutrition.    This patient is being managed with:   Diet Pureed-  Mildly Thick Liquids (MILDTHICKLIQS)  Supplement Feeding Modality:  Oral  Ensure Enlive Cans or Servings Per Day:  1       Frequency:  Three Times a day  Ensure Pudding Cans or Servings Per Day:  1       Frequency:  Three Times a day  Entered: Jun 19 2022  3:31PM    
This patient has been assessed with a concern for Malnutrition and has been determined to have a diagnosis/diagnoses of Severe protein-calorie malnutrition.    This patient is being managed with:   Diet Pureed-  Mildly Thick Liquids (MILDTHICKLIQS)  Supplement Feeding Modality:  Oral  Ensure Enlive Cans or Servings Per Day:  1       Frequency:  Three Times a day  Ensure Pudding Cans or Servings Per Day:  1       Frequency:  Three Times a day  Entered: Jun 20 2022  4:41PM

## 2022-06-26 NOTE — PROGRESS NOTE ADULT - PROBLEM SELECTOR PROBLEM 3
Hypothermia, endogenous

## 2022-06-26 NOTE — PROVIDER CONTACT NOTE (OTHER) - SITUATION
pt has blood tinged urine during incontinence care
Pt. has dry cough
pt with wet cough and poor po intake during breakfast
pt's NG tube displaced, pt coughing and has hiccups
Pt tachycardiac HR sustaining 125
Received pt from ED hypothermic- 94.5 rectally. Pt already on bear hugger
rectal temp - 94.1

## 2022-06-26 NOTE — PROGRESS NOTE ADULT - SUBJECTIVE AND OBJECTIVE BOX
PROGRESS NOTE:   Authored by Dr. Aaron Badillo MD (PGY-1). Pager [facility] [phone] / [facility]     Patient is a 61y old  Male who presents with a chief complaint of AMS (25 Jun 2022 11:03)      SUBJECTIVE / OVERNIGHT EVENTS:  No acute events overnight.     ADDITIONAL REVIEW OF SYSTEMS:  Patient denies fevers, chills, chest pain, shortness of breath, nausea, abdominal pain, diarrhea, constipation, dysuria, leg swelling, headache, light headedness.    MEDICATIONS  (STANDING):  dextrose 5% + lactated ringers. 1000 milliLiter(s) (100 mL/Hr) IV Continuous <Continuous>  dextrose 5%. 1000 milliLiter(s) (100 mL/Hr) IV Continuous <Continuous>  dextrose 5%. 1000 milliLiter(s) (50 mL/Hr) IV Continuous <Continuous>  dextrose 50% Injectable 25 Gram(s) IV Push once  dextrose 50% Injectable 12.5 Gram(s) IV Push once  dextrose 50% Injectable 25 Gram(s) IV Push once  enoxaparin Injectable 40 milliGRAM(s) SubCutaneous every 24 hours  glucagon  Injectable 1 milliGRAM(s) IntraMuscular once  influenza   Vaccine 0.5 milliLiter(s) IntraMuscular once  melatonin 3 milliGRAM(s) Oral at bedtime  midodrine. 10 milliGRAM(s) Oral three times a day  multivitamin 1 Tablet(s) Oral daily  thiamine 100 milliGRAM(s) Oral daily    MEDICATIONS  (PRN):  acetaminophen     Tablet .. 650 milliGRAM(s) Oral every 6 hours PRN Temp greater or equal to 38C (100.4F), Mild Pain (1 - 3)  dextrose Oral Gel 15 Gram(s) Oral once PRN Blood Glucose LESS THAN 70 milliGRAM(s)/deciliter  guaiFENesin Oral Liquid (Sugar-Free) 100 milliGRAM(s) Oral every 6 hours PRN Cough      CAPILLARY BLOOD GLUCOSE      POCT Blood Glucose.: 78 mg/dL (26 Jun 2022 04:16)  POCT Blood Glucose.: 71 mg/dL (25 Jun 2022 22:06)    I&O's Summary    24 Jun 2022 07:01  -  25 Jun 2022 07:00  --------------------------------------------------------  IN: 220 mL / OUT: 650 mL / NET: -430 mL    25 Jun 2022 07:01  -  26 Jun 2022 06:40  --------------------------------------------------------  IN: 0 mL / OUT: 150 mL / NET: -150 mL        PHYSICAL EXAM:  Vital Signs Last 24 Hrs  T(C): 36.4 (26 Jun 2022 04:18), Max: 36.9 (25 Jun 2022 21:41)  T(F): 97.5 (26 Jun 2022 04:18), Max: 98.5 (25 Jun 2022 21:41)  HR: 75 (26 Jun 2022 04:18) (65 - 77)  BP: 93/64 (26 Jun 2022 04:18) (93/64 - 104/67)  BP(mean): --  RR: 18 (26 Jun 2022 04:18) (16 - 18)  SpO2: 98% (26 Jun 2022 04:18) (96% - 98%)    CONSTITUTIONAL: NAD, well-developed  RESPIRATORY: Normal respiratory effort; lungs are clear to auscultation bilaterally  CARDIOVASCULAR: Regular rate and rhythm, normal S1 and S2, no murmur/rub/gallop; No lower extremity edema; Peripheral pulses are 2+ bilaterally  ABDOMEN: Nontender to palpation, normoactive bowel sounds, no rebound/guarding; No hepatosplenomegaly  MUSCLOSKELETAL: no clubbing or cyanosis of digits; no joint swelling or tenderness to palpation  PSYCH: A+O to person, place, and time; affect appropriate    LABS:                        14.0   7.09  )-----------( 153      ( 24 Jun 2022 07:01 )             41.8     06-24    139  |  99  |  7   ----------------------------<  70  3.9   |  30  |  0.63    Ca    10.4      24 Jun 2022 07:03    TPro  7.4  /  Alb  3.7  /  TBili  0.3  /  DBili  x   /  AST  18  /  ALT  21  /  AlkPhos  64  06-24                Tele Reviewed:    RADIOLOGY & ADDITIONAL TESTS:  Results Reviewed:   Imaging Personally Reviewed:  Electrocardiogram Personally Reviewed:     PROGRESS NOTE:   Authored by Dr. Aaron Badillo MD (PGY-1). Pager [facility] [phone] / [facility]     Patient is a 61y old  Male who presents with a chief complaint of AMS (25 Jun 2022 11:03)      SUBJECTIVE / OVERNIGHT EVENTS:  No acute events overnight. NG tube was dislodged this am. Will not try replacing it.     ADDITIONAL REVIEW OF SYSTEMS:  Patient denies fevers, chills, chest pain, shortness of breath, nausea, abdominal pain, diarrhea, constipation, dysuria, leg swelling, headache, light headedness.    MEDICATIONS  (STANDING):  dextrose 5% + lactated ringers. 1000 milliLiter(s) (100 mL/Hr) IV Continuous <Continuous>  dextrose 5%. 1000 milliLiter(s) (100 mL/Hr) IV Continuous <Continuous>  dextrose 5%. 1000 milliLiter(s) (50 mL/Hr) IV Continuous <Continuous>  dextrose 50% Injectable 25 Gram(s) IV Push once  dextrose 50% Injectable 12.5 Gram(s) IV Push once  dextrose 50% Injectable 25 Gram(s) IV Push once  enoxaparin Injectable 40 milliGRAM(s) SubCutaneous every 24 hours  glucagon  Injectable 1 milliGRAM(s) IntraMuscular once  influenza   Vaccine 0.5 milliLiter(s) IntraMuscular once  melatonin 3 milliGRAM(s) Oral at bedtime  midodrine. 10 milliGRAM(s) Oral three times a day  multivitamin 1 Tablet(s) Oral daily  thiamine 100 milliGRAM(s) Oral daily    MEDICATIONS  (PRN):  acetaminophen     Tablet .. 650 milliGRAM(s) Oral every 6 hours PRN Temp greater or equal to 38C (100.4F), Mild Pain (1 - 3)  dextrose Oral Gel 15 Gram(s) Oral once PRN Blood Glucose LESS THAN 70 milliGRAM(s)/deciliter  guaiFENesin Oral Liquid (Sugar-Free) 100 milliGRAM(s) Oral every 6 hours PRN Cough      CAPILLARY BLOOD GLUCOSE      POCT Blood Glucose.: 78 mg/dL (26 Jun 2022 04:16)  POCT Blood Glucose.: 71 mg/dL (25 Jun 2022 22:06)    I&O's Summary    24 Jun 2022 07:01  -  25 Jun 2022 07:00  --------------------------------------------------------  IN: 220 mL / OUT: 650 mL / NET: -430 mL    25 Jun 2022 07:01  -  26 Jun 2022 06:40  --------------------------------------------------------  IN: 0 mL / OUT: 150 mL / NET: -150 mL        PHYSICAL EXAM:  Vital Signs Last 24 Hrs  T(C): 36.4 (26 Jun 2022 04:18), Max: 36.9 (25 Jun 2022 21:41)  T(F): 97.5 (26 Jun 2022 04:18), Max: 98.5 (25 Jun 2022 21:41)  HR: 75 (26 Jun 2022 04:18) (65 - 77)  BP: 93/64 (26 Jun 2022 04:18) (93/64 - 104/67)  BP(mean): --  RR: 18 (26 Jun 2022 04:18) (16 - 18)  SpO2: 98% (26 Jun 2022 04:18) (96% - 98%)    CONSTITUTIONAL: NAD, well-developed  RESPIRATORY: Normal respiratory effort; lungs are clear to auscultation bilaterally  CARDIOVASCULAR: Regular rate and rhythm, normal S1 and S2, no murmur/rub/gallop; No lower extremity edema; Peripheral pulses are 2+ bilaterally  ABDOMEN: Nontender to palpation, normoactive bowel sounds, no rebound/guarding; No hepatosplenomegaly  MUSCLOSKELETAL: no clubbing or cyanosis of digits; no joint swelling or tenderness to palpation  PSYCH: A+O to person, place, and time; affect appropriate    LABS:                        14.0   7.09  )-----------( 153      ( 24 Jun 2022 07:01 )             41.8     06-24    139  |  99  |  7   ----------------------------<  70  3.9   |  30  |  0.63    Ca    10.4      24 Jun 2022 07:03    TPro  7.4  /  Alb  3.7  /  TBili  0.3  /  DBili  x   /  AST  18  /  ALT  21  /  AlkPhos  64  06-24

## 2022-06-27 ENCOUNTER — TRANSCRIPTION ENCOUNTER (OUTPATIENT)
Age: 62
End: 2022-06-27

## 2022-06-27 VITALS
HEART RATE: 61 BPM | DIASTOLIC BLOOD PRESSURE: 64 MMHG | TEMPERATURE: 98 F | OXYGEN SATURATION: 96 % | RESPIRATION RATE: 16 BRPM | SYSTOLIC BLOOD PRESSURE: 106 MMHG

## 2022-06-27 LAB
ALBUMIN SERPL ELPH-MCNC: 3.5 G/DL — SIGNIFICANT CHANGE UP (ref 3.3–5)
ALP SERPL-CCNC: 60 U/L — SIGNIFICANT CHANGE UP (ref 40–120)
ALT FLD-CCNC: 18 U/L — SIGNIFICANT CHANGE UP (ref 10–45)
ANION GAP SERPL CALC-SCNC: 11 MMOL/L — SIGNIFICANT CHANGE UP (ref 5–17)
AST SERPL-CCNC: 15 U/L — SIGNIFICANT CHANGE UP (ref 10–40)
BASOPHILS # BLD AUTO: 0.01 K/UL — SIGNIFICANT CHANGE UP (ref 0–0.2)
BASOPHILS NFR BLD AUTO: 0.1 % — SIGNIFICANT CHANGE UP (ref 0–2)
BILIRUB SERPL-MCNC: 0.3 MG/DL — SIGNIFICANT CHANGE UP (ref 0.2–1.2)
BUN SERPL-MCNC: 13 MG/DL — SIGNIFICANT CHANGE UP (ref 7–23)
CALCIUM SERPL-MCNC: 9.9 MG/DL — SIGNIFICANT CHANGE UP (ref 8.4–10.5)
CHLORIDE SERPL-SCNC: 99 MMOL/L — SIGNIFICANT CHANGE UP (ref 96–108)
CO2 SERPL-SCNC: 30 MMOL/L — SIGNIFICANT CHANGE UP (ref 22–31)
CREAT SERPL-MCNC: 0.76 MG/DL — SIGNIFICANT CHANGE UP (ref 0.5–1.3)
EGFR: 103 ML/MIN/1.73M2 — SIGNIFICANT CHANGE UP
EOSINOPHIL # BLD AUTO: 0.21 K/UL — SIGNIFICANT CHANGE UP (ref 0–0.5)
EOSINOPHIL NFR BLD AUTO: 2.8 % — SIGNIFICANT CHANGE UP (ref 0–6)
GLUCOSE BLDC GLUCOMTR-MCNC: 106 MG/DL — HIGH (ref 70–99)
GLUCOSE BLDC GLUCOMTR-MCNC: 126 MG/DL — HIGH (ref 70–99)
GLUCOSE BLDC GLUCOMTR-MCNC: 86 MG/DL — SIGNIFICANT CHANGE UP (ref 70–99)
GLUCOSE SERPL-MCNC: 105 MG/DL — HIGH (ref 70–99)
HCT VFR BLD CALC: 39.8 % — SIGNIFICANT CHANGE UP (ref 39–50)
HGB BLD-MCNC: 13.1 G/DL — SIGNIFICANT CHANGE UP (ref 13–17)
IMM GRANULOCYTES NFR BLD AUTO: 0.7 % — SIGNIFICANT CHANGE UP (ref 0–1.5)
LYMPHOCYTES # BLD AUTO: 1.16 K/UL — SIGNIFICANT CHANGE UP (ref 1–3.3)
LYMPHOCYTES # BLD AUTO: 15.4 % — SIGNIFICANT CHANGE UP (ref 13–44)
MAGNESIUM SERPL-MCNC: 1.7 MG/DL — SIGNIFICANT CHANGE UP (ref 1.6–2.6)
MCHC RBC-ENTMCNC: 28.5 PG — SIGNIFICANT CHANGE UP (ref 27–34)
MCHC RBC-ENTMCNC: 32.9 GM/DL — SIGNIFICANT CHANGE UP (ref 32–36)
MCV RBC AUTO: 86.7 FL — SIGNIFICANT CHANGE UP (ref 80–100)
MONOCYTES # BLD AUTO: 0.56 K/UL — SIGNIFICANT CHANGE UP (ref 0–0.9)
MONOCYTES NFR BLD AUTO: 7.4 % — SIGNIFICANT CHANGE UP (ref 2–14)
NEUTROPHILS # BLD AUTO: 5.56 K/UL — SIGNIFICANT CHANGE UP (ref 1.8–7.4)
NEUTROPHILS NFR BLD AUTO: 73.6 % — SIGNIFICANT CHANGE UP (ref 43–77)
NRBC # BLD: 0 /100 WBCS — SIGNIFICANT CHANGE UP (ref 0–0)
PHOSPHATE SERPL-MCNC: 3 MG/DL — SIGNIFICANT CHANGE UP (ref 2.5–4.5)
PLATELET # BLD AUTO: 170 K/UL — SIGNIFICANT CHANGE UP (ref 150–400)
POTASSIUM SERPL-MCNC: 3.7 MMOL/L — SIGNIFICANT CHANGE UP (ref 3.5–5.3)
POTASSIUM SERPL-SCNC: 3.7 MMOL/L — SIGNIFICANT CHANGE UP (ref 3.5–5.3)
PROT SERPL-MCNC: 7.1 G/DL — SIGNIFICANT CHANGE UP (ref 6–8.3)
RBC # BLD: 4.59 M/UL — SIGNIFICANT CHANGE UP (ref 4.2–5.8)
RBC # FLD: 14.3 % — SIGNIFICANT CHANGE UP (ref 10.3–14.5)
SODIUM SERPL-SCNC: 140 MMOL/L — SIGNIFICANT CHANGE UP (ref 135–145)
WBC # BLD: 7.55 K/UL — SIGNIFICANT CHANGE UP (ref 3.8–10.5)
WBC # FLD AUTO: 7.55 K/UL — SIGNIFICANT CHANGE UP (ref 3.8–10.5)

## 2022-06-27 PROCEDURE — 85027 COMPLETE CBC AUTOMATED: CPT

## 2022-06-27 PROCEDURE — 0225U NFCT DS DNA&RNA 21 SARSCOV2: CPT

## 2022-06-27 PROCEDURE — 82803 BLOOD GASES ANY COMBINATION: CPT

## 2022-06-27 PROCEDURE — 85018 HEMOGLOBIN: CPT

## 2022-06-27 PROCEDURE — 84443 ASSAY THYROID STIM HORMONE: CPT

## 2022-06-27 PROCEDURE — 92526 ORAL FUNCTION THERAPY: CPT

## 2022-06-27 PROCEDURE — 80053 COMPREHEN METABOLIC PANEL: CPT

## 2022-06-27 PROCEDURE — 87040 BLOOD CULTURE FOR BACTERIA: CPT

## 2022-06-27 PROCEDURE — 84100 ASSAY OF PHOSPHORUS: CPT

## 2022-06-27 PROCEDURE — 70450 CT HEAD/BRAIN W/O DYE: CPT | Mod: MA

## 2022-06-27 PROCEDURE — 82746 ASSAY OF FOLIC ACID SERUM: CPT

## 2022-06-27 PROCEDURE — 83605 ASSAY OF LACTIC ACID: CPT

## 2022-06-27 PROCEDURE — 82140 ASSAY OF AMMONIA: CPT

## 2022-06-27 PROCEDURE — 80048 BASIC METABOLIC PNL TOTAL CA: CPT

## 2022-06-27 PROCEDURE — 82962 GLUCOSE BLOOD TEST: CPT

## 2022-06-27 PROCEDURE — 82533 TOTAL CORTISOL: CPT

## 2022-06-27 PROCEDURE — U0003: CPT

## 2022-06-27 PROCEDURE — 97162 PT EVAL MOD COMPLEX 30 MIN: CPT

## 2022-06-27 PROCEDURE — 85025 COMPLETE CBC W/AUTO DIFF WBC: CPT

## 2022-06-27 PROCEDURE — 82330 ASSAY OF CALCIUM: CPT

## 2022-06-27 PROCEDURE — 36415 COLL VENOUS BLD VENIPUNCTURE: CPT

## 2022-06-27 PROCEDURE — 97530 THERAPEUTIC ACTIVITIES: CPT

## 2022-06-27 PROCEDURE — 84145 PROCALCITONIN (PCT): CPT

## 2022-06-27 PROCEDURE — U0005: CPT

## 2022-06-27 PROCEDURE — 97110 THERAPEUTIC EXERCISES: CPT

## 2022-06-27 PROCEDURE — 81001 URINALYSIS AUTO W/SCOPE: CPT

## 2022-06-27 PROCEDURE — 82947 ASSAY GLUCOSE BLOOD QUANT: CPT

## 2022-06-27 PROCEDURE — 87086 URINE CULTURE/COLONY COUNT: CPT

## 2022-06-27 PROCEDURE — 84132 ASSAY OF SERUM POTASSIUM: CPT

## 2022-06-27 PROCEDURE — 86780 TREPONEMA PALLIDUM: CPT

## 2022-06-27 PROCEDURE — 71250 CT THORAX DX C-: CPT

## 2022-06-27 PROCEDURE — 99285 EMERGENCY DEPT VISIT HI MDM: CPT

## 2022-06-27 PROCEDURE — 84295 ASSAY OF SERUM SODIUM: CPT

## 2022-06-27 PROCEDURE — 80307 DRUG TEST PRSMV CHEM ANLYZR: CPT

## 2022-06-27 PROCEDURE — 82435 ASSAY OF BLOOD CHLORIDE: CPT

## 2022-06-27 PROCEDURE — 82607 VITAMIN B-12: CPT

## 2022-06-27 PROCEDURE — 97116 GAIT TRAINING THERAPY: CPT

## 2022-06-27 PROCEDURE — 83735 ASSAY OF MAGNESIUM: CPT

## 2022-06-27 PROCEDURE — 71045 X-RAY EXAM CHEST 1 VIEW: CPT

## 2022-06-27 PROCEDURE — 85014 HEMATOCRIT: CPT

## 2022-06-27 RX ORDER — MIDODRINE HYDROCHLORIDE 2.5 MG/1
1 TABLET ORAL
Qty: 90 | Refills: 0
Start: 2022-06-27 | End: 2022-07-26

## 2022-06-27 RX ADMIN — MIDODRINE HYDROCHLORIDE 10 MILLIGRAM(S): 2.5 TABLET ORAL at 05:13

## 2022-06-27 RX ADMIN — SODIUM CHLORIDE 75 MILLILITER(S): 9 INJECTION, SOLUTION INTRAVENOUS at 09:08

## 2022-06-27 RX ADMIN — MIDODRINE HYDROCHLORIDE 10 MILLIGRAM(S): 2.5 TABLET ORAL at 11:38

## 2022-06-27 RX ADMIN — Medication 1 TABLET(S): at 11:38

## 2022-06-27 RX ADMIN — ENOXAPARIN SODIUM 40 MILLIGRAM(S): 100 INJECTION SUBCUTANEOUS at 05:13

## 2022-06-27 RX ADMIN — Medication 100 MILLIGRAM(S): at 11:38

## 2022-06-27 NOTE — PROGRESS NOTE ADULT - ASSESSMENT
60 yo male PMHx of cerebellar ataxia, prior  visit  to  ED on 4/17/22 due to AMS ,  was  intubated for depressed consciousness w/ cognitive decline.  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    infectious  and  malignancy  was  ruled  outt/  and  per   neuro  note ,no further workup     flow cytometry 4/28 showing nonspecific results 'degenerated sample, small lymphocytes'   nsgy consulted for meningeal bx, family refusing    CSF cytopathology 4/22 - increased number of large mononuclear cells in a background of few small lymphocytes, monocytes and neutrophils, cannot exclude a lymphoproliferative disorder versus an inflammatory process.    CSF cytopathology 4/29 - significant increased number of small lymphocytes with rare monocytes/ seen by  oncology  dr de leon,  no  intervention      Plan:  Discharge home today with home care and home PT.  See med list, wife has declined PEG tube for now, NGT was removed. No coronel,   continue dysphagia diet.

## 2022-06-27 NOTE — PROGRESS NOTE ADULT - REASON FOR ADMISSION
AMS

## 2022-06-27 NOTE — PROGRESS NOTE ADULT - SUBJECTIVE AND OBJECTIVE BOX
CARDIOLOGY     PROGRESS  NOTE   ________________________________________________    CHIEF COMPLAINT:Patient is a 61y old  Male who presents with a chief complaint of AMS (26 Jun 2022 08:40)  no complain.  	  REVIEW OF SYSTEMS:  CONSTITUTIONAL: No fever, weight loss, or fatigue  EYES: No eye pain, visual disturbances, or discharge  ENT:  No difficulty hearing, tinnitus, vertigo; No sinus or throat pain  NECK: No pain or stiffness  RESPIRATORY: No cough, wheezing, chills or hemoptysis; No Shortness of Breath  CARDIOVASCULAR: No chest pain, palpitations, passing out, dizziness, or leg swelling  GASTROINTESTINAL: No abdominal or epigastric pain. No nausea, vomiting, or hematemesis; No diarrhea or constipation. No melena or hematochezia.  GENITOURINARY: No dysuria, frequency, hematuria, or incontinence  NEUROLOGICAL: No headaches, memory loss, loss of strength, numbness, or tremors  SKIN: No itching, burning, rashes, or lesions   LYMPH Nodes: No enlarged glands  ENDOCRINE: No heat or cold intolerance; No hair loss  MUSCULOSKELETAL: No joint pain or swelling; No muscle, back, or extremity pain  PSYCHIATRIC: No depression, anxiety, mood swings, or difficulty sleeping  HEME/LYMPH: No easy bruising, or bleeding gums  ALLERGY AND IMMUNOLOGIC: No hives or eczema	    [ ] All others negative	  [x ] Unable to obtain    PHYSICAL EXAM:  T(C): 36.3 (06-27-22 @ 05:02), Max: 36.3 (06-27-22 @ 05:02)  HR: 56 (06-27-22 @ 05:02) (52 - 71)  BP: 102/68 (06-27-22 @ 05:02) (102/68 - 119/72)  RR: 16 (06-27-22 @ 05:02) (16 - 18)  SpO2: 97% (06-27-22 @ 05:02) (97% - 100%)  Wt(kg): --  I&O's Summary    26 Jun 2022 07:01  -  27 Jun 2022 07:00  --------------------------------------------------------  IN: 340 mL / OUT: 300 mL / NET: 40 mL        Appearance: Normal	  HEENT:   Normal oral mucosa, PERRL, EOMI	  Lymphatic: No lymphadenopathy  Cardiovascular: Normal S1 S2, No JVD, + murmurs, No edema  Respiratory: Lungs clear to auscultation	  Gastrointestinal:  Soft, Non-tender, + BS	  Skin: No rashes, No ecchymoses, No cyanosis	  Neurologic: Non-focal  Extremities: Normal range of motion, No clubbing, cyanosis or edema  Vascular: Peripheral pulses palpable 2+ bilaterally    MEDICATIONS  (STANDING):  dextrose 5% + lactated ringers. 1000 milliLiter(s) (75 mL/Hr) IV Continuous <Continuous>  dextrose 5%. 1000 milliLiter(s) (100 mL/Hr) IV Continuous <Continuous>  dextrose 5%. 1000 milliLiter(s) (50 mL/Hr) IV Continuous <Continuous>  dextrose 50% Injectable 25 Gram(s) IV Push once  dextrose 50% Injectable 12.5 Gram(s) IV Push once  dextrose 50% Injectable 25 Gram(s) IV Push once  enoxaparin Injectable 40 milliGRAM(s) SubCutaneous every 24 hours  glucagon  Injectable 1 milliGRAM(s) IntraMuscular once  influenza   Vaccine 0.5 milliLiter(s) IntraMuscular once  melatonin 3 milliGRAM(s) Oral at bedtime  midodrine. 10 milliGRAM(s) Oral three times a day  multivitamin 1 Tablet(s) Oral daily  thiamine 100 milliGRAM(s) Oral daily      TELEMETRY: 	    ECG:  	  RADIOLOGY:  OTHER: 	  	  LABS:	 	    CARDIAC MARKERS:                                14.1   9.26  )-----------( 181      ( 26 Jun 2022 06:39 )             43.0     06-26    137  |  97  |  15  ----------------------------<  82  3.9   |  30  |  0.82    Ca    10.4      26 Jun 2022 06:34  Phos  3.6     06-26  Mg     1.8     06-26      proBNP:   Lipid Profile:   HgA1c:   TSH: Thyroid Stimulating Hormone, Serum: 1.60 uIU/mL (06-18 @ 11:19)    Cortisol AM, Serum . (06.17.22 @ 08:45)    Cortisol AM, Serum: 15.6 ug/dL          Assessment and plan  ---------------------------  61M w/ hx of Cerebellar ataxia recent prolonged admission including MICU stay/ intubation from 4/18-5/13 for AMS and airway protection p/w AMS and cough. There was component of rapid neurocognitive decline w/ cerebellar ataxia and possible meningeal findings. Family refused meningeal biopsy. Endocrine work up for hypothermia that admission also unremarkable. As per wife, pt seemed closer to baseline and was able to answer appropriately by time of discharge to rehab. Pt has since been at rehab, starting roughly 4 days ago pt's wife noticed pt becoming more altered and lethargic. Has had decreased PO on pureed diet from discharge. Wife has also noticed wet cough around some time period. When wife felt these symptoms continued to worsen and rehab continued to make no interventions she sent him to hospital for further evaluation.  In ER: Given 1L LR.   pt is well known to me from the previous admission with hx of a.fib who presented with similar complain and change of mental status  will call wife to get a history  pt refused meningeal biopsy  agree with hydration  will add midodrine if needed  cortisol level noted, normal  will check old record  hx of a.fib ?AC  doing better continue current meds  increase midodrine to 10 mg tid, bp is much better controlled  bradycardia asymptomatic  pt with sig deconditioning  increase fluid/ salt intake  check cortisol level noted

## 2022-06-27 NOTE — DISCHARGE NOTE NURSING/CASE MANAGEMENT/SOCIAL WORK - NSDCPEFALRISK_GEN_ALL_CORE
For information on Fall & Injury Prevention, visit: [email or web address] OR  [email or web address] OR  https://www.cdc.gov/steadi/patient.html

## 2022-06-27 NOTE — PROGRESS NOTE ADULT - SUBJECTIVE AND OBJECTIVE BOX
INTERVAL HPI/OVERNIGHT EVENTS:  Pt seen and examined at bedside.     Allergies/Intolerance: No Known Allergies      MEDICATIONS  (STANDING):  dextrose 5% + lactated ringers. 1000 milliLiter(s) (75 mL/Hr) IV Continuous <Continuous>  dextrose 5%. 1000 milliLiter(s) (100 mL/Hr) IV Continuous <Continuous>  dextrose 5%. 1000 milliLiter(s) (50 mL/Hr) IV Continuous <Continuous>  dextrose 50% Injectable 25 Gram(s) IV Push once  dextrose 50% Injectable 12.5 Gram(s) IV Push once  dextrose 50% Injectable 25 Gram(s) IV Push once  enoxaparin Injectable 40 milliGRAM(s) SubCutaneous every 24 hours  glucagon  Injectable 1 milliGRAM(s) IntraMuscular once  influenza   Vaccine 0.5 milliLiter(s) IntraMuscular once  melatonin 3 milliGRAM(s) Oral at bedtime  midodrine. 10 milliGRAM(s) Oral three times a day  multivitamin 1 Tablet(s) Oral daily  thiamine 100 milliGRAM(s) Oral daily    MEDICATIONS  (PRN):  acetaminophen     Tablet .. 650 milliGRAM(s) Oral every 6 hours PRN Temp greater or equal to 38C (100.4F), Mild Pain (1 - 3)  dextrose Oral Gel 15 Gram(s) Oral once PRN Blood Glucose LESS THAN 70 milliGRAM(s)/deciliter  guaiFENesin Oral Liquid (Sugar-Free) 100 milliGRAM(s) Oral every 6 hours PRN Cough        ROS: all systems reviewed and wnl      PHYSICAL EXAMINATION:  Vital Signs Last 24 Hrs  T(C): 36.3 (27 Jun 2022 05:02), Max: 36.3 (27 Jun 2022 05:02)  T(F): 97.3 (27 Jun 2022 05:02), Max: 97.3 (27 Jun 2022 05:02)  HR: 56 (27 Jun 2022 05:02) (52 - 71)  BP: 102/68 (27 Jun 2022 05:02) (102/68 - 111/69)  BP(mean): --  RR: 16 (27 Jun 2022 05:02) (16 - 18)  SpO2: 97% (27 Jun 2022 05:02) (97% - 100%)  CAPILLARY BLOOD GLUCOSE      POCT Blood Glucose.: 106 mg/dL (27 Jun 2022 03:23)  POCT Blood Glucose.: 114 mg/dL (26 Jun 2022 21:26)  POCT Blood Glucose.: 121 mg/dL (26 Jun 2022 18:00)      06-26 @ 07:01  -  06-27 @ 07:00  --------------------------------------------------------  IN: 340 mL / OUT: 300 mL / NET: 40 mL        GENERAL: stable, in bed on RA, wife at bedside    NECK: supple, No JVD  CHEST/LUNG: clear to auscultation bilaterally; no rales, rhonchi, or wheezing b/l  HEART: normal S1, S2  ABDOMEN: BS+, soft, ND, NT   EXTREMITIES:  pulses palpable; no clubbing, cyanosis, or edema b/l LEs    LABS:                        13.1   7.55  )-----------( 170      ( 27 Jun 2022 07:14 )             39.8     06-27    140  |  99  |  13  ----------------------------<  105<H>  3.7   |  30  |  0.76    Ca    9.9      27 Jun 2022 07:14  Phos  3.0     06-27  Mg     1.7     06-27    TPro  7.1  /  Alb  3.5  /  TBili  0.3  /  DBili  x   /  AST  15  /  ALT  18  /  AlkPhos  60  06-27

## 2022-06-27 NOTE — DISCHARGE NOTE NURSING/CASE MANAGEMENT/SOCIAL WORK - PATIENT PORTAL LINK FT
You can access the FollowMyHealth Patient Portal offered by [facility] by registering at the following website: [email or web address]. By joining [facility]’s FollowMyHealth portal, you will also be able to view your health information using other applications (apps) compatible with our system.

## 2022-06-27 NOTE — PROGRESS NOTE ADULT - PROVIDER SPECIALTY LIST ADULT
Infectious Disease
Infectious Disease
Cardiology
Internal Medicine
Internal Medicine
Cardiology
Hospitalist
Infectious Disease
Internal Medicine

## 2022-07-07 PROBLEM — Z00.00 ENCOUNTER FOR PREVENTIVE HEALTH EXAMINATION: Status: ACTIVE | Noted: 2022-07-07

## 2022-07-12 ENCOUNTER — TRANSCRIPTION ENCOUNTER (OUTPATIENT)
Age: 62
End: 2022-07-12

## 2022-07-13 ENCOUNTER — RESULT REVIEW (OUTPATIENT)
Age: 62
End: 2022-07-13

## 2022-07-13 ENCOUNTER — INPATIENT (INPATIENT)
Facility: HOSPITAL | Age: 62
LOS: 13 days | Discharge: HOME | DRG: 853 | End: 2022-07-27
Attending: INTERNAL MEDICINE | Admitting: INTERNAL MEDICINE
Payer: MEDICAID

## 2022-07-13 VITALS
TEMPERATURE: 99 F | HEIGHT: 66 IN | DIASTOLIC BLOOD PRESSURE: 75 MMHG | WEIGHT: 149.91 LBS | RESPIRATION RATE: 19 BRPM | SYSTOLIC BLOOD PRESSURE: 106 MMHG | OXYGEN SATURATION: 94 % | HEART RATE: 116 BPM

## 2022-07-13 DIAGNOSIS — G93.41 METABOLIC ENCEPHALOPATHY: ICD-10-CM

## 2022-07-13 DIAGNOSIS — N17.9 ACUTE KIDNEY FAILURE, UNSPECIFIED: ICD-10-CM

## 2022-07-13 DIAGNOSIS — A41.9 SEPSIS, UNSPECIFIED ORGANISM: ICD-10-CM

## 2022-07-13 DIAGNOSIS — N39.0 URINARY TRACT INFECTION, SITE NOT SPECIFIED: ICD-10-CM

## 2022-07-13 DIAGNOSIS — Z29.9 ENCOUNTER FOR PROPHYLACTIC MEASURES, UNSPECIFIED: ICD-10-CM

## 2022-07-13 DIAGNOSIS — K59.00 CONSTIPATION, UNSPECIFIED: ICD-10-CM

## 2022-07-13 DIAGNOSIS — Z86.69 PERSONAL HISTORY OF OTHER DISEASES OF THE NERVOUS SYSTEM AND SENSE ORGANS: ICD-10-CM

## 2022-07-13 DIAGNOSIS — E83.52 HYPERCALCEMIA: ICD-10-CM

## 2022-07-13 LAB
ALBUMIN SERPL ELPH-MCNC: 3.9 G/DL — SIGNIFICANT CHANGE UP (ref 3.3–5)
ALP SERPL-CCNC: 77 U/L — SIGNIFICANT CHANGE UP (ref 40–120)
ALT FLD-CCNC: 18 U/L — SIGNIFICANT CHANGE UP (ref 10–45)
ANION GAP SERPL CALC-SCNC: 17 MMOL/L — SIGNIFICANT CHANGE UP (ref 5–17)
APPEARANCE UR: ABNORMAL
APTT BLD: 29.4 SEC — SIGNIFICANT CHANGE UP (ref 27.5–35.5)
AST SERPL-CCNC: 26 U/L — SIGNIFICANT CHANGE UP (ref 10–40)
BACTERIA # UR AUTO: ABNORMAL
BASE EXCESS BLDV CALC-SCNC: 6.6 MMOL/L — HIGH (ref -2–2)
BASOPHILS # BLD AUTO: 0.03 K/UL — SIGNIFICANT CHANGE UP (ref 0–0.2)
BASOPHILS NFR BLD AUTO: 0.2 % — SIGNIFICANT CHANGE UP (ref 0–2)
BILIRUB SERPL-MCNC: 0.8 MG/DL — SIGNIFICANT CHANGE UP (ref 0.2–1.2)
BILIRUB UR-MCNC: NEGATIVE — SIGNIFICANT CHANGE UP
BUN SERPL-MCNC: 46 MG/DL — HIGH (ref 7–23)
CA-I SERPL-SCNC: 1.25 MMOL/L — SIGNIFICANT CHANGE UP (ref 1.15–1.33)
CALCIUM SERPL-MCNC: 10.6 MG/DL — HIGH (ref 8.4–10.5)
CHLORIDE BLDV-SCNC: 106 MMOL/L — SIGNIFICANT CHANGE UP (ref 96–108)
CHLORIDE SERPL-SCNC: 100 MMOL/L — SIGNIFICANT CHANGE UP (ref 96–108)
CO2 BLDV-SCNC: 36 MMOL/L — HIGH (ref 22–26)
CO2 SERPL-SCNC: 28 MMOL/L — SIGNIFICANT CHANGE UP (ref 22–31)
COLOR SPEC: ABNORMAL
CREAT SERPL-MCNC: 1.78 MG/DL — HIGH (ref 0.5–1.3)
DIFF PNL FLD: ABNORMAL
EGFR: 43 ML/MIN/1.73M2 — LOW
EOSINOPHIL # BLD AUTO: 0.12 K/UL — SIGNIFICANT CHANGE UP (ref 0–0.5)
EOSINOPHIL NFR BLD AUTO: 0.8 % — SIGNIFICANT CHANGE UP (ref 0–6)
EPI CELLS # UR: 1 /HPF — SIGNIFICANT CHANGE UP
GAS PNL BLDV: 142 MMOL/L — SIGNIFICANT CHANGE UP (ref 136–145)
GAS PNL BLDV: SIGNIFICANT CHANGE UP
GAS PNL BLDV: SIGNIFICANT CHANGE UP
GLUCOSE BLDV-MCNC: 103 MG/DL — HIGH (ref 70–99)
GLUCOSE SERPL-MCNC: 95 MG/DL — SIGNIFICANT CHANGE UP (ref 70–99)
GLUCOSE UR QL: NEGATIVE — SIGNIFICANT CHANGE UP
HCO3 BLDV-SCNC: 34 MMOL/L — HIGH (ref 22–29)
HCT VFR BLD CALC: 46 % — SIGNIFICANT CHANGE UP (ref 39–50)
HCT VFR BLDA CALC: 39 % — SIGNIFICANT CHANGE UP (ref 39–51)
HGB BLD CALC-MCNC: 13 G/DL — SIGNIFICANT CHANGE UP (ref 12.6–17.4)
HGB BLD-MCNC: 14.8 G/DL — SIGNIFICANT CHANGE UP (ref 13–17)
HYALINE CASTS # UR AUTO: 0 /LPF — SIGNIFICANT CHANGE UP (ref 0–2)
IMM GRANULOCYTES NFR BLD AUTO: 0.7 % — SIGNIFICANT CHANGE UP (ref 0–1.5)
INR BLD: 1.24 RATIO — HIGH (ref 0.88–1.16)
KETONES UR-MCNC: ABNORMAL
LACTATE BLDV-MCNC: 1.5 MMOL/L — SIGNIFICANT CHANGE UP (ref 0.7–2)
LEUKOCYTE ESTERASE UR-ACNC: ABNORMAL
LYMPHOCYTES # BLD AUTO: 1.33 K/UL — SIGNIFICANT CHANGE UP (ref 1–3.3)
LYMPHOCYTES # BLD AUTO: 8.9 % — LOW (ref 13–44)
MCHC RBC-ENTMCNC: 28.2 PG — SIGNIFICANT CHANGE UP (ref 27–34)
MCHC RBC-ENTMCNC: 32.2 GM/DL — SIGNIFICANT CHANGE UP (ref 32–36)
MCV RBC AUTO: 87.6 FL — SIGNIFICANT CHANGE UP (ref 80–100)
MONOCYTES # BLD AUTO: 1.37 K/UL — HIGH (ref 0–0.9)
MONOCYTES NFR BLD AUTO: 9.2 % — SIGNIFICANT CHANGE UP (ref 2–14)
NEUTROPHILS # BLD AUTO: 11.92 K/UL — HIGH (ref 1.8–7.4)
NEUTROPHILS NFR BLD AUTO: 80.2 % — HIGH (ref 43–77)
NITRITE UR-MCNC: POSITIVE
NRBC # BLD: 0 /100 WBCS — SIGNIFICANT CHANGE UP (ref 0–0)
PCO2 BLDV: 60 MMHG — HIGH (ref 42–55)
PH BLDV: 7.36 — SIGNIFICANT CHANGE UP (ref 7.32–7.43)
PH UR: 6.5 — SIGNIFICANT CHANGE UP (ref 5–8)
PLATELET # BLD AUTO: 216 K/UL — SIGNIFICANT CHANGE UP (ref 150–400)
PO2 BLDV: 41 MMHG — SIGNIFICANT CHANGE UP (ref 25–45)
POTASSIUM BLDV-SCNC: 4.4 MMOL/L — SIGNIFICANT CHANGE UP (ref 3.5–5.1)
POTASSIUM SERPL-MCNC: 4.4 MMOL/L — SIGNIFICANT CHANGE UP (ref 3.5–5.3)
POTASSIUM SERPL-SCNC: 4.4 MMOL/L — SIGNIFICANT CHANGE UP (ref 3.5–5.3)
PROT SERPL-MCNC: 8.9 G/DL — HIGH (ref 6–8.3)
PROT UR-MCNC: 100 — SIGNIFICANT CHANGE UP
PROTHROM AB SERPL-ACNC: 14.4 SEC — HIGH (ref 10.5–13.4)
RBC # BLD: 5.25 M/UL — SIGNIFICANT CHANGE UP (ref 4.2–5.8)
RBC # FLD: 14.7 % — HIGH (ref 10.3–14.5)
RBC CASTS # UR COMP ASSIST: 31 /HPF — HIGH (ref 0–4)
SAO2 % BLDV: 65.1 % — LOW (ref 67–88)
SARS-COV-2 RNA SPEC QL NAA+PROBE: SIGNIFICANT CHANGE UP
SODIUM SERPL-SCNC: 145 MMOL/L — SIGNIFICANT CHANGE UP (ref 135–145)
SP GR SPEC: 1.02 — SIGNIFICANT CHANGE UP (ref 1.01–1.02)
UROBILINOGEN FLD QL: NEGATIVE — SIGNIFICANT CHANGE UP
WBC # BLD: 14.87 K/UL — HIGH (ref 3.8–10.5)
WBC # FLD AUTO: 14.87 K/UL — HIGH (ref 3.8–10.5)
WBC UR QL: 614 /HPF — HIGH (ref 0–5)

## 2022-07-13 PROCEDURE — 52332 CYSTOSCOPY AND TREATMENT: CPT | Mod: 50

## 2022-07-13 PROCEDURE — 76770 US EXAM ABDO BACK WALL COMP: CPT | Mod: 26

## 2022-07-13 PROCEDURE — 99285 EMERGENCY DEPT VISIT HI MDM: CPT

## 2022-07-13 PROCEDURE — 74176 CT ABD & PELVIS W/O CONTRAST: CPT | Mod: 26

## 2022-07-13 PROCEDURE — 71045 X-RAY EXAM CHEST 1 VIEW: CPT | Mod: 26

## 2022-07-13 PROCEDURE — 99233 SBSQ HOSP IP/OBS HIGH 50: CPT | Mod: 57,25

## 2022-07-13 PROCEDURE — 99254 IP/OBS CNSLTJ NEW/EST MOD 60: CPT | Mod: 57,25

## 2022-07-13 PROCEDURE — 88300 SURGICAL PATH GROSS: CPT | Mod: 26

## 2022-07-13 DEVICE — GUIDEWIRE SENSOR DUAL-FLEX NITINOL ANGLED .038" X 150CM: Type: IMPLANTABLE DEVICE | Site: RIGHT | Status: FUNCTIONAL

## 2022-07-13 DEVICE — URETERAL STENT CONTOUR 6FR 26CM: Type: IMPLANTABLE DEVICE | Site: RIGHT | Status: FUNCTIONAL

## 2022-07-13 DEVICE — GUIDEWIRE SENSOR DUAL-FLEX NITINOL STRAIGHT .035" X 150CM: Type: IMPLANTABLE DEVICE | Site: RIGHT | Status: FUNCTIONAL

## 2022-07-13 DEVICE — URETERAL CATH OPEN END 5FR 70CM: Type: IMPLANTABLE DEVICE | Site: RIGHT | Status: FUNCTIONAL

## 2022-07-13 RX ORDER — CEFTRIAXONE 500 MG/1
1000 INJECTION, POWDER, FOR SOLUTION INTRAMUSCULAR; INTRAVENOUS ONCE
Refills: 0 | Status: COMPLETED | OUTPATIENT
Start: 2022-07-13 | End: 2022-07-13

## 2022-07-13 RX ORDER — LACTULOSE 10 G/15ML
10 SOLUTION ORAL EVERY 12 HOURS
Refills: 0 | Status: DISCONTINUED | OUTPATIENT
Start: 2022-07-13 | End: 2022-07-14

## 2022-07-13 RX ORDER — ENOXAPARIN SODIUM 100 MG/ML
40 INJECTION SUBCUTANEOUS EVERY 24 HOURS
Refills: 0 | Status: DISCONTINUED | OUTPATIENT
Start: 2022-07-13 | End: 2022-07-14

## 2022-07-13 RX ORDER — MIDODRINE HYDROCHLORIDE 2.5 MG/1
10 TABLET ORAL ONCE
Refills: 0 | Status: COMPLETED | OUTPATIENT
Start: 2022-07-13 | End: 2022-07-13

## 2022-07-13 RX ORDER — THIAMINE MONONITRATE (VIT B1) 100 MG
100 TABLET ORAL DAILY
Refills: 0 | Status: DISCONTINUED | OUTPATIENT
Start: 2022-07-13 | End: 2022-07-14

## 2022-07-13 RX ORDER — LANOLIN ALCOHOL/MO/W.PET/CERES
3 CREAM (GRAM) TOPICAL AT BEDTIME
Refills: 0 | Status: DISCONTINUED | OUTPATIENT
Start: 2022-07-13 | End: 2022-07-14

## 2022-07-13 RX ORDER — ATORVASTATIN CALCIUM 80 MG/1
10 TABLET, FILM COATED ORAL AT BEDTIME
Refills: 0 | Status: DISCONTINUED | OUTPATIENT
Start: 2022-07-13 | End: 2022-07-21

## 2022-07-13 RX ORDER — ACETAMINOPHEN 500 MG
650 TABLET ORAL EVERY 6 HOURS
Refills: 0 | Status: DISCONTINUED | OUTPATIENT
Start: 2022-07-13 | End: 2022-07-14

## 2022-07-13 RX ORDER — SODIUM CHLORIDE 9 MG/ML
1000 INJECTION, SOLUTION INTRAVENOUS
Refills: 0 | Status: DISCONTINUED | OUTPATIENT
Start: 2022-07-13 | End: 2022-07-14

## 2022-07-13 RX ORDER — ASPIRIN/CALCIUM CARB/MAGNESIUM 324 MG
81 TABLET ORAL DAILY
Refills: 0 | Status: DISCONTINUED | OUTPATIENT
Start: 2022-07-13 | End: 2022-07-14

## 2022-07-13 RX ORDER — CEFEPIME 1 G/1
1000 INJECTION, POWDER, FOR SOLUTION INTRAMUSCULAR; INTRAVENOUS EVERY 12 HOURS
Refills: 0 | Status: DISCONTINUED | OUTPATIENT
Start: 2022-07-14 | End: 2022-07-14

## 2022-07-13 RX ORDER — SODIUM CHLORIDE 9 MG/ML
1000 INJECTION INTRAMUSCULAR; INTRAVENOUS; SUBCUTANEOUS ONCE
Refills: 0 | Status: COMPLETED | OUTPATIENT
Start: 2022-07-13 | End: 2022-07-13

## 2022-07-13 RX ORDER — SENNA PLUS 8.6 MG/1
2 TABLET ORAL AT BEDTIME
Refills: 0 | Status: DISCONTINUED | OUTPATIENT
Start: 2022-07-13 | End: 2022-07-14

## 2022-07-13 RX ORDER — CEFEPIME 1 G/1
1000 INJECTION, POWDER, FOR SOLUTION INTRAMUSCULAR; INTRAVENOUS ONCE
Refills: 0 | Status: COMPLETED | OUTPATIENT
Start: 2022-07-13 | End: 2022-07-13

## 2022-07-13 RX ORDER — CEFEPIME 1 G/1
INJECTION, POWDER, FOR SOLUTION INTRAMUSCULAR; INTRAVENOUS
Refills: 0 | Status: DISCONTINUED | OUTPATIENT
Start: 2022-07-13 | End: 2022-07-14

## 2022-07-13 RX ADMIN — CEFEPIME 100 MILLIGRAM(S): 1 INJECTION, POWDER, FOR SOLUTION INTRAMUSCULAR; INTRAVENOUS at 14:09

## 2022-07-13 RX ADMIN — Medication 1 TABLET(S): at 18:28

## 2022-07-13 RX ADMIN — LACTULOSE 10 GRAM(S): 10 SOLUTION ORAL at 18:27

## 2022-07-13 RX ADMIN — SODIUM CHLORIDE 50 MILLILITER(S): 9 INJECTION, SOLUTION INTRAVENOUS at 15:22

## 2022-07-13 RX ADMIN — MIDODRINE HYDROCHLORIDE 10 MILLIGRAM(S): 2.5 TABLET ORAL at 10:18

## 2022-07-13 RX ADMIN — ENOXAPARIN SODIUM 40 MILLIGRAM(S): 100 INJECTION SUBCUTANEOUS at 18:27

## 2022-07-13 RX ADMIN — SODIUM CHLORIDE 1000 MILLILITER(S): 9 INJECTION INTRAMUSCULAR; INTRAVENOUS; SUBCUTANEOUS at 09:53

## 2022-07-13 RX ADMIN — SODIUM CHLORIDE 50 MILLILITER(S): 9 INJECTION, SOLUTION INTRAVENOUS at 18:28

## 2022-07-13 RX ADMIN — Medication 81 MILLIGRAM(S): at 18:28

## 2022-07-13 RX ADMIN — CEFTRIAXONE 100 MILLIGRAM(S): 500 INJECTION, POWDER, FOR SOLUTION INTRAMUSCULAR; INTRAVENOUS at 10:32

## 2022-07-13 NOTE — H&P ADULT - NSHPPHYSICALEXAM_GEN_ALL_CORE
Vital Signs Last 24 Hrs  T(C): 37.6 (13 Jul 2022 10:27), Max: 37.6 (13 Jul 2022 10:27)  T(F): 99.6 (13 Jul 2022 10:27), Max: 99.6 (13 Jul 2022 10:27)  HR: 81 (13 Jul 2022 10:27) (81 - 116)  BP: 103/72 (13 Jul 2022 10:27) (103/72 - 106/75)  BP(mean): --  RR: 18 (13 Jul 2022 10:27) (18 - 19)  SpO2: 98% (13 Jul 2022 10:27) (94% - 98%)    Parameters below as of 13 Jul 2022 10:27  Patient On (Oxygen Delivery Method): room air

## 2022-07-13 NOTE — ED ADULT NURSE NOTE - OBJECTIVE STATEMENT
62 y/o male presents to ED complaining of fever, UTI. Pt a&ox1-2, wife at bedside, [facility] cerebellar ataxia, endorses coming home from rehab x2 weeks ago, has had generalized weakness since then is currently being treated for UTI on cipro PO, has taken for 2 days. Wife states that pt has had a "fever of 98.6" and patient felt warm to touch. At this time, pt is 99.6 rectally, unable to verbalize own concerns. Pt straight cathed for urine, 2 RN's at bedside, aseptice technique used. No pain noted at this time. No signs of cough, abd pain, vomiting, diarrhea. 18g IV placed to RAC. Labs obtained and medicated as ordered. 60 y/o male presents to ED complaining of fever, UTI. Pt a&ox1-2, wife at bedside, [facility] cerebellar ataxia, endorses coming home from rehab x2 weeks ago, has had generalized weakness since then is currently being treated for UTI on cipro PO, has taken for 2 days. Wife states that pt has had a "fever of 98.6" and patient felt warm to touch. At this time, pt is 99.6 rectally, unable to verbalize own concerns. Pt straight cathed for urine, 2 RN's at bedside, aseptice technique used. No pain noted at this time. No signs of cough, abd pain, vomiting, diarrhea. Two Stage 2 wounds noted to coccyx. As per wife, pt has had them since rehab. 18g IV placed to RAC. Labs obtained and medicated as ordered.

## 2022-07-13 NOTE — H&P ADULT - HISTORY OF PRESENT ILLNESS
61M w/ hx of cerebellar ataxia recent prolonged admission including MICU stay/ intubation from 4/18-5/13 for AMS and airway protection, recent admission for UTI treated with 5 day course of CTX, brought in by wife for decreased PO intake and lethargy for several days with "white and thick" and foul smelling urine. PCP recently prescribed cipro 500 mg BID x 3 days (pt took 2 days).    61M w/ hx of cerebellar ataxia with rapid neurocognitive decline (baseline AOx1-2, minimally conversant) recent prolonged admission including MICU stay/ intubation from 4/18-5/13 for AMS and airway protection, recent admission for UTI treated with 5 day course of CTX, brought in by wife for decreased PO intake and lethargy for several days with "white and thick" and foul smelling urine since 7/8. PCP recently prescribed cipro 500 mg BID x 3 days (pt took 2 days) and wife thinks urine has improved since then. However, he has been eating less since and has been lethargic with minimal responsiveness which prompted the ED evaluation. ROS limited due to patient's mental status. Wife has noted pt has been constipated and gave him a suppository and he subsequently had one bowel movement. She noted a few red specks but no overt bleeding or black stool.  61M w/ hx of cerebellar ataxia with rapid neurocognitive decline (baseline AOx1-2, minimally conversant) recent prolonged admission including MICU stay/ intubation from 4/18-5/13 for AMS and airway protection, recent admission for UTI treated with 5 day course of CTX, brought in by wife for decreased PO intake and lethargy for several days with "white and thick" and foul smelling urine since 7/8. Hx obtained from wife due to patient's encephalopathy. PCP recently prescribed cipro 500 mg BID x 3 days (pt took 2 days) and wife thinks urine has improved since then. However, he has been eating less since and has been lethargic with minimal responsiveness which prompted the ED evaluation. ROS limited due to patient's mental status. Wife has noted pt has been constipated and gave him a suppository and he subsequently had one bowel movement. She noted a few red specks but no overt bleeding or black stool.

## 2022-07-13 NOTE — H&P ADULT - PROBLEM SELECTOR PLAN 6
DVT: lovenox  Diet: Pureed with mildly thick liquids per S+S last admission, 100% assist  Dispo: likely home    Transitions of Care Status:  1.  Name of PCP:  2.  PCP Contacted on Admission: [ ] Y    [ ] N    3.  PCP contacted at Discharge: [ ] Y    [ ] N    [ ] N/A  4.  Post-Discharge Appointment Date and Location:  5.  Summary of Handoff given to PCP: Mild hypercalcemia i/s/o RIP  - s/p 1L in ED  - gentle IVF 7/13

## 2022-07-13 NOTE — H&P ADULT - PROBLEM SELECTOR PLAN 3
Baseline Cr ~0.8 on admission 1.78 i/s/o RIP  - monitor for urinary retention  - Extensive neuro workup prior admission for cerebellar ataxia with neurocognitive decline of unknown etiology, now with acute exacerbation of mental status likely related to UTI. Baseline mental status AOx1-2 minimally conversant, now nonresponsive, only hiccupping and grunting  - monitor mental status with abx  - if not improving by 7/14, would consult neurology

## 2022-07-13 NOTE — ED PROVIDER NOTE - OBJECTIVE STATEMENT
62 yo M with a PMH of cerebellar ataxia recent prolonged admission including MICU stay/ intubation from 4/18-5/13 for AMS and airway protection recent admission 06/15-06/22/22 for UTI  (tx with CTX) p/w decreased PO intake, lethargy x several days. Wife reports she noticed his urine was "white and thick" and was malodorous as well. PMD prescribed pt Cipro 500 BID x 3 days, has taken 2/3 days so far. Denies nausea, vomiting, fever, chills, diarrhea.

## 2022-07-13 NOTE — ED PROVIDER NOTE - NS ED ATTENDING STATEMENT MOD
Attending with This was a shared visit with the CEDRIC. I reviewed and verified the documentation and independently performed the documented:

## 2022-07-13 NOTE — H&P ADULT - PROBLEM SELECTOR PLAN 7
No defined cause. Recent extensive neurologic, ID and endocrine work up on prior admission. Wife declined brain biopsy that admission.  - Consider neuro consult if no improvement in mental status after treating infection

## 2022-07-13 NOTE — ED PROVIDER NOTE - ATTENDING APP SHARED VISIT CONTRIBUTION OF CARE
61 year old male PMH of Cerebellar ataxia recent prolonged admission including MICU stay/ intubation from 4/18-5/13 for AMS and airway protection p/w lower blood press today with purulent looking urine, foul smelling as per wife, started on cipro two days ago, no n/v/d/sob.  sepsis order set used, felt warm as per wife here rectal temp 99.4. bp runs low takes midrodrine to give a dose as well as ivf for soft bp.

## 2022-07-13 NOTE — CONSULT NOTE ADULT - SUBJECTIVE AND OBJECTIVE BOX
UROLOGY CONSULT NOTE    HPI:  61y old Male with PMHx of cerebellar ataxia, recent admission last month for change in mental status and treated for a UTI, who presents with   Wife was not at bedside; limited history from patient   Apparently, the patient was brouhgt in for increaesd lethargy.  He also recently had foul smelling urine that was treated with cipro x 3days  The patient       PAST MEDICAL HISTORY    H/O cerebellar ataxia        PAST SURGICAL HISTORY    No significant past surgical history        FAMILY HISTORY    FH: dementia (Mother)    FH: type 2 diabetes (Mother)    Family history of CVA (Father)        HOME MEDICATIONS    aspirin 81 mg oral delayed release tablet: 1 tab(s) orally once a day (2022 12:43)  atorvastatin 10 mg oral tablet: 1 tab(s) orally once a day (2022 12:43)  Multiple Vitamins oral tablet: 1 tab(s) orally once a day (2022 12:43)      DRUG ALLERGIES        REVIEW OF SYSTEMS: Pertinent positives and negatives as stated in HPI, otherwise negative      VITAL SIGNS    T(F): 98.1, Max: 99.6 (22 @ 10:27)  HR: 59  BP: 106/70  RR: 18  SpO2: 96%          PHYSICAL EXAM    Gen: Well groomed, well dressed, well nourished  Abd: Soft, NT/ND  Back: no CVAT bilaterally  Ext: No edema present b/l      LABS:                        14.8   14.87 )-----------( 216               46.0     145  |  100  |  46  ----------------------------<  95  4.4   |  28  |  1.78    Ca    10.6    TPro  8.9  /  Alb  3.9  /  TBili  0.8  /  DBili  x   /  AST  26  /  ALT  18  /  AlkPhos  77    PT: 14.4 sec;   INR: 1.24 ratio  PTT:29.4 sec      Urinalysis Basic:    Color: Light Orange / Appearance: Turbid / S.020 / pH: x  Gluc: x / Ketone: Small  / Bili: Negative / Urobili: Negative   Blood: x / Protein: 100 / Nitrite: Positive   Leuk Esterase: Large / RBC: 31 /hpf /  /HPF   Sq Epi: x / Non Sq Epi: 1 /hpf / Bacteria: Moderate      Urine culture:     Blood culture:     IMAGING:       UROLOGY CONSULT NOTE    HPI:  61y old Male with PMHx of cerebellar ataxia, recent admission last month for change in mental status and treated for a UTI, who was brought in for increased lethargy  Wife was not at bedside; limited history from patient   Apparently, the patient was brought in for increased lethargy.  He also recently had foul smelling urine that was treated with cipro x 3days  He currently has no acute complaints      PAST MEDICAL HISTORY    H/O cerebellar ataxia        PAST SURGICAL HISTORY    No significant past surgical history        FAMILY HISTORY    FH: dementia (Mother)    FH: type 2 diabetes (Mother)    Family history of CVA (Father)        HOME MEDICATIONS    aspirin 81 mg oral delayed release tablet: 1 tab(s) orally once a day (2022 12:43)  atorvastatin 10 mg oral tablet: 1 tab(s) orally once a day (2022 12:43)  Multiple Vitamins oral tablet: 1 tab(s) orally once a day (2022 12:43)      DRUG ALLERGIES    NKDA    REVIEW OF SYSTEMS: Pertinent positives and negatives as stated in HPI, otherwise negative      VITAL SIGNS    T(F): 98.1, Max: 99.6 (22 @ 10:27)  HR: 59  BP: 106/70  RR: 18  SpO2: 96%          PHYSICAL EXAM    Gen: Well groomed, well dressed, well nourished  Abd: Soft, NT/ND  Back: no CVAT bilaterally  Ext: No edema present b/l      LABS:                        14.8   14.87 )-----------( 216               46.0     145  |  100  |  46  ----------------------------<  95  4.4   |  28  |  1.78    Ca    10.6    TPro  8.9  /  Alb  3.9  /  TBili  0.8  /  DBili  x   /  AST  26  /  ALT  18  /  AlkPhos  77    PT: 14.4 sec;   INR: 1.24 ratio  PTT:29.4 sec      Urinalysis Basic:    Color: Light Orange / Appearance: Turbid / S.020 / pH: x  Gluc: x / Ketone: Small  / Bili: Negative / Urobili: Negative   Blood: x / Protein: 100 / Nitrite: Positive   Leuk Esterase: Large / RBC: 31 /hpf /  /HPF   Sq Epi: x / Non Sq Epi: 1 /hpf / Bacteria: Moderate      Urine culture: pending results    Blood culture: pending results      IMAGING:    Ultrasound: Mild right-sided hydronephrosis which appears to be secondary to an 8.1   mm calculus within the proximal right ureter. The dilatation of the right   renal collecting system is a new finding when compared to CT 2022.   The calculus at the proximal right ureter was seen previously.  Bilateral nonobstructing renal calculi. The previously seen calculus at   the left UPJ junction is not identified on this study.    Marked debris within the urinary bladder including tiny foci of air and   possible tiny foci of calculi.  A dependent small bladder calculus is also likely appreciated.  Please correlate with urinalysis for the above including marked isoechoic   debris.

## 2022-07-13 NOTE — H&P ADULT - PROBLEM SELECTOR PLAN 2
Received CTX 5 days then cipro x 2 days. At risk for MDR  - start cefepime 7/13 -   - f/u bcx, ucx. narrow abx as culture data allows  - plan for 5 day course

## 2022-07-13 NOTE — H&P ADULT - PROBLEM SELECTOR PLAN 4
Mild hypercalcemia i/s/o RIP  - s/p 1L in ED  - gentle IVF 7/13 Baseline Cr ~0.8 on admission 1.78 i/s/o RIP  - obtain kidney and bladder US  - f/u urine studies  - gentle IVF Baseline Cr ~0.8 on admission 1.78 i/s/o RIP  - obtain kidney and bladder US  - monitor urine output  - gentle IVF  - Avoid NSAIDs, ACEI/ARBS, RCA and nephrotoxins.   - Renally dose medications

## 2022-07-13 NOTE — H&P ADULT - NSHPLABSRESULTS_GEN_ALL_CORE
14.8   14.87 )-----------( 216      ( 2022 11:33 )             46.0     07-13    145  |  100  |  46<H>  ----------------------------<  95  4.4   |  28  |  1.78<H>    Ca    10.6<H>      2022 11:33    TPro  8.9<H>  /  Alb  3.9  /  TBili  0.8  /  DBili  x   /  AST  26  /  ALT  18  /  AlkPhos  77  07-13    PT/INR - ( 2022 11:33 )   PT: 14.4 sec;   INR: 1.24 ratio         PTT - ( 2022 11:33 )  PTT:29.4 sec    Urinalysis Basic - ( 2022 11:44 )    Color: Light Orange / Appearance: Turbid / S.020 / pH: x  Gluc: x / Ketone: Small  / Bili: Negative / Urobili: Negative   Blood: x / Protein: 100 / Nitrite: Positive   Leuk Esterase: Large / RBC: 31 /hpf /  /HPF   Sq Epi: x / Non Sq Epi: 1 /hpf / Bacteria: Moderate    CAPILLARY BLOOD GLUCOSE    EKG personally reviewed sinus tachycardia , qtc 403  CXR personally reviewed clear lungs

## 2022-07-13 NOTE — H&P ADULT - PROBLEM SELECTOR PLAN 8
DVT: lovenox  Diet: Pureed with mildly thick liquids per S+S last admission, 100% assist  Dispo: likely home    Transitions of Care Status:  1.  Name of PCP:  2.  PCP Contacted on Admission: [ ] Y    [ ] N    3.  PCP contacted at Discharge: [ ] Y    [ ] N    [ ] N/A  4.  Post-Discharge Appointment Date and Location:  5.  Summary of Handoff given to PCP:

## 2022-07-13 NOTE — ED PROVIDER NOTE - PHYSICAL EXAMINATION
CONSTITUTIONAL: Well appearing and in no apparent distress.  ENT: Airway patent, moist mucous membranes.   EYES: Pupils equal, round and reactive to light. EOMI. Conjunctiva normal appearing.   CARDIAC: Normal rate, regular rhythm.  Heart sounds S1, S2.    RESPIRATORY: Breath sounds clear and equal bilaterally.   GASTROINTESTINAL: Abdomen soft, non-tender, not distended.  : Penis and testes WNL. No penile discharge/swelling. Chaperoned by Dr. Snyder.   MUSCULOSKELETAL: Spine appears normal.  NEUROLOGICAL: Alert and oriented x3, no focal deficits, no motor or sensory deficits. 5/5 muscle strength throughout.  SKIN: Skin normal color, warm, dry and intact.   PSYCHIATRIC: Normal mood and affect.

## 2022-07-13 NOTE — ED PROVIDER NOTE - ADMIT DISPOSITION PRESENT ON ADMISSION SEPSIS
Returned Janell's phone call. She voiced concern regarding symptoms of neck soreness and right-sided facial numbness/tingling. She described the facial numbness as though she had an injection of Novacaine and it was wearing off. She also noted that yesterday she had a \"horrible headache\" right above her right eye. She denied any further headache today. Speech is clear, no confusion or language difficulties. Patient denied any upper or lower extremity weakness. No vision changes or dizziness. She reported that she took an allergy medication (Claritin) earlier today and wonders if that could have caused her symptoms. She is wondering if she should have these symptoms further evaluated. Reassured patient that her symptoms do not sound too concerning, such as stroke-like. It is very unlikely that the Claritin has anything to do with the symptoms. Patient reported that she took two baby aspirin; she denies use of any other NSAIDs. Reviewed with patient that her symptoms are not likely related to the carotid dissection, as Dr. Blum felt the dissection was either chronic or subacute. Her symptoms of neck soreness/pain and the right-sided facial numbness are likely related to occipital neuropathy/neuralgia. Confirmed with her that she had pain and discomfort when Dr. Blum examined and touched the back of her head/upper neck area. She was also noted have numbness in the back of her head just above the neck. Explained that her symptoms are likely due to inflammation and increased sensitivity in the nerve that runs in the back of the head. The pain and discomfort in the neck, as well as the numbness in her face, are likely referred from this inflamed nerve. Reviewed with patient Dr. Blum's recommendation for NSAIDs (ibuprofen, Advil, Aleve, Motrin) to treat the neck discomfort. Instructed her not take multiple aspirin, as doing so can be very irritating to the stomach and increase her risk for bleeding.  Discussed that if she is finding the need to take an NSAIDs regularly several times each day for the neck discomfort and headache, Dr. Blum recommended referral for possible steroid injection. Patient verbalized understanding. In the meantime, it is unlikely that her symptoms are related to the carotid dissection. The carotid dissection will very likely heal on its own over the next several months. Confirmed with patient that she is due to follow up with Dr. Blum in 6 months with repeat CT angiogram scan at that time. In the meantime, encouraged patient to take it easy, rest and try the NSAID therapy. If her symptoms progress or worsen, she should go to the ED for further evaluation. Encouraged the patient to give the office a call with any additional questions or concerns.   No

## 2022-07-13 NOTE — CHART NOTE - NSCHARTNOTEFT_GEN_A_CORE
Noted results of kidney + bladder US with multiple nephrolithiasis and mild R hydro. Since patient has recurrent UTIs with multiple stones, attempted to place urology consult. PTH added to AM labs. Noted results of kidney + bladder US with multiple nephrolithiasis and mild R hydro. Since patient has recurrent UTIs with multiple stones, up to 8.1mm w/ hydronephrosis, attempted to place urology consult. PTH, PTHrp, and vitamin D 25 added to AM labs.

## 2022-07-13 NOTE — ED PROVIDER NOTE - ATTENDING CONTRIBUTION TO CARE
pt 61 year old male PMH of Cerebellar ataxia recent prolonged admission including MICU stay/ intubation from 4/18-5/13 for AMS and airway protection p/w lower blood press today with purulent looking urine, foul smelling as per wife, started on cipro two days ago, no n/v/d/sob.  sepsis order set used, felt warm as per wife here rectal temp 99.4. bp runs low takes midrodrine to give a dose as well as ivf for soft bp.

## 2022-07-13 NOTE — H&P ADULT - PROBLEM SELECTOR PLAN 1
Sepsis associated with RIP. Met SIRS criteria on admission with leukocytosis and tachycardia, presumed UTI source  - abx as below  - f/u ucx/bcx

## 2022-07-13 NOTE — CONSULT NOTE ADULT - ASSESSMENT
61 year old male with a right proximal ureteral calculus, UTI    -  -  -  - 61 year old male with a right proximal ureteral calculus, UTI    -stat CT scan to evaluate possible obstructing left ureteral stone  -NPO for right, possible left ureteral stent tonight  -continue broad spectrum antibiotics  -f/u blood and urine culture results  -monitor I's and O's  -case d/w Dr Daigle

## 2022-07-13 NOTE — CONSULT NOTE ADULT - NS ATTEND AMEND GEN_ALL_CORE FT
R obstructing prox ureteral stone w hydro, RIP, sepsis 2/2 uti given elev wbc, tachycardia and dirty UA, L pelvic stone  Given worsened altered mental status and RIP, plan for OR for R poss L ureteral stent insertion  F/u cx  Abx as per medicine

## 2022-07-13 NOTE — H&P ADULT - PROBLEM SELECTOR PLAN 5
No defined cause. Recent extensive neurologic, ID and endocrine work up on prior admission. Wife declined brain biopsy that admission. - bowel regimen - bowel regimen  - wife reported few red specks in last BM, unclear if this is blood or food material  - monitor BM for s/s bleeding. normal hgb - low suspicion for active bleed.   - trend CBC

## 2022-07-13 NOTE — H&P ADULT - GASTROINTESTINAL
no suprapubic tenderness/normal/soft/nontender/nondistended/normal active bowel sounds not applicable

## 2022-07-14 LAB
24R-OH-CALCIDIOL SERPL-MCNC: 27.8 NG/ML — LOW (ref 30–80)
ALBUMIN SERPL ELPH-MCNC: 2.5 G/DL — LOW (ref 3.3–5)
ALBUMIN SERPL ELPH-MCNC: 2.7 G/DL — LOW (ref 3.3–5)
ALP SERPL-CCNC: 54 U/L — SIGNIFICANT CHANGE UP (ref 40–120)
ALP SERPL-CCNC: 55 U/L — SIGNIFICANT CHANGE UP (ref 40–120)
ALT FLD-CCNC: 11 U/L — SIGNIFICANT CHANGE UP (ref 10–45)
ALT FLD-CCNC: 11 U/L — SIGNIFICANT CHANGE UP (ref 10–45)
ANION GAP SERPL CALC-SCNC: 14 MMOL/L — SIGNIFICANT CHANGE UP (ref 5–17)
ANION GAP SERPL CALC-SCNC: 14 MMOL/L — SIGNIFICANT CHANGE UP (ref 5–17)
APTT BLD: 32.8 SEC — SIGNIFICANT CHANGE UP (ref 27.5–35.5)
AST SERPL-CCNC: 15 U/L — SIGNIFICANT CHANGE UP (ref 10–40)
AST SERPL-CCNC: 16 U/L — SIGNIFICANT CHANGE UP (ref 10–40)
BASOPHILS # BLD AUTO: 0 K/UL — SIGNIFICANT CHANGE UP (ref 0–0.2)
BASOPHILS # BLD AUTO: 0.01 K/UL — SIGNIFICANT CHANGE UP (ref 0–0.2)
BASOPHILS NFR BLD AUTO: 0 % — SIGNIFICANT CHANGE UP (ref 0–2)
BASOPHILS NFR BLD AUTO: 0.1 % — SIGNIFICANT CHANGE UP (ref 0–2)
BILIRUB DIRECT SERPL-MCNC: 0.1 MG/DL — SIGNIFICANT CHANGE UP (ref 0–0.3)
BILIRUB INDIRECT FLD-MCNC: 0.2 MG/DL — SIGNIFICANT CHANGE UP (ref 0.2–1)
BILIRUB SERPL-MCNC: 0.3 MG/DL — SIGNIFICANT CHANGE UP (ref 0.2–1.2)
BILIRUB SERPL-MCNC: 0.4 MG/DL — SIGNIFICANT CHANGE UP (ref 0.2–1.2)
BLD GP AB SCN SERPL QL: NEGATIVE — SIGNIFICANT CHANGE UP
BUN SERPL-MCNC: 25 MG/DL — HIGH (ref 7–23)
BUN SERPL-MCNC: 31 MG/DL — HIGH (ref 7–23)
CALCIUM SERPL-MCNC: 8.2 MG/DL — LOW (ref 8.4–10.5)
CALCIUM SERPL-MCNC: 8.3 MG/DL — LOW (ref 8.4–10.5)
CALCIUM SERPL-MCNC: 8.5 MG/DL — SIGNIFICANT CHANGE UP (ref 8.4–10.5)
CHLORIDE SERPL-SCNC: 105 MMOL/L — SIGNIFICANT CHANGE UP (ref 96–108)
CHLORIDE SERPL-SCNC: 106 MMOL/L — SIGNIFICANT CHANGE UP (ref 96–108)
CK MB CFR SERPL CALC: 1.2 NG/ML — SIGNIFICANT CHANGE UP (ref 0–6.7)
CK MB CFR SERPL CALC: 1.3 NG/ML — SIGNIFICANT CHANGE UP (ref 0–6.7)
CK SERPL-CCNC: 24 U/L — LOW (ref 30–200)
CK SERPL-CCNC: 26 U/L — LOW (ref 30–200)
CO2 SERPL-SCNC: 23 MMOL/L — SIGNIFICANT CHANGE UP (ref 22–31)
CO2 SERPL-SCNC: 24 MMOL/L — SIGNIFICANT CHANGE UP (ref 22–31)
CORTIS AM PEAK SERPL-MCNC: 7 UG/DL — SIGNIFICANT CHANGE UP (ref 6–18.4)
CREAT SERPL-MCNC: 0.84 MG/DL — SIGNIFICANT CHANGE UP (ref 0.5–1.3)
CREAT SERPL-MCNC: 0.9 MG/DL — SIGNIFICANT CHANGE UP (ref 0.5–1.3)
CULTURE RESULTS: SIGNIFICANT CHANGE UP
EGFR: 100 ML/MIN/1.73M2 — SIGNIFICANT CHANGE UP
EGFR: 98 ML/MIN/1.73M2 — SIGNIFICANT CHANGE UP
EOSINOPHIL # BLD AUTO: 0 K/UL — SIGNIFICANT CHANGE UP (ref 0–0.5)
EOSINOPHIL # BLD AUTO: 0.01 K/UL — SIGNIFICANT CHANGE UP (ref 0–0.5)
EOSINOPHIL NFR BLD AUTO: 0 % — SIGNIFICANT CHANGE UP (ref 0–6)
EOSINOPHIL NFR BLD AUTO: 0.1 % — SIGNIFICANT CHANGE UP (ref 0–6)
GAS PNL BLDA: SIGNIFICANT CHANGE UP
GLUCOSE BLDC GLUCOMTR-MCNC: 116 MG/DL — HIGH (ref 70–99)
GLUCOSE BLDC GLUCOMTR-MCNC: 132 MG/DL — HIGH (ref 70–99)
GLUCOSE BLDC GLUCOMTR-MCNC: 142 MG/DL — HIGH (ref 70–99)
GLUCOSE BLDC GLUCOMTR-MCNC: 147 MG/DL — HIGH (ref 70–99)
GLUCOSE SERPL-MCNC: 125 MG/DL — HIGH (ref 70–99)
GLUCOSE SERPL-MCNC: 134 MG/DL — HIGH (ref 70–99)
HCT VFR BLD CALC: 31.5 % — LOW (ref 39–50)
HCT VFR BLD CALC: 31.7 % — LOW (ref 39–50)
HCT VFR BLD CALC: 32 % — LOW (ref 39–50)
HGB BLD-MCNC: 10.1 G/DL — LOW (ref 13–17)
HGB BLD-MCNC: 10.2 G/DL — LOW (ref 13–17)
HGB BLD-MCNC: 10.3 G/DL — LOW (ref 13–17)
IMM GRANULOCYTES NFR BLD AUTO: 0.4 % — SIGNIFICANT CHANGE UP (ref 0–1.5)
IMM GRANULOCYTES NFR BLD AUTO: 0.5 % — SIGNIFICANT CHANGE UP (ref 0–1.5)
INR BLD: 1.34 RATIO — HIGH (ref 0.88–1.16)
LACTATE SERPL-SCNC: 3.9 MMOL/L — HIGH (ref 0.7–2)
LYMPHOCYTES # BLD AUTO: 0.43 K/UL — LOW (ref 1–3.3)
LYMPHOCYTES # BLD AUTO: 0.47 K/UL — LOW (ref 1–3.3)
LYMPHOCYTES # BLD AUTO: 5.1 % — LOW (ref 13–44)
LYMPHOCYTES # BLD AUTO: 5.1 % — LOW (ref 13–44)
MAGNESIUM SERPL-MCNC: 1.9 MG/DL — SIGNIFICANT CHANGE UP (ref 1.6–2.6)
MAGNESIUM SERPL-MCNC: 2 MG/DL — SIGNIFICANT CHANGE UP (ref 1.6–2.6)
MCHC RBC-ENTMCNC: 28.2 PG — SIGNIFICANT CHANGE UP (ref 27–34)
MCHC RBC-ENTMCNC: 28.2 PG — SIGNIFICANT CHANGE UP (ref 27–34)
MCHC RBC-ENTMCNC: 28.3 PG — SIGNIFICANT CHANGE UP (ref 27–34)
MCHC RBC-ENTMCNC: 31.9 GM/DL — LOW (ref 32–36)
MCHC RBC-ENTMCNC: 32.1 GM/DL — SIGNIFICANT CHANGE UP (ref 32–36)
MCHC RBC-ENTMCNC: 32.5 GM/DL — SIGNIFICANT CHANGE UP (ref 32–36)
MCV RBC AUTO: 86.8 FL — SIGNIFICANT CHANGE UP (ref 80–100)
MCV RBC AUTO: 88.2 FL — SIGNIFICANT CHANGE UP (ref 80–100)
MCV RBC AUTO: 88.4 FL — SIGNIFICANT CHANGE UP (ref 80–100)
MONOCYTES # BLD AUTO: 0.24 K/UL — SIGNIFICANT CHANGE UP (ref 0–0.9)
MONOCYTES # BLD AUTO: 0.32 K/UL — SIGNIFICANT CHANGE UP (ref 0–0.9)
MONOCYTES NFR BLD AUTO: 2.9 % — SIGNIFICANT CHANGE UP (ref 2–14)
MONOCYTES NFR BLD AUTO: 3.5 % — SIGNIFICANT CHANGE UP (ref 2–14)
NEUTROPHILS # BLD AUTO: 7.69 K/UL — HIGH (ref 1.8–7.4)
NEUTROPHILS # BLD AUTO: 8.31 K/UL — HIGH (ref 1.8–7.4)
NEUTROPHILS NFR BLD AUTO: 90.8 % — HIGH (ref 43–77)
NEUTROPHILS NFR BLD AUTO: 91.5 % — HIGH (ref 43–77)
NRBC # BLD: 0 /100 WBCS — SIGNIFICANT CHANGE UP (ref 0–0)
PHOSPHATE SERPL-MCNC: 2 MG/DL — LOW (ref 2.5–4.5)
PHOSPHATE SERPL-MCNC: 3 MG/DL — SIGNIFICANT CHANGE UP (ref 2.5–4.5)
PLATELET # BLD AUTO: 100 K/UL — LOW (ref 150–400)
PLATELET # BLD AUTO: 119 K/UL — LOW (ref 150–400)
PLATELET # BLD AUTO: 162 K/UL — SIGNIFICANT CHANGE UP (ref 150–400)
POTASSIUM SERPL-MCNC: 3.9 MMOL/L — SIGNIFICANT CHANGE UP (ref 3.5–5.3)
POTASSIUM SERPL-MCNC: 4.1 MMOL/L — SIGNIFICANT CHANGE UP (ref 3.5–5.3)
POTASSIUM SERPL-SCNC: 3.9 MMOL/L — SIGNIFICANT CHANGE UP (ref 3.5–5.3)
POTASSIUM SERPL-SCNC: 4.1 MMOL/L — SIGNIFICANT CHANGE UP (ref 3.5–5.3)
PROCALCITONIN SERPL-MCNC: 0.08 NG/ML — SIGNIFICANT CHANGE UP (ref 0.02–0.1)
PROT SERPL-MCNC: 5.8 G/DL — LOW (ref 6–8.3)
PROT SERPL-MCNC: 5.9 G/DL — LOW (ref 6–8.3)
PROTHROM AB SERPL-ACNC: 15.4 SEC — HIGH (ref 10.5–13.4)
PTH-INTACT FLD-MCNC: 45 PG/ML — SIGNIFICANT CHANGE UP (ref 15–65)
RBC # BLD: 3.57 M/UL — LOW (ref 4.2–5.8)
RBC # BLD: 3.62 M/UL — LOW (ref 4.2–5.8)
RBC # BLD: 3.65 M/UL — LOW (ref 4.2–5.8)
RBC # FLD: 14.3 % — SIGNIFICANT CHANGE UP (ref 10.3–14.5)
RBC # FLD: 14.4 % — SIGNIFICANT CHANGE UP (ref 10.3–14.5)
RBC # FLD: 14.5 % — SIGNIFICANT CHANGE UP (ref 10.3–14.5)
RH IG SCN BLD-IMP: POSITIVE — SIGNIFICANT CHANGE UP
SODIUM SERPL-SCNC: 143 MMOL/L — SIGNIFICANT CHANGE UP (ref 135–145)
SODIUM SERPL-SCNC: 143 MMOL/L — SIGNIFICANT CHANGE UP (ref 135–145)
SPECIMEN SOURCE: SIGNIFICANT CHANGE UP
T3 SERPL-MCNC: 51 NG/DL — LOW (ref 80–200)
T4 AB SER-ACNC: 4.8 UG/DL — SIGNIFICANT CHANGE UP (ref 4.6–12)
T4 AB SER-ACNC: 4.9 UG/DL — SIGNIFICANT CHANGE UP (ref 4.6–12)
TROPONIN T, HIGH SENSITIVITY RESULT: 10 NG/L — SIGNIFICANT CHANGE UP (ref 0–51)
TROPONIN T, HIGH SENSITIVITY RESULT: 9 NG/L — SIGNIFICANT CHANGE UP (ref 0–51)
TSH SERPL-MCNC: 0.6 UIU/ML — SIGNIFICANT CHANGE UP (ref 0.27–4.2)
WBC # BLD: 8.3 K/UL — SIGNIFICANT CHANGE UP (ref 3.8–10.5)
WBC # BLD: 8.4 K/UL — SIGNIFICANT CHANGE UP (ref 3.8–10.5)
WBC # BLD: 9.04 K/UL — SIGNIFICANT CHANGE UP (ref 3.8–10.5)
WBC # FLD AUTO: 8.3 K/UL — SIGNIFICANT CHANGE UP (ref 3.8–10.5)
WBC # FLD AUTO: 8.4 K/UL — SIGNIFICANT CHANGE UP (ref 3.8–10.5)
WBC # FLD AUTO: 9.04 K/UL — SIGNIFICANT CHANGE UP (ref 3.8–10.5)

## 2022-07-14 PROCEDURE — 99231 SBSQ HOSP IP/OBS SF/LOW 25: CPT

## 2022-07-14 PROCEDURE — 99255 IP/OBS CONSLTJ NEW/EST HI 80: CPT

## 2022-07-14 PROCEDURE — 93010 ELECTROCARDIOGRAM REPORT: CPT | Mod: 77

## 2022-07-14 PROCEDURE — 99233 SBSQ HOSP IP/OBS HIGH 50: CPT

## 2022-07-14 PROCEDURE — 99291 CRITICAL CARE FIRST HOUR: CPT | Mod: GC

## 2022-07-14 RX ORDER — PIPERACILLIN AND TAZOBACTAM 4; .5 G/20ML; G/20ML
3.38 INJECTION, POWDER, LYOPHILIZED, FOR SOLUTION INTRAVENOUS EVERY 8 HOURS
Refills: 0 | Status: DISCONTINUED | OUTPATIENT
Start: 2022-07-14 | End: 2022-07-19

## 2022-07-14 RX ORDER — SODIUM CHLORIDE 9 MG/ML
1000 INJECTION, SOLUTION INTRAVENOUS
Refills: 0 | Status: DISCONTINUED | OUTPATIENT
Start: 2022-07-14 | End: 2022-07-15

## 2022-07-14 RX ORDER — HYDROCORTISONE 20 MG
100 TABLET ORAL ONCE
Refills: 0 | Status: COMPLETED | OUTPATIENT
Start: 2022-07-14 | End: 2022-07-14

## 2022-07-14 RX ORDER — HYDROCORTISONE 20 MG
100 TABLET ORAL EVERY 8 HOURS
Refills: 0 | Status: DISCONTINUED | OUTPATIENT
Start: 2022-07-14 | End: 2022-07-17

## 2022-07-14 RX ORDER — PHENYLEPHRINE HYDROCHLORIDE 10 MG/ML
0.4 INJECTION INTRAVENOUS
Qty: 40 | Refills: 0 | Status: DISCONTINUED | OUTPATIENT
Start: 2022-07-14 | End: 2022-07-14

## 2022-07-14 RX ORDER — PIPERACILLIN AND TAZOBACTAM 4; .5 G/20ML; G/20ML
3.38 INJECTION, POWDER, LYOPHILIZED, FOR SOLUTION INTRAVENOUS ONCE
Refills: 0 | Status: COMPLETED | OUTPATIENT
Start: 2022-07-14 | End: 2022-07-14

## 2022-07-14 RX ORDER — POTASSIUM PHOSPHATE, MONOBASIC POTASSIUM PHOSPHATE, DIBASIC 236; 224 MG/ML; MG/ML
15 INJECTION, SOLUTION INTRAVENOUS ONCE
Refills: 0 | Status: COMPLETED | OUTPATIENT
Start: 2022-07-14 | End: 2022-07-14

## 2022-07-14 RX ORDER — THIAMINE MONONITRATE (VIT B1) 100 MG
100 TABLET ORAL DAILY
Refills: 0 | Status: DISCONTINUED | OUTPATIENT
Start: 2022-07-14 | End: 2022-07-27

## 2022-07-14 RX ORDER — SODIUM CHLORIDE 9 MG/ML
1000 INJECTION INTRAMUSCULAR; INTRAVENOUS; SUBCUTANEOUS ONCE
Refills: 0 | Status: COMPLETED | OUTPATIENT
Start: 2022-07-14 | End: 2022-07-14

## 2022-07-14 RX ORDER — CHLORHEXIDINE GLUCONATE 213 G/1000ML
1 SOLUTION TOPICAL
Refills: 0 | Status: DISCONTINUED | OUTPATIENT
Start: 2022-07-14 | End: 2022-07-14

## 2022-07-14 RX ORDER — INSULIN LISPRO 100/ML
VIAL (ML) SUBCUTANEOUS EVERY 4 HOURS
Refills: 0 | Status: DISCONTINUED | OUTPATIENT
Start: 2022-07-14 | End: 2022-07-16

## 2022-07-14 RX ORDER — VANCOMYCIN HCL 1 G
1000 VIAL (EA) INTRAVENOUS ONCE
Refills: 0 | Status: COMPLETED | OUTPATIENT
Start: 2022-07-14 | End: 2022-07-14

## 2022-07-14 RX ORDER — ENOXAPARIN SODIUM 100 MG/ML
40 INJECTION SUBCUTANEOUS EVERY 24 HOURS
Refills: 0 | Status: DISCONTINUED | OUTPATIENT
Start: 2022-07-14 | End: 2022-07-27

## 2022-07-14 RX ORDER — ASPIRIN/CALCIUM CARB/MAGNESIUM 324 MG
81 TABLET ORAL DAILY
Refills: 0 | Status: DISCONTINUED | OUTPATIENT
Start: 2022-07-14 | End: 2022-07-16

## 2022-07-14 RX ORDER — SENNA PLUS 8.6 MG/1
2 TABLET ORAL AT BEDTIME
Refills: 0 | Status: DISCONTINUED | OUTPATIENT
Start: 2022-07-14 | End: 2022-07-27

## 2022-07-14 RX ORDER — ONDANSETRON 8 MG/1
4 TABLET, FILM COATED ORAL ONCE
Refills: 0 | Status: DISCONTINUED | OUTPATIENT
Start: 2022-07-14 | End: 2022-07-14

## 2022-07-14 RX ORDER — SODIUM CHLORIDE 9 MG/ML
1000 INJECTION, SOLUTION INTRAVENOUS ONCE
Refills: 0 | Status: COMPLETED | OUTPATIENT
Start: 2022-07-14 | End: 2022-07-14

## 2022-07-14 RX ADMIN — Medication 250 MILLIGRAM(S): at 10:25

## 2022-07-14 RX ADMIN — Medication 100 MILLIGRAM(S): at 14:17

## 2022-07-14 RX ADMIN — PIPERACILLIN AND TAZOBACTAM 25 GRAM(S): 4; .5 INJECTION, POWDER, LYOPHILIZED, FOR SOLUTION INTRAVENOUS at 17:20

## 2022-07-14 RX ADMIN — Medication 1 TABLET(S): at 12:00

## 2022-07-14 RX ADMIN — SENNA PLUS 2 TABLET(S): 8.6 TABLET ORAL at 21:58

## 2022-07-14 RX ADMIN — ATORVASTATIN CALCIUM 10 MILLIGRAM(S): 80 TABLET, FILM COATED ORAL at 23:21

## 2022-07-14 RX ADMIN — SODIUM CHLORIDE 50 MILLILITER(S): 9 INJECTION, SOLUTION INTRAVENOUS at 00:00

## 2022-07-14 RX ADMIN — SODIUM CHLORIDE 150 MILLILITER(S): 9 INJECTION, SOLUTION INTRAVENOUS at 17:20

## 2022-07-14 RX ADMIN — PHENYLEPHRINE HYDROCHLORIDE 10.2 MICROGRAM(S)/KG/MIN: 10 INJECTION INTRAVENOUS at 10:44

## 2022-07-14 RX ADMIN — SODIUM CHLORIDE 1000 MILLILITER(S): 9 INJECTION INTRAMUSCULAR; INTRAVENOUS; SUBCUTANEOUS at 09:40

## 2022-07-14 RX ADMIN — CEFEPIME 100 MILLIGRAM(S): 1 INJECTION, POWDER, FOR SOLUTION INTRAMUSCULAR; INTRAVENOUS at 12:01

## 2022-07-14 RX ADMIN — Medication 81 MILLIGRAM(S): at 12:00

## 2022-07-14 RX ADMIN — PIPERACILLIN AND TAZOBACTAM 200 GRAM(S): 4; .5 INJECTION, POWDER, LYOPHILIZED, FOR SOLUTION INTRAVENOUS at 14:17

## 2022-07-14 RX ADMIN — POTASSIUM PHOSPHATE, MONOBASIC POTASSIUM PHOSPHATE, DIBASIC 62.5 MILLIMOLE(S): 236; 224 INJECTION, SOLUTION INTRAVENOUS at 16:15

## 2022-07-14 RX ADMIN — SODIUM CHLORIDE 150 MILLILITER(S): 9 INJECTION, SOLUTION INTRAVENOUS at 07:07

## 2022-07-14 RX ADMIN — Medication 100 MILLIGRAM(S): at 21:59

## 2022-07-14 RX ADMIN — Medication 100 MILLIGRAM(S): at 12:00

## 2022-07-14 RX ADMIN — SODIUM CHLORIDE 1000 MILLILITER(S): 9 INJECTION INTRAMUSCULAR; INTRAVENOUS; SUBCUTANEOUS at 04:45

## 2022-07-14 RX ADMIN — Medication 100 MILLIGRAM(S): at 06:37

## 2022-07-14 RX ADMIN — SODIUM CHLORIDE 2000 MILLILITER(S): 9 INJECTION, SOLUTION INTRAVENOUS at 06:50

## 2022-07-14 RX ADMIN — ENOXAPARIN SODIUM 40 MILLIGRAM(S): 100 INJECTION SUBCUTANEOUS at 12:00

## 2022-07-14 NOTE — CONSULT NOTE ADULT - ASSESSMENT
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 Course c/b development of hypotension/hypothermia/bradycaria concerning for relative adrenal insufficiency vs septic shock due to UTI. Pt with improvement after 2 to 3 liters IVF.    #Neuro:  =hx of cerebellar ataxia, chronic lacunar infarcts, leptomeningeal enhancements  -Neuro checks q 4 hrs and prn for changes  -activity as tolerated  -physical therapy consult when stable    #Pulm:  =no issue at present, hx of recent asp pneum  -Supplemental O2 prn to maintain SPO2 > 92%  -Bronchodilators q 6 hrs prn for sob/wheezes  -HOB >/= 30 degree angle  -incentive spirometry 10x q 2 hrs    #CV:  =hypotension/bradycardia (most likely due to relative adrenal insufficiency vs septic shock)  -ECG now and q am x3  -Cardiac Enzymes now and q 8 hrs x 3  -place on transthoracic pacer pads  -obtain cardiology consult  -IVF of LR @ 150 cc's/hr    #GI/:  =s/p bilat ureteral stent placements  -coronel  -strict I & O's keep even  -diet as tolerated  -f/u urology recs    #ID:  =sepsis secondary to UTI, r/o relative adrenal insufficiency  -pan culture  -stat cortisol level  -obtain TSH/Thyroxine/T4  -received hydrocortisone 100 mg IVP x1 (given after cortisol level obtained)  -consider adding hydrocortisone 100 mg IV q 8 hrs  -continue cefepime 1gm IV q 12 hrs (started 7/13/22)    #FEN/ENDO/HEME:  -obtain CMP/Mg++/PO--4/CBC w diff/PT/PTT/INR now and q. a.m.  -f/u TSH/Thyroxine/T4/cortisol levels  -POC glucose with ISS q 6 hrs - maintain glucose 140-180  -LR @ 150 cc's/hr

## 2022-07-14 NOTE — PROGRESS NOTE ADULT - NS ATTEND AMEND GEN_ALL_CORE FT
Sepsis 2/2 UTI, RIP, R ureteral stone w hydro, L intrarenal stone, AMS  Now s/p b/l stenting  O/n had RRT for lukas and hypotension  MICU consulted - no pressors currently, IVF bolus  Cxs pending  On broad spectrum abx  F/u labs, cxs  Appr MICU c/s and primary team care

## 2022-07-14 NOTE — PROGRESS NOTE ADULT - ASSESSMENT
A/P: 61y Male s/p BL ureteral stent placement     -DVT prophylaxis/OOB  -Incentive spirometry  -Strict I&O's  -Analgesia and antiemetics as needed  -Regular Diet  -[place]   -Abx  -Care as per primary team

## 2022-07-14 NOTE — CONSULT NOTE ADULT - ASSESSMENT
61M w/ hx of cerebellar ataxia with rapid neurocognitive decline (baseline AOx1-2, minimally conversant) recent prolonged admission including MICU stay/ intubation from 4/18-5/13 for AMS and airway protection, recent admission for UTI treated with 5 day course of CTX, brought in by wife for decreased PO intake and lethargy for several days with "white and thick" and foul smelling urine since 7/8. Hx obtained from wife due to patient's encephalopathy. PCP recently prescribed cipro 500 mg BID x 3 days (pt took 2 days) and wife thinks urine has improved since then. However, he has been eating less since and has been lethargic with minimal responsiveness which prompted the ED evaluation. ROS limited due to patient's mental status. Wife has noted pt has been constipated and gave him a suppository and he subsequently had one bowel movement. She noted a few red specks but no overt bleeding or black stool.  hypotension ?sec to sepsis/ pt with hx of low bp  bradycardia ,observe  dvt prophylaxis  am cortisol level  iv hydration  tsh  tele monitoring

## 2022-07-14 NOTE — PROGRESS NOTE ADULT - SUBJECTIVE AND OBJECTIVE BOX
Post op Check    Pt seen and examined without complaints. Very tired and lethargic. Pain is controlled. Denies SOB/CP/N/V.     Vital Signs Last 24 Hrs  T(C): 36.2 (13 Jul 2022 23:50), Max: 37.6 (13 Jul 2022 10:27)  T(F): 97.2 (13 Jul 2022 23:50), Max: 99.6 (13 Jul 2022 10:27)  HR: 56 (14 Jul 2022 00:15) (56 - 116)  BP: 103/58 (14 Jul 2022 00:15) (93/53 - 106/75)  BP(mean): 74 (14 Jul 2022 00:15) (68 - 75)  RR: 16 (14 Jul 2022 00:15) (14 - 19)  SpO2: 99% (14 Jul 2022 00:15) (94% - 100%)    Parameters below as of 14 Jul 2022 00:15  Patient On (Oxygen Delivery Method): room air        I&O's Summary    13 Jul 2022 07:01  -  14 Jul 2022 01:24  --------------------------------------------------------  IN: 50 mL / OUT: 225 mL / NET: -175 mL    I&O's Detail    13 Jul 2022 07:01  -  14 Jul 2022 01:24  --------------------------------------------------------  IN:    Lactated Ringers: 50 mL  Total IN: 50 mL    OUT:    Indwelling Catheter - Urethral (mL): 225 mL  Total OUT: 225 mL    Total NET: -175 mL          Physical Exam  Gen: awake alert NAD AXOX3  Pulm: No respiratory distress  Abd: Soft, NT, ND  Back: no CVAT bilaterally  : coronel in place draining clear light pink urine                           14.8   14.87 )-----------( 216      ( 13 Jul 2022 11:33 )             46.0       07-13    145  |  100  |  46<H>  ----------------------------<  95  4.4   |  28  |  1.78<H>    Ca    10.6<H>      13 Jul 2022 11:33    TPro  8.9<H>  /  Alb  3.9  /  TBili  0.8  /  DBili  x   /  AST  26  /  ALT  18  /  AlkPhos  77  07-13

## 2022-07-14 NOTE — RAPID RESPONSE TEAM SUMMARY - NSSITUATIONBACKGROUNDRRT_GEN_ALL_CORE
61M w/ hx of cerebellar ataxia recent prolonged admission including MICU stay/ intubation from 4/18-5/13 for AMS and airway protection, recent admission for UTI treated with 5 day course of CTX, brought in by wife for decreased PO intake and lethargy admitted for urosepsis 2/2 right proximal ureteral calculus s/p b/l ureteral stent placement yesterday evening. RRT called for hypotension. Vitals, hypothermic, bradycardic to 30s, SBP (86-90 post-2-3L IVF prior to rapid), SaO2 96+ RA. Pt denies any light-headedness, dizziness, blurry vision, weakness. On exam, pt awake, alert, and oriented x3; lungs clear throughout; bradycardic, regular rhythm; no LE edema. Pt s/p ureteral stents with sedation with propofol and fentanyl for pain control ~4-5 hours prior to rapid; no pain medications given since discharge from OR. Additionally received midodrine 10 yesterday. EKG showing 1st degree AV block with HR 36; previous EKG showing sinus tachycardia to 110s. Suspect the new onset 1st degree AV block 2/2 residual anaesthesia vs. transient bacteremia from recent urological procedure. CBC (collected prior to rapid) resulted showing drop in all cell lines, suspect dilutional; no subjective or objective findings to suggest acute blood loss anemia as cause of hypotension. Pacing pads placed; would have atropine at bedside. However, given pt currently A&Ox3 and asymptomatic despite borderline hypotension, would defer giving atropine or pacing at this time. Cardiology consulted. Continue with cefepime for underlying urosepsis; BCx already collected and pending. Primary team to follow up CBC, CMP, serum lactate (sent prior to rapid). 61M w/ hx of cerebellar ataxia recent prolonged admission including MICU stay/ intubation from 4/18-5/13 for AMS and airway protection, recent admission for UTI treated with 5 day course of CTX, brought in by wife for decreased PO intake and lethargy admitted for urosepsis 2/2 right proximal ureteral calculus s/p b/l ureteral stent placement yesterday evening. RRT called for hypotension. Vitals, hypothermic, bradycardic to 30s, SBP (86-90 post-2-3L IVF prior to rapid), SaO2 96+ RA. Pt denies any light-headedness, dizziness, blurry vision, weakness. On exam, pt awake, alert, and oriented x3; lungs clear throughout; bradycardic, regular rhythm; no LE edema. Pt s/p ureteral stents with sedation with propofol and fentanyl for pain control ~4-5 hours prior to rapid; no pain medications given since discharge from OR. Additionally received midodrine 10 yesterday. EKG showing 1st degree AV block with HR 36; previous EKG showing sinus tachycardia to 110s. Suspect the new onset 1st degree AV block 2/2 residual anaesthesia vs. transient bacteremia from recent urological procedure. CBC (collected prior to rapid) resulted showing drop in all cell lines, suspect dilutional; no subjective or objective findings to suggest acute blood loss anemia as cause of hypotension. Pacing pads placed; would have atropine at bedside. However, given pt currently A&Ox3 and asymptomatic despite borderline hypotension, would defer giving atropine or pacing at this time. Cardiology consulted. Continue with cefepime for underlying urosepsis; BCx already collected and pending. Primary team to follow up CBC, CMP, serum lactate (sent prior to rapid). No additional midodrine. 61M w/ hx of cerebellar ataxia recent prolonged admission including MICU stay/ intubation from 4/18-5/13 for AMS and airway protection, recent admission for UTI treated with 5 day course of CTX, brought in by wife for decreased PO intake and lethargy admitted for urosepsis 2/2 right proximal ureteral calculus s/p b/l ureteral stent placement yesterday evening. RRT called for hypotension. Vitals, hypothermic, bradycardic to 30s, SBP (86-90 post-2-3L IVF prior to rapid), SaO2 96+ RA. Pt denies any light-headedness, dizziness, blurry vision, weakness. On exam, pt awake, alert, and oriented x3; lungs clear throughout; bradycardic, regular rhythm; no LE edema. Pt s/p ureteral stents with sedation with propofol and fentanyl for pain control ~4-5 hours prior to rapid; no pain medications given since discharge from OR. Additionally received midodrine 10 yesterday. EKG showing 1st degree AV block with HR 36; previous EKG showing sinus tachycardia to 110s. Suspect the new onset 1st degree AV block 2/2 residual anaesthesia vs. transient bacteremia from recent urological procedure. CBC (collected prior to rapid) resulted showing drop in all cell lines, suspect dilutional; no subjective or objective findings to suggest acute blood loss anemia as cause of hypotension. Pacing pads placed; would have atropine at bedside. However, given pt currently A&Ox3 and asymptomatic despite borderline hypotension, would defer giving atropine or pacing at this time. Suspect bradycardia will resolve as anaesthesia is cleared. Per primary team, pt already received close to 3L IVF in resuscitation. Cardiology consulted. Continue with cefepime for underlying urosepsis; BCx already collected and pending. Primary team to follow up CBC, CMP, serum lactate (sent prior to rapid). No additional midodrine.

## 2022-07-14 NOTE — CONSULT NOTE ADULT - SUBJECTIVE AND OBJECTIVE BOX
CHIEF COMPLAINT:    HPI:        PAST MEDICAL & SURGICAL HISTORY:  H/O cerebellar ataxia      No significant past surgical history          FAMILY HISTORY:  FH: dementia (Mother)    FH: type 2 diabetes (Mother)    Family history of CVA (Father)        SOCIAL HISTORY:  Smoking: [ ] Never Smoked [ ] Former Smoker (__ packs x ___ years) [ ] Current Smoker  (__ packs x ___ years)  Substance Use: [ ] Never Used [ ] Used ____  EtOH Use:  Marital Status: [ ] Single [ ]  [ ]  [ ]   Sexual History:   Occupation:  Recent Travel:  Country of Birth:  Advance Directives:    Allergies    No Known Allergies    Intolerances        HOME MEDICATIONS:    REVIEW OF SYSTEMS:            OBJECTIVE:  ICU Vital Signs Last 24 Hrs  T(C): 32.7 (2022 05:00), Max: 37.6 (2022 10:27)  T(F): 90.8 (2022 05:00), Max: 99.6 (2022 10:27)  HR: 36 (2022 05:30) (33 - 116)  BP: 90/58 (2022 05:30) (87/53 - 106/75)  BP(mean): 70 (2022 05:30) (65 - 75)  ABP: --  ABP(mean): --  RR: 15 (2022 04:00) (14 - 19)  SpO2: 100% (2022 05:30) (94% - 100%)    O2 Parameters below as of 2022 03:00  Patient On (Oxygen Delivery Method): room air               @ 07:  -  14 @ 06:05  --------------------------------------------------------  IN: 1300 mL / OUT: 900 mL / NET: 400 mL      CAPILLARY BLOOD GLUCOSE      POCT Blood Glucose.: 116 mg/dL (2022 05:15)      PHYSICAL EXAM:        HOSPITAL MEDICATIONS:  MEDICATIONS  (STANDING):  aspirin enteric coated 81 milliGRAM(s) Oral daily  atorvastatin 10 milliGRAM(s) Oral at bedtime  cefepime   IVPB      cefepime   IVPB 1000 milliGRAM(s) IV Intermittent every 12 hours  enoxaparin Injectable 40 milliGRAM(s) SubCutaneous every 24 hours  lactated ringers. 1000 milliLiter(s) (50 mL/Hr) IV Continuous <Continuous>  lactulose Syrup 10 Gram(s) Oral every 12 hours  multivitamin 1 Tablet(s) Oral daily  senna 2 Tablet(s) Oral at bedtime  thiamine 100 milliGRAM(s) Oral daily    MEDICATIONS  (PRN):  acetaminophen     Tablet .. 650 milliGRAM(s) Oral every 6 hours PRN Temp greater or equal to 38C (100.4F), Mild Pain (1 - 3)  melatonin 3 milliGRAM(s) Oral at bedtime PRN Insomnia  ondansetron Injectable 4 milliGRAM(s) IV Push once PRN Nausea and/or Vomiting      LABS:                        10.1   9.04  )-----------( 100      ( 2022 05:21 )             31.5     Hgb Trend: 10.1<--, 14.8<--  07-14    143  |  106  |  31<H>  ----------------------------<  134<H>  3.9   |  23  |  0.90    Ca    8.5      2022 05:19  Phos  3.0     -  Mg     2.0         TPro  8.9<H>  /  Alb  3.9  /  TBili  0.8  /  DBili  x   /  AST  26  /  ALT  18  /  AlkPhos  77  07-13    Creatinine Trend: 0.90<--, 1.78<--, 0.76<--, 0.82<--, 0.63<--, 0.70<--  PT/INR - ( 2022 11:33 )   PT: 14.4 sec;   INR: 1.24 ratio         PTT - ( 2022 11:33 )  PTT:29.4 sec  Urinalysis Basic - ( 2022 11:44 )    Color: Light Orange / Appearance: Turbid / S.020 / pH: x  Gluc: x / Ketone: Small  / Bili: Negative / Urobili: Negative   Blood: x / Protein: 100 / Nitrite: Positive   Leuk Esterase: Large / RBC: 31 /hpf /  /HPF   Sq Epi: x / Non Sq Epi: 1 /hpf / Bacteria: Moderate        Venous Blood Gas:   @ 13:08  7.36/60/41/34/65.1  VBG Lactate: 1.5      MICROBIOLOGY:     RADIOLOGY:  [ ] Reviewed and interpreted by me    EKG:        [facility] (ext. [id number])           CHIEF COMPLAINT:    HPI:  61 yr old male with PMHx cerebellar ataxia with neurocognitive decline over a 2 yr period, chronic lacuna infarcts, minimally conversant, recent hospitalizations 22-22 for AMS,     PAST MEDICAL & SURGICAL HISTORY:  H/O cerebellar ataxia      No significant past surgical history          FAMILY HISTORY:  FH: dementia (Mother)    FH: type 2 diabetes (Mother)    Family history of CVA (Father)        SOCIAL HISTORY:  Smoking: [ ] Never Smoked [ ] Former Smoker (__ packs x ___ years) [ ] Current Smoker  (__ packs x ___ years)  Substance Use: [ ] Never Used [ ] Used ____  EtOH Use:  Marital Status: [ ] Single [ ]  [ ]  [ ]   Sexual History:   Occupation:  Recent Travel:  Country of Birth:  Advance Directives:    Allergies    No Known Allergies    Intolerances        HOME MEDICATIONS:    REVIEW OF SYSTEMS:            OBJECTIVE:  ICU Vital Signs Last 24 Hrs  T(C): 32.7 (2022 05:00), Max: 37.6 (2022 10:27)  T(F): 90.8 (2022 05:00), Max: 99.6 (2022 10:27)  HR: 36 (2022 05:30) (33 - 116)  BP: 90/58 (2022 05:30) (87/53 - 106/75)  BP(mean): 70 (2022 05:30) (65 - 75)  ABP: --  ABP(mean): --  RR: 15 (2022 04:00) (14 - 19)  SpO2: 100% (2022 05:30) (94% - 100%)    O2 Parameters below as of 2022 03:00  Patient On (Oxygen Delivery Method): room air              07-13 @ 07:01  -  07-14 @ 06:05  --------------------------------------------------------  IN: 1300 mL / OUT: 900 mL / NET: 400 mL      CAPILLARY BLOOD GLUCOSE      POCT Blood Glucose.: 116 mg/dL (2022 05:15)      PHYSICAL EXAM:        HOSPITAL MEDICATIONS:  MEDICATIONS  (STANDING):  aspirin enteric coated 81 milliGRAM(s) Oral daily  atorvastatin 10 milliGRAM(s) Oral at bedtime  cefepime   IVPB      cefepime   IVPB 1000 milliGRAM(s) IV Intermittent every 12 hours  enoxaparin Injectable 40 milliGRAM(s) SubCutaneous every 24 hours  lactated ringers. 1000 milliLiter(s) (50 mL/Hr) IV Continuous <Continuous>  lactulose Syrup 10 Gram(s) Oral every 12 hours  multivitamin 1 Tablet(s) Oral daily  senna 2 Tablet(s) Oral at bedtime  thiamine 100 milliGRAM(s) Oral daily    MEDICATIONS  (PRN):  acetaminophen     Tablet .. 650 milliGRAM(s) Oral every 6 hours PRN Temp greater or equal to 38C (100.4F), Mild Pain (1 - 3)  melatonin 3 milliGRAM(s) Oral at bedtime PRN Insomnia  ondansetron Injectable 4 milliGRAM(s) IV Push once PRN Nausea and/or Vomiting      LABS:                        10.1   9.04  )-----------( 100      ( 2022 05:21 )             31.5     Hgb Trend: 10.1<--, 14.8<--  07-14    143  |  106  |  31<H>  ----------------------------<  134<H>  3.9   |  23  |  0.90    Ca    8.5      2022 05:19  Phos  3.0     07-14  Mg     2.0     07-14    TPro  8.9<H>  /  Alb  3.9  /  TBili  0.8  /  DBili  x   /  AST  26  /  ALT  18  /  AlkPhos  77  07-13    Creatinine Trend: 0.90<--, 1.78<--, 0.76<--, 0.82<--, 0.63<--, 0.70<--  PT/INR - ( 2022 11:33 )   PT: 14.4 sec;   INR: 1.24 ratio         PTT - ( 2022 11:33 )  PTT:29.4 sec  Urinalysis Basic - ( 2022 11:44 )    Color: Light Orange / Appearance: Turbid / S.020 / pH: x  Gluc: x / Ketone: Small  / Bili: Negative / Urobili: Negative   Blood: x / Protein: 100 / Nitrite: Positive   Leuk Esterase: Large / RBC: 31 /hpf /  /HPF   Sq Epi: x / Non Sq Epi: 1 /hpf / Bacteria: Moderate        Venous Blood Gas:   @ 13:08  7.36/60/41/34/65.1  VBG Lactate: 1.5      MICROBIOLOGY:     RADIOLOGY:  [ ] Reviewed and interpreted by me    EKG:        Dieter [facility]-BC (ext. [id number])           CHIEF COMPLAINT:    HPI:  61 yr old male with PMHx cerebellar ataxia with neurocognitive decline over a 2 yr period, chronic lacuna infarcts, minimally conversant, recent hospitalizations 22-22 for AMS, asp pneum requiring intubation, found to have leptomeningeal enhancement at that time with relative adrenal insufficiency (cortisol level 6.4), 6/15/22-22 for UTI tx wit antibx, required midodrine therapy.       PAST MEDICAL & SURGICAL HISTORY:  H/O cerebellar ataxia      No significant past surgical history          FAMILY HISTORY:  FH: dementia (Mother)    FH: type 2 diabetes (Mother)    Family history of CVA (Father)        SOCIAL HISTORY:  Smoking: [ ] Never Smoked [ ] Former Smoker (__ packs x ___ years) [ ] Current Smoker  (__ packs x ___ years)  Substance Use: [ ] Never Used [ ] Used ____  EtOH Use:  Marital Status: [ ] Single [ ]  [ ]  [ ]   Sexual History:   Occupation:  Recent Travel:  Country of Birth:  Advance Directives:    Allergies    No Known Allergies    Intolerances        HOME MEDICATIONS:    REVIEW OF SYSTEMS:            OBJECTIVE:  ICU Vital Signs Last 24 Hrs  T(C): 32.7 (2022 05:00), Max: 37.6 (2022 10:27)  T(F): 90.8 (2022 05:00), Max: 99.6 (2022 10:27)  HR: 36 (2022 05:30) (33 - 116)  BP: 90/58 (2022 05:30) (87/53 - 106/75)  BP(mean): 70 (2022 05:30) (65 - 75)  ABP: --  ABP(mean): --  RR: 15 (2022 04:00) (14 - 19)  SpO2: 100% (2022 05:30) (94% - 100%)    O2 Parameters below as of 2022 03:00  Patient On (Oxygen Delivery Method): room air              13 @ 07:01  -  07-14 @ 06:05  --------------------------------------------------------  IN: 1300 mL / OUT: 900 mL / NET: 400 mL      CAPILLARY BLOOD GLUCOSE      POCT Blood Glucose.: 116 mg/dL (2022 05:15)      PHYSICAL EXAM:        HOSPITAL MEDICATIONS:  MEDICATIONS  (STANDING):  aspirin enteric coated 81 milliGRAM(s) Oral daily  atorvastatin 10 milliGRAM(s) Oral at bedtime  cefepime   IVPB      cefepime   IVPB 1000 milliGRAM(s) IV Intermittent every 12 hours  enoxaparin Injectable 40 milliGRAM(s) SubCutaneous every 24 hours  lactated ringers. 1000 milliLiter(s) (50 mL/Hr) IV Continuous <Continuous>  lactulose Syrup 10 Gram(s) Oral every 12 hours  multivitamin 1 Tablet(s) Oral daily  senna 2 Tablet(s) Oral at bedtime  thiamine 100 milliGRAM(s) Oral daily    MEDICATIONS  (PRN):  acetaminophen     Tablet .. 650 milliGRAM(s) Oral every 6 hours PRN Temp greater or equal to 38C (100.4F), Mild Pain (1 - 3)  melatonin 3 milliGRAM(s) Oral at bedtime PRN Insomnia  ondansetron Injectable 4 milliGRAM(s) IV Push once PRN Nausea and/or Vomiting      LABS:                        10.1   9.04  )-----------( 100      ( 2022 05:21 )             31.5     Hgb Trend: 10.1<--, 14.8<--  07-14    143  |  106  |  31<H>  ----------------------------<  134<H>  3.9   |  23  |  0.90    Ca    8.5      2022 05:19  Phos  3.0     07-14  Mg     2.0     07-14    TPro  8.9<H>  /  Alb  3.9  /  TBili  0.8  /  DBili  x   /  AST  26  /  ALT  18  /  AlkPhos  77  07-13    Creatinine Trend: 0.90<--, 1.78<--, 0.76<--, 0.82<--, 0.63<--, 0.70<--  PT/INR - ( 2022 11:33 )   PT: 14.4 sec;   INR: 1.24 ratio         PTT - ( 2022 11:33 )  PTT:29.4 sec  Urinalysis Basic - ( 2022 11:44 )    Color: Light Orange / Appearance: Turbid / S.020 / pH: x  Gluc: x / Ketone: Small  / Bili: Negative / Urobili: Negative   Blood: x / Protein: 100 / Nitrite: Positive   Leuk Esterase: Large / RBC: 31 /hpf /  /HPF   Sq Epi: x / Non Sq Epi: 1 /hpf / Bacteria: Moderate        Venous Blood Gas:  -13 @ 13:08  7.36/60/41/34/65.1  VBG Lactate: 1.5      MICROBIOLOGY:     RADIOLOGY:  [ ] Reviewed and interpreted by me    EKG:        Dieter ANP-BC (ext. [id number])           CHIEF COMPLAINT:    HPI:  61 yr old male with PMHx cerebellar ataxia with neurocognitive decline over a 2 yr period, chronic lacuna infarcts, minimally conversant, recent hospitalizations 22-22 for AMS, asp pneum requiring intubation, septic shock with relative adrenal insufficiency found to have leptomeningeal enhancement (no intervention) at that time. Tx with antibx, steroid therapy, 6/15/22-22 for UTI tx wit antibx, required midodrine therapy.       PAST MEDICAL & SURGICAL HISTORY:  H/O cerebellar ataxia      No significant past surgical history          FAMILY HISTORY:  FH: dementia (Mother)    FH: type 2 diabetes (Mother)    Family history of CVA (Father)        SOCIAL HISTORY:  Smoking: [ ] Never Smoked [ ] Former Smoker (__ packs x ___ years) [ ] Current Smoker  (__ packs x ___ years)  Substance Use: [ ] Never Used [ ] Used ____  EtOH Use:  Marital Status: [ ] Single [ ]  [ ]  [ ]   Sexual History:   Occupation:  Recent Travel:  Country of Birth:  Advance Directives:    Allergies    No Known Allergies    Intolerances        HOME MEDICATIONS:    REVIEW OF SYSTEMS:            OBJECTIVE:  ICU Vital Signs Last 24 Hrs  T(C): 32.7 (2022 05:00), Max: 37.6 (2022 10:27)  T(F): 90.8 (2022 05:00), Max: 99.6 (2022 10:27)  HR: 36 (2022 05:30) (33 - 116)  BP: 90/58 (2022 05:30) (87/53 - 106/75)  BP(mean): 70 (2022 05:30) (65 - 75)  ABP: --  ABP(mean): --  RR: 15 (2022 04:00) (14 - 19)  SpO2: 100% (2022 05:30) (94% - 100%)    O2 Parameters below as of 2022 03:00  Patient On (Oxygen Delivery Method): room air              -13 @ 07:01  -  07-14 @ 06:05  --------------------------------------------------------  IN: 1300 mL / OUT: 900 mL / NET: 400 mL      CAPILLARY BLOOD GLUCOSE      POCT Blood Glucose.: 116 mg/dL (2022 05:15)      PHYSICAL EXAM:        HOSPITAL MEDICATIONS:  MEDICATIONS  (STANDING):  aspirin enteric coated 81 milliGRAM(s) Oral daily  atorvastatin 10 milliGRAM(s) Oral at bedtime  cefepime   IVPB      cefepime   IVPB 1000 milliGRAM(s) IV Intermittent every 12 hours  enoxaparin Injectable 40 milliGRAM(s) SubCutaneous every 24 hours  lactated ringers. 1000 milliLiter(s) (50 mL/Hr) IV Continuous <Continuous>  lactulose Syrup 10 Gram(s) Oral every 12 hours  multivitamin 1 Tablet(s) Oral daily  senna 2 Tablet(s) Oral at bedtime  thiamine 100 milliGRAM(s) Oral daily    MEDICATIONS  (PRN):  acetaminophen     Tablet .. 650 milliGRAM(s) Oral every 6 hours PRN Temp greater or equal to 38C (100.4F), Mild Pain (1 - 3)  melatonin 3 milliGRAM(s) Oral at bedtime PRN Insomnia  ondansetron Injectable 4 milliGRAM(s) IV Push once PRN Nausea and/or Vomiting      LABS:                        10.1   9.04  )-----------( 100      ( 2022 05:21 )             31.5     Hgb Trend: 10.1<--, 14.8<--  07-14    143  |  106  |  31<H>  ----------------------------<  134<H>  3.9   |  23  |  0.90    Ca    8.5      2022 05:19  Phos  3.0     07-14  Mg     2.0     07-14    TPro  8.9<H>  /  Alb  3.9  /  TBili  0.8  /  DBili  x   /  AST  26  /  ALT  18  /  AlkPhos  77  07-13    Creatinine Trend: 0.90<--, 1.78<--, 0.76<--, 0.82<--, 0.63<--, 0.70<--  PT/INR - ( 2022 11:33 )   PT: 14.4 sec;   INR: 1.24 ratio         PTT - ( 2022 11:33 )  PTT:29.4 sec  Urinalysis Basic - ( 2022 11:44 )    Color: Light Orange / Appearance: Turbid / S.020 / pH: x  Gluc: x / Ketone: Small  / Bili: Negative / Urobili: Negative   Blood: x / Protein: 100 / Nitrite: Positive   Leuk Esterase: Large / RBC: 31 /hpf /  /HPF   Sq Epi: x / Non Sq Epi: 1 /hpf / Bacteria: Moderate        Venous Blood Gas:   @ 13:08  7.36/60/41/34/65.1  VBG Lactate: 1.5      MICROBIOLOGY:     RADIOLOGY:  [ ] Reviewed and interpreted by me    EKG:        [facility] (ext. [id number])           CHIEF COMPLAINT:    HPI:  61 yr old male with PMHx cerebellar ataxia with neurocognitive decline over a 2 yr period, chronic lacuna infarcts, minimally conversant (able to answer questions yes/no by shaking head mouthing words, follows commands), recent hospitalizations 22-22 for AMS, asp pneum requiring intubation, septic shock with relative adrenal insufficiency found to have leptomeningeal enhancement (no intervention) at that time, Tx with antibx, steroid therapy and 6/15/22-22 for UTI tx wit antibx (CTX), and required midodrine therapy. With Eventual  d/c to home.      Pt presented to E.D. afternoon of 22 with decreased P.O. intake, lethargy with foul smelling urine over a several day period initially tx with cipro, completed 2 doses of three. Found to have leukocytosis of 14.8, RIP with sCr 1.78 (baseline 0.58-0.73), positive UA with positive nitrates, large leuk-est, mod bacteria, mild Rt hydronephrosis, 8.1 mm calculus in Rt prox ureter on POCUS , CT A/P 22 demonstrating new Rt renal collecting system dilatation, bilateral non obstructing renal calculi. Pt admitted for sepsis secondary to UTI, pt obtained bilateral ureteral stent placement.     This short hospital course c/b RRT early this a.m. (22 0545) for hypotension with SBP 80's, bradycardia with HR 30's, hypothermia with Tmax of 32.7 (R),  initially not responsive to 1.6 liters IVF.  ECG demonstrated bradycardia with 1st AVB, slight J point elevation in anterior/lateral leads. Staff endorsed pt bradycardia with HR of 40's SBP of 90's.        Consult called for sepsis secondary to UTI s/p bilat ureteral stent placements. During consult pt received addition 1 liter LR, with IVF at 150 cc's/hr, hydrocortisone 100 mg IVP given x1.         PAST MEDICAL & SURGICAL HISTORY:  H/O cerebellar ataxia      No significant past surgical history          FAMILY HISTORY:  FH: dementia (Mother)    FH: type 2 diabetes (Mother)    Family history of CVA (Father)        SOCIAL HISTORY:  Smoking: [ ] Never Smoked [ ] Former Smoker (__ packs x ___ years) [ ] Current Smoker  (__ packs x ___ years)  Substance Use: [ ] Never Used [ ] Used ____  EtOH Use:  Marital Status: [ ] Single [ ]  [ ]  [ ]   Sexual History:   Occupation:  Recent Travel:  Country of Birth:  Advance Directives:    Allergies    No Known Allergies    Intolerances        HOME MEDICATIONS:    REVIEW OF SYSTEMS:            OBJECTIVE:  ICU Vital Signs Last 24 Hrs  T(C): 32.7 (2022 05:00), Max: 37.6 (2022 10:27)  T(F): 90.8 (2022 05:00), Max: 99.6 (2022 10:27)  HR: 36 (2022 05:30) (33 - 116)  BP: 90/58 (2022 05:30) (87/53 - 106/75)  BP(mean): 70 (2022 05:30) (65 - 75)  ABP: --  ABP(mean): --  RR: 15 (2022 04:00) (14 - 19)  SpO2: 100% (2022 05:30) (94% - 100%)    O2 Parameters below as of 2022 03:00  Patient On (Oxygen Delivery Method): room air              07-13 @ 07:01  -  07-14 @ 06:05  --------------------------------------------------------  IN: 1300 mL / OUT: 900 mL / NET: 400 mL      CAPILLARY BLOOD GLUCOSE      POCT Blood Glucose.: 116 mg/dL (2022 05:15)      PHYSICAL EXAM:        HOSPITAL MEDICATIONS:  MEDICATIONS  (STANDING):  aspirin enteric coated 81 milliGRAM(s) Oral daily  atorvastatin 10 milliGRAM(s) Oral at bedtime  cefepime   IVPB      cefepime   IVPB 1000 milliGRAM(s) IV Intermittent every 12 hours  enoxaparin Injectable 40 milliGRAM(s) SubCutaneous every 24 hours  lactated ringers. 1000 milliLiter(s) (50 mL/Hr) IV Continuous <Continuous>  lactulose Syrup 10 Gram(s) Oral every 12 hours  multivitamin 1 Tablet(s) Oral daily  senna 2 Tablet(s) Oral at bedtime  thiamine 100 milliGRAM(s) Oral daily    MEDICATIONS  (PRN):  acetaminophen     Tablet .. 650 milliGRAM(s) Oral every 6 hours PRN Temp greater or equal to 38C (100.4F), Mild Pain (1 - 3)  melatonin 3 milliGRAM(s) Oral at bedtime PRN Insomnia  ondansetron Injectable 4 milliGRAM(s) IV Push once PRN Nausea and/or Vomiting      LABS:                        10.1   9.04  )-----------( 100      ( 2022 05:21 )             31.5     Hgb Trend: 10.1<--, 14.8<--  07-14    143  |  106  |  31<H>  ----------------------------<  134<H>  3.9   |  23  |  0.90    Ca    8.5      2022 05:19  Phos  3.0     -  Mg     2.0         TPro  8.9<H>  /  Alb  3.9  /  TBili  0.8  /  DBili  x   /  AST  26  /  ALT  18  /  AlkPhos  77  07-13    Creatinine Trend: 0.90<--, 1.78<--, 0.76<--, 0.82<--, 0.63<--, 0.70<--  PT/INR - ( 2022 11:33 )   PT: 14.4 sec;   INR: 1.24 ratio         PTT - ( 2022 11:33 )  PTT:29.4 sec  Urinalysis Basic - ( 2022 11:44 )    Color: Light Orange / Appearance: Turbid / S.020 / pH: x  Gluc: x / Ketone: Small  / Bili: Negative / Urobili: Negative   Blood: x / Protein: 100 / Nitrite: Positive   Leuk Esterase: Large / RBC: 31 /hpf /  /HPF   Sq Epi: x / Non Sq Epi: 1 /hpf / Bacteria: Moderate        Venous Blood Gas:   @ 13:08  7.36/60/41/34/65.1  VBG Lactate: 1.5      MICROBIOLOGY:     RADIOLOGY:  [ ] Reviewed and interpreted by me    EKG:        Di[facility] (ext. [id number])           CHIEF COMPLAINT:    HPI:  61 yr old male with PMHx cerebellar ataxia with neurocognitive decline over a 2 yr period, chronic lacuna infarcts, minimally conversant (able to answer questions yes/no by shaking head speaking simple words, follows commands), recent hospitalizations 22-22 for AMS, asp pneum requiring intubation, septic shock with relative adrenal insufficiency found to have 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 (no intervention) at that time, Tx with antibx, steroid therapy and 6/15/22-22 for UTI tx wit antibx (CTX), and requiring midodrine therapy. Pt With Eventual improvement and d/c to home.      Pt this hospitalization presented to E.D. afternoon of 22 with decreased P.O. intake, lethargy with foul smelling urine over a several day period initially tx with cipro, completing 2 doses of three. Found to have leukocytosis of 14.8, RIP with sCr 1.78 (baseline 0.58-0.73), positive UA with positive nitrates, large leuk-est, mod bacteria, mild Rt hydronephrosis, 8.1 mm calculus in Rt prox ureter on POCUS , CT A/P 22 demonstrating new Rt renal collecting system dilatation, bilateral non obstructing renal calculi. Pt admitted for sepsis secondary to UTI and obtained bilateral ureteral stent placement.     This short hospital course c/b RRT early this a.m. (22 0545) for hypotension with SBP 80's, bradycardia with HR 30's, hypothermia with Tmax of 32.7 (R),  initially not responsive to 1.6 liters IVF.  ECG demonstrated bradycardia with 1st AVB, slight J point elevation in anterior/lateral leads. Staff endorsed pt bradycardia with HR of 40's SBP of 90's.        Consult called for sepsis secondary to UTI s/p bilat ureteral stent placements. During consult pt received addition 1 liter LR, with IVF at 150 cc's/hr, hydrocortisone 100 mg IVP given x1.         PAST MEDICAL & SURGICAL HISTORY:  H/O cerebellar ataxia      No significant past surgical history          FAMILY HISTORY:  FH: dementia (Mother)    FH: type 2 diabetes (Mother)    Family history of CVA (Father)        SOCIAL HISTORY:  Smoking: [ ] Never Smoked [ ] Former Smoker (__ packs x ___ years) [ ] Current Smoker  (__ packs x ___ years)  Substance Use: [ ] Never Used [ ] Used ____  EtOH Use:  Marital Status: [ ] Single [ ]  [ ]  [ ]   Sexual History:   Occupation:  Recent Travel:  Country of Birth:  Advance Directives:    Allergies    No Known Allergies    Intolerances        HOME MEDICATIONS:    REVIEW OF SYSTEMS:  pt minimal conversational however answers questions yes and no by speaking simple words and shaking head. Denies c/p/sob/lightheadedness/dizziness/N/pain    OBJECTIVE:  ICU Vital Signs Last 24 Hrs  T(C): 32.7 (2022 05:00), Max: 37.6 (2022 10:27)  T(F): 90.8 (2022 05:00), Max: 99.6 (2022 10:27)  HR: 36 (2022 05:30) (33 - 116)  BP: 90/58 (2022 05:30) (87/53 - 106/75)  BP(mean): 70 (2022 05:30) (65 - 75)  ABP: --  ABP(mean): --  RR: 15 (2022 04:00) (14 - 19)  SpO2: 100% (2022 05:30) (94% - 100%)    O2 Parameters below as of 2022 03:00  Patient On (Oxygen Delivery Method): room air               @ 07:01  -  07-14 @ 06:05  --------------------------------------------------------  IN: 1300 mL / OUT: 900 mL / NET: 400 mL      CAPILLARY BLOOD GLUCOSE      POCT Blood Glucose.: 116 mg/dL (2022 05:15)    VITAL SIGNS AT TIME OF CONSULT  BP: 96/56  ; HR:38 (bradycardia with 1st degree AVB)   ; RR:22   ; SPO2: 100% (3 liters N/C)  ; Temp: 34 (R)      PHYSICAL EXAM:  GENERAL:   NAD, well-groomed, well-developed    HEAD:    Atraumatic, Normocephalic    EYES:   PERRL 3 mm, conjunctiva and sclera clear    ENMT:   No oropharyngeal exudates, erythema or lesions,  Moist mucous membranes    NECK:   Supple, no cervical lymphadenopathy, no JVD    NERVOUS SYSTEM:  awake, focuses upon examiner, Oriented X2 answers questions by speaking simple words yes/no and shaking head. follows simple commands to demonstrate digits and extremities,  CN II-XII intact, in addition has spontaneous purposeful movement of all extremities  ; Upper extremities  3/5; Lower extremities 2-3/5, full sensation to light touch     CHEST/LUNG:   utilizing 3 liters N/C  .Breath sounds bilaterally, diminished in lower lung fields bases to 1/4 up, without crackles, rhonchi, wheezing, or rubs. POCUS A line predominant anteriorly with few focal B lines    HEART:   cardiac monitor bradycardia with 1st degree AVB rate of 30's to 40's  ; S1/S2 No murmurs, rubs, or gallops, POCUS Good LVFx, RV<LV, septal thickening appreciated    ABDOMEN:   Soft, Nontender, Nondistended; Bowel sounds present, Bladder non distended, non palpable    EXTREMITIES:   Pulses palpable radial pulses 2+ bilat, DP/PT 2+/1+ bilat, without clubbing, cyanosis. Digits warm to touch with good cap refill < 3 secs    SKIN:   slight cool dry, intact, normal color, no rash or abnormal lesions, without palpable nodes      HOSPITAL MEDICATIONS:  MEDICATIONS  (STANDING):  aspirin enteric coated 81 milliGRAM(s) Oral daily  atorvastatin 10 milliGRAM(s) Oral at bedtime  cefepime   IVPB      cefepime   IVPB 1000 milliGRAM(s) IV Intermittent every 12 hours  enoxaparin Injectable 40 milliGRAM(s) SubCutaneous every 24 hours  lactated ringers. 1000 milliLiter(s) (50 mL/Hr) IV Continuous <Continuous>  lactulose Syrup 10 Gram(s) Oral every 12 hours  multivitamin 1 Tablet(s) Oral daily  senna 2 Tablet(s) Oral at bedtime  thiamine 100 milliGRAM(s) Oral daily    MEDICATIONS  (PRN):  acetaminophen     Tablet .. 650 milliGRAM(s) Oral every 6 hours PRN Temp greater or equal to 38C (100.4F), Mild Pain (1 - 3)  melatonin 3 milliGRAM(s) Oral at bedtime PRN Insomnia  ondansetron Injectable 4 milliGRAM(s) IV Push once PRN Nausea and/or Vomiting      LABS:                        10.1   9.04  )-----------( 100      ( 2022 05:21 )             31.5     Hgb Trend: 10.1<--, 14.8<--  07-14    143  |  106  |  31<H>  ----------------------------<  134<H>  3.9   |  23  |  0.90    Ca    8.5      2022 05:19  Phos  3.0       Mg     2.0         TPro  8.9<H>  /  Alb  3.9  /  TBili  0.8  /  DBili  x   /  AST  26  /  ALT  18  /  AlkPhos  77      Creatinine Trend: 0.90<--, 1.78<--, 0.76<--, 0.82<--, 0.63<--, 0.70<--  PT/INR - ( 2022 11:33 )   PT: 14.4 sec;   INR: 1.24 ratio         PTT - ( 2022 11:33 )  PTT:29.4 sec  Urinalysis Basic - ( 2022 11:44 )    Color: Light Orange / Appearance: Turbid / S.020 / pH: x  Gluc: x / Ketone: Small  / Bili: Negative / Urobili: Negative   Blood: x / Protein: 100 / Nitrite: Positive   Leuk Esterase: Large / RBC: 31 /hpf /  /HPF   Sq Epi: x / Non Sq Epi: 1 /hpf / Bacteria: Moderate        Venous Blood Gas:   @ 13:08  7.36/60/41/34/65.1  VBG Lactate: 1.5      MICROBIOLOGY:     RADIOLOGY:  [ ] Reviewed and interpreted by me    EKG:        Dieter ANP-BC (ext. 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 Pt With Eventual improvement and d/c to home.      Pt this hospitalization presented to E.D. afternoon of 22 with decreased P.O. intake, lethargy with foul smelling urine over a several day period initially tx with cipro, completing 2 doses of three. Found to have leukocytosis of 14.8, RIP with sCr 1.78 (baseline 0.58-0.73), positive UA with positive nitrates, large leuk-est, mod bacteria, mild Rt hydronephrosis, 8.1 mm calculus in Rt prox ureter on POCUS , CT A/P 22 demonstrating new Rt renal collecting system dilatation, bilateral non obstructing renal calculi. Pt admitted for sepsis secondary to UTI and obtained bilateral ureteral stent placement.     This short hospital course c/b RRT early this a.m. (22 0545) for hypotension with SBP 80's, bradycardia with HR 30's, hypothermia with Tmax of 32.7 (R),  initially not responsive to 1.6 liters IVF.  ECG demonstrated bradycardia with 1st AVB, slight J point elevation in anterior/lateral leads. Staff endorsed pt bradycardia with HR of 40's SBP of 90's.        Consult called for sepsis secondary to UTI s/p bilat ureteral stent placements. During consult pt received addition 1 liter LR, with IVF at 150 cc's/hr, hydrocortisone 100 mg IVP given x1.         PAST MEDICAL & SURGICAL HISTORY:  H/O cerebellar ataxia      No significant past surgical history          FAMILY HISTORY:  FH: dementia (Mother)    FH: type 2 diabetes (Mother)    Family history of CVA (Father)        SOCIAL HISTORY:  Smoking: [ ] Never Smoked [ ] Former Smoker (__ packs x ___ years) [ ] Current Smoker  (__ packs x ___ years)  Substance Use: [ ] Never Used [ ] Used ____  EtOH Use:  Marital Status: [ ] Single [ ]  [ ]  [ ]   Sexual History:   Occupation:  Recent Travel:  Country of Birth:  Advance Directives:    Allergies    No Known Allergies    Intolerances        HOME MEDICATIONS:    REVIEW OF SYSTEMS:  pt minimal conversational however answers questions yes and no by speaking simple words and shaking head. Denies c/p/sob/lightheadedness/dizziness/N/pain    OBJECTIVE:  ICU Vital Signs Last 24 Hrs  T(C): 32.7 (2022 05:00), Max: 37.6 (2022 10:27)  T(F): 90.8 (2022 05:00), Max: 99.6 (2022 10:27)  HR: 36 (2022 05:30) (33 - 116)  BP: 90/58 (2022 05:30) (87/53 - 106/75)  BP(mean): 70 (2022 05:30) (65 - 75)  ABP: --  ABP(mean): --  RR: 15 (2022 04:00) (14 - 19)  SpO2: 100% (2022 05:30) (94% - 100%)    O2 Parameters below as of 2022 03:00  Patient On (Oxygen Delivery Method): room air              07-13 @ 07:01  -  07-14 @ 06:05  --------------------------------------------------------  IN: 1300 mL / OUT: 900 mL / NET: 400 mL      CAPILLARY BLOOD GLUCOSE      POCT Blood Glucose.: 116 mg/dL (2022 05:15)    VITAL SIGNS AT TIME OF CONSULT  BP: 96/56  ; HR:38 (bradycardia with 1st degree AVB)   ; RR:22   ; SPO2: 100% (3 liters N/C)  ; Temp: 34 (R)      PHYSICAL EXAM:  GENERAL:   NAD, well-groomed, well-developed    HEAD:    Atraumatic, Normocephalic    EYES:   PERRL 3 mm, conjunctiva and sclera clear    ENMT:   No oropharyngeal exudates, erythema or lesions,  Moist mucous membranes    NECK:   Supple, no cervical lymphadenopathy, no JVD    NERVOUS SYSTEM:  awake, focuses upon examiner, Oriented X2 answers questions by speaking simple words yes/no and shaking head. follows simple commands to demonstrate digits and extremities,  CN II-XII intact, in addition has spontaneous purposeful movement of all extremities  ; Upper extremities  3/5; Lower extremities 2-3/5, full sensation to light touch     CHEST/LUNG:   utilizing 3 liters N/C  .Breath sounds bilaterally, diminished in lower lung fields bases to 1/4 up, without crackles, rhonchi, wheezing, or rubs. POCUS A line predominant anteriorly with few focal B lines    HEART:   cardiac monitor bradycardia with 1st degree AVB rate of 30's to 40's  ; S1/S2 No murmurs, rubs, or gallops, POCUS Good LVFx, RV<LV, septal thickening appreciated    ABDOMEN:   Soft, Nontender, Nondistended; Bowel sounds present, Bladder non distended, non palpable    EXTREMITIES:   Pulses palpable radial pulses 2+ bilat, DP/PT 2+/1+ bilat, without clubbing, cyanosis. Digits warm to touch with good cap refill < 3 secs    SKIN:   slight cool dry, intact, normal color, no rash or abnormal lesions, without palpable nodes      HOSPITAL MEDICATIONS:  MEDICATIONS  (STANDING):  aspirin enteric coated 81 milliGRAM(s) Oral daily  atorvastatin 10 milliGRAM(s) Oral at bedtime  cefepime   IVPB      cefepime   IVPB 1000 milliGRAM(s) IV Intermittent every 12 hours  enoxaparin Injectable 40 milliGRAM(s) SubCutaneous every 24 hours  lactated ringers. 1000 milliLiter(s) (50 mL/Hr) IV Continuous <Continuous>  lactulose Syrup 10 Gram(s) Oral every 12 hours  multivitamin 1 Tablet(s) Oral daily  senna 2 Tablet(s) Oral at bedtime  thiamine 100 milliGRAM(s) Oral daily    MEDICATIONS  (PRN):  acetaminophen     Tablet .. 650 milliGRAM(s) Oral every 6 hours PRN Temp greater or equal to 38C (100.4F), Mild Pain (1 - 3)  melatonin 3 milliGRAM(s) Oral at bedtime PRN Insomnia  ondansetron Injectable 4 milliGRAM(s) IV Push once PRN Nausea and/or Vomiting      LABS:                        10.1   9.04  )-----------( 100      ( 2022 05:21 )             31.5     Hgb Trend: 10.1<--, 14.8<--  07-14    143  |  106  |  31<H>  ----------------------------<  134<H>  3.9   |  23  |  0.90    Ca    8.5      2022 05:19  Phos  3.0       Mg     2.0         TPro  8.9<H>  /  Alb  3.9  /  TBili  0.8  /  DBili  x   /  AST  26  /  ALT  18  /  AlkPhos  77      Creatinine Trend: 0.90<--, 1.78<--, 0.76<--, 0.82<--, 0.63<--, 0.70<--  PT/INR - ( 2022 11:33 )   PT: 14.4 sec;   INR: 1.24 ratio         PTT - ( 2022 11:33 )  PTT:29.4 sec  Urinalysis Basic - ( 2022 11:44 )    Color: Light Orange / Appearance: Turbid / S.020 / pH: x  Gluc: x / Ketone: Small  / Bili: Negative / Urobili: Negative   Blood: x / Protein: 100 / Nitrite: Positive   Leuk Esterase: Large / RBC: 31 /hpf /  /HPF   Sq Epi: x / Non Sq Epi: 1 /hpf / Bacteria: Moderate        Venous Blood Gas:   @ 13:08  7.36/60/41/34/65.1  VBG Lactate: 1.5      MICROBIOLOGY:     RADIOLOGY:  [ ] Reviewed and interpreted by me    EKG:        Dieter ANP-BC (ext. 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 Pt With Eventual improvement and d/c to home.      Pt this hospitalization presented to E.D. afternoon of 22 with decreased P.O. intake, lethargy with foul smelling urine over a several day period initially tx with cipro, completing 2 doses of three. Found to have leukocytosis of 14.8, RIP with sCr 1.78 (baseline 0.58-0.73), positive UA with positive nitrates, large leuk-est, mod bacteria, mild Rt hydronephrosis, 8.1 mm calculus in Rt prox ureter on POCUS , CT A/P 22 demonstrating new Rt renal collecting system dilatation, bilateral non obstructing renal calculi. Pt admitted for sepsis secondary to UTI and obtained bilateral ureteral stent placement.     This short hospital course c/b RRT early this a.m. (22 0545) for hypotension with SBP 80's, bradycardia with HR 30's, hypothermia with Tmax of 32.7 (R),  initially not responsive to 1.6 liters IVF.  ECG demonstrated bradycardia with 1st AVB, slight J point elevation in anterior/lateral leads. Staff endorsed pt bradycardia with HR of 40's SBP of 90's.        Consult called for sepsis secondary to UTI s/p bilat ureteral stent placements. During consult pt received addition 1 liter LR, with IVF at 150 cc's/hr, hydrocortisone 100 mg IVP given x1.       Pt with improvement in vital signs, currently not candidate for MICU admission. Please re consult if we can be of further assistance with this patient    PAST MEDICAL & SURGICAL HISTORY:  H/O cerebellar ataxia      No significant past surgical history          FAMILY HISTORY:  FH: dementia (Mother)    FH: type 2 diabetes (Mother)    Family history of CVA (Father)        SOCIAL HISTORY:  Smoking: [ ] Never Smoked [ ] Former Smoker (__ packs x ___ years) [ ] Current Smoker  (__ packs x ___ years)  Substance Use: [ ] Never Used [ ] Used ____  EtOH Use:  Marital Status: [ ] Single [ ]  [ ]  [ ]   Sexual History:   Occupation:  Recent Travel:  Country of Birth:  Advance Directives:    Allergies    No Known Allergies    Intolerances        HOME MEDICATIONS:    REVIEW OF SYSTEMS:  pt minimal conversational however answers questions yes and no by speaking simple words and shaking head. Denies c/p/sob/lightheadedness/dizziness/N/pain    OBJECTIVE:  ICU Vital Signs Last 24 Hrs  T(C): 32.7 (2022 05:00), Max: 37.6 (2022 10:27)  T(F): 90.8 (2022 05:00), Max: 99.6 (2022 10:27)  HR: 36 (2022 05:30) (33 - 116)  BP: 90/58 (2022 05:30) (87/53 - 106/75)  BP(mean): 70 (2022 05:30) (65 - 75)  ABP: --  ABP(mean): --  RR: 15 (2022 04:00) (14 - 19)  SpO2: 100% (2022 05:30) (94% - 100%)    O2 Parameters below as of 2022 03:00  Patient On (Oxygen Delivery Method): room air              - @ 07:  -  14 @ 06:05  --------------------------------------------------------  IN: 1300 mL / OUT: 900 mL / NET: 400 mL      CAPILLARY BLOOD GLUCOSE      POCT Blood Glucose.: 116 mg/dL (2022 05:15)    VITAL SIGNS AT TIME OF CONSULT  BP: 96/56  ; HR:38 (bradycardia with 1st degree AVB)   ; RR:22   ; SPO2: 100% (3 liters N/C)  ; Temp: 34 (R)      PHYSICAL EXAM:  GENERAL:   NAD, well-groomed, well-developed    HEAD:    Atraumatic, Normocephalic    EYES:   PERRL 3 mm, conjunctiva and sclera clear    ENMT:   No oropharyngeal exudates, erythema or lesions,  Moist mucous membranes    NECK:   Supple, no cervical lymphadenopathy, no JVD    NERVOUS SYSTEM:  awake, focuses upon examiner, Oriented X2 answers questions by speaking simple words yes/no and shaking head. follows simple commands to demonstrate digits and extremities,  CN II-XII intact, in addition has spontaneous purposeful movement of all extremities  ; Upper extremities  3/5; Lower extremities 2-3/5, full sensation to light touch     CHEST/LUNG:   utilizing 3 liters N/C  .Breath sounds bilaterally, diminished in lower lung fields bases to 1/4 up, without crackles, rhonchi, wheezing, or rubs. POCUS A line predominant anteriorly with few focal B lines    HEART:   cardiac monitor bradycardia with 1st degree AVB rate of 30's to 40's  ; S1/S2 No murmurs, rubs, or gallops, POCUS Good LVFx, RV<LV, septal thickening appreciated    ABDOMEN:   Soft, Nontender, Nondistended; Bowel sounds present, Bladder non distended, non palpable    EXTREMITIES:   Pulses palpable radial pulses 2+ bilat, DP/PT 2+/1+ bilat, without clubbing, cyanosis. Digits warm to touch with good cap refill < 3 secs    SKIN:   slight cool dry, intact, normal color, no rash or abnormal lesions, without palpable nodes      HOSPITAL MEDICATIONS:  MEDICATIONS  (STANDING):  aspirin enteric coated 81 milliGRAM(s) Oral daily  atorvastatin 10 milliGRAM(s) Oral at bedtime  cefepime   IVPB      cefepime   IVPB 1000 milliGRAM(s) IV Intermittent every 12 hours  enoxaparin Injectable 40 milliGRAM(s) SubCutaneous every 24 hours  lactated ringers. 1000 milliLiter(s) (50 mL/Hr) IV Continuous <Continuous>  lactulose Syrup 10 Gram(s) Oral every 12 hours  multivitamin 1 Tablet(s) Oral daily  senna 2 Tablet(s) Oral at bedtime  thiamine 100 milliGRAM(s) Oral daily    MEDICATIONS  (PRN):  acetaminophen     Tablet .. 650 milliGRAM(s) Oral every 6 hours PRN Temp greater or equal to 38C (100.4F), Mild Pain (1 - 3)  melatonin 3 milliGRAM(s) Oral at bedtime PRN Insomnia  ondansetron Injectable 4 milliGRAM(s) IV Push once PRN Nausea and/or Vomiting      LABS:                        10.1   9.04  )-----------( 100      ( 2022 05:21 )             31.5     Hgb Trend: 10.1<--, 14.8<--  07-14    143  |  106  |  31<H>  ----------------------------<  134<H>  3.9   |  23  |  0.90    Ca    8.5      2022 05:19  Phos  3.0       Mg     2.0         TPro  8.9<H>  /  Alb  3.9  /  TBili  0.8  /  DBili  x   /  AST  26  /  ALT  18  /  AlkPhos  77      Creatinine Trend: 0.90<--, 1.78<--, 0.76<--, 0.82<--, 0.63<--, 0.70<--  PT/INR - ( 2022 11:33 )   PT: 14.4 sec;   INR: 1.24 ratio         PTT - ( 2022 11:33 )  PTT:29.4 sec  Urinalysis Basic - ( 2022 11:44 )    Color: Light Orange / Appearance: Turbid / S.020 / pH: x  Gluc: x / Ketone: Small  / Bili: Negative / Urobili: Negative   Blood: x / Protein: 100 / Nitrite: Positive   Leuk Esterase: Large / RBC: 31 /hpf /  /HPF   Sq Epi: x / Non Sq Epi: 1 /hpf / Bacteria: Moderate        Venous Blood Gas:   @ 13:08  7.36/60/41/34/65.1  VBG Lactate: 1.5      MICROBIOLOGY:     RADIOLOGY:  [ ] Reviewed and interpreted by me    EKG:        Dieter ANP-BC (ext. [id number])

## 2022-07-14 NOTE — CHART NOTE - NSCHARTNOTEFT_GEN_A_CORE
Notified by RN, patient hypothermic s/p b/l ureteral stent placement.  Patient seen and exai    Vital Signs Last 24 Hrs  T(C): 32.4 (14 Jul 2022 03:30), Max: 37.6 (13 Jul 2022 10:27)  T(F): 90.3 (14 Jul 2022 03:30), Max: 99.6 (13 Jul 2022 10:27)  HR: 33 (14 Jul 2022 05:00) (33 - 116)  BP: 88/55 (14 Jul 2022 05:00) (87/53 - 106/75)  BP(mean): 67 (14 Jul 2022 05:00) (65 - 75)  RR: 15 (14 Jul 2022 04:00) (14 - 19)  SpO2: 99% (14 Jul 2022 05:00) (94% - 100%)    Parameters below as of 14 Jul 2022 03:00  Patient On (Oxygen Delivery Method): room air          Labs:                          14.8   14.87 )-----------( 216      ( 13 Jul 2022 11:33 )             46.0     07-13    145  |  100  |  46<H>  ----------------------------<  95  4.4   |  28  |  1.78<H>    Ca    10.6<H>      13 Jul 2022 11:33    TPro  8.9<H>  /  Alb  3.9  /  TBili  0.8  /  DBili  x   /  AST  26  /  ALT  18  /  AlkPhos  77  07-13        Radiology:    Physical Exam:  General: WN/WD NAD  Neurology: A&Ox3, nonfocal, HARPER x 4  Head:  Normocephalic, atraumatic  Respiratory: CTA B/L  CV: RRR, S1S2, no murmur  Abdominal: Soft, NT, ND no palpable mass  MSK: No edema, + peripheral pulses, FROM all 4 extremity    Assessment & Plan:  HPI:  61M w/ hx of cerebellar ataxia with rapid neurocognitive decline (baseline AOx1-2, minimally conversant) recent prolonged admission including MICU stay/ intubation from 4/18-5/13 for AMS and airway protection, recent admission for UTI treated with 5 day course of CTX, brought in by wife for decreased PO intake and lethargy for several days with "white and thick" and foul smelling urine since 7/8. Hx obtained from wife due to patient's encephalopathy. PCP recently prescribed cipro 500 mg BID x 3 days (pt took 2 days) and wife thinks urine has improved since then. However, he has been eating less since and has been lethargic with minimal responsiveness which prompted the ED evaluation. ROS limited due to patient's mental status. Wife has noted pt has been constipated and gave him a suppository and he subsequently had one bowel movement. She noted a few red specks but no overt bleeding or black stool.  (13 Jul 2022 13:02)    >  >  >  >        Follow up with Attending in AM. Notified by RN, patient hypothermic, bradycardic 38bpm, and hypotensive SBP 80s s/p b/l ureteral stent placement.  NS 1L, EKG ordered, bear jasekwabena  RRT called      Vital Signs Last 24 Hrs  T(C): 32.4 (14 Jul 2022 03:30), Max: 37.6 (13 Jul 2022 10:27)  T(F): 90.3 (14 Jul 2022 03:30), Max: 99.6 (13 Jul 2022 10:27)  HR: 33 (14 Jul 2022 05:00) (33 - 116)  BP: 88/55 (14 Jul 2022 05:00) (87/53 - 106/75)  BP(mean): 67 (14 Jul 2022 05:00) (65 - 75)  RR: 15 (14 Jul 2022 04:00) (14 - 19)  SpO2: 99% (14 Jul 2022 05:00) (94% - 100%)    Parameters below as of 14 Jul 2022 03:00  Patient On (Oxygen Delivery Method): room air          Labs:                          14.8   14.87 )-----------( 216      ( 13 Jul 2022 11:33 )             46.0     07-13    145  |  100  |  46<H>  ----------------------------<  95  4.4   |  28  |  1.78<H>    Ca    10.6<H>      13 Jul 2022 11:33    TPro  8.9<H>  /  Alb  3.9  /  TBili  0.8  /  DBili  x   /  AST  26  /  ALT  18  /  AlkPhos  77  07-13      Radiology:  us< from: US Kidney and Bladder (07.13.22 @ 16:01) >    IMPRESSION:    Mild right-sided hydronephrosis which appears to be secondary to an 8.1   mm calculus within the proximal right ureter. The dilatation of the right   renal collecting system is a new finding when compared to CT 4/23/2022.   The calculus at the proximal right ureter was seen previously.  Bilateral nonobstructing renal calculi. The previously seen calculus at   the left UPJ junction is not identified on this study.    Marked debris within the urinary bladder including tiny foci of air and   possible tiny foci of calculi.  A dependent small bladder calculus is also likely appreciated.  Please correlate with urinalysis for the above including marked isoechoic   debris.    < end of copied text >    Physical Exam:  General: NAD  Neurology: A&Ox to self only  Head:  Normocephalic, atraumatic  Respiratory: CTA B/L  CV: bradycardic  Abdominal: Soft, NT, ND   : +Barnett with blood tinged urine  MSK: No edema,     Assessment & Plan:  61M w/ hx of cerebellar ataxia with rapid neurocognitive decline (baseline AOx1-2, minimally conversant) recent prolonged admission including MICU stay/ intubation from 4/18-5/13 for AMS and airway protection, recent admission for UTI treated with 5 day course of CTX, brought in by wife for decreased PO intake and lethargy admitted with   right proximal ureteral calculus, UTI. s/p B/L ureteral stent placement. Patient now with hypothermia, bradycardia, and hypotension.    # Sepsis likely 2/2 UTI  - RRT called  - NS 1L x1  - EKG Sinus lukas with 1st degree HB  - bear hugger  - labs including lactate and procal  - c/w cefepime  - f/u Ucx, Bcx  - MICU consult called  - f/u with Attending in am  -         Follow up with Attending in AM. Notified by RN, patient hypothermic, bradycardic 38bpm, and hypotensive SBP 80s s/p b/l ureteral stent placement.  NS 1L, EKG ordered, bear jasekwabena  RRT called      Vital Signs Last 24 Hrs  T(C): 32.4 (14 Jul 2022 03:30), Max: 37.6 (13 Jul 2022 10:27)  T(F): 90.3 (14 Jul 2022 03:30), Max: 99.6 (13 Jul 2022 10:27)  HR: 33 (14 Jul 2022 05:00) (33 - 116)  BP: 88/55 (14 Jul 2022 05:00) (87/53 - 106/75)  BP(mean): 67 (14 Jul 2022 05:00) (65 - 75)  RR: 15 (14 Jul 2022 04:00) (14 - 19)  SpO2: 99% (14 Jul 2022 05:00) (94% - 100%)    Parameters below as of 14 Jul 2022 03:00  Patient On (Oxygen Delivery Method): room air          Labs:                          14.8   14.87 )-----------( 216      ( 13 Jul 2022 11:33 )             46.0     07-13    145  |  100  |  46<H>  ----------------------------<  95  4.4   |  28  |  1.78<H>    Ca    10.6<H>      13 Jul 2022 11:33    TPro  8.9<H>  /  Alb  3.9  /  TBili  0.8  /  DBili  x   /  AST  26  /  ALT  18  /  AlkPhos  77  07-13      Radiology:  us< from: US Kidney and Bladder (07.13.22 @ 16:01) >    IMPRESSION:    Mild right-sided hydronephrosis which appears to be secondary to an 8.1   mm calculus within the proximal right ureter. The dilatation of the right   renal collecting system is a new finding when compared to CT 4/23/2022.   The calculus at the proximal right ureter was seen previously.  Bilateral nonobstructing renal calculi. The previously seen calculus at   the left UPJ junction is not identified on this study.    Marked debris within the urinary bladder including tiny foci of air and   possible tiny foci of calculi.  A dependent small bladder calculus is also likely appreciated.  Please correlate with urinalysis for the above including marked isoechoic   debris.    < end of copied text >    Physical Exam:  General: NAD  Neurology: A&Ox to self only  Head:  Normocephalic, atraumatic  Respiratory: CTA B/L  CV: bradycardic  Abdominal: Soft, NT, ND   : +Barnett with blood tinged urine  MSK: No edema,     Assessment & Plan:  61M w/ hx of cerebellar ataxia with rapid neurocognitive decline (baseline AOx1-2, minimally conversant) recent prolonged admission including MICU stay/ intubation from 4/18-5/13 for AMS and airway protection, recent admission for UTI treated with 5 day course of CTX, brought in by wife for decreased PO intake and lethargy admitted with   right proximal ureteral calculus, UTI. s/p B/L ureteral stent placement. Patient now with hypothermia, bradycardia, and hypotension.    # Sepsis likely 2/2 UTI  - pt hypotensive and hypothermic  - RRT called, at bedside  - NS 1L x1  - bear hugger  - labs including lactate and procal  - c/w cefepime  - f/u Ucx, Bcx  - f/u CT A/P, pending read  - Cardiology consult called, pending     #Bradycardia  -Cardiology consult called, pending   -EKG Sinus lukas with 1st degree HB  -monitor on tele  -monitor VS closely    Follow up with Attending in AM. Notified by RN, patient hypothermic, bradycardic 38bpm, and hypotensive SBP 80s s/p b/l ureteral stent placement.  Patient seen and examined at bedside.  RRT called. NS 1L, EKG, bear hugger, and labs ordered    Vital Signs Last 24 Hrs  T(C): 32.4 (14 Jul 2022 03:30), Max: 37.6 (13 Jul 2022 10:27)  T(F): 90.3 (14 Jul 2022 03:30), Max: 99.6 (13 Jul 2022 10:27)  HR: 33 (14 Jul 2022 05:00) (33 - 116)  BP: 88/55 (14 Jul 2022 05:00) (87/53 - 106/75)  BP(mean): 67 (14 Jul 2022 05:00) (65 - 75)  RR: 15 (14 Jul 2022 04:00) (14 - 19)  SpO2: 99% (14 Jul 2022 05:00) (94% - 100%)    Parameters below as of 14 Jul 2022 03:00  Patient On (Oxygen Delivery Method): room air    Labs:                          14.8   14.87 )-----------( 216      ( 13 Jul 2022 11:33 )             46.0     07-13    145  |  100  |  46<H>  ----------------------------<  95  4.4   |  28  |  1.78<H>    Ca    10.6<H>      13 Jul 2022 11:33    TPro  8.9<H>  /  Alb  3.9  /  TBili  0.8  /  DBili  x   /  AST  26  /  ALT  18  /  AlkPhos  77  07-13      Radiology:  us< from: US Kidney and Bladder (07.13.22 @ 16:01) >    IMPRESSION:    Mild right-sided hydronephrosis which appears to be secondary to an 8.1   mm calculus within the proximal right ureter. The dilatation of the right   renal collecting system is a new finding when compared to CT 4/23/2022.   The calculus at the proximal right ureter was seen previously.  Bilateral nonobstructing renal calculi. The previously seen calculus at   the left UPJ junction is not identified on this study.    Marked debris within the urinary bladder including tiny foci of air and   possible tiny foci of calculi.  A dependent small bladder calculus is also likely appreciated.  Please correlate with urinalysis for the above including marked isoechoic   debris.    < end of copied text >    Physical Exam:  General: no apparent distress noted  Neurology: awake, A&Ox to self only  Head:  Normocephalic, atraumatic  Respiratory: CTA B/L  CV: bradycardic 38bpm  Abdominal: Soft, NT, ND   : +Barnett with blood tinged urine  MSK: No edema    Assessment & Plan:  61M w/ hx of cerebellar ataxia with rapid neurocognitive decline (baseline AOx1-2, minimally conversant) recent prolonged admission including MICU stay/ intubation from 4/18-5/13 for AMS and airway protection, recent admission for UTI treated with 5 day course of CTX, brought in by wife for decreased PO intake and lethargy admitted with   right proximal ureteral calculus, UTI. s/p B/L ureteral stent placement. Patient now with hypothermia, bradycardia, and hypotension.    # Hypothermia, hypotension, bradycardia- possibly 2/2 Urosepsis vs Anesthesia  - pt hypotensive and hypothermic  - RRT called, at bedside  - NS 1L x1  - bear hugger  - labs including lactate and procal  - c/w cefepime  - f/u Ucx, Bcx  - f/u CT A/P, pending read  - RRT labs: type and screen, CBC, BMP, lactate, TSH/T3/T4, cortisol  - Cardiology consult called, pending   - MICU consulted, at bedside    #Bradycardia  -Cardiology consult called, pending   -EKG Sinus lukas with 1st degree HB  -R2 pads on pt  -monitor on tele  -monitor VS closely  -f/u RRT labs    Dr. Correa called, awaiting a call back  Pt's spouse Antwan Rojas notified of above    Kaila Stratton, NP  Medicine  [id number] Notified by RN, patient hypothermic, bradycardic 38bpm, and hypotensive SBP 80s s/p b/l ureteral stent placement.  Patient seen and examined at bedside.  RRT called. NS 1L, EKG, bear hugger, and labs ordered    Vital Signs Last 24 Hrs  T(C): 32.4 (14 Jul 2022 03:30), Max: 37.6 (13 Jul 2022 10:27)  T(F): 90.3 (14 Jul 2022 03:30), Max: 99.6 (13 Jul 2022 10:27)  HR: 33 (14 Jul 2022 05:00) (33 - 116)  BP: 88/55 (14 Jul 2022 05:00) (87/53 - 106/75)  BP(mean): 67 (14 Jul 2022 05:00) (65 - 75)  RR: 15 (14 Jul 2022 04:00) (14 - 19)  SpO2: 99% (14 Jul 2022 05:00) (94% - 100%)    Parameters below as of 14 Jul 2022 03:00  Patient On (Oxygen Delivery Method): room air    Labs:                          14.8   14.87 )-----------( 216      ( 13 Jul 2022 11:33 )             46.0     07-13    145  |  100  |  46<H>  ----------------------------<  95  4.4   |  28  |  1.78<H>    Ca    10.6<H>      13 Jul 2022 11:33    TPro  8.9<H>  /  Alb  3.9  /  TBili  0.8  /  DBili  x   /  AST  26  /  ALT  18  /  AlkPhos  77  07-13      Radiology:  us< from: US Kidney and Bladder (07.13.22 @ 16:01) >    IMPRESSION:    Mild right-sided hydronephrosis which appears to be secondary to an 8.1   mm calculus within the proximal right ureter. The dilatation of the right   renal collecting system is a new finding when compared to CT 4/23/2022.   The calculus at the proximal right ureter was seen previously.  Bilateral nonobstructing renal calculi. The previously seen calculus at   the left UPJ junction is not identified on this study.    Marked debris within the urinary bladder including tiny foci of air and   possible tiny foci of calculi.  A dependent small bladder calculus is also likely appreciated.  Please correlate with urinalysis for the above including marked isoechoic   debris.    < end of copied text >    Physical Exam:  General: no apparent distress noted  Neurology: awake, A&Ox to self only  Head:  Normocephalic, atraumatic  Respiratory: CTA B/L  CV: bradycardic 38bpm  Abdominal: Soft, NT, ND   : +Barnett with blood tinged urine  MSK: No edema    Assessment & Plan:  61M w/ hx of cerebellar ataxia with rapid neurocognitive decline (baseline AOx1-2, minimally conversant) recent prolonged admission including MICU stay/ intubation from 4/18-5/13 for AMS and airway protection, recent admission for UTI treated with 5 day course of CTX, brought in by wife for decreased PO intake and lethargy admitted with   right proximal ureteral calculus, UTI. s/p B/L ureteral stent placement. Patient now with hypothermia, bradycardia, and hypotension.    # Hypothermia, hypotension, bradycardia- possibly 2/2 Urosepsis vs Anesthesia  - pt hypotensive and hypothermic  - RRT called, at bedside  - NS 1L x1  - bear hugger  - labs including lactate and procal  - c/w cefepime  - f/u Ucx, Bcx  - f/u CT A/P, pending read  - RRT labs: type and screen, CBC, BMP, lactate, TSH/T3/T4, cortisol  - Cardiology consult called, pending   - MICU consulted, at bedside    #Bradycardia  -Cardiology consult called, pending   -EKG Sinus lukas with 1st degree HB  -R2 pads on pt  -send CE  -monitor on tele  -monitor VS closely  -f/u RRT labs    Dr. Correa called, awaiting a call back  Pt's spouse Antwan Rojas notified of above and in agreement with the plan    Kaila Stratton, NP  Medicine  [id number] Notified by RN, patient hypothermic, bradycardic 38bpm, and hypotensive SBP 80s s/p b/l ureteral stent placement.  Patient seen and examined at bedside.  RRT called. NS 1L, EKG, bear hugger, and labs ordered    Vital Signs Last 24 Hrs  T(C): 32.4 (14 Jul 2022 03:30), Max: 37.6 (13 Jul 2022 10:27)  T(F): 90.3 (14 Jul 2022 03:30), Max: 99.6 (13 Jul 2022 10:27)  HR: 33 (14 Jul 2022 05:00) (33 - 116)  BP: 88/55 (14 Jul 2022 05:00) (87/53 - 106/75)  BP(mean): 67 (14 Jul 2022 05:00) (65 - 75)  RR: 15 (14 Jul 2022 04:00) (14 - 19)  SpO2: 99% (14 Jul 2022 05:00) (94% - 100%)    Parameters below as of 14 Jul 2022 03:00  Patient On (Oxygen Delivery Method): room air    Labs:                          14.8   14.87 )-----------( 216      ( 13 Jul 2022 11:33 )             46.0     07-13    145  |  100  |  46<H>  ----------------------------<  95  4.4   |  28  |  1.78<H>    Ca    10.6<H>      13 Jul 2022 11:33    TPro  8.9<H>  /  Alb  3.9  /  TBili  0.8  /  DBili  x   /  AST  26  /  ALT  18  /  AlkPhos  77  07-13      Radiology:  us< from: US Kidney and Bladder (07.13.22 @ 16:01) >    IMPRESSION:    Mild right-sided hydronephrosis which appears to be secondary to an 8.1   mm calculus within the proximal right ureter. The dilatation of the right   renal collecting system is a new finding when compared to CT 4/23/2022.   The calculus at the proximal right ureter was seen previously.  Bilateral nonobstructing renal calculi. The previously seen calculus at   the left UPJ junction is not identified on this study.    Marked debris within the urinary bladder including tiny foci of air and   possible tiny foci of calculi.  A dependent small bladder calculus is also likely appreciated.  Please correlate with urinalysis for the above including marked isoechoic   debris.    < end of copied text >    Physical Exam:  General: no apparent distress noted  Neurology: awake, A&Ox to self only  Head:  Normocephalic, atraumatic  Respiratory: CTA B/L  CV: bradycardic 38bpm  Abdominal: Soft, NT, ND   : +Barnett with blood tinged urine  MSK: No edema    Assessment & Plan:  61M w/ hx of cerebellar ataxia with rapid neurocognitive decline (baseline AOx1-2, minimally conversant) recent prolonged admission including MICU stay/ intubation from 4/18-5/13 for AMS and airway protection, recent admission for UTI treated with 5 day course of CTX, brought in by wife for decreased PO intake and lethargy admitted with   right proximal ureteral calculus, UTI. s/p B/L ureteral stent placement. Patient now with hypothermia, bradycardia, and hypotension.    # Sepsis- likely in the setting of UTI  - pt hypotensive and hypothermic  - RRT called, at bedside  - NS 1L x1  - bear hugger  - labs including lactate and procal  - c/w cefepime  - f/u Ucx, Bcx  - f/u CT A/P, pending read  - RRT labs: type and screen, CBC, BMP, lactate, TSH/T3/T4, cortisol  - MICU consulted, at bedside    #Bradycardia  -Cardiology consult called, pending   -EKG Sinus lukas with 1st degree HB  -R2 pads on pt  -send CE  -monitor on tele  -monitor VS closely  -f/u RRT labs    Dr. Correa called, awaiting a call back  Pt's spouse Antwan Rojas notified of above and in agreement with the plan    Kaila Stratton NP  Medicine  [id number]

## 2022-07-14 NOTE — PROGRESS NOTE ADULT - SUBJECTIVE AND OBJECTIVE BOX
Interval events:   hypotensive, bradycardic and hypothermic.   RR this AM   Cardiology consulted, no intervention for bradycardia       OBJECTIVE:  Vital Signs Last 24 Hrs  T(C): 35.9 (14 Jul 2022 08:30), Max: 37.6 (13 Jul 2022 10:27)  T(F): 96.6 (14 Jul 2022 08:30), Max: 99.6 (13 Jul 2022 10:27)  HR: 66 (14 Jul 2022 09:04) (33 - 81)  BP: 86/54 (14 Jul 2022 09:04) (77/51 - 106/70)  BP(mean): 65 (14 Jul 2022 09:04) (60 - 76)  RR: 16 (14 Jul 2022 09:04) (14 - 18)  SpO2: 100% (14 Jul 2022 09:04) (96% - 100%)    Parameters below as of 14 Jul 2022 09:00  Patient On (Oxygen Delivery Method): room air        Physical Examination:  GEN: NAD, resting quietly  PULM: symmetric chest rise bilaterally, no increased WOB  ABD: soft    LABS:                        10.2   8.30  )-----------( 119      ( 14 Jul 2022 06:04 )             32.0       07-14    143  |  106  |  31<H>  ----------------------------<  134<H>  3.9   |  23  |  0.90    Ca    8.5      14 Jul 2022 05:19  Phos  3.0     07-14  Mg     2.0     07-14    TPro  5.8<L>  /  Alb  2.5<L>  /  TBili  0.3  /  DBili  0.1  /  AST  16  /  ALT  11  /  AlkPhos  55  07-14

## 2022-07-14 NOTE — CONSULT NOTE ADULT - SUBJECTIVE AND OBJECTIVE BOX
HPI:  61M w/ hx of cerebellar ataxia with rapid neurocognitive decline (baseline AOx1-2, minimally conversant) recent prolonged admission including MICU stay/ intubation from - for AMS and airway protection, recent admission for UTI treated with 5 day course of CTX, brought in by wife for decreased PO intake and lethargy for several days with "white and thick" and foul smelling urine since . Hx obtained from wife due to patient's encephalopathy. PCP recently prescribed cipro 500 mg BID x 3 days (pt took 2 days) and wife thinks urine has improved since then. However, he has been eating less since and has been lethargic with minimal responsiveness which prompted the ED evaluation. ROS limited due to patient's mental status. Wife has noted pt has been constipated and gave him a suppository and he subsequently had one bowel movement. She noted a few red specks but no overt bleeding or black stool.  (2022 13:02)      PAST MEDICAL & SURGICAL HISTORY:  H/O cerebellar ataxia      No significant past surgical history          Allergies    No Known Allergies    Intolerances        ANTIMICROBIALS:  cefepime   IVPB    cefepime   IVPB 1000 every 12 hours      OTHER MEDS:  aspirin enteric coated 81 milliGRAM(s) Oral daily  atorvastatin 10 milliGRAM(s) Oral at bedtime  chlorhexidine 4% Liquid 1 Application(s) Topical <User Schedule>  enoxaparin Injectable 40 milliGRAM(s) SubCutaneous every 24 hours  hydrocortisone sodium succinate Injectable 100 milliGRAM(s) IV Push every 8 hours  insulin lispro (ADMELOG) corrective regimen sliding scale   SubCutaneous every 4 hours  lactated ringers. 1000 milliLiter(s) IV Continuous <Continuous>  multivitamin 1 Tablet(s) Oral daily  phenylephrine    Infusion 0.4 MICROgram(s)/kG/Min IV Continuous <Continuous>  senna 2 Tablet(s) Oral at bedtime  thiamine 100 milliGRAM(s) Oral daily      SOCIAL HISTORY:  , lives with family  no smoking, alcohol or drug abuse  no recent travel    FAMILY HISTORY:  FH: dementia (Mother)    FH: type 2 diabetes (Mother)    Family history of CVA (Father)        ROS:  Unobtainable because: minimally verbal    Physical Exam:    General:    NAD  Head: atraumatic, normocephalic  Eyes: normal sclera and conjunctiva  ENT:   no oropharyngeal lesions, no LAD, neck supple  Cardio:    lukas S1,S2  Respiratory:   clear b/l, no wheezing  abd:   soft, BS +, not tender  :     no CVAT, no suprapubic tenderness, + coronel  Musculoskeletal : no joint swelling, no edema  Skin:    no rash  vascular: no phlebitis  Neurologic:  awake but baseline minimally verbal, ?facial droop  psych: normal affect      Drug Dosing Weight  Height (cm): 167.6 (2022 22:29)  Weight (kg): 68 (2022 22:29)  BMI (kg/m2): 24.2 (2022 22:29)  BSA (m2): 1.77 (2022 22:29)    Vital Signs Last 24 Hrs  T(F): 98.4 (22 @ 11:03), Max: 99.6 (22 @ 10:27)    Vital Signs Last 24 Hrs  HR: 44 (22 @ 12:30) (33 - 81)  BP: 104/63 (22 @ 12:30) (77/51 - 140/69)  RR: 6 (22 @ 12:30)  SpO2: 100% (22 @ 12:30) (96% - 100%)  Wt(kg): --                          10.3   8.40  )-----------( 162      ( 2022 10:46 )             31.7       14    143  |  105  |  25<H>  ----------------------------<  125<H>  4.1   |  24  |  0.84    Ca    8.2<L>      2022 10:46  Phos  2.0       Mg     1.9         TPro  5.9<L>  /  Alb  2.7<L>  /  TBili  0.4  /  DBili  x   /  AST  15  /  ALT  11  /  AlkPhos  54        Urinalysis Basic - ( 2022 11:44 )    Color: Light Orange / Appearance: Turbid / S.020 / pH: x  Gluc: x / Ketone: Small  / Bili: Negative / Urobili: Negative   Blood: x / Protein: 100 / Nitrite: Positive   Leuk Esterase: Large / RBC: 31 /hpf /  /HPF   Sq Epi: x / Non Sq Epi: 1 /hpf / Bacteria: Moderate        MICROBIOLOGY:  v  Clean Catch Clean Catch (Midstream)  22   <10,000 CFU/mL Normal Urogenital Leanna  --  --      Clean Catch Clean Catch (Midstream)  22   Culture grew 3 or more types of organisms which indicate  collection contamination; consider recollection only if clinically  indicated.  --  --      .Blood Blood-Peripheral  22   No Growth Final  --  --      .Blood Blood-Peripheral  22   No Growth Final  --  --      Clean Catch Clean Catch (Midstream)  06-15-22   No growth  --  --                  RADIOLOGY:    Images independently visualized and reviewed personally,  findings as below    < from: CT Abdomen and Pelvis No Cont (22 @ 21:13) >  IMPRESSION:  Mild right hydronephrosis caused by an 8 x 7 x 11 mm stone at the right   ureteropelvic junction (UPJ).    Additional nonobstructing renal stones in the upper and lower poles of   both kidneys are partially obscured by motion artifact, but measure up to   3-4 mm in the mid to lower pole of the right kidney.    Approximately 8 x 2 mm bladder stone.    Underlying cystitis or pyelonephritis should be excluded based on   clinical symptoms and laboratory findings.    < end of copied text >

## 2022-07-14 NOTE — CONSULT NOTE ADULT - ASSESSMENT
61 m with cerebellar ataxia with neurocognitive decline over a 2 yr period, chronic lacuna infarcts (MRI 4/2022), minimally verbal,  recent hospitalizations 4/18/22-5/13/22 for AMS, asp pneum requiring intubation, septic shock with relative adrenal insufficiency found to have leptomeningeal enhancement but LP negative for infection was considered likely neoplastic but family refused biopsy, pt has had hypothermia and bradycardia in the previous admissions and all the previous urine cxs showed> 3 organisms now brought in for lethargy, poor PO intake and foul smelling urine, here afebrile, WBC:  14.8, RIP , Cr: 1.78, u/a positive  CT:  mild R hydro caused bu a 11 mm stone in R UPJ, more non obstructing stones in lower pole, an 8 mm blader stone  s/p b/l ureteral stent but then pt became hypotensive, hypothermic to 32 and lukas requiring pressors so was transferred to MICU    obstructing R UPJ stone with hypotension, hypothermia and bradycardia after b/l ureteral stent placement, ?septic shock and UTI but pt also had previous hypothermia and bradycardia in the previous admissions    * f/u the blood and urine cx  * was given a dose of vanco in PACU and on cefepime  * WBC and renal function improved after stent placement  * switch to zosyn for now and then will adjust as per the cultures  * monitor WBC/diff and renal function  * f/u with urology    The above assessment and plan was discussed with MICU    Jessi Shi MD  contact on teams  After 5pm and on weekends call [phone]

## 2022-07-14 NOTE — PROGRESS NOTE ADULT - ASSESSMENT
A/P: 61y Male s/p BL ureteral stent placement     - supportive care per primary team   - continue broad spectrum antibiotics  - consider adding amp for enterococcus coverage   - DVT prophylaxis/OOB  - Strict I&O's  - Keep homer Barth   Available on Teams

## 2022-07-14 NOTE — CONSULT NOTE ADULT - SUBJECTIVE AND OBJECTIVE BOX
CHIEF COMPLAINT:Patient is a 61y old  Male who presents with a chief complaint of UTI (14 Jul 2022 06:04)      HPI:  61M w/ hx of cerebellar ataxia with rapid neurocognitive decline (baseline AOx1-2, minimally conversant) recent prolonged admission including MICU stay/ intubation from 4/18-5/13 for AMS and airway protection, recent admission for UTI treated with 5 day course of CTX, brought in by wife for decreased PO intake and lethargy for several days with "white and thick" and foul smelling urine since 7/8. Hx obtained from wife due to patient's encephalopathy. PCP recently prescribed cipro 500 mg BID x 3 days (pt took 2 days) and wife thinks urine has improved since then. However, he has been eating less since and has been lethargic with minimal responsiveness which prompted the ED evaluation. ROS limited due to patient's mental status. Wife has noted pt has been constipated and gave him a suppository and he subsequently had one bowel movement. She noted a few red specks but no overt bleeding or black stool.       PAST MEDICAL & SURGICAL HISTORY:  H/O cerebellar ataxia      No significant past surgical history          MEDICATIONS  (STANDING):  aspirin enteric coated 81 milliGRAM(s) Oral daily  atorvastatin 10 milliGRAM(s) Oral at bedtime  cefepime   IVPB      cefepime   IVPB 1000 milliGRAM(s) IV Intermittent every 12 hours  enoxaparin Injectable 40 milliGRAM(s) SubCutaneous every 24 hours  lactated ringers. 1000 milliLiter(s) (150 mL/Hr) IV Continuous <Continuous>  lactulose Syrup 10 Gram(s) Oral every 12 hours  multivitamin 1 Tablet(s) Oral daily  senna 2 Tablet(s) Oral at bedtime  thiamine 100 milliGRAM(s) Oral daily    MEDICATIONS  (PRN):  acetaminophen     Tablet .. 650 milliGRAM(s) Oral every 6 hours PRN Temp greater or equal to 38C (100.4F), Mild Pain (1 - 3)  melatonin 3 milliGRAM(s) Oral at bedtime PRN Insomnia  ondansetron Injectable 4 milliGRAM(s) IV Push once PRN Nausea and/or Vomiting      FAMILY HISTORY:  FH: dementia (Mother)    FH: type 2 diabetes (Mother)    Family history of CVA (Father)        SOCIAL HISTORY:    [x ] Non-smoker  [ ] Smoker  [ ] Alcohol    Allergies    No Known Allergies    Intolerances    	    REVIEW OF SYSTEMS:  CONSTITUTIONAL: No fever, weight loss, or fatigue  EYES: No eye pain, visual disturbances, or discharge  ENT:  No difficulty hearing, tinnitus, vertigo; No sinus or throat pain  NECK: No pain or stiffness  RESPIRATORY: No cough, wheezing, chills or hemoptysis; No Shortness of Breath  CARDIOVASCULAR: No chest pain, palpitations, passing out, dizziness, or leg swelling  GASTROINTESTINAL: No abdominal or epigastric pain. No nausea, vomiting, or hematemesis; No diarrhea or constipation. No melena or hematochezia.  GENITOURINARY: No dysuria, frequency, hematuria, or incontinence  NEUROLOGICAL: No headaches, memory loss, loss of strength, numbness, or tremors  SKIN: No itching, burning, rashes, or lesions   LYMPH Nodes: No enlarged glands  ENDOCRINE: No heat or cold intolerance; No hair loss  MUSCULOSKELETAL: No joint pain or swelling; No muscle, back, or extremity pain  PSYCHIATRIC: No depression, anxiety, mood swings, or difficulty sleeping  HEME/LYMPH: No easy bruising, or bleeding gums  ALLERGY AND IMMUNOLOGIC: No hives or eczema	    [ ] All others negative	  [x ] Unable to obtain    PHYSICAL EXAM:  T(C): 34.7 (07-14-22 @ 07:00), Max: 37.6 (07-13-22 @ 10:27)  HR: 51 (07-14-22 @ 08:00) (33 - 116)  BP: 90/51 (07-14-22 @ 08:00) (84/54 - 106/75)  RR: 16 (07-14-22 @ 08:00) (14 - 19)  SpO2: 100% (07-14-22 @ 08:00) (94% - 100%)  Wt(kg): --  I&O's Summary    13 Jul 2022 07:01  -  14 Jul 2022 07:00  --------------------------------------------------------  IN: 2500 mL / OUT: 1350 mL / NET: 1150 mL    14 Jul 2022 07:01  -  14 Jul 2022 08:24  --------------------------------------------------------  IN: 150 mL / OUT: 125 mL / NET: 25 mL        Appearance: Normal	  HEENT:   Normal oral mucosa, PERRL, EOMI	  Lymphatic: No lymphadenopathy  Cardiovascular: Normal S1 S2, No JVD, + murmurs, No edema  Respiratory: rhonchi  Psychiatry: A & O x 3, Mood & affect appropriate  Gastrointestinal:  Soft, Non-tender, + BS	  Skin: No rashes, No ecchymoses, No cyanosis	  Neurologic: Non-focal  Extremities: Normal range of motion, No clubbing, cyanosis or edema  Vascular: Peripheral pulses palpable 2+ bilaterally    TELEMETRY: 	    ECG:  	  RADIOLOGY:  OTHER: 	  	  LABS:	 	    CARDIAC MARKERS:  CARDIAC MARKERS ( 14 Jul 2022 05:19 )  x     / x     / 26 U/L / x     / 1.3 ng/mL                              10.2   8.30  )-----------( 119      ( 14 Jul 2022 06:04 )             32.0     07-14    143  |  106  |  31<H>  ----------------------------<  134<H>  3.9   |  23  |  0.90    Ca    8.5      14 Jul 2022 05:19  Phos  3.0     07-14  Mg     2.0     07-14    TPro  5.8<L>  /  Alb  2.5<L>  /  TBili  0.3  /  DBili  0.1  /  AST  16  /  ALT  11  /  AlkPhos  55  07-14    proBNP:   Lipid Profile:   HgA1c:   TSH:   PT/INR - ( 13 Jul 2022 11:33 )   PT: 14.4 sec;   INR: 1.24 ratio         PTT - ( 13 Jul 2022 11:33 )  PTT:29.4 sec    PREVIOUS DIAGNOSTIC TESTING:    < from: 12 Lead ECG (07.13.22 @ 09:16) >  Diagnosis Line SINUS TACHYCARDIA  OTHERWISE NORMAL ECG  WHEN COMPARED WITH ECG OF 15-JEAN-2022 19:35,  NO SIGNIFICANT CHANGE WAS FOUND    < from: Limited Transthoracic Echo (w/Cont) (04.26.22 @ 17:36) >  1. Normal left ventricular systolic function.   Endocardial  visualization enhanced with intravenous injection of  Ultrasonic Enhancing Agent (Lumason).  2. Normal right ventricular size and function.    I have made amendments to the documentation where necessary. Additional comments: Sepsis 2/2 UTI, RIP, R ureteral stone w hydro, L intrarenal stone, AMS  Now s/p b/l stenting  O/n had RRT for lukas and hypotension  MICU consulted - no pressors currently, IVF bolus  Cxs pending  On broad spectrum abx  F/u labs, cxs  Appr MICU c/s and primary team care.

## 2022-07-14 NOTE — CHART NOTE - NSCHARTNOTEFT_GEN_A_CORE
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 Pt With Eventual improvement and d/c to home.      Pt this hospitalization presented to E.D. afternoon of 22 with decreased P.O. intake, lethargy with foul smelling urine over a several day period initially tx with cipro, completing 2 doses of three. Found to have leukocytosis of 14.8, RIP with sCr 1.78 (baseline 0.58-0.73), positive UA with positive nitrates, large leuk-est, mod bacteria, mild Rt hydronephrosis, 8.1 mm calculus in Rt prox ureter on POCUS , CT A/P 22 demonstrating new Rt renal collecting system dilatation, bilateral non obstructing renal calculi. Pt admitted for sepsis secondary to UTI and obtained bilateral ureteral stent placement.     This short hospital course c/b RRT early this a.m. (22 0545) for hypotension with SBP 80's, bradycardia with HR 30's, hypothermia with Tmax of 32.7 (R),  initially not responsive to 1.6 liters IVF.  ECG demonstrated bradycardia with 1st AVB, slight J point elevation in anterior/lateral leads. Staff endorsed pt bradycardia with HR of 40's SBP of 90's. Pt with initial MICU consult early morning of 22 for  sepsis secondary to UTI s/p bilat ureteral stent placements. During that consult pt received addition 1 liter LR, with IVF at 150 cc's/hr, hydrocortisone 100 mg IVP given x1 with initial improvement in SBP to 90's. However pt refractory to fluid therapy placed on phenylephrine gtt requiring transfer to MICU for septic shock secondary UTI in setting of relative adrenal insufficiency. Pt addition received vanco 1 gm IV.         PAST MEDICAL & SURGICAL HISTORY:  H/O cerebellar ataxia      No significant past surgical history          FAMILY HISTORY:  FH: dementia (Mother)    FH: type 2 diabetes (Mother)    Family history of CVA (Father)        SOCIAL HISTORY:  Smoking: [ ] Never Smoked [ ] Former Smoker (__ packs x ___ years) [ ] Current Smoker  (__ packs x ___ years)  Substance Use: [ ] Never Used [ ] Used ____  EtOH Use:  Marital Status: [ ] Single [ ]  [ ]  [ ]   Sexual History:   Occupation:  Recent Travel:  Country of Birth:  Advance Directives:    Allergies    No Known Allergies    Intolerances        HOME MEDICATIONS:    REVIEW OF SYSTEMS:  Constitutional: [ ] fevers [ ] chills [ ] weight loss [ ] weight gain  HEENT: [ ] dry eyes [ ] eye irritation [ ] postnasal drip [ ] nasal congestion  CV: [ ] chest pain [ ] orthopnea [ ] palpitations [ ] murmur  Resp: [ ] cough [ ] shortness of breath [ ] dyspnea [ ] wheezing [ ] sputum [ ] hemoptysis  GI: [ ] nausea [ ] vomiting [ ] diarrhea [ ] constipation [ ] abd pain [ ] dysphagia   : [ ] dysuria [ ] nocturia [ ] hematuria [ ] increased urinary frequency  Musculoskeletal: [ ] back pain [ ] myalgias [ ] arthralgias [ ] fracture  Skin: [ ] rash [ ] itch  Neurological: [ ] headache [ ] dizziness [ ] syncope [ ] weakness [ ] numbness  Psychiatric: [ ] anxiety [ ] depression  Endocrine: [ ] diabetes [ ] thyroid problem  Hematologic/Lymphatic: [ ] anemia [ ] bleeding problem  Allergic/Immunologic: [ ] itchy eyes [ ] nasal discharge [ ] hives [ ] angioedema  [ ] All other systems negative  [ ] Unable to assess ROS because ________    OBJECTIVE:  ICU Vital Signs Last 24 Hrs  T(C): 36.9 (2022 11:03), Max: 37.5 (2022 10:45)  T(F): 98.4 (2022 11:03), Max: 99.5 (2022 10:45)  HR: 50 (2022 11:15) (33 - 81)  BP: 112/59 (2022 11:15) (77/51 - 140/69)  BP(mean): 79 (2022 11:15) (58 - 98)  ABP: --  ABP(mean): --  RR: 9 (2022 11:15) (9 - 18)  SpO2: 100% (2022 11:15) (96% - 100%)    O2 Parameters below as of 2022 11:03  Patient On (Oxygen Delivery Method): nasal cannula  O2 Flow (L/min): 2            13 @ 07: @ 07:00  --------------------------------------------------------  IN: 2500 mL / OUT: 1350 mL / NET: 1150 mL     @ 07: @ 11:33  --------------------------------------------------------  IN: 2055 mL / OUT: 675 mL / NET: 1380 mL      CAPILLARY BLOOD GLUCOSE      POCT Blood Glucose.: 116 mg/dL (2022 05:15)      PHYSICAL EXAM:  General:   HEENT:   Lymph Nodes:  Neck:   Respiratory:   Cardiovascular:   Abdomen:   Extremities:   Skin:   Neurological:  Psychiatry:    LINES:     HOSPITAL MEDICATIONS:  MEDICATIONS  (STANDING):  aspirin enteric coated 81 milliGRAM(s) Oral daily  atorvastatin 10 milliGRAM(s) Oral at bedtime  cefepime   IVPB      cefepime   IVPB 1000 milliGRAM(s) IV Intermittent every 12 hours  chlorhexidine 4% Liquid 1 Application(s) Topical <User Schedule>  enoxaparin Injectable 40 milliGRAM(s) SubCutaneous every 24 hours  hydrocortisone sodium succinate Injectable 100 milliGRAM(s) IV Push every 8 hours  insulin lispro (ADMELOG) corrective regimen sliding scale   SubCutaneous every 4 hours  lactated ringers. 1000 milliLiter(s) (150 mL/Hr) IV Continuous <Continuous>  multivitamin 1 Tablet(s) Oral daily  phenylephrine    Infusion 0.4 MICROgram(s)/kG/Min (10.2 mL/Hr) IV Continuous <Continuous>  senna 2 Tablet(s) Oral at bedtime  thiamine 100 milliGRAM(s) Oral daily    MEDICATIONS  (PRN):      LABS:                        10.3   8.40  )-----------( 162      ( 2022 10:46 )             31.7     Hgb Trend: 10.3<--, 10.2<--, 10.1<--, 14.8<--  07-14    143  |  105  |  25<H>  ----------------------------<  125<H>  4.1   |  24  |  0.84    Ca    8.2<L>      2022 10:46  Phos  2.0     14  Mg     1.9     14    TPro  5.9<L>  /  Alb  2.7<L>  /  TBili  0.4  /  DBili  x   /  AST  15  /  ALT  11  /  AlkPhos  54  07-14    Creatinine Trend: 0.84<--, 0.90<--, 1.78<--, 0.76<--, 0.82<--, 0.63<--  PT/INR - ( 2022 10:46 )   PT: 15.4 sec;   INR: 1.34 ratio         PTT - ( 2022 10:46 )  PTT:32.8 sec  Urinalysis Basic - ( 2022 11:44 )    Color: Light Orange / Appearance: Turbid / S.020 / pH: x  Gluc: x / Ketone: Small  / Bili: Negative / Urobili: Negative   Blood: x / Protein: 100 / Nitrite: Positive   Leuk Esterase: Large / RBC: 31 /hpf /  /HPF   Sq Epi: x / Non Sq Epi: 1 /hpf / Bacteria: Moderate        Venous Blood Gas:   @ 13:08  7.36/60/41/34/65.1  VBG Lactate: 1.5      MICROBIOLOGY:     RADIOLOGY:  [ ] Reviewed and interpreted by me    EKG: CHIEF COMPLAINT: UTI    HPI:      61 yr old male with PMHx cerebellar ataxia with neurocognitive decline over 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 Pt With Eventual improvement and d/c to home.      Pt this hospitalization presented to E.D. afternoon of 22 with decreased P.O. intake, lethargy with foul smelling urine over a several day period initially tx with cipro, completing 2 doses of three. Found to have leukocytosis of 14.8, RIP with sCr 1.78 (baseline 0.58-0.73), positive UA with positive nitrates, large leuk-est, mod bacteria, mild Rt hydronephrosis, 8.1 mm calculus in Rt prox ureter on POCUS , CT A/P 22 demonstrating new Rt renal collecting system dilatation, bilateral non obstructing renal calculi. Pt admitted for sepsis secondary to UTI and obtained bilateral ureteral stent placement.     This short hospital course c/b RRT early this a.m. (22 0545) for hypotension with SBP 80's, bradycardia with HR 30's, hypothermia with Tmax of 32.7 (R),  initially not responsive to 1.6 liters IVF.  ECG demonstrated bradycardia with 1st AVB, slight J point elevation in anterior/lateral leads. Staff endorsed pt bradycardia with HR of 40's SBP of 90's. Pt with initial MICU consult early morning of 22 for  sepsis secondary to UTI s/p bilat ureteral stent placements. During that consult pt received addition 1 liter LR, with IVF at 150 cc's/hr, hydrocortisone 100 mg IVP given x1 with initial improvement in SBP to 90's. However pt refractory to fluid therapy placed on phenylephrine gtt requiring transfer to MICU for septic shock secondary UTI in setting of relative adrenal insufficiency. Pt addition received vanco 1 gm IV.         PAST MEDICAL & SURGICAL HISTORY:  H/O cerebellar ataxia      No significant past surgical history          FAMILY HISTORY:  FH: dementia (Mother)    FH: type 2 diabetes (Mother)    Family history of CVA (Father)        SOCIAL HISTORY:  Smoking: [ ] Never Smoked [ ] Former Smoker (__ packs x ___ years) [ ] Current Smoker  (__ packs x ___ years)  Substance Use: [ ] Never Used [ ] Used ____  EtOH Use:  Marital Status: [ ] Single [ ]  [ ]  [ ]   Sexual History:   Occupation:  Recent Travel:  Country of Birth:  Advance Directives:    Allergies    No Known Allergies    Intolerances        HOME MEDICATIONS:    REVIEW OF SYSTEMS:  pt minimal conversational however answers questions yes and no by speaking simple words and shaking head. Denies c/p/sob/lightheadedness/dizziness/N/pain    OBJECTIVE:  ICU Vital Signs Last 24 Hrs  T(C): 36.9 (2022 11:03), Max: 37.5 (2022 10:45)  T(F): 98.4 (2022 11:03), Max: 99.5 (2022 10:45)  HR: 50 (2022 11:15) (33 - 81)  BP: 112/59 (2022 11:15) (77/51 - 140/69)  BP(mean): 79 (2022 11:15) (58 - 98)  ABP: --  ABP(mean): --  RR: 9 (2022 11:15) (9 - 18)  SpO2: 100% (2022 11:15) (96% - 100%)    O2 Parameters below as of 2022 11:03  Patient On (Oxygen Delivery Method): nasal cannula  O2 Flow (L/min): 2             @ :  -   @ 07:00  --------------------------------------------------------  IN: 2500 mL / OUT: 1350 mL / NET: 1150 mL    14 @ 07: @ 11:33  --------------------------------------------------------  IN: 2055 mL / OUT: 675 mL / NET: 1380 mL      CAPILLARY BLOOD GLUCOSE      POCT Blood Glucose.: 116 mg/dL (2022 05:15)      PHYSICAL EXAM:  GENERAL:   NAD, well-groomed, well-developed    HEAD:    Atraumatic, Normocephalic    EYES:   PERRL 3 mm, conjunctiva and sclera clear    ENMT:   No oropharyngeal exudates, erythema or lesions,  Moist mucous membranes    NECK:   Supple, no cervical lymphadenopathy, no JVD    NERVOUS SYSTEM:  awake, focuses upon examiner, Oriented X2 answers questions by speaking simple words yes/no and shaking head. follows simple commands to demonstrate digits and extremities,  CN II-XII intact, in addition has spontaneous purposeful movement of all extremities  ; Upper extremities  3/5; Lower extremities 2-3/5, full sensation to light touch     CHEST/LUNG:   utilizing 3 liters N/C  .Breath sounds bilaterally, diminished in lower lung fields bases to 1/4 up, without crackles, rhonchi, wheezing, or rubs. POCUS A line predominant anteriorly with few focal B lines    HEART:   cardiac monitor bradycardia with 1st degree AVB rate of 30's to 40's  ; S1/S2 No murmurs, rubs, or gallops, POCUS Good LVFx, RV<LV, septal thickening appreciated    ABDOMEN:   Soft, Nontender, Nondistended; Bowel sounds present, Bladder non distended, non palpable    EXTREMITIES:   Pulses palpable radial pulses 2+ bilat, DP/PT 2+/1+ bilat, without clubbing, cyanosis. Digits warm to touch with good cap refill < 3 secs    SKIN:   slight cool dry, intact, normal color, no rash or abnormal lesions, without palpable nodes      LINES:     HOSPITAL MEDICATIONS:  MEDICATIONS  (STANDING):  aspirin enteric coated 81 milliGRAM(s) Oral daily  atorvastatin 10 milliGRAM(s) Oral at bedtime  cefepime   IVPB      cefepime   IVPB 1000 milliGRAM(s) IV Intermittent every 12 hours  chlorhexidine 4% Liquid 1 Application(s) Topical <User Schedule>  enoxaparin Injectable 40 milliGRAM(s) SubCutaneous every 24 hours  hydrocortisone sodium succinate Injectable 100 milliGRAM(s) IV Push every 8 hours  insulin lispro (ADMELOG) corrective regimen sliding scale   SubCutaneous every 4 hours  lactated ringers. 1000 milliLiter(s) (150 mL/Hr) IV Continuous <Continuous>  multivitamin 1 Tablet(s) Oral daily  phenylephrine    Infusion 0.4 MICROgram(s)/kG/Min (10.2 mL/Hr) IV Continuous <Continuous>  senna 2 Tablet(s) Oral at bedtime  thiamine 100 milliGRAM(s) Oral daily    MEDICATIONS  (PRN):      LABS:                        10.3   8.40  )-----------( 162      ( 2022 10:46 )             31.7     Hgb Trend: 10.3<--, 10.2<--, 10.1<--, 14.8<--      143  |  105  |  25<H>  ----------------------------<  125<H>  4.1   |  24  |  0.84    Ca    8.2<L>      2022 10:46  Phos  2.0       Mg     1.9         TPro  5.9<L>  /  Alb  2.7<L>  /  TBili  0.4  /  DBili  x   /  AST  15  /  ALT  11  /  AlkPhos  54      Creatinine Trend: 0.84<--, 0.90<--, 1.78<--, 0.76<--, 0.82<--, 0.63<--  PT/INR - ( 2022 10:46 )   PT: 15.4 sec;   INR: 1.34 ratio         PTT - ( 2022 10:46 )  PTT:32.8 sec  Urinalysis Basic - ( 2022 11:44 )    Color: Light Orange / Appearance: Turbid / S.020 / pH: x  Gluc: x / Ketone: Small  / Bili: Negative / Urobili: Negative   Blood: x / Protein: 100 / Nitrite: Positive   Leuk Esterase: Large / RBC: 31 /hpf /  /HPF   Sq Epi: x / Non Sq Epi: 1 /hpf / Bacteria: Moderate        Venous Blood Gas:   @ 13:08  7.36/60/41/34/65.1  VBG Lactate: 1.5      MICROBIOLOGY:     RADIOLOGY:  [ ] Reviewed and interpreted by me    EK yr old male with stated hx significant for cerebellar ataxia, chronic lacuna infarcts, leptomeningeal enhancement (MRI 2022), recent hospitalizations 2022 for Asp pneum/relative adrenal insufficiency/sepsis and 2022 for UTI who now is admitted for sepsis secondary to UTI, found to have new Rt renal collecting system dilatation, 8.1 mm Rt prox ureter calculus and Rt hydronephrosis requiring placement of bilat ureteral stent placement. Course c/b development of hypotension/hypothermia/bradycaria concerning for relative adrenal insufficiency vs septic shock due to UTI. Pt refractory to IVF therapy requiring phenylephrine gtt and transfer to MICU for septic shock secondary to UTI in setting of adrenal insufficiency    #Neuro:  =hx of cerebellar ataxia, chronic lacunar infarcts, leptomeningeal enhancements  -Neuro checks q 4 hrs and prn for changes  -activity as tolerated  -physical therapy consult when stable    #Pulm:  =no issue at present, hx of recent asp pneum  -Supplemental O2 prn to maintain SPO2 > 92%  -Bronchodilators q 6 hrs prn for sob/wheezes  -HOB >/= 30 degree angle  -incentive spirometry 10x q 2 hrs    #CV:  =hypotension/bradycardia (most likely due to relative adrenal insufficiency vs septic shock)  -ECG now and q am x3  -Cardiac Enzymes now and q 8 hrs x 3  -place on transthoracic pacer pads  -obtain cardiology consult  -phenylephrine gtt - titrate to MAP 65-70 mmHg  -IVF of LR @ 150 cc's/hr    #GI/:  =s/p bilat ureteral stent placements  -coronel  -strict I & O's keep even  -diet as tolerated  -f/u urology recs    #ID:  =sepsis secondary to UTI, r/o relative adrenal insufficiency  -pan culture  -cortisol level 22 of 7 ug/dl  -obtain TSH/Thyroxine/T4  -received hydrocortisone 100 mg IVP x1 (given after cortisol level obtained)  -will add hydrocortisone 100 mg IV q 8 hrs  -continue cefepime 1gm IV q 12 hrs (started 22)    #FEN/ENDO/HEME:  -obtain CMP/Mg++/PO--4/CBC w diff/PT/PTT/INR now and q. a.m.  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 Pt With Eventual improvement and d/c to home.      Pt this hospitalization presented to E.D. afternoon of 22 with decreased P.O. intake, lethargy with foul smelling urine over a several day period initially tx with cipro, completing 2 doses of three. Found to have leukocytosis of 14.8, RIP with sCr 1.78 (baseline 0.58-0.73), positive UA with positive nitrates, large leuk-est, mod bacteria, mild Rt hydronephrosis, 8.1 mm calculus in Rt prox ureter on POCUS , CT A/P 22 demonstrating new Rt renal collecting system dilatation, bilateral non obstructing renal calculi. Pt admitted for sepsis secondary to UTI and obtained bilateral ureteral stent placement.     This short hospital course c/b RRT early this a.m. (22 0545) for hypotension with SBP 80's, bradycardia with HR 30's, hypothermia with Tmax of 32.7 (R),  initially not responsive to 1.6 liters IVF.  ECG demonstrated bradycardia with 1st AVB, slight J point elevation in anterior/lateral leads. Staff endorsed pt bradycardia with HR of 40's SBP of 90's. Pt with initial MICU consult early morning of 22 for  sepsis secondary to UTI s/p bilat ureteral stent placements. During that consult pt received addition 1 liter LR, with IVF at 150 cc's/hr, hydrocortisone 100 mg IVP given x1 with initial improvement in SBP to 90's. However pt refractory to fluid therapy placed on phenylephrine gtt requiring transfer to MICU for septic shock secondary UTI in setting of relative adrenal insufficiency. Pt addition received vanco 1 gm IV.         PAST MEDICAL & SURGICAL HISTORY:  H/O cerebellar ataxia      No significant past surgical history          FAMILY HISTORY:  FH: dementia (Mother)    FH: type 2 diabetes (Mother)    Family history of CVA (Father)        SOCIAL HISTORY:  Smoking: [ ] Never Smoked [ ] Former Smoker (__ packs x ___ years) [ ] Current Smoker  (__ packs x ___ years)  Substance Use: [ ] Never Used [ ] Used ____  EtOH Use:  Marital Status: [ ] Single [ ]  [ ]  [ ]   Sexual History:   Occupation:  Recent Travel:  Country of Birth:  Advance Directives:    Allergies    No Known Allergies    Intolerances        HOME MEDICATIONS:    REVIEW OF SYSTEMS:  pt minimal conversational however answers questions yes and no by speaking simple words and shaking head. Denies c/p/sob/lightheadedness/dizziness/N/pain    OBJECTIVE:  ICU Vital Signs Last 24 Hrs  T(C): 36.9 (2022 11:03), Max: 37.5 (2022 10:45)  T(F): 98.4 (2022 11:03), Max: 99.5 (2022 10:45)  HR: 50 (2022 11:15) (33 - 81)  BP: 112/59 (2022 11:15) (77/51 - 140/69)  BP(mean): 79 (2022 11:15) (58 - 98)  ABP: --  ABP(mean): --  RR: 9 (2022 11:15) (9 - 18)  SpO2: 100% (2022 11:15) (96% - 100%)    O2 Parameters below as of 2022 11:03  Patient On (Oxygen Delivery Method): nasal cannula  O2 Flow (L/min): 2             @ :  -   @ 07:00  --------------------------------------------------------  IN: 2500 mL / OUT: 1350 mL / NET: 1150 mL    14 @ 07: @ 11:33  --------------------------------------------------------  IN: 2055 mL / OUT: 675 mL / NET: 1380 mL      CAPILLARY BLOOD GLUCOSE      POCT Blood Glucose.: 116 mg/dL (2022 05:15)      PHYSICAL EXAM:  GENERAL:   NAD, well-groomed, well-developed    HEAD:    Atraumatic, Normocephalic    EYES:   PERRL 3 mm, conjunctiva and sclera clear    ENMT:   No oropharyngeal exudates, erythema or lesions,  Moist mucous membranes    NECK:   Supple, no cervical lymphadenopathy, no JVD    NERVOUS SYSTEM:  awake, focuses upon examiner, Oriented X2 answers questions by speaking simple words yes/no and shaking head. follows simple commands to demonstrate digits and extremities,  CN II-XII intact, in addition has spontaneous purposeful movement of all extremities  ; Upper extremities  3/5; Lower extremities 2-3/5, full sensation to light touch     CHEST/LUNG:   utilizing 3 liters N/C  .Breath sounds bilaterally, diminished in lower lung fields bases to 1/4 up, without crackles, rhonchi, wheezing, or rubs. POCUS A line predominant anteriorly with few focal B lines    HEART:   cardiac monitor bradycardia with 1st degree AVB rate of 30's to 40's  ; S1/S2 No murmurs, rubs, or gallops, POCUS Good LVFx, RV<LV, septal thickening appreciated    ABDOMEN:   Soft, Nontender, Nondistended; Bowel sounds present, Bladder non distended, non palpable    EXTREMITIES:   Pulses palpable radial pulses 2+ bilat, DP/PT 2+/1+ bilat, without clubbing, cyanosis. Digits warm to touch with good cap refill < 3 secs    SKIN:   slight cool dry, intact, normal color, no rash or abnormal lesions, without palpable nodes      LINES:     HOSPITAL MEDICATIONS:  MEDICATIONS  (STANDING):  aspirin enteric coated 81 milliGRAM(s) Oral daily  atorvastatin 10 milliGRAM(s) Oral at bedtime  cefepime   IVPB      cefepime   IVPB 1000 milliGRAM(s) IV Intermittent every 12 hours  chlorhexidine 4% Liquid 1 Application(s) Topical <User Schedule>  enoxaparin Injectable 40 milliGRAM(s) SubCutaneous every 24 hours  hydrocortisone sodium succinate Injectable 100 milliGRAM(s) IV Push every 8 hours  insulin lispro (ADMELOG) corrective regimen sliding scale   SubCutaneous every 4 hours  lactated ringers. 1000 milliLiter(s) (150 mL/Hr) IV Continuous <Continuous>  multivitamin 1 Tablet(s) Oral daily  phenylephrine    Infusion 0.4 MICROgram(s)/kG/Min (10.2 mL/Hr) IV Continuous <Continuous>  senna 2 Tablet(s) Oral at bedtime  thiamine 100 milliGRAM(s) Oral daily    MEDICATIONS  (PRN):      LABS:                        10.3   8.40  )-----------( 162      ( 2022 10:46 )             31.7     Hgb Trend: 10.3<--, 10.2<--, 10.1<--, 14.8<--      143  |  105  |  25<H>  ----------------------------<  125<H>  4.1   |  24  |  0.84    Ca    8.2<L>      2022 10:46  Phos  2.0       Mg     1.9         TPro  5.9<L>  /  Alb  2.7<L>  /  TBili  0.4  /  DBili  x   /  AST  15  /  ALT  11  /  AlkPhos  54      Creatinine Trend: 0.84<--, 0.90<--, 1.78<--, 0.76<--, 0.82<--, 0.63<--  PT/INR - ( 2022 10:46 )   PT: 15.4 sec;   INR: 1.34 ratio         PTT - ( 2022 10:46 )  PTT:32.8 sec  Urinalysis Basic - ( 2022 11:44 )    Color: Light Orange / Appearance: Turbid / S.020 / pH: x  Gluc: x / Ketone: Small  / Bili: Negative / Urobili: Negative   Blood: x / Protein: 100 / Nitrite: Positive   Leuk Esterase: Large / RBC: 31 /hpf /  /HPF   Sq Epi: x / Non Sq Epi: 1 /hpf / Bacteria: Moderate        Venous Blood Gas:   @ 13:08  7.36/60/41/34/65.1  VBG Lactate: 1.5      MICROBIOLOGY:     RADIOLOGY:  [ ] Reviewed and interpreted by me    EK yr old male with stated hx significant for cerebellar ataxia, chronic lacuna infarcts, leptomeningeal enhancement (MRI 2022), recent hospitalizations 2022 for Asp pneum/relative adrenal insufficiency/sepsis and 2022 for UTI who now is admitted for sepsis secondary to UTI, found to have new Rt renal collecting system dilatation, 8.1 mm Rt prox ureter calculus and Rt hydronephrosis requiring placement of bilat ureteral stent placement. Course c/b development of hypotension/hypothermia/bradycaria concerning for relative adrenal insufficiency vs septic shock due to UTI. Pt refractory to IVF therapy requiring phenylephrine gtt and transfer to MICU for septic shock secondary to UTI in setting of adrenal insufficiency    #Neuro:  =hx of cerebellar ataxia, chronic lacunar infarcts, leptomeningeal enhancements  -Neuro checks q 4 hrs and prn for changes  -activity as tolerated  -physical therapy consult when stable    #Pulm:  =no issue at present, hx of recent asp pneum  -Supplemental O2 prn to maintain SPO2 > 92%  -Bronchodilators q 6 hrs prn for sob/wheezes  -HOB >/= 30 degree angle  -incentive spirometry 10x q 2 hrs    #CV:  =hypotension/bradycardia (most likely due to relative adrenal insufficiency vs septic shock)  -ECG now and q am x3  -Cardiac Enzymes now and q 8 hrs x 3  -place on transthoracic pacer pads  -obtain cardiology consult  -phenylephrine gtt - titrate to MAP 65-70 mmHg  -IVF of LR @ 150 cc's/hr    #GI/:  =s/p bilat ureteral stent placements  -coronel  -strict I & O's keep even  -diet as tolerated  -f/u urology recs    #ID:  =sepsis secondary to UTI, r/o relative adrenal insufficiency  -pan culture  -cortisol level 22 of 7 ug/dl  -obtain TSH/Thyroxine/T4  -received hydrocortisone 100 mg IVP x1 (given after cortisol level obtained)  -will add hydrocortisone 100 mg IV q 8 hrs  -continue cefepime 1gm IV q 12 hrs (started 22)    #FEN/ENDO/HEME:  -obtain CMP/Mg++/PO--4/CBC w diff/PT/PTT/INR now and q. a.m.  -f/u TSH/Thyroxine/T4/cortisol levels  -POC glucose with ISS q 6 hrs - maintain glucose 140-180  -LR @ 150 cc's/hr    CRITICAL CARE ATTENDING :  40 MINUTES CRITICAL CARE PROVIDED  RECENT STENT CHANGE, BACTEREMIA, HYPOTENSION  1.  HYPOTENSION--likely attributable to 2na 3.  Antibiotics, LR, pressor as needed.  Stress dose steroids  2.  Bacteremia--concur with gm - coverage  3.  Adrenal insufficiency--cortisol random low in face of severe stress.  W/U as indicated.  Stress dose  4.  Hypothermia--2+3 related.  warming blanket, rx underlying cause  5.  Bradycardia--would limit alpha agonist now as BP increasing.  Levofed, atropine for HR < 35 with 1.    discussed with MICU, urology teams

## 2022-07-15 LAB
ALBUMIN SERPL ELPH-MCNC: 2.6 G/DL — LOW (ref 3.3–5)
ALP SERPL-CCNC: 55 U/L — SIGNIFICANT CHANGE UP (ref 40–120)
ALT FLD-CCNC: 12 U/L — SIGNIFICANT CHANGE UP (ref 10–45)
ANION GAP SERPL CALC-SCNC: 13 MMOL/L — SIGNIFICANT CHANGE UP (ref 5–17)
APTT BLD: 31.1 SEC — SIGNIFICANT CHANGE UP (ref 27.5–35.5)
AST SERPL-CCNC: 12 U/L — SIGNIFICANT CHANGE UP (ref 10–40)
BASOPHILS # BLD AUTO: 0 K/UL — SIGNIFICANT CHANGE UP (ref 0–0.2)
BASOPHILS NFR BLD AUTO: 0 % — SIGNIFICANT CHANGE UP (ref 0–2)
BILIRUB SERPL-MCNC: 0.4 MG/DL — SIGNIFICANT CHANGE UP (ref 0.2–1.2)
BUN SERPL-MCNC: 18 MG/DL — SIGNIFICANT CHANGE UP (ref 7–23)
CALCIUM SERPL-MCNC: 8.4 MG/DL — SIGNIFICANT CHANGE UP (ref 8.4–10.5)
CHLORIDE SERPL-SCNC: 107 MMOL/L — SIGNIFICANT CHANGE UP (ref 96–108)
CO2 SERPL-SCNC: 26 MMOL/L — SIGNIFICANT CHANGE UP (ref 22–31)
CREAT SERPL-MCNC: 0.76 MG/DL — SIGNIFICANT CHANGE UP (ref 0.5–1.3)
EGFR: 103 ML/MIN/1.73M2 — SIGNIFICANT CHANGE UP
EOSINOPHIL # BLD AUTO: 0 K/UL — SIGNIFICANT CHANGE UP (ref 0–0.5)
EOSINOPHIL NFR BLD AUTO: 0 % — SIGNIFICANT CHANGE UP (ref 0–6)
GLUCOSE BLDC GLUCOMTR-MCNC: 109 MG/DL — HIGH (ref 70–99)
GLUCOSE BLDC GLUCOMTR-MCNC: 115 MG/DL — HIGH (ref 70–99)
GLUCOSE BLDC GLUCOMTR-MCNC: 117 MG/DL — HIGH (ref 70–99)
GLUCOSE BLDC GLUCOMTR-MCNC: 123 MG/DL — HIGH (ref 70–99)
GLUCOSE BLDC GLUCOMTR-MCNC: 125 MG/DL — HIGH (ref 70–99)
GLUCOSE SERPL-MCNC: 126 MG/DL — HIGH (ref 70–99)
HCT VFR BLD CALC: 30.8 % — LOW (ref 39–50)
HGB BLD-MCNC: 10 G/DL — LOW (ref 13–17)
IMM GRANULOCYTES NFR BLD AUTO: 0.6 % — SIGNIFICANT CHANGE UP (ref 0–1.5)
INR BLD: 1.24 RATIO — HIGH (ref 0.88–1.16)
LYMPHOCYTES # BLD AUTO: 0.72 K/UL — LOW (ref 1–3.3)
LYMPHOCYTES # BLD AUTO: 8.1 % — LOW (ref 13–44)
MAGNESIUM SERPL-MCNC: 1.6 MG/DL — SIGNIFICANT CHANGE UP (ref 1.6–2.6)
MCHC RBC-ENTMCNC: 28.2 PG — SIGNIFICANT CHANGE UP (ref 27–34)
MCHC RBC-ENTMCNC: 32.5 GM/DL — SIGNIFICANT CHANGE UP (ref 32–36)
MCV RBC AUTO: 86.8 FL — SIGNIFICANT CHANGE UP (ref 80–100)
MONOCYTES # BLD AUTO: 0.51 K/UL — SIGNIFICANT CHANGE UP (ref 0–0.9)
MONOCYTES NFR BLD AUTO: 5.7 % — SIGNIFICANT CHANGE UP (ref 2–14)
NEUTROPHILS # BLD AUTO: 7.6 K/UL — HIGH (ref 1.8–7.4)
NEUTROPHILS NFR BLD AUTO: 85.6 % — HIGH (ref 43–77)
NRBC # BLD: 0 /100 WBCS — SIGNIFICANT CHANGE UP (ref 0–0)
PHOSPHATE SERPL-MCNC: 2.9 MG/DL — SIGNIFICANT CHANGE UP (ref 2.5–4.5)
PLATELET # BLD AUTO: 148 K/UL — LOW (ref 150–400)
POTASSIUM SERPL-MCNC: 3.8 MMOL/L — SIGNIFICANT CHANGE UP (ref 3.5–5.3)
POTASSIUM SERPL-SCNC: 3.8 MMOL/L — SIGNIFICANT CHANGE UP (ref 3.5–5.3)
PROT SERPL-MCNC: 6.1 G/DL — SIGNIFICANT CHANGE UP (ref 6–8.3)
PROTHROM AB SERPL-ACNC: 14.3 SEC — HIGH (ref 10.5–13.4)
RBC # BLD: 3.55 M/UL — LOW (ref 4.2–5.8)
RBC # FLD: 14.2 % — SIGNIFICANT CHANGE UP (ref 10.3–14.5)
SODIUM SERPL-SCNC: 146 MMOL/L — HIGH (ref 135–145)
WBC # BLD: 8.88 K/UL — SIGNIFICANT CHANGE UP (ref 3.8–10.5)
WBC # FLD AUTO: 8.88 K/UL — SIGNIFICANT CHANGE UP (ref 3.8–10.5)

## 2022-07-15 PROCEDURE — 99232 SBSQ HOSP IP/OBS MODERATE 35: CPT

## 2022-07-15 PROCEDURE — 99233 SBSQ HOSP IP/OBS HIGH 50: CPT | Mod: GC

## 2022-07-15 PROCEDURE — 99233 SBSQ HOSP IP/OBS HIGH 50: CPT | Mod: GC,25

## 2022-07-15 RX ORDER — MAGNESIUM SULFATE 500 MG/ML
2 VIAL (ML) INJECTION ONCE
Refills: 0 | Status: COMPLETED | OUTPATIENT
Start: 2022-07-15 | End: 2022-07-15

## 2022-07-15 RX ORDER — SODIUM CHLORIDE 9 MG/ML
1000 INJECTION, SOLUTION INTRAVENOUS
Refills: 0 | Status: DISCONTINUED | OUTPATIENT
Start: 2022-07-15 | End: 2022-07-16

## 2022-07-15 RX ORDER — SODIUM CHLORIDE 9 MG/ML
1000 INJECTION, SOLUTION INTRAVENOUS
Refills: 0 | Status: DISCONTINUED | OUTPATIENT
Start: 2022-07-15 | End: 2022-07-15

## 2022-07-15 RX ADMIN — Medication 100 MILLIGRAM(S): at 06:01

## 2022-07-15 RX ADMIN — PIPERACILLIN AND TAZOBACTAM 25 GRAM(S): 4; .5 INJECTION, POWDER, LYOPHILIZED, FOR SOLUTION INTRAVENOUS at 18:39

## 2022-07-15 RX ADMIN — Medication 100 MILLIGRAM(S): at 21:56

## 2022-07-15 RX ADMIN — Medication 25 GRAM(S): at 02:56

## 2022-07-15 RX ADMIN — Medication 100 MILLIGRAM(S): at 14:32

## 2022-07-15 RX ADMIN — PIPERACILLIN AND TAZOBACTAM 25 GRAM(S): 4; .5 INJECTION, POWDER, LYOPHILIZED, FOR SOLUTION INTRAVENOUS at 09:59

## 2022-07-15 RX ADMIN — PIPERACILLIN AND TAZOBACTAM 25 GRAM(S): 4; .5 INJECTION, POWDER, LYOPHILIZED, FOR SOLUTION INTRAVENOUS at 02:29

## 2022-07-15 RX ADMIN — ENOXAPARIN SODIUM 40 MILLIGRAM(S): 100 INJECTION SUBCUTANEOUS at 11:09

## 2022-07-15 RX ADMIN — SODIUM CHLORIDE 100 MILLILITER(S): 9 INJECTION, SOLUTION INTRAVENOUS at 22:58

## 2022-07-15 RX ADMIN — Medication 81 MILLIGRAM(S): at 11:13

## 2022-07-15 NOTE — CHART NOTE - NSCHARTNOTEFT_GEN_A_CORE
MAR Accept Note  Transfer to:  Medicine  Accepting Attending Physician: Dr. Bandar Correa    Assigned Room:  8MON 821 W    Patient seen and examined.   Labs and data reviewed.   No findings precluding transfer of service.       HPI/MICU COURSE:   Please refer to MICU transfer note for full details. 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 Course c/b development of hypotension/hypothermia/bradycaria concerning for relative adrenal insufficiency vs septic shock due to UTI. Pt refractory to IVF therapy requiring phenylephrine gtt and transfer to MICU for septic shock secondary to UTI in setting of adrenal insufficiency now off pressors since 7/14      Admitted to MICU for hypotension w/bradycardia s/p b/l ureteral stents. Patient was initially not fluid responsive requiring Ad for BP support. Patient was weaned off Ad on 7/14.  EKG revealed first degree block and patient was asymtomatic, off ad. In addition patient's bradycardia improved on 7/15. Patient also received hydrocortisone 100mg IVP as well. At this time the patient no longer requires MICU level of care    For Follow-Up:  [ ] c/w Hydrocortisone 100mg IVP and taper  [ ] Bcx and Ucx and f/u ID recs regarding further abx currently on zosyn  [] f/u urology recs regarding f/u and coronel management  [] speech and swallow regarding po status, patient coughing w/meds        FOR FOLLOW-UP:    Sam Brown MD  Internal Medicine PGY-3

## 2022-07-15 NOTE — PROGRESS NOTE ADULT - SUBJECTIVE AND OBJECTIVE BOX
Interval events:   switched to zosyn per ID     OBJECTIVE:  Vital Signs Last 24 Hrs  T(C): 37.2 (15 Jul 2022 04:00), Max: 37.5 (14 Jul 2022 10:45)  T(F): 98.9 (15 Jul 2022 04:00), Max: 99.5 (14 Jul 2022 10:45)  HR: 44 (15 Jul 2022 06:00) (32 - 67)  BP: 120/69 (15 Jul 2022 06:00) (77/51 - 140/69)  BP(mean): 90 (15 Jul 2022 06:00) (58 - 98)  RR: 12 (15 Jul 2022 06:00) (5 - 25)  SpO2: 100% (15 Jul 2022 06:00) (97% - 100%)    Parameters below as of 14 Jul 2022 20:00  Patient On (Oxygen Delivery Method): room air        Physical Examination:  GEN: NAD, resting quietly  PULM: symmetric chest rise bilaterally, no increased WOB  ABD: soft  : coronel secure, urine clear      LABS:                        10.0   8.88  )-----------( 148      ( 15 Jul 2022 00:16 )             30.8       07-15    146<H>  |  107  |  18  ----------------------------<  126<H>  3.8   |  26  |  0.76    Ca    8.4      15 Jul 2022 00:16  Phos  2.9     07-15  Mg     1.6     07-15    TPro  6.1  /  Alb  2.6<L>  /  TBili  0.4  /  DBili  x   /  AST  12  /  ALT  12  /  AlkPhos  55  07-15

## 2022-07-15 NOTE — PROGRESS NOTE ADULT - SUBJECTIVE AND OBJECTIVE BOX
CARDIOLOGY     PROGRESS  NOTE   ________________________________________________    CHIEF COMPLAINT:Patient is a 61y old  Male who presents with a chief complaint of UTI (15 Jul 2022 07:30)  comfortable.  	  REVIEW OF SYSTEMS:  CONSTITUTIONAL: No fever, weight loss, or fatigue  EYES: No eye pain, visual disturbances, or discharge  ENT:  No difficulty hearing, tinnitus, vertigo; No sinus or throat pain  NECK: No pain or stiffness  RESPIRATORY: No cough, wheezing, chills or hemoptysis; No Shortness of Breath  CARDIOVASCULAR: No chest pain, palpitations, passing out, dizziness, or leg swelling  GASTROINTESTINAL: No abdominal or epigastric pain. No nausea, vomiting, or hematemesis; No diarrhea or constipation. No melena or hematochezia.  GENITOURINARY: No dysuria, frequency, hematuria, or incontinence  NEUROLOGICAL: No headaches, memory loss, loss of strength, numbness, or tremors  SKIN: No itching, burning, rashes, or lesions   LYMPH Nodes: No enlarged glands  ENDOCRINE: No heat or cold intolerance; No hair loss  MUSCULOSKELETAL: No joint pain or swelling; No muscle, back, or extremity pain  PSYCHIATRIC: No depression, anxiety, mood swings, or difficulty sleeping  HEME/LYMPH: No easy bruising, or bleeding gums  ALLERGY AND IMMUNOLOGIC: No hives or eczema	    [ ] All others negative	  [ x] Unable to obtain    PHYSICAL EXAM:  T(C): 36.5 (07-15-22 @ 08:00), Max: 37.5 (07-14-22 @ 10:45)  HR: 61 (07-15-22 @ 09:00) (32 - 67)  BP: 151/88 (07-15-22 @ 09:00) (79/43 - 151/88)  RR: 16 (07-15-22 @ 09:00) (5 - 25)  SpO2: 98% (07-15-22 @ 09:00) (98% - 100%)  Wt(kg): --  I&O's Summary    14 Jul 2022 07:01  -  15 Jul 2022 07:00  --------------------------------------------------------  IN: 5455 mL / OUT: 3465 mL / NET: 1990 mL    15 Jul 2022 07:01  -  15 Jul 2022 09:20  --------------------------------------------------------  IN: 150 mL / OUT: 185 mL / NET: -35 mL        Appearance: Normal	  HEENT:   Normal oral mucosa, PERRL, EOMI	  Lymphatic: No lymphadenopathy  Cardiovascular: Normal S1 S2, No JVD, + murmurs, No edema  Respiratory: Lungs clear to auscultation	  Gastrointestinal:  Soft, Non-tender, + BS	  Skin: No rashes, No ecchymoses, No cyanosis	  Neurologic: Non-focal  Extremities: Normal range of motion, No clubbing, cyanosis or edema  Vascular: Peripheral pulses palpable 2+ bilaterally    MEDICATIONS  (STANDING):  aspirin enteric coated 81 milliGRAM(s) Oral daily  atorvastatin 10 milliGRAM(s) Oral at bedtime  enoxaparin Injectable 40 milliGRAM(s) SubCutaneous every 24 hours  hydrocortisone sodium succinate Injectable 100 milliGRAM(s) IV Push every 8 hours  insulin lispro (ADMELOG) corrective regimen sliding scale   SubCutaneous every 4 hours  lactated ringers. 1000 milliLiter(s) (75 mL/Hr) IV Continuous <Continuous>  multivitamin 1 Tablet(s) Oral daily  piperacillin/tazobactam IVPB.. 3.375 Gram(s) IV Intermittent every 8 hours  senna 2 Tablet(s) Oral at bedtime  thiamine 100 milliGRAM(s) Oral daily      TELEMETRY: 	    ECG:  	  RADIOLOGY:  OTHER: 	  	  LABS:	 	    CARDIAC MARKERS:  CARDIAC MARKERS ( 14 Jul 2022 12:43 )  x     / x     / 24 U/L / x     / 1.2 ng/mL  CARDIAC MARKERS ( 14 Jul 2022 05:19 )  x     / x     / 26 U/L / x     / 1.3 ng/mL                                10.0   8.88  )-----------( 148      ( 15 Jul 2022 00:16 )             30.8     07-15    146<H>  |  107  |  18  ----------------------------<  126<H>  3.8   |  26  |  0.76    Ca    8.4      15 Jul 2022 00:16  Phos  2.9     07-15  Mg     1.6     07-15    TPro  6.1  /  Alb  2.6<L>  /  TBili  0.4  /  DBili  x   /  AST  12  /  ALT  12  /  AlkPhos  55  07-15    proBNP:   Lipid Profile:   HgA1c:   TSH: Thyroid Stimulating Hormone, Serum: 0.60 uIU/mL (07-14 @ 06:04)  Thyroid Stimulating Hormone, Serum: 1.60 uIU/mL (06-18 @ 11:19)    PT/INR - ( 15 Jul 2022 00:16 )   PT: 14.3 sec;   INR: 1.24 ratio         PTT - ( 15 Jul 2022 00:16 )  PTT:31.1 sec      Assessment and plan  ---------------------------  61M w/ hx of cerebellar ataxia with rapid neurocognitive decline (baseline AOx1-2, minimally conversant) recent prolonged admission including MICU stay/ intubation from 4/18-5/13 for AMS and airway protection, recent admission for UTI treated with 5 day course of CTX, brought in by wife for decreased PO intake and lethargy for several days with "white and thick" and foul smelling urine since 7/8. Hx obtained from wife due to patient's encephalopathy. PCP recently prescribed cipro 500 mg BID x 3 days (pt took 2 days) and wife thinks urine has improved since then. However, he has been eating less since and has been lethargic with minimal responsiveness which prompted the ED evaluation. ROS limited due to patient's mental status. Wife has noted pt has been constipated and gave him a suppository and he subsequently had one bowel movement. She noted a few red specks but no overt bleeding or black stool.  hypotension ?sec to sepsis/ pt with hx of low bp  bradycardia ,observe  dvt prophylaxis  am cortisol level, started on styeroid in MICU  iv hydration  tsh  continue abx

## 2022-07-15 NOTE — CHART NOTE - NSCHARTNOTEFT_GEN_A_CORE
MICU Transfer Note  ---------------------------    Transfer from: MICU  Transfer to:  (  ) Medicine    ( x ) Telemetry    (  ) RCU    (  ) Palliative    (  ) Stroke Unit    (  ) _______________  Accepting Physician:      MICU COURSE  Admitted to MICU for hypotension w/bradycardia s/p b/l ureteral stents. Patient was initially not fluid responsive requiring Ad for BP support. Patient was weaned off Ad on 7/14.  EKG revealed first degree block and patient was asymtomatic, off ad. Patient also received hydrocortisone 100mg IVP as well. At this time the patient no longer requires MICU level of care    For Follow-Up:  [ ] c/w Hydrocortisone 100mg IVP and titrate   [ ] Bcx and Ucx and f/u ID recs regarding further abx currently on zosyn  [] f/u urology recs regarding f/u and coronel management      OBJECTIVE --  Vital Signs Last 24 Hrs  T(C): 37.2 (15 Jul 2022 04:00), Max: 37.5 (14 Jul 2022 10:45)  T(F): 98.9 (15 Jul 2022 04:00), Max: 99.5 (14 Jul 2022 10:45)  HR: 43 (15 Jul 2022 07:00) (32 - 67)  BP: 113/69 (15 Jul 2022 07:00) (77/51 - 140/69)  BP(mean): 85 (15 Jul 2022 07:00) (58 - 98)  RR: 12 (15 Jul 2022 07:00) (5 - 25)  SpO2: 99% (15 Jul 2022 07:00) (98% - 100%)    Parameters below as of 15 Jul 2022 07:00  Patient On (Oxygen Delivery Method): room air    O2 Concentration (%): 21    I&O's Summary    14 Jul 2022 07:01  -  15 Jul 2022 07:00  --------------------------------------------------------  IN: 5455 mL / OUT: 3465 mL / NET: 1990 mL        MEDICATIONS  (STANDING):  aspirin enteric coated 81 milliGRAM(s) Oral daily  atorvastatin 10 milliGRAM(s) Oral at bedtime  enoxaparin Injectable 40 milliGRAM(s) SubCutaneous every 24 hours  hydrocortisone sodium succinate Injectable 100 milliGRAM(s) IV Push every 8 hours  insulin lispro (ADMELOG) corrective regimen sliding scale   SubCutaneous every 4 hours  lactated ringers. 1000 milliLiter(s) (75 mL/Hr) IV Continuous <Continuous>  multivitamin 1 Tablet(s) Oral daily  piperacillin/tazobactam IVPB.. 3.375 Gram(s) IV Intermittent every 8 hours  senna 2 Tablet(s) Oral at bedtime  thiamine 100 milliGRAM(s) Oral daily    MEDICATIONS  (PRN):        LABS                                            10.0                  Neurophils% (auto):   85.6   (07-15 @ 00:16):    8.88 )-----------(148          Lymphocytes% (auto):  8.1                                           30.8                   Eosinphils% (auto):   0.0      Manual%: Neutrophils x    ; Lymphocytes x    ; Eosinophils x    ; Bands%: x    ; Blasts x                                    146    |  107    |  18                  Calcium: 8.4   / iCa: x      (07-15 @ 00:16)    ----------------------------<  126       Magnesium: 1.6                              3.8     |  26     |  0.76             Phosphorous: 2.9      TPro  6.1    /  Alb  2.6    /  TBili  0.4    /  DBili  x      /  AST  12     /  ALT  12     /  AlkPhos  55     15 Jul 2022 00:16    ( 07-15 @ 00:16 )   PT: 14.3 sec;   INR: 1.24 ratio  aPTT: 31.1 sec MICU Transfer Note  ---------------------------    Transfer from: MICU  Transfer to:  ( x ) Medicine    (  ) Telemetry    (  ) RCU    (  ) Palliative    (  ) Stroke Unit    (  ) _______________  Accepting Physician:      MICU COURSE  Admitted to MICU for hypotension w/bradycardia s/p b/l ureteral stents. Patient was initially not fluid responsive requiring Ad for BP support. Patient was weaned off Ad on 7/14.  EKG revealed first degree block and patient was asymtomatic, off ad. In addition patient's bradycardia improved on 7/15. Patient also received hydrocortisone 100mg IVP as well. At this time the patient no longer requires MICU level of care    For Follow-Up:  [ ] c/w Hydrocortisone 100mg IVP and titrate   [ ] Bcx and Ucx and f/u ID recs regarding further abx currently on zosyn  [] f/u urology recs regarding f/u and coronel management      OBJECTIVE --  Vital Signs Last 24 Hrs  T(C): 37.2 (15 Jul 2022 04:00), Max: 37.5 (14 Jul 2022 10:45)  T(F): 98.9 (15 Jul 2022 04:00), Max: 99.5 (14 Jul 2022 10:45)  HR: 43 (15 Jul 2022 07:00) (32 - 67)  BP: 113/69 (15 Jul 2022 07:00) (77/51 - 140/69)  BP(mean): 85 (15 Jul 2022 07:00) (58 - 98)  RR: 12 (15 Jul 2022 07:00) (5 - 25)  SpO2: 99% (15 Jul 2022 07:00) (98% - 100%)    Parameters below as of 15 Jul 2022 07:00  Patient On (Oxygen Delivery Method): room air    O2 Concentration (%): 21    I&O's Summary    14 Jul 2022 07:01  -  15 Jul 2022 07:00  --------------------------------------------------------  IN: 5455 mL / OUT: 3465 mL / NET: 1990 mL        MEDICATIONS  (STANDING):  aspirin enteric coated 81 milliGRAM(s) Oral daily  atorvastatin 10 milliGRAM(s) Oral at bedtime  enoxaparin Injectable 40 milliGRAM(s) SubCutaneous every 24 hours  hydrocortisone sodium succinate Injectable 100 milliGRAM(s) IV Push every 8 hours  insulin lispro (ADMELOG) corrective regimen sliding scale   SubCutaneous every 4 hours  lactated ringers. 1000 milliLiter(s) (75 mL/Hr) IV Continuous <Continuous>  multivitamin 1 Tablet(s) Oral daily  piperacillin/tazobactam IVPB.. 3.375 Gram(s) IV Intermittent every 8 hours  senna 2 Tablet(s) Oral at bedtime  thiamine 100 milliGRAM(s) Oral daily    MEDICATIONS  (PRN):        LABS                                            10.0                  Neurophils% (auto):   85.6   (07-15 @ 00:16):    8.88 )-----------(148          Lymphocytes% (auto):  8.1                                           30.8                   Eosinphils% (auto):   0.0      Manual%: Neutrophils x    ; Lymphocytes x    ; Eosinophils x    ; Bands%: x    ; Blasts x                                    146    |  107    |  18                  Calcium: 8.4   / iCa: x      (07-15 @ 00:16)    ----------------------------<  126       Magnesium: 1.6                              3.8     |  26     |  0.76             Phosphorous: 2.9      TPro  6.1    /  Alb  2.6    /  TBili  0.4    /  DBili  x      /  AST  12     /  ALT  12     /  AlkPhos  55     15 Jul 2022 00:16    ( 07-15 @ 00:16 )   PT: 14.3 sec;   INR: 1.24 ratio  aPTT: 31.1 sec MICU Transfer Note  ---------------------------    Transfer from: MICU  Transfer to:  ( x ) Medicine    (  ) Telemetry    (  ) RCU    (  ) Palliative    (  ) Stroke Unit    (  ) _______________  Accepting Physician: Dr. Correa      MICU COURSE  Admitted to MICU for hypotension w/bradycardia s/p b/l ureteral stents. Patient was initially not fluid responsive requiring Ad for BP support. Patient was weaned off Ad on 7/14.  EKG revealed first degree block and patient was asymtomatic, off ad. In addition patient's bradycardia improved on 7/15. Patient also received hydrocortisone 100mg IVP as well. At this time the patient no longer requires MICU level of care    For Follow-Up:  [ ] c/w Hydrocortisone 100mg IVP and taper  [ ] Bcx and Ucx and f/u ID recs regarding further abx currently on zosyn  [] f/u urology recs regarding f/u and coronel management      OBJECTIVE --  Vital Signs Last 24 Hrs  T(C): 37.2 (15 Jul 2022 04:00), Max: 37.5 (14 Jul 2022 10:45)  T(F): 98.9 (15 Jul 2022 04:00), Max: 99.5 (14 Jul 2022 10:45)  HR: 43 (15 Jul 2022 07:00) (32 - 67)  BP: 113/69 (15 Jul 2022 07:00) (77/51 - 140/69)  BP(mean): 85 (15 Jul 2022 07:00) (58 - 98)  RR: 12 (15 Jul 2022 07:00) (5 - 25)  SpO2: 99% (15 Jul 2022 07:00) (98% - 100%)    Parameters below as of 15 Jul 2022 07:00  Patient On (Oxygen Delivery Method): room air    O2 Concentration (%): 21    I&O's Summary    14 Jul 2022 07:01  -  15 Jul 2022 07:00  --------------------------------------------------------  IN: 5455 mL / OUT: 3465 mL / NET: 1990 mL        MEDICATIONS  (STANDING):  aspirin enteric coated 81 milliGRAM(s) Oral daily  atorvastatin 10 milliGRAM(s) Oral at bedtime  enoxaparin Injectable 40 milliGRAM(s) SubCutaneous every 24 hours  hydrocortisone sodium succinate Injectable 100 milliGRAM(s) IV Push every 8 hours  insulin lispro (ADMELOG) corrective regimen sliding scale   SubCutaneous every 4 hours  lactated ringers. 1000 milliLiter(s) (75 mL/Hr) IV Continuous <Continuous>  multivitamin 1 Tablet(s) Oral daily  piperacillin/tazobactam IVPB.. 3.375 Gram(s) IV Intermittent every 8 hours  senna 2 Tablet(s) Oral at bedtime  thiamine 100 milliGRAM(s) Oral daily    MEDICATIONS  (PRN):        LABS                                            10.0                  Neurophils% (auto):   85.6   (07-15 @ 00:16):    8.88 )-----------(148          Lymphocytes% (auto):  8.1                                           30.8                   Eosinphils% (auto):   0.0      Manual%: Neutrophils x    ; Lymphocytes x    ; Eosinophils x    ; Bands%: x    ; Blasts x                                    146    |  107    |  18                  Calcium: 8.4   / iCa: x      (07-15 @ 00:16)    ----------------------------<  126       Magnesium: 1.6                              3.8     |  26     |  0.76             Phosphorous: 2.9      TPro  6.1    /  Alb  2.6    /  TBili  0.4    /  DBili  x      /  AST  12     /  ALT  12     /  AlkPhos  55     15 Jul 2022 00:16    ( 07-15 @ 00:16 )   PT: 14.3 sec;   INR: 1.24 ratio  aPTT: 31.1 sec MICU Transfer Note  ---------------------------    Transfer from: MICU  Transfer to:  ( x ) Medicine    (  ) Telemetry    (  ) RCU    (  ) Palliative    (  ) Stroke Unit    (  ) _______________  Accepting Physician: Dr. Correa      MICU COURSE  Admitted to MICU for hypotension w/bradycardia s/p b/l ureteral stents. Patient was initially not fluid responsive requiring Ad for BP support. Patient was weaned off Ad on 7/14.  EKG revealed first degree block and patient was asymtomatic, off ad. In addition patient's bradycardia improved on 7/15. Patient also received hydrocortisone 100mg IVP as well. At this time the patient no longer requires MICU level of care    For Follow-Up:  [ ] c/w Hydrocortisone 100mg IVP and taper  [ ] Bcx and Ucx and f/u ID recs regarding further abx currently on zosyn  [] f/u urology recs regarding f/u and coronel management  [] speech and swallow regarding po status, patient coughing w/meds      OBJECTIVE --  Vital Signs Last 24 Hrs  T(C): 37.2 (15 Jul 2022 04:00), Max: 37.5 (14 Jul 2022 10:45)  T(F): 98.9 (15 Jul 2022 04:00), Max: 99.5 (14 Jul 2022 10:45)  HR: 43 (15 Jul 2022 07:00) (32 - 67)  BP: 113/69 (15 Jul 2022 07:00) (77/51 - 140/69)  BP(mean): 85 (15 Jul 2022 07:00) (58 - 98)  RR: 12 (15 Jul 2022 07:00) (5 - 25)  SpO2: 99% (15 Jul 2022 07:00) (98% - 100%)    Parameters below as of 15 Jul 2022 07:00  Patient On (Oxygen Delivery Method): room air    O2 Concentration (%): 21    I&O's Summary    14 Jul 2022 07:01  -  15 Jul 2022 07:00  --------------------------------------------------------  IN: 5455 mL / OUT: 3465 mL / NET: 1990 mL        MEDICATIONS  (STANDING):  aspirin enteric coated 81 milliGRAM(s) Oral daily  atorvastatin 10 milliGRAM(s) Oral at bedtime  enoxaparin Injectable 40 milliGRAM(s) SubCutaneous every 24 hours  hydrocortisone sodium succinate Injectable 100 milliGRAM(s) IV Push every 8 hours  insulin lispro (ADMELOG) corrective regimen sliding scale   SubCutaneous every 4 hours  lactated ringers. 1000 milliLiter(s) (75 mL/Hr) IV Continuous <Continuous>  multivitamin 1 Tablet(s) Oral daily  piperacillin/tazobactam IVPB.. 3.375 Gram(s) IV Intermittent every 8 hours  senna 2 Tablet(s) Oral at bedtime  thiamine 100 milliGRAM(s) Oral daily    MEDICATIONS  (PRN):        LABS                                            10.0                  Neurophils% (auto):   85.6   (07-15 @ 00:16):    8.88 )-----------(148          Lymphocytes% (auto):  8.1                                           30.8                   Eosinphils% (auto):   0.0      Manual%: Neutrophils x    ; Lymphocytes x    ; Eosinophils x    ; Bands%: x    ; Blasts x                                    146    |  107    |  18                  Calcium: 8.4   / iCa: x      (07-15 @ 00:16)    ----------------------------<  126       Magnesium: 1.6                              3.8     |  26     |  0.76             Phosphorous: 2.9      TPro  6.1    /  Alb  2.6    /  TBili  0.4    /  DBili  x      /  AST  12     /  ALT  12     /  AlkPhos  55     15 Jul 2022 00:16    ( 07-15 @ 00:16 )   PT: 14.3 sec;   INR: 1.24 ratio  aPTT: 31.1 sec

## 2022-07-15 NOTE — PATIENT PROFILE ADULT - FALL HARM RISK - HARM RISK INTERVENTIONS

## 2022-07-15 NOTE — PROGRESS NOTE ADULT - ASSESSMENT
61 m with cerebellar ataxia with neurocognitive decline over a 2 yr period, chronic lacuna infarcts (MRI 4/2022), minimally verbal,  recent hospitalizations 4/18/22-5/13/22 for AMS, asp pneum requiring intubation, septic shock with relative adrenal insufficiency found to have leptomeningeal enhancement but LP negative for infection was considered likely neoplastic but family refused biopsy, pt has had hypothermia and bradycardia in the previous admissions and all the previous urine cxs showed> 3 organisms now brought in for lethargy, poor PO intake and foul smelling urine, here afebrile, WBC:  14.8, RIP , Cr: 1.78, u/a positive  CT:  mild R hydro caused bu a 11 mm stone in R UPJ, more non obstructing stones in lower pole, an 8 mm blader stone  s/p b/l ureteral stent but then pt became hypotensive, hypothermic to 32 and lukas requiring pressors so was transferred to MICU    obstructing R UPJ stone with hypotension, hypothermia and bradycardia after b/l ureteral stent placement, ?septic shock and UTI but pt also had previous hypothermia and bradycardia in the previous admissions, it could also be explained by adrenal insufficiency    * f/u the final blood and urine cx, negative for now  * pt was started on hydrocortisone 100 q 8 and seems to be improving, if cultures remain negative then the reason for shock was adrenal insufficiency and will discontinue antibiotics  * c/w zosyn for now, day 2  * WBC and renal function improved after stent placement  * monitor WBC/diff and renal function  * f/u with urology    The above assessment and plan was discussed with MICU    Jessi Shi MD  contact on teams  After 5pm and on weekends call [phone]

## 2022-07-15 NOTE — PROGRESS NOTE ADULT - ATTENDING COMMENTS
alert conversant.  VS improved, bradycardia resolved, maintaining good urine output  1.  Bacteremia--good antibiotic rx with excellent clinical response  1.  Hypotension--resolved on volume, rx infection  3.  Bradycardia--resolved.  ?related to 4  4.  Adrenal insufficiency--random cortisol during peak stress=7which is low and agree with stress dose taper from 50mg TID hydrocortisone .  Hypothermia which may have related to this or 1 also resolved    discussed with MICU team
s/p BL ureteral stent placement for sepsis 2/2 UTI and obstructing ureteral stone, renal stone  Cont excellent ICU care  F/u cxs  Abx  Will need outpt f/u for definitive stone tx

## 2022-07-15 NOTE — PROGRESS NOTE ADULT - ASSESSMENT
A/P: 61y Male s/p BL ureteral stent placement     - supportive care per primary team   - abx per ID   - f/up cultures  - DVT prophylaxis/OOB  - Strict I&O's  - Keep coronel for at least 48 hours afebrile  - Patient will need outpatient follow up for discussion of definitive stone management     Barbra Barth   Available on Teams A/P: 61y Male s/p BL ureteral stent placement for sepsis 2/2 UTI and obstructing ureteral stone, renal stone    - supportive care per primary team   - abx per ID   - f/up cultures  - DVT prophylaxis/OOB  - Strict I&O's  - Keep coronel for at least 48 hours afebrile  - Patient will need outpatient follow up for discussion of definitive stone management     Barbra Barth   Available on Teams

## 2022-07-15 NOTE — PROGRESS NOTE ADULT - SUBJECTIVE AND OBJECTIVE BOX
INTERVAL HPI/OVERNIGHT EVENTS:  Pt seen and examined at bedside.     Allergies/Intolerance: No Known Allergies      MEDICATIONS  (STANDING):  aspirin enteric coated 81 milliGRAM(s) Oral daily  atorvastatin 10 milliGRAM(s) Oral at bedtime  enoxaparin Injectable 40 milliGRAM(s) SubCutaneous every 24 hours  hydrocortisone sodium succinate Injectable 100 milliGRAM(s) IV Push every 8 hours  insulin lispro (ADMELOG) corrective regimen sliding scale   SubCutaneous every 4 hours  multivitamin 1 Tablet(s) Oral daily  piperacillin/tazobactam IVPB.. 3.375 Gram(s) IV Intermittent every 8 hours  senna 2 Tablet(s) Oral at bedtime  thiamine 100 milliGRAM(s) Oral daily    MEDICATIONS  (PRN):        ROS: all systems reviewed and wnl      PHYSICAL EXAMINATION:  Vital Signs Last 24 Hrs  T(C): 36.5 (15 Jul 2022 08:00), Max: 37.2 (15 Jul 2022 04:00)  T(F): 97.7 (15 Jul 2022 08:00), Max: 98.9 (15 Jul 2022 04:00)  HR: 67 (15 Jul 2022 11:00) (32 - 67)  BP: 163/99 (15 Jul 2022 11:00) (95/56 - 163/99)  BP(mean): 124 (15 Jul 2022 11:00) (70 - 126)  RR: 16 (15 Jul 2022 11:00) (5 - 25)  SpO2: 100% (15 Jul 2022 11:00) (98% - 100%)    Parameters below as of 15 Jul 2022 07:00  Patient On (Oxygen Delivery Method): room air    O2 Concentration (%): 21  CAPILLARY BLOOD GLUCOSE      POCT Blood Glucose.: 117 mg/dL (15 Jul 2022 10:06)  POCT Blood Glucose.: 115 mg/dL (15 Jul 2022 05:56)  POCT Blood Glucose.: 115 mg/dL (15 Jul 2022 02:28)  POCT Blood Glucose.: 147 mg/dL (2022 22:05)  POCT Blood Glucose.: 142 mg/dL (2022 17:16)  POCT Blood Glucose.: 132 mg/dL (2022 14:15)      07-14 @ 07:01  -  07-15 @ 07:00  --------------------------------------------------------  IN: 5455 mL / OUT: 3465 mL / NET: 1990 mL    07-15 @ 07:01  -  07-15 @ 11:32  --------------------------------------------------------  IN: 300 mL / OUT: 1160 mL / NET: -860 mL        GENERAL:   NECK: supple, No JVD  CHEST/LUNG: clear to auscultation bilaterally; no rales, rhonchi, or wheezing b/l  HEART: normal S1, S2  ABDOMEN: BS+, soft, ND, NT   EXTREMITIES:  pulses palpable; no clubbing, cyanosis, or edema b/l LEs  SKIN: no rashes or lesions      LABS:                        10.0   8.88  )-----------( 148      ( 15 Jul 2022 00:16 )             30.8     07-15    146<H>  |  107  |  18  ----------------------------<  126<H>  3.8   |  26  |  0.76    Ca    8.4      15 Jul 2022 00:16  Phos  2.9     07-15  Mg     1.6     07-15    TPro  6.1  /  Alb  2.6<L>  /  TBili  0.4  /  DBili  x   /  AST  12  /  ALT  12  /  AlkPhos  55  07-15    PT/INR - ( 15 Jul 2022 00:16 )   PT: 14.3 sec;   INR: 1.24 ratio         PTT - ( 15 Jul 2022 00:16 )  PTT:31.1 sec  Urinalysis Basic - ( 2022 11:44 )    Color: Light Orange / Appearance: Turbid / S.020 / pH: x  Gluc: x / Ketone: Small  / Bili: Negative / Urobili: Negative   Blood: x / Protein: 100 / Nitrite: Positive   Leuk Esterase: Large / RBC: 31 /hpf /  /HPF   Sq Epi: x / Non Sq Epi: 1 /hpf / Bacteria: Moderate             INTERVAL HPI/OVERNIGHT EVENTS:  Pt seen and examined at bedside.     Allergies/Intolerance: No Known Allergies      MEDICATIONS  (STANDING):  aspirin enteric coated 81 milliGRAM(s) Oral daily  atorvastatin 10 milliGRAM(s) Oral at bedtime  enoxaparin Injectable 40 milliGRAM(s) SubCutaneous every 24 hours  hydrocortisone sodium succinate Injectable 100 milliGRAM(s) IV Push every 8 hours  insulin lispro (ADMELOG) corrective regimen sliding scale   SubCutaneous every 4 hours  multivitamin 1 Tablet(s) Oral daily  piperacillin/tazobactam IVPB.. 3.375 Gram(s) IV Intermittent every 8 hours  senna 2 Tablet(s) Oral at bedtime  thiamine 100 milliGRAM(s) Oral daily    MEDICATIONS  (PRN):        ROS: all systems reviewed and wnl      PHYSICAL EXAMINATION:  Vital Signs Last 24 Hrs  T(C): 36.5 (15 Jul 2022 08:00), Max: 37.2 (15 Jul 2022 04:00)  T(F): 97.7 (15 Jul 2022 08:00), Max: 98.9 (15 Jul 2022 04:00)  HR: 67 (15 Jul 2022 11:00) (32 - 67)  BP: 163/99 (15 Jul 2022 11:00) (95/56 - 163/99)  BP(mean): 124 (15 Jul 2022 11:00) (70 - 126)  RR: 16 (15 Jul 2022 11:00) (5 - 25)  SpO2: 100% (15 Jul 2022 11:00) (98% - 100%)    Parameters below as of 15 Jul 2022 07:00  Patient On (Oxygen Delivery Method): room air    O2 Concentration (%): 21  CAPILLARY BLOOD GLUCOSE      POCT Blood Glucose.: 117 mg/dL (15 Jul 2022 10:06)  POCT Blood Glucose.: 115 mg/dL (15 Jul 2022 05:56)  POCT Blood Glucose.: 115 mg/dL (15 Jul 2022 02:28)  POCT Blood Glucose.: 147 mg/dL (2022 22:05)  POCT Blood Glucose.: 142 mg/dL (2022 17:16)  POCT Blood Glucose.: 132 mg/dL (2022 14:15)      07-14 @ 07:01  -  07-15 @ 07:00  --------------------------------------------------------  IN: 5455 mL / OUT: 3465 mL / NET: 1990 mL    07-15 @ 07:01  -  07-15 @ 11:32  --------------------------------------------------------  IN: 300 mL / OUT: 1160 mL / NET: -860 mL        GENERAL: stable, in bed, alert, comfortable, non-verbal. Barnett in place.    NECK: supple, No JVD  CHEST/LUNG: clear to auscultation bilaterally; no rales, rhonchi, or wheezing b/l  HEART: normal S1, S2  ABDOMEN: BS+, soft, ND, NT   EXTREMITIES:  pulses palpable; no clubbing, cyanosis, or edema b/l LEs      LABS:                        10.0   8.88  )-----------( 148      ( 15 Jul 2022 00:16 )             30.8     07-15    146<H>  |  107  |  18  ----------------------------<  126<H>  3.8   |  26  |  0.76    Ca    8.4      15 Jul 2022 00:16  Phos  2.9     07-15  Mg     1.6     07-15    TPro  6.1  /  Alb  2.6<L>  /  TBili  0.4  /  DBili  x   /  AST  12  /  ALT  12  /  AlkPhos  55  07-15    PT/INR - ( 15 Jul 2022 00:16 )   PT: 14.3 sec;   INR: 1.24 ratio         PTT - ( 15 Jul 2022 00:16 )  PTT:31.1 sec  Urinalysis Basic - ( 2022 11:44 )    Color: Light Orange / Appearance: Turbid / S.020 / pH: x  Gluc: x / Ketone: Small  / Bili: Negative / Urobili: Negative   Blood: x / Protein: 100 / Nitrite: Positive   Leuk Esterase: Large / RBC: 31 /hpf /  /HPF   Sq Epi: x / Non Sq Epi: 1 /hpf / Bacteria: Moderate

## 2022-07-15 NOTE — PROGRESS NOTE ADULT - ASSESSMENT
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 Course c/b development of hypotension/hypothermia/bradycaria concerning for relative adrenal insufficiency vs septic shock due to UTI. Pt refractory to IVF therapy requiring phenylephrine gtt and transfer to MICU for septic shock secondary to UTI in setting of adrenal insufficiency now off pressors since 7/14    #Neuro:  =hx of cerebellar ataxia, chronic lacunar infarcts, leptomeningeal enhancements  -Neuro checks q 4 hrs and prn for changes  -activity as tolerated  -physical therapy consult when stable    #Pulm:  =no issue at present, hx of recent asp pneum  -Supplemental O2 prn to maintain SPO2 > 92%  -Bronchodilators q 6 hrs prn for sob/wheezes  -HOB >/= 30 degree angle  -incentive spirometry 10x q 2 hrs    #CV:  =bradycardia (most likely due to relative adrenal insufficiency vs septic shock)  -ECG demonstrating first degree block but otherwise asymtomatic bradycardia  -Cardiac Enzymes now and q 8 hrs x 3      #GI/:  =s/p bilat ureteral stent placements  -coronel  -strict I & O's keep even  -diet as tolerated  -f/u urology recs  -keep coronel for at least 48hrs    #ID:  =sepsis secondary to UTI, r/o relative adrenal insufficiency  -pan culture  -cortisol level 7/14/22 of 7 ug/dl  -received hydrocortisone 100 mg IVP x1 (given after cortisol level obtained)  -c/w hydrocortisone 100 mg IV q 8 hrs  -c/w zosyn and adjust per cultures per ID  -bcx on 7/13 ngtd and ucx on 7/13 neg    #FEN/ENDO/HEME:  -obtain CMP/Mg++/PO--4/CBC w diff/PT/PTT/INR now and q. a.m.  -TSH 0.6, T4 4.8, cortisol 7  -POC glucose with ISS q 6 hrs - maintain glucose 140-180    DVT Lovenox 61 yr old male with stated hx significant for cerebellar ataxia, chronic lacuna infarcts, leptomeningeal enhancement (MRI 4/2022), recent hospitalizations 4/2022 for Asp pneum/relative adrenal insufficiency/sepsis and 6/2022 for UTI who now is admitted for sepsis secondary to UTI, found to have new Rt renal collecting system dilatation, 8.1 mm Rt prox ureter calculus and Rt hydronephrosis requiring placement of bilat ureteral stent placement. Course c/b development of hypotension/hypothermia/bradycaria concerning for relative adrenal insufficiency vs septic shock due to UTI. Pt refractory to IVF therapy requiring phenylephrine gtt and transfer to MICU for septic shock secondary to UTI in setting of adrenal insufficiency now off pressors since 7/14    #Neuro:  =hx of cerebellar ataxia, chronic lacunar infarcts, leptomeningeal enhancements  -Neuro checks q 4 hrs and prn for changes  -activity as tolerated  -physical therapy consult when stable, on 8 René.         #CV:  =bradycardia (most likely due to relative adrenal insufficiency vs septic shock)  -ECG demonstrating first degree block but otherwise asymtomatic bradycardia  IVF while NPO.   -    #GI/:  =s/p bilat ureteral stent placements  -coronel  -strict I & O's keep even  -diet as tolerated  -f/u urology recs  -keep coronel for at least 48hrs, IV Zosyn.   Need Speech and Swallow to advance diet.     #ID:  =sepsis secondary to UTI, r/o relative adrenal insufficiency  -pan culture  -cortisol level 7/14/22 of 7 ug/dl  -received hydrocortisone 100 mg IVP x1 (given after cortisol level obtained)  -c/w hydrocortisone 100 mg IV q 8 hrs  -c/w zosyn and adjust per cultures per ID  -bcx on 7/13 ngtd and ucx on 7/13 neg        DVT Lovenox 40 mg/day

## 2022-07-15 NOTE — PROGRESS NOTE ADULT - SUBJECTIVE AND OBJECTIVE BOX
Follow Up:  shock, obstructive uropathy, ?UTI    Interval History/ROS: improved hypothermia, blood and urine cx negative, no vomiting, diarrhea, pain        Allergies  No Known Allergies        ANTIMICROBIALS:  piperacillin/tazobactam IVPB.. 3.375 every 8 hours      OTHER MEDS:  aspirin enteric coated 81 milliGRAM(s) Oral daily  atorvastatin 10 milliGRAM(s) Oral at bedtime  enoxaparin Injectable 40 milliGRAM(s) SubCutaneous every 24 hours  hydrocortisone sodium succinate Injectable 100 milliGRAM(s) IV Push every 8 hours  insulin lispro (ADMELOG) corrective regimen sliding scale   SubCutaneous every 4 hours  multivitamin 1 Tablet(s) Oral daily  senna 2 Tablet(s) Oral at bedtime  thiamine 100 milliGRAM(s) Oral daily      Vital Signs Last 24 Hrs  T(C): 36.5 (15 Jul 2022 12:00), Max: 37.2 (15 Jul 2022 04:00)  T(F): 97.7 (15 Jul 2022 12:00), Max: 98.9 (15 Jul 2022 04:00)  HR: 53 (15 Jul 2022 12:00) (32 - 67)  BP: 150/84 (15 Jul 2022 12:00) (109/56 - 163/99)  BP(mean): 112 (15 Jul 2022 12:00) (77 - 126)  RR: 15 (15 Jul 2022 12:00) (9 - 25)  SpO2: 100% (15 Jul 2022 12:00) (98% - 100%)    Parameters below as of 15 Jul 2022 07:00  Patient On (Oxygen Delivery Method): room air    O2 Concentration (%): 21    Physical Exam:  General: NAD  Respiratory:   clear b/l, no wheezing  abd:   soft, BS +, not tender  :     no CVAT, no suprapubic tenderness, + coronel  Musculoskeletal : no joint swelling, no edema  Skin:    no rash  vascular: no phlebitis                        10.0   8.88  )-----------( 148      ( 15 Jul 2022 00:16 )             30.8       07-15    146<H>  |  107  |  18  ----------------------------<  126<H>  3.8   |  26  |  0.76    Ca    8.4      15 Jul 2022 00:16  Phos  2.9     07-15  Mg     1.6     07-15    TPro  6.1  /  Alb  2.6<L>  /  TBili  0.4  /  DBili  x   /  AST  12  /  ALT  12  /  AlkPhos  55  07-15          MICROBIOLOGY:  v  Clean Catch Clean Catch (Midstream)  07-13-22   <10,000 CFU/mL Normal Urogenital Leanna  --  --      .Blood Blood-Peripheral  07-13-22   No growth to date.  --  --      .Blood Blood-Peripheral  07-13-22   No growth to date.  --  --      Clean Catch Clean Catch (Midstream)  06-17-22   Culture grew 3 or more types of organisms which indicate  collection contamination; consider recollection only if clinically  indicated.  --  --      .Blood Blood-Peripheral  06-16-22   No Growth Final  --  --      .Blood Blood-Peripheral  06-16-22   No Growth Final  --  --      Clean Catch Clean Catch (Midstream)  06-15-22   No growth  --  --                RADIOLOGY:  Images independently visualized and reviewed personally, findings as below  < from: CT Abdomen and Pelvis No Cont (07.13.22 @ 21:13) >  IMPRESSION:  Mild right hydronephrosis caused by an 8 x 7 x 11 mm stone at the right   ureteropelvic junction (UPJ).    Additional nonobstructing renal stones in the upper and lower poles of   both kidneys are partially obscured by motion artifact, but measure up to   3-4 mm in the mid to lower pole of the right kidney.    Approximately 8 x 2 mm bladder stone.    Underlying cystitis or pyelonephritis should be excluded based on   clinical symptoms and laboratory findings.    < end of copied text >

## 2022-07-15 NOTE — PROGRESS NOTE ADULT - ASSESSMENT
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 Course c/b development of hypotension/hypothermia/bradycaria concerning for relative adrenal insufficiency vs septic shock due to UTI. Pt refractory to IVF therapy requiring phenylephrine gtt and transfer to MICU for septic shock secondary to UTI in setting of adrenal insufficiency now off pressors since 7/14    #Neuro:  =hx of cerebellar ataxia, chronic lacunar infarcts, leptomeningeal enhancements  -Neuro checks q 4 hrs and prn for changes  -activity as tolerated  -physical therapy consult when stable    #Pulm:  =no issue at present, hx of recent asp pneum  -Supplemental O2 prn to maintain SPO2 > 92%  -Bronchodilators q 6 hrs prn for sob/wheezes  -HOB >/= 30 degree angle  -incentive spirometry 10x q 2 hrs    #CV:  =bradycardia (most likely due to relative adrenal insufficiency vs septic shock)  -ECG demonstrating first degree block but otherwise asymtomatic bradycardia  -Cardiac Enzymes now and q 8 hrs x 3      #GI/:  =s/p bilat ureteral stent placements  -coronel  -strict I & O's keep even  -diet as tolerated  -f/u urology recs  -keep coronel for at least 48hrs    #ID:  =sepsis secondary to UTI, r/o relative adrenal insufficiency  -pan culture  -cortisol level 7/14/22 of 7 ug/dl  -received hydrocortisone 100 mg IVP x1 (given after cortisol level obtained)  -c/w hydrocortisone 100 mg IV q 8 hrs  -c/w zosyn and adjust per cultures per ID  -bcx on 7/13 ngtd and ucx on 7/13 neg    #FEN/ENDO/HEME:  -obtain CMP/Mg++/PO--4/CBC w diff/PT/PTT/INR now and q. a.m.  -TSH 0.6, T4 4.8, cortisol 7  -POC glucose with ISS q 6 hrs - maintain glucose 140-180    DVT Lovenox

## 2022-07-15 NOTE — PROGRESS NOTE ADULT - SUBJECTIVE AND OBJECTIVE BOX
Maximo Soto, PGY2  Pager [phone] [facility] or [phone] [facility]    INTERVAL HPI/OVERNIGHT EVENTS:    SUBJECTIVE: Patient seen and examined at bedside.   Patient was seen and examined at bedside this morning. Denies any nausea/vomiting/diarrhea, headache, shortness of breath, abdominal pain or chest pain/palpitations. Patient responding appropriately to questions and able to make needs known. Vital signs/imaging/telemetry events reviewed.    OVN no events    VITAL SIGNS:  ICU Vital Signs Last 24 Hrs  T(C): 37.2 (15 Jul 2022 04:00), Max: 37.5 (2022 10:45)  T(F): 98.9 (15 Jul 2022 04:00), Max: 99.5 (2022 10:45)  HR: 44 (15 Jul 2022 06:00) (32 - 67)  BP: 120/69 (15 Jul 2022 06:00) (77/51 - 140/69)  BP(mean): 90 (15 Jul 2022 06:00) (58 - 98)  ABP: --  ABP(mean): --  RR: 12 (15 Jul 2022 06:00) (5 - 25)  SpO2: 100% (15 Jul 2022 06:00) (98% - 100%)    O2 Parameters below as of 2022 20:00  Patient On (Oxygen Delivery Method): room air              07-14 @ 07:01  -  07-15 @ 07:00  --------------------------------------------------------  IN: 5455 mL / OUT: 3465 mL / NET: 1990 mL      CAPILLARY BLOOD GLUCOSE      POCT Blood Glucose.: 115 mg/dL (15 Jul 2022 05:56)      PHYSICAL EXAM:  GENERAL: NAD, well-developed  HEENT:  Atraumatic, Normocephalic, EOMI, PERRLA, conjunctiva and sclera clear, oral mucosa moist, clear w/o any exudate   NECK: Supple, No JVD  CHEST/LUNG: Clear to auscultation bilaterally; No wheeze  HEART: bradycardiac but regular rhythm; No murmurs, rubs, or gallops  ABDOMEN: Soft, Nontender, Nondistended; Bowel sounds present  EXTREMITIES:  2+ Peripheral Pulses, No clubbing, cyanosis, or edema  PSYCH: AAOx3, normal affect   NEUROLOGY: non-focal, moving all extremities  SKIN: No rashes or lesions    MEDICATIONS:  MEDICATIONS  (STANDING):  aspirin enteric coated 81 milliGRAM(s) Oral daily  atorvastatin 10 milliGRAM(s) Oral at bedtime  enoxaparin Injectable 40 milliGRAM(s) SubCutaneous every 24 hours  hydrocortisone sodium succinate Injectable 100 milliGRAM(s) IV Push every 8 hours  insulin lispro (ADMELOG) corrective regimen sliding scale   SubCutaneous every 4 hours  lactated ringers. 1000 milliLiter(s) (75 mL/Hr) IV Continuous <Continuous>  multivitamin 1 Tablet(s) Oral daily  piperacillin/tazobactam IVPB.. 3.375 Gram(s) IV Intermittent every 8 hours  senna 2 Tablet(s) Oral at bedtime  thiamine 100 milliGRAM(s) Oral daily    MEDICATIONS  (PRN):      ALLERGIES:  Allergies    No Known Allergies    Intolerances        LABS:                        10.0   8.88  )-----------( 148      ( 15 Jul 2022 00:16 )             30.8     07-15    146<H>  |  107  |  18  ----------------------------<  126<H>  3.8   |  26  |  0.76    Ca    8.4      15 Jul 2022 00:16  Phos  2.9     07-15  Mg     1.6     07-15    TPro  6.1  /  Alb  2.6<L>  /  TBili  0.4  /  DBili  x   /  AST  12  /  ALT  12  /  AlkPhos  55  07-15    PT/INR - ( 15 Jul 2022 00:16 )   PT: 14.3 sec;   INR: 1.24 ratio         PTT - ( 15 Jul 2022 00:16 )  PTT:31.1 sec  Urinalysis Basic - ( 2022 11:44 )    Color: Light Orange / Appearance: Turbid / S.020 / pH: x  Gluc: x / Ketone: Small  / Bili: Negative / Urobili: Negative   Blood: x / Protein: 100 / Nitrite: Positive   Leuk Esterase: Large / RBC: 31 /hpf /  /HPF   Sq Epi: x / Non Sq Epi: 1 /hpf / Bacteria: Moderate        RADIOLOGY & ADDITIONAL TESTS: Reviewed.

## 2022-07-16 LAB
ALBUMIN SERPL ELPH-MCNC: 3 G/DL — LOW (ref 3.3–5)
ALP SERPL-CCNC: 55 U/L — SIGNIFICANT CHANGE UP (ref 40–120)
ALT FLD-CCNC: 13 U/L — SIGNIFICANT CHANGE UP (ref 10–45)
ANION GAP SERPL CALC-SCNC: 10 MMOL/L — SIGNIFICANT CHANGE UP (ref 5–17)
AST SERPL-CCNC: 18 U/L — SIGNIFICANT CHANGE UP (ref 10–40)
BILIRUB SERPL-MCNC: 0.3 MG/DL — SIGNIFICANT CHANGE UP (ref 0.2–1.2)
BUN SERPL-MCNC: 15 MG/DL — SIGNIFICANT CHANGE UP (ref 7–23)
CALCIUM SERPL-MCNC: 8.7 MG/DL — SIGNIFICANT CHANGE UP (ref 8.4–10.5)
CHLORIDE SERPL-SCNC: 101 MMOL/L — SIGNIFICANT CHANGE UP (ref 96–108)
CO2 SERPL-SCNC: 29 MMOL/L — SIGNIFICANT CHANGE UP (ref 22–31)
CREAT SERPL-MCNC: 0.69 MG/DL — SIGNIFICANT CHANGE UP (ref 0.5–1.3)
EGFR: 106 ML/MIN/1.73M2 — SIGNIFICANT CHANGE UP
GLUCOSE BLDC GLUCOMTR-MCNC: 103 MG/DL — HIGH (ref 70–99)
GLUCOSE BLDC GLUCOMTR-MCNC: 147 MG/DL — HIGH (ref 70–99)
GLUCOSE BLDC GLUCOMTR-MCNC: 157 MG/DL — HIGH (ref 70–99)
GLUCOSE BLDC GLUCOMTR-MCNC: 165 MG/DL — HIGH (ref 70–99)
GLUCOSE BLDC GLUCOMTR-MCNC: 182 MG/DL — HIGH (ref 70–99)
GLUCOSE SERPL-MCNC: 183 MG/DL — HIGH (ref 70–99)
HCT VFR BLD CALC: 34.1 % — LOW (ref 39–50)
HGB BLD-MCNC: 10.9 G/DL — LOW (ref 13–17)
MCHC RBC-ENTMCNC: 28.5 PG — SIGNIFICANT CHANGE UP (ref 27–34)
MCHC RBC-ENTMCNC: 32 GM/DL — SIGNIFICANT CHANGE UP (ref 32–36)
MCV RBC AUTO: 89 FL — SIGNIFICANT CHANGE UP (ref 80–100)
NRBC # BLD: 0 /100 WBCS — SIGNIFICANT CHANGE UP (ref 0–0)
PLATELET # BLD AUTO: 154 K/UL — SIGNIFICANT CHANGE UP (ref 150–400)
POTASSIUM SERPL-MCNC: 2.9 MMOL/L — CRITICAL LOW (ref 3.5–5.3)
POTASSIUM SERPL-SCNC: 2.9 MMOL/L — CRITICAL LOW (ref 3.5–5.3)
PROT SERPL-MCNC: 6.5 G/DL — SIGNIFICANT CHANGE UP (ref 6–8.3)
RBC # BLD: 3.83 M/UL — LOW (ref 4.2–5.8)
RBC # FLD: 14.1 % — SIGNIFICANT CHANGE UP (ref 10.3–14.5)
SODIUM SERPL-SCNC: 140 MMOL/L — SIGNIFICANT CHANGE UP (ref 135–145)
WBC # BLD: 8.35 K/UL — SIGNIFICANT CHANGE UP (ref 3.8–10.5)
WBC # FLD AUTO: 8.35 K/UL — SIGNIFICANT CHANGE UP (ref 3.8–10.5)

## 2022-07-16 RX ORDER — POTASSIUM CHLORIDE 20 MEQ
40 PACKET (EA) ORAL ONCE
Refills: 0 | Status: COMPLETED | OUTPATIENT
Start: 2022-07-16 | End: 2022-07-16

## 2022-07-16 RX ORDER — INSULIN LISPRO 100/ML
VIAL (ML) SUBCUTANEOUS EVERY 6 HOURS
Refills: 0 | Status: DISCONTINUED | OUTPATIENT
Start: 2022-07-16 | End: 2022-07-16

## 2022-07-16 RX ORDER — POLYETHYLENE GLYCOL 3350 17 G/17G
17 POWDER, FOR SOLUTION ORAL DAILY
Refills: 0 | Status: DISCONTINUED | OUTPATIENT
Start: 2022-07-16 | End: 2022-07-25

## 2022-07-16 RX ORDER — INSULIN LISPRO 100/ML
VIAL (ML) SUBCUTANEOUS
Refills: 0 | Status: DISCONTINUED | OUTPATIENT
Start: 2022-07-16 | End: 2022-07-27

## 2022-07-16 RX ORDER — DEXTROSE MONOHYDRATE, SODIUM CHLORIDE, AND POTASSIUM CHLORIDE 50; .745; 4.5 G/1000ML; G/1000ML; G/1000ML
1000 INJECTION, SOLUTION INTRAVENOUS
Refills: 0 | Status: DISCONTINUED | OUTPATIENT
Start: 2022-07-16 | End: 2022-07-17

## 2022-07-16 RX ORDER — POTASSIUM CHLORIDE 20 MEQ
10 PACKET (EA) ORAL
Refills: 0 | Status: COMPLETED | OUTPATIENT
Start: 2022-07-16 | End: 2022-07-16

## 2022-07-16 RX ORDER — INSULIN LISPRO 100/ML
VIAL (ML) SUBCUTANEOUS AT BEDTIME
Refills: 0 | Status: DISCONTINUED | OUTPATIENT
Start: 2022-07-16 | End: 2022-07-27

## 2022-07-16 RX ADMIN — Medication 100 MILLIGRAM(S): at 21:43

## 2022-07-16 RX ADMIN — ENOXAPARIN SODIUM 40 MILLIGRAM(S): 100 INJECTION SUBCUTANEOUS at 11:47

## 2022-07-16 RX ADMIN — SENNA PLUS 2 TABLET(S): 8.6 TABLET ORAL at 21:43

## 2022-07-16 RX ADMIN — DEXTROSE MONOHYDRATE, SODIUM CHLORIDE, AND POTASSIUM CHLORIDE 100 MILLILITER(S): 50; .745; 4.5 INJECTION, SOLUTION INTRAVENOUS at 21:44

## 2022-07-16 RX ADMIN — POLYETHYLENE GLYCOL 3350 17 GRAM(S): 17 POWDER, FOR SOLUTION ORAL at 11:49

## 2022-07-16 RX ADMIN — Medication 1 TABLET(S): at 11:48

## 2022-07-16 RX ADMIN — Medication 1: at 06:32

## 2022-07-16 RX ADMIN — Medication 40 MILLIEQUIVALENT(S): at 17:28

## 2022-07-16 RX ADMIN — ATORVASTATIN CALCIUM 10 MILLIGRAM(S): 80 TABLET, FILM COATED ORAL at 21:43

## 2022-07-16 RX ADMIN — SODIUM CHLORIDE 100 MILLILITER(S): 9 INJECTION, SOLUTION INTRAVENOUS at 08:24

## 2022-07-16 RX ADMIN — Medication 100 MILLIGRAM(S): at 05:11

## 2022-07-16 RX ADMIN — PIPERACILLIN AND TAZOBACTAM 25 GRAM(S): 4; .5 INJECTION, POWDER, LYOPHILIZED, FOR SOLUTION INTRAVENOUS at 02:45

## 2022-07-16 RX ADMIN — Medication 100 MILLIEQUIVALENT(S): at 09:33

## 2022-07-16 RX ADMIN — Medication 100 MILLIEQUIVALENT(S): at 13:40

## 2022-07-16 RX ADMIN — Medication 100 MILLIGRAM(S): at 11:47

## 2022-07-16 RX ADMIN — PIPERACILLIN AND TAZOBACTAM 25 GRAM(S): 4; .5 INJECTION, POWDER, LYOPHILIZED, FOR SOLUTION INTRAVENOUS at 17:29

## 2022-07-16 RX ADMIN — DEXTROSE MONOHYDRATE, SODIUM CHLORIDE, AND POTASSIUM CHLORIDE 100 MILLILITER(S): 50; .745; 4.5 INJECTION, SOLUTION INTRAVENOUS at 09:28

## 2022-07-16 RX ADMIN — Medication 1: at 11:52

## 2022-07-16 RX ADMIN — Medication 100 MILLIEQUIVALENT(S): at 11:41

## 2022-07-16 RX ADMIN — PIPERACILLIN AND TAZOBACTAM 25 GRAM(S): 4; .5 INJECTION, POWDER, LYOPHILIZED, FOR SOLUTION INTRAVENOUS at 09:20

## 2022-07-16 RX ADMIN — Medication 100 MILLIGRAM(S): at 13:36

## 2022-07-16 NOTE — PHYSICAL THERAPY INITIAL EVALUATION ADULT - PERTINENT HX OF CURRENT PROBLEM, REHAB EVAL
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

## 2022-07-16 NOTE — DIETITIAN INITIAL EVALUATION ADULT - ORAL INTAKE PTA/DIET HISTORY
Spouse at bedside able to assist in nutrition evaluation. Per spouse, reports pt with hx of eating pureed diet, THIN liquids (despite prior recommendations to continue on thickened - see below). Meal preparation done by family. Few dietary preferences noted, including soft rice, beans, fish (no beef, no pork), however per spouse noted "he eats when things taste good", typically three meals daily. Takes dietary supplements: MVI, thiamine and Ensure Enlive 2x daily. Denies baseline N/V; endorsed +constipation x 8 days; no documented BM's recorded thus far. On bowel regimen. To note, pt pending swallow evaluation - NPO at this time.

## 2022-07-16 NOTE — DIETITIAN INITIAL EVALUATION ADULT - ADD RECOMMEND
1. Defer initiation of PO diet to medical team. As able, consider no therapeutic restrictions; defer consistency to medical team, SLP.   2. Recommend multivitamin, vitamin C and thiamine (if no medication contraindications noted).   3. Monitor wt trends/labs/skin integrity/hydration status/bowel regularity.  1. Defer initiation of PO diet to medical team. As able, consider no therapeutic restrictions; defer consistency to medical team, SLP. Consider Ensure Enlive if PO diet warranted.   2. Recommend multivitamin, vitamin C and thiamine (if no medication contraindications noted) to aid in prevention of micronutrient deficiencies, and aid in wound healing.   3. Monitor wt trends/labs/skin integrity/hydration status/bowel regularity.

## 2022-07-16 NOTE — PHYSICAL THERAPY INITIAL EVALUATION ADULT - ADDITIONAL COMMENTS
Pt lives in a pvt house w/ 4 steps to neg. Pt amb w/ RW PTA. Pt req assistance w/ ADL's. Pt has a RW and wheelchair at home.

## 2022-07-16 NOTE — PROGRESS NOTE ADULT - SUBJECTIVE AND OBJECTIVE BOX
CARDIOLOGY     PROGRESS  NOTE   ________________________________________________    CHIEF COMPLAINT:Patient is a 61y old  Male who presents with a chief complaint of UTI (15 Jul 2022 12:45)  no complain.  	  REVIEW OF SYSTEMS:  CONSTITUTIONAL: No fever, weight loss, or fatigue  EYES: No eye pain, visual disturbances, or discharge  ENT:  No difficulty hearing, tinnitus, vertigo; No sinus or throat pain  NECK: No pain or stiffness  RESPIRATORY: No cough, wheezing, chills or hemoptysis; No Shortness of Breath  CARDIOVASCULAR: No chest pain, palpitations, passing out, dizziness, or leg swelling  GASTROINTESTINAL: No abdominal or epigastric pain. No nausea, vomiting, or hematemesis; No diarrhea or constipation. No melena or hematochezia.  GENITOURINARY: No dysuria, frequency, hematuria, or incontinence  NEUROLOGICAL: No headaches, memory loss, loss of strength, numbness, or tremors  SKIN: No itching, burning, rashes, or lesions   LYMPH Nodes: No enlarged glands  ENDOCRINE: No heat or cold intolerance; No hair loss  MUSCULOSKELETAL: No joint pain or swelling; No muscle, back, or extremity pain  PSYCHIATRIC: No depression, anxiety, mood swings, or difficulty sleeping  HEME/LYMPH: No easy bruising, or bleeding gums  ALLERGY AND IMMUNOLOGIC: No hives or eczema	    [ ] All others negative	  [ ] Unable to obtain    PHYSICAL EXAM:  T(C): 36.4 (07-16-22 @ 04:48), Max: 36.5 (07-15-22 @ 12:00)  HR: 52 (07-16-22 @ 04:48) (40 - 67)  BP: 103/61 (07-16-22 @ 08:55) (95/54 - 163/99)  RR: 14 (07-16-22 @ 08:55) (14 - 16)  SpO2: 99% (07-16-22 @ 08:55) (99% - 100%)  Wt(kg): --  I&O's Summary    15 Jul 2022 07:01  -  16 Jul 2022 07:00  --------------------------------------------------------  IN: 1750 mL / OUT: 3885 mL / NET: -2135 mL        Appearance: Normal	  HEENT:   Normal oral mucosa, PERRL, EOMI	  Lymphatic: No lymphadenopathy  Cardiovascular: Normal S1 S2, No JVD, + murmurs, No edema  Respiratory: Lungs clear to auscultation	  Psychiatry: A & O x 3, Mood & affect appropriate  Gastrointestinal:  Soft, Non-tender, + BS	  Skin: No rashes, No ecchymoses, No cyanosis	  Neurologic: Non-focal  Extremities: Normal range of motion, No clubbing, cyanosis or edema  Vascular: Peripheral pulses palpable 2+ bilaterally    MEDICATIONS  (STANDING):  aspirin enteric coated 81 milliGRAM(s) Oral daily  atorvastatin 10 milliGRAM(s) Oral at bedtime  dextrose 5% + sodium chloride 0.9% with potassium chloride 20 mEq/L 1000 milliLiter(s) (100 mL/Hr) IV Continuous <Continuous>  enoxaparin Injectable 40 milliGRAM(s) SubCutaneous every 24 hours  hydrocortisone sodium succinate Injectable 100 milliGRAM(s) IV Push every 8 hours  insulin lispro (ADMELOG) corrective regimen sliding scale   SubCutaneous every 6 hours  multivitamin 1 Tablet(s) Oral daily  piperacillin/tazobactam IVPB.. 3.375 Gram(s) IV Intermittent every 8 hours  potassium chloride  10 mEq/100 mL IVPB 10 milliEquivalent(s) IV Intermittent every 1 hour  senna 2 Tablet(s) Oral at bedtime  thiamine 100 milliGRAM(s) Oral daily      TELEMETRY: 	    ECG:  	  RADIOLOGY:  OTHER: 	  	  LABS:	 	    CARDIAC MARKERS:  CARDIAC MARKERS ( 14 Jul 2022 12:43 )  x     / x     / 24 U/L / x     / 1.2 ng/mL                                10.9   8.35  )-----------( 154      ( 16 Jul 2022 07:23 )             34.1     07-16    140  |  101  |  15  ----------------------------<  183<H>  2.9<LL>   |  29  |  0.69    Ca    8.7      16 Jul 2022 07:14  Phos  2.9     07-15  Mg     1.6     07-15    TPro  6.5  /  Alb  3.0<L>  /  TBili  0.3  /  DBili  x   /  AST  18  /  ALT  13  /  AlkPhos  55  07-16    proBNP:   Lipid Profile:   HgA1c:   TSH: Thyroid Stimulating Hormone, Serum: 0.60 uIU/mL (07-14 @ 06:04)  Thyroid Stimulating Hormone, Serum: 1.60 uIU/mL (06-18 @ 11:19)    PT/INR - ( 15 Jul 2022 00:16 )   PT: 14.3 sec;   INR: 1.24 ratio         PTT - ( 15 Jul 2022 00:16 )  PTT:31.1 sec      Assessment and plan  ---------------------------  61M w/ hx of cerebellar ataxia with rapid neurocognitive decline (baseline AOx1-2, minimally conversant) recent prolonged admission including MICU stay/ intubation from 4/18-5/13 for AMS and airway protection, recent admission for UTI treated with 5 day course of CTX, brought in by wife for decreased PO intake and lethargy for several days with "white and thick" and foul smelling urine since 7/8. Hx obtained from wife due to patient's encephalopathy. PCP recently prescribed cipro 500 mg BID x 3 days (pt took 2 days) and wife thinks urine has improved since then. However, he has been eating less since and has been lethargic with minimal responsiveness which prompted the ED evaluation. ROS limited due to patient's mental status. Wife has noted pt has been constipated and gave him a suppository and he subsequently had one bowel movement. She noted a few red specks but no overt bleeding or black stool.  hypotension ?sec to sepsis/ pt with hx of low bp  bradycardia ,observe  dvt prophylaxis  am cortisol level, started on steroid in MICU  iv hydration  tsh  continue abx  will taper hydrocortisone slowly

## 2022-07-16 NOTE — PHYSICAL THERAPY INITIAL EVALUATION ADULT - PRECAUTIONS/LIMITATIONS, REHAB EVAL
Course c/b development of hypotension/hypothermia/bradycaria concerning for relative adrenal insufficiency vs septic shock due to UTI. Pt refractory to IVF therapy requiring phenylephrine gtt and transfer to MICU for septic shock secondary to UTI in setting of adrenal insufficiency now off pressors since 7/14

## 2022-07-16 NOTE — DIETITIAN INITIAL EVALUATION ADULT - PERTINENT MEDS FT
MEDICATIONS  (STANDING):  aspirin enteric coated 81 milliGRAM(s) Oral daily  atorvastatin 10 milliGRAM(s) Oral at bedtime  dextrose 5% + sodium chloride 0.9% with potassium chloride 20 mEq/L 1000 milliLiter(s) (100 mL/Hr) IV Continuous <Continuous>  enoxaparin Injectable 40 milliGRAM(s) SubCutaneous every 24 hours  hydrocortisone sodium succinate Injectable 100 milliGRAM(s) IV Push every 8 hours  insulin lispro (ADMELOG) corrective regimen sliding scale   SubCutaneous every 6 hours  multivitamin 1 Tablet(s) Oral daily  piperacillin/tazobactam IVPB.. 3.375 Gram(s) IV Intermittent every 8 hours  potassium chloride  10 mEq/100 mL IVPB 10 milliEquivalent(s) IV Intermittent every 1 hour  senna 2 Tablet(s) Oral at bedtime  thiamine 100 milliGRAM(s) Oral daily    MEDICATIONS  (PRN):

## 2022-07-16 NOTE — DIETITIAN INITIAL EVALUATION ADULT - PERTINENT LABORATORY DATA
07-16    140  |  101  |  15  ----------------------------<  183<H>  2.9<LL>   |  29  |  0.69    Ca    8.7      16 Jul 2022 07:14  Phos  2.9     07-15  Mg     1.6     07-15    TPro  6.5  /  Alb  3.0<L>  /  TBili  0.3  /  DBili  x   /  AST  18  /  ALT  13  /  AlkPhos  55  07-16  POCT Blood Glucose.: 182 mg/dL (07-16-22 @ 06:30)  A1C with Estimated Average Glucose Result: 5.5 % (04-18-22 @ 09:56)

## 2022-07-16 NOTE — DIETITIAN INITIAL EVALUATION ADULT - NSFNSNUTRCHEWSWALLOWFT_GEN_A_CORE
Pending formal speech and swallow evaluation by SLP prior to advancement of PO diet. Seen by SLP during previous admission. Per SLP note 6/20/22, recommended for pt to continue on pureed diet with mildly thick liquids via teaspoon.

## 2022-07-16 NOTE — CHART NOTE - NSCHARTNOTEFT_GEN_A_CORE
A/P: 61y Male s/p BL ureteral stent placement for sepsis 2/2 UTI and obstructing ureteral stone, renal stone.   Cultures have remained negative   Appreciate ID recs   - TOV per primary team   - Patient should follow up with Dr. Troncoso as an outpatient for definitive stone management    Please call with future questions or concern    Barbra Barth  Available on Teams

## 2022-07-16 NOTE — PROGRESS NOTE ADULT - SUBJECTIVE AND OBJECTIVE BOX
INTERVAL HPI/OVERNIGHT EVENTS:  Pt seen and examined at bedside.     Allergies/Intolerance: No Known Allergies      MEDICATIONS  (STANDING):  aspirin enteric coated 81 milliGRAM(s) Oral daily  atorvastatin 10 milliGRAM(s) Oral at bedtime  dextrose 5% + sodium chloride 0.9% with potassium chloride 20 mEq/L 1000 milliLiter(s) (100 mL/Hr) IV Continuous <Continuous>  enoxaparin Injectable 40 milliGRAM(s) SubCutaneous every 24 hours  hydrocortisone sodium succinate Injectable 100 milliGRAM(s) IV Push every 8 hours  insulin lispro (ADMELOG) corrective regimen sliding scale   SubCutaneous every 6 hours  multivitamin 1 Tablet(s) Oral daily  piperacillin/tazobactam IVPB.. 3.375 Gram(s) IV Intermittent every 8 hours  polyethylene glycol 3350 17 Gram(s) Oral daily  potassium chloride  10 mEq/100 mL IVPB 10 milliEquivalent(s) IV Intermittent every 1 hour  senna 2 Tablet(s) Oral at bedtime  thiamine 100 milliGRAM(s) Oral daily    MEDICATIONS  (PRN):        ROS: all systems reviewed and wnl      PHYSICAL EXAMINATION:  Vital Signs Last 24 Hrs  T(C): 36.4 (16 Jul 2022 04:48), Max: 36.5 (15 Jul 2022 12:00)  T(F): 97.6 (16 Jul 2022 04:48), Max: 97.7 (15 Jul 2022 12:00)  HR: 52 (16 Jul 2022 04:48) (40 - 67)  BP: 103/61 (16 Jul 2022 08:55) (95/54 - 163/99)  BP(mean): 117 (15 Jul 2022 13:00) (112 - 124)  RR: 14 (16 Jul 2022 08:55) (14 - 16)  SpO2: 99% (16 Jul 2022 08:55) (99% - 100%)    Parameters below as of 16 Jul 2022 08:55  Patient On (Oxygen Delivery Method): room air      CAPILLARY BLOOD GLUCOSE      POCT Blood Glucose.: 182 mg/dL (16 Jul 2022 06:30)  POCT Blood Glucose.: 157 mg/dL (16 Jul 2022 02:05)  POCT Blood Glucose.: 115 mg/dL (15 Jul 2022 21:32)  POCT Blood Glucose.: 109 mg/dL (15 Jul 2022 19:29)  POCT Blood Glucose.: 125 mg/dL (15 Jul 2022 17:18)  POCT Blood Glucose.: 123 mg/dL (15 Jul 2022 14:37)      07-15 @ 07:01  -  07-16 @ 07:00  --------------------------------------------------------  IN: 1750 mL / OUT: 3885 mL / NET: -2135 mL        GENERAL: stable, flat affect, VSS, comfortable. Banrett in place.   NECK: supple, No JVD  CHEST/LUNG: clear to auscultation bilaterally; no rales, rhonchi, or wheezing b/l  HEART: normal S1, S2  ABDOMEN: BS+, soft, ND, NT   EXTREMITIES:  pulses palpable; no clubbing, cyanosis, or edema b/l LEs  SKIN: no rashes or lesions      LABS:                        10.9   8.35  )-----------( 154      ( 16 Jul 2022 07:23 )             34.1     07-16    140  |  101  |  15  ----------------------------<  183<H>  2.9<LL>   |  29  |  0.69    Ca    8.7      16 Jul 2022 07:14  Phos  2.9     07-15  Mg     1.6     07-15    TPro  6.5  /  Alb  3.0<L>  /  TBili  0.3  /  DBili  x   /  AST  18  /  ALT  13  /  AlkPhos  55  07-16    PT/INR - ( 15 Jul 2022 00:16 )   PT: 14.3 sec;   INR: 1.24 ratio         PTT - ( 15 Jul 2022 00:16 )  PTT:31.1 sec

## 2022-07-16 NOTE — DIETITIAN INITIAL EVALUATION ADULT - SIGNS/SYMPTOMS
pt with stage I pressure injury to sacrum wt loss 12.6% x 9 months; moderate body fat and muscle mass depletion; </=50% energy intake x 4 days

## 2022-07-16 NOTE — DIETITIAN INITIAL EVALUATION ADULT - OTHER INFO
Dosing wt (7/13): 149.6 lbs  [facility] trend [facility] (7/16): 138 lbs; (6/9): 137 lbs; (4/22): 168 lbs; (4/20): 152.2 lbs (10/6/21): 171.1 lbs  Variable wt trend noted. Per previous RD note, spouse acknowledged hx of significant wt loss. Diagnosed with severe protein calorie malnutrition on 6/19/22. Overall, with wt loss of 12.6% x ~9 months. Will continue to trend/monitor.

## 2022-07-16 NOTE — DIETITIAN INITIAL EVALUATION ADULT - NSFNSGIIOFT_GEN_A_CORE
-Continues on D5% + NaCl for hydration throughout duration of NPO status. S/P lactated ringers.   -No documented BM's recorded thus far. On bowel regimen (senna).   -Continues on antibiotics as prescribed.   -Continues on MVI, thiamine; KCl supplementation.  -Prescribed insulin lispro sliding scale to aid in management of BG. A1c 5.5%.

## 2022-07-16 NOTE — DIETITIAN INITIAL EVALUATION ADULT - PROBLEM/PLAN-4
Addended by: PANKAJ MAXWELL on: 7/14/2021 03:48 PM     Modules accepted: Orders     DISPLAY PLAN FREE TEXT

## 2022-07-16 NOTE — DIETITIAN INITIAL EVALUATION ADULT - ETIOLOGY
increased physiological demand of nutrient in setting of pressure injury  complex clinical course; concern for inadequate energy intake with subsequent increased nutrient needs

## 2022-07-16 NOTE — PROGRESS NOTE ADULT - ASSESSMENT
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 Course c/b development of hypotension/hypothermia/bradycaria concerning for relative adrenal insufficiency vs septic shock due to UTI. Pt refractory to IVF therapy requiring phenylephrine gtt and transfer to MICU for septic shock secondary to UTI in setting of adrenal insufficiency now off pressors since 7/14    #Neuro:  =hx of cerebellar ataxia, chronic lacunar infarcts, leptomeningeal enhancements  activity as tolerated  -physical therapy consult when stable, on 8 René.         #CV:  =bradycardia (most likely due to relative adrenal insufficiency vs septic shock)  -ECG demonstrating first degree block but otherwise asymtomatic bradycardia  IVF while NPO.   -    #GI/:  =s/p bilat ureteral stent placements  -coronel  -strict I & O's keep even  -diet as tolerated  -f/u urology recs  -keep coronel for at least 48hrs, IV Zosyn.   Need Speech and Swallow to advance diet.   Needs TOV after seen by PT.        #ID:  =sepsis secondary to UTI, r/o relative adrenal insufficiency  -pan culture  -cortisol level 7/14/22 of 7 ug/dl  -received hydrocortisone 100 mg IVP x1 (given after cortisol level obtained)  -c/w hydrocortisone 100 mg IV q 8 hrs  -c/w zosyn and adjust per cultures per ID  -bcx on 7/13 ngtd and ucx on 7/13 neg        DVT Lovenox 40 mg/day

## 2022-07-16 NOTE — DIETITIAN INITIAL EVALUATION ADULT - REASON FOR ADMISSION
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 Now admitted for sepsis 2/2 UTI. Found to have a new R renal collecting system dilation, 8.1mm R prox ureter calculus and R hydronephrosis requiring placement of bilateral ureteral stent placement. Course c/b development of hypotension, hypothermia and bradycardia, concerning for relative adrenal insufficiency vs septic shock due to UTI. Transferred to MICU for septic shock 2/2 UTI, now off pressors since 7/15 and transferred to medical floor 7/15. Continues on antibiotics as prescribed. ID following. s

## 2022-07-17 LAB
-  AMIKACIN: SIGNIFICANT CHANGE UP
-  AZTREONAM: SIGNIFICANT CHANGE UP
-  CEFEPIME: SIGNIFICANT CHANGE UP
-  CEFTAZIDIME: SIGNIFICANT CHANGE UP
-  CIPROFLOXACIN: SIGNIFICANT CHANGE UP
-  GENTAMICIN: SIGNIFICANT CHANGE UP
-  IMIPENEM: SIGNIFICANT CHANGE UP
-  LEVOFLOXACIN: SIGNIFICANT CHANGE UP
-  MEROPENEM: SIGNIFICANT CHANGE UP
-  PIPERACILLIN/TAZOBACTAM: SIGNIFICANT CHANGE UP
-  TOBRAMYCIN: SIGNIFICANT CHANGE UP
A1C WITH ESTIMATED AVERAGE GLUCOSE RESULT: 5.6 % — SIGNIFICANT CHANGE UP (ref 4–5.6)
ANION GAP SERPL CALC-SCNC: 7 MMOL/L — SIGNIFICANT CHANGE UP (ref 5–17)
APPEARANCE UR: ABNORMAL
BACTERIA # UR AUTO: NEGATIVE — SIGNIFICANT CHANGE UP
BILIRUB UR-MCNC: NEGATIVE — SIGNIFICANT CHANGE UP
BUN SERPL-MCNC: 11 MG/DL — SIGNIFICANT CHANGE UP (ref 7–23)
CALCIUM SERPL-MCNC: 8.7 MG/DL — SIGNIFICANT CHANGE UP (ref 8.4–10.5)
CHLORIDE SERPL-SCNC: 109 MMOL/L — HIGH (ref 96–108)
CO2 SERPL-SCNC: 29 MMOL/L — SIGNIFICANT CHANGE UP (ref 22–31)
COLOR SPEC: SIGNIFICANT CHANGE UP
CREAT SERPL-MCNC: 0.79 MG/DL — SIGNIFICANT CHANGE UP (ref 0.5–1.3)
CULTURE RESULTS: SIGNIFICANT CHANGE UP
DIFF PNL FLD: ABNORMAL
EGFR: 102 ML/MIN/1.73M2 — SIGNIFICANT CHANGE UP
EPI CELLS # UR: 1 /HPF — SIGNIFICANT CHANGE UP
ESTIMATED AVERAGE GLUCOSE: 114 MG/DL — SIGNIFICANT CHANGE UP (ref 68–114)
GLUCOSE BLDC GLUCOMTR-MCNC: 124 MG/DL — HIGH (ref 70–99)
GLUCOSE BLDC GLUCOMTR-MCNC: 154 MG/DL — HIGH (ref 70–99)
GLUCOSE BLDC GLUCOMTR-MCNC: 217 MG/DL — HIGH (ref 70–99)
GLUCOSE BLDC GLUCOMTR-MCNC: 254 MG/DL — HIGH (ref 70–99)
GLUCOSE SERPL-MCNC: 207 MG/DL — HIGH (ref 70–99)
GLUCOSE UR QL: ABNORMAL
HYALINE CASTS # UR AUTO: 2 /LPF — SIGNIFICANT CHANGE UP (ref 0–2)
KETONES UR-MCNC: NEGATIVE — SIGNIFICANT CHANGE UP
LEUKOCYTE ESTERASE UR-ACNC: ABNORMAL
MAGNESIUM SERPL-MCNC: 1.8 MG/DL — SIGNIFICANT CHANGE UP (ref 1.6–2.6)
METHOD TYPE: SIGNIFICANT CHANGE UP
NITRITE UR-MCNC: NEGATIVE — SIGNIFICANT CHANGE UP
ORGANISM # SPEC MICROSCOPIC CNT: SIGNIFICANT CHANGE UP
ORGANISM # SPEC MICROSCOPIC CNT: SIGNIFICANT CHANGE UP
PH UR: 7.5 — SIGNIFICANT CHANGE UP (ref 5–8)
POTASSIUM SERPL-MCNC: 3.7 MMOL/L — SIGNIFICANT CHANGE UP (ref 3.5–5.3)
POTASSIUM SERPL-SCNC: 3.7 MMOL/L — SIGNIFICANT CHANGE UP (ref 3.5–5.3)
PROT UR-MCNC: ABNORMAL
RBC CASTS # UR COMP ASSIST: 3020 /HPF — HIGH (ref 0–4)
SODIUM SERPL-SCNC: 145 MMOL/L — SIGNIFICANT CHANGE UP (ref 135–145)
SP GR SPEC: 1.02 — SIGNIFICANT CHANGE UP (ref 1.01–1.02)
SPECIMEN SOURCE: SIGNIFICANT CHANGE UP
SURGICAL PATHOLOGY STUDY: SIGNIFICANT CHANGE UP
UROBILINOGEN FLD QL: NEGATIVE — SIGNIFICANT CHANGE UP
WBC UR QL: 11 /HPF — HIGH (ref 0–5)

## 2022-07-17 PROCEDURE — 99232 SBSQ HOSP IP/OBS MODERATE 35: CPT | Mod: GC

## 2022-07-17 RX ORDER — HYDROCORTISONE 20 MG
50 TABLET ORAL EVERY 8 HOURS
Refills: 0 | Status: DISCONTINUED | OUTPATIENT
Start: 2022-07-17 | End: 2022-07-20

## 2022-07-17 RX ORDER — SODIUM CHLORIDE 9 MG/ML
250 INJECTION INTRAMUSCULAR; INTRAVENOUS; SUBCUTANEOUS ONCE
Refills: 0 | Status: COMPLETED | OUTPATIENT
Start: 2022-07-17 | End: 2022-07-17

## 2022-07-17 RX ADMIN — Medication 100 MILLIGRAM(S): at 11:15

## 2022-07-17 RX ADMIN — Medication 2: at 08:25

## 2022-07-17 RX ADMIN — Medication 3: at 12:27

## 2022-07-17 RX ADMIN — POLYETHYLENE GLYCOL 3350 17 GRAM(S): 17 POWDER, FOR SOLUTION ORAL at 11:14

## 2022-07-17 RX ADMIN — Medication 100 MILLIGRAM(S): at 21:59

## 2022-07-17 RX ADMIN — Medication 100 MILLIGRAM(S): at 05:33

## 2022-07-17 RX ADMIN — Medication 1 TABLET(S): at 11:15

## 2022-07-17 RX ADMIN — PIPERACILLIN AND TAZOBACTAM 25 GRAM(S): 4; .5 INJECTION, POWDER, LYOPHILIZED, FOR SOLUTION INTRAVENOUS at 00:59

## 2022-07-17 RX ADMIN — DEXTROSE MONOHYDRATE, SODIUM CHLORIDE, AND POTASSIUM CHLORIDE 100 MILLILITER(S): 50; .745; 4.5 INJECTION, SOLUTION INTRAVENOUS at 11:28

## 2022-07-17 RX ADMIN — Medication 100 MILLIGRAM(S): at 15:21

## 2022-07-17 RX ADMIN — PIPERACILLIN AND TAZOBACTAM 25 GRAM(S): 4; .5 INJECTION, POWDER, LYOPHILIZED, FOR SOLUTION INTRAVENOUS at 17:23

## 2022-07-17 RX ADMIN — SODIUM CHLORIDE 500 MILLILITER(S): 9 INJECTION INTRAMUSCULAR; INTRAVENOUS; SUBCUTANEOUS at 16:45

## 2022-07-17 RX ADMIN — PIPERACILLIN AND TAZOBACTAM 25 GRAM(S): 4; .5 INJECTION, POWDER, LYOPHILIZED, FOR SOLUTION INTRAVENOUS at 09:09

## 2022-07-17 RX ADMIN — ENOXAPARIN SODIUM 40 MILLIGRAM(S): 100 INJECTION SUBCUTANEOUS at 11:15

## 2022-07-17 NOTE — SWALLOW BEDSIDE ASSESSMENT ADULT - COMMENTS
Hx cont:   7/13: Urology following for right proximal ureteral calculus, UTI s/p BL ureteral stent placement.  7/14: RRT for hypotension, hypothermia and bradycardia after b/l ureteral stent placement, ?septic shock and UTI. s/p transfer to MICU.  7/15: s/p transfer back to floors  7/16: Dietician Initial Eval: "Per spouse, reports pt with hx of eating pureed diet, THIN liquids (despite prior recommendations to continue on thickened)."     IMAGING:  CT ABD/PELVIS: 7/13/22  IMPRESSION:  Mild right hydronephrosis caused by an 8 x 7 x 11 mm stone at the right ureteropelvic junction (UPJ). Additional nonobstructing renal stones in the upper and lower poles of both kidneys are partially obscured by motion artifact, but measure up to 3-4 mm in the mid to lower pole of the right kidney. Approximately 8 x 2 mm bladder stone. Underlying cystitis or pyelonephritis should be excluded based on clinical symptoms and laboratory findings.    CXR: 7/13/22  IMPRESSION:  Clear lungs.    Pt is known to this service. MBS completed 5/6/22 with rx for Puree/mildly thick liquids. "Moderate oral phase and mild pharyngeal phase dysphagia, likely acute-on-chronic, related to cerebellar ataxia + chronic left cerebellum lacunar infarcts, intubation x10 days, admission for asp PNA suspect related to emesis, and overall deconditioning. spillover for more viscous textures. trace-mild silent laryngeal penetration during the swallow for all consistencies administered. However, due to patient's waxing/wanning levels of alertness, cognitive deficits, and prolonged oral phase, would recommend a puree and mildly thick liquids at this time." Pt seen most recently on 6/20/22 with rx for Puree diet with mildly thick liquids via teaspoon.

## 2022-07-17 NOTE — SWALLOW BEDSIDE ASSESSMENT ADULT - DIET PRIOR TO ADMI
[facility]  Pediatrics  [address]  [facility] [address]  Phone: [phone]   May 7, 2018     Patient: Fabrizio Worley   YOB: 2015   Date of Visit: 5/7/2018       To Whom it May Concern:    Fabrizio Worley was seen in my clinic on 5/7/2018. He may return to school on 5/8.    If you have any questions or concerns, please don't hesitate to call.    Sincerely,           Patti Prabhakar MD     
Soft foods and Thin Liquids per pt

## 2022-07-17 NOTE — SWALLOW BEDSIDE ASSESSMENT ADULT - SWALLOW EVAL: RECOMMENDED FEEDING/EATING TECHNIQUES
slow rate (ensure pt has swallowed previous bite/sip before administering next)/alternate food with liquid/crush medication (when feasible)/maintain upright posture during/after eating for 30 mins/oral hygiene/small sips/bites

## 2022-07-17 NOTE — SWALLOW BEDSIDE ASSESSMENT ADULT - ORAL PHASE
Delayed oral transit time (up to 45 seconds)/Decreased anterior-posterior movement of the bolus Decreased anterior-posterior movement of the bolus/Delayed oral transit time

## 2022-07-17 NOTE — SWALLOW BEDSIDE ASSESSMENT ADULT - SLP PERTINENT HISTORY OF CURRENT PROBLEM
62 y/o M with PMH of cerebellar ataxia with rapid neurocognitive decline (baseline AOx1-2, minimally conversant) recent prolonged admission including MICU stay/ intubation from 4/18-5/13 for AMS and airway protection, recent admission for UTI treated with 5 day course of CTX, brought in by wife for decreased PO intake and lethargy for several days with "white and thick" and foul smelling urine since 7/8. Hx obtained from wife 2/2 patient's encephalopathy. PCP recently prescribed cipro 500 mg BID x 3 days (pt took 2 days) and wife thinks urine has improved since then. However, he has been eating less since and has been lethargic with minimal responsiveness which prompted the ED evaluation. Wife has noted pt has been constipated and gave him a suppository and he subsequently had one bowel movement. Admitted for recurrent UTI. Sepsis associated with RIP. Met SIRS criteria on admission with leukocytosis and tachycardia, presumed UTI source. ID following.

## 2022-07-17 NOTE — PROGRESS NOTE ADULT - ASSESSMENT
61 m with cerebellar ataxia with neurocognitive decline over a 2 yr period, chronic lacuna infarcts (MRI 4/2022), minimally verbal,  recent hospitalizations 4/18/22-5/13/22 for AMS, asp pneum requiring intubation, septic shock with relative adrenal insufficiency found to have leptomeningeal enhancement but LP negative for infection was considered likely neoplastic but family refused biopsy, pt has had hypothermia and bradycardia in the previous admissions and all the previous urine cxs showed> 3 organisms now brought in for lethargy, poor PO intake and foul smelling urine, here afebrile, WBC:  14.8, RIP , Cr: 1.78, u/a positive  CT:  mild R hydro caused bu a 11 mm stone in R UPJ, more non obstructing stones in lower pole, an 8 mm blader stone  s/p b/l ureteral stent but then pt became hypotensive, hypothermic to 32 and lukas requiring pressors so was transferred to MICU    obstructing R UPJ stone with hypotension, hypothermia and bradycardia after b/l ureteral stent placement, ?septic shock and UTI, OR urine cx with pseudomonas pan S   but pt also had previous hypothermia and bradycardia in the previous admissions, it could also be explained by adrenal insufficiency    * f/u the final blood and urine cx, negative for now  * pt was started on hydrocortisone 100 q 8 and seems to be improving, if cultures remain negative then the reason for shock was adrenal insufficiency and will discontinue antibiotics  * c/w zosyn, started 7/14, now day 4, will complete a 10 day course, when pt ready for discharge will switch to cipro  * monitor WBC/diff and renal function  * f/u with urology    The above assessment and plan was discussed with the primary team    Jessi Shi MD  contact on teams  After 5pm and on weekends call [phone]

## 2022-07-17 NOTE — PROGRESS NOTE ADULT - SUBJECTIVE AND OBJECTIVE BOX
Follow Up:  shock, obstructive uropathy, ?UTI    Interval History/ROS: pt was transferred to floor, still with episodes of hypothermia, the OR urine came back with pseudomonas         Allergies  No Known Allergies        ANTIMICROBIALS:  piperacillin/tazobactam IVPB.. 3.375 every 8 hours      OTHER MEDS:  atorvastatin 10 milliGRAM(s) Oral at bedtime  enoxaparin Injectable 40 milliGRAM(s) SubCutaneous every 24 hours  hydrocortisone sodium succinate IVPB 50 milliGRAM(s) IV Intermittent every 8 hours  insulin lispro (ADMELOG) corrective regimen sliding scale   SubCutaneous three times a day with meals  insulin lispro (ADMELOG) corrective regimen sliding scale   SubCutaneous at bedtime  multivitamin 1 Tablet(s) Oral daily  polyethylene glycol 3350 17 Gram(s) Oral daily  senna 2 Tablet(s) Oral at bedtime  thiamine 100 milliGRAM(s) Oral daily      Vital Signs Last 24 Hrs  T(C): 36.3 (17 Jul 2022 16:17), Max: 37.2 (16 Jul 2022 19:41)  T(F): 97.4 (17 Jul 2022 16:17), Max: 99 (16 Jul 2022 19:41)  HR: 49 (17 Jul 2022 16:17) (37 - 54)  BP: 88/62 (17 Jul 2022 16:17) (88/62 - 132/77)  BP(mean): --  RR: 17 (17 Jul 2022 16:17) (16 - 17)  SpO2: 97% (17 Jul 2022 16:17) (97% - 99%)    Parameters below as of 17 Jul 2022 16:17  Patient On (Oxygen Delivery Method): room air        Physical Exam:  General: NAD  Respiratory:   clear b/l, no wheezing  abd:   soft, BS +, not tender  :     no CVAT, no suprapubic tenderness, + coronel  Musculoskeletal : no joint swelling, no edema  Skin:    no rash  vascular: no phlebitis                          10.9   8.35  )-----------( 154      ( 16 Jul 2022 07:23 )             34.1       07-17    145  |  109<H>  |  11  ----------------------------<  207<H>  3.7   |  29  |  0.79    Ca    8.7      17 Jul 2022 09:25  Mg     1.8     07-17    TPro  6.5  /  Alb  3.0<L>  /  TBili  0.3  /  DBili  x   /  AST  18  /  ALT  13  /  AlkPhos  55  07-16          MICROBIOLOGY:  v  OR Collect Bladder (from O.R.)  07-14-22   Few Pseudomonas aeruginosa  --  --      Kidney kidney  07-14-22   Rare Pseudomonas aeruginosa  --  Pseudomonas aeruginosa      Clean Catch Clean Catch (Midstream)  07-13-22   <10,000 CFU/mL Normal Urogenital Leanna  --  --      .Blood Blood-Peripheral  07-13-22   No growth to date.  --  --      .Blood Blood-Peripheral  07-13-22   No growth to date.  --  --                RADIOLOGY:  Images independently visualized and reviewed personally, findings as below  < from: CT Abdomen and Pelvis No Cont (07.13.22 @ 21:13) >  IMPRESSION:  Mild right hydronephrosis caused by an 8 x 7 x 11 mm stone at the right   ureteropelvic junction (UPJ).    Additional nonobstructing renal stones in the upper and lower poles of   both kidneys are partially obscured by motion artifact, but measure up to   3-4 mm in the mid to lower pole of the right kidney.    Approximately 8 x 2 mm bladder stone.    Underlying cystitis or pyelonephritis should be excluded based on   clinical symptoms and laboratory findings.      < end of copied text >

## 2022-07-17 NOTE — CHART NOTE - NSCHARTNOTEFT_GEN_A_CORE
MEDICINE NP     EDD VALDIVIA  61y Male  Patient is a 61y old  Male who presents with a chief complaint of UTI (17 Jul 2022 16:31)       Event Summary:   Notified by RN patient with low Blood pressure of  88/62 , manual 80/60  Patient seen at bedside he's awake and responsive, follow some commands, Lungs clear   Will give a 250 cc NS bolus over 30 minutes.      Repeat BP after 30 minutes 96/66 HR 43 RR 16 Temp 97.6 02 sat 100% on RA       Vital Signs Last 24 Hrs  T(C): 36.3 (17 Jul 2022 17:25), Max: 37.2 (16 Jul 2022 19:41)  T(F): 97.4 (17 Jul 2022 17:25), Max: 99 (16 Jul 2022 19:41)  HR: 42 (17 Jul 2022 17:25) (37 - 54)  BP: 96/66 (17 Jul 2022 17:25) (80/60 - 132/77)  BP(mean): --  RR: 17 (17 Jul 2022 17:25) (16 - 17)  SpO2: 99% (17 Jul 2022 17:25) (97% - 99%)    Parameters below as of 17 Jul 2022 17:25  Patient On (Oxygen Delivery Method): room air      Will continue to monitor     ALAN Javier  Medicine Department

## 2022-07-17 NOTE — SWALLOW BEDSIDE ASSESSMENT ADULT - SLP GENERAL OBSERVATIONS
Pt was received at bedside awake and alert. +room air. He was AAOx2-3 to self, general location, and partial time (year only, stated incorrect month). Delayed processing time noted (neurocognitive decline). Able to follow basic commands with repetition/cueing. He was pleasant and cooperative throughout evaluation. Vocal quality deemed WFL for age/gender.

## 2022-07-17 NOTE — SWALLOW BEDSIDE ASSESSMENT ADULT - SWALLOW EVAL: DIAGNOSIS
60 y/o M admitted for sepsis secondary to UTI s/p b/l ureteral stent placement, course c/b development of hypotension/hypothermia/bradycaria after procedure with transfer to MICU for septic shock 2/2 UTI, now off pressors and back to floors. Pt was seen today for Bedside Swallow Evaluation which revealed oral dysphagia and suspected pharyngeal dysphagia, suspect superimposed upon cognitive deficits. The swallow sequence was characterized by prolonged mastication of soft and bite sized trial, delayed oral transit across all trials (up to 45 seconds), reduced AP bolus transfer, and suspected delayed trigger of pharyngeal swallow. No overt clinical s/s of aspiration or penetration across all trials of thin liquids, mildly thick liquids, moderately thick liquid, puree, and soft and bite sized trials. Pt denied dysphagia.

## 2022-07-17 NOTE — PROGRESS NOTE ADULT - ASSESSMENT
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 Course c/b development of hypotension/hypothermia/bradycaria concerning for relative adrenal insufficiency vs septic shock due to UTI. Pt refractory to IVF therapy requiring phenylephrine gtt and transfer to MICU for septic shock secondary to UTI in setting of adrenal insufficiency now off pressors since 7/14    #Neuro:  =hx of cerebellar ataxia, chronic lacunar infarcts, leptomeningeal enhancements  activity as tolerated  -physical therapy consult when stable, on 8 René.         #CV:  =bradycardia (most likely due to relative adrenal insufficiency vs septic shock)  -ECG demonstrating first degree block but otherwise asymtomatic bradycardia  IVF while NPO.   -    #GI/:  =s/p bilat ureteral stent placements  -coronel  -strict I & O's keep even  -diet as tolerated  -f/u urology recs  -keep coronel for at least 48hrs, IV Zosyn.   Need Speech and Swallow to advance diet.   Needs TOV after seen by PT.        #ID:  =sepsis secondary to UTI, r/o relative adrenal insufficiency  -pan culture  -cortisol level 7/14/22 of 7 ug/dl  -received hydrocortisone 100 mg IVP x1 (given after cortisol level obtained)  -c/w hydrocortisone 100 mg IV q 8 hrs  -c/w zosyn and adjust per cultures per ID  -bcx on 7/13 ngtd and ucx on 7/13 neg        DVT Lovenox 40 mg/day 61 yr old male with stated hx significant for cerebellar ataxia, chronic lacuna infarcts, leptomeningeal enhancement (MRI 4/2022), recent hospitalizations 4/2022 for Asp pneum/relative adrenal insufficiency/sepsis and 6/2022 for UTI who now is admitted for sepsis secondary to UTI, found to have new Rt renal collecting system dilatation, 8.1 mm Rt prox ureter calculus and Rt hydronephrosis requiring placement of bilat ureteral stent placement. Course c/b development of hypotension/hypothermia/bradycaria concerning for relative adrenal insufficiency vs septic shock due to UTI. Pt refractory to IVF therapy requiring phenylephrine gtt and transfer to MICU for septic shock secondary to UTI in setting of adrenal insufficiency now off pressors since 7/14      #Neuro:  =hx of cerebellar ataxia, chronic lacunar infarcts, leptomeningeal enhancements  activity as tolerated  -physical therapy consult when stable, on 8 René.         #CV:  =bradycardia (most likely due to relative adrenal insufficiency vs septic shock)  -ECG demonstrating first degree block but otherwise asymtomatic bradycardia  Stop IVF.    -    #GI/:  =s/p bilat ureteral stent placements  -coronel, needs TOV perhaps after ureteral stents removed. IV Zosyn for now. Need Speech and Swallow to advance diet.   Needs TOV after seen by PT.        #ID:  =sepsis secondary to UTI, r/o relative adrenal insufficiency  -pan culture  -cortisol level 7/14/22 of 7 ug/dl  -received hydrocortisone 100 mg IVP x1 (given after cortisol level obtained)  -c/w hydrocortisone, decrease to 50 mg IV q 8 hrs  -c/w zosyn and adjust per cultures per ID  -bcx on 7/13 ngtd and ucx on 7/13 neg        DVT Lovenox 40 mg/day

## 2022-07-17 NOTE — PROGRESS NOTE ADULT - SUBJECTIVE AND OBJECTIVE BOX
CARDIOLOGY     PROGRESS  NOTE   ________________________________________________    CHIEF COMPLAINT:Patient is a 61y old  Male who presents with a chief complaint of UTI (16 Jul 2022 10:11)  no complain.  	  REVIEW OF SYSTEMS:  CONSTITUTIONAL: No fever, weight loss, or fatigue  EYES: No eye pain, visual disturbances, or discharge  ENT:  No difficulty hearing, tinnitus, vertigo; No sinus or throat pain  NECK: No pain or stiffness  RESPIRATORY: No cough, wheezing, chills or hemoptysis; No Shortness of Breath  CARDIOVASCULAR: No chest pain, palpitations, passing out, dizziness, or leg swelling  GASTROINTESTINAL: No abdominal or epigastric pain. No nausea, vomiting, or hematemesis; No diarrhea or constipation. No melena or hematochezia.  GENITOURINARY: No dysuria, frequency, hematuria, or incontinence  NEUROLOGICAL: No headaches, memory loss, loss of strength, numbness, or tremors  SKIN: No itching, burning, rashes, or lesions   LYMPH Nodes: No enlarged glands  ENDOCRINE: No heat or cold intolerance; No hair loss  MUSCULOSKELETAL: No joint pain or swelling; No muscle, back, or extremity pain  PSYCHIATRIC: No depression, anxiety, mood swings, or difficulty sleeping  HEME/LYMPH: No easy bruising, or bleeding gums  ALLERGY AND IMMUNOLOGIC: No hives or eczema	    [ ] All others negative	  [ ] Unable to obtain    PHYSICAL EXAM:  T(C): 33.9 (07-17-22 @ 09:30), Max: 37.2 (07-16-22 @ 19:41)  HR: 39 (07-17-22 @ 09:00) (39 - 54)  BP: 132/77 (07-17-22 @ 09:00) (87/55 - 132/77)  RR: 16 (07-17-22 @ 09:00) (16 - 16)  SpO2: 98% (07-17-22 @ 09:00) (98% - 100%)  Wt(kg): --  I&O's Summary    16 Jul 2022 07:01  -  17 Jul 2022 07:00  --------------------------------------------------------  IN: 2020 mL / OUT: 1850 mL / NET: 170 mL    17 Jul 2022 07:01  -  17 Jul 2022 09:54  --------------------------------------------------------  IN: 100 mL / OUT: 0 mL / NET: 100 mL        Appearance: Normal	  HEENT:   Normal oral mucosa, PERRL, EOMI	  Lymphatic: No lymphadenopathy  Cardiovascular: Normal S1 S2, No JVD, +murmurs, No edema  Respiratory: Lungs clear to auscultation	  Gastrointestinal:  Soft, Non-tender, + BS	  Skin: No rashes, No ecchymoses, No cyanosis	  Extremities: Normal range of motion, No clubbing, cyanosis or edema  Vascular: Peripheral pulses palpable 2+ bilaterally    MEDICATIONS  (STANDING):  atorvastatin 10 milliGRAM(s) Oral at bedtime  dextrose 5% + sodium chloride 0.9% with potassium chloride 20 mEq/L 1000 milliLiter(s) (100 mL/Hr) IV Continuous <Continuous>  enoxaparin Injectable 40 milliGRAM(s) SubCutaneous every 24 hours  hydrocortisone sodium succinate Injectable 100 milliGRAM(s) IV Push every 8 hours  insulin lispro (ADMELOG) corrective regimen sliding scale   SubCutaneous three times a day with meals  insulin lispro (ADMELOG) corrective regimen sliding scale   SubCutaneous at bedtime  multivitamin 1 Tablet(s) Oral daily  piperacillin/tazobactam IVPB.. 3.375 Gram(s) IV Intermittent every 8 hours  polyethylene glycol 3350 17 Gram(s) Oral daily  senna 2 Tablet(s) Oral at bedtime  thiamine 100 milliGRAM(s) Oral daily      TELEMETRY: 	    ECG:  	  RADIOLOGY:  OTHER: 	  	  LABS:	 	    CARDIAC MARKERS:                                10.9   8.35  )-----------( 154      ( 16 Jul 2022 07:23 )             34.1     07-17    145  |  109<H>  |  11  ----------------------------<  207<H>  3.7   |  29  |  0.79    Ca    8.7      17 Jul 2022 09:25  Mg     1.8     07-17    TPro  6.5  /  Alb  3.0<L>  /  TBili  0.3  /  DBili  x   /  AST  18  /  ALT  13  /  AlkPhos  55  07-16    proBNP:   Lipid Profile:   HgA1c:   TSH: Thyroid Stimulating Hormone, Serum: 0.60 uIU/mL (07-14 @ 06:04)  Thyroid Stimulating Hormone, Serum: 1.60 uIU/mL (06-18 @ 11:19)          Assessment and plan  ---------------------------  61M w/ hx of cerebellar ataxia with rapid neurocognitive decline (baseline AOx1-2, minimally conversant) recent prolonged admission including MICU stay/ intubation from 4/18-5/13 for AMS and airway protection, recent admission for UTI treated with 5 day course of CTX, brought in by wife for decreased PO intake and lethargy for several days with "white and thick" and foul smelling urine since 7/8. Hx obtained from wife due to patient's encephalopathy. PCP recently prescribed cipro 500 mg BID x 3 days (pt took 2 days) and wife thinks urine has improved since then. However, he has been eating less since and has been lethargic with minimal responsiveness which prompted the ED evaluation. ROS limited due to patient's mental status. Wife has noted pt has been constipated and gave him a suppository and he subsequently had one bowel movement. She noted a few red specks but no overt bleeding or black stool.  hypotension ?sec to sepsis/ pt with hx of low bp  bradycardia ,observe  dvt prophylaxis  am cortisol level, started on steroid in MICU  iv hydration  tsh  continue abx  will taper hydrocortisone slowly as per medicine, bp is improving  midodrine is held

## 2022-07-17 NOTE — PROGRESS NOTE ADULT - SUBJECTIVE AND OBJECTIVE BOX
INTERVAL HPI/OVERNIGHT EVENTS:  Pt seen and examined at bedside.     Allergies/Intolerance: No Known Allergies      MEDICATIONS  (STANDING):  atorvastatin 10 milliGRAM(s) Oral at bedtime  dextrose 5% + sodium chloride 0.9% with potassium chloride 20 mEq/L 1000 milliLiter(s) (100 mL/Hr) IV Continuous <Continuous>  enoxaparin Injectable 40 milliGRAM(s) SubCutaneous every 24 hours  hydrocortisone sodium succinate Injectable 100 milliGRAM(s) IV Push every 8 hours  insulin lispro (ADMELOG) corrective regimen sliding scale   SubCutaneous three times a day with meals  insulin lispro (ADMELOG) corrective regimen sliding scale   SubCutaneous at bedtime  multivitamin 1 Tablet(s) Oral daily  piperacillin/tazobactam IVPB.. 3.375 Gram(s) IV Intermittent every 8 hours  polyethylene glycol 3350 17 Gram(s) Oral daily  senna 2 Tablet(s) Oral at bedtime  thiamine 100 milliGRAM(s) Oral daily    MEDICATIONS  (PRN):        ROS: all systems reviewed and wnl      PHYSICAL EXAMINATION:  Vital Signs Last 24 Hrs  T(C): 34.7 (17 Jul 2022 11:00), Max: 37.2 (16 Jul 2022 19:41)  T(F): 94.5 (17 Jul 2022 11:00), Max: 99 (16 Jul 2022 19:41)  HR: 39 (17 Jul 2022 09:00) (39 - 54)  BP: 132/77 (17 Jul 2022 09:00) (87/55 - 132/77)  BP(mean): --  RR: 16 (17 Jul 2022 09:00) (16 - 16)  SpO2: 98% (17 Jul 2022 09:00) (98% - 100%)    Parameters below as of 17 Jul 2022 09:00  Patient On (Oxygen Delivery Method): room air      CAPILLARY BLOOD GLUCOSE      POCT Blood Glucose.: 217 mg/dL (17 Jul 2022 07:47)  POCT Blood Glucose.: 147 mg/dL (16 Jul 2022 21:45)  POCT Blood Glucose.: 103 mg/dL (16 Jul 2022 17:04)  POCT Blood Glucose.: 165 mg/dL (16 Jul 2022 11:51)      07-16 @ 07:01  -  07-17 @ 07:00  --------------------------------------------------------  IN: 2020 mL / OUT: 1850 mL / NET: 170 mL    07-17 @ 07:01  -  07-17 @ 11:27  --------------------------------------------------------  IN: 100 mL / OUT: 0 mL / NET: 100 mL        GENERAL:   NECK: supple, No JVD  CHEST/LUNG: clear to auscultation bilaterally; no rales, rhonchi, or wheezing b/l  HEART: normal S1, S2  ABDOMEN: BS+, soft, ND, NT   EXTREMITIES:  pulses palpable; no clubbing, cyanosis, or edema b/l LEs  SKIN: no rashes or lesions      LABS:                        10.9   8.35  )-----------( 154      ( 16 Jul 2022 07:23 )             34.1     07-17    145  |  109<H>  |  11  ----------------------------<  207<H>  3.7   |  29  |  0.79    Ca    8.7      17 Jul 2022 09:25  Mg     1.8     07-17    TPro  6.5  /  Alb  3.0<L>  /  TBili  0.3  /  DBili  x   /  AST  18  /  ALT  13  /  AlkPhos  55  07-16               INTERVAL HPI/OVERNIGHT EVENTS:  Pt seen and examined at bedside.     Allergies/Intolerance: No Known Allergies      MEDICATIONS  (STANDING):  atorvastatin 10 milliGRAM(s) Oral at bedtime  dextrose 5% + sodium chloride 0.9% with potassium chloride 20 mEq/L 1000 milliLiter(s) (100 mL/Hr) IV Continuous <Continuous>  enoxaparin Injectable 40 milliGRAM(s) SubCutaneous every 24 hours  hydrocortisone sodium succinate Injectable 100 milliGRAM(s) IV Push every 8 hours  insulin lispro (ADMELOG) corrective regimen sliding scale   SubCutaneous three times a day with meals  insulin lispro (ADMELOG) corrective regimen sliding scale   SubCutaneous at bedtime  multivitamin 1 Tablet(s) Oral daily  piperacillin/tazobactam IVPB.. 3.375 Gram(s) IV Intermittent every 8 hours  polyethylene glycol 3350 17 Gram(s) Oral daily  senna 2 Tablet(s) Oral at bedtime  thiamine 100 milliGRAM(s) Oral daily    MEDICATIONS  (PRN):        ROS: all systems reviewed and wnl      PHYSICAL EXAMINATION:  Vital Signs Last 24 Hrs  T(C): 34.7 (17 Jul 2022 11:00), Max: 37.2 (16 Jul 2022 19:41)  T(F): 94.5 (17 Jul 2022 11:00), Max: 99 (16 Jul 2022 19:41)  HR: 39 (17 Jul 2022 09:00) (39 - 54)  BP: 132/77 (17 Jul 2022 09:00) (87/55 - 132/77)  BP(mean): --  RR: 16 (17 Jul 2022 09:00) (16 - 16)  SpO2: 98% (17 Jul 2022 09:00) (98% - 100%)    Parameters below as of 17 Jul 2022 09:00  Patient On (Oxygen Delivery Method): room air      CAPILLARY BLOOD GLUCOSE      POCT Blood Glucose.: 217 mg/dL (17 Jul 2022 07:47)  POCT Blood Glucose.: 147 mg/dL (16 Jul 2022 21:45)  POCT Blood Glucose.: 103 mg/dL (16 Jul 2022 17:04)  POCT Blood Glucose.: 165 mg/dL (16 Jul 2022 11:51)      07-16 @ 07:01  -  07-17 @ 07:00  --------------------------------------------------------  IN: 2020 mL / OUT: 1850 mL / NET: 170 mL    07-17 @ 07:01  -  07-17 @ 11:27  --------------------------------------------------------  IN: 100 mL / OUT: 0 mL / NET: 100 mL        GENERAL: stable on RA, comfortable  NECK: supple, No JVD  CHEST/LUNG: clear to auscultation bilaterally; no rales, rhonchi, or wheezing b/l  HEART: normal S1, S2  ABDOMEN: BS+, soft, ND, NT   EXTREMITIES:  pulses palpable; no clubbing, cyanosis, or edema b/l LEs  SKIN: no rashes or lesions      LABS:                        10.9   8.35  )-----------( 154      ( 16 Jul 2022 07:23 )             34.1     07-17    145  |  109<H>  |  11  ----------------------------<  207<H>  3.7   |  29  |  0.79    Ca    8.7      17 Jul 2022 09:25  Mg     1.8     07-17    TPro  6.5  /  Alb  3.0<L>  /  TBili  0.3  /  DBili  x   /  AST  18  /  ALT  13  /  AlkPhos  55  07-16

## 2022-07-18 ENCOUNTER — NON-APPOINTMENT (OUTPATIENT)
Age: 62
End: 2022-07-18

## 2022-07-18 LAB
-  AMIKACIN: SIGNIFICANT CHANGE UP
-  AZTREONAM: SIGNIFICANT CHANGE UP
-  CEFEPIME: SIGNIFICANT CHANGE UP
-  CEFTAZIDIME: SIGNIFICANT CHANGE UP
-  CIPROFLOXACIN: SIGNIFICANT CHANGE UP
-  GENTAMICIN: SIGNIFICANT CHANGE UP
-  IMIPENEM: SIGNIFICANT CHANGE UP
-  LEVOFLOXACIN: SIGNIFICANT CHANGE UP
-  MEROPENEM: SIGNIFICANT CHANGE UP
-  PIPERACILLIN/TAZOBACTAM: SIGNIFICANT CHANGE UP
-  TOBRAMYCIN: SIGNIFICANT CHANGE UP
ANION GAP SERPL CALC-SCNC: 10 MMOL/L — SIGNIFICANT CHANGE UP (ref 5–17)
BUN SERPL-MCNC: 10 MG/DL — SIGNIFICANT CHANGE UP (ref 7–23)
CALCIUM SERPL-MCNC: 8.7 MG/DL — SIGNIFICANT CHANGE UP (ref 8.4–10.5)
CHLORIDE SERPL-SCNC: 105 MMOL/L — SIGNIFICANT CHANGE UP (ref 96–108)
CO2 SERPL-SCNC: 29 MMOL/L — SIGNIFICANT CHANGE UP (ref 22–31)
CREAT SERPL-MCNC: 0.74 MG/DL — SIGNIFICANT CHANGE UP (ref 0.5–1.3)
CULTURE RESULTS: SIGNIFICANT CHANGE UP
EGFR: 104 ML/MIN/1.73M2 — SIGNIFICANT CHANGE UP
GLUCOSE BLDC GLUCOMTR-MCNC: 118 MG/DL — HIGH (ref 70–99)
GLUCOSE BLDC GLUCOMTR-MCNC: 123 MG/DL — HIGH (ref 70–99)
GLUCOSE BLDC GLUCOMTR-MCNC: 135 MG/DL — HIGH (ref 70–99)
GLUCOSE BLDC GLUCOMTR-MCNC: 143 MG/DL — HIGH (ref 70–99)
GLUCOSE SERPL-MCNC: 149 MG/DL — HIGH (ref 70–99)
HCT VFR BLD CALC: 37.2 % — LOW (ref 39–50)
HGB BLD-MCNC: 11.8 G/DL — LOW (ref 13–17)
MCHC RBC-ENTMCNC: 28.1 PG — SIGNIFICANT CHANGE UP (ref 27–34)
MCHC RBC-ENTMCNC: 31.7 GM/DL — LOW (ref 32–36)
MCV RBC AUTO: 88.6 FL — SIGNIFICANT CHANGE UP (ref 80–100)
METHOD TYPE: SIGNIFICANT CHANGE UP
NRBC # BLD: 0 /100 WBCS — SIGNIFICANT CHANGE UP (ref 0–0)
ORGANISM # SPEC MICROSCOPIC CNT: SIGNIFICANT CHANGE UP
ORGANISM # SPEC MICROSCOPIC CNT: SIGNIFICANT CHANGE UP
PLATELET # BLD AUTO: 197 K/UL — SIGNIFICANT CHANGE UP (ref 150–400)
POTASSIUM SERPL-MCNC: 3.1 MMOL/L — LOW (ref 3.5–5.3)
POTASSIUM SERPL-SCNC: 3.1 MMOL/L — LOW (ref 3.5–5.3)
RBC # BLD: 4.2 M/UL — SIGNIFICANT CHANGE UP (ref 4.2–5.8)
RBC # FLD: 14.2 % — SIGNIFICANT CHANGE UP (ref 10.3–14.5)
SODIUM SERPL-SCNC: 144 MMOL/L — SIGNIFICANT CHANGE UP (ref 135–145)
SPECIMEN SOURCE: SIGNIFICANT CHANGE UP
WBC # BLD: 9.7 K/UL — SIGNIFICANT CHANGE UP (ref 3.8–10.5)
WBC # FLD AUTO: 9.7 K/UL — SIGNIFICANT CHANGE UP (ref 3.8–10.5)

## 2022-07-18 PROCEDURE — 93010 ELECTROCARDIOGRAM REPORT: CPT

## 2022-07-18 PROCEDURE — 99232 SBSQ HOSP IP/OBS MODERATE 35: CPT

## 2022-07-18 RX ORDER — POTASSIUM CHLORIDE 20 MEQ
10 PACKET (EA) ORAL
Refills: 0 | Status: DISCONTINUED | OUTPATIENT
Start: 2022-07-18 | End: 2022-07-18

## 2022-07-18 RX ORDER — POTASSIUM CHLORIDE 20 MEQ
40 PACKET (EA) ORAL ONCE
Refills: 0 | Status: COMPLETED | OUTPATIENT
Start: 2022-07-18 | End: 2022-07-18

## 2022-07-18 RX ADMIN — PIPERACILLIN AND TAZOBACTAM 25 GRAM(S): 4; .5 INJECTION, POWDER, LYOPHILIZED, FOR SOLUTION INTRAVENOUS at 10:42

## 2022-07-18 RX ADMIN — PIPERACILLIN AND TAZOBACTAM 25 GRAM(S): 4; .5 INJECTION, POWDER, LYOPHILIZED, FOR SOLUTION INTRAVENOUS at 02:18

## 2022-07-18 RX ADMIN — PIPERACILLIN AND TAZOBACTAM 25 GRAM(S): 4; .5 INJECTION, POWDER, LYOPHILIZED, FOR SOLUTION INTRAVENOUS at 18:28

## 2022-07-18 RX ADMIN — Medication 100 MILLIGRAM(S): at 11:29

## 2022-07-18 RX ADMIN — Medication 100 MILLIGRAM(S): at 06:00

## 2022-07-18 RX ADMIN — Medication 100 MILLIGRAM(S): at 21:31

## 2022-07-18 RX ADMIN — SENNA PLUS 2 TABLET(S): 8.6 TABLET ORAL at 21:31

## 2022-07-18 RX ADMIN — ATORVASTATIN CALCIUM 10 MILLIGRAM(S): 80 TABLET, FILM COATED ORAL at 21:31

## 2022-07-18 RX ADMIN — Medication 40 MILLIEQUIVALENT(S): at 18:58

## 2022-07-18 RX ADMIN — Medication 100 MILLIGRAM(S): at 14:39

## 2022-07-18 RX ADMIN — ENOXAPARIN SODIUM 40 MILLIGRAM(S): 100 INJECTION SUBCUTANEOUS at 11:28

## 2022-07-18 RX ADMIN — POLYETHYLENE GLYCOL 3350 17 GRAM(S): 17 POWDER, FOR SOLUTION ORAL at 11:29

## 2022-07-18 RX ADMIN — Medication 1 TABLET(S): at 11:29

## 2022-07-18 NOTE — CHART NOTE - NSCHARTNOTEFT_GEN_A_CORE
Notified by RN Pt bradycardic on tele to 35. Tele reviewed Pt's HR sustaining 35-40 with brief dips to 33 upon chart review, Pt with known episodes of bradycardia. Pt seen and examined at bedside. Pt A+Ox3, with no acute complaints. Pt denies headache, dizziness, weakness, chest pain, SOB, n/v/d.     Assessment/Plan:  >VS hemodynamically stable aside from HR-35-40 on tele  >EKG done STAT - Sinus bradycardia (HR-38), with 1st degree AVB; No acute changes from previous  >C/w hydrocortisone for adrenal insufficiency  >Place R2 pads at bedside  >c/w tele monitoring  >Cardiology f/u in AM  >Will endorse to AM team, attending to follow    Aleisha Barrios PA-C  Department of Medicine

## 2022-07-18 NOTE — ADVANCED PRACTICE NURSE CONSULT - ASSESSMENT
The pt was encountered on 8 Monti- Mr Rojas is in a [employer] P 500 support surface and needs assistance with T&P. will recommend Complete CAir boots to off-load the heels. As the pt was a/w a pressure injury, he was seen by nutrition and found to have moderate protein calorie malnutrition. Pt reports that his appetite is poor because "I want to eat real food." the pt is on a dysphagia diet- as per RN,  a Speech and Swallow exam is pending  upon assessment, the pt presents with an area of deeply darkened intact skin located on the sacrum and b/l buttocks. It measures 11cmx 8cmx 0cm . The skin is intact but darker than the remaining skin - will classify as a deep tissue injury.  Kelly moisture barrier cream was applied

## 2022-07-18 NOTE — PROGRESS NOTE ADULT - ASSESSMENT
61 m with cerebellar ataxia with neurocognitive decline over a 2 yr period, chronic lacuna infarcts (MRI 4/2022), minimally verbal,  recent hospitalizations 4/18/22-5/13/22 for AMS, asp pneum requiring intubation, septic shock with relative adrenal insufficiency found to have leptomeningeal enhancement but LP negative for infection was considered likely neoplastic but family refused biopsy, pt has had hypothermia and bradycardia in the previous admissions and all the previous urine cxs showed> 3 organisms now brought in for lethargy, poor PO intake and foul smelling urine, here afebrile, WBC:  14.8, RIP , Cr: 1.78, u/a positive  CT:  mild R hydro caused bu a 11 mm stone in R UPJ, more non obstructing stones in lower pole, an 8 mm blader stone  s/p b/l ureteral stent but then pt became hypotensive, hypothermic to 32 and lukas requiring pressors so was transferred to MICU    obstructing R UPJ stone with hypotension, hypothermia and bradycardia after b/l ureteral stent placement, ?septic shock and UTI, OR urine cx with pseudomonas pan S   but pt also had previous hypothermia and bradycardia in the previous admissions, it could also be explained by adrenal insufficiency    * pt was started on hydrocortisone 100 q 8, still with episodes of hypothermia and   * c/w zosyn, started 7/14, now day 5, will complete a 10 day course, when pt ready for discharge will switch to cipro 500 q 12  * monitor WBC/diff and renal function  * f/u with urology    The above assessment and plan was discussed with the primary team    Jessi Shi MD  contact on teams  After 5pm and on weekends call [phone]       61 m with cerebellar ataxia with neurocognitive decline over a 2 yr period, chronic lacuna infarcts (MRI 4/2022), minimally verbal,  recent hospitalizations 4/18/22-5/13/22 for AMS, asp pneum requiring intubation, septic shock with relative adrenal insufficiency found to have leptomeningeal enhancement but LP negative for infection was considered likely neoplastic but family refused biopsy, pt has had hypothermia and bradycardia in the previous admissions and all the previous urine cxs showed> 3 organisms now brought in for lethargy, poor PO intake and foul smelling urine, here afebrile, WBC:  14.8, RIP , Cr: 1.78, u/a positive  CT:  mild R hydro caused bu a 11 mm stone in R UPJ, more non obstructing stones in lower pole, an 8 mm blader stone  s/p b/l ureteral stent but then pt became hypotensive, hypothermic to 32 and lukas requiring pressors so was transferred to MICU    obstructing R UPJ stone with hypotension, hypothermia and bradycardia after b/l ureteral stent placement, ?septic shock and UTI, OR urine cx with pseudomonas pan S and another pseudomonas I to cefepime and zosyn  but pt also had previous hypothermia and bradycardia in the previous admissions, it could also be explained by adrenal insufficiency    * pt was started on hydrocortisone 100 q 8, still with episodes of hypothermia and   * c/w zosyn, started 7/14, now day 5, will complete a 10 day course, switch to cipro 500 q 12  * monitor WBC/diff and renal function  * f/u with urology    The above assessment and plan was discussed with the primary team    Jessi Shi MD  contact on teams  After 5pm and on weekends call [phone]

## 2022-07-18 NOTE — PROGRESS NOTE ADULT - ASSESSMENT
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 Course c/b development of hypotension/hypothermia/bradycaria concerning for relative adrenal insufficiency vs septic shock due to UTI. Pt refractory to IVF therapy requiring phenylephrine gtt and transfer to MICU for septic shock secondary to UTI in setting of adrenal insufficiency now off pressors since 7/14      #Neuro:  =hx of cerebellar ataxia, chronic lacunar infarcts, leptomeningeal enhancements, all stable. Physical therapy consult when stable,          #CV:  =bradycardia (most likely due to relative adrenal insufficiency vs septic shock)  -ECG demonstrating first degree block but otherwise asymtomatic bradycardia  Stop IVF.  Monitor.   -    #GI/:  =s/p bilat ureteral stent placements  -coronel, needs TOV perhaps after ureteral stents removed. IV Zosyn for now. Need Speech and Swallow to advance diet.   Needs TOV after seen by PT.    Will ask Urology to reconsult.         #ID:  =sepsis secondary to UTI, r/o relative adrenal insufficiency  -pan culture  -cortisol level 7/14/22 of 7 ug/dl  -received hydrocortisone 100 mg IVP x1 (given after cortisol level obtained)  -c/w hydrocortisone, decrease to 50 mg IV q 8 hrs, taper every other day.    -c/w zosyn and adjust per cultures per ID  -bcx on 7/13 ngtd and ucx on 7/13 neg        DVT Lovenox 40 mg/day

## 2022-07-18 NOTE — PROGRESS NOTE ADULT - SUBJECTIVE AND OBJECTIVE BOX
CARDIOLOGY     PROGRESS  NOTE   ________________________________________________    CHIEF COMPLAINT:Patient is a 61y old  Male who presents with a chief complaint of UTI (17 Jul 2022 16:31)  no complain.  	  REVIEW OF SYSTEMS:  CONSTITUTIONAL: No fever, weight loss, or fatigue  EYES: No eye pain, visual disturbances, or discharge  ENT:  No difficulty hearing, tinnitus, vertigo; No sinus or throat pain  NECK: No pain or stiffness  RESPIRATORY: No cough, wheezing, chills or hemoptysis; No Shortness of Breath  CARDIOVASCULAR: No chest pain, palpitations, passing out, dizziness, or leg swelling  GASTROINTESTINAL: No abdominal or epigastric pain. No nausea, vomiting, or hematemesis; No diarrhea or constipation. No melena or hematochezia.  GENITOURINARY: No dysuria, frequency, hematuria, or incontinence  NEUROLOGICAL: No headaches, memory loss, loss of strength, numbness, or tremors  SKIN: No itching, burning, rashes, or lesions   LYMPH Nodes: No enlarged glands  ENDOCRINE: No heat or cold intolerance; No hair loss  MUSCULOSKELETAL: No joint pain or swelling; No muscle, back, or extremity pain  PSYCHIATRIC: No depression, anxiety, mood swings, or difficulty sleeping  HEME/LYMPH: No easy bruising, or bleeding gums  ALLERGY AND IMMUNOLOGIC: No hives or eczema	    [ ] All others negative	  [ ] Unable to obtain    PHYSICAL EXAM:  T(C): 36 (07-18-22 @ 06:04), Max: 36.6 (07-18-22 @ 02:38)  HR: 38 (07-18-22 @ 06:04) (37 - 49)  BP: 143/82 (07-18-22 @ 06:04) (80/60 - 143/82)  RR: 18 (07-18-22 @ 06:04) (16 - 18)  SpO2: 96% (07-18-22 @ 06:04) (96% - 99%)  Wt(kg): --  I&O's Summary    17 Jul 2022 07:01  -  18 Jul 2022 07:00  --------------------------------------------------------  IN: 650 mL / OUT: 2100 mL / NET: -1450 mL        Appearance: Normal	  HEENT:   Normal oral mucosa, PERRL, EOMI	  Lymphatic: No lymphadenopathy  Cardiovascular: Normal S1 S2, No JVD, + murmurs, No edema  Respiratory: Lungs clear to auscultation	  Psychiatry: A & O x 3, Mood & affect appropriate  Gastrointestinal:  Soft, Non-tender, + BS	  Skin: No rashes, No ecchymoses, No cyanosis	  Neurologic: Non-focal  Extremities: Normal range of motion, No clubbing, cyanosis or edema  Vascular: Peripheral pulses palpable 2+ bilaterally    MEDICATIONS  (STANDING):  atorvastatin 10 milliGRAM(s) Oral at bedtime  enoxaparin Injectable 40 milliGRAM(s) SubCutaneous every 24 hours  hydrocortisone sodium succinate IVPB 50 milliGRAM(s) IV Intermittent every 8 hours  insulin lispro (ADMELOG) corrective regimen sliding scale   SubCutaneous three times a day with meals  insulin lispro (ADMELOG) corrective regimen sliding scale   SubCutaneous at bedtime  multivitamin 1 Tablet(s) Oral daily  piperacillin/tazobactam IVPB.. 3.375 Gram(s) IV Intermittent every 8 hours  polyethylene glycol 3350 17 Gram(s) Oral daily  senna 2 Tablet(s) Oral at bedtime  thiamine 100 milliGRAM(s) Oral daily      TELEMETRY: 	    ECG:  	  RADIOLOGY:  OTHER: 	  	  LABS:	 	    CARDIAC MARKERS:            07-17    145  |  109<H>  |  11  ----------------------------<  207<H>  3.7   |  29  |  0.79    Ca    8.7      17 Jul 2022 09:25  Mg     1.8     07-17      proBNP:   Lipid Profile:   HgA1c:   TSH: Thyroid Stimulating Hormone, Serum: 0.60 uIU/mL (07-14 @ 06:04)  Thyroid Stimulating Hormone, Serum: 1.60 uIU/mL (06-18 @ 11:19)          Assessment and plan  ---------------------------  61M w/ hx of cerebellar ataxia with rapid neurocognitive decline (baseline AOx1-2, minimally conversant) recent prolonged admission including MICU stay/ intubation from 4/18-5/13 for AMS and airway protection, recent admission for UTI treated with 5 day course of CTX, brought in by wife for decreased PO intake and lethargy for several days with "white and thick" and foul smelling urine since 7/8. Hx obtained from wife due to patient's encephalopathy. PCP recently prescribed cipro 500 mg BID x 3 days (pt took 2 days) and wife thinks urine has improved since then. However, he has been eating less since and has been lethargic with minimal responsiveness which prompted the ED evaluation. ROS limited due to patient's mental status. Wife has noted pt has been constipated and gave him a suppository and he subsequently had one bowel movement. She noted a few red specks but no overt bleeding or black stool.  hypotension ?sec to sepsis/ pt with hx of low bp  bradycardia ,observe  dvt prophylaxis  am cortisol level, started on steroid in MICU  iv hydration  tsh  continue abx  will taper hydrocortisone slowly as per medicine, bp is improving  midodrine is held  abx

## 2022-07-18 NOTE — PROGRESS NOTE ADULT - SUBJECTIVE AND OBJECTIVE BOX
INTERVAL HPI/OVERNIGHT EVENTS:  Pt seen and examined at bedside.     Allergies/Intolerance: No Known Allergies      MEDICATIONS  (STANDING):  atorvastatin 10 milliGRAM(s) Oral at bedtime  enoxaparin Injectable 40 milliGRAM(s) SubCutaneous every 24 hours  hydrocortisone sodium succinate IVPB 50 milliGRAM(s) IV Intermittent every 8 hours  insulin lispro (ADMELOG) corrective regimen sliding scale   SubCutaneous three times a day with meals  insulin lispro (ADMELOG) corrective regimen sliding scale   SubCutaneous at bedtime  multivitamin 1 Tablet(s) Oral daily  piperacillin/tazobactam IVPB.. 3.375 Gram(s) IV Intermittent every 8 hours  polyethylene glycol 3350 17 Gram(s) Oral daily  senna 2 Tablet(s) Oral at bedtime  thiamine 100 milliGRAM(s) Oral daily    MEDICATIONS  (PRN):        ROS: all systems reviewed and wnl      PHYSICAL EXAMINATION:  Vital Signs Last 24 Hrs  T(C): 36 (2022 06:04), Max: 36.6 (2022 02:38)  T(F): 96.8 (2022 06:04), Max: 97.9 (2022 02:38)  HR: 38 (2022 06:04) (37 - 49)  BP: 143/82 (2022 06:04) (80/60 - 143/82)  BP(mean): --  RR: 18 (2022 06:04) (16 - 18)  SpO2: 96% (2022 06:04) (96% - 99%)    Parameters below as of 2022 06:04  Patient On (Oxygen Delivery Method): room air      CAPILLARY BLOOD GLUCOSE      POCT Blood Glucose.: 118 mg/dL (2022 07:55)  POCT Blood Glucose.: 154 mg/dL (2022 20:55)  POCT Blood Glucose.: 124 mg/dL (2022 16:35)  POCT Blood Glucose.: 254 mg/dL (2022 12:11)      -17 @ 07:01  -  -18 @ 07:00  --------------------------------------------------------  IN: 650 mL / OUT: 2100 mL / NET: -1450 mL        GENERAL: stable, in bed, affect remains flat, no fevers, SOB, Barnett remains in place.   NECK: supple, No JVD  CHEST/LUNG: clear to auscultation bilaterally; no rales, rhonchi, or wheezing b/l  HEART: normal S1, S2  ABDOMEN: BS+, soft, ND, NT   EXTREMITIES:  pulses palpable; no clubbing, cyanosis, or edema b/l LEs    LABS:        145  |  109<H>  |  11  ----------------------------<  207<H>  3.7   |  29  |  0.79    Ca    8.7      2022 09:25  Mg     1.8             Urinalysis Basic - ( 2022 23:31 )    Color: BROWN / Appearance: Slightly Turbid / S.017 / pH: x  Gluc: x / Ketone: Negative  / Bili: Negative / Urobili: Negative   Blood: x / Protein: 30 mg/dL / Nitrite: Negative   Leuk Esterase: Moderate / RBC: 3020 /hpf / WBC 11 /HPF   Sq Epi: x / Non Sq Epi: 1 /hpf / Bacteria: Negative

## 2022-07-18 NOTE — PROGRESS NOTE ADULT - SUBJECTIVE AND OBJECTIVE BOX
Follow Up:  shock, obstructive uropathy, ?UTI    Interval History/ROS: pt still with bradycardia and episodes of hypothermia         Allergies  No Known Allergies        ANTIMICROBIALS:  piperacillin/tazobactam IVPB.. 3.375 every 8 hours      OTHER MEDS:  atorvastatin 10 milliGRAM(s) Oral at bedtime  enoxaparin Injectable 40 milliGRAM(s) SubCutaneous every 24 hours  hydrocortisone sodium succinate IVPB 50 milliGRAM(s) IV Intermittent every 8 hours  insulin lispro (ADMELOG) corrective regimen sliding scale   SubCutaneous three times a day with meals  insulin lispro (ADMELOG) corrective regimen sliding scale   SubCutaneous at bedtime  multivitamin 1 Tablet(s) Oral daily  polyethylene glycol 3350 17 Gram(s) Oral daily  senna 2 Tablet(s) Oral at bedtime  thiamine 100 milliGRAM(s) Oral daily      Vital Signs Last 24 Hrs  T(C): 35.1 (2022 10:25), Max: 36.6 (2022 02:38)  T(F): 95.1 (2022 10:25), Max: 97.9 (2022 02:38)  HR: 41 (2022 10:25) (38 - 49)  BP: 117/74 (2022 10:25) (80/60 - 143/82)  BP(mean): --  RR: 18 (2022 10:25) (17 - 18)  SpO2: 95% (2022 10:25) (95% - 99%)    Parameters below as of 2022 10:25  Patient On (Oxygen Delivery Method): room air        Physical Exam:  General: NAD  Respiratory:   clear b/l, no wheezing  abd:   soft, BS +, not tender  :     no CVAT, no suprapubic tenderness, + coronel  Musculoskeletal : no joint swelling, no edema  Skin:    no rash  vascular: no phlebitis                            11.8   9.70  )-----------( 197      ( 2022 10:46 )             37.2       07-18    144  |  105  |  10  ----------------------------<  149<H>  3.1<L>   |  29  |  0.74    Ca    8.7      2022 10:46  Mg     1.8     07-17        Urinalysis Basic - ( 2022 23:31 )    Color: BROWN / Appearance: Slightly Turbid / S.017 / pH: x  Gluc: x / Ketone: Negative  / Bili: Negative / Urobili: Negative   Blood: x / Protein: 30 mg/dL / Nitrite: Negative   Leuk Esterase: Moderate / RBC: 3020 /hpf / WBC 11 /HPF   Sq Epi: x / Non Sq Epi: 1 /hpf / Bacteria: Negative        MICROBIOLOGY:  v  OR Collect Bladder (from O.R.)  22   Few Pseudomonas aeruginosa  --  Pseudomonas aeruginosa      Kidney kidney  22   Rare Pseudomonas aeruginosa  --  Pseudomonas aeruginosa      Clean Catch Clean Catch (Midstream)  22   <10,000 CFU/mL Normal Urogenital Leanna  --  --      .Blood Blood-Peripheral  22   No growth to date.  --  --      .Blood Blood-Peripheral  22   No growth to date.  --  --                RADIOLOGY:  Images independently visualized and reviewed personally, findings as below  < from: CT Abdomen and Pelvis No Cont (22 @ 21:13) >    IMPRESSION:  Mild right hydronephrosis caused by an 8 x 7 x 11 mm stone at the right   ureteropelvic junction (UPJ).    Additional nonobstructing renal stones in the upper and lower poles of   both kidneys are partially obscured by motion artifact, but measure up to   3-4 mm in the mid to lower pole of the right kidney.    Approximately 8 x 2 mm bladder stone.    Underlying cystitis or pyelonephritis should be excluded based on   clinical symptoms and laboratory findings.    < end of copied text >

## 2022-07-18 NOTE — ADVANCED PRACTICE NURSE CONSULT - RECOMMEDATIONS
Will recommend the followin. B/l buttocks: continue to monitor, routine pericare with Kelly  2, Continue to encourage mobility, T&P  3, Complete CAir boots  4. Seat cushion when OOB to chair  5. nutrition support as pt condition allows  Tx plan discussed with RN

## 2022-07-18 NOTE — ADVANCED PRACTICE NURSE CONSULT - REASON FOR CONSULT
Requested by staff to assess skin status of pt a/w a pressure injury. PMH is noted:  61M w/ hx of cerebellar ataxia with rapid neurocognitive decline (baseline AOx1-2, minimally conversant) recent prolonged admission including MICU stay/ intubation from 4/18-5/13 for AMS and airway protection, recent admission for UTI treated with 5 day course of CTX, brought in by wife for decreased PO intake and lethargy for several days with "white and thick" and foul smelling urine since 7/8. Hx obtained from wife due to patient's encephalopathy. PCP recently prescribed cipro 500 mg BID x 3 days (pt took 2 days) and wife thinks urine has improved since then. However, he has been eating less since and has been lethargic with minimal responsiveness which prompted the ED evaluation. ROS limited due to patient's mental status. Wife has noted pt has been constipated and gave him a suppository and he subsequently had one bowel movement. She noted a few red specks but no overt bleeding or black stool.

## 2022-07-19 LAB
ANION GAP SERPL CALC-SCNC: 11 MMOL/L — SIGNIFICANT CHANGE UP (ref 5–17)
BUN SERPL-MCNC: 14 MG/DL — SIGNIFICANT CHANGE UP (ref 7–23)
CALCIUM SERPL-MCNC: 8.6 MG/DL — SIGNIFICANT CHANGE UP (ref 8.4–10.5)
CHLORIDE SERPL-SCNC: 105 MMOL/L — SIGNIFICANT CHANGE UP (ref 96–108)
CO2 SERPL-SCNC: 28 MMOL/L — SIGNIFICANT CHANGE UP (ref 22–31)
CREAT SERPL-MCNC: 0.83 MG/DL — SIGNIFICANT CHANGE UP (ref 0.5–1.3)
EGFR: 100 ML/MIN/1.73M2 — SIGNIFICANT CHANGE UP
GLUCOSE BLDC GLUCOMTR-MCNC: 113 MG/DL — HIGH (ref 70–99)
GLUCOSE BLDC GLUCOMTR-MCNC: 136 MG/DL — HIGH (ref 70–99)
GLUCOSE BLDC GLUCOMTR-MCNC: 141 MG/DL — HIGH (ref 70–99)
GLUCOSE BLDC GLUCOMTR-MCNC: 142 MG/DL — HIGH (ref 70–99)
GLUCOSE SERPL-MCNC: 91 MG/DL — SIGNIFICANT CHANGE UP (ref 70–99)
MAGNESIUM SERPL-MCNC: 1.5 MG/DL — LOW (ref 1.6–2.6)
POTASSIUM SERPL-MCNC: 3 MMOL/L — LOW (ref 3.5–5.3)
POTASSIUM SERPL-SCNC: 3 MMOL/L — LOW (ref 3.5–5.3)
SODIUM SERPL-SCNC: 144 MMOL/L — SIGNIFICANT CHANGE UP (ref 135–145)

## 2022-07-19 PROCEDURE — 99232 SBSQ HOSP IP/OBS MODERATE 35: CPT

## 2022-07-19 RX ORDER — POTASSIUM CHLORIDE 20 MEQ
40 PACKET (EA) ORAL EVERY 4 HOURS
Refills: 0 | Status: COMPLETED | OUTPATIENT
Start: 2022-07-19 | End: 2022-07-19

## 2022-07-19 RX ORDER — ASCORBIC ACID 60 MG
500 TABLET,CHEWABLE ORAL DAILY
Refills: 0 | Status: DISCONTINUED | OUTPATIENT
Start: 2022-07-19 | End: 2022-07-27

## 2022-07-19 RX ORDER — POTASSIUM CHLORIDE 20 MEQ
20 PACKET (EA) ORAL DAILY
Refills: 0 | Status: DISCONTINUED | OUTPATIENT
Start: 2022-07-20 | End: 2022-07-27

## 2022-07-19 RX ORDER — CIPROFLOXACIN LACTATE 400MG/40ML
500 VIAL (ML) INTRAVENOUS EVERY 12 HOURS
Refills: 0 | Status: DISCONTINUED | OUTPATIENT
Start: 2022-07-19 | End: 2022-07-25

## 2022-07-19 RX ORDER — MAGNESIUM SULFATE 500 MG/ML
1 VIAL (ML) INJECTION ONCE
Refills: 0 | Status: COMPLETED | OUTPATIENT
Start: 2022-07-19 | End: 2022-07-19

## 2022-07-19 RX ADMIN — PIPERACILLIN AND TAZOBACTAM 25 GRAM(S): 4; .5 INJECTION, POWDER, LYOPHILIZED, FOR SOLUTION INTRAVENOUS at 09:50

## 2022-07-19 RX ADMIN — Medication 100 MILLIGRAM(S): at 06:05

## 2022-07-19 RX ADMIN — Medication 100 MILLIGRAM(S): at 13:05

## 2022-07-19 RX ADMIN — Medication 100 MILLIGRAM(S): at 13:09

## 2022-07-19 RX ADMIN — Medication 40 MILLIEQUIVALENT(S): at 09:27

## 2022-07-19 RX ADMIN — POLYETHYLENE GLYCOL 3350 17 GRAM(S): 17 POWDER, FOR SOLUTION ORAL at 13:05

## 2022-07-19 RX ADMIN — Medication 100 GRAM(S): at 09:28

## 2022-07-19 RX ADMIN — ATORVASTATIN CALCIUM 10 MILLIGRAM(S): 80 TABLET, FILM COATED ORAL at 21:58

## 2022-07-19 RX ADMIN — Medication 40 MILLIEQUIVALENT(S): at 14:13

## 2022-07-19 RX ADMIN — ENOXAPARIN SODIUM 40 MILLIGRAM(S): 100 INJECTION SUBCUTANEOUS at 13:04

## 2022-07-19 RX ADMIN — Medication 500 MILLIGRAM(S): at 20:15

## 2022-07-19 RX ADMIN — SENNA PLUS 2 TABLET(S): 8.6 TABLET ORAL at 21:58

## 2022-07-19 RX ADMIN — Medication 100 MILLIGRAM(S): at 21:59

## 2022-07-19 RX ADMIN — Medication 1 TABLET(S): at 13:04

## 2022-07-19 RX ADMIN — PIPERACILLIN AND TAZOBACTAM 25 GRAM(S): 4; .5 INJECTION, POWDER, LYOPHILIZED, FOR SOLUTION INTRAVENOUS at 02:56

## 2022-07-19 RX ADMIN — Medication 500 MILLIGRAM(S): at 22:08

## 2022-07-19 NOTE — CHART NOTE - NSCHARTNOTEFT_GEN_A_CORE
Nutrition Follow Up Note  Patient seen for: malnutrition initial follow-up. Chart reviewed, events noted.     "61 yr old male with stated hx significant for cerebellar ataxia, chronic lacuna infarcts, leptomeningeal enhancement (MRI 4/2022), recent hospitalizations 4/2022 for Asp pneum/relative adrenal insufficiency/sepsis and 6/2022 for UTI who now is admitted for sepsis secondary to UTI, found to have new Rt renal collecting system dilatation, 8.1 mm Rt prox ureter calculus and Rt hydronephrosis requiring placement of bilat ureteral stent placement. Course c/b development of hypotension/hypothermia/bradycaria concerning for relative adrenal insufficiency vs septic shock due to UTI. Pt refractory to IVF therapy requiring phenylephrine gtt and transfer to MICU for septic shock secondary to UTI in setting of adrenal insufficiency now off pressors since 7/14"    Source: [] Patient       [x] Medical Record        [x] RN        [] Family at bedside       [] Other:    -If unable to interview patient: [] Trach/Vent/BiPAP  [x] Disoriented/confused/inappropriate to interview    Diet Order:   Diet, Minced and Moist (07-16-22 @ 10:59) [Active]    - Is current order appropriate/adequate? [] Yes  [x]  No: pt would benefit from oral nutrition supplements     - PO intake :   [] >75%  Adequate    [] 50-75%  Fair       [x] <50%  Poor    - Nutrition-related concerns:      - Pt sleeping deeply at time of visit. Per RN, pt not eating well and family bringing food from home at times. Pt seen with lunch tray untouched this afternoon.       - Per chart: pt is ordered for admelog SSI, cipro, potassium chloride, Solu-Cortef, multivitamin, thiamine, and atorvastatin.    GI: No nausea, vomiting, constipation, diarrhea noted per chart.  Last BM 7/16 per chart.   Bowel Regimen? [x] Yes (miralax, senna)   [] No      Weights:   Dosing wt: 68 kg (07-13)  No new weights available per chart. RD to continue to monitor weight trends as able/available.     MEDICATIONS  (STANDING):  atorvastatin 10 milliGRAM(s) Oral at bedtime  ciprofloxacin     Tablet 500 milliGRAM(s) Oral every 12 hours  enoxaparin Injectable 40 milliGRAM(s) SubCutaneous every 24 hours  hydrocortisone sodium succinate IVPB 50 milliGRAM(s) IV Intermittent every 8 hours  insulin lispro (ADMELOG) corrective regimen sliding scale   SubCutaneous three times a day with meals  insulin lispro (ADMELOG) corrective regimen sliding scale   SubCutaneous at bedtime  multivitamin 1 Tablet(s) Oral daily  polyethylene glycol 3350 17 Gram(s) Oral daily  potassium chloride   Solution 40 milliEquivalent(s) Oral every 4 hours  senna 2 Tablet(s) Oral at bedtime  thiamine 100 milliGRAM(s) Oral daily      Pertinent Labs:   07-19 @ 07:25: Na 144, BUN 14, Cr 0.83, BG 91, K+ 3.0<L>, Mg 1.5<L>    A1C with Estimated Average Glucose Result: 5.6 % (07-17-22 @ 09:25)  A1C with Estimated Average Glucose Result: 5.5 % (04-18-22 @ 09:56)    Finger Sticks:  POCT Blood Glucose.: 136 mg/dL (07-19 @ 11:56)  POCT Blood Glucose.: 113 mg/dL (07-19 @ 08:01)  POCT Blood Glucose.: 143 mg/dL (07-18 @ 21:29)  POCT Blood Glucose.: 123 mg/dL (07-18 @ 16:45)      Skin per nursing documentation: Per wound care note 7/18, pt with a deep tissue injury to the sacrum/bilateral buttocks  Edema per nursing documentation: none noted    Estimated Needs:   [x] recalculated:  30-35 kcal/kg = 8973-8780 kcal/day  1.2-1.4 g/kg = 82-95 g pro/day  fluids deferred to team  based on dosing wt: 68 kg (07-13)    Previous Nutrition Diagnosis: Moderate acute on chronic malnutrition; Increased nutrients needs   Nutrition Diagnosis is: [x] ongoing  [] resolved [] not applicable   -Being addressed with PO diet, pending oral nutrition supplements, and micronutrient supplementation.    Nutrition Care Plan:  [x] In Progress  [] Achieved  [] Not applicable    New Nutrition Diagnosis: [x] Not applicable    Nutrition Interventions:     Education Provided   [] Yes:  [x] No: pt sleeping deeply at time of visit    Recommendations:      1) Continue diet free of therapeutic restrictions - defer texture to team/SLP.  2) Continue multivitamin. Recommend Vitamin C to promote wound healing, pending no medical contraindications.   3) Recommend Ensure Enlive 2x/day to promote adequate PO intake.   4) Monitor PO intake, GI tolerance, skin integrity, labs, weight, and bowel movement regularity.   5) Honor food preferences as feasible. Assist with feeding and encourage PO intake.  6) malnutrition alert in chart     Monitoring and Evaluation:   Continue to monitor nutritional intake, tolerance to diet prescription, weights, labs, skin integrity    RD remains available upon request and will follow up per protocol  Kait Harris RDN, CDN Pager #[phone] Nutrition Follow Up Note  Patient seen for: malnutrition initial follow-up. Chart reviewed, events noted.     "61 yr old male with stated hx significant for cerebellar ataxia, chronic lacuna infarcts, leptomeningeal enhancement (MRI 4/2022), recent hospitalizations 4/2022 for Asp pneum/relative adrenal insufficiency/sepsis and 6/2022 for UTI who now is admitted for sepsis secondary to UTI, found to have new Rt renal collecting system dilatation, 8.1 mm Rt prox ureter calculus and Rt hydronephrosis requiring placement of bilat ureteral stent placement. Course c/b development of hypotension/hypothermia/bradycaria concerning for relative adrenal insufficiency vs septic shock due to UTI. Pt refractory to IVF therapy requiring phenylephrine gtt and transfer to MICU for septic shock secondary to UTI in setting of adrenal insufficiency now off pressors since 7/14"    Source: [] Patient       [x] Medical Record        [x] RN        [] Family at bedside       [] Other:    -If unable to interview patient: [] Trach/Vent/BiPAP  [x] Disoriented/confused/inappropriate to interview    Diet Order:   Diet, Minced and Moist (07-16-22 @ 10:59) [Active]    - Is current order appropriate/adequate? [] Yes  [x]  No: pt would benefit from oral nutrition supplements     - PO intake :   [] >75%  Adequate    [] 50-75%  Fair       [x] <50%  Poor    - Nutrition-related concerns:      - Pt sleeping deeply at time of visit. Per RN, pt not eating well and family bringing food from home at times. Pt seen with lunch tray untouched this afternoon.       - Per chart: pt is ordered for admelog SSI, cipro, potassium chloride, Solu-Cortef, multivitamin, thiamine, and atorvastatin.    GI: No nausea, vomiting, constipation, diarrhea noted per chart.  Last BM 7/16 per chart.   Bowel Regimen? [x] Yes (miralax, senna)   [] No      Weights:   Dosing wt: 68 kg (07-13)  No new weights available per chart. RD to continue to monitor weight trends as able/available.     MEDICATIONS  (STANDING):  atorvastatin 10 milliGRAM(s) Oral at bedtime  ciprofloxacin     Tablet 500 milliGRAM(s) Oral every 12 hours  enoxaparin Injectable 40 milliGRAM(s) SubCutaneous every 24 hours  hydrocortisone sodium succinate IVPB 50 milliGRAM(s) IV Intermittent every 8 hours  insulin lispro (ADMELOG) corrective regimen sliding scale   SubCutaneous three times a day with meals  insulin lispro (ADMELOG) corrective regimen sliding scale   SubCutaneous at bedtime  multivitamin 1 Tablet(s) Oral daily  polyethylene glycol 3350 17 Gram(s) Oral daily  potassium chloride   Solution 40 milliEquivalent(s) Oral every 4 hours  senna 2 Tablet(s) Oral at bedtime  thiamine 100 milliGRAM(s) Oral daily      Pertinent Labs:   07-19 @ 07:25: Na 144, BUN 14, Cr 0.83, BG 91, K+ 3.0<L>, Mg 1.5<L>    A1C with Estimated Average Glucose Result: 5.6 % (07-17-22 @ 09:25)  A1C with Estimated Average Glucose Result: 5.5 % (04-18-22 @ 09:56)    Finger Sticks:  POCT Blood Glucose.: 136 mg/dL (07-19 @ 11:56)  POCT Blood Glucose.: 113 mg/dL (07-19 @ 08:01)  POCT Blood Glucose.: 143 mg/dL (07-18 @ 21:29)  POCT Blood Glucose.: 123 mg/dL (07-18 @ 16:45)      Skin per nursing documentation: Per wound care note 7/18, pt with a deep tissue injury to the sacrum/bilateral buttocks  Edema per nursing documentation: none noted    Estimated Needs:   [x] recalculated:  30-35 kcal/kg = 9614-8524 kcal/day  1.2-1.4 g/kg = 82-95 g pro/day  fluids deferred to team  based on dosing wt: 68 kg (07-13)    Previous Nutrition Diagnosis: Moderate acute on chronic malnutrition; Increased nutrients needs   Nutrition Diagnosis is: [x] ongoing  [] resolved [] not applicable   -Being addressed with PO diet, pending oral nutrition supplements, and micronutrient supplementation.    Nutrition Care Plan:  [x] In Progress  [] Achieved  [] Not applicable    New Nutrition Diagnosis: [x] Not applicable    Nutrition Interventions:     Education Provided   [] Yes:  [x] No: pt sleeping deeply at time of visit    Recommendations:      1) Continue diet free of therapeutic restrictions - defer texture to team/SLP.  2) Continue multivitamin. Recommend Vitamin C to promote wound healing, pending no medical contraindications.   3) Recommend Ensure Enlive 3x/day to promote adequate PO intake.   4) Monitor PO intake, GI tolerance, skin integrity, labs, weight, and bowel movement regularity.   5) Honor food preferences as feasible. Assist with feeding and encourage PO intake.  6) malnutrition alert in chart     Monitoring and Evaluation:   Continue to monitor nutritional intake, tolerance to diet prescription, weights, labs, skin integrity    RD remains available upon request and will follow up per protocol  Kait Harris RDN, CDN Pager #[phone]

## 2022-07-19 NOTE — PROGRESS NOTE ADULT - ASSESSMENT
61 m with cerebellar ataxia with neurocognitive decline over a 2 yr period, chronic lacuna infarcts (MRI 4/2022), minimally verbal,  recent hospitalizations 4/18/22-5/13/22 for AMS, asp pneum requiring intubation, septic shock with relative adrenal insufficiency found to have leptomeningeal enhancement but LP negative for infection was considered likely neoplastic but family refused biopsy, pt has had hypothermia and bradycardia in the previous admissions and all the previous urine cxs showed> 3 organisms now brought in for lethargy, poor PO intake and foul smelling urine, here afebrile, WBC:  14.8, RIP , Cr: 1.78, u/a positive  CT:  mild R hydro caused bu a 11 mm stone in R UPJ, more non obstructing stones in lower pole, an 8 mm blader stone  s/p b/l ureteral stent but then pt became hypotensive, hypothermic to 32 and lukas requiring pressors so was transferred to MICU    obstructing R UPJ stone with hypotension, hypothermia and bradycardia after b/l ureteral stent placement, ?septic shock and UTI, OR urine cx with pseudomonas pan S and another pseudomonas I to cefepime and zosyn  but pt also had previous hypothermia and bradycardia in the previous admissions, it could also be explained by adrenal insufficiency    * pt was started on hydrocortisone 100 q 8, still with episodes of hypothermia and bradycardia  * s/p zosyn, 7/14-7/18, switched to cipro 500 q 12 7/19 now day 6  * will complete a 10 day cousre  * monitor WBC/diff and renal function  * f/u with urology    The above assessment and plan was discussed with the primary team    Jessi Shi MD  contact on teams  After 5pm and on weekends call [phone]

## 2022-07-19 NOTE — PROGRESS NOTE ADULT - ASSESSMENT
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 Course c/b development of hypotension/hypothermia/bradycaria concerning for relative adrenal insufficiency vs septic shock due to UTI. Pt refractory to IVF therapy requiring phenylephrine gtt and transfer to MICU for septic shock secondary to UTI in setting of adrenal insufficiency now off pressors since 7/14      #Neuro:  =hx of cerebellar ataxia, chronic lacunar infarcts, leptomeningeal enhancements, all stable. Physical therapy consult when stable,          #CV:  =bradycardia (most likely due to relative adrenal insufficiency vs septic shock)  -ECG demonstrating first degree block but otherwise asymtomatic bradycardia  Stop IVF.  Monitor.   -    #GI/:  =s/p bilat ureteral stent placements  -coronel, needs TOV perhaps after ureteral stents removed. IV Zosyn for now. Need Speech and Swallow to advance diet.   Needs TOV after seen by PT.    Will ask Urology to reconsult.         #ID:  =sepsis secondary to UTI, r/o relative adrenal insufficiency  -pan culture  -cortisol level 7/14/22 of 7 ug/dl  -received hydrocortisone 100 mg IVP x1 (given after cortisol level obtained)  -c/w hydrocortisone, decrease to 50 mg IV q 8 hrs, taper every other day.    -c/w zosyn and adjust per cultures per ID  -bcx on 7/13 ngtd and ucx on 7/13 neg        DVT Lovenox 40 mg/day 61 yr old male with stated hx significant for cerebellar ataxia, chronic lacuna infarcts, leptomeningeal enhancement (MRI 4/2022), recent hospitalizations 4/2022 for Asp pneum/relative adrenal insufficiency/sepsis and 6/2022 for UTI who now is admitted for sepsis secondary to UTI, found to have new Rt renal collecting system dilatation, 8.1 mm Rt prox ureter calculus and Rt hydronephrosis requiring placement of bilat ureteral stent placement. Course c/b development of hypotension/hypothermia/bradycaria concerning for relative adrenal insufficiency vs septic shock due to UTI. Pt refractory to IVF therapy requiring phenylephrine gtt and transfer to MICU for septic shock secondary to UTI in setting of adrenal insufficiency now off pressors since 7/14      #Neuro:  =hx of cerebellar ataxia, chronic lacunar infarcts, leptomeningeal enhancements, all stable. Physical therapy as tolerated.           #CV:  =bradycardia (most likely due to relative adrenal insufficiency vs septic shock)  -ECG demonstrating first degree block but otherwise asymtomatic bradycardia  Stop IVF.  Monitor.   -    #GI/:  =s/p bilat ureteral stent placements  -coronel, needs TOV perhaps after ureteral stents removed. IV Zosyn for now. Need Speech and Swallow to advance diet.   Needs TOV after seen by PT.  Will ask Urology to reconsult. Agree change to Cipro for urine culture.         #ID:  =sepsis secondary to UTI, r/o relative adrenal insufficiency  -pan culture  -cortisol level 7/14/22 of 7 ug/dl  -received hydrocortisone 100 mg IVP x1 (given after cortisol level obtained)  -c/w hydrocortisone, decrease to 50 mg IV q 8 hrs, taper every other day.    -c/w zosyn and adjust per cultures per ID  -bcx on 7/13 ngtd and ucx on 7/13 neg        DVT Lovenox 40 mg/day

## 2022-07-19 NOTE — PROGRESS NOTE ADULT - SUBJECTIVE AND OBJECTIVE BOX
INTERVAL HPI/OVERNIGHT EVENTS:  Pt seen and examined at bedside.     Allergies/Intolerance: No Known Allergies      MEDICATIONS  (STANDING):  atorvastatin 10 milliGRAM(s) Oral at bedtime  ciprofloxacin     Tablet 500 milliGRAM(s) Oral every 12 hours  enoxaparin Injectable 40 milliGRAM(s) SubCutaneous every 24 hours  hydrocortisone sodium succinate IVPB 50 milliGRAM(s) IV Intermittent every 8 hours  insulin lispro (ADMELOG) corrective regimen sliding scale   SubCutaneous three times a day with meals  insulin lispro (ADMELOG) corrective regimen sliding scale   SubCutaneous at bedtime  multivitamin 1 Tablet(s) Oral daily  polyethylene glycol 3350 17 Gram(s) Oral daily  potassium chloride   Solution 40 milliEquivalent(s) Oral every 4 hours  senna 2 Tablet(s) Oral at bedtime  thiamine 100 milliGRAM(s) Oral daily    MEDICATIONS  (PRN):        ROS: all systems reviewed and wnl      PHYSICAL EXAMINATION:  Vital Signs Last 24 Hrs  T(C): 36.7 (2022 05:10), Max: 36.9 (2022 23:05)  T(F): 98.1 (2022 05:10), Max: 98.4 (2022 23:05)  HR: 82 (2022 05:10) (44 - 82)  BP: 99/60 (2022 05:10) (92/57 - 127/67)  BP(mean): --  RR: 18 (2022 05:10) (18 - 18)  SpO2: 98% (2022 05:10) (97% - 98%)    Parameters below as of 2022 05:10  Patient On (Oxygen Delivery Method): room air      CAPILLARY BLOOD GLUCOSE      POCT Blood Glucose.: 113 mg/dL (2022 08:01)  POCT Blood Glucose.: 143 mg/dL (2022 21:29)  POCT Blood Glucose.: 123 mg/dL (2022 16:45)  POCT Blood Glucose.: 135 mg/dL (2022 11:49)      18 @ 07:01  -  19 @ 07:00  --------------------------------------------------------  IN: 500 mL / OUT: 1100 mL / NET: -600 mL        GENERAL:   NECK: supple, No JVD  CHEST/LUNG: clear to auscultation bilaterally; no rales, rhonchi, or wheezing b/l  HEART: normal S1, S2  ABDOMEN: BS+, soft, ND, NT   EXTREMITIES:  pulses palpable; no clubbing, cyanosis, or edema b/l LEs  SKIN: no rashes or lesions      LABS:                        11.8   9.70  )-----------( 197      ( 2022 10:46 )             37.2     07-    144  |  105  |  14  ----------------------------<  91  3.0<L>   |  28  |  0.83    Ca    8.6      2022 07:25  Mg     1.5             Urinalysis Basic - ( 2022 23:31 )    Color: BROWN / Appearance: Slightly Turbid / S.017 / pH: x  Gluc: x / Ketone: Negative  / Bili: Negative / Urobili: Negative   Blood: x / Protein: 30 mg/dL / Nitrite: Negative   Leuk Esterase: Moderate / RBC: 3020 /hpf / WBC 11 /HPF   Sq Epi: x / Non Sq Epi: 1 /hpf / Bacteria: Negative             INTERVAL HPI/OVERNIGHT EVENTS:  Pt seen and examined at bedside.     Allergies/Intolerance: No Known Allergies      MEDICATIONS  (STANDING):  atorvastatin 10 milliGRAM(s) Oral at bedtime  ciprofloxacin     Tablet 500 milliGRAM(s) Oral every 12 hours  enoxaparin Injectable 40 milliGRAM(s) SubCutaneous every 24 hours  hydrocortisone sodium succinate IVPB 50 milliGRAM(s) IV Intermittent every 8 hours  insulin lispro (ADMELOG) corrective regimen sliding scale   SubCutaneous three times a day with meals  insulin lispro (ADMELOG) corrective regimen sliding scale   SubCutaneous at bedtime  multivitamin 1 Tablet(s) Oral daily  polyethylene glycol 3350 17 Gram(s) Oral daily  potassium chloride   Solution 40 milliEquivalent(s) Oral every 4 hours  senna 2 Tablet(s) Oral at bedtime  thiamine 100 milliGRAM(s) Oral daily    MEDICATIONS  (PRN):        ROS: all systems reviewed and wnl      PHYSICAL EXAMINATION:  Vital Signs Last 24 Hrs  T(C): 36.7 (2022 05:10), Max: 36.9 (2022 23:05)  T(F): 98.1 (2022 05:10), Max: 98.4 (2022 23:05)  HR: 82 (2022 05:10) (44 - 82)  BP: 99/60 (2022 05:10) (92/57 - 127/67)  BP(mean): --  RR: 18 (2022 05:10) (18 - 18)  SpO2: 98% (2022 05:10) (97% - 98%)    Parameters below as of 2022 05:10  Patient On (Oxygen Delivery Method): room air      CAPILLARY BLOOD GLUCOSE      POCT Blood Glucose.: 113 mg/dL (2022 08:01)  POCT Blood Glucose.: 143 mg/dL (2022 21:29)  POCT Blood Glucose.: 123 mg/dL (2022 16:45)  POCT Blood Glucose.: 135 mg/dL (2022 11:49)      18 @ 07:01  -  19 @ 07:00  --------------------------------------------------------  IN: 500 mL / OUT: 1100 mL / NET: -600 mL        GENERAL: stable, in bed, on RA, coronel in place, no fevers or SOB   NECK: supple, No JVD  CHEST/LUNG: clear to auscultation bilaterally; no rales, rhonchi, or wheezing b/l  HEART: normal S1, S2  ABDOMEN: BS+, soft, ND, NT   EXTREMITIES:  pulses palpable; no clubbing, cyanosis, or edema b/l LEs        LABS:                        11.8   9.70  )-----------( 197      ( 2022 10:46 )             37.2     07-19    144  |  105  |  14  ----------------------------<  91  3.0<L>   |  28  |  0.83    Ca    8.6      2022 07:25  Mg     1.5     07-19        Urinalysis Basic - ( 2022 23:31 )    Color: BROWN / Appearance: Slightly Turbid / S.017 / pH: x  Gluc: x / Ketone: Negative  / Bili: Negative / Urobili: Negative   Blood: x / Protein: 30 mg/dL / Nitrite: Negative   Leuk Esterase: Moderate / RBC: 3020 /hpf / WBC 11 /HPF   Sq Epi: x / Non Sq Epi: 1 /hpf / Bacteria: Negative

## 2022-07-19 NOTE — CHART NOTE - NSCHARTNOTEFT_GEN_A_CORE
61y Male s/p BL ureteral stent placement for sepsis 2/2 UTI and obstructing ureteral stone, renal stone.   Wife reported hematuria in coronel . Patient seen lying in bed and in NAD, coronel with light cranberry like color.  F/C was irrigated and now clear. Notify Urology , will see patient. Followup with urology attending when to d/c coronel and perform TOV  check cbc in am

## 2022-07-19 NOTE — PROGRESS NOTE ADULT - SUBJECTIVE AND OBJECTIVE BOX
Follow Up:  shock, obstructive uropathy, ?UTI    Interval History/ROS: pt doing well, episodes of bradycardia          Allergies  No Known Allergies        ANTIMICROBIALS:  ciprofloxacin     Tablet 500 every 12 hours      OTHER MEDS:  atorvastatin 10 milliGRAM(s) Oral at bedtime  enoxaparin Injectable 40 milliGRAM(s) SubCutaneous every 24 hours  hydrocortisone sodium succinate IVPB 50 milliGRAM(s) IV Intermittent every 8 hours  insulin lispro (ADMELOG) corrective regimen sliding scale   SubCutaneous three times a day with meals  insulin lispro (ADMELOG) corrective regimen sliding scale   SubCutaneous at bedtime  multivitamin 1 Tablet(s) Oral daily  polyethylene glycol 3350 17 Gram(s) Oral daily  senna 2 Tablet(s) Oral at bedtime  thiamine 100 milliGRAM(s) Oral daily      Vital Signs Last 24 Hrs  T(C): 37.6 (2022 10:00), Max: 37.6 (2022 10:00)  T(F): 99.7 (2022 10:00), Max: 99.7 (2022 10:00)  HR: 74 (2022 10:00) (44 - 82)  BP: 107/74 (2022 10:00) (92/57 - 127/67)  BP(mean): --  RR: 18 (2022 10:00) (18 - 18)  SpO2: 98% (2022 10:00) (97% - 98%)    Parameters below as of 2022 10:00  Patient On (Oxygen Delivery Method): room air        Physical Exam:  General: NAD  Respiratory:   comfortable on RA  abd:   soft, BS +, not tender  :     no CVAT, no suprapubic tenderness, + coronel  Musculoskeletal : no joint swelling, no edema  Skin:    no rash  vascular: no phlebitis                          11.8   9.70  )-----------( 197      ( 2022 10:46 )             37.2       07-19    144  |  105  |  14  ----------------------------<  91  3.0<L>   |  28  |  0.83    Ca    8.6      2022 07:25  Mg     1.5     07-19        Urinalysis Basic - ( 2022 23:31 )    Color: BROWN / Appearance: Slightly Turbid / S.017 / pH: x  Gluc: x / Ketone: Negative  / Bili: Negative / Urobili: Negative   Blood: x / Protein: 30 mg/dL / Nitrite: Negative   Leuk Esterase: Moderate / RBC: 3020 /hpf / WBC 11 /HPF   Sq Epi: x / Non Sq Epi: 1 /hpf / Bacteria: Negative        MICROBIOLOGY:  v  .Blood Blood-Peripheral  22   No growth to date.  --  --      OR Collect Bladder (from O.R.)  22   Few Pseudomonas aeruginosa  --  Pseudomonas aeruginosa      Kidney kidney  22   Rare Pseudomonas aeruginosa  --  Pseudomonas aeruginosa      Clean Catch Clean Catch (Midstream)  22   <10,000 CFU/mL Normal Urogenital Leanna  --  --      .Blood Blood-Peripheral  22   No Growth Final  --  --      .Blood Blood-Peripheral  22   No Growth Final  --  --                RADIOLOGY:  Images independently visualized and reviewed personally, findings as below  < from: CT Abdomen and Pelvis No Cont (22 @ 21:13) >    IMPRESSION:  Mild right hydronephrosis caused by an 8 x 7 x 11 mm stone at the right   ureteropelvic junction (UPJ).    Additional nonobstructing renal stones in the upper and lower poles of   both kidneys are partially obscured by motion artifact, but measure up to   3-4 mm in the mid to lower pole of the right kidney.    Approximately 8 x 2 mm bladder stone.    Underlying cystitis or pyelonephritis should be excluded based on     < end of copied text >

## 2022-07-19 NOTE — PROGRESS NOTE ADULT - ASSESSMENT
A/P: 61y Male s/p BL ureteral stent placement for sepsis 2/2 UTI and obstructing ureteral stone, renal stone. Urology called to evaluate hematuria after stent placement.    - urine color light peach  - anticipate urine color to have small amount of blood due to ureteral stent  - may TOV patient per primary team  - Patient will need outpatient follow up for discussion of definitive stone management

## 2022-07-19 NOTE — PROGRESS NOTE ADULT - SUBJECTIVE AND OBJECTIVE BOX
CARDIOLOGY     PROGRESS  NOTE   ________________________________________________    CHIEF COMPLAINT:Patient is a 61y old  Male who presents with a chief complaint of UTI (18 Jul 2022 15:20)  no complain.  	  REVIEW OF SYSTEMS:  CONSTITUTIONAL: No fever, weight loss, or fatigue  EYES: No eye pain, visual disturbances, or discharge  ENT:  No difficulty hearing, tinnitus, vertigo; No sinus or throat pain  NECK: No pain or stiffness  RESPIRATORY: No cough, wheezing, chills or hemoptysis; No Shortness of Breath  CARDIOVASCULAR: No chest pain, palpitations, passing out, dizziness, or leg swelling  GASTROINTESTINAL: No abdominal or epigastric pain. No nausea, vomiting, or hematemesis; No diarrhea or constipation. No melena or hematochezia.  GENITOURINARY: No dysuria, frequency, hematuria, or incontinence  NEUROLOGICAL: No headaches, memory loss, loss of strength, numbness, or tremors  SKIN: No itching, burning, rashes, or lesions   LYMPH Nodes: No enlarged glands  ENDOCRINE: No heat or cold intolerance; No hair loss  MUSCULOSKELETAL: No joint pain or swelling; No muscle, back, or extremity pain  PSYCHIATRIC: No depression, anxiety, mood swings, or difficulty sleeping  HEME/LYMPH: No easy bruising, or bleeding gums  ALLERGY AND IMMUNOLOGIC: No hives or eczema	    [ ] All others negative	  [ ] Unable to obtain    PHYSICAL EXAM:  T(C): 36.7 (07-19-22 @ 05:10), Max: 36.9 (07-18-22 @ 23:05)  HR: 82 (07-19-22 @ 05:10) (41 - 82)  BP: 99/60 (07-19-22 @ 05:10) (92/57 - 127/67)  RR: 18 (07-19-22 @ 05:10) (18 - 18)  SpO2: 98% (07-19-22 @ 05:10) (95% - 98%)  Wt(kg): --  I&O's Summary    18 Jul 2022 07:01  -  19 Jul 2022 07:00  --------------------------------------------------------  IN: 500 mL / OUT: 1100 mL / NET: -600 mL        Appearance: Normal	  HEENT:   Normal oral mucosa, PERRL, EOMI	  Lymphatic: No lymphadenopathy  Cardiovascular: Normal S1 S2, No JVD, + murmurs, No edema  Respiratory:rhonchi	  Psychiatry: A & O x 3, Mood & affect appropriate  Gastrointestinal:  Soft, Non-tender, + BS	  Skin: No rashes, No ecchymoses, No cyanosis	  Extremities: Normal range of motion, No clubbing, cyanosis or edema  Vascular: Peripheral pulses palpable 2+ bilaterally    MEDICATIONS  (STANDING):  atorvastatin 10 milliGRAM(s) Oral at bedtime  enoxaparin Injectable 40 milliGRAM(s) SubCutaneous every 24 hours  hydrocortisone sodium succinate IVPB 50 milliGRAM(s) IV Intermittent every 8 hours  insulin lispro (ADMELOG) corrective regimen sliding scale   SubCutaneous three times a day with meals  insulin lispro (ADMELOG) corrective regimen sliding scale   SubCutaneous at bedtime  multivitamin 1 Tablet(s) Oral daily  piperacillin/tazobactam IVPB.. 3.375 Gram(s) IV Intermittent every 8 hours  polyethylene glycol 3350 17 Gram(s) Oral daily  senna 2 Tablet(s) Oral at bedtime  thiamine 100 milliGRAM(s) Oral daily      TELEMETRY: 	    ECG:  	  RADIOLOGY:  OTHER: 	  	  LABS:	 	    CARDIAC MARKERS:                                11.8   9.70  )-----------( 197      ( 18 Jul 2022 10:46 )             37.2     07-19    144  |  105  |  14  ----------------------------<  91  3.0<L>   |  28  |  0.83    Ca    8.6      19 Jul 2022 07:25  Mg     1.8     07-17      proBNP:   Lipid Profile:   HgA1c:   TSH: Thyroid Stimulating Hormone, Serum: 0.60 uIU/mL (07-14 @ 06:04)    < from: 12 Lead ECG (07.18.22 @ 06:51) >  Diagnosis Line MARKED SINUS BRADYCARDIA WITH 1ST DEGREE A-V BLOCK  ABNORMAL ECG  WHEN COMPARED WITH ECG OF 14-JUL-2022 11:43,  NO SIGNIFICANT CHANGE WAS FOUND      Assessment and plan  ---------------------------  61M w/ hx of cerebellar ataxia with rapid neurocognitive decline (baseline AOx1-2, minimally conversant) recent prolonged admission including MICU stay/ intubation from 4/18-5/13 for AMS and airway protection, recent admission for UTI treated with 5 day course of CTX, brought in by wife for decreased PO intake and lethargy for several days with "white and thick" and foul smelling urine since 7/8. Hx obtained from wife due to patient's encephalopathy. PCP recently prescribed cipro 500 mg BID x 3 days (pt took 2 days) and wife thinks urine has improved since then. However, he has been eating less since and has been lethargic with minimal responsiveness which prompted the ED evaluation. ROS limited due to patient's mental status. Wife has noted pt has been constipated and gave him a suppository and he subsequently had one bowel movement. She noted a few red specks but no overt bleeding or black stool.  hypotension ?sec to sepsis/ pt with hx of low bp  bradycardia ,observe  dvt prophylaxis  am cortisol level, started on steroid in MICU  iv hydration  tsh  continue abx  will taper hydrocortisone slowly as per medicine, bp is improving  midodrine is held   abx  fu hr and bp closely

## 2022-07-19 NOTE — PROGRESS NOTE ADULT - SUBJECTIVE AND OBJECTIVE BOX
UROLOGY DAILY PROGRESS NOTE:     Subjective: Called by primary team for hematuria after stent placement. Primary team irrigated coronel to water clear with good success.  Patient seen and examined at bedside.         Objective:  Vital signs  T(F): , Max: 99.7 (07-19-22 @ 10:00)  HR: 74 (07-19-22 @ 10:00)  BP: 107/74 (07-19-22 @ 10:00)  SpO2: 98% (07-19-22 @ 10:00)  Wt(kg): --    I&O's Summary    18 Jul 2022 07:01  -  19 Jul 2022 07:00  --------------------------------------------------------  IN: 500 mL / OUT: 1100 mL / NET: -600 mL    19 Jul 2022 07:01  -  19 Jul 2022 20:43  --------------------------------------------------------  IN: 100 mL / OUT: 150 mL / NET: -50 mL        Gen: NAD  Abd: Soft, NT, ND  : coronel in place, urine draining light peach    Labs:  07-19  x     / x     /0.83   07-18  9.70  / 37.2  /0.74                           11.8   9.70  )-----------( 197      ( 18 Jul 2022 10:46 )             37.2     07-19    144  |  105  |  14  ----------------------------<  91  3.0<L>   |  28  |  0.83    Ca    8.6      19 Jul 2022 07:25  Mg     1.5     07-19

## 2022-07-20 LAB
ANION GAP SERPL CALC-SCNC: 10 MMOL/L — SIGNIFICANT CHANGE UP (ref 5–17)
ANION GAP SERPL CALC-SCNC: 9 MMOL/L — SIGNIFICANT CHANGE UP (ref 5–17)
BUN SERPL-MCNC: 13 MG/DL — SIGNIFICANT CHANGE UP (ref 7–23)
BUN SERPL-MCNC: 20 MG/DL — SIGNIFICANT CHANGE UP (ref 7–23)
CALCIUM SERPL-MCNC: 7.3 MG/DL — LOW (ref 8.4–10.5)
CALCIUM SERPL-MCNC: 8.8 MG/DL — SIGNIFICANT CHANGE UP (ref 8.4–10.5)
CHLORIDE SERPL-SCNC: 106 MMOL/L — SIGNIFICANT CHANGE UP (ref 96–108)
CHLORIDE SERPL-SCNC: 89 MMOL/L — LOW (ref 96–108)
CO2 SERPL-SCNC: 24 MMOL/L — SIGNIFICANT CHANGE UP (ref 22–31)
CO2 SERPL-SCNC: 29 MMOL/L — SIGNIFICANT CHANGE UP (ref 22–31)
CREAT SERPL-MCNC: 0.46 MG/DL — LOW (ref 0.5–1.3)
CREAT SERPL-MCNC: 0.72 MG/DL — SIGNIFICANT CHANGE UP (ref 0.5–1.3)
EGFR: 105 ML/MIN/1.73M2 — SIGNIFICANT CHANGE UP
EGFR: 120 ML/MIN/1.73M2 — SIGNIFICANT CHANGE UP
GLUCOSE BLDC GLUCOMTR-MCNC: 100 MG/DL — HIGH (ref 70–99)
GLUCOSE BLDC GLUCOMTR-MCNC: 110 MG/DL — HIGH (ref 70–99)
GLUCOSE BLDC GLUCOMTR-MCNC: 113 MG/DL — HIGH (ref 70–99)
GLUCOSE BLDC GLUCOMTR-MCNC: 124 MG/DL — HIGH (ref 70–99)
GLUCOSE SERPL-MCNC: 123 MG/DL — HIGH (ref 70–99)
GLUCOSE SERPL-MCNC: 712 MG/DL — CRITICAL HIGH (ref 70–99)
HCT VFR BLD CALC: 32.1 % — LOW (ref 39–50)
HGB BLD-MCNC: 10.2 G/DL — LOW (ref 13–17)
MCHC RBC-ENTMCNC: 28.5 PG — SIGNIFICANT CHANGE UP (ref 27–34)
MCHC RBC-ENTMCNC: 31.8 GM/DL — LOW (ref 32–36)
MCV RBC AUTO: 89.7 FL — SIGNIFICANT CHANGE UP (ref 80–100)
NRBC # BLD: 0 /100 WBCS — SIGNIFICANT CHANGE UP (ref 0–0)
PLATELET # BLD AUTO: 174 K/UL — SIGNIFICANT CHANGE UP (ref 150–400)
POTASSIUM SERPL-MCNC: 2.6 MMOL/L — CRITICAL LOW (ref 3.5–5.3)
POTASSIUM SERPL-MCNC: 3 MMOL/L — LOW (ref 3.5–5.3)
POTASSIUM SERPL-SCNC: 2.6 MMOL/L — CRITICAL LOW (ref 3.5–5.3)
POTASSIUM SERPL-SCNC: 3 MMOL/L — LOW (ref 3.5–5.3)
RBC # BLD: 3.58 M/UL — LOW (ref 4.2–5.8)
RBC # FLD: 14.5 % — SIGNIFICANT CHANGE UP (ref 10.3–14.5)
SARS-COV-2 RNA SPEC QL NAA+PROBE: SIGNIFICANT CHANGE UP
SODIUM SERPL-SCNC: 123 MMOL/L — LOW (ref 135–145)
SODIUM SERPL-SCNC: 144 MMOL/L — SIGNIFICANT CHANGE UP (ref 135–145)
WBC # BLD: 8.47 K/UL — SIGNIFICANT CHANGE UP (ref 3.8–10.5)
WBC # FLD AUTO: 8.47 K/UL — SIGNIFICANT CHANGE UP (ref 3.8–10.5)

## 2022-07-20 PROCEDURE — 99232 SBSQ HOSP IP/OBS MODERATE 35: CPT

## 2022-07-20 RX ORDER — POTASSIUM CHLORIDE 20 MEQ
20 PACKET (EA) ORAL
Refills: 0 | Status: COMPLETED | OUTPATIENT
Start: 2022-07-20 | End: 2022-07-21

## 2022-07-20 RX ADMIN — Medication 20 MILLIEQUIVALENT(S): at 20:42

## 2022-07-20 RX ADMIN — Medication 1 TABLET(S): at 13:26

## 2022-07-20 RX ADMIN — ENOXAPARIN SODIUM 40 MILLIGRAM(S): 100 INJECTION SUBCUTANEOUS at 13:26

## 2022-07-20 RX ADMIN — Medication 500 MILLIGRAM(S): at 05:29

## 2022-07-20 RX ADMIN — Medication 500 MILLIGRAM(S): at 17:41

## 2022-07-20 RX ADMIN — SENNA PLUS 2 TABLET(S): 8.6 TABLET ORAL at 22:03

## 2022-07-20 RX ADMIN — ATORVASTATIN CALCIUM 10 MILLIGRAM(S): 80 TABLET, FILM COATED ORAL at 22:03

## 2022-07-20 RX ADMIN — Medication 20 MILLIEQUIVALENT(S): at 22:03

## 2022-07-20 RX ADMIN — Medication 100 MILLIGRAM(S): at 13:25

## 2022-07-20 RX ADMIN — POLYETHYLENE GLYCOL 3350 17 GRAM(S): 17 POWDER, FOR SOLUTION ORAL at 13:25

## 2022-07-20 RX ADMIN — Medication 20 MILLIEQUIVALENT(S): at 13:25

## 2022-07-20 RX ADMIN — Medication 500 MILLIGRAM(S): at 13:25

## 2022-07-20 RX ADMIN — Medication 100 MILLIGRAM(S): at 05:29

## 2022-07-20 NOTE — PROGRESS NOTE ADULT - ASSESSMENT
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 Course c/b development of hypotension/hypothermia/bradycaria concerning for relative adrenal insufficiency vs septic shock due to UTI. Pt refractory to IVF therapy requiring phenylephrine gtt and transfer to MICU for septic shock secondary to UTI in setting of adrenal insufficiency now off pressors since 7/14      #Neuro:  =hx of cerebellar ataxia, chronic lacunar infarcts, leptomeningeal enhancements, all stable. Physical therapy as tolerated.           #CV:  =bradycardia (most likely due to relative adrenal insufficiency vs septic shock)  -ECG demonstrating first degree block but otherwise asymtomatic bradycardia  Stop IVF.  Monitor.   -    #GI/:  =s/p bilat ureteral stent placements  -coronel, needs TOV perhaps after ureteral stents removed. IV Zosyn for now. Need Speech and Swallow to advance diet.   Needs TOV after seen by PT.  Will ask Urology to reconsult. Agree change to Cipro for urine culture.         #ID:  =sepsis secondary to UTI, r/o relative adrenal insufficiency  -pan culture  -cortisol level 7/14/22 of 7 ug/dl  -received hydrocortisone 100 mg IVP x1 (given after cortisol level obtained)  -c/w hydrocortisone, decrease to 50 mg IV q 8 hrs, taper every other day.    -c/w zosyn and adjust per cultures per ID  -bcx on 7/13 ngtd and ucx on 7/13 neg        DVT Lovenox 40 mg/day 61 yr old male with stated hx significant for cerebellar ataxia, chronic lacuna infarcts, leptomeningeal enhancement (MRI 4/2022), recent hospitalizations 4/2022 for Asp pneum/relative adrenal insufficiency/sepsis and 6/2022 for UTI who now is admitted for sepsis secondary to UTI, found to have new Rt renal collecting system dilatation, 8.1 mm Rt prox ureter calculus and Rt hydronephrosis requiring placement of bilat ureteral stent placement. Course c/b development of hypotension/hypothermia/bradycaria concerning for relative adrenal insufficiency vs septic shock due to UTI. Pt refractory to IVF therapy requiring phenylephrine gtt and transfer to MICU for septic shock secondary to UTI in setting of adrenal insufficiency now off pressors since 7/14      #Neuro:  =hx of cerebellar ataxia, chronic lacunar infarcts, leptomeningeal enhancements, all stable. Physical therapy as tolerated.           #CV:  =bradycardia (most likely due to relative adrenal insufficiency vs septic shock). ECG demonstrating first degree block but otherwise asymtomatic bradycardia  Stop IVF.  Monitor.   -    #GI/:  =s/p bilat ureteral stent placements  -coronel, needs TOV today. Ureteral stents removed as outpatient with Urology. Advance diet.   Needs TOV after seen by PT.  Will ask Urology to reconsult. Agree change to Cipro for urine culture.         DVT Lovenox 40 mg/day.     Discharge home tomorrow with home care.

## 2022-07-20 NOTE — PROGRESS NOTE ADULT - SUBJECTIVE AND OBJECTIVE BOX
CARDIOLOGY     PROGRESS  NOTE   ________________________________________________    CHIEF COMPLAINT:Patient is a 61y old  Male who presents with a chief complaint of UTI (19 Jul 2022 20:43)  no complain.  	  REVIEW OF SYSTEMS:  CONSTITUTIONAL: No fever, weight loss, or fatigue  EYES: No eye pain, visual disturbances, or discharge  ENT:  No difficulty hearing, tinnitus, vertigo; No sinus or throat pain  NECK: No pain or stiffness  RESPIRATORY: No cough, wheezing, chills or hemoptysis; No Shortness of Breath  CARDIOVASCULAR: No chest pain, palpitations, passing out, dizziness, or leg swelling  GASTROINTESTINAL: No abdominal or epigastric pain. No nausea, vomiting, or hematemesis; No diarrhea or constipation. No melena or hematochezia.  GENITOURINARY: No dysuria, frequency, hematuria, or incontinence  NEUROLOGICAL: No headaches, memory loss, loss of strength, numbness, or tremors  SKIN: No itching, burning, rashes, or lesions   LYMPH Nodes: No enlarged glands  ENDOCRINE: No heat or cold intolerance; No hair loss  MUSCULOSKELETAL: No joint pain or swelling; No muscle, back, or extremity pain  PSYCHIATRIC: No depression, anxiety, mood swings, or difficulty sleeping  HEME/LYMPH: No easy bruising, or bleeding gums  ALLERGY AND IMMUNOLOGIC: No hives or eczema	    [ ] All others negative	  [x ] Unable to obtain    PHYSICAL EXAM:  T(C): 36.3 (07-20-22 @ 06:30), Max: 37.6 (07-19-22 @ 10:00)  HR: 34 (07-20-22 @ 01:53) (34 - 74)  BP: 126/70 (07-20-22 @ 01:53) (107/74 - 126/70)  RR: 18 (07-20-22 @ 01:53) (18 - 18)  SpO2: 100% (07-20-22 @ 01:53) (98% - 100%)  Wt(kg): --  I&O's Summary    19 Jul 2022 07:01  -  20 Jul 2022 07:00  --------------------------------------------------------  IN: 100 mL / OUT: 1050 mL / NET: -950 mL        Appearance: Normal	  HEENT:   Normal oral mucosa, PERRL, EOMI	  Lymphatic: No lymphadenopathy  Cardiovascular: Normal S1 S2, No JVD, + murmurs, No edema  Respiratory: rhonchi	  Gastrointestinal:  Soft, Non-tender, + BS	  Skin: No rashes, No ecchymoses, No cyanosis	  Neurologic: Non-focal  Extremities: Normal range of motion, No clubbing, cyanosis or edema  Vascular: Peripheral pulses palpable 2+ bilaterally    MEDICATIONS  (STANDING):  ascorbic acid 500 milliGRAM(s) Oral daily  atorvastatin 10 milliGRAM(s) Oral at bedtime  ciprofloxacin     Tablet 500 milliGRAM(s) Oral every 12 hours  enoxaparin Injectable 40 milliGRAM(s) SubCutaneous every 24 hours  hydrocortisone sodium succinate IVPB 50 milliGRAM(s) IV Intermittent every 8 hours  insulin lispro (ADMELOG) corrective regimen sliding scale   SubCutaneous three times a day with meals  insulin lispro (ADMELOG) corrective regimen sliding scale   SubCutaneous at bedtime  multivitamin 1 Tablet(s) Oral daily  polyethylene glycol 3350 17 Gram(s) Oral daily  potassium chloride    Tablet ER 20 milliEquivalent(s) Oral daily  senna 2 Tablet(s) Oral at bedtime  thiamine 100 milliGRAM(s) Oral daily      TELEMETRY: 	    ECG:  	  RADIOLOGY:  OTHER: 	  	  LABS:	 	    CARDIAC MARKERS:                                10.2   8.47  )-----------( 174      ( 20 Jul 2022 06:59 )             32.1     07-20    123<L>  |  89<L>  |  13  ----------------------------<  712<HH>  2.6<LL>   |  24  |  0.46<L>    Ca    7.3<L>      20 Jul 2022 07:07  Mg     1.5     07-19      proBNP:   Lipid Profile:   HgA1c:   TSH: Thyroid Stimulating Hormone, Serum: 0.60 uIU/mL (07-14 @ 06:04)          Assessment and plan  ---------------------------  61M w/ hx of cerebellar ataxia with rapid neurocognitive decline (baseline AOx1-2, minimally conversant) recent prolonged admission including MICU stay/ intubation from 4/18-5/13 for AMS and airway protection, recent admission for UTI treated with 5 day course of CTX, brought in by wife for decreased PO intake and lethargy for several days with "white and thick" and foul smelling urine since 7/8. Hx obtained from wife due to patient's encephalopathy. PCP recently prescribed cipro 500 mg BID x 3 days (pt took 2 days) and wife thinks urine has improved since then. However, he has been eating less since and has been lethargic with minimal responsiveness which prompted the ED evaluation. ROS limited due to patient's mental status. Wife has noted pt has been constipated and gave him a suppository and he subsequently had one bowel movement. She noted a few red specks but no overt bleeding or black stool.  hypotension ?sec to sepsis/ pt with hx of low bp  bradycardia ,observe  dvt prophylaxis  am cortisol level, started on steroid in MICU  iv hydration  tsh  continue abx  will taper hydrocortisone slowly as per medicine, bp is improving  midodrine is held   abx  fu hr and bp closely  replete K , keep k>4

## 2022-07-20 NOTE — PROGRESS NOTE ADULT - ASSESSMENT
61 m with cerebellar ataxia with neurocognitive decline over a 2 yr period, chronic lacuna infarcts (MRI 4/2022), minimally verbal,  recent hospitalizations 4/18/22-5/13/22 for AMS, asp pneum requiring intubation, septic shock with relative adrenal insufficiency found to have leptomeningeal enhancement but LP negative for infection was considered likely neoplastic but family refused biopsy, pt has had hypothermia and bradycardia in the previous admissions and all the previous urine cxs showed> 3 organisms now brought in for lethargy, poor PO intake and foul smelling urine, here afebrile, WBC:  14.8, RIP , Cr: 1.78, u/a positive  CT:  mild R hydro caused bu a 11 mm stone in R UPJ, more non obstructing stones in lower pole, an 8 mm blader stone  s/p b/l ureteral stent but then pt became hypotensive, hypothermic to 32 and lukas requiring pressors so was transferred to MICU    obstructing R UPJ stone with hypotension, hypothermia and bradycardia after b/l ureteral stent placement, ?septic shock and UTI, OR urine cx with pseudomonas pan S and another pseudomonas I to cefepime and zosyn  but pt also had previous hypothermia and bradycardia in the previous admissions, it could also be explained by adrenal insufficiency    * pt was started on hydrocortisone 100 q 8, still with episodes of hypothermia and bradycardia  * s/p zosyn, 7/14-7/18, switched to cipro 500 q 12 7/19 now day 7  * will complete a 10 day course  * monitor WBC/diff and renal function  * f/u with urology    The above assessment and plan was discussed with the primary team    Jessi Shi MD  contact on teams  After 5pm and on weekends call [phone]     61 m with cerebellar ataxia with neurocognitive decline over a 2 yr period, chronic lacuna infarcts (MRI 4/2022), minimally verbal,  recent hospitalizations 4/18/22-5/13/22 for AMS, asp pneum requiring intubation, septic shock with relative adrenal insufficiency found to have leptomeningeal enhancement but LP negative for infection was considered likely neoplastic but family refused biopsy, pt has had hypothermia and bradycardia in the previous admissions and all the previous urine cxs showed> 3 organisms now brought in for lethargy, poor PO intake and foul smelling urine, here afebrile, WBC:  14.8, RIP , Cr: 1.78, u/a positive  CT:  mild R hydro caused bu a 11 mm stone in R UPJ, more non obstructing stones in lower pole, an 8 mm blader stone  s/p b/l ureteral stent but then pt became hypotensive, hypothermic to 32 and lukas requiring pressors so was transferred to MICU    obstructing R UPJ stone with hypotension, hypothermia and bradycardia after b/l ureteral stent placement, ?septic shock and UTI, OR urine cx with pseudomonas pan S and another pseudomonas I to cefepime and zosyn  but pt also had previous hypothermia and bradycardia in the previous admissions, it could also be explained by adrenal insufficiency    * pt was started on hydrocortisone 100 q 8, still with episodes of hypothermia and bradycardia  * s/p zosyn, 7/14-7/18, switched to cipro 500 q 12 7/19 now day 7  * will complete a 10 day course  * monitor WBC/diff and renal function  * will sign off, please call with questions     The above assessment and plan was discussed with the primary team    Jessi Shi MD  contact on teams  After 5pm and on weekends call [phone]

## 2022-07-20 NOTE — CHART NOTE - NSCHARTNOTEFT_GEN_A_CORE
Urine is clear to light pink    Urology signing off   No objection to TOV    If patient fails TOV replace coronel, jan with coronel and follow up outpatient     [facility]  [address] [id number] [phone]

## 2022-07-20 NOTE — PROGRESS NOTE ADULT - SUBJECTIVE AND OBJECTIVE BOX
Follow Up:  shock, obstructive uropathy, ?UTI    Interval History/ROS: pt doing well, still with episodes of hypothermia and  bradycardia            Allergies  No Known Allergies        ANTIMICROBIALS:  ciprofloxacin     Tablet 500 every 12 hours      OTHER MEDS:  ascorbic acid 500 milliGRAM(s) Oral daily  atorvastatin 10 milliGRAM(s) Oral at bedtime  enoxaparin Injectable 40 milliGRAM(s) SubCutaneous every 24 hours  insulin lispro (ADMELOG) corrective regimen sliding scale   SubCutaneous three times a day with meals  insulin lispro (ADMELOG) corrective regimen sliding scale   SubCutaneous at bedtime  multivitamin 1 Tablet(s) Oral daily  polyethylene glycol 3350 17 Gram(s) Oral daily  potassium chloride    Tablet ER 20 milliEquivalent(s) Oral daily  senna 2 Tablet(s) Oral at bedtime  thiamine 100 milliGRAM(s) Oral daily      Vital Signs Last 24 Hrs  T(C): 36.7 (20 Jul 2022 11:12), Max: 36.7 (20 Jul 2022 09:06)  T(F): 98.1 (20 Jul 2022 11:12), Max: 98.1 (20 Jul 2022 11:12)  HR: 74 (20 Jul 2022 09:06) (34 - 74)  BP: 107/64 (20 Jul 2022 09:06) (107/64 - 126/70)  BP(mean): --  RR: 18 (20 Jul 2022 09:06) (18 - 18)  SpO2: 99% (20 Jul 2022 09:06) (99% - 100%)    Parameters below as of 20 Jul 2022 09:06  Patient On (Oxygen Delivery Method): room air        Physical Exam:  General: NAD  Respiratory:   comfortable on RA  abd:   soft, BS +, not tender  :     no CVAT, no suprapubic tenderness, + coronel  Musculoskeletal : no joint swelling, no edema  Skin:    no rash  vascular: no phlebitis                          10.2   8.47  )-----------( 174      ( 20 Jul 2022 06:59 )             32.1       07-20    144  |  106  |  20  ----------------------------<  123<H>  3.0<L>   |  29  |  0.72    Ca    8.8      20 Jul 2022 15:18  Mg     1.5     07-19            MICROBIOLOGY:  v  .Blood Blood-Peripheral  07-18-22   No growth to date.  --  --      .Blood Blood-Peripheral  07-17-22   No growth to date.  --  --      OR Collect Bladder (from O.R.)  07-14-22   Few Pseudomonas aeruginosa  --  Pseudomonas aeruginosa      Kidney kidney  07-14-22   Rare Pseudomonas aeruginosa  --  Pseudomonas aeruginosa      Clean Catch Clean Catch (Midstream)  07-13-22   <10,000 CFU/mL Normal Urogenital Leanna  --  --      .Blood Blood-Peripheral  07-13-22   No Growth Final  --  --      .Blood Blood-Peripheral  07-13-22   No Growth Final  --  --                RADIOLOGY:  Images independently visualized and reviewed personally, findings as below  < from: CT Abdomen and Pelvis No Cont (07.13.22 @ 21:13) >  IMPRESSION:  Mild right hydronephrosis caused by an 8 x 7 x 11 mm stone at the right   ureteropelvic junction (UPJ).    Additional nonobstructing renal stones in the upper and lower poles of   both kidneys are partially obscured by motion artifact, but measure up to   3-4 mm in the mid to lower pole of the right kidney.    Approximately 8 x 2 mm bladder stone.    < end of copied text >

## 2022-07-21 LAB
ANION GAP SERPL CALC-SCNC: 8 MMOL/L — SIGNIFICANT CHANGE UP (ref 5–17)
BUN SERPL-MCNC: 20 MG/DL — SIGNIFICANT CHANGE UP (ref 7–23)
CALCIUM SERPL-MCNC: 9.1 MG/DL — SIGNIFICANT CHANGE UP (ref 8.4–10.5)
CHLORIDE SERPL-SCNC: 109 MMOL/L — HIGH (ref 96–108)
CO2 SERPL-SCNC: 26 MMOL/L — SIGNIFICANT CHANGE UP (ref 22–31)
CREAT SERPL-MCNC: 0.64 MG/DL — SIGNIFICANT CHANGE UP (ref 0.5–1.3)
EGFR: 108 ML/MIN/1.73M2 — SIGNIFICANT CHANGE UP
GLUCOSE BLDC GLUCOMTR-MCNC: 102 MG/DL — HIGH (ref 70–99)
GLUCOSE BLDC GLUCOMTR-MCNC: 111 MG/DL — HIGH (ref 70–99)
GLUCOSE BLDC GLUCOMTR-MCNC: 114 MG/DL — HIGH (ref 70–99)
GLUCOSE BLDC GLUCOMTR-MCNC: 86 MG/DL — SIGNIFICANT CHANGE UP (ref 70–99)
GLUCOSE BLDC GLUCOMTR-MCNC: 99 MG/DL — SIGNIFICANT CHANGE UP (ref 70–99)
GLUCOSE SERPL-MCNC: 94 MG/DL — SIGNIFICANT CHANGE UP (ref 70–99)
HCT VFR BLD CALC: 36.1 % — LOW (ref 39–50)
HGB BLD-MCNC: 11.6 G/DL — LOW (ref 13–17)
MCHC RBC-ENTMCNC: 27.6 PG — SIGNIFICANT CHANGE UP (ref 27–34)
MCHC RBC-ENTMCNC: 32.1 GM/DL — SIGNIFICANT CHANGE UP (ref 32–36)
MCV RBC AUTO: 86 FL — SIGNIFICANT CHANGE UP (ref 80–100)
NRBC # BLD: 0 /100 WBCS — SIGNIFICANT CHANGE UP (ref 0–0)
PLATELET # BLD AUTO: 201 K/UL — SIGNIFICANT CHANGE UP (ref 150–400)
POTASSIUM SERPL-MCNC: 4 MMOL/L — SIGNIFICANT CHANGE UP (ref 3.5–5.3)
POTASSIUM SERPL-SCNC: 4 MMOL/L — SIGNIFICANT CHANGE UP (ref 3.5–5.3)
PTH RELATED PROT SERPL-MCNC: <2 PMOL/L — SIGNIFICANT CHANGE UP
RBC # BLD: 4.2 M/UL — SIGNIFICANT CHANGE UP (ref 4.2–5.8)
RBC # FLD: 15 % — HIGH (ref 10.3–14.5)
SODIUM SERPL-SCNC: 143 MMOL/L — SIGNIFICANT CHANGE UP (ref 135–145)
WBC # BLD: 9 K/UL — SIGNIFICANT CHANGE UP (ref 3.8–10.5)
WBC # FLD AUTO: 9 K/UL — SIGNIFICANT CHANGE UP (ref 3.8–10.5)

## 2022-07-21 RX ORDER — FLUDROCORTISONE ACETATE 0.1 MG/1
0.1 TABLET ORAL DAILY
Refills: 0 | Status: DISCONTINUED | OUTPATIENT
Start: 2022-07-21 | End: 2022-07-22

## 2022-07-21 RX ADMIN — Medication 20 MILLIEQUIVALENT(S): at 00:42

## 2022-07-21 RX ADMIN — FLUDROCORTISONE ACETATE 0.1 MILLIGRAM(S): 0.1 TABLET ORAL at 11:33

## 2022-07-21 RX ADMIN — Medication 500 MILLIGRAM(S): at 11:34

## 2022-07-21 RX ADMIN — Medication 500 MILLIGRAM(S): at 18:31

## 2022-07-21 RX ADMIN — POLYETHYLENE GLYCOL 3350 17 GRAM(S): 17 POWDER, FOR SOLUTION ORAL at 11:33

## 2022-07-21 RX ADMIN — Medication 500 MILLIGRAM(S): at 05:22

## 2022-07-21 RX ADMIN — Medication 100 MILLIGRAM(S): at 11:35

## 2022-07-21 RX ADMIN — ENOXAPARIN SODIUM 40 MILLIGRAM(S): 100 INJECTION SUBCUTANEOUS at 11:33

## 2022-07-21 RX ADMIN — SENNA PLUS 2 TABLET(S): 8.6 TABLET ORAL at 21:30

## 2022-07-21 RX ADMIN — Medication 1 TABLET(S): at 11:34

## 2022-07-21 NOTE — PROGRESS NOTE ADULT - SUBJECTIVE AND OBJECTIVE BOX
CARDIOLOGY     PROGRESS  NOTE   ________________________________________________    CHIEF COMPLAINT:Patient is a 61y old  Male who presents with a chief complaint of UTI (21 Jul 2022 08:24)  no complain.  	  REVIEW OF SYSTEMS:  CONSTITUTIONAL: No fever, weight loss, or fatigue  EYES: No eye pain, visual disturbances, or discharge  ENT:  No difficulty hearing, tinnitus, vertigo; No sinus or throat pain  NECK: No pain or stiffness  RESPIRATORY: No cough, wheezing, chills or hemoptysis; No Shortness of Breath  CARDIOVASCULAR: No chest pain, palpitations, passing out, dizziness, or leg swelling  GASTROINTESTINAL: No abdominal or epigastric pain. No nausea, vomiting, or hematemesis; No diarrhea or constipation. No melena or hematochezia.  GENITOURINARY: No dysuria, frequency, hematuria, or incontinence  NEUROLOGICAL: No headaches, memory loss, loss of strength, numbness, or tremors  SKIN: No itching, burning, rashes, or lesions   LYMPH Nodes: No enlarged glands  ENDOCRINE: No heat or cold intolerance; No hair loss  MUSCULOSKELETAL: No joint pain or swelling; No muscle, back, or extremity pain  PSYCHIATRIC: No depression, anxiety, mood swings, or difficulty sleeping  HEME/LYMPH: No easy bruising, or bleeding gums  ALLERGY AND IMMUNOLOGIC: No hives or eczema	    [ ] All others negative	  [ ] Unable to obtain    PHYSICAL EXAM:  T(C): 36.3 (07-21-22 @ 08:57), Max: 36.7 (07-20-22 @ 11:12)  HR: 52 (07-21-22 @ 08:57) (39 - 52)  BP: 103/65 (07-21-22 @ 08:57) (98/61 - 107/80)  RR: 18 (07-21-22 @ 08:57) (18 - 18)  SpO2: 98% (07-21-22 @ 08:57) (95% - 98%)  Wt(kg): --  I&O's Summary    20 Jul 2022 07:01  -  21 Jul 2022 07:00  --------------------------------------------------------  IN: 240 mL / OUT: 600 mL / NET: -360 mL        Appearance: Normal	  HEENT:   Normal oral mucosa, PERRL, EOMI	  Lymphatic: No lymphadenopathy  Cardiovascular: Normal S1 S2, No JVD, + murmurs, No edema  Respiratory: Lungs clear to auscultation	  Gastrointestinal:  Soft, Non-tender, + BS	  Skin: No rashes, No ecchymoses, No cyanosis	  Neurologic: Non-focal  Extremities: Normal range of motion, No clubbing, cyanosis or edema  Vascular: Peripheral pulses palpable 2+ bilaterally    MEDICATIONS  (STANDING):  ascorbic acid 500 milliGRAM(s) Oral daily  ciprofloxacin     Tablet 500 milliGRAM(s) Oral every 12 hours  enoxaparin Injectable 40 milliGRAM(s) SubCutaneous every 24 hours  fludroCORTISONE 0.1 milliGRAM(s) Oral daily  insulin lispro (ADMELOG) corrective regimen sliding scale   SubCutaneous three times a day with meals  insulin lispro (ADMELOG) corrective regimen sliding scale   SubCutaneous at bedtime  multivitamin 1 Tablet(s) Oral daily  polyethylene glycol 3350 17 Gram(s) Oral daily  potassium chloride    Tablet ER 20 milliEquivalent(s) Oral daily  predniSONE   Tablet 7.5 milliGRAM(s) Oral daily  senna 2 Tablet(s) Oral at bedtime  thiamine 100 milliGRAM(s) Oral daily      TELEMETRY: 	    ECG:  	  RADIOLOGY:  OTHER: 	  	  LABS:	 	    CARDIAC MARKERS:                                11.6   9.00  )-----------( 201      ( 21 Jul 2022 06:45 )             36.1     07-21    143  |  109<H>  |  20  ----------------------------<  94  4.0   |  26  |  0.64    Ca    9.1      21 Jul 2022 06:44      proBNP:   Lipid Profile:   HgA1c:   TSH: Thyroid Stimulating Hormone, Serum: 0.60 uIU/mL (07-14 @ 06:04)          Assessment and plan  ---------------------------  61M w/ hx of cerebellar ataxia with rapid neurocognitive decline (baseline AOx1-2, minimally conversant) recent prolonged admission including MICU stay/ intubation from 4/18-5/13 for AMS and airway protection, recent admission for UTI treated with 5 day course of CTX, brought in by wife for decreased PO intake and lethargy for several days with "white and thick" and foul smelling urine since 7/8. Hx obtained from wife due to patient's encephalopathy. PCP recently prescribed cipro 500 mg BID x 3 days (pt took 2 days) and wife thinks urine has improved since then. However, he has been eating less since and has been lethargic with minimal responsiveness which prompted the ED evaluation. ROS limited due to patient's mental status. Wife has noted pt has been constipated and gave him a suppository and he subsequently had one bowel movement. She noted a few red specks but no overt bleeding or black stool.  hypotension ?sec to sepsis/ pt with hx of low bp  bradycardia ,observe  dvt prophylaxis  am cortisol level, started on steroid in MICU  iv hydration  tsh  continue abx  will taper hydrocortisone slowly as per medicine, bp is improving  abx  fu hr and bp closely  replete K , keep k>4  no midodrine sec to bradycardia, will increase florinef to bid if bp is low

## 2022-07-21 NOTE — PROGRESS NOTE ADULT - SUBJECTIVE AND OBJECTIVE BOX
INTERVAL HPI/OVERNIGHT EVENTS:  Pt seen and examined at bedside.     Allergies/Intolerance: No Known Allergies      MEDICATIONS  (STANDING):  ascorbic acid 500 milliGRAM(s) Oral daily  atorvastatin 10 milliGRAM(s) Oral at bedtime  ciprofloxacin     Tablet 500 milliGRAM(s) Oral every 12 hours  enoxaparin Injectable 40 milliGRAM(s) SubCutaneous every 24 hours  insulin lispro (ADMELOG) corrective regimen sliding scale   SubCutaneous three times a day with meals  insulin lispro (ADMELOG) corrective regimen sliding scale   SubCutaneous at bedtime  multivitamin 1 Tablet(s) Oral daily  polyethylene glycol 3350 17 Gram(s) Oral daily  potassium chloride    Tablet ER 20 milliEquivalent(s) Oral daily  senna 2 Tablet(s) Oral at bedtime  thiamine 100 milliGRAM(s) Oral daily    MEDICATIONS  (PRN):        ROS: all systems reviewed and wnl      PHYSICAL EXAMINATION:  Vital Signs Last 24 Hrs  T(C): 36.7 (21 Jul 2022 04:37), Max: 36.7 (20 Jul 2022 09:06)  T(F): 98.1 (21 Jul 2022 04:37), Max: 98.1 (20 Jul 2022 11:12)  HR: 47 (21 Jul 2022 04:37) (39 - 74)  BP: 98/61 (21 Jul 2022 04:37) (98/61 - 107/80)  BP(mean): --  RR: 18 (21 Jul 2022 04:37) (18 - 18)  SpO2: 95% (21 Jul 2022 04:37) (95% - 99%)    Parameters below as of 21 Jul 2022 04:37  Patient On (Oxygen Delivery Method): room air      CAPILLARY BLOOD GLUCOSE      POCT Blood Glucose.: 124 mg/dL (20 Jul 2022 21:05)  POCT Blood Glucose.: 100 mg/dL (20 Jul 2022 16:45)  POCT Blood Glucose.: 110 mg/dL (20 Jul 2022 11:38)  POCT Blood Glucose.: 113 mg/dL (20 Jul 2022 08:39)      07-20 @ 07:01  -  07-21 @ 07:00  --------------------------------------------------------  IN: 240 mL / OUT: 600 mL / NET: -360 mL        GENERAL: stable, in bed, comfortable, no fevers, coronel removed yesterday.   NECK: supple, No JVD  CHEST/LUNG: clear to auscultation bilaterally; no rales, rhonchi, or wheezing b/l  HEART: normal S1, S2  ABDOMEN: BS+, soft, ND, NT   EXTREMITIES:  pulses palpable; no clubbing, cyanosis, or edema b/l LEs      LABS:                        11.6   9.00  )-----------( 201      ( 21 Jul 2022 06:45 )             36.1     07-21    143  |  109<H>  |  20  ----------------------------<  94  4.0   |  26  |  0.64    Ca    9.1      21 Jul 2022 06:44

## 2022-07-22 ENCOUNTER — TRANSCRIPTION ENCOUNTER (OUTPATIENT)
Age: 62
End: 2022-07-22

## 2022-07-22 DIAGNOSIS — I95.9 HYPOTENSION, UNSPECIFIED: ICD-10-CM

## 2022-07-22 DIAGNOSIS — E78.5 HYPERLIPIDEMIA, UNSPECIFIED: ICD-10-CM

## 2022-07-22 DIAGNOSIS — E27.40 UNSPECIFIED ADRENOCORTICAL INSUFFICIENCY: ICD-10-CM

## 2022-07-22 LAB
GLUCOSE BLDC GLUCOMTR-MCNC: 101 MG/DL — HIGH (ref 70–99)
GLUCOSE BLDC GLUCOMTR-MCNC: 104 MG/DL — HIGH (ref 70–99)
GLUCOSE BLDC GLUCOMTR-MCNC: 125 MG/DL — HIGH (ref 70–99)
GLUCOSE BLDC GLUCOMTR-MCNC: 93 MG/DL — SIGNIFICANT CHANGE UP (ref 70–99)
NIDUS STONE QN: SIGNIFICANT CHANGE UP
TSH SERPL-MCNC: 2.94 UIU/ML — SIGNIFICANT CHANGE UP (ref 0.27–4.2)

## 2022-07-22 PROCEDURE — 99233 SBSQ HOSP IP/OBS HIGH 50: CPT | Mod: GC

## 2022-07-22 PROCEDURE — 99255 IP/OBS CONSLTJ NEW/EST HI 80: CPT | Mod: GC

## 2022-07-22 RX ORDER — MIDODRINE HYDROCHLORIDE 2.5 MG/1
5 TABLET ORAL ONCE
Refills: 0 | Status: COMPLETED | OUTPATIENT
Start: 2022-07-22 | End: 2022-07-22

## 2022-07-22 RX ORDER — HYDROCORTISONE 20 MG
25 TABLET ORAL ONCE
Refills: 0 | Status: COMPLETED | OUTPATIENT
Start: 2022-07-23 | End: 2022-07-23

## 2022-07-22 RX ORDER — HYDROCORTISONE 20 MG
25 TABLET ORAL ONCE
Refills: 0 | Status: COMPLETED | OUTPATIENT
Start: 2022-07-22 | End: 2022-07-22

## 2022-07-22 RX ORDER — MIDODRINE HYDROCHLORIDE 2.5 MG/1
5 TABLET ORAL THREE TIMES A DAY
Refills: 0 | Status: DISCONTINUED | OUTPATIENT
Start: 2022-07-22 | End: 2022-07-27

## 2022-07-22 RX ORDER — FLUDROCORTISONE ACETATE 0.1 MG/1
0.1 TABLET ORAL
Refills: 0 | Status: DISCONTINUED | OUTPATIENT
Start: 2022-07-22 | End: 2022-07-22

## 2022-07-22 RX ORDER — COSYNTROPIN 0.25 MG/ML
0.25 INJECTION, SOLUTION INTRAVENOUS ONCE
Refills: 0 | Status: COMPLETED | OUTPATIENT
Start: 2022-07-24 | End: 2022-07-24

## 2022-07-22 RX ADMIN — MIDODRINE HYDROCHLORIDE 5 MILLIGRAM(S): 2.5 TABLET ORAL at 21:50

## 2022-07-22 RX ADMIN — POLYETHYLENE GLYCOL 3350 17 GRAM(S): 17 POWDER, FOR SOLUTION ORAL at 13:39

## 2022-07-22 RX ADMIN — Medication 500 MILLIGRAM(S): at 05:30

## 2022-07-22 RX ADMIN — MIDODRINE HYDROCHLORIDE 5 MILLIGRAM(S): 2.5 TABLET ORAL at 18:34

## 2022-07-22 RX ADMIN — Medication 500 MILLIGRAM(S): at 18:35

## 2022-07-22 RX ADMIN — ENOXAPARIN SODIUM 40 MILLIGRAM(S): 100 INJECTION SUBCUTANEOUS at 13:39

## 2022-07-22 RX ADMIN — Medication 7.5 MILLIGRAM(S): at 05:34

## 2022-07-22 RX ADMIN — FLUDROCORTISONE ACETATE 0.1 MILLIGRAM(S): 0.1 TABLET ORAL at 05:29

## 2022-07-22 RX ADMIN — Medication 25 MILLIGRAM(S): at 21:48

## 2022-07-22 RX ADMIN — SENNA PLUS 2 TABLET(S): 8.6 TABLET ORAL at 21:50

## 2022-07-22 RX ADMIN — Medication 500 MILLIGRAM(S): at 13:40

## 2022-07-22 RX ADMIN — Medication 100 MILLIGRAM(S): at 13:39

## 2022-07-22 RX ADMIN — FLUDROCORTISONE ACETATE 0.1 MILLIGRAM(S): 0.1 TABLET ORAL at 18:35

## 2022-07-22 RX ADMIN — Medication 1 TABLET(S): at 13:39

## 2022-07-22 RX ADMIN — MIDODRINE HYDROCHLORIDE 5 MILLIGRAM(S): 2.5 TABLET ORAL at 13:17

## 2022-07-22 NOTE — PROGRESS NOTE ADULT - SUBJECTIVE AND OBJECTIVE BOX
CARDIOLOGY     PROGRESS  NOTE   ________________________________________________    CHIEF COMPLAINT:Patient is a 61y old  Male who presents with a chief complaint of UTI (22 Jul 2022 06:07)  no complain.  	  REVIEW OF SYSTEMS:  CONSTITUTIONAL: No fever, weight loss, or fatigue  EYES: No eye pain, visual disturbances, or discharge  ENT:  No difficulty hearing, tinnitus, vertigo; No sinus or throat pain  NECK: No pain or stiffness  RESPIRATORY: No cough, wheezing, chills or hemoptysis; No Shortness of Breath  CARDIOVASCULAR: No chest pain, palpitations, passing out, dizziness, or leg swelling  GASTROINTESTINAL: No abdominal or epigastric pain. No nausea, vomiting, or hematemesis; No diarrhea or constipation. No melena or hematochezia.  GENITOURINARY: No dysuria, frequency, hematuria, or incontinence  NEUROLOGICAL: No headaches, memory loss, loss of strength, numbness, or tremors  SKIN: No itching, burning, rashes, or lesions   LYMPH Nodes: No enlarged glands  ENDOCRINE: No heat or cold intolerance; No hair loss  MUSCULOSKELETAL: No joint pain or swelling; No muscle, back, or extremity pain  PSYCHIATRIC: No depression, anxiety, mood swings, or difficulty sleeping  HEME/LYMPH: No easy bruising, or bleeding gums  ALLERGY AND IMMUNOLOGIC: No hives or eczema	    [ ] All others negative	  [X ] Unable to obtain    PHYSICAL EXAM:  T(C): 36.6 (07-22-22 @ 04:35), Max: 36.6 (07-22-22 @ 04:35)  HR: 60 (07-22-22 @ 04:35) (45 - 62)  BP: 97/62 (07-22-22 @ 04:35) (97/62 - 128/82)  RR: 18 (07-22-22 @ 04:35) (18 - 18)  SpO2: 98% (07-22-22 @ 04:35) (98% - 100%)  Wt(kg): --  I&O's Summary    21 Jul 2022 07:01  -  22 Jul 2022 07:00  --------------------------------------------------------  IN: 240 mL / OUT: 850 mL / NET: -610 mL        Appearance: Normal	  HEENT:   Normal oral mucosa, PERRL, EOMI	  Lymphatic: No lymphadenopathy  Cardiovascular: Normal S1 S2, No JVD, + murmurs, No edema  Respiratory:rhonchi  Psychiatry: A & O x 3, Mood & affect appropriate  Gastrointestinal:  Soft, Non-tender, + BS	  Skin: No rashes, No ecchymoses, No cyanosis	  Neurologic: Non-focal  Extremities: Normal range of motion, No clubbing, cyanosis or edema  Vascular: Peripheral pulses palpable 2+ bilaterally    MEDICATIONS  (STANDING):  ascorbic acid 500 milliGRAM(s) Oral daily  ciprofloxacin     Tablet 500 milliGRAM(s) Oral every 12 hours  enoxaparin Injectable 40 milliGRAM(s) SubCutaneous every 24 hours  fludroCORTISONE 0.1 milliGRAM(s) Oral daily  insulin lispro (ADMELOG) corrective regimen sliding scale   SubCutaneous three times a day with meals  insulin lispro (ADMELOG) corrective regimen sliding scale   SubCutaneous at bedtime  multivitamin 1 Tablet(s) Oral daily  polyethylene glycol 3350 17 Gram(s) Oral daily  potassium chloride    Tablet ER 20 milliEquivalent(s) Oral daily  predniSONE   Tablet 7.5 milliGRAM(s) Oral daily  senna 2 Tablet(s) Oral at bedtime  thiamine 100 milliGRAM(s) Oral daily      TELEMETRY: 	    ECG:  	  RADIOLOGY:  OTHER: 	  	  LABS:	 	    CARDIAC MARKERS:                                11.6   9.00  )-----------( 201      ( 21 Jul 2022 06:45 )             36.1     07-21    143  |  109<H>  |  20  ----------------------------<  94  4.0   |  26  |  0.64    Ca    9.1      21 Jul 2022 06:44      proBNP:   Lipid Profile:   HgA1c:   TSH: Thyroid Stimulating Hormone, Serum: 0.60 uIU/mL (07-14 @ 06:04)          Assessment and plan  ---------------------------  61M w/ hx of cerebellar ataxia with rapid neurocognitive decline (baseline AOx1-2, minimally conversant) recent prolonged admission including MICU stay/ intubation from 4/18-5/13 for AMS and airway protection, recent admission for UTI treated with 5 day course of CTX, brought in by wife for decreased PO intake and lethargy for several days with "white and thick" and foul smelling urine since 7/8. Hx obtained from wife due to patient's encephalopathy. PCP recently prescribed cipro 500 mg BID x 3 days (pt took 2 days) and wife thinks urine has improved since then. However, he has been eating less since and has been lethargic with minimal responsiveness which prompted the ED evaluation. ROS limited due to patient's mental status. Wife has noted pt has been constipated and gave him a suppository and he subsequently had one bowel movement. She noted a few red specks but no overt bleeding or black stool.  hypotension ?sec to sepsis/ pt with hx of low bp  bradycardia ,observe  dvt prophylaxis  am cortisol level, started on steroid in MICU  iv hydration  tsh  continue abx  will taper hydrocortisone slowly as per medicine, bp is improving  abx  fu hr and bp closely  replete K , keep k>4  no midodrine sec to bradycardia, will increase florinef to bid if bp is low  taper steroid slowly

## 2022-07-22 NOTE — PROGRESS NOTE ADULT - ASSESSMENT
62 yo male      PMHx of cerebellar ataxia    prior  visit  to  ED on 4/17/22 due to AMS ,  was  intubated for depressed consciousness w/ cognitive decline.  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    infectious  and  malignancy  was  ruled  outt/  and  per   neuro  note ,no further workup     flow cytometry 4/28 showing nonspecific results 'degenerated sample, small lymphocytes'   nsgy consulted for meningeal bx, family refusing    CSF cytopathology 4/22 - increased number of large mononuclear cells in a background of few small lymphocytes, monocytes and neutrophils, cannot exclude a lymphoproliferative disorder versus an inflammatory process.    CSF cytopathology 4/29 - significant increased number of small lymphocytes with rare monocytes/ seen by  oncology  dr de leon,  no  intervention      now  admitted    c/o  ams, as  before   h/o  cerebellar  ataxia,  with  no defined  cause  found   pt  with  bradycardia, ?  central, seen  by  card/  echo  on 4/2022,  normal  ef   now  with  hypothermia,   as  before  pt  has  h/o intermittent   hypothermia,  not related  to  any   infectious   process.  rather  , it  seems to be  dysautonomia/  with  h/o normal  cortisol level  prior  CT  c/ap,  no  malignant  process   most  of  these  issues, do  not  have   a good  rx  on feeds  per  swallow  eval   arousable, not communicative/   which is  about his  base line  with few  periods  of  alertness    bed bound   status  and  altered  baseline  mental  status  on  cipro, uti  on  florinef/ prednisone  plan.  awiating   d/c

## 2022-07-22 NOTE — PROGRESS NOTE ADULT - SUBJECTIVE AND OBJECTIVE BOX
afberile    REVIEW OF SYSTEMS:  GEN: no fever,    no chills  RESP: no SOB,   no cough  CVS: no chest pain,   no palpitations  GI: no abdominal pain,   no nausea,   no vomiting,   no constipation,   no diarrhea  : no dysuria,   no frequency  NEURO: no headache,   no dizziness  PSYCH: no depression,   not anxious  Derm : no rash    MEDICATIONS  (STANDING):  ascorbic acid 500 milliGRAM(s) Oral daily  ciprofloxacin     Tablet 500 milliGRAM(s) Oral every 12 hours  enoxaparin Injectable 40 milliGRAM(s) SubCutaneous every 24 hours  fludroCORTISONE 0.1 milliGRAM(s) Oral daily  insulin lispro (ADMELOG) corrective regimen sliding scale   SubCutaneous three times a day with meals  insulin lispro (ADMELOG) corrective regimen sliding scale   SubCutaneous at bedtime  multivitamin 1 Tablet(s) Oral daily  polyethylene glycol 3350 17 Gram(s) Oral daily  potassium chloride    Tablet ER 20 milliEquivalent(s) Oral daily  predniSONE   Tablet 7.5 milliGRAM(s) Oral daily  senna 2 Tablet(s) Oral at bedtime  thiamine 100 milliGRAM(s) Oral daily    MEDICATIONS  (PRN):      Vital Signs Last 24 Hrs  T(C): 36.6 (22 Jul 2022 04:35), Max: 36.6 (22 Jul 2022 04:35)  T(F): 97.8 (22 Jul 2022 04:35), Max: 97.8 (22 Jul 2022 04:35)  HR: 60 (22 Jul 2022 04:35) (45 - 62)  BP: 97/62 (22 Jul 2022 04:35) (97/62 - 128/82)  BP(mean): --  RR: 18 (22 Jul 2022 04:35) (18 - 18)  SpO2: 98% (22 Jul 2022 04:35) (98% - 100%)    Parameters below as of 22 Jul 2022 04:35  Patient On (Oxygen Delivery Method): room air      CAPILLARY BLOOD GLUCOSE      POCT Blood Glucose.: 114 mg/dL (21 Jul 2022 21:15)  POCT Blood Glucose.: 111 mg/dL (21 Jul 2022 16:48)  POCT Blood Glucose.: 99 mg/dL (21 Jul 2022 12:03)  POCT Blood Glucose.: 102 mg/dL (21 Jul 2022 09:22)  POCT Blood Glucose.: 86 mg/dL (21 Jul 2022 08:36)    I&O's Summary    20 Jul 2022 07:01  -  21 Jul 2022 07:00  --------------------------------------------------------  IN: 240 mL / OUT: 600 mL / NET: -360 mL    21 Jul 2022 07:01  -  22 Jul 2022 06:07  --------------------------------------------------------  IN: 240 mL / OUT: 550 mL / NET: -310 mL        PHYSICAL EXAM:  HEAD:  Atraumatic, Normocephalic  NECK: Supple, No   JVD  CHEST/LUNG:   no     rales,     no,    rhonchi  HEART: Regular rate and rhythm;         murmur  ABDOMEN: Soft, Nontender, ;   EXTREMITIES:    no    edema  NEUROLOGY:  alert    LABS:                        11.6   9.00  )-----------( 201      ( 21 Jul 2022 06:45 )             36.1     07-21    143  |  109<H>  |  20  ----------------------------<  94  4.0   |  26  |  0.64    Ca    9.1      21 Jul 2022 06:44                      Thyroid Stimulating Hormone, Serum: 0.60 uIU/mL (07-14 @ 06:04)          Consultant(s) Notes Reviewed:      Care Discussed with Consultants/Other Providers:

## 2022-07-22 NOTE — CONSULT NOTE ADULT - ATTENDING COMMENTS
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 Course c/b development of hypotension/hypothermia/bradycaria concerning for relative adrenal insufficiency vs septic shock due to UTI. Pt with improvement after 1.5L IVF. Despite this he was only actually 350cc positive as he has post obstructive diureses and 1.2 L urine since stents.  He is known to have relative adrenal insuficency though I don't see a full work up each time he has been in ICU despite multiple preesors his cortisol has been as low as 6.   Please start Hydrocortisone. Please work up adrenal deficiency once oput of shock.   Please Give 1-2 More liters and continue to match urine out put there after Does not have need for pressors or ICU  Needs Tele given bradycardia.
Agree with fellow's note above. Plan for cosyntropin test on 7/24, Endocrine team to follow.    Sofia Dalton DO

## 2022-07-22 NOTE — DISCHARGE NOTE NURSING/CASE MANAGEMENT/SOCIAL WORK - NSDCPEFALRISK_GEN_ALL_CORE
For information on Fall & Injury Prevention, visit: [email or web address] OR  [email or web address] OR  https://www.cdc.gov/steadi/patient.html

## 2022-07-22 NOTE — DISCHARGE NOTE NURSING/CASE MANAGEMENT/SOCIAL WORK - PATIENT PORTAL LINK FT
You can access the FollowMyHealth Patient Portal offered by [facility] by registering at the following website: [email or web address]. By joining [facility]’s FollowMyHealth portal, you will also be able to view your health information using other applications (apps) compatible with our system.

## 2022-07-22 NOTE — CONSULT NOTE ADULT - ASSESSMENT
Concern for adrenal insufficiency  - 7/14 6AM cortisol 7  - on 7/14 at 6:37am 100mg IV hydrocortisone was given followed by 50mg q8h until 7/20 at which point it was changed to fludricortisone 0.1mg bid  - patient intermittently hypotensive and hypothermic    Poorly controlled T2DM with hyperglycemia  A1c   -Hold oral DM agents while inpatient  -Start Lantus  units at bedtime. DO NOT HOLD IF NPO.  -Start Admelog  units TID pre-meal. HOLD IF NPO.  -Use moderate/Use low dose Admelog correction scale pre-meal  -Use moderate/Use low dose Admelog correction scale at bedtime  -Fingerstick BG before meals and bedtime  -Goal -180  -Carbohydrate consistent diet  -RD consult  Discharge plan:  -Likely to discharge patient home on basal insulin plus Ozempic and metformin versus basal/bolus insulin. Final regimen pending clinical course.  -Patient will need education on how to self-administer insulin, how to check FSBG, as well as hypoglycemia management. Patient to call doctor with persistent high or low BG at home.   -Will need to have glucometer, test strips and lancets sent to pharmacy prior to discharge.  -Recommend outpatient dilated eye exam/routine ophthalmology f/u, podiatry referral and endocrinology f/u outpatient  -Recommend routine outpatient ophthalmology, podiatry and endocrinology f/u    HTN  -Outpatient goal BP <130/80. Management per primary team.    HLD  -On atorvastatin 40mg daily  -Would likely benefit from a statin if no contraindication  -Can check lipid profile if not done recently  -Statin likely inappropriate in this reproductive age female    Discussed with primary team.    Delta Chavarria MD, Endocrinology Fellow  Pager [phone] from 9am to 5pm. After hours and on weekends, please call [phone].   Concern for adrenal insufficiency  - 7/14 6AM cortisol 7, 7/14 TSH 0.6, 7/22 TSH 2.94  - BGs in 90s-100s  - on 7/14 at 6:37am 100mg IV hydrocortisone was given followed by 50mg q8h until 7/20 at which point it was changed to fludricortisone 0.1mg bid  - patient intermittently hypotensive and hypothermic    Poorly controlled T2DM with hyperglycemia  A1c   -Hold oral DM agents while inpatient  -Start Lantus  units at bedtime. DO NOT HOLD IF NPO.  -Start Admelog  units TID pre-meal. HOLD IF NPO.  -Use moderate/Use low dose Admelog correction scale pre-meal  -Use moderate/Use low dose Admelog correction scale at bedtime  -Fingerstick BG before meals and bedtime  -Goal -180  -Carbohydrate consistent diet  -RD consult  Discharge plan:  -Likely to discharge patient home on basal insulin plus Ozempic and metformin versus basal/bolus insulin. Final regimen pending clinical course.  -Patient will need education on how to self-administer insulin, how to check FSBG, as well as hypoglycemia management. Patient to call doctor with persistent high or low BG at home.   -Will need to have glucometer, test strips and lancets sent to pharmacy prior to discharge.  -Recommend outpatient dilated eye exam/routine ophthalmology f/u, podiatry referral and endocrinology f/u outpatient  -Recommend routine outpatient ophthalmology, podiatry and endocrinology f/u    HTN  -Outpatient goal BP <130/80. Management per primary team.    HLD  -On atorvastatin 40mg daily  -Would likely benefit from a statin if no contraindication  -Can check lipid profile if not done recently  -Statin likely inappropriate in this reproductive age female    Discussed with primary team.    Delta Chavarria MD, Endocrinology Fellow  Pager [phone] from 9am to 5pm. After hours and on weekends, please call [phone].   61M w/ hx of chronic lacunar infarcts, cerebellar ataxia with rapid neurocognitive decline (baseline AOx1-2, minimally conversant) recent prolonged admission including MICU stay/ intubation from 4/18-5/13 for AMS and airway protection, recent admission for UTI treated with 5 day course of CTX, brought in by wife for decreased PO intake and lethargy for several days with "white and thick" and foul smelling urine since 7/8. Endocrinology was consulted for concern for adrenal insufficiency.    Concern for adrenal insufficiency  - the patient presented with UTI and 8.1mm right proximal ureter calculus with hydronephrosis s/p bilateral ureteral stent placement with ccb hypotension, hypothermia, bradycardia  - patient has been refractory to IVF therapy requiring phenylephrine gtt and MICU for septic shcok from UTI, off of pressors since 7/14  - on 7/14 at 6:37am 100mg IV hydrocortisone was given followed by 50mg q8h until 7/20 at which point it was changed to fludricortisone 0.1mg bid, one dose of prednisone 7.5mg on 7/22  - patient also started on midodrine 5mg tid on 7/22  - 7/14 6AM cortisol 7, 7/14 TSH 0.6, 7/22 TSH 2.94  - BGs in 90s-100s  - SBPs 90s-low 100s  - briefly hypothermic to 94.3F on 7/20 am requiring warming protocol for a few hours, now temperature has been stable  - ddx: adrenal insufficiency vs severe sepsis vs autonomic dysfunction 2/2 underlying neurologic issues  PLAN    Hypotension  - Management per primary team.    HLD  -On atorvastatin 10mg daily at home  PLAN  -Would likely benefit from a statin if no contraindication  -Can check lipid profile if not done recently  -Management per primary team    Discussed with primary team.    Delta Chavarria MD, Endocrinology Fellow  Pager [phone] from 9am to 5pm. After hours and on weekends, please call [phone].   61M w/ hx of chronic lacunar infarcts, cerebellar ataxia with rapid neurocognitive decline (baseline AOx1-2, minimally conversant) recent prolonged admission including MICU stay/ intubation from 4/18-5/13 for AMS and airway protection, recent admission for UTI treated with 5 day course of CTX, brought in by wife for decreased PO intake and lethargy for several days with "white and thick" and foul smelling urine since 7/8. Endocrinology was consulted for concern for adrenal insufficiency.    Concern for adrenal insufficiency  - the patient presented with UTI and 8.1mm right proximal ureter calculus with hydronephrosis s/p bilateral ureteral stent placement with ccb hypotension, hypothermia, bradycardia  - patient has been refractory to IVF therapy requiring phenylephrine gtt and MICU for septic shcok from UTI, off of pressors since 7/14  - on 7/14 at 6:37am 100mg IV hydrocortisone was given followed by 50mg q8h until 7/20 at which point it was changed to fludricortisone 0.1mg bid, one dose of prednisone 7.5mg on 7/22  - patient also started on midodrine 5mg tid on 7/22  - 6/17 8:45am cortisol 15.6, 7/14 6AM cortisol 7, 7/14 TSH 0.6, 7/22 TSH 2.94  - BGs in 90s-100s  - SBPs 90s-low 100s, persistently bradycardic  - briefly hypothermic to 94.3F on 7/20 am requiring warming protocol for a few hours, now temperature has been stable  - ddx: adrenal insufficiency vs severe sepsis vs autonomic dysfunction 2/2 underlying neurologic issues  PLAN  - with an    Hypotension  - Management per primary team.    HLD  -On atorvastatin 10mg daily at home  PLAN  -Would likely benefit from a statin if no contraindication  -Can check lipid profile if not done recently  -Management per primary team    Discussed with primary team.    Delta Chavarria MD, Endocrinology Fellow  Pager [phone] from 9am to 5pm. After hours and on weekends, please call [phone].   61M w/ hx of chronic lacunar infarcts, cerebellar ataxia with rapid neurocognitive decline (baseline AOx1-2, minimally conversant) recent prolonged admission including MICU stay/ intubation from 4/18-5/13 for AMS and airway protection, recent admission for UTI treated with 5 day course of CTX, brought in by wife for decreased PO intake and lethargy for several days with "white and thick" and foul smelling urine since 7/8. Endocrinology was consulted for concern for adrenal insufficiency.    Concern for adrenal insufficiency  - the patient presented with UTI and 8.1mm right proximal ureter calculus with hydronephrosis s/p bilateral ureteral stent placement with ccb hypotension, hypothermia, bradycardia  - patient has been refractory to IVF therapy requiring phenylephrine gtt and MICU for septic shcok from UTI, off of pressors since 7/14  - on 7/14 at 6:37am 100mg IV hydrocortisone was given followed by 50mg q8h until 7/20 at which point it was changed to fludricortisone 0.1mg bid, one dose of prednisone 7.5mg on 7/22  - patient also started on midodrine 5mg tid on 7/22  - 6/17 8:45am cortisol 15.6, 7/14 6AM cortisol 7, 7/14 TSH 0.6, 7/22 TSH 2.94  - BGs in 90s-100s  - SBPs 90s-low 100s, persistently bradycardic  - briefly hypothermic to 94.3F on 7/20 am requiring warming protocol for a few hours, now temperature has been stable  - no history of steroid use, immune checkpoint inhibitor use, ketoconazole  - ddx: adrenal insufficiency vs severe sepsis vs autonomic dysfunction 2/2 underlying neurologic issues  PLAN  - recommend d/c fludricortisone, start hydrocortisone 25mg PO q12h starting tonight, last dose 7/23 am (2 doses total), the patient should not get any hydrocortisone the night of 7/23  - on 7/24am, recommend cosyntropin test: 8am baseline ACTH and cortisol level, then administer 250mcg cosyntropin, measure cortisol at 30min and 60min after giving cosyntropin  - if cortisol level after stimulation is >14.5, adrenal insufficiency is unlikely and if >18, it is definitively ruled out    Hypotension  - Management per primary team.    HLD  -On atorvastatin 10mg daily at home  PLAN  -Would likely benefit from a statin if no contraindication  -Can check lipid profile if not done recently  -Management per primary team    Discussed with primary team.    Delta Chavarria MD, Endocrinology Fellow  Pager [phone] from 9am to 5pm. After hours and on weekends, please call [phone].

## 2022-07-22 NOTE — CONSULT NOTE ADULT - SUBJECTIVE AND OBJECTIVE BOX
ENDOCRINE INITIAL CONSULT - concern for adrenal insufficiency    HPI:  61M w/ hx of cerebellar ataxia with rapid neurocognitive decline (baseline AOx1-2, minimally conversant) recent prolonged admission including MICU stay/ intubation from 4/18-5/13 for AMS and airway protection, recent admission for UTI treated with 5 day course of CTX, brought in by wife for decreased PO intake and lethargy for several days with "white and thick" and foul smelling urine since 7/8. Hx obtained from wife due to patient's encephalopathy. PCP recently prescribed cipro 500 mg BID x 3 days (pt took 2 days) and wife thinks urine has improved since then. However, he has been eating less since and has been lethargic with minimal responsiveness which prompted the ED evaluation. ROS limited due to patient's mental status. Wife has noted pt has been constipated and gave him a suppository and he subsequently had one bowel movement. She noted a few red specks but no overt bleeding or black stool.  (13 Jul 2022 13:02)      ENDOCRINE HISTORY     PAST MEDICAL & SURGICAL HISTORY:  H/O cerebellar ataxia      No significant past surgical history          FAMILY HISTORY:  FH: dementia (Mother)    FH: type 2 diabetes (Mother)    Family history of CVA (Father)        Social History:  Lives with wife. Has 3 children. No smoking, drinking, drug hx. (13 Jul 2022 13:02)      Home Medications:  aspirin 81 mg oral delayed release tablet: 1 tab(s) orally once a day (13 Jul 2022 12:43)  atorvastatin 10 mg oral tablet: 1 tab(s) orally once a day (13 Jul 2022 12:43)  Multiple Vitamins oral tablet: 1 tab(s) orally once a day (13 Jul 2022 12:43)      MEDICATIONS  (STANDING):  ascorbic acid 500 milliGRAM(s) Oral daily  ciprofloxacin     Tablet 500 milliGRAM(s) Oral every 12 hours  enoxaparin Injectable 40 milliGRAM(s) SubCutaneous every 24 hours  fludroCORTISONE 0.1 milliGRAM(s) Oral two times a day  insulin lispro (ADMELOG) corrective regimen sliding scale   SubCutaneous three times a day with meals  insulin lispro (ADMELOG) corrective regimen sliding scale   SubCutaneous at bedtime  midodrine. 5 milliGRAM(s) Oral three times a day  multivitamin 1 Tablet(s) Oral daily  polyethylene glycol 3350 17 Gram(s) Oral daily  potassium chloride    Tablet ER 20 milliEquivalent(s) Oral daily  senna 2 Tablet(s) Oral at bedtime  thiamine 100 milliGRAM(s) Oral daily    MEDICATIONS  (PRN):      Allergies    No Known Allergies    Intolerances        REVIEW OF SYSTEMS  Constitutional: No fever  Eyes: No blurry vision  Neuro: No tremors  HEENT: No pain  Cardiovascular: No chest pain, palpitations  Respiratory: No SOB, no cough  GI: No nausea, vomiting, abdominal pain  : No dysuria  Skin: no rash  Psych: no depression  Endocrine: no polyuria, polydipsia  Hem/lymph: no swelling  Osteoporosis: no fractures  ALL OTHER SYSTEMS REVIEWED AND NEGATIVE     UNABLE TO OBTAIN     PHYSICAL EXAM   Vital Signs Last 24 Hrs  T(C): 37 (22 Jul 2022 10:00), Max: 37 (22 Jul 2022 10:00)  T(F): 98.6 (22 Jul 2022 10:00), Max: 98.6 (22 Jul 2022 10:00)  HR: 68 (22 Jul 2022 10:00) (45 - 68)  BP: 89/59 (22 Jul 2022 10:00) (89/59 - 128/82)  BP(mean): --  RR: 20 (22 Jul 2022 10:00) (18 - 20)  SpO2: 100% (22 Jul 2022 10:00) (98% - 100%)    Parameters below as of 22 Jul 2022 10:00  Patient On (Oxygen Delivery Method): room air      GENERAL: NAD, well-groomed, well-developed  EYES: No proptosis, no lid lag, anicteric  HEENT:  Atraumatic, Normocephalic, moist mucous membranes  THYROID: Normal size, no palpable nodules  RESPIRATORY: Clear to auscultation bilaterally; No rales, rhonchi, wheezing  CARDIOVASCULAR: Regular rate and rhythm; No murmurs; no peripheral edema  GI: Soft, nontender, non distended, normal bowel sounds  SKIN: Dry, intact, No rashes or lesions  MUSCULOSKELETAL: Full range of motion, normal strength  NEURO: sensation intact, extraocular movements intact, no tremor  PSYCH: Alert and oriented x 3, normal affect, normal mood  CUSHING'S SIGNS: no striae    CAPILLARY BLOOD GLUCOSE      POCT Blood Glucose.: 101 mg/dL (22 Jul 2022 11:48)  POCT Blood Glucose.: 93 mg/dL (22 Jul 2022 08:06)  POCT Blood Glucose.: 114 mg/dL (21 Jul 2022 21:15)  POCT Blood Glucose.: 111 mg/dL (21 Jul 2022 16:48)      A1C with Estimated Average Glucose Result: 5.6 % (07-17-22 @ 09:25)  A1C with Estimated Average Glucose Result: 5.5 % (04-18-22 @ 09:56)                            11.6   9.00  )-----------( 201      ( 21 Jul 2022 06:45 )             36.1       07-21    143  |  109<H>  |  20  ----------------------------<  94  4.0   |  26  |  0.64    Ca    9.1      21 Jul 2022 06:44        Thyroid Stimulating Hormone, Serum: 2.94 uIU/mL (07-22-22 @ 07:17)  Thyroid Stimulating Hormone, Serum: 0.60 uIU/mL (07-14-22 @ 06:04)      LIPIDS    RADIOLOGY ENDOCRINE INITIAL CONSULT - concern for adrenal insufficiency    HPI:  61M w/ hx of cerebellar ataxia with rapid neurocognitive decline (baseline AOx1-2, minimally conversant) recent prolonged admission including MICU stay/ intubation from 4/18-5/13 for AMS and airway protection, recent admission for UTI treated with 5 day course of CTX, brought in by wife for decreased PO intake and lethargy for several days with "white and thick" and foul smelling urine since 7/8. Hx obtained from wife due to patient's encephalopathy. PCP recently prescribed cipro 500 mg BID x 3 days (pt took 2 days) and wife thinks urine has improved since then. However, he has been eating less since and has been lethargic with minimal responsiveness which prompted the ED evaluation. ROS limited due to patient's mental status. Wife has noted pt has been constipated and gave him a suppository and he subsequently had one bowel movement. She noted a few red specks but no overt bleeding or black stool.  (13 Jul 2022 13:02)      ENDOCRINE HISTORY   At bedside, the patient is able to answer questions with some detail. He reports that he has never had an issue with any steroid hormone in his body. He denies ever taking steroids in the past without any history of chemotherapy, radiation therapy or immunotherapy. He lives with his wife who gives him his medication and takes care of him. He lives in [place] and does not have an Endocrinologist. He currently reports feeling occasionally tired, cold and having polyuria. He previously had dysuria which resolved. He denies pain, headache, lightheadedness, dizziness, nausea, vomiting, abdominal pain, chest pain, diarrhea, constipation, fevers, chills, swelling, muscle weakness, polydipsia or skin changes. He reports his appetite is good.    Social history: Denies alcohol use, tobacco use or recreational drug use  Family history: Denies family history of adrenal disease, thyroid disease, diabetes or autoimmune diseases    PAST MEDICAL & SURGICAL HISTORY:  H/O cerebellar ataxia      No significant past surgical history          FAMILY HISTORY:  FH: dementia (Mother)    FH: type 2 diabetes (Mother)    Family history of CVA (Father)        Social History:  Lives with wife. Has 3 children. No smoking, drinking, drug hx. (13 Jul 2022 13:02)      Home Medications:  aspirin 81 mg oral delayed release tablet: 1 tab(s) orally once a day (13 Jul 2022 12:43)  atorvastatin 10 mg oral tablet: 1 tab(s) orally once a day (13 Jul 2022 12:43)  Multiple Vitamins oral tablet: 1 tab(s) orally once a day (13 Jul 2022 12:43)      MEDICATIONS  (STANDING):  ascorbic acid 500 milliGRAM(s) Oral daily  ciprofloxacin     Tablet 500 milliGRAM(s) Oral every 12 hours  enoxaparin Injectable 40 milliGRAM(s) SubCutaneous every 24 hours  fludroCORTISONE 0.1 milliGRAM(s) Oral two times a day  insulin lispro (ADMELOG) corrective regimen sliding scale   SubCutaneous three times a day with meals  insulin lispro (ADMELOG) corrective regimen sliding scale   SubCutaneous at bedtime  midodrine. 5 milliGRAM(s) Oral three times a day  multivitamin 1 Tablet(s) Oral daily  polyethylene glycol 3350 17 Gram(s) Oral daily  potassium chloride    Tablet ER 20 milliEquivalent(s) Oral daily  senna 2 Tablet(s) Oral at bedtime  thiamine 100 milliGRAM(s) Oral daily    MEDICATIONS  (PRN):      Allergies    No Known Allergies    Intolerances        REVIEW OF SYSTEMS  Constitutional: No fever  Eyes: No blurry vision  Neuro: No tremors  HEENT: No pain  Cardiovascular: No chest pain, palpitations  Respiratory: No SOB, no cough  GI: No nausea, vomiting, abdominal pain  : No dysuria  Skin: no rash  Psych: no depression  Endocrine: no polyuria, polydipsia  Hem/lymph: no swelling  Osteoporosis: no fractures  ALL OTHER SYSTEMS REVIEWED AND NEGATIVE     UNABLE TO OBTAIN     PHYSICAL EXAM   Vital Signs Last 24 Hrs  T(C): 37 (22 Jul 2022 10:00), Max: 37 (22 Jul 2022 10:00)  T(F): 98.6 (22 Jul 2022 10:00), Max: 98.6 (22 Jul 2022 10:00)  HR: 68 (22 Jul 2022 10:00) (45 - 68)  BP: 89/59 (22 Jul 2022 10:00) (89/59 - 128/82)  BP(mean): --  RR: 20 (22 Jul 2022 10:00) (18 - 20)  SpO2: 100% (22 Jul 2022 10:00) (98% - 100%)    Parameters below as of 22 Jul 2022 10:00  Patient On (Oxygen Delivery Method): room air      GENERAL: NAD, well-groomed, sitting up in bed  EYES: No proptosis, no lid lag, anicteric  HEENT:  Atraumatic, Normocephalic, dry mucous membranes  THYROID: Normal size, no palpable nodules  RESPIRATORY: Clear to auscultation bilaterally; No rales, rhonchi, wheezing  CARDIOVASCULAR: Regular rate and rhythm; No murmurs; no peripheral edema  GI: Soft, nontender, non distended, normal bowel sounds  SKIN: Dry, intact, No rashes or lesions  MUSCULOSKELETAL: Full range of motion, able to lift both legs off the ground and both arms into the air  NEURO: sensation intact, extraocular movements intact, no tremor  PSYCH: Alert and oriented x 3 (knew he was in the hospital, July, 2022), normal affect, normal mood  CUSHING'S SIGNS: no striae, no dorsocervical fatpad    CAPILLARY BLOOD GLUCOSE      POCT Blood Glucose.: 101 mg/dL (22 Jul 2022 11:48)  POCT Blood Glucose.: 93 mg/dL (22 Jul 2022 08:06)  POCT Blood Glucose.: 114 mg/dL (21 Jul 2022 21:15)  POCT Blood Glucose.: 111 mg/dL (21 Jul 2022 16:48)      A1C with Estimated Average Glucose Result: 5.6 % (07-17-22 @ 09:25)  A1C with Estimated Average Glucose Result: 5.5 % (04-18-22 @ 09:56)                            11.6   9.00  )-----------( 201      ( 21 Jul 2022 06:45 )             36.1       07-21    143  |  109<H>  |  20  ----------------------------<  94  4.0   |  26  |  0.64    Ca    9.1      21 Jul 2022 06:44        Thyroid Stimulating Hormone, Serum: 2.94 uIU/mL (07-22-22 @ 07:17)  Thyroid Stimulating Hormone, Serum: 0.60 uIU/mL (07-14-22 @ 06:04)      LIPIDS    RADIOLOGY

## 2022-07-23 LAB
ANION GAP SERPL CALC-SCNC: 8 MMOL/L — SIGNIFICANT CHANGE UP (ref 5–17)
BUN SERPL-MCNC: 17 MG/DL — SIGNIFICANT CHANGE UP (ref 7–23)
CALCIUM SERPL-MCNC: 9.4 MG/DL — SIGNIFICANT CHANGE UP (ref 8.4–10.5)
CHLORIDE SERPL-SCNC: 106 MMOL/L — SIGNIFICANT CHANGE UP (ref 96–108)
CO2 SERPL-SCNC: 28 MMOL/L — SIGNIFICANT CHANGE UP (ref 22–31)
CORTIS AM PEAK SERPL-MCNC: 11.1 UG/DL — SIGNIFICANT CHANGE UP (ref 6–18.4)
CREAT SERPL-MCNC: 0.67 MG/DL — SIGNIFICANT CHANGE UP (ref 0.5–1.3)
CULTURE RESULTS: SIGNIFICANT CHANGE UP
CULTURE RESULTS: SIGNIFICANT CHANGE UP
EGFR: 107 ML/MIN/1.73M2 — SIGNIFICANT CHANGE UP
GLUCOSE BLDC GLUCOMTR-MCNC: 108 MG/DL — HIGH (ref 70–99)
GLUCOSE BLDC GLUCOMTR-MCNC: 108 MG/DL — HIGH (ref 70–99)
GLUCOSE BLDC GLUCOMTR-MCNC: 110 MG/DL — HIGH (ref 70–99)
GLUCOSE BLDC GLUCOMTR-MCNC: 119 MG/DL — HIGH (ref 70–99)
GLUCOSE BLDC GLUCOMTR-MCNC: 97 MG/DL — SIGNIFICANT CHANGE UP (ref 70–99)
GLUCOSE SERPL-MCNC: 115 MG/DL — HIGH (ref 70–99)
MAGNESIUM SERPL-MCNC: 1.9 MG/DL — SIGNIFICANT CHANGE UP (ref 1.6–2.6)
POTASSIUM SERPL-MCNC: 3.8 MMOL/L — SIGNIFICANT CHANGE UP (ref 3.5–5.3)
POTASSIUM SERPL-SCNC: 3.8 MMOL/L — SIGNIFICANT CHANGE UP (ref 3.5–5.3)
SODIUM SERPL-SCNC: 142 MMOL/L — SIGNIFICANT CHANGE UP (ref 135–145)
SPECIMEN SOURCE: SIGNIFICANT CHANGE UP
SPECIMEN SOURCE: SIGNIFICANT CHANGE UP

## 2022-07-23 RX ORDER — SODIUM CHLORIDE 9 MG/ML
500 INJECTION, SOLUTION INTRAVENOUS ONCE
Refills: 0 | Status: DISCONTINUED | OUTPATIENT
Start: 2022-07-23 | End: 2022-07-25

## 2022-07-23 RX ORDER — POTASSIUM CHLORIDE 20 MEQ
20 PACKET (EA) ORAL ONCE
Refills: 0 | Status: COMPLETED | OUTPATIENT
Start: 2022-07-23 | End: 2022-07-23

## 2022-07-23 RX ORDER — SODIUM CHLORIDE 9 MG/ML
1000 INJECTION, SOLUTION INTRAVENOUS ONCE
Refills: 0 | Status: COMPLETED | OUTPATIENT
Start: 2022-07-23 | End: 2022-07-23

## 2022-07-23 RX ADMIN — Medication 20 MILLIEQUIVALENT(S): at 12:28

## 2022-07-23 RX ADMIN — Medication 500 MILLIGRAM(S): at 05:48

## 2022-07-23 RX ADMIN — Medication 25 MILLIGRAM(S): at 08:48

## 2022-07-23 RX ADMIN — Medication 100 MILLIGRAM(S): at 12:28

## 2022-07-23 RX ADMIN — Medication 1 TABLET(S): at 12:28

## 2022-07-23 RX ADMIN — SENNA PLUS 2 TABLET(S): 8.6 TABLET ORAL at 21:21

## 2022-07-23 RX ADMIN — MIDODRINE HYDROCHLORIDE 5 MILLIGRAM(S): 2.5 TABLET ORAL at 18:31

## 2022-07-23 RX ADMIN — Medication 500 MILLIGRAM(S): at 12:28

## 2022-07-23 RX ADMIN — Medication 500 MILLIGRAM(S): at 18:31

## 2022-07-23 RX ADMIN — ENOXAPARIN SODIUM 40 MILLIGRAM(S): 100 INJECTION SUBCUTANEOUS at 12:28

## 2022-07-23 RX ADMIN — Medication 20 MILLIEQUIVALENT(S): at 18:33

## 2022-07-23 RX ADMIN — MIDODRINE HYDROCHLORIDE 5 MILLIGRAM(S): 2.5 TABLET ORAL at 12:28

## 2022-07-23 RX ADMIN — MIDODRINE HYDROCHLORIDE 5 MILLIGRAM(S): 2.5 TABLET ORAL at 05:48

## 2022-07-23 RX ADMIN — POLYETHYLENE GLYCOL 3350 17 GRAM(S): 17 POWDER, FOR SOLUTION ORAL at 12:28

## 2022-07-23 RX ADMIN — SODIUM CHLORIDE 1000 MILLILITER(S): 9 INJECTION, SOLUTION INTRAVENOUS at 00:20

## 2022-07-23 NOTE — PROVIDER CONTACT NOTE (MEDICATION) - SITUATION
Patient is hypotensive and hypothermic. BP: 89/60, temp: 94.0 axillary. Warming blanket placed on patient.

## 2022-07-23 NOTE — RAPID RESPONSE TEAM SUMMARY - NSSITUATIONBACKGROUNDRRT_GEN_ALL_CORE
61 year old man with cerebellar ataxia with neurocognitive decline, chronic lacuna infarcts, recent hospitalizations, concern for relative adrenal insufficiency, currently being treated for pseudomonal UTI. RRT called for hypotension with SBP in 60s. Patient mentating well. Had been having episodes of hypotension and bradycardia and previously was on midodrine and florinef and hydrocortisone now discontinued in setting of plan for evaluation with cosyntropin test in AM. Patient awake with no acute complaints, oriented. HR 60s. Afebrile.    -  with improvement in SBP to 80s.  - Septic work up given hypotension and no recent labs including repeat BCx, UA, UCx, CXR  - Repeat  and follow up BP. Monitor mental status. Can consider repeat steroid dose if resistant but would attempt to avoid given plan for evaluation of adrenal insufficiency in AM. Avoid midodrine in the setting of bradycardia.

## 2022-07-23 NOTE — CHART NOTE - NSCHARTNOTEFT_GEN_A_CORE
EVENT Sinus Philip 30's during daytime sleep      Chart and meds reviewed.  Patient seen, spouse at bedside    HPI: 61M w/ hx of cerebellar ataxia with rapid neurocognitive decline (baseline AOx1-2, minimally conversant) recent prolonged admission including MICU stay/ intubation from 4/18-5/13 for AMS and airway protection, recent admission for UTI treated with 5 day course of CTX, brought in by wife for decreased PO intake and lethargy for several days with "white and thick" and foul smelling urine since 7/8. Hx obtained from wife due to patient's encephalopathy. PCP recently prescribed cipro 500 mg BID x 3 days (pt took 2 days) and wife thinks urine has improved since then. However, he has been eating less since and has been lethargic with minimal responsiveness which prompted the ED evaluation. ROS limited due to patient's mental status. Wife has noted pt has been constipated and gave him a suppository and he subsequently had one bowel movement. She noted a few red specks but no overt bleeding or black stool.  (13 Jul 2022 13:02)      ROS: denies CP/palpitation/SOB/HA/dizziness/abd pain/n/v/d/f/c      MEDICATIONS  (STANDING):  ascorbic acid 500 milliGRAM(s) Oral daily  ciprofloxacin     Tablet 500 milliGRAM(s) Oral every 12 hours  enoxaparin Injectable 40 milliGRAM(s) SubCutaneous every 24 hours  insulin lispro (ADMELOG) corrective regimen sliding scale   SubCutaneous three times a day with meals  insulin lispro (ADMELOG) corrective regimen sliding scale   SubCutaneous at bedtime  midodrine. 5 milliGRAM(s) Oral three times a day  multivitamin 1 Tablet(s) Oral daily  polyethylene glycol 3350 17 Gram(s) Oral daily  potassium chloride    Tablet ER 20 milliEquivalent(s) Oral daily  senna 2 Tablet(s) Oral at bedtime  thiamine 100 milliGRAM(s) Oral daily    MEDICATIONS  (PRN):      VITALS:  Vital Signs Last 24 Hrs  T(C): 36.8 (23 Jul 2022 01:30), Max: 43 (22 Jul 2022 21:20)  T(F): 98.2 (23 Jul 2022 01:30), Max: 109.4 (22 Jul 2022 21:20)  HR: 44 (23 Jul 2022 12:40) (44 - 54)  BP: 126/77 (23 Jul 2022 12:40) (60/39 - 126/77)  BP(mean): --  RR: 18 (23 Jul 2022 01:30) (16 - 18)  SpO2: 97% (23 Jul 2022 01:30) (97% - 99%)    Parameters below as of 23 Jul 2022 01:30  Patient On (Oxygen Delivery Method): room air      BRIEF FOCUS PHYSICAL EXAM:    GENERAL: frail appearing, NAD  CV; SB rhythm regular  Resp: even and unlabored breathing at rest    MOST RECENT LABS    Basic Metabolic Panel in AM (07.21.22 @ 06:44)      Sodium, Serum: 143 mmol/L    Potassium, Serum: 4.0 mmol/L    Chloride, Serum: 109 mmol/L    Carbon Dioxide, Serum: 26 mmol/L    Anion Gap, Serum: 8 mmol/L    Blood Urea Nitrogen, Serum: 20 mg/dL    Creatinine, Serum: 0.64 mg/dL    Glucose, Serum: 94 mg/dL    Calcium, Total Serum: 9.1 mg/dL    eGFR: 108: The estimated glomerular filtration rate (eGFR) is calculated using the  2021 CKD-EPI creatinine equation, which does not have a coefficient for  race and is validated in individuals 18 years of age and older (N Engl J  Med 2021; 385:5585-9514). Creatinine-based eGFR may be inaccurate in  various situations including but not limited to extremes of muscle mass,  altered dietary protein intake, or medications that affect renal tubular  creatinine secretion. mL/min/1.73m2    Magnesium, Serum: 1.5 mg/dL (07.19.22 @ 07:25)      A/P  61 year old Man with hx of cerebellar ataxia and as in HPI, admitted for AMS having bradycardia with sustained rate of 32-36 during daytime sleep (when awaken HR goes up to 40's - 50's)  - recent labs to assess mg / k: stat BMP / mg ordered: covering RN for primary RN made aware to collect now  - Dr Juarez made aware  - monitor for now  - daily electrolyte monitoring

## 2022-07-23 NOTE — CHART NOTE - NSCHARTNOTEFT_GEN_A_CORE
62 yo M with a PMH of cerebellar ataxia recent prolonged admission including MICU stay/ intubation from 4/18-5/13 for AMS and airway protection recent admission 06/15-06/22/22 for UTI  (tx with CTX) p/w decreased PO intake, lethargy x several days. Wife reports she noticed his urine was "white and thick" and was malodorous as well. PMD prescribed pt Cipro 500 BID x 3 days, has taken 2/3 days      Dx UTI- s/p zosyn, 7/14-7/18, switched to cipro(7/19)  500 bid day 10        Hydronephrosis-right with stone- s/p b/l stent placement         Bradycardia likely due to adrenal insufficiency - was on IV hydrocortisone now PO taper    Overnight events  on 7/22 late evening 9p.m Patient became hypothermic again Oral temp 94  lloyd hugger applied repeat rectal temp improved 97.8. Still with sinus bradycardia HR 55 bpm    Patient seen and examined at bedside  At Baseline AAO x2. He is asymptomatic denies dizziness, rigors, SOB.  Pt also hypotensive  Cuff B/P 60/39  Manual B/P rechecked in both arms correlating 77/47.     Patient with poor po intake cxray clear, previous recent TTE in Apil 2022 had normal LVEF 65-70%,normal RV fcn  Clinically pt looked dehydrated  1Liter LR bolus given     Repeat serial B/P improved overnight 100/63 systolics  and this morning 110/63  Patient was started on midodrine 5mg tid by the day team yesterday.     Patient does not look septic, his blood Cx on 7/17 and 7/18 have been NTD he is being treated for Pseudomonas UTI completed zosyn x 5 days switched to oral cipro total treatment Day #10 Abx.     Patient with history of recurrent hypothermia and bradycardia he is being followed by endocrine  his IV solumedrol was tapered off yesterday given solucortef 25 mg po last night and another 25mg this morning ordered. He will have a cosyntropin test ordered for 7/24  a.m  pending cortisol ACTH levels post study to rule out adrenal insufficency.  Pt currently asymptomatic/ VSS.  Will endorse to day team for close follow up

## 2022-07-23 NOTE — PROGRESS NOTE ADULT - ASSESSMENT
61M w/ hx of chronic lacunar infarcts, cerebellar ataxia with rapid neurocognitive decline (baseline AOx1-2, minimally conversant) recent prolonged admission including MICU stay/ intubation from 4/18-5/13 for AMS and airway protection, recent admission for UTI treated with 5 day course of CTX, brought in by wife for decreased PO intake and lethargy for several days with "white and thick" and foul smelling urine since 7/8. Endocrinology was consulted for concern for adrenal insufficiency.    Concern for adrenal insufficiency  - the patient presented with UTI and 8.1mm right proximal ureter calculus with hydronephrosis s/p bilateral ureteral stent placement with ccb hypotension, hypothermia, bradycardia  - patient has been refractory to IVF therapy requiring phenylephrine gtt and MICU for septic shcok from UTI, off of pressors since 7/14  - on 7/14 at 6:37am 100mg IV hydrocortisone was given followed by 50mg q8h until 7/20 at which point it was changed to fludricortisone 0.1mg bid, one dose of prednisone 7.5mg on 7/22  - patient also started on midodrine 5mg tid on 7/22  - 6/17 8:45am cortisol 15.6, 7/14 6AM cortisol 7, 7/14 TSH 0.6, 7/22 TSH 2.94  - BGs in 90s-100s  - SBPs 90s-low 100s, persistently bradycardic  - briefly hypothermic to 94.3F on 7/20 am requiring warming protocol for a few hours. Since, temperature had been stable, until 7/22 late evening patient became hypothermic again with oral temp 94; lloyd hugger applied, repeat rectal temp improved 97.8.  - no history of steroid use, immune checkpoint inhibitor use, ketoconazole  - ddx: adrenal insufficiency vs severe sepsis vs autonomic dysfunction 2/2 underlying neurologic issues  PLAN  - Off fludricortisone, received hydrocortisone 25mg PO q12h starting last night, last dose 7/23 am (2 doses total), the patient should not get any hydrocortisone the night of 7/23  - on 7/24am, recommend cosyntropin test: 8am baseline ACTH and cortisol level, then administer 250mcg cosyntropin, measure cortisol at 30min and 60min after giving cosyntropin  - if cortisol level after stimulation is >14.5, adrenal insufficiency is unlikely and if >18, it is ruled out    Hypotension  - Management per primary team.    HLD  -On atorvastatin 10mg daily at home    Dany Xie DO, Endocrinology Fellow  Pager [phone] from 9am to 5pm. After hours and on weekends, please call [phone].

## 2022-07-23 NOTE — PROGRESS NOTE ADULT - ASSESSMENT
62 yo male      PMHx of cerebellar ataxia    prior  visit  to  ED on 4/17/22 due to AMS ,  was  intubated for depressed consciousness w/ cognitive decline.  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    infectious  and  malignancy  was  ruled  outt/  and  per   neuro  note ,no further workup     flow cytometry 4/28 showing nonspecific results 'degenerated sample, small lymphocytes'   nsgy consulted for meningeal bx, family refusing    CSF cytopathology 4/22 - increased number of large mononuclear cells in a background of few small lymphocytes, monocytes and neutrophils, cannot exclude a lymphoproliferative disorder versus an inflammatory process.    CSF cytopathology 4/29 - significant increased number of small lymphocytes with rare monocytes/ seen by  oncology  dr de leon,  no  intervention      now  admitted    c/o  ams, as  before   h/o  cerebellar  ataxia,  with  no defined  cause  found   pt  with  bradycardia, ?  central, seen  by  card/  echo  on 4/2022,  normal  ef   now  with  hypothermia,   as  before  pt  has  h/o intermittent   hypothermia,  not related  to  any   infectious   process.  rather  , it  seems to be  dysautonomia/  with  h/o normal  cortisol level  prior  CT  c/ap,  no  malignant  process   most  of  these  issues, do  not  have   a good  rx  on feeds  per  swallow  eval   arousable, not communicative/   which is  about his  base line  with few  periods  of  alertness    bed bound   status  and  altered  baseline  mental  status  on  cipro, uti  on midodrine  on    hydrocortisone   seen by endo, w/p  pe r  endo   awaiting actch  test  per  endo/.  had  recd  steroid  on 7/14  and  7/ 22/ periods  of  low  sbp./  follow bladder  scan

## 2022-07-23 NOTE — PROVIDER CONTACT NOTE (MEDICATION) - ASSESSMENT
BP: 89/60, manual BP: 88/56, Temp: 94.0, HR: 50, RR: 16, O2: 99%. No c/o chest pain or lightheadedness. Patient reports being cold.

## 2022-07-23 NOTE — PROGRESS NOTE ADULT - SUBJECTIVE AND OBJECTIVE BOX
[facility]  REVIEW OF SYSTEMS:  GEN: no fever,    no chills  RESP: no SOB,   no cough  CVS: no chest pain,   no palpitations  GI: no abdominal pain,   no nausea,   no vomiting,   no constipation,   no diarrhea  : no dysuria,   no frequency  NEURO: no headache,   no dizziness  PSYCH: no depression,   not anxious  Derm : no rash    MEDICATIONS  (STANDING):  ascorbic acid 500 milliGRAM(s) Oral daily  ciprofloxacin     Tablet 500 milliGRAM(s) Oral every 12 hours  enoxaparin Injectable 40 milliGRAM(s) SubCutaneous every 24 hours  hydrocortisone 25 milliGRAM(s) Oral once  insulin lispro (ADMELOG) corrective regimen sliding scale   SubCutaneous three times a day with meals  insulin lispro (ADMELOG) corrective regimen sliding scale   SubCutaneous at bedtime  midodrine. 5 milliGRAM(s) Oral three times a day  multivitamin 1 Tablet(s) Oral daily  polyethylene glycol 3350 17 Gram(s) Oral daily  potassium chloride    Tablet ER 20 milliEquivalent(s) Oral daily  senna 2 Tablet(s) Oral at bedtime  thiamine 100 milliGRAM(s) Oral daily    MEDICATIONS  (PRN):      Vital Signs Last 24 Hrs  T(C): 36.8 (23 Jul 2022 01:30), Max: 43 (22 Jul 2022 21:20)  T(F): 98.2 (23 Jul 2022 01:30), Max: 109.4 (22 Jul 2022 21:20)  HR: 53 (23 Jul 2022 01:30) (48 - 68)  BP: 100/63 (23 Jul 2022 01:30) (60/39 - 113/68)  BP(mean): --  RR: 18 (23 Jul 2022 01:30) (16 - 20)  SpO2: 97% (23 Jul 2022 01:30) (97% - 100%)    Parameters below as of 23 Jul 2022 01:30  Patient On (Oxygen Delivery Method): room air      CAPILLARY BLOOD GLUCOSE      POCT Blood Glucose.: 125 mg/dL (22 Jul 2022 22:11)  POCT Blood Glucose.: 104 mg/dL (22 Jul 2022 16:55)  POCT Blood Glucose.: 101 mg/dL (22 Jul 2022 11:48)  POCT Blood Glucose.: 93 mg/dL (22 Jul 2022 08:06)    I&O's Summary    22 Jul 2022 07:01  -  23 Jul 2022 07:00  --------------------------------------------------------  IN: 390 mL / OUT: 400 mL / NET: -10 mL        PHYSICAL EXAM:  HEAD:  Atraumatic, Normocephalic  NECK: Supple, No   JVD  CHEST/LUNG:   no     rales,     no,    rhonchi  HEART: Regular rate and rhythm;         murmur  ABDOMEN: Soft, Nontender, ;   EXTREMITIES:    no    edema  NEUROLOGY:  alert    LABS:                          Thyroid Stimulating Hormone, Serum: 2.94 uIU/mL (07-22 @ 07:17)          Consultant(s) Notes Reviewed:      Care Discussed with Consultants/Other Providers:

## 2022-07-23 NOTE — PROVIDER CONTACT NOTE (CHANGE IN STATUS NOTIFICATION) - ASSESSMENT
BP: 72/46, Manual BP: 74/42, HR: 54, Temp: 98.2, O2: 97% on RA, RR: 18. Patient is asymptomatic. Denies chest pain, lightheadedness. No AMS noted.

## 2022-07-23 NOTE — CHART NOTE - NSCHARTNOTEFT_GEN_A_CORE
Medicine PA Note     60 yo M with a PMH of cerebellar ataxia recent prolonged admission including MICU stay/ intubation from 4/18-5/13 for AMS and airway protection recent admission 06/15-06/22/22 for UTI  (tx with CTX) p/w decreased PO intake, lethargy x several days.     RRT called for hypotension. Pt was found to have a BP:~60/40 at bedside. Pt is bradycardic, asymptomatic, mentating well.   1L of LR ordered   Repeat BP after 400cc administered, 86/40  MAR ended RRT after repeat BP as patient is responding to fluid resuscitation.   Will repeat BP after 1L of LR has been completed and place patient on maintenance fluids overnight.   Pt is suspected to have adrenal insufficiency as he is bradycardic and hypotensive, was scheduled to have ACTH stim test in the AM, however is patient's BP cannot maintain above >80 systolic, per MAR, give steroids and postpone the ACTH stim test  Continue to monitor patient's vitals closely overnight   Endorse/sign out to day team on overnight event   RN aware of management     Dione Gutierrez PA-C  Dept of Medicine  [phone]

## 2022-07-23 NOTE — PROGRESS NOTE ADULT - SUBJECTIVE AND OBJECTIVE BOX
CARDIOLOGY     PROGRESS  NOTE   ________________________________________________    CHIEF COMPLAINT:Patient is a 61y old  Male who presents with a chief complaint of UTI (23 Jul 2022 07:47)  no complain  	  REVIEW OF SYSTEMS:  CONSTITUTIONAL: No fever, weight loss, or fatigue  EYES: No eye pain, visual disturbances, or discharge  ENT:  No difficulty hearing, tinnitus, vertigo; No sinus or throat pain  NECK: No pain or stiffness  RESPIRATORY: No cough, wheezing, chills or hemoptysis; No Shortness of Breath  CARDIOVASCULAR: No chest pain, palpitations, passing out, dizziness, or leg swelling  GASTROINTESTINAL: No abdominal or epigastric pain. No nausea, vomiting, or hematemesis; No diarrhea or constipation. No melena or hematochezia.  GENITOURINARY: No dysuria, frequency, hematuria, or incontinence  NEUROLOGICAL: No headaches, memory loss, loss of strength, numbness, or tremors  SKIN: No itching, burning, rashes, or lesions   LYMPH Nodes: No enlarged glands  ENDOCRINE: No heat or cold intolerance; No hair loss  MUSCULOSKELETAL: No joint pain or swelling; No muscle, back, or extremity pain  PSYCHIATRIC: No depression, anxiety, mood swings, or difficulty sleeping  HEME/LYMPH: No easy bruising, or bleeding gums  ALLERGY AND IMMUNOLOGIC: No hives or eczema	    [ ] All others negative	  [ ] Unable to obtain    PHYSICAL EXAM:  T(C): 36.8 (07-23-22 @ 01:30), Max: 43 (07-22-22 @ 21:20)  HR: 53 (07-23-22 @ 01:30) (48 - 54)  BP: 100/63 (07-23-22 @ 01:30) (60/39 - 113/68)  RR: 18 (07-23-22 @ 01:30) (16 - 18)  SpO2: 97% (07-23-22 @ 01:30) (97% - 99%)  Wt(kg): --  I&O's Summary    22 Jul 2022 07:01  -  23 Jul 2022 07:00  --------------------------------------------------------  IN: 390 mL / OUT: 400 mL / NET: -10 mL        Appearance: Normal	  HEENT:   Normal oral mucosa, PERRL, EOMI	  Lymphatic: No lymphadenopathy  Cardiovascular: Normal S1 S2, No JVD, + murmurs, No edema  Respiratory: Lungs clear to auscultation	  Psychiatry: A & O x 3, Mood & affect appropriate  Gastrointestinal:  Soft, Non-tender, + BS	  Skin: No rashes, No ecchymoses, No cyanosis	  Neurologic: Non-focal  Extremities: Normal range of motion, No clubbing, cyanosis or edema  Vascular: Peripheral pulses palpable 2+ bilaterally    MEDICATIONS  (STANDING):  ascorbic acid 500 milliGRAM(s) Oral daily  ciprofloxacin     Tablet 500 milliGRAM(s) Oral every 12 hours  enoxaparin Injectable 40 milliGRAM(s) SubCutaneous every 24 hours  insulin lispro (ADMELOG) corrective regimen sliding scale   SubCutaneous three times a day with meals  insulin lispro (ADMELOG) corrective regimen sliding scale   SubCutaneous at bedtime  midodrine. 5 milliGRAM(s) Oral three times a day  multivitamin 1 Tablet(s) Oral daily  polyethylene glycol 3350 17 Gram(s) Oral daily  potassium chloride    Tablet ER 20 milliEquivalent(s) Oral daily  senna 2 Tablet(s) Oral at bedtime  thiamine 100 milliGRAM(s) Oral daily      TELEMETRY: 	    ECG:  	  RADIOLOGY:  OTHER: 	  	  LABS:	 	    CARDIAC MARKERS:                  proBNP:   Lipid Profile:   HgA1c:   TSH: Thyroid Stimulating Hormone, Serum: 2.94 uIU/mL (07-22 @ 07:17)  Thyroid Stimulating Hormone, Serum: 0.60 uIU/mL (07-14 @ 06:04)          Assessment and plan  ---------------------------  61M w/ hx of cerebellar ataxia with rapid neurocognitive decline (baseline AOx1-2, minimally conversant) recent prolonged admission including MICU stay/ intubation from 4/18-5/13 for AMS and airway protection, recent admission for UTI treated with 5 day course of CTX, brought in by wife for decreased PO intake and lethargy for several days with "white and thick" and foul smelling urine since 7/8. Hx obtained from wife due to patient's encephalopathy. PCP recently prescribed cipro 500 mg BID x 3 days (pt took 2 days) and wife thinks urine has improved since then. However, he has been eating less since and has been lethargic with minimal responsiveness which prompted the ED evaluation. ROS limited due to patient's mental status. Wife has noted pt has been constipated and gave him a suppository and he subsequently had one bowel movement. She noted a few red specks but no overt bleeding or black stool.  hypotension ?sec to sepsis/ pt with hx of low bp  bradycardia ,observe  dvt prophylaxis  am cortisol level, started on steroid in MICU  iv hydration  tsh  continue abx  will taper hydrocortisone slowly as per medicine, bp is improving  abx  fu hr and bp closely  replete K , keep k>4  no midodrine sec to bradycardia, will increase florinef to bid if bp is low  taper steroid slowly  increase florinef to bid    	                    CARDIOLOGY     PROGRESS  NOTE   ________________________________________________    CHIEF COMPLAINT:Patient is a 61y old  Male who presents with a chief complaint of UTI (23 Jul 2022 07:47)  no complain  	  REVIEW OF SYSTEMS:  CONSTITUTIONAL: No fever, weight loss, or fatigue  EYES: No eye pain, visual disturbances, or discharge  ENT:  No difficulty hearing, tinnitus, vertigo; No sinus or throat pain  NECK: No pain or stiffness  RESPIRATORY: No cough, wheezing, chills or hemoptysis; No Shortness of Breath  CARDIOVASCULAR: No chest pain, palpitations, passing out, dizziness, or leg swelling  GASTROINTESTINAL: No abdominal or epigastric pain. No nausea, vomiting, or hematemesis; No diarrhea or constipation. No melena or hematochezia.  GENITOURINARY: No dysuria, frequency, hematuria, or incontinence  NEUROLOGICAL: No headaches, memory loss, loss of strength, numbness, or tremors  SKIN: No itching, burning, rashes, or lesions   LYMPH Nodes: No enlarged glands  ENDOCRINE: No heat or cold intolerance; No hair loss  MUSCULOSKELETAL: No joint pain or swelling; No muscle, back, or extremity pain  PSYCHIATRIC: No depression, anxiety, mood swings, or difficulty sleeping  HEME/LYMPH: No easy bruising, or bleeding gums  ALLERGY AND IMMUNOLOGIC: No hives or eczema	    [ ] All others negative	  [ ] Unable to obtain    PHYSICAL EXAM:  T(C): 36.8 (07-23-22 @ 01:30), Max: 43 (07-22-22 @ 21:20)  HR: 53 (07-23-22 @ 01:30) (48 - 54)  BP: 100/63 (07-23-22 @ 01:30) (60/39 - 113/68)  RR: 18 (07-23-22 @ 01:30) (16 - 18)  SpO2: 97% (07-23-22 @ 01:30) (97% - 99%)  Wt(kg): --  I&O's Summary    22 Jul 2022 07:01  -  23 Jul 2022 07:00  --------------------------------------------------------  IN: 390 mL / OUT: 400 mL / NET: -10 mL        Appearance: Normal	  HEENT:   Normal oral mucosa, PERRL, EOMI	  Lymphatic: No lymphadenopathy  Cardiovascular: Normal S1 S2, No JVD, + murmurs, No edema  Respiratory: Lungs clear to auscultation	  Psychiatry: A & O x 3, Mood & affect appropriate  Gastrointestinal:  Soft, Non-tender, + BS	  Skin: No rashes, No ecchymoses, No cyanosis	  Neurologic: Non-focal  Extremities: Normal range of motion, No clubbing, cyanosis or edema  Vascular: Peripheral pulses palpable 2+ bilaterally    MEDICATIONS  (STANDING):  ascorbic acid 500 milliGRAM(s) Oral daily  ciprofloxacin     Tablet 500 milliGRAM(s) Oral every 12 hours  enoxaparin Injectable 40 milliGRAM(s) SubCutaneous every 24 hours  insulin lispro (ADMELOG) corrective regimen sliding scale   SubCutaneous three times a day with meals  insulin lispro (ADMELOG) corrective regimen sliding scale   SubCutaneous at bedtime  midodrine. 5 milliGRAM(s) Oral three times a day  multivitamin 1 Tablet(s) Oral daily  polyethylene glycol 3350 17 Gram(s) Oral daily  potassium chloride    Tablet ER 20 milliEquivalent(s) Oral daily  senna 2 Tablet(s) Oral at bedtime  thiamine 100 milliGRAM(s) Oral daily      TELEMETRY: 	    ECG:  	  RADIOLOGY:  OTHER: 	  	  LABS:	 	    CARDIAC MARKERS:                  proBNP:   Lipid Profile:   HgA1c:   TSH: Thyroid Stimulating Hormone, Serum: 2.94 uIU/mL (07-22 @ 07:17)  Thyroid Stimulating Hormone, Serum: 0.60 uIU/mL (07-14 @ 06:04)          Assessment and plan  ---------------------------  61M w/ hx of cerebellar ataxia with rapid neurocognitive decline (baseline AOx1-2, minimally conversant) recent prolonged admission including MICU stay/ intubation from 4/18-5/13 for AMS and airway protection, recent admission for UTI treated with 5 day course of CTX, brought in by wife for decreased PO intake and lethargy for several days with "white and thick" and foul smelling urine since 7/8. Hx obtained from wife due to patient's encephalopathy. PCP recently prescribed cipro 500 mg BID x 3 days (pt took 2 days) and wife thinks urine has improved since then. However, he has been eating less since and has been lethargic with minimal responsiveness which prompted the ED evaluation. ROS limited due to patient's mental status. Wife has noted pt has been constipated and gave him a suppository and he subsequently had one bowel movement. She noted a few red specks but no overt bleeding or black stool.  hypotension ?sec to sepsis/ pt with hx of low bp  bradycardia ,observe  dvt prophylaxis  am cortisol level, started on steroid in MICU  iv hydration  tsh  continue abx  will taper hydrocortisone slowly as per medicine, bp is improving  abx  fu hr and bp closely  replete K , keep k>4  taper steroid slowly  may switch midodrine to florinef if sig bradycardia on midodrine

## 2022-07-23 NOTE — PROGRESS NOTE ADULT - SUBJECTIVE AND OBJECTIVE BOX
Chief Complaint: UTI    History: Low BP last night. Asymptomatic per team. Patient today denied having had lightheadedness, abd pain, N/V, weakness. Received 1L IVF last night with improvement in BP.    MEDICATIONS  (STANDING):  ascorbic acid 500 milliGRAM(s) Oral daily  ciprofloxacin     Tablet 500 milliGRAM(s) Oral every 12 hours  enoxaparin Injectable 40 milliGRAM(s) SubCutaneous every 24 hours  insulin lispro (ADMELOG) corrective regimen sliding scale   SubCutaneous three times a day with meals  insulin lispro (ADMELOG) corrective regimen sliding scale   SubCutaneous at bedtime  midodrine. 5 milliGRAM(s) Oral three times a day  multivitamin 1 Tablet(s) Oral daily  polyethylene glycol 3350 17 Gram(s) Oral daily  potassium chloride    Tablet ER 20 milliEquivalent(s) Oral daily  senna 2 Tablet(s) Oral at bedtime  thiamine 100 milliGRAM(s) Oral daily    MEDICATIONS  (PRN):      PHYSICAL EXAM:  VITALS: T(C): 36.8 (07-23-22 @ 01:30)  T(F): 98.2 (07-23-22 @ 01:30), Max: 109.4 (07-22-22 @ 21:20)  HR: 44 (07-23-22 @ 12:40) (44 - 54)  BP: 126/77 (07-23-22 @ 12:40) (60/39 - 126/77)  RR:  (16 - 18)  SpO2:  (97% - 99%)  Wt(kg): --  General: Well-developed male, No acute distress.   Eye:  Extraocular movements are intact, No proptosis or lid lag, No scleral icterus.   Respiratory:  Respirations are non-labored, Symmetric chest wall expansion.  Cardiovascular:  Normal rate, Regular rhythm, No edema.  Gastrointestinal:  Soft, Non-tender, Non-distended.   Integumentary:  Warm, dry.    POCT Blood Glucose.: 119 mg/dL (07-23-22 @ 11:34)  POCT Blood Glucose.: 110 mg/dL (07-23-22 @ 08:04)  POCT Blood Glucose.: 125 mg/dL (07-22-22 @ 22:11)  POCT Blood Glucose.: 104 mg/dL (07-22-22 @ 16:55)  POCT Blood Glucose.: 101 mg/dL (07-22-22 @ 11:48)  POCT Blood Glucose.: 93 mg/dL (07-22-22 @ 08:06)  POCT Blood Glucose.: 114 mg/dL (07-21-22 @ 21:15)  POCT Blood Glucose.: 111 mg/dL (07-21-22 @ 16:48)  POCT Blood Glucose.: 99 mg/dL (07-21-22 @ 12:03)  POCT Blood Glucose.: 102 mg/dL (07-21-22 @ 09:22)  POCT Blood Glucose.: 86 mg/dL (07-21-22 @ 08:36)  POCT Blood Glucose.: 124 mg/dL (07-20-22 @ 21:05)  POCT Blood Glucose.: 100 mg/dL (07-20-22 @ 16:45)      07-21    143  |  109<H>  |  20  ----------------------------<  94  4.0   |  26  |  0.64    eGFR: 108    Ca    9.1      07-21            Thyroid Function Tests:  07-22 @ 07:17 TSH 2.94 FreeT4 -- T3 -- Anti TPO -- Anti Thyroglobulin Ab -- TSI --  07-14 @ 06:04 TSH 0.60 FreeT4 -- T3 51 Anti TPO -- Anti Thyroglobulin Ab -- TSI --

## 2022-07-23 NOTE — RAPID RESPONSE TEAM SUMMARY - NSADDTLFINDINGSRRT_GEN_ALL_CORE
Re-evaluated BP at 1240 at 97/60 s/p 1L NS Update: Re-evaluated BP at 1240 AM at 97/60 s/p 1L NS. Patient mentating well without acute complaints.

## 2022-07-24 LAB
ACTH STIM CORTISOL BASELINE: 8.7 UG/DL — SIGNIFICANT CHANGE UP (ref 6–18.4)
ALBUMIN SERPL ELPH-MCNC: 2.4 G/DL — LOW (ref 3.3–5)
ALP SERPL-CCNC: 38 U/L — LOW (ref 40–120)
ALT FLD-CCNC: 14 U/L — SIGNIFICANT CHANGE UP (ref 10–45)
ANION GAP SERPL CALC-SCNC: 10 MMOL/L — SIGNIFICANT CHANGE UP (ref 5–17)
ANION GAP SERPL CALC-SCNC: 7 MMOL/L — SIGNIFICANT CHANGE UP (ref 5–17)
APPEARANCE UR: ABNORMAL
AST SERPL-CCNC: 13 U/L — SIGNIFICANT CHANGE UP (ref 10–40)
BACTERIA # UR AUTO: NEGATIVE — SIGNIFICANT CHANGE UP
BASOPHILS # BLD AUTO: 0.01 K/UL — SIGNIFICANT CHANGE UP (ref 0–0.2)
BASOPHILS NFR BLD AUTO: 0.1 % — SIGNIFICANT CHANGE UP (ref 0–2)
BILIRUB SERPL-MCNC: 0.2 MG/DL — SIGNIFICANT CHANGE UP (ref 0.2–1.2)
BILIRUB UR-MCNC: NEGATIVE — SIGNIFICANT CHANGE UP
BUN SERPL-MCNC: 16 MG/DL — SIGNIFICANT CHANGE UP (ref 7–23)
BUN SERPL-MCNC: 17 MG/DL — SIGNIFICANT CHANGE UP (ref 7–23)
CALCIUM SERPL-MCNC: 8.6 MG/DL — SIGNIFICANT CHANGE UP (ref 8.4–10.5)
CALCIUM SERPL-MCNC: 8.9 MG/DL — SIGNIFICANT CHANGE UP (ref 8.4–10.5)
CHLORIDE SERPL-SCNC: 108 MMOL/L — SIGNIFICANT CHANGE UP (ref 96–108)
CHLORIDE SERPL-SCNC: 108 MMOL/L — SIGNIFICANT CHANGE UP (ref 96–108)
CO2 SERPL-SCNC: 23 MMOL/L — SIGNIFICANT CHANGE UP (ref 22–31)
CO2 SERPL-SCNC: 24 MMOL/L — SIGNIFICANT CHANGE UP (ref 22–31)
COLOR SPEC: ABNORMAL
CORTIS AM PEAK SERPL-MCNC: 14.8 UG/DL — SIGNIFICANT CHANGE UP (ref 6–18.4)
CORTIS AM PEAK SERPL-MCNC: 16.4 UG/DL — SIGNIFICANT CHANGE UP (ref 6–18.4)
CREAT SERPL-MCNC: 0.66 MG/DL — SIGNIFICANT CHANGE UP (ref 0.5–1.3)
CREAT SERPL-MCNC: 0.79 MG/DL — SIGNIFICANT CHANGE UP (ref 0.5–1.3)
DIFF PNL FLD: ABNORMAL
EGFR: 102 ML/MIN/1.73M2 — SIGNIFICANT CHANGE UP
EGFR: 107 ML/MIN/1.73M2 — SIGNIFICANT CHANGE UP
EOSINOPHIL # BLD AUTO: 0.19 K/UL — SIGNIFICANT CHANGE UP (ref 0–0.5)
EOSINOPHIL NFR BLD AUTO: 2.1 % — SIGNIFICANT CHANGE UP (ref 0–6)
EPI CELLS # UR: 2 — SIGNIFICANT CHANGE UP
GLUCOSE BLDC GLUCOMTR-MCNC: 104 MG/DL — HIGH (ref 70–99)
GLUCOSE BLDC GLUCOMTR-MCNC: 121 MG/DL — HIGH (ref 70–99)
GLUCOSE BLDC GLUCOMTR-MCNC: 92 MG/DL — SIGNIFICANT CHANGE UP (ref 70–99)
GLUCOSE BLDC GLUCOMTR-MCNC: 94 MG/DL — SIGNIFICANT CHANGE UP (ref 70–99)
GLUCOSE SERPL-MCNC: 93 MG/DL — SIGNIFICANT CHANGE UP (ref 70–99)
GLUCOSE SERPL-MCNC: 95 MG/DL — SIGNIFICANT CHANGE UP (ref 70–99)
GLUCOSE UR QL: NEGATIVE — SIGNIFICANT CHANGE UP
HCT VFR BLD CALC: 33.5 % — LOW (ref 39–50)
HGB BLD-MCNC: 10.9 G/DL — LOW (ref 13–17)
HYALINE CASTS # UR AUTO: 0 /LPF — SIGNIFICANT CHANGE UP (ref 0–2)
IMM GRANULOCYTES NFR BLD AUTO: 1.1 % — SIGNIFICANT CHANGE UP (ref 0–1.5)
KETONES UR-MCNC: NEGATIVE — SIGNIFICANT CHANGE UP
LEUKOCYTE ESTERASE UR-ACNC: ABNORMAL
LYMPHOCYTES # BLD AUTO: 1.56 K/UL — SIGNIFICANT CHANGE UP (ref 1–3.3)
LYMPHOCYTES # BLD AUTO: 17.3 % — SIGNIFICANT CHANGE UP (ref 13–44)
MAGNESIUM SERPL-MCNC: 1.6 MG/DL — SIGNIFICANT CHANGE UP (ref 1.6–2.6)
MAGNESIUM SERPL-MCNC: 1.8 MG/DL — SIGNIFICANT CHANGE UP (ref 1.6–2.6)
MCHC RBC-ENTMCNC: 28.4 PG — SIGNIFICANT CHANGE UP (ref 27–34)
MCHC RBC-ENTMCNC: 32.5 GM/DL — SIGNIFICANT CHANGE UP (ref 32–36)
MCV RBC AUTO: 87.2 FL — SIGNIFICANT CHANGE UP (ref 80–100)
MONOCYTES # BLD AUTO: 0.83 K/UL — SIGNIFICANT CHANGE UP (ref 0–0.9)
MONOCYTES NFR BLD AUTO: 9.2 % — SIGNIFICANT CHANGE UP (ref 2–14)
NEUTROPHILS # BLD AUTO: 6.32 K/UL — SIGNIFICANT CHANGE UP (ref 1.8–7.4)
NEUTROPHILS NFR BLD AUTO: 70.2 % — SIGNIFICANT CHANGE UP (ref 43–77)
NITRITE UR-MCNC: NEGATIVE — SIGNIFICANT CHANGE UP
NRBC # BLD: 0 /100 WBCS — SIGNIFICANT CHANGE UP (ref 0–0)
PH UR: 8 — SIGNIFICANT CHANGE UP (ref 5–8)
PHOSPHATE SERPL-MCNC: 2.5 MG/DL — SIGNIFICANT CHANGE UP (ref 2.5–4.5)
PLATELET # BLD AUTO: 174 K/UL — SIGNIFICANT CHANGE UP (ref 150–400)
POTASSIUM SERPL-MCNC: 3.7 MMOL/L — SIGNIFICANT CHANGE UP (ref 3.5–5.3)
POTASSIUM SERPL-MCNC: 5 MMOL/L — SIGNIFICANT CHANGE UP (ref 3.5–5.3)
POTASSIUM SERPL-SCNC: 3.7 MMOL/L — SIGNIFICANT CHANGE UP (ref 3.5–5.3)
POTASSIUM SERPL-SCNC: 5 MMOL/L — SIGNIFICANT CHANGE UP (ref 3.5–5.3)
PROT SERPL-MCNC: 5.2 G/DL — LOW (ref 6–8.3)
PROT UR-MCNC: ABNORMAL
RBC # BLD: 3.84 M/UL — LOW (ref 4.2–5.8)
RBC # FLD: 15.6 % — HIGH (ref 10.3–14.5)
RBC CASTS # UR COMP ASSIST: >50 /HPF — SIGNIFICANT CHANGE UP (ref 0–4)
SODIUM SERPL-SCNC: 138 MMOL/L — SIGNIFICANT CHANGE UP (ref 135–145)
SODIUM SERPL-SCNC: 142 MMOL/L — SIGNIFICANT CHANGE UP (ref 135–145)
SP GR SPEC: 1.01 — SIGNIFICANT CHANGE UP (ref 1.01–1.02)
UROBILINOGEN FLD QL: NEGATIVE — SIGNIFICANT CHANGE UP
WBC # BLD: 9.01 K/UL — SIGNIFICANT CHANGE UP (ref 3.8–10.5)
WBC # FLD AUTO: 9.01 K/UL — SIGNIFICANT CHANGE UP (ref 3.8–10.5)
WBC UR QL: 30 /HPF — HIGH (ref 0–5)

## 2022-07-24 PROCEDURE — 71045 X-RAY EXAM CHEST 1 VIEW: CPT | Mod: 26

## 2022-07-24 RX ORDER — SODIUM CHLORIDE 9 MG/ML
1000 INJECTION, SOLUTION INTRAVENOUS
Refills: 0 | Status: DISCONTINUED | OUTPATIENT
Start: 2022-07-24 | End: 2022-07-26

## 2022-07-24 RX ADMIN — Medication 20 MILLIEQUIVALENT(S): at 12:17

## 2022-07-24 RX ADMIN — POLYETHYLENE GLYCOL 3350 17 GRAM(S): 17 POWDER, FOR SOLUTION ORAL at 12:18

## 2022-07-24 RX ADMIN — COSYNTROPIN 0.25 MILLIGRAM(S): 0.25 INJECTION, SOLUTION INTRAVENOUS at 10:46

## 2022-07-24 RX ADMIN — Medication 100 MILLIGRAM(S): at 12:17

## 2022-07-24 RX ADMIN — MIDODRINE HYDROCHLORIDE 5 MILLIGRAM(S): 2.5 TABLET ORAL at 12:18

## 2022-07-24 RX ADMIN — ENOXAPARIN SODIUM 40 MILLIGRAM(S): 100 INJECTION SUBCUTANEOUS at 12:18

## 2022-07-24 RX ADMIN — Medication 500 MILLIGRAM(S): at 12:17

## 2022-07-24 RX ADMIN — SODIUM CHLORIDE 50 MILLILITER(S): 9 INJECTION, SOLUTION INTRAVENOUS at 05:43

## 2022-07-24 RX ADMIN — MIDODRINE HYDROCHLORIDE 5 MILLIGRAM(S): 2.5 TABLET ORAL at 05:43

## 2022-07-24 RX ADMIN — MIDODRINE HYDROCHLORIDE 5 MILLIGRAM(S): 2.5 TABLET ORAL at 17:08

## 2022-07-24 RX ADMIN — Medication 500 MILLIGRAM(S): at 05:43

## 2022-07-24 RX ADMIN — Medication 500 MILLIGRAM(S): at 17:07

## 2022-07-24 RX ADMIN — Medication 1 TABLET(S): at 12:17

## 2022-07-24 RX ADMIN — SODIUM CHLORIDE 50 MILLILITER(S): 9 INJECTION, SOLUTION INTRAVENOUS at 10:46

## 2022-07-24 NOTE — PROGRESS NOTE ADULT - SUBJECTIVE AND OBJECTIVE BOX
CARDIOLOGY     PROGRESS  NOTE   ________________________________________________    CHIEF COMPLAINT:Patient is a 61y old  Male who presents with a chief complaint of UTI (24 Jul 2022 06:29)  no complain.  	  REVIEW OF SYSTEMS:  CONSTITUTIONAL: No fever, weight loss, or fatigue  EYES: No eye pain, visual disturbances, or discharge  ENT:  No difficulty hearing, tinnitus, vertigo; No sinus or throat pain  NECK: No pain or stiffness  RESPIRATORY: No cough, wheezing, chills or hemoptysis; No Shortness of Breath  CARDIOVASCULAR: No chest pain, palpitations, passing out, dizziness, or leg swelling  GASTROINTESTINAL: No abdominal or epigastric pain. No nausea, vomiting, or hematemesis; No diarrhea or constipation. No melena or hematochezia.  GENITOURINARY: No dysuria, frequency, hematuria, or incontinence  NEUROLOGICAL: No headaches, memory loss, loss of strength, numbness, or tremors  SKIN: No itching, burning, rashes, or lesions   LYMPH Nodes: No enlarged glands  ENDOCRINE: No heat or cold intolerance; No hair loss  MUSCULOSKELETAL: No joint pain or swelling; No muscle, back, or extremity pain  PSYCHIATRIC: No depression, anxiety, mood swings, or difficulty sleeping  HEME/LYMPH: No easy bruising, or bleeding gums  ALLERGY AND IMMUNOLOGIC: No hives or eczema	    [ ] All others negative	  [ ] Unable to obtain    PHYSICAL EXAM:  T(C): 36.1 (07-24-22 @ 10:26), Max: 36.1 (07-24-22 @ 10:26)  HR: 52 (07-24-22 @ 10:26) (44 - 52)  BP: 106/66 (07-24-22 @ 10:26) (106/66 - 126/77)  RR: 18 (07-24-22 @ 10:26) (18 - 18)  SpO2: 97% (07-24-22 @ 10:26) (97% - 97%)  Wt(kg): --  I&O's Summary      Appearance: Normal	  HEENT:   Normal oral mucosa, PERRL, EOMI	  Lymphatic: No lymphadenopathy  Cardiovascular: Normal S1 S2, No JVD, + murmurs, No edema  Respiratory:rhonchi  Psychiatry: A & O x 3, Mood & affect appropriate  Gastrointestinal:  Soft, Non-tender, + BS	  Skin: No rashes, No ecchymoses, No cyanosis	  Neurologic: Non-focal  Extremities: Normal range of motion, No clubbing, cyanosis or edema  Vascular: Peripheral pulses palpable 2+ bilaterally    MEDICATIONS  (STANDING):  ascorbic acid 500 milliGRAM(s) Oral daily  ciprofloxacin     Tablet 500 milliGRAM(s) Oral every 12 hours  enoxaparin Injectable 40 milliGRAM(s) SubCutaneous every 24 hours  insulin lispro (ADMELOG) corrective regimen sliding scale   SubCutaneous three times a day with meals  insulin lispro (ADMELOG) corrective regimen sliding scale   SubCutaneous at bedtime  lactated ringers Bolus 500 milliLiter(s) IV Bolus once  lactated ringers Bolus 500 milliLiter(s) IV Bolus once  lactated ringers. 1000 milliLiter(s) (50 mL/Hr) IV Continuous <Continuous>  midodrine. 5 milliGRAM(s) Oral three times a day  multivitamin 1 Tablet(s) Oral daily  polyethylene glycol 3350 17 Gram(s) Oral daily  potassium chloride    Tablet ER 20 milliEquivalent(s) Oral daily  senna 2 Tablet(s) Oral at bedtime  thiamine 100 milliGRAM(s) Oral daily      TELEMETRY: 	    ECG:  	  RADIOLOGY:  OTHER: 	  	  LABS:	 	    CARDIAC MARKERS:                                10.9   9.01  )-----------( 174      ( 24 Jul 2022 00:02 )             33.5     07-24    142  |  108  |  17  ----------------------------<  95  3.7   |  24  |  0.79    Ca    8.9      24 Jul 2022 00:02  Phos  2.5     07-24  Mg     1.6     07-24    TPro  5.2<L>  /  Alb  2.4<L>  /  TBili  0.2  /  DBili  x   /  AST  13  /  ALT  14  /  AlkPhos  38<L>  07-24    proBNP:   Lipid Profile:   HgA1c:   TSH: Thyroid Stimulating Hormone, Serum: 2.94 uIU/mL (07-22 @ 07:17)  Thyroid Stimulating Hormone, Serum: 0.60 uIU/mL (07-14 @ 06:04)          Assessment and plan  ---------------------------  61M w/ hx of cerebellar ataxia with rapid neurocognitive decline (baseline AOx1-2, minimally conversant) recent prolonged admission including MICU stay/ intubation from 4/18-5/13 for AMS and airway protection, recent admission for UTI treated with 5 day course of CTX, brought in by wife for decreased PO intake and lethargy for several days with "white and thick" and foul smelling urine since 7/8. Hx obtained from wife due to patient's encephalopathy. PCP recently prescribed cipro 500 mg BID x 3 days (pt took 2 days) and wife thinks urine has improved since then. However, he has been eating less since and has been lethargic with minimal responsiveness which prompted the ED evaluation. ROS limited due to patient's mental status. Wife has noted pt has been constipated and gave him a suppository and he subsequently had one bowel movement. She noted a few red specks but no overt bleeding or black stool.  hypotension ?sec to sepsis/ pt with hx of low bp  bradycardia ,observe  dvt prophylaxis  am cortisol level, started on steroid in MICU  iv hydration  tsh  continue abx  will taper hydrocortisone slowly as per medicine, bp is improving  abx  fu hr and bp closely  replete K , keep k>4  taper steroid slowly  may switch midodrine to florinef if sig bradycardia on midodrine

## 2022-07-24 NOTE — PROGRESS NOTE ADULT - ASSESSMENT
62 yo male      PMHx of cerebellar ataxia    prior  visit  to  ED on 4/17/22 due to AMS ,  was  intubated for depressed consciousness w/ cognitive decline.  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    infectious  and  malignancy  was  ruled  outt/  and  per   neuro  note ,no further workup     flow cytometry 4/28 showing nonspecific results 'degenerated sample, small lymphocytes'   nsgy consulted for meningeal bx, family refusing    CSF cytopathology 4/22 - increased number of large mononuclear cells in a background of few small lymphocytes, monocytes and neutrophils, cannot exclude a lymphoproliferative disorder versus an inflammatory process.    CSF cytopathology 4/29 - significant increased number of small lymphocytes with rare monocytes/ seen by  oncology  dr de leon,  no  intervention      now  admitted    c/o  ams, as  before   h/o  cerebellar  ataxia,  with  no defined  cause  found   pt  with  bradycardia, ?  central, seen  by  card/  echo  on 4/2022,  normal  ef   now  with  hypothermia,   as  before  pt  has  h/o intermittent   hypothermia,  not related  to  any   infectious   process.  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  had  recd  steroid  on 7/14  and  7/ 22/ periods  of  low  sbp.  s/p  rrt/  for  bradycardia/  low  bp,  was given steroid   hence  acth  stim test   to  be  deferred/  endo  following

## 2022-07-24 NOTE — PROGRESS NOTE ADULT - SUBJECTIVE AND OBJECTIVE BOX
afberile  REVIEW OF SYSTEMS:  GEN: no fever,    no chills  RESP: no SOB,   no cough  CVS: no chest pain,   no palpitations  GI: no abdominal pain,   no nausea,   no vomiting,   no constipation,   no diarrhea  : no dysuria,   no frequency  NEURO: no headache,   no dizziness  PSYCH: no depression,   not anxious  Derm : no rash    MEDICATIONS  (STANDING):  ascorbic acid 500 milliGRAM(s) Oral daily  ciprofloxacin     Tablet 500 milliGRAM(s) Oral every 12 hours  cosyntropin Injectable 0.25 milliGRAM(s) IV Push once  enoxaparin Injectable 40 milliGRAM(s) SubCutaneous every 24 hours  insulin lispro (ADMELOG) corrective regimen sliding scale   SubCutaneous three times a day with meals  insulin lispro (ADMELOG) corrective regimen sliding scale   SubCutaneous at bedtime  lactated ringers Bolus 500 milliLiter(s) IV Bolus once  lactated ringers Bolus 500 milliLiter(s) IV Bolus once  lactated ringers. 1000 milliLiter(s) (50 mL/Hr) IV Continuous <Continuous>  midodrine. 5 milliGRAM(s) Oral three times a day  multivitamin 1 Tablet(s) Oral daily  polyethylene glycol 3350 17 Gram(s) Oral daily  potassium chloride    Tablet ER 20 milliEquivalent(s) Oral daily  senna 2 Tablet(s) Oral at bedtime  thiamine 100 milliGRAM(s) Oral daily    MEDICATIONS  (PRN):      Vital Signs Last 24 Hrs  T(C): --  T(F): --  HR: 44 (23 Jul 2022 12:40) (44 - 44)  BP: 126/77 (23 Jul 2022 12:40) (126/77 - 126/77)  BP(mean): --  RR: --  SpO2: --      CAPILLARY BLOOD GLUCOSE      POCT Blood Glucose.: 97 mg/dL (23 Jul 2022 23:10)  POCT Blood Glucose.: 108 mg/dL (23 Jul 2022 21:30)  POCT Blood Glucose.: 108 mg/dL (23 Jul 2022 17:06)  POCT Blood Glucose.: 119 mg/dL (23 Jul 2022 11:34)  POCT Blood Glucose.: 110 mg/dL (23 Jul 2022 08:04)    I&O's Summary    22 Jul 2022 07:01  -  23 Jul 2022 07:00  --------------------------------------------------------  IN: 390 mL / OUT: 400 mL / NET: -10 mL        PHYSICAL EXAM:  HEAD:  Atraumatic, Normocephalic  NECK: Supple, No   JVD  CHEST/LUNG:   no     rales,     no,    rhonchi  HEART: Regular rate and rhythm;         murmur  ABDOMEN: Soft, Nontender, ;   EXTREMITIES:   no     edema  NEUROLOGY:  alert    LABS:                        10.9   9.01  )-----------( 174      ( 24 Jul 2022 00:02 )             33.5     07-24    142  |  108  |  17  ----------------------------<  95  3.7   |  24  |  0.79    Ca    8.9      24 Jul 2022 00:02  Phos  2.5     07-24  Mg     1.6     07-24    TPro  5.2<L>  /  Alb  2.4<L>  /  TBili  0.2  /  DBili  x   /  AST  13  /  ALT  14  /  AlkPhos  38<L>  07-24                    Thyroid Stimulating Hormone, Serum: 2.94 uIU/mL (07-22 @ 07:17)          Consultant(s) Notes Reviewed:      Care Discussed with Consultants/Other Providers:

## 2022-07-24 NOTE — PROGRESS NOTE ADULT - SUBJECTIVE AND OBJECTIVE BOX
Chief Complaint: UTI    History: Had episode of hypotension overnight. Responded to 1L IVF. Patient c/o dysuria but otherwise feels well. During hypotensive episode had no weakness, dizziness, N/V, abd pain.    MEDICATIONS  (STANDING):  ascorbic acid 500 milliGRAM(s) Oral daily  ciprofloxacin     Tablet 500 milliGRAM(s) Oral every 12 hours  enoxaparin Injectable 40 milliGRAM(s) SubCutaneous every 24 hours  insulin lispro (ADMELOG) corrective regimen sliding scale   SubCutaneous three times a day with meals  insulin lispro (ADMELOG) corrective regimen sliding scale   SubCutaneous at bedtime  lactated ringers Bolus 500 milliLiter(s) IV Bolus once  lactated ringers Bolus 500 milliLiter(s) IV Bolus once  lactated ringers. 1000 milliLiter(s) (50 mL/Hr) IV Continuous <Continuous>  midodrine. 5 milliGRAM(s) Oral three times a day  multivitamin 1 Tablet(s) Oral daily  polyethylene glycol 3350 17 Gram(s) Oral daily  potassium chloride    Tablet ER 20 milliEquivalent(s) Oral daily  senna 2 Tablet(s) Oral at bedtime  thiamine 100 milliGRAM(s) Oral daily    MEDICATIONS  (PRN):    PHYSICAL EXAM:  VITALS: T(C): 36.1 (07-24-22 @ 10:26)  T(F): 97 (07-24-22 @ 10:26), Max: 97 (07-24-22 @ 10:26)  HR: 52 (07-24-22 @ 10:26) (52 - 52)  BP: 106/66 (07-24-22 @ 10:26) (106/66 - 106/66)  RR:  (18 - 18)  SpO2:  (97% - 97%)  Wt(kg): --  General: Well-developed male, No acute distress, Speaking full sentences.   Eye:  Extraocular movements are intact, No proptosis or lid lag, No scleral icterus.   Respiratory:  Respirations are non-labored, Symmetric chest wall expansion.  Cardiovascular:  Borderline bradycardic, Regular rhythm, No edema.  Gastrointestinal:  Soft, Non-tender, Non-distended.   Integumentary:  Warm, dry.    POCT Blood Glucose.: 92 mg/dL (07-24-22 @ 12:06)  POCT Blood Glucose.: 104 mg/dL (07-24-22 @ 08:04)  POCT Blood Glucose.: 97 mg/dL (07-23-22 @ 23:10)  POCT Blood Glucose.: 108 mg/dL (07-23-22 @ 21:30)  POCT Blood Glucose.: 108 mg/dL (07-23-22 @ 17:06)  POCT Blood Glucose.: 119 mg/dL (07-23-22 @ 11:34)  POCT Blood Glucose.: 110 mg/dL (07-23-22 @ 08:04)  POCT Blood Glucose.: 125 mg/dL (07-22-22 @ 22:11)  POCT Blood Glucose.: 104 mg/dL (07-22-22 @ 16:55)  POCT Blood Glucose.: 101 mg/dL (07-22-22 @ 11:48)  POCT Blood Glucose.: 93 mg/dL (07-22-22 @ 08:06)  POCT Blood Glucose.: 114 mg/dL (07-21-22 @ 21:15)  POCT Blood Glucose.: 111 mg/dL (07-21-22 @ 16:48)      07-24    138  |  108  |  16  ----------------------------<  93  5.0   |  23  |  0.66    eGFR: 107    Ca    8.6      07-24  Mg     1.8     07-24  Phos  2.5     07-24    TPro  5.2<L>  /  Alb  2.4<L>  /  TBili  0.2  /  DBili  x   /  AST  13  /  ALT  14  /  AlkPhos  38<L>  07-24          Thyroid Function Tests:  07-22 @ 07:17 TSH 2.94 FreeT4 -- T3 -- Anti TPO -- Anti Thyroglobulin Ab -- TSI --  07-14 @ 06:04 TSH 0.60 FreeT4 -- T3 51 Anti TPO -- Anti Thyroglobulin Ab -- TSI --

## 2022-07-24 NOTE — PROGRESS NOTE ADULT - ASSESSMENT
61M w/ hx of chronic lacunar infarcts, cerebellar ataxia with rapid neurocognitive decline (baseline AOx1-2, minimally conversant) recent prolonged admission including MICU stay/ intubation from 4/18-5/13 for AMS and airway protection, recent admission for UTI treated with 5 day course of CTX, brought in by wife for decreased PO intake and lethargy for several days with "white and thick" and foul smelling urine since 7/8. Endocrinology was consulted for concern for adrenal insufficiency.    Concern for adrenal insufficiency  - the patient presented with UTI and 8.1mm right proximal ureter calculus with hydronephrosis s/p bilateral ureteral stent placement with ccb hypotension, hypothermia, bradycardia  - patient has been refractory to IVF therapy requiring phenylephrine gtt and MICU for septic shcok from UTI, off of pressors since 7/14  - on 7/14 at 6:37am 100mg IV hydrocortisone was given followed by 50mg q8h until 7/20 at which point it was changed to fludricortisone 0.1mg bid, one dose of prednisone 7.5mg on 7/22  - patient also started on midodrine 5mg tid on 7/22  - 6/17 8:45am cortisol 15.6, 7/14 6AM cortisol 7, 7/14 TSH 0.6, 7/22 TSH 2.94  - BGs in 90s-100s  - SBPs 90s-low 100s, persistently bradycardic  - briefly hypothermic to 94.3F on 7/20 am requiring warming protocol for a few hours. Since, temperature had been stable, until 7/22 late evening patient became hypothermic again with oral temp 94; lloyd hugger applied, repeat rectal temp improved 97.8.  - no history of steroid use, immune checkpoint inhibitor use, ketoconazole  - ddx: adrenal insufficiency vs severe sepsis vs autonomic dysfunction 2/2 underlying neurologic issues  PLAN  - Off fludricortisone. Hydrocortisone was tapered to 25mg PO q12h x2 doses, then the patient did not get any hydrocortisone the night of 7/23 for stim test on 7/24. Baseline ACTH and cortisol sent to core lab and may take some time to come back, but the 30 and 60 min levels will be run in-house. Further steroid recommendations pending stim test results. If patient becomes hemodynamically unstable in the interim without response to IVF, can give steroids if needed.  - if cortisol level after stimulation is >14.5, adrenal insufficiency is unlikely and if >18, primary adrenal insufficiency and severe chronic secondary adrenal insufficiency are ruled out.  - Obtain FT4    Hypotension  - Management per primary team.    HLD  - On atorvastatin 10mg daily at home    Dany Xie DO, Endocrinology Fellow  Pager [phone] from 9am to 5pm. After hours and on weekends, please call [phone].   61M w/ hx of chronic lacunar infarcts, cerebellar ataxia with rapid neurocognitive decline (baseline AOx1-2, minimally conversant) recent prolonged admission including MICU stay/ intubation from 4/18-5/13 for AMS and airway protection, recent admission for UTI treated with 5 day course of CTX, brought in by wife for decreased PO intake and lethargy for several days with "white and thick" and foul smelling urine since 7/8. Endocrinology was consulted for concern for adrenal insufficiency.    Concern for adrenal insufficiency  - the patient presented with UTI and 8.1mm right proximal ureter calculus with hydronephrosis s/p bilateral ureteral stent placement with ccb hypotension, hypothermia, bradycardia  - patient has been refractory to IVF therapy requiring phenylephrine gtt and MICU for septic shcok from UTI, off of pressors since 7/14  - on 7/14 at 6:37am 100mg IV hydrocortisone was given followed by 50mg q8h until 7/20 at which point it was changed to fludricortisone 0.1mg bid, one dose of prednisone 7.5mg on 7/22  - patient also started on midodrine 5mg tid on 7/22  - 6/17 8:45am cortisol 15.6, 7/14 6AM cortisol 7, 7/14 TSH 0.6, 7/22 TSH 2.94  - BGs in 90s-100s  - SBPs 90s-low 100s, persistently bradycardic  - briefly hypothermic to 94.3F on 7/20 am requiring warming protocol for a few hours. Since, temperature had been stable, until 7/22 late evening patient became hypothermic again with oral temp 94; lloyd hugger applied, repeat rectal temp improved 97.8.  - no history of steroid use, immune checkpoint inhibitor use, ketoconazole  - ddx: adrenal insufficiency vs severe sepsis vs autonomic dysfunction 2/2 underlying neurologic issues  PLAN  - Off fludricortisone. Hydrocortisone was tapered to 25mg PO q12h x2 doses, then the patient did not get any hydrocortisone the night of 7/23 for stim test on 7/24. Baseline ACTH and cortisol sent to core lab and may take some time to come back, but the 30 and 60 min levels will be run in-house.   - Further steroid recommendations pending stim test results. If patient becomes hemodynamically unstable in the interim without response to IVF, can give steroids if needed. If BP soft without response to IVF, recommend 50mg hydrocortisone IV x1 with hydrocortisone PO 20 mg at 8am and 10mg at 3pm thereafter. If stress dose steroids (50mg q6h) are needed for severe instability, okay to start empirically based on clinical status.  - if cortisol level after stimulation is >14.5, adrenal insufficiency is unlikely and if >18, primary adrenal insufficiency and severe chronic secondary adrenal insufficiency are ruled out.  - Obtain FT4    Hypotension  - Management per primary team.    HLD  - On atorvastatin 10mg daily at home    Dany Xie DO, Endocrinology Fellow  Pager [phone] from 9am to 5pm. After hours and on weekends, please call [phone].

## 2022-07-25 LAB
ANION GAP SERPL CALC-SCNC: 7 MMOL/L — SIGNIFICANT CHANGE UP (ref 5–17)
BUN SERPL-MCNC: 12 MG/DL — SIGNIFICANT CHANGE UP (ref 7–23)
CALCIUM SERPL-MCNC: 9.1 MG/DL — SIGNIFICANT CHANGE UP (ref 8.4–10.5)
CHLORIDE SERPL-SCNC: 108 MMOL/L — SIGNIFICANT CHANGE UP (ref 96–108)
CO2 SERPL-SCNC: 29 MMOL/L — SIGNIFICANT CHANGE UP (ref 22–31)
CREAT SERPL-MCNC: 0.66 MG/DL — SIGNIFICANT CHANGE UP (ref 0.5–1.3)
CULTURE RESULTS: NO GROWTH — SIGNIFICANT CHANGE UP
EGFR: 107 ML/MIN/1.73M2 — SIGNIFICANT CHANGE UP
GLUCOSE BLDC GLUCOMTR-MCNC: 100 MG/DL — HIGH (ref 70–99)
GLUCOSE BLDC GLUCOMTR-MCNC: 105 MG/DL — HIGH (ref 70–99)
GLUCOSE BLDC GLUCOMTR-MCNC: 110 MG/DL — HIGH (ref 70–99)
GLUCOSE BLDC GLUCOMTR-MCNC: 92 MG/DL — SIGNIFICANT CHANGE UP (ref 70–99)
GLUCOSE SERPL-MCNC: 88 MG/DL — SIGNIFICANT CHANGE UP (ref 70–99)
MAGNESIUM SERPL-MCNC: 1.7 MG/DL — SIGNIFICANT CHANGE UP (ref 1.6–2.6)
POTASSIUM SERPL-MCNC: 3.8 MMOL/L — SIGNIFICANT CHANGE UP (ref 3.5–5.3)
POTASSIUM SERPL-SCNC: 3.8 MMOL/L — SIGNIFICANT CHANGE UP (ref 3.5–5.3)
SODIUM SERPL-SCNC: 144 MMOL/L — SIGNIFICANT CHANGE UP (ref 135–145)
SPECIMEN SOURCE: SIGNIFICANT CHANGE UP
T4 FREE SERPL-MCNC: 1.1 NG/DL — SIGNIFICANT CHANGE UP (ref 0.9–1.8)

## 2022-07-25 RX ORDER — SODIUM CHLORIDE 9 MG/ML
250 INJECTION INTRAMUSCULAR; INTRAVENOUS; SUBCUTANEOUS ONCE
Refills: 0 | Status: COMPLETED | OUTPATIENT
Start: 2022-07-25 | End: 2022-07-25

## 2022-07-25 RX ADMIN — Medication 500 MILLIGRAM(S): at 12:00

## 2022-07-25 RX ADMIN — Medication 100 MILLIGRAM(S): at 12:00

## 2022-07-25 RX ADMIN — MIDODRINE HYDROCHLORIDE 5 MILLIGRAM(S): 2.5 TABLET ORAL at 16:57

## 2022-07-25 RX ADMIN — ENOXAPARIN SODIUM 40 MILLIGRAM(S): 100 INJECTION SUBCUTANEOUS at 12:00

## 2022-07-25 RX ADMIN — Medication 20 MILLIEQUIVALENT(S): at 12:00

## 2022-07-25 RX ADMIN — SODIUM CHLORIDE 500 MILLILITER(S): 9 INJECTION INTRAMUSCULAR; INTRAVENOUS; SUBCUTANEOUS at 22:07

## 2022-07-25 RX ADMIN — Medication 1 TABLET(S): at 12:00

## 2022-07-25 RX ADMIN — SODIUM CHLORIDE 50 MILLILITER(S): 9 INJECTION, SOLUTION INTRAVENOUS at 05:48

## 2022-07-25 RX ADMIN — MIDODRINE HYDROCHLORIDE 5 MILLIGRAM(S): 2.5 TABLET ORAL at 12:00

## 2022-07-25 RX ADMIN — SODIUM CHLORIDE 50 MILLILITER(S): 9 INJECTION, SOLUTION INTRAVENOUS at 17:33

## 2022-07-25 RX ADMIN — Medication 500 MILLIGRAM(S): at 05:48

## 2022-07-25 RX ADMIN — SENNA PLUS 2 TABLET(S): 8.6 TABLET ORAL at 21:47

## 2022-07-25 RX ADMIN — MIDODRINE HYDROCHLORIDE 5 MILLIGRAM(S): 2.5 TABLET ORAL at 05:48

## 2022-07-25 RX ADMIN — SODIUM CHLORIDE 500 MILLILITER(S): 9 INJECTION INTRAMUSCULAR; INTRAVENOUS; SUBCUTANEOUS at 23:37

## 2022-07-25 RX ADMIN — Medication 0: at 21:47

## 2022-07-25 NOTE — CHART NOTE - NSCHARTNOTEFT_GEN_A_CORE
Notified by RN, patient with recurrent hypotension SBP 70s. Pt s/p RRT couple of days ago for same, responded to IVF. On midodrine 5mg TID.  Patient seen and examined at bedside.  Found patient awake and alert, comfortable. Denied complaints at this time.    Vitals:T(F): 98.3 (07-25-22 @ 22:20)  HR: 57 (07-25-22 @ 22:20)  BP: 90/55 (07-25-22 @ 23:00)  RR: 18 (07-25-22 @ 22:20)  SpO2: 96% (07-25-22 @ 22:20)  Wt(kg): --    Plan:  61 year old man with cerebellar ataxia with neurocognitive decline, chronic lacuna infarcts, recent hospitalizations, completed tx for UTI now with recurrent hypotension.   - Blood cx from 7/24 no growth to date, Ucx from 7/24 no growth.  - CXR 7/24 no active pulmonary process  - Appreciate Endocrine consult: AI and hypothyroidism excluded as cause for symptoms  - total NS 500ml bolus given  - c/w maintenance fluids  - c/w midodrine tid  - monitor VS closely  - Notified by RN, patient with recurrent hypotension SBP 70s. Pt s/p RRT couple of days ago for same, responded to IVF. On midodrine 5mg TID.  Patient seen and examined at bedside.  Found patient awake and alert, comfortable. Denied complaints at this time.    Vitals:T(F): 98.3 (07-25-22 @ 22:20)  HR: 57 (07-25-22 @ 22:20)  BP: 90/55 (07-25-22 @ 23:00)  RR: 18 (07-25-22 @ 22:20)  SpO2: 96% (07-25-22 @ 22:20)  Wt(kg): --    Plan:  61 year old man with cerebellar ataxia with neurocognitive decline, chronic lacuna infarcts, recent hospitalizations, completed tx for UTI now with recurrent hypotension.   - Blood cx from 7/24 no growth to date, Ucx from 7/24 no growth.  - CXR 7/24 no active pulmonary process  - Appreciate Endocrine consult: AI and hypothyroidism excluded as cause for symptoms  - total NS 500ml bolus given  - c/w maintenance fluids  - c/w midodrine tid  - monitor VS closely  - discussed with RN to notify provider with any changes in pt's status  - f/u with Cardiology  - f/u Attending in am    Kaila Stratton, NP  Medicine [id number]

## 2022-07-25 NOTE — CHART NOTE - NSCHARTNOTESELECT_GEN_ALL_CORE
Bradycardia/Event Note
Endocrinology/Event Note
Nutrition Services
Speech and Swallow
urology
Event Note
MAR Accept Note/Event Note
MICU ACCEPT NOTE/Event Note
RRT/Event Note
Results reviewed/Event Note
Transfer Note
hematuria/Event Note
urology

## 2022-07-25 NOTE — PROGRESS NOTE ADULT - SUBJECTIVE AND OBJECTIVE BOX
CARDIOLOGY     PROGRESS  NOTE   ________________________________________________    CHIEF COMPLAINT:Patient is a 61y old  Male who presents with a chief complaint of UTI (25 Jul 2022 06:23)  no complain.  	  REVIEW OF SYSTEMS:  CONSTITUTIONAL: No fever, weight loss, or fatigue  EYES: No eye pain, visual disturbances, or discharge  ENT:  No difficulty hearing, tinnitus, vertigo; No sinus or throat pain  NECK: No pain or stiffness  RESPIRATORY: No cough, wheezing, chills or hemoptysis; No Shortness of Breath  CARDIOVASCULAR: No chest pain, palpitations, passing out, dizziness, or leg swelling  GASTROINTESTINAL: No abdominal or epigastric pain. No nausea, vomiting, or hematemesis; No diarrhea or constipation. No melena or hematochezia.  GENITOURINARY: No dysuria, frequency, hematuria, or incontinence  NEUROLOGICAL: No headaches, memory loss, loss of strength, numbness, or tremors  SKIN: No itching, burning, rashes, or lesions   LYMPH Nodes: No enlarged glands  ENDOCRINE: No heat or cold intolerance; No hair loss  MUSCULOSKELETAL: No joint pain or swelling; No muscle, back, or extremity pain  PSYCHIATRIC: No depression, anxiety, mood swings, or difficulty sleeping  HEME/LYMPH: No easy bruising, or bleeding gums  ALLERGY AND IMMUNOLOGIC: No hives or eczema	    [ ] All others negative	  [ ] Unable to obtain    PHYSICAL EXAM:  T(C): 36.2 (07-24-22 @ 23:15), Max: 36.3 (07-24-22 @ 15:54)  HR: 36 (07-24-22 @ 23:15) (36 - 52)  BP: 108/72 (07-24-22 @ 23:15) (91/62 - 108/72)  RR: 18 (07-24-22 @ 23:15) (18 - 18)  SpO2: 98% (07-24-22 @ 23:15) (97% - 98%)  Wt(kg): --  I&O's Summary    24 Jul 2022 07:01  -  25 Jul 2022 07:00  --------------------------------------------------------  IN: 0 mL / OUT: 200 mL / NET: -200 mL        Appearance: Normal	  HEENT:   Normal oral mucosa, PERRL, EOMI	  Lymphatic: No lymphadenopathy  Cardiovascular: Normal S1 S2, No JVD, + murmurs, No edema  Respiratory: Lungs clear to auscultation	  Psychiatry: A & O x 3, Mood & affect appropriate  Gastrointestinal:  Soft, Non-tender, + BS	  Skin: No rashes, No ecchymoses, No cyanosis	  Neurologic: Non-focal  Extremities: Normal range of motion, No clubbing, cyanosis or edema  Vascular: Peripheral pulses palpable 2+ bilaterally    MEDICATIONS  (STANDING):  ascorbic acid 500 milliGRAM(s) Oral daily  enoxaparin Injectable 40 milliGRAM(s) SubCutaneous every 24 hours  insulin lispro (ADMELOG) corrective regimen sliding scale   SubCutaneous three times a day with meals  insulin lispro (ADMELOG) corrective regimen sliding scale   SubCutaneous at bedtime  lactated ringers. 1000 milliLiter(s) (50 mL/Hr) IV Continuous <Continuous>  midodrine. 5 milliGRAM(s) Oral three times a day  multivitamin 1 Tablet(s) Oral daily  potassium chloride    Tablet ER 20 milliEquivalent(s) Oral daily  senna 2 Tablet(s) Oral at bedtime  thiamine 100 milliGRAM(s) Oral daily      TELEMETRY: 	    ECG:  	  RADIOLOGY:  OTHER: 	  	  LABS:	 	    CARDIAC MARKERS:                                10.9   9.01  )-----------( 174      ( 24 Jul 2022 00:02 )             33.5     07-25    144  |  108  |  12  ----------------------------<  88  3.8   |  29  |  0.66    Ca    9.1      25 Jul 2022 06:57  Phos  2.5     07-24  Mg     1.7     07-25    TPro  5.2<L>  /  Alb  2.4<L>  /  TBili  0.2  /  DBili  x   /  AST  13  /  ALT  14  /  AlkPhos  38<L>  07-24    proBNP:   Lipid Profile:   HgA1c:   TSH: Thyroid Stimulating Hormone, Serum: 2.94 uIU/mL (07-22 @ 07:17)  Thyroid Stimulating Hormone, Serum: 0.60 uIU/mL (07-14 @ 06:04)          Assessment and plan  ---------------------------  61M w/ hx of cerebellar ataxia with rapid neurocognitive decline (baseline AOx1-2, minimally conversant) recent prolonged admission including MICU stay/ intubation from 4/18-5/13 for AMS and airway protection, recent admission for UTI treated with 5 day course of CTX, brought in by wife for decreased PO intake and lethargy for several days with "white and thick" and foul smelling urine since 7/8. Hx obtained from wife due to patient's encephalopathy. PCP recently prescribed cipro 500 mg BID x 3 days (pt took 2 days) and wife thinks urine has improved since then. However, he has been eating less since and has been lethargic with minimal responsiveness which prompted the ED evaluation. ROS limited due to patient's mental status. Wife has noted pt has been constipated and gave him a suppository and he subsequently had one bowel movement. She noted a few red specks but no overt bleeding or black stool.  hypotension ?sec to sepsis/ pt with hx of low bp  bradycardia ,observe  dvt prophylaxis  am cortisol level, started on steroid in MICU  iv hydration  tsh  continue abx  will taper hydrocortisone slowly as per medicine, bp is improving  abx  fu hr and bp closely  replete K , keep k>4  taper steroid slowly  may switch midodrine to florinef if sig bradycardia on midodrine

## 2022-07-25 NOTE — CHART NOTE - NSCHARTNOTEFT_GEN_A_CORE
62 y/o M admitted for sepsis secondary to UTI s/p b/l ureteral stent placement, course c/b development of hypotension/hypothermia/bradycaria after procedure with transfer to MICU for septic shock 2/2 UTI, now off pressors and back to floors. Pt was seen 7/17/22 for initial Bedside Swallow Evaluation. Rx at that time: Minced and Moist diet and Thin Liquids. Seen 7/20 for assessment of diet tolerance. Rx: continue current diet.     Pt seen today for assessment of diet tolerance. He was received at bedside awake and alert. +room air. He was AAOx3. Delayed processing time noted (neurocognitive decline). Able to follow basic commands. He was pleasant and cooperative. Pt endorsed distaste for minced and moist diet, requesting texture advancement. Pt was trialed with thin liquids and soft solids (deferred puree trials). The swallow sequence was characterized by prolonged, though generally efficient mastication of soft solids, reduced AP bolus transfer, and suspected delayed trigger of pharyngeal swallow. Single instance of cough noted post intake of thin liquids via large consecutive straw sips. No overt clinical s/s of aspiration or penetration across extensive trials of thin liquids via small single cup sips and soft solids. Discussed indication for texture advancement. Safe swallowing guidelines reviewed with emphasis on SMALL SINGLE cup sips, NO straws, and chew completely. Pt verbalized understanding. Purposeful and proactive rounding completed. 5 P's addressed. Pt left in no distress.    Impressions:  Pt continues to present with oral dysphagia and suspected pharyngeal dysphagia. Candidate for texture advancement.     Recommendations:  1) Upgrade to Easy to Chew and Thin Liquids via SMALL SINGLE CUP SIPS. No straws. Meds in puree.  2) 1:1 feeding assistance. Ensure pt is upright at 90 degrees, SMALL SINGLE bites/sips, NO straws, chew completely, slow rate.  3) Excellent oral care and aspiration precautions. Monitor for s/s of aspiration or penetration (coughing, choking, wet/gurgly vocal qualiy). d/c PO intake if any signs are observed and contact speech dpt [phone].   4) Will f/u for assessment of diet tolerance.  5) NALDO.    Discussed the above with pt, RN, Misty, and NP, Torsten.     Katherine Huynh M.S., CCC-SLP ext. [phone]

## 2022-07-25 NOTE — PROGRESS NOTE ADULT - NUTRITIONAL ASSESSMENT
This patient has been assessed with a concern for Malnutrition and has been determined to have a diagnosis/diagnoses of Moderate protein-calorie malnutrition.    This patient is being managed with:   Diet Minced and Moist-  Supplement Feeding Modality:  Oral  Ensure Enlive Cans or Servings Per Day:  1       Frequency:  Three Times a day  Entered: Jul 19 2022  7:08PM    

## 2022-07-25 NOTE — PROGRESS NOTE ADULT - ASSESSMENT
62 yo male      PMHx of cerebellar ataxia    prior  visit  to  ED on 4/17/22 due to AMS ,  was  intubated for depressed consciousness w/ cognitive decline.  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    infectious  and  malignancy  was  ruled  outt/  and  per   neuro  note ,no further workup     flow cytometry 4/28 showing nonspecific results 'degenerated sample, small lymphocytes'   nsgy consulted for meningeal bx, family refusing    CSF cytopathology 4/22 - increased number of large mononuclear cells in a background of few small lymphocytes, monocytes and neutrophils, cannot exclude a lymphoproliferative disorder versus an inflammatory process.    CSF cytopathology 4/29 - significant increased number of small lymphocytes with rare monocytes/ seen by  oncology  dr de leon,  no  intervention      now  admitted    c/o  ams, as  before   h/o  cerebellar  ataxia,  with  no defined  cause  found   pt  with  bradycardia, ?  central, seen  by  card/  echo  on 4/2022,  normal  ef   now  with  hypothermia,   as  before  pt  has  h/o intermittent   hypothermia,  not related  to  any   infectious   process.  rather  , it  seems to be  dysautonomia/  with  h/o normal  cortisol level  prior  CT  c/ap,  no  malignant  process  on feeds  per  swallow  eval     bed bound   status  and  altered  baseline  mental  status  on  cipro, uti   awaiting acth   test  per  endo/.  had  recd  steroid  on 7/14  and  7/ 22/ periods  of  low  sbp.  s/p  rrt/  for  bradycardia/  low  bp,  was given steroid   hence  acth  stim test   to  be  deferred/  endo  following   bradycardia  in  30's/ pt  alert/   spoke  with  wife

## 2022-07-25 NOTE — CHART NOTE - NSCHARTNOTEFT_GEN_A_CORE
Endocrine consulted for r/o AI. Stim test reviewed with stimulated cortisol appropriate. Also with normal Free T4. AI and hypothyroidism excluded as cause for symptoms. Please reconsult if needed.      Alfredo Navarrete D.O  [phone]

## 2022-07-26 LAB
GLUCOSE BLDC GLUCOMTR-MCNC: 100 MG/DL — HIGH (ref 70–99)
GLUCOSE BLDC GLUCOMTR-MCNC: 102 MG/DL — HIGH (ref 70–99)
GLUCOSE BLDC GLUCOMTR-MCNC: 88 MG/DL — SIGNIFICANT CHANGE UP (ref 70–99)
GLUCOSE BLDC GLUCOMTR-MCNC: 88 MG/DL — SIGNIFICANT CHANGE UP (ref 70–99)
HCT VFR BLD CALC: 35.4 % — LOW (ref 39–50)
HGB BLD-MCNC: 11.3 G/DL — LOW (ref 13–17)
MCHC RBC-ENTMCNC: 28.4 PG — SIGNIFICANT CHANGE UP (ref 27–34)
MCHC RBC-ENTMCNC: 31.9 GM/DL — LOW (ref 32–36)
MCV RBC AUTO: 88.9 FL — SIGNIFICANT CHANGE UP (ref 80–100)
NRBC # BLD: 0 /100 WBCS — SIGNIFICANT CHANGE UP (ref 0–0)
PLATELET # BLD AUTO: 143 K/UL — LOW (ref 150–400)
RBC # BLD: 3.98 M/UL — LOW (ref 4.2–5.8)
RBC # FLD: 15.9 % — HIGH (ref 10.3–14.5)
SARS-COV-2 RNA SPEC QL NAA+PROBE: SIGNIFICANT CHANGE UP
WBC # BLD: 9.33 K/UL — SIGNIFICANT CHANGE UP (ref 3.8–10.5)
WBC # FLD AUTO: 9.33 K/UL — SIGNIFICANT CHANGE UP (ref 3.8–10.5)

## 2022-07-26 RX ORDER — MIDODRINE HYDROCHLORIDE 2.5 MG/1
10 TABLET ORAL ONCE
Refills: 0 | Status: COMPLETED | OUTPATIENT
Start: 2022-07-26 | End: 2022-07-26

## 2022-07-26 RX ORDER — FLUDROCORTISONE ACETATE 0.1 MG/1
0.1 TABLET ORAL
Refills: 0 | Status: DISCONTINUED | OUTPATIENT
Start: 2022-07-26 | End: 2022-07-27

## 2022-07-26 RX ADMIN — FLUDROCORTISONE ACETATE 0.1 MILLIGRAM(S): 0.1 TABLET ORAL at 12:44

## 2022-07-26 RX ADMIN — MIDODRINE HYDROCHLORIDE 5 MILLIGRAM(S): 2.5 TABLET ORAL at 17:33

## 2022-07-26 RX ADMIN — MIDODRINE HYDROCHLORIDE 5 MILLIGRAM(S): 2.5 TABLET ORAL at 05:48

## 2022-07-26 RX ADMIN — SENNA PLUS 2 TABLET(S): 8.6 TABLET ORAL at 21:17

## 2022-07-26 RX ADMIN — Medication 1 TABLET(S): at 11:54

## 2022-07-26 RX ADMIN — Medication 100 MILLIGRAM(S): at 11:53

## 2022-07-26 RX ADMIN — Medication 0: at 21:16

## 2022-07-26 RX ADMIN — Medication 20 MILLIEQUIVALENT(S): at 11:54

## 2022-07-26 RX ADMIN — MIDODRINE HYDROCHLORIDE 10 MILLIGRAM(S): 2.5 TABLET ORAL at 20:25

## 2022-07-26 RX ADMIN — ENOXAPARIN SODIUM 40 MILLIGRAM(S): 100 INJECTION SUBCUTANEOUS at 11:54

## 2022-07-26 RX ADMIN — Medication 500 MILLIGRAM(S): at 11:54

## 2022-07-26 RX ADMIN — MIDODRINE HYDROCHLORIDE 5 MILLIGRAM(S): 2.5 TABLET ORAL at 11:53

## 2022-07-26 RX ADMIN — FLUDROCORTISONE ACETATE 0.1 MILLIGRAM(S): 0.1 TABLET ORAL at 17:34

## 2022-07-26 NOTE — PROGRESS NOTE ADULT - SUBJECTIVE AND OBJECTIVE BOX
CARDIOLOGY     PROGRESS  NOTE   ________________________________________________    CHIEF COMPLAINT:Patient is a 61y old  Male who presents with a chief complaint of UTI (25 Jul 2022 09:19)  no complain.  	  REVIEW OF SYSTEMS:  CONSTITUTIONAL: No fever, weight loss, or fatigue  EYES: No eye pain, visual disturbances, or discharge  ENT:  No difficulty hearing, tinnitus, vertigo; No sinus or throat pain  NECK: No pain or stiffness  RESPIRATORY: No cough, wheezing, chills or hemoptysis; No Shortness of Breath  CARDIOVASCULAR: No chest pain, palpitations, passing out, dizziness, or leg swelling  GASTROINTESTINAL: No abdominal or epigastric pain. No nausea, vomiting, or hematemesis; No diarrhea or constipation. No melena or hematochezia.  GENITOURINARY: No dysuria, frequency, hematuria, or incontinence  NEUROLOGICAL: No headaches, memory loss, loss of strength, numbness, or tremors  SKIN: No itching, burning, rashes, or lesions   LYMPH Nodes: No enlarged glands  ENDOCRINE: No heat or cold intolerance; No hair loss  MUSCULOSKELETAL: No joint pain or swelling; No muscle, back, or extremity pain  PSYCHIATRIC: No depression, anxiety, mood swings, or difficulty sleeping  HEME/LYMPH: No easy bruising, or bleeding gums  ALLERGY AND IMMUNOLOGIC: No hives or eczema	    [ ] All others negative	  [ ] Unable to obtain    PHYSICAL EXAM:  T(C): 36.4 (07-26-22 @ 04:00), Max: 36.8 (07-25-22 @ 22:20)  HR: 60 (07-26-22 @ 00:55) (37 - 60)  BP: 105/59 (07-26-22 @ 04:00) (72/39 - 108/72)  RR: 18 (07-26-22 @ 04:00) (17 - 18)  SpO2: 96% (07-26-22 @ 04:00) (96% - 100%)  Wt(kg): --  I&O's Summary    25 Jul 2022 07:01  -  26 Jul 2022 07:00  --------------------------------------------------------  IN: 1530 mL / OUT: 200 mL / NET: 1330 mL        Appearance: Normal	  HEENT:   Normal oral mucosa, PERRL, EOMI	  Lymphatic: No lymphadenopathy  Cardiovascular: Normal S1 S2, No JVD, + murmurs, No edema  Respiratory: Lungs clear to auscultation	  Psychiatry: A & O x 3, Mood & affect appropriate  Gastrointestinal:  Soft, Non-tender, + BS	  Skin: No rashes, No ecchymoses, No cyanosis	  Neurologic: Non-focal  Extremities: Normal range of motion, No clubbing, cyanosis or edema  Vascular: Peripheral pulses palpable 2+ bilaterally    MEDICATIONS  (STANDING):  ascorbic acid 500 milliGRAM(s) Oral daily  enoxaparin Injectable 40 milliGRAM(s) SubCutaneous every 24 hours  insulin lispro (ADMELOG) corrective regimen sliding scale   SubCutaneous three times a day with meals  insulin lispro (ADMELOG) corrective regimen sliding scale   SubCutaneous at bedtime  lactated ringers. 1000 milliLiter(s) (50 mL/Hr) IV Continuous <Continuous>  midodrine. 5 milliGRAM(s) Oral three times a day  multivitamin 1 Tablet(s) Oral daily  potassium chloride    Tablet ER 20 milliEquivalent(s) Oral daily  senna 2 Tablet(s) Oral at bedtime  thiamine 100 milliGRAM(s) Oral daily      TELEMETRY: 	    ECG:  	  RADIOLOGY:  OTHER: 	  	  LABS:	 	    CARDIAC MARKERS:                                11.3   9.33  )-----------( 143      ( 26 Jul 2022 06:51 )             35.4     07-25    144  |  108  |  12  ----------------------------<  88  3.8   |  29  |  0.66    Ca    9.1      25 Jul 2022 06:57  Mg     1.7     07-25      proBNP:   Lipid Profile:   HgA1c:   TSH: Thyroid Stimulating Hormone, Serum: 2.94 uIU/mL (07-22 @ 07:17)  Thyroid Stimulating Hormone, Serum: 0.60 uIU/mL (07-14 @ 06:04)      Cortisol AM, Serum . (07.24.22 @ 14:12)    Cortisol AM, Serum: 16.4 ug/dL        Assessment and plan  ---------------------------  61M w/ hx of cerebellar ataxia with rapid neurocognitive decline (baseline AOx1-2, minimally conversant) recent prolonged admission including MICU stay/ intubation from 4/18-5/13 for AMS and airway protection, recent admission for UTI treated with 5 day course of CTX, brought in by wife for decreased PO intake and lethargy for several days with "white and thick" and foul smelling urine since 7/8. Hx obtained from wife due to patient's encephalopathy. PCP recently prescribed cipro 500 mg BID x 3 days (pt took 2 days) and wife thinks urine has improved since then. However, he has been eating less since and has been lethargic with minimal responsiveness which prompted the ED evaluation. ROS limited due to patient's mental status. Wife has noted pt has been constipated and gave him a suppository and he subsequently had one bowel movement. She noted a few red specks but no overt bleeding or black stool.  hypotension ?sec to sepsis/ pt with hx of low bp  bradycardia ,observe  dvt prophylaxis  am cortisol level, started on steroid in MICU  iv hydration  tsh  continue abx  will taper hydrocortisone slowly as per medicine, bp is improving  fu hr and bp closely  replete K , keep k>4  taper steroid slowly  may switch midodrine to florinef if sig bradycardia on midodrine  no objection to dc cardiac wise

## 2022-07-26 NOTE — PROVIDER CONTACT NOTE (OTHER) - BACKGROUND
(Admit Diagnosis) Urinary tract infection
(Admit Diagnosis) Urinary tract infection
Pt. admitted from MICU - admitted for UTI
pmhx cerebellar ataxia, adrenal insufficiency, hypotension, HLD, metabolic encephalopathy, RIP, severe sepsis with organ dysfunction
Pt hx of afib,pneumonia,HTN,HLD
Pt. admitted from MICU w/ UTI + PMH of cerebral ataxia
as above
hx of bradycardia
Patient is diagnosed with UTI.
hx of bradycardia

## 2022-07-26 NOTE — PROVIDER CONTACT NOTE (OTHER) - REASON
Bladder Scan Results
Hypothermia
bradycardic
Hypotension, RRT
Pt Temp 94.3, HR 34
's-140's,RR 28, O2 Sat 94 RA
Patient has not voided- bladder scan and straight cath done
Pt. HR in the 30s
patient experiencing low blood pressure
Pt. noted to be lethargic w/ altered mental status.
Bradycardia

## 2022-07-26 NOTE — PROVIDER CONTACT NOTE (OTHER) - ACTION/TREATMENT ORDERED:
NP examined patient. and found to be more alert and responding to questions.
PA immediately notified of hypotension, PA assessed pt and requested a RRT. RRT came in, bolus of LR was administered,pt bp in the high 80's, blood and cultures sent. Pt will continue to be monitored
EKG now and SUNIL Barrios will evaluate pt
Keep hyperthermia blanket on until pt temp 98F.
Repeat bladder scan in 8 hours if still not voiding on own.
SUNIL Stratton notified and aware - 250 NS bolus given, BP 90/55. another 250 bolus given and BP 94/52. no further intervention at this time.
Team aware, no actions ordered. Will continue to monitor.
continue to monitor
Team made aware. No actions ordered.
Recheck bladder scan at 10am
monitor or possible EKG

## 2022-07-26 NOTE — PROVIDER CONTACT NOTE (OTHER) - DATE AND TIME:
23-Jul-2022
23-Jul-2022 04:40
20-Jul-2022 02:36
23-Jul-2022 15:15
15-Jul-2022 15:00
20-Jul-2022 23:00
25-Jul-2022 22:00
15-Jul-2022 18:08
15-Jul-2022 18:04
23-Jul-2022 12:42
18-Jul-2022 00:02

## 2022-07-26 NOTE — PROVIDER CONTACT NOTE (OTHER) - SITUATION
Patient SB as low as 36
Pt. noted to be lethargic w/ altered mental status.
's-140's,RR 28, O2 Sat 94 RA
Patient bladder scan: 319. Patient is urinating on his own.
Reviewed vital signs with NP. Reviewed concern with patient mental status a bit more lethargic.
SB to 32 while sleeping
Pt Temp 94.3, HR 34. Hyperthermia blanket applied
Pt's vitals were taken, Bp was 65/47, pt asymptomatic
Pt. HR in the 30s/ 1st degree heart block
patient blood pressure 72/40
Patient has not voided- bladder scan and straight cath done as per order  Bladder scan volume read- 302ml, Straight cath was 250ml with small blood clots and red in color

## 2022-07-26 NOTE — PROGRESS NOTE ADULT - SUBJECTIVE AND OBJECTIVE BOX
INTERVAL HPI/OVERNIGHT EVENTS:  Pt seen and examined at bedside.     Allergies/Intolerance: No Known Allergies      MEDICATIONS  (STANDING):  ascorbic acid 500 milliGRAM(s) Oral daily  enoxaparin Injectable 40 milliGRAM(s) SubCutaneous every 24 hours  insulin lispro (ADMELOG) corrective regimen sliding scale   SubCutaneous three times a day with meals  insulin lispro (ADMELOG) corrective regimen sliding scale   SubCutaneous at bedtime  lactated ringers. 1000 milliLiter(s) (50 mL/Hr) IV Continuous <Continuous>  midodrine. 5 milliGRAM(s) Oral three times a day  multivitamin 1 Tablet(s) Oral daily  potassium chloride    Tablet ER 20 milliEquivalent(s) Oral daily  senna 2 Tablet(s) Oral at bedtime  thiamine 100 milliGRAM(s) Oral daily    MEDICATIONS  (PRN):        ROS: all systems reviewed and wnl      PHYSICAL EXAMINATION:  Vital Signs Last 24 Hrs  T(C): 37 (2022 08:56), Max: 37 (2022 08:56)  T(F): 98.6 (2022 08:56), Max: 98.6 (2022 08:56)  HR: 50 (2022 08:56) (37 - 60)  BP: 106/67 (2022 08:56) (72/39 - 108/72)  BP(mean): --  RR: 18 (2022 08:56) (17 - 18)  SpO2: 100% (2022 08:56) (96% - 100%)    Parameters below as of 2022 08:56  Patient On (Oxygen Delivery Method): room air      CAPILLARY BLOOD GLUCOSE      POCT Blood Glucose.: 88 mg/dL (2022 08:16)  POCT Blood Glucose.: 110 mg/dL (2022 21:13)  POCT Blood Glucose.: 92 mg/dL (2022 16:51)  POCT Blood Glucose.: 105 mg/dL (2022 11:34)       @ 07:01  -   @ 07:00  --------------------------------------------------------  IN: 1530 mL / OUT: 200 mL / NET: 1330 mL        GENERAL:   NECK: supple, No JVD  CHEST/LUNG: clear to auscultation bilaterally; no rales, rhonchi, or wheezing b/l  HEART: normal S1, S2  ABDOMEN: BS+, soft, ND, NT   EXTREMITIES:  pulses palpable; no clubbing, cyanosis, or edema b/l LEs  SKIN: no rashes or lesions      LABS:                        11.3   9.33  )-----------( 143      ( 2022 06:51 )             35.4     07-25    144  |  108  |  12  ----------------------------<  88  3.8   |  29  |  0.66    Ca    9.1      2022 06:57  Mg     1.7     07        Urinalysis Basic - ( 2022 12:23 )    Color: Red / Appearance: Turbid / S.010 / pH: x  Gluc: x / Ketone: Negative  / Bili: Negative / Urobili: Negative   Blood: x / Protein: 30 mg/dL / Nitrite: Negative   Leuk Esterase: Moderate / RBC: >50 /hpf / WBC 30 /HPF   Sq Epi: x / Non Sq Epi: 2 / Bacteria: Negative             INTERVAL HPI/OVERNIGHT EVENTS:  Pt seen and examined at bedside.     Allergies/Intolerance: No Known Allergies      MEDICATIONS  (STANDING):  ascorbic acid 500 milliGRAM(s) Oral daily  enoxaparin Injectable 40 milliGRAM(s) SubCutaneous every 24 hours  insulin lispro (ADMELOG) corrective regimen sliding scale   SubCutaneous three times a day with meals  insulin lispro (ADMELOG) corrective regimen sliding scale   SubCutaneous at bedtime  lactated ringers. 1000 milliLiter(s) (50 mL/Hr) IV Continuous <Continuous>  midodrine. 5 milliGRAM(s) Oral three times a day  multivitamin 1 Tablet(s) Oral daily  potassium chloride    Tablet ER 20 milliEquivalent(s) Oral daily  senna 2 Tablet(s) Oral at bedtime  thiamine 100 milliGRAM(s) Oral daily    MEDICATIONS  (PRN):        ROS: all systems reviewed and wnl      PHYSICAL EXAMINATION:  Vital Signs Last 24 Hrs  T(C): 37 (2022 08:56), Max: 37 (2022 08:56)  T(F): 98.6 (2022 08:56), Max: 98.6 (2022 08:56)  HR: 50 (2022 08:56) (37 - 60)  BP: 106/67 (2022 08:56) (72/39 - 108/72)  BP(mean): --  RR: 18 (2022 08:56) (17 - 18)  SpO2: 100% (2022 08:56) (96% - 100%)    Parameters below as of 2022 08:56  Patient On (Oxygen Delivery Method): room air      CAPILLARY BLOOD GLUCOSE      POCT Blood Glucose.: 88 mg/dL (2022 08:16)  POCT Blood Glucose.: 110 mg/dL (2022 21:13)  POCT Blood Glucose.: 92 mg/dL (2022 16:51)  POCT Blood Glucose.: 105 mg/dL (2022 11:34)       @ 07:01  -   @ 07:00  --------------------------------------------------------  IN: 1530 mL / OUT: 200 mL / NET: 1330 mL        GENERAL: stable, in bed, comfortable on RA, no fevers, BP stable   NECK: supple, No JVD  CHEST/LUNG: clear to auscultation bilaterally; no rales, rhonchi, or wheezing b/l  HEART: normal S1, S2  ABDOMEN: BS+, soft, ND, NT   EXTREMITIES:  pulses palpable; no clubbing, cyanosis, or edema b/l LEs        LABS:                        11.3   9.33  )-----------( 143      ( 2022 06:51 )             35.4     07-25    144  |  108  |  12  ----------------------------<  88  3.8   |  29  |  0.66    Ca    9.1      2022 06:57  Mg     1.7     07-25        Urinalysis Basic - ( 2022 12:23 )    Color: Red / Appearance: Turbid / S.010 / pH: x  Gluc: x / Ketone: Negative  / Bili: Negative / Urobili: Negative   Blood: x / Protein: 30 mg/dL / Nitrite: Negative   Leuk Esterase: Moderate / RBC: >50 /hpf / WBC 30 /HPF   Sq Epi: x / Non Sq Epi: 2 / Bacteria: Negative

## 2022-07-26 NOTE — PROVIDER CONTACT NOTE (OTHER) - ASSESSMENT
No acute distress noted
Pt. admitted from MICU - NAD noted. VSS
asymptomatic
patients blood pressure low although has chronically low BP's. patient is asymptomatic and resting - no complaints of discomfort or change in status
's-140's,RR 28, O2 Sat 94 RA.Pt states SOB and chest pain.
Pt. noted to be lethargic w/ altered mental status. Unable to state time, location, and situation. Seems more lethargic than prior assessment.
asymptomatic
Patient VSS on RA. Denies abdominal pain/ fullness. Reports not needing to urinate. Incontinence pad saturated with urine.

## 2022-07-26 NOTE — PROGRESS NOTE ADULT - ASSESSMENT
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 Course c/b development of hypotension/hypothermia/bradycaria concerning for relative adrenal insufficiency vs septic shock due to UTI. Pt refractory to IVF therapy requiring phenylephrine gtt and transfer to MICU for septic shock secondary to UTI in setting of adrenal insufficiency now off pressors since 7/14      #Neuro:  =hx of cerebellar ataxia, chronic lacunar infarcts, leptomeningeal enhancements, all stable. Physical therapy as tolerated.     All chronic conditions.         #CV:  =bradycardia (most likely due to relative adrenal insufficiency vs septic shock). ECG demonstrating first degree block but otherwise asymtomatic bradycardia  Stop IVF.  Monitor.  Will ask endo to eval, add prednisone 7.5 mg/day and Florinef for BP. AM cortisol.   -    #GI/:  =s/p bilat ureteral stent placements, coronel removed, ureteral stents removed as outpatient with Urology. Advance diet.   Needs TOV after seen by PT.  Will ask Urology to reconsult. Agree change to Cipro for urine culture.         DVT Lovenox 40 mg/day.     Discharge home Friday AM with home care. Spoke to wife yesterday, all questions were answered.   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 Course c/b development of hypotension/hypothermia/bradycaria concerning for relative adrenal insufficiency vs septic shock due to UTI. Pt refractory to IVF therapy requiring phenylephrine gtt and transfer to MICU for septic shock secondary to UTI in setting of adrenal insufficiency now off pressors since 7/14      #Neuro:  =hx of cerebellar ataxia, chronic lacunar infarcts, leptomeningeal enhancements, all stable. Physical therapy as tolerated.   All chronic conditions.         #CV:  =bradycardia stable, ECG demonstrating first degree block but otherwise asymtomatic bradycardia, IVF on maintenance. Monitor.  Endo r/o   adrenal insufficiency via stim test. No role for prednisone or Florinef.    -    #GI/:  =s/p bilat ureteral stent placements, coronel removed, ureteral stents need to be removed as outpatient with Urology. Advance diet.   Needs TOV after seen by PT.  Will ask Urology to reconsult. Cipro is completed.          DVT Lovenox 40 mg/day.     Discharge home when stable. Spoke to wife yesterday, all questions were answered.   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 Course c/b development of hypotension/hypothermia/bradycaria concerning for relative adrenal insufficiency vs septic shock due to UTI. Pt refractory to IVF therapy requiring phenylephrine gtt and transfer to MICU for septic shock secondary to UTI in setting of adrenal insufficiency now off pressors since 7/14      #Neuro:  =hx of cerebellar ataxia, chronic lacunar infarcts, leptomeningeal enhancements, all stable. Physical therapy as tolerated.   All chronic conditions.         #CV:  =bradycardia stable, ECG demonstrating first degree block but otherwise asymtomatic bradycardia, IVF on maintenance. Monitor.  Endo r/o   adrenal insufficiency via stim test. No role for prednisone or Florinef.      CBC and SMA-7 all normal.   -    #GI/:  =s/p bilat ureteral stent placements, coronel removed, ureteral stents need to be removed as outpatient with Urology. Advance diet.   Needs TOV after seen by PT.  Will ask Urology to reconsult. Cipro is completed.          DVT Lovenox 40 mg/day.     Discharge home when stable. Spoke to wife yesterday, all questions were answered.   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 Course c/b development of hypotension/hypothermia/bradycaria concerning for relative adrenal insufficiency vs septic shock due to UTI. Pt refractory to IVF therapy requiring phenylephrine gtt and transfer to MICU for septic shock secondary to UTI in setting of adrenal insufficiency now off pressors since 7/14      #Neuro:  =hx of cerebellar ataxia, chronic lacunar infarcts, leptomeningeal enhancements, all stable. Physical therapy as tolerated.   All chronic conditions.         #CV:  =bradycardia stable, ECG demonstrating first degree block but otherwise asymtomatic bradycardia, IVF to stop. Monitor.  Endo r/o   adrenal insufficiency via stim test. No role for prednisone. Can try Florinef to support BP.       CBC and SMA-7 all normal.   -    #GI/:  =s/p bilat ureteral stent placements, coronel removed, ureteral stents need to be removed as outpatient with Urology. Advance diet.   Needs TOV after seen by PT.  Will ask Urology to reconsult. Cipro is completed.          DVT Lovenox 40 mg/day.     Discharge home when stable. Spoke to wife yesterday, all questions were answered.

## 2022-07-26 NOTE — PROVIDER CONTACT NOTE (OTHER) - NAME OF MD/NP/PA/DO NOTIFIED:
EVERETTE Skinner NP
SUNIL Barrios
PA Dione Gutierrez> RRT
[place]
Jody Bassett, NP
SUNIL Orellana
YOSELIN Riddle
Kenisha Redd
SUNIL Stratton
Silvino Schaefer
Bradly

## 2022-07-27 ENCOUNTER — TRANSCRIPTION ENCOUNTER (OUTPATIENT)
Age: 62
End: 2022-07-27

## 2022-07-27 VITALS
OXYGEN SATURATION: 100 % | SYSTOLIC BLOOD PRESSURE: 104 MMHG | HEART RATE: 50 BPM | DIASTOLIC BLOOD PRESSURE: 61 MMHG | TEMPERATURE: 93 F | RESPIRATION RATE: 18 BRPM

## 2022-07-27 LAB
GLUCOSE BLDC GLUCOMTR-MCNC: 80 MG/DL — SIGNIFICANT CHANGE UP (ref 70–99)
GLUCOSE BLDC GLUCOMTR-MCNC: 89 MG/DL — SIGNIFICANT CHANGE UP (ref 70–99)

## 2022-07-27 RX ORDER — SENNA PLUS 8.6 MG/1
2 TABLET ORAL
Qty: 0 | Refills: 0 | DISCHARGE
Start: 2022-07-27

## 2022-07-27 RX ORDER — ASPIRIN/CALCIUM CARB/MAGNESIUM 324 MG
1 TABLET ORAL
Qty: 0 | Refills: 0 | DISCHARGE

## 2022-07-27 RX ORDER — ATORVASTATIN CALCIUM 80 MG/1
1 TABLET, FILM COATED ORAL
Qty: 0 | Refills: 0 | DISCHARGE

## 2022-07-27 RX ADMIN — MIDODRINE HYDROCHLORIDE 5 MILLIGRAM(S): 2.5 TABLET ORAL at 05:41

## 2022-07-27 RX ADMIN — Medication 500 MILLIGRAM(S): at 12:37

## 2022-07-27 RX ADMIN — Medication 100 MILLIGRAM(S): at 12:37

## 2022-07-27 RX ADMIN — Medication 1 TABLET(S): at 12:37

## 2022-07-27 RX ADMIN — FLUDROCORTISONE ACETATE 0.1 MILLIGRAM(S): 0.1 TABLET ORAL at 05:41

## 2022-07-27 RX ADMIN — MIDODRINE HYDROCHLORIDE 5 MILLIGRAM(S): 2.5 TABLET ORAL at 12:38

## 2022-07-27 RX ADMIN — ENOXAPARIN SODIUM 40 MILLIGRAM(S): 100 INJECTION SUBCUTANEOUS at 12:38

## 2022-07-27 NOTE — DISCHARGE NOTE PROVIDER - DETAILS OF MALNUTRITION DIAGNOSIS/DIAGNOSES
This patient has been assessed with a concern for Malnutrition and was treated during this hospitalization for the following Nutrition diagnosis/diagnoses:     -  07/16/2022: Moderate protein-calorie malnutrition

## 2022-07-27 NOTE — PROGRESS NOTE ADULT - SUBJECTIVE AND OBJECTIVE BOX
INTERVAL HPI/OVERNIGHT EVENTS:  Pt seen and examined at bedside.     Allergies/Intolerance: No Known Allergies      MEDICATIONS  (STANDING):  ascorbic acid 500 milliGRAM(s) Oral daily  enoxaparin Injectable 40 milliGRAM(s) SubCutaneous every 24 hours  fludroCORTISONE 0.1 milliGRAM(s) Oral two times a day  insulin lispro (ADMELOG) corrective regimen sliding scale   SubCutaneous three times a day with meals  insulin lispro (ADMELOG) corrective regimen sliding scale   SubCutaneous at bedtime  midodrine. 5 milliGRAM(s) Oral three times a day  multivitamin 1 Tablet(s) Oral daily  potassium chloride    Tablet ER 20 milliEquivalent(s) Oral daily  senna 2 Tablet(s) Oral at bedtime  thiamine 100 milliGRAM(s) Oral daily    MEDICATIONS  (PRN):        ROS: all systems reviewed and wnl      PHYSICAL EXAMINATION:  Vital Signs Last 24 Hrs  T(C): 36.3 (27 Jul 2022 04:58), Max: 37 (26 Jul 2022 20:00)  T(F): 97.3 (27 Jul 2022 04:58), Max: 98.6 (26 Jul 2022 20:00)  HR: 42 (27 Jul 2022 04:58) (42 - 65)  BP: 153/75 (27 Jul 2022 04:58) (77/54 - 153/75)  BP(mean): --  RR: 18 (27 Jul 2022 04:58) (17 - 18)  SpO2: 98% (27 Jul 2022 04:58) (98% - 100%)    Parameters below as of 27 Jul 2022 04:58  Patient On (Oxygen Delivery Method): room air      CAPILLARY BLOOD GLUCOSE      POCT Blood Glucose.: 89 mg/dL (27 Jul 2022 07:33)  POCT Blood Glucose.: 88 mg/dL (26 Jul 2022 21:13)  POCT Blood Glucose.: 102 mg/dL (26 Jul 2022 17:18)  POCT Blood Glucose.: 100 mg/dL (26 Jul 2022 12:18)      07-26 @ 07:01  -  07-27 @ 07:00  --------------------------------------------------------  IN: 480 mL / OUT: 0 mL / NET: 480 mL    07-27 @ 07:01  -  07-27 @ 10:57  --------------------------------------------------------  IN: 240 mL / OUT: 300 mL / NET: -60 mL        GENERAL: stable this AM, comfortable, alert, coronel removed  NECK: supple, No JVD  CHEST/LUNG: clear to auscultation bilaterally; no rales, rhonchi, or wheezing b/l  HEART: normal S1, S2  ABDOMEN: BS+, soft, ND, NT   EXTREMITIES:  pulses palpable; no clubbing, cyanosis, or edema b/l LEs  SKIN: no rashes or lesions      LABS:                        11.3   9.33  )-----------( 143      ( 26 Jul 2022 06:51 )             35.4

## 2022-07-27 NOTE — DISCHARGE NOTE PROVIDER - NSDCMRMEDTOKEN_GEN_ALL_CORE_FT
ascorbic acid 500 mg oral tablet: 1 tab(s) orally once a day  fludrocortisone 0.1 mg oral tablet: 1 tab(s) orally 2 times a day  midodrine 5 mg oral tablet: 1 tab(s) orally 3 times a day  Multiple Vitamins oral tablet: 1 tab(s) orally once a day  senna leaf extract oral tablet: 2 tab(s) orally once a day (at bedtime)  thiamine 100 mg oral tablet: 1 tab(s) orally once a day

## 2022-07-27 NOTE — PROGRESS NOTE ADULT - REASON FOR ADMISSION
UTI

## 2022-07-27 NOTE — PROGRESS NOTE ADULT - SUBJECTIVE AND OBJECTIVE BOX
CARDIOLOGY     PROGRESS  NOTE   ________________________________________________    CHIEF COMPLAINT:Patient is a 61y old  Male who presents with a chief complaint of UTI (26 Jul 2022 09:30)  no complain.  	  REVIEW OF SYSTEMS:  CONSTITUTIONAL: No fever, weight loss, or fatigue  EYES: No eye pain, visual disturbances, or discharge  ENT:  No difficulty hearing, tinnitus, vertigo; No sinus or throat pain  NECK: No pain or stiffness  RESPIRATORY: No cough, wheezing, chills or hemoptysis; No Shortness of Breath  CARDIOVASCULAR: No chest pain, palpitations, passing out, dizziness, or leg swelling  GASTROINTESTINAL: No abdominal or epigastric pain. No nausea, vomiting, or hematemesis; No diarrhea or constipation. No melena or hematochezia.  GENITOURINARY: No dysuria, frequency, hematuria, or incontinence  NEUROLOGICAL: No headaches, memory loss, loss of strength, numbness, or tremors  SKIN: No itching, burning, rashes, or lesions   LYMPH Nodes: No enlarged glands  ENDOCRINE: No heat or cold intolerance; No hair loss  MUSCULOSKELETAL: No joint pain or swelling; No muscle, back, or extremity pain  PSYCHIATRIC: No depression, anxiety, mood swings, or difficulty sleeping  HEME/LYMPH: No easy bruising, or bleeding gums  ALLERGY AND IMMUNOLOGIC: No hives or eczema	    [ ] All others negative	  [ ] Unable to obtain    PHYSICAL EXAM:  T(C): 36.3 (07-27-22 @ 04:58), Max: 37 (07-26-22 @ 08:56)  HR: 42 (07-27-22 @ 04:58) (42 - 65)  BP: 153/75 (07-27-22 @ 04:58) (77/54 - 153/75)  RR: 18 (07-27-22 @ 04:58) (17 - 18)  SpO2: 98% (07-27-22 @ 04:58) (98% - 100%)  Wt(kg): --  I&O's Summary    26 Jul 2022 07:01  -  27 Jul 2022 07:00  --------------------------------------------------------  IN: 480 mL / OUT: 0 mL / NET: 480 mL        Appearance: Normal	  HEENT:   Normal oral mucosa, PERRL, EOMI	  Lymphatic: No lymphadenopathy  Cardiovascular: Normal S1 S2, No JVD, + murmurs, No edema  Respiratory: rhonchi  Psychiatry: A & O x 3, Mood & affect appropriate  Gastrointestinal:  Soft, Non-tender, + BS	  Skin: No rashes, No ecchymoses, No cyanosis	  Neurologic: Non-focal  Extremities: Normal range of motion, No clubbing, cyanosis or edema  Vascular: Peripheral pulses palpable 2+ bilaterally    MEDICATIONS  (STANDING):  ascorbic acid 500 milliGRAM(s) Oral daily  enoxaparin Injectable 40 milliGRAM(s) SubCutaneous every 24 hours  fludroCORTISONE 0.1 milliGRAM(s) Oral two times a day  insulin lispro (ADMELOG) corrective regimen sliding scale   SubCutaneous three times a day with meals  insulin lispro (ADMELOG) corrective regimen sliding scale   SubCutaneous at bedtime  midodrine. 5 milliGRAM(s) Oral three times a day  multivitamin 1 Tablet(s) Oral daily  potassium chloride    Tablet ER 20 milliEquivalent(s) Oral daily  senna 2 Tablet(s) Oral at bedtime  thiamine 100 milliGRAM(s) Oral daily      TELEMETRY: 	    ECG:  	  RADIOLOGY:  OTHER: 	  	  LABS:	 	    CARDIAC MARKERS:                                11.3   9.33  )-----------( 143      ( 26 Jul 2022 06:51 )             35.4           proBNP:   Lipid Profile:   HgA1c:   TSH: Thyroid Stimulating Hormone, Serum: 2.94 uIU/mL (07-22 @ 07:17)  Thyroid Stimulating Hormone, Serum: 0.60 uIU/mL (07-14 @ 06:04)          Assessment and plan  ---------------------------  61M w/ hx of cerebellar ataxia with rapid neurocognitive decline (baseline AOx1-2, minimally conversant) recent prolonged admission including MICU stay/ intubation from 4/18-5/13 for AMS and airway protection, recent admission for UTI treated with 5 day course of CTX, brought in by wife for decreased PO intake and lethargy for several days with "white and thick" and foul smelling urine since 7/8. Hx obtained from wife due to patient's encephalopathy. PCP recently prescribed cipro 500 mg BID x 3 days (pt took 2 days) and wife thinks urine has improved since then. However, he has been eating less since and has been lethargic with minimal responsiveness which prompted the ED evaluation. ROS limited due to patient's mental status. Wife has noted pt has been constipated and gave him a suppository and he subsequently had one bowel movement. She noted a few red specks but no overt bleeding or black stool.  hypotension ?sec to sepsis/ pt with hx of low bp  bradycardia ,observe  dvt prophylaxis  am cortisol level, started on steroid in MICU  iv hydration  tsh  continue abx  will taper hydrocortisone slowly as per medicine, bp is improving  fu hr and bp closely  replete K , keep k>4  taper steroid slowly  may switch midodrine to florinef if sig bradycardia on midodrine  decrease midodrine

## 2022-07-27 NOTE — DISCHARGE NOTE PROVIDER - NSDCFUSCHEDAPPT_GEN_ALL_CORE_FT
Abimael Daigle  [facility]  UROLOGY [address]  Scheduled Appointment: 08/08/2022    Pablito Trotter  [facility]  [address] [address] [facility]  Scheduled Appointment: 10/07/2022

## 2022-07-27 NOTE — PROGRESS NOTE ADULT - PROVIDER SPECIALTY LIST ADULT
Cardiology
Cardiology
Hospitalist
Infectious Disease
Infectious Disease
Cardiology
Hospitalist
Internal Medicine
Urology
Cardiology
Hospitalist
Infectious Disease
MICU
Urology
Urology
Endocrinology
Hospitalist
Internal Medicine
Urology
Endocrinology
Internal Medicine
Internal Medicine

## 2022-07-27 NOTE — DISCHARGE NOTE PROVIDER - CARE PROVIDER_API CALL
Abimael Daigle)  Urology  [address] [place] [address], [place] [address]  Phone: [phone]  Fax: [phone]  Follow Up Time:    Abimael Daigle)  Urology  [address] [facility], [address]  [place] [address]  Phone: [phone]  Fax: [phone]  Follow Up Time:     Yolanda Peralta)  Internal Medicine  [address] [place] [address]  Phone: [phone]  Fax: [phone]  Follow Up Time:

## 2022-07-27 NOTE — DISCHARGE NOTE PROVIDER - CARE PROVIDERS DIRECT ADDRESSES
[email or web address]

## 2022-07-27 NOTE — PROGRESS NOTE ADULT - ASSESSMENT
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 Course c/b development of hypotension/hypothermia/bradycaria concerning for relative adrenal insufficiency vs septic shock due to UTI. Pt refractory to IVF therapy requiring phenylephrine gtt and transfer to MICU for septic shock secondary to UTI in setting of adrenal insufficiency now off pressors since 7/14      #Neuro:  =hx of cerebellar ataxia, chronic lacunar infarcts, leptomeningeal enhancements, all stable. Physical therapy as tolerated.   All chronic conditions.         #CV:  =bradycardia stable, ECG demonstrating first degree block but otherwise asymtomatic bradycardia, IVF to stop. Monitor.  Endo r/o   adrenal insufficiency via stim test. No role for prednisone. Can try Florinef to support BP.       CBC and SMA-7 all normal.   -    #GI/:  =s/p bilat ureteral stent placements, coronel removed, ureteral stents need to be removed as outpatient with Urology. Advance diet.   Needs TOV after seen by PT.  Will ask Urology to reconsult. Cipro is completed.          DVT Lovenox 40 mg/day.     Discharge home when BP stable. Spoke to wife yesterday, all questions were answered.  Low BP likely from autonomic insufficiency.

## 2022-07-27 NOTE — DISCHARGE NOTE PROVIDER - HOSPITAL COURSE
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 Course c/b development of hypotension/hypothermia/bradycaria concerning for relative adrenal insufficiency vs septic shock due to UTI. Pt refractory to IVF therapy requiring phenylephrine gtt and transfer to MICU for septic shock secondary to UTI in setting of adrenal insufficiency now off pressors since 7/14      Admitted to MICU for hypotension w/bradycardia s/p b/l ureteral stents. Patient was initially not fluid responsive requiring Ad for BP support. Patient was weaned off Ad on 7/14.  EKG revealed first degree block and patient was asymtomatic, off ad. In addition patient's bradycardia improved on 7/15. Patient also received hydrocortisone 100mg IVP as well. At this time the patient no longer requires MICU level of care      61 yr old male with stated hx significant for cerebellar ataxia, chronic lacuna infarcts, leptomeningeal enhancement (MRI 4/2022), recent hospitalizations 4/2022 for Asp pneum/relative adrenal insufficiency/sepsis and 6/2022 for UTI who now is admitted for sepsis secondary to UTI, found to have new Rt renal collecting system dilatation, 8.1 mm Rt prox ureter calculus and Rt hydronephrosis requiring placement of bilat ureteral stent placement. Course c/b development of hypotension/hypothermia/bradycaria concerning for relative adrenal insufficiency vs septic shock due to UTI. Pt refractory to IVF therapy requiring phenylephrine gtt and transfer to MICU for septic shock secondary to UTI in setting of adrenal insufficiency now off pressors since 7/14      Admitted to MICU for hypotension w/bradycardia s/p b/l ureteral stents. Patient was initially not fluid responsive requiring Ad for BP support. Patient was weaned off Ad on 7/14.  EKG revealed first degree block and patient was asymtomatic, off ad. In addition patient's bradycardia improved on 7/15. Patient also received hydrocortisone 100mg IVP as well. At this time the patient no longer requires MICU level of care  Patient was transferred to floor.    Noted bradycardia, hypotension and hypothermia. Now stable.   ECG demonstrating first degree block but otherwise asymtomatic bradycardia.  Endocrine  r/o   adrenal insufficiency via stim test. No role for prednisone. Started  Florinef to support BP.    Low BP likely from autonomic insufficiency.     #GI/:  =s/p bilat ureteral stent placements, coronel removed, ureteral stents need to be removed as outpatient with Urology.   # UTI completed abx    Cleared for discharge as per Dr Correa. Discharge medication reconciliation DW Dr Correa          61 yr old male with stated hx significant for cerebellar ataxia, chronic lacunar infarcts, leptomeningeal enhancement (MRI 4/2022), recent hospitalizations 4/2022 for Asp pneumonia and sepsis and 6/2022 for UTI who now is admitted for sepsis secondary to UTI, found to have new Rt renal collecting system dilatation, 8.1 mm Rt prox ureter calculus and Rt hydronephrosis requiring placement of bilat ureteral stent placement. Course c/b development of hypotension/hypothermia/bradycaria concerning for relative adrenal insufficiency vs septic shock due to UTI. Pt refractory to IVF therapy requiring phenylephrine gtt and transfer to MICU for septic shock secondary to UTI in setting of adrenal insufficiency now off pressors since 7/14      Admitted to MICU for hypotension w/bradycardia s/p b/l ureteral stents. Patient was initially not fluid responsive requiring Ad for BP support. Patient was weaned off Ad on 7/14.  EKG revealed first degree block and patient was asymtomatic, off ad. In addition patient's bradycardia improved on 7/15. Patient also received hydrocortisone 100mg IVP as well. At this time the patient no longer requires MICU level of care and was transferred to floor.    Noted bradycardia, hypotension and hypothermia. Now stable on 8 René. ECG demonstrating first degree block but otherwise asymtomatic bradycardia.  Endocrine  r/o adrenal insufficiency via stim test. No role for prednisone. This patient has no evidence for adrenal insufficiency. Started  Florinef only to support BP.    Low BP likely from autonomic insufficiency.     #GI/:  =s/p bilat ureteral stent placements, coronel removed, ureteral stents need to be removed as outpatient with Urology in 2 weeks after discharge.    # UTI completed all abx, discharge to home with home care. BP is chronically low and may need additional midodrine dose now and then. Wife   is aware and plan was discussed in full with her on multiple occasions.

## 2022-07-27 NOTE — DISCHARGE NOTE PROVIDER - PROVIDER TOKENS
PROVIDER:[TOKEN:[[id number]]] PROVIDER:[TOKEN:[[id number]]],PROVIDER:[TOKEN:[[id number]]]

## 2022-07-27 NOTE — DISCHARGE NOTE PROVIDER - NSDCCPCAREPLAN_GEN_ALL_CORE_FT
PRINCIPAL DISCHARGE DIAGNOSIS  Diagnosis: Severe sepsis with acute organ dysfunction  Assessment and Plan of Treatment: Likely from UTI. Was in MICU. Completed antibiotic. Now stable      SECONDARY DISCHARGE DIAGNOSES  Diagnosis: History of cerebellar ataxia  Assessment and Plan of Treatment: Stable    Diagnosis: Hypotension  Assessment and Plan of Treatment: Noted bradycardia, hypotension and hypothermia. Now stable.   ECG demonstrating first degree block but otherwise asymtomatic bradycardia.  Endocrine  r/o   adrenal insufficiency via stim test. No role for prednisone. Started  Florinef to support BP.    Low BP likely from autonomic insufficiency.   Fall precautions. Change position slowly when out of bed.   Increase po fluid intake. arsen stockings when out of bed  Follow up with your PCP in 3-5 days      Diagnosis: Ureteral stone with hydronephrosis  Assessment and Plan of Treatment: s/p bilat ureteral stent placements, coronel removed, ureteral stents need to be removed as outpatient with Urology. Please follow up with Abimael Moreno at [facility]

## 2022-08-08 ENCOUNTER — APPOINTMENT (OUTPATIENT)
Dept: UROLOGY | Facility: CLINIC | Age: 62
End: 2022-08-08

## 2022-08-08 VITALS
SYSTOLIC BLOOD PRESSURE: 87 MMHG | RESPIRATION RATE: 16 BRPM | TEMPERATURE: 97.9 F | HEART RATE: 82 BPM | HEIGHT: 66 IN | DIASTOLIC BLOOD PRESSURE: 56 MMHG | OXYGEN SATURATION: 97 %

## 2022-08-08 PROCEDURE — 99214 OFFICE O/P EST MOD 30 MIN: CPT

## 2022-08-08 NOTE — ASSESSMENT
[FreeTextEntry1] : Patient is a 62 yo M who presents for f/u of R obs ureteral stone w urosepsis and L intrarenal stones.\par \par S/p b/l ureteral stenting.\par here to schedule definitive stone surgery and stent removal\par D/w pt and wife pt will need med/cards clearance, PST and COVID testing\par OR order placed

## 2022-08-08 NOTE — PHYSICAL EXAM
[General Appearance - Well Developed] : well developed [General Appearance - Well Nourished] : well nourished [Normal Appearance] : normal appearance [Well Groomed] : well groomed [General Appearance - In No Acute Distress] : no acute distress [Urethral Meatus] : meatus normal [Urinary Bladder Findings] : the bladder was normal on palpation [FreeTextEntry1] : wheelchair [Affect] : the affect was normal [Mood] : the mood was normal [Not Anxious] : not anxious

## 2022-08-08 NOTE — SWALLOW BEDSIDE ASSESSMENT ADULT - SLP PRECAUTIONS/LIMITATIONS: VISION
[de-identified] : SHANTANU ZHAO \par \par Acct #- [id number] \par \par \par \par Attention:  Nurse Reviewer /Medical Director\par \par  \par Based on my patient's condition, I strongly believe that the MRI R shoulder is medically.necessary.  \par The patient has failed oral meds, injections and PT and conservative treatment in combination or by themselves and therefore needs the MRI.  \par The MRI will dictate further treatment t recommendations.\par \par  within functional limits

## 2022-08-08 NOTE — HISTORY OF PRESENT ILLNESS
[facility]  [facility]       Chart reviewed, no opportunity found: CHART REVIEWED, NO OPPORTUNITY FOUND                        Patients insurance company: [facility]
[FreeTextEntry1] : 61 yr old male with stated hx significant for cerebellar ataxia, chronic  lacunar infarcts, leptomeningeal enhancement (MRI 4/2022), recent hospitalizations 4/2022 for Asp pneumonia and sepsis and 6/2022 for UTI who now is admitted for sepsis secondary to UTI, found to have new Rt renal collecting  system dilatation, 8.1 mm Rt prox ureter calculus and Rt hydronephrosis requiring placement of bilat ureteral stent placement 7/13/22.\par \par He presents for f/u today.  During post sepsis course, he did require MICU hospitalization and was started on midodrine and fluorinef.  Ultimately not felt to have adrenal insufficiency, but likely like autonomic insufficiency.  EKGs performed for bradycardia which did improve, Echo from 4/2022 normal EF.\par \par He has intermittent gross hematuria from stents.  no fever/chills.\par Here to schedule definitive stone surgery and stent removal.\par \par

## 2022-08-09 ENCOUNTER — INPATIENT (INPATIENT)
Facility: HOSPITAL | Age: 62
LOS: 2 days | Discharge: HOME | DRG: 313 | End: 2022-08-12
Attending: INTERNAL MEDICINE | Admitting: INTERNAL MEDICINE
Payer: MEDICAID

## 2022-08-09 VITALS
RESPIRATION RATE: 18 BRPM | SYSTOLIC BLOOD PRESSURE: 84 MMHG | OXYGEN SATURATION: 100 % | WEIGHT: 173.06 LBS | HEART RATE: 61 BPM | TEMPERATURE: 98 F | HEIGHT: 66 IN | DIASTOLIC BLOOD PRESSURE: 51 MMHG

## 2022-08-09 DIAGNOSIS — Z86.79 PERSONAL HISTORY OF OTHER DISEASES OF THE CIRCULATORY SYSTEM: ICD-10-CM

## 2022-08-09 DIAGNOSIS — E87.6 HYPOKALEMIA: ICD-10-CM

## 2022-08-09 DIAGNOSIS — R07.9 CHEST PAIN, UNSPECIFIED: ICD-10-CM

## 2022-08-09 DIAGNOSIS — Z86.69 PERSONAL HISTORY OF OTHER DISEASES OF THE NERVOUS SYSTEM AND SENSE ORGANS: ICD-10-CM

## 2022-08-09 DIAGNOSIS — R74.8 ABNORMAL LEVELS OF OTHER SERUM ENZYMES: ICD-10-CM

## 2022-08-09 LAB
A1C WITH ESTIMATED AVERAGE GLUCOSE RESULT: 5.3 % — SIGNIFICANT CHANGE UP (ref 4–5.6)
ALBUMIN SERPL ELPH-MCNC: 3 G/DL — LOW (ref 3.3–5)
ALP SERPL-CCNC: 186 U/L — HIGH (ref 40–120)
ALT FLD-CCNC: 28 U/L — SIGNIFICANT CHANGE UP (ref 10–45)
ANION GAP SERPL CALC-SCNC: 13 MMOL/L — SIGNIFICANT CHANGE UP (ref 5–17)
AST SERPL-CCNC: 72 U/L — HIGH (ref 10–40)
BASOPHILS # BLD AUTO: 0.01 K/UL — SIGNIFICANT CHANGE UP (ref 0–0.2)
BASOPHILS NFR BLD AUTO: 0.2 % — SIGNIFICANT CHANGE UP (ref 0–2)
BILIRUB SERPL-MCNC: 1 MG/DL — SIGNIFICANT CHANGE UP (ref 0.2–1.2)
BUN SERPL-MCNC: 11 MG/DL — SIGNIFICANT CHANGE UP (ref 7–23)
CALCIUM SERPL-MCNC: 8.5 MG/DL — SIGNIFICANT CHANGE UP (ref 8.4–10.5)
CHLORIDE SERPL-SCNC: 107 MMOL/L — SIGNIFICANT CHANGE UP (ref 96–108)
CO2 SERPL-SCNC: 25 MMOL/L — SIGNIFICANT CHANGE UP (ref 22–31)
CREAT SERPL-MCNC: 0.77 MG/DL — SIGNIFICANT CHANGE UP (ref 0.5–1.3)
EGFR: 102 ML/MIN/1.73M2 — SIGNIFICANT CHANGE UP
EOSINOPHIL # BLD AUTO: 0.25 K/UL — SIGNIFICANT CHANGE UP (ref 0–0.5)
EOSINOPHIL NFR BLD AUTO: 4.9 % — SIGNIFICANT CHANGE UP (ref 0–6)
ESTIMATED AVERAGE GLUCOSE: 105 MG/DL — SIGNIFICANT CHANGE UP (ref 68–114)
GLUCOSE SERPL-MCNC: 111 MG/DL — HIGH (ref 70–99)
HCT VFR BLD CALC: 31.1 % — LOW (ref 39–50)
HGB BLD-MCNC: 10.1 G/DL — LOW (ref 13–17)
IMM GRANULOCYTES NFR BLD AUTO: 0.2 % — SIGNIFICANT CHANGE UP (ref 0–1.5)
LYMPHOCYTES # BLD AUTO: 0.81 K/UL — LOW (ref 1–3.3)
LYMPHOCYTES # BLD AUTO: 15.9 % — SIGNIFICANT CHANGE UP (ref 13–44)
MAGNESIUM SERPL-MCNC: 1.9 MG/DL — SIGNIFICANT CHANGE UP (ref 1.6–2.6)
MCHC RBC-ENTMCNC: 28.9 PG — SIGNIFICANT CHANGE UP (ref 27–34)
MCHC RBC-ENTMCNC: 32.5 GM/DL — SIGNIFICANT CHANGE UP (ref 32–36)
MCV RBC AUTO: 88.9 FL — SIGNIFICANT CHANGE UP (ref 80–100)
MONOCYTES # BLD AUTO: 0.48 K/UL — SIGNIFICANT CHANGE UP (ref 0–0.9)
MONOCYTES NFR BLD AUTO: 9.4 % — SIGNIFICANT CHANGE UP (ref 2–14)
NEUTROPHILS # BLD AUTO: 3.53 K/UL — SIGNIFICANT CHANGE UP (ref 1.8–7.4)
NEUTROPHILS NFR BLD AUTO: 69.4 % — SIGNIFICANT CHANGE UP (ref 43–77)
NRBC # BLD: 0 /100 WBCS — SIGNIFICANT CHANGE UP (ref 0–0)
NT-PROBNP SERPL-SCNC: 267 PG/ML — SIGNIFICANT CHANGE UP (ref 0–300)
PLATELET # BLD AUTO: 155 K/UL — SIGNIFICANT CHANGE UP (ref 150–400)
POTASSIUM SERPL-MCNC: 3.1 MMOL/L — LOW (ref 3.5–5.3)
POTASSIUM SERPL-SCNC: 3.1 MMOL/L — LOW (ref 3.5–5.3)
PROT SERPL-MCNC: 5.8 G/DL — LOW (ref 6–8.3)
RBC # BLD: 3.5 M/UL — LOW (ref 4.2–5.8)
RBC # FLD: 15.3 % — HIGH (ref 10.3–14.5)
SARS-COV-2 RNA SPEC QL NAA+PROBE: SIGNIFICANT CHANGE UP
SODIUM SERPL-SCNC: 145 MMOL/L — SIGNIFICANT CHANGE UP (ref 135–145)
TROPONIN T, HIGH SENSITIVITY RESULT: 19 NG/L — SIGNIFICANT CHANGE UP (ref 0–51)
WBC # BLD: 5.09 K/UL — SIGNIFICANT CHANGE UP (ref 3.8–10.5)
WBC # FLD AUTO: 5.09 K/UL — SIGNIFICANT CHANGE UP (ref 3.8–10.5)

## 2022-08-09 PROCEDURE — 71045 X-RAY EXAM CHEST 1 VIEW: CPT | Mod: 26

## 2022-08-09 PROCEDURE — 93010 ELECTROCARDIOGRAM REPORT: CPT

## 2022-08-09 PROCEDURE — 99222 1ST HOSP IP/OBS MODERATE 55: CPT

## 2022-08-09 PROCEDURE — 99285 EMERGENCY DEPT VISIT HI MDM: CPT | Mod: 25

## 2022-08-09 PROCEDURE — 93306 TTE W/DOPPLER COMPLETE: CPT | Mod: 26

## 2022-08-09 RX ORDER — ENOXAPARIN SODIUM 100 MG/ML
40 INJECTION SUBCUTANEOUS EVERY 24 HOURS
Refills: 0 | Status: DISCONTINUED | OUTPATIENT
Start: 2022-08-09 | End: 2022-08-12

## 2022-08-09 RX ORDER — FLUDROCORTISONE ACETATE 0.1 MG/1
0.1 TABLET ORAL
Refills: 0 | Status: DISCONTINUED | OUTPATIENT
Start: 2022-08-09 | End: 2022-08-12

## 2022-08-09 RX ORDER — ASPIRIN/CALCIUM CARB/MAGNESIUM 324 MG
81 TABLET ORAL DAILY
Refills: 0 | Status: DISCONTINUED | OUTPATIENT
Start: 2022-08-10 | End: 2022-08-11

## 2022-08-09 RX ORDER — ASCORBIC ACID 60 MG
500 TABLET,CHEWABLE ORAL DAILY
Refills: 0 | Status: DISCONTINUED | OUTPATIENT
Start: 2022-08-09 | End: 2022-08-12

## 2022-08-09 RX ORDER — POTASSIUM CHLORIDE 20 MEQ
40 PACKET (EA) ORAL ONCE
Refills: 0 | Status: COMPLETED | OUTPATIENT
Start: 2022-08-09 | End: 2022-08-09

## 2022-08-09 RX ORDER — MIDODRINE HYDROCHLORIDE 2.5 MG/1
5 TABLET ORAL THREE TIMES A DAY
Refills: 0 | Status: DISCONTINUED | OUTPATIENT
Start: 2022-08-09 | End: 2022-08-12

## 2022-08-09 RX ORDER — THIAMINE MONONITRATE (VIT B1) 100 MG
100 TABLET ORAL DAILY
Refills: 0 | Status: DISCONTINUED | OUTPATIENT
Start: 2022-08-09 | End: 2022-08-12

## 2022-08-09 RX ADMIN — FLUDROCORTISONE ACETATE 0.1 MILLIGRAM(S): 0.1 TABLET ORAL at 19:15

## 2022-08-09 RX ADMIN — ENOXAPARIN SODIUM 40 MILLIGRAM(S): 100 INJECTION SUBCUTANEOUS at 16:31

## 2022-08-09 RX ADMIN — MIDODRINE HYDROCHLORIDE 5 MILLIGRAM(S): 2.5 TABLET ORAL at 16:31

## 2022-08-09 RX ADMIN — Medication 40 MILLIEQUIVALENT(S): at 16:31

## 2022-08-09 NOTE — ED PROVIDER NOTE - WR ORDER NAME 1
medical record; mother and family at bedside/family/significant other/other (specify)
Xray Chest 1 View- PORTABLE-Urgent

## 2022-08-09 NOTE — H&P ADULT - NSHPREVIEWOFSYSTEMS_GEN_ALL_CORE
Review of Systems:   CONSTITUTIONAL: No fever, weight loss  EYES: No eye pain, visual disturbances, or discharge  ENMT:  No difficulty hearing, tinnitus, vertigo; No sinus or throat pain  RESPIRATORY: No SOB. No cough, wheezing, chills or hemoptysis  CARDIOVASCULAR: Admits to chest pain, No palpitations, dizziness, or leg swelling  GASTROINTESTINAL: No abdominal or epigastric pain. No nausea, vomiting, or hematemesis; No diarrhea or constipation. No melena or hematochezia.  GENITOURINARY: No dysuria, frequency, hematuria, or incontinence  NEUROLOGICAL: No headaches, no worsening of strength, numbness, or tremors  SKIN: No itching, burning, rashes, or lesions   LYMPH NODES: No enlarged glands  ENDOCRINE: No heat or cold intolerance; No hair loss  MUSCULOSKELETAL: No joint pain or swelling; No muscle, back pain  PSYCHIATRIC: No depression, anxiety, mood swings, or difficulty sleeping  HEME/LYMPH: No easy bruising, or bleeding gums

## 2022-08-09 NOTE — ED ADULT NURSE NOTE - OBJECTIVE STATEMENT
61 y M PMH PMH of cerebellar ataxia recent prolonged admission including MICU stay/ intubation from 4/18-5/13 for AMS and airway protection, recent b/l ureteral stent placement for sepsis 2/2 UTI BIB EMS for chest pain. States CP started this morning, midsternal, non-radiating, now resolved, received 324mg ASA from EMS. Hypotensive to 80s upon arrival but spouse at bedside states this is his normal BP and is on home midodrine. Spouse does note that the past week he has been having temps of 99.5-99.8. Afebrile rectally here. Denies CP, SOB, n/v/d, fevers, chills, abdominal pain,  weakness, fatigue, numbness, tingling in upper and lower extremities, HA, blurry vision. Updated on plan of care.

## 2022-08-09 NOTE — ED ADULT NURSE REASSESSMENT NOTE - NS ED NURSE REASSESS COMMENT FT1
pt medicated per MD orders, repeat labs drawn and sent, remains on cardiac monitor, awaiting bed to tele, no new complaints, will continue to monitor pt

## 2022-08-09 NOTE — H&P ADULT - NSHPPHYSICALEXAM_GEN_ALL_CORE
Vital Signs Last 24 Hrs  T(C): 37.1 (09 Aug 2022 14:54), Max: 37.2 (09 Aug 2022 12:37)  T(F): 98.8 (09 Aug 2022 14:54), Max: 98.9 (09 Aug 2022 12:37)  HR: 53 (09 Aug 2022 14:54) (53 - 61)  BP: 95/59 (09 Aug 2022 14:54) (84/51 - 95/59)  BP(mean): 72 (09 Aug 2022 14:54) (72 - 76)  RR: 19 (09 Aug 2022 14:54) (18 - 19)  SpO2: 98% (09 Aug 2022 14:54) (98% - 100%)    Parameters below as of 09 Aug 2022 14:54  Patient On (Oxygen Delivery Method): room air        CONSTITUTIONAL: Well-groomed, in no apparent distress  EYES: No conjunctival or scleral injection, non-icteric; PERRLA and symmetric  ENMT: No external nasal lesions; nasal mucosa not inflamed;  no pharyngeal injection or exudates, oral mucosa with moist membranes  NECK: Trachea midline without palpable neck mass  RESPIRATORY: Breathing comfortably; no dullness to percussion; lungs CTA without wheeze/rhonchi/rales  CARDIOVASCULAR: +S1S2, bradycardic, no M/G/R; no carotid bruits; pedal pulses full and symmetric; no lower extremity edema  GASTROINTESTINAL: No palpable masses or tenderness, +BS throughout, no rebound/guarding; no hepatosplenomegaly  MUSCULOSKELETAL: No digital clubbing or cyanosis; no paraspinal tenderness; 3/5 muscle strength in LE b/l (baseline), 5/5 strength in b/l UE.   SKIN: No rashes or ulcers noted; no subcutaneous nodules or induration palpable  NEUROLOGIC: CN II-XII intact; normal reflexes in upper and lower extremities; sensation intact in LEs b/l to light touch  PSYCHIATRIC: A+O x 2 (self and location); Answers questions appropriately and follows commands. Some confusing regarding medical history. Mood and affect appropriate

## 2022-08-09 NOTE — H&P ADULT - ASSESSMENT
62 y/o M w/ PMHx of cerebellar ataxia, chronic lacunar infarcts, hypotension on midodrine, no prior cardiac hx, nonsmoker presenting to the hospital with one day history of atypical chest pain that has resolved prior to admission with non-ischemic EKG and neg troponin, Admitted for TTE.

## 2022-08-09 NOTE — ED ADULT TRIAGE NOTE - NS ED TRIAGE AVPU SCALE
Alert-The patient is alert, awake and responds to voice. The patient is oriented to time, place, and person. The triage nurse is able to obtain subjective information. independent

## 2022-08-09 NOTE — H&P ADULT - PROBLEM SELECTOR PLAN 5
Alk phos 186, AST 72, ALT 28 on admission. No abdominal pain, n/v. Trend alk phos in AM. If persistently elevated, consider further evaluation/US.

## 2022-08-09 NOTE — ED PROVIDER NOTE - PHYSICAL EXAMINATION
GENERAL: NAD  HEENT:  Atraumatic  CHEST/LUNG: Chest rise equal bilaterally  HEART: Regular rate and rhythm  ABDOMEN: Soft, Nontender, Nondistended  EXTREMITIES:  Extremities warm  PSYCH: A&Ox3  SKIN: No obvious rashes or lesions  NEUROLOGY: strength and sensation intact in all extremities.

## 2022-08-09 NOTE — ED PROVIDER NOTE - OBJECTIVE STATEMENT
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 Denies fevers, chills, nausea, vomiting, dizziness, SOB, abdominal pain, dysuria, hematuria.    PCP: Ry Salter MD

## 2022-08-09 NOTE — ED PROVIDER NOTE - ATTENDING CONTRIBUTION TO CARE
Patient presenting complaining of chest pain lasting approximately six hours now resolved.  No similar prior episodes in past.  No known cardiac disease however other extensive comorbidities.  Wife reporting recent echo but no stress/cath.  Non diagnostic exam.  EKG non diagnostic. Given risk factors will r/o ACS with labs/troponin, likely admit vs observe for further risk stratification.

## 2022-08-09 NOTE — ED ADULT NURSE REASSESSMENT NOTE - NS ED NURSE REASSESS COMMENT FT1
remains on cardiac monitor, no new complaints of cp, palps or dizziness, admitted tele, awaiting bed

## 2022-08-09 NOTE — H&P ADULT - NSHPSOCIALHISTORY_GEN_ALL_CORE
Denies tobacco use   Denies ETOH use   Denies any other drug use   Lives at home with wife and 3 adult children   Wheelchair bound

## 2022-08-09 NOTE — H&P ADULT - NSHPLABSRESULTS_GEN_ALL_CORE
H/H 10/31 (at baseline), K 3.1, Alk phos 186, AST 72, ALT 28, Trop 19, EKG reviewed: sinus luksa with 1st degree AV block, Rate 59, , . No acute ischemic changes noted. Noted to be in sinus lukas w/ 1st degree AV block on prior EKG.  CXR image reviewed - without any acute pulmonary processes.  No recent TTE available to be reviewed.  Reviewed prior hospitalization in July and as described in HPI.

## 2022-08-09 NOTE — H&P ADULT - PROBLEM SELECTOR PLAN 2
Hx of hypotension on midodrine 5mg TID and fludrocortisone 0.1mg BID. Per wife, BP at home around 90s/60s.   -C/w home midodrine and fludrocortisone

## 2022-08-09 NOTE — H&P ADULT - HISTORY OF PRESENT ILLNESS
60 y/o M w/ PMHx of cerebellar ataxia, chronic lacunar infarcts, hypotension on midodrine presenting to the hospital with acute onset of chest pain.   Of note, patient had a recent hospitalization on 4/2022 for aspiration PNA/sepsis and on 7/2022 for sepsis/UTI, right ureter calculus with hydronephrosis requiring placement of b/l ureteral stent placement. Since discharge, patient reports doing well at home up until this morning around 7:30 am when he started having chest pain. He said he was sleeping when he felt a left sided squeezing sensation. Rated the pain as a 7/10. Denied any alleviating or provoking factors. Pain had been intermittent for 2-3 hours. Denies any radiation. Denied any shortness of breath, nausea, vomiting, lightheadedness, dizziness. Denies experiencing similar symptoms in the past. Given persistence of his symptoms, EMS was called and was given full dose ASA. On arrival to the ED, his chest pain had resolved. He was afebrile, BP 95/59, HR72, 99% on RA. Labs notable for no WBC elevation, H/H 10/31 which appears to be baseline, K 3.1, Alk phos 186, AST 72, ALT 28, Trop 19, EKG: sinus lukas with 1st degree AV block, Rate 59, , . No acute ischemic changes noted. CXR without any acute pulmonary processes. On admission, patient appears to be comfortable. Alert and oriented to self and location. Able to answer questions appropriately. Some confusing regarding his medical history. Currently denies having any more chest pain since arrival to the ED. Denies having any other acute symptoms. Patient to be admitted to medicine for further management.

## 2022-08-09 NOTE — ED PROVIDER NOTE - CLINICAL SUMMARY MEDICAL DECISION MAKING FREE TEXT BOX
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 Will screen for ACS (troponin), anemia/electrolytes, and chest x ray to screen for cardiopulmonary pathology and reassess w/ likely TBA.

## 2022-08-09 NOTE — H&P ADULT - PROBLEM SELECTOR PLAN 1
61M w/ no prior cardiac history presenting with one day of atypical left sided chest pain that resolved prior to admission. EKG showing sinus lukas with first degree AV block; no acute ischemic changes noted. Troponin 19. Received full dose aspirin by EMS.   -Currently chest pain free   -EKG prn CP   -F/u A1C, lipid panel, TSH   -Consider stress pending TTE results 61M w/ no prior cardiac history presenting with one day of atypical left sided chest pain that resolved prior to admission. EKG showing sinus lukas with first degree AV block; no acute ischemic changes noted. Troponin 19. Received full dose aspirin by EMS.   -Currently chest pain free   -F/u repeat troponin   -EKG prn CP   -F/u A1C, lipid panel, TSH   -Consider stress pending TTE results

## 2022-08-10 LAB
ALBUMIN SERPL ELPH-MCNC: 3.2 G/DL — LOW (ref 3.3–5)
ALP SERPL-CCNC: 323 U/L — HIGH (ref 40–120)
ALT FLD-CCNC: 105 U/L — HIGH (ref 10–45)
ANION GAP SERPL CALC-SCNC: 8 MMOL/L — SIGNIFICANT CHANGE UP (ref 5–17)
ANION GAP SERPL CALC-SCNC: 9 MMOL/L — SIGNIFICANT CHANGE UP (ref 5–17)
AST SERPL-CCNC: 117 U/L — HIGH (ref 10–40)
BASOPHILS # BLD AUTO: 0.01 K/UL — SIGNIFICANT CHANGE UP (ref 0–0.2)
BASOPHILS NFR BLD AUTO: 0.3 % — SIGNIFICANT CHANGE UP (ref 0–2)
BILIRUB DIRECT SERPL-MCNC: 0.2 MG/DL — SIGNIFICANT CHANGE UP (ref 0–0.3)
BILIRUB INDIRECT FLD-MCNC: 0.6 MG/DL — SIGNIFICANT CHANGE UP (ref 0.2–1)
BILIRUB SERPL-MCNC: 0.8 MG/DL — SIGNIFICANT CHANGE UP (ref 0.2–1.2)
BUN SERPL-MCNC: 10 MG/DL — SIGNIFICANT CHANGE UP (ref 7–23)
BUN SERPL-MCNC: 9 MG/DL — SIGNIFICANT CHANGE UP (ref 7–23)
CALCIUM SERPL-MCNC: 8.7 MG/DL — SIGNIFICANT CHANGE UP (ref 8.4–10.5)
CALCIUM SERPL-MCNC: 8.7 MG/DL — SIGNIFICANT CHANGE UP (ref 8.4–10.5)
CHLORIDE SERPL-SCNC: 107 MMOL/L — SIGNIFICANT CHANGE UP (ref 96–108)
CHLORIDE SERPL-SCNC: 107 MMOL/L — SIGNIFICANT CHANGE UP (ref 96–108)
CHOLEST SERPL-MCNC: 193 MG/DL — SIGNIFICANT CHANGE UP
CO2 SERPL-SCNC: 27 MMOL/L — SIGNIFICANT CHANGE UP (ref 22–31)
CO2 SERPL-SCNC: 28 MMOL/L — SIGNIFICANT CHANGE UP (ref 22–31)
CREAT SERPL-MCNC: 0.57 MG/DL — SIGNIFICANT CHANGE UP (ref 0.5–1.3)
CREAT SERPL-MCNC: 0.59 MG/DL — SIGNIFICANT CHANGE UP (ref 0.5–1.3)
EGFR: 111 ML/MIN/1.73M2 — SIGNIFICANT CHANGE UP
EGFR: 112 ML/MIN/1.73M2 — SIGNIFICANT CHANGE UP
EOSINOPHIL # BLD AUTO: 0.28 K/UL — SIGNIFICANT CHANGE UP (ref 0–0.5)
EOSINOPHIL NFR BLD AUTO: 7.7 % — HIGH (ref 0–6)
GLUCOSE SERPL-MCNC: 105 MG/DL — HIGH (ref 70–99)
GLUCOSE SERPL-MCNC: 123 MG/DL — HIGH (ref 70–99)
HCT VFR BLD CALC: 35 % — LOW (ref 39–50)
HDLC SERPL-MCNC: 30 MG/DL — LOW
HGB BLD-MCNC: 11.5 G/DL — LOW (ref 13–17)
LIPID PNL WITH DIRECT LDL SERPL: 145 MG/DL — HIGH
LYMPHOCYTES # BLD AUTO: 0.7 K/UL — LOW (ref 1–3.3)
LYMPHOCYTES # BLD AUTO: 19.2 % — SIGNIFICANT CHANGE UP (ref 13–44)
MAGNESIUM SERPL-MCNC: 1.8 MG/DL — SIGNIFICANT CHANGE UP (ref 1.6–2.6)
MCHC RBC-ENTMCNC: 29.3 PG — SIGNIFICANT CHANGE UP (ref 27–34)
MCHC RBC-ENTMCNC: 32.9 GM/DL — SIGNIFICANT CHANGE UP (ref 32–36)
MCV RBC AUTO: 89.1 FL — SIGNIFICANT CHANGE UP (ref 80–100)
MONOCYTES # BLD AUTO: 0.37 K/UL — SIGNIFICANT CHANGE UP (ref 0–0.9)
MONOCYTES NFR BLD AUTO: 10.1 % — SIGNIFICANT CHANGE UP (ref 2–14)
NEUTROPHILS # BLD AUTO: 2.29 K/UL — SIGNIFICANT CHANGE UP (ref 1.8–7.4)
NEUTROPHILS NFR BLD AUTO: 62.7 % — SIGNIFICANT CHANGE UP (ref 43–77)
NON HDL CHOLESTEROL: 163 MG/DL — HIGH
NRBC # BLD: 0 /100 WBCS — SIGNIFICANT CHANGE UP (ref 0–0)
PLATELET # BLD AUTO: 172 K/UL — SIGNIFICANT CHANGE UP (ref 150–400)
POTASSIUM SERPL-MCNC: 3.4 MMOL/L — LOW (ref 3.5–5.3)
POTASSIUM SERPL-MCNC: 4.1 MMOL/L — SIGNIFICANT CHANGE UP (ref 3.5–5.3)
POTASSIUM SERPL-SCNC: 3.4 MMOL/L — LOW (ref 3.5–5.3)
POTASSIUM SERPL-SCNC: 4.1 MMOL/L — SIGNIFICANT CHANGE UP (ref 3.5–5.3)
PROT SERPL-MCNC: 6.3 G/DL — SIGNIFICANT CHANGE UP (ref 6–8.3)
RBC # BLD: 3.93 M/UL — LOW (ref 4.2–5.8)
RBC # FLD: 15.1 % — HIGH (ref 10.3–14.5)
SODIUM SERPL-SCNC: 142 MMOL/L — SIGNIFICANT CHANGE UP (ref 135–145)
SODIUM SERPL-SCNC: 144 MMOL/L — SIGNIFICANT CHANGE UP (ref 135–145)
T4 AB SER-ACNC: 4.5 UG/DL — LOW (ref 4.6–12)
TRIGL SERPL-MCNC: 91 MG/DL — SIGNIFICANT CHANGE UP
TROPONIN T, HIGH SENSITIVITY RESULT: 13 NG/L — SIGNIFICANT CHANGE UP (ref 0–51)
TSH SERPL-MCNC: 1 UIU/ML — SIGNIFICANT CHANGE UP (ref 0.27–4.2)
WBC # BLD: 3.65 K/UL — LOW (ref 3.8–10.5)
WBC # FLD AUTO: 3.65 K/UL — LOW (ref 3.8–10.5)

## 2022-08-10 PROCEDURE — 99253 IP/OBS CNSLTJ NEW/EST LOW 45: CPT

## 2022-08-10 RX ORDER — POTASSIUM CHLORIDE 20 MEQ
20 PACKET (EA) ORAL
Refills: 0 | Status: COMPLETED | OUTPATIENT
Start: 2022-08-10 | End: 2022-08-10

## 2022-08-10 RX ORDER — MAGNESIUM OXIDE 400 MG ORAL TABLET 241.3 MG
400 TABLET ORAL ONCE
Refills: 0 | Status: COMPLETED | OUTPATIENT
Start: 2022-08-10 | End: 2022-08-10

## 2022-08-10 RX ADMIN — Medication 20 MILLIEQUIVALENT(S): at 13:31

## 2022-08-10 RX ADMIN — FLUDROCORTISONE ACETATE 0.1 MILLIGRAM(S): 0.1 TABLET ORAL at 18:15

## 2022-08-10 RX ADMIN — MIDODRINE HYDROCHLORIDE 5 MILLIGRAM(S): 2.5 TABLET ORAL at 11:14

## 2022-08-10 RX ADMIN — Medication 20 MILLIEQUIVALENT(S): at 11:13

## 2022-08-10 RX ADMIN — Medication 100 MILLIGRAM(S): at 11:14

## 2022-08-10 RX ADMIN — Medication 500 MILLIGRAM(S): at 11:13

## 2022-08-10 RX ADMIN — Medication 81 MILLIGRAM(S): at 11:14

## 2022-08-10 RX ADMIN — ENOXAPARIN SODIUM 40 MILLIGRAM(S): 100 INJECTION SUBCUTANEOUS at 18:14

## 2022-08-10 RX ADMIN — Medication 20 MILLIEQUIVALENT(S): at 08:12

## 2022-08-10 RX ADMIN — MIDODRINE HYDROCHLORIDE 5 MILLIGRAM(S): 2.5 TABLET ORAL at 18:15

## 2022-08-10 RX ADMIN — MIDODRINE HYDROCHLORIDE 5 MILLIGRAM(S): 2.5 TABLET ORAL at 05:33

## 2022-08-10 RX ADMIN — FLUDROCORTISONE ACETATE 0.1 MILLIGRAM(S): 0.1 TABLET ORAL at 05:32

## 2022-08-10 RX ADMIN — MAGNESIUM OXIDE 400 MG ORAL TABLET 400 MILLIGRAM(S): 241.3 TABLET ORAL at 08:12

## 2022-08-10 NOTE — PROGRESS NOTE ADULT - SUBJECTIVE AND OBJECTIVE BOX
INTERVAL HPI/OVERNIGHT EVENTS:  Pt seen and examined at bedside.     Allergies/Intolerance: No Known Allergies      MEDICATIONS  (STANDING):  ascorbic acid 500 milliGRAM(s) Oral daily  aspirin  chewable 81 milliGRAM(s) Oral daily  enoxaparin Injectable 40 milliGRAM(s) SubCutaneous every 24 hours  fludroCORTISONE 0.1 milliGRAM(s) Oral two times a day  midodrine. 5 milliGRAM(s) Oral three times a day  potassium chloride    Tablet ER 20 milliEquivalent(s) Oral every 2 hours  thiamine 100 milliGRAM(s) Oral daily    MEDICATIONS  (PRN):        ROS: all systems reviewed and wnl      PHYSICAL EXAMINATION:  Vital Signs Last 24 Hrs  T(C): 36.6 (10 Aug 2022 05:21), Max: 37.2 (09 Aug 2022 12:37)  T(F): 97.9 (10 Aug 2022 05:21), Max: 98.9 (09 Aug 2022 12:37)  HR: 55 (10 Aug 2022 05:21) (48 - 92)  BP: 98/60 (10 Aug 2022 05:21) (84/51 - 114/70)  BP(mean): 86 (09 Aug 2022 17:31) (72 - 86)  RR: 18 (10 Aug 2022 05:21) (18 - 19)  SpO2: 100% (10 Aug 2022 05:21) (97% - 100%)    Parameters below as of 10 Aug 2022 05:21  Patient On (Oxygen Delivery Method): room air      CAPILLARY BLOOD GLUCOSE          08-09 @ 07:01  -  08-10 @ 07:00  --------------------------------------------------------  IN: 180 mL / OUT: 0 mL / NET: 180 mL        GENERAL:   NECK: supple, No JVD  CHEST/LUNG: clear to auscultation bilaterally; no rales, rhonchi, or wheezing b/l  HEART: normal S1, S2  ABDOMEN: BS+, soft, ND, NT   EXTREMITIES:  pulses palpable; no clubbing, cyanosis, or edema b/l LEs  SKIN: no rashes or lesions      LABS:                        11.5   3.65  )-----------( 172      ( 10 Aug 2022 04:43 )             35.0     08-10    144  |  107  |  10  ----------------------------<  105<H>  3.4<L>   |  28  |  0.59    Ca    8.7      10 Aug 2022 04:43  Mg     1.8     08-10    TPro  6.3  /  Alb  3.2<L>  /  TBili  0.8  /  DBili  0.2  /  AST  117<H>  /  ALT  105<H>  /  AlkPhos  323<H>  08-10               INTERVAL HPI/OVERNIGHT EVENTS:  Pt seen and examined at bedside.     Allergies/Intolerance: No Known Allergies      MEDICATIONS  (STANDING):  ascorbic acid 500 milliGRAM(s) Oral daily  aspirin  chewable 81 milliGRAM(s) Oral daily  enoxaparin Injectable 40 milliGRAM(s) SubCutaneous every 24 hours  fludroCORTISONE 0.1 milliGRAM(s) Oral two times a day  midodrine. 5 milliGRAM(s) Oral three times a day  potassium chloride    Tablet ER 20 milliEquivalent(s) Oral every 2 hours  thiamine 100 milliGRAM(s) Oral daily    MEDICATIONS  (PRN):        ROS: all systems reviewed and wnl      PHYSICAL EXAMINATION:  Vital Signs Last 24 Hrs  T(C): 36.6 (10 Aug 2022 05:21), Max: 37.2 (09 Aug 2022 12:37)  T(F): 97.9 (10 Aug 2022 05:21), Max: 98.9 (09 Aug 2022 12:37)  HR: 55 (10 Aug 2022 05:21) (48 - 92)  BP: 98/60 (10 Aug 2022 05:21) (84/51 - 114/70)  BP(mean): 86 (09 Aug 2022 17:31) (72 - 86)  RR: 18 (10 Aug 2022 05:21) (18 - 19)  SpO2: 100% (10 Aug 2022 05:21) (97% - 100%)    Parameters below as of 10 Aug 2022 05:21  Patient On (Oxygen Delivery Method): room air      CAPILLARY BLOOD GLUCOSE          08-09 @ 07:01  -  08-10 @ 07:00  --------------------------------------------------------  IN: 180 mL / OUT: 0 mL / NET: 180 mL        GENERAL: stable, no fevers or CP   NECK: supple, No JVD  CHEST/LUNG: clear to auscultation bilaterally; no rales, rhonchi, or wheezing b/l  HEART: normal S1, S2  ABDOMEN: BS+, soft, ND, NT   EXTREMITIES:  pulses palpable; no clubbing, cyanosis, or edema b/l LEs  SKIN: no rashes or lesions      LABS:                        11.5   3.65  )-----------( 172      ( 10 Aug 2022 04:43 )             35.0     08-10    144  |  107  |  10  ----------------------------<  105<H>  3.4<L>   |  28  |  0.59    Ca    8.7      10 Aug 2022 04:43  Mg     1.8     08-10    TPro  6.3  /  Alb  3.2<L>  /  TBili  0.8  /  DBili  0.2  /  AST  117<H>  /  ALT  105<H>  /  AlkPhos  323<H>  08-10

## 2022-08-10 NOTE — CONSULT NOTE ADULT - ASSESSMENT
62yo male with b/l nephrolithiasis s/p b/l stents 7/13  - will discuss with on call the possibility of inpatient URS  62yo male with b/l nephrolithiasis s/p b/l stents 7/13.    - Urology will book for URS tomorrow, August 11 in afternoon  - Please make NPO in preparation for surgery  - Will need preop labs    Case discussed with Dr. Daigle 62yo male with b/l nephrolithiasis s/p b/l stents 7/13.    - Urology will book for bilateral ureteroscopy, laser lithotripsy, stent exchange tomorrow  - Please make NPO in preparation for surgery  - Will need preop labs (CBC, BMP)    Case discussed with Dr. Daigle 62yo male with b/l nephrolithiasis s/p b/l stents 7/11.    - Urology will book for bilateral ureteroscopy, laser lithotripsy, stent exchange tomorrow  - Please make NPO in preparation for surgery  - Will need preop labs (CBC, BMP)    Case discussed with Dr. Daigle

## 2022-08-10 NOTE — PROGRESS NOTE ADULT - ASSESSMENT
62 y/o M w/ PMHx of cerebellar ataxia, chronic lacunar infarcts, hypotension on midodrine, no prior cardiac hx, nonsmoker presenting to the hospital with one day history of atypical chest pain that has resolved prior to admission with non-ischemic EKG and neg troponin, Admitted for TTE.  60 y/o M w/ PMHx of cerebellar ataxia, chronic lacunar infarcts, hypotension on midodrine, no prior cardiac hx, nonsmoker presenting to the hospital with one day history of atypical chest pain that has resolved prior to admission with non-ischemic EKG and neg troponin, Admitted for TTE.       Plan:  TTE is normal study, EF normal. No cardiac history. No change in TTE compared to prior.     Continue home meds: Florinef, Midodrine and vitamin C. BP always low.  60 y/o M w/ PMHx of cerebellar ataxia, chronic lacunar infarcts, hypotension on midodrine, no prior cardiac hx, nonsmoker presenting to the hospital with one day history of atypical chest pain that has resolved prior to admission with non-ischemic EKG and neg troponin, Admitted for TTE.         Plan:  TTE is normal study, EF normal. No cardiac history. No change in TTE compared to prior.  Cardiac status stable.        Continue home meds: Florinef, Midodrine and vitamin C. BP always low.  Increased LFT noted, abdominal sonogram ordered.     Asked Urology to eval regarding ureteral stone removal.  Has bilateral ureteral stents, family has not been able to meet Urologist.

## 2022-08-10 NOTE — CONSULT NOTE ADULT - SUBJECTIVE AND OBJECTIVE BOX
HPI: 61y Male h/o cerebellar ataxia, chronic lacunar infarcts, hypotension on midodrine admitted 8/9 with acute onset of chest pain. Cleared from cardiac workup, but also with history of nephrolithiasis s/p 7/13/22 BL ureteral stent placement for UTI and right proximal obstructing ureteral stone, and left renal stone. Urology called to evaluate for possible stone/stent removal while patient is in house. Medicine reports patient is optimized for surgery.     PAST MEDICAL & SURGICAL HISTORY:  H/O cerebellar ataxia      History of hypotension      Anemia      No significant past surgical history        FAMILY HISTORY:  FH: dementia (Mother)    FH: type 2 diabetes (Mother)    Family history of CVA (Father)      SOCIAL HISTORY:   Tobacco hx:    MEDICATIONS  (STANDING):  ascorbic acid 500 milliGRAM(s) Oral daily  aspirin  chewable 81 milliGRAM(s) Oral daily  enoxaparin Injectable 40 milliGRAM(s) SubCutaneous every 24 hours  fludroCORTISONE 0.1 milliGRAM(s) Oral two times a day  midodrine. 5 milliGRAM(s) Oral three times a day  potassium chloride    Tablet ER 20 milliEquivalent(s) Oral every 2 hours  thiamine 100 milliGRAM(s) Oral daily    MEDICATIONS  (PRN):    Allergies    No Known Allergies    Intolerances        REVIEW OF SYSTEMS: Pertinent positives and negatives as stated in HPI, otherwise negative    Vital signs  T(C): 36.6 (08-10-22 @ 05:21), Max: 37.2 (08-09-22 @ 12:37)  HR: 55 (08-10-22 @ 05:21)  BP: 98/60 (08-10-22 @ 05:21)  SpO2: 100% (08-10-22 @ 05:21)  Wt(kg): --    Output    UOP    Physical Exam  Gen: NAD  Pulm: No respiratory distress, no subcostal retractions  CV: RRR, no JVD  Abd: Soft, NT, ND  : Uncircumcised/Circumcised, no lesions.  No discharge or blood at urethral meatus.  Testes descended bilaterally.  Testes and epididymis nontender bilaterally.  Cremasteric reflex present bilaterally.  MSK: No edema present    LABS:          08-10 @ 04:43    WBC 3.65  / Hct 35.0  / SCr 0.59     08-09 @ 23:42    WBC --    / Hct --    / SCr 0.57     08-10    144  |  107  |  10  ----------------------------<  105<H>  3.4<L>   |  28  |  0.59    Ca    8.7      10 Aug 2022 04:43  Mg     1.8     08-10    TPro  6.3  /  Alb  3.2<L>  /  TBili  0.8  /  DBili  0.2  /  AST  117<H>  /  ALT  105<H>  /  AlkPhos  323<H>  08-10          Urine Cx:   Blood Cx:    RADIOLOGY:     HPI: 61y Male h/o cerebellar ataxia, chronic lacunar infarcts, hypotension on midodrine admitted 8/9 with acute onset of chest pain. Cleared from cardiac workup, but also with history of nephrolithiasis s/p 7/13/22 BL ureteral stent placement for UTI and right proximal obstructing ureteral stone, and b/l renal stones. Urology called to evaluate for possible stone/stent removal while patient is in house. Medicine reports patient is optimized for surgery.     PAST MEDICAL & SURGICAL HISTORY:  H/O cerebellar ataxia      History of hypotension      Anemia      No significant past surgical history        FAMILY HISTORY:  FH: dementia (Mother)    FH: type 2 diabetes (Mother)    Family history of CVA (Father)      SOCIAL HISTORY:   Tobacco hx:    MEDICATIONS  (STANDING):  ascorbic acid 500 milliGRAM(s) Oral daily  aspirin  chewable 81 milliGRAM(s) Oral daily  enoxaparin Injectable 40 milliGRAM(s) SubCutaneous every 24 hours  fludroCORTISONE 0.1 milliGRAM(s) Oral two times a day  midodrine. 5 milliGRAM(s) Oral three times a day  potassium chloride    Tablet ER 20 milliEquivalent(s) Oral every 2 hours  thiamine 100 milliGRAM(s) Oral daily    MEDICATIONS  (PRN):    Allergies    No Known Allergies    Intolerances        REVIEW OF SYSTEMS: Pertinent positives and negatives as stated in HPI, otherwise negative    Vital signs  T(C): 36.6 (08-10-22 @ 05:21), Max: 37.2 (08-09-22 @ 12:37)  HR: 55 (08-10-22 @ 05:21)  BP: 98/60 (08-10-22 @ 05:21)  SpO2: 100% (08-10-22 @ 05:21)  Wt(kg): --    Output    UOP    Physical Exam  Gen: NAD  Pulm: No respiratory distress, no subcostal retractions  CV: RRR, no JVD  Abd: Soft, NT, ND  : Uncircumcised/Circumcised, no lesions.  No discharge or blood at urethral meatus.  Testes descended bilaterally.  Testes and epididymis nontender bilaterally.        LABS:          08-10 @ 04:43    WBC 3.65  / Hct 35.0  / SCr 0.59     08-09 @ 23:42    WBC --    / Hct --    / SCr 0.57     08-10    144  |  107  |  10  ----------------------------<  105<H>  3.4<L>   |  28  |  0.59    Ca    8.7      10 Aug 2022 04:43  Mg     1.8     08-10    TPro  6.3  /  Alb  3.2<L>  /  TBili  0.8  /  DBili  0.2  /  AST  117<H>  /  ALT  105<H>  /  AlkPhos  323<H>  08-10          Urine Cx: neg (7/24)  Blood Cx:    RADIOLOGY:     HPI: 61y Male h/o cerebellar ataxia, chronic lacunar infarcts, hypotension on midodrine admitted 8/9 with acute onset of chest pain. Cleared from cardiac workup, but also with history of nephrolithiasis s/p 7/13/22 BL ureteral stent placement for UTI and right proximal obstructing ureteral stone, and b/l renal stones. Urology called to evaluate for possible stone/stent removal while patient is in house. Medicine reports patient is optimized for surgery. Pt denies any pain, nausea, vomiting, or difficulty voiding. denies fever/chills, dysuria.     PAST MEDICAL & SURGICAL HISTORY:  H/O cerebellar ataxia      History of hypotension      Anemia      No significant past surgical history        FAMILY HISTORY:  FH: dementia (Mother)    FH: type 2 diabetes (Mother)    Family history of CVA (Father)      SOCIAL HISTORY:   Tobacco hx:    MEDICATIONS  (STANDING):  ascorbic acid 500 milliGRAM(s) Oral daily  aspirin  chewable 81 milliGRAM(s) Oral daily  enoxaparin Injectable 40 milliGRAM(s) SubCutaneous every 24 hours  fludroCORTISONE 0.1 milliGRAM(s) Oral two times a day  midodrine. 5 milliGRAM(s) Oral three times a day  potassium chloride    Tablet ER 20 milliEquivalent(s) Oral every 2 hours  thiamine 100 milliGRAM(s) Oral daily    MEDICATIONS  (PRN):    Allergies    No Known Allergies    Intolerances        REVIEW OF SYSTEMS: Pertinent positives and negatives as stated in HPI, otherwise negative    Vital signs  T(C): 36.6 (08-10-22 @ 05:21), Max: 37.2 (08-09-22 @ 12:37)  HR: 55 (08-10-22 @ 05:21)  BP: 98/60 (08-10-22 @ 05:21)  SpO2: 100% (08-10-22 @ 05:21)  Wt(kg): --    Output    UOP    Physical Exam  Gen: NAD  Pulm: No respiratory distress, no subcostal retractions  CV: RRR, no JVD  Abd: Soft, NT, ND  : Uncircumcised/Circumcised, no lesions.  No discharge or blood at urethral meatus.  Testes descended bilaterally.  Testes and epididymis nontender bilaterally.        LABS:          08-10 @ 04:43    WBC 3.65  / Hct 35.0  / SCr 0.59     08-09 @ 23:42    WBC --    / Hct --    / SCr 0.57     08-10    144  |  107  |  10  ----------------------------<  105<H>  3.4<L>   |  28  |  0.59    Ca    8.7      10 Aug 2022 04:43  Mg     1.8     08-10    TPro  6.3  /  Alb  3.2<L>  /  TBili  0.8  /  DBili  0.2  /  AST  117<H>  /  ALT  105<H>  /  AlkPhos  323<H>  08-10          Urine Cx: neg (7/24)  Blood Cx:    RADIOLOGY:     HPI: 61y Male h/o cerebellar ataxia, chronic lacunar infarcts, hypotension on midodrine admitted 8/9 with acute onset of chest pain. Cleared from cardiac workup, but also with history of nephrolithiasis s/p 7/13/22 BL ureteral stent placement for UTI and right proximal obstructing ureteral stone, and b/l renal stones. Urology called to evaluate for possible stone/stent removal while patient is in house. Medicine reports patient is optimized for surgery. Pt denies any pain, nausea, vomiting, or difficulty voiding. denies fever/chills, dysuria.     PAST MEDICAL & SURGICAL HISTORY:  H/O cerebellar ataxia      History of hypotension      Anemia      No significant past surgical history        FAMILY HISTORY:  FH: dementia (Mother)    FH: type 2 diabetes (Mother)    Family history of CVA (Father)      SOCIAL HISTORY:   Tobacco hx:    MEDICATIONS  (STANDING):  ascorbic acid 500 milliGRAM(s) Oral daily  aspirin  chewable 81 milliGRAM(s) Oral daily  enoxaparin Injectable 40 milliGRAM(s) SubCutaneous every 24 hours  fludroCORTISONE 0.1 milliGRAM(s) Oral two times a day  midodrine. 5 milliGRAM(s) Oral three times a day  potassium chloride    Tablet ER 20 milliEquivalent(s) Oral every 2 hours  thiamine 100 milliGRAM(s) Oral daily    MEDICATIONS  (PRN):    Allergies    No Known Allergies    Intolerances        REVIEW OF SYSTEMS: Pertinent positives and negatives as stated in HPI, otherwise negative    Vital signs  T(C): 36.6 (08-10-22 @ 05:21), Max: 37.2 (08-09-22 @ 12:37)  HR: 55 (08-10-22 @ 05:21)  BP: 98/60 (08-10-22 @ 05:21)  SpO2: 100% (08-10-22 @ 05:21)  Wt(kg): --    Output    UOP    Physical Exam  Gen: NAD  Pulm: No respiratory distress, no subcostal retractions  CV: RRR, no JVD  Abd: Soft, NT, ND, no CVAT      LABS:          08-10 @ 04:43    WBC 3.65  / Hct 35.0  / SCr 0.59     08-09 @ 23:42    WBC --    / Hct --    / SCr 0.57     08-10    144  |  107  |  10  ----------------------------<  105<H>  3.4<L>   |  28  |  0.59    Ca    8.7      10 Aug 2022 04:43  Mg     1.8     08-10    TPro  6.3  /  Alb  3.2<L>  /  TBili  0.8  /  DBili  0.2  /  AST  117<H>  /  ALT  105<H>  /  AlkPhos  323<H>  08-10          Urine Cx: neg (7/24)

## 2022-08-11 PROBLEM — Z86.79 PERSONAL HISTORY OF OTHER DISEASES OF THE CIRCULATORY SYSTEM: Chronic | Status: ACTIVE | Noted: 2022-08-09

## 2022-08-11 PROBLEM — D64.9 ANEMIA, UNSPECIFIED: Chronic | Status: ACTIVE | Noted: 2022-08-09

## 2022-08-11 LAB
ALBUMIN SERPL ELPH-MCNC: 3.1 G/DL — LOW (ref 3.3–5)
ALP SERPL-CCNC: 285 U/L — HIGH (ref 40–120)
ALT FLD-CCNC: 69 U/L — HIGH (ref 10–45)
ANION GAP SERPL CALC-SCNC: 11 MMOL/L — SIGNIFICANT CHANGE UP (ref 5–17)
AST SERPL-CCNC: 39 U/L — SIGNIFICANT CHANGE UP (ref 10–40)
BILIRUB SERPL-MCNC: 0.6 MG/DL — SIGNIFICANT CHANGE UP (ref 0.2–1.2)
BUN SERPL-MCNC: 9 MG/DL — SIGNIFICANT CHANGE UP (ref 7–23)
CALCIUM SERPL-MCNC: 9.1 MG/DL — SIGNIFICANT CHANGE UP (ref 8.4–10.5)
CHLORIDE SERPL-SCNC: 104 MMOL/L — SIGNIFICANT CHANGE UP (ref 96–108)
CO2 SERPL-SCNC: 25 MMOL/L — SIGNIFICANT CHANGE UP (ref 22–31)
CREAT SERPL-MCNC: 0.49 MG/DL — LOW (ref 0.5–1.3)
EGFR: 117 ML/MIN/1.73M2 — SIGNIFICANT CHANGE UP
GLUCOSE SERPL-MCNC: 76 MG/DL — SIGNIFICANT CHANGE UP (ref 70–99)
HCT VFR BLD CALC: 38 % — LOW (ref 39–50)
HGB BLD-MCNC: 12.2 G/DL — LOW (ref 13–17)
MCHC RBC-ENTMCNC: 28.7 PG — SIGNIFICANT CHANGE UP (ref 27–34)
MCHC RBC-ENTMCNC: 32.1 GM/DL — SIGNIFICANT CHANGE UP (ref 32–36)
MCV RBC AUTO: 89.4 FL — SIGNIFICANT CHANGE UP (ref 80–100)
NRBC # BLD: 0 /100 WBCS — SIGNIFICANT CHANGE UP (ref 0–0)
PLATELET # BLD AUTO: 192 K/UL — SIGNIFICANT CHANGE UP (ref 150–400)
POTASSIUM SERPL-MCNC: 3.9 MMOL/L — SIGNIFICANT CHANGE UP (ref 3.5–5.3)
POTASSIUM SERPL-SCNC: 3.9 MMOL/L — SIGNIFICANT CHANGE UP (ref 3.5–5.3)
PROT SERPL-MCNC: 7 G/DL — SIGNIFICANT CHANGE UP (ref 6–8.3)
RBC # BLD: 4.25 M/UL — SIGNIFICANT CHANGE UP (ref 4.2–5.8)
RBC # FLD: 15.4 % — HIGH (ref 10.3–14.5)
SODIUM SERPL-SCNC: 140 MMOL/L — SIGNIFICANT CHANGE UP (ref 135–145)
TROPONIN T, HIGH SENSITIVITY RESULT: 10 NG/L — SIGNIFICANT CHANGE UP (ref 0–51)
WBC # BLD: 3.01 K/UL — LOW (ref 3.8–10.5)
WBC # FLD AUTO: 3.01 K/UL — LOW (ref 3.8–10.5)

## 2022-08-11 PROCEDURE — 82803 BLOOD GASES ANY COMBINATION: CPT

## 2022-08-11 PROCEDURE — 97116 GAIT TRAINING THERAPY: CPT

## 2022-08-11 PROCEDURE — 84480 ASSAY TRIIODOTHYRONINE (T3): CPT

## 2022-08-11 PROCEDURE — 86900 BLOOD TYPING SEROLOGIC ABO: CPT

## 2022-08-11 PROCEDURE — 97530 THERAPEUTIC ACTIVITIES: CPT

## 2022-08-11 PROCEDURE — 83519 RIA NONANTIBODY: CPT

## 2022-08-11 PROCEDURE — 93005 ELECTROCARDIOGRAM TRACING: CPT

## 2022-08-11 PROCEDURE — 81001 URINALYSIS AUTO W/SCOPE: CPT

## 2022-08-11 PROCEDURE — 85025 COMPLETE CBC W/AUTO DIFF WBC: CPT

## 2022-08-11 PROCEDURE — 96375 TX/PRO/DX INJ NEW DRUG ADDON: CPT

## 2022-08-11 PROCEDURE — 83970 ASSAY OF PARATHORMONE: CPT

## 2022-08-11 PROCEDURE — 82365 CALCULUS SPECTROSCOPY: CPT

## 2022-08-11 PROCEDURE — 87077 CULTURE AEROBIC IDENTIFY: CPT

## 2022-08-11 PROCEDURE — 83036 HEMOGLOBIN GLYCOSYLATED A1C: CPT

## 2022-08-11 PROCEDURE — 85014 HEMATOCRIT: CPT

## 2022-08-11 PROCEDURE — 80053 COMPREHEN METABOLIC PANEL: CPT

## 2022-08-11 PROCEDURE — U0005: CPT

## 2022-08-11 PROCEDURE — 85027 COMPLETE CBC AUTOMATED: CPT

## 2022-08-11 PROCEDURE — 87086 URINE CULTURE/COLONY COUNT: CPT

## 2022-08-11 PROCEDURE — 84439 ASSAY OF FREE THYROXINE: CPT

## 2022-08-11 PROCEDURE — 86901 BLOOD TYPING SEROLOGIC RH(D): CPT

## 2022-08-11 PROCEDURE — U0003: CPT

## 2022-08-11 PROCEDURE — 82962 GLUCOSE BLOOD TEST: CPT

## 2022-08-11 PROCEDURE — 76770 US EXAM ABDO BACK WALL COMP: CPT

## 2022-08-11 PROCEDURE — 88300 SURGICAL PATH GROSS: CPT

## 2022-08-11 PROCEDURE — 86850 RBC ANTIBODY SCREEN: CPT

## 2022-08-11 PROCEDURE — 87186 SC STD MICRODIL/AGAR DIL: CPT

## 2022-08-11 PROCEDURE — 71045 X-RAY EXAM CHEST 1 VIEW: CPT

## 2022-08-11 PROCEDURE — 84436 ASSAY OF TOTAL THYROXINE: CPT

## 2022-08-11 PROCEDURE — 82550 ASSAY OF CK (CPK): CPT

## 2022-08-11 PROCEDURE — 84100 ASSAY OF PHOSPHORUS: CPT

## 2022-08-11 PROCEDURE — 84145 PROCALCITONIN (PCT): CPT

## 2022-08-11 PROCEDURE — 97162 PT EVAL MOD COMPLEX 30 MIN: CPT

## 2022-08-11 PROCEDURE — C1758: CPT

## 2022-08-11 PROCEDURE — 85018 HEMOGLOBIN: CPT

## 2022-08-11 PROCEDURE — 83605 ASSAY OF LACTIC ACID: CPT

## 2022-08-11 PROCEDURE — 87040 BLOOD CULTURE FOR BACTERIA: CPT

## 2022-08-11 PROCEDURE — 92610 EVALUATE SWALLOWING FUNCTION: CPT

## 2022-08-11 PROCEDURE — 85610 PROTHROMBIN TIME: CPT

## 2022-08-11 PROCEDURE — C2617: CPT

## 2022-08-11 PROCEDURE — 82330 ASSAY OF CALCIUM: CPT

## 2022-08-11 PROCEDURE — 82533 TOTAL CORTISOL: CPT

## 2022-08-11 PROCEDURE — 83735 ASSAY OF MAGNESIUM: CPT

## 2022-08-11 PROCEDURE — 85730 THROMBOPLASTIN TIME PARTIAL: CPT

## 2022-08-11 PROCEDURE — 76000 FLUOROSCOPY <1 HR PHYS/QHP: CPT

## 2022-08-11 PROCEDURE — 84484 ASSAY OF TROPONIN QUANT: CPT

## 2022-08-11 PROCEDURE — 84132 ASSAY OF SERUM POTASSIUM: CPT

## 2022-08-11 PROCEDURE — 92526 ORAL FUNCTION THERAPY: CPT

## 2022-08-11 PROCEDURE — C1769: CPT

## 2022-08-11 PROCEDURE — 82435 ASSAY OF BLOOD CHLORIDE: CPT

## 2022-08-11 PROCEDURE — 36415 COLL VENOUS BLD VENIPUNCTURE: CPT

## 2022-08-11 PROCEDURE — 82947 ASSAY GLUCOSE BLOOD QUANT: CPT

## 2022-08-11 PROCEDURE — 76700 US EXAM ABDOM COMPLETE: CPT | Mod: 26

## 2022-08-11 PROCEDURE — 80076 HEPATIC FUNCTION PANEL: CPT

## 2022-08-11 PROCEDURE — 82310 ASSAY OF CALCIUM: CPT

## 2022-08-11 PROCEDURE — 82306 VITAMIN D 25 HYDROXY: CPT

## 2022-08-11 PROCEDURE — 96374 THER/PROPH/DIAG INJ IV PUSH: CPT

## 2022-08-11 PROCEDURE — 74176 CT ABD & PELVIS W/O CONTRAST: CPT

## 2022-08-11 PROCEDURE — 84295 ASSAY OF SERUM SODIUM: CPT

## 2022-08-11 PROCEDURE — 84443 ASSAY THYROID STIM HORMONE: CPT

## 2022-08-11 PROCEDURE — 80048 BASIC METABOLIC PNL TOTAL CA: CPT

## 2022-08-11 PROCEDURE — 82024 ASSAY OF ACTH: CPT

## 2022-08-11 PROCEDURE — 99285 EMERGENCY DEPT VISIT HI MDM: CPT | Mod: 25

## 2022-08-11 PROCEDURE — 82553 CREATINE MB FRACTION: CPT

## 2022-08-11 RX ORDER — CIPROFLOXACIN LACTATE 400MG/40ML
400 VIAL (ML) INTRAVENOUS ONCE
Refills: 0 | Status: COMPLETED | OUTPATIENT
Start: 2022-08-11 | End: 2022-08-11

## 2022-08-11 RX ORDER — SODIUM CHLORIDE 9 MG/ML
1000 INJECTION, SOLUTION INTRAVENOUS
Refills: 0 | Status: DISCONTINUED | OUTPATIENT
Start: 2022-08-11 | End: 2022-08-11

## 2022-08-11 RX ORDER — CIPROFLOXACIN LACTATE 400MG/40ML
VIAL (ML) INTRAVENOUS
Refills: 0 | Status: DISCONTINUED | OUTPATIENT
Start: 2022-08-11 | End: 2022-08-12

## 2022-08-11 RX ORDER — CIPROFLOXACIN LACTATE 400MG/40ML
400 VIAL (ML) INTRAVENOUS EVERY 12 HOURS
Refills: 0 | Status: DISCONTINUED | OUTPATIENT
Start: 2022-08-11 | End: 2022-08-12

## 2022-08-11 RX ADMIN — Medication 500 MILLIGRAM(S): at 12:54

## 2022-08-11 RX ADMIN — MIDODRINE HYDROCHLORIDE 5 MILLIGRAM(S): 2.5 TABLET ORAL at 06:20

## 2022-08-11 RX ADMIN — Medication 100 MILLIGRAM(S): at 12:54

## 2022-08-11 RX ADMIN — ENOXAPARIN SODIUM 40 MILLIGRAM(S): 100 INJECTION SUBCUTANEOUS at 18:38

## 2022-08-11 RX ADMIN — FLUDROCORTISONE ACETATE 0.1 MILLIGRAM(S): 0.1 TABLET ORAL at 18:32

## 2022-08-11 RX ADMIN — SODIUM CHLORIDE 100 MILLILITER(S): 9 INJECTION, SOLUTION INTRAVENOUS at 10:32

## 2022-08-11 RX ADMIN — Medication 200 MILLIGRAM(S): at 10:37

## 2022-08-11 RX ADMIN — MIDODRINE HYDROCHLORIDE 5 MILLIGRAM(S): 2.5 TABLET ORAL at 18:32

## 2022-08-11 RX ADMIN — MIDODRINE HYDROCHLORIDE 5 MILLIGRAM(S): 2.5 TABLET ORAL at 12:54

## 2022-08-11 RX ADMIN — FLUDROCORTISONE ACETATE 0.1 MILLIGRAM(S): 0.1 TABLET ORAL at 06:20

## 2022-08-11 RX ADMIN — Medication 200 MILLIGRAM(S): at 17:40

## 2022-08-11 NOTE — CHART NOTE - NSCHARTNOTEFT_GEN_A_CORE
Patient's surgery is cancelled and will not be rescheduled while inpatient.    Patient believed surgery was tomorrow and did not wish to have surgery today. He can be discharged once cleared by primary team and will have his surgery in September as originally scheduled.

## 2022-08-11 NOTE — PROGRESS NOTE ADULT - ATTENDING COMMENTS
b/l stones with stents  Unable to perform surgery today due to OR availability  D/w pt that will plan for date in Sept as scheduled

## 2022-08-11 NOTE — PROGRESS NOTE ADULT - ASSESSMENT
62yo male with b/l nephrolithiasis s/p b/l stents 7/13.    - OR for bilateral ureteroscopy, laser lithotripsy, stent exchange today   - Keep NPO   - Covid neg 8/9/22  - Will need preop labs (CBC, BMP)  - previous cultures with Pseudomonas (sensitive to cipro/gentamicin) will give periop antibiotics   - Will obtain consent this morning     Case discussed with Dr. Daigle

## 2022-08-11 NOTE — PROGRESS NOTE ADULT - SUBJECTIVE AND OBJECTIVE BOX
INTERVAL HPI/OVERNIGHT EVENTS:  Pt seen and examined at bedside.     Allergies/Intolerance: No Known Allergies      MEDICATIONS  (STANDING):  ascorbic acid 500 milliGRAM(s) Oral daily  aspirin  chewable 81 milliGRAM(s) Oral daily  enoxaparin Injectable 40 milliGRAM(s) SubCutaneous every 24 hours  fludroCORTISONE 0.1 milliGRAM(s) Oral two times a day  midodrine. 5 milliGRAM(s) Oral three times a day  thiamine 100 milliGRAM(s) Oral daily    MEDICATIONS  (PRN):        ROS: all systems reviewed and wnl      PHYSICAL EXAMINATION:  Vital Signs Last 24 Hrs  T(C): 36.3 (11 Aug 2022 04:10), Max: 36.4 (10 Aug 2022 11:42)  T(F): 97.4 (11 Aug 2022 04:10), Max: 97.5 (10 Aug 2022 11:42)  HR: 46 (11 Aug 2022 04:10) (46 - 61)  BP: 108/68 (11 Aug 2022 04:10) (95/63 - 117/75)  BP(mean): --  RR: 18 (11 Aug 2022 04:10) (18 - 18)  SpO2: 100% (11 Aug 2022 04:10) (99% - 100%)    Parameters below as of 11 Aug 2022 04:10  Patient On (Oxygen Delivery Method): room air      CAPILLARY BLOOD GLUCOSE          08-10 @ 07:01  -  08-11 @ 07:00  --------------------------------------------------------  IN: 600 mL / OUT: 0 mL / NET: 600 mL        GENERAL:   NECK: supple, No JVD  CHEST/LUNG: clear to auscultation bilaterally; no rales, rhonchi, or wheezing b/l  HEART: normal S1, S2  ABDOMEN: BS+, soft, ND, NT   EXTREMITIES:  pulses palpable; no clubbing, cyanosis, or edema b/l LEs  SKIN: no rashes or lesions      LABS:                        12.2   3.01  )-----------( 192      ( 11 Aug 2022 06:57 )             38.0     08-11    140  |  104  |  9   ----------------------------<  76  3.9   |  25  |  0.49<L>    Ca    9.1      11 Aug 2022 07:11  Mg     1.8     08-10    TPro  7.0  /  Alb  3.1<L>  /  TBili  0.6  /  DBili  x   /  AST  39  /  ALT  69<H>  /  AlkPhos  285<H>  08-11               INTERVAL HPI/OVERNIGHT EVENTS:  Pt seen and examined at bedside.     Allergies/Intolerance: No Known Allergies      MEDICATIONS  (STANDING):  ascorbic acid 500 milliGRAM(s) Oral daily  aspirin  chewable 81 milliGRAM(s) Oral daily  enoxaparin Injectable 40 milliGRAM(s) SubCutaneous every 24 hours  fludroCORTISONE 0.1 milliGRAM(s) Oral two times a day  midodrine. 5 milliGRAM(s) Oral three times a day  thiamine 100 milliGRAM(s) Oral daily    MEDICATIONS  (PRN):        ROS: all systems reviewed and wnl      PHYSICAL EXAMINATION:  Vital Signs Last 24 Hrs  T(C): 36.3 (11 Aug 2022 04:10), Max: 36.4 (10 Aug 2022 11:42)  T(F): 97.4 (11 Aug 2022 04:10), Max: 97.5 (10 Aug 2022 11:42)  HR: 46 (11 Aug 2022 04:10) (46 - 61)  BP: 108/68 (11 Aug 2022 04:10) (95/63 - 117/75)  BP(mean): --  RR: 18 (11 Aug 2022 04:10) (18 - 18)  SpO2: 100% (11 Aug 2022 04:10) (99% - 100%)    Parameters below as of 11 Aug 2022 04:10  Patient On (Oxygen Delivery Method): room air      CAPILLARY BLOOD GLUCOSE          08-10 @ 07:01  -  08-11 @ 07:00  --------------------------------------------------------  IN: 600 mL / OUT: 0 mL / NET: 600 mL        GENERAL: stable, in bed, no fevers or hematuria  NECK: supple, No JVD  CHEST/LUNG: clear to auscultation bilaterally; no rales, rhonchi, or wheezing b/l  HEART: normal S1, S2  ABDOMEN: BS+, soft, ND, NT   EXTREMITIES:  pulses palpable; no clubbing, cyanosis, or edema b/l LEs  SKIN: no rashes or lesions      LABS:                        12.2   3.01  )-----------( 192      ( 11 Aug 2022 06:57 )             38.0     08-11    140  |  104  |  9   ----------------------------<  76  3.9   |  25  |  0.49<L>    Ca    9.1      11 Aug 2022 07:11  Mg     1.8     08-10    TPro  7.0  /  Alb  3.1<L>  /  TBili  0.6  /  DBili  x   /  AST  39  /  ALT  69<H>  /  AlkPhos  285<H>  08-11

## 2022-08-11 NOTE — PROGRESS NOTE ADULT - SUBJECTIVE AND OBJECTIVE BOX
Interval events:   No acute events   added on for OR at 5 pm         OBJECTIVE:  Vital Signs Last 24 Hrs  T(C): 36.3 (11 Aug 2022 04:10), Max: 36.4 (10 Aug 2022 11:42)  T(F): 97.4 (11 Aug 2022 04:10), Max: 97.5 (10 Aug 2022 11:42)  HR: 46 (11 Aug 2022 04:10) (46 - 61)  BP: 108/68 (11 Aug 2022 04:10) (95/63 - 117/75)  BP(mean): --  RR: 18 (11 Aug 2022 04:10) (18 - 18)  SpO2: 100% (11 Aug 2022 04:10) (99% - 100%)    Parameters below as of 11 Aug 2022 04:10  Patient On (Oxygen Delivery Method): room air        Physical Examination:  GEN: NAD, resting quietly  PULM: symmetric chest rise bilaterally, no increased WOB  ABD: soft      LABS:                        11.5   3.65  )-----------( 172      ( 10 Aug 2022 04:43 )             35.0       08-10    144  |  107  |  10  ----------------------------<  105<H>  3.4<L>   |  28  |  0.59    Ca    8.7      10 Aug 2022 04:43  Mg     1.8     08-10    TPro  6.3  /  Alb  3.2<L>  /  TBili  0.8  /  DBili  0.2  /  AST  117<H>  /  ALT  105<H>  /  AlkPhos  323<H>  08-10

## 2022-08-11 NOTE — PROGRESS NOTE ADULT - ASSESSMENT
60 y/o M w/ PMHx of cerebellar ataxia, chronic lacunar infarcts, hypotension on midodrine, no prior cardiac hx, nonsmoker presenting to the hospital with one day history of atypical chest pain that has resolved prior to admission with non-ischemic EKG and neg troponin, Admitted for TTE.         Plan:  TTE is normal study, EF normal. No cardiac history. No change in TTE compared to prior.  Cardiac status stable.        Continue home meds: Florinef, Midodrine and vitamin C. BP always low.  Increased LFT noted, abdominal sonogram ordered.     Asked Urology to eval regarding ureteral stone removal.  Has bilateral ureteral stents, family has not been able to meet Urologist.  62 y/o M w/ PMHx of cerebellar ataxia, chronic lacunar infarcts, hypotension on midodrine, no prior cardiac hx, nonsmoker presenting to the hospital with one day history of atypical chest pain that has resolved prior to admission with non-ischemic EKG and neg troponin, Admitted for TTE.         Plan:  TTE is normal study, EF normal. No cardiac history. No change in TTE compared to prior.  Cardiac status stable.    No prior cardiac stents or cardiac history.  BP better today.     Continue home meds: Florinef, Midodrine and vitamin C. BP always low.  Increased LFT noted, abdominal sonogram ordered.     Asked Urology to eval regarding ureteral stone removal.  Has bilateral ureteral stents, family has not been able to meet Urologist.

## 2022-08-12 ENCOUNTER — TRANSCRIPTION ENCOUNTER (OUTPATIENT)
Age: 62
End: 2022-08-12

## 2022-08-12 VITALS
DIASTOLIC BLOOD PRESSURE: 62 MMHG | OXYGEN SATURATION: 100 % | SYSTOLIC BLOOD PRESSURE: 98 MMHG | HEART RATE: 62 BPM | RESPIRATION RATE: 18 BRPM

## 2022-08-12 PROCEDURE — 71045 X-RAY EXAM CHEST 1 VIEW: CPT

## 2022-08-12 PROCEDURE — 85027 COMPLETE CBC AUTOMATED: CPT

## 2022-08-12 PROCEDURE — 83880 ASSAY OF NATRIURETIC PEPTIDE: CPT

## 2022-08-12 PROCEDURE — 76700 US EXAM ABDOM COMPLETE: CPT

## 2022-08-12 PROCEDURE — 83036 HEMOGLOBIN GLYCOSYLATED A1C: CPT

## 2022-08-12 PROCEDURE — 84436 ASSAY OF TOTAL THYROXINE: CPT

## 2022-08-12 PROCEDURE — 84484 ASSAY OF TROPONIN QUANT: CPT

## 2022-08-12 PROCEDURE — 93306 TTE W/DOPPLER COMPLETE: CPT

## 2022-08-12 PROCEDURE — 80048 BASIC METABOLIC PNL TOTAL CA: CPT

## 2022-08-12 PROCEDURE — 80053 COMPREHEN METABOLIC PANEL: CPT

## 2022-08-12 PROCEDURE — U0003: CPT

## 2022-08-12 PROCEDURE — 80061 LIPID PANEL: CPT

## 2022-08-12 PROCEDURE — 80076 HEPATIC FUNCTION PANEL: CPT

## 2022-08-12 PROCEDURE — 36415 COLL VENOUS BLD VENIPUNCTURE: CPT

## 2022-08-12 PROCEDURE — 83735 ASSAY OF MAGNESIUM: CPT

## 2022-08-12 PROCEDURE — U0005: CPT

## 2022-08-12 PROCEDURE — 85025 COMPLETE CBC W/AUTO DIFF WBC: CPT

## 2022-08-12 PROCEDURE — 99285 EMERGENCY DEPT VISIT HI MDM: CPT | Mod: 25

## 2022-08-12 PROCEDURE — 84443 ASSAY THYROID STIM HORMONE: CPT

## 2022-08-12 RX ADMIN — Medication 100 MILLIGRAM(S): at 11:51

## 2022-08-12 RX ADMIN — Medication 200 MILLIGRAM(S): at 05:09

## 2022-08-12 RX ADMIN — MIDODRINE HYDROCHLORIDE 5 MILLIGRAM(S): 2.5 TABLET ORAL at 11:51

## 2022-08-12 RX ADMIN — Medication 500 MILLIGRAM(S): at 11:51

## 2022-08-12 RX ADMIN — MIDODRINE HYDROCHLORIDE 5 MILLIGRAM(S): 2.5 TABLET ORAL at 05:11

## 2022-08-12 RX ADMIN — FLUDROCORTISONE ACETATE 0.1 MILLIGRAM(S): 0.1 TABLET ORAL at 05:11

## 2022-08-12 NOTE — PROGRESS NOTE ADULT - ASSESSMENT
62 y/o M w/ PMHx of cerebellar ataxia, chronic lacunar infarcts, hypotension on midodrine, no prior cardiac hx, nonsmoker presenting to the hospital with one day history of atypical chest pain that has resolved prior to admission with non-ischemic EKG and neg troponin, Admitted for TTE.         Plan:  TTE is normal study, EF normal. No cardiac history. No change in TTE compared to prior.  Cardiac status stable.    No prior cardiac stents or cardiac history.  BP better today.     Continue home meds: Florinef, Midodrine and vitamin C. BP always low.  Increased LFT noted, abdominal sonogram ordered.     Asked Urology to eval regarding ureteral stone removal.  Has bilateral ureteral stents, family has not been able to meet Urologist.  62 y/o M w/ PMHx of cerebellar ataxia, chronic lacunar infarcts, hypotension on midodrine, no prior cardiac hx, nonsmoker presenting to the hospital with one day history of atypical chest pain that has resolved prior to admission with non-ischemic EKG and neg troponin, Admitted for TTE.         Plan:  TTE is normal study, EF normal. No cardiac history. No change in TTE compared to prior.  Cardiac status stable.    No prior cardiac stents or cardiac history.  BP better today.     No cardiac history.     Continue home meds: Florinef, Midodrine and vitamin C. BP always low.  Increased LFT noted, abdominal sonogram ordered.     Urology to remove kidney stones in September. Has bilateral ureteral stents.

## 2022-08-12 NOTE — DISCHARGE NOTE PROVIDER - NSDCFUSCHEDAPPT_GEN_ALL_CORE_FT
Isaias Daigle  [facility]  Scheduled Appointment: 09/11/2022    Abimael Daigle  [facility]  UROLOGY [address]  Scheduled Appointment: 09/14/2022    Pablito Trotter  [facility]  OTOLARYNG [address] [place] [address]  Scheduled Appointment: 10/07/2022

## 2022-08-12 NOTE — DISCHARGE NOTE NURSING/CASE MANAGEMENT/SOCIAL WORK - NSDCPEFALRISK_GEN_ALL_CORE
For information on Fall & Injury Prevention, visit: [email or web address] OR  [email or web address] OR  https://www.cdc.gov/steadi/patient.html

## 2022-08-12 NOTE — DISCHARGE NOTE NURSING/CASE MANAGEMENT/SOCIAL WORK - PATIENT PORTAL LINK FT
You can access the FollowMyHealth Patient Portal offered by [facility] by registering at the following website: [email or web address]. By joining [facility]’s FollowMyHealth portal, you will also be able to view your health information using other applications (apps) compatible with our system.

## 2022-08-12 NOTE — PROGRESS NOTE ADULT - SUBJECTIVE AND OBJECTIVE BOX
INTERVAL HPI/OVERNIGHT EVENTS:  Pt seen and examined at bedside.     Allergies/Intolerance: No Known Allergies      MEDICATIONS  (STANDING):  ascorbic acid 500 milliGRAM(s) Oral daily  ciprofloxacin   IVPB      ciprofloxacin   IVPB 400 milliGRAM(s) IV Intermittent every 12 hours  enoxaparin Injectable 40 milliGRAM(s) SubCutaneous every 24 hours  fludroCORTISONE 0.1 milliGRAM(s) Oral two times a day  midodrine. 5 milliGRAM(s) Oral three times a day  thiamine 100 milliGRAM(s) Oral daily    MEDICATIONS  (PRN):        ROS: all systems reviewed and wnl      PHYSICAL EXAMINATION:  Vital Signs Last 24 Hrs  T(C): 36.4 (12 Aug 2022 04:33), Max: 36.4 (12 Aug 2022 04:33)  T(F): 97.6 (12 Aug 2022 04:33), Max: 97.6 (12 Aug 2022 04:33)  HR: 47 (12 Aug 2022 04:33) (47 - 51)  BP: 103/73 (12 Aug 2022 04:33) (103/73 - 117/78)  BP(mean): --  RR: 18 (12 Aug 2022 04:33) (18 - 18)  SpO2: 100% (12 Aug 2022 04:33) (99% - 100%)    Parameters below as of 12 Aug 2022 04:33  Patient On (Oxygen Delivery Method): room air      CAPILLARY BLOOD GLUCOSE          08-11 @ 07:01  -  08-12 @ 07:00  --------------------------------------------------------  IN: 342 mL / OUT: 0 mL / NET: 342 mL        GENERAL:   NECK: supple, No JVD  CHEST/LUNG: clear to auscultation bilaterally; no rales, rhonchi, or wheezing b/l  HEART: normal S1, S2  ABDOMEN: BS+, soft, ND, NT   EXTREMITIES:  pulses palpable; no clubbing, cyanosis, or edema b/l LEs  SKIN: no rashes or lesions      LABS:                        12.2   3.01  )-----------( 192      ( 11 Aug 2022 06:57 )             38.0     08-11    140  |  104  |  9   ----------------------------<  76  3.9   |  25  |  0.49<L>    Ca    9.1      11 Aug 2022 07:11    TPro  7.0  /  Alb  3.1<L>  /  TBili  0.6  /  DBili  x   /  AST  39  /  ALT  69<H>  /  AlkPhos  285<H>  08-11               INTERVAL HPI/OVERNIGHT EVENTS:  Pt seen and examined at bedside.     Allergies/Intolerance: No Known Allergies      MEDICATIONS  (STANDING):  ascorbic acid 500 milliGRAM(s) Oral daily  ciprofloxacin   IVPB      ciprofloxacin   IVPB 400 milliGRAM(s) IV Intermittent every 12 hours  enoxaparin Injectable 40 milliGRAM(s) SubCutaneous every 24 hours  fludroCORTISONE 0.1 milliGRAM(s) Oral two times a day  midodrine. 5 milliGRAM(s) Oral three times a day  thiamine 100 milliGRAM(s) Oral daily    MEDICATIONS  (PRN):        ROS: all systems reviewed and wnl      PHYSICAL EXAMINATION:  Vital Signs Last 24 Hrs  T(C): 36.4 (12 Aug 2022 04:33), Max: 36.4 (12 Aug 2022 04:33)  T(F): 97.6 (12 Aug 2022 04:33), Max: 97.6 (12 Aug 2022 04:33)  HR: 47 (12 Aug 2022 04:33) (47 - 51)  BP: 103/73 (12 Aug 2022 04:33) (103/73 - 117/78)  BP(mean): --  RR: 18 (12 Aug 2022 04:33) (18 - 18)  SpO2: 100% (12 Aug 2022 04:33) (99% - 100%)    Parameters below as of 12 Aug 2022 04:33  Patient On (Oxygen Delivery Method): room air      CAPILLARY BLOOD GLUCOSE          08-11 @ 07:01  -  08-12 @ 07:00  --------------------------------------------------------  IN: 342 mL / OUT: 0 mL / NET: 342 mL        GENERAL: in bed, stable, no hematuria.   NECK: supple, No JVD  CHEST/LUNG: clear to auscultation bilaterally; no rales, rhonchi, or wheezing b/l  HEART: normal S1, S2  ABDOMEN: BS+, soft, ND, NT   EXTREMITIES:  pulses palpable; no clubbing, cyanosis, or edema b/l LEs        LABS:                        12.2   3.01  )-----------( 192      ( 11 Aug 2022 06:57 )             38.0     08-11    140  |  104  |  9   ----------------------------<  76  3.9   |  25  |  0.49<L>    Ca    9.1      11 Aug 2022 07:11    TPro  7.0  /  Alb  3.1<L>  /  TBili  0.6  /  DBili  x   /  AST  39  /  ALT  69<H>  /  AlkPhos  285<H>  08-11

## 2022-08-12 NOTE — DISCHARGE NOTE PROVIDER - CARE PROVIDER_API CALL
Abimael Daigle)  Urology  [address] [place] [address], [address]  [place] [address]  Phone: [phone]  Fax: [phone]  Established Patient  Scheduled Appointment: 09/14/2022

## 2022-08-12 NOTE — DISCHARGE NOTE PROVIDER - HOSPITAL COURSE
To be done. 60 y/o M w/ PMHx of cerebellar ataxia, chronic lacunar infarcts, hypotension on midodrine, no prior cardiac hx, nonsmoker presenting to the hospital with one day history of atypical chest pain that has resolved prior to admission with non-ischemic EKG and neg troponin, Admitted for TTE.   Plan:  TTE is normal study, EF normal. No cardiac history. No change in TTE compared to prior.  Cardiac status stable.    No prior cardiac stents or cardiac history.  BP better today.   No cardiac history.   Continue home meds: Florinef, Midodrine and vitamin C. BP always low.  Increased LFT noted, abdominal sonogram ordered.   Urology to remove kidney stones in September. Has bilateral ureteral stents.    62 y/o Male PMH cerebellar ataxia, chronic lacunar infarcts, chronic hypotension on midodrine, no prior cardiac hx, nonsmoker presenting to the hospital with one day history of atypical chest pain that has resolved prior to admission with non-ischemic EKG and neg troponins. Admitted for TTE.   Plan:  TTE is normal study, EF normal. No cardiac history. No change in TTE compared to prior.  Cardiac status stable.  No prior cardiac stents or cardiac history.  BP better today. No IVF needed.  No cardiac history.   Continue home meds: Florinef, Midodrine and vitamin C. BP always low.  Increased LFT noted, abdominal sonogram ordered.   Urology to remove kidney stones in September. Has bilateral ureteral stents that will remain in place for now. See med list.

## 2022-08-26 ENCOUNTER — OUTPATIENT (OUTPATIENT)
Dept: OUTPATIENT SERVICES | Facility: HOSPITAL | Age: 62
LOS: 1 days | End: 2022-08-26
Payer: MEDICAID

## 2022-08-26 ENCOUNTER — INPATIENT (INPATIENT)
Facility: HOSPITAL | Age: 62
LOS: 16 days | Discharge: HOME HEALTH | DRG: 987 | End: 2022-09-12
Attending: INTERNAL MEDICINE | Admitting: INTERNAL MEDICINE
Payer: MEDICAID

## 2022-08-26 VITALS
OXYGEN SATURATION: 99 % | TEMPERATURE: 97 F | HEART RATE: 71 BPM | RESPIRATION RATE: 18 BRPM | HEIGHT: 67 IN | SYSTOLIC BLOOD PRESSURE: 82 MMHG | DIASTOLIC BLOOD PRESSURE: 59 MMHG | WEIGHT: 169.98 LBS

## 2022-08-26 VITALS
SYSTOLIC BLOOD PRESSURE: 92 MMHG | WEIGHT: 156.09 LBS | DIASTOLIC BLOOD PRESSURE: 56 MMHG | HEIGHT: 67 IN | HEART RATE: 90 BPM | RESPIRATION RATE: 14 BRPM | TEMPERATURE: 98 F | OXYGEN SATURATION: 98 %

## 2022-08-26 DIAGNOSIS — N20.0 CALCULUS OF KIDNEY: ICD-10-CM

## 2022-08-26 DIAGNOSIS — Z01.818 ENCOUNTER FOR OTHER PREPROCEDURAL EXAMINATION: ICD-10-CM

## 2022-08-26 LAB
ANION GAP SERPL CALC-SCNC: 10 MMOL/L — SIGNIFICANT CHANGE UP (ref 5–17)
ANION GAP SERPL CALC-SCNC: 9 MMOL/L — SIGNIFICANT CHANGE UP (ref 5–17)
BASOPHILS # BLD AUTO: 0.01 K/UL — SIGNIFICANT CHANGE UP (ref 0–0.2)
BASOPHILS NFR BLD AUTO: 0.1 % — SIGNIFICANT CHANGE UP (ref 0–2)
BUN SERPL-MCNC: 8 MG/DL — SIGNIFICANT CHANGE UP (ref 7–23)
BUN SERPL-MCNC: 8 MG/DL — SIGNIFICANT CHANGE UP (ref 7–23)
CALCIUM SERPL-MCNC: 8.9 MG/DL — SIGNIFICANT CHANGE UP (ref 8.4–10.5)
CALCIUM SERPL-MCNC: 9.4 MG/DL — SIGNIFICANT CHANGE UP (ref 8.4–10.5)
CHLORIDE SERPL-SCNC: 101 MMOL/L — SIGNIFICANT CHANGE UP (ref 96–108)
CHLORIDE SERPL-SCNC: 102 MMOL/L — SIGNIFICANT CHANGE UP (ref 96–108)
CO2 SERPL-SCNC: 30 MMOL/L — SIGNIFICANT CHANGE UP (ref 22–31)
CO2 SERPL-SCNC: 31 MMOL/L — SIGNIFICANT CHANGE UP (ref 22–31)
CREAT SERPL-MCNC: 0.86 MG/DL — SIGNIFICANT CHANGE UP (ref 0.5–1.3)
CREAT SERPL-MCNC: 1.01 MG/DL — SIGNIFICANT CHANGE UP (ref 0.5–1.3)
EGFR: 85 ML/MIN/1.73M2 — SIGNIFICANT CHANGE UP
EGFR: 99 ML/MIN/1.73M2 — SIGNIFICANT CHANGE UP
EOSINOPHIL # BLD AUTO: 0.28 K/UL — SIGNIFICANT CHANGE UP (ref 0–0.5)
EOSINOPHIL NFR BLD AUTO: 2.8 % — SIGNIFICANT CHANGE UP (ref 0–6)
GLUCOSE SERPL-MCNC: 115 MG/DL — HIGH (ref 70–99)
GLUCOSE SERPL-MCNC: 130 MG/DL — HIGH (ref 70–99)
HCT VFR BLD CALC: 31.4 % — LOW (ref 39–50)
HCT VFR BLD CALC: 35.6 % — LOW (ref 39–50)
HGB BLD-MCNC: 10.2 G/DL — LOW (ref 13–17)
HGB BLD-MCNC: 11.3 G/DL — LOW (ref 13–17)
IMM GRANULOCYTES NFR BLD AUTO: 0.6 % — SIGNIFICANT CHANGE UP (ref 0–1.5)
LYMPHOCYTES # BLD AUTO: 1.33 K/UL — SIGNIFICANT CHANGE UP (ref 1–3.3)
LYMPHOCYTES # BLD AUTO: 13.4 % — SIGNIFICANT CHANGE UP (ref 13–44)
MAGNESIUM SERPL-MCNC: 1.6 MG/DL — SIGNIFICANT CHANGE UP (ref 1.6–2.6)
MCHC RBC-ENTMCNC: 27.7 PG — SIGNIFICANT CHANGE UP (ref 27–34)
MCHC RBC-ENTMCNC: 28.3 PG — SIGNIFICANT CHANGE UP (ref 27–34)
MCHC RBC-ENTMCNC: 31.7 GM/DL — LOW (ref 32–36)
MCHC RBC-ENTMCNC: 32.5 GM/DL — SIGNIFICANT CHANGE UP (ref 32–36)
MCV RBC AUTO: 87 FL — SIGNIFICANT CHANGE UP (ref 80–100)
MCV RBC AUTO: 87.3 FL — SIGNIFICANT CHANGE UP (ref 80–100)
MONOCYTES # BLD AUTO: 1.15 K/UL — HIGH (ref 0–0.9)
MONOCYTES NFR BLD AUTO: 11.6 % — SIGNIFICANT CHANGE UP (ref 2–14)
NEUTROPHILS # BLD AUTO: 7.12 K/UL — SIGNIFICANT CHANGE UP (ref 1.8–7.4)
NEUTROPHILS NFR BLD AUTO: 71.5 % — SIGNIFICANT CHANGE UP (ref 43–77)
NRBC # BLD: 0 /100 WBCS — SIGNIFICANT CHANGE UP (ref 0–0)
NRBC # BLD: 0 /100 WBCS — SIGNIFICANT CHANGE UP (ref 0–0)
PLATELET # BLD AUTO: 215 K/UL — SIGNIFICANT CHANGE UP (ref 150–400)
PLATELET # BLD AUTO: 241 K/UL — SIGNIFICANT CHANGE UP (ref 150–400)
POTASSIUM SERPL-MCNC: 2.5 MMOL/L — CRITICAL LOW (ref 3.5–5.3)
POTASSIUM SERPL-MCNC: 2.6 MMOL/L — CRITICAL LOW (ref 3.5–5.3)
POTASSIUM SERPL-SCNC: 2.5 MMOL/L — CRITICAL LOW (ref 3.5–5.3)
POTASSIUM SERPL-SCNC: 2.6 MMOL/L — CRITICAL LOW (ref 3.5–5.3)
RBC # BLD: 3.61 M/UL — LOW (ref 4.2–5.8)
RBC # BLD: 4.08 M/UL — LOW (ref 4.2–5.8)
RBC # FLD: 14.1 % — SIGNIFICANT CHANGE UP (ref 10.3–14.5)
RBC # FLD: 14.2 % — SIGNIFICANT CHANGE UP (ref 10.3–14.5)
SODIUM SERPL-SCNC: 141 MMOL/L — SIGNIFICANT CHANGE UP (ref 135–145)
SODIUM SERPL-SCNC: 142 MMOL/L — SIGNIFICANT CHANGE UP (ref 135–145)
WBC # BLD: 9.21 K/UL — SIGNIFICANT CHANGE UP (ref 3.8–10.5)
WBC # BLD: 9.95 K/UL — SIGNIFICANT CHANGE UP (ref 3.8–10.5)
WBC # FLD AUTO: 9.21 K/UL — SIGNIFICANT CHANGE UP (ref 3.8–10.5)
WBC # FLD AUTO: 9.95 K/UL — SIGNIFICANT CHANGE UP (ref 3.8–10.5)

## 2022-08-26 PROCEDURE — 99285 EMERGENCY DEPT VISIT HI MDM: CPT

## 2022-08-26 PROCEDURE — G0463: CPT

## 2022-08-26 PROCEDURE — 80048 BASIC METABOLIC PNL TOTAL CA: CPT

## 2022-08-26 PROCEDURE — 85027 COMPLETE CBC AUTOMATED: CPT

## 2022-08-26 PROCEDURE — 36415 COLL VENOUS BLD VENIPUNCTURE: CPT

## 2022-08-26 RX ORDER — MAGNESIUM SULFATE 500 MG/ML
1 VIAL (ML) INJECTION ONCE
Refills: 0 | Status: COMPLETED | OUTPATIENT
Start: 2022-08-26 | End: 2022-08-26

## 2022-08-26 RX ORDER — POTASSIUM CHLORIDE 20 MEQ
10 PACKET (EA) ORAL
Refills: 0 | Status: COMPLETED | OUTPATIENT
Start: 2022-08-26 | End: 2022-08-27

## 2022-08-26 RX ORDER — POTASSIUM CHLORIDE 20 MEQ
40 PACKET (EA) ORAL ONCE
Refills: 0 | Status: COMPLETED | OUTPATIENT
Start: 2022-08-26 | End: 2022-08-26

## 2022-08-26 RX ADMIN — Medication 40 MILLIEQUIVALENT(S): at 22:53

## 2022-08-26 RX ADMIN — Medication 100 GRAM(S): at 22:54

## 2022-08-26 NOTE — H&P PST ADULT - HISTORY OF PRESENT ILLNESS
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   Patient denies   preop covid swab 9/11 @ [facility]      61 yr old male with stated hx significant for cerebellar ataxia, chronic lacuna infarcts, leptomeningeal enhancement (MRI 4/2022), recent hospitalizations 4/2022 for Asp pneum/relative adrenal insufficiency/sepsis and 6/2022 for UTI who now is admitted for sepsis secondary to UTI, found to have new Rt renal collecting system dilatation, 8.1 mm Rt prox ureter calculus and Rt hydronephrosis requiring placement of bilat ureteral stent placement. Course c/b development of hypotension/hypothermia/bradycaria concerning for relative adrenal insufficiency vs septic shock due to UTI. 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  Patient denies fever, chills, nausea/vomiting. abdominal pain/flank pain, gross hematuria or dysuria. He presents to [facility] today for evaluation prior to scheduled Cystoscopy, Bilateral Ureteroscopy, Laser Lithotripsy, Stone Extraction, Stent Removal on 9/14/2022.    preop covid swab 9/11 @ [facility]

## 2022-08-26 NOTE — H&P PST ADULT - ENMT
Hospitalist Progress Note    Patient:  Darrel Spine    Unit/Bed:5K-14/014-A  YOB: 1929  MRN: [id number]   Acct: [de-identified]   PCP: Deirdre Aleman MD  Code Status: Limited  Date of Admission: 9/13/2021    Expected Discharge: pending placement. Disposition: ECF, precert pending. Pt is medically stable for discharge, awaiting placement. Assessment/Plan:    1. COVID-19 pneumonia: treated with dexamethasone 6mg daily. First positive test 09/13. Pt stable on RA. Isolation no longer required. 2. Possible superimposed bacterial PNA: Completed treatment with Rocephin and Azithromycin     3. Acute hypoxic respiratory failure: 2/2 above. Resolved. 9/25-> Pt placed on O2 overnight after rapid was called. PO2 on ABG was 170, no hypercapnea. No acute changes on EKG, no evidence of fluid overload. Pt is currently sating 97% on 2L - wean O2 as tolerated. 4. ESRD on HD: Nephrology following. On HD MWF.     5. S/p unwitnessed fall: No apparent injuries, CT brain and cervical spine -unremarkable     6. Dark tarry stools: One episode, none further. Hemoglobin stable.  Cotinue to monitor    7. Elevated troponin: chronically elevated, likely d/t ESRD. No chest pain, repeat troponin improved, no acute ischemic changes on ekg. Pt had recent LHC. 8. CAD s/p PCI: Patient had 2 stents (LAD and Ramus) at [facility] on 08/31/21. Will continue with aspirin, Plavix, metoprolol and pravastatin. 9. Code status: Limited CODE STATUS no CPR, no resuscitation and no intubation. 10. H/o NSVT / Sick sinus syndrome: noted, has PPM/ICD. Tele ordered. Continue metoprolol. Possible PAF. However, after review of EKGs, P waves were present and did not appear to be in a fib. Pt typically follows with Cardiology at [facility], unclear if he has a hx of AF. He is not on anticoagulation. Even if PAF, the risks of anticoagulation may be greater than the benefit. CHADSVASc 4, HAS-BLED 4.    Pacer interrogated, awaiting report. Will need to arrange follow up with his Cardiologist at [facility]. 11. Chronic HFrEF: EF 45% and grade 1 diastolic dysfunction on Echo 05/2021. Follows with Cardiology at [facility]. Fluid status stable, Nephrology following. Cont home meds. 12. Chronic anemia: anemia of chronic disease and INDRA. On Retacrin, ferrous sulfate. Chief Complaint: SOB    HPI / Hospital Course: Per HPI: \"The patient is a 81 y.o. male who presents with complaints of shortness of breath and cough for the last 2 days. Patient is accompanied by family who helps with history. They report that patient has had a poor appetite, fatigued, short of breath and coughing for last 2 days. He recently was at [facility] approximately 2 weeks ago where he had 2 stents placed. They state he was in his normal state of health until 2 days ago. Patient was seen in dialysis today and had a full session however continued to be short of breath and had a significant cough and was sent for evaluation. He denies any fevers or chills, nausea or vomiting. He denies any diarrhea or constipation. He denies any chest pain.     In the ED patient was found to be Covid positive. He was placed on 2 L nasal cannula. He was also found to have slightly elevated troponin and was started on heparin drip in the ED. Family updated on test results. \"    Pt has completed treatment for COVID and superimposed bacterial PNA. I took over care 9/22, per previous note, pt stable for discharge and awaiting precert to ECF.      2/18-> Rapid response called during dialysis. Pt had vomited, became lethargic and had episode of AF RVR on the monitor per report. Dialysis was stopped. Patient still lethargic but following commands and nods to answer questions appropriately. EKG showed NSR, does not appear to be AF. Patient is moaning which per nursing, he typically does during dialysis. Patient seen again later. He reports fatigue and wanting to sleep. He denies abdominal pain, n/v/d, CP, SOB, fever/chills. Pt had bleeding from dialysis fistulasite. 9/23-> Patient alert to verbal stimuli, lethargic. Appears to be at his baseline. Denies fever/chills, sob, cp, abd pain, n/v/d. Nephrology started IVF for today and plan for dialysis tomorrow. Patient has been NSR on tele. 9/24-> Pt appears at baseline. He is more alert today than he has been. Tolerated dialysis well. 9/26-> Patient is alert, oriented to self, place, time, and somewhat to situation. He denies fever/chills, SOB, CP, abd pain, n/v/d, cough. He reports of left arm pain at dialysis cath site. The patient states that he has been told before that his heart beat gets \"out of wack. \"   Chart reviewed and a rapid was called last night they were unable to get a reading of O2 levels. ABG results with pO2 179, no hypercapnea. Based on ABG did not appear to be truly hypoxic so he was put back on 6L NC and nursing instructed to wean. His EKG reported atrial fibrillation; however, after review, there were P waves present. Ordered pacer interrogate. 9/27-> no acute events overnight. Patient denies sob, cp, abd pain, nvd. Nephrology planning HD tomorrow. Awaiting pacer interrogate results. 9/28- Pt had dialysis today, tolerated well. Subjective (past 24 hours): No acute events overnight. Patient is doing well. Denies fever/chills, sob, cp, palpitations, abd pain, n/v/d. Awaiting pacer interrogate report and placement. ROS: Pertinent positives as noted in HPI. All other systems reviewed and negative. Past medical history, family history, social history and allergies reviewed again and is unchanged since admission. Medications:  Reviewed.   Infusion Medications    dextrose 100 mL/hr (09/23/21 1003)    sodium chloride       Scheduled Medications    aspirin  81 mg Oral Daily    heparin (porcine)  5,000 Units SubCUTAneous 3 times per day    epoetin traci-epbx  6,000 Units SubCUTAneous Once per day on Mon Wed Fri    calcitRIOL  1.5 mcg Oral Once per day on Mon Wed Fri    cinacalcet  60 mg Oral Once per day on Mon Wed Fri    docusate sodium  100 mg Oral BID    ferrous sulfate  325 mg Oral Daily with breakfast    gabapentin  100 mg Oral TID    metoprolol  100 mg Oral BID    pantoprazole  40 mg Oral QAM AC    vitamin C  500 mg Oral Daily    polyethylene glycol  17 g Oral Daily    sodium chloride flush  5-40 mL IntraVENous 2 times per day    clopidogrel  75 mg Oral Daily     PRN Meds: ipratropium-albuterol, glucose, dextrose, glucagon (rDNA), dextrose, sodium chloride flush, sodium chloride, ondansetron **OR** ondansetron, polyethylene glycol, acetaminophen **OR** acetaminophen, guaiFENesin-dextromethorphan, albuterol sulfate HFA    I/O:     Intake/Output Summary (Last 24 hours) at 9/28/2021 1551  Last data filed at 9/28/2021 1323  Gross per 24 hour   Intake 420 ml   Output 0 ml   Net 420 ml       Diet:  ADULT DIET; Regular; Low Potassium (Less than 3000 mg/day); 1200 ml  Adult Oral Nutrition Supplement; Renal Oral Supplement    Exam:  BP (!) 123/58   Pulse 94   Temp 98.5 °F (36.9 °C) (Oral)   Resp 18   Ht 5' 6\" (1.676 m)   Wt 102 lb 11.8 oz (46.6 kg)   SpO2 97%   BMI 16.58 kg/m²   General:  Chronically ill appearing male. NAD. HEENT:  normocephalic and atraumatic. No scleral icterus. PERR. Neck: supple. No JVD. No thyromegaly. Lungs: clear to auscultation. No retractions  Cardiac: RRR without murmur. Abdomen: soft. Nontender. Bowel sounds positive. Extremities:  No clubbing, cyanosis, or edema x 4. Vasculature: capillary refill < 3 seconds. Palpable LE pulses bilaterally. Skin:  warm and dry. Psych:  Alert and oriented x3. Lethargic. Lymph:  No supraclavicular adenopathy. Neurologic:  No focal deficit. No seizures.       Data: (All radiographs, tracings, PFTs, and imaging are personally viewed and interpreted unless otherwise noted)  Labs:   Recent Labs 09/25/21  1853 09/26/21  0547 09/28/21  0605   WBC  --  6.1 5.3   HGB 8.0* 7.7* 7.2*   HCT 24.7* 22.9* 21.2*   PLT  --  199 188     Recent Labs     09/26/21  0547 09/27/21  1838   * 135   K 4.8 4.6   CL 94* 97*   CO2 25 26   BUN 33* 19   CREATININE 6.0* 3.8*   CALCIUM 8.0* 8.1*   PHOS  --  2.5     No results for input(s): AST, ALT, BILIDIR, BILITOT, ALKPHOS in the last 72 hours. No results for input(s): INR in the last 72 hours. No results for input(s): Graciela Ill in the last 72 hours. Urinalysis:   Lab Results   Component Value Date    NITRU NEGATIVE 06/22/2019    WBCUA 50-75 06/22/2019    BACTERIA NONE 06/22/2019    RBCUA 25-50 06/22/2019    BLOODU LARGE 06/22/2019    SPECGRAV 1.013 05/03/2016    GLUCOSEU NEGATIVE 06/22/2019     Urine culture: No results found for: Monia Fleming  Micro:   Blood culture #1:   Lab Results   Component Value Date    BC No growth-preliminary No growth  09/13/2021     Blood culture #2:No results found for: Sen Suh  Organism:  Lab Results   Component Value Date    ORG Micrococcus species 07/12/2021       No results found for: LABGRAM  MRSA culture only:No results found for: [facility]  Respiratory culture: No results found for: CULTRESP  Aerobic and Anaerobic :  No results found for: LABAERO  No results found for: [facility]    Radiology Reports:  XR CHEST PORTABLE   Final Result   Impression:      Increasing consolidation and interstitial prominence. Question pulmonary edema versus pneumonia      This document has been electronically signed by: Leon Goldman MD on    09/24/2021 10:34 PM      XR CHEST PORTABLE   Final Result   1. No acute intrathoracic process. 2. Mild stable cardiomegaly. **This report has been created using voice recognition software. It may contain minor errors which are inherent in voice recognition technology. **      Final report electronically signed by Dr. Lilian Her on 9/22/2021 9:52 PM      CT HEAD WO CONTRAST   Final Result 1. Stable CT scan of the brain, no interval change since previous study dated 15th of June 2021. **This report has been created using voice recognition software. It may contain minor errors which are inherent in voice recognition technology. **      Final report electronically signed by DR Fabiola Remy on 9/15/2021 4:26 PM      CT CERVICAL SPINE WO CONTRAST   Final Result    No evidence of acute osseous injury of the cervical spine. **This report has been created using voice recognition software. It may contain minor errors which are inherent in voice recognition technology. **      Final report electronically signed by Dr. Lorena Aviles MD on 9/15/2021 4:29 PM      XR CHEST PORTABLE   Final Result   Cardiomegaly with vascular congestion and interstitial edema and effusions differential would include congestive heart failure or fluid overload. **This report has been created using voice recognition software. It may contain minor errors which are inherent in voice recognition technology. **      Final report electronically signed by Dr. Tammy Monroe on 9/13/2021 2:22 PM        XR CHEST PORTABLE    Result Date: 9/13/2021  PROCEDURE: XR CHEST PORTABLE CLINICAL INFORMATION: SOB . COMPARISON: 7/12/2021 TECHNIQUE: Portable upright FINDINGS: Heart size is mildly enlarged. Mediastinum is not widened. There is pulmonary vascular congestion. Interstitial edema. There is suggestion of small effusions. AICD over the left chest. EKG leads overlie the chest. Right upper extremity vascular stents are noted. Cardiomegaly with vascular congestion and interstitial edema and effusions differential would include congestive heart failure or fluid overload. **This report has been created using voice recognition software. It may contain minor errors which are inherent in voice recognition technology. ** Final report electronically signed by Dr. Tammy Monroe on 9/13/2021 2:22 PM        Tele:   [] mouth negative

## 2022-08-26 NOTE — ED ADULT NURSE NOTE - NSIMPLEMENTINTERV_GEN_ALL_ED
Implemented All Fall Risk Interventions:  [place] to call system. Call bell, personal items and telephone within reach. Instruct patient to call for assistance. Room bathroom lighting operational. Non-slip footwear when patient is off stretcher. Physically safe environment: no spills, clutter or unnecessary equipment. Stretcher in lowest position, wheels locked, appropriate side rails in place. Provide visual cue, wrist band, yellow gown, etc. Monitor gait and stability. Monitor for mental status changes and reorient to person, place, and time. Review medications for side effects contributing to fall risk. Reinforce activity limits and safety measures with patient and family.

## 2022-08-26 NOTE — ED PROVIDER NOTE - PROGRESS NOTE DETAILS
Sign out follow-up: K 2.6->3.1. Temp 95.3. Will order broad spectrum Abx (h/o PNA and UTI within past 6 months), cultures and UA sent. NATANAEL.

## 2022-08-26 NOTE — ED ADULT NURSE NOTE - NSICDXPASTMEDICALHX_GEN_ALL_CORE_FT
PAST MEDICAL HISTORY:  Adrenal insufficiency     Anemia     H/O cerebellar ataxia -diagnosed in 2019    History of hypotension     Lacunar infarction -chronic    Pneumonia, aspiration -april 2022 (intubated for 7 days at [facility])

## 2022-08-26 NOTE — H&P PST ADULT - PROBLEM SELECTOR PLAN 1
Cystoscopy  Bilateral Ureteroscopy  Laser Lithotripsy, Stone Extraction, Stent Removal  -cbc, bmp, urine culture done at [facility]  -medical evaluation 8/29  -preop instructions provided Cystoscopy  Bilateral Ureteroscopy  Laser Lithotripsy, Stone Extraction, Stent Removal  -cbc, bmp done at [facility]  -patient unable to provide urine sample, surgeon informed via email  -medical evaluation 8/29  -preop instructions provided

## 2022-08-26 NOTE — H&P PST ADULT - NSICDXPASTMEDICALHX_GEN_ALL_CORE_FT
PAST MEDICAL HISTORY:  Anemia     H/O cerebellar ataxia -diagnosed in 2019    History of hypotension     Lacunar infarction -chronic    Pneumonia, aspiration -april 2022 (intubated for 7 days at [facility])     PAST MEDICAL HISTORY:  Adrenal insufficiency     Anemia     H/O cerebellar ataxia -diagnosed in 2019    History of hypotension     Lacunar infarction -chronic    Pneumonia, aspiration -april 2022 (intubated for 7 days at [facility])

## 2022-08-26 NOTE — ED ADULT NURSE NOTE - OBJECTIVE STATEMENT
61 y old male with PMH of cerebellar ataxia, lacunar infarcts, hypotension on Midodrine, adrenal insufficiency hospitalized for aspiration Pneumonia/Sepsis in April 2022, in June 2022 for UTI/Sepsis, in July 2022 for UTI, and at that time was found to have right Kidney stones and right Hydronephrosis, requiring placement of bilateral ureteral stents, presents to the ED from home for abnormal labs. Pt and family were told pt's potassium level was 2.4. Pt states that when he went home at 6PM he felt his speech was heavy. Pt A&O x 3, confused. Pt drowsy just arousable with verbal stimuli. Pt following commands. Respirations nonlabored on RA. MOEW. Skin warm, dry and intact. Pt placed on CCM. Bed locked in lowest position and side rails up.

## 2022-08-26 NOTE — ED PROVIDER NOTE - NSICDXPASTMEDICALHX_GEN_ALL_CORE_FT
PAST MEDICAL HISTORY:  Adrenal insufficiency     Anemia     H/O cerebellar ataxia -diagnosed in 2019    History of hypotension     Lacunar infarction -chronic    Pneumonia, aspiration -april 2022 (intubated for 7 days at [facility])

## 2022-08-26 NOTE — H&P PST ADULT - PATIENT ON (OXYGEN DELIVERY METHOD)
1818 Assume care of Pt in OR post   IRRIGATION AND DEBRIDEMENT, WOUND - HEEL Right   , report received. Pt breathing unlabored with clear lung sounds bilat.    1847 Pt states pain is controled and tolerable, denies nausea. Pt tolerating PO fluids. Report to Abdiel HARRIS    1908 Pt taken to room by RN in stable condition.     room air

## 2022-08-26 NOTE — ED ADULT TRIAGE NOTE - RESPIRATORY RATE (BREATHS/MIN)
Schedule SBFT for further evaluation    Start Xifaxan for bloating and symptoms, complete course of Xifaxan as prescribed.    Continue dietary changes for GI symptoms at this time with smaller more frequent meals, acid Rx and miralax for constiaption.      18

## 2022-08-26 NOTE — ED ADULT TRIAGE NOTE - CHIEF COMPLAINT QUOTE
abnormal labs, low K at 2.4, pt feeling fatigued and states that "speech sounds slurred and heavy" no other focal deficits, gross neuro intact and baseline. No facial droop.

## 2022-08-26 NOTE — ED PROVIDER NOTE - PHYSICAL EXAMINATION
Const: no acute distress, Well-developed, Eyes: no conjunctival injection and no scleral icterus ENMT: Moist mucus membranes, CVS: +S1/S2, radial/DP pulse 2+ bilaterally  RESP: Unlabored respiratory effort, Clear to auscultation bilaterally GI: Nontender/Nondistended soft abdomen, no CVA tenderness MSK: Extremities w/o deformity or ttp, Psych: Awake, Alert, & Orientedx3;  Appropriate mood and affect, cooperative  Neuro: 5/5 bilateral elbow flexion/extension, 5/5 bilateral wrist flexion/extension, 5/5 bilateral hand , 5/5 bilateral hip flexion/extension, 5/5 bilateral knee flexion/extension, 5/5 bilateral dorsiflexion/plantarflexion, intact sensation in the bilateral arms and legs to light touch, normal finger to nose bilaterally w/ no dysmetria, EOMI, CN2-12 intact, pupils are 4 mm and reactive bilaterally

## 2022-08-26 NOTE — ED PROVIDER NOTE - OBJECTIVE STATEMENT
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 Pt states that when he went home at 6PM he felt his speech was heavy. pt denies any slurred speech, as previously mentioned to the triage nurse. Pt wife at bedside also reports NO SLURRED SPEECH. Pt w/ no arm/leg weakness numbness, no facial droop. pt missed his midodrine dosage, was given at this time w/ heavy speech and pt had improvement of heavy speech.

## 2022-08-27 DIAGNOSIS — A41.9 SEPSIS, UNSPECIFIED ORGANISM: ICD-10-CM

## 2022-08-27 LAB
ANION GAP SERPL CALC-SCNC: 10 MMOL/L — SIGNIFICANT CHANGE UP (ref 5–17)
ANION GAP SERPL CALC-SCNC: 8 MMOL/L — SIGNIFICANT CHANGE UP (ref 5–17)
APPEARANCE UR: ABNORMAL
BACTERIA # UR AUTO: ABNORMAL
BASE EXCESS BLDV CALC-SCNC: 7.4 MMOL/L — HIGH (ref -2–3)
BILIRUB UR-MCNC: NEGATIVE — SIGNIFICANT CHANGE UP
BLOOD GAS VENOUS - CREATININE: SIGNIFICANT CHANGE UP MG/DL (ref 0.5–1.3)
BUN SERPL-MCNC: 7 MG/DL — SIGNIFICANT CHANGE UP (ref 7–23)
BUN SERPL-MCNC: 8 MG/DL — SIGNIFICANT CHANGE UP (ref 7–23)
CA-I SERPL-SCNC: 1.23 MMOL/L — SIGNIFICANT CHANGE UP (ref 1.15–1.33)
CALCIUM SERPL-MCNC: 9 MG/DL — SIGNIFICANT CHANGE UP (ref 8.4–10.5)
CALCIUM SERPL-MCNC: 9.4 MG/DL — SIGNIFICANT CHANGE UP (ref 8.4–10.5)
CHLORIDE BLDV-SCNC: 101 MMOL/L — SIGNIFICANT CHANGE UP (ref 96–108)
CHLORIDE SERPL-SCNC: 101 MMOL/L — SIGNIFICANT CHANGE UP (ref 96–108)
CHLORIDE SERPL-SCNC: 102 MMOL/L — SIGNIFICANT CHANGE UP (ref 96–108)
CO2 BLDV-SCNC: 37 MMOL/L — HIGH (ref 22–26)
CO2 SERPL-SCNC: 30 MMOL/L — SIGNIFICANT CHANGE UP (ref 22–31)
CO2 SERPL-SCNC: 31 MMOL/L — SIGNIFICANT CHANGE UP (ref 22–31)
COLOR SPEC: ABNORMAL
CREAT SERPL-MCNC: 0.69 MG/DL — SIGNIFICANT CHANGE UP (ref 0.5–1.3)
CREAT SERPL-MCNC: 0.71 MG/DL — SIGNIFICANT CHANGE UP (ref 0.5–1.3)
DIFF PNL FLD: ABNORMAL
EGFR: 105 ML/MIN/1.73M2 — SIGNIFICANT CHANGE UP
EGFR: 106 ML/MIN/1.73M2 — SIGNIFICANT CHANGE UP
EPI CELLS # UR: SIGNIFICANT CHANGE UP
GAS PNL BLDV: 139 MMOL/L — SIGNIFICANT CHANGE UP (ref 136–145)
GAS PNL BLDV: SIGNIFICANT CHANGE UP
GAS PNL BLDV: SIGNIFICANT CHANGE UP
GLUCOSE BLDV-MCNC: 83 MG/DL — SIGNIFICANT CHANGE UP (ref 70–99)
GLUCOSE SERPL-MCNC: 90 MG/DL — SIGNIFICANT CHANGE UP (ref 70–99)
GLUCOSE SERPL-MCNC: 97 MG/DL — SIGNIFICANT CHANGE UP (ref 70–99)
GLUCOSE UR QL: NEGATIVE — SIGNIFICANT CHANGE UP
HCO3 BLDV-SCNC: 35 MMOL/L — HIGH (ref 22–29)
HCT VFR BLDA CALC: 34 % — LOW (ref 39–51)
HGB BLD CALC-MCNC: 11.3 G/DL — LOW (ref 12.6–17.4)
HYALINE CASTS # UR AUTO: 0 /LPF — SIGNIFICANT CHANGE UP (ref 0–7)
KETONES UR-MCNC: NEGATIVE — SIGNIFICANT CHANGE UP
LACTATE BLDV-MCNC: 1.2 MMOL/L — SIGNIFICANT CHANGE UP (ref 0.5–2)
LEUKOCYTE ESTERASE UR-ACNC: ABNORMAL
NITRITE UR-MCNC: POSITIVE
OTHER CELLS CSF MANUAL: 6.5 ML/DL — LOW (ref 18–22)
PCO2 BLDV: 63 MMHG — HIGH (ref 42–55)
PH BLDV: 7.35 — SIGNIFICANT CHANGE UP (ref 7.32–7.43)
PH UR: 7 — SIGNIFICANT CHANGE UP (ref 5–8)
PO2 BLDV: 30 MMHG — SIGNIFICANT CHANGE UP (ref 25–45)
POTASSIUM BLDV-SCNC: 3.1 MMOL/L — LOW (ref 3.5–5.1)
POTASSIUM SERPL-MCNC: 3.1 MMOL/L — LOW (ref 3.5–5.3)
POTASSIUM SERPL-MCNC: 3.3 MMOL/L — LOW (ref 3.5–5.3)
POTASSIUM SERPL-SCNC: 3.1 MMOL/L — LOW (ref 3.5–5.3)
POTASSIUM SERPL-SCNC: 3.3 MMOL/L — LOW (ref 3.5–5.3)
PROT UR-MCNC: ABNORMAL
RAPID RVP RESULT: SIGNIFICANT CHANGE UP
RBC CASTS # UR COMP ASSIST: >50 /HPF — SIGNIFICANT CHANGE UP (ref 0–4)
SAO2 % BLDV: 32.6 % — LOW (ref 67–88)
SARS-COV-2 RNA SPEC QL NAA+PROBE: SIGNIFICANT CHANGE UP
SODIUM SERPL-SCNC: 141 MMOL/L — SIGNIFICANT CHANGE UP (ref 135–145)
SODIUM SERPL-SCNC: 141 MMOL/L — SIGNIFICANT CHANGE UP (ref 135–145)
SP GR SPEC: 1.01 — LOW (ref 1.01–1.02)
UROBILINOGEN FLD QL: NEGATIVE — SIGNIFICANT CHANGE UP
WBC UR QL: 10 /HPF — HIGH (ref 0–5)

## 2022-08-27 RX ORDER — ASCORBIC ACID 60 MG
500 TABLET,CHEWABLE ORAL DAILY
Refills: 0 | Status: DISCONTINUED | OUTPATIENT
Start: 2022-08-27 | End: 2022-09-12

## 2022-08-27 RX ORDER — MAGNESIUM OXIDE 400 MG ORAL TABLET 241.3 MG
400 TABLET ORAL DAILY
Refills: 0 | Status: DISCONTINUED | OUTPATIENT
Start: 2022-08-27 | End: 2022-08-31

## 2022-08-27 RX ORDER — PIPERACILLIN AND TAZOBACTAM 4; .5 G/20ML; G/20ML
3.38 INJECTION, POWDER, LYOPHILIZED, FOR SOLUTION INTRAVENOUS ONCE
Refills: 0 | Status: COMPLETED | OUTPATIENT
Start: 2022-08-27 | End: 2022-08-27

## 2022-08-27 RX ORDER — POTASSIUM CHLORIDE 20 MEQ
20 PACKET (EA) ORAL
Refills: 0 | Status: DISCONTINUED | OUTPATIENT
Start: 2022-08-27 | End: 2022-08-27

## 2022-08-27 RX ORDER — POTASSIUM CHLORIDE 20 MEQ
40 PACKET (EA) ORAL ONCE
Refills: 0 | Status: COMPLETED | OUTPATIENT
Start: 2022-08-27 | End: 2022-08-27

## 2022-08-27 RX ORDER — THIAMINE MONONITRATE (VIT B1) 100 MG
100 TABLET ORAL DAILY
Refills: 0 | Status: DISCONTINUED | OUTPATIENT
Start: 2022-08-27 | End: 2022-08-28

## 2022-08-27 RX ORDER — POTASSIUM CHLORIDE 20 MEQ
20 PACKET (EA) ORAL THREE TIMES A DAY
Refills: 0 | Status: DISCONTINUED | OUTPATIENT
Start: 2022-08-28 | End: 2022-08-30

## 2022-08-27 RX ORDER — POTASSIUM CHLORIDE 20 MEQ
20 PACKET (EA) ORAL THREE TIMES A DAY
Refills: 0 | Status: DISCONTINUED | OUTPATIENT
Start: 2022-08-27 | End: 2022-08-27

## 2022-08-27 RX ORDER — VANCOMYCIN HCL 1 G
1000 VIAL (EA) INTRAVENOUS ONCE
Refills: 0 | Status: COMPLETED | OUTPATIENT
Start: 2022-08-27 | End: 2022-08-27

## 2022-08-27 RX ORDER — MIDODRINE HYDROCHLORIDE 2.5 MG/1
5 TABLET ORAL THREE TIMES A DAY
Refills: 0 | Status: DISCONTINUED | OUTPATIENT
Start: 2022-08-27 | End: 2022-09-01

## 2022-08-27 RX ORDER — HEPARIN SODIUM 5000 [USP'U]/ML
5000 INJECTION INTRAVENOUS; SUBCUTANEOUS EVERY 8 HOURS
Refills: 0 | Status: DISCONTINUED | OUTPATIENT
Start: 2022-08-27 | End: 2022-09-09

## 2022-08-27 RX ADMIN — PIPERACILLIN AND TAZOBACTAM 3.38 GRAM(S): 4; .5 INJECTION, POWDER, LYOPHILIZED, FOR SOLUTION INTRAVENOUS at 08:10

## 2022-08-27 RX ADMIN — Medication 20 MILLIEQUIVALENT(S): at 15:02

## 2022-08-27 RX ADMIN — MIDODRINE HYDROCHLORIDE 5 MILLIGRAM(S): 2.5 TABLET ORAL at 11:23

## 2022-08-27 RX ADMIN — Medication 1 TABLET(S): at 11:23

## 2022-08-27 RX ADMIN — Medication 40 MILLIEQUIVALENT(S): at 06:52

## 2022-08-27 RX ADMIN — Medication 100 MILLIGRAM(S): at 11:23

## 2022-08-27 RX ADMIN — PIPERACILLIN AND TAZOBACTAM 200 GRAM(S): 4; .5 INJECTION, POWDER, LYOPHILIZED, FOR SOLUTION INTRAVENOUS at 07:34

## 2022-08-27 RX ADMIN — HEPARIN SODIUM 5000 UNIT(S): 5000 INJECTION INTRAVENOUS; SUBCUTANEOUS at 15:02

## 2022-08-27 RX ADMIN — Medication 100 MILLIEQUIVALENT(S): at 00:03

## 2022-08-27 RX ADMIN — MAGNESIUM OXIDE 400 MG ORAL TABLET 400 MILLIGRAM(S): 241.3 TABLET ORAL at 11:23

## 2022-08-27 RX ADMIN — Medication 40 MILLIEQUIVALENT(S): at 16:36

## 2022-08-27 RX ADMIN — Medication 1000 MILLIGRAM(S): at 09:00

## 2022-08-27 RX ADMIN — Medication 250 MILLIGRAM(S): at 07:34

## 2022-08-27 RX ADMIN — Medication 100 MILLIEQUIVALENT(S): at 01:02

## 2022-08-27 RX ADMIN — Medication 100 MILLIEQUIVALENT(S): at 02:15

## 2022-08-27 RX ADMIN — Medication 500 MILLIGRAM(S): at 11:23

## 2022-08-27 RX ADMIN — HEPARIN SODIUM 5000 UNIT(S): 5000 INJECTION INTRAVENOUS; SUBCUTANEOUS at 22:42

## 2022-08-27 RX ADMIN — MIDODRINE HYDROCHLORIDE 5 MILLIGRAM(S): 2.5 TABLET ORAL at 16:35

## 2022-08-27 NOTE — H&P ADULT - NSICDXPASTMEDICALHX_GEN_ALL_CORE_FT
PAST MEDICAL HISTORY:  Adrenal insufficiency     Anemia     H/O cerebellar ataxia -diagnosed in 2019    History of hypotension     Lacunar infarction -chronic    Pneumonia, aspiration -april 2022 (intubated for 7 days at [facility])

## 2022-08-27 NOTE — H&P ADULT - ASSESSMENT
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    Plan: Admit to medicine for hypokalemia. Stop Florineff as this can make lower. Replaced in ER now 3.1. Add bid KCL, add daily Magoxide. Follow  in AM.  Endo did stim test last admission, no evidence for adrenal insufficiency.  Discharge home after K is replaced.       Continue all home meds. Midodrine, Vitamin C.  Needs midodrine for chronic low BP.  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      Plan: Admit to medicine for hypokalemia. Stop Florineff as this can make K lower. Replaced in ER now 3.1. Add TID KCL, add daily Magoxide. Follow  in AM.      Endo did stim test last admission, no evidence for adrenal insufficiency.  Discharge home after K is replaced.  Florineff was only added   to increase BP.        Continue all home meds. Midodrine, Vitamin C.  Needs midodrine for chronic low BP.

## 2022-08-27 NOTE — H&P ADULT - HISTORY OF PRESENT ILLNESS
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 Pt states that when he went home at 6PM he felt his speech was heavy. pt denies any slurred speech, as previously mentioned to the triage nurse. Pt wife at bedside also reports NO SLURRED SPEECH. Pt w/ no arm/leg weakness numbness, no facial droop. pt missed his midodrine dosage, was given at this time w/ heavy speech and pt had improvement of heavy speech. 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     Low K was noted on pre-op testing.

## 2022-08-27 NOTE — H&P ADULT - NSHPPHYSICALEXAM_GEN_ALL_CORE
PHYSICAL EXAMINATION:  Vital Signs Last 24 Hrs  T(C): 35.2 (27 Aug 2022 05:53), Max: 36.5 (26 Aug 2022 15:25)  T(F): 95.3 (27 Aug 2022 05:53), Max: 97.7 (26 Aug 2022 15:25)  HR: 55 (27 Aug 2022 05:53) (45 - 90)  BP: 107/70 (27 Aug 2022 05:53) (82/59 - 117/78)  BP(mean): 83 (27 Aug 2022 05:53) (68 - 91)  RR: 16 (27 Aug 2022 05:53) (14 - 18)  SpO2: 98% (27 Aug 2022 05:53) (98% - 100%)    Parameters below as of 27 Aug 2022 05:53  Patient On (Oxygen Delivery Method): room air      CAPILLARY BLOOD GLUCOSE          GENERAL: NAD, well-groomed, well-developed  HEAD:  atraumatic, normocephalic  EYES: sclera anicteric  ENMT: mucous membranes moist  NECK: supple, No JVD  CHEST/LUNG: clear to auscultation bilaterally; no rales, rhonchi, or wheezing b/l  HEART: normal S1, S2  ABDOMEN: BS+, soft, ND, NT   EXTREMITIES:  pulses palpable; no clubbing, cyanosis, or edema b/l LEs  NEURO: awake, alert, interactive; moves all extremities  SKIN: no rashes or lesions PHYSICAL EXAMINATION:  Vital Signs Last 24 Hrs  T(C): 35.2 (27 Aug 2022 05:53), Max: 36.5 (26 Aug 2022 15:25)  T(F): 95.3 (27 Aug 2022 05:53), Max: 97.7 (26 Aug 2022 15:25)  HR: 55 (27 Aug 2022 05:53) (45 - 90)  BP: 107/70 (27 Aug 2022 05:53) (82/59 - 117/78)  BP(mean): 83 (27 Aug 2022 05:53) (68 - 91)  RR: 16 (27 Aug 2022 05:53) (14 - 18)  SpO2: 98% (27 Aug 2022 05:53) (98% - 100%)    Parameters below as of 27 Aug 2022 05:53  Patient On (Oxygen Delivery Method): room air      CAPILLARY BLOOD GLUCOSE          GENERAL: NAD, ENMT: mucous membranes moist  NECK: supple, No JVD  CHEST/LUNG: clear to auscultation bilaterally; no rales, rhonchi, or wheezing b/l  HEART: normal S1, S2  ABDOMEN: BS+, soft, ND, NT   EXTREMITIES:  pulses palpable; no clubbing, cyanosis, or edema b/l LEs  NEURO: awake, alert, interactive; moves all extremities  SKIN: no rashes or lesions PHYSICAL EXAMINATION:  Vital Signs Last 24 Hrs  T(C): 35.2 (27 Aug 2022 05:53), Max: 36.5 (26 Aug 2022 15:25)  T(F): 95.3 (27 Aug 2022 05:53), Max: 97.7 (26 Aug 2022 15:25)  HR: 55 (27 Aug 2022 05:53) (45 - 90)  BP: 107/70 (27 Aug 2022 05:53) (82/59 - 117/78)  BP(mean): 83 (27 Aug 2022 05:53) (68 - 91)  RR: 16 (27 Aug 2022 05:53) (14 - 18)  SpO2: 98% (27 Aug 2022 05:53) (98% - 100%)    Parameters below as of 27 Aug 2022 05:53  Patient On (Oxygen Delivery Method): room air      CAPILLARY BLOOD GLUCOSE          GENERAL: NAD, seen in ER, alert, comfortable, answers questions and follows commands. No coronel.    ENMT: mucous membranes moist  NECK: supple, No JVD  CHEST/LUNG: clear to auscultation bilaterally; no rales, rhonchi, or wheezing b/l  HEART: normal S1, S2  ABDOMEN: BS+, soft, ND, NT   EXTREMITIES:  pulses palpable; no clubbing, cyanosis, or edema b/l LEs  NEURO: awake, alert, interactive; moves all extremities  SKIN: no rashes or lesions

## 2022-08-27 NOTE — H&P ADULT - NSHPLABSRESULTS_GEN_ALL_CORE
LABS:                        10.2   9.95  )-----------( 215      ( 26 Aug 2022 21:27 )             31.4     08    141  |  102  |  8   ----------------------------<  97  3.1<L>   |  31  |  0.71    Ca    9.0      27 Aug 2022 05:18  Mg     1.6             Urinalysis Basic - ( 27 Aug 2022 07:23 )    Color: Light Orange / Appearance: Turbid / S.007 / pH: x  Gluc: x / Ketone: Negative  / Bili: Negative / Urobili: Negative   Blood: x / Protein: 30 mg/dL / Nitrite: Positive   Leuk Esterase: Large / RBC: >50 /hpf / WBC 10 /HPF   Sq Epi: x / Non Sq Epi: Occasional / Bacteria: Moderate          RADIOLOGY & ADDITIONAL TESTS:

## 2022-08-28 LAB
ANION GAP SERPL CALC-SCNC: 6 MMOL/L — SIGNIFICANT CHANGE UP (ref 5–17)
ANION GAP SERPL CALC-SCNC: 8 MMOL/L — SIGNIFICANT CHANGE UP (ref 5–17)
BUN SERPL-MCNC: 6 MG/DL — LOW (ref 7–23)
BUN SERPL-MCNC: 7 MG/DL — SIGNIFICANT CHANGE UP (ref 7–23)
CALCIUM SERPL-MCNC: 9.4 MG/DL — SIGNIFICANT CHANGE UP (ref 8.4–10.5)
CALCIUM SERPL-MCNC: 9.8 MG/DL — SIGNIFICANT CHANGE UP (ref 8.4–10.5)
CHLORIDE SERPL-SCNC: 104 MMOL/L — SIGNIFICANT CHANGE UP (ref 96–108)
CHLORIDE SERPL-SCNC: 104 MMOL/L — SIGNIFICANT CHANGE UP (ref 96–108)
CO2 SERPL-SCNC: 32 MMOL/L — HIGH (ref 22–31)
CO2 SERPL-SCNC: 32 MMOL/L — HIGH (ref 22–31)
CREAT SERPL-MCNC: 0.77 MG/DL — SIGNIFICANT CHANGE UP (ref 0.5–1.3)
CREAT SERPL-MCNC: 0.79 MG/DL — SIGNIFICANT CHANGE UP (ref 0.5–1.3)
EGFR: 102 ML/MIN/1.73M2 — SIGNIFICANT CHANGE UP
EGFR: 102 ML/MIN/1.73M2 — SIGNIFICANT CHANGE UP
GLUCOSE SERPL-MCNC: 107 MG/DL — HIGH (ref 70–99)
GLUCOSE SERPL-MCNC: 94 MG/DL — SIGNIFICANT CHANGE UP (ref 70–99)
HCT VFR BLD CALC: 36.2 % — LOW (ref 39–50)
HGB BLD-MCNC: 11.6 G/DL — LOW (ref 13–17)
MCHC RBC-ENTMCNC: 28 PG — SIGNIFICANT CHANGE UP (ref 27–34)
MCHC RBC-ENTMCNC: 32 GM/DL — SIGNIFICANT CHANGE UP (ref 32–36)
MCV RBC AUTO: 87.4 FL — SIGNIFICANT CHANGE UP (ref 80–100)
NRBC # BLD: 0 /100 WBCS — SIGNIFICANT CHANGE UP (ref 0–0)
PLATELET # BLD AUTO: 225 K/UL — SIGNIFICANT CHANGE UP (ref 150–400)
POTASSIUM SERPL-MCNC: 4 MMOL/L — SIGNIFICANT CHANGE UP (ref 3.5–5.3)
POTASSIUM SERPL-MCNC: 4 MMOL/L — SIGNIFICANT CHANGE UP (ref 3.5–5.3)
POTASSIUM SERPL-SCNC: 4 MMOL/L — SIGNIFICANT CHANGE UP (ref 3.5–5.3)
POTASSIUM SERPL-SCNC: 4 MMOL/L — SIGNIFICANT CHANGE UP (ref 3.5–5.3)
RBC # BLD: 4.14 M/UL — LOW (ref 4.2–5.8)
RBC # FLD: 14.2 % — SIGNIFICANT CHANGE UP (ref 10.3–14.5)
SODIUM SERPL-SCNC: 142 MMOL/L — SIGNIFICANT CHANGE UP (ref 135–145)
SODIUM SERPL-SCNC: 144 MMOL/L — SIGNIFICANT CHANGE UP (ref 135–145)
TSH SERPL-MCNC: 1.51 UIU/ML — SIGNIFICANT CHANGE UP (ref 0.27–4.2)
WBC # BLD: 7.53 K/UL — SIGNIFICANT CHANGE UP (ref 3.8–10.5)
WBC # FLD AUTO: 7.53 K/UL — SIGNIFICANT CHANGE UP (ref 3.8–10.5)

## 2022-08-28 RX ORDER — CHLORHEXIDINE GLUCONATE 213 G/1000ML
1 SOLUTION TOPICAL DAILY
Refills: 0 | Status: DISCONTINUED | OUTPATIENT
Start: 2022-08-28 | End: 2022-09-12

## 2022-08-28 RX ADMIN — HEPARIN SODIUM 5000 UNIT(S): 5000 INJECTION INTRAVENOUS; SUBCUTANEOUS at 21:33

## 2022-08-28 RX ADMIN — Medication 20 MILLIEQUIVALENT(S): at 14:50

## 2022-08-28 RX ADMIN — Medication 20 MILLIEQUIVALENT(S): at 22:39

## 2022-08-28 RX ADMIN — MIDODRINE HYDROCHLORIDE 5 MILLIGRAM(S): 2.5 TABLET ORAL at 18:57

## 2022-08-28 RX ADMIN — HEPARIN SODIUM 5000 UNIT(S): 5000 INJECTION INTRAVENOUS; SUBCUTANEOUS at 14:50

## 2022-08-28 RX ADMIN — Medication 1 TABLET(S): at 12:30

## 2022-08-28 RX ADMIN — CHLORHEXIDINE GLUCONATE 1 APPLICATION(S): 213 SOLUTION TOPICAL at 12:30

## 2022-08-28 RX ADMIN — MAGNESIUM OXIDE 400 MG ORAL TABLET 400 MILLIGRAM(S): 241.3 TABLET ORAL at 12:31

## 2022-08-28 RX ADMIN — MIDODRINE HYDROCHLORIDE 5 MILLIGRAM(S): 2.5 TABLET ORAL at 12:30

## 2022-08-28 RX ADMIN — Medication 20 MILLIEQUIVALENT(S): at 05:24

## 2022-08-28 RX ADMIN — HEPARIN SODIUM 5000 UNIT(S): 5000 INJECTION INTRAVENOUS; SUBCUTANEOUS at 05:24

## 2022-08-28 RX ADMIN — Medication 500 MILLIGRAM(S): at 12:30

## 2022-08-28 RX ADMIN — MIDODRINE HYDROCHLORIDE 5 MILLIGRAM(S): 2.5 TABLET ORAL at 05:24

## 2022-08-28 NOTE — PATIENT PROFILE ADULT - FALL HARM RISK - HARM RISK INTERVENTIONS

## 2022-08-28 NOTE — CONSULT NOTE ADULT - ASSESSMENT
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   pt is well known to me with hx of severe orthostatic hypotension on florinef/ ?midodrine with bradycardia, pt had a normal am cortisol level.  ivf, keep hydrated  autonomic dysfunction, over all controlled with midodrine, don't increase dose sec to bradycardia  check tsh  dvt prophylaxis

## 2022-08-28 NOTE — PROGRESS NOTE ADULT - SUBJECTIVE AND OBJECTIVE BOX
[facility]    REVIEW OF SYSTEMS:  GEN: no fever,    no chills  RESP: no SOB,   no cough  CVS: no chest pain,   no palpitations  GI: no abdominal pain,   no nausea,   no vomiting,   no constipation,   no diarrhea  : no dysuria,   no frequency  NEURO: no headache,   no dizziness  PSYCH: no depression,   not anxious  Derm : no rash    MEDICATIONS  (STANDING):  ascorbic acid 500 milliGRAM(s) Oral daily  heparin   Injectable 5000 Unit(s) SubCutaneous every 8 hours  magnesium oxide 400 milliGRAM(s) Oral daily  midodrine. 5 milliGRAM(s) Oral three times a day  multivitamin 1 Tablet(s) Oral daily  potassium chloride    Tablet ER 20 milliEquivalent(s) Oral three times a day    MEDICATIONS  (PRN):      Vital Signs Last 24 Hrs  T(C): 36.4 (28 Aug 2022 04:53), Max: 36.5 (27 Aug 2022 19:02)  T(F): 97.5 (28 Aug 2022 04:53), Max: 97.7 (27 Aug 2022 19:02)  HR: 55 (28 Aug 2022 04:53) (55 - 64)  BP: 104/71 (28 Aug 2022 04:53) (99/63 - 119/82)  BP(mean): --  RR: 18 (28 Aug 2022 04:53) (18 - 18)  SpO2: 100% (28 Aug 2022 04:53) (98% - 100%)    Parameters below as of 28 Aug 2022 04:53  Patient On (Oxygen Delivery Method): room air      CAPILLARY BLOOD GLUCOSE        I&O's Summary      PHYSICAL EXAM:  HEAD:  Atraumatic, Normocephalic  NECK: Supple, No   JVD  CHEST/LUNG:   no     rales,     no,    rhonchi  HEART: Regular rate and rhythm;         murmur  ABDOMEN: Soft, Nontender, ;   EXTREMITIES:    no    edema  NEUROLOGY:  alert    LABS:                        10.2   9.95  )-----------( 215      ( 26 Aug 2022 21:27 )             31.4         142  |  104  |  7   ----------------------------<  107<H>  4.0   |  32<H>  |  0.79    Ca    9.4      28 Aug 2022 00:41  Mg     1.6                 Urinalysis Basic - ( 27 Aug 2022 07:23 )    Color: Light Orange / Appearance: Turbid / S.007 / pH: x  Gluc: x / Ketone: Negative  / Bili: Negative / Urobili: Negative   Blood: x / Protein: 30 mg/dL / Nitrite: Positive   Leuk Esterase: Large / RBC: >50 /hpf / WBC 10 /HPF   Sq Epi: x / Non Sq Epi: Occasional / Bacteria: Moderate           @ 07:24  3.1  30      Thyroid Stimulating Hormone, Serum: 1.51 uIU/mL ( @ 07:27)          Consultant(s) Notes Reviewed:      Care Discussed with Consultants/Other Providers:

## 2022-08-28 NOTE — CONSULT NOTE ADULT - SUBJECTIVE AND OBJECTIVE BOX
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   pt is well known to me with hx of severe orthostatic hypotension on florinef/ ?midodrine with bradycardia, pt had a normal am cortisol level.      PAST MEDICAL & SURGICAL HISTORY:  H/O cerebellar ataxia  -diagnosed in 2019      History of hypotension      Anemia      Lacunar infarction  -chronic      Pneumonia, aspiration  -april 2022 (intubated for 7 days at [facility])      Adrenal insufficiency      No significant past surgical history          MEDICATIONS  (STANDING):  ascorbic acid 500 milliGRAM(s) Oral daily  chlorhexidine 2% Cloths 1 Application(s) Topical daily  heparin   Injectable 5000 Unit(s) SubCutaneous every 8 hours  magnesium oxide 400 milliGRAM(s) Oral daily  midodrine. 5 milliGRAM(s) Oral three times a day  multivitamin 1 Tablet(s) Oral daily  potassium chloride    Tablet ER 20 milliEquivalent(s) Oral three times a day    MEDICATIONS  (PRN):      FAMILY HISTORY:  FH: dementia (Mother)    FH: type 2 diabetes (Mother)    Family history of CVA (Father)        SOCIAL HISTORY:    [x ] Non-smoker  [ ] Smoker  [ ] Alcohol    Allergies    No Known Allergies    Intolerances    	    REVIEW OF SYSTEMS:  CONSTITUTIONAL: No fever, weight loss, or fatigue  EYES: No eye pain, visual disturbances, or discharge  ENT:  No difficulty hearing, tinnitus, vertigo; No sinus or throat pain  NECK: No pain or stiffness  RESPIRATORY: No cough, wheezing, chills or hemoptysis; + Shortness of Breath  CARDIOVASCULAR: No chest pain, palpitations, passing out, dizziness, or leg swelling  GASTROINTESTINAL: No abdominal or epigastric pain. No nausea, vomiting, or hematemesis; No diarrhea or constipation. No melena or hematochezia.  GENITOURINARY: No dysuria, frequency, hematuria, or incontinence  NEUROLOGICAL: No headaches, memory loss, loss of strength, numbness, or tremors  SKIN: No itching, burning, rashes, or lesions   LYMPH Nodes: No enlarged glands  ENDOCRINE: No heat or cold intolerance; No hair loss  MUSCULOSKELETAL: No joint pain or swelling; No muscle, back, or extremity pain  PSYCHIATRIC: No depression, anxiety, mood swings, or difficulty sleeping  HEME/LYMPH: No easy bruising, or bleeding gums  ALLERGY AND IMMUNOLOGIC: No hives or eczema	    [ ] All others negative	  [ ] Unable to obtain    PHYSICAL EXAM:  T(C): 36.4 (08-28-22 @ 04:53), Max: 36.5 (08-27-22 @ 19:02)  HR: 62 (08-28-22 @ 09:30) (55 - 62)  BP: 103/73 (08-28-22 @ 09:30) (99/63 - 119/82)  RR: 18 (08-28-22 @ 04:53) (18 - 18)  SpO2: 99% (08-28-22 @ 09:30) (99% - 100%)  Wt(kg): --  I&O's Summary      Appearance: Normal	  HEENT:   Normal oral mucosa, PERRL, EOMI	  Lymphatic: No lymphadenopathy  Cardiovascular: Normal S1 S2, No JVD, + murmurs, No edema  Respiratory:clear  Psychiatry: A & O x 3, Mood & affect appropriate  Gastrointestinal:  Soft, Non-tender, + BS	  Skin: No rashes, No ecchymoses, No cyanosis	  Neurologic: Non-focal  Extremities: Normal range of motion, No clubbing, cyanosis or edema  Vascular: Peripheral pulses palpable 2+ bilaterally    TELEMETRY: 	    ECG:  	  RADIOLOGY:  OTHER: 	  	  LABS:	 	    CARDIAC MARKERS:                              11.6   7.53  )-----------( 225      ( 28 Aug 2022 07:17 )             36.2     08-28    142  |  104  |  7   ----------------------------<  107<H>  4.0   |  32<H>  |  0.79    Ca    9.4      28 Aug 2022 00:41  Mg     1.6     08-26      proBNP:   Lipid Profile:   HgA1c:   TSH:       PREVIOUS DIAGNOSTIC TESTING:      < from: 12 Lead ECG (08.26.22 @ 19:40) >  Diagnosis Line NORMAL SINUS RHYTHM  LOW VOLTAGE QRS  NONSPECIFIC T WAVE ABNORMALITY  ABNORMAL ECG  WHEN COMPARED WITH ECG OF 09-AUG-2022 12:25,  SIGNIFICANT CHANGES HAVE OCCURRED    < from: TTE with Doppler (w/Cont) (08.09.22 @ 15:34) >  1. Normal aortic root size. (Ao: 2.8 cm at the sinuses of  Valsalva).  2. Normal left atrium.  LA volume index = 16 cc/m2.  3. Normal left ventricular systolic function. No segmental  wall motion abnormalities. Endocardial visualization  enhanced with intravenous injection of Ultrasonic Enhancing  Agent (Lumason). LVEF calculated using biplane Hickman's  method was 74%.  4. Normal diastolic function  5. Normal right atrium.  6. Normal right ventricular size and function.  7. Normal tricuspid valve. Minimal tricuspid regurgitation.  8. Estimated pulmonary artery systolic pressure equals 16  mm Hg, assuming right atrial pressure equals 3  mm Hg,  consistent with normal pulmonary pressures.  9. No pericardial effusion seen.    < from: Xray Chest 1 View- PORTABLE-Urgent (08.09.22 @ 13:55) >  FINDINGS:  The heart is normal in size.  The lungs are clear.  There is no pneumothorax or pleural effusion.    IMPRESSION:  Clear lungs.

## 2022-08-28 NOTE — PROGRESS NOTE ADULT - ASSESSMENT
62 yo male      PMHx of cerebellar ataxia     on 4/17/22 due to AMS ,  was  intubated for depressed consciousness w/ cognitive decline.  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    infectious  and  malignancy  was  ruled  outt/  and  per   neuro  note ,no further workup     flow cytometry 4/28 showing nonspecific results 'degenerated sample, small lymphocytes'   nsgy consulted for meningeal bx, family refusing    CSF cytopathology 4/22 - increased number of large mononuclear cells in a background of few small lymphocytes, monocytes and neutrophils, cannot exclude a lymphoproliferative disorder versus an inflammatory process.    CSF cytopathology 4/29 - significant increased number of small lymphocytes with rare monocytes/ seen by  oncology  dr de leon,  no  intervention      now  admitted    with  hypokalemia   h/o  cerebellar  ataxia,  with  no defined  cause  found   pt  with  bradycardia, ?  central, seen  by  card/  echo  on 4/2022,  normal  ef    h/o  recurrent , intermittent   hypothermia,    not related  to  any   infectious   process,  it  seems to be  dysautonomia/    with  h/o normal  cortisol level  prior  CT  c/ap,  no  malignant  process  on feeds  per  swallow  eval     bed bound   status  and  altered  baseline  mental  status at  times/      h/o bradycardia   f/p  by  card    on  midodrine   dvt ppx/  q/q  heparin

## 2022-08-29 ENCOUNTER — TRANSCRIPTION ENCOUNTER (OUTPATIENT)
Age: 62
End: 2022-08-29

## 2022-08-29 LAB
ANION GAP SERPL CALC-SCNC: 7 MMOL/L — SIGNIFICANT CHANGE UP (ref 5–17)
BUN SERPL-MCNC: 6 MG/DL — LOW (ref 7–23)
CALCIUM SERPL-MCNC: 9.6 MG/DL — SIGNIFICANT CHANGE UP (ref 8.4–10.5)
CHLORIDE SERPL-SCNC: 104 MMOL/L — SIGNIFICANT CHANGE UP (ref 96–108)
CO2 SERPL-SCNC: 31 MMOL/L — SIGNIFICANT CHANGE UP (ref 22–31)
CREAT SERPL-MCNC: 0.7 MG/DL — SIGNIFICANT CHANGE UP (ref 0.5–1.3)
EGFR: 105 ML/MIN/1.73M2 — SIGNIFICANT CHANGE UP
GLUCOSE SERPL-MCNC: 80 MG/DL — SIGNIFICANT CHANGE UP (ref 70–99)
MRSA PCR RESULT.: DETECTED
POTASSIUM SERPL-MCNC: 4.4 MMOL/L — SIGNIFICANT CHANGE UP (ref 3.5–5.3)
POTASSIUM SERPL-SCNC: 4.4 MMOL/L — SIGNIFICANT CHANGE UP (ref 3.5–5.3)
S AUREUS DNA NOSE QL NAA+PROBE: DETECTED
SODIUM SERPL-SCNC: 142 MMOL/L — SIGNIFICANT CHANGE UP (ref 135–145)

## 2022-08-29 PROCEDURE — 99253 IP/OBS CNSLTJ NEW/EST LOW 45: CPT

## 2022-08-29 RX ORDER — CEFTRIAXONE 500 MG/1
1000 INJECTION, POWDER, FOR SOLUTION INTRAMUSCULAR; INTRAVENOUS EVERY 24 HOURS
Refills: 0 | Status: DISCONTINUED | OUTPATIENT
Start: 2022-08-29 | End: 2022-08-30

## 2022-08-29 RX ORDER — MUPIROCIN 20 MG/G
1 OINTMENT TOPICAL
Refills: 0 | Status: COMPLETED | OUTPATIENT
Start: 2022-08-29 | End: 2022-09-03

## 2022-08-29 RX ADMIN — Medication 20 MILLIEQUIVALENT(S): at 21:28

## 2022-08-29 RX ADMIN — Medication 20 MILLIEQUIVALENT(S): at 06:00

## 2022-08-29 RX ADMIN — Medication 500 MILLIGRAM(S): at 11:49

## 2022-08-29 RX ADMIN — CHLORHEXIDINE GLUCONATE 1 APPLICATION(S): 213 SOLUTION TOPICAL at 11:55

## 2022-08-29 RX ADMIN — MAGNESIUM OXIDE 400 MG ORAL TABLET 400 MILLIGRAM(S): 241.3 TABLET ORAL at 11:49

## 2022-08-29 RX ADMIN — MIDODRINE HYDROCHLORIDE 5 MILLIGRAM(S): 2.5 TABLET ORAL at 06:01

## 2022-08-29 RX ADMIN — MUPIROCIN 1 APPLICATION(S): 20 OINTMENT TOPICAL at 21:29

## 2022-08-29 RX ADMIN — HEPARIN SODIUM 5000 UNIT(S): 5000 INJECTION INTRAVENOUS; SUBCUTANEOUS at 06:00

## 2022-08-29 RX ADMIN — HEPARIN SODIUM 5000 UNIT(S): 5000 INJECTION INTRAVENOUS; SUBCUTANEOUS at 21:29

## 2022-08-29 RX ADMIN — HEPARIN SODIUM 5000 UNIT(S): 5000 INJECTION INTRAVENOUS; SUBCUTANEOUS at 13:18

## 2022-08-29 RX ADMIN — Medication 20 MILLIEQUIVALENT(S): at 13:18

## 2022-08-29 RX ADMIN — MIDODRINE HYDROCHLORIDE 5 MILLIGRAM(S): 2.5 TABLET ORAL at 16:07

## 2022-08-29 RX ADMIN — Medication 1 TABLET(S): at 11:49

## 2022-08-29 RX ADMIN — CEFTRIAXONE 100 MILLIGRAM(S): 500 INJECTION, POWDER, FOR SOLUTION INTRAMUSCULAR; INTRAVENOUS at 06:00

## 2022-08-29 RX ADMIN — MIDODRINE HYDROCHLORIDE 5 MILLIGRAM(S): 2.5 TABLET ORAL at 11:48

## 2022-08-29 NOTE — DIETITIAN INITIAL EVALUATION ADULT - ADD RECOMMEND
1. Will continue to monitor PO intake, weight, labs, skin, GI status, and diet as able. 2. Encourage PO intake and honor food preferences. 3. Continue Multivitamin and Vitamin C as medically feasible to optimize nutrient intake and further aid with pressure ulcer healing. 4. Provided recommendations to optimize PO and protein intake - made aware RD remains available. 5. Malnutrition notification placed in chart.

## 2022-08-29 NOTE — DIETITIAN INITIAL EVALUATION ADULT - ENERGY INTAKE
Confirmed with wife and RN. Pt reports good appetite and PO intake in house. Agreed to Ensure Enlive. RN reports pt eating ~75% of his meals. Pt denies difficulty chewing/swallowing - RN states pt takes all of his pills at the same time. Pt denies nausea, vomiting, diarrhea, or constipation, last BM (08/28).

## 2022-08-29 NOTE — PROGRESS NOTE ADULT - SUBJECTIVE AND OBJECTIVE BOX
[facility]    REVIEW OF SYSTEMS:  GEN: no fever,    no chills  RESP: no SOB,   no cough  CVS: no chest pain,   no palpitations  GI: no abdominal pain,   no nausea,   no vomiting,   no constipation,   no diarrhea  : no dysuria,   no frequency  NEURO: no headache,   no dizziness  PSYCH: no depression,   not anxious  Derm : no rash    MEDICATIONS  (STANDING):  ascorbic acid 500 milliGRAM(s) Oral daily  chlorhexidine 2% Cloths 1 Application(s) Topical daily  heparin   Injectable 5000 Unit(s) SubCutaneous every 8 hours  magnesium oxide 400 milliGRAM(s) Oral daily  midodrine. 5 milliGRAM(s) Oral three times a day  multivitamin 1 Tablet(s) Oral daily  potassium chloride    Tablet ER 20 milliEquivalent(s) Oral three times a day    MEDICATIONS  (PRN):      Vital Signs Last 24 Hrs  T(C): 36.7 (29 Aug 2022 04:09), Max: 36.7 (28 Aug 2022 22:23)  T(F): 98 (29 Aug 2022 04:09), Max: 98 (28 Aug 2022 22:23)  HR: 55 (29 Aug 2022 04:09) (53 - 71)  BP: 101/68 (29 Aug 2022 04:09) (101/68 - 111/69)  BP(mean): --  RR: 18 (29 Aug 2022 04:09) (18 - 18)  SpO2: 99% (29 Aug 2022 04:09) (97% - 99%)    Parameters below as of 29 Aug 2022 04:09  Patient On (Oxygen Delivery Method): room air      CAPILLARY BLOOD GLUCOSE        I&O's Summary    28 Aug 2022 07:01  -  29 Aug 2022 05:39  --------------------------------------------------------  IN: 0 mL / OUT: 200 mL / NET: -200 mL        PHYSICAL EXAM:  HEAD:  Atraumatic, Normocephalic  NECK: Supple, No   JVD  CHEST/LUNG:   no     rales,     no,    rhonchi  HEART: Regular rate and rhythm;         murmur  ABDOMEN: Soft, Nontender, ;   EXTREMITIES:   no     edema  NEUROLOGY:  alert    LABS:                        11.6   7.53  )-----------( 225      ( 28 Aug 2022 07:17 )             36.2         144  |  104  |  6<L>  ----------------------------<  94  4.0   |  32<H>  |  0.77    Ca    9.8      28 Aug 2022 22:05            Urinalysis Basic - ( 27 Aug 2022 07:23 )    Color: Light Orange / Appearance: Turbid / S.007 / pH: x  Gluc: x / Ketone: Negative  / Bili: Negative / Urobili: Negative   Blood: x / Protein: 30 mg/dL / Nitrite: Positive   Leuk Esterase: Large / RBC: >50 /hpf / WBC 10 /HPF   Sq Epi: x / Non Sq Epi: Occasional / Bacteria: Moderate           @ 07:24  3.1  30      Thyroid Stimulating Hormone, Serum: 1.51 uIU/mL ( @ 07:27)          Consultant(s) Notes Reviewed:      Care Discussed with Consultants/Other Providers:

## 2022-08-29 NOTE — DISCHARGE NOTE PROVIDER - HOSPITAL COURSE
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    bl urs/ll/stents   left stent on string   right stent no string  Will dc coronel and left stent on Friday   - Right stent to be removed outpatient in 2 weeks   9/7: coronel and left stent removed   9/12: right stent removed     - No further  intervention   - Expect some blood tinged urine   - f/up with Dr. 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    Both ureteral stents and both stones now removed. Removed coronel, on regular diet.      No further  intervention, expect some blood tinged urine   - f/up with Dr. Daigle outpatient . Discharged home with home care.  Continue all prior meds .

## 2022-08-29 NOTE — DIETITIAN INITIAL EVALUATION ADULT - REASON FOR ADMISSION
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

## 2022-08-29 NOTE — DISCHARGE NOTE PROVIDER - CARE PROVIDERS DIRECT ADDRESSES
[email or web address]

## 2022-08-29 NOTE — DIETITIAN INITIAL EVALUATION ADULT - ETIOLOGY
Increased demands for nutrients for pressure ulcer healing  Inadequate oral intake in setting of pureed diet

## 2022-08-29 NOTE — DISCHARGE NOTE PROVIDER - NSDCCPCAREPLAN_GEN_ALL_CORE_FT
PRINCIPAL DISCHARGE DIAGNOSIS  Diagnosis: Sepsis  Assessment and Plan of Treatment: Take all antibiotics as ordered.  Call you Health care provider upon arrival home to make a one week follow up appointment.  If you develop fever, chills, malaise, or change in mental status call your Health Care Provider or go to the Emergency Department.  Nutrition is important, eat small frequent meals to help ensure you get adequate calories.  Do not stay in bed all day!  Increase your activity daily as tolerated.        SECONDARY DISCHARGE DIAGNOSES  Diagnosis: Hypokalemia  Assessment and Plan of Treatment: resolved    Diagnosis: Renal calculi  Assessment and Plan of Treatment: s/p b/l ureteral stent placement and subsequent removal  expect some blood tinged urine  follow up with Dr. Daigle outpatient

## 2022-08-29 NOTE — DISCHARGE NOTE PROVIDER - DETAILS OF MALNUTRITION DIAGNOSIS/DIAGNOSES
This patient has been assessed with a concern for Malnutrition and was treated during this hospitalization for the following Nutrition diagnosis/diagnoses:     -  08/29/2022: Severe protein-calorie malnutrition

## 2022-08-29 NOTE — DIETITIAN INITIAL EVALUATION ADULT - CONTINUE CURRENT NUTRITION CARE PLAN
Defer diet/fluid consistencies to medical team/SLP recommendations. 1. Continue regular diet. Will continue to monitor as able and adjust as needed. 2. Recommend Ensure Enlive 3xday to optimize kcal and protein intake. Spoke to ACP./yes

## 2022-08-29 NOTE — DIETITIAN INITIAL EVALUATION ADULT - SIGNS/SYMPTOMS
PO intake <75% with 21.6% weight loss x ~10 months; sign of muscle loss  Pt with pressure ulcers as above

## 2022-08-29 NOTE — DIETITIAN INITIAL EVALUATION ADULT - REASON INDICATOR FOR ASSESSMENT
Nutrition consult received for pressure ulcer >2.   Information obtained from: medical record, previous RD note, pt, RN, and wife at bedside.

## 2022-08-29 NOTE — DIETITIAN INITIAL EVALUATION ADULT - WEIGHT (KG)
Subjective:     Chief Complaint   Patient presents with    Follow Up Chronic Condition     Insomnia, Weightloss, Restarting Wellbutrin, Restarting Dexliant    Labs     HPI:  Mahad Krueger is a 65 y.o. female who presents due to insomnia, weight loss restarting Wellbutrin and would like her PPI restarted. Patient has history of anxiety, OCD, depression. Is currently not following with psychiatry. She not been taking any of her medications. Last visit Lexapro was restarted at 20 mg daily. Has not noticed much of a difference. She was on Wellbutrin as well previously. Okay with restarting that. Patient continues to have insomnia. Feels like it might of helped a little bit for a couple nights. She is on 50 mg of trazodone is okay with increasing the dose. She has been on Ambien in the past.    Patient has unexplained weight loss she does admit to eating 3 meals a day but very small meals. What she describes as TV dinners. Gabapentin restarted due to history of neuropathy. She also has history of cervical radiculopathy and fibromyalgia.     Past Medical History:   Diagnosis Date    Anxiety     Chronic mental illness     Gastroesophageal reflux disease     Lumbar back pain with radiculopathy affecting left lower extremity     Lumbar back pain with radiculopathy affecting right lower extremity     Major depressive disorder, single episode, unspecified     Migraine     Mixed hyperlipidemia 1/29/2018    Mood disorder ([address]     Obsessive compulsive disorder     Paresthesia of both feet 8/27/2019    Piriformis syndrome 10/25/2018    Vertigo     Vitamin D deficiency 2/28/2019     Family History   Problem Relation Age of Onset    Cancer Mother         lung    High Cholesterol Mother     Hypertension Mother     Dementia Father     Heart Disease Brother      Social History     Socioeconomic History    Marital status: SINGLE     Spouse name: Not on file    Number of children: Not 60.9 on file    Years of education: Not on file    Highest education level: Not on file   Occupational History    Not on file   Tobacco Use    Smoking status: Never Smoker    Smokeless tobacco: Never Used   Vaping Use    Vaping Use: Never used   Substance and Sexual Activity    Alcohol use: No    Drug use: No    Sexual activity: Not on file   Other Topics Concern    Not on file   Social History Narrative    Not on file     Social Determinants of Health     Financial Resource Strain:     Difficulty of Paying Living Expenses:    Food Insecurity:     Worried About Running Out of Food in the Last Year:     [address] in the Last Year:    Transportation Needs:     Lack of Transportation (Medical):  Lack of Transportation (Non-Medical):    Physical Activity:     Days of Exercise per Week:     Minutes of Exercise per Session:    Stress:     Feeling of Stress :    Social Connections:     Frequency of Communication with Friends and Family:     Frequency of Social Gatherings with Friends and Family:     Attends Religion Services:     Active Member of Clubs or Organizations:     Attends Club or Organization Meetings:     Marital Status:    Intimate Partner Violence:     Fear of Current or Ex-Partner:     Emotionally Abused:     Physically Abused:     Sexually Abused:      Current Outpatient Medications on File Prior to Visit   Medication Sig Dispense Refill    gabapentin (NEURONTIN) 400 mg capsule Take 1 Capsule by mouth two (2) times a day. Max Daily Amount: 800 mg. 180 Capsule 0    escitalopram oxalate (LEXAPRO) 20 mg tablet escitalopram 20 mg tablet daily 90 Tablet 0    traZODone (DESYREL) 50 mg tablet Take 1 Tablet by mouth nightly. 90 Tablet 0    [DISCONTINUED] butalbital-acetaminophen-caffeine (FIORICET, ESGIC) -40 mg per tablet Take 2 Tabs by mouth every eight (8) hours as needed for Pain or Headache.  Indications: migraine headache, Tension Headache (Patient not taking: Reported on 6/21/2021) 50 Tab 11    [DISCONTINUED] tiZANidine (ZANAFLEX) 4 mg tablet Take 0.5-1 Tabs by mouth three (3) times daily as needed for Pain. (Patient not taking: Reported on 6/21/2021) 100 Tab 5    [DISCONTINUED] diclofenac EC (VOLTAREN) 75 mg EC tablet Take 75 mg by mouth. As needed (Patient not taking: Reported on 6/21/2021)      [DISCONTINUED] ALPRAZolam (XANAX) 1 mg tablet 1 mg nightly. (Patient not taking: Reported on 6/21/2021)  0    [DISCONTINUED] methylphenidate HCl (RITALIN) 10 mg tablet Take 10 mg by mouth daily. (Patient not taking: Reported on 6/21/2021)  0    [DISCONTINUED] simvastatin (ZOCOR) 20 mg tablet 20 mg nightly. (Patient not taking: Reported on 6/21/2021)      [DISCONTINUED] zolpidem (AMBIEN) 10 mg tablet 10 mg nightly. (Patient not taking: Reported on 6/21/2021)  0    [DISCONTINUED] ferrous sulfate 325 mg (65 mg iron) tablet Take  by mouth Daily (before breakfast). (Patient not taking: Reported on 6/21/2021)      [DISCONTINUED] FIBER, DEXTRIN, PO Take 3 mg by mouth three (3) times daily. (Patient not taking: Reported on 6/21/2021)      [DISCONTINUED] cholecalciferol, vitamin D3, (VITAMIN D3) 2,000 unit tab Take  by mouth daily. (Patient not taking: Reported on 6/21/2021)       No current facility-administered medications on file prior to visit. Allergies   Allergen Reactions    Clindamycin Hcl Diarrhea and Other (comments)     C-diff    Penicillins Other (comments)     Body aches    Prednisone Other (comments)     hyper    Sulfa (Sulfonamide Antibiotics) Unknown (comments)     Review of Systems   All other systems reviewed and are negative. Objective:     Vitals:    07/06/21 1110   BP: 118/62   Pulse: 86   Temp: 97.6 °F (36.4 °C)   TempSrc: Temporal   SpO2: 95%   Weight: 131 lb (59.4 kg)   Height: 5' 8\" (1.727 m)     Physical Exam  Vitals reviewed. Constitutional:       Appearance: Normal appearance. HENT:      Head: Normocephalic and atraumatic. Cardiovascular:      Rate and Rhythm: Normal rate. Pulmonary:      Effort: Pulmonary effort is normal.   Neurological:      Mental Status: She is alert and oriented to person, place, and time. Psychiatric:         Mood and Affect: Mood normal.         Behavior: Behavior normal.            Assessment/Plan:       ICD-10-CM ICD-9-CM    1. Unintentional weight loss  R63.4 783.21 CBC WITH AUTOMATED DIFF      METABOLIC PANEL, COMPREHENSIVE      TSH 3RD GENERATION      HIV 1/2 AG/AB, 4TH GENERATION,W RFLX CONFIRM      HEPATITIS PANEL, ACUTE      SED RATE (ESR)      C REACTIVE PROTEIN, QT   2. Gastroesophageal reflux disease, unspecified whether esophagitis present  K21.9 530.81 pantoprazole (PROTONIX) 40 mg tablet   3. Tension vascular headache  G44.209 307.81    4. Cervical radiculopathy due to degenerative joint disease of spine  M47.22 721.0    5. Ulnar neuropathy at elbow of right upper extremity  G56.21 354.2    6. Ulnar neuropathy at elbow of left upper extremity  G56.22 354.2    7. Migraine with aura and without status migrainosus, not intractable  G43.109 346.00    8. Anxiety  F41.9 300.00 buPROPion XL (WELLBUTRIN XL) 150 mg tablet      DISCONTINUED: buPROPion XL (WELLBUTRIN XL) 150 mg tablet   9. Depression, unspecified depression type  F32.9 311 buPROPion XL (WELLBUTRIN XL) 150 mg tablet      DISCONTINUED: buPROPion XL (WELLBUTRIN XL) 150 mg tablet   Plan: For insomnia increase trazodone 100 mg daily, with instructions to titrate up to 150 mg if needed. If trazodone continues to be ineffective we will consider switching to Remeron as to help with weight gain, and appetite. For depression, and anxiety added Wellbutrin and continue Lexapro. For unintentional weight loss labs ordered we will follow-up labs. Will consider CT abdomen in the future. Protonix ordered for GERD.     Follow-Up:  Follow-up and Dispositions    · Return in about 1 month (around 8/6/2021) for Insomnia, anxiety, depression, weight loss.          Steve Boothe MD

## 2022-08-29 NOTE — DISCHARGE NOTE PROVIDER - PROVIDER TOKENS
PROVIDER:[TOKEN:[[id number]],FOLLOWUP:[1 week]],PROVIDER:[TOKEN:[[id number]],FOLLOWUP:[1 week]]

## 2022-08-29 NOTE — DIETITIAN INITIAL EVALUATION ADULT - PERTINENT MEDS FT
MEDICATIONS  (STANDING):  ascorbic acid 500 milliGRAM(s) Oral daily  cefTRIAXone   IVPB 1000 milliGRAM(s) IV Intermittent every 24 hours  chlorhexidine 2% Cloths 1 Application(s) Topical daily  heparin   Injectable 5000 Unit(s) SubCutaneous every 8 hours  magnesium oxide 400 milliGRAM(s) Oral daily  midodrine. 5 milliGRAM(s) Oral three times a day  multivitamin 1 Tablet(s) Oral daily  mupirocin 2% Nasal 1 Application(s) Both Nostrils two times a day  potassium chloride    Tablet ER 20 milliEquivalent(s) Oral three times a day    MEDICATIONS  (PRN):

## 2022-08-29 NOTE — DIETITIAN INITIAL EVALUATION ADULT - OTHER INFO
Pt reports weight loss PTA unable to recall further details. Wife reports pt was ~150 pounds approximately 2 months ago, but might have lost weight due to being on a pureed diet until 3 weeks ago and "eating little". Weight as per previous RD note (07/16/2022) 149.6 pounds after 21.5 pounds weight loss x ~ 9 months (from 171.1 to 149.6 pounds). Weight as per flow sheets (08/27) 134.4 pounds - suggests a total of 36.7 pounds x ~10 months.     Provided recommendations to optimize PO and protein intake, recommended small frequent meals by ordering nutrient-dense snacks and leaving non-perishable food away from tray for later consumption during the day or between meals, to start with protein, and sips of supplement throughout the day; reviewed foods with protein and menu order procedures in hospital. Pt and wife deny having further questions/concerns about diet and nutrition - made aware RD remains available.

## 2022-08-29 NOTE — CHART NOTE - NSCHARTNOTEFT_GEN_A_CORE
Based on patients diagnosis of cerebellar ataxia, chronic lacunar infarct, hypotension, patient requires claudia lift to transfer from bed to chair and chair to bed    Based on patients ongoing issues with deconditioning and generalized weakness secondary to patients diagnosis of cerebellar ataxia, chronic lacunar infarct, hypotension . Patient will require a semi electric hospital bed. This is necessary to achieve positioning, elevation and head of bed needs to be elevated at least 30 degrees most of the time. Bed pillows and wedges have been tried and ruled out.

## 2022-08-29 NOTE — PROGRESS NOTE ADULT - ASSESSMENT
62 yo male      PMHx of cerebellar ataxia     on 4/17/22 due to AMS ,  was  intubated for depressed consciousness w/ cognitive decline.  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    infectious  and  malignancy  was  ruled  outt/  and  per   neuro  note ,no further workup     flow cytometry 4/28 showing nonspecific results 'degenerated sample, small lymphocytes'   nsgy consulted for meningeal bx, family refusing    CSF cytopathology 4/22 - increased number of large mononuclear cells in a background of few small lymphocytes, monocytes and neutrophils, cannot exclude a lymphoproliferative disorder versus an inflammatory process.    CSF cytopathology 4/29 - significant increased number of small lymphocytes with rare monocytes/ seen by  oncology  dr de leon,  no  intervention      now  admitted    with  hypokalemia   h/o  cerebellar  ataxia,  with  no defined  cause  found   pt  with  bradycardia, ?  central, seen  by  card/  echo  on 4/2022,  normal  ef    h/o  recurrent , intermittent   hypothermia,    not related  to  any   infectious   process,  it  seems to be  dysautonomia/    with  h/o normal  cortisol level  prior  CT  c/ap,  no  malignant  process  on feeds  per  swallow  eval     bed bound   status  and  altered  baseline  mental  status at  times     h/o bradycardia   f/p  by  card    on  midodrine  uti,  gram negative  rods. on iv rocephim   dvt ppx/  q/q  heparin

## 2022-08-29 NOTE — DIETITIAN INITIAL EVALUATION ADULT - NSICDXPASTMEDICALHX_GEN_ALL_CORE_FT
PAST MEDICAL HISTORY:  Adrenal insufficiency     Anemia     H/O cerebellar ataxia -diagnosed in 2019    History of hypotension     Lacunar infarction -chronic    Pneumonia, aspiration -april 2022 (intubated for 7 days at [facility])

## 2022-08-29 NOTE — DIETITIAN INITIAL EVALUATION ADULT - ORAL INTAKE PTA/DIET HISTORY
Confirmed information with wife. Pt reports good appetite and PO intake at home, not following any type of diet or restriction at home. Wife reports pt was on pureed diet until 3 weeks ago when he was upgraded to regular (spoke to ACP); states pt was "eating little" while on pureed diet. Pt confirms NKFA. Reports taking Multivitamin, Vitamin C, and Vitamin B1 PTA; states drinking Ensure High Protein 2xday.

## 2022-08-29 NOTE — CONSULT NOTE ADULT - ASSESSMENT
60yo male with b/l nephrolithiasis s/p b/l stents   - plan for OR wednesday for b/l URS  - please make NPO pMN tomorrow   - f/u urine culture sensitivities  - continue CTX for now   - please provide medical clearance/optimization   - discussed with Dr. Daigle

## 2022-08-29 NOTE — DISCHARGE NOTE PROVIDER - NSDCMRMEDTOKEN_GEN_ALL_CORE_FT
ascorbic acid 500 mg oral tablet: 1 tab(s) orally once a day  fludrocortisone 0.1 mg oral tablet: 1 tab(s) orally 2 times a day  hospital bed MAK 99:   claudia lift MAK 99:   midodrine 5 mg oral tablet: 1 tab(s) orally 3 times a day  Multiple Vitamins oral tablet: 1 tab(s) orally once a day  thiamine 100 mg oral tablet: 1 tab(s) orally once a day   ascorbic acid 500 mg oral tablet: 1 tab(s) orally once a day  midodrine 5 mg oral tablet: 1 tab(s) orally 3 times a day  Multiple Vitamins oral tablet: 1 tab(s) orally once a day  ocular lubricant ophthalmic solution: 1 drop(s) to each affected eye 2 times a day  thiamine 100 mg oral tablet: 1 tab(s) orally once a day

## 2022-08-29 NOTE — DISCHARGE NOTE PROVIDER - CARE PROVIDER_API CALL
Abimael Daigle)  Urology  [address], [address]  [place] [address]  Phone: [phone]  Fax: [phone]  Follow Up Time: 1 week    Yolanda Peralta)  Internal Medicine  [address] [place] [address]  Phone: [phone]  Fax: [phone]  Follow Up Time: 1 week

## 2022-08-29 NOTE — CONSULT NOTE ADULT - SUBJECTIVE AND OBJECTIVE BOX
HPI: 61y Male h/o cerebellar ataxia, chronic lacunar infarcts, hypotension on midodrine admitted 8/27 for abnormal labs- hypokalemia. Urology called for history of nephrolithiasis s/p 7/13/22 BL ureteral stent placement for UTI and right proximal obstructing ureteral stone, and b/l renal stones. Urology called to evaluate for possible stone/stent removal while patient is in house. Pt denies any pain, nausea, vomiting, or difficulty voiding. denies fever/chills, dysuria.  Current urine cx growing GNR, awaiting sensitivities. Bcxs NGTD. On CTX    PAST MEDICAL & SURGICAL HISTORY:  H/O cerebellar ataxia  -diagnosed in 2019    History of hypotension    Anemia    Lacunar infarction  -chronic    Pneumonia, aspiration  -april 2022 (intubated for 7 days at [facility])    Adrenal insufficiency    No significant past surgical history      FAMILY HISTORY:  FH: dementia (Mother)    FH: type 2 diabetes (Mother)    Family history of CVA (Father)      SOCIAL HISTORY:   Tobacco hx:    MEDICATIONS  (STANDING):  ascorbic acid 500 milliGRAM(s) Oral daily  cefTRIAXone   IVPB 1000 milliGRAM(s) IV Intermittent every 24 hours  chlorhexidine 2% Cloths 1 Application(s) Topical daily  heparin   Injectable 5000 Unit(s) SubCutaneous every 8 hours  magnesium oxide 400 milliGRAM(s) Oral daily  midodrine. 5 milliGRAM(s) Oral three times a day  multivitamin 1 Tablet(s) Oral daily  potassium chloride    Tablet ER 20 milliEquivalent(s) Oral three times a day    MEDICATIONS  (PRN):    Allergies    No Known Allergies    Intolerances        REVIEW OF SYSTEMS: Pertinent positives and negatives as stated in HPI, otherwise negative    Vital signs  T(C): 36.7 (08-29-22 @ 04:09), Max: 36.7 (08-28-22 @ 22:23)  HR: 55 (08-29-22 @ 04:09)  BP: 101/68 (08-29-22 @ 04:09)  SpO2: 99% (08-29-22 @ 04:09)  Wt(kg): --    Output    UOP    Physical Exam  Gen: NAD  Pulm: No respiratory distress, no subcostal retractions  CV: RRR, no JVD  Abd: Soft, NT, ND  :     LABS:      08-28 @ 22:05    WBC --    / Hct --    / SCr 0.77     08-28 @ 07:17    WBC 7.53  / Hct 36.2  / SCr --       08-28    144  |  104  |  6<L>  ----------------------------<  94  4.0   |  32<H>  |  0.77    Ca    9.8      28 Aug 2022 22:05            Urine Cx: GNR  Blood Cx: NGTD    RADIOLOGY:  < from: US Abdomen Complete (US Abdomen Complete .) (08.11.22 @ 11:03) >  FINDINGS:  Liver: Within normal limits. Patent main portal vein with normal flow   direction.  Bile ducts: Normal caliber. Common bile duct measures 4 mm.  Gallbladder: Contracted around gallstones. No focal tenderness or   evidence of cholecystitis.  Pancreas: Visualized portions are within normal limits.  Spleen: 9.8 cm. Within normal limits.  Right kidney: 12.2 cm. Mild hydronephrosis. Calculi.  Left kidney: 11.4 cm. No hydronephrosis. Calculi.  Ascites: None.  Aorta and IVC: Visualized portions are within normal limits.    IMPRESSION:  Cholelithiasis.  No biliary ductal dilatation.  Bilateral renal calculi.  Mild right hydronephrosis.    --- End of Report ---        < end of copied text >     HPI: 61y Male h/o cerebellar ataxia, chronic lacunar infarcts, hypotension on midodrine admitted 8/27 for abnormal labs- hypokalemia. Urology called for history of nephrolithiasis s/p 7/13/22 BL ureteral stent placement for UTI and right proximal obstructing ureteral stone, and b/l renal stones. Urology called to evaluate for possible stone/stent removal while patient is in house. Pt denies any pain, nausea, vomiting, or difficulty voiding. denies fever/chills, dysuria or hematuria. Current urine cx growing GNR, awaiting sensitivities. Bcxs NGTD. On CTX.     PAST MEDICAL & SURGICAL HISTORY:  H/O cerebellar ataxia  -diagnosed in 2019    History of hypotension    Anemia    Lacunar infarction  -chronic    Pneumonia, aspiration  -april 2022 (intubated for 7 days at [facility])    Adrenal insufficiency    No significant past surgical history      FAMILY HISTORY:  FH: dementia (Mother)    FH: type 2 diabetes (Mother)    Family history of CVA (Father)      SOCIAL HISTORY:   Tobacco hx:    MEDICATIONS  (STANDING):  ascorbic acid 500 milliGRAM(s) Oral daily  cefTRIAXone   IVPB 1000 milliGRAM(s) IV Intermittent every 24 hours  chlorhexidine 2% Cloths 1 Application(s) Topical daily  heparin   Injectable 5000 Unit(s) SubCutaneous every 8 hours  magnesium oxide 400 milliGRAM(s) Oral daily  midodrine. 5 milliGRAM(s) Oral three times a day  multivitamin 1 Tablet(s) Oral daily  potassium chloride    Tablet ER 20 milliEquivalent(s) Oral three times a day    MEDICATIONS  (PRN):    Allergies    No Known Allergies    Intolerances        REVIEW OF SYSTEMS: Pertinent positives and negatives as stated in HPI, otherwise negative    Vital signs  T(C): 36.7 (08-29-22 @ 04:09), Max: 36.7 (08-28-22 @ 22:23)  HR: 55 (08-29-22 @ 04:09)  BP: 101/68 (08-29-22 @ 04:09)  SpO2: 99% (08-29-22 @ 04:09)  Wt(kg): --    Output    UOP    Physical Exam  Gen: NAD  Pulm: No respiratory distress, no subcostal retractions  CV: RRR, no JVD  Abd: Soft, NT, ND, no CVAT  : voiding      LABS:      08-28 @ 22:05    WBC --    / Hct --    / SCr 0.77     08-28 @ 07:17    WBC 7.53  / Hct 36.2  / SCr --       08-28    144  |  104  |  6<L>  ----------------------------<  94  4.0   |  32<H>  |  0.77    Ca    9.8      28 Aug 2022 22:05            Urine Cx: GNR  Blood Cx: NGTD    RADIOLOGY:  < from: US Abdomen Complete (US Abdomen Complete .) (08.11.22 @ 11:03) >  FINDINGS:  Liver: Within normal limits. Patent main portal vein with normal flow   direction.  Bile ducts: Normal caliber. Common bile duct measures 4 mm.  Gallbladder: Contracted around gallstones. No focal tenderness or   evidence of cholecystitis.  Pancreas: Visualized portions are within normal limits.  Spleen: 9.8 cm. Within normal limits.  Right kidney: 12.2 cm. Mild hydronephrosis. Calculi.  Left kidney: 11.4 cm. No hydronephrosis. Calculi.  Ascites: None.  Aorta and IVC: Visualized portions are within normal limits.    IMPRESSION:  Cholelithiasis.  No biliary ductal dilatation.  Bilateral renal calculi.  Mild right hydronephrosis.    --- End of Report ---        < end of copied text >

## 2022-08-29 NOTE — PROGRESS NOTE ADULT - SUBJECTIVE AND OBJECTIVE BOX
CARDIOLOGY     PROGRESS  NOTE   ________________________________________________    CHIEF COMPLAINT:Patient is a 61y old  Male who presents with a chief complaint of Hypokalemia (29 Aug 2022 05:39)  no complain.  	  REVIEW OF SYSTEMS:  CONSTITUTIONAL: No fever, weight loss, or fatigue  EYES: No eye pain, visual disturbances, or discharge  ENT:  No difficulty hearing, tinnitus, vertigo; No sinus or throat pain  NECK: No pain or stiffness  RESPIRATORY: No cough, wheezing, chills or hemoptysis; No Shortness of Breath  CARDIOVASCULAR: No chest pain, palpitations, passing out, dizziness, or leg swelling  GASTROINTESTINAL: No abdominal or epigastric pain. No nausea, vomiting, or hematemesis; No diarrhea or constipation. No melena or hematochezia.  GENITOURINARY: No dysuria, frequency, hematuria, or incontinence  NEUROLOGICAL: No headaches, memory loss, loss of strength, numbness, or tremors  SKIN: No itching, burning, rashes, or lesions   LYMPH Nodes: No enlarged glands  ENDOCRINE: No heat or cold intolerance; No hair loss  MUSCULOSKELETAL: No joint pain or swelling; No muscle, back, or extremity pain  PSYCHIATRIC: No depression, anxiety, mood swings, or difficulty sleeping  HEME/LYMPH: No easy bruising, or bleeding gums  ALLERGY AND IMMUNOLOGIC: No hives or eczema	    [ ] All others negative	  [x ] Unable to obtain    PHYSICAL EXAM:  T(C): 36.7 (08-29-22 @ 04:09), Max: 36.7 (08-28-22 @ 22:23)  HR: 55 (08-29-22 @ 04:09) (53 - 71)  BP: 101/68 (08-29-22 @ 04:09) (101/68 - 111/69)  RR: 18 (08-29-22 @ 04:09) (18 - 18)  SpO2: 99% (08-29-22 @ 04:09) (97% - 99%)  Wt(kg): --  I&O's Summary    28 Aug 2022 07:01  -  29 Aug 2022 07:00  --------------------------------------------------------  IN: 50 mL / OUT: 200 mL / NET: -150 mL        Appearance: Normal	  HEENT:   Normal oral mucosa, PERRL, EOMI	  Lymphatic: No lymphadenopathy  Cardiovascular: Normal S1 S2, No JVD, + murmurs, No edema  Respiratory: Lungs clear to auscultation	  Gastrointestinal:  Soft, Non-tender, + BS	  Skin: No rashes, No ecchymoses, No cyanosis	  Extremities: Normal range of motion, No clubbing, cyanosis or edema  Vascular: Peripheral pulses palpable 2+ bilaterally    MEDICATIONS  (STANDING):  ascorbic acid 500 milliGRAM(s) Oral daily  cefTRIAXone   IVPB 1000 milliGRAM(s) IV Intermittent every 24 hours  chlorhexidine 2% Cloths 1 Application(s) Topical daily  heparin   Injectable 5000 Unit(s) SubCutaneous every 8 hours  magnesium oxide 400 milliGRAM(s) Oral daily  midodrine. 5 milliGRAM(s) Oral three times a day  multivitamin 1 Tablet(s) Oral daily  potassium chloride    Tablet ER 20 milliEquivalent(s) Oral three times a day      TELEMETRY: 	    ECG:  	  RADIOLOGY:  OTHER: 	  	  LABS:	 	    CARDIAC MARKERS:                                11.6   7.53  )-----------( 225      ( 28 Aug 2022 07:17 )             36.2     08-28    144  |  104  |  6<L>  ----------------------------<  94  4.0   |  32<H>  |  0.77    Ca    9.8      28 Aug 2022 22:05      proBNP: Serum Pro-Brain Natriuretic Peptide: 267 pg/mL (08-09 @ 16:44)    Lipid Profile: Cholesterol 193  LDL --  HDL 30  TG 91    HgA1c:   TSH: Thyroid Stimulating Hormone, Serum: 1.51 uIU/mL (08-27 @ 07:27)  Thyroid Stimulating Hormone, Serum: 1.00 uIU/mL (08-09 @ 16:44)          Assessment and plan  ---------------------------  61 M w/ PMH of progressive Cerebellar Ataxia, Chronic Lacunar infarcts, 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   pt is well known to me with hx of severe orthostatic hypotension on florinef/ ?midodrine with bradycardia, pt had a normal am cortisol level.  ivf, keep hydrated  autonomic dysfunction, over all controlled with midodrine, don't increase dose sec to bradycardia  check tsh  dvt prophylaxis  if sig bradycardia will change Midodrine to Florinef  bl arsen stocking

## 2022-08-29 NOTE — DISCHARGE NOTE PROVIDER - NSDCFUSCHEDAPPT_GEN_ALL_CORE_FT
Abimael Daigle Physician [facility]  UROLOGY [address]  Scheduled Appointment: 08/31/2022    Isaias Daigle  [facility]  Scheduled Appointment: 09/11/2022    [facility], Unknown  [facility]  Scheduled Appointment: 09/14/2022    Pablito Trotter Physician [facility]  OTOLARYNG [address] [place] [address]  Scheduled Appointment: 10/07/2022     Isaias Daigle  [facility]  Scheduled Appointment: 09/11/2022    [facility]  [facility]  Scheduled Appointment: 09/14/2022    [facility]  OTOLARYNG [address] [place] [address]  Scheduled Appointment: 10/07/2022     [facility]  [facility]  Scheduled Appointment: 09/14/2022    [facility]  OTOLARYNG [address] [facility]  Scheduled Appointment: 10/07/2022

## 2022-08-30 ENCOUNTER — TRANSCRIPTION ENCOUNTER (OUTPATIENT)
Age: 62
End: 2022-08-30

## 2022-08-30 LAB
-  AMIKACIN: SIGNIFICANT CHANGE UP
-  AZTREONAM: SIGNIFICANT CHANGE UP
-  CEFEPIME: SIGNIFICANT CHANGE UP
-  CEFTAZIDIME: SIGNIFICANT CHANGE UP
-  CIPROFLOXACIN: SIGNIFICANT CHANGE UP
-  GENTAMICIN: SIGNIFICANT CHANGE UP
-  IMIPENEM: SIGNIFICANT CHANGE UP
-  LEVOFLOXACIN: SIGNIFICANT CHANGE UP
-  MEROPENEM: SIGNIFICANT CHANGE UP
-  PIPERACILLIN/TAZOBACTAM: SIGNIFICANT CHANGE UP
-  TOBRAMYCIN: SIGNIFICANT CHANGE UP
ALBUMIN SERPL ELPH-MCNC: 3.5 G/DL — SIGNIFICANT CHANGE UP (ref 3.3–5)
ALP SERPL-CCNC: 90 U/L — SIGNIFICANT CHANGE UP (ref 40–120)
ALT FLD-CCNC: 8 U/L — LOW (ref 10–45)
ANION GAP SERPL CALC-SCNC: 13 MMOL/L — SIGNIFICANT CHANGE UP (ref 5–17)
AST SERPL-CCNC: 12 U/L — SIGNIFICANT CHANGE UP (ref 10–40)
BASOPHILS # BLD AUTO: 0.02 K/UL — SIGNIFICANT CHANGE UP (ref 0–0.2)
BASOPHILS NFR BLD AUTO: 0.3 % — SIGNIFICANT CHANGE UP (ref 0–2)
BILIRUB SERPL-MCNC: 0.2 MG/DL — SIGNIFICANT CHANGE UP (ref 0.2–1.2)
BUN SERPL-MCNC: 6 MG/DL — LOW (ref 7–23)
CALCIUM SERPL-MCNC: 10 MG/DL — SIGNIFICANT CHANGE UP (ref 8.4–10.5)
CHLORIDE SERPL-SCNC: 107 MMOL/L — SIGNIFICANT CHANGE UP (ref 96–108)
CO2 SERPL-SCNC: 30 MMOL/L — SIGNIFICANT CHANGE UP (ref 22–31)
CREAT SERPL-MCNC: 0.78 MG/DL — SIGNIFICANT CHANGE UP (ref 0.5–1.3)
CULTURE RESULTS: SIGNIFICANT CHANGE UP
EGFR: 102 ML/MIN/1.73M2 — SIGNIFICANT CHANGE UP
EOSINOPHIL # BLD AUTO: 0.19 K/UL — SIGNIFICANT CHANGE UP (ref 0–0.5)
EOSINOPHIL NFR BLD AUTO: 3.2 % — SIGNIFICANT CHANGE UP (ref 0–6)
GLUCOSE SERPL-MCNC: 80 MG/DL — SIGNIFICANT CHANGE UP (ref 70–99)
HCT VFR BLD CALC: 38.6 % — LOW (ref 39–50)
HGB BLD-MCNC: 12.3 G/DL — LOW (ref 13–17)
IMM GRANULOCYTES NFR BLD AUTO: 0.5 % — SIGNIFICANT CHANGE UP (ref 0–1.5)
LYMPHOCYTES # BLD AUTO: 1.36 K/UL — SIGNIFICANT CHANGE UP (ref 1–3.3)
LYMPHOCYTES # BLD AUTO: 23.1 % — SIGNIFICANT CHANGE UP (ref 13–44)
MCHC RBC-ENTMCNC: 28.1 PG — SIGNIFICANT CHANGE UP (ref 27–34)
MCHC RBC-ENTMCNC: 31.9 GM/DL — LOW (ref 32–36)
MCV RBC AUTO: 88.3 FL — SIGNIFICANT CHANGE UP (ref 80–100)
METHOD TYPE: SIGNIFICANT CHANGE UP
MONOCYTES # BLD AUTO: 0.55 K/UL — SIGNIFICANT CHANGE UP (ref 0–0.9)
MONOCYTES NFR BLD AUTO: 9.4 % — SIGNIFICANT CHANGE UP (ref 2–14)
NEUTROPHILS # BLD AUTO: 3.73 K/UL — SIGNIFICANT CHANGE UP (ref 1.8–7.4)
NEUTROPHILS NFR BLD AUTO: 63.5 % — SIGNIFICANT CHANGE UP (ref 43–77)
NRBC # BLD: 0 /100 WBCS — SIGNIFICANT CHANGE UP (ref 0–0)
ORGANISM # SPEC MICROSCOPIC CNT: SIGNIFICANT CHANGE UP
ORGANISM # SPEC MICROSCOPIC CNT: SIGNIFICANT CHANGE UP
PLATELET # BLD AUTO: 245 K/UL — SIGNIFICANT CHANGE UP (ref 150–400)
POTASSIUM SERPL-MCNC: 5.1 MMOL/L — SIGNIFICANT CHANGE UP (ref 3.5–5.3)
POTASSIUM SERPL-SCNC: 5.1 MMOL/L — SIGNIFICANT CHANGE UP (ref 3.5–5.3)
PROT SERPL-MCNC: 7.6 G/DL — SIGNIFICANT CHANGE UP (ref 6–8.3)
RBC # BLD: 4.37 M/UL — SIGNIFICANT CHANGE UP (ref 4.2–5.8)
RBC # FLD: 14.6 % — HIGH (ref 10.3–14.5)
SODIUM SERPL-SCNC: 150 MMOL/L — HIGH (ref 135–145)
SPECIMEN SOURCE: SIGNIFICANT CHANGE UP
WBC # BLD: 5.88 K/UL — SIGNIFICANT CHANGE UP (ref 3.8–10.5)
WBC # FLD AUTO: 5.88 K/UL — SIGNIFICANT CHANGE UP (ref 3.8–10.5)

## 2022-08-30 RX ORDER — CEFEPIME 1 G/1
2000 INJECTION, POWDER, FOR SOLUTION INTRAMUSCULAR; INTRAVENOUS EVERY 12 HOURS
Refills: 0 | Status: DISCONTINUED | OUTPATIENT
Start: 2022-08-31 | End: 2022-08-31

## 2022-08-30 RX ORDER — POTASSIUM CHLORIDE 20 MEQ
20 PACKET (EA) ORAL DAILY
Refills: 0 | Status: DISCONTINUED | OUTPATIENT
Start: 2022-08-30 | End: 2022-08-31

## 2022-08-30 RX ORDER — CIPROFLOXACIN LACTATE 400MG/40ML
400 VIAL (ML) INTRAVENOUS EVERY 12 HOURS
Refills: 0 | Status: DISCONTINUED | OUTPATIENT
Start: 2022-08-30 | End: 2022-08-30

## 2022-08-30 RX ORDER — GENTAMICIN SULFATE 40 MG/ML
320 VIAL (ML) INJECTION DAILY
Refills: 0 | Status: DISCONTINUED | OUTPATIENT
Start: 2022-08-30 | End: 2022-08-30

## 2022-08-30 RX ORDER — CEFEPIME 1 G/1
2000 INJECTION, POWDER, FOR SOLUTION INTRAMUSCULAR; INTRAVENOUS ONCE
Refills: 0 | Status: COMPLETED | OUTPATIENT
Start: 2022-08-30 | End: 2022-08-30

## 2022-08-30 RX ORDER — CEFEPIME 1 G/1
INJECTION, POWDER, FOR SOLUTION INTRAMUSCULAR; INTRAVENOUS
Refills: 0 | Status: DISCONTINUED | OUTPATIENT
Start: 2022-08-30 | End: 2022-08-31

## 2022-08-30 RX ADMIN — MIDODRINE HYDROCHLORIDE 5 MILLIGRAM(S): 2.5 TABLET ORAL at 05:01

## 2022-08-30 RX ADMIN — HEPARIN SODIUM 5000 UNIT(S): 5000 INJECTION INTRAVENOUS; SUBCUTANEOUS at 05:01

## 2022-08-30 RX ADMIN — MAGNESIUM OXIDE 400 MG ORAL TABLET 400 MILLIGRAM(S): 241.3 TABLET ORAL at 12:17

## 2022-08-30 RX ADMIN — CEFEPIME 100 MILLIGRAM(S): 1 INJECTION, POWDER, FOR SOLUTION INTRAMUSCULAR; INTRAVENOUS at 18:56

## 2022-08-30 RX ADMIN — Medication 1 TABLET(S): at 12:18

## 2022-08-30 RX ADMIN — Medication 500 MILLIGRAM(S): at 12:18

## 2022-08-30 RX ADMIN — Medication 20 MILLIEQUIVALENT(S): at 05:01

## 2022-08-30 RX ADMIN — MIDODRINE HYDROCHLORIDE 5 MILLIGRAM(S): 2.5 TABLET ORAL at 17:24

## 2022-08-30 RX ADMIN — HEPARIN SODIUM 5000 UNIT(S): 5000 INJECTION INTRAVENOUS; SUBCUTANEOUS at 22:12

## 2022-08-30 RX ADMIN — CHLORHEXIDINE GLUCONATE 1 APPLICATION(S): 213 SOLUTION TOPICAL at 12:17

## 2022-08-30 RX ADMIN — HEPARIN SODIUM 5000 UNIT(S): 5000 INJECTION INTRAVENOUS; SUBCUTANEOUS at 12:17

## 2022-08-30 RX ADMIN — MUPIROCIN 1 APPLICATION(S): 20 OINTMENT TOPICAL at 22:36

## 2022-08-30 RX ADMIN — MIDODRINE HYDROCHLORIDE 5 MILLIGRAM(S): 2.5 TABLET ORAL at 12:17

## 2022-08-30 RX ADMIN — Medication 20 MILLIEQUIVALENT(S): at 12:17

## 2022-08-30 RX ADMIN — MUPIROCIN 1 APPLICATION(S): 20 OINTMENT TOPICAL at 12:18

## 2022-08-30 RX ADMIN — CEFTRIAXONE 100 MILLIGRAM(S): 500 INJECTION, POWDER, FOR SOLUTION INTRAMUSCULAR; INTRAVENOUS at 05:01

## 2022-08-30 NOTE — PHARMACOTHERAPY INTERVENTION NOTE - COMMENTS
Patient is a 62 y/o M with PMH of cerebellar ataxia, chronic lacunar infaracts, 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, chronic hypotension, previous admission pneumonia/sepsis 4/22, UTI/sepsis 6/22, and most recent admission 7/13/22 with sepsis 2/2 UTI with right kidney stones and right hydronephrosis, had bilateral ureteral stent placement. On this admission patient found to be hypokalemic during pre-surgical testing. Patient now scheduled for inpatient bilateral uteroscopy, laser lithotripsy, and stent exchange. Currently on ceftriaxone 1g IV q24h. Urine culture from 8/27 growing >100,000 CFU pseudomonas aeruginosa. Previous UCx (7/14/22) grew pseudomonas aeruginosa with intermediate resistance to cefepime and zosyn. Recommended to discontinue ceftriaxone and start ciprofloxacin 400mg IV q12h. Discussed with NP Sherrill.     Curtis Wilkerson, PharmD   PGY-1 Pharmacy Resident   Spectra: [phone]  Available on Teams   Patient is a 62 y/o M with PMH of cerebellar ataxia, chronic lacunar infaracts, 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, chronic hypotension, previous admission pneumonia/sepsis 4/22, UTI/sepsis 6/22, and most recent admission 7/13/22 with sepsis 2/2 UTI with right kidney stones and right hydronephrosis, had bilateral ureteral stent placement. On this admission patient found to be hypokalemic during pre-surgical testing. Patient now scheduled for inpatient bilateral uteroscopy, laser lithotripsy, and stent exchange. Currently on ceftriaxone 1g IV q24h. Urine culture from 8/27 growing >100,000 CFU pseudomonas aeruginosa. Previous UCx (7/14/22) grew pseudomonas aeruginosa with intermediate resistance to cefepime and zosyn. Recommended to discontinue ceftriaxone and start ciprofloxacin 400mg IV q12h. Patient is on potassium chloride 20mEq by mouth three times daily. Potassium level is 5.1. Recommended to decrease dose to potassium chloride 20mEq once daily. Discussed with NP Roderick Wilkerson, PharmD   PGY-1 Pharmacy Resident   Spectra: [phone]  Available on Teams

## 2022-08-30 NOTE — PROGRESS NOTE ADULT - SUBJECTIVE AND OBJECTIVE BOX
CARDIOLOGY     PROGRESS  NOTE   ________________________________________________    CHIEF COMPLAINT:Patient is a 61y old  Male who presents with a chief complaint of Hypokalemia (30 Aug 2022 06:44)  no complain.  	  REVIEW OF SYSTEMS:  CONSTITUTIONAL: No fever, weight loss, or fatigue  EYES: No eye pain, visual disturbances, or discharge  ENT:  No difficulty hearing, tinnitus, vertigo; No sinus or throat pain  NECK: No pain or stiffness  RESPIRATORY: No cough, wheezing, chills or hemoptysis; No Shortness of Breath  CARDIOVASCULAR: No chest pain, palpitations, passing out, dizziness, or leg swelling  GASTROINTESTINAL: No abdominal or epigastric pain. No nausea, vomiting, or hematemesis; No diarrhea or constipation. No melena or hematochezia.  GENITOURINARY: No dysuria, frequency, hematuria, or incontinence  NEUROLOGICAL: No headaches, memory loss, loss of strength, numbness, or tremors  SKIN: No itching, burning, rashes, or lesions   LYMPH Nodes: No enlarged glands  ENDOCRINE: No heat or cold intolerance; No hair loss  MUSCULOSKELETAL: No joint pain or swelling; No muscle, back, or extremity pain  PSYCHIATRIC: No depression, anxiety, mood swings, or difficulty sleeping  HEME/LYMPH: No easy bruising, or bleeding gums  ALLERGY AND IMMUNOLOGIC: No hives or eczema	    [ ] All others negative	  [ ] Unable to obtain    PHYSICAL EXAM:  T(C): 36.4 (08-30-22 @ 04:49), Max: 36.7 (08-29-22 @ 19:48)  HR: 67 (08-30-22 @ 04:49) (58 - 69)  BP: 105/73 (08-30-22 @ 04:49) (105/73 - 110/69)  RR: 17 (08-30-22 @ 04:49) (17 - 18)  SpO2: 98% (08-30-22 @ 04:49) (98% - 99%)  Wt(kg): --  I&O's Summary    29 Aug 2022 07:01  -  30 Aug 2022 07:00  --------------------------------------------------------  IN: 600 mL / OUT: 0 mL / NET: 600 mL        Appearance: Normal	  HEENT:   Normal oral mucosa, PERRL, EOMI	  Lymphatic: No lymphadenopathy  Cardiovascular: Normal S1 S2, No JVD, + murmurs, No edema  Respiratory: rhonchi  Psychiatry: A & O x 3, Mood & affect appropriate  Gastrointestinal:  Soft, Non-tender, + BS	  Skin: No rashes, No ecchymoses, No cyanosis	  Neurologic: Non-focal  Extremities: Normal range of motion, No clubbing, cyanosis or edema  Vascular: Peripheral pulses palpable 2+ bilaterally    MEDICATIONS  (STANDING):  ascorbic acid 500 milliGRAM(s) Oral daily  cefTRIAXone   IVPB 1000 milliGRAM(s) IV Intermittent every 24 hours  chlorhexidine 2% Cloths 1 Application(s) Topical daily  heparin   Injectable 5000 Unit(s) SubCutaneous every 8 hours  magnesium oxide 400 milliGRAM(s) Oral daily  midodrine. 5 milliGRAM(s) Oral three times a day  multivitamin 1 Tablet(s) Oral daily  mupirocin 2% Nasal 1 Application(s) Both Nostrils two times a day  potassium chloride    Tablet ER 20 milliEquivalent(s) Oral three times a day      TELEMETRY: 	    ECG:  	  RADIOLOGY:  OTHER: 	  	  LABS:	 	    CARDIAC MARKERS:            08-29    142  |  104  |  6<L>  ----------------------------<  80  4.4   |  31  |  0.70    Ca    9.6      29 Aug 2022 10:54      proBNP: Serum Pro-Brain Natriuretic Peptide: 267 pg/mL (08-09 @ 16:44)    Lipid Profile: Cholesterol 193  LDL --  HDL 30  TG 91    HgA1c:   TSH: Thyroid Stimulating Hormone, Serum: 1.51 uIU/mL (08-27 @ 07:27)  Thyroid Stimulating Hormone, Serum: 1.00 uIU/mL (08-09 @ 16:44)          Assessment and plan  ---------------------------  61 M w/ PMH of progressive Cerebellar Ataxia, Chronic Lacunar infarcts, 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   pt is well known to me with hx of severe orthostatic hypotension on florinef/ ?midodrine with bradycardia, pt had a normal am cortisol level.  ivf, keep hydrated  autonomic dysfunction, over all controlled with midodrine, don't increase dose sec to bradycardia  check tsh  dvt prophylaxis  if sig bradycardia will change Midodrine to Florinef  bl arsen mcelroy dc planning  ?cause of hypokalemia, may consider renal eval

## 2022-08-30 NOTE — PROGRESS NOTE ADULT - SUBJECTIVE AND OBJECTIVE BOX
Interval events:   No acute events         OBJECTIVE:  Vital Signs Last 24 Hrs  T(C): 36.4 (30 Aug 2022 04:49), Max: 36.7 (29 Aug 2022 19:48)  T(F): 97.6 (30 Aug 2022 04:49), Max: 98.1 (29 Aug 2022 19:48)  HR: 67 (30 Aug 2022 04:49) (58 - 69)  BP: 105/73 (30 Aug 2022 04:49) (105/73 - 110/69)  BP(mean): --  RR: 17 (30 Aug 2022 04:49) (17 - 18)  SpO2: 98% (30 Aug 2022 04:49) (98% - 99%)    Parameters below as of 30 Aug 2022 04:49  Patient On (Oxygen Delivery Method): room air        Physical Examination:  GEN: NAD, resting quietly  ABD: soft      LABS:                        11.6   7.53  )-----------( 225      ( 28 Aug 2022 07:17 )             36.2       08-29    142  |  104  |  6<L>  ----------------------------<  80  4.4   |  31  |  0.70    Ca    9.6      29 Aug 2022 10:54

## 2022-08-30 NOTE — PHARMACOTHERAPY INTERVENTION NOTE - COMMENTS
Recommended to change antibiotics from gentamicin to cefepime 2g q12h (CrCl 87 mL/min) since patient has cefepime-sensitive Pseudomonas aeruginosa growing in 8/27 urine culture, and to reduce nephrotoxicity risk.

## 2022-08-30 NOTE — PROGRESS NOTE ADULT - ASSESSMENT
62yo male with b/l nephrolithiasis s/p b/l stents   - plan for OR wednesday for b/l URS  - please make NPO pMN today  - f/u urine culture sensitivities  - recommend cipro based on old culture sensitivities   - please provide medical clearance/optimization   - consent to be obtained from family today     Barbra Barth   Available on Teams

## 2022-08-30 NOTE — PROGRESS NOTE ADULT - SUBJECTIVE AND OBJECTIVE BOX
afberile  REVIEW OF SYSTEMS:  GEN: no fever,    no chills  RESP: no SOB,   no cough  CVS: no chest pain,   no palpitations  GI: no abdominal pain,   no nausea,   no vomiting,   no constipation,   no diarrhea  : no dysuria,   no frequency  NEURO: no headache,   no dizziness  PSYCH: no depression,   not anxious  Derm : no rash    MEDICATIONS  (STANDING):  ascorbic acid 500 milliGRAM(s) Oral daily  cefTRIAXone   IVPB 1000 milliGRAM(s) IV Intermittent every 24 hours  chlorhexidine 2% Cloths 1 Application(s) Topical daily  heparin   Injectable 5000 Unit(s) SubCutaneous every 8 hours  magnesium oxide 400 milliGRAM(s) Oral daily  midodrine. 5 milliGRAM(s) Oral three times a day  multivitamin 1 Tablet(s) Oral daily  mupirocin 2% Nasal 1 Application(s) Both Nostrils two times a day  potassium chloride    Tablet ER 20 milliEquivalent(s) Oral three times a day    MEDICATIONS  (PRN):      Vital Signs Last 24 Hrs  T(C): 36.4 (30 Aug 2022 04:49), Max: 36.7 (29 Aug 2022 19:48)  T(F): 97.6 (30 Aug 2022 04:49), Max: 98.1 (29 Aug 2022 19:48)  HR: 67 (30 Aug 2022 04:49) (58 - 69)  BP: 105/73 (30 Aug 2022 04:49) (105/73 - 110/69)  BP(mean): --  RR: 17 (30 Aug 2022 04:49) (17 - 18)  SpO2: 98% (30 Aug 2022 04:49) (98% - 99%)    Parameters below as of 30 Aug 2022 04:49  Patient On (Oxygen Delivery Method): room air      CAPILLARY BLOOD GLUCOSE        I&O's Summary    28 Aug 2022 07:01  -  29 Aug 2022 07:00  --------------------------------------------------------  IN: 50 mL / OUT: 200 mL / NET: -150 mL    29 Aug 2022 07:01  -  30 Aug 2022 06:44  --------------------------------------------------------  IN: 600 mL / OUT: 0 mL / NET: 600 mL        PHYSICAL EXAM:  HEAD:  Atraumatic, Normocephalic  NECK: Supple, No   JVD  CHEST/LUNG:   no     rales,     no,    rhonchi  HEART: Regular rate and rhythm;         murmur  ABDOMEN: Soft, Nontender, ;   EXTREMITIES:    no    edema  NEUROLOGY:  alert    LABS:                        11.6   7.53  )-----------( 225      ( 28 Aug 2022 07:17 )             36.2     08-29    142  |  104  |  6<L>  ----------------------------<  80  4.4   |  31  |  0.70    Ca    9.6      29 Aug 2022 10:54                      Thyroid Stimulating Hormone, Serum: 1.51 uIU/mL (08-27 @ 07:27)          Consultant(s) Notes Reviewed:      Care Discussed with Consultants/Other Providers:

## 2022-08-30 NOTE — PROGRESS NOTE ADULT - ASSESSMENT
62 yo male      PMHx of cerebellar ataxia     on 4/17/22 due to AMS ,  was  intubated for depressed consciousness w/ cognitive decline.  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    infectious  and  malignancy  was  ruled  outt/  and  per   neuro  note ,no further workup     flow cytometry 4/28 showing nonspecific results 'degenerated sample, small lymphocytes'   nsgy consulted for meningeal bx, family refusing    CSF cytopathology 4/22 - increased number of large mononuclear cells in a background of few small lymphocytes, monocytes and neutrophils, cannot exclude a lymphoproliferative disorder versus an inflammatory process.    CSF cytopathology 4/29 - significant increased number of small lymphocytes with rare monocytes/ seen by  oncology  dr de leon,  no  intervention      now  admitted    with  hypokalemia   h/o  cerebellar  ataxia,  with  no defined  cause  found   pt  with  bradycardia, ?  central, seen  by  card/  echo  on 4/2022,  normal  ef    h/o  recurrent , intermittent   hypothermia,    not related  to  any   infectious   process,  it  seems to be  dysautonomia/    with  h/o normal  cortisol level  prior  CT  c/ap,  no  malignant  process  on feeds  per  swallow  eval     bed bound   status  and  altered  baseline  mental  status at  times     h/o bradycardia   f/p  by  card    on  midodrine  uti, /  Pseudomonas,  on iv rocephim/  house  ID eval   urology  planning   surg  in am /  cleraed  for  procedure   dvt ppx/  q/q  heparin

## 2022-08-31 ENCOUNTER — RESULT REVIEW (OUTPATIENT)
Age: 62
End: 2022-08-31

## 2022-08-31 ENCOUNTER — APPOINTMENT (OUTPATIENT)
Dept: UROLOGY | Facility: HOSPITAL | Age: 62
End: 2022-08-31

## 2022-08-31 LAB
ANION GAP SERPL CALC-SCNC: 10 MMOL/L — SIGNIFICANT CHANGE UP (ref 5–17)
ANION GAP SERPL CALC-SCNC: 9 MMOL/L — SIGNIFICANT CHANGE UP (ref 5–17)
BUN SERPL-MCNC: 10 MG/DL — SIGNIFICANT CHANGE UP (ref 7–23)
BUN SERPL-MCNC: 11 MG/DL — SIGNIFICANT CHANGE UP (ref 7–23)
CALCIUM SERPL-MCNC: 10.2 MG/DL — SIGNIFICANT CHANGE UP (ref 8.4–10.5)
CALCIUM SERPL-MCNC: 8.8 MG/DL — SIGNIFICANT CHANGE UP (ref 8.4–10.5)
CHLORIDE SERPL-SCNC: 105 MMOL/L — SIGNIFICANT CHANGE UP (ref 96–108)
CHLORIDE SERPL-SCNC: 109 MMOL/L — HIGH (ref 96–108)
CO2 SERPL-SCNC: 25 MMOL/L — SIGNIFICANT CHANGE UP (ref 22–31)
CO2 SERPL-SCNC: 30 MMOL/L — SIGNIFICANT CHANGE UP (ref 22–31)
CREAT SERPL-MCNC: 0.62 MG/DL — SIGNIFICANT CHANGE UP (ref 0.5–1.3)
CREAT SERPL-MCNC: 0.75 MG/DL — SIGNIFICANT CHANGE UP (ref 0.5–1.3)
EGFR: 103 ML/MIN/1.73M2 — SIGNIFICANT CHANGE UP
GLUCOSE SERPL-MCNC: 159 MG/DL — HIGH (ref 70–99)
GLUCOSE SERPL-MCNC: 84 MG/DL — SIGNIFICANT CHANGE UP (ref 70–99)
HCT VFR BLD CALC: 31.5 % — LOW (ref 39–50)
HCT VFR BLD CALC: 38.7 % — LOW (ref 39–50)
HGB BLD-MCNC: 10 G/DL — LOW (ref 13–17)
HGB BLD-MCNC: 12.1 G/DL — LOW (ref 13–17)
MCHC RBC-ENTMCNC: 27.8 PG — SIGNIFICANT CHANGE UP (ref 27–34)
MCHC RBC-ENTMCNC: 28 PG — SIGNIFICANT CHANGE UP (ref 27–34)
MCHC RBC-ENTMCNC: 31.3 GM/DL — LOW (ref 32–36)
MCHC RBC-ENTMCNC: 31.7 GM/DL — LOW (ref 32–36)
MCV RBC AUTO: 88.2 FL — SIGNIFICANT CHANGE UP (ref 80–100)
MCV RBC AUTO: 89 FL — SIGNIFICANT CHANGE UP (ref 80–100)
NRBC # BLD: 0 /100 WBCS — SIGNIFICANT CHANGE UP (ref 0–0)
NRBC # BLD: 0 /100 WBCS — SIGNIFICANT CHANGE UP (ref 0–0)
PLATELET # BLD AUTO: 174 K/UL — SIGNIFICANT CHANGE UP (ref 150–400)
PLATELET # BLD AUTO: 236 K/UL — SIGNIFICANT CHANGE UP (ref 150–400)
POTASSIUM SERPL-MCNC: 4.6 MMOL/L — SIGNIFICANT CHANGE UP (ref 3.5–5.3)
POTASSIUM SERPL-MCNC: 4.8 MMOL/L — SIGNIFICANT CHANGE UP (ref 3.5–5.3)
POTASSIUM SERPL-SCNC: 4.6 MMOL/L — SIGNIFICANT CHANGE UP (ref 3.5–5.3)
POTASSIUM SERPL-SCNC: 4.8 MMOL/L — SIGNIFICANT CHANGE UP (ref 3.5–5.3)
RBC # BLD: 3.57 M/UL — LOW (ref 4.2–5.8)
RBC # BLD: 4.35 M/UL — SIGNIFICANT CHANGE UP (ref 4.2–5.8)
RBC # FLD: 14.4 % — SIGNIFICANT CHANGE UP (ref 10.3–14.5)
RBC # FLD: 14.6 % — HIGH (ref 10.3–14.5)
SODIUM SERPL-SCNC: 144 MMOL/L — SIGNIFICANT CHANGE UP (ref 135–145)
SODIUM SERPL-SCNC: 144 MMOL/L — SIGNIFICANT CHANGE UP (ref 135–145)
WBC # BLD: 11.56 K/UL — HIGH (ref 3.8–10.5)
WBC # BLD: 5.41 K/UL — SIGNIFICANT CHANGE UP (ref 3.8–10.5)
WBC # FLD AUTO: 11.56 K/UL — HIGH (ref 3.8–10.5)
WBC # FLD AUTO: 5.41 K/UL — SIGNIFICANT CHANGE UP (ref 3.8–10.5)

## 2022-08-31 PROCEDURE — 52356 CYSTO/URETERO W/LITHOTRIPSY: CPT | Mod: 50

## 2022-08-31 PROCEDURE — 88300 SURGICAL PATH GROSS: CPT | Mod: 26

## 2022-08-31 DEVICE — IMPLANTABLE DEVICE: Type: IMPLANTABLE DEVICE | Status: FUNCTIONAL

## 2022-08-31 DEVICE — STONE BASKET ZEROTIP NITINOL 4-WIRE 1.9FR 120CM X 12MM: Type: IMPLANTABLE DEVICE | Status: FUNCTIONAL

## 2022-08-31 DEVICE — GUIDEWIRE AMPLATZ SUPER-STIFF STRAIGHT .035" X 180CM: Type: IMPLANTABLE DEVICE | Status: FUNCTIONAL

## 2022-08-31 DEVICE — URETERAL ACCESS SHEATH 10FR 12-16FR 35CM: Type: IMPLANTABLE DEVICE | Status: FUNCTIONAL

## 2022-08-31 DEVICE — LASER FIBER SOLTIVE 365: Type: IMPLANTABLE DEVICE | Status: FUNCTIONAL

## 2022-08-31 DEVICE — LASER FIBER SOLTIVE 150 BALL TIP: Type: IMPLANTABLE DEVICE | Status: FUNCTIONAL

## 2022-08-31 DEVICE — GUIDEWIRE SENSOR DUAL-FLEX NITINOL STRAIGHT .035" X 150CM: Type: IMPLANTABLE DEVICE | Status: FUNCTIONAL

## 2022-08-31 DEVICE — STENT URET INLAY OPTIMA 6FRX24CM: Type: IMPLANTABLE DEVICE | Status: FUNCTIONAL

## 2022-08-31 RX ORDER — SODIUM CHLORIDE 9 MG/ML
1000 INJECTION INTRAMUSCULAR; INTRAVENOUS; SUBCUTANEOUS
Refills: 0 | Status: DISCONTINUED | OUTPATIENT
Start: 2022-08-31 | End: 2022-09-01

## 2022-08-31 RX ORDER — CEFEPIME 1 G/1
2000 INJECTION, POWDER, FOR SOLUTION INTRAMUSCULAR; INTRAVENOUS EVERY 12 HOURS
Refills: 0 | Status: DISCONTINUED | OUTPATIENT
Start: 2022-09-01 | End: 2022-09-02

## 2022-08-31 RX ORDER — CEFEPIME 1 G/1
INJECTION, POWDER, FOR SOLUTION INTRAMUSCULAR; INTRAVENOUS
Refills: 0 | Status: DISCONTINUED | OUTPATIENT
Start: 2022-09-01 | End: 2022-09-02

## 2022-08-31 RX ORDER — FENTANYL CITRATE 50 UG/ML
25 INJECTION INTRAVENOUS
Refills: 0 | Status: DISCONTINUED | OUTPATIENT
Start: 2022-08-31 | End: 2022-09-01

## 2022-08-31 RX ORDER — SODIUM CHLORIDE 9 MG/ML
1000 INJECTION, SOLUTION INTRAVENOUS ONCE
Refills: 0 | Status: COMPLETED | OUTPATIENT
Start: 2022-08-31 | End: 2022-08-31

## 2022-08-31 RX ORDER — ONDANSETRON 8 MG/1
4 TABLET, FILM COATED ORAL ONCE
Refills: 0 | Status: DISCONTINUED | OUTPATIENT
Start: 2022-08-31 | End: 2022-09-01

## 2022-08-31 RX ORDER — CIPROFLOXACIN LACTATE 400MG/40ML
400 VIAL (ML) INTRAVENOUS ONCE
Refills: 0 | Status: COMPLETED | OUTPATIENT
Start: 2022-08-31 | End: 2022-08-31

## 2022-08-31 RX ORDER — CIPROFLOXACIN LACTATE 400MG/40ML
VIAL (ML) INTRAVENOUS
Refills: 0 | Status: DISCONTINUED | OUTPATIENT
Start: 2022-08-31 | End: 2022-09-01

## 2022-08-31 RX ORDER — CIPROFLOXACIN LACTATE 400MG/40ML
400 VIAL (ML) INTRAVENOUS EVERY 12 HOURS
Refills: 0 | Status: DISCONTINUED | OUTPATIENT
Start: 2022-09-01 | End: 2022-09-01

## 2022-08-31 RX ORDER — CEFEPIME 1 G/1
2000 INJECTION, POWDER, FOR SOLUTION INTRAMUSCULAR; INTRAVENOUS ONCE
Refills: 0 | Status: COMPLETED | OUTPATIENT
Start: 2022-08-31 | End: 2022-09-01

## 2022-08-31 RX ORDER — CEFEPIME 1 G/1
1000 INJECTION, POWDER, FOR SOLUTION INTRAMUSCULAR; INTRAVENOUS EVERY 12 HOURS
Refills: 0 | Status: DISCONTINUED | OUTPATIENT
Start: 2022-08-31 | End: 2022-08-31

## 2022-08-31 RX ADMIN — HEPARIN SODIUM 5000 UNIT(S): 5000 INJECTION INTRAVENOUS; SUBCUTANEOUS at 21:28

## 2022-08-31 RX ADMIN — SODIUM CHLORIDE 1000 MILLILITER(S): 9 INJECTION, SOLUTION INTRAVENOUS at 22:30

## 2022-08-31 RX ADMIN — SODIUM CHLORIDE 100 MILLILITER(S): 9 INJECTION INTRAMUSCULAR; INTRAVENOUS; SUBCUTANEOUS at 13:49

## 2022-08-31 RX ADMIN — Medication 200 MILLIGRAM(S): at 21:19

## 2022-08-31 RX ADMIN — CEFEPIME 100 MILLIGRAM(S): 1 INJECTION, POWDER, FOR SOLUTION INTRAMUSCULAR; INTRAVENOUS at 05:40

## 2022-08-31 RX ADMIN — HEPARIN SODIUM 5000 UNIT(S): 5000 INJECTION INTRAVENOUS; SUBCUTANEOUS at 13:48

## 2022-08-31 RX ADMIN — MIDODRINE HYDROCHLORIDE 5 MILLIGRAM(S): 2.5 TABLET ORAL at 05:41

## 2022-08-31 RX ADMIN — SODIUM CHLORIDE 100 MILLILITER(S): 9 INJECTION INTRAMUSCULAR; INTRAVENOUS; SUBCUTANEOUS at 21:18

## 2022-08-31 RX ADMIN — HEPARIN SODIUM 5000 UNIT(S): 5000 INJECTION INTRAVENOUS; SUBCUTANEOUS at 05:41

## 2022-08-31 NOTE — PROGRESS NOTE ADULT - SUBJECTIVE AND OBJECTIVE BOX
CARDIOLOGY     PROGRESS  NOTE   ________________________________________________    CHIEF COMPLAINT:Patient is a 61y old  Male who presents with a chief complaint of Hypokalemia (31 Aug 2022 07:10)  no complain, sleeping  	  REVIEW OF SYSTEMS:  CONSTITUTIONAL: No fever, weight loss, or fatigue  EYES: No eye pain, visual disturbances, or discharge  ENT:  No difficulty hearing, tinnitus, vertigo; No sinus or throat pain  NECK: No pain or stiffness  RESPIRATORY: No cough, wheezing, chills or hemoptysis; No Shortness of Breath  CARDIOVASCULAR: No chest pain, palpitations, passing out, dizziness, or leg swelling  GASTROINTESTINAL: No abdominal or epigastric pain. No nausea, vomiting, or hematemesis; No diarrhea or constipation. No melena or hematochezia.  GENITOURINARY: No dysuria, frequency, hematuria, or incontinence  NEUROLOGICAL: No headaches, memory loss, loss of strength, numbness, or tremors  SKIN: No itching, burning, rashes, or lesions   LYMPH Nodes: No enlarged glands  ENDOCRINE: No heat or cold intolerance; No hair loss  MUSCULOSKELETAL: No joint pain or swelling; No muscle, back, or extremity pain  PSYCHIATRIC: No depression, anxiety, mood swings, or difficulty sleeping  HEME/LYMPH: No easy bruising, or bleeding gums  ALLERGY AND IMMUNOLOGIC: No hives or eczema	    [ ] All others negative	  [x ] Unable to obtain    PHYSICAL EXAM:  T(C): 36.2 (08-31-22 @ 05:39), Max: 36.4 (08-30-22 @ 12:19)  HR: 67 (08-31-22 @ 05:39) (65 - 77)  BP: 100/61 (08-31-22 @ 05:34) (93/64 - 103/71)  RR: 18 (08-31-22 @ 05:39) (16 - 18)  SpO2: 97% (08-31-22 @ 05:39) (97% - 100%)  Wt(kg): --  I&O's Summary    30 Aug 2022 07:01  -  31 Aug 2022 07:00  --------------------------------------------------------  IN: 480 mL / OUT: 0 mL / NET: 480 mL        Appearance: Normal	  HEENT:   Normal oral mucosa, PERRL, EOMI	  Lymphatic: No lymphadenopathy  Cardiovascular: Normal S1 S2, No JVD, + murmurs, No edema  Respiratory: rhonchi  Psychiatry: A & O x 3, Mood & affect appropriate  Gastrointestinal:  Soft, Non-tender, + BS	  Skin: No rashes, No ecchymoses, No cyanosis	  Neurologic: Non-focal  Extremities: Normal range of motion, No clubbing, cyanosis or edema  Vascular: Peripheral pulses palpable 2+ bilaterally    MEDICATIONS  (STANDING):  ascorbic acid 500 milliGRAM(s) Oral daily  cefepime   IVPB      cefepime   IVPB 2000 milliGRAM(s) IV Intermittent every 12 hours  chlorhexidine 2% Cloths 1 Application(s) Topical daily  heparin   Injectable 5000 Unit(s) SubCutaneous every 8 hours  magnesium oxide 400 milliGRAM(s) Oral daily  midodrine. 5 milliGRAM(s) Oral three times a day  multivitamin 1 Tablet(s) Oral daily  mupirocin 2% Nasal 1 Application(s) Both Nostrils two times a day  potassium chloride    Tablet ER 20 milliEquivalent(s) Oral daily      TELEMETRY: 	    ECG:  	  RADIOLOGY:  OTHER: 	  	  LABS:	 	    CARDIAC MARKERS:                                12.3   5.88  )-----------( 245      ( 30 Aug 2022 11:19 )             38.6     08-30    150<H>  |  107  |  6<L>  ----------------------------<  80  5.1   |  30  |  0.78    Ca    10.0      30 Aug 2022 11:19    TPro  7.6  /  Alb  3.5  /  TBili  0.2  /  DBili  x   /  AST  12  /  ALT  8<L>  /  AlkPhos  90  08-30    proBNP: Serum Pro-Brain Natriuretic Peptide: 267 pg/mL (08-09 @ 16:44)    Lipid Profile: Cholesterol 193  LDL --  HDL 30  TG 91    HgA1c:   TSH: Thyroid Stimulating Hormone, Serum: 1.51 uIU/mL (08-27 @ 07:27)  Thyroid Stimulating Hormone, Serum: 1.00 uIU/mL (08-09 @ 16:44)    Based on patients diagnosis of cerebellar ataxia, chronic lacunar infarct, hypotension, patient requires claudia lift to transfer from bed to chair and chair to bed    Based on patients ongoing issues with deconditioning and generalized weakness secondary to patients diagnosis of cerebellar ataxia, chronic lacunar infarct, hypotension . Patient will require a semi electric hospital bed. This is necessary to achieve positioning, elevation and head of bed needs to be elevated at least 30 degrees most of the time. Bed pillows and wedges have been tried and ruled out.  now  admitted    with  hypokalemia   h/o  cerebellar  ataxia,  with  no defined  cause  found   pt  with  bradycardia, ?  central, seen  by  card/  echo  on 4/2022,  normal  ef    h/o  recurrent , intermittent   hypothermia,    not related  to  any   infectious   process,  it  seems to be  dysautonomia/    with  h/o normal  cortisol level  prior  CT  c/ap,  no  malignant  process  on feeds  per  swallow  eval     bed bound   status  and  altered  baseline  mental  status at  times     h/o bradycardia   f/p  by  card    on  midodrine  uti, /  Pseudomonas,  on iv rocephim/  house  ID eval   urology  planning   surg  in am /  cleraed  for  procedure   dvt ppx/  q/q  heparin    - plan for OR wednesday for b/l URS  - please make NPO pMN today  - f/u urine culture sensitivities  - recommend cipro/gent based on old culture sensitivities   - please provide medical clearance/optimization   - consent in chart    Assessment and plan  ---------------------------  61 M w/ PMH of progressive Cerebellar Ataxia, Chronic Lacunar infarcts, 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   pt is well known to me with hx of severe orthostatic hypotension on florinef/ ?midodrine with bradycardia, pt had a normal am cortisol level.  ivf, keep hydrated  autonomic dysfunction, over all controlled with midodrine, don't increase dose sec to bradycardia  check tsh  dvt prophylaxis  if sig bradycardia will change Midodrine to Florinef if remains bradycardic  bl arsen stocking  increase k, dc supplement  pt is goig to OR today, continue hydration and avoid hypotension

## 2022-08-31 NOTE — CHART NOTE - NSCHARTNOTEFT_GEN_A_CORE
Called by PACU RN for hypothermia to 92.4F rectally. BP also noted to be in 80s/40s.   Pt seen and examined. Still sleepy from anesthesia. Did not express any acute complaints when asked, just shook head from side to side.    Vital Signs Last 24 Hrs  T(C): 35.4 (31 Aug 2022 22:00), Max: 37.1 (31 Aug 2022 11:44)  T(F): 95.7 (31 Aug 2022 22:00), Max: 98.7 (31 Aug 2022 11:44)  HR: 70 (31 Aug 2022 22:00) (66 - 80)  BP: 81/57 (31 Aug 2022 22:00) (81/57 - 116/83)  BP(mean): 65 (31 Aug 2022 22:00) (65 - 75)  RR: 14 (31 Aug 2022 22:00) (14 - 18)  SpO2: 100% (31 Aug 2022 22:00) (97% - 100%)    O2 Parameters below as of 31 Aug 2022 22:00  Patient On (Oxygen Delivery Method): room air    General: NAD, Lying in bed comfortably  Resp: No respiratory distress or accessory muscle use.   Abd: Soft, NT/ND, no rebound/guarding  : coronel with translucent strawberry urine    A/P: 62 yo s/p BL URS/LL/BL stents c/b hypothermia and hypotension. Recent UCx grew Psuedomonas. c/f post op sepsis from urinary source  - MICU consult called, will follow up recs  - 1L bolus per MICU  - STAT labs/Cx  - Continue ABX  - Continue warm pt  - will monitor closely     d/w Dr. Suarez and Dr. Daigle

## 2022-08-31 NOTE — PROGRESS NOTE ADULT - ASSESSMENT
62 yo male      PMHx of cerebellar ataxia     on 4/17/22 due to AMS ,  was  intubated for depressed consciousness w/ cognitive decline.  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    infectious  and  malignancy  was  ruled  outt/  and  per   neuro  note ,no further workup     flow cytometry 4/28 showing nonspecific results 'degenerated sample, small lymphocytes'   nsgy consulted for meningeal bx, family refusing    CSF cytopathology 4/22 - increased number of large mononuclear cells in a background of few small lymphocytes, monocytes and neutrophils, cannot exclude a lymphoproliferative disorder versus an inflammatory process.    CSF cytopathology 4/29 - significant increased number of small lymphocytes with rare monocytes/ seen by  oncology  dr de leon,  no  intervention      now  admitted    with  hypokalemia   h/o  cerebellar  ataxia,  with  no defined  cause  found   pt  with  bradycardia, ?  central, seen  by  card/  echo  on 4/2022,  normal  ef    h/o  recurrent , intermittent   hypothermia,    not related  to  any   infectious   process,  it  seems to be  dysautonomia/    with  h/o normal  cortisol level  prior  CT  c/ap,  no  malignant  process  on feeds  per  swallow  eval     bed bound   status  and  altered  baseline  mental  status at  times     h/o bradycardia   f/p  by  card    on  midodrine  uti, /  Pseudomonas,  on iv rocephim/  house  ID eval   urology  planning   surg  in am /  cleraed  for  procedure   dvt ppx/  q/q  heparin                     Plan: now  admitted  with  hypokalemia, all corrected, K is 5.1.      h/o  cerebellar  ataxia,  with  no defined  cause  found    pt  with  bradycardia, ?  central, seen  by  card/  echo  on 4/2022,  normal  ef  h/o  recurrent , intermittent   hypothermia,    not related  to  any   infectious   process,  it  seems to be  dysautonomia/   with  h/o normal  cortisol level  prior  CT  c/ap,  no  malignant  process  on feeds  per  swallow  eval   bed bound   status  and  altered  baseline  mental  status at  times  h/o bradycardia   f/p  by  card   on  midodrine    UTI, Pseudomonas,  on iv rocephim/  house  ID eval  urology  planning   surg  in am /  cleraed  for  procedure  dvt ppx/  q/q  heparin                     Plan: now  admitted  with  hypokalemia, all corrected, K is 5.1.      h/o  cerebellar  ataxia,  with  no defined  cause  found    pt  with  bradycardia, ?  central, seen  by  card/  echo  on 4/2022,  normal  ef  h/o  recurrent , intermittent   hypothermia,    not related  to  any   infectious   process,  it  seems to be  dysautonomia/   with  h/o normal  cortisol level  prior  CT  c/ap,  no  malignant  process  on feeds  per  swallow  eval   bed bound   status  and  altered  baseline  mental  status at  times  h/o bradycardia   f/p  by  card   on  midodrine    UTI, Pseudomonas,  on iv rocephim/  house  ID eval, now Cefepeme   urology  planning   surg  in am /  cleraed  for  procedure  dvt ppx/  q/q  heparin, IVF while NPO.

## 2022-08-31 NOTE — PROGRESS NOTE ADULT - SUBJECTIVE AND OBJECTIVE BOX
Post op Check    Pt seen and examined. Pt still sleepy from anesthesia. Per PACU RN was evaluated by anesthesia and they believe that given pt's comorbidities will take longer to fully wake up after anesthesia. Per PACU RN, pt is improving and responds by shaking head. Pain is controlled. Denies SOB/CP/N/V.     Vital Signs Last 24 Hrs  T(C): 36 (31 Aug 2022 19:05), Max: 37.1 (31 Aug 2022 11:44)  T(F): 96.8 (31 Aug 2022 19:05), Max: 98.7 (31 Aug 2022 11:44)  HR: 72 (31 Aug 2022 21:30) (66 - 80)  BP: 96/54 (31 Aug 2022 21:30) (88/54 - 116/83)  BP(mean): 72 (31 Aug 2022 21:30) (66 - 75)  RR: 14 (31 Aug 2022 21:30) (14 - 18)  SpO2: 100% (31 Aug 2022 21:30) (97% - 100%)    Parameters below as of 31 Aug 2022 19:05  Patient On (Oxygen Delivery Method): room air        I&O's Summary    30 Aug 2022 07:01  -  31 Aug 2022 07:00  --------------------------------------------------------  IN: 480 mL / OUT: 0 mL / NET: 480 mL    31 Aug 2022 07:01  -  31 Aug 2022 21:49  --------------------------------------------------------  IN: 200 mL / OUT: 520 mL / NET: -320 mL        Physical Exam  Gen: NAD  Pulm: No respiratory distress, no subcostal retractions  Abd: Soft, NT, ND  Back: no cvat bl  : coronel secured with translucent strawberry colored urine                           12.1   5.41  )-----------( 236      ( 31 Aug 2022 12:02 )             38.7       08-31    144  |  105  |  11  ----------------------------<  84  4.6   |  30  |  0.75    Ca    10.2      31 Aug 2022 12:04    TPro  7.6  /  Alb  3.5  /  TBili  0.2  /  DBili  x   /  AST  12  /  ALT  8<L>  /  AlkPhos  90  08-30

## 2022-08-31 NOTE — CHART NOTE - NSCHARTNOTEFT_GEN_A_CORE
60 yo male s/p bilateral URS/LL/Stents     - Will dc coronel and left stent on Friday   - Right stent to be removed outpatient in 2 weeks   - Can continue home meds  - Please keep patient on Cipro until coronel and stent removed  - Regular diet     Remainder of care per primary     Barbra Barth   Available on Teams

## 2022-08-31 NOTE — PROGRESS NOTE ADULT - SUBJECTIVE AND OBJECTIVE BOX
Interval events:   None      OBJECTIVE:  Vital Signs Last 24 Hrs  T(C): 36.2 (31 Aug 2022 05:39), Max: 36.4 (30 Aug 2022 12:19)  T(F): 97.1 (31 Aug 2022 05:39), Max: 97.6 (30 Aug 2022 12:19)  HR: 67 (31 Aug 2022 05:39) (65 - 77)  BP: 100/61 (31 Aug 2022 05:34) (93/64 - 103/71)  BP(mean): --  RR: 18 (31 Aug 2022 05:39) (16 - 18)  SpO2: 97% (31 Aug 2022 05:39) (97% - 100%)    Parameters below as of 31 Aug 2022 05:39  Patient On (Oxygen Delivery Method): room air        Physical Examination:  GEN: NAD, resting quietly  ABD: sof      LABS:                        12.3   5.88  )-----------( 245      ( 30 Aug 2022 11:19 )             38.6       08-30    150<H>  |  107  |  6<L>  ----------------------------<  80  5.1   |  30  |  0.78    Ca    10.0      30 Aug 2022 11:19    TPro  7.6  /  Alb  3.5  /  TBili  0.2  /  DBili  x   /  AST  12  /  ALT  8<L>  /  AlkPhos  90  08-30

## 2022-08-31 NOTE — PROGRESS NOTE ADULT - ASSESSMENT
A/P: 60 yo male s/p bilateral URS/LL/Stents     - Will dc coronel and left stent on Friday   - Right stent to be removed outpatient in 2 weeks   - Can continue home meds  - Please keep patient on Cipro until coronel and stent removed  - Regular diet   - will continue to monitor after anesthesia    Remainder of care per primary     Barbra Barth   Available on Teams.

## 2022-08-31 NOTE — PROGRESS NOTE ADULT - SUBJECTIVE AND OBJECTIVE BOX
INTERVAL HPI/OVERNIGHT EVENTS:  Pt seen and examined at bedside.     Allergies/Intolerance: No Known Allergies      MEDICATIONS  (STANDING):  ascorbic acid 500 milliGRAM(s) Oral daily  cefepime   IVPB      cefepime   IVPB 2000 milliGRAM(s) IV Intermittent every 12 hours  chlorhexidine 2% Cloths 1 Application(s) Topical daily  heparin   Injectable 5000 Unit(s) SubCutaneous every 8 hours  midodrine. 5 milliGRAM(s) Oral three times a day  multivitamin 1 Tablet(s) Oral daily  mupirocin 2% Nasal 1 Application(s) Both Nostrils two times a day    MEDICATIONS  (PRN):        ROS: all systems reviewed and wnl      PHYSICAL EXAMINATION:  Vital Signs Last 24 Hrs  T(C): 36.2 (31 Aug 2022 05:39), Max: 36.4 (30 Aug 2022 12:19)  T(F): 97.1 (31 Aug 2022 05:39), Max: 97.6 (30 Aug 2022 12:19)  HR: 67 (31 Aug 2022 05:39) (65 - 77)  BP: 100/61 (31 Aug 2022 05:34) (93/64 - 103/71)  BP(mean): --  RR: 18 (31 Aug 2022 05:39) (16 - 18)  SpO2: 97% (31 Aug 2022 05:39) (97% - 100%)    Parameters below as of 31 Aug 2022 05:39  Patient On (Oxygen Delivery Method): room air      CAPILLARY BLOOD GLUCOSE          08-30 @ 07:01  -  08-31 @ 07:00  --------------------------------------------------------  IN: 480 mL / OUT: 0 mL / NET: 480 mL        GENERAL:   NECK: supple, No JVD  CHEST/LUNG: clear to auscultation bilaterally; no rales, rhonchi, or wheezing b/l  HEART: normal S1, S2  ABDOMEN: BS+, soft, ND, NT   EXTREMITIES:  pulses palpable; no clubbing, cyanosis, or edema b/l LEs  SKIN: no rashes or lesions      LABS:                        12.3   5.88  )-----------( 245      ( 30 Aug 2022 11:19 )             38.6     08-30    150<H>  |  107  |  6<L>  ----------------------------<  80  5.1   |  30  |  0.78    Ca    10.0      30 Aug 2022 11:19    TPro  7.6  /  Alb  3.5  /  TBili  0.2  /  DBili  x   /  AST  12  /  ALT  8<L>  /  AlkPhos  90  08-30               INTERVAL HPI/OVERNIGHT EVENTS:  Pt seen and examined at bedside.     Allergies/Intolerance: No Known Allergies      MEDICATIONS  (STANDING):  ascorbic acid 500 milliGRAM(s) Oral daily  cefepime   IVPB      cefepime   IVPB 2000 milliGRAM(s) IV Intermittent every 12 hours  chlorhexidine 2% Cloths 1 Application(s) Topical daily  heparin   Injectable 5000 Unit(s) SubCutaneous every 8 hours  midodrine. 5 milliGRAM(s) Oral three times a day  multivitamin 1 Tablet(s) Oral daily  mupirocin 2% Nasal 1 Application(s) Both Nostrils two times a day    MEDICATIONS  (PRN):        ROS: all systems reviewed and wnl      PHYSICAL EXAMINATION:  Vital Signs Last 24 Hrs  T(C): 36.2 (31 Aug 2022 05:39), Max: 36.4 (30 Aug 2022 12:19)  T(F): 97.1 (31 Aug 2022 05:39), Max: 97.6 (30 Aug 2022 12:19)  HR: 67 (31 Aug 2022 05:39) (65 - 77)  BP: 100/61 (31 Aug 2022 05:34) (93/64 - 103/71)  BP(mean): --  RR: 18 (31 Aug 2022 05:39) (16 - 18)  SpO2: 97% (31 Aug 2022 05:39) (97% - 100%)    Parameters below as of 31 Aug 2022 05:39  Patient On (Oxygen Delivery Method): room air      CAPILLARY BLOOD GLUCOSE          08-30 @ 07:01  -  08-31 @ 07:00  --------------------------------------------------------  IN: 480 mL / OUT: 0 mL / NET: 480 mL        GENERAL: stable on RA, afebrile  NECK: supple, No JVD  CHEST/LUNG: clear to auscultation bilaterally; no rales, rhonchi, or wheezing b/l  HEART: normal S1, S2  ABDOMEN: BS+, soft, ND, NT   EXTREMITIES:  pulses palpable; no clubbing, cyanosis, or edema b/l LEs  SKIN: no rashes or lesions      LABS:                        12.3   5.88  )-----------( 245      ( 30 Aug 2022 11:19 )             38.6     08-30    150<H>  |  107  |  6<L>  ----------------------------<  80  5.1   |  30  |  0.78    Ca    10.0      30 Aug 2022 11:19    TPro  7.6  /  Alb  3.5  /  TBili  0.2  /  DBili  x   /  AST  12  /  ALT  8<L>  /  AlkPhos  90  08-30               INTERVAL HPI/OVERNIGHT EVENTS:  Pt seen and examined at bedside.     Allergies/Intolerance: No Known Allergies      MEDICATIONS  (STANDING):  ascorbic acid 500 milliGRAM(s) Oral daily  cefepime   IVPB      cefepime   IVPB 2000 milliGRAM(s) IV Intermittent every 12 hours  chlorhexidine 2% Cloths 1 Application(s) Topical daily  heparin   Injectable 5000 Unit(s) SubCutaneous every 8 hours  midodrine. 5 milliGRAM(s) Oral three times a day  multivitamin 1 Tablet(s) Oral daily  mupirocin 2% Nasal 1 Application(s) Both Nostrils two times a day    MEDICATIONS  (PRN):        ROS: all systems reviewed and wnl      PHYSICAL EXAMINATION:  Vital Signs Last 24 Hrs  T(C): 36.2 (31 Aug 2022 05:39), Max: 36.4 (30 Aug 2022 12:19)  T(F): 97.1 (31 Aug 2022 05:39), Max: 97.6 (30 Aug 2022 12:19)  HR: 67 (31 Aug 2022 05:39) (65 - 77)  BP: 100/61 (31 Aug 2022 05:34) (93/64 - 103/71)  BP(mean): --  RR: 18 (31 Aug 2022 05:39) (16 - 18)  SpO2: 97% (31 Aug 2022 05:39) (97% - 100%)    Parameters below as of 31 Aug 2022 05:39  Patient On (Oxygen Delivery Method): room air      CAPILLARY BLOOD GLUCOSE          08-30 @ 07:01  -  08-31 @ 07:00  --------------------------------------------------------  IN: 480 mL / OUT: 0 mL / NET: 480 mL        GENERAL: stable on RA, afebrile, in bed, asleep  NECK: supple, No JVD  CHEST/LUNG: clear to auscultation bilaterally; no rales, rhonchi, or wheezing b/l  HEART: normal S1, S2  ABDOMEN: BS+, soft, ND, NT   EXTREMITIES:  pulses palpable; no clubbing, cyanosis, or edema b/l LEs  SKIN: no rashes or lesions      LABS:                        12.3   5.88  )-----------( 245      ( 30 Aug 2022 11:19 )             38.6     08-30    150<H>  |  107  |  6<L>  ----------------------------<  80  5.1   |  30  |  0.78    Ca    10.0      30 Aug 2022 11:19    TPro  7.6  /  Alb  3.5  /  TBili  0.2  /  DBili  x   /  AST  12  /  ALT  8<L>  /  AlkPhos  90  08-30

## 2022-08-31 NOTE — PRE-ANESTHESIA EVALUATION ADULT - NSANTHPMHFT_GEN_ALL_CORE
n 4/17/22 due to AMS ,  was  intubated for depressed consciousness w/ cognitive decline.  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    infectious  and  malignancy  was  ruled  outt/  and  per   neuro  note ,no further workup     flow cytometry 4/28 showing nonspecific results 'degenerated sample, small lymphocytes'   nsgy consulted for meningeal bx, family refusing    CSF cytopathology 4/22 - increased number of large mononuclear cells in a background of few small lymphocytes, monocytes and neutrophils, cannot exclude a lymphoproliferative disorder versus an inflammatory process.    CSF cytopathology 4/29 - significant increased number of small lymphocytes with rare monocytes/ seen by  oncology  dr de leon,  no  intervention      now  admitted    with  hypokalemia   h/o  cerebellar  ataxia,  with  no defined  cause  found   pt  with  bradycardia, ?  central, seen  by  card/  echo  on 4/2022,  normal  ef    h/o  recurrent , intermittent   hypothermia,    not related  to  any   infectious   process,  it  seems to be  dysautonomia/    with  h/o normal  cortisol level  prior  CT  c/ap,  no  malignant  process  on feeds  per  swallow  eval     bed bound   status  and  altered  baseline  mental  status at  times     h/o bradycardia   f/p  by  card    on  midodrine  uti, /  Pseudomonas,  on iv rocephim/  house  ID eval   urology  planning   surg  in am /  cleraed  for  procedure   dvt ppx/  q/q  heparin    Chart reviewed.  60 yo M.  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 Chart reviewed.  60 yo M.  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

## 2022-08-31 NOTE — PROGRESS NOTE ADULT - ASSESSMENT
62yo male with b/l nephrolithiasis s/p b/l stents   - plan for OR wednesday for b/l URS  - please make NPO pMN today  - f/u urine culture sensitivities  - recommend cipro/gent based on old culture sensitivities   - please provide medical clearance/optimization   - consent in chart    Barbra Barth   Available on Teams  60yo male with b/l nephrolithiasis s/p b/l stents   - plan for OR wednesday for b/l URS  - keep NPO  - f/u urine culture sensitivities  - recommend cipro/gent based on old culture sensitivities   - please provide medical clearance/optimization   - consent in chart    Barbra Barth   Available on Teams

## 2022-08-31 NOTE — PRE-ANESTHESIA EVALUATION ADULT - NSANTHADDINFOFT_GEN_ALL_CORE
Pt with multiple risk factors for aspiration.  With altered mentation from baseline.  Risks of aspiration d/w patient and HCP.  Agree with plan for general anesthesia.

## 2022-08-31 NOTE — PRE-OP CHECKLIST - WEIGHT IN LBS
Telephone Encounter by Savi Lundberg CMA at 04/27/17 09:31 AM     Author:  Savi Lundberg CMA Service:  (none) Author Type:  Certified Medical Assistant     Filed:  04/27/17 09:32 AM Encounter Date:  4/26/2017 Status:  Signed     :  Savi Lundberg CMA (Certified Medical Assistant)            Last OV: 9-9-16  Last lipid 9-9-16  Routed to Dr. Gary[SB1.1M]      Revision History        User Key Date/Time User Provider Type Action    > SB1.1 04/27/17 09:32 AM Savi Lundberg CMA Certified Medical Assistant Sign    M - Manual             134.4

## 2022-09-01 LAB
ANION GAP SERPL CALC-SCNC: 10 MMOL/L — SIGNIFICANT CHANGE UP (ref 5–17)
ANION GAP SERPL CALC-SCNC: 12 MMOL/L — SIGNIFICANT CHANGE UP (ref 5–17)
BASE EXCESS BLDV CALC-SCNC: -1.5 MMOL/L — SIGNIFICANT CHANGE UP (ref -2–3)
BASOPHILS # BLD AUTO: 0 K/UL — SIGNIFICANT CHANGE UP (ref 0–0.2)
BASOPHILS NFR BLD AUTO: 0 % — SIGNIFICANT CHANGE UP (ref 0–2)
BLD GP AB SCN SERPL QL: NEGATIVE — SIGNIFICANT CHANGE UP
BUN SERPL-MCNC: 12 MG/DL — SIGNIFICANT CHANGE UP (ref 7–23)
BUN SERPL-MCNC: 14 MG/DL — SIGNIFICANT CHANGE UP (ref 7–23)
CA-I SERPL-SCNC: 1.2 MMOL/L — SIGNIFICANT CHANGE UP (ref 1.15–1.33)
CALCIUM SERPL-MCNC: 8.7 MG/DL — SIGNIFICANT CHANGE UP (ref 8.4–10.5)
CALCIUM SERPL-MCNC: 9.2 MG/DL — SIGNIFICANT CHANGE UP (ref 8.4–10.5)
CHLORIDE BLDV-SCNC: 108 MMOL/L — SIGNIFICANT CHANGE UP (ref 96–108)
CHLORIDE SERPL-SCNC: 107 MMOL/L — SIGNIFICANT CHANGE UP (ref 96–108)
CHLORIDE SERPL-SCNC: 109 MMOL/L — HIGH (ref 96–108)
CO2 BLDV-SCNC: 28 MMOL/L — HIGH (ref 22–26)
CO2 SERPL-SCNC: 23 MMOL/L — SIGNIFICANT CHANGE UP (ref 22–31)
CO2 SERPL-SCNC: 27 MMOL/L — SIGNIFICANT CHANGE UP (ref 22–31)
CREAT SERPL-MCNC: 0.73 MG/DL — SIGNIFICANT CHANGE UP (ref 0.5–1.3)
CREAT SERPL-MCNC: 0.92 MG/DL — SIGNIFICANT CHANGE UP (ref 0.5–1.3)
CULTURE RESULTS: SIGNIFICANT CHANGE UP
CULTURE RESULTS: SIGNIFICANT CHANGE UP
EGFR: 104 ML/MIN/1.73M2 — SIGNIFICANT CHANGE UP
EGFR: 109 ML/MIN/1.73M2 — SIGNIFICANT CHANGE UP
EGFR: 95 ML/MIN/1.73M2 — SIGNIFICANT CHANGE UP
EOSINOPHIL # BLD AUTO: 0 K/UL — SIGNIFICANT CHANGE UP (ref 0–0.5)
EOSINOPHIL NFR BLD AUTO: 0 % — SIGNIFICANT CHANGE UP (ref 0–6)
GAS PNL BLDV: 141 MMOL/L — SIGNIFICANT CHANGE UP (ref 136–145)
GAS PNL BLDV: SIGNIFICANT CHANGE UP
GAS PNL BLDV: SIGNIFICANT CHANGE UP
GLUCOSE BLDV-MCNC: 150 MG/DL — HIGH (ref 70–99)
GLUCOSE SERPL-MCNC: 83 MG/DL — SIGNIFICANT CHANGE UP (ref 70–99)
GLUCOSE SERPL-MCNC: 96 MG/DL — SIGNIFICANT CHANGE UP (ref 70–99)
HCO3 BLDV-SCNC: 26 MMOL/L — SIGNIFICANT CHANGE UP (ref 22–29)
HCT VFR BLD CALC: 32.7 % — LOW (ref 39–50)
HCT VFR BLDA CALC: 35 % — LOW (ref 39–51)
HGB BLD CALC-MCNC: 11.5 G/DL — LOW (ref 12.6–17.4)
HGB BLD-MCNC: 10.2 G/DL — LOW (ref 13–17)
HOROWITZ INDEX BLDV+IHG-RTO: SIGNIFICANT CHANGE UP
LACTATE BLDV-MCNC: 4.2 MMOL/L — CRITICAL HIGH (ref 0.5–2)
LYMPHOCYTES # BLD AUTO: 0.17 K/UL — LOW (ref 1–3.3)
LYMPHOCYTES # BLD AUTO: 0.9 % — LOW (ref 13–44)
MAGNESIUM SERPL-MCNC: 1.7 MG/DL — SIGNIFICANT CHANGE UP (ref 1.6–2.6)
MANUAL SMEAR VERIFICATION: SIGNIFICANT CHANGE UP
MCHC RBC-ENTMCNC: 27.7 PG — SIGNIFICANT CHANGE UP (ref 27–34)
MCHC RBC-ENTMCNC: 31.2 GM/DL — LOW (ref 32–36)
MCV RBC AUTO: 88.9 FL — SIGNIFICANT CHANGE UP (ref 80–100)
METAMYELOCYTES # FLD: 0.9 % — HIGH (ref 0–0)
MONOCYTES # BLD AUTO: 0.33 K/UL — SIGNIFICANT CHANGE UP (ref 0–0.9)
MONOCYTES NFR BLD AUTO: 1.7 % — LOW (ref 2–14)
NEUTROPHILS # BLD AUTO: 18.62 K/UL — HIGH (ref 1.8–7.4)
NEUTROPHILS NFR BLD AUTO: 82.5 % — HIGH (ref 43–77)
NEUTS BAND # BLD: 14 % — HIGH (ref 0–8)
PCO2 BLDV: 57 MMHG — HIGH (ref 42–55)
PH BLDV: 7.27 — LOW (ref 7.32–7.43)
PHOSPHATE SERPL-MCNC: 3 MG/DL — SIGNIFICANT CHANGE UP (ref 2.5–4.5)
PLAT MORPH BLD: NORMAL — SIGNIFICANT CHANGE UP
PLATELET # BLD AUTO: 185 K/UL — SIGNIFICANT CHANGE UP (ref 150–400)
PO2 BLDV: 40 MMHG — SIGNIFICANT CHANGE UP (ref 25–45)
POTASSIUM BLDV-SCNC: 4 MMOL/L — SIGNIFICANT CHANGE UP (ref 3.5–5.1)
POTASSIUM SERPL-MCNC: 4.3 MMOL/L — SIGNIFICANT CHANGE UP (ref 3.5–5.3)
POTASSIUM SERPL-MCNC: 5.4 MMOL/L — HIGH (ref 3.5–5.3)
POTASSIUM SERPL-SCNC: 4.3 MMOL/L — SIGNIFICANT CHANGE UP (ref 3.5–5.3)
POTASSIUM SERPL-SCNC: 5.4 MMOL/L — HIGH (ref 3.5–5.3)
RBC # BLD: 3.68 M/UL — LOW (ref 4.2–5.8)
RBC # FLD: 14.9 % — HIGH (ref 10.3–14.5)
RBC BLD AUTO: SIGNIFICANT CHANGE UP
RH IG SCN BLD-IMP: POSITIVE — SIGNIFICANT CHANGE UP
SAO2 % BLDV: 62.4 % — LOW (ref 67–88)
SODIUM SERPL-SCNC: 142 MMOL/L — SIGNIFICANT CHANGE UP (ref 135–145)
SODIUM SERPL-SCNC: 146 MMOL/L — HIGH (ref 135–145)
SPECIMEN SOURCE: SIGNIFICANT CHANGE UP
SPECIMEN SOURCE: SIGNIFICANT CHANGE UP
WBC # BLD: 19.3 K/UL — HIGH (ref 3.8–10.5)
WBC # FLD AUTO: 19.3 K/UL — HIGH (ref 3.8–10.5)

## 2022-09-01 PROCEDURE — 99291 CRITICAL CARE FIRST HOUR: CPT

## 2022-09-01 PROCEDURE — 99291 CRITICAL CARE FIRST HOUR: CPT | Mod: GC

## 2022-09-01 PROCEDURE — 76705 ECHO EXAM OF ABDOMEN: CPT | Mod: 26,GC

## 2022-09-01 PROCEDURE — 99232 SBSQ HOSP IP/OBS MODERATE 35: CPT

## 2022-09-01 PROCEDURE — 76604 US EXAM CHEST: CPT | Mod: 26,GC

## 2022-09-01 PROCEDURE — 93308 TTE F-UP OR LMTD: CPT | Mod: 26,GC

## 2022-09-01 RX ORDER — SODIUM CHLORIDE 9 MG/ML
500 INJECTION, SOLUTION INTRAVENOUS ONCE
Refills: 0 | Status: COMPLETED | OUTPATIENT
Start: 2022-09-01 | End: 2022-09-01

## 2022-09-01 RX ORDER — SODIUM CHLORIDE 9 MG/ML
1000 INJECTION, SOLUTION INTRAVENOUS
Refills: 0 | Status: DISCONTINUED | OUTPATIENT
Start: 2022-09-01 | End: 2022-09-01

## 2022-09-01 RX ORDER — MIDODRINE HYDROCHLORIDE 2.5 MG/1
5 TABLET ORAL ONCE
Refills: 0 | Status: DISCONTINUED | OUTPATIENT
Start: 2022-09-01 | End: 2022-09-01

## 2022-09-01 RX ORDER — MIDODRINE HYDROCHLORIDE 2.5 MG/1
5 TABLET ORAL THREE TIMES A DAY
Refills: 0 | Status: DISCONTINUED | OUTPATIENT
Start: 2022-09-01 | End: 2022-09-01

## 2022-09-01 RX ORDER — MIDODRINE HYDROCHLORIDE 2.5 MG/1
5 TABLET ORAL ONCE
Refills: 0 | Status: COMPLETED | OUTPATIENT
Start: 2022-09-01 | End: 2022-09-01

## 2022-09-01 RX ORDER — MIDODRINE HYDROCHLORIDE 2.5 MG/1
10 TABLET ORAL THREE TIMES A DAY
Refills: 0 | Status: DISCONTINUED | OUTPATIENT
Start: 2022-09-01 | End: 2022-09-03

## 2022-09-01 RX ORDER — SODIUM CHLORIDE 9 MG/ML
1000 INJECTION, SOLUTION INTRAVENOUS ONCE
Refills: 0 | Status: COMPLETED | OUTPATIENT
Start: 2022-09-01 | End: 2022-09-01

## 2022-09-01 RX ORDER — CHLORHEXIDINE GLUCONATE 213 G/1000ML
1 SOLUTION TOPICAL
Refills: 0 | Status: DISCONTINUED | OUTPATIENT
Start: 2022-09-01 | End: 2022-09-12

## 2022-09-01 RX ORDER — NOREPINEPHRINE BITARTRATE/D5W 8 MG/250ML
0.05 PLASTIC BAG, INJECTION (ML) INTRAVENOUS
Qty: 8 | Refills: 0 | Status: DISCONTINUED | OUTPATIENT
Start: 2022-09-01 | End: 2022-09-01

## 2022-09-01 RX ADMIN — CEFEPIME 100 MILLIGRAM(S): 1 INJECTION, POWDER, FOR SOLUTION INTRAMUSCULAR; INTRAVENOUS at 02:19

## 2022-09-01 RX ADMIN — MIDODRINE HYDROCHLORIDE 10 MILLIGRAM(S): 2.5 TABLET ORAL at 14:07

## 2022-09-01 RX ADMIN — Medication 5.72 MICROGRAM(S)/KG/MIN: at 00:51

## 2022-09-01 RX ADMIN — HEPARIN SODIUM 5000 UNIT(S): 5000 INJECTION INTRAVENOUS; SUBCUTANEOUS at 22:11

## 2022-09-01 RX ADMIN — HEPARIN SODIUM 5000 UNIT(S): 5000 INJECTION INTRAVENOUS; SUBCUTANEOUS at 05:25

## 2022-09-01 RX ADMIN — SODIUM CHLORIDE 100 MILLILITER(S): 9 INJECTION, SOLUTION INTRAVENOUS at 07:56

## 2022-09-01 RX ADMIN — MIDODRINE HYDROCHLORIDE 5 MILLIGRAM(S): 2.5 TABLET ORAL at 02:58

## 2022-09-01 RX ADMIN — CHLORHEXIDINE GLUCONATE 1 APPLICATION(S): 213 SOLUTION TOPICAL at 11:21

## 2022-09-01 RX ADMIN — SODIUM CHLORIDE 500 MILLILITER(S): 9 INJECTION, SOLUTION INTRAVENOUS at 14:06

## 2022-09-01 RX ADMIN — MUPIROCIN 1 APPLICATION(S): 20 OINTMENT TOPICAL at 08:20

## 2022-09-01 RX ADMIN — MIDODRINE HYDROCHLORIDE 10 MILLIGRAM(S): 2.5 TABLET ORAL at 20:47

## 2022-09-01 RX ADMIN — HEPARIN SODIUM 5000 UNIT(S): 5000 INJECTION INTRAVENOUS; SUBCUTANEOUS at 14:07

## 2022-09-01 RX ADMIN — CEFEPIME 100 MILLIGRAM(S): 1 INJECTION, POWDER, FOR SOLUTION INTRAMUSCULAR; INTRAVENOUS at 17:36

## 2022-09-01 RX ADMIN — Medication 500 MILLIGRAM(S): at 14:06

## 2022-09-01 RX ADMIN — SODIUM CHLORIDE 100 MILLILITER(S): 9 INJECTION, SOLUTION INTRAVENOUS at 02:54

## 2022-09-01 RX ADMIN — Medication 1 TABLET(S): at 14:07

## 2022-09-01 RX ADMIN — SODIUM CHLORIDE 500 MILLILITER(S): 9 INJECTION, SOLUTION INTRAVENOUS at 09:09

## 2022-09-01 RX ADMIN — MIDODRINE HYDROCHLORIDE 5 MILLIGRAM(S): 2.5 TABLET ORAL at 05:24

## 2022-09-01 RX ADMIN — CEFEPIME 100 MILLIGRAM(S): 1 INJECTION, POWDER, FOR SOLUTION INTRAMUSCULAR; INTRAVENOUS at 05:25

## 2022-09-01 RX ADMIN — CHLORHEXIDINE GLUCONATE 1 APPLICATION(S): 213 SOLUTION TOPICAL at 06:26

## 2022-09-01 RX ADMIN — Medication 5.72 MICROGRAM(S)/KG/MIN: at 07:57

## 2022-09-01 RX ADMIN — Medication 200 MILLIGRAM(S): at 05:26

## 2022-09-01 RX ADMIN — SODIUM CHLORIDE 1000 MILLILITER(S): 9 INJECTION, SOLUTION INTRAVENOUS at 00:52

## 2022-09-01 RX ADMIN — MIDODRINE HYDROCHLORIDE 5 MILLIGRAM(S): 2.5 TABLET ORAL at 09:09

## 2022-09-01 NOTE — PHYSICAL THERAPY INITIAL EVALUATION ADULT - IMPAIRMENTS FOUND, PT EVAL
aerobic capacity/endurance/gait, locomotion, and balance/muscle strength
aerobic capacity/endurance/gait, locomotion, and balance/muscle strength/ROM

## 2022-09-01 NOTE — PROGRESS NOTE ADULT - ASSESSMENT
Plan: now  admitted  with  hypokalemia, all corrected, K is 5.1.      h/o  cerebellar  ataxia,  with  no defined  cause  found    pt  with  bradycardia, ?  central, seen  by  card/  echo  on 4/2022,  normal  ef  h/o  recurrent , intermittent   hypothermia,    not related  to  any   infectious   process,  it  seems to be  dysautonomia/   with  h/o normal  cortisol level.   prior  CT  c/ap,  no  malignant  process  on feeds  per  swallow  eval   bed bound   status  and  altered  baseline  mental  status at  times  h/o bradycardia   f/p  by  card   on  midodrine 10 mg tid    UTI, Pseudomonas,  on iv rocephim/  house  ID eval, now Cefepeme   urology  planning   surg  in am /  cleraed  for  procedure  dvt ppx/  q/q  heparin, IVF while NPO. IV Levophed while in MICU, tolerates IVF well, try to wean pressors over 24 hours.

## 2022-09-01 NOTE — PHYSICAL THERAPY INITIAL EVALUATION ADULT - BED MOBILITY TRAINING, PT EVAL
GOAL: Pt will perform bed mobility with CG Ax1 in 4 weeks.
GOAL: Pt will perform all bed mobility Josiane to prevent skin breakdown in 2 weeks.

## 2022-09-01 NOTE — CHART NOTE - NSCHARTNOTEFT_GEN_A_CORE
Attending: Dr. Devine  : Isiaas Coates, PGY-2    INDICATION: shock    PROCEDURE:  [x] LIMITED ECHO  [x] LIMITED CHEST  [ ] LIMITED RETROPERITONEAL  [ ] LIMITED ABDOMINAL  [ ] LIMITED DVT  [ ] NEEDLE GUIDANCE VASCULAR  [ ] NEEDLE GUIDANCE THORACENTESIS  [ ] NEEDLE GUIDANCE PARACENTESIS  [ ] NEEDLE GUIDANCE PERICARDIOCENTESIS  [ ] OTHER    FINDINGS:   Lungs: A-lines predominant in b/l anterior lung fields, good lung sliding, no sign of consolidations or pleural effusions.    Heart: LV systolic function preserved, RV size normal with preserved systolic function. IVC ~1 cm without respiratory variation.      INTERPRETATION: Findings suggest intravascular volume depletion given IVC size, s/p 1L LR bolus, will order second 1L LR bolus, may require pressor support if persistently hypotensive despite fluid resuscitation. LV and RV function preserved, unlikely cardiogenic shock.

## 2022-09-01 NOTE — CONSULT NOTE ADULT - SUBJECTIVE AND OBJECTIVE BOX
CHIEF COMPLAINT: hypotension    HPI:    62 y/o M with PMH of cerebellar ataxia, chronic lacunar infarcts, hypotension on midodrine admitted 8/27 for abnormal labs including hypokalemia. History of nephrolithiasis s/p 7/13/22 BL ureteral stent placement for UTI and right proximal obstructing ureteral stone, and b/l renal stones. Pt growing Pseudomonas in UCx 8/27, BCx 8/27 NGTD. S/p b/l ureteral stent placement 8/31, s/p vanc, zosyn, cipro, cefepime, and ceftriaxone since admission, now on cipro and added on cefepime. Pt lethargic but responsive to pain and awakens briefly on sternal rub. Pt had MICU stay in 7/2022 briefly for hypotension s/p b/l ureteral stent placement, started on juanita gtt as was not fluid responsive, had normal cosyntropin test for AI r/o. Pt now hypothermic to 92.4F, hypotensive to 81/57, MICU consulted for hypotension.      PAST MEDICAL & SURGICAL HISTORY:  H/O cerebellar ataxia  -diagnosed in 2019      History of hypotension      Anemia      Lacunar infarction  -chronic      Pneumonia, aspiration  -april 2022 (intubated for 7 days at [facility])      Adrenal insufficiency      No significant past surgical history          FAMILY HISTORY:  FH: dementia (Mother)    FH: type 2 diabetes (Mother)    Family history of CVA (Father)        SOCIAL HISTORY:  unable to assess given mental status    Allergies    No Known Allergies    Intolerances        HOME MEDICATIONS:    REVIEW OF SYSTEMS:  [x] Unable to assess ROS because AMS    OBJECTIVE:  ICU Vital Signs Last 24 Hrs  T(C): 33.3 (31 Aug 2022 22:30), Max: 37.1 (31 Aug 2022 11:44)  T(F): 92 (31 Aug 2022 22:30), Max: 98.7 (31 Aug 2022 11:44)  HR: 65 (31 Aug 2022 23:00) (64 - 80)  BP: 95/55 (31 Aug 2022 23:00) (81/54 - 116/83)  BP(mean): 70 (31 Aug 2022 23:00) (63 - 75)  ABP: --  ABP(mean): --  RR: 14 (31 Aug 2022 22:45) (14 - 18)  SpO2: 100% (31 Aug 2022 23:00) (97% - 100%)    O2 Parameters below as of 31 Aug 2022 22:45  Patient On (Oxygen Delivery Method): room air              08-30 @ 07:01  -  08-31 @ 07:00  --------------------------------------------------------  IN: 480 mL / OUT: 0 mL / NET: 480 mL    08-31 @ 07:01  -  09-01 @ 00:03  --------------------------------------------------------  IN: 400 mL / OUT: 845 mL / NET: -445 mL      CAPILLARY BLOOD GLUCOSE          PHYSICAL EXAM:  General: laying in bed comfortably  HEENT: normocephalic, atraumatic; pupils pinpoint and slightly reactive  Neck: supple  Respiratory: clear to auscultation b/l in all lung fields  Cardiovascular: RRR, S1/S2 present, no m/r/g  Abdomen: soft, nontender, nondistended, +BS  Extremities: no edema, erythema, or cyanosis  Skin: no rashes or lesions  Neurological: AAOx0, responsive to pain in all 4 extremities, wakes up briefly when sternal rubbed but unresponsive to questions, was able to squeeze R hand when asked    HOSPITAL MEDICATIONS:  MEDICATIONS  (STANDING):  ascorbic acid 500 milliGRAM(s) Oral daily  cefepime   IVPB      cefepime   IVPB 2000 milliGRAM(s) IV Intermittent once  cefepime   IVPB 2000 milliGRAM(s) IV Intermittent every 12 hours  chlorhexidine 2% Cloths 1 Application(s) Topical daily  ciprofloxacin   IVPB      heparin   Injectable 5000 Unit(s) SubCutaneous every 8 hours  midodrine. 5 milliGRAM(s) Oral three times a day  multivitamin 1 Tablet(s) Oral daily  mupirocin 2% Nasal 1 Application(s) Both Nostrils two times a day  sodium chloride 0.9%. 1000 milliLiter(s) (100 mL/Hr) IV Continuous <Continuous>    MEDICATIONS  (PRN):  fentaNYL    Injectable 25 MICROGram(s) IV Push every 5 minutes PRN Moderate Pain (4 - 6)  ondansetron Injectable 4 milliGRAM(s) IV Push once PRN Nausea and/or Vomiting      LABS:                        10.0   11.56 )-----------( 174      ( 31 Aug 2022 23:36 )             31.5     08-31    144  |  109<H>  |  10  ----------------------------<  159<H>  4.8   |  25  |  0.62    Ca    8.8      31 Aug 2022 23:36    TPro  7.6  /  Alb  3.5  /  TBili  0.2  /  DBili  x   /  AST  12  /  ALT  8<L>  /  AlkPhos  90  08-30              MICROBIOLOGY:     RADIOLOGY:  [x] Reviewed and interpreted by me

## 2022-09-01 NOTE — PHYSICAL THERAPY INITIAL EVALUATION ADULT - BALANCE TRAINING, PT EVAL
GOAL: Pt will demonstrate improved static/dynamic standing balance by 1 grade in order to improve stability, decrease fall risk and increase independence with ADLs within 4 weeks.
GOAL: Pt will maintain good dynamic sitting balance for 10mins to facilitate hygiene activities in 2 weeks.

## 2022-09-01 NOTE — CHART NOTE - NSCHARTNOTEFT_GEN_A_CORE
MICU Transfer Note  ---------------------------    Transfer from: MICU  Transfer to:  ( X ) Medicine    (  ) Telemetry    (  ) RCU    (  ) Palliative    (  ) Stroke Unit    (  ) _______________  Accepting Physician: Bandar Correa      MICU COURSE  61M with PMH of cerebellar ataxia, chronic lacunar infarcts, hypotension on midodrine admitted 8/27 for abnormal labs including hypokalemia. History of nephrolithiasis s/p 7/13/22 BL ureteral stent placement for UTI and right proximal obstructing ureteral stone, and b/l renal stones. Pt growing Pseudomonas in UCx 8/27, BCx 8/27 NGTD. S/p b/l ureteral stent placement 8/31, s/p vanc, zosyn, cipro, cefepime, and ceftriaxone since admission, now on cipro and added on cefepime. Pt lethargic but responsive to pain and awakens briefly on sternal rub. Pt had MICU stay in 7/2022 briefly for hypotension s/p b/l ureteral stent placement, started on juanita gtt as was not fluid responsive, had normal cosyntropin test for AI r/o. Pt now hypothermic to 92.4F, hypotensive to 81/57, initially fluid responsive but persistently hypotensive requiring levophed. Pt. transferred to the MICU for pressor support.   In the MICU, the patient was further fluid resuscitated and weaned off pressors. He was continued on cefepime. He is stable for downgrade to the floors.        OBJECTIVE --  Vital Signs Last 24 Hrs  T(C): 36.1 (01 Sep 2022 11:00), Max: 36.5 (01 Sep 2022 04:00)  T(F): 97 (01 Sep 2022 11:00), Max: 97.7 (01 Sep 2022 04:00)  HR: 77 (01 Sep 2022 12:00) (64 - 117)  BP: 90/58 (01 Sep 2022 12:00) (80/49 - 116/83)  BP(mean): 71 (01 Sep 2022 12:00) (60 - 91)  RR: 22 (01 Sep 2022 12:00) (14 - 27)  SpO2: 99% (01 Sep 2022 12:00) (96% - 100%)    Parameters below as of 01 Sep 2022 07:00  Patient On (Oxygen Delivery Method): room air        I&O's Summary    31 Aug 2022 07:01  -  01 Sep 2022 07:00  --------------------------------------------------------  IN: 3600 mL / OUT: 1945 mL / NET: 1655 mL    01 Sep 2022 07:01  -  01 Sep 2022 13:25  --------------------------------------------------------  IN: 603.4 mL / OUT: 475 mL / NET: 128.4 mL        MEDICATIONS  (STANDING):  ascorbic acid 500 milliGRAM(s) Oral daily  cefepime   IVPB      cefepime   IVPB 2000 milliGRAM(s) IV Intermittent every 12 hours  chlorhexidine 2% Cloths 1 Application(s) Topical daily  chlorhexidine 4% Liquid 1 Application(s) Topical <User Schedule>  heparin   Injectable 5000 Unit(s) SubCutaneous every 8 hours  midodrine. 10 milliGRAM(s) Oral three times a day  multivitamin 1 Tablet(s) Oral daily  mupirocin 2% Nasal 1 Application(s) Both Nostrils two times a day  norepinephrine Infusion 0.05 MICROgram(s)/kG/Min (5.72 mL/Hr) IV Continuous <Continuous>    MEDICATIONS  (PRN):        LABS                                            10.2                  Neurophils% (auto):   82.5   (09-01 @ 12:09):    19.30)-----------(185          Lymphocytes% (auto):  0.9                                           32.7                   Eosinphils% (auto):   0.0      Manual%: Neutrophils x    ; Lymphocytes x    ; Eosinophils x    ; Bands%: 14.0 ; Blasts x                                    142    |  107    |  12                  Calcium: 8.7   / iCa: x      (09-01 @ 12:09)    ----------------------------<  83        Magnesium: 1.7                              5.4     |  23     |  0.73             Phosphorous: 3.0          ASSESSMENT & PLAN:   ====================ASSESSMENT======================  61M with PMH of cerebellar ataxia, chronic lacunar infarcts, chronic hypotension on midodrine admitted 8/27 for hypokalemia, s/p b/l ureteral stent placement with Urology on 8/31, growing Pseudomonas on UCx 8/28 on cipro and cefepime, now with lethargy, hypothermia, and hypotension initially fluid responsive, but now requiring levophed; pt. transferred to the MICU for pressor support.    Plan:  ====================NEUROLOGY=======================  #AMS/Lethargy  - waxes and wanes   - can consider CT head, vEEG, metabolic w/u (B12, thiamine, electrolytes, blood gas, etc.) if no improvement in mental status   - continue to monitor  -will clarify baseline mental status with family today     ====================RESPIRATORY======================  No active issues; pt. currently protecting airway  - SpO2 > 92% on RA    ====================CARDIOVASCULAR==================  #Hypotension  - hx of chronic hypotension on midodrine 5mg TID  - s/p MICU admission 7/2022 with negative cosyntropin testing for adrenal insufficiency r/o  - s/p 1.2L of IVF in OR per Anesthesia Record, switched maintenance NS to LR  - POCUS with 1cm IVC  - s/p 2L LR bolus with initial improvement in SBP from 80s to 90s but now back to 80s  - started levophed for pressor support; wean as tolerated  - c/w midodrine, increased to 10 today to wean off levo     ====================/RENAL========================  #Nephrolithiasis   - hx of nephrolithiasis s/p b/l ureteral stent placement for UTI and R proximal obstructing ureteral stone and b/l renal stones 7/13/22; s/p b/l ureteral stent placement with urology 8/31  - coronel in place- putting out blood-tinged urine  - UCx (8/27) growing pseudomonas  - s/p vancomycin, Zosyn, ciprofloxacin, cefepime and ceftriaxone since admission  - urology following; appreciate recs  - c/w cefepime, d/c cipro today  - f/u repeat BCx and UCx    ====================GI/NUTRITION=====================  No active issues  - keep NPO for now given AMS    ====================INFECTIOUS DISEASE================  #UTI  - hx of nephrolithiasis s/p b/l ureteral stent placement for UTI and R proximal obstructing ureteral stone and b/l renal stones 7/13/22; s/p b/l ureteral stent placement with urology 8/31  - coronel in place- putting out blood-tinged urine  - UCx (8/27) growing pseudomonas  - s/p vancomycin, Zosyn, ciprofloxacin, cefepime and ceftriaxone since admission  - urology following; appreciate recs  - c/w ciprofloxacin and cefepime for dual coverage of pseudomonas   - f/u repeat BCx and UCx    #Hypothermia  - temp noted to be 92.4F in PACU  - c/w lloyd hugger  - f/u BCx/UCx as above    ====================ENDOCRINE=======================  No active issues  - monitor FS per routine    ====================HEMATOLOGIC/DVT PPx=============  #DVT PPx  - Hep SubQ    ====================ETHICS===========================  full code.    For Follow-Up:  [ ] antibiotic duration   [ ] stent and coronel removal by urology MICU Transfer Note  ---------------------------    Transfer from: MICU  Transfer to:  ( X ) Medicine    (  ) Telemetry    (  ) RCU    (  ) Palliative    (  ) Stroke Unit    (  ) _______________  Accepting Physician: Bandar Correa  Signed out to MARIA DE JESUS Fernández at 3:00pm 9/1/2022    MICU COURSE  61M with PMH of cerebellar ataxia, chronic lacunar infarcts, hypotension on midodrine admitted 8/27 for abnormal labs including hypokalemia. History of nephrolithiasis s/p 7/13/22 BL ureteral stent placement for UTI and right proximal obstructing ureteral stone, and b/l renal stones. Pt growing Pseudomonas in UCx 8/27, BCx 8/27 NGTD. S/p b/l ureteral stent placement 8/31, s/p vanc, zosyn, cipro, cefepime, and ceftriaxone since admission, now on cipro and added on cefepime. Pt lethargic but responsive to pain and awakens briefly on sternal rub. Pt had MICU stay in 7/2022 briefly for hypotension s/p b/l ureteral stent placement, started on juanita gtt as was not fluid responsive, had normal cosyntropin test for AI r/o. Pt now hypothermic to 92.4F, hypotensive to 81/57, initially fluid responsive but persistently hypotensive requiring levophed. Pt. transferred to the MICU for pressor support.   In the MICU, the patient was further fluid resuscitated and weaned off pressors. He was continued on cefepime. He is stable for downgrade to the floors.        OBJECTIVE --  Vital Signs Last 24 Hrs  T(C): 36.1 (01 Sep 2022 11:00), Max: 36.5 (01 Sep 2022 04:00)  T(F): 97 (01 Sep 2022 11:00), Max: 97.7 (01 Sep 2022 04:00)  HR: 77 (01 Sep 2022 12:00) (64 - 117)  BP: 90/58 (01 Sep 2022 12:00) (80/49 - 116/83)  BP(mean): 71 (01 Sep 2022 12:00) (60 - 91)  RR: 22 (01 Sep 2022 12:00) (14 - 27)  SpO2: 99% (01 Sep 2022 12:00) (96% - 100%)    Parameters below as of 01 Sep 2022 07:00  Patient On (Oxygen Delivery Method): room air        I&O's Summary    31 Aug 2022 07:01  -  01 Sep 2022 07:00  --------------------------------------------------------  IN: 3600 mL / OUT: 1945 mL / NET: 1655 mL    01 Sep 2022 07:01  -  01 Sep 2022 13:25  --------------------------------------------------------  IN: 603.4 mL / OUT: 475 mL / NET: 128.4 mL        MEDICATIONS  (STANDING):  ascorbic acid 500 milliGRAM(s) Oral daily  cefepime   IVPB      cefepime   IVPB 2000 milliGRAM(s) IV Intermittent every 12 hours  chlorhexidine 2% Cloths 1 Application(s) Topical daily  chlorhexidine 4% Liquid 1 Application(s) Topical <User Schedule>  heparin   Injectable 5000 Unit(s) SubCutaneous every 8 hours  midodrine. 10 milliGRAM(s) Oral three times a day  multivitamin 1 Tablet(s) Oral daily  mupirocin 2% Nasal 1 Application(s) Both Nostrils two times a day  norepinephrine Infusion 0.05 MICROgram(s)/kG/Min (5.72 mL/Hr) IV Continuous <Continuous>    MEDICATIONS  (PRN):        LABS                                            10.2                  Neurophils% (auto):   82.5   (09-01 @ 12:09):    19.30)-----------(185          Lymphocytes% (auto):  0.9                                           32.7                   Eosinphils% (auto):   0.0      Manual%: Neutrophils x    ; Lymphocytes x    ; Eosinophils x    ; Bands%: 14.0 ; Blasts x                                    142    |  107    |  12                  Calcium: 8.7   / iCa: x      (09-01 @ 12:09)    ----------------------------<  83        Magnesium: 1.7                              5.4     |  23     |  0.73             Phosphorous: 3.0          ASSESSMENT & PLAN:   ====================ASSESSMENT======================  61M with PMH of cerebellar ataxia, chronic lacunar infarcts, chronic hypotension on midodrine admitted 8/27 for hypokalemia, s/p b/l ureteral stent placement with Urology on 8/31, growing Pseudomonas on UCx 8/28 on cipro and cefepime, now with lethargy, hypothermia, and hypotension initially fluid responsive, but now requiring levophed; pt. transferred to the MICU for pressor support.    Plan:  ====================NEUROLOGY=======================  #AMS/Lethargy  - waxes and wanes   - can consider CT head, vEEG, metabolic w/u (B12, thiamine, electrolytes, blood gas, etc.) if no improvement in mental status   - continue to monitor  -will clarify baseline mental status with family today     ====================RESPIRATORY======================  No active issues; pt. currently protecting airway  - SpO2 > 92% on RA    ====================CARDIOVASCULAR==================  #Hypotension  - hx of chronic hypotension on midodrine 5mg TID  - s/p MICU admission 7/2022 with negative cosyntropin testing for adrenal insufficiency r/o  - s/p 1.2L of IVF in OR per Anesthesia Record, switched maintenance NS to LR  - POCUS with 1cm IVC  - s/p 2L LR bolus with initial improvement in SBP from 80s to 90s but now back to 80s  - started levophed for pressor support; wean as tolerated  - c/w midodrine, increased to 10 today to wean off levo     ====================/RENAL========================  #Nephrolithiasis   - hx of nephrolithiasis s/p b/l ureteral stent placement for UTI and R proximal obstructing ureteral stone and b/l renal stones 7/13/22; s/p b/l ureteral stent placement with urology 8/31  - coronel in place- putting out blood-tinged urine  - UCx (8/27) growing pseudomonas  - s/p vancomycin, Zosyn, ciprofloxacin, cefepime and ceftriaxone since admission  - urology following; appreciate recs  - c/w cefepime, d/c cipro today  - f/u repeat BCx and UCx    ====================GI/NUTRITION=====================  No active issues  - keep NPO for now given AMS    ====================INFECTIOUS DISEASE================  #UTI  - hx of nephrolithiasis s/p b/l ureteral stent placement for UTI and R proximal obstructing ureteral stone and b/l renal stones 7/13/22; s/p b/l ureteral stent placement with urology 8/31  - coronel in place- putting out blood-tinged urine  - UCx (8/27) growing pseudomonas  - s/p vancomycin, Zosyn, ciprofloxacin, cefepime and ceftriaxone since admission  - urology following; appreciate recs  - c/w ciprofloxacin and cefepime for dual coverage of pseudomonas   - f/u repeat BCx and UCx    #Hypothermia  - temp noted to be 92.4F in PACU  - c/w lloyd hugger  - f/u BCx/UCx as above    ====================ENDOCRINE=======================  No active issues  - monitor FS per routine    ====================HEMATOLOGIC/DVT PPx=============  #DVT PPx  - Hep SubQ    ====================ETHICS===========================  full code.    For Follow-Up:  [ ] antibiotic duration   [ ] stent and coronel removal by urology

## 2022-09-01 NOTE — PROGRESS NOTE ADULT - ASSESSMENT
60 yo male s/p bilateral URS/LL/Stents     - Do NOT remove coronel   - Will dc coronel and left stent once clinically improved   - Right stent to be removed outpatient    - Can continue home meds  - Please keep patient on Cipro until coronel and stent removed  - Regular diet     Remainder of care per primary    62 yo male s/p bilateral URS/LL/Stents     - Do NOT remove coronel   - Will dc coronel and left stent once clinically improved   - Right stent to be removed outpatient    - Can continue home meds  - patient with previous culture resistance, consider ID consult   - Please keep patient on Cipro until coronel and stent removed  - Regular diet     Remainder of care per primary

## 2022-09-01 NOTE — PROGRESS NOTE ADULT - SUBJECTIVE AND OBJECTIVE BOX
CARDIOLOGY     PROGRESS  NOTE   ________________________________________________    CHIEF COMPLAINT:Patient is a 61y old  Male who presents with a chief complaint of Hypokalemia (01 Sep 2022 08:04)  events has noted.  	  REVIEW OF SYSTEMS:  CONSTITUTIONAL: No fever, weight loss, or fatigue  EYES: No eye pain, visual disturbances, or discharge  ENT:  No difficulty hearing, tinnitus, vertigo; No sinus or throat pain  NECK: No pain or stiffness  RESPIRATORY: No cough, wheezing, chills or hemoptysis; No Shortness of Breath  CARDIOVASCULAR: No chest pain, palpitations, passing out, dizziness, or leg swelling  GASTROINTESTINAL: No abdominal or epigastric pain. No nausea, vomiting, or hematemesis; No diarrhea or constipation. No melena or hematochezia.  GENITOURINARY: No dysuria, frequency, hematuria, or incontinence  NEUROLOGICAL: No headaches, memory loss, loss of strength, numbness, or tremors  SKIN: No itching, burning, rashes, or lesions   LYMPH Nodes: No enlarged glands  ENDOCRINE: No heat or cold intolerance; No hair loss  MUSCULOSKELETAL: No joint pain or swelling; No muscle, back, or extremity pain  PSYCHIATRIC: No depression, anxiety, mood swings, or difficulty sleeping  HEME/LYMPH: No easy bruising, or bleeding gums  ALLERGY AND IMMUNOLOGIC: No hives or eczema	    [ ] All others negative	  [ x] Unable to obtain    PHYSICAL EXAM:  T(C): 36.5 (09-01-22 @ 07:00), Max: 37.1 (08-31-22 @ 11:44)  HR: 81 (09-01-22 @ 09:15) (64 - 117)  BP: 107/58 (09-01-22 @ 09:15) (80/49 - 116/83)  RR: 23 (09-01-22 @ 09:15) (14 - 27)  SpO2: 98% (09-01-22 @ 09:15) (96% - 100%)  Wt(kg): --  I&O's Summary    31 Aug 2022 07:01  -  01 Sep 2022 07:00  --------------------------------------------------------  IN: 3600 mL / OUT: 1945 mL / NET: 1655 mL    01 Sep 2022 07:01  -  01 Sep 2022 10:08  --------------------------------------------------------  IN: 103.4 mL / OUT: 100 mL / NET: 3.4 mL        Appearance: Normal	  HEENT:   Normal oral mucosa, PERRL, EOMI	  Lymphatic: No lymphadenopathy  Cardiovascular: Normal S1 S2, No JVD, + murmurs, No edema  Respiratory: Lungs clear to auscultation	  Psychiatry: A & O x 3, Mood & affect appropriate  Gastrointestinal:  Soft, Non-tender, + BS	  Skin: No rashes, No ecchymoses, No cyanosis	  Neurologic: Non-focal  Extremities: Normal range of motion, No clubbing, cyanosis or edema  Vascular: Peripheral pulses palpable 2+ bilaterally    MEDICATIONS  (STANDING):  ascorbic acid 500 milliGRAM(s) Oral daily  cefepime   IVPB      cefepime   IVPB 2000 milliGRAM(s) IV Intermittent every 12 hours  chlorhexidine 2% Cloths 1 Application(s) Topical daily  chlorhexidine 4% Liquid 1 Application(s) Topical <User Schedule>  heparin   Injectable 5000 Unit(s) SubCutaneous every 8 hours  midodrine. 10 milliGRAM(s) Oral three times a day  multivitamin 1 Tablet(s) Oral daily  mupirocin 2% Nasal 1 Application(s) Both Nostrils two times a day  norepinephrine Infusion 0.05 MICROgram(s)/kG/Min (5.72 mL/Hr) IV Continuous <Continuous>      TELEMETRY: 	    ECG:  	  RADIOLOGY:  OTHER: 	  	  LABS:	 	    CARDIAC MARKERS:                                10.0   11.56 )-----------( 174      ( 31 Aug 2022 23:36 )             31.5     08-31    144  |  109<H>  |  10  ----------------------------<  159<H>  4.8   |  25  |  0.62    Ca    8.8      31 Aug 2022 23:36    TPro  7.6  /  Alb  3.5  /  TBili  0.2  /  DBili  x   /  AST  12  /  ALT  8<L>  /  AlkPhos  90  08-30    proBNP: Serum Pro-Brain Natriuretic Peptide: 267 pg/mL (08-09 @ 16:44)    Lipid Profile: Cholesterol 193  LDL --  HDL 30  TG 91    HgA1c:   TSH: Thyroid Stimulating Hormone, Serum: 1.51 uIU/mL (08-27 @ 07:27)  Thyroid Stimulating Hormone, Serum: 1.00 uIU/mL (08-09 @ 16:44)          Assessment and plan  ---------------------------  61 M w/ PMH of progressive Cerebellar Ataxia, Chronic Lacunar infarcts, 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   pt is well known to me with hx of severe orthostatic hypotension on florinef/ ?midodrine with bradycardia, pt had a normal am cortisol level.  ivf, keep hydrated  autonomic dysfunction, over all controlled with midodrine, don't increase dose sec to bradycardia  check tsh  dvt prophylaxis  if sig bradycardia will change Midodrine to Florinef if remains bradycardic  s/p syrgery transferred to MICU sec to hypotension  continue iv abx/ pressor  supportive care

## 2022-09-01 NOTE — PHYSICAL THERAPY INITIAL EVALUATION ADULT - GAIT TRAINING, PT EVAL
GOAL: Pt will ambulate 25 ft with Min Ax1 w/use of appropriate assistive device in 4 weeks
GOAL: Pt will amb 10' Josiane with RW in 2 weeks.

## 2022-09-01 NOTE — PHYSICAL THERAPY INITIAL EVALUATION ADULT - GENERAL OBSERVATIONS, REHAB EVAL
Received semisupine +IV, +Barnett, +ICU monitoring. Pt A&OX1, follows 50% simple commands, req additional tactile cueing and gesturing.  Pt not verbalizing, only shaking head yes/no.
Pt rec'd semi-supine in bed in NAD, VSS, IVL, agreeable to PT

## 2022-09-01 NOTE — PHYSICAL THERAPY INITIAL EVALUATION ADULT - NSPTDISCHREC_GEN_A_CORE
If D/C home, will require assist with ALL Functional mobility and Home PT/Sub-acute Rehab
Sub-acute Rehab

## 2022-09-01 NOTE — PHYSICAL THERAPY INITIAL EVALUATION ADULT - PERTINENT HX OF CURRENT PROBLEM, REHAB EVAL
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  Admit to medicine for hypokalemia
Pt is a 60 y/o male admitted with hypokalemia. 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 Pt presents to ED for abnormal labs with hypokalemia. Pt underwent bilateral ureteroscopy (8/31) and was transferred to MICU for pressor support.

## 2022-09-01 NOTE — PHYSICAL THERAPY INITIAL EVALUATION ADULT - IMPAIRMENTS CONTRIBUTING IMPAIRED BED MOBILITY, REHAB EVAL
impaired balance/impaired postural control/decreased strength
impaired balance/impaired postural control/decreased strength

## 2022-09-01 NOTE — CONSULT NOTE ADULT - ASSESSMENT
62 y/o M with PMH of cerebellar ataxia, chronic lacunar infarcts, hypotension on midodrine admitted 8/27 for hypokalemia, s/p b/l ureteral stent placement with Urology on 8/31, growing Pseudomonas on UCx 8/28 on cipro, MICU consulted for lethargy, hypothermia and hypotension.    #Hypotension  -Pt received 1.2L of IVF in OR per Anesthesia Record, ordered for NS 100cc/hr  -Pt received 1L LR bolus with improvement in BP from 80s -> 90s, can give another 1L LR bolus as pt had ~1 cm IVC on POCUS, seems to be fluid responsive  -Started on cefepime by primary team in addition to cipro for double coverage of pseudomonas  -Repeat BCx x 2 and UCx sent      #Hypothermia  -Hypothermic to 92.4 F on rectal, would c/w lloyd deng      #Lethargy  -Pt AAOx0 at bedside, briefly wakes up when sternal rubbed, per anesthesia may be recovering slowly from sedation due to PMH of cerebellar ataxia  -Would monitor for now, currently protecting airway 62 y/o M with PMH of cerebellar ataxia, chronic lacunar infarcts, hypotension on midodrine admitted 8/27 for hypokalemia, s/p b/l ureteral stent placement with Urology on 8/31, growing Pseudomonas on UCx 8/28 on cipro, MICU consulted for lethargy, hypothermia and hypotension.    #Hypotension  -Pt received 1.2L of IVF in OR per Anesthesia Record, ordered for NS 100cc/hr  -Pt received 1L LR bolus with improvement in BP from 80s -> 90s, can give another 1L LR bolus as pt had ~1 cm IVC on POCUS, seems to be fluid responsive  -Started on cefepime by primary team in addition to cipro for double coverage of pseudomonas  -Repeat BCx x 2 and UCx sent      #Hypothermia  -Hypothermic to 92.4 F on rectal, would c/w lloyd deng  -infectious workup and abx as above    #Lethargy  -Pt AAOx0 at bedside, briefly wakes up when sternal rubbed, per anesthesia may be recovering slowly from sedation due to PMH of cerebellar ataxia  -Would monitor for now, currently protecting airway

## 2022-09-01 NOTE — PHYSICAL THERAPY INITIAL EVALUATION ADULT - TRANSFER TRAINING, PT EVAL
GOAL: Pt will perform all transfers Josiane with appropriate AD in 2 weeks.
GOAL: Pt will perform ALL transfers with CG Ax1, w/use of appropriate assistive device as needed, in 4 weeks.

## 2022-09-01 NOTE — PHYSICAL THERAPY INITIAL EVALUATION ADULT - PLANNED THERAPY INTERVENTIONS, PT EVAL
balance training/bed mobility training/gait training/strengthening/transfer training
balance training/bed mobility training/gait training/transfer training

## 2022-09-01 NOTE — CHART NOTE - NSCHARTNOTEFT_GEN_A_CORE
MAR Accept Note  Transfer to: [facility]  Accepting Attending Physician: Dr. Correa  Assigned Room: 446D     Patient seen and examined.   Labs and data reviewed.   No findings precluding transfer of service.       HPI/MICU COURSE:   Please refer to MICU transfer note for full details. Briefly, this is a 61M with PMH of cerebellar ataxia, chronic lacunar infarcts, hypotension on midodrine admitted 8/27 for abnormal labs including hypokalemia. History of nephrolithiasis s/p 7/13/22 BL ureteral stent placement for UTI and right proximal obstructing ureteral stone, and b/l renal stones. Pt growing Pseudomonas in UCx 8/27, BCx 8/27 NGTD. S/p b/l ureteral stent placement 8/31, s/p vanc, zosyn, cipro, cefepime, and ceftriaxone since admission, now on cipro and added on cefepime. Pt lethargic but responsive to pain and awakens briefly on sternal rub. Pt had MICU stay in 7/2022 briefly for hypotension s/p b/l ureteral stent placement, started on juanita gtt as was not fluid responsive, had normal cosyntropin test for AI r/o. Pt now hypothermic to 92.4F, hypotensive to 81/57, initially fluid responsive but persistently hypotensive requiring levophed. Pt. transferred to the MICU for pressor support.     In the MICU, the patient was further fluid resuscitated and weaned off pressors. He was continued on cefepime. He is stable for downgrade to the floors.        FOR FOLLOW-UP:    [ ] antibiotic duration   [ ] stent and coronel removal by urology.

## 2022-09-01 NOTE — PHYSICAL THERAPY INITIAL EVALUATION ADULT - ADDITIONAL COMMENTS
Pt lives in a  with his spouse. +4 steps to enter. As per pt's spouse, has had several recent admissions and has been declining functionally. Prior to last admission pt was walking with assist. Recently pt has required stand pivot transfer from bed to W/C with assist from son. Owns R/W and W/C. Pt's son assists with OOB and ADLs during the day, pt's spouse assists at night.
Pt lives in a  with his spouse. +4 steps to enter. As per pt's spouse, has had several recent admissions and has been declining functionally. Prior to last admission pt was walking with assist. Recently pt has required stand pivot transfer from bed to W/C with assist from son. Owns R/W and W/C. Pt's son assists with OOB and ADLs during the day, pt's spouse assists at night.

## 2022-09-01 NOTE — CHART NOTE - NSCHARTNOTEFT_GEN_A_CORE
MICU ACCEPT NOTE    CHIEF COMPLAINT: Patient is a 61y old  Male who presents with a chief complaint of Hypokalemia (01 Sep 2022 00:02)    HPI: 61M with PMH of cerebellar ataxia, chronic lacunar infarcts, hypotension on midodrine admitted 8/27 for abnormal labs including hypokalemia. History of nephrolithiasis s/p 7/13/22 BL ureteral stent placement for UTI and right proximal obstructing ureteral stone, and b/l renal stones. Pt growing Pseudomonas in UCx 8/27, BCx 8/27 NGTD. S/p b/l ureteral stent placement 8/31, s/p vanc, zosyn, cipro, cefepime, and ceftriaxone since admission, now on cipro and added on cefepime. Pt lethargic but responsive to pain and awakens briefly on sternal rub. Pt had MICU stay in 7/2022 briefly for hypotension s/p b/l ureteral stent placement, started on juanita gtt as was not fluid responsive, had normal cosyntropin test for AI r/o. Pt now hypothermic to 92.4F, hypotensive to 81/57, initially fluid responsive but persistently hypotensive requiring levophed. Pt. transferred to the MICU for pressor support.     PAST MEDICAL & SURGICAL HISTORY:  H/O cerebellar ataxia  -diagnosed in 2019  History of hypotension  Anemia  Lacunar infarction  -chronic  Pneumonia, aspiration  -april 2022 (intubated for 7 days at [facility])  Adrenal insufficiency  No significant past surgical history    FAMILY HISTORY:  FH: dementia (Mother)  FH: type 2 diabetes (Mother)  Family history of CVA (Father)    SOCIAL HISTORY:  unable to assess due to AMS    MEDICATIONS:  MEDICATIONS  (STANDING):  ascorbic acid 500 milliGRAM(s) Oral daily  cefepime   IVPB      cefepime   IVPB 2000 milliGRAM(s) IV Intermittent once  cefepime   IVPB 2000 milliGRAM(s) IV Intermittent every 12 hours  chlorhexidine 2% Cloths 1 Application(s) Topical daily  ciprofloxacin   IVPB      ciprofloxacin   IVPB 400 milliGRAM(s) IV Intermittent every 12 hours  heparin   Injectable 5000 Unit(s) SubCutaneous every 8 hours  lactated ringers Bolus 1000 milliLiter(s) IV Bolus once  midodrine. 5 milliGRAM(s) Oral three times a day  multivitamin 1 Tablet(s) Oral daily  mupirocin 2% Nasal 1 Application(s) Both Nostrils two times a day  norepinephrine Infusion 0.05 MICROgram(s)/kG/Min (5.72 mL/Hr) IV Continuous <Continuous>  sodium chloride 0.9%. 1000 milliLiter(s) (100 mL/Hr) IV Continuous <Continuous>    MEDICATIONS  (PRN):  fentaNYL    Injectable 25 MICROGram(s) IV Push every 5 minutes PRN Moderate Pain (4 - 6)  ondansetron Injectable 4 milliGRAM(s) IV Push once PRN Nausea and/or Vomiting    ALLERGIES:  No Known Allergies  Intolerances    REVIEW OF SYSTEMS:  [ ] All other systems negative  [X] Unable to assess ROS because AMS    OBJECTIVE:  ICU Vital Signs Last 24 Hrs  T(C): 33.3 (31 Aug 2022 22:30), Max: 37.1 (31 Aug 2022 11:44)  T(F): 92 (31 Aug 2022 22:30), Max: 98.7 (31 Aug 2022 11:44)  HR: 80 (01 Sep 2022 00:15) (64 - 80)  BP: 81/52 (01 Sep 2022 00:15) (81/52 - 116/83)  BP(mean): 61 (01 Sep 2022 00:15) (61 - 75)  ABP: --  ABP(mean): --  RR: 14 (01 Sep 2022 00:15) (14 - 18)  SpO2: 100% (01 Sep 2022 00:15) (97% - 100%)    O2 Parameters below as of 01 Sep 2022 00:15  Patient On (Oxygen Delivery Method): room air    08-30 @ 07:01 - 08-31 @ 07:00  --------------------------------------------------------  IN: 480 mL / OUT: 0 mL / NET: 480 mL    08-31 @ 07:01  -  09-01 @ 00:38  --------------------------------------------------------  IN: 1800 mL / OUT: 1195 mL / NET: 605 mL    CAPILLARY BLOOD GLUCOSE    PHYSICAL EXAM:  General: laying in bed comfortably  HEENT: normocephalic, atraumatic; pupils pinpoint and slightly reactive  Respiratory: clear to auscultation b/l in all lung fields  Cardiovascular: RRR, S1/S2 present, no m/r/g  Abdomen: soft, nontender, nondistended, +BS  Extremities: no edema, erythema, or cyanosis  Skin: no rashes or lesions  Neurological: AAOx0, responsive to pain in all 4 extremities, wakes up briefly when sternal rubbed but unresponsive to questions, was able to squeeze R hand when asked    LABS:                        10.0   11.56 )-----------( 174      ( 31 Aug 2022 23:36 )             31.5     Hgb Trend: 10.0<--, 12.1<--, 12.3<--, 11.6<--, 10.2<--  08-31    144  |  109<H>  |  10  ----------------------------<  159<H>  4.8   |  25  |  0.62    Ca    8.8      31 Aug 2022 23:36    TPro  7.6  /  Alb  3.5  /  TBili  0.2  /  DBili  x   /  AST  12  /  ALT  8<L>  /  AlkPhos  90  08-30    Creatinine Trend: 0.62<--, 0.75<--, 0.78<--, 0.70<--, 0.77<--, 0.79<--    MICROBIOLOGY:     RADIOLOGY & ADDITIONAL TESTS:    ====================ASSESSMENT======================  61M with PMH of cerebellar ataxia, chronic lacunar infarcts, chronic hypotension on midodrine admitted 8/27 for hypokalemia, s/p b/l ureteral stent placement with Urology on 8/31, growing Pseudomonas on UCx 8/28 on cipro, now with lethargy, hypothermia, and hypotension initially fluid responsive, but now requiring levophed; pt. transferred to the MICU for pressor support.    Plan:  ====================NEUROLOGY=======================  #AMS/Lethargy  - Pt AAOx0 at bedside, briefly wakes up when sternal rubbed; per anesthesia may be recovering slowly from sedation due to PMH of cerebellar ataxia  - can consider CT head, vEEG, metabolic w/u (B12, thiamine, electrolytes, blood gas, etc.) if no improvement in mental status   - continue to monitor    ====================RESPIRATORY======================  No active issues; pt. currently protecting airway  - SpO2 > 92% on RA    ====================CARDIOVASCULAR==================  #Hypotension  - Hx of chronic hypotension on midodrine 5mg TID  - s/p MICU admission 7/2022 with negative cosyntropin testing for adrenal insufficiency r/o  - s/p 1.2L of IVF in OR per Anesthesia Record, ordered for NS 100cc/hr  - POCUS with 1cm IVC  - s/p 2L LR bolus with initial improvement in SBP from 80s to 90s but now back to 80s  - started levophed for pressor support; wean as tolerated    ====================/RENAL========================  #Nephrolithiasis   - Hx of nephrolithiasis s/p b/l ureteral stent placement for UTI and R proximal obstructing ureteral stone and b/l renal stones 7/13/22; s/p b/l ureteral stent placement with urology 8/31  - UCx (8/27) growing pseudomonas  - s/p vancomycin, Zosyn, ciprofloxacin, cefepime and ceftriaxone since admission; currently on ciprofloxacin and cefepime for dual coverage  - urology following; appreciate recs  - c/w ciprofloxacin and cefepime for dual coverage of pseudomonas   - f/u repeat BCx and UCx    ====================GI/NUTRITION=====================  No active issues  - keep NPO for now given AMS    ====================INFECTIOUS DISEASE================  #UTI  - Hx of nephrolithiasis s/p b/l ureteral stent placement for UTI and R proximal obstructing ureteral stone and b/l renal stones 7/13/22; s/p b/l ureteral stent placement with urology 8/31  - UCx (8/27) growing pseudomonas  - s/p vancomycin, Zosyn, ciprofloxacin, cefepime and ceftriaxone since admission; currently on ciprofloxacin and cefepime for dual coverage  - urology following; appreciate recs  - c/w ciprofloxacin and cefepime for dual coverage of pseudomonas   - f/u repeat BCx and UCx    #Hypothermia  - Temp noted to be 92.4F in PACU  - c/w lloyd hugger  - f/u BCx/UCx as above    ====================ENDOCRINE=======================  No active issues  - monitor FS per routine    ====================HEMATOLOGIC/DVT PPx=============  #DVT PPx  - Hep SubQ    ====================ETHICS===========================  full code MICU ACCEPT NOTE    CHIEF COMPLAINT: Patient is a 61y old  Male who presents with a chief complaint of Hypokalemia (01 Sep 2022 00:02)    HPI: 61M with PMH of cerebellar ataxia, chronic lacunar infarcts, hypotension on midodrine admitted 8/27 for abnormal labs including hypokalemia. History of nephrolithiasis s/p 7/13/22 BL ureteral stent placement for UTI and right proximal obstructing ureteral stone, and b/l renal stones. Pt growing Pseudomonas in UCx 8/27, BCx 8/27 NGTD. S/p b/l ureteral stent placement 8/31, s/p vanc, zosyn, cipro, cefepime, and ceftriaxone since admission, now on cipro and added on cefepime. Pt lethargic but responsive to pain and awakens briefly on sternal rub. Pt had MICU stay in 7/2022 briefly for hypotension s/p b/l ureteral stent placement, started on juanita gtt as was not fluid responsive, had normal cosyntropin test for AI r/o. Pt now hypothermic to 92.4F, hypotensive to 81/57, initially fluid responsive but persistently hypotensive requiring levophed. Pt. transferred to the MICU for pressor support.     PAST MEDICAL & SURGICAL HISTORY:  H/O cerebellar ataxia  -diagnosed in 2019  History of hypotension  Anemia  Lacunar infarction  -chronic  Pneumonia, aspiration  -april 2022 (intubated for 7 days at [facility])  Adrenal insufficiency  No significant past surgical history    FAMILY HISTORY:  FH: dementia (Mother)  FH: type 2 diabetes (Mother)  Family history of CVA (Father)    SOCIAL HISTORY:  unable to assess due to AMS    MEDICATIONS:  MEDICATIONS  (STANDING):  ascorbic acid 500 milliGRAM(s) Oral daily  cefepime   IVPB      cefepime   IVPB 2000 milliGRAM(s) IV Intermittent once  cefepime   IVPB 2000 milliGRAM(s) IV Intermittent every 12 hours  chlorhexidine 2% Cloths 1 Application(s) Topical daily  ciprofloxacin   IVPB      ciprofloxacin   IVPB 400 milliGRAM(s) IV Intermittent every 12 hours  heparin   Injectable 5000 Unit(s) SubCutaneous every 8 hours  lactated ringers Bolus 1000 milliLiter(s) IV Bolus once  midodrine. 5 milliGRAM(s) Oral three times a day  multivitamin 1 Tablet(s) Oral daily  mupirocin 2% Nasal 1 Application(s) Both Nostrils two times a day  norepinephrine Infusion 0.05 MICROgram(s)/kG/Min (5.72 mL/Hr) IV Continuous <Continuous>  sodium chloride 0.9%. 1000 milliLiter(s) (100 mL/Hr) IV Continuous <Continuous>    MEDICATIONS  (PRN):  fentaNYL    Injectable 25 MICROGram(s) IV Push every 5 minutes PRN Moderate Pain (4 - 6)  ondansetron Injectable 4 milliGRAM(s) IV Push once PRN Nausea and/or Vomiting    ALLERGIES:  No Known Allergies  Intolerances    REVIEW OF SYSTEMS:  [ ] All other systems negative  [X] Unable to assess ROS because AMS    OBJECTIVE:  ICU Vital Signs Last 24 Hrs  T(C): 33.3 (31 Aug 2022 22:30), Max: 37.1 (31 Aug 2022 11:44)  T(F): 92 (31 Aug 2022 22:30), Max: 98.7 (31 Aug 2022 11:44)  HR: 80 (01 Sep 2022 00:15) (64 - 80)  BP: 81/52 (01 Sep 2022 00:15) (81/52 - 116/83)  BP(mean): 61 (01 Sep 2022 00:15) (61 - 75)  ABP: --  ABP(mean): --  RR: 14 (01 Sep 2022 00:15) (14 - 18)  SpO2: 100% (01 Sep 2022 00:15) (97% - 100%)    O2 Parameters below as of 01 Sep 2022 00:15  Patient On (Oxygen Delivery Method): room air    08-30 @ 07:01 - 08-31 @ 07:00  --------------------------------------------------------  IN: 480 mL / OUT: 0 mL / NET: 480 mL    08-31 @ 07:01  -  09-01 @ 00:38  --------------------------------------------------------  IN: 1800 mL / OUT: 1195 mL / NET: 605 mL    CAPILLARY BLOOD GLUCOSE    PHYSICAL EXAM:  General: laying in bed comfortably  HEENT: normocephalic, atraumatic; pupils pinpoint and slightly reactive  Respiratory: clear to auscultation b/l in all lung fields  Cardiovascular: RRR, S1/S2 present, no m/r/g  Abdomen: soft, nontender, nondistended, +BS  Extremities: no edema, erythema, or cyanosis  Skin: no rashes or lesions  Neurological: AAOx0, responsive to pain in all 4 extremities, wakes up briefly when sternal rubbed but unresponsive to questions, was able to squeeze R hand when asked    LABS:                        10.0   11.56 )-----------( 174      ( 31 Aug 2022 23:36 )             31.5     Hgb Trend: 10.0<--, 12.1<--, 12.3<--, 11.6<--, 10.2<--  08-31    144  |  109<H>  |  10  ----------------------------<  159<H>  4.8   |  25  |  0.62    Ca    8.8      31 Aug 2022 23:36    TPro  7.6  /  Alb  3.5  /  TBili  0.2  /  DBili  x   /  AST  12  /  ALT  8<L>  /  AlkPhos  90  08-30    Creatinine Trend: 0.62<--, 0.75<--, 0.78<--, 0.70<--, 0.77<--, 0.79<--    MICROBIOLOGY:     RADIOLOGY & ADDITIONAL TESTS:    ====================ASSESSMENT======================  61M with PMH of cerebellar ataxia, chronic lacunar infarcts, chronic hypotension on midodrine admitted 8/27 for hypokalemia, s/p b/l ureteral stent placement with Urology on 8/31, growing Pseudomonas on UCx 8/28 on cipro and cefepime, now with lethargy, hypothermia, and hypotension initially fluid responsive, but now requiring levophed; pt. transferred to the MICU for pressor support.    Plan:  ====================NEUROLOGY=======================  #AMS/Lethargy  - Pt AAOx0 at bedside, briefly wakes up when sternal rubbed; per anesthesia may be recovering slowly from sedation due to PMH of cerebellar ataxia  - can consider CT head, vEEG, metabolic w/u (B12, thiamine, electrolytes, blood gas, etc.) if no improvement in mental status   - continue to monitor    ====================RESPIRATORY======================  No active issues; pt. currently protecting airway  - SpO2 > 92% on RA    ====================CARDIOVASCULAR==================  #Hypotension  - Hx of chronic hypotension on midodrine 5mg TID  - s/p MICU admission 7/2022 with negative cosyntropin testing for adrenal insufficiency r/o  - s/p 1.2L of IVF in OR per Anesthesia Record, ordered for NS 100cc/hr  - POCUS with 1cm IVC  - s/p 2L LR bolus with initial improvement in SBP from 80s to 90s but now back to 80s  - started levophed for pressor support; wean as tolerated    ====================/RENAL========================  #Nephrolithiasis   - Hx of nephrolithiasis s/p b/l ureteral stent placement for UTI and R proximal obstructing ureteral stone and b/l renal stones 7/13/22; s/p b/l ureteral stent placement with urology 8/31  - UCx (8/27) growing pseudomonas  - s/p vancomycin, Zosyn, ciprofloxacin, cefepime and ceftriaxone since admission  - urology following; appreciate recs  - c/w ciprofloxacin and cefepime for dual coverage of pseudomonas   - f/u repeat BCx and UCx    ====================GI/NUTRITION=====================  No active issues  - keep NPO for now given AMS    ====================INFECTIOUS DISEASE================  #UTI  - hx of nephrolithiasis s/p b/l ureteral stent placement for UTI and R proximal obstructing ureteral stone and b/l renal stones 7/13/22; s/p b/l ureteral stent placement with urology 8/31  - UCx (8/27) growing pseudomonas  - s/p vancomycin, Zosyn, ciprofloxacin, cefepime and ceftriaxone since admission  - urology following; appreciate recs  - c/w ciprofloxacin and cefepime for dual coverage of pseudomonas   - f/u repeat BCx and UCx    #Hypothermia  - temp noted to be 92.4F in PACU  - c/w lloyd hugger  - f/u BCx/UCx as above    ====================ENDOCRINE=======================  No active issues  - monitor FS per routine    ====================HEMATOLOGIC/DVT PPx=============  #DVT PPx  - Hep SubQ    ====================ETHICS===========================  full code MICU ACCEPT NOTE    CHIEF COMPLAINT: Patient is a 61y old  Male who presents with a chief complaint of Hypokalemia (01 Sep 2022 00:02)    HPI: 61M with PMH of cerebellar ataxia, chronic lacunar infarcts, hypotension on midodrine admitted 8/27 for abnormal labs including hypokalemia. History of nephrolithiasis s/p 7/13/22 BL ureteral stent placement for UTI and right proximal obstructing ureteral stone, and b/l renal stones. Pt growing Pseudomonas in UCx 8/27, BCx 8/27 NGTD. S/p b/l ureteral stent placement 8/31, s/p vanc, zosyn, cipro, cefepime, and ceftriaxone since admission, now on cipro and added on cefepime. Pt lethargic but responsive to pain and awakens briefly on sternal rub. Pt had MICU stay in 7/2022 briefly for hypotension s/p b/l ureteral stent placement, started on juanita gtt as was not fluid responsive, had normal cosyntropin test for AI r/o. Pt now hypothermic to 92.4F, hypotensive to 81/57, initially fluid responsive but persistently hypotensive requiring levophed. Pt. transferred to the MICU for pressor support.     PAST MEDICAL & SURGICAL HISTORY:  H/O cerebellar ataxia  -diagnosed in 2019  History of hypotension  Anemia  Lacunar infarction  -chronic  Pneumonia, aspiration  -april 2022 (intubated for 7 days at [facility])  Adrenal insufficiency  No significant past surgical history    FAMILY HISTORY:  FH: dementia (Mother)  FH: type 2 diabetes (Mother)  Family history of CVA (Father)    SOCIAL HISTORY:  unable to assess due to AMS    MEDICATIONS:  MEDICATIONS  (STANDING):  ascorbic acid 500 milliGRAM(s) Oral daily  cefepime   IVPB      cefepime   IVPB 2000 milliGRAM(s) IV Intermittent once  cefepime   IVPB 2000 milliGRAM(s) IV Intermittent every 12 hours  chlorhexidine 2% Cloths 1 Application(s) Topical daily  ciprofloxacin   IVPB      ciprofloxacin   IVPB 400 milliGRAM(s) IV Intermittent every 12 hours  heparin   Injectable 5000 Unit(s) SubCutaneous every 8 hours  lactated ringers Bolus 1000 milliLiter(s) IV Bolus once  midodrine. 5 milliGRAM(s) Oral three times a day  multivitamin 1 Tablet(s) Oral daily  mupirocin 2% Nasal 1 Application(s) Both Nostrils two times a day  norepinephrine Infusion 0.05 MICROgram(s)/kG/Min (5.72 mL/Hr) IV Continuous <Continuous>  sodium chloride 0.9%. 1000 milliLiter(s) (100 mL/Hr) IV Continuous <Continuous>    MEDICATIONS  (PRN):  fentaNYL    Injectable 25 MICROGram(s) IV Push every 5 minutes PRN Moderate Pain (4 - 6)  ondansetron Injectable 4 milliGRAM(s) IV Push once PRN Nausea and/or Vomiting    ALLERGIES:  No Known Allergies  Intolerances    REVIEW OF SYSTEMS:  [ ] All other systems negative  [X] Unable to assess ROS because AMS    OBJECTIVE:  ICU Vital Signs Last 24 Hrs  T(C): 33.3 (31 Aug 2022 22:30), Max: 37.1 (31 Aug 2022 11:44)  T(F): 92 (31 Aug 2022 22:30), Max: 98.7 (31 Aug 2022 11:44)  HR: 80 (01 Sep 2022 00:15) (64 - 80)  BP: 81/52 (01 Sep 2022 00:15) (81/52 - 116/83)  BP(mean): 61 (01 Sep 2022 00:15) (61 - 75)  ABP: --  ABP(mean): --  RR: 14 (01 Sep 2022 00:15) (14 - 18)  SpO2: 100% (01 Sep 2022 00:15) (97% - 100%)    O2 Parameters below as of 01 Sep 2022 00:15  Patient On (Oxygen Delivery Method): room air    08-30 @ 07:01 - 08-31 @ 07:00  --------------------------------------------------------  IN: 480 mL / OUT: 0 mL / NET: 480 mL    08-31 @ 07:01  -  09-01 @ 00:38  --------------------------------------------------------  IN: 1800 mL / OUT: 1195 mL / NET: 605 mL    CAPILLARY BLOOD GLUCOSE    PHYSICAL EXAM:  General: laying in bed comfortably  HEENT: normocephalic, atraumatic; pupils pinpoint and slightly reactive  Respiratory: clear to auscultation b/l in all lung fields  Cardiovascular: RRR, S1/S2 present, no m/r/g  Abdomen: soft, nontender, nondistended, +BS  Extremities: no edema, erythema, or cyanosis  Skin: no rashes or lesions  Neurological: AAOx0, responsive to pain in all 4 extremities, wakes up briefly when sternal rubbed but unresponsive to questions, was able to squeeze R hand when asked    LABS:                        10.0   11.56 )-----------( 174      ( 31 Aug 2022 23:36 )             31.5     Hgb Trend: 10.0<--, 12.1<--, 12.3<--, 11.6<--, 10.2<--  08-31    144  |  109<H>  |  10  ----------------------------<  159<H>  4.8   |  25  |  0.62    Ca    8.8      31 Aug 2022 23:36    TPro  7.6  /  Alb  3.5  /  TBili  0.2  /  DBili  x   /  AST  12  /  ALT  8<L>  /  AlkPhos  90  08-30    Creatinine Trend: 0.62<--, 0.75<--, 0.78<--, 0.70<--, 0.77<--, 0.79<--    MICROBIOLOGY:     RADIOLOGY & ADDITIONAL TESTS:    ====================ASSESSMENT======================  61M with PMH of cerebellar ataxia, chronic lacunar infarcts, chronic hypotension on midodrine admitted 8/27 for hypokalemia, s/p b/l ureteral stent placement with Urology on 8/31, growing Pseudomonas on UCx 8/28 on cipro and cefepime, now with lethargy, hypothermia, and hypotension initially fluid responsive, but now requiring levophed; pt. transferred to the MICU for pressor support.    Plan:  ====================NEUROLOGY=======================  #AMS/Lethargy  - Pt AAOx0 at bedside, briefly wakes up when sternal rubbed; per anesthesia may be recovering slowly from sedation due to PMH of cerebellar ataxia  - can consider CT head, vEEG, metabolic w/u (B12, thiamine, electrolytes, blood gas, etc.) if no improvement in mental status   - continue to monitor    ====================RESPIRATORY======================  No active issues; pt. currently protecting airway  - SpO2 > 92% on RA    ====================CARDIOVASCULAR==================  #Hypotension  - hx of chronic hypotension on midodrine 5mg TID  - s/p MICU admission 7/2022 with negative cosyntropin testing for adrenal insufficiency r/o  - s/p 1.2L of IVF in OR per Anesthesia Record, switched maintenance NS to LR  - POCUS with 1cm IVC  - s/p 2L LR bolus with initial improvement in SBP from 80s to 90s but now back to 80s  - started levophed for pressor support; wean as tolerated  - c/w midodrine 5mg TID    ====================/RENAL========================  #Nephrolithiasis   - hx of nephrolithiasis s/p b/l ureteral stent placement for UTI and R proximal obstructing ureteral stone and b/l renal stones 7/13/22; s/p b/l ureteral stent placement with urology 8/31  - coronel in place- putting out blood-tinged urine  - UCx (8/27) growing pseudomonas  - s/p vancomycin, Zosyn, ciprofloxacin, cefepime and ceftriaxone since admission  - urology following; appreciate recs  - c/w ciprofloxacin and cefepime for dual coverage of pseudomonas   - f/u repeat BCx and UCx    ====================GI/NUTRITION=====================  No active issues  - keep NPO for now given AMS    ====================INFECTIOUS DISEASE================  #UTI  - hx of nephrolithiasis s/p b/l ureteral stent placement for UTI and R proximal obstructing ureteral stone and b/l renal stones 7/13/22; s/p b/l ureteral stent placement with urology 8/31  - coronel in place- putting out blood-tinged urine  - UCx (8/27) growing pseudomonas  - s/p vancomycin, Zosyn, ciprofloxacin, cefepime and ceftriaxone since admission  - urology following; appreciate recs  - c/w ciprofloxacin and cefepime for dual coverage of pseudomonas   - f/u repeat BCx and UCx    #Hypothermia  - temp noted to be 92.4F in PACU  - c/w lloyd hugger  - f/u BCx/UCx as above    ====================ENDOCRINE=======================  No active issues  - monitor FS per routine    ====================HEMATOLOGIC/DVT PPx=============  #DVT PPx  - Hep SubQ    ====================ETHICS===========================  full code MICU ACCEPT NOTE    CHIEF COMPLAINT: Patient is a 61y old  Male who presents with a chief complaint of Hypokalemia (01 Sep 2022 00:02)    HPI: 61M with PMH of cerebellar ataxia, chronic lacunar infarcts, hypotension on midodrine admitted 8/27 for abnormal labs including hypokalemia. History of nephrolithiasis s/p 7/13/22 BL ureteral stent placement for UTI and right proximal obstructing ureteral stone, and b/l renal stones. Pt growing Pseudomonas in UCx 8/27, BCx 8/27 NGTD. S/p b/l ureteral stent placement 8/31, s/p vanc, zosyn, cipro, cefepime, and ceftriaxone since admission, now on cipro and added on cefepime. Pt lethargic but responsive to pain and awakens briefly on sternal rub. Pt had MICU stay in 7/2022 briefly for hypotension s/p b/l ureteral stent placement, started on juanita gtt as was not fluid responsive, had normal cosyntropin test for AI r/o. Pt now hypothermic to 92.4F, hypotensive to 81/57, initially fluid responsive but persistently hypotensive requiring levophed. Pt. transferred to the MICU for pressor support.     PAST MEDICAL & SURGICAL HISTORY:  H/O cerebellar ataxia  -diagnosed in 2019  History of hypotension  Anemia  Lacunar infarction  -chronic  Pneumonia, aspiration  -april 2022 (intubated for 7 days at [facility])  Adrenal insufficiency  No significant past surgical history    FAMILY HISTORY:  FH: dementia (Mother)  FH: type 2 diabetes (Mother)  Family history of CVA (Father)    SOCIAL HISTORY:  unable to assess due to AMS    MEDICATIONS:  MEDICATIONS  (STANDING):  ascorbic acid 500 milliGRAM(s) Oral daily  cefepime   IVPB      cefepime   IVPB 2000 milliGRAM(s) IV Intermittent once  cefepime   IVPB 2000 milliGRAM(s) IV Intermittent every 12 hours  chlorhexidine 2% Cloths 1 Application(s) Topical daily  ciprofloxacin   IVPB      ciprofloxacin   IVPB 400 milliGRAM(s) IV Intermittent every 12 hours  heparin   Injectable 5000 Unit(s) SubCutaneous every 8 hours  lactated ringers Bolus 1000 milliLiter(s) IV Bolus once  midodrine. 5 milliGRAM(s) Oral three times a day  multivitamin 1 Tablet(s) Oral daily  mupirocin 2% Nasal 1 Application(s) Both Nostrils two times a day  norepinephrine Infusion 0.05 MICROgram(s)/kG/Min (5.72 mL/Hr) IV Continuous <Continuous>  sodium chloride 0.9%. 1000 milliLiter(s) (100 mL/Hr) IV Continuous <Continuous>    MEDICATIONS  (PRN):  fentaNYL    Injectable 25 MICROGram(s) IV Push every 5 minutes PRN Moderate Pain (4 - 6)  ondansetron Injectable 4 milliGRAM(s) IV Push once PRN Nausea and/or Vomiting    ALLERGIES:  No Known Allergies  Intolerances    REVIEW OF SYSTEMS:  [ ] All other systems negative  [X] Unable to assess ROS because AMS    OBJECTIVE:  ICU Vital Signs Last 24 Hrs  T(C): 33.3 (31 Aug 2022 22:30), Max: 37.1 (31 Aug 2022 11:44)  T(F): 92 (31 Aug 2022 22:30), Max: 98.7 (31 Aug 2022 11:44)  HR: 80 (01 Sep 2022 00:15) (64 - 80)  BP: 81/52 (01 Sep 2022 00:15) (81/52 - 116/83)  BP(mean): 61 (01 Sep 2022 00:15) (61 - 75)  ABP: --  ABP(mean): --  RR: 14 (01 Sep 2022 00:15) (14 - 18)  SpO2: 100% (01 Sep 2022 00:15) (97% - 100%)    O2 Parameters below as of 01 Sep 2022 00:15  Patient On (Oxygen Delivery Method): room air    08-30 @ 07:01 - 08-31 @ 07:00  --------------------------------------------------------  IN: 480 mL / OUT: 0 mL / NET: 480 mL    08-31 @ 07:01  -  09-01 @ 00:38  --------------------------------------------------------  IN: 1800 mL / OUT: 1195 mL / NET: 605 mL    CAPILLARY BLOOD GLUCOSE    PHYSICAL EXAM:  General: laying in bed comfortably  HEENT: normocephalic, atraumatic; pupils pinpoint and slightly reactive  Respiratory: clear to auscultation b/l in all lung fields  Cardiovascular: RRR, S1/S2 present, no m/r/g  Abdomen: soft, nontender, nondistended, +BS  Extremities: no edema, erythema, or cyanosis  Skin: no rashes or lesions  Neurological: AAOx0-2 (waxing and waning), responsive to pain in all 4 extremities, wakes up briefly when sternal rubbed but unresponsive to questions, was able to squeeze R hand when asked    LABS:                        10.0   11.56 )-----------( 174      ( 31 Aug 2022 23:36 )             31.5     Hgb Trend: 10.0<--, 12.1<--, 12.3<--, 11.6<--, 10.2<--  08-31    144  |  109<H>  |  10  ----------------------------<  159<H>  4.8   |  25  |  0.62    Ca    8.8      31 Aug 2022 23:36    TPro  7.6  /  Alb  3.5  /  TBili  0.2  /  DBili  x   /  AST  12  /  ALT  8<L>  /  AlkPhos  90  08-30    Creatinine Trend: 0.62<--, 0.75<--, 0.78<--, 0.70<--, 0.77<--, 0.79<--    MICROBIOLOGY:     RADIOLOGY & ADDITIONAL TESTS:    ====================ASSESSMENT======================  61M with PMH of cerebellar ataxia, chronic lacunar infarcts, chronic hypotension on midodrine admitted 8/27 for hypokalemia, s/p b/l ureteral stent placement with Urology on 8/31, growing Pseudomonas on UCx 8/28 on cipro and cefepime, now with lethargy, hypothermia, and hypotension initially fluid responsive, but now requiring levophed; pt. transferred to the MICU for pressor support.    Plan:  ====================NEUROLOGY=======================  #AMS/Lethargy  - Pt AAOx0-2 at bedside, briefly wakes up when sternal rubbed; per anesthesia may be recovering slowly from sedation due to PMH of cerebellar ataxia  - can consider CT head, vEEG, metabolic w/u (B12, thiamine, electrolytes, blood gas, etc.) if no improvement in mental status   - continue to monitor    ====================RESPIRATORY======================  No active issues; pt. currently protecting airway  - SpO2 > 92% on RA    ====================CARDIOVASCULAR==================  #Hypotension  - hx of chronic hypotension on midodrine 5mg TID  - s/p MICU admission 7/2022 with negative cosyntropin testing for adrenal insufficiency r/o  - s/p 1.2L of IVF in OR per Anesthesia Record, switched maintenance NS to LR  - POCUS with 1cm IVC  - s/p 2L LR bolus with initial improvement in SBP from 80s to 90s but now back to 80s  - started levophed for pressor support; wean as tolerated  - c/w midodrine 5mg TID    ====================/RENAL========================  #Nephrolithiasis   - hx of nephrolithiasis s/p b/l ureteral stent placement for UTI and R proximal obstructing ureteral stone and b/l renal stones 7/13/22; s/p b/l ureteral stent placement with urology 8/31  - coronel in place- putting out blood-tinged urine  - UCx (8/27) growing pseudomonas  - s/p vancomycin, Zosyn, ciprofloxacin, cefepime and ceftriaxone since admission  - urology following; appreciate recs  - c/w ciprofloxacin and cefepime for dual coverage of pseudomonas   - f/u repeat BCx and UCx    ====================GI/NUTRITION=====================  No active issues  - keep NPO for now given AMS    ====================INFECTIOUS DISEASE================  #UTI  - hx of nephrolithiasis s/p b/l ureteral stent placement for UTI and R proximal obstructing ureteral stone and b/l renal stones 7/13/22; s/p b/l ureteral stent placement with urology 8/31  - coronel in place- putting out blood-tinged urine  - UCx (8/27) growing pseudomonas  - s/p vancomycin, Zosyn, ciprofloxacin, cefepime and ceftriaxone since admission  - urology following; appreciate recs  - c/w ciprofloxacin and cefepime for dual coverage of pseudomonas   - f/u repeat BCx and UCx    #Hypothermia  - temp noted to be 92.4F in PACU  - c/w lloyd hugger  - f/u BCx/UCx as above    ====================ENDOCRINE=======================  No active issues  - monitor FS per routine    ====================HEMATOLOGIC/DVT PPx=============  #DVT PPx  - Hep SubQ    ====================ETHICS===========================  full code MICU ACCEPT NOTE    CHIEF COMPLAINT: Patient is a 61y old  Male who presents with a chief complaint of Hypokalemia (01 Sep 2022 00:02)    HPI: 61M with PMH of cerebellar ataxia, chronic lacunar infarcts, hypotension on midodrine admitted 8/27 for abnormal labs including hypokalemia. History of nephrolithiasis s/p 7/13/22 BL ureteral stent placement for UTI and right proximal obstructing ureteral stone, and b/l renal stones. Pt growing Pseudomonas in UCx 8/27, BCx 8/27 NGTD. S/p b/l ureteral stent placement 8/31, s/p vanc, zosyn, cipro, cefepime, and ceftriaxone since admission, now on cipro and added on cefepime. Pt lethargic but responsive to pain and awakens briefly on sternal rub. Pt had MICU stay in 7/2022 briefly for hypotension s/p b/l ureteral stent placement, started on juanita gtt as was not fluid responsive, had normal cosyntropin test for AI r/o. Pt now hypothermic to 92.4F, hypotensive to 81/57, initially fluid responsive but persistently hypotensive requiring levophed. Pt. transferred to the MICU for pressor support.     PAST MEDICAL & SURGICAL HISTORY:  H/O cerebellar ataxia  -diagnosed in 2019  History of hypotension  Anemia  Lacunar infarction  -chronic  Pneumonia, aspiration  -april 2022 (intubated for 7 days at [facility])  Adrenal insufficiency  No significant past surgical history    FAMILY HISTORY:  FH: dementia (Mother)  FH: type 2 diabetes (Mother)  Family history of CVA (Father)    SOCIAL HISTORY:  unable to assess due to AMS    MEDICATIONS:  MEDICATIONS  (STANDING):  ascorbic acid 500 milliGRAM(s) Oral daily  cefepime   IVPB      cefepime   IVPB 2000 milliGRAM(s) IV Intermittent once  cefepime   IVPB 2000 milliGRAM(s) IV Intermittent every 12 hours  chlorhexidine 2% Cloths 1 Application(s) Topical daily  ciprofloxacin   IVPB      ciprofloxacin   IVPB 400 milliGRAM(s) IV Intermittent every 12 hours  heparin   Injectable 5000 Unit(s) SubCutaneous every 8 hours  lactated ringers Bolus 1000 milliLiter(s) IV Bolus once  midodrine. 5 milliGRAM(s) Oral three times a day  multivitamin 1 Tablet(s) Oral daily  mupirocin 2% Nasal 1 Application(s) Both Nostrils two times a day  norepinephrine Infusion 0.05 MICROgram(s)/kG/Min (5.72 mL/Hr) IV Continuous <Continuous>  sodium chloride 0.9%. 1000 milliLiter(s) (100 mL/Hr) IV Continuous <Continuous>    MEDICATIONS  (PRN):  fentaNYL    Injectable 25 MICROGram(s) IV Push every 5 minutes PRN Moderate Pain (4 - 6)  ondansetron Injectable 4 milliGRAM(s) IV Push once PRN Nausea and/or Vomiting    ALLERGIES:  No Known Allergies  Intolerances    REVIEW OF SYSTEMS:  [ ] All other systems negative  [X] Unable to assess ROS because AMS    OBJECTIVE:  ICU Vital Signs Last 24 Hrs  T(C): 33.3 (31 Aug 2022 22:30), Max: 37.1 (31 Aug 2022 11:44)  T(F): 92 (31 Aug 2022 22:30), Max: 98.7 (31 Aug 2022 11:44)  HR: 80 (01 Sep 2022 00:15) (64 - 80)  BP: 81/52 (01 Sep 2022 00:15) (81/52 - 116/83)  BP(mean): 61 (01 Sep 2022 00:15) (61 - 75)  ABP: --  ABP(mean): --  RR: 14 (01 Sep 2022 00:15) (14 - 18)  SpO2: 100% (01 Sep 2022 00:15) (97% - 100%)    O2 Parameters below as of 01 Sep 2022 00:15  Patient On (Oxygen Delivery Method): room air    08-30 @ 07:01 - 08-31 @ 07:00  --------------------------------------------------------  IN: 480 mL / OUT: 0 mL / NET: 480 mL    08-31 @ 07:01  -  09-01 @ 00:38  --------------------------------------------------------  IN: 1800 mL / OUT: 1195 mL / NET: 605 mL    CAPILLARY BLOOD GLUCOSE    PHYSICAL EXAM:  General: laying in bed comfortably  HEENT: normocephalic, atraumatic; pupils pinpoint and slightly reactive  Respiratory: clear to auscultation b/l in all lung fields  Cardiovascular: RRR, S1/S2 present, no m/r/g  Abdomen: soft, nontender, nondistended, +BS  Extremities: no edema, erythema, or cyanosis  Skin: no rashes or lesions  Neurological: AAOx0-2 (waxing and waning), responsive to pain in all 4 extremities, wakes up briefly when sternal rubbed but unresponsive to questions, was able to squeeze R hand when asked    LABS:                        10.0   11.56 )-----------( 174      ( 31 Aug 2022 23:36 )             31.5     Hgb Trend: 10.0<--, 12.1<--, 12.3<--, 11.6<--, 10.2<--  08-31    144  |  109<H>  |  10  ----------------------------<  159<H>  4.8   |  25  |  0.62    Ca    8.8      31 Aug 2022 23:36    TPro  7.6  /  Alb  3.5  /  TBili  0.2  /  DBili  x   /  AST  12  /  ALT  8<L>  /  AlkPhos  90  08-30    Creatinine Trend: 0.62<--, 0.75<--, 0.78<--, 0.70<--, 0.77<--, 0.79<--    MICROBIOLOGY:     RADIOLOGY & ADDITIONAL TESTS:    CT from previous admission:  IMPRESSION:  Mild right hydronephrosis caused by an 8 x 7 x 11 mm stone at the right   ureteropelvic junction (UPJ).    Additional nonobstructing renal stones in the upper and lower poles of   both kidneys are partially obscured by motion artifact, but measure up to   3-4 mm in the mid to lower pole of the right kidney.    Approximately 8 x 2 mm bladder stone.    Underlying cystitis or pyelonephritis should be excluded based on   clinical symptoms and laboratory findings.    < end of copied text >        ====================ASSESSMENT======================  61M with PMH of cerebellar ataxia, chronic lacunar infarcts, chronic hypotension on midodrine admitted 8/27 for hypokalemia, s/p b/l ureteral stent placement with Urology on 8/31, growing Pseudomonas on UCx 8/28 on cipro and cefepime, now with lethargy, hypothermia, and hypotension initially fluid responsive, but now requiring levophed; pt. transferred to the MICU for pressor support.    Plan:  ====================NEUROLOGY=======================  #AMS/Lethargy  - Pt AAOx0-2 at bedside, briefly wakes up when sternal rubbed; per anesthesia may be recovering slowly from sedation due to PMH of cerebellar ataxia  - can consider CT head, vEEG, metabolic w/u (B12, thiamine, electrolytes, blood gas, etc.) if no improvement in mental status   - continue to monitor    ====================RESPIRATORY======================  No active issues; pt. currently protecting airway  - SpO2 > 92% on RA    ====================CARDIOVASCULAR==================  #Hypotension  - hx of chronic hypotension on midodrine 5mg TID  - s/p MICU admission 7/2022 with negative cosyntropin testing for adrenal insufficiency r/o  - s/p 1.2L of IVF in OR per Anesthesia Record, switched maintenance NS to LR  - POCUS with 1cm IVC  - s/p 2L LR bolus with initial improvement in SBP from 80s to 90s but now back to 80s  - started levophed for pressor support; wean as tolerated  - c/w midodrine 5mg TID once able to toelrate po    ====================/RENAL========================  #Nephrolithiasis   - hx of nephrolithiasis s/p b/l ureteral stent placement for UTI and R proximal obstructing ureteral stone and b/l renal stones 7/13/22; s/p b/l ureteral stent placement with urology 8/31  - coronel in place- putting out blood-tinged urine  - UCx (8/27) growing pseudomonas  - s/p vancomycin, Zosyn, ciprofloxacin, cefepime and ceftriaxone since admission  - urology following; appreciate recs  - c/w ciprofloxacin and cefepime for dual coverage of pseudomonas   - f/u repeat BCx and UCx    ====================GI/NUTRITION=====================  No active issues  - keep NPO for now given AMS    ====================INFECTIOUS DISEASE================  #UTI  - hx of nephrolithiasis s/p b/l ureteral stent placement for UTI and R proximal obstructing ureteral stone and b/l renal stones 7/13/22; s/p b/l ureteral stent placement with urology 8/31  - coronel in place- putting out blood-tinged urine  - UCx (8/27) growing pseudomonas  - s/p vancomycin, Zosyn, ciprofloxacin, cefepime and ceftriaxone since admission  - urology following; appreciate recs  - c/w ciprofloxacin and cefepime for dual coverage of pseudomonas   - f/u repeat BCx and UCx    #Hypothermia  - temp noted to be 92.4F in PACU  - c/w lloyd hugger  - f/u BCx/UCx as above    ====================ENDOCRINE=======================  No active issues  - monitor FS per routine    ====================HEMATOLOGIC/DVT PPx=============  #DVT PPx  - Hep SubQ    ====================ETHICS===========================  full code

## 2022-09-01 NOTE — CHART NOTE - NSCHARTNOTEFT_GEN_A_CORE
: Dipti/ Ly    INDICATION: Shock     PROCEDURE:  [x ] LIMITED ECHO  [x ] LIMITED CHEST  [ ] LIMITED RETROPERITONEAL  [x ] LIMITED ABDOMINAL  [ ] LIMITED DVT  [ ] NEEDLE GUIDANCE VASCULAR  [ ] NEEDLE GUIDANCE THORACENTESIS  [ ] NEEDLE GUIDANCE PARACENTESIS  [ ] NEEDLE GUIDANCE PERICARDIOCENTESIS  [ ] OTHER    FINDINGS:    A-line predominant pattern on anterior chest. No pleural effusions.     LVSF grossly normal. RV appears smaller than LV.     IVC 1.45 in some views. No hydronephrosis appreciated bilaterally.     INTERPRETATION:    Images stored on [employer]. : Dipti/ Ly    INDICATION: Shock, septic shock    PROCEDURE:  [x ] LIMITED ECHO  [x ] LIMITED CHEST  [ ] LIMITED RETROPERITONEAL  [x ] LIMITED ABDOMINAL  [ ] LIMITED DVT  [ ] NEEDLE GUIDANCE VASCULAR  [ ] NEEDLE GUIDANCE THORACENTESIS  [ ] NEEDLE GUIDANCE PARACENTESIS  [ ] NEEDLE GUIDANCE PERICARDIOCENTESIS  [ ] OTHER    FINDINGS:    A-line predominant pattern on anterior chest. No pleural effusions.     LVSF grossly normal. RV appears smaller than LV.     IVC 1.45 in some views. No hydronephrosis appreciated bilaterally.     INTERPRETATION:  no hydronephrosis  normal aeration pattern  normal LVSF  Images stored on [employer].    Attending Attestation:  I was present during the key portions of the procedure and immediately available during the entire procedure.  Sony Modi MD  Attending  Pulmonary & Critical Care Medicine

## 2022-09-01 NOTE — PROGRESS NOTE ADULT - SUBJECTIVE AND OBJECTIVE BOX
INTERVAL HPI/OVERNIGHT EVENTS:    SUBJECTIVE: Patient seen and examined at bedside.     CONSTITUTIONAL: No weakness, fevers or chills  EYES/ENT: No visual changes;  No vertigo or throat pain   NECK: No pain or stiffness  RESPIRATORY: No cough, wheezing, hemoptysis; No shortness of breath  CARDIOVASCULAR: No chest pain or palpitations  GASTROINTESTINAL: No abdominal or epigastric pain. No nausea, vomiting, or hematemesis; No diarrhea or constipation. No melena or hematochezia.  GENITOURINARY: No dysuria, frequency or hematuria  NEUROLOGICAL: No numbness or weakness  SKIN: No itching, rashes    OBJECTIVE:    VITAL SIGNS:  ICU Vital Signs Last 24 Hrs  T(C): 36.5 (01 Sep 2022 07:00), Max: 37.1 (31 Aug 2022 11:44)  T(F): 97.7 (01 Sep 2022 07:00), Max: 98.7 (31 Aug 2022 11:44)  HR: 94 (01 Sep 2022 07:30) (64 - 117)  BP: 84/53 (01 Sep 2022 07:30) (80/49 - 116/83)  BP(mean): 64 (01 Sep 2022 07:30) (60 - 91)  ABP: --  ABP(mean): --  RR: 23 (01 Sep 2022 07:30) (14 - 27)  SpO2: 99% (01 Sep 2022 07:30) (96% - 100%)    O2 Parameters below as of 01 Sep 2022 07:00  Patient On (Oxygen Delivery Method): room air              08-31 @ 07:01  -  09-01 @ 07:00  --------------------------------------------------------  IN: 3600 mL / OUT: 1945 mL / NET: 1655 mL      CAPILLARY BLOOD GLUCOSE          PHYSICAL EXAM:    General: NAD  HEENT: NC/AT; PERRL, clear conjunctiva  Neck: supple  Respiratory: CTA b/l  Cardiovascular: +S1/S2; RRR  Abdomen: soft, NT/ND; +BS x4  Extremities: WWP, 2+ peripheral pulses b/l; no LE edema  Skin: normal color and turgor; no rash  Neurological:    MEDICATIONS:  MEDICATIONS  (STANDING):  ascorbic acid 500 milliGRAM(s) Oral daily  cefepime   IVPB      cefepime   IVPB 2000 milliGRAM(s) IV Intermittent every 12 hours  chlorhexidine 2% Cloths 1 Application(s) Topical daily  chlorhexidine 4% Liquid 1 Application(s) Topical <User Schedule>  ciprofloxacin   IVPB      ciprofloxacin   IVPB 400 milliGRAM(s) IV Intermittent every 12 hours  heparin   Injectable 5000 Unit(s) SubCutaneous every 8 hours  lactated ringers. 1000 milliLiter(s) (100 mL/Hr) IV Continuous <Continuous>  midodrine. 5 milliGRAM(s) Oral three times a day  multivitamin 1 Tablet(s) Oral daily  mupirocin 2% Nasal 1 Application(s) Both Nostrils two times a day  norepinephrine Infusion 0.05 MICROgram(s)/kG/Min (5.72 mL/Hr) IV Continuous <Continuous>    MEDICATIONS  (PRN):      ALLERGIES:  Allergies    No Known Allergies    Intolerances        LABS:                        10.0   11.56 )-----------( 174      ( 31 Aug 2022 23:36 )             31.5     08-31    144  |  109<H>  |  10  ----------------------------<  159<H>  4.8   |  25  |  0.62    Ca    8.8      31 Aug 2022 23:36    TPro  7.6  /  Alb  3.5  /  TBili  0.2  /  DBili  x   /  AST  12  /  ALT  8<L>  /  AlkPhos  90  08-30          RADIOLOGY & ADDITIONAL TESTS: Reviewed.     INTERVAL HPI/OVERNIGHT EVENTS:  -admitted to the MICU for pressor support       SUBJECTIVE: Patient seen and examined at bedside.     CONSTITUTIONAL: No weakness, fevers or chills  EYES/ENT: No visual changes;  No vertigo or throat pain   NECK: No pain or stiffness  RESPIRATORY: No cough, wheezing, hemoptysis; No shortness of breath  CARDIOVASCULAR: No chest pain or palpitations  GASTROINTESTINAL: No abdominal or epigastric pain. No nausea, vomiting, or hematemesis; No diarrhea or constipation. No melena or hematochezia.  GENITOURINARY: No dysuria, frequency or hematuria  NEUROLOGICAL: No numbness or weakness  SKIN: No itching, rashes    OBJECTIVE:    VITAL SIGNS:  ICU Vital Signs Last 24 Hrs  T(C): 36.5 (01 Sep 2022 07:00), Max: 37.1 (31 Aug 2022 11:44)  T(F): 97.7 (01 Sep 2022 07:00), Max: 98.7 (31 Aug 2022 11:44)  HR: 94 (01 Sep 2022 07:30) (64 - 117)  BP: 84/53 (01 Sep 2022 07:30) (80/49 - 116/83)  BP(mean): 64 (01 Sep 2022 07:30) (60 - 91)  ABP: --  ABP(mean): --  RR: 23 (01 Sep 2022 07:30) (14 - 27)  SpO2: 99% (01 Sep 2022 07:30) (96% - 100%)    O2 Parameters below as of 01 Sep 2022 07:00  Patient On (Oxygen Delivery Method): room air              08-31 @ 07:01  -  09-01 @ 07:00  --------------------------------------------------------  IN: 3600 mL / OUT: 1945 mL / NET: 1655 mL      CAPILLARY BLOOD GLUCOSE      PHYSICAL EXAM:    General: NAD  HEENT: NC/AT; PERRL, clear conjunctiva  Neck: supple  Respiratory: CTA b/l  Cardiovascular: +S1/S2; RRR  Abdomen: soft, NT/ND; +BS x4  Extremities: WWP, 2+ peripheral pulses b/l; no LE edema  Skin: normal color and turgor; no rash  Neurological: no focal deficits, waxes and wanes    MEDICATIONS:  MEDICATIONS  (STANDING):  ascorbic acid 500 milliGRAM(s) Oral daily  cefepime   IVPB      cefepime   IVPB 2000 milliGRAM(s) IV Intermittent every 12 hours  chlorhexidine 2% Cloths 1 Application(s) Topical daily  chlorhexidine 4% Liquid 1 Application(s) Topical <User Schedule>  ciprofloxacin   IVPB      ciprofloxacin   IVPB 400 milliGRAM(s) IV Intermittent every 12 hours  heparin   Injectable 5000 Unit(s) SubCutaneous every 8 hours  lactated ringers. 1000 milliLiter(s) (100 mL/Hr) IV Continuous <Continuous>  midodrine. 5 milliGRAM(s) Oral three times a day  multivitamin 1 Tablet(s) Oral daily  mupirocin 2% Nasal 1 Application(s) Both Nostrils two times a day  norepinephrine Infusion 0.05 MICROgram(s)/kG/Min (5.72 mL/Hr) IV Continuous <Continuous>    MEDICATIONS  (PRN):      ALLERGIES:  Allergies    No Known Allergies    Intolerances        LABS:                        10.0   11.56 )-----------( 174      ( 31 Aug 2022 23:36 )             31.5     08-31    144  |  109<H>  |  10  ----------------------------<  159<H>  4.8   |  25  |  0.62    Ca    8.8      31 Aug 2022 23:36    TPro  7.6  /  Alb  3.5  /  TBili  0.2  /  DBili  x   /  AST  12  /  ALT  8<L>  /  AlkPhos  90  08-30          RADIOLOGY & ADDITIONAL TESTS: Reviewed.

## 2022-09-01 NOTE — PROGRESS NOTE ADULT - SUBJECTIVE AND OBJECTIVE BOX
INTERVAL HPI/OVERNIGHT EVENTS:  Pt seen and examined at bedside.     Allergies/Intolerance: No Known Allergies      MEDICATIONS  (STANDING):  ascorbic acid 500 milliGRAM(s) Oral daily  cefepime   IVPB      cefepime   IVPB 2000 milliGRAM(s) IV Intermittent every 12 hours  chlorhexidine 2% Cloths 1 Application(s) Topical daily  chlorhexidine 4% Liquid 1 Application(s) Topical <User Schedule>  heparin   Injectable 5000 Unit(s) SubCutaneous every 8 hours  midodrine. 10 milliGRAM(s) Oral three times a day  multivitamin 1 Tablet(s) Oral daily  mupirocin 2% Nasal 1 Application(s) Both Nostrils two times a day  norepinephrine Infusion 0.05 MICROgram(s)/kG/Min (5.72 mL/Hr) IV Continuous <Continuous>    MEDICATIONS  (PRN):        ROS: all systems reviewed and wnl      PHYSICAL EXAMINATION:  Vital Signs Last 24 Hrs  T(C): 36.5 (01 Sep 2022 07:00), Max: 37.1 (31 Aug 2022 11:44)  T(F): 97.7 (01 Sep 2022 07:00), Max: 98.7 (31 Aug 2022 11:44)  HR: 75 (01 Sep 2022 10:30) (64 - 117)  BP: 92/58 (01 Sep 2022 10:30) (80/49 - 116/83)  BP(mean): 70 (01 Sep 2022 10:30) (60 - 91)  RR: 20 (01 Sep 2022 10:30) (14 - 27)  SpO2: 99% (01 Sep 2022 10:30) (96% - 100%)    Parameters below as of 01 Sep 2022 07:00  Patient On (Oxygen Delivery Method): room air      CAPILLARY BLOOD GLUCOSE          08-31 @ 07:01  -  09-01 @ 07:00  --------------------------------------------------------  IN: 3600 mL / OUT: 1945 mL / NET: 1655 mL    09-01 @ 07:01 - 09-01 @ 11:01  --------------------------------------------------------  IN: 603.4 mL / OUT: 250 mL / NET: 353.4 mL        GENERAL: stable on RA in MICU, on pressors post-procedure.   NECK: supple, No JVD  CHEST/LUNG: clear to auscultation bilaterally; no rales, rhonchi, or wheezing b/l  HEART: normal S1, S2  ABDOMEN: BS+, soft, ND, NT   EXTREMITIES:  pulses palpable; no clubbing, cyanosis, or edema b/l LEs  SKIN: no rashes or lesions      LABS:                        10.0   11.56 )-----------( 174      ( 31 Aug 2022 23:36 )             31.5     08-31    144  |  109<H>  |  10  ----------------------------<  159<H>  4.8   |  25  |  0.62    Ca    8.8      31 Aug 2022 23:36    TPro  7.6  /  Alb  3.5  /  TBili  0.2  /  DBili  x   /  AST  12  /  ALT  8<L>  /  AlkPhos  90  08-30

## 2022-09-01 NOTE — PHYSICAL THERAPY INITIAL EVALUATION ADULT - NSPTDMEREC_GEN_A_CORE
TBD at rehab
Owns R/W and W/C. If D/C home will benefit from hospital bed, patient lift device, bedside commode.

## 2022-09-01 NOTE — PROGRESS NOTE ADULT - ASSESSMENT
====================ASSESSMENT======================  61M with PMH of cerebellar ataxia, chronic lacunar infarcts, chronic hypotension on midodrine admitted 8/27 for hypokalemia, s/p b/l ureteral stent placement with Urology on 8/31, growing Pseudomonas on UCx 8/28 on cipro and cefepime, now with lethargy, hypothermia, and hypotension initially fluid responsive, but now requiring levophed; pt. transferred to the MICU for pressor support.    Plan:  ====================NEUROLOGY=======================  #AMS/Lethargy  - Pt AAOx0-2 at bedside, briefly wakes up when sternal rubbed; per anesthesia may be recovering slowly from sedation due to PMH of cerebellar ataxia  - can consider CT head, vEEG, metabolic w/u (B12, thiamine, electrolytes, blood gas, etc.) if no improvement in mental status   - continue to monitor    ====================RESPIRATORY======================  No active issues; pt. currently protecting airway  - SpO2 > 92% on RA    ====================CARDIOVASCULAR==================  #Hypotension  - hx of chronic hypotension on midodrine 5mg TID  - s/p MICU admission 7/2022 with negative cosyntropin testing for adrenal insufficiency r/o  - s/p 1.2L of IVF in OR per Anesthesia Record, switched maintenance NS to LR  - POCUS with 1cm IVC  - s/p 2L LR bolus with initial improvement in SBP from 80s to 90s but now back to 80s  - started levophed for pressor support; wean as tolerated  - c/w midodrine 5mg TID once able to toelrate po    ====================/RENAL========================  #Nephrolithiasis   - hx of nephrolithiasis s/p b/l ureteral stent placement for UTI and R proximal obstructing ureteral stone and b/l renal stones 7/13/22; s/p b/l ureteral stent placement with urology 8/31  - coronel in place- putting out blood-tinged urine  - UCx (8/27) growing pseudomonas  - s/p vancomycin, Zosyn, ciprofloxacin, cefepime and ceftriaxone since admission  - urology following; appreciate recs  - c/w ciprofloxacin and cefepime for dual coverage of pseudomonas   - f/u repeat BCx and UCx    ====================GI/NUTRITION=====================  No active issues  - keep NPO for now given AMS    ====================INFECTIOUS DISEASE================  #UTI  - hx of nephrolithiasis s/p b/l ureteral stent placement for UTI and R proximal obstructing ureteral stone and b/l renal stones 7/13/22; s/p b/l ureteral stent placement with urology 8/31  - coronel in place- putting out blood-tinged urine  - UCx (8/27) growing pseudomonas  - s/p vancomycin, Zosyn, ciprofloxacin, cefepime and ceftriaxone since admission  - urology following; appreciate recs  - c/w ciprofloxacin and cefepime for dual coverage of pseudomonas   - f/u repeat BCx and UCx    #Hypothermia  - temp noted to be 92.4F in PACU  - c/w lloyd hugger  - f/u BCx/UCx as above    ====================ENDOCRINE=======================  No active issues  - monitor FS per routine    ====================HEMATOLOGIC/DVT PPx=============  #DVT PPx  - Hep SubQ    ====================ETHICS===========================  full code.   ====================ASSESSMENT======================  61M with PMH of cerebellar ataxia, chronic lacunar infarcts, chronic hypotension on midodrine admitted 8/27 for hypokalemia, s/p b/l ureteral stent placement with Urology on 8/31, growing Pseudomonas on UCx 8/28 on cipro and cefepime, now with lethargy, hypothermia, and hypotension initially fluid responsive, but now requiring levophed; pt. transferred to the MICU for pressor support.    Plan:  ====================NEUROLOGY=======================  #AMS/Lethargy  - waxes and wanes   - can consider CT head, vEEG, metabolic w/u (B12, thiamine, electrolytes, blood gas, etc.) if no improvement in mental status   - continue to monitor  -will clarify baseline mental status with family today     ====================RESPIRATORY======================  No active issues; pt. currently protecting airway  - SpO2 > 92% on RA    ====================CARDIOVASCULAR==================  #Hypotension  - hx of chronic hypotension on midodrine 5mg TID  - s/p MICU admission 7/2022 with negative cosyntropin testing for adrenal insufficiency r/o  - s/p 1.2L of IVF in OR per Anesthesia Record, switched maintenance NS to LR  - POCUS with 1cm IVC  - s/p 2L LR bolus with initial improvement in SBP from 80s to 90s but now back to 80s  - started levophed for pressor support; wean as tolerated  - c/w midodrine, increased to 10 today to wean off levo     ====================/RENAL========================  #Nephrolithiasis   - hx of nephrolithiasis s/p b/l ureteral stent placement for UTI and R proximal obstructing ureteral stone and b/l renal stones 7/13/22; s/p b/l ureteral stent placement with urology 8/31  - coronel in place- putting out blood-tinged urine  - UCx (8/27) growing pseudomonas  - s/p vancomycin, Zosyn, ciprofloxacin, cefepime and ceftriaxone since admission  - urology following; appreciate recs  - c/w cefepime, d/c cipro today  - f/u repeat BCx and UCx    ====================GI/NUTRITION=====================  No active issues  - keep NPO for now given AMS    ====================INFECTIOUS DISEASE================  #UTI  - hx of nephrolithiasis s/p b/l ureteral stent placement for UTI and R proximal obstructing ureteral stone and b/l renal stones 7/13/22; s/p b/l ureteral stent placement with urology 8/31  - coronel in place- putting out blood-tinged urine  - UCx (8/27) growing pseudomonas  - s/p vancomycin, Zosyn, ciprofloxacin, cefepime and ceftriaxone since admission  - urology following; appreciate recs  - c/w ciprofloxacin and cefepime for dual coverage of pseudomonas   - f/u repeat BCx and UCx    #Hypothermia  - temp noted to be 92.4F in PACU  - c/w lloyd hugger  - f/u BCx/UCx as above    ====================ENDOCRINE=======================  No active issues  - monitor FS per routine    ====================HEMATOLOGIC/DVT PPx=============  #DVT PPx  - Hep SubQ    ====================ETHICS===========================  full code.

## 2022-09-01 NOTE — PROGRESS NOTE ADULT - SUBJECTIVE AND OBJECTIVE BOX
Interval events:   s/p bilateral URS/LL/stents   hypotensive and hypothermic overnight   accepted to MICU     OBJECTIVE:  Vital Signs Last 24 Hrs  T(C): 36.5 (01 Sep 2022 04:00), Max: 37.1 (31 Aug 2022 11:44)  T(F): 97.7 (01 Sep 2022 04:00), Max: 98.7 (31 Aug 2022 11:44)  HR: 87 (01 Sep 2022 05:00) (64 - 117)  BP: 95/57 (01 Sep 2022 05:00) (80/49 - 116/83)  BP(mean): 72 (01 Sep 2022 05:00) (60 - 91)  RR: 16 (01 Sep 2022 05:00) (14 - 27)  SpO2: 100% (01 Sep 2022 05:00) (96% - 100%)    Parameters below as of 01 Sep 2022 03:00  Patient On (Oxygen Delivery Method): room air        Physical Examination:  GEN: NAD, resting quietly  ABD: soft  : coronel       LABS:                        10.0   11.56 )-----------( 174      ( 31 Aug 2022 23:36 )             31.5       08-31    144  |  109<H>  |  10  ----------------------------<  159<H>  4.8   |  25  |  0.62    Ca    8.8      31 Aug 2022 23:36    TPro  7.6  /  Alb  3.5  /  TBili  0.2  /  DBili  x   /  AST  12  /  ALT  8<L>  /  AlkPhos  90  08-30

## 2022-09-02 LAB
ALBUMIN SERPL ELPH-MCNC: 3.1 G/DL — LOW (ref 3.3–5)
ALP SERPL-CCNC: 80 U/L — SIGNIFICANT CHANGE UP (ref 40–120)
ALT FLD-CCNC: 8 U/L — LOW (ref 10–45)
ANION GAP SERPL CALC-SCNC: 10 MMOL/L — SIGNIFICANT CHANGE UP (ref 5–17)
AST SERPL-CCNC: 17 U/L — SIGNIFICANT CHANGE UP (ref 10–40)
BILIRUB SERPL-MCNC: 0.3 MG/DL — SIGNIFICANT CHANGE UP (ref 0.2–1.2)
BUN SERPL-MCNC: 15 MG/DL — SIGNIFICANT CHANGE UP (ref 7–23)
CALCIUM SERPL-MCNC: 9.3 MG/DL — SIGNIFICANT CHANGE UP (ref 8.4–10.5)
CHLORIDE SERPL-SCNC: 109 MMOL/L — HIGH (ref 96–108)
CO2 SERPL-SCNC: 28 MMOL/L — SIGNIFICANT CHANGE UP (ref 22–31)
CREAT SERPL-MCNC: 1.03 MG/DL — SIGNIFICANT CHANGE UP (ref 0.5–1.3)
CULTURE RESULTS: NO GROWTH — SIGNIFICANT CHANGE UP
EGFR: 83 ML/MIN/1.73M2 — SIGNIFICANT CHANGE UP
GLUCOSE BLDC GLUCOMTR-MCNC: 88 MG/DL — SIGNIFICANT CHANGE UP (ref 70–99)
GLUCOSE SERPL-MCNC: 75 MG/DL — SIGNIFICANT CHANGE UP (ref 70–99)
GRAM STN FLD: SIGNIFICANT CHANGE UP
GRAM STN FLD: SIGNIFICANT CHANGE UP
HCT VFR BLD CALC: 35.1 % — LOW (ref 39–50)
HGB BLD-MCNC: 10.8 G/DL — LOW (ref 13–17)
MAGNESIUM SERPL-MCNC: 2 MG/DL — SIGNIFICANT CHANGE UP (ref 1.6–2.6)
MCHC RBC-ENTMCNC: 27.6 PG — SIGNIFICANT CHANGE UP (ref 27–34)
MCHC RBC-ENTMCNC: 30.8 GM/DL — LOW (ref 32–36)
MCV RBC AUTO: 89.5 FL — SIGNIFICANT CHANGE UP (ref 80–100)
METHOD TYPE: SIGNIFICANT CHANGE UP
NRBC # BLD: 0 /100 WBCS — SIGNIFICANT CHANGE UP (ref 0–0)
P AERUGINOSA DNA BLD POS NAA+NON-PROBE: SIGNIFICANT CHANGE UP
PHOSPHATE SERPL-MCNC: 3.6 MG/DL — SIGNIFICANT CHANGE UP (ref 2.5–4.5)
PLATELET # BLD AUTO: 148 K/UL — LOW (ref 150–400)
POTASSIUM SERPL-MCNC: 4.3 MMOL/L — SIGNIFICANT CHANGE UP (ref 3.5–5.3)
POTASSIUM SERPL-SCNC: 4.3 MMOL/L — SIGNIFICANT CHANGE UP (ref 3.5–5.3)
PROT SERPL-MCNC: 6.8 G/DL — SIGNIFICANT CHANGE UP (ref 6–8.3)
RBC # BLD: 3.92 M/UL — LOW (ref 4.2–5.8)
RBC # FLD: 15.3 % — HIGH (ref 10.3–14.5)
SODIUM SERPL-SCNC: 147 MMOL/L — HIGH (ref 135–145)
SPECIMEN SOURCE: SIGNIFICANT CHANGE UP
SPECIMEN SOURCE: SIGNIFICANT CHANGE UP
WBC # BLD: 14.44 K/UL — HIGH (ref 3.8–10.5)
WBC # FLD AUTO: 14.44 K/UL — HIGH (ref 3.8–10.5)

## 2022-09-02 PROCEDURE — 99232 SBSQ HOSP IP/OBS MODERATE 35: CPT

## 2022-09-02 PROCEDURE — 99254 IP/OBS CNSLTJ NEW/EST MOD 60: CPT | Mod: GC

## 2022-09-02 PROCEDURE — 99233 SBSQ HOSP IP/OBS HIGH 50: CPT | Mod: GC

## 2022-09-02 RX ORDER — CEFEPIME 1 G/1
2000 INJECTION, POWDER, FOR SOLUTION INTRAMUSCULAR; INTRAVENOUS EVERY 8 HOURS
Refills: 0 | Status: DISCONTINUED | OUTPATIENT
Start: 2022-09-02 | End: 2022-09-04

## 2022-09-02 RX ORDER — SODIUM CHLORIDE 9 MG/ML
1000 INJECTION INTRAMUSCULAR; INTRAVENOUS; SUBCUTANEOUS
Refills: 0 | Status: DISCONTINUED | OUTPATIENT
Start: 2022-09-02 | End: 2022-09-02

## 2022-09-02 RX ORDER — SODIUM CHLORIDE 9 MG/ML
1000 INJECTION, SOLUTION INTRAVENOUS
Refills: 0 | Status: DISCONTINUED | OUTPATIENT
Start: 2022-09-02 | End: 2022-09-02

## 2022-09-02 RX ORDER — SODIUM CHLORIDE 9 MG/ML
1000 INJECTION, SOLUTION INTRAVENOUS
Refills: 0 | Status: DISCONTINUED | OUTPATIENT
Start: 2022-09-02 | End: 2022-09-03

## 2022-09-02 RX ORDER — CHLORPROMAZINE HCL 10 MG
10 TABLET ORAL ONCE
Refills: 0 | Status: COMPLETED | OUTPATIENT
Start: 2022-09-02 | End: 2022-09-02

## 2022-09-02 RX ADMIN — Medication 10 MILLIGRAM(S): at 23:16

## 2022-09-02 RX ADMIN — CHLORHEXIDINE GLUCONATE 1 APPLICATION(S): 213 SOLUTION TOPICAL at 12:36

## 2022-09-02 RX ADMIN — HEPARIN SODIUM 5000 UNIT(S): 5000 INJECTION INTRAVENOUS; SUBCUTANEOUS at 22:54

## 2022-09-02 RX ADMIN — CEFEPIME 100 MILLIGRAM(S): 1 INJECTION, POWDER, FOR SOLUTION INTRAMUSCULAR; INTRAVENOUS at 15:44

## 2022-09-02 RX ADMIN — SODIUM CHLORIDE 100 MILLILITER(S): 9 INJECTION, SOLUTION INTRAVENOUS at 22:56

## 2022-09-02 RX ADMIN — CEFEPIME 100 MILLIGRAM(S): 1 INJECTION, POWDER, FOR SOLUTION INTRAMUSCULAR; INTRAVENOUS at 06:47

## 2022-09-02 RX ADMIN — MIDODRINE HYDROCHLORIDE 10 MILLIGRAM(S): 2.5 TABLET ORAL at 06:46

## 2022-09-02 RX ADMIN — CHLORHEXIDINE GLUCONATE 1 APPLICATION(S): 213 SOLUTION TOPICAL at 06:46

## 2022-09-02 RX ADMIN — HEPARIN SODIUM 5000 UNIT(S): 5000 INJECTION INTRAVENOUS; SUBCUTANEOUS at 06:46

## 2022-09-02 RX ADMIN — SODIUM CHLORIDE 100 MILLILITER(S): 9 INJECTION INTRAMUSCULAR; INTRAVENOUS; SUBCUTANEOUS at 12:19

## 2022-09-02 RX ADMIN — MUPIROCIN 1 APPLICATION(S): 20 OINTMENT TOPICAL at 15:52

## 2022-09-02 RX ADMIN — MUPIROCIN 1 APPLICATION(S): 20 OINTMENT TOPICAL at 00:55

## 2022-09-02 RX ADMIN — CEFEPIME 100 MILLIGRAM(S): 1 INJECTION, POWDER, FOR SOLUTION INTRAMUSCULAR; INTRAVENOUS at 22:54

## 2022-09-02 RX ADMIN — HEPARIN SODIUM 5000 UNIT(S): 5000 INJECTION INTRAVENOUS; SUBCUTANEOUS at 15:44

## 2022-09-02 NOTE — CONSULT NOTE ADULT - SUBJECTIVE AND OBJECTIVE BOX
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   Low K was noted on pre-op testing.   (27 Aug 2022 09:33)    Request for wound care consult for sacral/bilateral buttocks skin breakdown received from nursing. Mr. Rojas was encountered on an alternating air with low air loss surface.  His extreme immobility, inactivity, gross incontinence of urine and stool as well as poor nutritional status all contribute to his high risk for pressure injury development and hinder healing.    PAST MEDICAL & SURGICAL HISTORY:  H/O cerebellar ataxia, -diagnosed in 2019  History of hypotension  Anemia  Lacunar infarction, -chronic  Pneumonia, aspiration, -april 2022 (intubated for 7 days at [facility])  Adrenal insufficiency  No significant past surgical history    MEDICATIONS  (STANDING):  ascorbic acid 500 milliGRAM(s) Oral daily  cefepime   IVPB      cefepime   IVPB 2000 milliGRAM(s) IV Intermittent every 12 hours  chlorhexidine 2% Cloths 1 Application(s) Topical daily  chlorhexidine 4% Liquid 1 Application(s) Topical <User Schedule>  heparin   Injectable 5000 Unit(s) SubCutaneous every 8 hours  midodrine. 10 milliGRAM(s) Oral three times a day  multivitamin 1 Tablet(s) Oral daily  mupirocin 2% Nasal 1 Application(s) Both Nostrils two times a day  sodium chloride 0.9%. 1000 milliLiter(s) (100 mL/Hr) IV Continuous <Continuous>    MEDICATIONS  (PRN):    Allergies    No Known Allergies    Intolerances    Vital Signs Last 24 Hrs  T(C): 36.7 (02 Sep 2022 04:41), Max: 36.9 (01 Sep 2022 20:15)  T(F): 98 (02 Sep 2022 04:41), Max: 98.4 (01 Sep 2022 20:15)  HR: 61 (02 Sep 2022 10:04) (61 - 89)  BP: 87/55 (02 Sep 2022 10:04) (86/54 - 104/60)  BP(mean): 75 (01 Sep 2022 15:00) (69 - 75)  RR: 18 (02 Sep 2022 04:41) (18 - 24)  SpO2: 98% (02 Sep 2022 10:04) (97% - 98%)    Parameters below as of 02 Sep 2022 10:04  Patient On (Oxygen Delivery Method): room air    Physical Exam:  General: obtunded, agitated with stimulation  Respiratory: equal chest rise with respirations  Gastrointestinal: soft NT/ND  Neurology: nonverbal, not following commands  Musculoskeletal: no contractures  Vascular: BLE edema equal  Skin:  Sacral/bilateral buttocks with scattered, small superficially denuded areas of skin over an area of  L 4cm X W 5cm x D 0.1cm with pink wound bed, no necrotic tissue, and scant serosanguinous drainage, periwound hyperpigmented  Left elbow with hyperpigmentation and small round hypopigmented spot with no skin loss  No odor, erythema, increased warmth, tenderness, induration, fluctuance    LABS:  09-02    147<H>  |  109<H>  |  15  ----------------------------<  75  4.3   |  28  |  1.03    Ca    9.3      02 Sep 2022 06:59  Phos  3.6     09-02  Mg     2.0     09-02    TPro  6.8  /  Alb  3.1<L>  /  TBili  0.3  /  DBili  x   /  AST  17  /  ALT  8<L>  /  AlkPhos  80  09-02                          10.8   14.44 )-----------( 148      ( 02 Sep 2022 06:59 )             35.1

## 2022-09-02 NOTE — OCCUPATIONAL THERAPY INITIAL EVALUATION ADULT - MANUAL MUSCLE TESTING RESULTS, REHAB EVAL
not able to follow directions. Minimal spontaneous movement noted b/l UE/LE, not WFL./grossly assessed due to

## 2022-09-02 NOTE — OCCUPATIONAL THERAPY INITIAL EVALUATION ADULT - NSOTDISCHREC_GEN_A_CORE
IF home, will require assist for all ADL/mobility & home OT to address ADL/IADL, AE/DME, and fall prevention/Sub-acute Rehab

## 2022-09-02 NOTE — CONSULT NOTE ADULT - ASSESSMENT
Impression:    Sacral/bilateral Buttocks stage 2 pressure injury present on admission  Left elbow hyperpigmentation  Incontinence of bowel and bladder  Incontinence Dermatitis    Recommend:  1.) topical therapy: sacral/buttock injury - cleanse with incontinence cleanser, pat dry, apply Triad ointment BID and PRN for incontinent episodes  Left elbow - cleanse with NS, pat dry, apply allevyn foam dressing ever other day.  2.) Incontinence Management - incontinence cleanser, pads, pericare BID  3.) Maintain on an alternating air with low air loss surface  4.) Turn and reposition Q 2 hours  5.) Nutrition optimization  6.) Offload heels/feet with complete cair air fluidized boots; ensure that the soles of the feet are not resting on the foot board of the bed.    Care as per medicine. Will not actively follow but will remain available. Please recall for new issues or deterioration.  Upon discharge f/[facility] [address] [place] [address] [phone]  Thank you for this consult  Giovanna Le, NP-C, CWOCN [id number]

## 2022-09-02 NOTE — OCCUPATIONAL THERAPY INITIAL EVALUATION ADULT - PERTINENT HX OF CURRENT PROBLEM, REHAB EVAL
61M with PMH of cerebellar ataxia, chronic lacunar infarcts, hypotension on midodrine admitted 8/27 for abnormal labs including hypokalemia. History of nephrolithiasis s/p 7/13/22 BL ureteral stent placement for UTI and right proximal obstructing ureteral stone, and b/l renal stones. Pt growing Pseudomonas in UCx 8/27, BCx 8/27 NGTD. S/p b/l ureteral stent placement 8/31, s/p vanc, zosyn, cipro, cefepime, and ceftriaxone since admission, now on cipro and added on cefepime. MICU stay for pressure support. Downgraded to floors 9/2/22.

## 2022-09-02 NOTE — CONSULT NOTE ADULT - ASSESSMENT
61 M w/ hx of progressive cerebllar ataxia, chronic lacunar infarcts, chronic HoTN on midodrine presenting with PsA UTI s/p bilateral ureteroscopy with stent placement with course c/b septic shock 2/2 PsA bacteremia. ID consulted for antibiotic management.    //PsA bacteremia  -Likely 2/2 translocation during  instrumentation  //PsA UTI  //Nephrolithiasis  //Cerebellar ataxia    Suggest:  -May continue with cefepime 2g q12, will discuss dose adjustment with ID pharmacy  -Please obtain daily CBCs with differentials  -Please obtain repeat lactate  -F/u repeat blood cx (9/2)  -Barnett catheter/stent management per Urology    INCOMPLETE NOTE, pending d/w attending Dr. Judge 61 M w/ hx of progressive cerebllar ataxia, chronic lacunar infarcts, chronic HoTN on midodrine presenting with pseudomonas aeroginosa UTI s/p bilateral ureteroscopy with stent placement with course c/b septic shock 2/2 PsA bacteremia. ID consulted for antibiotic management.    //PsA bacteremia  -Likely 2/2 translocation during  instrumentation. 1/4 bottles.  //PsA UTI  //Nephrolithiasis  //Cerebellar ataxia    Suggest:  -May continue with cefepime 2g q12, will discuss dose adjustment with ID pharmacy  -Please obtain daily CBCs with differentials  -Please obtain repeat lactate  -F/u repeat blood cx (9/2)  -Barnett catheter/stent management per Urology    INCOMPLETE NOTE, pending d/w attending Dr. Judge 61 M w/ hx of progressive cerebllar ataxia, chronic lacunar infarcts, chronic HoTN on midodrine presenting with pseudomonas aeroginosa UTI s/p bilateral ureteroscopy with stent placement with course c/b septic shock 2/2 PsA bacteremia. ID consulted for antibiotic management.    //PsA bacteremia  -Likely 2/2 translocation during  instrumentation. 2/4 bottles.  //PsA UTI  //Nephrolithiasis  //Cerebellar ataxia    Suggest:  -May continue with cefepime 2g q12, will discuss dose adjustment with ID pharmacy  -Please obtain daily CBCs with differentials  -Please obtain repeat lactate  -F/u repeat blood cx (9/2)  -Barnett catheter/stent management per Urology    INCOMPLETE NOTE, pending d/w attending Dr. Judge 61 M w/ hx of progressive cerebllar ataxia, chronic lacunar infarcts, chronic HoTN on midodrine presenting with pseudomonas aeroginosa UTI s/p bilateral ureteroscopy with stent placement with course c/b septic shock 2/2 PsA bacteremia. ID consulted for antibiotic management.    //PsA bacteremia  -Likely 2/2 translocation during  instrumentation. 2/4 bottles.  //PsA UTI  //Nephrolithiasis  //Cerebellar ataxia    Suggest:  -Please INCREASE cefepime frequency to 2g q8h  -F/u repeat blood cx (9/2)  -Barnett catheter/stent management per Urology/Primary team    Seen and d/w attending, Dr. Judge. D/w primary team YOSELIN Haro.

## 2022-09-02 NOTE — PROGRESS NOTE ADULT - SUBJECTIVE AND OBJECTIVE BOX
CARDIOLOGY     PROGRESS  NOTE   ________________________________________________    CHIEF COMPLAINT:Patient is a 61y old  Male who presents with a chief complaint of Hypokalemia (01 Sep 2022 11:00)  no complain.  	  REVIEW OF SYSTEMS:  CONSTITUTIONAL: No fever, weight loss, or fatigue  EYES: No eye pain, visual disturbances, or discharge  ENT:  No difficulty hearing, tinnitus, vertigo; No sinus or throat pain  NECK: No pain or stiffness  RESPIRATORY: No cough, wheezing, chills or hemoptysis; No Shortness of Breath  CARDIOVASCULAR: No chest pain, palpitations, passing out, dizziness, or leg swelling  GASTROINTESTINAL: No abdominal or epigastric pain. No nausea, vomiting, or hematemesis; No diarrhea or constipation. No melena or hematochezia.  GENITOURINARY: No dysuria, frequency, hematuria, or incontinence  NEUROLOGICAL: No headaches, memory loss, loss of strength, numbness, or tremors  SKIN: No itching, burning, rashes, or lesions   LYMPH Nodes: No enlarged glands  ENDOCRINE: No heat or cold intolerance; No hair loss  MUSCULOSKELETAL: No joint pain or swelling; No muscle, back, or extremity pain  PSYCHIATRIC: No depression, anxiety, mood swings, or difficulty sleeping  HEME/LYMPH: No easy bruising, or bleeding gums  ALLERGY AND IMMUNOLOGIC: No hives or eczema	    [ ] All others negative	  [ x] Unable to obtain    PHYSICAL EXAM:  T(C): 36.7 (09-02-22 @ 04:41), Max: 36.9 (09-01-22 @ 20:15)  HR: 66 (09-02-22 @ 04:41) (66 - 94)  BP: 93/60 (09-02-22 @ 04:41) (84/53 - 108/69)  RR: 18 (09-02-22 @ 04:41) (15 - 26)  SpO2: 98% (09-02-22 @ 04:41) (96% - 100%)  Wt(kg): --  I&O's Summary    01 Sep 2022 07:01  -  02 Sep 2022 07:00  --------------------------------------------------------  IN: 1103.4 mL / OUT: 2100 mL / NET: -996.6 mL        Appearance: Normal	  HEENT:   Normal oral mucosa, PERRL, EOMI	  Lymphatic: No lymphadenopathy  Cardiovascular: Normal S1 S2, No JVD, + murmurs, No edema  Respiratory: Lungs clear to auscultation	  Gastrointestinal:  Soft, Non-tender, + BS	  Skin: No rashes, No ecchymoses, No cyanosis	  Neurologic: Non-focal  Extremities: Normal range of motion, No clubbing, cyanosis or edema  Vascular: Peripheral pulses palpable 2+ bilaterally    MEDICATIONS  (STANDING):  ascorbic acid 500 milliGRAM(s) Oral daily  cefepime   IVPB      cefepime   IVPB 2000 milliGRAM(s) IV Intermittent every 12 hours  chlorhexidine 2% Cloths 1 Application(s) Topical daily  chlorhexidine 4% Liquid 1 Application(s) Topical <User Schedule>  heparin   Injectable 5000 Unit(s) SubCutaneous every 8 hours  midodrine. 10 milliGRAM(s) Oral three times a day  multivitamin 1 Tablet(s) Oral daily  mupirocin 2% Nasal 1 Application(s) Both Nostrils two times a day      TELEMETRY: 	    ECG:  	  RADIOLOGY:  OTHER: 	  	  LABS:	 	    CARDIAC MARKERS:                                10.8   14.44 )-----------( 148      ( 02 Sep 2022 06:59 )             35.1     09-01    146<H>  |  109<H>  |  14  ----------------------------<  96  4.3   |  27  |  0.92    Ca    9.2      01 Sep 2022 21:50  Phos  3.0     09-01  Mg     1.7     09-01      proBNP: Serum Pro-Brain Natriuretic Peptide: 267 pg/mL (08-09 @ 16:44)    Lipid Profile: Cholesterol 193  LDL --  HDL 30  TG 91    HgA1c:   TSH: Thyroid Stimulating Hormone, Serum: 1.51 uIU/mL (08-27 @ 07:27)  Thyroid Stimulating Hormone, Serum: 1.00 uIU/mL (08-09 @ 16:44)          Assessment and plan  ---------------------------  61 M w/ PMH of progressive Cerebellar Ataxia, Chronic Lacunar infarcts, 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   pt is well known to me with hx of severe orthostatic hypotension on florinef/ ?midodrine with bradycardia, pt had a normal am cortisol level.  ivf, keep hydrated  autonomic dysfunction, over all controlled with midodrine, don't increase dose sec to bradycardia  check tsh  dvt prophylaxis  if sig bradycardia will change Midodrine to Florinef if remains bradycardic  s/p syrgery transferred to MICU sec to hypotension  continue iv abx/ pressor  supportive care  continue iv abx  encourage fluid

## 2022-09-02 NOTE — SWALLOW BEDSIDE ASSESSMENT ADULT - COMMENTS
h/o cerebellar ataxia, with  no defined cause found, pt  with  bradycardia, on midodrine, ? central, seen by card/  echo on 4/2022, normal  ef, h/o  recurrent , intermittent   hypothermia, not related to any   infectious process, it seems to be dysautonomia/ with h/o normal cortisol level; prior CT c/ap, no malignant process bed bound status and altered baseline mental status at times  ID eval   urology  planning   surg  in am /  cleraed  for  procedure   dvt ppx/  q/q  heparin
h/o cerebellar ataxia, with  no defined cause found, pt  with  bradycardia, on midodrine, ? central, seen by card/  echo on 4/2022, normal  ef, h/o  recurrent , intermittent   hypothermia, not related to any   infectious process, it seems to be dysautonomia/ with h/o normal cortisol level; prior CT c/ap, no malignant process bed bound status and altered baseline mental status at times  ID eval   urology  planning   surg  in am /  cleraed  for  procedure   dvt ppx/  q/q  heparin

## 2022-09-02 NOTE — OCCUPATIONAL THERAPY INITIAL EVALUATION ADULT - LEVEL OF INDEPENDENCE: SIT/STAND, REHAB EVAL
will assess as appropriate; pt with poor sitting balance & mental status at time of eval./unable to perform

## 2022-09-02 NOTE — PROGRESS NOTE ADULT - ASSESSMENT
Plan: now  admitted  with  hypokalemia, all corrected, K is 5.1.      h/o  cerebellar  ataxia,  with  no defined  cause  found    pt  with  bradycardia, ?  central, seen  by  card/  echo  on 4/2022,  normal  ef  h/o  recurrent , intermittent   hypothermia,    not related  to  any   infectious   process,  it  seems to be  dysautonomia/   with  h/o normal  cortisol level.   prior  CT  c/ap,  no  malignant  process  on feeds  per  swallow  eval   bed bound   status  and  altered  baseline  mental  status at  times  h/o bradycardia   f/p  by  card   on  midodrine 10 mg tid    UTI, Pseudomonas,  on iv rocephim/  house  ID eval, now Cefepeme   urology  planning   surg  in am /  cleraed  for  procedure  dvt ppx/  q/q  heparin, IVF while NPO. IV Levophed while in MICU, tolerates IVF well, try to wean pressors over 24 hours.                          Plan: now  admitted  with  hypokalemia, all corrected, K is 5.1.      h/o  cerebellar  ataxia,  with  no defined  cause  found    pt  with  bradycardia, ?  central, seen  by  card/  echo  on 4/2022,  normal  ef  h/o  recurrent , intermittent   hypothermia,    not related  to  any   infectious   process,  it  seems to be  dysautonomia/   with  h/o normal  cortisol level.  prior  CT  c/ap,  no  malignant  process  on feeds  per  swallow  eval.      Continue Midodrine 10 mg tid    UTI, Pseudomonas,  on IIV Cefepeme,     Advance diet, IVF , PT and discharge home after weekend.                   Plan: now  admitted  with  hypokalemia, all corrected, K is 5.1., h/o  cerebellar  ataxia,  with  no defined  cause  found    pt  with  bradycardia, ?  central, seen  by  card/  echo  on 4/2022,  normal  ef  h/o  recurrent , intermittent   hypothermia,    not related  to  any   infectious   process,  it  seems to be  dysautonomia/   with  h/o normal  cortisol level.  prior  CT  c/ap,  no  malignant  process  on feeds  per  swallow  eval.     Plan:  Continue Midodrine 10 mg tid, UTI, Pseudomonas,  on IIV Cefepeme,     Advance diet, IVF , PT and discharge home after weekend. Positive blood cultures, ID consult was called, contineu Cefepeme for now.

## 2022-09-02 NOTE — PROGRESS NOTE ADULT - SUBJECTIVE AND OBJECTIVE BOX
INTERVAL HPI/OVERNIGHT EVENTS:  Pt seen and examined at bedside.     Allergies/Intolerance: No Known Allergies      MEDICATIONS  (STANDING):  ascorbic acid 500 milliGRAM(s) Oral daily  cefepime   IVPB      cefepime   IVPB 2000 milliGRAM(s) IV Intermittent every 12 hours  chlorhexidine 2% Cloths 1 Application(s) Topical daily  chlorhexidine 4% Liquid 1 Application(s) Topical <User Schedule>  heparin   Injectable 5000 Unit(s) SubCutaneous every 8 hours  midodrine. 10 milliGRAM(s) Oral three times a day  multivitamin 1 Tablet(s) Oral daily  mupirocin 2% Nasal 1 Application(s) Both Nostrils two times a day    MEDICATIONS  (PRN):        ROS: all systems reviewed and wnl      PHYSICAL EXAMINATION:  Vital Signs Last 24 Hrs  T(C): 36.7 (02 Sep 2022 04:41), Max: 36.9 (01 Sep 2022 20:15)  T(F): 98 (02 Sep 2022 04:41), Max: 98.4 (01 Sep 2022 20:15)  HR: 66 (02 Sep 2022 04:41) (66 - 89)  BP: 93/60 (02 Sep 2022 04:41) (85/60 - 104/60)  BP(mean): 75 (01 Sep 2022 15:00) (66 - 75)  RR: 18 (02 Sep 2022 04:41) (18 - 26)  SpO2: 98% (02 Sep 2022 04:41) (96% - 100%)    Parameters below as of 02 Sep 2022 04:41  Patient On (Oxygen Delivery Method): room air      CAPILLARY BLOOD GLUCOSE          09-01 @ 07:01  -  09-02 @ 07:00  --------------------------------------------------------  IN: 1103.4 mL / OUT: 2100 mL / NET: -996.6 mL        GENERAL:   NECK: supple, No JVD  CHEST/LUNG: clear to auscultation bilaterally; no rales, rhonchi, or wheezing b/l  HEART: normal S1, S2  ABDOMEN: BS+, soft, ND, NT   EXTREMITIES:  pulses palpable; no clubbing, cyanosis, or edema b/l LEs  SKIN: no rashes or lesions      LABS:                        10.8   14.44 )-----------( 148      ( 02 Sep 2022 06:59 )             35.1     09-02    147<H>  |  109<H>  |  15  ----------------------------<  75  4.3   |  28  |  1.03    Ca    9.3      02 Sep 2022 06:59  Phos  3.6     09-02  Mg     2.0     09-02    TPro  6.8  /  Alb  3.1<L>  /  TBili  0.3  /  DBili  x   /  AST  17  /  ALT  8<L>  /  AlkPhos  80  09-02               INTERVAL HPI/OVERNIGHT EVENTS:  Pt seen and examined at bedside.     Allergies/Intolerance: No Known Allergies      MEDICATIONS  (STANDING):  ascorbic acid 500 milliGRAM(s) Oral daily  cefepime   IVPB      cefepime   IVPB 2000 milliGRAM(s) IV Intermittent every 12 hours  chlorhexidine 2% Cloths 1 Application(s) Topical daily  chlorhexidine 4% Liquid 1 Application(s) Topical <User Schedule>  heparin   Injectable 5000 Unit(s) SubCutaneous every 8 hours  midodrine. 10 milliGRAM(s) Oral three times a day  multivitamin 1 Tablet(s) Oral daily  mupirocin 2% Nasal 1 Application(s) Both Nostrils two times a day    MEDICATIONS  (PRN):        ROS: all systems reviewed and wnl      PHYSICAL EXAMINATION:  Vital Signs Last 24 Hrs  T(C): 36.7 (02 Sep 2022 04:41), Max: 36.9 (01 Sep 2022 20:15)  T(F): 98 (02 Sep 2022 04:41), Max: 98.4 (01 Sep 2022 20:15)  HR: 66 (02 Sep 2022 04:41) (66 - 89)  BP: 93/60 (02 Sep 2022 04:41) (85/60 - 104/60)  BP(mean): 75 (01 Sep 2022 15:00) (66 - 75)  RR: 18 (02 Sep 2022 04:41) (18 - 26)  SpO2: 98% (02 Sep 2022 04:41) (96% - 100%)    Parameters below as of 02 Sep 2022 04:41  Patient On (Oxygen Delivery Method): room air      CAPILLARY BLOOD GLUCOSE          09-01 @ 07:01  -  09-02 @ 07:00  --------------------------------------------------------  IN: 1103.4 mL / OUT: 2100 mL / NET: -996.6 mL        GENERAL: stable, in bed, comfortable.   NECK: supple, No JVD  CHEST/LUNG: clear to auscultation bilaterally; no rales, rhonchi, or wheezing b/l  HEART: normal S1, S2  ABDOMEN: BS+, soft, ND, NT   EXTREMITIES:  pulses palpable; no clubbing, cyanosis, or edema b/l LEs        LABS:                        10.8   14.44 )-----------( 148      ( 02 Sep 2022 06:59 )             35.1     09-02    147<H>  |  109<H>  |  15  ----------------------------<  75  4.3   |  28  |  1.03    Ca    9.3      02 Sep 2022 06:59  Phos  3.6     09-02  Mg     2.0     09-02    TPro  6.8  /  Alb  3.1<L>  /  TBili  0.3  /  DBili  x   /  AST  17  /  ALT  8<L>  /  AlkPhos  80  09-02

## 2022-09-02 NOTE — PROGRESS NOTE ADULT - SUBJECTIVE AND OBJECTIVE BOX
Interval events:   blood cultures: GNR     OBJECTIVE:  Vital Signs Last 24 Hrs  T(C): 36.7 (02 Sep 2022 04:41), Max: 36.9 (01 Sep 2022 20:15)  T(F): 98 (02 Sep 2022 04:41), Max: 98.4 (01 Sep 2022 20:15)  HR: 66 (02 Sep 2022 04:41) (66 - 89)  BP: 93/60 (02 Sep 2022 04:41) (85/60 - 108/69)  BP(mean): 75 (01 Sep 2022 15:00) (66 - 84)  RR: 18 (02 Sep 2022 04:41) (15 - 26)  SpO2: 98% (02 Sep 2022 04:41) (96% - 100%)    Parameters below as of 02 Sep 2022 04:41  Patient On (Oxygen Delivery Method): room air        Physical Examination:  GEN: NAD, resting quietly  ABD: soft  : coronel clear       LABS:                        10.8   14.44 )-----------( 148      ( 02 Sep 2022 06:59 )             35.1       09-02    147<H>  |  109<H>  |  15  ----------------------------<  75  4.3   |  28  |  1.03    Ca    9.3      02 Sep 2022 06:59  Phos  3.6     09-02  Mg     2.0     09-02    TPro  6.8  /  Alb  3.1<L>  /  TBili  0.3  /  DBili  x   /  AST  17  /  ALT  8<L>  /  AlkPhos  80  09-02

## 2022-09-02 NOTE — CHART NOTE - NSCHARTNOTEFT_GEN_A_CORE
Notified by RN of BCx sent 8/31 growing gram negative rods in aerobic bottle. Patient remains on cefepime at this time. Will send repeat cultures with AM labs. Vital signs stable; will continue to monitor. Consider ID consult in AM. RN aware of current plan.     Geraldine BARBER  Medicine

## 2022-09-02 NOTE — OCCUPATIONAL THERAPY INITIAL EVALUATION ADULT - DIAGNOSIS, OT EVAL
Pt presents with decreased command following, impaired cognition, muscle tone, decreased ROM, strength, endurance, balance, and coordination, all impacting ability to perform ADLs and functional mobility.

## 2022-09-02 NOTE — PROGRESS NOTE ADULT - ASSESSMENT
62 yo male s/p bilateral URS/LL/Stents     - Do NOT remove coronel   - Will dc coronel and left stent once clinically improved   - Right stent to be removed outpatient    - Can continue home meds  - Recommend antibiotic coverage to include prior culture resistance pattern: OR culture from July 14 Klebsiella intermediate to cefepime (sensitive to meropenem/gentamicin/cipro)   - Agree with ID consult       Remainder of care per primary     Barbra Barth   Available on Teams  62 yo male s/p bilateral URS/LL/Stents     - Do NOT remove coronel   - Will dc coronel and left stent once clinically improved   - Right stent to be removed outpatient    - Can continue home meds  - Recommend antibiotic coverage to include prior culture resistance pattern: OR culture from July 14 pseudomonas intermediate to cefepime (sensitive to meropenem/gentamicin/cipro)   - Agree with ID consult       Remainder of care per primary     Barbra Barth   Available on Teams

## 2022-09-02 NOTE — OCCUPATIONAL THERAPY INITIAL EVALUATION ADULT - MUSCLE TONE ASSESSMENT, REHAB EVAL
bilateral upper extremities/bilateral lower extremities/mildly increased tone/moderately increased tone

## 2022-09-02 NOTE — OCCUPATIONAL THERAPY INITIAL EVALUATION ADULT - LIVES WITH, PROFILE
Pt unable to provide history at this time. Per chart review, pt lives in a house with his wife, 4 steps to enter. Pt has had recent hospital admissions & priror to past admissions was walking however recently has been stand pivot transferring bed<>wheelchair with assist from son. Pt's son assists with all ADL & mobility in day, and wife assists at night. Pt owns w/c & RW.

## 2022-09-03 LAB
-  AMIKACIN: SIGNIFICANT CHANGE UP
-  AMIKACIN: SIGNIFICANT CHANGE UP
-  AZTREONAM: SIGNIFICANT CHANGE UP
-  AZTREONAM: SIGNIFICANT CHANGE UP
-  CEFEPIME: SIGNIFICANT CHANGE UP
-  CEFEPIME: SIGNIFICANT CHANGE UP
-  CEFTAZIDIME: SIGNIFICANT CHANGE UP
-  CEFTAZIDIME: SIGNIFICANT CHANGE UP
-  CIPROFLOXACIN: SIGNIFICANT CHANGE UP
-  CIPROFLOXACIN: SIGNIFICANT CHANGE UP
-  GENTAMICIN: SIGNIFICANT CHANGE UP
-  GENTAMICIN: SIGNIFICANT CHANGE UP
-  IMIPENEM: SIGNIFICANT CHANGE UP
-  IMIPENEM: SIGNIFICANT CHANGE UP
-  LEVOFLOXACIN: SIGNIFICANT CHANGE UP
-  LEVOFLOXACIN: SIGNIFICANT CHANGE UP
-  MEROPENEM: SIGNIFICANT CHANGE UP
-  MEROPENEM: SIGNIFICANT CHANGE UP
-  PIPERACILLIN/TAZOBACTAM: SIGNIFICANT CHANGE UP
-  PIPERACILLIN/TAZOBACTAM: SIGNIFICANT CHANGE UP
-  TOBRAMYCIN: SIGNIFICANT CHANGE UP
-  TOBRAMYCIN: SIGNIFICANT CHANGE UP
ANION GAP SERPL CALC-SCNC: 11 MMOL/L — SIGNIFICANT CHANGE UP (ref 5–17)
ANION GAP SERPL CALC-SCNC: 8 MMOL/L — SIGNIFICANT CHANGE UP (ref 5–17)
BUN SERPL-MCNC: 15 MG/DL — SIGNIFICANT CHANGE UP (ref 7–23)
BUN SERPL-MCNC: 16 MG/DL — SIGNIFICANT CHANGE UP (ref 7–23)
CALCIUM SERPL-MCNC: 9.4 MG/DL — SIGNIFICANT CHANGE UP (ref 8.4–10.5)
CALCIUM SERPL-MCNC: 9.7 MG/DL — SIGNIFICANT CHANGE UP (ref 8.4–10.5)
CHLORIDE SERPL-SCNC: 110 MMOL/L — HIGH (ref 96–108)
CHLORIDE SERPL-SCNC: 111 MMOL/L — HIGH (ref 96–108)
CO2 SERPL-SCNC: 28 MMOL/L — SIGNIFICANT CHANGE UP (ref 22–31)
CO2 SERPL-SCNC: 29 MMOL/L — SIGNIFICANT CHANGE UP (ref 22–31)
CREAT SERPL-MCNC: 0.86 MG/DL — SIGNIFICANT CHANGE UP (ref 0.5–1.3)
CREAT SERPL-MCNC: 0.93 MG/DL — SIGNIFICANT CHANGE UP (ref 0.5–1.3)
CULTURE RESULTS: SIGNIFICANT CHANGE UP
CULTURE RESULTS: SIGNIFICANT CHANGE UP
EGFR: 94 ML/MIN/1.73M2 — SIGNIFICANT CHANGE UP
EGFR: 99 ML/MIN/1.73M2 — SIGNIFICANT CHANGE UP
GLUCOSE SERPL-MCNC: 94 MG/DL — SIGNIFICANT CHANGE UP (ref 70–99)
GLUCOSE SERPL-MCNC: 99 MG/DL — SIGNIFICANT CHANGE UP (ref 70–99)
GRAM STN FLD: SIGNIFICANT CHANGE UP
HCT VFR BLD CALC: 33.3 % — LOW (ref 39–50)
HGB BLD-MCNC: 10.2 G/DL — LOW (ref 13–17)
MCHC RBC-ENTMCNC: 27.6 PG — SIGNIFICANT CHANGE UP (ref 27–34)
MCHC RBC-ENTMCNC: 30.6 GM/DL — LOW (ref 32–36)
MCV RBC AUTO: 90 FL — SIGNIFICANT CHANGE UP (ref 80–100)
METHOD TYPE: SIGNIFICANT CHANGE UP
METHOD TYPE: SIGNIFICANT CHANGE UP
NRBC # BLD: 0 /100 WBCS — SIGNIFICANT CHANGE UP (ref 0–0)
ORGANISM # SPEC MICROSCOPIC CNT: SIGNIFICANT CHANGE UP
PLATELET # BLD AUTO: 133 K/UL — LOW (ref 150–400)
POTASSIUM SERPL-MCNC: 3.7 MMOL/L — SIGNIFICANT CHANGE UP (ref 3.5–5.3)
POTASSIUM SERPL-MCNC: 3.8 MMOL/L — SIGNIFICANT CHANGE UP (ref 3.5–5.3)
POTASSIUM SERPL-SCNC: 3.7 MMOL/L — SIGNIFICANT CHANGE UP (ref 3.5–5.3)
POTASSIUM SERPL-SCNC: 3.8 MMOL/L — SIGNIFICANT CHANGE UP (ref 3.5–5.3)
RBC # BLD: 3.7 M/UL — LOW (ref 4.2–5.8)
RBC # FLD: 15.5 % — HIGH (ref 10.3–14.5)
SARS-COV-2 RNA SPEC QL NAA+PROBE: SIGNIFICANT CHANGE UP
SODIUM SERPL-SCNC: 147 MMOL/L — HIGH (ref 135–145)
SODIUM SERPL-SCNC: 150 MMOL/L — HIGH (ref 135–145)
SPECIMEN SOURCE: SIGNIFICANT CHANGE UP
SPECIMEN SOURCE: SIGNIFICANT CHANGE UP
WBC # BLD: 9.15 K/UL — SIGNIFICANT CHANGE UP (ref 3.8–10.5)
WBC # FLD AUTO: 9.15 K/UL — SIGNIFICANT CHANGE UP (ref 3.8–10.5)

## 2022-09-03 PROCEDURE — 70450 CT HEAD/BRAIN W/O DYE: CPT | Mod: 26

## 2022-09-03 RX ORDER — SODIUM CHLORIDE 9 MG/ML
1000 INJECTION, SOLUTION INTRAVENOUS
Refills: 0 | Status: DISCONTINUED | OUTPATIENT
Start: 2022-09-03 | End: 2022-09-05

## 2022-09-03 RX ORDER — MIDODRINE HYDROCHLORIDE 2.5 MG/1
5 TABLET ORAL THREE TIMES A DAY
Refills: 0 | Status: DISCONTINUED | OUTPATIENT
Start: 2022-09-03 | End: 2022-09-12

## 2022-09-03 RX ORDER — SODIUM CHLORIDE 9 MG/ML
1000 INJECTION, SOLUTION INTRAVENOUS
Refills: 0 | Status: DISCONTINUED | OUTPATIENT
Start: 2022-09-03 | End: 2022-09-03

## 2022-09-03 RX ADMIN — CHLORHEXIDINE GLUCONATE 1 APPLICATION(S): 213 SOLUTION TOPICAL at 06:01

## 2022-09-03 RX ADMIN — CHLORHEXIDINE GLUCONATE 1 APPLICATION(S): 213 SOLUTION TOPICAL at 13:38

## 2022-09-03 RX ADMIN — HEPARIN SODIUM 5000 UNIT(S): 5000 INJECTION INTRAVENOUS; SUBCUTANEOUS at 13:36

## 2022-09-03 RX ADMIN — CEFEPIME 100 MILLIGRAM(S): 1 INJECTION, POWDER, FOR SOLUTION INTRAMUSCULAR; INTRAVENOUS at 14:51

## 2022-09-03 RX ADMIN — MUPIROCIN 1 APPLICATION(S): 20 OINTMENT TOPICAL at 15:00

## 2022-09-03 RX ADMIN — HEPARIN SODIUM 5000 UNIT(S): 5000 INJECTION INTRAVENOUS; SUBCUTANEOUS at 21:32

## 2022-09-03 RX ADMIN — SODIUM CHLORIDE 100 MILLILITER(S): 9 INJECTION, SOLUTION INTRAVENOUS at 21:31

## 2022-09-03 RX ADMIN — Medication 500 MILLIGRAM(S): at 13:37

## 2022-09-03 RX ADMIN — HEPARIN SODIUM 5000 UNIT(S): 5000 INJECTION INTRAVENOUS; SUBCUTANEOUS at 06:01

## 2022-09-03 RX ADMIN — SODIUM CHLORIDE 100 MILLILITER(S): 9 INJECTION, SOLUTION INTRAVENOUS at 08:37

## 2022-09-03 RX ADMIN — Medication 1 TABLET(S): at 13:37

## 2022-09-03 RX ADMIN — CEFEPIME 100 MILLIGRAM(S): 1 INJECTION, POWDER, FOR SOLUTION INTRAMUSCULAR; INTRAVENOUS at 06:01

## 2022-09-03 RX ADMIN — MIDODRINE HYDROCHLORIDE 5 MILLIGRAM(S): 2.5 TABLET ORAL at 13:38

## 2022-09-03 RX ADMIN — SODIUM CHLORIDE 75 MILLILITER(S): 9 INJECTION, SOLUTION INTRAVENOUS at 02:13

## 2022-09-03 RX ADMIN — MIDODRINE HYDROCHLORIDE 10 MILLIGRAM(S): 2.5 TABLET ORAL at 06:01

## 2022-09-03 RX ADMIN — SODIUM CHLORIDE 100 MILLILITER(S): 9 INJECTION, SOLUTION INTRAVENOUS at 13:41

## 2022-09-03 RX ADMIN — CEFEPIME 100 MILLIGRAM(S): 1 INJECTION, POWDER, FOR SOLUTION INTRAMUSCULAR; INTRAVENOUS at 22:27

## 2022-09-03 RX ADMIN — MUPIROCIN 1 APPLICATION(S): 20 OINTMENT TOPICAL at 00:05

## 2022-09-03 RX ADMIN — MIDODRINE HYDROCHLORIDE 5 MILLIGRAM(S): 2.5 TABLET ORAL at 21:32

## 2022-09-03 NOTE — PROGRESS NOTE ADULT - SUBJECTIVE AND OBJECTIVE BOX
INTERVAL HPI/OVERNIGHT EVENTS:  Pt seen and examined at bedside.     Allergies/Intolerance: No Known Allergies      MEDICATIONS  (STANDING):  ascorbic acid 500 milliGRAM(s) Oral daily  cefepime   IVPB 2000 milliGRAM(s) IV Intermittent every 8 hours  chlorhexidine 2% Cloths 1 Application(s) Topical daily  chlorhexidine 4% Liquid 1 Application(s) Topical <User Schedule>  dextrose 5%. 1000 milliLiter(s) (75 mL/Hr) IV Continuous <Continuous>  heparin   Injectable 5000 Unit(s) SubCutaneous every 8 hours  midodrine. 10 milliGRAM(s) Oral three times a day  multivitamin 1 Tablet(s) Oral daily  mupirocin 2% Nasal 1 Application(s) Both Nostrils two times a day    MEDICATIONS  (PRN):        ROS: all systems reviewed and wnl      PHYSICAL EXAMINATION:  Vital Signs Last 24 Hrs  T(C): 36.8 (03 Sep 2022 04:11), Max: 36.8 (03 Sep 2022 04:11)  T(F): 98.2 (03 Sep 2022 04:11), Max: 98.2 (03 Sep 2022 04:11)  HR: 72 (03 Sep 2022 04:11) (58 - 72)  BP: 111/76 (03 Sep 2022 04:11) (87/55 - 112/75)  BP(mean): --  RR: 18 (03 Sep 2022 04:11) (18 - 18)  SpO2: 99% (03 Sep 2022 04:11) (97% - 99%)    Parameters below as of 03 Sep 2022 04:11  Patient On (Oxygen Delivery Method): room air      CAPILLARY BLOOD GLUCOSE      POCT Blood Glucose.: 88 mg/dL (02 Sep 2022 13:12)      09-01 @ 07:01  -  09-02 @ 07:00  --------------------------------------------------------  IN: 1103.4 mL / OUT: 2100 mL / NET: -996.6 mL    09-02 @ 07:01  -  09-03 @ 06:58  --------------------------------------------------------  IN: 25 mL / OUT: 1675 mL / NET: -1650 mL        GENERAL:   NECK: supple, No JVD  CHEST/LUNG: clear to auscultation bilaterally; no rales, rhonchi, or wheezing b/l  HEART: normal S1, S2  ABDOMEN: BS+, soft, ND, NT   EXTREMITIES:  pulses palpable; no clubbing, cyanosis, or edema b/l LEs  SKIN: no rashes or lesions      LABS:                        10.8   14.44 )-----------( 148      ( 02 Sep 2022 06:59 )             35.1     09-03    150<H>  |  111<H>  |  16  ----------------------------<  94  3.8   |  28  |  0.86    Ca    9.7      03 Sep 2022 00:21  Phos  3.6     09-02  Mg     2.0     09-02    TPro  6.8  /  Alb  3.1<L>  /  TBili  0.3  /  DBili  x   /  AST  17  /  ALT  8<L>  /  AlkPhos  80  09-02               INTERVAL HPI/OVERNIGHT EVENTS:  Pt seen and examined at bedside.     Allergies/Intolerance: No Known Allergies      MEDICATIONS  (STANDING):  ascorbic acid 500 milliGRAM(s) Oral daily  cefepime   IVPB 2000 milliGRAM(s) IV Intermittent every 8 hours  chlorhexidine 2% Cloths 1 Application(s) Topical daily  chlorhexidine 4% Liquid 1 Application(s) Topical <User Schedule>  dextrose 5%. 1000 milliLiter(s) (75 mL/Hr) IV Continuous <Continuous>  heparin   Injectable 5000 Unit(s) SubCutaneous every 8 hours  midodrine. 10 milliGRAM(s) Oral three times a day  multivitamin 1 Tablet(s) Oral daily  mupirocin 2% Nasal 1 Application(s) Both Nostrils two times a day    MEDICATIONS  (PRN):        ROS: all systems reviewed and wnl      PHYSICAL EXAMINATION:  Vital Signs Last 24 Hrs  T(C): 36.8 (03 Sep 2022 04:11), Max: 36.8 (03 Sep 2022 04:11)  T(F): 98.2 (03 Sep 2022 04:11), Max: 98.2 (03 Sep 2022 04:11)  HR: 72 (03 Sep 2022 04:11) (58 - 72)  BP: 111/76 (03 Sep 2022 04:11) (87/55 - 112/75)  BP(mean): --  RR: 18 (03 Sep 2022 04:11) (18 - 18)  SpO2: 99% (03 Sep 2022 04:11) (97% - 99%)    Parameters below as of 03 Sep 2022 04:11  Patient On (Oxygen Delivery Method): room air      CAPILLARY BLOOD GLUCOSE      POCT Blood Glucose.: 88 mg/dL (02 Sep 2022 13:12)      09-01 @ 07:01  -  09-02 @ 07:00  --------------------------------------------------------  IN: 1103.4 mL / OUT: 2100 mL / NET: -996.6 mL    09-02 @ 07:01  -  09-03 @ 06:58  --------------------------------------------------------  IN: 25 mL / OUT: 1675 mL / NET: -1650 mL        GENERAL:stable, in bed, asleep, no fevers, has coronel in place.    NECK: supple, No JVD  CHEST/LUNG: clear to auscultation bilaterally; no rales, rhonchi, or wheezing b/l  HEART: normal S1, S2  ABDOMEN: BS+, soft, ND, NT   EXTREMITIES:  pulses palpable; no clubbing, cyanosis, or edema b/l LEs  SKIN: no rashes or lesions      LABS:                        10.8   14.44 )-----------( 148      ( 02 Sep 2022 06:59 )             35.1     09-03    150<H>  |  111<H>  |  16  ----------------------------<  94  3.8   |  28  |  0.86    Ca    9.7      03 Sep 2022 00:21  Phos  3.6     09-02  Mg     2.0     09-02    TPro  6.8  /  Alb  3.1<L>  /  TBili  0.3  /  DBili  x   /  AST  17  /  ALT  8<L>  /  AlkPhos  80  09-02

## 2022-09-03 NOTE — PROGRESS NOTE ADULT - SUBJECTIVE AND OBJECTIVE BOX
CARDIOLOGY     PROGRESS  NOTE   ________________________________________________    CHIEF COMPLAINT:Patient is a 61y old  Male who presents with a chief complaint of Hypokalemia (03 Sep 2022 06:58)  no complain.  	  REVIEW OF SYSTEMS:  CONSTITUTIONAL: No fever, weight loss, or fatigue  EYES: No eye pain, visual disturbances, or discharge  ENT:  No difficulty hearing, tinnitus, vertigo; No sinus or throat pain  NECK: No pain or stiffness  RESPIRATORY: No cough, wheezing, chills or hemoptysis; No Shortness of Breath  CARDIOVASCULAR: No chest pain, palpitations, passing out, dizziness, or leg swelling  GASTROINTESTINAL: No abdominal or epigastric pain. No nausea, vomiting, or hematemesis; No diarrhea or constipation. No melena or hematochezia.  GENITOURINARY: No dysuria, frequency, hematuria, or incontinence  NEUROLOGICAL: No headaches, memory loss, loss of strength, numbness, or tremors  SKIN: No itching, burning, rashes, or lesions   LYMPH Nodes: No enlarged glands  ENDOCRINE: No heat or cold intolerance; No hair loss  MUSCULOSKELETAL: No joint pain or swelling; No muscle, back, or extremity pain  PSYCHIATRIC: No depression, anxiety, mood swings, or difficulty sleeping  HEME/LYMPH: No easy bruising, or bleeding gums  ALLERGY AND IMMUNOLOGIC: No hives or eczema	    [ ] All others negative	  [ x] Unable to obtain    PHYSICAL EXAM:  T(C): 36.8 (09-03-22 @ 04:11), Max: 36.8 (09-03-22 @ 04:11)  HR: 72 (09-03-22 @ 04:11) (58 - 72)  BP: 111/76 (09-03-22 @ 04:11) (100/66 - 112/75)  RR: 18 (09-03-22 @ 04:11) (18 - 18)  SpO2: 99% (09-03-22 @ 04:11) (97% - 99%)  Wt(kg): --  I&O's Summary    02 Sep 2022 07:01  -  03 Sep 2022 07:00  --------------------------------------------------------  IN: 25 mL / OUT: 2475 mL / NET: -2450 mL        Appearance: Normal	  HEENT:   Normal oral mucosa, PERRL, EOMI	  Lymphatic: No lymphadenopathy  Cardiovascular: Normal S1 S2, No JVD, + murmurs, No edema  Respiratory: Lungs clear to auscultation	  Gastrointestinal:  Soft, Non-tender, + BS	  Skin: No rashes, No ecchymoses, No cyanosis	  Neurologic: Non-focal  Extremities: Normal range of motion, No clubbing, cyanosis or edema  Vascular: Peripheral pulses palpable 2+ bilaterally    MEDICATIONS  (STANDING):  ascorbic acid 500 milliGRAM(s) Oral daily  cefepime   IVPB 2000 milliGRAM(s) IV Intermittent every 8 hours  chlorhexidine 2% Cloths 1 Application(s) Topical daily  chlorhexidine 4% Liquid 1 Application(s) Topical <User Schedule>  dextrose 5%. 1000 milliLiter(s) (100 mL/Hr) IV Continuous <Continuous>  heparin   Injectable 5000 Unit(s) SubCutaneous every 8 hours  midodrine. 10 milliGRAM(s) Oral three times a day  multivitamin 1 Tablet(s) Oral daily  mupirocin 2% Nasal 1 Application(s) Both Nostrils two times a day      TELEMETRY: 	    ECG:  	  RADIOLOGY:  OTHER: 	  	  LABS:	 	    CARDIAC MARKERS:                                10.2   9.15  )-----------( 133      ( 03 Sep 2022 06:53 )             33.3     09-03    147<H>  |  110<H>  |  15  ----------------------------<  99  3.7   |  29  |  0.93    Ca    9.4      03 Sep 2022 06:57  Phos  3.6     09-02  Mg     2.0     09-02    TPro  6.8  /  Alb  3.1<L>  /  TBili  0.3  /  DBili  x   /  AST  17  /  ALT  8<L>  /  AlkPhos  80  09-02    proBNP: Serum Pro-Brain Natriuretic Peptide: 267 pg/mL (08-09 @ 16:44)    Lipid Profile: Cholesterol 193  LDL --  HDL 30  TG 91    HgA1c:   TSH: Thyroid Stimulating Hormone, Serum: 1.51 uIU/mL (08-27 @ 07:27)  Thyroid Stimulating Hormone, Serum: 1.00 uIU/mL (08-09 @ 16:44)          Assessment and plan  ---------------------------  61 M w/ PMH of progressive Cerebellar Ataxia, Chronic Lacunar infarcts, 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   pt is well known to me with hx of severe orthostatic hypotension on florinef/ ?midodrine with bradycardia, pt had a normal am cortisol level.  ivf, keep hydrated  autonomic dysfunction, over all controlled with midodrine, don't increase dose sec to bradycardia  check tsh  dvt prophylaxis  if sig bradycardia will change Midodrine to Florinef if remains bradycardic  s/p syrgery transferred to MICU sec to hypotension  continue iv abx/ pressor  supportive care  continue iv abx  encourage fluid  decrease midodrine pt with sig hx of bradycardia, add Florinef  increase free water/ ivf      	                    CARDIOLOGY     PROGRESS  NOTE   ________________________________________________    CHIEF COMPLAINT:Patient is a 61y old  Male who presents with a chief complaint of Hypokalemia (03 Sep 2022 06:58)  no complain.  	  REVIEW OF SYSTEMS:  CONSTITUTIONAL: No fever, weight loss, or fatigue  EYES: No eye pain, visual disturbances, or discharge  ENT:  No difficulty hearing, tinnitus, vertigo; No sinus or throat pain  NECK: No pain or stiffness  RESPIRATORY: No cough, wheezing, chills or hemoptysis; No Shortness of Breath  CARDIOVASCULAR: No chest pain, palpitations, passing out, dizziness, or leg swelling  GASTROINTESTINAL: No abdominal or epigastric pain. No nausea, vomiting, or hematemesis; No diarrhea or constipation. No melena or hematochezia.  GENITOURINARY: No dysuria, frequency, hematuria, or incontinence  NEUROLOGICAL: No headaches, memory loss, loss of strength, numbness, or tremors  SKIN: No itching, burning, rashes, or lesions   LYMPH Nodes: No enlarged glands  ENDOCRINE: No heat or cold intolerance; No hair loss  MUSCULOSKELETAL: No joint pain or swelling; No muscle, back, or extremity pain  PSYCHIATRIC: No depression, anxiety, mood swings, or difficulty sleeping  HEME/LYMPH: No easy bruising, or bleeding gums  ALLERGY AND IMMUNOLOGIC: No hives or eczema	    [ ] All others negative	  [ x] Unable to obtain    PHYSICAL EXAM:  T(C): 36.8 (09-03-22 @ 04:11), Max: 36.8 (09-03-22 @ 04:11)  HR: 72 (09-03-22 @ 04:11) (58 - 72)  BP: 111/76 (09-03-22 @ 04:11) (100/66 - 112/75)  RR: 18 (09-03-22 @ 04:11) (18 - 18)  SpO2: 99% (09-03-22 @ 04:11) (97% - 99%)  Wt(kg): --  I&O's Summary    02 Sep 2022 07:01  -  03 Sep 2022 07:00  --------------------------------------------------------  IN: 25 mL / OUT: 2475 mL / NET: -2450 mL        Appearance: Normal	  HEENT:   Normal oral mucosa, PERRL, EOMI	  Lymphatic: No lymphadenopathy  Cardiovascular: Normal S1 S2, No JVD, + murmurs, No edema  Respiratory: Lungs clear to auscultation	  Gastrointestinal:  Soft, Non-tender, + BS	  Skin: No rashes, No ecchymoses, No cyanosis	  Neurologic: Non-focal  Extremities: Normal range of motion, No clubbing, cyanosis or edema  Vascular: Peripheral pulses palpable 2+ bilaterally    MEDICATIONS  (STANDING):  ascorbic acid 500 milliGRAM(s) Oral daily  cefepime   IVPB 2000 milliGRAM(s) IV Intermittent every 8 hours  chlorhexidine 2% Cloths 1 Application(s) Topical daily  chlorhexidine 4% Liquid 1 Application(s) Topical <User Schedule>  dextrose 5%. 1000 milliLiter(s) (100 mL/Hr) IV Continuous <Continuous>  heparin   Injectable 5000 Unit(s) SubCutaneous every 8 hours  midodrine. 10 milliGRAM(s) Oral three times a day  multivitamin 1 Tablet(s) Oral daily  mupirocin 2% Nasal 1 Application(s) Both Nostrils two times a day      TELEMETRY: 	    ECG:  	  RADIOLOGY:  OTHER: 	  	  LABS:	 	    CARDIAC MARKERS:                                10.2   9.15  )-----------( 133      ( 03 Sep 2022 06:53 )             33.3     09-03    147<H>  |  110<H>  |  15  ----------------------------<  99  3.7   |  29  |  0.93    Ca    9.4      03 Sep 2022 06:57  Phos  3.6     09-02  Mg     2.0     09-02    TPro  6.8  /  Alb  3.1<L>  /  TBili  0.3  /  DBili  x   /  AST  17  /  ALT  8<L>  /  AlkPhos  80  09-02    proBNP: Serum Pro-Brain Natriuretic Peptide: 267 pg/mL (08-09 @ 16:44)    Lipid Profile: Cholesterol 193  LDL --  HDL 30  TG 91    HgA1c:   TSH: Thyroid Stimulating Hormone, Serum: 1.51 uIU/mL (08-27 @ 07:27)  Thyroid Stimulating Hormone, Serum: 1.00 uIU/mL (08-09 @ 16:44)    Notified by RN of BCx sent 8/31 growing gram negative rods in aerobic bottle. Patient remains on cefepime at this time. Will send repeat cultures with AM labs. Vital signs stable; will continue to monitor. Consider ID consult in AM. RN aware of current plan.       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   pt is well known to me with hx of severe orthostatic hypotension on florinef/ ?midodrine with bradycardia, pt had a normal am cortisol level.  ivf, keep hydrated  autonomic dysfunction, over all controlled with midodrine, don't increase dose sec to bradycardia  check tsh  dvt prophylaxis  if sig bradycardia will change Midodrine to Florinef if remains bradycardic  s/p syrgery transferred to MICU sec to hypotension  continue iv abx/ pressor  supportive care  continue iv abx  encourage fluid  decrease midodrine pt with sig hx of bradycardia, add Florinef  increase free water/ ivf  + BC, continue ABX

## 2022-09-03 NOTE — PROGRESS NOTE ADULT - ASSESSMENT
Plan: now  admitted  with  hypokalemia, all corrected, K is 5.1., h/o  cerebellar  ataxia,  with  no defined  cause  found    pt  with  bradycardia, ?  central, seen  by  card/  echo  on 4/2022,  normal  ef  h/o  recurrent , intermittent   hypothermia,    not related  to  any   infectious   process,  it  seems to be  dysautonomia/   with  h/o normal  cortisol level.  prior  CT  c/ap,  no  malignant  process  on feeds  per  swallow  eval.     Plan:  Continue Midodrine 10 mg tid, UTI, Pseudomonas,  on IIV Cefepeme,     Advance diet, IVF , PT and discharge home after weekend. Positive blood cultures, ID consult was called, contineu Cefepeme for now.                     Plan: now  admitted  with  hypokalemia, all corrected, K is 5.1., h/o  cerebellar  ataxia,  with  no defined  cause  found    pt  with  bradycardia, ?  central, seen  by  card/  echo  on 4/2022,  normal  ef  h/o  recurrent , intermittent   hypothermia,    not related  to  any   infectious   process,  it  seems to be  dysautonomia/   with  h/o normal  cortisol level.  prior  CT  c/ap,  no  malignant  process  on feeds  per  swallow  eval.       Plan:  Continue Midodrine 10 mg tid, UTI, Pseudomonas in blood,  on IIV Cefepeme 2 gram tid.      Advance diet, IVF , PT and Speech eval.     Positive blood cultures, ID consult was called, contineu Cefepeme for now.        IVF with D5W to correct hypernatremia.

## 2022-09-04 LAB
ANION GAP SERPL CALC-SCNC: 10 MMOL/L — SIGNIFICANT CHANGE UP (ref 5–17)
BUN SERPL-MCNC: 10 MG/DL — SIGNIFICANT CHANGE UP (ref 7–23)
CALCIUM SERPL-MCNC: 9.3 MG/DL — SIGNIFICANT CHANGE UP (ref 8.4–10.5)
CHLORIDE SERPL-SCNC: 102 MMOL/L — SIGNIFICANT CHANGE UP (ref 96–108)
CO2 SERPL-SCNC: 26 MMOL/L — SIGNIFICANT CHANGE UP (ref 22–31)
CREAT SERPL-MCNC: 0.67 MG/DL — SIGNIFICANT CHANGE UP (ref 0.5–1.3)
CULTURE RESULTS: SIGNIFICANT CHANGE UP
EGFR: 107 ML/MIN/1.73M2 — SIGNIFICANT CHANGE UP
GLUCOSE SERPL-MCNC: 119 MG/DL — HIGH (ref 70–99)
POTASSIUM SERPL-MCNC: 3.2 MMOL/L — LOW (ref 3.5–5.3)
POTASSIUM SERPL-SCNC: 3.2 MMOL/L — LOW (ref 3.5–5.3)
SODIUM SERPL-SCNC: 138 MMOL/L — SIGNIFICANT CHANGE UP (ref 135–145)
SPECIMEN SOURCE: SIGNIFICANT CHANGE UP

## 2022-09-04 PROCEDURE — 99232 SBSQ HOSP IP/OBS MODERATE 35: CPT

## 2022-09-04 RX ORDER — MEROPENEM 1 G/30ML
1000 INJECTION INTRAVENOUS EVERY 8 HOURS
Refills: 0 | Status: DISCONTINUED | OUTPATIENT
Start: 2022-09-04 | End: 2022-09-06

## 2022-09-04 RX ADMIN — HEPARIN SODIUM 5000 UNIT(S): 5000 INJECTION INTRAVENOUS; SUBCUTANEOUS at 21:08

## 2022-09-04 RX ADMIN — Medication 1 TABLET(S): at 12:04

## 2022-09-04 RX ADMIN — MIDODRINE HYDROCHLORIDE 5 MILLIGRAM(S): 2.5 TABLET ORAL at 06:45

## 2022-09-04 RX ADMIN — HEPARIN SODIUM 5000 UNIT(S): 5000 INJECTION INTRAVENOUS; SUBCUTANEOUS at 06:44

## 2022-09-04 RX ADMIN — MEROPENEM 100 MILLIGRAM(S): 1 INJECTION INTRAVENOUS at 21:09

## 2022-09-04 RX ADMIN — MIDODRINE HYDROCHLORIDE 5 MILLIGRAM(S): 2.5 TABLET ORAL at 17:43

## 2022-09-04 RX ADMIN — CHLORHEXIDINE GLUCONATE 1 APPLICATION(S): 213 SOLUTION TOPICAL at 06:15

## 2022-09-04 RX ADMIN — MIDODRINE HYDROCHLORIDE 5 MILLIGRAM(S): 2.5 TABLET ORAL at 12:04

## 2022-09-04 RX ADMIN — HEPARIN SODIUM 5000 UNIT(S): 5000 INJECTION INTRAVENOUS; SUBCUTANEOUS at 13:32

## 2022-09-04 RX ADMIN — Medication 500 MILLIGRAM(S): at 12:04

## 2022-09-04 RX ADMIN — CEFEPIME 100 MILLIGRAM(S): 1 INJECTION, POWDER, FOR SOLUTION INTRAMUSCULAR; INTRAVENOUS at 06:14

## 2022-09-04 RX ADMIN — SODIUM CHLORIDE 75 MILLILITER(S): 9 INJECTION, SOLUTION INTRAVENOUS at 21:11

## 2022-09-04 RX ADMIN — SODIUM CHLORIDE 75 MILLILITER(S): 9 INJECTION, SOLUTION INTRAVENOUS at 13:33

## 2022-09-04 RX ADMIN — CHLORHEXIDINE GLUCONATE 1 APPLICATION(S): 213 SOLUTION TOPICAL at 12:04

## 2022-09-04 RX ADMIN — SODIUM CHLORIDE 100 MILLILITER(S): 9 INJECTION, SOLUTION INTRAVENOUS at 09:51

## 2022-09-04 RX ADMIN — MEROPENEM 100 MILLIGRAM(S): 1 INJECTION INTRAVENOUS at 13:32

## 2022-09-04 NOTE — PROVIDER CONTACT NOTE (CRITICAL VALUE NOTIFICATION) - TEST AND RESULT REPORTED:
Blood Culture growth in Aerobic bottle gram negative phyllis
Blood culture on 8/31/2022 - Growth in aerobic bottle, gram negative rods
blood cultures from 9/2 show growth in aerobic bottles gram negative rods

## 2022-09-04 NOTE — PROGRESS NOTE ADULT - ASSESSMENT
61M s/p ureteral stent placement/stone extraction with postop with pseudomonas uti, bacteremia, sepsis, leukocytosis.    - Repeat blood culture from 9/2 still with Gram neg rods  - would change cefepime to meropenem 1 gram iv q8  - leukocytosis better but cefepime seems to have intermediate sensitivity on shey.  - repeat blood culture x 2  - f/up with    - consider d/c RUE IV also if not needed.      please call ID this weekend x [phone] with questions.    Brooklyn Calix MD  [phone] (pager)  [phone] (office)

## 2022-09-04 NOTE — PROGRESS NOTE ADULT - SUBJECTIVE AND OBJECTIVE BOX
61yPatient is a 61y old  Male who presents with a chief complaint of Hypokalemia (03 Sep 2022 11:20)      Interval history:  In bed - hiccups noted  Blood culture from 9/2 noted to be positive still      Antimicrobials:    cefepime   IVPB 2000 milliGRAM(s) IV Intermittent every 8 hours    MEDICATIONS  (STANDING):  heparin   Injectable 5000 every 8 hours  midodrine. 5 three times a day    Vital Signs Last 24 Hrs  T(C): 36.4 (09-04-22 @ 10:00), Max: 37.2 (09-03-22 @ 11:44)  T(F): 97.5 (09-04-22 @ 10:00), Max: 98.9 (09-03-22 @ 11:44)  HR: 91 (09-04-22 @ 10:00) (64 - 91)  BP: 111/71 (09-04-22 @ 10:00) (104/66 - 122/70)  BP(mean): --  RR: 18 (09-04-22 @ 10:00) (18 - 18)  SpO2: 99% (09-04-22 @ 10:00) (98% - 99%)    PHYSICAL EXAM:  Constitutional: non-toxic, non-verbal  HEAD/EYES: anicteric, no conjunctival injection  ENT:  supple, no thrush  Cardiovascular:   normal S1, S2, no murmur, no edema  Respiratory:  clear BS bilaterally, no wheezes, no rales  GI:  soft, non-tender, normal bowel sounds  : + coronel  Musculoskeletal:  no synovitis, normal ROM  Neurologic: nonverbal, arousable to noxious stimuli  Skin:  no rash, hyperpigmentation L. elbow  Heme/Onc: no lymphadenopathy                                                10.2   9.15  )-----------( 133      ( 03 Sep 2022 06:53 )             33.3   09-04    138  |  102  |  10  ----------------------------<  119<H>  3.2<L>   |  26  |  0.67    Ca    9.3      04 Sep 2022 07:22        RECENT CULTURES:  09-02 @ 06:51  .Blood Blood-Peripheral  --  --  --    Growth in aerobic bottle: Gram Negative Rods  --  09-01 @ 00:00  Catheterized Catheterized  --  --  --    No growth  --  08-31 @ 23:35  .Blood Blood-Peripheral  --  --  --    Growth in aerobic bottle: Pseudomonas aeruginosa  See previous culture [id number]  --  08-31 @ 23:34  .Blood Blood-Peripheral  Blood Culture PCR  Pseudomonas aeruginosa  Pseudomonas aeruginosa  Blood Culture PCR  PCR    Growth in aerobic bottle: Pseudomonas aeruginosa Multiple Morphological  Strains  ***Blood Panel PCR results on this specimen are available  approximately 3 hours after the Gram stain result.***  Gram stain, PCR, and/or culture results may not always  correspond due to difference in methodologies.  ************************************************************  This PCR assay was performed by multiplex PCR. This  Assay tests for 66 bacterial and resistance gene targets.  Please refer to the [facility] test directory  at [email or web address] for details.  --      Radiology:  < from: CT Head No Cont (09.03.22 @ 13:24) >  IMPRESSION:   Mild periventricular white matter ischemia.    < end of copied text >      < from: CT Abdomen and Pelvis No Cont (07.13.22 @ 21:13) >  IMPRESSION:  Mild right hydronephrosis caused by an 8 x 7 x 11 mm stone at the right   ureteropelvic junction (UPJ).    Additional nonobstructing renal stones in the upper and lower poles of   both kidneys are partially obscured by motion artifact, but measure up to   3-4 mm in the mid to lower pole of the right kidney.    Approximately 8 x 2 mm bladder stone.    Underlying cystitis or pyelonephritis should be excluded based on   clinical symptoms and laboratory findings.    < end of copied text >

## 2022-09-04 NOTE — PROGRESS NOTE ADULT - ASSESSMENT
Plan: now  admitted  with  hypokalemia, all corrected, K is 5.1., h/o  cerebellar  ataxia,  with  no defined  cause  found    pt  with  bradycardia, ?  central, seen  by  card/  echo  on 4/2022,  normal  ef  h/o  recurrent , intermittent   hypothermia,    not related  to  any   infectious   process,  it  seems to be  dysautonomia/   with  h/o normal  cortisol level.  prior  CT  c/ap,  no  malignant  process  on feeds  per  swallow  eval.       Plan:  Continue Midodrine 10 mg tid, UTI, Pseudomonas in blood,  on IIV Cefepeme 2 gram tid.      Advance diet, IVF , PT and Speech eval.     Positive blood cultures, ID consult was called, contineu Cefepeme for now.        IVF with D5W to correct hypernatremia.                  Plan: now  admitted  with  hypokalemia, all corrected, K is 5.1., h/o  cerebellar  ataxia,  with  no defined  cause  found    pt  with  bradycardia, ?  central, seen  by  card/  echo  on 4/2022,  normal  ef  h/o  recurrent , intermittent   hypothermia,    not related  to  any   infectious   process,  it  seems to be  dysautonomia/   with  h/o normal  cortisol level.  prior  CT  c/ap,  no  malignant  process  on feeds  per  swallow  eval.       Plan:  Continue Midodrine 10 mg tid, UTI, Pseudomonas in blood,  on IIV Cefepeme 2 gram tid.  Repeat blood cultures requested.     Advance diet, IVF , PT and Speech eval.     Positive blood cultures, ID consult was called, continue Cefepeme for now.        IVF with D5W to correct hypernatremia, sodium better today at 138.

## 2022-09-04 NOTE — PROVIDER CONTACT NOTE (CRITICAL VALUE NOTIFICATION) - ASSESSMENT
Pt is nonverbal, no acute distress noted, VSS.
pt is confused as baseline
pt alert, but confused, pt nonverbal, VSS, no acute distress noted, no s/s of pain or discomfort present

## 2022-09-04 NOTE — PROVIDER CONTACT NOTE (CRITICAL VALUE NOTIFICATION) - ACTION/TREATMENT ORDERED:
continue with antibiotics, cont to monitor.
Repeat blood cultures, Follow up with ID
Blood culture x2

## 2022-09-04 NOTE — PROVIDER CONTACT NOTE (CRITICAL VALUE NOTIFICATION) - BACKGROUND
pt is nonverbal, hx of adrenal insufficiency, pneumonia, aspiration, hypotension, and anemia.
Anshu Mir(PA)
pt admitted with Hypokalemia since been resolved, h/o right kidney stones requiring b/l stent placment in 7/2022; s/p b/l ureteroscopy 8/31; Urosepsis (BCx w/ GNR) w/ hypotension on midodrine s/p pressors in MICU; s/p zosyn/vanco; pmhx of anemia, hypotension, aspiration pneumonia
UTI

## 2022-09-04 NOTE — PROGRESS NOTE ADULT - SUBJECTIVE AND OBJECTIVE BOX
CARDIOLOGY     PROGRESS  NOTE   ________________________________________________    CHIEF COMPLAINT:Patient is a 61y old  Male who presents with a chief complaint of Hypokalemia (04 Sep 2022 09:55)  no complain, comfortable.  	  REVIEW OF SYSTEMS:  CONSTITUTIONAL: No fever, weight loss, or fatigue  EYES: No eye pain, visual disturbances, or discharge  ENT:  No difficulty hearing, tinnitus, vertigo; No sinus or throat pain  NECK: No pain or stiffness  RESPIRATORY: No cough, wheezing, chills or hemoptysis; No Shortness of Breath  CARDIOVASCULAR: No chest pain, palpitations, passing out, dizziness, or leg swelling  GASTROINTESTINAL: No abdominal or epigastric pain. No nausea, vomiting, or hematemesis; No diarrhea or constipation. No melena or hematochezia.  GENITOURINARY: No dysuria, frequency, hematuria, or incontinence  NEUROLOGICAL: No headaches, memory loss, loss of strength, numbness, or tremors  SKIN: No itching, burning, rashes, or lesions   LYMPH Nodes: No enlarged glands  ENDOCRINE: No heat or cold intolerance; No hair loss  MUSCULOSKELETAL: No joint pain or swelling; No muscle, back, or extremity pain  PSYCHIATRIC: No depression, anxiety, mood swings, or difficulty sleeping  HEME/LYMPH: No easy bruising, or bleeding gums  ALLERGY AND IMMUNOLOGIC: No hives or eczema	    [ ] All others negative	  [x ] Unable to obtain    PHYSICAL EXAM:  T(C): 36.4 (09-04-22 @ 10:00), Max: 37.2 (09-03-22 @ 11:44)  HR: 91 (09-04-22 @ 10:00) (64 - 91)  BP: 111/71 (09-04-22 @ 10:00) (104/66 - 122/70)  RR: 18 (09-04-22 @ 10:00) (18 - 18)  SpO2: 99% (09-04-22 @ 10:00) (98% - 99%)  Wt(kg): --  I&O's Summary    03 Sep 2022 07:01  -  04 Sep 2022 07:00  --------------------------------------------------------  IN: 2450 mL / OUT: 2450 mL / NET: 0 mL        Appearance: Normal	  HEENT:   Normal oral mucosa, PERRL, EOMI	  Lymphatic: No lymphadenopathy  Cardiovascular: Normal S1 S2, No JVD, +murmurs, No edema  Respiratory: rhonchi  Gastrointestinal:  Soft, Non-tender, + BS	  Skin: No rashes, No ecchymoses, No cyanosis	  Neurologic: Non-focal  Extremities: Normal range of motion, No clubbing, cyanosis or edema  Vascular: Peripheral pulses palpable 2+ bilaterally    MEDICATIONS  (STANDING):  ascorbic acid 500 milliGRAM(s) Oral daily  cefepime   IVPB 2000 milliGRAM(s) IV Intermittent every 8 hours  chlorhexidine 2% Cloths 1 Application(s) Topical daily  chlorhexidine 4% Liquid 1 Application(s) Topical <User Schedule>  dextrose 5%. 1000 milliLiter(s) (100 mL/Hr) IV Continuous <Continuous>  heparin   Injectable 5000 Unit(s) SubCutaneous every 8 hours  midodrine. 5 milliGRAM(s) Oral three times a day  multivitamin 1 Tablet(s) Oral daily      TELEMETRY: 	    ECG:  	  RADIOLOGY:  OTHER: 	  	  LABS:	 	    CARDIAC MARKERS:                                10.2   9.15  )-----------( 133      ( 03 Sep 2022 06:53 )             33.3     09-04    138  |  102  |  10  ----------------------------<  119<H>  3.2<L>   |  26  |  0.67    Ca    9.3      04 Sep 2022 07:22      proBNP: Serum Pro-Brain Natriuretic Peptide: 267 pg/mL (08-09 @ 16:44)    Lipid Profile: Cholesterol 193  LDL --  HDL 30  TG 91    HgA1c:   TSH: Thyroid Stimulating Hormone, Serum: 1.51 uIU/mL (08-27 @ 07:27)  Thyroid Stimulating Hormone, Serum: 1.00 uIU/mL (08-09 @ 16:44)    - Repeat blood culture from 9/2 still with Gram neg rods  - would change cefepime to meropenem 1 gram iv q8  - leukocytosis better but cefepime seems to have intermediate sensitivity on shey.  - repeat blood culture x 2  - f/up with    - consider d/c RUE IV also if not needed.      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   pt is well known to me with hx of severe orthostatic hypotension on florinef/ ?midodrine with bradycardia, pt had a normal am cortisol level.  ivf, keep hydrated  autonomic dysfunction, over all controlled with midodrine, don't increase dose sec to bradycardia  check tsh  dvt prophylaxis  if sig bradycardia will change Midodrine to Florinef if remains bradycardic  s/p syrgery transferred to MICU sec to hypotension  continue iv abx/ pressor  supportive care  continue iv abx  encourage fluid  decrease midodrine pt with sig hx of bradycardia, add Florinef  increase free water/ ivf  + BC, continue ABX change to Meropenem  bp is well controlled

## 2022-09-04 NOTE — PROVIDER CONTACT NOTE (CRITICAL VALUE NOTIFICATION) - SITUATION
Blood culture done on 8/31/2022 -
blood cultures from 9/2 show growth in aerobic bottles gram negative rods
Pt admitted with sepsis

## 2022-09-04 NOTE — PROGRESS NOTE ADULT - SUBJECTIVE AND OBJECTIVE BOX
INTERVAL HPI/OVERNIGHT EVENTS:  Pt seen and examined at bedside.     Allergies/Intolerance: No Known Allergies      MEDICATIONS  (STANDING):  ascorbic acid 500 milliGRAM(s) Oral daily  chlorhexidine 2% Cloths 1 Application(s) Topical daily  chlorhexidine 4% Liquid 1 Application(s) Topical <User Schedule>  dextrose 5%. 1000 milliLiter(s) (100 mL/Hr) IV Continuous <Continuous>  heparin   Injectable 5000 Unit(s) SubCutaneous every 8 hours  meropenem  IVPB 1000 milliGRAM(s) IV Intermittent every 8 hours  midodrine. 5 milliGRAM(s) Oral three times a day  multivitamin 1 Tablet(s) Oral daily    MEDICATIONS  (PRN):        ROS: all systems reviewed and wnl      PHYSICAL EXAMINATION:  Vital Signs Last 24 Hrs  T(C): 36.4 (04 Sep 2022 10:00), Max: 37.2 (03 Sep 2022 11:44)  T(F): 97.5 (04 Sep 2022 10:00), Max: 98.9 (03 Sep 2022 11:44)  HR: 91 (04 Sep 2022 10:00) (64 - 91)  BP: 111/71 (04 Sep 2022 10:00) (104/66 - 122/70)  BP(mean): --  RR: 18 (04 Sep 2022 10:00) (18 - 18)  SpO2: 99% (04 Sep 2022 10:00) (98% - 99%)    Parameters below as of 04 Sep 2022 10:00  Patient On (Oxygen Delivery Method): room air      CAPILLARY BLOOD GLUCOSE          09-03 @ 07:01  -  09-04 @ 07:00  --------------------------------------------------------  IN: 2450 mL / OUT: 2450 mL / NET: 0 mL        GENERAL:   NECK: supple, No JVD  CHEST/LUNG: clear to auscultation bilaterally; no rales, rhonchi, or wheezing b/l  HEART: normal S1, S2  ABDOMEN: BS+, soft, ND, NT   EXTREMITIES:  pulses palpable; no clubbing, cyanosis, or edema b/l LEs  SKIN: no rashes or lesions      LABS:                        10.2   9.15  )-----------( 133      ( 03 Sep 2022 06:53 )             33.3     09-04    138  |  102  |  10  ----------------------------<  119<H>  3.2<L>   |  26  |  0.67    Ca    9.3      04 Sep 2022 07:22                 INTERVAL HPI/OVERNIGHT EVENTS:  Pt seen and examined at bedside.     Allergies/Intolerance: No Known Allergies      MEDICATIONS  (STANDING):  ascorbic acid 500 milliGRAM(s) Oral daily  chlorhexidine 2% Cloths 1 Application(s) Topical daily  chlorhexidine 4% Liquid 1 Application(s) Topical <User Schedule>  dextrose 5%. 1000 milliLiter(s) (100 mL/Hr) IV Continuous <Continuous>  heparin   Injectable 5000 Unit(s) SubCutaneous every 8 hours  meropenem  IVPB 1000 milliGRAM(s) IV Intermittent every 8 hours  midodrine. 5 milliGRAM(s) Oral three times a day  multivitamin 1 Tablet(s) Oral daily    MEDICATIONS  (PRN):        ROS: all systems reviewed and wnl      PHYSICAL EXAMINATION:  Vital Signs Last 24 Hrs  T(C): 36.4 (04 Sep 2022 10:00), Max: 37.2 (03 Sep 2022 11:44)  T(F): 97.5 (04 Sep 2022 10:00), Max: 98.9 (03 Sep 2022 11:44)  HR: 91 (04 Sep 2022 10:00) (64 - 91)  BP: 111/71 (04 Sep 2022 10:00) (104/66 - 122/70)  BP(mean): --  RR: 18 (04 Sep 2022 10:00) (18 - 18)  SpO2: 99% (04 Sep 2022 10:00) (98% - 99%)    Parameters below as of 04 Sep 2022 10:00  Patient On (Oxygen Delivery Method): room air      CAPILLARY BLOOD GLUCOSE          09-03 @ 07:01  -  09-04 @ 07:00  --------------------------------------------------------  IN: 2450 mL / OUT: 2450 mL / NET: 0 mL        GENERAL: stable, in bed, sleeps at times, no fevers or SOB  NECK: supple, No JVD  CHEST/LUNG: clear to auscultation bilaterally; no rales, rhonchi, or wheezing b/l  HEART: normal S1, S2  ABDOMEN: BS+, soft, ND, NT   EXTREMITIES:  pulses palpable; no clubbing, cyanosis, or edema b/l LEs        LABS:                        10.2   9.15  )-----------( 133      ( 03 Sep 2022 06:53 )             33.3     09-04    138  |  102  |  10  ----------------------------<  119<H>  3.2<L>   |  26  |  0.67    Ca    9.3      04 Sep 2022 07:22

## 2022-09-05 RX ORDER — POTASSIUM CHLORIDE 20 MEQ
20 PACKET (EA) ORAL
Refills: 0 | Status: DISCONTINUED | OUTPATIENT
Start: 2022-09-05 | End: 2022-09-05

## 2022-09-05 RX ORDER — CHLORPROMAZINE HCL 10 MG
25 TABLET ORAL
Refills: 0 | Status: DISCONTINUED | OUTPATIENT
Start: 2022-09-05 | End: 2022-09-07

## 2022-09-05 RX ORDER — DEXTROSE MONOHYDRATE, SODIUM CHLORIDE, AND POTASSIUM CHLORIDE 50; .745; 4.5 G/1000ML; G/1000ML; G/1000ML
1000 INJECTION, SOLUTION INTRAVENOUS
Refills: 0 | Status: DISCONTINUED | OUTPATIENT
Start: 2022-09-05 | End: 2022-09-09

## 2022-09-05 RX ADMIN — MIDODRINE HYDROCHLORIDE 5 MILLIGRAM(S): 2.5 TABLET ORAL at 05:55

## 2022-09-05 RX ADMIN — Medication 25 MILLIGRAM(S): at 17:38

## 2022-09-05 RX ADMIN — DEXTROSE MONOHYDRATE, SODIUM CHLORIDE, AND POTASSIUM CHLORIDE 75 MILLILITER(S): 50; .745; 4.5 INJECTION, SOLUTION INTRAVENOUS at 21:17

## 2022-09-05 RX ADMIN — MEROPENEM 100 MILLIGRAM(S): 1 INJECTION INTRAVENOUS at 05:55

## 2022-09-05 RX ADMIN — HEPARIN SODIUM 5000 UNIT(S): 5000 INJECTION INTRAVENOUS; SUBCUTANEOUS at 13:48

## 2022-09-05 RX ADMIN — DEXTROSE MONOHYDRATE, SODIUM CHLORIDE, AND POTASSIUM CHLORIDE 75 MILLILITER(S): 50; .745; 4.5 INJECTION, SOLUTION INTRAVENOUS at 11:05

## 2022-09-05 RX ADMIN — MIDODRINE HYDROCHLORIDE 5 MILLIGRAM(S): 2.5 TABLET ORAL at 17:24

## 2022-09-05 RX ADMIN — MEROPENEM 100 MILLIGRAM(S): 1 INJECTION INTRAVENOUS at 13:53

## 2022-09-05 RX ADMIN — MEROPENEM 100 MILLIGRAM(S): 1 INJECTION INTRAVENOUS at 21:15

## 2022-09-05 RX ADMIN — CHLORHEXIDINE GLUCONATE 1 APPLICATION(S): 213 SOLUTION TOPICAL at 06:03

## 2022-09-05 RX ADMIN — HEPARIN SODIUM 5000 UNIT(S): 5000 INJECTION INTRAVENOUS; SUBCUTANEOUS at 21:15

## 2022-09-05 RX ADMIN — CHLORHEXIDINE GLUCONATE 1 APPLICATION(S): 213 SOLUTION TOPICAL at 11:05

## 2022-09-05 RX ADMIN — HEPARIN SODIUM 5000 UNIT(S): 5000 INJECTION INTRAVENOUS; SUBCUTANEOUS at 05:55

## 2022-09-05 RX ADMIN — Medication 1 DROP(S): at 17:24

## 2022-09-05 NOTE — PROGRESS NOTE ADULT - SUBJECTIVE AND OBJECTIVE BOX
INTERVAL HPI/OVERNIGHT EVENTS:  Pt seen and examined at bedside.     Allergies/Intolerance: No Known Allergies      MEDICATIONS  (STANDING):  ascorbic acid 500 milliGRAM(s) Oral daily  chlorhexidine 2% Cloths 1 Application(s) Topical daily  chlorhexidine 4% Liquid 1 Application(s) Topical <User Schedule>  dextrose 5%. 1000 milliLiter(s) (75 mL/Hr) IV Continuous <Continuous>  heparin   Injectable 5000 Unit(s) SubCutaneous every 8 hours  meropenem  IVPB 1000 milliGRAM(s) IV Intermittent every 8 hours  midodrine. 5 milliGRAM(s) Oral three times a day  multivitamin 1 Tablet(s) Oral daily    MEDICATIONS  (PRN):        ROS: all systems reviewed and wnl      PHYSICAL EXAMINATION:  Vital Signs Last 24 Hrs  T(C): 36.7 (05 Sep 2022 00:24), Max: 36.7 (05 Sep 2022 00:24)  T(F): 98 (05 Sep 2022 00:24), Max: 98 (05 Sep 2022 00:24)  HR: 60 (05 Sep 2022 04:53) (60 - 91)  BP: 121/81 (05 Sep 2022 04:53) (111/71 - 128/86)  BP(mean): --  RR: 18 (05 Sep 2022 04:53) (17 - 18)  SpO2: 99% (05 Sep 2022 04:53) (96% - 100%)    Parameters below as of 05 Sep 2022 04:53  Patient On (Oxygen Delivery Method): room air      CAPILLARY BLOOD GLUCOSE          09-04 @ 07:01  -  09-05 @ 07:00  --------------------------------------------------------  IN: 1100 mL / OUT: 2100 mL / NET: -1000 mL        GENERAL:   NECK: supple, No JVD  CHEST/LUNG: clear to auscultation bilaterally; no rales, rhonchi, or wheezing b/l  HEART: normal S1, S2  ABDOMEN: BS+, soft, ND, NT   EXTREMITIES:  pulses palpable; no clubbing, cyanosis, or edema b/l LEs  SKIN: no rashes or lesions      LABS:    09-04    138  |  102  |  10  ----------------------------<  119<H>  3.2<L>   |  26  |  0.67    Ca    9.3      04 Sep 2022 07:22                 INTERVAL HPI/OVERNIGHT EVENTS:  Pt seen and examined at bedside.     Allergies/Intolerance: No Known Allergies      MEDICATIONS  (STANDING):  ascorbic acid 500 milliGRAM(s) Oral daily  chlorhexidine 2% Cloths 1 Application(s) Topical daily  chlorhexidine 4% Liquid 1 Application(s) Topical <User Schedule>  dextrose 5%. 1000 milliLiter(s) (75 mL/Hr) IV Continuous <Continuous>  heparin   Injectable 5000 Unit(s) SubCutaneous every 8 hours  meropenem  IVPB 1000 milliGRAM(s) IV Intermittent every 8 hours  midodrine. 5 milliGRAM(s) Oral three times a day  multivitamin 1 Tablet(s) Oral daily    MEDICATIONS  (PRN):        ROS: all systems reviewed and wnl      PHYSICAL EXAMINATION:  Vital Signs Last 24 Hrs  T(C): 36.7 (05 Sep 2022 00:24), Max: 36.7 (05 Sep 2022 00:24)  T(F): 98 (05 Sep 2022 00:24), Max: 98 (05 Sep 2022 00:24)  HR: 60 (05 Sep 2022 04:53) (60 - 91)  BP: 121/81 (05 Sep 2022 04:53) (111/71 - 128/86)  BP(mean): --  RR: 18 (05 Sep 2022 04:53) (17 - 18)  SpO2: 99% (05 Sep 2022 04:53) (96% - 100%)    Parameters below as of 05 Sep 2022 04:53  Patient On (Oxygen Delivery Method): room air      CAPILLARY BLOOD GLUCOSE          09-04 @ 07:01  -  09-05 @ 07:00  --------------------------------------------------------  IN: 1100 mL / OUT: 2100 mL / NET: -1000 mL        GENERAL: stable, in bed, eyes awake, less sedated yesterday.   NECK: supple, No JVD  CHEST/LUNG: clear to auscultation bilaterally; no rales, rhonchi, or wheezing b/l  HEART: normal S1, S2  ABDOMEN: BS+, soft, ND, NT   EXTREMITIES:  pulses palpable; no clubbing, cyanosis, or edema b/l LEs        LABS:    09-04    138  |  102  |  10  ----------------------------<  119<H>  3.2<L>   |  26  |  0.67    Ca    9.3      04 Sep 2022 07:22

## 2022-09-05 NOTE — PROGRESS NOTE ADULT - SUBJECTIVE AND OBJECTIVE BOX
CARDIOLOGY     PROGRESS  NOTE   ________________________________________________    CHIEF COMPLAINT:Patient is a 61y old  Male who presents with a chief complaint of Hypokalemia (05 Sep 2022 09:20)  comfortable.  	  REVIEW OF SYSTEMS:  CONSTITUTIONAL: No fever, weight loss, or fatigue  EYES: No eye pain, visual disturbances, or discharge  ENT:  No difficulty hearing, tinnitus, vertigo; No sinus or throat pain  NECK: No pain or stiffness  RESPIRATORY: No cough, wheezing, chills or hemoptysis; No Shortness of Breath  CARDIOVASCULAR: No chest pain, palpitations, passing out, dizziness, or leg swelling  GASTROINTESTINAL: No abdominal or epigastric pain. No nausea, vomiting, or hematemesis; No diarrhea or constipation. No melena or hematochezia.  GENITOURINARY: No dysuria, frequency, hematuria, or incontinence  NEUROLOGICAL: No headaches, memory loss, loss of strength, numbness, or tremors  SKIN: No itching, burning, rashes, or lesions   LYMPH Nodes: No enlarged glands  ENDOCRINE: No heat or cold intolerance; No hair loss  MUSCULOSKELETAL: No joint pain or swelling; No muscle, back, or extremity pain  PSYCHIATRIC: No depression, anxiety, mood swings, or difficulty sleeping  HEME/LYMPH: No easy bruising, or bleeding gums  ALLERGY AND IMMUNOLOGIC: No hives or eczema	    [ ] All others negative	  [ x] Unable to obtain    PHYSICAL EXAM:  T(C): 36.7 (09-05-22 @ 09:37), Max: 36.7 (09-05-22 @ 00:24)  HR: 60 (09-05-22 @ 09:37) (60 - 91)  BP: 114/75 (09-05-22 @ 09:37) (111/71 - 128/86)  RR: 18 (09-05-22 @ 09:37) (17 - 18)  SpO2: 100% (09-05-22 @ 09:37) (96% - 100%)  Wt(kg): --  I&O's Summary    04 Sep 2022 07:01  -  05 Sep 2022 07:00  --------------------------------------------------------  IN: 1100 mL / OUT: 2100 mL / NET: -1000 mL        Appearance: Normal	  HEENT:   Normal oral mucosa, PERRL, EOMI	  Lymphatic: No lymphadenopathy  Cardiovascular: Normal S1 S2, No JVD, + murmurs, No edema  Respiratory: rhonchi  Psychiatry: A & O x 3, Mood & affect appropriate  Gastrointestinal:  Soft, Non-tender, + BS	  Skin: No rashes, No ecchymoses, No cyanosis	  Neurologic: Non-focal  Extremities: Normal range of motion, No clubbing, cyanosis or edema  Vascular: Peripheral pulses palpable 2+ bilaterally    MEDICATIONS  (STANDING):  ascorbic acid 500 milliGRAM(s) Oral daily  chlorhexidine 2% Cloths 1 Application(s) Topical daily  chlorhexidine 4% Liquid 1 Application(s) Topical <User Schedule>  dextrose 5% + sodium chloride 0.45% with potassium chloride 20 mEq/L 1000 milliLiter(s) (75 mL/Hr) IV Continuous <Continuous>  heparin   Injectable 5000 Unit(s) SubCutaneous every 8 hours  meropenem  IVPB 1000 milliGRAM(s) IV Intermittent every 8 hours  midodrine. 5 milliGRAM(s) Oral three times a day  multivitamin 1 Tablet(s) Oral daily      TELEMETRY: 	    ECG:  	  RADIOLOGY:  OTHER: 	  	  LABS:	 	    CARDIAC MARKERS:            09-04    138  |  102  |  10  ----------------------------<  119<H>  3.2<L>   |  26  |  0.67    Ca    9.3      04 Sep 2022 07:22      proBNP: Serum Pro-Brain Natriuretic Peptide: 267 pg/mL (08-09 @ 16:44)    Lipid Profile: Cholesterol 193  LDL --  HDL 30  TG 91    HgA1c:   TSH: Thyroid Stimulating Hormone, Serum: 1.51 uIU/mL (08-27 @ 07:27)  Thyroid Stimulating Hormone, Serum: 1.00 uIU/mL (08-09 @ 16:44)          Assessment and plan  ---------------------------  61 M w/ PMH of progressive Cerebellar Ataxia, Chronic Lacunar infarcts, 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   pt is well known to me with hx of severe orthostatic hypotension on florinef/ ?midodrine with bradycardia, pt had a normal am cortisol level.  ivf, keep hydrated  autonomic dysfunction, over all controlled with midodrine, don't increase dose sec to bradycardia  check tsh  dvt prophylaxis  if sig bradycardia will change Midodrine to Florinef if remains bradycardic  s/p syrgery transferred to MICU sec to hypotension  continue iv abx/ pressor  supportive care  continue iv abx  encourage fluid  decrease midodrine pt with sig hx of bradycardia, add Florinef if bp is low  increase free water/ ivf  + BC, continue ABX change to Meropenem  bp is well controlled

## 2022-09-05 NOTE — PROGRESS NOTE ADULT - ASSESSMENT
Plan: now  admitted  with  hypokalemia, all corrected, K is 5.1., h/o  cerebellar  ataxia,  with  no defined  cause  found    pt  with  bradycardia, ?  central, seen  by  card/  echo  on 4/2022,  normal  ef  h/o  recurrent , intermittent   hypothermia,    not related  to  any   infectious   process,  it  seems to be  dysautonomia/   with  h/o normal  cortisol level.  prior  CT  c/ap,  no  malignant  process  on feeds  per  swallow  eval.       Plan:  Continue Midodrine 10 mg tid, UTI, Pseudomonas in blood,  on IIV Cefepeme 2 gram tid.  Repeat blood cultures requested.     Advance diet, IVF , PT and Speech eval.     Positive blood cultures, ID consult was called, continue Cefepeme for now.        IVF with D5W to correct hypernatremia, sodium better today at 138.                   Plan: now  admitted  with  hypokalemia, all corrected, K is 5.1., h/o  cerebellar  ataxia,  with  no defined  cause  found    pt  with  bradycardia, ?  central, seen  by  card/  echo  on 4/2022,  normal  ef  h/o  recurrent , intermittent   hypothermia,    not related  to  any   infectious   process,  it  seems to be  dysautonomia/   with  h/o normal  cortisol level.  prior  CT  c/ap,  no  malignant  process  on feeds  per  swallow  eval.       Plan:  Continue Midodrine 10 mg tid, UTI, Pseudomonas in blood,  on IIV Merepemen TID. Repeat blood cultures requested.     Advance diet, IVF , PT and Speech eval.     Positive blood cultures, ID consult was called, continue Meropemen for now, follow repeat blood cultures.         Note: Still has Coronel in place, does not have chronic coronel at home.     IVF can be changed to D5W1/2 NS with KCL. Sodium better today at 138.

## 2022-09-06 LAB
ANION GAP SERPL CALC-SCNC: 10 MMOL/L — SIGNIFICANT CHANGE UP (ref 5–17)
BUN SERPL-MCNC: 6 MG/DL — LOW (ref 7–23)
CALCIUM SERPL-MCNC: 9.6 MG/DL — SIGNIFICANT CHANGE UP (ref 8.4–10.5)
CHLORIDE SERPL-SCNC: 107 MMOL/L — SIGNIFICANT CHANGE UP (ref 96–108)
CO2 SERPL-SCNC: 25 MMOL/L — SIGNIFICANT CHANGE UP (ref 22–31)
CREAT SERPL-MCNC: 0.57 MG/DL — SIGNIFICANT CHANGE UP (ref 0.5–1.3)
EGFR: 112 ML/MIN/1.73M2 — SIGNIFICANT CHANGE UP
GLUCOSE SERPL-MCNC: 111 MG/DL — HIGH (ref 70–99)
HCT VFR BLD CALC: 34.9 % — LOW (ref 39–50)
HGB BLD-MCNC: 11.2 G/DL — LOW (ref 13–17)
LACTATE SERPL-SCNC: 0.8 MMOL/L — SIGNIFICANT CHANGE UP (ref 0.5–2)
MCHC RBC-ENTMCNC: 27.2 PG — SIGNIFICANT CHANGE UP (ref 27–34)
MCHC RBC-ENTMCNC: 32.1 GM/DL — SIGNIFICANT CHANGE UP (ref 32–36)
MCV RBC AUTO: 84.7 FL — SIGNIFICANT CHANGE UP (ref 80–100)
NRBC # BLD: 0 /100 WBCS — SIGNIFICANT CHANGE UP (ref 0–0)
PLATELET # BLD AUTO: 130 K/UL — LOW (ref 150–400)
POTASSIUM SERPL-MCNC: 3.8 MMOL/L — SIGNIFICANT CHANGE UP (ref 3.5–5.3)
POTASSIUM SERPL-SCNC: 3.8 MMOL/L — SIGNIFICANT CHANGE UP (ref 3.5–5.3)
RBC # BLD: 4.12 M/UL — LOW (ref 4.2–5.8)
RBC # FLD: 14.7 % — HIGH (ref 10.3–14.5)
SODIUM SERPL-SCNC: 142 MMOL/L — SIGNIFICANT CHANGE UP (ref 135–145)
WBC # BLD: 5.9 K/UL — SIGNIFICANT CHANGE UP (ref 3.8–10.5)
WBC # FLD AUTO: 5.9 K/UL — SIGNIFICANT CHANGE UP (ref 3.8–10.5)

## 2022-09-06 PROCEDURE — 99232 SBSQ HOSP IP/OBS MODERATE 35: CPT

## 2022-09-06 RX ORDER — SODIUM CHLORIDE 9 MG/ML
250 INJECTION INTRAMUSCULAR; INTRAVENOUS; SUBCUTANEOUS ONCE
Refills: 0 | Status: COMPLETED | OUTPATIENT
Start: 2022-09-06 | End: 2022-09-06

## 2022-09-06 RX ORDER — SODIUM CHLORIDE 9 MG/ML
500 INJECTION INTRAMUSCULAR; INTRAVENOUS; SUBCUTANEOUS
Refills: 0 | Status: DISCONTINUED | OUTPATIENT
Start: 2022-09-06 | End: 2022-09-06

## 2022-09-06 RX ORDER — CEFEPIME 1 G/1
2000 INJECTION, POWDER, FOR SOLUTION INTRAMUSCULAR; INTRAVENOUS EVERY 8 HOURS
Refills: 0 | Status: DISCONTINUED | OUTPATIENT
Start: 2022-09-06 | End: 2022-09-09

## 2022-09-06 RX ORDER — LANOLIN ALCOHOL/MO/W.PET/CERES
3 CREAM (GRAM) TOPICAL ONCE
Refills: 0 | Status: DISCONTINUED | OUTPATIENT
Start: 2022-09-06 | End: 2022-09-07

## 2022-09-06 RX ADMIN — SODIUM CHLORIDE 250 MILLILITER(S): 9 INJECTION INTRAMUSCULAR; INTRAVENOUS; SUBCUTANEOUS at 20:03

## 2022-09-06 RX ADMIN — MIDODRINE HYDROCHLORIDE 5 MILLIGRAM(S): 2.5 TABLET ORAL at 06:01

## 2022-09-06 RX ADMIN — MIDODRINE HYDROCHLORIDE 5 MILLIGRAM(S): 2.5 TABLET ORAL at 12:29

## 2022-09-06 RX ADMIN — Medication 1 TABLET(S): at 12:28

## 2022-09-06 RX ADMIN — Medication 1 DROP(S): at 06:01

## 2022-09-06 RX ADMIN — CHLORHEXIDINE GLUCONATE 1 APPLICATION(S): 213 SOLUTION TOPICAL at 06:04

## 2022-09-06 RX ADMIN — DEXTROSE MONOHYDRATE, SODIUM CHLORIDE, AND POTASSIUM CHLORIDE 75 MILLILITER(S): 50; .745; 4.5 INJECTION, SOLUTION INTRAVENOUS at 06:00

## 2022-09-06 RX ADMIN — HEPARIN SODIUM 5000 UNIT(S): 5000 INJECTION INTRAVENOUS; SUBCUTANEOUS at 13:51

## 2022-09-06 RX ADMIN — HEPARIN SODIUM 5000 UNIT(S): 5000 INJECTION INTRAVENOUS; SUBCUTANEOUS at 22:06

## 2022-09-06 RX ADMIN — MIDODRINE HYDROCHLORIDE 5 MILLIGRAM(S): 2.5 TABLET ORAL at 16:43

## 2022-09-06 RX ADMIN — MEROPENEM 100 MILLIGRAM(S): 1 INJECTION INTRAVENOUS at 06:01

## 2022-09-06 RX ADMIN — CHLORHEXIDINE GLUCONATE 1 APPLICATION(S): 213 SOLUTION TOPICAL at 12:29

## 2022-09-06 RX ADMIN — SODIUM CHLORIDE 70 MILLILITER(S): 9 INJECTION INTRAMUSCULAR; INTRAVENOUS; SUBCUTANEOUS at 11:04

## 2022-09-06 RX ADMIN — HEPARIN SODIUM 5000 UNIT(S): 5000 INJECTION INTRAVENOUS; SUBCUTANEOUS at 06:04

## 2022-09-06 RX ADMIN — CEFEPIME 100 MILLIGRAM(S): 1 INJECTION, POWDER, FOR SOLUTION INTRAMUSCULAR; INTRAVENOUS at 13:50

## 2022-09-06 RX ADMIN — Medication 1 DROP(S): at 17:37

## 2022-09-06 RX ADMIN — CEFEPIME 100 MILLIGRAM(S): 1 INJECTION, POWDER, FOR SOLUTION INTRAMUSCULAR; INTRAVENOUS at 22:06

## 2022-09-06 RX ADMIN — Medication 500 MILLIGRAM(S): at 12:29

## 2022-09-06 RX ADMIN — Medication 25 MILLIGRAM(S): at 20:37

## 2022-09-06 NOTE — SWALLOW BEDSIDE ASSESSMENT ADULT - PHARYNGEAL PHASE
suspected incoordination of swallow sequence; cough noted after thin liquids/Delayed pharyngeal swallow/Decreased laryngeal elevation
Absent laryngeal elevation/Absent trigger of swallow

## 2022-09-06 NOTE — SWALLOW BEDSIDE ASSESSMENT ADULT - SLP GENERAL OBSERVATIONS
Pt seen at bedside, lethargic but rousable, eyes closed/partially closed, but moving in response to stimuli, nonverbal at this time, not following directions for the purposes of the exam.
Pt seen at bedside, awake and alert, appears very tense/anxious, somewhat defensive to presentation of tactile stimuli, nonverbal at this time, but nodding intermittently to yes/no questions, not following directions for the purposes of the exam. Pt seen with niece present, attempting to encourage Pt's participation.

## 2022-09-06 NOTE — SWALLOW BEDSIDE ASSESSMENT ADULT - SWALLOW EVAL: MANDIBULAR STRENGTH AND MOBILITY
MARE
TMJ noted to be tense, and in open position at rest; gentle massage to masseter muscle was only partially effective at facilitating oral closure for acceptance of PO trials.

## 2022-09-06 NOTE — SWALLOW BEDSIDE ASSESSMENT ADULT - H & P REVIEW
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 Low K was noted on pre-op testing. Admitted to medicine for hypokalemia. Stop Ivan as this can make K lower. Replaced in ER. Endo did stim test last admission, no evidence for adrenal insufficiency./yes

## 2022-09-06 NOTE — PROGRESS NOTE ADULT - ASSESSMENT
61 M w/ hx of progressive cerebllar ataxia, chronic lacunar infarcts, chronic HoTN on midodrine presenting with pseudomonas aeroginosa UTI s/p bilateral ureteroscopy with stent placement with course c/b septic shock 2/2 PsA bacteremia. ID consulted for antibiotic management.    s/p ureteral stent placement/stone extraction with postop with pseudomonas uti, bacteremia, sepsis, leukocytosis   -DC meropenem and restart cefepime 2 gm iv q8  -repeat bcx negative from 9/4  -monitor temps, cbc, creatinine   -complete abx 9/7    Alan Judge  Attending Physician   Division of Infectious Disease  Office #[phone]  Available on [employer] Teams also  After 5pm/weekend or no response, call #[phone]

## 2022-09-06 NOTE — SWALLOW BEDSIDE ASSESSMENT ADULT - SPECIFY REASON(S)
to subjectively assess swallow safety/function.

## 2022-09-06 NOTE — SWALLOW BEDSIDE ASSESSMENT ADULT - ORAL PHASE
absent initiation of oral transfer of bolus with all trials/Decreased anterior-posterior movement of the bolus/Delayed oral transit time
only occasional initiation of oral transfer of bolus/Decreased anterior-posterior movement of the bolus/Delayed oral transit time

## 2022-09-06 NOTE — SWALLOW BEDSIDE ASSESSMENT ADULT - ORAL PREPARATORY PHASE
Pt orally receptive to cup sips of liquids, and intermittently to spoon presentation of puree; prolonged oral holding with frequent anterior loss of majority of the bolus of all textures (? purposeful/ behvioral vs. inability to initiation AP transport?)/Reduced oral grading
Pt orally receptive to cup sips of liquids, and intermittently to spoon presentation of puree; prolonged oral holding with frequent anterior loss of majority of the bolus of all textures (? purposeful/ behvioral vs. inability to initiation AP transport?)/Reduced oral grading

## 2022-09-06 NOTE — SWALLOW BEDSIDE ASSESSMENT ADULT - SWALLOW EVAL: PATIENT/FAMILY GOALS STATEMENT
Pt unable to provide due to nonverbal state at this time.
Pt unable to provide due to nonverbal state at this time.

## 2022-09-06 NOTE — SWALLOW BEDSIDE ASSESSMENT ADULT - SWALLOW EVAL: DIAGNOSIS
Consult received and appreciated. Chart reviewed. Attempted to see pt for swallow evaluation. Pt currently NPO for procedure, thus not appropriate for PO trials at this time. As per d/w ACP from primary team, will reattempt at a later date.
Pt presents with evidence of an oropharyngeal dysphagia notable for intermittently functional oral reception, prolonged oral holding with only occasional initiation of A-P transport, frequent anterior loss of most of oral intake, suspected delay in swallow trigger, and overt s/s laryngeal penetration/aspiration after thin liquids. Pt also with superimposed altered mental status that likely is impacting Pt's ability to functionally/safely participate in oral intake.
Pt presents with evidence of an oropharyngeal dysphagia notable for intermittently functional oral reception, prolonged oral holding with only occasional initiation of A-P transport, frequent anterior loss of most of oral intake, suspected delay in swallow trigger, and overt s/s laryngeal penetration/aspiration after thin liquids. Pt also with superimposed altered mental status that likely is impacting Pt's ability to functionally/safely participate in oral intake.

## 2022-09-06 NOTE — PROGRESS NOTE ADULT - ASSESSMENT
62 yo male s/p bilateral URS/LL/Stents     - Do NOT remove coronel   - Will dc coronel and left stent once clinically improved   - Right stent to be removed outpatient    - Can continue home meds  - blood pseudomonas, repeat NGTD   - ID recs       Remainder of care per primary     Barbra Barth   Available on Teams

## 2022-09-06 NOTE — SWALLOW BEDSIDE ASSESSMENT ADULT - MUCOSAL QUALITY
Pt with orange colored material in oral cavity, suspect medication residue?  Pt was administered gentle oral care with suction toothbrush and oral swab as adjunct to remove material from oral cavity.
anterior oral mucosa appear GWFL

## 2022-09-06 NOTE — SWALLOW BEDSIDE ASSESSMENT ADULT - SLP PERTINENT HISTORY OF CURRENT PROBLEM
h/o cerebellar ataxia, with  no defined cause found, pt  with  bradycardia, on midodrine, ? central, seen by card/  echo on 4/2022, normal  ef, h/o  recurrent , intermittent   hypothermia, not related to any   infectious process, it seems to be dysautonomia/ with h/o normal cortisol level; prior CT c/ap, no malignant process bed bound status and altered baseline mental status at times; ID eval; urology  planning surg  in am /  cleared  for  procedure;  dvt ppx/  q/q  heparin
h/o cerebellar ataxia, with  no defined cause found, pt  with  bradycardia, on midodrine, ? central, seen by card/  echo on 4/2022, normal  ef, h/o  recurrent , intermittent   hypothermia, not related to any   infectious process, it seems to be dysautonomia/ with h/o normal cortisol level; prior CT c/ap, no malignant process bed bound status and altered baseline mental status at times; ID eval; urology  planning surg  in am /  cleared  for  procedure;  dvt ppx/  q/q  heparin

## 2022-09-06 NOTE — PROGRESS NOTE ADULT - SUBJECTIVE AND OBJECTIVE BOX
Interval events:   hypothermic          OBJECTIVE:  Vital Signs Last 24 Hrs  T(C): 33.5 (06 Sep 2022 05:58), Max: 36.7 (05 Sep 2022 09:37)  T(F): 92.3 (06 Sep 2022 05:58), Max: 98 (05 Sep 2022 09:37)  HR: 61 (06 Sep 2022 05:58) (58 - 70)  BP: 104/69 (06 Sep 2022 05:58) (101/69 - 126/81)  BP(mean): --  RR: 18 (06 Sep 2022 05:58) (18 - 18)  SpO2: 100% (06 Sep 2022 05:58) (98% - 100%)    Parameters below as of 06 Sep 2022 05:58  Patient On (Oxygen Delivery Method): room air        Physical Examination:  GEN: NAD, resting quietly  ABD: soft  : homer with stent attached     LABS:      09-06    142  |  107  |  6<L>  ----------------------------<  111<H>  3.8   |  25  |  0.57    Ca    9.6      06 Sep 2022 07:08

## 2022-09-06 NOTE — PROGRESS NOTE ADULT - ASSESSMENT
Plan: now  admitted  with  hypokalemia, all corrected, K is 5.1., h/o  cerebellar  ataxia,  with  no defined  cause  found    pt  with  bradycardia, ?  central, seen  by  card/  echo  on 4/2022,  normal  ef  h/o  recurrent , intermittent   hypothermia,    not related  to  any   infectious   process,  it  seems to be  dysautonomia/   with  h/o normal  cortisol level.  prior  CT  c/ap,  no  malignant  process  on feeds  per  swallow  eval.       Plan:  Continue Midodrine 10 mg tid, UTI, Pseudomonas in blood,  on IIV Merepemen TID. Repeat blood cultures requested.     Advance diet as tolerated. Wean off IVF. Swallow eval.           Note: Still has Coronel in place, does not have chronic coronel at home.     IVF can be changed to D5W1/2 NS with KCL. Sodium better today at 138.     Per Urology, remove left stent here, right stent as outpatient.  Keep coronel in place until all procedures are done.

## 2022-09-06 NOTE — CHART NOTE - NSCHARTNOTEFT_GEN_A_CORE
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    Source: [] Patient       [x] EMR        [x] RN        [] Family at bedside       [x] Other: NP    -If unable to interview patient: [] Trach/Vent/BiPAP  [x] Disoriented/confused/inappropriate to interview/unable to speak as per chart     RN denies pt with nausea or vomiting. Last BM () as per flow sheets, RN aware.     Diet Order:   Diet, NPO:   Except Medications  With Ice Chips/Sips of Water (22)    Weights:   Daily Weight in k kg () -> 64.5 () -?accuracy as pt previously with edema.     Nutritionally Pertinent MEDICATIONS  (STANDING):  ascorbic acid  cefepime   IVPB  dextrose 5% + sodium chloride 0.45% with potassium chloride 20 mEq/L  midodrine.  multivitamin    Pertinent Labs: ( @ 07:08): Na 142, BUN 6<L>, Cr 0.57, <H>, K+ 3.8, Phos --, Mg --, Alk Phos --, ALT/SGPT --, AST/SGOT --    A1C with Estimated Average Glucose Result: 5.3 % (22 @ 16:44)  A1C with Estimated Average Glucose Result: 5.6 % (22 @ 09:25)  A1C with Estimated Average Glucose Result: 5.5 % (22 @ 09:56)    Skin per nursing documentation: pressure ulcers in sacrum stage 2, left elbow stage 1, and buttocks suspected deep tissue injury. x  Edema as per flow sheets: none at this time (previously +1 svetlana. ankle edema).     Estimated Needs:   [x] no change since previous assessment (meets estimated needs for pressure ulcer healing).   [] recalculated:     Previous Nutrition Diagnoses:  [x] Malnutrition; severe  [x] Increased nutrient needs      Nutrition Diagnoses are: [x] ongoing - pt NPO, pending swallow evaluation.     New Nutrition Diagnosis: [x] Not applicable    Nutrition Care Plan:  [x] In Progress          Nutrition Interventions:     Education Provided:       [] Yes  [x] No    Recommendations:      1. Defer feeding plans and diet/fluid consistencies to medical team/SLP recommendations. If medically feasible, consider regular diet + Ensure Enlive 3xday. Will continue to monitor as able and adjust as needed.   2. Continue Multivitamin and Vitamin C as medically feasible to optimize nutrient intake and further aid with pressure ulcer healing.   3. Consider tube feedings if within GOC and if pt unable to consume anything by mouth.   4. Continue to obtain weights as able to identify changes if any.     Monitoring and Evaluation:   Continue to monitor nutritional intake when applicable, tolerance to diet prescription, weights, labs, skin integrity    RD remains available upon request and will follow up per protocol  Deepthi Tovar MS RDN CDN 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    Source: [] Patient       [x] EMR        [x] RN        [] Family at bedside       [x] Other: NP    -If unable to interview patient: [] Trach/Vent/BiPAP  [x] Disoriented/confused/inappropriate to interview/unable to speak as per chart     RN denies pt with nausea or vomiting. Last BM () as per flow sheets, RN aware.     Diet Order:   Diet, NPO:   Except Medications  With Ice Chips/Sips of Water (22)    Weights:   Daily Weight in k kg () -> 64.5 () -?accuracy as pt previously with edema.     Nutritionally Pertinent MEDICATIONS  (STANDING):  ascorbic acid  cefepime   IVPB  dextrose 5% + sodium chloride 0.45% with potassium chloride 20 mEq/L  midodrine.  multivitamin    Pertinent Labs: ( @ 07:08): Na 142, BUN 6<L>, Cr 0.57, <H>, K+ 3.8, Phos --, Mg --, Alk Phos --, ALT/SGPT --, AST/SGOT --    A1C with Estimated Average Glucose Result: 5.3 % (22 @ 16:44)  A1C with Estimated Average Glucose Result: 5.6 % (22 @ 09:25)  A1C with Estimated Average Glucose Result: 5.5 % (22 @ 09:56)    Skin per nursing documentation: pressure ulcers in sacrum stage 2, left elbow stage 1, and buttocks suspected deep tissue injury. x  Edema as per flow sheets: none at this time (previously +1 svetlana. ankle edema).     Estimated Needs:   [x] no change since previous assessment (meets estimated needs for pressure ulcer healing).   [] recalculated:     Previous Nutrition Diagnoses:  [x] Malnutrition; severe  [x] Increased nutrient needs      Nutrition Diagnoses are: [x] ongoing - pt NPO, pending swallow evaluation.     New Nutrition Diagnosis: [x] Not applicable    Nutrition Care Plan:  [x] In Progress          Nutrition Interventions:     Education Provided:       [] Yes  [x] No    Recommendations:      1. Defer feeding plans and diet/fluid consistencies to medical team/SLP recommendations. If medically feasible, consider regular diet + Ensure Enlive 3xday. Will continue to monitor as able and adjust as needed.   2. Continue Multivitamin and Vitamin C as medically feasible to optimize nutrient intake and further aid with pressure ulcer healing.   3. Consider tube feedings if within GOC and if pt unable to consume anything by mouth.   4. Continue to obtain weights as able to identify changes if any.     Monitoring and Evaluation:   Continue to monitor nutritional intake when applicable, tolerance to diet prescription, weights, labs, skin integrity    RD remains available upon request and will follow up per protocol  Deepthi Tovar MS RDN CDN [facility] CCTD #[phone]

## 2022-09-06 NOTE — CHART NOTE - NSCHARTNOTEFT_GEN_A_CORE
CC: Hypothermia    Notified by RN of patient with temperature of 92.3F rectally. Additional vital signs stable. Patient seen and examined bedside. Of note, patient s/p ureteral stent placement/stone extraction with post-op pseudomonas UTI/bacteremia, sepsis, and leukocytosis.    Vital Signs Last 24 Hrs  T(C): 33.5 (06 Sep 2022 05:58), Max: 36.7 (05 Sep 2022 09:37)  T(F): 92.3 (06 Sep 2022 05:58), Max: 98 (05 Sep 2022 09:37)  HR: 61 (06 Sep 2022 05:58) (58 - 70)  BP: 104/69 (06 Sep 2022 05:58) (101/69 - 126/81)  BP(mean): --  RR: 18 (06 Sep 2022 05:58) (18 - 18)  SpO2: 100% (06 Sep 2022 05:58) (98% - 100%)    Parameters below as of 06 Sep 2022 05:58  Patient On (Oxygen Delivery Method): room air    PHYSICAL EXAM:  GENERAL: stable, in bed, eyes awake, less sedated yesterday.   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 Patient now with episode of hypothermia rectally to 92.3F.    1. Hypothermia  - Warming blanket ordered and to be applied STAT with goal temperature of 98F  - Patient with positive BCx from 9/2 (P. aeruginosa); repeat cultures drawn 9/4 with NGTD  - Will f/u AM CBC, BMP; will add repeat cultures this morning  - C/w meropenem as ordered  - ID following; will continue to follow recommendations  - Will continue to trend vitals signs  - Will notify day team regarding overnight events    Geraldine Cunningham CC: Hypothermia    Notified by RN of patient with temperature of 92.3F rectally. Additional vital signs stable. Patient seen and examined bedside. Of note, patient s/p ureteral stent placement/stone extraction with post-op pseudomonas UTI/bacteremia, sepsis, and leukocytosis. Unable to obtain ROS.     Vital Signs Last 24 Hrs  T(C): 33.5 (06 Sep 2022 05:58), Max: 36.7 (05 Sep 2022 09:37)  T(F): 92.3 (06 Sep 2022 05:58), Max: 98 (05 Sep 2022 09:37)  HR: 61 (06 Sep 2022 05:58) (58 - 70)  BP: 104/69 (06 Sep 2022 05:58) (101/69 - 126/81)  BP(mean): --  RR: 18 (06 Sep 2022 05:58) (18 - 18)  SpO2: 100% (06 Sep 2022 05:58) (98% - 100%)    Parameters below as of 06 Sep 2022 05:58  Patient On (Oxygen Delivery Method): room air    PHYSICAL EXAM:  GENERAL: Awake, alert in bed, NAD  NECK: Supple, No JVD  CHEST/LUNG: CTABL  HEART: RRR, S1, S2  ABDOMEN: BS+ x4 quadrants, soft, ND, NT   EXTREMITIES: Pulses palpable; no clubbing, cyanosis, or edema b/l LEs    Culture - Blood (09.02.22 @ 06:51)   Gram Stain:   Growth in aerobic bottle: Gram Negative Rods   Specimen Source: .Blood Blood-Peripheral   Culture Results:   Growth in aerobic bottle: Pseudomonas aeruginosa     ASSESSMENT: 61 M w/ PMH of progressive Cerebellar Ataxia, Chronic Lacunar infarcts, 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 Patient now with episode of hypothermia rectally to 92.3F.    1. Hypothermia  - Warming blanket ordered and to be applied STAT with goal temperature of 98F  - Patient with positive BCx from 9/2 (P. aeruginosa); repeat cultures drawn 9/4 with NGTD  - Will f/u AM CBC, BMP; will add repeat cultures this morning  - C/w meropenem as ordered  - ID following; will continue to follow recommendations  - Will continue to trend vitals signs  - Will notify day team regarding overnight events    Geraldine Cunningham CC: Hypothermia    Notified by RN of patient with temperature of 92.3F rectally. Additional vital signs stable. Patient seen and examined bedside. Of note, patient s/p ureteral stent placement/stone extraction with post-op pseudomonas UTI/bacteremia, sepsis, and leukocytosis. Unable to obtain ROS.     Vital Signs Last 24 Hrs  T(C): 33.5 (06 Sep 2022 05:58), Max: 36.7 (05 Sep 2022 09:37)  T(F): 92.3 (06 Sep 2022 05:58), Max: 98 (05 Sep 2022 09:37)  HR: 61 (06 Sep 2022 05:58) (58 - 70)  BP: 104/69 (06 Sep 2022 05:58) (101/69 - 126/81)  BP(mean): --  RR: 18 (06 Sep 2022 05:58) (18 - 18)  SpO2: 100% (06 Sep 2022 05:58) (98% - 100%)    Parameters below as of 06 Sep 2022 05:58  Patient On (Oxygen Delivery Method): room air    PHYSICAL EXAM:  GENERAL: Awake, alert in bed, NAD  NECK: Supple, No JVD  CHEST/LUNG: CTABL  HEART: RRR, S1, S2  ABDOMEN: BS+ x4 quadrants, soft, ND, NT   EXTREMITIES: Pulses palpable; no clubbing, cyanosis, or edema b/l LEs    Culture - Blood (09.02.22 @ 06:51)   Gram Stain:   Growth in aerobic bottle: Gram Negative Rods   Specimen Source: .Blood Blood-Peripheral   Culture Results:   Growth in aerobic bottle: Pseudomonas aeruginosa     ASSESSMENT: 61 M w/ PMH of progressive Cerebellar Ataxia, Chronic Lacunar infarcts, 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 Patient now with episode of hypothermia rectally to 92.3F.    1. Hypothermia  - Warming blanket ordered and to be applied STAT with goal temperature of 98F  - Patient with positive BCx from 9/2 (P. aeruginosa); repeat cultures drawn 9/4 with NGTD  - Will f/u AM CBC, BMP, lactate; will add repeat cultures this morning  - C/w meropenem as ordered  - ID following; will continue to follow recommendations  - Will continue to trend vitals signs  - Will notify day team regarding overnight events    Geraldine Cunningham

## 2022-09-06 NOTE — SWALLOW BEDSIDE ASSESSMENT ADULT - ADDITIONAL RECOMMENDATIONS
Maintain good oral hygiene.   This service will continue to follow. Will reassess as overall status and alertness improve.
Maintain good oral hygiene.   This service will continue to follow. Will reassess as overall status and alertness improve.

## 2022-09-06 NOTE — PROGRESS NOTE ADULT - SUBJECTIVE AND OBJECTIVE BOX
INTERVAL HPI/OVERNIGHT EVENTS:  Pt seen and examined at bedside.     Allergies/Intolerance: No Known Allergies      MEDICATIONS  (STANDING):  artificial  tears Solution 1 Drop(s) Both EYES two times a day  ascorbic acid 500 milliGRAM(s) Oral daily  chlorhexidine 2% Cloths 1 Application(s) Topical daily  chlorhexidine 4% Liquid 1 Application(s) Topical <User Schedule>  dextrose 5% + sodium chloride 0.45% with potassium chloride 20 mEq/L 1000 milliLiter(s) (75 mL/Hr) IV Continuous <Continuous>  heparin   Injectable 5000 Unit(s) SubCutaneous every 8 hours  meropenem  IVPB 1000 milliGRAM(s) IV Intermittent every 8 hours  midodrine. 5 milliGRAM(s) Oral three times a day  multivitamin 1 Tablet(s) Oral daily  sodium chloride 0.9%. 500 milliLiter(s) (70 mL/Hr) IV Continuous <Continuous>    MEDICATIONS  (PRN):  chlorproMAZINE    Tablet 25 milliGRAM(s) Oral four times a day PRN hiccup        ROS: all systems reviewed and wnl      PHYSICAL EXAMINATION:  Vital Signs Last 24 Hrs  T(C): 34.6 (06 Sep 2022 10:00), Max: 36.7 (05 Sep 2022 14:02)  T(F): 94.3 (06 Sep 2022 10:00), Max: 98 (05 Sep 2022 14:02)  HR: 65 (06 Sep 2022 10:00) (58 - 70)  BP: 101/65 (06 Sep 2022 10:00) (101/65 - 126/81)  BP(mean): --  RR: 18 (06 Sep 2022 10:00) (18 - 18)  SpO2: 97% (06 Sep 2022 10:00) (97% - 100%)    Parameters below as of 06 Sep 2022 10:00  Patient On (Oxygen Delivery Method): room air      CAPILLARY BLOOD GLUCOSE          09-05 @ 07:01  -  09-06 @ 07:00  --------------------------------------------------------  IN: 1000 mL / OUT: 1800 mL / NET: -800 mL        GENERAL: stable, in bed, eyes open yesterday, no SOB, coronel in place, some hematuria  NECK: supple, No JVD  CHEST/LUNG: clear to auscultation bilaterally; no rales, rhonchi, or wheezing b/l  HEART: normal S1, S2  ABDOMEN: BS+, soft, ND, NT   EXTREMITIES:  pulses palpable; no clubbing, cyanosis, or edema b/l LEs        LABS:                        11.2   5.90  )-----------( 130      ( 06 Sep 2022 07:11 )             34.9     09-06    142  |  107  |  6<L>  ----------------------------<  111<H>  3.8   |  25  |  0.57    Ca    9.6      06 Sep 2022 07:08

## 2022-09-06 NOTE — PROGRESS NOTE ADULT - SUBJECTIVE AND OBJECTIVE BOX
EDD VALDIVIA 61y [id number]    Patient is a 61y old  Male who presents with a chief complaint of Hypokalemia (06 Sep 2022 08:11)      Follow Up/CC:  ID following for bacteremia    Interval History/ROS: hypothermia, warming blanket in place    Allergies    No Known Allergies    Intolerances        ANTIMICROBIALS:  meropenem  IVPB 1000 every 8 hours      MEDICATIONS  (STANDING):  artificial  tears Solution 1 Drop(s) Both EYES two times a day  ascorbic acid 500 milliGRAM(s) Oral daily  chlorhexidine 2% Cloths 1 Application(s) Topical daily  chlorhexidine 4% Liquid 1 Application(s) Topical <User Schedule>  dextrose 5% + sodium chloride 0.45% with potassium chloride 20 mEq/L 1000 milliLiter(s) (75 mL/Hr) IV Continuous <Continuous>  heparin   Injectable 5000 Unit(s) SubCutaneous every 8 hours  meropenem  IVPB 1000 milliGRAM(s) IV Intermittent every 8 hours  midodrine. 5 milliGRAM(s) Oral three times a day  multivitamin 1 Tablet(s) Oral daily  sodium chloride 0.9%. 500 milliLiter(s) (70 mL/Hr) IV Continuous <Continuous>    MEDICATIONS  (PRN):  chlorproMAZINE    Tablet 25 milliGRAM(s) Oral four times a day PRN hiccup        Vital Signs Last 24 Hrs  T(C): 34.6 (06 Sep 2022 10:00), Max: 36.7 (05 Sep 2022 14:02)  T(F): 94.3 (06 Sep 2022 10:00), Max: 98 (05 Sep 2022 14:02)  HR: 65 (06 Sep 2022 10:00) (58 - 70)  BP: 101/65 (06 Sep 2022 10:00) (101/65 - 126/81)  BP(mean): --  RR: 18 (06 Sep 2022 10:00) (18 - 18)  SpO2: 97% (06 Sep 2022 10:00) (97% - 100%)    Parameters below as of 06 Sep 2022 10:00  Patient On (Oxygen Delivery Method): room air        CBC Full  -  ( 06 Sep 2022 07:11 )  WBC Count : 5.90 K/uL  RBC Count : 4.12 M/uL  Hemoglobin : 11.2 g/dL  Hematocrit : 34.9 %  Platelet Count - Automated : 130 K/uL  Mean Cell Volume : 84.7 fl  Mean Cell Hemoglobin : 27.2 pg  Mean Cell Hemoglobin Concentration : 32.1 gm/dL  Auto Neutrophil # : x  Auto Lymphocyte # : x  Auto Monocyte # : x  Auto Eosinophil # : x  Auto Basophil # : x  Auto Neutrophil % : x  Auto Lymphocyte % : x  Auto Monocyte % : x  Auto Eosinophil % : x  Auto Basophil % : x    09-06    142  |  107  |  6<L>  ----------------------------<  111<H>  3.8   |  25  |  0.57    Ca    9.6      06 Sep 2022 07:08            MICROBIOLOGY:  .Blood Blood  09-04-22   No growth to date.  --  --      .Blood Blood-Peripheral  09-02-22   Growth in aerobic bottle: Pseudomonas aeruginosa  See previous culture [id number]  --    Growth in aerobic bottle: Gram Negative Rods      Catheterized Catheterized  09-01-22   No growth  --  --      .Blood Blood-Peripheral  08-31-22   Growth in aerobic bottle: Pseudomonas aeruginosa  See previous culture [id number]  --    Growth in aerobic bottle: Gram Negative Rods      .Blood Blood-Peripheral  08-31-22   Growth in aerobic bottle: Pseudomonas aeruginosa Multiple Morphological  Strains  ***Blood Panel PCR results on this specimen are available  approximately 3 hours after the Gram stain result.***  Gram stain, PCR, and/or culture results may not always  correspond due to difference in methodologies.  ************************************************************  This PCR assay was performed by multiplex PCR. This  Assay tests for 66 bacterial and resistance gene targets.  Please refer to the [facility] test directory  at [email or web address] for details.  --  Blood Culture PCR  Pseudomonas aeruginosa  Pseudomonas aeruginosa      Clean Catch Clean Catch (Midstream)  08-27-22   >100,000 CFU/ml Pseudomonas aeruginosa  <10,000 CFU/ml Normal Urogenital flory present  --  Pseudomonas aeruginosa      .Blood Blood-Peripheral  08-27-22   No Growth Final  --  --              v            RADIOLOGY

## 2022-09-06 NOTE — SWALLOW BEDSIDE ASSESSMENT ADULT - SWALLOW EVAL: ORAL MUSCULATURE
oral cavity open at rest/unable to assess due to poor participation/comprehension
unable to assess due to poor participation/comprehension

## 2022-09-06 NOTE — PROGRESS NOTE ADULT - SUBJECTIVE AND OBJECTIVE BOX
CARDIOLOGY     PROGRESS  NOTE   ________________________________________________    CHIEF COMPLAINT:Patient is a 61y old  Male who presents with a chief complaint of Hypokalemia (05 Sep 2022 09:51)  no complain.  	  REVIEW OF SYSTEMS:  CONSTITUTIONAL: No fever, weight loss, or fatigue  EYES: No eye pain, visual disturbances, or discharge  ENT:  No difficulty hearing, tinnitus, vertigo; No sinus or throat pain  NECK: No pain or stiffness  RESPIRATORY: No cough, wheezing, chills or hemoptysis; No Shortness of Breath  CARDIOVASCULAR: No chest pain, palpitations, passing out, dizziness, or leg swelling  GASTROINTESTINAL: No abdominal or epigastric pain. No nausea, vomiting, or hematemesis; No diarrhea or constipation. No melena or hematochezia.  GENITOURINARY: No dysuria, frequency, hematuria, or incontinence  NEUROLOGICAL: No headaches, memory loss, loss of strength, numbness, or tremors  SKIN: No itching, burning, rashes, or lesions   LYMPH Nodes: No enlarged glands  ENDOCRINE: No heat or cold intolerance; No hair loss  MUSCULOSKELETAL: No joint pain or swelling; No muscle, back, or extremity pain  PSYCHIATRIC: No depression, anxiety, mood swings, or difficulty sleeping  HEME/LYMPH: No easy bruising, or bleeding gums  ALLERGY AND IMMUNOLOGIC: No hives or eczema	    [ ] All others negative	  [ x] Unable to obtain    PHYSICAL EXAM:  T(C): 33.5 (09-06-22 @ 05:58), Max: 36.7 (09-05-22 @ 09:37)  HR: 61 (09-06-22 @ 05:58) (58 - 70)  BP: 104/69 (09-06-22 @ 05:58) (101/69 - 126/81)  RR: 18 (09-06-22 @ 05:58) (18 - 18)  SpO2: 100% (09-06-22 @ 05:58) (98% - 100%)  Wt(kg): --  I&O's Summary    05 Sep 2022 07:01  -  06 Sep 2022 07:00  --------------------------------------------------------  IN: 1000 mL / OUT: 1800 mL / NET: -800 mL        Appearance: Normal	  HEENT:   Normal oral mucosa, PERRL, EOMI	  Lymphatic: No lymphadenopathy  Cardiovascular: Normal S1 S2, No JVD, + murmurs, No edema  Respiratory: rhonchi  Psychiatry: A & O x 3, Mood & affect appropriate  Gastrointestinal:  Soft, Non-tender, + BS	  Skin: No rashes, No ecchymoses, No cyanosis	  Neurologic: Non-focal  Extremities: Normal range of motion, No clubbing, cyanosis or edema  Vascular: Peripheral pulses palpable 2+ bilaterally    MEDICATIONS  (STANDING):  artificial  tears Solution 1 Drop(s) Both EYES two times a day  ascorbic acid 500 milliGRAM(s) Oral daily  chlorhexidine 2% Cloths 1 Application(s) Topical daily  chlorhexidine 4% Liquid 1 Application(s) Topical <User Schedule>  dextrose 5% + sodium chloride 0.45% with potassium chloride 20 mEq/L 1000 milliLiter(s) (75 mL/Hr) IV Continuous <Continuous>  heparin   Injectable 5000 Unit(s) SubCutaneous every 8 hours  meropenem  IVPB 1000 milliGRAM(s) IV Intermittent every 8 hours  midodrine. 5 milliGRAM(s) Oral three times a day  multivitamin 1 Tablet(s) Oral daily      TELEMETRY: 	    ECG:  	  RADIOLOGY:  OTHER: 	  	  LABS:	 	    CARDIAC MARKERS:            09-06    142  |  107  |  6<L>  ----------------------------<  111<H>  3.8   |  25  |  0.57    Ca    9.6      06 Sep 2022 07:08      proBNP: Serum Pro-Brain Natriuretic Peptide: 267 pg/mL (08-09 @ 16:44)    Lipid Profile: Cholesterol 193  LDL --  HDL 30  TG 91    HgA1c:   TSH: Thyroid Stimulating Hormone, Serum: 1.51 uIU/mL (08-27 @ 07:27)  Thyroid Stimulating Hormone, Serum: 1.00 uIU/mL (08-09 @ 16:44)    Culture - Blood (09.04.22 @ 13:16)    Specimen Source: .Blood Blood    Culture Results:   No growth to date.    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   pt is well known to me with hx of severe orthostatic hypotension on florinef/ ?midodrine with bradycardia, pt had a normal am cortisol level.  ivf, keep hydrated  autonomic dysfunction, over all controlled with midodrine, don't increase dose sec to bradycardia  check tsh  dvt prophylaxis  if sig bradycardia will change Midodrine to Florinef if remains bradycardic  s/p syrgery transferred to MICU sec to hypotension  continue iv abx/ pressor  supportive care  continue iv abx  encourage fluid  decrease midodrine pt with sig hx of bradycardia, add Florinef if bp is low  increase free water/ ivf  + BC, continue ABX change to Meropenem  bp is well controlled  start ivf, pt with sig decrease po fluid intake

## 2022-09-07 LAB
ALBUMIN SERPL ELPH-MCNC: 2.8 G/DL — LOW (ref 3.3–5)
ALP SERPL-CCNC: 72 U/L — SIGNIFICANT CHANGE UP (ref 40–120)
ALT FLD-CCNC: 7 U/L — LOW (ref 10–45)
ANION GAP SERPL CALC-SCNC: 8 MMOL/L — SIGNIFICANT CHANGE UP (ref 5–17)
AST SERPL-CCNC: 8 U/L — LOW (ref 10–40)
BILIRUB SERPL-MCNC: 0.2 MG/DL — SIGNIFICANT CHANGE UP (ref 0.2–1.2)
BUN SERPL-MCNC: 7 MG/DL — SIGNIFICANT CHANGE UP (ref 7–23)
CALCIUM SERPL-MCNC: 9.3 MG/DL — SIGNIFICANT CHANGE UP (ref 8.4–10.5)
CHLORIDE SERPL-SCNC: 111 MMOL/L — HIGH (ref 96–108)
CO2 SERPL-SCNC: 24 MMOL/L — SIGNIFICANT CHANGE UP (ref 22–31)
CREAT SERPL-MCNC: 0.72 MG/DL — SIGNIFICANT CHANGE UP (ref 0.5–1.3)
CULTURE RESULTS: SIGNIFICANT CHANGE UP
EGFR: 105 ML/MIN/1.73M2 — SIGNIFICANT CHANGE UP
GLUCOSE SERPL-MCNC: 108 MG/DL — HIGH (ref 70–99)
HCT VFR BLD CALC: 33.8 % — LOW (ref 39–50)
HGB BLD-MCNC: 10.8 G/DL — LOW (ref 13–17)
MAGNESIUM SERPL-MCNC: 1.8 MG/DL — SIGNIFICANT CHANGE UP (ref 1.6–2.6)
MCHC RBC-ENTMCNC: 27.4 PG — SIGNIFICANT CHANGE UP (ref 27–34)
MCHC RBC-ENTMCNC: 32 GM/DL — SIGNIFICANT CHANGE UP (ref 32–36)
MCV RBC AUTO: 85.8 FL — SIGNIFICANT CHANGE UP (ref 80–100)
NRBC # BLD: 0 /100 WBCS — SIGNIFICANT CHANGE UP (ref 0–0)
PHOSPHATE SERPL-MCNC: 1.7 MG/DL — LOW (ref 2.5–4.5)
PLATELET # BLD AUTO: 151 K/UL — SIGNIFICANT CHANGE UP (ref 150–400)
POTASSIUM SERPL-MCNC: 3.6 MMOL/L — SIGNIFICANT CHANGE UP (ref 3.5–5.3)
POTASSIUM SERPL-SCNC: 3.6 MMOL/L — SIGNIFICANT CHANGE UP (ref 3.5–5.3)
PROT SERPL-MCNC: 6.7 G/DL — SIGNIFICANT CHANGE UP (ref 6–8.3)
RBC # BLD: 3.94 M/UL — LOW (ref 4.2–5.8)
RBC # FLD: 15.1 % — HIGH (ref 10.3–14.5)
SODIUM SERPL-SCNC: 143 MMOL/L — SIGNIFICANT CHANGE UP (ref 135–145)
SPECIMEN SOURCE: SIGNIFICANT CHANGE UP
WBC # BLD: 7.25 K/UL — SIGNIFICANT CHANGE UP (ref 3.8–10.5)
WBC # FLD AUTO: 7.25 K/UL — SIGNIFICANT CHANGE UP (ref 3.8–10.5)

## 2022-09-07 PROCEDURE — 93010 ELECTROCARDIOGRAM REPORT: CPT

## 2022-09-07 PROCEDURE — 99223 1ST HOSP IP/OBS HIGH 75: CPT | Mod: GC

## 2022-09-07 PROCEDURE — 99232 SBSQ HOSP IP/OBS MODERATE 35: CPT

## 2022-09-07 RX ORDER — POTASSIUM PHOSPHATE, MONOBASIC POTASSIUM PHOSPHATE, DIBASIC 236; 224 MG/ML; MG/ML
15 INJECTION, SOLUTION INTRAVENOUS ONCE
Refills: 0 | Status: COMPLETED | OUTPATIENT
Start: 2022-09-07 | End: 2022-09-07

## 2022-09-07 RX ADMIN — MIDODRINE HYDROCHLORIDE 5 MILLIGRAM(S): 2.5 TABLET ORAL at 05:41

## 2022-09-07 RX ADMIN — DEXTROSE MONOHYDRATE, SODIUM CHLORIDE, AND POTASSIUM CHLORIDE 75 MILLILITER(S): 50; .745; 4.5 INJECTION, SOLUTION INTRAVENOUS at 18:39

## 2022-09-07 RX ADMIN — HEPARIN SODIUM 5000 UNIT(S): 5000 INJECTION INTRAVENOUS; SUBCUTANEOUS at 13:44

## 2022-09-07 RX ADMIN — CEFEPIME 100 MILLIGRAM(S): 1 INJECTION, POWDER, FOR SOLUTION INTRAMUSCULAR; INTRAVENOUS at 05:41

## 2022-09-07 RX ADMIN — HEPARIN SODIUM 5000 UNIT(S): 5000 INJECTION INTRAVENOUS; SUBCUTANEOUS at 21:56

## 2022-09-07 RX ADMIN — CHLORHEXIDINE GLUCONATE 1 APPLICATION(S): 213 SOLUTION TOPICAL at 13:34

## 2022-09-07 RX ADMIN — CHLORHEXIDINE GLUCONATE 1 APPLICATION(S): 213 SOLUTION TOPICAL at 05:41

## 2022-09-07 RX ADMIN — DEXTROSE MONOHYDRATE, SODIUM CHLORIDE, AND POTASSIUM CHLORIDE 75 MILLILITER(S): 50; .745; 4.5 INJECTION, SOLUTION INTRAVENOUS at 13:35

## 2022-09-07 RX ADMIN — Medication 1 DROP(S): at 05:41

## 2022-09-07 RX ADMIN — POTASSIUM PHOSPHATE, MONOBASIC POTASSIUM PHOSPHATE, DIBASIC 62.5 MILLIMOLE(S): 236; 224 INJECTION, SOLUTION INTRAVENOUS at 13:32

## 2022-09-07 RX ADMIN — CEFEPIME 100 MILLIGRAM(S): 1 INJECTION, POWDER, FOR SOLUTION INTRAMUSCULAR; INTRAVENOUS at 13:38

## 2022-09-07 RX ADMIN — HEPARIN SODIUM 5000 UNIT(S): 5000 INJECTION INTRAVENOUS; SUBCUTANEOUS at 05:40

## 2022-09-07 RX ADMIN — CEFEPIME 100 MILLIGRAM(S): 1 INJECTION, POWDER, FOR SOLUTION INTRAMUSCULAR; INTRAVENOUS at 21:56

## 2022-09-07 NOTE — PROGRESS NOTE ADULT - SUBJECTIVE AND OBJECTIVE BOX
INTERVAL HPI/OVERNIGHT EVENTS:  Pt seen and examined at bedside.     Allergies/Intolerance: No Known Allergies      MEDICATIONS  (STANDING):  artificial  tears Solution 1 Drop(s) Both EYES two times a day  ascorbic acid 500 milliGRAM(s) Oral daily  cefepime   IVPB 2000 milliGRAM(s) IV Intermittent every 8 hours  chlorhexidine 2% Cloths 1 Application(s) Topical daily  chlorhexidine 4% Liquid 1 Application(s) Topical <User Schedule>  dextrose 5% + sodium chloride 0.45% with potassium chloride 20 mEq/L 1000 milliLiter(s) (75 mL/Hr) IV Continuous <Continuous>  heparin   Injectable 5000 Unit(s) SubCutaneous every 8 hours  midodrine. 5 milliGRAM(s) Oral three times a day  multivitamin 1 Tablet(s) Oral daily    MEDICATIONS  (PRN):  chlorproMAZINE    Tablet 25 milliGRAM(s) Oral four times a day PRN hiccup  melatonin 3 milliGRAM(s) Oral once PRN Insomnia        ROS: all systems reviewed and wnl      PHYSICAL EXAMINATION:  Vital Signs Last 24 Hrs  T(C): 37.2 (07 Sep 2022 04:33), Max: 37.2 (06 Sep 2022 18:00)  T(F): 99 (07 Sep 2022 04:33), Max: 99 (06 Sep 2022 18:00)  HR: 71 (07 Sep 2022 04:33) (65 - 128)  BP: 96/60 (07 Sep 2022 04:33) (96/60 - 102/72)  BP(mean): 1 (06 Sep 2022 18:00) (1 - 1)  RR: 18 (07 Sep 2022 04:33) (18 - 18)  SpO2: 100% (07 Sep 2022 04:33) (97% - 100%)    Parameters below as of 07 Sep 2022 04:33  Patient On (Oxygen Delivery Method): room air      CAPILLARY BLOOD GLUCOSE          09-05 @ 07:01  -  09-06 @ 07:00  --------------------------------------------------------  IN: 1000 mL / OUT: 1800 mL / NET: -800 mL    09-06 @ 07:01  -  09-07 @ 06:50  --------------------------------------------------------  IN: 1100 mL / OUT: 1100 mL / NET: 0 mL        GENERAL:   NECK: supple, No JVD  CHEST/LUNG: clear to auscultation bilaterally; no rales, rhonchi, or wheezing b/l  HEART: normal S1, S2  ABDOMEN: BS+, soft, ND, NT   EXTREMITIES:  pulses palpable; no clubbing, cyanosis, or edema b/l LEs  SKIN: no rashes or lesions      LABS:                        11.2   5.90  )-----------( 130      ( 06 Sep 2022 07:11 )             34.9     09-06    142  |  107  |  6<L>  ----------------------------<  111<H>  3.8   |  25  |  0.57    Ca    9.6      06 Sep 2022 07:08                 INTERVAL HPI/OVERNIGHT EVENTS:  Pt seen and examined at bedside.     Allergies/Intolerance: No Known Allergies      MEDICATIONS  (STANDING):  artificial  tears Solution 1 Drop(s) Both EYES two times a day  ascorbic acid 500 milliGRAM(s) Oral daily  cefepime   IVPB 2000 milliGRAM(s) IV Intermittent every 8 hours  chlorhexidine 2% Cloths 1 Application(s) Topical daily  chlorhexidine 4% Liquid 1 Application(s) Topical <User Schedule>  dextrose 5% + sodium chloride 0.45% with potassium chloride 20 mEq/L 1000 milliLiter(s) (75 mL/Hr) IV Continuous <Continuous>  heparin   Injectable 5000 Unit(s) SubCutaneous every 8 hours  midodrine. 5 milliGRAM(s) Oral three times a day  multivitamin 1 Tablet(s) Oral daily    MEDICATIONS  (PRN):  chlorproMAZINE    Tablet 25 milliGRAM(s) Oral four times a day PRN hiccup  melatonin 3 milliGRAM(s) Oral once PRN Insomnia        ROS: all systems reviewed and wnl      PHYSICAL EXAMINATION:  Vital Signs Last 24 Hrs  T(C): 37.2 (07 Sep 2022 04:33), Max: 37.2 (06 Sep 2022 18:00)  T(F): 99 (07 Sep 2022 04:33), Max: 99 (06 Sep 2022 18:00)  HR: 71 (07 Sep 2022 04:33) (65 - 128)  BP: 96/60 (07 Sep 2022 04:33) (96/60 - 102/72)  BP(mean): 1 (06 Sep 2022 18:00) (1 - 1)  RR: 18 (07 Sep 2022 04:33) (18 - 18)  SpO2: 100% (07 Sep 2022 04:33) (97% - 100%)    Parameters below as of 07 Sep 2022 04:33  Patient On (Oxygen Delivery Method): room air      CAPILLARY BLOOD GLUCOSE          09-05 @ 07:01  -  09-06 @ 07:00  --------------------------------------------------------  IN: 1000 mL / OUT: 1800 mL / NET: -800 mL    09-06 @ 07:01  -  09-07 @ 06:50  --------------------------------------------------------  IN: 1100 mL / OUT: 1100 mL / NET: 0 mL        GENERAL: in bed, stable, alert, comfortable, eyes open  NECK: supple, No JVD  CHEST/LUNG: clear to auscultation bilaterally; no rales, rhonchi, or wheezing b/l  HEART: normal S1, S2  ABDOMEN: BS+, soft, ND, NT   EXTREMITIES:  pulses palpable; no clubbing, cyanosis, or edema b/l LEs      LABS:                        11.2   5.90  )-----------( 130      ( 06 Sep 2022 07:11 )             34.9     09-06    142  |  107  |  6<L>  ----------------------------<  111<H>  3.8   |  25  |  0.57    Ca    9.6      06 Sep 2022 07:08

## 2022-09-07 NOTE — PROGRESS NOTE ADULT - ATTENDING COMMENTS
S/p b/l URS, LL, stent  Hypotensive postop   Transferred to MICU  Stable on pressors  Mentating well/responsive at baseline  Would cont coronel/stent at this time  Cont cipro abx as prior admission in July with pseudomonas strains w differing sensitivities  Appreciate excellent primary team care
S/p b/l URS, LL, stent  Hypotensive postop - Initially ransferred to MICU and now to floor  Stable and nontoxic appearing  Mentating well/responsive at baseline  D/c Barnett and L stent, subsequently R stent out  Appreciate excellent primary team care
Recent admission for sepsis, repeat admission for AMS   B/l ureteral stones with indwelling stents  Suspect colonization  Pt has been on IV abx and will remain on abx postop  Will plan for OR for b/l URS, stone extraction
S/p b/l URS, LL, stent  Hypotensive postop   Transferred to MICU and now to floor  Stable and nontoxic appearing  Mentating well/responsive at baseline  Consider dc coronel/stent at this time  Cont cipro abx as prior admission in July with pseudomonas strains w differing sensitivities  Appreciate excellent primary team care
61 M with cerebellar ataxia, chronic hypotension on midodrine here with hypokalemia, h/o ureteral stents, found to have sepsis due to pseudomonas uti and now s/p bilateral ureteral stent exchange 8/31 and now with septic shock due to pseudomonas UTI    - c/w vasopressors for septic shock, give IVF bolus as tolerated  - mental status improving post levophed  - c/w cefepime for uti, can likely d/c cipro, will get ID consult  - increase midodrine to 10 mg po tid  - advance diet as tolerated    lvsf normal  hsq for dvt ppx  full code as per patient/chart    Critically ill patient requiring frequent bedside visits with therapy changes.

## 2022-09-07 NOTE — PROGRESS NOTE ADULT - SUBJECTIVE AND OBJECTIVE BOX
EDD VALDIVIA 61y [id number]    Patient is a 61y old  Male who presents with a chief complaint of Hypokalemia (07 Sep 2022 07:49)      Follow Up/CC:  ID following for    Interval History/ROS:    Allergies    No Known Allergies    Intolerances        ANTIMICROBIALS:  cefepime   IVPB 2000 every 8 hours      MEDICATIONS  (STANDING):  artificial  tears Solution 1 Drop(s) Both EYES two times a day  ascorbic acid 500 milliGRAM(s) Oral daily  cefepime   IVPB 2000 milliGRAM(s) IV Intermittent every 8 hours  chlorhexidine 2% Cloths 1 Application(s) Topical daily  chlorhexidine 4% Liquid 1 Application(s) Topical <User Schedule>  dextrose 5% + sodium chloride 0.45% with potassium chloride 20 mEq/L 1000 milliLiter(s) (75 mL/Hr) IV Continuous <Continuous>  heparin   Injectable 5000 Unit(s) SubCutaneous every 8 hours  midodrine. 5 milliGRAM(s) Oral three times a day  multivitamin 1 Tablet(s) Oral daily    MEDICATIONS  (PRN):        Vital Signs Last 24 Hrs  T(C): 37.2 (07 Sep 2022 04:33), Max: 37.2 (06 Sep 2022 18:00)  T(F): 99 (07 Sep 2022 04:33), Max: 99 (06 Sep 2022 18:00)  HR: 71 (07 Sep 2022 04:33) (71 - 128)  BP: 96/60 (07 Sep 2022 04:33) (96/60 - 102/72)  BP(mean): 1 (06 Sep 2022 18:00) (1 - 1)  RR: 18 (07 Sep 2022 04:33) (18 - 18)  SpO2: 100% (07 Sep 2022 04:33) (98% - 100%)    Parameters below as of 07 Sep 2022 04:33  Patient On (Oxygen Delivery Method): room air        CBC Full  -  ( 07 Sep 2022 07:28 )  WBC Count : 7.25 K/uL  RBC Count : 3.94 M/uL  Hemoglobin : 10.8 g/dL  Hematocrit : 33.8 %  Platelet Count - Automated : 151 K/uL  Mean Cell Volume : 85.8 fl  Mean Cell Hemoglobin : 27.4 pg  Mean Cell Hemoglobin Concentration : 32.0 gm/dL  Auto Neutrophil # : x  Auto Lymphocyte # : x  Auto Monocyte # : x  Auto Eosinophil # : x  Auto Basophil # : x  Auto Neutrophil % : x  Auto Lymphocyte % : x  Auto Monocyte % : x  Auto Eosinophil % : x  Auto Basophil % : x    09-07    143  |  111<H>  |  7   ----------------------------<  108<H>  3.6   |  24  |  0.72    Ca    9.3      07 Sep 2022 07:25  Phos  1.7     09-07  Mg     1.8     09-07    TPro  6.7  /  Alb  2.8<L>  /  TBili  0.2  /  DBili  x   /  AST  8<L>  /  ALT  7<L>  /  AlkPhos  72  09-07    LIVER FUNCTIONS - ( 07 Sep 2022 07:25 )  Alb: 2.8 g/dL / Pro: 6.7 g/dL / ALK PHOS: 72 U/L / ALT: 7 U/L / AST: 8 U/L / GGT: x               MICROBIOLOGY:  .Blood Blood  09-06-22   No growth to date.  --  --      .Blood Blood  09-06-22   No growth to date.  --  --      .Blood Blood  09-04-22   No growth to date.  --  --      .Blood Blood-Peripheral  09-02-22   Growth in aerobic bottle: Pseudomonas aeruginosa  See previous culture [id number]  --    Growth in aerobic bottle: Gram Negative Rods      Catheterized Catheterized  09-01-22   No growth  --  --      .Blood Blood-Peripheral  08-31-22   Growth in aerobic bottle: Pseudomonas aeruginosa  See previous culture [id number]  --    Growth in aerobic bottle: Gram Negative Rods      .Blood Blood-Peripheral  08-31-22   Growth in aerobic bottle: Pseudomonas aeruginosa Multiple Morphological  Strains  ***Blood Panel PCR results on this specimen are available  approximately 3 hours after the Gram stain result.***  Gram stain, PCR, and/or culture results may not always  correspond due to difference in methodologies.  ************************************************************  This PCR assay was performed by multiplex PCR. This  Assay tests for 66 bacterial and resistance gene targets.  Please refer to the [facility] test directory  at [email or web address] for details.  --  Blood Culture PCR  Pseudomonas aeruginosa  Pseudomonas aeruginosa      Clean Catch Clean Catch (Midstream)  08-27-22   >100,000 CFU/ml Pseudomonas aeruginosa  <10,000 CFU/ml Normal Urogenital flory present  --  Pseudomonas aeruginosa      .Blood Blood-Peripheral  08-27-22   No Growth Final  --  --              v            RADIOLOGY     EDD VALDIVIA 61y [id number]    Patient is a 61y old  Male who presents with a chief complaint of Hypokalemia (07 Sep 2022 07:49)      Follow Up/CC:  ID following for bacteremia    Interval History/ROS: no fever    Allergies    No Known Allergies    Intolerances        ANTIMICROBIALS:  cefepime   IVPB 2000 every 8 hours      MEDICATIONS  (STANDING):  artificial  tears Solution 1 Drop(s) Both EYES two times a day  ascorbic acid 500 milliGRAM(s) Oral daily  cefepime   IVPB 2000 milliGRAM(s) IV Intermittent every 8 hours  chlorhexidine 2% Cloths 1 Application(s) Topical daily  chlorhexidine 4% Liquid 1 Application(s) Topical <User Schedule>  dextrose 5% + sodium chloride 0.45% with potassium chloride 20 mEq/L 1000 milliLiter(s) (75 mL/Hr) IV Continuous <Continuous>  heparin   Injectable 5000 Unit(s) SubCutaneous every 8 hours  midodrine. 5 milliGRAM(s) Oral three times a day  multivitamin 1 Tablet(s) Oral daily    MEDICATIONS  (PRN):        Vital Signs Last 24 Hrs  T(C): 37.2 (07 Sep 2022 04:33), Max: 37.2 (06 Sep 2022 18:00)  T(F): 99 (07 Sep 2022 04:33), Max: 99 (06 Sep 2022 18:00)  HR: 71 (07 Sep 2022 04:33) (71 - 128)  BP: 96/60 (07 Sep 2022 04:33) (96/60 - 102/72)  BP(mean): 1 (06 Sep 2022 18:00) (1 - 1)  RR: 18 (07 Sep 2022 04:33) (18 - 18)  SpO2: 100% (07 Sep 2022 04:33) (98% - 100%)    Parameters below as of 07 Sep 2022 04:33  Patient On (Oxygen Delivery Method): room air        CBC Full  -  ( 07 Sep 2022 07:28 )  WBC Count : 7.25 K/uL  RBC Count : 3.94 M/uL  Hemoglobin : 10.8 g/dL  Hematocrit : 33.8 %  Platelet Count - Automated : 151 K/uL  Mean Cell Volume : 85.8 fl  Mean Cell Hemoglobin : 27.4 pg  Mean Cell Hemoglobin Concentration : 32.0 gm/dL  Auto Neutrophil # : x  Auto Lymphocyte # : x  Auto Monocyte # : x  Auto Eosinophil # : x  Auto Basophil # : x  Auto Neutrophil % : x  Auto Lymphocyte % : x  Auto Monocyte % : x  Auto Eosinophil % : x  Auto Basophil % : x    09-07    143  |  111<H>  |  7   ----------------------------<  108<H>  3.6   |  24  |  0.72    Ca    9.3      07 Sep 2022 07:25  Phos  1.7     09-07  Mg     1.8     09-07    TPro  6.7  /  Alb  2.8<L>  /  TBili  0.2  /  DBili  x   /  AST  8<L>  /  ALT  7<L>  /  AlkPhos  72  09-07    LIVER FUNCTIONS - ( 07 Sep 2022 07:25 )  Alb: 2.8 g/dL / Pro: 6.7 g/dL / ALK PHOS: 72 U/L / ALT: 7 U/L / AST: 8 U/L / GGT: x               MICROBIOLOGY:  .Blood Blood  09-06-22   No growth to date.  --  --      .Blood Blood  09-06-22   No growth to date.  --  --      .Blood Blood  09-04-22   No growth to date.  --  --      .Blood Blood-Peripheral  09-02-22   Growth in aerobic bottle: Pseudomonas aeruginosa  See previous culture [id number]  --    Growth in aerobic bottle: Gram Negative Rods      Catheterized Catheterized  09-01-22   No growth  --  --      .Blood Blood-Peripheral  08-31-22   Growth in aerobic bottle: Pseudomonas aeruginosa  See previous culture [id number]  --    Growth in aerobic bottle: Gram Negative Rods      .Blood Blood-Peripheral  08-31-22   Growth in aerobic bottle: Pseudomonas aeruginosa Multiple Morphological  Strains  ***Blood Panel PCR results on this specimen are available  approximately 3 hours after the Gram stain result.***  Gram stain, PCR, and/or culture results may not always  correspond due to difference in methodologies.  ************************************************************  This PCR assay was performed by multiplex PCR. This  Assay tests for 66 bacterial and resistance gene targets.  Please refer to the [facility] test directory  at [email or web address] for details.  --  Blood Culture PCR  Pseudomonas aeruginosa  Pseudomonas aeruginosa      Clean Catch Clean Catch (Midstream)  08-27-22   >100,000 CFU/ml Pseudomonas aeruginosa  <10,000 CFU/ml Normal Urogenital flory present  --  Pseudomonas aeruginosa      .Blood Blood-Peripheral  08-27-22   No Growth Final  --  --              v            RADIOLOGY

## 2022-09-07 NOTE — PROGRESS NOTE ADULT - ASSESSMENT
61 M w/ hx of progressive cerebllar ataxia, chronic lacunar infarcts, chronic HoTN on midodrine presenting with pseudomonas aeroginosa UTI s/p bilateral ureteroscopy with stent placement with course c/b septic shock 2/2 PsA bacteremia. ID consulted for antibiotic management.    s/p ureteral stent placement/stone extraction with postop with pseudomonas uti, bacteremia, sepsis, leukocytosis   -cont cefepime 2 gm iv q8 till coronel out   -repeat bcx negative from 9/4  -monitor temps, cbc, creatinine   -complete abx 9/7    Alan Judge  Attending Physician   Division of Infectious Disease  Office #[phone]  Available on [employer] Teams also  After 5pm/weekend or no response, call #[phone]

## 2022-09-07 NOTE — PROGRESS NOTE ADULT - SUBJECTIVE AND OBJECTIVE BOX
CARDIOLOGY     PROGRESS  NOTE   ________________________________________________    CHIEF COMPLAINT:Patient is a 61y old  Male who presents with a chief complaint of Hypokalemia (07 Sep 2022 06:50)  no complain.  	  REVIEW OF SYSTEMS:  CONSTITUTIONAL: No fever, weight loss, or fatigue  EYES: No eye pain, visual disturbances, or discharge  ENT:  No difficulty hearing, tinnitus, vertigo; No sinus or throat pain  NECK: No pain or stiffness  RESPIRATORY: No cough, wheezing, chills or hemoptysis; No Shortness of Breath  CARDIOVASCULAR: No chest pain, palpitations, passing out, dizziness, or leg swelling  GASTROINTESTINAL: No abdominal or epigastric pain. No nausea, vomiting, or hematemesis; No diarrhea or constipation. No melena or hematochezia.  GENITOURINARY: No dysuria, frequency, hematuria, or incontinence  NEUROLOGICAL: No headaches, memory loss, loss of strength, numbness, or tremors  SKIN: No itching, burning, rashes, or lesions   LYMPH Nodes: No enlarged glands  ENDOCRINE: No heat or cold intolerance; No hair loss  MUSCULOSKELETAL: No joint pain or swelling; No muscle, back, or extremity pain  PSYCHIATRIC: No depression, anxiety, mood swings, or difficulty sleeping  HEME/LYMPH: No easy bruising, or bleeding gums  ALLERGY AND IMMUNOLOGIC: No hives or eczema	    [ ] All others negative	  [ x] Unable to obtain    PHYSICAL EXAM:  T(C): 37.2 (09-07-22 @ 04:33), Max: 37.2 (09-06-22 @ 18:00)  HR: 71 (09-07-22 @ 04:33) (65 - 128)  BP: 96/60 (09-07-22 @ 04:33) (96/60 - 102/72)  RR: 18 (09-07-22 @ 04:33) (18 - 18)  SpO2: 100% (09-07-22 @ 04:33) (97% - 100%)  Wt(kg): --  I&O's Summary    06 Sep 2022 07:01  -  07 Sep 2022 07:00  --------------------------------------------------------  IN: 1100 mL / OUT: 1100 mL / NET: 0 mL        Appearance: Normal	  HEENT:   Normal oral mucosa, PERRL, EOMI	  Lymphatic: No lymphadenopathy  Cardiovascular: Normal S1 S2, No JVD, + murmurs, No edema  Respiratory: rhonchi  Gastrointestinal:  Soft, Non-tender, + BS	  Skin: No rashes, No ecchymoses, No cyanosis	  Neurologic: Non-focal  Extremities: Normal range of motion, No clubbing, cyanosis or edema  Vascular: Peripheral pulses palpable 2+ bilaterally    MEDICATIONS  (STANDING):  artificial  tears Solution 1 Drop(s) Both EYES two times a day  ascorbic acid 500 milliGRAM(s) Oral daily  cefepime   IVPB 2000 milliGRAM(s) IV Intermittent every 8 hours  chlorhexidine 2% Cloths 1 Application(s) Topical daily  chlorhexidine 4% Liquid 1 Application(s) Topical <User Schedule>  dextrose 5% + sodium chloride 0.45% with potassium chloride 20 mEq/L 1000 milliLiter(s) (75 mL/Hr) IV Continuous <Continuous>  heparin   Injectable 5000 Unit(s) SubCutaneous every 8 hours  midodrine. 5 milliGRAM(s) Oral three times a day  multivitamin 1 Tablet(s) Oral daily      TELEMETRY: 	    ECG:  	  RADIOLOGY:  OTHER: 	  	  LABS:	 	    CARDIAC MARKERS:                                11.2   5.90  )-----------( 130      ( 06 Sep 2022 07:11 )             34.9     09-06    142  |  107  |  6<L>  ----------------------------<  111<H>  3.8   |  25  |  0.57    Ca    9.6      06 Sep 2022 07:08      proBNP: Serum Pro-Brain Natriuretic Peptide: 267 pg/mL (08-09 @ 16:44)    Lipid Profile: Cholesterol 193  LDL --  HDL 30  TG 91    HgA1c:   TSH: Thyroid Stimulating Hormone, Serum: 1.51 uIU/mL (08-27 @ 07:27)  Thyroid Stimulating Hormone, Serum: 1.00 uIU/mL (08-09 @ 16:44)    s/p ureteral stent placement/stone extraction with postop with pseudomonas uti, bacteremia, sepsis, leukocytosis   -DC meropenem and restart cefepime 2 gm iv q8  -repeat bcx negative from 9/4  -monitor temps, cbc, creatinine   -complete abx 9/7      Assessment and plan  ---------------------------  61 M w/ PMH of progressive Cerebellar Ataxia, Chronic Lacunar infarcts, 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   pt is well known to me with hx of severe orthostatic hypotension on florinef/ ?midodrine with bradycardia, pt had a normal am cortisol level.  ivf, keep hydrated  autonomic dysfunction, over all controlled with midodrine, don't increase dose sec to bradycardia  check tsh  dvt prophylaxis  if sig bradycardia will change Midodrine to Florinef if remains bradycardic  s/p syrgery transferred to MICU sec to hypotension  continue iv abx/ pressor  supportive care  continue iv abx  encourage fluid  decrease midodrine pt with sig hx of bradycardia, add Florinef if bp is low  increase free water/ ivf  + BC, continue ABX change to Meropenem  bp is well controlled  start ivf, pt with sig decrease po fluid intake

## 2022-09-07 NOTE — CONSULT NOTE ADULT - ATTENDING COMMENTS
HPI as per resident note, personally verified by me. Patient encephalopathy that has not improved despite medical treatment for UTI/sepsis. He was on meropenem and now cefepime. Baseline mental status is AAO x 1-2. No focal neurologic deficits reported and no convulsive movements.    Mental status - Awake, attends to all stimuli b/l, does not follow commands, minimal verbal output that is hypophonic and mildly dysarthric. Easily loses attention. Does not follow commands. Due to clinical condition unable to assess (MARE) orientation, rep/naming, attn/conc, recent and remote memory, fund of knowledge    Cranial nerves:  CN II: Visual fields are full to confrontation/threat b/l. Pupils are 3 mm and briskly reactive to light.   CN III, IV, VI: EOMI, no nystagmus, no ptosis  CN V: Facial sensation is intact to LT in all 3 divisions bilaterally.  CN VII: Face is symmetric with normal eye closure and smile.  CN VII: Hearing is normal to conversation b/l  CN IX, X: MARE  CN XI: Head turning at least 4/5 b/l and symmetric  CN XII: Tongue is midline with normal movements and no atrophy (examined by mimicking)    Motor - Normal bulk and paratonia noted of BUE's > BLE's. BUE strength at least 4/5 and BLE's at least 2/5 and symmetric    Sensation - intact to pain throughout as he can localize and withdraw to strong noxious stimuli in each extremity    DTR's - 2+ BUE's, 1+ KJ b/l, 0+ AJ b/l, and downgoing b/l plantar response    Coordination - unable to assess due to mental status    Gait and station - unable to assess due to mental status    A/P:  Encephalopathy  UTI/sepsis  Cerebellar ataxia  Prior strokes    - Etiology for patient's encephalopathy is likely toxic/metabolic given acute and ongoing medical issues with likely slower return to baseline given lower cognitive reserve. However, he also has been on medications that can lower the seizure threshold (meropenem) and can even be epileptogenic (cefepime) so need to exclude non-convulsive seizures  - vEEG to evaluate for focal slowing, epileptiform discharges, or seizures  - If EEG unrevealing and patient fails to improve could then consider additional neurologic work-up with MRI brain w/o but can hold off for now given lower suspicion for acute CNS process and lower yield  - Continue to address above medical problems, as you are doing  - Will continue to follow patient with you
Patient seen and examined.  Agree with resident note as above.      Patient initially managed in PACU conservatively but now with refractory hypoTN.  Will accept to MICU for pressor support.  Please see accept note for full assessment and plan.
61M s/p ureteral stent placement/stone extraction with postop with pseudomonas uti, bacteremia, sepsis, leukocytosis   -cefepime 2 gm iv q8  -check repeat bcx  -f/u final cx results  -improving  -monitor temps, cbc, creatinine     Alan Judge  Attending Physician   Division of Infectious Disease  Office #[phone]  Available on [employer] Teams also  After 5pm/weekend or no response, call #[phone]    ID coverage available over Labor Day 3-day weekend (Sept 3-5) if needed. Call #[phone] for questions/concerns.

## 2022-09-07 NOTE — CONSULT NOTE ADULT - SUBJECTIVE AND OBJECTIVE BOX
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 Patient was admitted for sepsis and neurology consulted for continued encephalopathy despite medical management. Patient during this hospitalization was as found to have hydronephrosis 2/2 ureteral stone, now s/p stent placement and stone extraction.           Review of Systems:  Unable to assess      Allergies:      PMHx/PSHx/Family Hx: As above, otherwise see below   No pertinent past medical history    No pertinent past medical history    H/O cerebellar ataxia    History of hypotension    Anemia    Lacunar infarction    Pneumonia, aspiration    Adrenal insufficiency        Social Hx:  No current use of tobacco, alcohol, or illicit drugs  Lives with ***    Medications:  MEDICATIONS  (STANDING):  artificial  tears Solution 1 Drop(s) Both EYES two times a day  ascorbic acid 500 milliGRAM(s) Oral daily  cefepime   IVPB 2000 milliGRAM(s) IV Intermittent every 8 hours  chlorhexidine 2% Cloths 1 Application(s) Topical daily  chlorhexidine 4% Liquid 1 Application(s) Topical <User Schedule>  dextrose 5% + sodium chloride 0.45% with potassium chloride 20 mEq/L 1000 milliLiter(s) (75 mL/Hr) IV Continuous <Continuous>  heparin   Injectable 5000 Unit(s) SubCutaneous every 8 hours  midodrine. 5 milliGRAM(s) Oral three times a day  multivitamin 1 Tablet(s) Oral daily    MEDICATIONS  (PRN):      Vitals:  T(C): 37.4 (09-07-22 @ 12:00), Max: 37.4 (09-07-22 @ 12:00)  HR: 74 (09-07-22 @ 12:00) (71 - 128)  BP: 103/59 (09-07-22 @ 12:00) (96/60 - 103/59)  RR: 18 (09-07-22 @ 12:00) (18 - 18)  SpO2: 99% (09-07-22 @ 12:00) (98% - 100%)    Physical Examination:   General - NAD, pleasant,   Cardiovascular - Peripheral pulses palpable, no edema  Neurologic Exam:  Mental status - Awake, does not attend to verbal stimuli, does not follow commands. minimally verbal output. easily loses attention    Cranial nerves:  CN II: Visual fields are full to confrontation. Pupils are 3 mm and briskly reactive to light.   CN III, IV, VI: EOMI, no nystagmus, no ptosis  CN V: Facial sensation is intact to LT in all 3 divisions bilaterally.  CN VII: Face is symmetric with normal eye closure and smile.  CN VII: Hearing is normal to rubbing fingers  CN IX, X: Palate elevates symmetrically. Phonation is normal.  CN XI: Head turning and shoulder shrug are intact  CN XII: Tongue is midline with normal movements and no atrophy.    Motor - Normal bulk and tone throughout. No pronator drift of out-stretched arms.  Strength testing            Deltoid      Biceps      Triceps     Wrist Extension    Wrist Flexion     Interossei         R            5                 5               5                     5             5                        5                 5  L             5                 5               5                     5            5                        5                 5              Hip Flexion    Hip Extension    Knee Flexion    Knee Extension    Dorsiflexion    Plantar Flexion  R              5                  5                       5                 5                            5                          5  L              5                 5                        5                5                            5                          5    Sensation - intact to pain throughout     DTR's -             Biceps      Triceps     Brachioradialis      Patellar    Ankle    Toes/plantar response  R             2+             2+                  2+              1+          mute                 Down  L              2+             2+                 2+              1+           mute                 Down    Coordination - unable to assess due to mental status    Gait and station - unable to assess due to mental status    Labs:                        10.8   7.25  )-----------( 151      ( 07 Sep 2022 07:28 )             33.8     09-07    143  |  111<H>  |  7   ----------------------------<  108<H>  3.6   |  24  |  0.72    Ca    9.3      07 Sep 2022 07:25  Phos  1.7     09-07  Mg     1.8     09-07    TPro  6.7  /  Alb  2.8<L>  /  TBili  0.2  /  DBili  x   /  AST  8<L>  /  ALT  7<L>  /  AlkPhos  72  09-07    CAPILLARY BLOOD GLUCOSE        LIVER FUNCTIONS - ( 07 Sep 2022 07:25 )  Alb: 2.8 g/dL / Pro: 6.7 g/dL / ALK PHOS: 72 U/L / ALT: 7 U/L / AST: 8 U/L / GGT: x             Culture - Blood (collected 06 Sep 2022 07:51)  Source: .Blood Blood  Preliminary Report (07 Sep 2022 11:02):    No growth to date.    Culture - Blood (collected 06 Sep 2022 07:38)  Source: .Blood Blood  Preliminary Report (07 Sep 2022 11:02):    No growth to date.        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 Patient was admitted for sepsis and neurology consulted for continued encephalopathy despite medical management. Patient during this hospitalization was as found to have hydronephrosis 2/2 ureteral stone, now s/p stent placement and stone extraction.           Review of Systems:  Unable to assess      Allergies: NKDA      PMHx/PSHx/Family Hx: As above, otherwise see below   No pertinent past medical history    No pertinent past medical history    H/O cerebellar ataxia    History of hypotension    Anemia    Lacunar infarction    Pneumonia, aspiration    Adrenal insufficiency        Social Hx:  No current use of tobacco, alcohol, or illicit drugs    Medications:  MEDICATIONS  (STANDING):  artificial  tears Solution 1 Drop(s) Both EYES two times a day  ascorbic acid 500 milliGRAM(s) Oral daily  cefepime   IVPB 2000 milliGRAM(s) IV Intermittent every 8 hours  chlorhexidine 2% Cloths 1 Application(s) Topical daily  chlorhexidine 4% Liquid 1 Application(s) Topical <User Schedule>  dextrose 5% + sodium chloride 0.45% with potassium chloride 20 mEq/L 1000 milliLiter(s) (75 mL/Hr) IV Continuous <Continuous>  heparin   Injectable 5000 Unit(s) SubCutaneous every 8 hours  midodrine. 5 milliGRAM(s) Oral three times a day  multivitamin 1 Tablet(s) Oral daily    MEDICATIONS  (PRN):      Vitals:  T(C): 37.4 (09-07-22 @ 12:00), Max: 37.4 (09-07-22 @ 12:00)  HR: 74 (09-07-22 @ 12:00) (71 - 128)  BP: 103/59 (09-07-22 @ 12:00) (96/60 - 103/59)  RR: 18 (09-07-22 @ 12:00) (18 - 18)  SpO2: 99% (09-07-22 @ 12:00) (98% - 100%)    Physical Examination:   General - NAD, pleasant,   Cardiovascular - Peripheral pulses palpable, no edema  Eyes - Fundoscopy not well visualized    Neurologic Exam:  Mental status - Awake, does not attend to verbal stimuli, does not follow commands. minimally verbal output. easily loses attention    Cranial nerves:  CN II: Visual fields are full to confrontation. Pupils are 3 mm and briskly reactive to light.   CN III, IV, VI: EOMI, no nystagmus, no ptosis  CN V: Facial sensation is intact to LT in all 3 divisions bilaterally.  CN VII: Face is symmetric with normal eye closure and smile.  CN VII: Hearing is normal to rubbing fingers  CN IX, X: Palate elevates symmetrically. Phonation is normal.  CN XI: Head turning and shoulder shrug are intact  CN XII: Tongue is midline with normal movements and no atrophy.    Motor - Normal bulk and tone throughout. No pronator drift of out-stretched arms.  Strength testing            Deltoid      Biceps      Triceps     Wrist Extension    Wrist Flexion     Interossei         R            5                 5               5                     5             5                        5                 5  L             5                 5               5                     5            5                        5                 5              Hip Flexion    Hip Extension    Knee Flexion    Knee Extension    Dorsiflexion    Plantar Flexion  R              5                  5                       5                 5                            5                          5  L              5                 5                        5                5                            5                          5    Sensation - intact to pain throughout     DTR's -             Biceps      Triceps     Brachioradialis      Patellar    Ankle    Toes/plantar response  R             2+             2+                  2+              1+          mute                 Down  L              2+             2+                 2+              1+           mute                 Down    Coordination - unable to assess due to mental status    Gait and station - unable to assess due to mental status    Labs:                        10.8   7.25  )-----------( 151      ( 07 Sep 2022 07:28 )             33.8     09-07    143  |  111<H>  |  7   ----------------------------<  108<H>  3.6   |  24  |  0.72    Ca    9.3      07 Sep 2022 07:25  Phos  1.7     09-07  Mg     1.8     09-07    TPro  6.7  /  Alb  2.8<L>  /  TBili  0.2  /  DBili  x   /  AST  8<L>  /  ALT  7<L>  /  AlkPhos  72  09-07    CAPILLARY BLOOD GLUCOSE        LIVER FUNCTIONS - ( 07 Sep 2022 07:25 )  Alb: 2.8 g/dL / Pro: 6.7 g/dL / ALK PHOS: 72 U/L / ALT: 7 U/L / AST: 8 U/L / GGT: x             Culture - Blood (collected 06 Sep 2022 07:51)  Source: .Blood Blood  Preliminary Report (07 Sep 2022 11:02):    No growth to date.    Culture - Blood (collected 06 Sep 2022 07:38)  Source: .Blood Blood  Preliminary Report (07 Sep 2022 11:02):    No growth to date.        CSF:                  Radiology:  CT Head No Cont:  (03 Sep 2022 13:24)

## 2022-09-07 NOTE — PROGRESS NOTE ADULT - SUBJECTIVE AND OBJECTIVE BOX
Interval events:   No acute events         OBJECTIVE:  Vital Signs Last 24 Hrs  T(C): 37.2 (07 Sep 2022 04:33), Max: 37.2 (06 Sep 2022 18:00)  T(F): 99 (07 Sep 2022 04:33), Max: 99 (06 Sep 2022 18:00)  HR: 71 (07 Sep 2022 04:33) (65 - 128)  BP: 96/60 (07 Sep 2022 04:33) (96/60 - 102/72)  BP(mean): 1 (06 Sep 2022 18:00) (1 - 1)  RR: 18 (07 Sep 2022 04:33) (18 - 18)  SpO2: 100% (07 Sep 2022 04:33) (97% - 100%)    Parameters below as of 07 Sep 2022 04:33  Patient On (Oxygen Delivery Method): room air        Physical Examination:  GEN: NAD, resting quietly  PULM: symmetric chest rise bilaterally, no increased WOB  ABD: soft      LABS:                        11.2   5.90  )-----------( 130      ( 06 Sep 2022 07:11 )             34.9       09-06    142  |  107  |  6<L>  ----------------------------<  111<H>  3.8   |  25  |  0.57    Ca    9.6      06 Sep 2022 07:08

## 2022-09-07 NOTE — CONSULT NOTE ADULT - CONSULT REQUESTED DATE/TIME
29-Aug-2022 09:46
02-Sep-2022 12:47
01-Sep-2022 00:03
02-Sep-2022 12:33
28-Aug-2022
07-Sep-2022 15:53

## 2022-09-07 NOTE — CONSULT NOTE ADULT - ASSESSMENT
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 Patient was admitted for sepsis and neurology consulted for continued encephalopathy despite medical management. Patient during this hospitalization was as found to have hydronephrosis 2/2 ureteral stone, now s/p stent placement and stone extraction. Neuro exam limited as above, limited due to encephalopathy. Currently afebrile. CTH negative for any acute pathology. Patient completed course of meropenem and recommended to start cefepime by ID     Impression  Encephalopathy likely due to infectious/toxic metabolic etiology with delayed cognitive recovery despite correction of metabolic/infectious issues. Would r/o non-convulsive seizures     Recommendations   - vEEG   - Both meropenem and cefepime even more can lower seizure threshold. Would recommend consideration for alternative abx therapy, will check EEG if encephalopathy 2/2 seizures   - Continue management of sepsis per ID and primary team   - Will hold off on starting AEDs at this time until EEG performed   - PT/OT   - Decision for further neuroimaging pending vEEG and if no improvement with medical management     Case discussed with neurology attending Dr. Guerin

## 2022-09-07 NOTE — PROGRESS NOTE ADULT - ASSESSMENT
62 yo male s/p bilateral URS/LL/Stents     - will remove left stent and coronel this morning   - will plan to remove right stent with bedside cystoscopy tomorrow   - Please continue abx until all foreign bodies removed   - Can continue home meds  - blood pseudomonas, repeat NGTD   - ID recs       Remainder of care per primary     Barbra Barth   Available on Teams

## 2022-09-07 NOTE — PROGRESS NOTE ADULT - ASSESSMENT
Plan: now  admitted  with  hypokalemia, all corrected, K is 5.1., h/o  cerebellar  ataxia,  with  no defined  cause  found    pt  with  bradycardia, ?  central, seen  by  card/  echo  on 4/2022,  normal  ef  h/o  recurrent , intermittent   hypothermia,    not related  to  any   infectious   process,  it  seems to be  dysautonomia/   with  h/o normal  cortisol level.  prior  CT  c/ap,  no  malignant  process  on feeds  per  swallow  eval.       Plan:  Continue Midodrine 10 mg tid, UTI, Pseudomonas in blood,  on IIV Merepemen TID. Repeat blood cultures requested.     Advance diet as tolerated. Wean off IVF. Swallow eval.           Note: Still has Coronel in place, does not have chronic coronel at home.     IVF can be changed to D5W1/2 NS with KCL. Sodium better today at 138.     Per Urology, remove left stent here, right stent as outpatient.  Keep coronel in place until all procedures are done.                    Plan:  Continue Midodrine 10 mg tid, UTI, Pseudomonas in blood,  on IIV Merepemen TID. Repeat blood cultures requested.     Advance diet as tolerated. Wean off IVF. Swallow eval.           Note: Still has Coronel in place, does not have chronic coronel at home.     IVF can be changed to D5W1/2 NS with KCL. Sodium better today at 138.     Per Urology, remove left stent here, right stent as outpatient.  Keep coronel in place until all procedures are done.

## 2022-09-08 LAB
ANION GAP SERPL CALC-SCNC: 11 MMOL/L — SIGNIFICANT CHANGE UP (ref 5–17)
BUN SERPL-MCNC: 8 MG/DL — SIGNIFICANT CHANGE UP (ref 7–23)
CALCIUM SERPL-MCNC: 9.2 MG/DL — SIGNIFICANT CHANGE UP (ref 8.4–10.5)
CHLORIDE SERPL-SCNC: 111 MMOL/L — HIGH (ref 96–108)
CO2 SERPL-SCNC: 23 MMOL/L — SIGNIFICANT CHANGE UP (ref 22–31)
CREAT SERPL-MCNC: 0.7 MG/DL — SIGNIFICANT CHANGE UP (ref 0.5–1.3)
EGFR: 105 ML/MIN/1.73M2 — SIGNIFICANT CHANGE UP
GLUCOSE SERPL-MCNC: 86 MG/DL — SIGNIFICANT CHANGE UP (ref 70–99)
NIDUS STONE QN: SIGNIFICANT CHANGE UP
PHOSPHATE SERPL-MCNC: 2 MG/DL — LOW (ref 2.5–4.5)
POTASSIUM SERPL-MCNC: 3.6 MMOL/L — SIGNIFICANT CHANGE UP (ref 3.5–5.3)
POTASSIUM SERPL-SCNC: 3.6 MMOL/L — SIGNIFICANT CHANGE UP (ref 3.5–5.3)
SODIUM SERPL-SCNC: 145 MMOL/L — SIGNIFICANT CHANGE UP (ref 135–145)

## 2022-09-08 PROCEDURE — 95720 EEG PHY/QHP EA INCR W/VEEG: CPT

## 2022-09-08 PROCEDURE — 99232 SBSQ HOSP IP/OBS MODERATE 35: CPT

## 2022-09-08 RX ORDER — POTASSIUM PHOSPHATE, MONOBASIC POTASSIUM PHOSPHATE, DIBASIC 236; 224 MG/ML; MG/ML
15 INJECTION, SOLUTION INTRAVENOUS ONCE
Refills: 0 | Status: COMPLETED | OUTPATIENT
Start: 2022-09-08 | End: 2022-09-08

## 2022-09-08 RX ADMIN — Medication 500 MILLIGRAM(S): at 12:36

## 2022-09-08 RX ADMIN — HEPARIN SODIUM 5000 UNIT(S): 5000 INJECTION INTRAVENOUS; SUBCUTANEOUS at 22:17

## 2022-09-08 RX ADMIN — Medication 1 DROP(S): at 18:13

## 2022-09-08 RX ADMIN — CEFEPIME 100 MILLIGRAM(S): 1 INJECTION, POWDER, FOR SOLUTION INTRAMUSCULAR; INTRAVENOUS at 05:33

## 2022-09-08 RX ADMIN — MIDODRINE HYDROCHLORIDE 5 MILLIGRAM(S): 2.5 TABLET ORAL at 12:36

## 2022-09-08 RX ADMIN — MIDODRINE HYDROCHLORIDE 5 MILLIGRAM(S): 2.5 TABLET ORAL at 05:34

## 2022-09-08 RX ADMIN — CEFEPIME 100 MILLIGRAM(S): 1 INJECTION, POWDER, FOR SOLUTION INTRAMUSCULAR; INTRAVENOUS at 12:36

## 2022-09-08 RX ADMIN — DEXTROSE MONOHYDRATE, SODIUM CHLORIDE, AND POTASSIUM CHLORIDE 75 MILLILITER(S): 50; .745; 4.5 INJECTION, SOLUTION INTRAVENOUS at 06:14

## 2022-09-08 RX ADMIN — CHLORHEXIDINE GLUCONATE 1 APPLICATION(S): 213 SOLUTION TOPICAL at 05:44

## 2022-09-08 RX ADMIN — HEPARIN SODIUM 5000 UNIT(S): 5000 INJECTION INTRAVENOUS; SUBCUTANEOUS at 05:34

## 2022-09-08 RX ADMIN — CHLORHEXIDINE GLUCONATE 1 APPLICATION(S): 213 SOLUTION TOPICAL at 12:00

## 2022-09-08 RX ADMIN — DEXTROSE MONOHYDRATE, SODIUM CHLORIDE, AND POTASSIUM CHLORIDE 75 MILLILITER(S): 50; .745; 4.5 INJECTION, SOLUTION INTRAVENOUS at 22:16

## 2022-09-08 RX ADMIN — MIDODRINE HYDROCHLORIDE 5 MILLIGRAM(S): 2.5 TABLET ORAL at 18:12

## 2022-09-08 RX ADMIN — POTASSIUM PHOSPHATE, MONOBASIC POTASSIUM PHOSPHATE, DIBASIC 62.5 MILLIMOLE(S): 236; 224 INJECTION, SOLUTION INTRAVENOUS at 11:43

## 2022-09-08 RX ADMIN — Medication 1 DROP(S): at 05:34

## 2022-09-08 RX ADMIN — HEPARIN SODIUM 5000 UNIT(S): 5000 INJECTION INTRAVENOUS; SUBCUTANEOUS at 12:37

## 2022-09-08 RX ADMIN — Medication 1 TABLET(S): at 12:36

## 2022-09-08 RX ADMIN — CEFEPIME 100 MILLIGRAM(S): 1 INJECTION, POWDER, FOR SOLUTION INTRAMUSCULAR; INTRAVENOUS at 22:17

## 2022-09-08 NOTE — PROGRESS NOTE ADULT - SUBJECTIVE AND OBJECTIVE BOX
CARDIOLOGY     PROGRESS  NOTE   ________________________________________________    CHIEF COMPLAINT:Patient is a 61y old  Male who presents with a chief complaint of Hypokalemia (08 Sep 2022 07:16)  no complain.  	  REVIEW OF SYSTEMS:  CONSTITUTIONAL: No fever, weight loss, or fatigue  EYES: No eye pain, visual disturbances, or discharge  ENT:  No difficulty hearing, tinnitus, vertigo; No sinus or throat pain  NECK: No pain or stiffness  RESPIRATORY: No cough, wheezing, chills or hemoptysis; No Shortness of Breath  CARDIOVASCULAR: No chest pain, palpitations, passing out, dizziness, or leg swelling  GASTROINTESTINAL: No abdominal or epigastric pain. No nausea, vomiting, or hematemesis; No diarrhea or constipation. No melena or hematochezia.  GENITOURINARY: No dysuria, frequency, hematuria, or incontinence  NEUROLOGICAL: No headaches, memory loss, loss of strength, numbness, or tremors  SKIN: No itching, burning, rashes, or lesions   LYMPH Nodes: No enlarged glands  ENDOCRINE: No heat or cold intolerance; No hair loss  MUSCULOSKELETAL: No joint pain or swelling; No muscle, back, or extremity pain  PSYCHIATRIC: No depression, anxiety, mood swings, or difficulty sleeping  HEME/LYMPH: No easy bruising, or bleeding gums  ALLERGY AND IMMUNOLOGIC: No hives or eczema	    [ ] All others negative	  [ x] Unable to obtain    PHYSICAL EXAM:  T(C): 37.2 (09-08-22 @ 04:44), Max: 37.4 (09-07-22 @ 12:00)  HR: 94 (09-08-22 @ 04:44) (66 - 94)  BP: 110/75 (09-08-22 @ 04:44) (100/66 - 111/74)  RR: 18 (09-08-22 @ 04:44) (18 - 18)  SpO2: 99% (09-08-22 @ 04:44) (99% - 100%)  Wt(kg): --  I&O's Summary    07 Sep 2022 07:01  -  08 Sep 2022 07:00  --------------------------------------------------------  IN: 0 mL / OUT: 450 mL / NET: -450 mL        Appearance: Normal	  HEENT:   Normal oral mucosa, PERRL, EOMI	  Lymphatic: No lymphadenopathy  Cardiovascular: Normal S1 S2, No JVD, +murmurs, No edema  Respiratory: Lungs clear to auscultation	  Gastrointestinal:  Soft, Non-tender, + BS	  Skin: No rashes, No ecchymoses, No cyanosis	  Neurologic: Non-focal  Extremities: Normal range of motion, No clubbing, cyanosis or edema  Vascular: Peripheral pulses palpable 2+ bilaterally    MEDICATIONS  (STANDING):  artificial  tears Solution 1 Drop(s) Both EYES two times a day  ascorbic acid 500 milliGRAM(s) Oral daily  cefepime   IVPB 2000 milliGRAM(s) IV Intermittent every 8 hours  chlorhexidine 2% Cloths 1 Application(s) Topical daily  chlorhexidine 4% Liquid 1 Application(s) Topical <User Schedule>  dextrose 5% + sodium chloride 0.45% with potassium chloride 20 mEq/L 1000 milliLiter(s) (75 mL/Hr) IV Continuous <Continuous>  heparin   Injectable 5000 Unit(s) SubCutaneous every 8 hours  midodrine. 5 milliGRAM(s) Oral three times a day  multivitamin 1 Tablet(s) Oral daily      TELEMETRY: 	    ECG:  	  RADIOLOGY:  OTHER: 	  	  LABS:	 	    CARDIAC MARKERS:                                10.8   7.25  )-----------( 151      ( 07 Sep 2022 07:28 )             33.8     09-07    143  |  111<H>  |  7   ----------------------------<  108<H>  3.6   |  24  |  0.72    Ca    9.3      07 Sep 2022 07:25  Phos  1.7     09-07  Mg     1.8     09-07    TPro  6.7  /  Alb  2.8<L>  /  TBili  0.2  /  DBili  x   /  AST  8<L>  /  ALT  7<L>  /  AlkPhos  72  09-07    proBNP: Serum Pro-Brain Natriuretic Peptide: 267 pg/mL (08-09 @ 16:44)    Lipid Profile: Cholesterol 193  LDL --  HDL 30  TG 91    HgA1c:   TSH: Thyroid Stimulating Hormone, Serum: 1.51 uIU/mL (08-27 @ 07:27)  Thyroid Stimulating Hormone, Serum: 1.00 uIU/mL (08-09 @ 16:44)          Assessment and plan  ---------------------------  61 M w/ PMH of progressive Cerebellar Ataxia, Chronic Lacunar infarcts, 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   pt is well known to me with hx of severe orthostatic hypotension on florinef/ ?midodrine with bradycardia, pt had a normal am cortisol level.  ivf, keep hydrated  autonomic dysfunction, over all controlled with midodrine, don't increase dose sec to bradycardia  check tsh  dvt prophylaxis  if sig bradycardia will change Midodrine to Florinef if remains bradycardic  s/p syrgery transferred to MICU sec to hypotension  continue iv abx/ pressor  supportive care  continue iv abx  encourage fluid  decrease midodrine pt with sig hx of bradycardia, add Florinef if bp is low  increase free water/ ivf  + BC, continue ABX change to Meropenem  bp is well controlled  start ivf, pt with sig decrease po fluid intake  continue abx

## 2022-09-08 NOTE — PROGRESS NOTE ADULT - ASSESSMENT
61 M w/ hx of progressive cerebllar ataxia, chronic lacunar infarcts, chronic HoTN on midodrine presenting with pseudomonas aeroginosa UTI s/p bilateral ureteroscopy with stent placement with course c/b septic shock 2/2 PsA bacteremia. ID consulted for antibiotic management.    s/p ureteral stent placement/stone extraction with postop with pseudomonas uti, bacteremia, sepsis, leukocytosis   -cont cefepime 2 gm iv q8 till ureteral stent removed   -repeat bcx negative from 9/4  -monitor temps, cbc, creatinine   -complete abx today    Alan Judge  Attending Physician   Division of Infectious Disease  Office #[phone]  Available on [employer] Teams also  After 5pm/weekend or no response, call #[phone]

## 2022-09-08 NOTE — CHART NOTE - NSCHARTNOTEFT_GEN_A_CORE
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 Low K was noted on pre-op testing. Admitted to medicine for hypokalemia, sepsis. h/o cerebellar ataxia, with  no defined cause found, pt  with  bradycardia, on midodrine, ? central, seen by card/  echo on 4/2022, normal  ef, h/o recurrent, intermittent hypothermia, not related to any infectious process, it seems to be dysautonomia/ with h/o normal cortisol level; prior CT c/ap, no malignant process bed bound status and altered baseline mental status at times. Pt found to have hydronephrosis 2/2 ureteral stone, now s/p stent placement and stone extraction.   9/7: Per Neuro: consulted for continued encephalopathy despite medical management. Neuro exam limited due to encephalopathy. Currently afebrile. CTH negative for any acute pathology. Patient completed course of meropenem and recommended to start cefepime by ID. Impression: Encephalopathy likely due to infectious/toxic metabolic etiology with delayed cognitive recovery despite correction of metabolic/infectious issues. Would r/o non-convulsive seizures. Rx: vEEG; Both meropenem and cefepime even more can lower seizure threshold. Would recommend consideration for alternative abx therapy, will check EEG if encephalopathy 2/2 seizures.   9/8: Per Urology, remove left stent removed yesterday, right stent to be removed today with bedside cystoscopy. Per Medicine: More alert today, stop Cefepime tomorrow after ureteral stent removed. PT eval.     Pt seen at bedside to assess for candidacy for oral intake, given improvement in mental status. Pt awake and alert, verbally responsive but poorly intelligible due to ataxic-like dysarthria, occasionally following directions for the purposes of the evaluation. Pt was adminstered PO trials of thin and mildly thick liquids, purees, soft and hard solids. Pt presents with prolonged inefficient mastication, prolonged but improved oral holding, suspected delay in pharyngeal swallow trigger, palpable hyolaryngeal elevation/excursion, and no overt s/s laryngeal penetration/aspiration. Pt is appropriate for trial of oral diet with total assistance for feeding. This service will continue to follow to monitor tolerance, and assess for possible upgrade pending improvement in overall status. Findings d/w NP Rosa Elena Leo and MEAGAN Telles. Purposeful proactive rounding reinforced and 5 Ps addressed. Pt left in no distress.     RECOMMENDATIONS   1) Upgrade to Puree with Thin liquids via small single sips  2) Total Assistance  3) Strict aspiration and reflux precautions!   4) Maintain good oral hygiene.   5) Monitor for s/s aspiration/laryngeal penetration. If noted:  D/C p.o. intake, provide non-oral nutrition/hydration/meds, and contact this service @ [phone]   6) This service will continue to follow.     Renetta Bond MA, CCC-SLP  Speech-Language Pathologist  office [phone]

## 2022-09-08 NOTE — PROGRESS NOTE ADULT - SUBJECTIVE AND OBJECTIVE BOX
INTERVAL HPI/OVERNIGHT EVENTS:  Pt seen and examined at bedside.     Allergies/Intolerance: No Known Allergies      MEDICATIONS  (STANDING):  artificial  tears Solution 1 Drop(s) Both EYES two times a day  ascorbic acid 500 milliGRAM(s) Oral daily  cefepime   IVPB 2000 milliGRAM(s) IV Intermittent every 8 hours  chlorhexidine 2% Cloths 1 Application(s) Topical daily  chlorhexidine 4% Liquid 1 Application(s) Topical <User Schedule>  dextrose 5% + sodium chloride 0.45% with potassium chloride 20 mEq/L 1000 milliLiter(s) (75 mL/Hr) IV Continuous <Continuous>  heparin   Injectable 5000 Unit(s) SubCutaneous every 8 hours  midodrine. 5 milliGRAM(s) Oral three times a day  multivitamin 1 Tablet(s) Oral daily    MEDICATIONS  (PRN):        ROS: all systems reviewed and wnl      PHYSICAL EXAMINATION:  Vital Signs Last 24 Hrs  T(C): 37.2 (08 Sep 2022 04:44), Max: 37.4 (07 Sep 2022 12:00)  T(F): 99 (08 Sep 2022 04:44), Max: 99.3 (07 Sep 2022 12:00)  HR: 94 (08 Sep 2022 04:44) (66 - 94)  BP: 110/75 (08 Sep 2022 04:44) (100/66 - 111/74)  BP(mean): --  RR: 18 (08 Sep 2022 04:44) (18 - 18)  SpO2: 99% (08 Sep 2022 04:44) (99% - 100%)    Parameters below as of 08 Sep 2022 04:44  Patient On (Oxygen Delivery Method): room air      CAPILLARY BLOOD GLUCOSE          09-07 @ 07:01  -  09-08 @ 07:00  --------------------------------------------------------  IN: 0 mL / OUT: 450 mL / NET: -450 mL        GENERAL:   NECK: supple, No JVD  CHEST/LUNG: clear to auscultation bilaterally; no rales, rhonchi, or wheezing b/l  HEART: normal S1, S2  ABDOMEN: BS+, soft, ND, NT   EXTREMITIES:  pulses palpable; no clubbing, cyanosis, or edema b/l LEs  SKIN: no rashes or lesions      LABS:                        10.8   7.25  )-----------( 151      ( 07 Sep 2022 07:28 )             33.8     09-07    143  |  111<H>  |  7   ----------------------------<  108<H>  3.6   |  24  |  0.72    Ca    9.3      07 Sep 2022 07:25  Phos  1.7     09-07  Mg     1.8     09-07    TPro  6.7  /  Alb  2.8<L>  /  TBili  0.2  /  DBili  x   /  AST  8<L>  /  ALT  7<L>  /  AlkPhos  72  09-07               INTERVAL HPI/OVERNIGHT EVENTS:  Pt seen and examined at bedside.     Allergies/Intolerance: No Known Allergies      MEDICATIONS  (STANDING):  artificial  tears Solution 1 Drop(s) Both EYES two times a day  ascorbic acid 500 milliGRAM(s) Oral daily  cefepime   IVPB 2000 milliGRAM(s) IV Intermittent every 8 hours  chlorhexidine 2% Cloths 1 Application(s) Topical daily  chlorhexidine 4% Liquid 1 Application(s) Topical <User Schedule>  dextrose 5% + sodium chloride 0.45% with potassium chloride 20 mEq/L 1000 milliLiter(s) (75 mL/Hr) IV Continuous <Continuous>  heparin   Injectable 5000 Unit(s) SubCutaneous every 8 hours  midodrine. 5 milliGRAM(s) Oral three times a day  multivitamin 1 Tablet(s) Oral daily    MEDICATIONS  (PRN):        ROS: all systems reviewed and wnl      PHYSICAL EXAMINATION:  Vital Signs Last 24 Hrs  T(C): 37.2 (08 Sep 2022 04:44), Max: 37.4 (07 Sep 2022 12:00)  T(F): 99 (08 Sep 2022 04:44), Max: 99.3 (07 Sep 2022 12:00)  HR: 94 (08 Sep 2022 04:44) (66 - 94)  BP: 110/75 (08 Sep 2022 04:44) (100/66 - 111/74)  BP(mean): --  RR: 18 (08 Sep 2022 04:44) (18 - 18)  SpO2: 99% (08 Sep 2022 04:44) (99% - 100%)    Parameters below as of 08 Sep 2022 04:44  Patient On (Oxygen Delivery Method): room air      CAPILLARY BLOOD GLUCOSE          09-07 @ 07:01  -  09-08 @ 07:00  --------------------------------------------------------  IN: 0 mL / OUT: 450 mL / NET: -450 mL        GENERAL: stable, in bed, flat affect, follows simple commands, still NPO, eyes open  NECK: supple, No JVD  CHEST/LUNG: clear to auscultation bilaterally; no rales, rhonchi, or wheezing b/l  HEART: normal S1, S2  ABDOMEN: BS+, soft, ND, NT   EXTREMITIES:  pulses palpable; no clubbing, cyanosis, or edema b/l LEs        LABS:                        10.8   7.25  )-----------( 151      ( 07 Sep 2022 07:28 )             33.8     09-07    143  |  111<H>  |  7   ----------------------------<  108<H>  3.6   |  24  |  0.72    Ca    9.3      07 Sep 2022 07:25  Phos  1.7     09-07  Mg     1.8     09-07    TPro  6.7  /  Alb  2.8<L>  /  TBili  0.2  /  DBili  x   /  AST  8<L>  /  ALT  7<L>  /  AlkPhos  72  09-07

## 2022-09-08 NOTE — PROGRESS NOTE ADULT - SUBJECTIVE AND OBJECTIVE BOX
EDD VALDIVIA 61y [id number]    Patient is a 61y old  Male who presents with a chief complaint of Hypokalemia (08 Sep 2022 07:28)      Follow Up/CC:  ID following for uti    Interval History/ROS: no fever, planning ureteral stent removal today per     Allergies    No Known Allergies    Intolerances        ANTIMICROBIALS:  cefepime   IVPB 2000 every 8 hours      MEDICATIONS  (STANDING):  artificial  tears Solution 1 Drop(s) Both EYES two times a day  ascorbic acid 500 milliGRAM(s) Oral daily  cefepime   IVPB 2000 milliGRAM(s) IV Intermittent every 8 hours  chlorhexidine 2% Cloths 1 Application(s) Topical daily  chlorhexidine 4% Liquid 1 Application(s) Topical <User Schedule>  dextrose 5% + sodium chloride 0.45% with potassium chloride 20 mEq/L 1000 milliLiter(s) (75 mL/Hr) IV Continuous <Continuous>  heparin   Injectable 5000 Unit(s) SubCutaneous every 8 hours  midodrine. 5 milliGRAM(s) Oral three times a day  multivitamin 1 Tablet(s) Oral daily  potassium phosphate IVPB 15 milliMole(s) IV Intermittent once    MEDICATIONS  (PRN):        Vital Signs Last 24 Hrs  T(C): 37.2 (08 Sep 2022 04:44), Max: 37.4 (07 Sep 2022 12:00)  T(F): 99 (08 Sep 2022 04:44), Max: 99.3 (07 Sep 2022 12:00)  HR: 94 (08 Sep 2022 04:44) (66 - 94)  BP: 110/75 (08 Sep 2022 04:44) (100/66 - 111/74)  BP(mean): --  RR: 18 (08 Sep 2022 04:44) (18 - 18)  SpO2: 99% (08 Sep 2022 04:44) (99% - 100%)    Parameters below as of 08 Sep 2022 04:44  Patient On (Oxygen Delivery Method): room air        CBC Full  -  ( 07 Sep 2022 07:28 )  WBC Count : 7.25 K/uL  RBC Count : 3.94 M/uL  Hemoglobin : 10.8 g/dL  Hematocrit : 33.8 %  Platelet Count - Automated : 151 K/uL  Mean Cell Volume : 85.8 fl  Mean Cell Hemoglobin : 27.4 pg  Mean Cell Hemoglobin Concentration : 32.0 gm/dL  Auto Neutrophil # : x  Auto Lymphocyte # : x  Auto Monocyte # : x  Auto Eosinophil # : x  Auto Basophil # : x  Auto Neutrophil % : x  Auto Lymphocyte % : x  Auto Monocyte % : x  Auto Eosinophil % : x  Auto Basophil % : x    09-08    145  |  111<H>  |  8   ----------------------------<  86  3.6   |  23  |  0.70    Ca    9.2      08 Sep 2022 07:09  Phos  2.0     09-08  Mg     1.8     09-07    TPro  6.7  /  Alb  2.8<L>  /  TBili  0.2  /  DBili  x   /  AST  8<L>  /  ALT  7<L>  /  AlkPhos  72  09-07    LIVER FUNCTIONS - ( 07 Sep 2022 07:25 )  Alb: 2.8 g/dL / Pro: 6.7 g/dL / ALK PHOS: 72 U/L / ALT: 7 U/L / AST: 8 U/L / GGT: x               MICROBIOLOGY:  .Blood Blood  09-06-22   No growth to date.  --  --      .Blood Blood  09-06-22   No growth to date.  --  --      .Blood Blood  09-04-22   No growth to date.  --  --      .Blood Blood-Peripheral  09-02-22   Growth in aerobic bottle: Pseudomonas aeruginosa  See previous culture [id number]  --    Growth in aerobic bottle: Gram Negative Rods      Catheterized Catheterized  09-01-22   No growth  --  --      .Blood Blood-Peripheral  08-31-22   Growth in aerobic bottle: Pseudomonas aeruginosa  See previous culture [id number]  --    Growth in aerobic bottle: Gram Negative Rods      .Blood Blood-Peripheral  08-31-22   Growth in aerobic bottle: Pseudomonas aeruginosa Multiple Morphological  Strains  ***Blood Panel PCR results on this specimen are available  approximately 3 hours after the Gram stain result.***  Gram stain, PCR, and/or culture results may not always  correspond due to difference in methodologies.  ************************************************************  This PCR assay was performed by multiplex PCR. This  Assay tests for 66 bacterial and resistance gene targets.  Please refer to the [facility] Labs test directory  at [email or web address] for details.  --  Blood Culture PCR  Pseudomonas aeruginosa  Pseudomonas aeruginosa      Clean Catch Clean Catch (Midstream)  08-27-22   >100,000 CFU/ml Pseudomonas aeruginosa  <10,000 CFU/ml Normal Urogenital flory present  --  Pseudomonas aeruginosa      .Blood Blood-Peripheral  08-27-22   No Growth Final  --  --      RADIOLOGY

## 2022-09-08 NOTE — CHART NOTE - NSCHARTNOTEFT_GEN_A_CORE
Pt was placed in a supine position,  prepped and draped in normal sterile fashion. Flexible cystoscope gently inserted into urethra and advanced without resistance into urinary bladder. Unable to remove stent due to malfunction of cystoscopy light source. Procedure was then aborted. Pt tolerated well.    Will likely need follow up with Dr. Daigle for outpatient stent removal. Will follow up tomorrow.

## 2022-09-08 NOTE — PROGRESS NOTE ADULT - NSPROGADDITIONALINFOA_GEN_ALL_CORE
D/w Med NP (Candi
D/w Med NP (Rosa Elena)  D/w Dr. Correa     Will sign off, recall ID if needed #[phone].
D/w Med NP Juan Pablo)

## 2022-09-08 NOTE — PROGRESS NOTE ADULT - ASSESSMENT
Plan:  Continue Midodrine 10 mg tid, UTI, Pseudomonas in blood,  on IIV Merepemen TID. Repeat blood cultures requested.     Advance diet as tolerated. Wean off IVF. Swallow eval.           Note: Still has Coronel in place, does not have chronic coronel at home.     IVF can be changed to D5W1/2 NS with KCL. Sodium better today at 138.     Per Urology, remove left stent here, right stent as outpatient.  Keep coronel in place until all procedures are done.                    Plan:  Continue Midodrine 10 mg tid, UTI, Pseudomonas in blood,  on IIV Cefepeme 2 grams every 8 hours. Repeat blood cultures requested.     Advance diet as tolerated. Wean off IVF. Swallow eval.           Note: Coronel removed, does not have chronic coronel at home.     IVF can be changed to D5W1/2 NS with KCL. Sodium better today at 138.         Per Urology, remove left stent removed yesterday, right stent to be removed today with bedside cystoscopy.      Appreciate Neuro input, CT head no acute findings, EEG is reasonable to r/o non-convulsive status.  Hold AED.                    Plan:  Continue Midodrine 10 mg tid, UTI, Pseudomonas in blood,  on IIV Cefepeme 2 grams every 8 hours. Repeat blood cultures requested.     Advance diet as tolerated. Wean off IVF. Swallow eval.           Note: Coronel removed, does not have chronic coornel at home.     IVF can be changed to D5W1/2 NS with KCL. Sodium better today at 138.         Per Urology, remove left stent removed yesterday, right stent to be removed today with bedside cystoscopy.      Appreciate Neuro input, CT head no acute findings, EEG is reasonable to r/o non-convulsive status.  Hold AED.         More alert today, stop Cefepeme tomorrow after ureteral stent removed.  PT eval.

## 2022-09-08 NOTE — PROGRESS NOTE ADULT - SUBJECTIVE AND OBJECTIVE BOX
Interval events:   s/p coronel and left stent removal   AF, VSS  voids recorded         OBJECTIVE:  Vital Signs Last 24 Hrs  T(C): 37.2 (08 Sep 2022 04:44), Max: 37.4 (07 Sep 2022 12:00)  T(F): 99 (08 Sep 2022 04:44), Max: 99.3 (07 Sep 2022 12:00)  HR: 94 (08 Sep 2022 04:44) (66 - 94)  BP: 110/75 (08 Sep 2022 04:44) (100/66 - 111/74)  BP(mean): --  RR: 18 (08 Sep 2022 04:44) (18 - 18)  SpO2: 99% (08 Sep 2022 04:44) (99% - 100%)    Parameters below as of 08 Sep 2022 04:44  Patient On (Oxygen Delivery Method): room air        Physical Examination:  GEN: NAD, resting quietly  ABD: soft      LABS:                        10.8   7.25  )-----------( 151      ( 07 Sep 2022 07:28 )             33.8       09-07    143  |  111<H>  |  7   ----------------------------<  108<H>  3.6   |  24  |  0.72    Ca    9.3      07 Sep 2022 07:25  Phos  1.7     09-07  Mg     1.8     09-07    TPro  6.7  /  Alb  2.8<L>  /  TBili  0.2  /  DBili  x   /  AST  8<L>  /  ALT  7<L>  /  AlkPhos  72  09-07

## 2022-09-08 NOTE — PROGRESS NOTE ADULT - GASTROINTESTINAL
soft/nontender/nondistended/no guarding/no rigidity

## 2022-09-09 LAB
ALBUMIN SERPL ELPH-MCNC: 2.9 G/DL — LOW (ref 3.3–5)
ALP SERPL-CCNC: 69 U/L — SIGNIFICANT CHANGE UP (ref 40–120)
ALT FLD-CCNC: 10 U/L — SIGNIFICANT CHANGE UP (ref 10–45)
ANION GAP SERPL CALC-SCNC: 11 MMOL/L — SIGNIFICANT CHANGE UP (ref 5–17)
ANION GAP SERPL CALC-SCNC: 9 MMOL/L — SIGNIFICANT CHANGE UP (ref 5–17)
ANISOCYTOSIS BLD QL: SLIGHT — SIGNIFICANT CHANGE UP
APTT BLD: 31.7 SEC — SIGNIFICANT CHANGE UP (ref 27.5–35.5)
AST SERPL-CCNC: 13 U/L — SIGNIFICANT CHANGE UP (ref 10–40)
BASOPHILS # BLD AUTO: 0 K/UL — SIGNIFICANT CHANGE UP (ref 0–0.2)
BASOPHILS NFR BLD AUTO: 0 % — SIGNIFICANT CHANGE UP (ref 0–2)
BILIRUB SERPL-MCNC: 0.4 MG/DL — SIGNIFICANT CHANGE UP (ref 0.2–1.2)
BUN SERPL-MCNC: 8 MG/DL — SIGNIFICANT CHANGE UP (ref 7–23)
BUN SERPL-MCNC: 8 MG/DL — SIGNIFICANT CHANGE UP (ref 7–23)
CALCIUM SERPL-MCNC: 9.3 MG/DL — SIGNIFICANT CHANGE UP (ref 8.4–10.5)
CALCIUM SERPL-MCNC: 9.6 MG/DL — SIGNIFICANT CHANGE UP (ref 8.4–10.5)
CHLORIDE SERPL-SCNC: 109 MMOL/L — HIGH (ref 96–108)
CHLORIDE SERPL-SCNC: 109 MMOL/L — HIGH (ref 96–108)
CO2 SERPL-SCNC: 21 MMOL/L — LOW (ref 22–31)
CO2 SERPL-SCNC: 23 MMOL/L — SIGNIFICANT CHANGE UP (ref 22–31)
CREAT SERPL-MCNC: 0.54 MG/DL — SIGNIFICANT CHANGE UP (ref 0.5–1.3)
CREAT SERPL-MCNC: 0.55 MG/DL — SIGNIFICANT CHANGE UP (ref 0.5–1.3)
CULTURE RESULTS: SIGNIFICANT CHANGE UP
CULTURE RESULTS: SIGNIFICANT CHANGE UP
DACRYOCYTES BLD QL SMEAR: SLIGHT — SIGNIFICANT CHANGE UP
EGFR: 113 ML/MIN/1.73M2 — SIGNIFICANT CHANGE UP
EGFR: 114 ML/MIN/1.73M2 — SIGNIFICANT CHANGE UP
EOSINOPHIL # BLD AUTO: 0.24 K/UL — SIGNIFICANT CHANGE UP (ref 0–0.5)
EOSINOPHIL NFR BLD AUTO: 3.5 % — SIGNIFICANT CHANGE UP (ref 0–6)
GLUCOSE SERPL-MCNC: 113 MG/DL — HIGH (ref 70–99)
GLUCOSE SERPL-MCNC: 89 MG/DL — SIGNIFICANT CHANGE UP (ref 70–99)
HCT VFR BLD CALC: 30.8 % — LOW (ref 39–50)
HCT VFR BLD CALC: 32.7 % — LOW (ref 39–50)
HGB BLD-MCNC: 10.4 G/DL — LOW (ref 13–17)
HGB BLD-MCNC: 9.8 G/DL — LOW (ref 13–17)
INR BLD: 1.2 RATIO — HIGH (ref 0.88–1.16)
LACTATE SERPL-SCNC: 0.6 MMOL/L — SIGNIFICANT CHANGE UP (ref 0.5–2)
LYMPHOCYTES # BLD AUTO: 0.66 K/UL — LOW (ref 1–3.3)
LYMPHOCYTES # BLD AUTO: 9.7 % — LOW (ref 13–44)
MANUAL SMEAR VERIFICATION: SIGNIFICANT CHANGE UP
MCHC RBC-ENTMCNC: 27.6 PG — SIGNIFICANT CHANGE UP (ref 27–34)
MCHC RBC-ENTMCNC: 27.7 PG — SIGNIFICANT CHANGE UP (ref 27–34)
MCHC RBC-ENTMCNC: 31.8 GM/DL — LOW (ref 32–36)
MCHC RBC-ENTMCNC: 31.8 GM/DL — LOW (ref 32–36)
MCV RBC AUTO: 86.8 FL — SIGNIFICANT CHANGE UP (ref 80–100)
MCV RBC AUTO: 87 FL — SIGNIFICANT CHANGE UP (ref 80–100)
METAMYELOCYTES # FLD: 0.9 % — HIGH (ref 0–0)
MICROCYTES BLD QL: SLIGHT — SIGNIFICANT CHANGE UP
MONOCYTES # BLD AUTO: 0.48 K/UL — SIGNIFICANT CHANGE UP (ref 0–0.9)
MONOCYTES NFR BLD AUTO: 7.1 % — SIGNIFICANT CHANGE UP (ref 2–14)
MYELOCYTES NFR BLD: 0.9 % — HIGH (ref 0–0)
NEUTROPHILS # BLD AUTO: 5.31 K/UL — SIGNIFICANT CHANGE UP (ref 1.8–7.4)
NEUTROPHILS NFR BLD AUTO: 77 % — SIGNIFICANT CHANGE UP (ref 43–77)
NEUTS BAND # BLD: 0.9 % — SIGNIFICANT CHANGE UP (ref 0–8)
NRBC # BLD: 0 /100 WBCS — SIGNIFICANT CHANGE UP (ref 0–0)
OVALOCYTES BLD QL SMEAR: SLIGHT — SIGNIFICANT CHANGE UP
PHOSPHATE SERPL-MCNC: 2.6 MG/DL — SIGNIFICANT CHANGE UP (ref 2.5–4.5)
PLAT MORPH BLD: NORMAL — SIGNIFICANT CHANGE UP
PLATELET # BLD AUTO: 139 K/UL — LOW (ref 150–400)
PLATELET # BLD AUTO: 141 K/UL — LOW (ref 150–400)
POIKILOCYTOSIS BLD QL AUTO: SLIGHT — SIGNIFICANT CHANGE UP
POTASSIUM SERPL-MCNC: 3.9 MMOL/L — SIGNIFICANT CHANGE UP (ref 3.5–5.3)
POTASSIUM SERPL-MCNC: 4 MMOL/L — SIGNIFICANT CHANGE UP (ref 3.5–5.3)
POTASSIUM SERPL-SCNC: 3.9 MMOL/L — SIGNIFICANT CHANGE UP (ref 3.5–5.3)
POTASSIUM SERPL-SCNC: 4 MMOL/L — SIGNIFICANT CHANGE UP (ref 3.5–5.3)
PROT SERPL-MCNC: 6.8 G/DL — SIGNIFICANT CHANGE UP (ref 6–8.3)
PROTHROM AB SERPL-ACNC: 14 SEC — HIGH (ref 10.5–13.4)
RBC # BLD: 3.55 M/UL — LOW (ref 4.2–5.8)
RBC # BLD: 3.76 M/UL — LOW (ref 4.2–5.8)
RBC # FLD: 15.2 % — HIGH (ref 10.3–14.5)
RBC # FLD: 15.4 % — HIGH (ref 10.3–14.5)
RBC BLD AUTO: ABNORMAL
SARS-COV-2 RNA SPEC QL NAA+PROBE: SIGNIFICANT CHANGE UP
SCHISTOCYTES BLD QL AUTO: SLIGHT — SIGNIFICANT CHANGE UP
SODIUM SERPL-SCNC: 141 MMOL/L — SIGNIFICANT CHANGE UP (ref 135–145)
SODIUM SERPL-SCNC: 141 MMOL/L — SIGNIFICANT CHANGE UP (ref 135–145)
SPECIMEN SOURCE: SIGNIFICANT CHANGE UP
SPECIMEN SOURCE: SIGNIFICANT CHANGE UP
WBC # BLD: 6.82 K/UL — SIGNIFICANT CHANGE UP (ref 3.8–10.5)
WBC # BLD: 6.87 K/UL — SIGNIFICANT CHANGE UP (ref 3.8–10.5)
WBC # FLD AUTO: 6.82 K/UL — SIGNIFICANT CHANGE UP (ref 3.8–10.5)
WBC # FLD AUTO: 6.87 K/UL — SIGNIFICANT CHANGE UP (ref 3.8–10.5)

## 2022-09-09 PROCEDURE — 71045 X-RAY EXAM CHEST 1 VIEW: CPT | Mod: 26

## 2022-09-09 RX ORDER — CEFEPIME 1 G/1
2000 INJECTION, POWDER, FOR SOLUTION INTRAMUSCULAR; INTRAVENOUS EVERY 8 HOURS
Refills: 0 | Status: DISCONTINUED | OUTPATIENT
Start: 2022-09-09 | End: 2022-09-12

## 2022-09-09 RX ORDER — ENOXAPARIN SODIUM 100 MG/ML
40 INJECTION SUBCUTANEOUS EVERY 24 HOURS
Refills: 0 | Status: DISCONTINUED | OUTPATIENT
Start: 2022-09-10 | End: 2022-09-12

## 2022-09-09 RX ORDER — SODIUM CHLORIDE 9 MG/ML
1000 INJECTION, SOLUTION INTRAVENOUS
Refills: 0 | Status: DISCONTINUED | OUTPATIENT
Start: 2022-09-09 | End: 2022-09-12

## 2022-09-09 RX ORDER — SODIUM CHLORIDE 9 MG/ML
1000 INJECTION INTRAMUSCULAR; INTRAVENOUS; SUBCUTANEOUS
Refills: 0 | Status: DISCONTINUED | OUTPATIENT
Start: 2022-09-09 | End: 2022-09-10

## 2022-09-09 RX ADMIN — CHLORHEXIDINE GLUCONATE 1 APPLICATION(S): 213 SOLUTION TOPICAL at 11:24

## 2022-09-09 RX ADMIN — DEXTROSE MONOHYDRATE, SODIUM CHLORIDE, AND POTASSIUM CHLORIDE 75 MILLILITER(S): 50; .745; 4.5 INJECTION, SOLUTION INTRAVENOUS at 08:01

## 2022-09-09 RX ADMIN — MIDODRINE HYDROCHLORIDE 5 MILLIGRAM(S): 2.5 TABLET ORAL at 17:36

## 2022-09-09 RX ADMIN — MIDODRINE HYDROCHLORIDE 5 MILLIGRAM(S): 2.5 TABLET ORAL at 05:28

## 2022-09-09 RX ADMIN — SODIUM CHLORIDE 100 MILLILITER(S): 9 INJECTION, SOLUTION INTRAVENOUS at 21:49

## 2022-09-09 RX ADMIN — Medication 500 MILLIGRAM(S): at 11:25

## 2022-09-09 RX ADMIN — MIDODRINE HYDROCHLORIDE 5 MILLIGRAM(S): 2.5 TABLET ORAL at 11:25

## 2022-09-09 RX ADMIN — HEPARIN SODIUM 5000 UNIT(S): 5000 INJECTION INTRAVENOUS; SUBCUTANEOUS at 05:28

## 2022-09-09 RX ADMIN — Medication 1 DROP(S): at 05:28

## 2022-09-09 RX ADMIN — CEFEPIME 100 MILLIGRAM(S): 1 INJECTION, POWDER, FOR SOLUTION INTRAMUSCULAR; INTRAVENOUS at 05:27

## 2022-09-09 RX ADMIN — Medication 1 DROP(S): at 17:36

## 2022-09-09 RX ADMIN — CEFEPIME 100 MILLIGRAM(S): 1 INJECTION, POWDER, FOR SOLUTION INTRAMUSCULAR; INTRAVENOUS at 21:49

## 2022-09-09 RX ADMIN — Medication 1 TABLET(S): at 11:26

## 2022-09-09 RX ADMIN — CHLORHEXIDINE GLUCONATE 1 APPLICATION(S): 213 SOLUTION TOPICAL at 05:29

## 2022-09-09 RX ADMIN — SODIUM CHLORIDE 50 MILLILITER(S): 9 INJECTION INTRAMUSCULAR; INTRAVENOUS; SUBCUTANEOUS at 19:59

## 2022-09-09 NOTE — PROGRESS NOTE ADULT - SUBJECTIVE AND OBJECTIVE BOX
Interval events:   attempted to remove ureteral stent. aborted due to equipment malfunction         OBJECTIVE:  Vital Signs Last 24 Hrs  T(C): 36.3 (09 Sep 2022 04:13), Max: 36.7 (08 Sep 2022 12:16)  T(F): 97.4 (09 Sep 2022 04:13), Max: 98 (08 Sep 2022 12:16)  HR: 59 (09 Sep 2022 04:13) (59 - 75)  BP: 115/76 (09 Sep 2022 04:13) (103/75 - 126/80)  BP(mean): --  RR: 18 (09 Sep 2022 04:13) (17 - 18)  SpO2: 99% (09 Sep 2022 04:13) (99% - 100%)    Parameters below as of 09 Sep 2022 04:13  Patient On (Oxygen Delivery Method): room air        Physical Examination:  GEN: NAD, resting quietly  ABD: soft  : condom catheter     LABS:                        10.8   7.25  )-----------( 151      ( 07 Sep 2022 07:28 )             33.8       09-08    145  |  111<H>  |  8   ----------------------------<  86  3.6   |  23  |  0.70    Ca    9.2      08 Sep 2022 07:09  Phos  2.0     09-08  Mg     1.8     09-07    TPro  6.7  /  Alb  2.8<L>  /  TBili  0.2  /  DBili  x   /  AST  8<L>  /  ALT  7<L>  /  AlkPhos  72  09-07

## 2022-09-09 NOTE — CHART NOTE - NSCHARTNOTEFT_GEN_A_CORE
EEG READ  Abnormal EEG study.  Moderate nonspecific diffuse or multifocal cerebral dysfunction.   No epileptiform pattern or seizure seen.    IMPRESSION  Encephalopathy likely 2/2 toxic/ metabolic causes. Not seizures. patient's condition likely to improve.     RECOMMENDATION  [ ] D/C EEG   [ ] Rest of care per primary team   [ ] Neurology signing off. Please reconsult PRN or call [facility] with any questions.    Case discussed with Neurology attending, Dr. Иван Guerin.

## 2022-09-09 NOTE — CHART NOTE - NSCHARTNOTEFT_GEN_A_CORE
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     Vital Signs Last 24 Hrs  T(C): 34.2 (09 Sep 2022 18:16), Max: 36.6 (09 Sep 2022 12:00)  T(F): 93.5 (09 Sep 2022 18:16), Max: 97.8 (09 Sep 2022 12:00)  HR: 48 (09 Sep 2022 18:16) (43 - 66)  BP: 96/63 (09 Sep 2022 18:16) (96/63 - 126/80)  BP(mean): --  RR: 16 (09 Sep 2022 18:16) (16 - 18)  SpO2: 99% (09 Sep 2022 18:16) (99% - 100%)    Parameters below as of 09 Sep 2022 18:16  Patient On (Oxygen Delivery Method): room air    Pt. currently with hr 43 and temp 93.5 and placed on warming blanket. Per wife the pt. has had this episode with similar sx previously.  Pt. is arousable and intermittently sleeping. Discussed plan with Dr. Correa and and pt. meets sepsis criteria. Pt. has workup in progress with labs drawn and cxr pending.  Per Dr. Correa plan is for urology to will attempt to remove right stent again on Monday and then discharge home afterward.  Will monitor pt. overnight and pending labs will followup. Discussed with Dr. Prabhakar and OK for Cefepime to restart and IV pending labs.  Will monitor v/s and temp overnight .    JESSIE Reid, YOSELIN - BC   [phone]

## 2022-09-09 NOTE — PROGRESS NOTE ADULT - ASSESSMENT
60 yo male s/p bilateral URS/LL/Stents   9/7: coronel and left stent removed     - right ureteral stent to be removed outpatient   - No objection to discharge per    - Please obtain and document PVR     Remainder of care per primary     Barbra Barth   Available on Teams

## 2022-09-09 NOTE — PROVIDER CONTACT NOTE (SEPSIS SCREENING) - BACKGROUND:
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

## 2022-09-09 NOTE — PROGRESS NOTE ADULT - SUBJECTIVE AND OBJECTIVE BOX
INTERVAL HPI/OVERNIGHT EVENTS:  Pt seen and examined at bedside.     Allergies/Intolerance: No Known Allergies      MEDICATIONS  (STANDING):  artificial  tears Solution 1 Drop(s) Both EYES two times a day  ascorbic acid 500 milliGRAM(s) Oral daily  cefepime   IVPB 2000 milliGRAM(s) IV Intermittent every 8 hours  chlorhexidine 2% Cloths 1 Application(s) Topical daily  chlorhexidine 4% Liquid 1 Application(s) Topical <User Schedule>  dextrose 5% + sodium chloride 0.45% with potassium chloride 20 mEq/L 1000 milliLiter(s) (75 mL/Hr) IV Continuous <Continuous>  heparin   Injectable 5000 Unit(s) SubCutaneous every 8 hours  midodrine. 5 milliGRAM(s) Oral three times a day  multivitamin 1 Tablet(s) Oral daily    MEDICATIONS  (PRN):        ROS: all systems reviewed and wnl      PHYSICAL EXAMINATION:  Vital Signs Last 24 Hrs  T(C): 36.4 (09 Sep 2022 08:00), Max: 36.7 (08 Sep 2022 12:16)  T(F): 97.6 (09 Sep 2022 08:00), Max: 98 (08 Sep 2022 12:16)  HR: 60 (09 Sep 2022 08:00) (59 - 75)  BP: 110/70 (09 Sep 2022 08:00) (103/75 - 126/80)  BP(mean): --  RR: 18 (09 Sep 2022 08:00) (17 - 18)  SpO2: 100% (09 Sep 2022 08:00) (99% - 100%)    Parameters below as of 09 Sep 2022 08:00  Patient On (Oxygen Delivery Method): room air      CAPILLARY BLOOD GLUCOSE          09-08 @ 07:01  -  09-09 @ 07:00  --------------------------------------------------------  IN: 1000 mL / OUT: 0 mL / NET: 1000 mL        GENERAL:   NECK: supple, No JVD  CHEST/LUNG: clear to auscultation bilaterally; no rales, rhonchi, or wheezing b/l  HEART: normal S1, S2  ABDOMEN: BS+, soft, ND, NT   EXTREMITIES:  pulses palpable; no clubbing, cyanosis, or edema b/l LEs  SKIN: no rashes or lesions      LABS:                        10.4   6.87  )-----------( 141      ( 09 Sep 2022 07:39 )             32.7     09-09    141  |  109<H>  |  8   ----------------------------<  113<H>  4.0   |  21<L>  |  0.54    Ca    9.3      09 Sep 2022 07:39  Phos  2.6     09-09                 INTERVAL HPI/OVERNIGHT EVENTS:  Pt seen and examined at bedside.     Allergies/Intolerance: No Known Allergies      MEDICATIONS  (STANDING):  artificial  tears Solution 1 Drop(s) Both EYES two times a day  ascorbic acid 500 milliGRAM(s) Oral daily  cefepime   IVPB 2000 milliGRAM(s) IV Intermittent every 8 hours  chlorhexidine 2% Cloths 1 Application(s) Topical daily  chlorhexidine 4% Liquid 1 Application(s) Topical <User Schedule>  dextrose 5% + sodium chloride 0.45% with potassium chloride 20 mEq/L 1000 milliLiter(s) (75 mL/Hr) IV Continuous <Continuous>  heparin   Injectable 5000 Unit(s) SubCutaneous every 8 hours  midodrine. 5 milliGRAM(s) Oral three times a day  multivitamin 1 Tablet(s) Oral daily    MEDICATIONS  (PRN):        ROS: all systems reviewed and wnl      PHYSICAL EXAMINATION:  Vital Signs Last 24 Hrs  T(C): 36.4 (09 Sep 2022 08:00), Max: 36.7 (08 Sep 2022 12:16)  T(F): 97.6 (09 Sep 2022 08:00), Max: 98 (08 Sep 2022 12:16)  HR: 60 (09 Sep 2022 08:00) (59 - 75)  BP: 110/70 (09 Sep 2022 08:00) (103/75 - 126/80)  BP(mean): --  RR: 18 (09 Sep 2022 08:00) (17 - 18)  SpO2: 100% (09 Sep 2022 08:00) (99% - 100%)    Parameters below as of 09 Sep 2022 08:00  Patient On (Oxygen Delivery Method): room air      CAPILLARY BLOOD GLUCOSE          09-08 @ 07:01  -  09-09 @ 07:00  --------------------------------------------------------  IN: 1000 mL / OUT: 0 mL / NET: 1000 mL        GENERAL: stable, in bed, comfortable on RA, no fevers or gross heamturia, on puree diet.   NECK: supple, No JVD  CHEST/LUNG: clear to auscultation bilaterally; no rales, rhonchi, or wheezing b/l  HEART: normal S1, S2  ABDOMEN: BS+, soft, ND, NT   EXTREMITIES:  pulses palpable; no clubbing, cyanosis, or edema b/l LEs        LABS:                        10.4   6.87  )-----------( 141      ( 09 Sep 2022 07:39 )             32.7     09-09    141  |  109<H>  |  8   ----------------------------<  113<H>  4.0   |  21<L>  |  0.54    Ca    9.3      09 Sep 2022 07:39  Phos  2.6     09-09

## 2022-09-09 NOTE — CHART NOTE - NSCHARTNOTEFT_GEN_A_CORE
Addendum: Discussed with urology today. Were not able to remove right stent due to 24 EEG study. Will try again on Monday to remove right   ureteral stent then discharge home on Monday after that.

## 2022-09-09 NOTE — PROGRESS NOTE ADULT - ASSESSMENT
Plan:  Continue Midodrine 10 mg tid, UTI, Pseudomonas in blood,  on IIV Cefepeme 2 grams every 8 hours. Repeat blood cultures requested.     Advance diet as tolerated. Wean off IVF. Swallow eval.           Note: Coronel removed, does not have chronic coronel at home.     IVF can be changed to D5W1/2 NS with KCL. Sodium better today at 138.         Per Urology, remove left stent removed yesterday, right stent to be removed today with bedside cystoscopy.      Appreciate Neuro input, CT head no acute findings, EEG is reasonable to r/o non-convulsive status.  Hold AED.         More alert today, stop Cefepeme tomorrow after ureteral stent removed.  PT eval.                Plan:  Continue Midodrine 10 mg tid, UTI, Pseudomonas in blood,  on IIV Cefepeme 2 grams every 8 hours. Repeat blood cultures requested.     Advance diet as tolerated. Wean off IVF. Swallow eval.           Note: Coronel removed, does not have chronic coronel at home.     IVF can be changed to D5W1/2 NS with KCL. Sodium better today at 138.         Per Urology, remove left stent removed yesterday, right stent now to be removed as outpatient.  No coronel needed, check PVR.        Appreciate Neuro input, CT head no acute findings, EEG is negative  for seizure activity.  No indication for AED.         More alert today, stop Cefepeme today.      Plan:   PT eval and discharge home Monday.

## 2022-09-09 NOTE — EEG REPORT - NS EEG TEXT BOX
[facility]  Comprehensive Epilepsy Center  Report of Continuous Video EEG    [facility]: [address], [place] [address], Phone [phone]  [place] Office: [address], [address], [place] [address] Phone [phone]    [facility]: [address] [place] [address], Phone [phone]  [place] Office: [address] [place] [address], Phone [phone]    Patient Name: Shlomo Rojas    Age: 61 year, : 1960  Patient ID: -, MRN #: -, Sorenson: - [address] [facility] [address]  Referring Physician: -  EEG #: [id number]    Study Time/Date: 10:51:12 AM on 2022  	  End Time/Date: 0800 on 2022          			   Duration: 21H    Study Information:    EEG Recording Technique:  The patient underwent continuous Video-EEG monitoring, using Telemetry System hardware on the XLTek Digital System. EEG and video data were stored on a computer hard drive with important events saved in digital archive files. The material was reviewed by a physician (electroencephalographer / epileptologist) on a daily basis. Rafael and seizure detection algorithms were utilized and reviewed. An EEG Technician attended to the patient, and was available throughout daytime work hours.  The epilepsy center neurologist was available in person or on call 24-hours per day.    EEG Placement and Labeling of Electrodes:  The EEG was performed utilizing 20 channel referential EEG connections (coronal over temporal over parasagittal montage) using all standard 10-20 electrode placements with EKG, with additional electrodes placed in the inferior temporal region using the modified 10-10 montage electrode placements for elective admissions, or if deemed necessary. Recording was at a sampling rate of 256 samples per second per channel. Time synchronized digital video recording was done simultaneously with EEG recording. A low light infrared camera was used for low light recording.     History:   VEEG performed at bedside  COR : PT awake and lethargic  No HV stim due to covid protocols  No photic due to PT unable to keep eyes closed  62 y/o Male PMH adrenal insufficiency, pneumonia, lacunar infarction, anemia, HPT, H/O Cerebellar ataxia  P/W sepsis and change of mental status      Pertinent Medication  PROAMATINE    Interpretation:    Daily EEG Visual Analysis    Findings: The background was continuous and reactive. During wakefulness, the posterior dominant rhythm consisted of symmetric, 7 Hz activity, with amplitude to 30 uV, that attenuated to eye opening.     Background Slowing:  Diffuse theta and polymorphic delta slowing.    Focal Slowing:   None were present.    Sleep Background:  Drowsiness was characterized by fragmentation, attenuation, and slowing of the background activity.    Sleep was characterized by the presence of vertex waves, symmetric sleep spindles and K-complexes.    Other Non-Epileptiform Findings:  None were present.    Interictal Epileptiform Activity:   None were present.    Events:  Clinical events: None recorded.  Seizures: None recorded.    Activation Procedures:   Hyperventilation was not performed.    Photic stimulation was not performed.     Artifacts:  Intermittent myogenic and movement artifacts were noted.    EEG Summary / Classification:  Abnormal EEG in the awake, drowsy and asleep states.  - Moderate generalized slowing.    EEG Impression / Clinical Correlate:  Abnormal EEG study.  Moderate nonspecific diffuse or multifocal cerebral dysfunction.   No epileptiform pattern or seizure seen.    **In absence of additional clinical concerns, recommend consideration for discontinuation of current EEG study with reconnection in future if warranted.  ________________________________________    Oswaldo Clark MD  Attending Physician, [facility]     [facility]  Comprehensive Epilepsy Center  Report of Continuous Video EEG    [facility]: [address], [place] [address], Phone [phone]  [place] Office: [address], [address], [place] [address] Phone [phone]    [facility]: [address] [place] [address], Phone [phone]  [place] Office: [address] [place] [address], Phone [phone]    Patient Name: Shlomo Rojas    Age: 61 year, : 1960  Patient ID: -, MRN #: -, Sorenson: - 4 [facility] [address]  Referring Physician: -  EEG #: [id number]    Study Time/Date: 10:51:12 AM on 2022  	  End Time/Date: 0800 on 2022          			   Duration: 25H    Study Information:    EEG Recording Technique:  The patient underwent continuous Video-EEG monitoring, using Telemetry System hardware on the XLTek Digital System. EEG and video data were stored on a computer hard drive with important events saved in digital archive files. The material was reviewed by a physician (electroencephalographer / epileptologist) on a daily basis. Raafel and seizure detection algorithms were utilized and reviewed. An EEG Technician attended to the patient, and was available throughout daytime work hours.  The epilepsy center neurologist was available in person or on call 24-hours per day.    EEG Placement and Labeling of Electrodes:  The EEG was performed utilizing 20 channel referential EEG connections (coronal over temporal over parasagittal montage) using all standard 10-20 electrode placements with EKG, with additional electrodes placed in the inferior temporal region using the modified 10-10 montage electrode placements for elective admissions, or if deemed necessary. Recording was at a sampling rate of 256 samples per second per channel. Time synchronized digital video recording was done simultaneously with EEG recording. A low light infrared camera was used for low light recording.     History:   VEEG performed at bedside  COR : PT awake and lethargic  No HV stim due to covid protocols  No photic due to PT unable to keep eyes closed  62 y/o Male PMH adrenal insufficiency, pneumonia, lacunar infarction, anemia, HPT, H/O Cerebellar ataxia  P/W sepsis and change of mental status      Pertinent Medication  PROAMATINE    Interpretation:    Daily EEG Visual Analysis    Findings: The background was continuous and reactive. During wakefulness, the posterior dominant rhythm consisted of symmetric, 7 Hz activity, with amplitude to 30 uV, that attenuated to eye opening.     Background Slowing:  Diffuse theta and polymorphic delta slowing.    Focal Slowing:   None were present.    Sleep Background:  Drowsiness was characterized by fragmentation, attenuation, and slowing of the background activity.    Sleep was characterized by the presence of vertex waves, symmetric sleep spindles and K-complexes.    Other Non-Epileptiform Findings:  None were present.    Interictal Epileptiform Activity:   None were present.    Events:  Clinical events: None recorded.  Seizures: None recorded.    Activation Procedures:   Hyperventilation was not performed.    Photic stimulation was not performed.     Artifacts:  Intermittent myogenic and movement artifacts were noted.    EEG Summary / Classification:  Abnormal EEG in the awake, drowsy and asleep states.  - Moderate generalized slowing.    EEG Impression / Clinical Correlate:  Abnormal EEG study.  Moderate nonspecific diffuse or multifocal cerebral dysfunction.   No epileptiform pattern or seizure seen.    **In absence of additional clinical concerns, recommend consideration for discontinuation of current EEG study with reconnection in future if warranted.  ________________________________________    Oswaldo Clark MD  Attending Physician, [facility]

## 2022-09-10 LAB
ANION GAP SERPL CALC-SCNC: 10 MMOL/L — SIGNIFICANT CHANGE UP (ref 5–17)
APPEARANCE UR: ABNORMAL
BACTERIA # UR AUTO: NEGATIVE — SIGNIFICANT CHANGE UP
BILIRUB UR-MCNC: NEGATIVE — SIGNIFICANT CHANGE UP
BUN SERPL-MCNC: 9 MG/DL — SIGNIFICANT CHANGE UP (ref 7–23)
CALCIUM SERPL-MCNC: 9.3 MG/DL — SIGNIFICANT CHANGE UP (ref 8.4–10.5)
CHLORIDE SERPL-SCNC: 108 MMOL/L — SIGNIFICANT CHANGE UP (ref 96–108)
CO2 SERPL-SCNC: 23 MMOL/L — SIGNIFICANT CHANGE UP (ref 22–31)
COLOR SPEC: SIGNIFICANT CHANGE UP
CREAT SERPL-MCNC: 0.61 MG/DL — SIGNIFICANT CHANGE UP (ref 0.5–1.3)
DIFF PNL FLD: ABNORMAL
EGFR: 110 ML/MIN/1.73M2 — SIGNIFICANT CHANGE UP
EPI CELLS # UR: 2 /HPF — SIGNIFICANT CHANGE UP
GLUCOSE SERPL-MCNC: 87 MG/DL — SIGNIFICANT CHANGE UP (ref 70–99)
GLUCOSE UR QL: NEGATIVE — SIGNIFICANT CHANGE UP
HCT VFR BLD CALC: 31.3 % — LOW (ref 39–50)
HGB BLD-MCNC: 10 G/DL — LOW (ref 13–17)
HYALINE CASTS # UR AUTO: 2 /LPF — SIGNIFICANT CHANGE UP (ref 0–2)
KETONES UR-MCNC: NEGATIVE — SIGNIFICANT CHANGE UP
LACTATE SERPL-SCNC: 0.7 MMOL/L — SIGNIFICANT CHANGE UP (ref 0.5–2)
LEUKOCYTE ESTERASE UR-ACNC: ABNORMAL
MCHC RBC-ENTMCNC: 27.4 PG — SIGNIFICANT CHANGE UP (ref 27–34)
MCHC RBC-ENTMCNC: 31.9 GM/DL — LOW (ref 32–36)
MCV RBC AUTO: 85.8 FL — SIGNIFICANT CHANGE UP (ref 80–100)
NITRITE UR-MCNC: NEGATIVE — SIGNIFICANT CHANGE UP
NRBC # BLD: 0 /100 WBCS — SIGNIFICANT CHANGE UP (ref 0–0)
PH UR: 6.5 — SIGNIFICANT CHANGE UP (ref 5–8)
PLATELET # BLD AUTO: 156 K/UL — SIGNIFICANT CHANGE UP (ref 150–400)
POTASSIUM SERPL-MCNC: 3.8 MMOL/L — SIGNIFICANT CHANGE UP (ref 3.5–5.3)
POTASSIUM SERPL-SCNC: 3.8 MMOL/L — SIGNIFICANT CHANGE UP (ref 3.5–5.3)
PROT UR-MCNC: ABNORMAL
RBC # BLD: 3.65 M/UL — LOW (ref 4.2–5.8)
RBC # FLD: 15.2 % — HIGH (ref 10.3–14.5)
RBC CASTS # UR COMP ASSIST: 3532 /HPF — HIGH (ref 0–4)
SODIUM SERPL-SCNC: 141 MMOL/L — SIGNIFICANT CHANGE UP (ref 135–145)
SP GR SPEC: 1.01 — SIGNIFICANT CHANGE UP (ref 1.01–1.02)
UROBILINOGEN FLD QL: NEGATIVE — SIGNIFICANT CHANGE UP
WBC # BLD: 7.29 K/UL — SIGNIFICANT CHANGE UP (ref 3.8–10.5)
WBC # FLD AUTO: 7.29 K/UL — SIGNIFICANT CHANGE UP (ref 3.8–10.5)
WBC UR QL: 20 /HPF — HIGH (ref 0–5)

## 2022-09-10 RX ADMIN — Medication 1 DROP(S): at 17:53

## 2022-09-10 RX ADMIN — MIDODRINE HYDROCHLORIDE 5 MILLIGRAM(S): 2.5 TABLET ORAL at 05:18

## 2022-09-10 RX ADMIN — ENOXAPARIN SODIUM 40 MILLIGRAM(S): 100 INJECTION SUBCUTANEOUS at 05:18

## 2022-09-10 RX ADMIN — SODIUM CHLORIDE 100 MILLILITER(S): 9 INJECTION, SOLUTION INTRAVENOUS at 07:14

## 2022-09-10 RX ADMIN — Medication 1 TABLET(S): at 12:03

## 2022-09-10 RX ADMIN — Medication 1 DROP(S): at 05:18

## 2022-09-10 RX ADMIN — SODIUM CHLORIDE 100 MILLILITER(S): 9 INJECTION, SOLUTION INTRAVENOUS at 21:44

## 2022-09-10 RX ADMIN — CHLORHEXIDINE GLUCONATE 1 APPLICATION(S): 213 SOLUTION TOPICAL at 12:05

## 2022-09-10 RX ADMIN — CEFEPIME 100 MILLIGRAM(S): 1 INJECTION, POWDER, FOR SOLUTION INTRAMUSCULAR; INTRAVENOUS at 21:41

## 2022-09-10 RX ADMIN — SODIUM CHLORIDE 100 MILLILITER(S): 9 INJECTION, SOLUTION INTRAVENOUS at 05:18

## 2022-09-10 RX ADMIN — Medication 500 MILLIGRAM(S): at 12:04

## 2022-09-10 RX ADMIN — MIDODRINE HYDROCHLORIDE 5 MILLIGRAM(S): 2.5 TABLET ORAL at 12:03

## 2022-09-10 RX ADMIN — CEFEPIME 100 MILLIGRAM(S): 1 INJECTION, POWDER, FOR SOLUTION INTRAMUSCULAR; INTRAVENOUS at 13:35

## 2022-09-10 RX ADMIN — CHLORHEXIDINE GLUCONATE 1 APPLICATION(S): 213 SOLUTION TOPICAL at 05:19

## 2022-09-10 RX ADMIN — MIDODRINE HYDROCHLORIDE 5 MILLIGRAM(S): 2.5 TABLET ORAL at 17:52

## 2022-09-10 RX ADMIN — CEFEPIME 100 MILLIGRAM(S): 1 INJECTION, POWDER, FOR SOLUTION INTRAMUSCULAR; INTRAVENOUS at 05:17

## 2022-09-10 NOTE — PROGRESS NOTE ADULT - ASSESSMENT
62 yo male      PMHx of cerebellar ataxia     on 4/17/22 due to AMS ,  was  intubated for depressed consciousness w/ cognitive decline.  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    infectious  and  malignancy  was  ruled  outt/  and  per   neuro  note ,no further workup     flow cytometry 4/28 showing nonspecific results 'degenerated sample, small lymphocytes'   nsgy consulted for meningeal bx, family refusing    CSF cytopathology 4/22 - increased number of large mononuclear cells in a background of few small lymphocytes, monocytes and neutrophils, cannot exclude a lymphoproliferative disorder versus an inflammatory process.    CSF cytopathology 4/29 - significant increased number of small lymphocytes with rare monocytes/ seen by  oncology  dr de leon,  no  intervention        admitted    with  hypokalemia   h/o  cerebellar  ataxia,  with  no defined  cause  found   pt  with  bradycardia, ?  central, seen  by  card/dr grant,   echo  on 4/2022,  normal  ef    h/o  recurrent , intermittent   hypothermia,    not related  to  any   infectious   process,  it  seems to be  dysautonomia/    with  h/o normal  cortisol level  prior  CT  c/ap,  no  malignant  process  on feeds  per  swallow  eval     bed bound   status  and  altered  baseline  mental  status at  times   on  midodrine  uti,  and   Pseudomonas,  bacteremia   on iv  cefepime,   house  ID eval   urology    following,   s/p  left  ureteral  stent removed/  ha s R  stent/ c/c  mild  R hydro   dvt ppx/  sq  heparin

## 2022-09-10 NOTE — PROGRESS NOTE ADULT - SUBJECTIVE AND OBJECTIVE BOX
CARDIOLOGY     PROGRESS  NOTE   ________________________________________________    CHIEF COMPLAINT:Patient is a 61y old  Male who presents with a chief complaint of Hypokalemia (10 Sep 2022 08:20)  comfortable.  	  REVIEW OF SYSTEMS:  CONSTITUTIONAL: No fever, weight loss, or fatigue  EYES: No eye pain, visual disturbances, or discharge  ENT:  No difficulty hearing, tinnitus, vertigo; No sinus or throat pain  NECK: No pain or stiffness  RESPIRATORY: No cough, wheezing, chills or hemoptysis; No Shortness of Breath  CARDIOVASCULAR: No chest pain, palpitations, passing out, dizziness, or leg swelling  GASTROINTESTINAL: No abdominal or epigastric pain. No nausea, vomiting, or hematemesis; No diarrhea or constipation. No melena or hematochezia.  GENITOURINARY: No dysuria, frequency, hematuria, or incontinence  NEUROLOGICAL: No headaches, memory loss, loss of strength, numbness, or tremors  SKIN: No itching, burning, rashes, or lesions   LYMPH Nodes: No enlarged glands  ENDOCRINE: No heat or cold intolerance; No hair loss  MUSCULOSKELETAL: No joint pain or swelling; No muscle, back, or extremity pain  PSYCHIATRIC: No depression, anxiety, mood swings, or difficulty sleeping  HEME/LYMPH: No easy bruising, or bleeding gums  ALLERGY AND IMMUNOLOGIC: No hives or eczema	    [ ] All others negative	  [ x] Unable to obtain    PHYSICAL EXAM:  T(C): 36.5 (09-10-22 @ 08:00), Max: 36.9 (09-10-22 @ 04:50)  HR: 62 (09-10-22 @ 08:00) (43 - 73)  BP: 102/69 (09-10-22 @ 08:00) (92/56 - 114/67)  RR: 17 (09-10-22 @ 08:00) (16 - 17)  SpO2: 100% (09-10-22 @ 08:00) (98% - 100%)  Wt(kg): --  I&O's Summary    09 Sep 2022 07:01  -  10 Sep 2022 07:00  --------------------------------------------------------  IN: 1150 mL / OUT: 450 mL / NET: 700 mL        Appearance: Normal	  HEENT:   Normal oral mucosa, PERRL, EOMI	  Lymphatic: No lymphadenopathy  Cardiovascular: Normal S1 S2, No JVD, + murmurs, No edema  Respiratory: Lungs clear to auscultation	  Gastrointestinal:  Soft, Non-tender, + BS	  Skin: No rashes, No ecchymoses, No cyanosis	  Neurologic: Non-focal  Extremities: Normal range of motion, No clubbing, cyanosis or edema  Vascular: Peripheral pulses palpable 2+ bilaterally    MEDICATIONS  (STANDING):  artificial  tears Solution 1 Drop(s) Both EYES two times a day  ascorbic acid 500 milliGRAM(s) Oral daily  cefepime   IVPB 2000 milliGRAM(s) IV Intermittent every 8 hours  chlorhexidine 2% Cloths 1 Application(s) Topical daily  chlorhexidine 4% Liquid 1 Application(s) Topical <User Schedule>  enoxaparin Injectable 40 milliGRAM(s) SubCutaneous every 24 hours  lactated ringers. 1000 milliLiter(s) (100 mL/Hr) IV Continuous <Continuous>  midodrine. 5 milliGRAM(s) Oral three times a day  multivitamin 1 Tablet(s) Oral daily  sodium chloride 0.9%. 1000 milliLiter(s) (50 mL/Hr) IV Continuous <Continuous>      TELEMETRY: 	    ECG:  	  RADIOLOGY:  OTHER: 	  	  LABS:	 	    CARDIAC MARKERS:                                10.0   7.29  )-----------( 156      ( 10 Sep 2022 06:56 )             31.3     09-10    141  |  108  |  9   ----------------------------<  87  3.8   |  23  |  0.61    Ca    9.3      10 Sep 2022 06:58  Phos  2.6     09-09    TPro  6.8  /  Alb  2.9<L>  /  TBili  0.4  /  DBili  x   /  AST  13  /  ALT  10  /  AlkPhos  69  09-09    proBNP:   Lipid Profile:   HgA1c:   TSH: Thyroid Stimulating Hormone, Serum: 1.51 uIU/mL (08-27 @ 07:27)    PT/INR - ( 09 Sep 2022 19:32 )   PT: 14.0 sec;   INR: 1.20 ratio         PTT - ( 09 Sep 2022 19:32 )  PTT:31.7 sec  s/p ureteral stent placement/stone extraction with postop with pseudomonas uti, bacteremia, sepsis, leukocytosis   -cont cefepime 2 gm iv q8 till ureteral stent removed   -repeat bcx negative from 9/4  -monitor temps, cbc, creatinine   -complete abx today    Assessment and plan  ---------------------------  61 M w/ PMH of progressive Cerebellar Ataxia, Chronic Lacunar infarcts, 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   pt is well known to me with hx of severe orthostatic hypotension on florinef/ ?midodrine with bradycardia, pt had a normal am cortisol level.  ivf, keep hydrated  autonomic dysfunction, over all controlled with midodrine, don't increase dose sec to bradycardia  check tsh  dvt prophylaxis  if sig bradycardia will change Midodrine to Florinef if remains bradycardic  s/p syrgery transferred to MICU sec to hypotension  continue iv abx/ pressor  supportive care  continue iv abx  encourage fluid  decrease midodrine pt with sig hx of bradycardia, add Florinef if bp is low  increase free water/ ivf  + BC, continue ABX   bp is well controlled  start ivf, pt with sig decrease po fluid intake  continue abx

## 2022-09-10 NOTE — PROGRESS NOTE ADULT - SUBJECTIVE AND OBJECTIVE BOX
afberile  REVIEW OF SYSTEMS:  GEN: no fever,    no chills  RESP: no SOB,   no cough  CVS: no chest pain,   no palpitations  GI: no abdominal pain,   no nausea,   no vomiting,   no constipation,   no diarrhea  : no dysuria,   no frequency  NEURO: no headache,   no dizziness  PSYCH: no depression,   not anxious  Derm : no rash    MEDICATIONS  (STANDING):  artificial  tears Solution 1 Drop(s) Both EYES two times a day  ascorbic acid 500 milliGRAM(s) Oral daily  cefepime   IVPB 2000 milliGRAM(s) IV Intermittent every 8 hours  chlorhexidine 2% Cloths 1 Application(s) Topical daily  chlorhexidine 4% Liquid 1 Application(s) Topical <User Schedule>  enoxaparin Injectable 40 milliGRAM(s) SubCutaneous every 24 hours  lactated ringers. 1000 milliLiter(s) (100 mL/Hr) IV Continuous <Continuous>  midodrine. 5 milliGRAM(s) Oral three times a day  multivitamin 1 Tablet(s) Oral daily  sodium chloride 0.9%. 1000 milliLiter(s) (50 mL/Hr) IV Continuous <Continuous>    MEDICATIONS  (PRN):      Vital Signs Last 24 Hrs  T(C): 36.9 (10 Sep 2022 04:50), Max: 36.9 (10 Sep 2022 04:50)  T(F): 98.4 (10 Sep 2022 04:50), Max: 98.4 (10 Sep 2022 04:50)  HR: 73 (10 Sep 2022 04:50) (43 - 73)  BP: 105/69 (10 Sep 2022 04:50) (92/56 - 114/67)  BP(mean): --  RR: 17 (10 Sep 2022 04:50) (16 - 18)  SpO2: 98% (10 Sep 2022 04:50) (98% - 100%)    Parameters below as of 10 Sep 2022 04:50  Patient On (Oxygen Delivery Method): room air      CAPILLARY BLOOD GLUCOSE        I&O's Summary    09 Sep 2022 07:01  -  10 Sep 2022 07:00  --------------------------------------------------------  IN: 1150 mL / OUT: 450 mL / NET: 700 mL        PHYSICAL EXAM:  HEAD:  Atraumatic, Normocephalic  NECK: Supple, No   JVD  CHEST/LUNG:   no     rales,     no,    rhonchi  HEART: Regular rate and rhythm;         murmur  ABDOMEN: Soft, Nontender, ;   EXTREMITIES:     no   edema  NEUROLOGY:  alert    LABS:                        10.0   7.29  )-----------( 156      ( 10 Sep 2022 06:56 )             31.3     09-10    141  |  108  |  9   ----------------------------<  87  3.8   |  23  |  0.61    Ca    9.3      10 Sep 2022 06:58  Phos  2.6         TPro  6.8  /  Alb  2.9<L>  /  TBili  0.4  /  DBili  x   /  AST  13  /  ALT  10  /  AlkPhos  69      PT/INR - ( 09 Sep 2022 19:32 )   PT: 14.0 sec;   INR: 1.20 ratio         PTT - ( 09 Sep 2022 19:32 )  PTT:31.7 sec      Urinalysis Basic - ( 10 Sep 2022 00:23 )    Color: BROWN / Appearance: Slightly Turbid / S.011 / pH: x  Gluc: x / Ketone: Negative  / Bili: Negative / Urobili: Negative   Blood: x / Protein: 30 mg/dL / Nitrite: Negative   Leuk Esterase: Large / RBC: 3532 /hpf / WBC 20 /HPF   Sq Epi: x / Non Sq Epi: 2 /hpf / Bacteria: Negative      Lactate, Blood: 0.7 mmol/L (09-10 @ 07:00)  Lactate, Blood: 0.6 mmol/L ( @ 19:32)          Thyroid Stimulating Hormone, Serum: 1.51 uIU/mL ( @ 07:27)          Consultant(s) Notes Reviewed:      Care Discussed with Consultants/Other Providers:

## 2022-09-11 RX ADMIN — Medication 1 DROP(S): at 17:18

## 2022-09-11 RX ADMIN — Medication 1 TABLET(S): at 11:48

## 2022-09-11 RX ADMIN — CEFEPIME 100 MILLIGRAM(S): 1 INJECTION, POWDER, FOR SOLUTION INTRAMUSCULAR; INTRAVENOUS at 21:45

## 2022-09-11 RX ADMIN — MIDODRINE HYDROCHLORIDE 5 MILLIGRAM(S): 2.5 TABLET ORAL at 05:15

## 2022-09-11 RX ADMIN — ENOXAPARIN SODIUM 40 MILLIGRAM(S): 100 INJECTION SUBCUTANEOUS at 05:15

## 2022-09-11 RX ADMIN — MIDODRINE HYDROCHLORIDE 5 MILLIGRAM(S): 2.5 TABLET ORAL at 11:47

## 2022-09-11 RX ADMIN — SODIUM CHLORIDE 60 MILLILITER(S): 9 INJECTION, SOLUTION INTRAVENOUS at 21:44

## 2022-09-11 RX ADMIN — CHLORHEXIDINE GLUCONATE 1 APPLICATION(S): 213 SOLUTION TOPICAL at 05:15

## 2022-09-11 RX ADMIN — Medication 1 DROP(S): at 05:15

## 2022-09-11 RX ADMIN — Medication 500 MILLIGRAM(S): at 11:47

## 2022-09-11 RX ADMIN — CEFEPIME 100 MILLIGRAM(S): 1 INJECTION, POWDER, FOR SOLUTION INTRAMUSCULAR; INTRAVENOUS at 13:54

## 2022-09-11 RX ADMIN — CEFEPIME 100 MILLIGRAM(S): 1 INJECTION, POWDER, FOR SOLUTION INTRAMUSCULAR; INTRAVENOUS at 05:16

## 2022-09-11 RX ADMIN — MIDODRINE HYDROCHLORIDE 5 MILLIGRAM(S): 2.5 TABLET ORAL at 17:18

## 2022-09-11 RX ADMIN — SODIUM CHLORIDE 60 MILLILITER(S): 9 INJECTION, SOLUTION INTRAVENOUS at 13:00

## 2022-09-11 RX ADMIN — CHLORHEXIDINE GLUCONATE 1 APPLICATION(S): 213 SOLUTION TOPICAL at 11:48

## 2022-09-11 NOTE — PROVIDER CONTACT NOTE (OTHER) - ACTION/TREATMENT ORDERED:
bear hugger w warm packs. cont to monitor.
olya de la torre warm packs. bcx x2, lactate, cbc drawn this am. will cont to monitor pt and vitals.

## 2022-09-11 NOTE — PROGRESS NOTE ADULT - ASSESSMENT
Plan:  Continue Midodrine 10 mg tid, UTI, Pseudomonas in blood,  on IIV Cefepeme 2 grams every 8 hours. Repeat blood cultures requested.     Advance diet as tolerated. Wean off IVF. Swallow eval.           Note: Coronel removed, does not have chronic coronel at home.     IVF can be changed to D5W1/2 NS with KCL. Sodium better today at 138.         Per Urology, remove left stent removed yesterday, right stent now to be removed as outpatient.  No coronel needed, check PVR.        Appreciate Neuro input, CT head no acute findings, EEG is negative  for seizure activity.  No indication for AED.         More alert today, stop Cefepeme today.      Plan:   PT eval and discharge home Monday.                  Plan:  Continue Midodrine 10 mg tid, UTI, Pseudomonas in blood,  on IIV Cefepeme 2 grams every 8 hours. Repeat blood cultures requested.     Advance diet as tolerated. Wean off IVF. Swallow eval.           Note: Coronel removed, does not have chronic coronel at home.     IVF can be changed to D5W1/2 NS with KCL. Sodium better today at 138.         Plan: Per Urology, remove left stent removed last week, right stent now to be removed tomorrow.  No coronel needed, check PVR.        Appreciate Neuro input, CT head no acute findings, EEG is negative  for seizure activity.  No indication for AED.         More alert today, Cefepeme until right stent removed.  PT eval and discharge home Monday.       Stop IVF after stent removed, lactate normal at .70, no sign of infection.

## 2022-09-11 NOTE — PROGRESS NOTE ADULT - SUBJECTIVE AND OBJECTIVE BOX
CARDIOLOGY     PROGRESS  NOTE   ________________________________________________    CHIEF COMPLAINT:Patient is a 61y old  Male who presents with a chief complaint of Hypokalemia (11 Sep 2022 10:21)  comfortable.  	  REVIEW OF SYSTEMS:  CONSTITUTIONAL: No fever, weight loss, or fatigue  EYES: No eye pain, visual disturbances, or discharge  ENT:  No difficulty hearing, tinnitus, vertigo; No sinus or throat pain  NECK: No pain or stiffness  RESPIRATORY: No cough, wheezing, chills or hemoptysis; No Shortness of Breath  CARDIOVASCULAR: No chest pain, palpitations, passing out, dizziness, or leg swelling  GASTROINTESTINAL: No abdominal or epigastric pain. No nausea, vomiting, or hematemesis; No diarrhea or constipation. No melena or hematochezia.  GENITOURINARY: No dysuria, frequency, hematuria, or incontinence  NEUROLOGICAL: No headaches, memory loss, loss of strength, numbness, or tremors  SKIN: No itching, burning, rashes, or lesions   LYMPH Nodes: No enlarged glands  ENDOCRINE: No heat or cold intolerance; No hair loss  MUSCULOSKELETAL: No joint pain or swelling; No muscle, back, or extremity pain  PSYCHIATRIC: No depression, anxiety, mood swings, or difficulty sleeping  HEME/LYMPH: No easy bruising, or bleeding gums  ALLERGY AND IMMUNOLOGIC: No hives or eczema	    [ ] All others negative	  [ ] Unable to obtain    PHYSICAL EXAM:  T(C): 36.3 (09-11-22 @ 00:09), Max: 36.5 (09-10-22 @ 13:15)  HR: 53 (09-11-22 @ 05:42) (52 - 62)  BP: 121/79 (09-11-22 @ 05:42) (111/67 - 130/71)  RR: 18 (09-11-22 @ 05:42) (17 - 18)  SpO2: 100% (09-11-22 @ 05:42) (99% - 100%)  Wt(kg): --  I&O's Summary    10 Sep 2022 07:01  -  11 Sep 2022 07:00  --------------------------------------------------------  IN: 480 mL / OUT: 1350 mL / NET: -870 mL        Appearance: Normal	  HEENT:   Normal oral mucosa, PERRL, EOMI	  Lymphatic: No lymphadenopathy  Cardiovascular: Normal S1 S2, No JVD, + murmurs, No edema  Respiratory: rhonchi  Gastrointestinal:  Soft, Non-tender, + BS	  Skin: No rashes, No ecchymoses, No cyanosis	  Neurologic: Non-focal  Extremities: Normal range of motion, No clubbing, cyanosis or edema  Vascular: Peripheral pulses palpable 2+ bilaterally    MEDICATIONS  (STANDING):  artificial  tears Solution 1 Drop(s) Both EYES two times a day  ascorbic acid 500 milliGRAM(s) Oral daily  cefepime   IVPB 2000 milliGRAM(s) IV Intermittent every 8 hours  chlorhexidine 2% Cloths 1 Application(s) Topical daily  chlorhexidine 4% Liquid 1 Application(s) Topical <User Schedule>  enoxaparin Injectable 40 milliGRAM(s) SubCutaneous every 24 hours  lactated ringers. 1000 milliLiter(s) (60 mL/Hr) IV Continuous <Continuous>  midodrine. 5 milliGRAM(s) Oral three times a day  multivitamin 1 Tablet(s) Oral daily      TELEMETRY: 	    ECG:  	  RADIOLOGY:  OTHER: 	  	  LABS:	 	    CARDIAC MARKERS:                                10.0   7.29  )-----------( 156      ( 10 Sep 2022 06:56 )             31.3     09-10    141  |  108  |  9   ----------------------------<  87  3.8   |  23  |  0.61    Ca    9.3      10 Sep 2022 06:58    TPro  6.8  /  Alb  2.9<L>  /  TBili  0.4  /  DBili  x   /  AST  13  /  ALT  10  /  AlkPhos  69  09-09    proBNP:   Lipid Profile:   HgA1c:   TSH: Thyroid Stimulating Hormone, Serum: 1.51 uIU/mL (08-27 @ 07:27)    PT/INR - ( 09 Sep 2022 19:32 )   PT: 14.0 sec;   INR: 1.20 ratio         PTT - ( 09 Sep 2022 19:32 )  PTT:31.7 sec  s/p ureteral stent placement/stone extraction with postop with pseudomonas uti, bacteremia, sepsis, leukocytosis   -cont cefepime 2 gm iv q8 till ureteral stent removed   -repeat bcx negative from 9/4  -monitor temps, cbc, creatinine   -complete abx today    Assessment and plan  ---------------------------  61 M w/ PMH of progressive Cerebellar Ataxia, Chronic Lacunar infarcts, 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   pt is well known to me with hx of severe orthostatic hypotension on florinef/ ?midodrine with bradycardia, pt had a normal am cortisol level.  ivf, keep hydrated  autonomic dysfunction, over all controlled with midodrine, don't increase dose sec to bradycardia  check tsh  dvt prophylaxis  if sig bradycardia will change Midodrine to Florinef if remains bradycardic  s/p syrgery transferred to MICU sec to hypotension  continue iv abx/ pressor  supportive care  continue iv abx  encourage fluid  decrease midodrine pt with sig hx of bradycardia, add Florinef if bp is low  increase free water/ ivf  + BC, continue ABX   bp is well controlled  start ivf, pt with sig decrease po fluid intake  dc abx as per ID  dc planning

## 2022-09-11 NOTE — PROGRESS NOTE ADULT - SUBJECTIVE AND OBJECTIVE BOX
INTERVAL HPI/OVERNIGHT EVENTS:  Pt seen and examined at bedside.     Allergies/Intolerance: No Known Allergies      MEDICATIONS  (STANDING):  artificial  tears Solution 1 Drop(s) Both EYES two times a day  ascorbic acid 500 milliGRAM(s) Oral daily  cefepime   IVPB 2000 milliGRAM(s) IV Intermittent every 8 hours  chlorhexidine 2% Cloths 1 Application(s) Topical daily  chlorhexidine 4% Liquid 1 Application(s) Topical <User Schedule>  enoxaparin Injectable 40 milliGRAM(s) SubCutaneous every 24 hours  lactated ringers. 1000 milliLiter(s) (100 mL/Hr) IV Continuous <Continuous>  midodrine. 5 milliGRAM(s) Oral three times a day  multivitamin 1 Tablet(s) Oral daily    MEDICATIONS  (PRN):        ROS: all systems reviewed and wnl      PHYSICAL EXAMINATION:  Vital Signs Last 24 Hrs  T(C): 36.3 (11 Sep 2022 00:09), Max: 36.5 (10 Sep 2022 13:15)  T(F): 97.4 (11 Sep 2022 00:09), Max: 97.7 (10 Sep 2022 13:15)  HR: 53 (11 Sep 2022 05:42) (52 - 62)  BP: 121/79 (11 Sep 2022 05:42) (111/67 - 130/71)  BP(mean): --  RR: 18 (11 Sep 2022 05:42) (17 - 18)  SpO2: 100% (11 Sep 2022 05:42) (99% - 100%)    Parameters below as of 11 Sep 2022 05:42  Patient On (Oxygen Delivery Method): room air      CAPILLARY BLOOD GLUCOSE          09-10 @ 07:01  -   @ 07:00  --------------------------------------------------------  IN: 480 mL / OUT: 1350 mL / NET: -870 mL        GENERAL:   NECK: supple, No JVD  CHEST/LUNG: clear to auscultation bilaterally; no rales, rhonchi, or wheezing b/l  HEART: normal S1, S2  ABDOMEN: BS+, soft, ND, NT   EXTREMITIES:  pulses palpable; no clubbing, cyanosis, or edema b/l LEs  SKIN: no rashes or lesions      LABS:                        10.0   7.29  )-----------( 156      ( 10 Sep 2022 06:56 )             31.3     09-10    141  |  108  |  9   ----------------------------<  87  3.8   |  23  |  0.61    Ca    9.3      10 Sep 2022 06:58    TPro  6.8  /  Alb  2.9<L>  /  TBili  0.4  /  DBili  x   /  AST  13  /  ALT  10  /  AlkPhos  69      PT/INR - ( 09 Sep 2022 19:32 )   PT: 14.0 sec;   INR: 1.20 ratio         PTT - ( 09 Sep 2022 19:32 )  PTT:31.7 sec  Urinalysis Basic - ( 10 Sep 2022 00:23 )    Color: BROWN / Appearance: Slightly Turbid / S.011 / pH: x  Gluc: x / Ketone: Negative  / Bili: Negative / Urobili: Negative   Blood: x / Protein: 30 mg/dL / Nitrite: Negative   Leuk Esterase: Large / RBC: 3532 /hpf / WBC 20 /HPF   Sq Epi: x / Non Sq Epi: 2 /hpf / Bacteria: Negative             INTERVAL HPI/OVERNIGHT EVENTS:  Pt seen and examined at bedside.     Allergies/Intolerance: No Known Allergies      MEDICATIONS  (STANDING):  artificial  tears Solution 1 Drop(s) Both EYES two times a day  ascorbic acid 500 milliGRAM(s) Oral daily  cefepime   IVPB 2000 milliGRAM(s) IV Intermittent every 8 hours  chlorhexidine 2% Cloths 1 Application(s) Topical daily  chlorhexidine 4% Liquid 1 Application(s) Topical <User Schedule>  enoxaparin Injectable 40 milliGRAM(s) SubCutaneous every 24 hours  lactated ringers. 1000 milliLiter(s) (100 mL/Hr) IV Continuous <Continuous>  midodrine. 5 milliGRAM(s) Oral three times a day  multivitamin 1 Tablet(s) Oral daily    MEDICATIONS  (PRN):        ROS: all systems reviewed and wnl      PHYSICAL EXAMINATION:  Vital Signs Last 24 Hrs  T(C): 36.3 (11 Sep 2022 00:09), Max: 36.5 (10 Sep 2022 13:15)  T(F): 97.4 (11 Sep 2022 00:09), Max: 97.7 (10 Sep 2022 13:15)  HR: 53 (11 Sep 2022 05:42) (52 - 62)  BP: 121/79 (11 Sep 2022 05:42) (111/67 - 130/71)  BP(mean): --  RR: 18 (11 Sep 2022 05:42) (17 - 18)  SpO2: 100% (11 Sep 2022 05:42) (99% - 100%)    Parameters below as of 11 Sep 2022 05:42  Patient On (Oxygen Delivery Method): room air      CAPILLARY BLOOD GLUCOSE          09-10 @ 07:01  -   @ 07:00  --------------------------------------------------------  IN: 480 mL / OUT: 1350 mL / NET: -870 mL        GENERAL: stable, in bed, comfortable, no fevers or cough   NECK: supple, No JVD  CHEST/LUNG: clear to auscultation bilaterally; no rales, rhonchi, or wheezing b/l  HEART: normal S1, S2  ABDOMEN: BS+, soft, ND, NT   EXTREMITIES:  pulses palpable; no clubbing, cyanosis, or edema b/l LEs  SKIN: no rashes or lesions      LABS:                        10.0   7.29  )-----------( 156      ( 10 Sep 2022 06:56 )             31.3     09-10    141  |  108  |  9   ----------------------------<  87  3.8   |  23  |  0.61    Ca    9.3      10 Sep 2022 06:58    TPro  6.8  /  Alb  2.9<L>  /  TBili  0.4  /  DBili  x   /  AST  13  /  ALT  10  /  AlkPhos  69      PT/INR - ( 09 Sep 2022 19:32 )   PT: 14.0 sec;   INR: 1.20 ratio         PTT - ( 09 Sep 2022 19:32 )  PTT:31.7 sec  Urinalysis Basic - ( 10 Sep 2022 00:23 )    Color: BROWN / Appearance: Slightly Turbid / S.011 / pH: x  Gluc: x / Ketone: Negative  / Bili: Negative / Urobili: Negative   Blood: x / Protein: 30 mg/dL / Nitrite: Negative   Leuk Esterase: Large / RBC: 3532 /hpf / WBC 20 /HPF   Sq Epi: x / Non Sq Epi: 2 /hpf / Bacteria: Negative

## 2022-09-11 NOTE — CHART NOTE - NSCHARTNOTESELECT_GEN_ALL_CORE
Event Note
Event Note
Hypothermia
MICU Transfer Note/Transfer Note
POCUS/Event Note
Urology
DME/Event Note
Event Note
Event Note
MAR Accept Note/Event Note
MICU Accept Note/Transfer Note
Neurology
Nutrition Services
POCUS- Resident/Attending
Speech & Swallow
Urology/Event Note
urology

## 2022-09-11 NOTE — PROVIDER CONTACT NOTE (OTHER) - ASSESSMENT
alert, nonverbal. vss except temp
alert, but minimally verbal. vss other than temp, soft bp, but stable. coronel in place w hematuria. pt on meropenem 1g q8.

## 2022-09-11 NOTE — PROVIDER CONTACT NOTE (OTHER) - RECOMMENDATIONS
bear hugger w hot packs provided. will cont to monitor pt and vitals.
olya de la torre warm packs. bcx x2, lactate, cbc drawn this am. will cont to monitor pt and vitals.

## 2022-09-12 ENCOUNTER — TRANSCRIPTION ENCOUNTER (OUTPATIENT)
Age: 62
End: 2022-09-12

## 2022-09-12 VITALS
HEART RATE: 82 BPM | RESPIRATION RATE: 18 BRPM | SYSTOLIC BLOOD PRESSURE: 103 MMHG | TEMPERATURE: 97 F | DIASTOLIC BLOOD PRESSURE: 71 MMHG | OXYGEN SATURATION: 98 %

## 2022-09-12 PROCEDURE — 95714 VEEG EA 12-26 HR UNMNTR: CPT

## 2022-09-12 PROCEDURE — 81001 URINALYSIS AUTO W/SCOPE: CPT

## 2022-09-12 PROCEDURE — 84100 ASSAY OF PHOSPHORUS: CPT

## 2022-09-12 PROCEDURE — 97164 PT RE-EVAL EST PLAN CARE: CPT

## 2022-09-12 PROCEDURE — 82533 TOTAL CORTISOL: CPT

## 2022-09-12 PROCEDURE — 70450 CT HEAD/BRAIN W/O DYE: CPT

## 2022-09-12 PROCEDURE — 80053 COMPREHEN METABOLIC PANEL: CPT

## 2022-09-12 PROCEDURE — 85027 COMPLETE CBC AUTOMATED: CPT

## 2022-09-12 PROCEDURE — 86850 RBC ANTIBODY SCREEN: CPT

## 2022-09-12 PROCEDURE — 80048 BASIC METABOLIC PNL TOTAL CA: CPT

## 2022-09-12 PROCEDURE — 71045 X-RAY EXAM CHEST 1 VIEW: CPT

## 2022-09-12 PROCEDURE — 88300 SURGICAL PATH GROSS: CPT

## 2022-09-12 PROCEDURE — 97530 THERAPEUTIC ACTIVITIES: CPT

## 2022-09-12 PROCEDURE — 82565 ASSAY OF CREATININE: CPT

## 2022-09-12 PROCEDURE — C1889: CPT

## 2022-09-12 PROCEDURE — 82365 CALCULUS SPECTROSCOPY: CPT

## 2022-09-12 PROCEDURE — U0003: CPT

## 2022-09-12 PROCEDURE — 86900 BLOOD TYPING SEROLOGIC ABO: CPT

## 2022-09-12 PROCEDURE — 82803 BLOOD GASES ANY COMBINATION: CPT

## 2022-09-12 PROCEDURE — 97110 THERAPEUTIC EXERCISES: CPT

## 2022-09-12 PROCEDURE — 76000 FLUOROSCOPY <1 HR PHYS/QHP: CPT

## 2022-09-12 PROCEDURE — C2617: CPT

## 2022-09-12 PROCEDURE — 82962 GLUCOSE BLOOD TEST: CPT

## 2022-09-12 PROCEDURE — C1894: CPT

## 2022-09-12 PROCEDURE — 95700 EEG CONT REC W/VID EEG TECH: CPT

## 2022-09-12 PROCEDURE — 82435 ASSAY OF BLOOD CHLORIDE: CPT

## 2022-09-12 PROCEDURE — 83605 ASSAY OF LACTIC ACID: CPT

## 2022-09-12 PROCEDURE — C1758: CPT

## 2022-09-12 PROCEDURE — 97112 NEUROMUSCULAR REEDUCATION: CPT

## 2022-09-12 PROCEDURE — 84295 ASSAY OF SERUM SODIUM: CPT

## 2022-09-12 PROCEDURE — 87086 URINE CULTURE/COLONY COUNT: CPT

## 2022-09-12 PROCEDURE — C1769: CPT

## 2022-09-12 PROCEDURE — 36415 COLL VENOUS BLD VENIPUNCTURE: CPT

## 2022-09-12 PROCEDURE — 87186 SC STD MICRODIL/AGAR DIL: CPT

## 2022-09-12 PROCEDURE — 82947 ASSAY GLUCOSE BLOOD QUANT: CPT

## 2022-09-12 PROCEDURE — 85025 COMPLETE CBC W/AUTO DIFF WBC: CPT

## 2022-09-12 PROCEDURE — 85014 HEMATOCRIT: CPT

## 2022-09-12 PROCEDURE — 96375 TX/PRO/DX INJ NEW DRUG ADDON: CPT

## 2022-09-12 PROCEDURE — 87641 MR-STAPH DNA AMP PROBE: CPT

## 2022-09-12 PROCEDURE — 99285 EMERGENCY DEPT VISIT HI MDM: CPT | Mod: 25

## 2022-09-12 PROCEDURE — 92610 EVALUATE SWALLOWING FUNCTION: CPT

## 2022-09-12 PROCEDURE — 85730 THROMBOPLASTIN TIME PARTIAL: CPT

## 2022-09-12 PROCEDURE — 87150 DNA/RNA AMPLIFIED PROBE: CPT

## 2022-09-12 PROCEDURE — 97166 OT EVAL MOD COMPLEX 45 MIN: CPT

## 2022-09-12 PROCEDURE — 97535 SELF CARE MNGMENT TRAINING: CPT

## 2022-09-12 PROCEDURE — 96376 TX/PRO/DX INJ SAME DRUG ADON: CPT

## 2022-09-12 PROCEDURE — 92526 ORAL FUNCTION THERAPY: CPT

## 2022-09-12 PROCEDURE — 87040 BLOOD CULTURE FOR BACTERIA: CPT

## 2022-09-12 PROCEDURE — 0225U NFCT DS DNA&RNA 21 SARSCOV2: CPT

## 2022-09-12 PROCEDURE — U0005: CPT

## 2022-09-12 PROCEDURE — 83735 ASSAY OF MAGNESIUM: CPT

## 2022-09-12 PROCEDURE — 85018 HEMOGLOBIN: CPT

## 2022-09-12 PROCEDURE — 84132 ASSAY OF SERUM POTASSIUM: CPT

## 2022-09-12 PROCEDURE — 97161 PT EVAL LOW COMPLEX 20 MIN: CPT

## 2022-09-12 PROCEDURE — 96365 THER/PROPH/DIAG IV INF INIT: CPT

## 2022-09-12 PROCEDURE — 85610 PROTHROMBIN TIME: CPT

## 2022-09-12 PROCEDURE — 84443 ASSAY THYROID STIM HORMONE: CPT

## 2022-09-12 PROCEDURE — 86901 BLOOD TYPING SEROLOGIC RH(D): CPT

## 2022-09-12 PROCEDURE — 87640 STAPH A DNA AMP PROBE: CPT

## 2022-09-12 PROCEDURE — 82330 ASSAY OF CALCIUM: CPT

## 2022-09-12 RX ORDER — FLUDROCORTISONE ACETATE 0.1 MG/1
1 TABLET ORAL
Qty: 60 | Refills: 0

## 2022-09-12 RX ORDER — MIDODRINE HYDROCHLORIDE 2.5 MG/1
1 TABLET ORAL
Qty: 90 | Refills: 0
Start: 2022-09-12 | End: 2022-10-11

## 2022-09-12 RX ORDER — MIDODRINE HYDROCHLORIDE 2.5 MG/1
1 TABLET ORAL
Qty: 90 | Refills: 0

## 2022-09-12 RX ADMIN — Medication 1 DROP(S): at 06:42

## 2022-09-12 RX ADMIN — ENOXAPARIN SODIUM 40 MILLIGRAM(S): 100 INJECTION SUBCUTANEOUS at 06:41

## 2022-09-12 RX ADMIN — Medication 500 MILLIGRAM(S): at 11:35

## 2022-09-12 RX ADMIN — CEFEPIME 100 MILLIGRAM(S): 1 INJECTION, POWDER, FOR SOLUTION INTRAMUSCULAR; INTRAVENOUS at 06:41

## 2022-09-12 RX ADMIN — CHLORHEXIDINE GLUCONATE 1 APPLICATION(S): 213 SOLUTION TOPICAL at 06:42

## 2022-09-12 RX ADMIN — MIDODRINE HYDROCHLORIDE 5 MILLIGRAM(S): 2.5 TABLET ORAL at 11:35

## 2022-09-12 RX ADMIN — Medication 1 TABLET(S): at 11:35

## 2022-09-12 RX ADMIN — CHLORHEXIDINE GLUCONATE 1 APPLICATION(S): 213 SOLUTION TOPICAL at 11:37

## 2022-09-12 NOTE — PROGRESS NOTE ADULT - SUBJECTIVE AND OBJECTIVE BOX
INTERVAL HPI/OVERNIGHT EVENTS:  Pt seen and examined at bedside.     Allergies/Intolerance: No Known Allergies      MEDICATIONS  (STANDING):  artificial  tears Solution 1 Drop(s) Both EYES two times a day  ascorbic acid 500 milliGRAM(s) Oral daily  cefepime   IVPB 2000 milliGRAM(s) IV Intermittent every 8 hours  chlorhexidine 2% Cloths 1 Application(s) Topical daily  chlorhexidine 4% Liquid 1 Application(s) Topical <User Schedule>  enoxaparin Injectable 40 milliGRAM(s) SubCutaneous every 24 hours  lactated ringers. 1000 milliLiter(s) (60 mL/Hr) IV Continuous <Continuous>  midodrine. 5 milliGRAM(s) Oral three times a day  multivitamin 1 Tablet(s) Oral daily    MEDICATIONS  (PRN):        ROS: all systems reviewed and wnl      PHYSICAL EXAMINATION:  Vital Signs Last 24 Hrs  T(C): 36.3 (12 Sep 2022 05:13), Max: 36.7 (11 Sep 2022 21:00)  T(F): 97.4 (12 Sep 2022 05:13), Max: 98 (11 Sep 2022 21:00)  HR: 68 (12 Sep 2022 05:13) (65 - 75)  BP: 114/76 (12 Sep 2022 05:13) (105/71 - 117/75)  BP(mean): --  RR: 18 (12 Sep 2022 05:13) (18 - 18)  SpO2: 99% (12 Sep 2022 05:13) (98% - 100%)    Parameters below as of 12 Sep 2022 05:13  Patient On (Oxygen Delivery Method): room air      CAPILLARY BLOOD GLUCOSE          09-11 @ 07:01  -  09-12 @ 07:00  --------------------------------------------------------  IN: 940 mL / OUT: 600 mL / NET: 340 mL        GENERAL:   NECK: supple, No JVD  CHEST/LUNG: clear to auscultation bilaterally; no rales, rhonchi, or wheezing b/l  HEART: normal S1, S2  ABDOMEN: BS+, soft, ND, NT   EXTREMITIES:  pulses palpable; no clubbing, cyanosis, or edema b/l LEs  SKIN: no rashes or lesions      LABS:                     INTERVAL HPI/OVERNIGHT EVENTS:  Pt seen and examined at bedside.     Allergies/Intolerance: No Known Allergies      MEDICATIONS  (STANDING):  artificial  tears Solution 1 Drop(s) Both EYES two times a day  ascorbic acid 500 milliGRAM(s) Oral daily  cefepime   IVPB 2000 milliGRAM(s) IV Intermittent every 8 hours  chlorhexidine 2% Cloths 1 Application(s) Topical daily  chlorhexidine 4% Liquid 1 Application(s) Topical <User Schedule>  enoxaparin Injectable 40 milliGRAM(s) SubCutaneous every 24 hours  lactated ringers. 1000 milliLiter(s) (60 mL/Hr) IV Continuous <Continuous>  midodrine. 5 milliGRAM(s) Oral three times a day  multivitamin 1 Tablet(s) Oral daily    MEDICATIONS  (PRN):        ROS: all systems reviewed and wnl      PHYSICAL EXAMINATION:  Vital Signs Last 24 Hrs  T(C): 36.3 (12 Sep 2022 05:13), Max: 36.7 (11 Sep 2022 21:00)  T(F): 97.4 (12 Sep 2022 05:13), Max: 98 (11 Sep 2022 21:00)  HR: 68 (12 Sep 2022 05:13) (65 - 75)  BP: 114/76 (12 Sep 2022 05:13) (105/71 - 117/75)  BP(mean): --  RR: 18 (12 Sep 2022 05:13) (18 - 18)  SpO2: 99% (12 Sep 2022 05:13) (98% - 100%)    Parameters below as of 12 Sep 2022 05:13  Patient On (Oxygen Delivery Method): room air      CAPILLARY BLOOD GLUCOSE          09-11 @ 07:01  -  09-12 @ 07:00  --------------------------------------------------------  IN: 940 mL / OUT: 600 mL / NET: 340 mL        GENERAL: stable, in bed, alert, follows commands, awake  NECK: supple, No JVD  CHEST/LUNG: clear to auscultation bilaterally; no rales, rhonchi, or wheezing b/l  HEART: normal S1, S2  ABDOMEN: BS+, soft, ND, NT   EXTREMITIES:  pulses palpable; no clubbing, cyanosis, or edema b/l LEs        LABS:

## 2022-09-12 NOTE — PROGRESS NOTE ADULT - ASSESSMENT
60 yo male s/p bilateral URS/LL/Stents   9/7: coronel and left stent removed   9/12: right stent removed     - No further  intervention   - Expect some blood tinged urine   - Recommend single dose abx for ppx   - f/up with Dr. Daigle outpatient     Remainder of care per primary     Barbra Barth   Available on Teams

## 2022-09-12 NOTE — PROGRESS NOTE ADULT - REASON FOR ADMISSION
Hypokalemia

## 2022-09-12 NOTE — PROGRESS NOTE ADULT - ASSESSMENT
Plan:  Continue Midodrine 10 mg tid, UTI, Pseudomonas in blood,  on IIV Cefepeme 2 grams every 8 hours. Repeat blood cultures requested.     Advance diet as tolerated. Wean off IVF. Swallow eval.           Note: Coronel removed, does not have chronic coronel at home.     IVF can be changed to D5W1/2 NS with KCL. Sodium better today at 138.         Plan: Per Urology, remove left stent removed last week, right stent now to be removed tomorrow.  No coronel needed, check PVR.        Appreciate Neuro input, CT head no acute findings, EEG is negative  for seizure activity.  No indication for AED.         More alert today, Cefepeme until right stent removed.  PT eval and discharge home Monday.       Stop IVF after stent removed, lactate normal at .70, no sign of infection.                   Plan:  Continue Midodrine 10 mg tid, UTI, Pseudomonas in blood,  on IIV Cefepeme 2 grams every 8 hours. Repeat blood cultures requested.     Advance diet as tolerated. Wean off IVF. Swallow eval.           Note: Coronel removed, does not have chronic coronel at home.     IVF can be changed to D5W1/2 NS with KCL. Sodium better today at 138.         Plan: Per Urology, remove left stent removed last week, right stent now removed. No coronel needed, check PVR.        Appreciate Neuro input, CT head no acute findings, EEG is negative  for seizure activity.  No indication for AED.         More alert today, Cefepeme until right stent removed.  PT eval and discharge home Monday.       Stop IVF after stent removed, lactate normal at .70, no sign of infection.    Stop Cefepeme.     Discharge home later today.

## 2022-09-12 NOTE — PROGRESS NOTE ADULT - SUBJECTIVE AND OBJECTIVE BOX
CARDIOLOGY     PROGRESS  NOTE   ________________________________________________    CHIEF COMPLAINT:Patient is a 61y old  Male who presents with a chief complaint of Hypokalemia (12 Sep 2022 07:56)  doing better.  	  REVIEW OF SYSTEMS:  CONSTITUTIONAL: No fever, weight loss, or fatigue  EYES: No eye pain, visual disturbances, or discharge  ENT:  No difficulty hearing, tinnitus, vertigo; No sinus or throat pain  NECK: No pain or stiffness  RESPIRATORY: No cough, wheezing, chills or hemoptysis; No Shortness of Breath  CARDIOVASCULAR: No chest pain, palpitations, passing out, dizziness, or leg swelling  GASTROINTESTINAL: No abdominal or epigastric pain. No nausea, vomiting, or hematemesis; No diarrhea or constipation. No melena or hematochezia.  GENITOURINARY: No dysuria, frequency, hematuria, or incontinence  NEUROLOGICAL: No headaches, memory loss, loss of strength, numbness, or tremors  SKIN: No itching, burning, rashes, or lesions   LYMPH Nodes: No enlarged glands  ENDOCRINE: No heat or cold intolerance; No hair loss  MUSCULOSKELETAL: No joint pain or swelling; No muscle, back, or extremity pain  PSYCHIATRIC: No depression, anxiety, mood swings, or difficulty sleeping  HEME/LYMPH: No easy bruising, or bleeding gums  ALLERGY AND IMMUNOLOGIC: No hives or eczema	    [ ] All others negative	  [x ] Unable to obtain    PHYSICAL EXAM:  T(C): 36.3 (09-12-22 @ 05:13), Max: 36.7 (09-11-22 @ 21:00)  HR: 68 (09-12-22 @ 05:13) (65 - 75)  BP: 114/76 (09-12-22 @ 05:13) (105/71 - 117/75)  RR: 18 (09-12-22 @ 05:13) (18 - 18)  SpO2: 99% (09-12-22 @ 05:13) (98% - 100%)  Wt(kg): --  I&O's Summary    11 Sep 2022 07:01  -  12 Sep 2022 07:00  --------------------------------------------------------  IN: 940 mL / OUT: 600 mL / NET: 340 mL        Appearance: Normal	  HEENT:   Normal oral mucosa, PERRL, EOMI	  Lymphatic: No lymphadenopathy  Cardiovascular: Normal S1 S2, No JVD, + murmurs, No edema  Respiratory: rhonchi  Psychiatry: A & O x 3, Mood & affect appropriate  Gastrointestinal:  Soft, Non-tender, + BS	  Skin: No rashes, No ecchymoses, No cyanosis	  Neurologic: Non-focal  Extremities: Normal range of motion, No clubbing, cyanosis or edema  Vascular: Peripheral pulses palpable 2+ bilaterally    MEDICATIONS  (STANDING):  artificial  tears Solution 1 Drop(s) Both EYES two times a day  ascorbic acid 500 milliGRAM(s) Oral daily  cefepime   IVPB 2000 milliGRAM(s) IV Intermittent every 8 hours  chlorhexidine 2% Cloths 1 Application(s) Topical daily  chlorhexidine 4% Liquid 1 Application(s) Topical <User Schedule>  enoxaparin Injectable 40 milliGRAM(s) SubCutaneous every 24 hours  lactated ringers. 1000 milliLiter(s) (60 mL/Hr) IV Continuous <Continuous>  midodrine. 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   pt is well known to me with hx of severe orthostatic hypotension on florinef/ ?midodrine with bradycardia, pt had a normal am cortisol level.  ivf, keep hydrated  autonomic dysfunction, over all controlled with midodrine, don't increase dose sec to bradycardia  check tsh  dvt prophylaxis  if sig bradycardia will change Midodrine to Florinef if remains bradycardic  s/p syrgery transferred to MICU sec to hypotension  continue iv abx/ pressor  supportive care  continue iv abx  encourage fluid  decrease midodrine pt with sig hx of bradycardia, add Florinef if bp is low  increase free water/ ivf  + BC, continue ABX   bp is well controlled  start ivf, pt with sig decrease po fluid intake  awaiting stent remova in order to dc abx  bp and hr is well controlled

## 2022-09-12 NOTE — PROGRESS NOTE ADULT - SUBJECTIVE AND OBJECTIVE BOX
Interval events:   stent removed at bedside this morning          OBJECTIVE:  Vital Signs Last 24 Hrs  T(C): 36.3 (12 Sep 2022 05:13), Max: 36.7 (11 Sep 2022 21:00)  T(F): 97.4 (12 Sep 2022 05:13), Max: 98 (11 Sep 2022 21:00)  HR: 68 (12 Sep 2022 05:13) (65 - 75)  BP: 114/76 (12 Sep 2022 05:13) (105/71 - 117/75)  BP(mean): --  RR: 18 (12 Sep 2022 05:13) (18 - 18)  SpO2: 99% (12 Sep 2022 05:13) (98% - 100%)    Parameters below as of 12 Sep 2022 05:13  Patient On (Oxygen Delivery Method): room air        Physical Examination:  GEN: NAD, resting quietly  ABD: soft      LABS:

## 2022-09-12 NOTE — DISCHARGE NOTE NURSING/CASE MANAGEMENT/SOCIAL WORK - PATIENT PORTAL LINK FT
You can access the FollowMyHealth Patient Portal offered by [facility] by registering at the following website: [email or web address]. By joining [employer]’s FollowMyHealth portal, you will also be able to view your health information using other applications (apps) compatible with our system.

## 2022-09-12 NOTE — PROGRESS NOTE ADULT - NUTRITIONAL ASSESSMENT
This patient has been assessed with a concern for Malnutrition and has been determined to have a diagnosis/diagnoses of Severe protein-calorie malnutrition.    This patient is being managed with:   Diet NPO after Midnight-     NPO Start Date: 30-Aug-2022   NPO Start Time: 23:59  Entered: Aug 30 2022  6:35AM    Diet Regular-  Supplement Feeding Modality:  Oral  Ensure Enlive Cans or Servings Per Day:  3       Frequency:  Daily  Entered: Aug 29 2022  7:18PM    
This patient has been assessed with a concern for Malnutrition and has been determined to have a diagnosis/diagnoses of Severe protein-calorie malnutrition.    This patient is being managed with:   Diet Pureed-  Entered: Sep  8 2022  4:02PM    
This patient has been assessed with a concern for Malnutrition and has been determined to have a diagnosis/diagnoses of Severe protein-calorie malnutrition.    This patient is being managed with:   Diet NPO after Midnight-     NPO Start Date: 30-Aug-2022   NPO Start Time: 23:59  Entered: Aug 30 2022  6:35AM    Diet Regular-  Supplement Feeding Modality:  Oral  Ensure Enlive Cans or Servings Per Day:  3       Frequency:  Daily  Entered: Aug 29 2022  7:18PM    
This patient has been assessed with a concern for Malnutrition and has been determined to have a diagnosis/diagnoses of Severe protein-calorie malnutrition.    This patient is being managed with:   Diet NPO-  Except Medications  Entered: Sep  1 2022  4:37AM    
This patient has been assessed with a concern for Malnutrition and has been determined to have a diagnosis/diagnoses of Severe protein-calorie malnutrition.    This patient is being managed with:   Diet NPO-  Except Medications  With Ice Chips/Sips of Water  Entered: Sep  3 2022  8:10AM    
This patient has been assessed with a concern for Malnutrition and has been determined to have a diagnosis/diagnoses of Severe protein-calorie malnutrition.    This patient is being managed with:   Diet NPO-  Except Medications  Entered: Sep  1 2022  4:37AM    
This patient has been assessed with a concern for Malnutrition and has been determined to have a diagnosis/diagnoses of Severe protein-calorie malnutrition.    This patient is being managed with:   Diet NPO-  Except Medications  With Ice Chips/Sips of Water  Entered: Sep  3 2022  8:10AM    
This patient has been assessed with a concern for Malnutrition and has been determined to have a diagnosis/diagnoses of Severe protein-calorie malnutrition.    This patient is being managed with:   Diet NPO-  Except Medications  With Ice Chips/Sips of Water  Entered: Sep  3 2022  8:10AM    
This patient has been assessed with a concern for Malnutrition and has been determined to have a diagnosis/diagnoses of Severe protein-calorie malnutrition.    This patient is being managed with:   Diet NPO after Midnight-     NPO Start Date: 30-Aug-2022   NPO Start Time: 23:59  Entered: Aug 30 2022  6:35AM    Diet Regular-  Supplement Feeding Modality:  Oral  Ensure Enlive Cans or Servings Per Day:  3       Frequency:  Daily  Entered: Aug 29 2022  7:18PM    
This patient has been assessed with a concern for Malnutrition and has been determined to have a diagnosis/diagnoses of Severe protein-calorie malnutrition.    This patient is being managed with:   Diet Pureed-  Supplement Feeding Modality:  Oral  Ensure Enlive Cans or Servings Per Day:  3       Frequency:  Daily  Entered: Sep  9 2022  4:41PM    
This patient has been assessed with a concern for Malnutrition and has been determined to have a diagnosis/diagnoses of Severe protein-calorie malnutrition.    This patient is being managed with:   Diet NPO-  Except Medications  Entered: Sep  1 2022  4:37AM    
This patient has been assessed with a concern for Malnutrition and has been determined to have a diagnosis/diagnoses of Severe protein-calorie malnutrition.    This patient is being managed with:   Diet Regular-  Supplement Feeding Modality:  Oral  Ensure Enlive Cans or Servings Per Day:  3       Frequency:  Daily  Entered: Aug 29 2022  7:18PM

## 2022-09-12 NOTE — PROGRESS NOTE ADULT - PROVIDER SPECIALTY LIST ADULT
Cardiology
Hospitalist
Internal Medicine
Urology
Cardiology
Hospitalist
Hospitalist
Infectious Disease
Infectious Disease
Urology
Cardiology
Hospitalist
Urology
Urology
Hospitalist
Internal Medicine
MICU
Urology
Infectious Disease
Infectious Disease

## 2022-09-14 ENCOUNTER — APPOINTMENT (OUTPATIENT)
Age: 62
End: 2022-09-14

## 2022-09-14 LAB — SURGICAL PATHOLOGY STUDY: SIGNIFICANT CHANGE UP

## 2022-10-07 ENCOUNTER — APPOINTMENT (OUTPATIENT)
Dept: OTOLARYNGOLOGY | Facility: CLINIC | Age: 62
End: 2022-10-07

## 2022-10-18 NOTE — BRIEF OPERATIVE NOTE - PRIMARY SURGEON
rodriguez Xolair Counseling:  Patient informed of potential adverse effects including but not limited to fever, muscle aches, rash and allergic reactions.  The patient verbalized understanding of the proper use and possible adverse effects of Xolair.  All of the patient's questions and concerns were addressed.

## 2022-10-30 NOTE — PROGRESS NOTE ADULT - PSYCHIATRIC
Your child was seen in the ED and found to have a likely viral illness. This should improve over time, but often is worse on days 3 and 4 of illness. Please provide plenty of fluids. You may treat their fever or pain with acetaminophen and ibuprofen.  Please provide plenty of fluids.    For the constipation we recommend MiraLAX.    Please contact the number to establish care with a pediatrician.  We have also requested that our  reach out to you to schedule an appointment.    Please return to the emergency department or seek medical attention if they develop:  Dehydration, difficulty breathing, excessive fussiness, or any other new or concerning findings.    
depressed
No

## 2022-11-07 NOTE — PATIENT PROFILE ADULT - FUNCTIONAL ASSESSMENT - DAILY ACTIVITY 1.
Bill 27528 For Specimen Handling/Conveyance To Laboratory?: no Detail Level: Detailed Medical Necessity Information: It is in your best interest to select a reason for this procedure from the list below. All of these items fulfill various CMS LCD requirements except the new and changing color options. Was A Bandage Applied: Yes Anesthesia Type: 1% lidocaine with epinephrine Biopsy Method: 15 blade Wound Care: Petrolatum Size Of Lesion In Cm (Required): 0.4 Hemostasis: Narayan's X Size Of Lesion In Cm (Optional): 0 Consent was obtained from the patient. The risks and benefits to therapy were discussed in detail. Specifically, the risks of infection, scarring, bleeding, prolonged wound healing, incomplete removal, allergy to anesthesia, nerve injury and recurrence were addressed. Prior to the procedure, the treatment site was clearly identified and confirmed by the patient. All components of Universal Protocol/PAUSE Rule completed. Billing Type: Third-Party Bill Medical Necessity Clause: This procedure was medically necessary because the lesion that was treated was: Notification Instructions: Patient will be notified of pathology results. However, patient instructed to call the office if not contacted within 2 weeks. Post-Care Instructions: I reviewed with the patient in detail post-care instructions. Patient is to keep the biopsy site dry overnight, and then apply bacitracin twice daily until healed. Patient may apply hydrogen peroxide soaks to remove any crusting. 2 = A lot of assistance Size Of Lesion In Cm (Required): 0.5

## 2022-11-28 NOTE — DIETITIAN INITIAL EVALUATION ADULT - WEIGHT USED FOR CALCULATIONS
----- Message from Sima Lomax MD sent at 11/28/2022  3:20 PM CST -----  She is positive for flu and strept.  Call in Tamiflu
Pt was notified, please sign pended medication.
IBW

## 2023-01-06 NOTE — PROGRESS NOTE ADULT - SUBJECTIVE AND OBJECTIVE BOX
MRN-[id number]    Subjective: 60 yo male seen and examined at bedside. Off sedation.    PAST MEDICAL & SURGICAL HISTORY:  H/O cerebellar ataxia    No significant past surgical history    FAMILY HISTORY:  FH: dementia (Mother)    FH: type 2 diabetes (Mother)    Family history of CVA (Father)    Social Hx:  Nonsmoker, no drug or alcohol use    Home Medications:  atorvastatin 20 mg oral tablet: 1 tab(s) orally once a day (2022 06:28)  mirtazapine 15 mg oral tablet: 1 tab(s) orally once a day (at bedtime), As Needed (2022 06:28)    MEDICATIONS  (STANDING):  acyclovir IVPB      acyclovir IVPB 600 milliGRAM(s) IV Intermittent every 8 hours  albuterol/ipratropium for Nebulization 3 milliLiter(s) Nebulizer every 6 hours  ampicillin  IVPB 2 Gram(s) IV Intermittent every 4 hours  ampicillin  IVPB      atorvastatin 20 milliGRAM(s) Oral at bedtime  cefTRIAXone   IVPB 2000 milliGRAM(s) IV Intermittent every 12 hours  chlorhexidine 0.12% Liquid 15 milliLiter(s) Oral Mucosa every 12 hours  chlorhexidine 4% Liquid 1 Application(s) Topical <User Schedule>  dextrose 50% Injectable 50 milliLiter(s) IV Push every 15 minutes  dextrose 50% Injectable 25 milliLiter(s) IV Push every 15 minutes  folic acid 1 milliGRAM(s) Oral daily  heparin   Injectable 5000 Unit(s) SubCutaneous every 12 hours  hydrocortisone sodium succinate Injectable 100 milliGRAM(s) IV Push every 8 hours  insulin lispro (ADMELOG) corrective regimen sliding scale   SubCutaneous every 6 hours  insulin NPH human recombinant 6 Unit(s) SubCutaneous every 6 hours  multivitamin 1 Tablet(s) Oral daily  norepinephrine Infusion 0.05 MICROgram(s)/kG/Min (7.14 mL/Hr) IV Continuous <Continuous>  pantoprazole  Injectable 40 milliGRAM(s) IV Push daily  thiamine 100 milliGRAM(s) Oral daily  vancomycin  IVPB 1000 milliGRAM(s) IV Intermittent every 12 hours    MEDICATIONS  (PRN):  aluminum hydroxide/magnesium hydroxide/simethicone Suspension 30 milliLiter(s) Oral every 4 hours PRN Dyspepsia  ondansetron Injectable 4 milliGRAM(s) IV Push every 8 hours PRN Nausea and/or Vomiting    Allergies  No Known Allergies	    ROS: Unable to obtain due to mental status/intubated.    ICU Vital Signs Last 24 Hrs  T(C): 36.5 (2022 08:00), Max: 36.8 (2022 00:00)  T(F): 97.7 (2022 08:00), Max: 98.2 (2022 00:00)  HR: 103 (2022 09:14) (75 - 121)  BP: 97/54 (2022 09:00) (79/52 - 132/60)  BP(mean): 72 (2022 09:00) (62 - 87)  RR: 12 (2022 09:00) (12 - 29)  SpO2: 97% (2022 09:14) (94% - 100%)    GENERAL EXAM:  Constitutional: Lying in bed, NAD.  HEENT: Normocephalic. No scleral icterus.  Extremities: No edema.    NEUROLOGICAL EXAM: (Off sedation)  MS: Eyes closed. Open to verbal stimuli. Requires repeated stimuli to keep eyes open throughout exam. No verbal output (intubated). Does not follow commands.  CN: PERRL. Patient fights eye opening, cannot asses EOM. Face grossly symmetric.  Motor: Withdraws to noxious stimuli in upper extremities. TF to noxious stimuli in lower extremities.   Sensory: Grimaces to noxious stimuli x4.  Reflexes: 3+ throughout upper extremities. 2+ throughout lower extremities. Babinski absent bilaterally.   Coordination/Gait: Unable to assess.    Labs:   cbc                      8.8    17.34 )-----------( 106      ( 2022 00:27 )             26.7     [id number]    143  |  110<H>  |  11  ----------------------------<  119<H>  4.2   |  25  |  0.76    Ca    8.5      2022 09:33  Phos  3.1     04-25  Mg     2.2     04-25    TPro  5.3<L>  /  Alb  2.7<L>  /  TBili  0.1<L>  /  DBili  x   /  AST  11  /  ALT  15  /  AlkPhos  55  04-25    Coags: PT/INR - ( 2022 11:33 )   PT: 12.9 sec;   INR: 1.12 ratio      PTT - ( 2022 00:26 )  PTT:26.0 sec    LIVER FUNCTIONS - ( 2022 09:33 )  Alb: 2.7 g/dL / Pro: 5.3 g/dL / ALK PHOS: 55 U/L / ALT: 15 U/L / AST: 11 U/L / GGT: x           UA: Urinalysis Basic - ( 2022 10:35 )    Color: Light Orange / Appearance: Clear / S.042 / pH: x  Gluc: x / Ketone: Trace  / Bili: Negative / Urobili: Negative   Blood: x / Protein: 30 mg/dL / Nitrite: Negative   Leuk Esterase: Negative / RBC: 885 /hpf / WBC 8 /HPF   Sq Epi: x / Non Sq Epi: 1 /hpf / Bacteria: Negative    Radiology/EEGs:  - CT HEAD: No acute intracranial hemorrhage or mass effect.  - CTA NECK: No hemodynamically significant stenosis by NASCET criteria, dissection, or pseudoaneurysm.  - CTA HEAD: No large vessel occlusion, significant stenosis, dissection, or saccular aneurysm.  - MRI BRAIN: Extensive, diffuse leptomeningeal enhancement. Differential diagnosis primarily includes leptomeningeal metastases and infectious meningitis. Abnormal ependymal enhancement involving the bilateral frontal horns, bilateral ventricular bodies, left temporal and occipital horn, and fourth ventricle. Differential diagnosis includes ependymal metastases and infectious meningitis. Possible small foci of restricted diffusion in the bilateral superior cerebral hemispheres. These may represent small acute infarcts or regions of encephalitis. Edema noted within the cerebellar vermis and mesial bilateral cerebellar hemispheres.  - MRI CERVICAL SPINE: Diffuse leptomeningeal enhancement. This may represent the presence of leptomeningeal metastases or leptomeningeal infection. Multilevel degenerative changes.    - EEG SUMMARY/CLASSIFICATION:  Abnormal EEG   - Moderate to severe generalized slowing.  _____________________________________________________________  EEG IMPRESSION/CLINICAL CORRELATE:  Abnormal EEG study.  Moderate to severe nonspecific diffuse or multifocal cerebral dysfunction.   No epileptiform pattern or seizure seen.    - EEG SUMMARY/CLASSIFICATION:  Abnormal EEG   - Suppressed background   - burst attenuation pattern  _____________________________________________________________  EEG IMPRESSION/CLINICAL CORRELATE:  Abnormal EEG study.  Severe nonspecific diffuse or multifocal cerebral dysfunction.   In the absence of sedation, voltage suppression maybe seen with acute to subacute cortical injury  No epileptiform patterns or seizures recorded x 1 day.     [id number]    Subjective: 62 yo male seen and examined at bedside. Off sedation. No events/seizures overnight.    PAST MEDICAL & SURGICAL HISTORY:  H/O cerebellar ataxia    No significant past surgical history    FAMILY HISTORY:  FH: dementia (Mother)    FH: type 2 diabetes (Mother)    Family history of CVA (Father)    Social Hx:  Nonsmoker, no drug or alcohol use    Home Medications:  atorvastatin 20 mg oral tablet: 1 tab(s) orally once a day (2022 06:28)  mirtazapine 15 mg oral tablet: 1 tab(s) orally once a day (at bedtime), As Needed (2022 06:28)    MEDICATIONS  (STANDING):  acyclovir IVPB      acyclovir IVPB 600 milliGRAM(s) IV Intermittent every 8 hours  albuterol/ipratropium for Nebulization 3 milliLiter(s) Nebulizer every 6 hours  ampicillin  IVPB 2 Gram(s) IV Intermittent every 4 hours  ampicillin  IVPB      atorvastatin 20 milliGRAM(s) Oral at bedtime  cefTRIAXone   IVPB 2000 milliGRAM(s) IV Intermittent every 12 hours  chlorhexidine 0.12% Liquid 15 milliLiter(s) Oral Mucosa every 12 hours  chlorhexidine 4% Liquid 1 Application(s) Topical <User Schedule>  dextrose 50% Injectable 50 milliLiter(s) IV Push every 15 minutes  dextrose 50% Injectable 25 milliLiter(s) IV Push every 15 minutes  folic acid 1 milliGRAM(s) Oral daily  heparin   Injectable 5000 Unit(s) SubCutaneous every 12 hours  hydrocortisone sodium succinate Injectable 100 milliGRAM(s) IV Push every 8 hours  insulin lispro (ADMELOG) corrective regimen sliding scale   SubCutaneous every 6 hours  insulin NPH human recombinant 6 Unit(s) SubCutaneous every 6 hours  multivitamin 1 Tablet(s) Oral daily  norepinephrine Infusion 0.05 MICROgram(s)/kG/Min (7.14 mL/Hr) IV Continuous <Continuous>  pantoprazole  Injectable 40 milliGRAM(s) IV Push daily  thiamine 100 milliGRAM(s) Oral daily  vancomycin  IVPB 1000 milliGRAM(s) IV Intermittent every 12 hours    MEDICATIONS  (PRN):  aluminum hydroxide/magnesium hydroxide/simethicone Suspension 30 milliLiter(s) Oral every 4 hours PRN Dyspepsia  ondansetron Injectable 4 milliGRAM(s) IV Push every 8 hours PRN Nausea and/or Vomiting    Allergies  No Known Allergies	    ROS: Unable to obtain due to mental status/intubated.    ICU Vital Signs Last 24 Hrs  T(C): 36.5 (2022 08:00), Max: 36.8 (2022 00:00)  T(F): 97.7 (2022 08:00), Max: 98.2 (2022 00:00)  HR: 103 (2022 09:14) (75 - 121)  BP: 97/54 (2022 09:00) (79/52 - 132/60)  BP(mean): 72 (2022 09:00) (62 - 87)  RR: 12 (2022 09:00) (12 - 29)  SpO2: 97% (2022 09:14) (94% - 100%)    GENERAL EXAM:  Constitutional: Lying in bed, NAD.  HEENT: Normocephalic. No scleral icterus.  Extremities: No edema.    NEUROLOGICAL EXAM: (Off sedation)  MS: Eyes closed. Open to verbal stimuli. Requires repeated stimuli to keep eyes open throughout exam. No verbal output (intubated). Does not follow commands.  CN: PERRL. Patient fights eye opening, cannot asses EOM. Face grossly symmetric.  Motor: Withdraws to noxious stimuli in upper extremities. TF to noxious stimuli in lower extremities.   Sensory: Grimaces to noxious stimuli x4.  Reflexes: 3+ throughout upper extremities. 2+ throughout lower extremities. Babinski absent bilaterally.   Coordination/Gait: Unable to assess.    Labs:   cbc                      8.8    17.34 )-----------( 106      ( 2022 00:27 )             26.7     [id number]    143  |  110<H>  |  11  ----------------------------<  119<H>  4.2   |  25  |  0.76    Ca    8.5      2022 09:33  Phos  3.1     04-25  Mg     2.2     04-25    TPro  5.3<L>  /  Alb  2.7<L>  /  TBili  0.1<L>  /  DBili  x   /  AST  11  /  ALT  15  /  AlkPhos  55  -25    Coags: PT/INR - ( 2022 11:33 )   PT: 12.9 sec;   INR: 1.12 ratio      PTT - ( 2022 00:26 )  PTT:26.0 sec    LIVER FUNCTIONS - ( 2022 09:33 )  Alb: 2.7 g/dL / Pro: 5.3 g/dL / ALK PHOS: 55 U/L / ALT: 15 U/L / AST: 11 U/L / GGT: x           UA: Urinalysis Basic - ( 2022 10:35 )    Color: Light Orange / Appearance: Clear / S.042 / pH: x  Gluc: x / Ketone: Trace  / Bili: Negative / Urobili: Negative   Blood: x / Protein: 30 mg/dL / Nitrite: Negative   Leuk Esterase: Negative / RBC: 885 /hpf / WBC 8 /HPF   Sq Epi: x / Non Sq Epi: 1 /hpf / Bacteria: Negative    Radiology/EEGs:  - CT HEAD: No acute intracranial hemorrhage or mass effect.  - CTA NECK: No hemodynamically significant stenosis by NASCET criteria, dissection, or pseudoaneurysm.  - CTA HEAD: No large vessel occlusion, significant stenosis, dissection, or saccular aneurysm.  - MRI BRAIN: Extensive, diffuse leptomeningeal enhancement. Differential diagnosis primarily includes leptomeningeal metastases and infectious meningitis. Abnormal ependymal enhancement involving the bilateral frontal horns, bilateral ventricular bodies, left temporal and occipital horn, and fourth ventricle. Differential diagnosis includes ependymal metastases and infectious meningitis. Possible small foci of restricted diffusion in the bilateral superior cerebral hemispheres. These may represent small acute infarcts or regions of encephalitis. Edema noted within the cerebellar vermis and mesial bilateral cerebellar hemispheres.  - MRI CERVICAL SPINE: Diffuse leptomeningeal enhancement. This may represent the presence of leptomeningeal metastases or leptomeningeal infection. Multilevel degenerative changes.    - EEG SUMMARY/CLASSIFICATION:  Abnormal EEG   - Moderate to severe generalized slowing.  _____________________________________________________________  EEG IMPRESSION/CLINICAL CORRELATE:  Abnormal EEG study.  Moderate to severe nonspecific diffuse or multifocal cerebral dysfunction.   No epileptiform pattern or seizure seen.    - EEG SUMMARY/CLASSIFICATION:  Abnormal EEG   - Suppressed background   - burst attenuation pattern  _____________________________________________________________  EEG IMPRESSION/CLINICAL CORRELATE:  Abnormal EEG study.  Severe nonspecific diffuse or multifocal cerebral dysfunction.   In the absence of sedation, voltage suppression maybe seen with acute to subacute cortical injury  No epileptiform patterns or seizures recorded x 1 day.     [id number]    Subjective: 60 yo male seen and examined at bedside. Off sedation. No events/seizures overnight. In atrial fibrillation during exam.    PAST MEDICAL & SURGICAL HISTORY:  H/O cerebellar ataxia    No significant past surgical history    FAMILY HISTORY:  FH: dementia (Mother)    FH: type 2 diabetes (Mother)    Family history of CVA (Father)    Social Hx:  Nonsmoker, no drug or alcohol use    Home Medications:  atorvastatin 20 mg oral tablet: 1 tab(s) orally once a day (2022 06:28)  mirtazapine 15 mg oral tablet: 1 tab(s) orally once a day (at bedtime), As Needed (2022 06:28)    MEDICATIONS  (STANDING):  acyclovir IVPB      acyclovir IVPB 600 milliGRAM(s) IV Intermittent every 8 hours  albuterol/ipratropium for Nebulization 3 milliLiter(s) Nebulizer every 6 hours  ampicillin  IVPB 2 Gram(s) IV Intermittent every 4 hours  ampicillin  IVPB      atorvastatin 20 milliGRAM(s) Oral at bedtime  cefTRIAXone   IVPB 2000 milliGRAM(s) IV Intermittent every 12 hours  chlorhexidine 0.12% Liquid 15 milliLiter(s) Oral Mucosa every 12 hours  chlorhexidine 4% Liquid 1 Application(s) Topical <User Schedule>  dextrose 50% Injectable 50 milliLiter(s) IV Push every 15 minutes  dextrose 50% Injectable 25 milliLiter(s) IV Push every 15 minutes  folic acid 1 milliGRAM(s) Oral daily  heparin   Injectable 5000 Unit(s) SubCutaneous every 12 hours  hydrocortisone sodium succinate Injectable 100 milliGRAM(s) IV Push every 8 hours  insulin lispro (ADMELOG) corrective regimen sliding scale   SubCutaneous every 6 hours  insulin NPH human recombinant 6 Unit(s) SubCutaneous every 6 hours  multivitamin 1 Tablet(s) Oral daily  norepinephrine Infusion 0.05 MICROgram(s)/kG/Min (7.14 mL/Hr) IV Continuous <Continuous>  pantoprazole  Injectable 40 milliGRAM(s) IV Push daily  thiamine 100 milliGRAM(s) Oral daily  vancomycin  IVPB 1000 milliGRAM(s) IV Intermittent every 12 hours    MEDICATIONS  (PRN):  aluminum hydroxide/magnesium hydroxide/simethicone Suspension 30 milliLiter(s) Oral every 4 hours PRN Dyspepsia  ondansetron Injectable 4 milliGRAM(s) IV Push every 8 hours PRN Nausea and/or Vomiting    Allergies  No Known Allergies	    ROS: Unable to obtain due to mental status/intubated.    ICU Vital Signs Last 24 Hrs  T(C): 36.5 (2022 08:00), Max: 36.8 (2022 00:00)  T(F): 97.7 (2022 08:00), Max: 98.2 (2022 00:00)  HR: 103 (2022 09:14) (75 - 121)  BP: 97/54 (2022 09:00) (79/52 - 132/60)  BP(mean): 72 (2022 09:00) (62 - 87)  RR: 12 (2022 09:00) (12 - 29)  SpO2: 97% (2022 09:14) (94% - 100%)    GENERAL EXAM:  Constitutional: Lying in bed, NAD.  HEENT: Normocephalic. No scleral icterus.  Extremities: No edema.    NEUROLOGICAL EXAM: (Off sedation)  MS: Eyes closed. Open to verbal stimuli. Requires repeated stimuli to keep eyes open throughout exam. No verbal output (intubated). Does not follow commands.  CN: PERRL. Patient fights eye opening, cannot asses EOM. Face grossly symmetric.  Motor: Withdraws to noxious stimuli in upper extremities. TF to noxious stimuli in lower extremities.   Sensory: Grimaces to noxious stimuli x4.  Reflexes: 3+ throughout upper extremities. 2+ throughout lower extremities. Babinski absent bilaterally.   Coordination/Gait: Unable to assess.    Labs:   cbc                      8.8    17.34 )-----------( 106      ( 2022 00:27 )             26.7     [id number]    143  |  110<H>  |  11  ----------------------------<  119<H>  4.2   |  25  |  0.76    Ca    8.5      2022 09:33  Phos  3.1     04-25  Mg     2.2     -25    TPro  5.3<L>  /  Alb  2.7<L>  /  TBili  0.1<L>  /  DBili  x   /  AST  11  /  ALT  15  /  AlkPhos  55  25    Coags: PT/INR - ( 2022 11:33 )   PT: 12.9 sec;   INR: 1.12 ratio      PTT - ( 2022 00:26 )  PTT:26.0 sec    LIVER FUNCTIONS - ( 2022 09:33 )  Alb: 2.7 g/dL / Pro: 5.3 g/dL / ALK PHOS: 55 U/L / ALT: 15 U/L / AST: 11 U/L / GGT: x           UA: Urinalysis Basic - ( 2022 10:35 )    Color: Light Orange / Appearance: Clear / S.042 / pH: x  Gluc: x / Ketone: Trace  / Bili: Negative / Urobili: Negative   Blood: x / Protein: 30 mg/dL / Nitrite: Negative   Leuk Esterase: Negative / RBC: 885 /hpf / WBC 8 /HPF   Sq Epi: x / Non Sq Epi: 1 /hpf / Bacteria: Negative    Radiology/EEGs:  - CT HEAD: No acute intracranial hemorrhage or mass effect.  - CTA NECK: No hemodynamically significant stenosis by NASCET criteria, dissection, or pseudoaneurysm.  - CTA HEAD: No large vessel occlusion, significant stenosis, dissection, or saccular aneurysm.  - MRI BRAIN: Extensive, diffuse leptomeningeal enhancement. Differential diagnosis primarily includes leptomeningeal metastases and infectious meningitis. Abnormal ependymal enhancement involving the bilateral frontal horns, bilateral ventricular bodies, left temporal and occipital horn, and fourth ventricle. Differential diagnosis includes ependymal metastases and infectious meningitis. Possible small foci of restricted diffusion in the bilateral superior cerebral hemispheres. These may represent small acute infarcts or regions of encephalitis. Edema noted within the cerebellar vermis and mesial bilateral cerebellar hemispheres.  - MRI CERVICAL SPINE: Diffuse leptomeningeal enhancement. This may represent the presence of leptomeningeal metastases or leptomeningeal infection. Multilevel degenerative changes.    - EEG SUMMARY/CLASSIFICATION:  Abnormal EEG   - Moderate to severe generalized slowing.  _____________________________________________________________  EEG IMPRESSION/CLINICAL CORRELATE:  Abnormal EEG study.  Moderate to severe nonspecific diffuse or multifocal cerebral dysfunction.   No epileptiform pattern or seizure seen.    - EEG SUMMARY/CLASSIFICATION:  Abnormal EEG   - Suppressed background   - burst attenuation pattern  _____________________________________________________________  EEG IMPRESSION/CLINICAL CORRELATE:  Abnormal EEG study.  Severe nonspecific diffuse or multifocal cerebral dysfunction.   In the absence of sedation, voltage suppression maybe seen with acute to subacute cortical injury  No epileptiform patterns or seizures recorded x 1 day.    - EEG SUMMARY/CLASSIFICATION:  Abnormal EEG   - Suppressed background initially, then more severe slowing with superimposed blunt GPDs with triphasic morphology near 1.5hz, most prominent after transition from burst suppression, more apparent with arousal, less conspicuous with clinical drowsiness. GPDs less conspicuous in latter portions.   - Some right hand movements noted, no definite correlation with abnormal findings on EEG  _____________________________________________________________  EEG IMPRESSION/CLINICAL CORRELATE:  Abnormal EEG study.  Severe nonspecific diffuse or multifocal cerebral dysfunction, improved vs prior day.   GPDs with triphasic morphology may be associated with an increased risk for seizure, but are typically seen in the context of a relatively severe metabolic encephalopathic process.  GPDs less conspicuous in latter portions.   No definite electrographic seizures.  ECG irregular. Thalidomide Pregnancy And Lactation Text: This medication is Pregnancy Category X and is absolutely contraindicated during pregnancy. It is unknown if it is excreted in breast milk.

## 2023-01-21 NOTE — ED PROVIDER NOTE - PHYSICAL EXAMINATION
GEN: Well Appearing, Nontoxic, NAD  HEENT: NC/AT, Symm Facies. EOMI  CV: +S1S2, RRR w/o m/g/r  RESP: CTAB w/o w/r/r  ABD: Soft, nt/nd.  EXT/MSK: No lower extremity edema or calf tenderness. FROMx4  SKIN: No erythema, lesions or rash  Neuro: Grossly intact, AOX3 with normal speech, Sensation intact, motor equal. abnormal lab result

## 2023-01-23 NOTE — PROGRESS NOTE ADULT - SUBJECTIVE AND OBJECTIVE BOX
40 DATE OF SERVICE: 22 @ 06:25    Patient is a 61y old  Male who presents with a chief complaint of AMS (2022 20:05)      SUBJECTIVE / OVERNIGHT EVENTS: NAEO. Patient afebrile o/n, had episode of hypothermia to 94.1 at 5pm yesterday afternoon. Patient feels     MEDICATIONS  (STANDING):  dextrose 5% + sodium chloride 0.45%. 1000 milliLiter(s) (80 mL/Hr) IV Continuous <Continuous>  dextrose 5%. 1000 milliLiter(s) (100 mL/Hr) IV Continuous <Continuous>  dextrose 5%. 1000 milliLiter(s) (50 mL/Hr) IV Continuous <Continuous>  dextrose 50% Injectable 25 Gram(s) IV Push once  dextrose 50% Injectable 12.5 Gram(s) IV Push once  dextrose 50% Injectable 25 Gram(s) IV Push once  enoxaparin Injectable 40 milliGRAM(s) SubCutaneous every 24 hours  glucagon  Injectable 1 milliGRAM(s) IntraMuscular once  influenza   Vaccine 0.5 milliLiter(s) IntraMuscular once    MEDICATIONS  (PRN):  acetaminophen     Tablet .. 650 milliGRAM(s) Oral every 6 hours PRN Temp greater or equal to 38C (100.4F), Mild Pain (1 - 3)  dextrose Oral Gel 15 Gram(s) Oral once PRN Blood Glucose LESS THAN 70 milliGRAM(s)/deciliter      Vital Signs Last 24 Hrs  T(C): 36.2 (2022 04:45), Max: 37 (2022 21:43)  T(F): 97.1 (2022 04:45), Max: 98.6 (2022 21:43)  HR: 62 (2022 04:45) (60 - 113)  BP: 105/68 (2022 04:45) (100/61 - 112/69)  BP(mean): --  RR: 18 (2022 04:45) (18 - 18)  SpO2: 96% (2022 04:45) (94% - 96%)  CAPILLARY BLOOD GLUCOSE      POCT Blood Glucose.: 77 mg/dL (2022 23:56)  POCT Blood Glucose.: 126 mg/dL (2022 17:12)  POCT Blood Glucose.: 111 mg/dL (2022 12:51)  POCT Blood Glucose.: 83 mg/dL (2022 07:53)    I&O's Summary      PHYSICAL EXAM:  GENERAL: NAD  HEAD:  Atraumatic, Normocephalic  EYES: EOMI, PERRLA, conjunctiva and sclera clear  NECK: Supple, No JVD  CHEST/LUNG: Wet cough, Clear to auscultation bilaterally; No wheeze  HEART: Regular rate and rhythm; No murmurs, rubs, or gallops  ABDOMEN: Soft, Nontender, Nondistended; Bowel sounds present  EXTREMITIES:  2+ Peripheral Pulses, No clubbing, cyanosis, or edema  PSYCH: AAOx  NEUROLOGY: non-focal  SKIN: No rashes or lesions    LABS:                        13.9   9.12  )-----------( 153      ( 2022 07:00 )             43.0     06-16    139  |  100  |  17  ----------------------------<  85  4.0   |  27  |  0.73    Ca    10.4      2022 07:00  Phos  3.3     06-15  Mg     1.6     06-16    TPro  8.0  /  Alb  4.1  /  TBili  0.6  /  DBili  x   /  AST  20  /  ALT  24  /  AlkPhos  68  06-16          Urinalysis Basic - ( 15 Benigno 2022 20:17 )    Color: Light Yellow / Appearance: Turbid / S.015 / pH: x  Gluc: x / Ketone: Negative  / Bili: Negative / Urobili: Negative   Blood: x / Protein: Negative / Nitrite: Negative   Leuk Esterase: Negative / RBC: 10 /hpf / WBC 1 /HPF   Sq Epi: x / Non Sq Epi: 0 /hpf / Bacteria: Negative        RADIOLOGY & ADDITIONAL TESTS:    Imaging Personally Reviewed:    Consultant(s) Notes Reviewed:      Care Discussed with Consultants/Other Providers:   DATE OF SERVICE: 22 @ 06:25    Patient is a 61y old  Male who presents with a chief complaint of AMS (2022 20:05)      SUBJECTIVE / OVERNIGHT EVENTS: NAEO. Patient afebrile o/n, had episode of hypothermia to 94.1 at 5pm yesterday afternoon. Patient AOx1 this AM to self. Patient denies CP, SOB, fevers/chills    MEDICATIONS  (STANDING):  dextrose 5% + sodium chloride 0.45%. 1000 milliLiter(s) (80 mL/Hr) IV Continuous <Continuous>  dextrose 5%. 1000 milliLiter(s) (100 mL/Hr) IV Continuous <Continuous>  dextrose 5%. 1000 milliLiter(s) (50 mL/Hr) IV Continuous <Continuous>  dextrose 50% Injectable 25 Gram(s) IV Push once  dextrose 50% Injectable 12.5 Gram(s) IV Push once  dextrose 50% Injectable 25 Gram(s) IV Push once  enoxaparin Injectable 40 milliGRAM(s) SubCutaneous every 24 hours  glucagon  Injectable 1 milliGRAM(s) IntraMuscular once  influenza   Vaccine 0.5 milliLiter(s) IntraMuscular once    MEDICATIONS  (PRN):  acetaminophen     Tablet .. 650 milliGRAM(s) Oral every 6 hours PRN Temp greater or equal to 38C (100.4F), Mild Pain (1 - 3)  dextrose Oral Gel 15 Gram(s) Oral once PRN Blood Glucose LESS THAN 70 milliGRAM(s)/deciliter      Vital Signs Last 24 Hrs  T(C): 36.2 (2022 04:45), Max: 37 (2022 21:43)  T(F): 97.1 (2022 04:45), Max: 98.6 (2022 21:43)  HR: 62 (2022 04:45) (60 - 113)  BP: 105/68 (2022 04:45) (100/61 - 112/69)  BP(mean): --  RR: 18 (2022 04:45) (18 - 18)  SpO2: 96% (2022 04:45) (94% - 96%)  CAPILLARY BLOOD GLUCOSE      POCT Blood Glucose.: 77 mg/dL (2022 23:56)  POCT Blood Glucose.: 126 mg/dL (2022 17:12)  POCT Blood Glucose.: 111 mg/dL (2022 12:51)  POCT Blood Glucose.: 83 mg/dL (2022 07:53)    I&O's Summary      PHYSICAL EXAM:  GENERAL: NAD  HEAD:  Atraumatic, Normocephalic  EYES: EOMI, PERRLA, conjunctiva and sclera clear  NECK: Supple, No JVD  CHEST/LUNG: Clear to auscultation bilaterally; No wheeze  HEART: Regular rate and rhythm; No murmurs, rubs, or gallops  ABDOMEN: Soft, Nontender, Nondistended; Bowel sounds present  EXTREMITIES:  2+ Peripheral Pulses, No clubbing, cyanosis, or edema  PSYCH: AAOx1  NEUROLOGY: non-focal, reflexes intact b/l, negative Valverde's sign, down going toes w/ Babinski  SKIN: No rashes or lesions    LABS:                        13.9   9.12  )-----------( 153      ( 2022 07:00 )             43.0     06-16    139  |  100  |  17  ----------------------------<  85  4.0   |  27  |  0.73    Ca    10.4      2022 07:00  Phos  3.3     06-15  Mg     1.6     06-16    TPro  8.0  /  Alb  4.1  /  TBili  0.6  /  DBili  x   /  AST  20  /  ALT  24  /  AlkPhos  68  06-16          Urinalysis Basic - ( 15 Benigno 2022 20:17 )    Color: Light Yellow / Appearance: Turbid / S.015 / pH: x  Gluc: x / Ketone: Negative  / Bili: Negative / Urobili: Negative   Blood: x / Protein: Negative / Nitrite: Negative   Leuk Esterase: Negative / RBC: 10 /hpf / WBC 1 /HPF   Sq Epi: x / Non Sq Epi: 0 /hpf / Bacteria: Negative        RADIOLOGY & ADDITIONAL TESTS:    Imaging Personally Reviewed:    Consultant(s) Notes Reviewed:      Care Discussed with Consultants/Other Providers:

## 2023-02-15 ENCOUNTER — TRANSCRIPTION ENCOUNTER (OUTPATIENT)
Age: 63
End: 2023-02-15

## 2023-02-15 ENCOUNTER — INPATIENT (INPATIENT)
Facility: HOSPITAL | Age: 63
LOS: 5 days | Discharge: HOME HEALTH | DRG: 854 | End: 2023-02-21
Attending: SPECIALIST | Admitting: SPECIALIST
Payer: MEDICARE

## 2023-02-15 VITALS
SYSTOLIC BLOOD PRESSURE: 113 MMHG | HEART RATE: 104 BPM | DIASTOLIC BLOOD PRESSURE: 84 MMHG | HEIGHT: 67 IN | OXYGEN SATURATION: 95 % | RESPIRATION RATE: 19 BRPM | TEMPERATURE: 96 F | WEIGHT: 160.06 LBS

## 2023-02-15 LAB
ALBUMIN SERPL ELPH-MCNC: 4.2 G/DL — SIGNIFICANT CHANGE UP (ref 3.3–5)
ALP SERPL-CCNC: 88 U/L — SIGNIFICANT CHANGE UP (ref 40–120)
ALT FLD-CCNC: 11 U/L — SIGNIFICANT CHANGE UP (ref 10–45)
ANION GAP SERPL CALC-SCNC: 13 MMOL/L — SIGNIFICANT CHANGE UP (ref 5–17)
AST SERPL-CCNC: 11 U/L — SIGNIFICANT CHANGE UP (ref 10–40)
BASOPHILS # BLD AUTO: 0.02 K/UL — SIGNIFICANT CHANGE UP (ref 0–0.2)
BASOPHILS NFR BLD AUTO: 0.2 % — SIGNIFICANT CHANGE UP (ref 0–2)
BILIRUB SERPL-MCNC: 0.4 MG/DL — SIGNIFICANT CHANGE UP (ref 0.2–1.2)
BUN SERPL-MCNC: 16 MG/DL — SIGNIFICANT CHANGE UP (ref 7–23)
CALCIUM SERPL-MCNC: 10.7 MG/DL — HIGH (ref 8.4–10.5)
CHLORIDE SERPL-SCNC: 96 MMOL/L — SIGNIFICANT CHANGE UP (ref 96–108)
CO2 SERPL-SCNC: 28 MMOL/L — SIGNIFICANT CHANGE UP (ref 22–31)
CREAT SERPL-MCNC: 0.74 MG/DL — SIGNIFICANT CHANGE UP (ref 0.5–1.3)
EGFR: 103 ML/MIN/1.73M2 — SIGNIFICANT CHANGE UP
EOSINOPHIL # BLD AUTO: 0.14 K/UL — SIGNIFICANT CHANGE UP (ref 0–0.5)
EOSINOPHIL NFR BLD AUTO: 1.3 % — SIGNIFICANT CHANGE UP (ref 0–6)
GAS PNL BLDV: SIGNIFICANT CHANGE UP
GLUCOSE SERPL-MCNC: 132 MG/DL — HIGH (ref 70–99)
HCT VFR BLD CALC: 44.6 % — SIGNIFICANT CHANGE UP (ref 39–50)
HGB BLD-MCNC: 14.3 G/DL — SIGNIFICANT CHANGE UP (ref 13–17)
IMM GRANULOCYTES NFR BLD AUTO: 0.4 % — SIGNIFICANT CHANGE UP (ref 0–0.9)
LIDOCAIN IGE QN: 20 U/L — SIGNIFICANT CHANGE UP (ref 7–60)
LYMPHOCYTES # BLD AUTO: 1.4 K/UL — SIGNIFICANT CHANGE UP (ref 1–3.3)
LYMPHOCYTES # BLD AUTO: 13 % — SIGNIFICANT CHANGE UP (ref 13–44)
MCHC RBC-ENTMCNC: 28 PG — SIGNIFICANT CHANGE UP (ref 27–34)
MCHC RBC-ENTMCNC: 32.1 GM/DL — SIGNIFICANT CHANGE UP (ref 32–36)
MCV RBC AUTO: 87.5 FL — SIGNIFICANT CHANGE UP (ref 80–100)
MONOCYTES # BLD AUTO: 0.59 K/UL — SIGNIFICANT CHANGE UP (ref 0–0.9)
MONOCYTES NFR BLD AUTO: 5.5 % — SIGNIFICANT CHANGE UP (ref 2–14)
NEUTROPHILS # BLD AUTO: 8.62 K/UL — HIGH (ref 1.8–7.4)
NEUTROPHILS NFR BLD AUTO: 79.6 % — HIGH (ref 43–77)
NRBC # BLD: 0 /100 WBCS — SIGNIFICANT CHANGE UP (ref 0–0)
PLATELET # BLD AUTO: 259 K/UL — SIGNIFICANT CHANGE UP (ref 150–400)
POTASSIUM SERPL-MCNC: 3.7 MMOL/L — SIGNIFICANT CHANGE UP (ref 3.5–5.3)
POTASSIUM SERPL-SCNC: 3.7 MMOL/L — SIGNIFICANT CHANGE UP (ref 3.5–5.3)
PROT SERPL-MCNC: 8.3 G/DL — SIGNIFICANT CHANGE UP (ref 6–8.3)
RBC # BLD: 5.1 M/UL — SIGNIFICANT CHANGE UP (ref 4.2–5.8)
RBC # FLD: 14.2 % — SIGNIFICANT CHANGE UP (ref 10.3–14.5)
SODIUM SERPL-SCNC: 137 MMOL/L — SIGNIFICANT CHANGE UP (ref 135–145)
WBC # BLD: 10.81 K/UL — HIGH (ref 3.8–10.5)
WBC # FLD AUTO: 10.81 K/UL — HIGH (ref 3.8–10.5)

## 2023-02-15 PROCEDURE — 99291 CRITICAL CARE FIRST HOUR: CPT

## 2023-02-15 NOTE — ED PROVIDER NOTE - NSICDXPASTMEDICALHX_GEN_ALL_CORE_FT
PAST MEDICAL HISTORY:  Adrenal insufficiency     Anemia     H/O cerebellar ataxia -diagnosed in 2019    History of hypotension     Lacunar infarction -chronic    Pneumonia, aspiration -april 2022 (intubated for 7 days at [facility])

## 2023-02-15 NOTE — ED ADULT TRIAGE NOTE - MEANS OF ARRIVAL
Marquita Perea  [facility] C[facility]  [address] [place] [address]  Phone: [phone]  Fax: [phone]  Follow Up Time:    wheelchair

## 2023-02-15 NOTE — ED PROVIDER NOTE - OBJECTIVE STATEMENT
62-year-old male, with a history of lacunar infarct, cerebellar ataxia, kidney stones complicated by urinary tract infection, presenting with a chief complaint of altered mental status.  He has been confused for the past 2 weeks or so, gradually worsening.  To the point that he is lethargic per his wife.  He has been on ciprofloxacin for recent diagnosis of urinary tract infection, has not been improving despite antibiotic therapy.  Per chart review. Patient has a history of orthostatic hypotension, on midodrine, no other regular medications were noted in the chart.  No allergies to medication per chart review.

## 2023-02-15 NOTE — ED PROVIDER NOTE - RAPID ASSESSMENT
62y M w/ pmhx of UTI, cerebral ataxia, lacunar infraction, kidney stone presents to the ED c/o cloudy urine, back pain. Denies hematuria, fever, abd pain. As per wife pt has not been sleeping and not acting like himself. Was on a 6 day course of cipro for UTI that finished last week w/o relief. Pt is well appearing in triage.     Flora NAVARRO (Scribe) have documented this rapid assessment note under the dictation of Gisell Chase) which has been reviewed and affirmed to be accurate. Patient was seen as a QDOC patient. The patient will be seen and further worked up in the main emergency department and their care will be completed by the main emergency department team along with a thorough physical exam. Receiving team will follow up on labs, analgesia, any clinical imaging, reassess and disposition as clinically indicated, all decisions regarding the progression of care will be made at their discretion. 62y M w/ pmhx of UTI, cerebral ataxia, lacunar infraction, kidney stone presents to the ED c/o cloudy urine, back pain. Denies hematuria, fever, abd pain. As per wife pt has not been sleeping and not acting like himself. Was on a 6 day course of cipro for UTI that finished last week w/o relief. Pt is sitting in chair wife provides history. he is awake     IFlora (Ilene) have documented this rapid assessment note under the dictation of Gisell Chase) which has been reviewed and affirmed to be accurate. Patient was seen as a QDOC patient. The patient will be seen and further worked up in the main emergency department and their care will be completed by the main emergency department team along with a thorough physical exam. Receiving team will follow up on labs, analgesia, any clinical imaging, reassess and disposition as clinically indicated, all decisions regarding the progression of care will be made at their discretion.    Attending Note --     I saw the patient waiting area. A brief history was taken and a thorough physical exam was not performed as there is no physical exam room available.  The patient will be seen and further worked up in the main emergency department and their care will be completed by the main emergency department team.  I was not involved in this patient's care during the QDOC process, and unless otherwise noted in the ED provider note, was not involved in their care during their ED course.    The scribe's documentation has been prepared under my direction and personally reviewed by me in its entirety. I confirm that the note above accurately reflects all work, treatment, procedures, and medical decision making performed by me  Dr. Chase

## 2023-02-15 NOTE — ED PROVIDER NOTE - PHYSICAL EXAMINATION
General: NAD. Patient converses w/ the examiner normally.   Head: NCAT. No wounds, hematomas or active bleeding over the scalp.  Eyes: PERRL.   HENT: Negative for Raccoon eyes or Johnson's signs. Ears are visually unremarkable. No nasal septal deviation or nasal septal hematoma. Oral cavity and OP are clear.  Chest: Chest wall is visually unremarkable. No clavicular or chest wall tenderness. Heart sounds = normal rate and rhythm w/out M/R/G.   Abdomen: Visually unremarkable. No tenderness or guarding with palpation.  : Visually unremarkable genitalia. Yogesh Martinez RN.   MSK: The pelvis is stable w/ AP stressing. No gross deformity of the extremities. No wounds to the extremities. Full ROM x4 extremities. No point tenderness x4 extremities.    Neurologic: Alert does not answer questions appropriately. Minimal verbage. Spontaneous movement x4 extremities.  Spine: No midline tenderness.

## 2023-02-15 NOTE — ED PROVIDER NOTE - CLINICAL SUMMARY MEDICAL DECISION MAKING FREE TEXT BOX
62-year-old male with a history of cerebellar ataxia, lacunar infarcts, ureteral stones complicated by urinary tract infection, presenting with altered mental status in the setting of recently diagnosed UTI, currently on Cipro.  Found to be hypothermic here, sepsis protocol   Applied.  Will trend lactate, fluids at 1.5 L, antibiotics empiric for urinary tract infection in a patient with resistant organism in the past, responsive to cefepime.  Bear hugger applied for hypothermia correction.  We will trend temperature.  Obtain blood cultures and urine cultures, chest x-ray to identify focality of infection.  Glucose unremarkable.  Obtain EKG in a patient with altered mental status.  Poor historian, will obtain head CT.  rule out major metabolic derangements, felt to be metabolic encephalopathy.  Assess for acid-base disturbance.  Likely admission for  sepsis.

## 2023-02-15 NOTE — ED PROVIDER NOTE - PROGRESS NOTE DETAILS
Attending Masom:  uro consulted Rahul Hernández MD (PGY-2): Admission to urology.  Patient clinically stable.  Resting in the bed comfortably.

## 2023-02-15 NOTE — ED PROVIDER NOTE - ATTENDING CONTRIBUTION TO CARE
MD Hernandez:  patient seen and evaluated personally.   I agree with the History & Physical,  Impression & Plan other than what was detailed in my note.  MD Hernandez  61 y/o m hx of cerebellar ataxia, presenting w/ concern for uti as wife state he has had dark/cloudy urine and was told to come in by provider, pt unable to provide hx because he is sleepy, wife states she just gave mirtazapine and he hasn't been sleeping, she states that he has been more confused over the past two week w/ hallucinations, no head injruy reported, he has become more sleepy this am but has not been normal for at least two weeks, pos hypothermia, tachy, pt is sedate, needs sternal rub to awake, lungs cta b/l, heart sounds normal, abd soft w/ pos ttp in sp area, no cva ttp, moves all extrem, pt not participating with exam otherwise, likely uti, causing uro sepsis no s/o shock curerntly, will start abx, agree w/ qdoc, ct head added to r/o ich given ams however likely metabolic vs encephalopathy, vs multifactorial, possible from mirtazapine, hold on further sedatives at this time.

## 2023-02-16 DIAGNOSIS — N20.0 CALCULUS OF KIDNEY: ICD-10-CM

## 2023-02-16 PROBLEM — I63.81 OTHER CEREBRAL INFARCTION DUE TO OCCLUSION OR STENOSIS OF SMALL ARTERY: Chronic | Status: ACTIVE | Noted: 2022-08-26

## 2023-02-16 PROBLEM — E27.40 UNSPECIFIED ADRENOCORTICAL INSUFFICIENCY: Chronic | Status: ACTIVE | Noted: 2022-08-26

## 2023-02-16 PROBLEM — Z86.69 PERSONAL HISTORY OF OTHER DISEASES OF THE NERVOUS SYSTEM AND SENSE ORGANS: Chronic | Status: ACTIVE | Noted: 2022-04-17

## 2023-02-16 PROBLEM — J69.0 PNEUMONITIS DUE TO INHALATION OF FOOD AND VOMIT: Chronic | Status: ACTIVE | Noted: 2022-08-26

## 2023-02-16 LAB
ANION GAP SERPL CALC-SCNC: 9 MMOL/L — SIGNIFICANT CHANGE UP (ref 5–17)
ANION GAP SERPL CALC-SCNC: 9 MMOL/L — SIGNIFICANT CHANGE UP (ref 5–17)
APPEARANCE UR: ABNORMAL
APTT BLD: 32.1 SEC — SIGNIFICANT CHANGE UP (ref 27.5–35.5)
BASE EXCESS BLDV CALC-SCNC: 3.8 MMOL/L — HIGH (ref -2–3)
BILIRUB UR-MCNC: NEGATIVE — SIGNIFICANT CHANGE UP
BLD GP AB SCN SERPL QL: NEGATIVE — SIGNIFICANT CHANGE UP
BUN SERPL-MCNC: 12 MG/DL — SIGNIFICANT CHANGE UP (ref 7–23)
BUN SERPL-MCNC: 12 MG/DL — SIGNIFICANT CHANGE UP (ref 7–23)
CA-I SERPL-SCNC: 1.32 MMOL/L — SIGNIFICANT CHANGE UP (ref 1.15–1.33)
CALCIUM SERPL-MCNC: 9.8 MG/DL — SIGNIFICANT CHANGE UP (ref 8.4–10.5)
CALCIUM SERPL-MCNC: 9.9 MG/DL — SIGNIFICANT CHANGE UP (ref 8.4–10.5)
CHLORIDE BLDV-SCNC: 101 MMOL/L — SIGNIFICANT CHANGE UP (ref 96–108)
CHLORIDE SERPL-SCNC: 104 MMOL/L — SIGNIFICANT CHANGE UP (ref 96–108)
CHLORIDE SERPL-SCNC: 104 MMOL/L — SIGNIFICANT CHANGE UP (ref 96–108)
CO2 BLDV-SCNC: 32 MMOL/L — HIGH (ref 22–26)
CO2 SERPL-SCNC: 25 MMOL/L — SIGNIFICANT CHANGE UP (ref 22–31)
CO2 SERPL-SCNC: 26 MMOL/L — SIGNIFICANT CHANGE UP (ref 22–31)
COLOR SPEC: YELLOW — SIGNIFICANT CHANGE UP
CREAT SERPL-MCNC: 0.49 MG/DL — LOW (ref 0.5–1.3)
CREAT SERPL-MCNC: 0.51 MG/DL — SIGNIFICANT CHANGE UP (ref 0.5–1.3)
DIFF PNL FLD: ABNORMAL
EGFR: 115 ML/MIN/1.73M2 — SIGNIFICANT CHANGE UP
EGFR: 117 ML/MIN/1.73M2 — SIGNIFICANT CHANGE UP
FLUAV AG NPH QL: SIGNIFICANT CHANGE UP
FLUBV AG NPH QL: SIGNIFICANT CHANGE UP
GAS PNL BLDA: SIGNIFICANT CHANGE UP
GAS PNL BLDA: SIGNIFICANT CHANGE UP
GAS PNL BLDV: 135 MMOL/L — LOW (ref 136–145)
GAS PNL BLDV: SIGNIFICANT CHANGE UP
GAS PNL BLDV: SIGNIFICANT CHANGE UP
GLUCOSE BLDV-MCNC: 89 MG/DL — SIGNIFICANT CHANGE UP (ref 70–99)
GLUCOSE SERPL-MCNC: 113 MG/DL — HIGH (ref 70–99)
GLUCOSE SERPL-MCNC: 119 MG/DL — HIGH (ref 70–99)
GLUCOSE UR QL: NEGATIVE — SIGNIFICANT CHANGE UP
HCO3 BLDV-SCNC: 30 MMOL/L — HIGH (ref 22–29)
HCT VFR BLD CALC: 36.1 % — LOW (ref 39–50)
HCT VFR BLD CALC: 36.2 % — LOW (ref 39–50)
HCT VFR BLDA CALC: 36 % — LOW (ref 39–51)
HGB BLD CALC-MCNC: 11.9 G/DL — LOW (ref 12.6–17.4)
HGB BLD-MCNC: 11.9 G/DL — LOW (ref 13–17)
HGB BLD-MCNC: 12.1 G/DL — LOW (ref 13–17)
INR BLD: 1.13 RATIO — SIGNIFICANT CHANGE UP (ref 0.88–1.16)
KETONES UR-MCNC: NEGATIVE — SIGNIFICANT CHANGE UP
LACTATE BLDV-MCNC: 1.1 MMOL/L — SIGNIFICANT CHANGE UP (ref 0.5–2)
LEUKOCYTE ESTERASE UR-ACNC: ABNORMAL
MAGNESIUM SERPL-MCNC: 1.7 MG/DL — SIGNIFICANT CHANGE UP (ref 1.6–2.6)
MAGNESIUM SERPL-MCNC: 1.7 MG/DL — SIGNIFICANT CHANGE UP (ref 1.6–2.6)
MCHC RBC-ENTMCNC: 28.3 PG — SIGNIFICANT CHANGE UP (ref 27–34)
MCHC RBC-ENTMCNC: 28.6 PG — SIGNIFICANT CHANGE UP (ref 27–34)
MCHC RBC-ENTMCNC: 33 GM/DL — SIGNIFICANT CHANGE UP (ref 32–36)
MCHC RBC-ENTMCNC: 33.4 GM/DL — SIGNIFICANT CHANGE UP (ref 32–36)
MCV RBC AUTO: 85.6 FL — SIGNIFICANT CHANGE UP (ref 80–100)
MCV RBC AUTO: 85.7 FL — SIGNIFICANT CHANGE UP (ref 80–100)
NITRITE UR-MCNC: POSITIVE
NRBC # BLD: 0 /100 WBCS — SIGNIFICANT CHANGE UP (ref 0–0)
NRBC # BLD: 0 /100 WBCS — SIGNIFICANT CHANGE UP (ref 0–0)
PCO2 BLDV: 52 MMHG — SIGNIFICANT CHANGE UP (ref 42–55)
PH BLDV: 7.37 — SIGNIFICANT CHANGE UP (ref 7.32–7.43)
PH UR: 6.5 — SIGNIFICANT CHANGE UP (ref 5–8)
PHOSPHATE SERPL-MCNC: 3.2 MG/DL — SIGNIFICANT CHANGE UP (ref 2.5–4.5)
PHOSPHATE SERPL-MCNC: 3.3 MG/DL — SIGNIFICANT CHANGE UP (ref 2.5–4.5)
PLATELET # BLD AUTO: 187 K/UL — SIGNIFICANT CHANGE UP (ref 150–400)
PLATELET # BLD AUTO: 195 K/UL — SIGNIFICANT CHANGE UP (ref 150–400)
PO2 BLDV: 65 MMHG — HIGH (ref 25–45)
POTASSIUM BLDV-SCNC: 3.8 MMOL/L — SIGNIFICANT CHANGE UP (ref 3.5–5.1)
POTASSIUM SERPL-MCNC: 4.2 MMOL/L — SIGNIFICANT CHANGE UP (ref 3.5–5.3)
POTASSIUM SERPL-MCNC: 4.3 MMOL/L — SIGNIFICANT CHANGE UP (ref 3.5–5.3)
POTASSIUM SERPL-SCNC: 4.2 MMOL/L — SIGNIFICANT CHANGE UP (ref 3.5–5.3)
POTASSIUM SERPL-SCNC: 4.3 MMOL/L — SIGNIFICANT CHANGE UP (ref 3.5–5.3)
PROT UR-MCNC: ABNORMAL
PROTHROM AB SERPL-ACNC: 13 SEC — SIGNIFICANT CHANGE UP (ref 10.5–13.4)
RBC # BLD: 4.21 M/UL — SIGNIFICANT CHANGE UP (ref 4.2–5.8)
RBC # BLD: 4.23 M/UL — SIGNIFICANT CHANGE UP (ref 4.2–5.8)
RBC # FLD: 14.1 % — SIGNIFICANT CHANGE UP (ref 10.3–14.5)
RBC # FLD: 14.2 % — SIGNIFICANT CHANGE UP (ref 10.3–14.5)
RH IG SCN BLD-IMP: POSITIVE — SIGNIFICANT CHANGE UP
RSV RNA NPH QL NAA+NON-PROBE: SIGNIFICANT CHANGE UP
SAO2 % BLDV: 93.4 % — HIGH (ref 67–88)
SARS-COV-2 RNA SPEC QL NAA+PROBE: SIGNIFICANT CHANGE UP
SODIUM SERPL-SCNC: 138 MMOL/L — SIGNIFICANT CHANGE UP (ref 135–145)
SODIUM SERPL-SCNC: 139 MMOL/L — SIGNIFICANT CHANGE UP (ref 135–145)
SP GR SPEC: 1.02 — SIGNIFICANT CHANGE UP (ref 1.01–1.02)
UROBILINOGEN FLD QL: NEGATIVE — SIGNIFICANT CHANGE UP
WBC # BLD: 7.88 K/UL — SIGNIFICANT CHANGE UP (ref 3.8–10.5)
WBC # BLD: 7.96 K/UL — SIGNIFICANT CHANGE UP (ref 3.8–10.5)
WBC # FLD AUTO: 7.88 K/UL — SIGNIFICANT CHANGE UP (ref 3.8–10.5)
WBC # FLD AUTO: 7.96 K/UL — SIGNIFICANT CHANGE UP (ref 3.8–10.5)

## 2023-02-16 PROCEDURE — 70450 CT HEAD/BRAIN W/O DYE: CPT | Mod: 26,MA

## 2023-02-16 PROCEDURE — 52332 CYSTOSCOPY AND TREATMENT: CPT | Mod: LT

## 2023-02-16 PROCEDURE — 99291 CRITICAL CARE FIRST HOUR: CPT

## 2023-02-16 PROCEDURE — 93010 ELECTROCARDIOGRAM REPORT: CPT

## 2023-02-16 PROCEDURE — 71045 X-RAY EXAM CHEST 1 VIEW: CPT | Mod: 26

## 2023-02-16 PROCEDURE — 74176 CT ABD & PELVIS W/O CONTRAST: CPT | Mod: 26,MD

## 2023-02-16 DEVICE — URETERAL STENT TRIA SOFT 6FR 24MM: Type: IMPLANTABLE DEVICE | Site: LEFT | Status: FUNCTIONAL

## 2023-02-16 DEVICE — GUIDEWIRE SENSOR DUAL-FLEX NITINOL STRAIGHT .035" X 150CM: Type: IMPLANTABLE DEVICE | Site: LEFT | Status: FUNCTIONAL

## 2023-02-16 RX ORDER — THIAMINE MONONITRATE (VIT B1) 100 MG
100 TABLET ORAL DAILY
Refills: 0 | Status: DISCONTINUED | OUTPATIENT
Start: 2023-02-16 | End: 2023-02-21

## 2023-02-16 RX ORDER — ACETAMINOPHEN 500 MG
650 TABLET ORAL EVERY 6 HOURS
Refills: 0 | Status: DISCONTINUED | OUTPATIENT
Start: 2023-02-16 | End: 2023-02-16

## 2023-02-16 RX ORDER — ACETAMINOPHEN 500 MG
1000 TABLET ORAL ONCE
Refills: 0 | Status: DISCONTINUED | OUTPATIENT
Start: 2023-02-16 | End: 2023-02-16

## 2023-02-16 RX ORDER — MAGNESIUM SULFATE 500 MG/ML
2 VIAL (ML) INJECTION ONCE
Refills: 0 | Status: COMPLETED | OUTPATIENT
Start: 2023-02-16 | End: 2023-02-16

## 2023-02-16 RX ORDER — SODIUM CHLORIDE 9 MG/ML
1000 INJECTION, SOLUTION INTRAVENOUS ONCE
Refills: 0 | Status: COMPLETED | OUTPATIENT
Start: 2023-02-16 | End: 2023-02-16

## 2023-02-16 RX ORDER — ASCORBIC ACID 60 MG
500 TABLET,CHEWABLE ORAL DAILY
Refills: 0 | Status: DISCONTINUED | OUTPATIENT
Start: 2023-02-16 | End: 2023-02-21

## 2023-02-16 RX ORDER — SODIUM CHLORIDE 9 MG/ML
500 INJECTION, SOLUTION INTRAVENOUS ONCE
Refills: 0 | Status: COMPLETED | OUTPATIENT
Start: 2023-02-16 | End: 2023-02-16

## 2023-02-16 RX ORDER — HYDROMORPHONE HYDROCHLORIDE 2 MG/ML
0.25 INJECTION INTRAMUSCULAR; INTRAVENOUS; SUBCUTANEOUS
Refills: 0 | Status: DISCONTINUED | OUTPATIENT
Start: 2023-02-16 | End: 2023-02-16

## 2023-02-16 RX ORDER — CEFEPIME 1 G/1
1000 INJECTION, POWDER, FOR SOLUTION INTRAMUSCULAR; INTRAVENOUS EVERY 12 HOURS
Refills: 0 | Status: DISCONTINUED | OUTPATIENT
Start: 2023-02-16 | End: 2023-02-17

## 2023-02-16 RX ORDER — SODIUM CHLORIDE 9 MG/ML
1000 INJECTION, SOLUTION INTRAVENOUS
Refills: 0 | Status: DISCONTINUED | OUTPATIENT
Start: 2023-02-16 | End: 2023-02-17

## 2023-02-16 RX ORDER — ACETAMINOPHEN 500 MG
975 TABLET ORAL EVERY 6 HOURS
Refills: 0 | Status: DISCONTINUED | OUTPATIENT
Start: 2023-02-16 | End: 2023-02-21

## 2023-02-16 RX ORDER — MIDODRINE HYDROCHLORIDE 2.5 MG/1
5 TABLET ORAL THREE TIMES A DAY
Refills: 0 | Status: DISCONTINUED | OUTPATIENT
Start: 2023-02-16 | End: 2023-02-17

## 2023-02-16 RX ORDER — ONDANSETRON 8 MG/1
4 TABLET, FILM COATED ORAL EVERY 6 HOURS
Refills: 0 | Status: DISCONTINUED | OUTPATIENT
Start: 2023-02-16 | End: 2023-02-16

## 2023-02-16 RX ORDER — HEPARIN SODIUM 5000 [USP'U]/ML
5000 INJECTION INTRAVENOUS; SUBCUTANEOUS EVERY 8 HOURS
Refills: 0 | Status: DISCONTINUED | OUTPATIENT
Start: 2023-02-16 | End: 2023-02-17

## 2023-02-16 RX ORDER — SENNA PLUS 8.6 MG/1
2 TABLET ORAL AT BEDTIME
Refills: 0 | Status: DISCONTINUED | OUTPATIENT
Start: 2023-02-16 | End: 2023-02-16

## 2023-02-16 RX ORDER — HYDROMORPHONE HYDROCHLORIDE 2 MG/ML
0.5 INJECTION INTRAMUSCULAR; INTRAVENOUS; SUBCUTANEOUS
Refills: 0 | Status: DISCONTINUED | OUTPATIENT
Start: 2023-02-16 | End: 2023-02-16

## 2023-02-16 RX ORDER — CEFEPIME 1 G/1
1000 INJECTION, POWDER, FOR SOLUTION INTRAMUSCULAR; INTRAVENOUS ONCE
Refills: 0 | Status: COMPLETED | OUTPATIENT
Start: 2023-02-16 | End: 2023-02-16

## 2023-02-16 RX ORDER — ACETAMINOPHEN 500 MG
1000 TABLET ORAL ONCE
Refills: 0 | Status: COMPLETED | OUTPATIENT
Start: 2023-02-16 | End: 2023-02-16

## 2023-02-16 RX ORDER — CHLORHEXIDINE GLUCONATE 213 G/1000ML
1 SOLUTION TOPICAL
Refills: 0 | Status: DISCONTINUED | OUTPATIENT
Start: 2023-02-16 | End: 2023-02-21

## 2023-02-16 RX ADMIN — CEFEPIME 100 MILLIGRAM(S): 1 INJECTION, POWDER, FOR SOLUTION INTRAMUSCULAR; INTRAVENOUS at 00:58

## 2023-02-16 RX ADMIN — Medication 25 GRAM(S): at 20:35

## 2023-02-16 RX ADMIN — HEPARIN SODIUM 5000 UNIT(S): 5000 INJECTION INTRAVENOUS; SUBCUTANEOUS at 18:45

## 2023-02-16 RX ADMIN — SODIUM CHLORIDE 1000 MILLILITER(S): 9 INJECTION, SOLUTION INTRAVENOUS at 23:57

## 2023-02-16 RX ADMIN — CEFEPIME 100 MILLIGRAM(S): 1 INJECTION, POWDER, FOR SOLUTION INTRAMUSCULAR; INTRAVENOUS at 18:45

## 2023-02-16 RX ADMIN — SODIUM CHLORIDE 125 MILLILITER(S): 9 INJECTION, SOLUTION INTRAVENOUS at 09:51

## 2023-02-16 RX ADMIN — SODIUM CHLORIDE 1000 MILLILITER(S): 9 INJECTION, SOLUTION INTRAVENOUS at 01:32

## 2023-02-16 RX ADMIN — Medication 1000 MILLIGRAM(S): at 16:19

## 2023-02-16 RX ADMIN — Medication 400 MILLIGRAM(S): at 15:40

## 2023-02-16 RX ADMIN — MIDODRINE HYDROCHLORIDE 5 MILLIGRAM(S): 2.5 TABLET ORAL at 12:47

## 2023-02-16 RX ADMIN — SODIUM CHLORIDE 500 MILLILITER(S): 9 INJECTION, SOLUTION INTRAVENOUS at 01:32

## 2023-02-16 NOTE — ED ADULT NURSE NOTE - NSICDXPASTMEDICALHX_GEN_ALL_CORE_FT
PAST MEDICAL HISTORY:  Adrenal insufficiency     Anemia     H/O cerebellar ataxia -diagnosed in 2019    History of hypotension     Lacunar infarction -chronic    Pneumonia, aspiration -april 2022 (intubated for 7 days at [facility])

## 2023-02-16 NOTE — H&P ADULT - NSHPLABSRESULTS_GEN_ALL_CORE
14.3   10.81 )-----------( 259      ( 15 Feb 2023 21:53 )             44.6       02-15    137  |  96  |  16  ----------------------------<  132<H>  3.7   |  28  |  0.74    Ca    10.7<H>      15 Feb 2023 21:53    TPro  8.3  /  Alb  4.2  /  TBili  0.4  /  DBili  x   /  AST  11  /  ALT  11  /  AlkPhos  88  02-15      Urinalysis Basic - ( 2023 03:40 )    Color: Yellow / Appearance: Slightly Turbid / S.017 / pH: x  Gluc: x / Ketone: Negative  / Bili: Negative / Urobili: Negative   Blood: x / Protein: 100 mg/dL / Nitrite: Positive   Leuk Esterase: Large / RBC: 9 /hpf /  /HPF   Sq Epi: x / Non Sq Epi: 1 /hpf / Bacteria: Moderate        ACC: [id number] EXAM:  CT ABDOMEN AND PELVIS   ORDERED BY: TEJAS ANDERSON     PROCEDURE DATE:  2023          INTERPRETATION:  CLINICAL INFORMATION: Cloudy urine and back pain.    COMPARISON: CT abdomen pelvis dated 2022. Chest CT dated 2022    CONTRAST/COMPLICATIONS:  IV Contrast: NONE  Oral Contrast: NONE  Complications: None reported at time of study completion    PROCEDURE:  CT of the Abdomen and Pelvis was performed.  Sagittal and coronal reformats were performed.    FINDINGS:  LOWER CHEST: Increased nonspecific right lower lobe nodular and   groundglass opacities. Focus of tree-in-bud opacity left lower lobe..   Prominent bilateral hilar lymph nodes are only partially visualized.    LIVER: Within normal limits.  BILE DUCTS: Normal caliber.  GALLBLADDER: Cholelithiasis.  SPLEEN: Within normal limits.  PANCREAS: Within normal limits.  ADRENALS: Within normal limits.  KIDNEYS/URETERS: Bilateral nonobstructing renal calculi measuring up to 8   mm on the right and 5 mm onthe left. An obstructing calculus is noted in   the left mid ureter measuring up to 4 mm there is resulting mild left   hydronephrosis. No right hydronephrosis. Exophytic cyst in the left upper   pole.    BLADDER: The bladder wall is thickened. Thereare layering bladder   calculi and hyperdense material which may represent hemorrhagic products.  REPRODUCTIVE ORGANS: Prostate within normal limits.    BOWEL: No bowel obstruction. Appendix is normal.  PERITONEUM: No ascites.  VESSELS: Atherosclerotic changes.  RETROPERITONEUM/LYMPH NODES: No lymphadenopathy.  ABDOMINAL WALL: Small fat-containing umbilical hernia. Increased soft   tissue density and edema adjacent to the right greater trochanter in the   right hip is of indeterminate  BONES: Degenerative changes.    IMPRESSION:  Nephrolithiasis. Mild left hydroureteronephrosis to the level of a 4 mm   obstructing calculus in the proximal ureter. Bladder wall thickening   which may be due to underdistention. Layering high attenuation the   bladder could be hemorrhagic products. Correlation with urinalysis is   recommended.    Right lower lobe groundglass opacity and left lower lobe tree-in-bud   nodularity which may be infectious or inflammatory. Bilateral prominent   hilar lymph nodes.    Increased soft tissue density and edema adjacent to the right greater   trochanter in the right hip soft tissues is indeterminate. Recommend   follow-up with contrast-enhanced right hip MRI to further evaluate.    --- End of Report ---           KAELA OLIVARES MD; Resident Radiologist  This document has been electronically signed.  JOSE ANTONIO HARRISON MD; Attending Radiologist  This document has been electronically signed. 2023  4:30AM

## 2023-02-16 NOTE — CONSULT NOTE ADULT - ASSESSMENT
ASSESSMENT:  62M s/p left ureteral stone with hypothermia requiring ureteral stent insertion  c/b hypothermia, bradycardia and worsening mental status     PLAN:    NEURO:  tylenol prn for pain       RESPIRATORY:   maintain Sat O2 >90%  requiring NC      CARDIOVASCULAR:  maintain MAP >65  consider invasive hemodynamic monitoring if becomes hypotensive  monitor for need for pressors   c/w midodrine 5mg TID      GI/NUTRITION:  Regular diet  monitor bowel function       GENITOURINARY/RENAL:  coronel in place  strict I/Os  left stent in place per urology       HEMATOLOGIC:  monitor H/H   HSQ DVT ppx    INFECTIOUS DISEASE:  c/f urosepsis  hypothermic, passive warming with barehugger  monitor WBC  c/w cefepime      ENDOCRINE:  monitor glucose levels on BMP      The above plan was discussed with the SICU team during rounds.     --  Asuncion Bashir MD, PGY-II  SICU [id number]

## 2023-02-16 NOTE — PROGRESS NOTE ADULT - ASSESSMENT
A/P: 62y Male s/p left ureteral stone with hypothermia requiring ureteral stent insertion     trend fever curve   fu cx  cont cefepime for prev cx showing pseudomonas   cont home meds   DVT prophylaxis/OOB  Incentive spirometry  Strict I&O's  Analgesia and antiemetics as needed  Diet  AM labs

## 2023-02-16 NOTE — CONSULT NOTE ADULT - ATTENDING COMMENTS
Patient seen and examined on arrival to SICU on 2/16 & on SICU PM rounds  Sepsis, likely secondary to urosepsis  Hypothermia slowly resolving with active rewarming methods  Hypotension resolving with antibiotics and resuscitation

## 2023-02-16 NOTE — ED ADULT NURSE NOTE - OBJECTIVE STATEMENT
Pt is a 62y M PMH UTI, cerebral ataxia, brought in by wife c/o urinary symptoms, ams. Pt presents sleepy, A&Ox1, breathing unlabored and spontaneous. Pt wife at bedside, able to give hx. Per wife, pt had UTI, finished course of abx and has been confused, saying he is seeing people in the room that are not there, has been increasingly sleepy/ lethargic. denies fever, chills, NVD. Pt resting in stretcher, bed in lowest position, aware of plan of care. Comfort and safety measures maintained.

## 2023-02-16 NOTE — PROGRESS NOTE ADULT - SUBJECTIVE AND OBJECTIVE BOX
Subjective  called by RN for worsening mental status, bradycardia and hypothermia 92.2 rectally   pt seen and examined without complaints   Objective    Vital signs  T(F): , Max: 98.2 (02-16-23 @ 10:45)  HR: 54 (02-16-23 @ 16:00)  BP: 111/69 (02-16-23 @ 16:00)  SpO2: 100% (02-16-23 @ 16:00)  Wt(kg): --    Output     02-16 @ 07:01  -  02-16 @ 16:16  --------------------------------------------------------  IN: 995 mL / OUT: 1000 mL / NET: -5 mL        Gen awake alert nad axox0  Abd obese soft ntnd   Back no cvat bl   coronel in place urine yellow  neuro: no facial asymmetry; responds to pain and limited verbal stimuli, no slurred speech     Labs                        12.1   7.96  )-----------( 187      ( 16 Feb 2023 15:37 )             36.2     02-16    138  |  104  |  12  ----------------------------<  119<H>  4.3   |  25  |  0.49<L>    Ca    9.8      16 Feb 2023 15:37  Phos  3.2     02-16  Mg     1.7     02-16    TPro  8.3  /  Alb  4.2  /  TBili  0.4  /  DBili  x   /  AST  11  /  ALT  11  /  AlkPhos  88  02-15        Urine Cx: sent and rec   Blood Cx: sent and rec     < from: CT Head No Cont (02.16.23 @ 00:42) >  IMPRESSION:  Stable exam. No acute intracranial hemorrhage, vasogenic edema or   extra-axial collection. Stable mild ventriculomegaly and chronic   microvascular changes.    < end of copied text >    < from: CT Abdomen and Pelvis No Cont (02.16.23 @ 00:41) >  FINDINGS:  LOWER CHEST: Increased nonspecific right lower lobe nodular and   groundglass opacities. Focus of tree-in-bud opacity left lower lobe..   Prominent bilateral hilar lymph nodes are only partially visualized.    LIVER: Within normal limits.  BILE DUCTS: Normal caliber.  GALLBLADDER: Cholelithiasis.  SPLEEN: Within normal limits.  PANCREAS: Within normal limits.  ADRENALS: Within normal limits.  KIDNEYS/URETERS: Bilateral nonobstructing renal calculi measuring up to 8   mm on the right and 5 mm onthe left. An obstructing calculus is noted in   the left mid ureter measuring up to 4 mm there is resulting mild left   hydronephrosis. No right hydronephrosis. Exophytic cyst in the left upper   pole.    BLADDER: The bladder wall is thickened. Thereare layering bladder   calculi and hyperdense material which may represent hemorrhagic products.  REPRODUCTIVE ORGANS: Prostate within normal limits.    BOWEL: No bowel obstruction. Appendix is normal.  PERITONEUM: No ascites.  VESSELS: Atherosclerotic changes.  RETROPERITONEUM/LYMPH NODES: No lymphadenopathy.  ABDOMINAL WALL: Small fat-containing umbilical hernia. Increased soft   tissue density and edema adjacent to the right greater trochanter in the   right hip is of indeterminate  BONES: Degenerative changes.    IMPRESSION:  Nephrolithiasis. Mild left hydroureteronephrosis to the level of a 4 mm   obstructing calculus in the proximal ureter. Bladder wall thickening   which may be due to underdistention. Layering high attenuation the   bladder could be hemorrhagic products. Correlation with urinalysis is   recommended.    Right lower lobe groundglass opacity and left lower lobe tree-in-bud   nodularity which may be infectious or inflammatory. Bilateral prominent   hilar lymph nodes.    Increased soft tissue density and edema adjacent to the right greater   trochanter in the right hip soft tissues is indeterminate. Recommend   follow-up with contrast-enhanced right hip MRI to further evaluate.      < end of copied text >

## 2023-02-16 NOTE — H&P ADULT - NSICDXPASTMEDICALHX_GEN_ALL_CORE_FT
PAST MEDICAL HISTORY:  Adrenal insufficiency     Anemia     H/O cerebellar ataxia -diagnosed in 2019    History of hypotension     Lacunar infarction -chronic    Pneumonia, aspiration -april 2022 (intubated for 7 days at [facility])

## 2023-02-16 NOTE — PROGRESS NOTE ADULT - ASSESSMENT
A/P: 62y Male s/p left ureteral stone with hypothermia requiring ureteral stent insertion     c/b hypothermia, bradycardia and worsening mental status     sicu called for eval as pt not stable for floor  abg obtained  stat labs   ekg with bradycardia and junctional rhythm    fu cx  cont cefepime for prev cx showing pseudomonas   cont home meds   DVT prophylaxis/OOB  Incentive spirometry  Strict I&O's  Analgesia and antiemetics as needed  Diet  AM labs

## 2023-02-16 NOTE — H&P ADULT - HISTORY OF PRESENT ILLNESS
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 Per wife, he has been more confused over the past 2 weeks. Patient complains of burning on urination and left flank pain. Of note, patient had sepsis secondary to renal stones in July 2022 requiring bilateral stent placement, which were removed post stone work in August 2022.     In the ED, he has been hypothermic as low as 92.6. HR, BP, and O2 WNL. WBC 10.8, Cr 0.74. U/A positive for nitrites and leuk esterase. CT abd/pelvis shows mild left hydroureteronephrosis with an obstructing 4mm stone in the proximal ureter.      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 Per wife, he has been more confused and hallucinating over the past 2 weeks. Patient complains of burning on urination and left flank pain. Of note, patient had sepsis secondary to renal stones in July 2022 requiring bilateral stent placement, which were removed post stone work in August 2022.     In the ED, he has been hypothermic as low as 92.6. HR, BP, and O2 WNL. WBC 10.8, Cr 0.74. U/A positive for nitrites and leuk esterase. CT abd/pelvis shows mild left hydroureteronephrosis with an obstructing 4mm stone in the proximal ureter. Unable to obtain thorough history secondary to altered mental status.

## 2023-02-16 NOTE — ED ADULT NURSE REASSESSMENT NOTE - NS ED NURSE REASSESS COMMENT FT1
Indwelling urinary "coronel" catheter w/ temp sensor inserted using sterile technique. Procedure, risks, and benefits of catheter explained to patient. Second RN present to confirm sterility. Pt tolerated well. Urinary catheter drained cloudy yellow urine, no clots visualized. Approximately 125cc's of urine drained on insertion. Urine sample obtained and UA and Culture sent to lab. Bedside drainage to gravity. Stat lock in place. Urethral temperature at 90.5 Fahrenheit, MD Hernandez and MD Hernández made aware. Patient repositioned in stretcher for comfort. Warming blanket on patient. Stretcher locked and in lowest position, appropriate side rails up. Pt instructed to notify RN if assistance is needed.

## 2023-02-16 NOTE — PRE-ANESTHESIA EVALUATION ADULT - NSANTHPMHFT_GEN_ALL_CORE
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 CT abd pelvis showed 4mm L proximal ureteral septic stone. hydroureteronephrosis with an obstructing 4mm stone in the proximal ureter. Unable to obtain thorough history secondary to altered mental status.

## 2023-02-16 NOTE — H&P ADULT - ASSESSMENT
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 CT abd pelvis showed 4mm L proximal ureteral septic stone.  -admit to urology to Dr. Dobbs  -added on to OR for L ureteral stent emergently  -consent obtained over the phone from patient's wife  -abx  -trend temp  -f/u cultures  -DVT ppx  -home meds added  -d/w Dr. Gonzalez and Dr. 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 CT abd pelvis showed 4mm L proximal ureteral septic stone.  -admit to urology to Dr. Dobbs  -added on to OR for L ureteral stent emergently  -consent obtained over the phone from patient's wife  -npo/IVF  -abx  -trend temp  -f/u cultures  -DVT ppx  -home meds added  -d/w Dr. Gonzalez and Dr. Dobbs

## 2023-02-16 NOTE — H&P ADULT - NSHPPHYSICALEXAM_GEN_ALL_CORE
General: NAD, Lying in bed comfortably  Neuro: altered  Resp: No respiratory distress or accessory muscle use. no supplemental O2  Abd: Soft, NT/ND, no rebound/guarding  Back: (+) L CVAT  : coronel yellow, grossly purulen

## 2023-02-16 NOTE — PROGRESS NOTE ADULT - SUBJECTIVE AND OBJECTIVE BOX
Post op Check    Pt seen and examined without complaints. Pain is controlled. Denies SOB/CP/N/V.     Vital Signs Last 24 Hrs  T(C): 36.8 (16 Feb 2023 10:45), Max: 36.8 (16 Feb 2023 10:45)  T(F): 98.2 (16 Feb 2023 10:45), Max: 98.2 (16 Feb 2023 10:45)  HR: 63 (16 Feb 2023 10:45) (61 - 104)  BP: 111/66 (16 Feb 2023 10:45) (93/62 - 117/67)  BP(mean): 83 (16 Feb 2023 10:45) (72 - 86)  RR: 16 (16 Feb 2023 10:45) (16 - 20)  SpO2: 100% (16 Feb 2023 10:45) (94% - 100%)    Parameters below as of 16 Feb 2023 10:45  Patient On (Oxygen Delivery Method): room air        I&O's Summary    16 Feb 2023 07:01  -  16 Feb 2023 11:08  --------------------------------------------------------  IN: 250 mL / OUT: 200 mL / NET: 50 mL        Physical Exam  Gen: awake alert NAD AXOX3  Pulm: No respiratory distress, no subcostal retractions  CV: RRR, no JVD  Abd: Soft, NT, ND  Back: no cvat bl   :  coronel in place urine yellow                           14.3   10.81 )-----------( 259      ( 15 Feb 2023 21:53 )             44.6       02-15    137  |  96  |  16  ----------------------------<  132<H>  3.7   |  28  |  0.74    Ca    10.7<H>      15 Feb 2023 21:53    TPro  8.3  /  Alb  4.2  /  TBili  0.4  /  DBili  x   /  AST  11  /  ALT  11  /  AlkPhos  88  02-15

## 2023-02-17 LAB
ANION GAP SERPL CALC-SCNC: 10 MMOL/L — SIGNIFICANT CHANGE UP (ref 5–17)
APTT BLD: 30.3 SEC — SIGNIFICANT CHANGE UP (ref 27.5–35.5)
BUN SERPL-MCNC: 11 MG/DL — SIGNIFICANT CHANGE UP (ref 7–23)
CALCIUM SERPL-MCNC: 9.7 MG/DL — SIGNIFICANT CHANGE UP (ref 8.4–10.5)
CHLORIDE SERPL-SCNC: 107 MMOL/L — SIGNIFICANT CHANGE UP (ref 96–108)
CO2 SERPL-SCNC: 27 MMOL/L — SIGNIFICANT CHANGE UP (ref 22–31)
CREAT SERPL-MCNC: 0.63 MG/DL — SIGNIFICANT CHANGE UP (ref 0.5–1.3)
EGFR: 108 ML/MIN/1.73M2 — SIGNIFICANT CHANGE UP
GAS PNL BLDV: SIGNIFICANT CHANGE UP
GLUCOSE SERPL-MCNC: 80 MG/DL — SIGNIFICANT CHANGE UP (ref 70–99)
HCT VFR BLD CALC: 35.1 % — LOW (ref 39–50)
HGB BLD-MCNC: 11.1 G/DL — LOW (ref 13–17)
INR BLD: 1.16 RATIO — SIGNIFICANT CHANGE UP (ref 0.88–1.16)
MAGNESIUM SERPL-MCNC: 2.2 MG/DL — SIGNIFICANT CHANGE UP (ref 1.6–2.6)
MCHC RBC-ENTMCNC: 27.2 PG — SIGNIFICANT CHANGE UP (ref 27–34)
MCHC RBC-ENTMCNC: 31.6 GM/DL — LOW (ref 32–36)
MCV RBC AUTO: 86 FL — SIGNIFICANT CHANGE UP (ref 80–100)
MRSA PCR RESULT.: DETECTED
NRBC # BLD: 0 /100 WBCS — SIGNIFICANT CHANGE UP (ref 0–0)
PHOSPHATE SERPL-MCNC: 3 MG/DL — SIGNIFICANT CHANGE UP (ref 2.5–4.5)
PLATELET # BLD AUTO: 212 K/UL — SIGNIFICANT CHANGE UP (ref 150–400)
POTASSIUM SERPL-MCNC: 4.2 MMOL/L — SIGNIFICANT CHANGE UP (ref 3.5–5.3)
POTASSIUM SERPL-SCNC: 4.2 MMOL/L — SIGNIFICANT CHANGE UP (ref 3.5–5.3)
PROTHROM AB SERPL-ACNC: 13.4 SEC — SIGNIFICANT CHANGE UP (ref 10.5–13.4)
RBC # BLD: 4.08 M/UL — LOW (ref 4.2–5.8)
RBC # FLD: 14.3 % — SIGNIFICANT CHANGE UP (ref 10.3–14.5)
S AUREUS DNA NOSE QL NAA+PROBE: DETECTED
SODIUM SERPL-SCNC: 144 MMOL/L — SIGNIFICANT CHANGE UP (ref 135–145)
WBC # BLD: 7.19 K/UL — SIGNIFICANT CHANGE UP (ref 3.8–10.5)
WBC # FLD AUTO: 7.19 K/UL — SIGNIFICANT CHANGE UP (ref 3.8–10.5)

## 2023-02-17 PROCEDURE — 99231 SBSQ HOSP IP/OBS SF/LOW 25: CPT

## 2023-02-17 PROCEDURE — 99233 SBSQ HOSP IP/OBS HIGH 50: CPT

## 2023-02-17 RX ORDER — ENOXAPARIN SODIUM 100 MG/ML
40 INJECTION SUBCUTANEOUS EVERY 24 HOURS
Refills: 0 | Status: DISCONTINUED | OUTPATIENT
Start: 2023-02-17 | End: 2023-02-21

## 2023-02-17 RX ORDER — MIDODRINE HYDROCHLORIDE 2.5 MG/1
10 TABLET ORAL EVERY 8 HOURS
Refills: 0 | Status: DISCONTINUED | OUTPATIENT
Start: 2023-02-17 | End: 2023-02-18

## 2023-02-17 RX ORDER — SODIUM CHLORIDE 9 MG/ML
1000 INJECTION, SOLUTION INTRAVENOUS
Refills: 0 | Status: DISCONTINUED | OUTPATIENT
Start: 2023-02-17 | End: 2023-02-18

## 2023-02-17 RX ORDER — SODIUM CHLORIDE 9 MG/ML
500 INJECTION, SOLUTION INTRAVENOUS ONCE
Refills: 0 | Status: COMPLETED | OUTPATIENT
Start: 2023-02-17 | End: 2023-02-17

## 2023-02-17 RX ORDER — CEFEPIME 1 G/1
1000 INJECTION, POWDER, FOR SOLUTION INTRAMUSCULAR; INTRAVENOUS EVERY 8 HOURS
Refills: 0 | Status: DISCONTINUED | OUTPATIENT
Start: 2023-02-17 | End: 2023-02-19

## 2023-02-17 RX ORDER — BNT162B2 ORIGINAL AND OMICRON BA.4/BA.5 .1125; .1125 MG/2.25ML; MG/2.25ML
0.3 INJECTION, SUSPENSION INTRAMUSCULAR ONCE
Refills: 0 | Status: DISCONTINUED | OUTPATIENT
Start: 2023-02-17 | End: 2023-02-21

## 2023-02-17 RX ORDER — SODIUM CHLORIDE 9 MG/ML
1000 INJECTION, SOLUTION INTRAVENOUS ONCE
Refills: 0 | Status: COMPLETED | OUTPATIENT
Start: 2023-02-17 | End: 2023-02-17

## 2023-02-17 RX ORDER — PHENYLEPHRINE HYDROCHLORIDE 10 MG/ML
0.1 INJECTION INTRAVENOUS
Qty: 40 | Refills: 0 | Status: DISCONTINUED | OUTPATIENT
Start: 2023-02-17 | End: 2023-02-18

## 2023-02-17 RX ORDER — SODIUM CHLORIDE 9 MG/ML
1000 INJECTION, SOLUTION INTRAVENOUS
Refills: 0 | Status: DISCONTINUED | OUTPATIENT
Start: 2023-02-17 | End: 2023-02-17

## 2023-02-17 RX ADMIN — HEPARIN SODIUM 5000 UNIT(S): 5000 INJECTION INTRAVENOUS; SUBCUTANEOUS at 00:16

## 2023-02-17 RX ADMIN — MIDODRINE HYDROCHLORIDE 10 MILLIGRAM(S): 2.5 TABLET ORAL at 13:03

## 2023-02-17 RX ADMIN — Medication 500 MILLIGRAM(S): at 12:50

## 2023-02-17 RX ADMIN — HEPARIN SODIUM 5000 UNIT(S): 5000 INJECTION INTRAVENOUS; SUBCUTANEOUS at 08:34

## 2023-02-17 RX ADMIN — SODIUM CHLORIDE 125 MILLILITER(S): 9 INJECTION, SOLUTION INTRAVENOUS at 19:16

## 2023-02-17 RX ADMIN — SODIUM CHLORIDE 125 MILLILITER(S): 9 INJECTION, SOLUTION INTRAVENOUS at 08:33

## 2023-02-17 RX ADMIN — MIDODRINE HYDROCHLORIDE 5 MILLIGRAM(S): 2.5 TABLET ORAL at 05:27

## 2023-02-17 RX ADMIN — SODIUM CHLORIDE 3000 MILLILITER(S): 9 INJECTION, SOLUTION INTRAVENOUS at 10:00

## 2023-02-17 RX ADMIN — SODIUM CHLORIDE 1000 MILLILITER(S): 9 INJECTION, SOLUTION INTRAVENOUS at 20:30

## 2023-02-17 RX ADMIN — ENOXAPARIN SODIUM 40 MILLIGRAM(S): 100 INJECTION SUBCUTANEOUS at 12:49

## 2023-02-17 RX ADMIN — CHLORHEXIDINE GLUCONATE 1 APPLICATION(S): 213 SOLUTION TOPICAL at 05:27

## 2023-02-17 RX ADMIN — Medication 975 MILLIGRAM(S): at 18:16

## 2023-02-17 RX ADMIN — Medication 100 MILLIGRAM(S): at 12:49

## 2023-02-17 RX ADMIN — CEFEPIME 100 MILLIGRAM(S): 1 INJECTION, POWDER, FOR SOLUTION INTRAMUSCULAR; INTRAVENOUS at 13:03

## 2023-02-17 RX ADMIN — Medication 975 MILLIGRAM(S): at 19:02

## 2023-02-17 RX ADMIN — CEFEPIME 100 MILLIGRAM(S): 1 INJECTION, POWDER, FOR SOLUTION INTRAMUSCULAR; INTRAVENOUS at 21:47

## 2023-02-17 RX ADMIN — Medication 1 TABLET(S): at 12:49

## 2023-02-17 NOTE — ADVANCED PRACTICE NURSE CONSULT - RECOMMEDATIONS
Impression:    urinary incontinence  fecal incontinence  B/L buttocks/sacral deep tissue injury in evolution, present on admission  B/L heel deep tissue injuries, present on admission     Recommendations:    1) continue turning and positioning q2 and PRN utilizing offloading assistive devices  2) continue with routine pericare daily and PRN soiling  3) encourage optimal nutrition  4) waffle cushion when oob to chair  5) B/L LE complete cair air fluidized boots to offload heels/feet  6) triad protective barrier cream to B/L buttocks/sacrum daily and PRN soiling  7) incontinence management - continue external male urinary catheter to divert urine from skin   8) podiatry for B/L heels treatment recommendations    Plan discussed with MEAGAN Garcia at bedside

## 2023-02-17 NOTE — PATIENT PROFILE ADULT - FALL HARM RISK - HARM RISK INTERVENTIONS

## 2023-02-17 NOTE — PROGRESS NOTE ADULT - ASSESSMENT
62M s/p left ureteral stone with hypothermia requiring ureteral stent insertion  c/b hypothermia, bradycardia and worsening mental status     PLAN:    NEURO:  tylenol prn for pain       RESPIRATORY:   maintain Sat O2 >90%  requiring NC      CARDIOVASCULAR:  maintain MAP >65  consider invasive hemodynamic monitoring if becomes hypotensive  monitor for need for pressors   c/w midodrine 5mg TID      GI/NUTRITION:  Regular diet  monitor bowel function       GENITOURINARY/RENAL:  coronel in place  strict I/Os  left stent in place per urology       HEMATOLOGIC:  monitor H/H   HSQ DVT ppx    INFECTIOUS DISEASE:  c/f urosepsis  hypothermic, passive warming with barehugger  monitor WBC  c/w cefepime      ENDOCRINE:  monitor glucose levels on BMP 62M s/p left ureteral stone with hypothermia requiring ureteral stent insertion  c/b hypothermia, bradycardia and worsening mental status     PLAN:    NEURO:  - A&Ox2, following commands  - tylenol prn for pain     RESPIRATORY:   - maintain Sat O2 >90%  - requiring NC    CARDIOVASCULAR:  - maintain MAP >65  - Increase midodrine to 10mg Q8h    GI/NUTRITION:  - Regular diet    GENITOURINARY/RENAL:  - Barnett in place  - Give 500cc LR bolus for soft pressures  - Maintain LR@100cc/hr IVF  - S/p L ureteral stent by urology    HEMATOLOGIC:  - HSQ DVT ppx    INFECTIOUS DISEASE: presented with urosepsis  - 2/15 Bcx NGTD, f/u repeat Bcx and Ucx  - monitor WBC  - c/w cefepime, increase dose to 1g q8h (2/16- )    ENDOCRINE:  - monitor glucose levels on BMP    Dispo: SICU

## 2023-02-17 NOTE — PROGRESS NOTE ADULT - ASSESSMENT
62 year old male s/p left ureteral stent, POD#1; c/b hypothermia, bradycardia and worsening mental status     -analgesia as needed  -f/u blood, kidney, and urine cultures  -continue cefepime  -monitor mental status  -DVT prophylaxis

## 2023-02-17 NOTE — PROGRESS NOTE ADULT - SUBJECTIVE AND OBJECTIVE BOX
SICU Daily Progress Note  =====================================================  Interval/Overnight Events:     - LR bolus    HPI:    Allergies: No Known Allergies      MEDICATIONS:   --------------------------------------------------------------------------------------  Neurologic Medications  acetaminophen     Tablet .. 975 milliGRAM(s) Oral every 6 hours PRN Mild Pain (1 - 3)    Respiratory Medications    Cardiovascular Medications  midodrine. 5 milliGRAM(s) Oral three times a day    Gastrointestinal Medications  ascorbic acid 500 milliGRAM(s) Oral daily  lactated ringers. 1000 milliLiter(s) IV Continuous <Continuous>  multivitamin 1 Tablet(s) Oral daily  thiamine 100 milliGRAM(s) Oral daily    Genitourinary Medications    Hematologic/Oncologic Medications  heparin   Injectable 5000 Unit(s) SubCutaneous every 8 hours    Antimicrobial/Immunologic Medications  cefepime   IVPB 1000 milliGRAM(s) IV Intermittent every 12 hours    Endocrine/Metabolic Medications    Topical/Other Medications  chlorhexidine 2% Cloths 1 Application(s) Topical <User Schedule>    --------------------------------------------------------------------------------------    VITAL SIGNS, INS/OUTS (last 24 hours):  --------------------------------------------------------------------------------------  T(C): 36.9 (23 @ 23:00), Max: 37.1 (23 @ 21:00)  HR: 89 (23 @ 01:00) (43 - 106)  BP: 101/61 (23 @ 01:00) (81/55 - 128/74)  RR: 13 (23 @ 01:00) (12 - 25)  SpO2: 100% (23 @ 01:00) (91% - 100%)    23 @ 07:01  -  23 @ 02:00  --------------------------------------------------------  IN: 3045 mL / OUT: 2570 mL / NET: 475 mL      --------------------------------------------------------------------------------------    EXAM  NEUROLOGY  Exam: Normal, NAD, alert, oriented x3, no focal deficits.    HEENT  Exam: Normocephalic, atraumatic, EOMI.     RESPIRATORY  Exam: Normal expansion/effort.  Mechanical Ventilation:     CARDIOVASCULAR  Exam: Regular rate and rhythm.       GI/NUTRITION  Exam: Abdomen soft, Non-tender, Non-distended.     VASCULAR  Exam: Extremities warm, pink, well-perfused.     MUSCULOSKELETAL  Exam: All extremities moving spontaneously without limitations.     SKIN  Exam: Good skin turgor, no skin breakdown.       LABS  --------------------------------------------------------------------------------------                        11.1   7.19  )-----------( 212      ( 2023 00:41 )             35.1   02-    144  |  107  |  11  ----------------------------<  80  4.2   |  27  |  0.63    Ca    9.7      2023 00:40  Phos  3.0     -  Mg     2.2     -    TPro  8.3  /  Alb  4.2  /  TBili  0.4  /  DBili  x   /  AST  11  /  ALT  11  /  AlkPhos  88  02-15  ABG - ( 2023 18:20 )  pH, Arterial: 7.40  pH, Blood: x     /  pCO2: 46    /  pO2: 227   / HCO3: 28    / Base Excess: 3.0   /  SaO2: 99.1            Urinalysis Basic - ( 2023 03:40 )    Color: Yellow / Appearance: Slightly Turbid / S.017 / pH: x  Gluc: x / Ketone: Negative  / Bili: Negative / Urobili: Negative   Blood: x / Protein: 100 mg/dL / Nitrite: Positive   Leuk Esterase: Large / RBC: 9 /hpf /  /HPF   Sq Epi: x / Non Sq Epi: 1 /hpf / Bacteria: Moderate    PT/INR - ( 2023 00:41 )   PT: 13.4 sec;   INR: 1.16 ratio         PTT - ( 2023 00:41 )  PTT:30.3 sec  --------------------------------------------------------------------------------------

## 2023-02-17 NOTE — ADVANCED PRACTICE NURSE CONSULT - REASON FOR CONSULT
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 Per wife, he has been more confused and hallucinating over the past 2 weeks. Patient complains of burning on urination and left flank pain. Of note, patient had sepsis secondary to renal stones in July 2022 requiring bilateral stent placement, which were removed post stone work in August 2022.     In the ED, he has been hypothermic as low as 92.6. HR, BP, and O2 WNL. WBC 10.8, Cr 0.74. U/A positive for nitrites and leuk esterase. CT abd/pelvis shows mild left hydroureteronephrosis with an obstructing 4mm stone in the proximal ureter. Unable to obtain thorough history secondary to altered mental status.

## 2023-02-17 NOTE — PROGRESS NOTE ADULT - SUBJECTIVE AND OBJECTIVE BOX
The patient was seen and examined at bedside.  No acute events overnight and the patient is without acute complaints this AM.    T(C): 37 (02-17-23 @ 05:00), Max: 37.1 (02-16-23 @ 21:00)  HR: 86 (02-17-23 @ 07:00) (43 - 106)  BP: 108/67 (02-17-23 @ 07:00) (81/55 - 128/74)  RR: 19 (02-17-23 @ 07:00) (12 - 25)  SpO2: 99% (02-17-23 @ 07:00) (91% - 100%)  Wt(kg): --    Physical Exam:    General: NAD, A+Ox3  Abdomen: soft, non-tender, non-distended      02-16 @ 07:01  -  02-17 @ 07:00  --------------------------------------------------------  IN: 3795 mL / OUT: 3380 mL / NET: 415 mL      F - 1940cc clear                          11.1   7.19  )-----------( 212      ( 17 Feb 2023 00:41 )             35.1     144  |  107  |  11  ----------------------------<  80  4.2   |  27  |  0.63    Ca    9.7  Phos  3.0  Mg     2.2    TPro  8.3  /  Alb  4.2  /  TBili  0.4  /  DBili  x   /  AST  11  /  ALT  11  /  AlkPhos  88

## 2023-02-17 NOTE — ADVANCED PRACTICE NURSE CONSULT - ASSESSMENT
When wound care RN arrived on unit, patient was found lying in a low air loss pressure redistribution support surface style bed. Patient was alert and oriented and gave consent to skin consult. This patient is unable to turn independently and staff assistance x 2 was provided. Once turned, fecal incontinence was apparent and pericare was provided, condom catheter in place. Once cleaned, wound care RN was able to visualize an area of persistent nonblanchable maroon discoloration with purple hues and scattered areas of superficial partial thickness skin loss over B/L buttocks/sacral skin, area measures approximately 8cm x 12cm x 0.1cm- this wound is consistent with a deep tissue injury in evolution. Additionally, on left heel there is a dark purple discoloration noted to measure approximately 3cm x 3cm x 0cm and right heel with light purple discoloration measuring approximately 2cm x 2cm x 0cm- both of these wounds are consistent with deep tissue injuries, present on admission. Once consult was complete, patient was educated on the need for routine turning and positioning to prevent pressure injuries and patient was placed in a left side-lying position utilizing pillow positioner assistive devices.

## 2023-02-18 LAB
-  AMIKACIN: SIGNIFICANT CHANGE UP
-  AZTREONAM: SIGNIFICANT CHANGE UP
-  CEFEPIME: SIGNIFICANT CHANGE UP
-  CEFTAZIDIME: SIGNIFICANT CHANGE UP
-  CIPROFLOXACIN: SIGNIFICANT CHANGE UP
-  GENTAMICIN: SIGNIFICANT CHANGE UP
-  IMIPENEM: SIGNIFICANT CHANGE UP
-  LEVOFLOXACIN: SIGNIFICANT CHANGE UP
-  MEROPENEM: SIGNIFICANT CHANGE UP
-  PIPERACILLIN/TAZOBACTAM: SIGNIFICANT CHANGE UP
-  TOBRAMYCIN: SIGNIFICANT CHANGE UP
ANION GAP SERPL CALC-SCNC: 8 MMOL/L — SIGNIFICANT CHANGE UP (ref 5–17)
BUN SERPL-MCNC: 9 MG/DL — SIGNIFICANT CHANGE UP (ref 7–23)
CALCIUM SERPL-MCNC: 9.4 MG/DL — SIGNIFICANT CHANGE UP (ref 8.4–10.5)
CHLORIDE SERPL-SCNC: 105 MMOL/L — SIGNIFICANT CHANGE UP (ref 96–108)
CO2 SERPL-SCNC: 29 MMOL/L — SIGNIFICANT CHANGE UP (ref 22–31)
CREAT SERPL-MCNC: 0.66 MG/DL — SIGNIFICANT CHANGE UP (ref 0.5–1.3)
CULTURE RESULTS: SIGNIFICANT CHANGE UP
EGFR: 107 ML/MIN/1.73M2 — SIGNIFICANT CHANGE UP
GLUCOSE SERPL-MCNC: 80 MG/DL — SIGNIFICANT CHANGE UP (ref 70–99)
HCT VFR BLD CALC: 32.7 % — LOW (ref 39–50)
HGB BLD-MCNC: 10.3 G/DL — LOW (ref 13–17)
MAGNESIUM SERPL-MCNC: 1.8 MG/DL — SIGNIFICANT CHANGE UP (ref 1.6–2.6)
MCHC RBC-ENTMCNC: 27.7 PG — SIGNIFICANT CHANGE UP (ref 27–34)
MCHC RBC-ENTMCNC: 31.5 GM/DL — LOW (ref 32–36)
MCV RBC AUTO: 87.9 FL — SIGNIFICANT CHANGE UP (ref 80–100)
METHOD TYPE: SIGNIFICANT CHANGE UP
NRBC # BLD: 0 /100 WBCS — SIGNIFICANT CHANGE UP (ref 0–0)
ORGANISM # SPEC MICROSCOPIC CNT: SIGNIFICANT CHANGE UP
ORGANISM # SPEC MICROSCOPIC CNT: SIGNIFICANT CHANGE UP
PHOSPHATE SERPL-MCNC: 2.5 MG/DL — SIGNIFICANT CHANGE UP (ref 2.5–4.5)
PLATELET # BLD AUTO: 172 K/UL — SIGNIFICANT CHANGE UP (ref 150–400)
POTASSIUM SERPL-MCNC: 4 MMOL/L — SIGNIFICANT CHANGE UP (ref 3.5–5.3)
POTASSIUM SERPL-SCNC: 4 MMOL/L — SIGNIFICANT CHANGE UP (ref 3.5–5.3)
RBC # BLD: 3.72 M/UL — LOW (ref 4.2–5.8)
RBC # FLD: 14.6 % — HIGH (ref 10.3–14.5)
SODIUM SERPL-SCNC: 142 MMOL/L — SIGNIFICANT CHANGE UP (ref 135–145)
SPECIMEN SOURCE: SIGNIFICANT CHANGE UP
WBC # BLD: 4.99 K/UL — SIGNIFICANT CHANGE UP (ref 3.8–10.5)
WBC # FLD AUTO: 4.99 K/UL — SIGNIFICANT CHANGE UP (ref 3.8–10.5)

## 2023-02-18 PROCEDURE — 99232 SBSQ HOSP IP/OBS MODERATE 35: CPT

## 2023-02-18 PROCEDURE — 99223 1ST HOSP IP/OBS HIGH 75: CPT

## 2023-02-18 RX ORDER — MAGNESIUM SULFATE 500 MG/ML
2 VIAL (ML) INJECTION ONCE
Refills: 0 | Status: COMPLETED | OUTPATIENT
Start: 2023-02-18 | End: 2023-02-18

## 2023-02-18 RX ORDER — POLYETHYLENE GLYCOL 3350 17 G/17G
17 POWDER, FOR SOLUTION ORAL DAILY
Refills: 0 | Status: DISCONTINUED | OUTPATIENT
Start: 2023-02-18 | End: 2023-02-21

## 2023-02-18 RX ORDER — SENNA PLUS 8.6 MG/1
2 TABLET ORAL AT BEDTIME
Refills: 0 | Status: DISCONTINUED | OUTPATIENT
Start: 2023-02-18 | End: 2023-02-21

## 2023-02-18 RX ORDER — MIDODRINE HYDROCHLORIDE 2.5 MG/1
10 TABLET ORAL
Refills: 0 | Status: DISCONTINUED | OUTPATIENT
Start: 2023-02-18 | End: 2023-02-19

## 2023-02-18 RX ORDER — VANCOMYCIN HCL 1 G
1000 VIAL (EA) INTRAVENOUS ONCE
Refills: 0 | Status: COMPLETED | OUTPATIENT
Start: 2023-02-18 | End: 2023-02-18

## 2023-02-18 RX ORDER — MUPIROCIN 20 MG/G
1 OINTMENT TOPICAL
Refills: 0 | Status: DISCONTINUED | OUTPATIENT
Start: 2023-02-18 | End: 2023-02-21

## 2023-02-18 RX ADMIN — MIDODRINE HYDROCHLORIDE 10 MILLIGRAM(S): 2.5 TABLET ORAL at 17:10

## 2023-02-18 RX ADMIN — SODIUM CHLORIDE 125 MILLILITER(S): 9 INJECTION, SOLUTION INTRAVENOUS at 07:13

## 2023-02-18 RX ADMIN — POLYETHYLENE GLYCOL 3350 17 GRAM(S): 17 POWDER, FOR SOLUTION ORAL at 11:48

## 2023-02-18 RX ADMIN — MIDODRINE HYDROCHLORIDE 10 MILLIGRAM(S): 2.5 TABLET ORAL at 05:11

## 2023-02-18 RX ADMIN — CEFEPIME 100 MILLIGRAM(S): 1 INJECTION, POWDER, FOR SOLUTION INTRAMUSCULAR; INTRAVENOUS at 05:11

## 2023-02-18 RX ADMIN — ENOXAPARIN SODIUM 40 MILLIGRAM(S): 100 INJECTION SUBCUTANEOUS at 12:17

## 2023-02-18 RX ADMIN — MIDODRINE HYDROCHLORIDE 10 MILLIGRAM(S): 2.5 TABLET ORAL at 11:52

## 2023-02-18 RX ADMIN — Medication 25 GRAM(S): at 02:12

## 2023-02-18 RX ADMIN — Medication 100 MILLIGRAM(S): at 11:48

## 2023-02-18 RX ADMIN — Medication 500 MILLIGRAM(S): at 11:48

## 2023-02-18 RX ADMIN — CEFEPIME 100 MILLIGRAM(S): 1 INJECTION, POWDER, FOR SOLUTION INTRAMUSCULAR; INTRAVENOUS at 21:29

## 2023-02-18 RX ADMIN — MUPIROCIN 1 APPLICATION(S): 20 OINTMENT TOPICAL at 17:10

## 2023-02-18 RX ADMIN — Medication 250 MILLIGRAM(S): at 00:45

## 2023-02-18 RX ADMIN — CEFEPIME 100 MILLIGRAM(S): 1 INJECTION, POWDER, FOR SOLUTION INTRAMUSCULAR; INTRAVENOUS at 16:26

## 2023-02-18 RX ADMIN — CHLORHEXIDINE GLUCONATE 1 APPLICATION(S): 213 SOLUTION TOPICAL at 05:25

## 2023-02-18 RX ADMIN — Medication 1 TABLET(S): at 11:48

## 2023-02-18 RX ADMIN — Medication 255 MILLIMOLE(S): at 03:45

## 2023-02-18 NOTE — DIETITIAN INITIAL EVALUATION ADULT - CONTINUE CURRENT NUTRITION CARE PLAN
keep diet liberalized due to poor PO intake to optimize calories provided.   Defer diet texture/liquid consistency to team/SLP recommendations.   Per chart, pt with a history of poor PO intake noted with pureed solids. However, RN reports pt with occasional pocketing of foods. Monitor need for SLP evaluation./yes

## 2023-02-18 NOTE — OCCUPATIONAL THERAPY INITIAL EVALUATION ADULT - PERTINENT HX OF CURRENT PROBLEM, REHAB EVAL
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 Per wife, he has been more confused and hallucinating over the past 2 weeks. Patient complains of burning on urination and left flank pain. Of note, patient had sepsis secondary to renal stones in July 2022 requiring bilateral stent placement, which were removed post stone work in August 2022.   In the ED, he has been hypothermic as low as 92.6. HR, BP, and O2 WNL. WBC 10.8, Cr 0.74. U/A positive for nitrites and leuk esterase. CT abd/pelvis shows mild left hydroureteronephrosis with an obstructing 4mm stone in the proximal ureter. Unable to obtain thorough history secondary to altered mental status.   s/p left ureteral stone with hypothermia requiring ureteral stent insertion  c/b hypothermia, bradycardia and worsening mental status

## 2023-02-18 NOTE — PROGRESS NOTE ADULT - SUBJECTIVE AND OBJECTIVE BOX
Subjective    Patient seen and examined.   Sitting in chair, speech unclear.    Objective    Vital signs  T(F): , Max: 98.6 (02-18-23 @ 03:00)  HR: 74 (02-18-23 @ 12:00)  BP: 119/83 (02-18-23 @ 12:00)  SpO2: 100% (02-18-23 @ 12:00)  Wt(kg): --    Output     02-17 @ 07:01  -  02-18 @ 07:00  --------------------------------------------------------  IN: 5390 mL / OUT: 3510 mL / NET: 1880 mL    02-18 @ 07:01  -  02-18 @ 13:10  --------------------------------------------------------  IN: 880 mL / OUT: 665 mL / NET: 215 mL        General: NAD  Abdomen: soft/non-tender/non-distended  : coronel clear yellow    Labs      02-18 @ 00:40    WBC 4.99  / Hct 32.7  / SCr --       02-18 @ 00:39    WBC --    / Hct --    / SCr 0.66       Culture - Blood (02.16.23 @ 05:18)    Specimen Source: .Blood Blood-Peripheral    Culture Results:   No growth to date.    Culture - Urine (02.16.23 @ 03:40)    Specimen Source: Clean Catch Clean Catch (Midstream)    Culture Results:   >100,000 CFU/ml Gram Negative Rods     Subjective    Patient seen and examined.   Sitting in chair, speech unclear.    Objective    Vital signs  T(F): , Max: 98.6 (02-18-23 @ 03:00)  HR: 74 (02-18-23 @ 12:00)  BP: 119/83 (02-18-23 @ 12:00)  SpO2: 100% (02-18-23 @ 12:00)  Wt(kg): --    Output     02-17 @ 07:01  -  02-18 @ 07:00  --------------------------------------------------------  IN: 5390 mL / OUT: 3510 mL / NET: 1880 mL    02-18 @ 07:01  -  02-18 @ 13:10  --------------------------------------------------------  IN: 880 mL / OUT: 665 mL / NET: 215 mL        General: NAD  Abdomen: soft/non-tender/non-distended  : coronel clear yellow  resp wnl    Labs      02-18 @ 00:40    WBC 4.99  / Hct 32.7  / SCr --       02-18 @ 00:39    WBC --    / Hct --    / SCr 0.66       Culture - Blood (02.16.23 @ 05:18)    Specimen Source: .Blood Blood-Peripheral    Culture Results:   No growth to date.    Culture - Urine (02.16.23 @ 03:40)    Specimen Source: Clean Catch Clean Catch (Midstream)    Culture Results:   >100,000 CFU/ml Gram Negative Rods

## 2023-02-18 NOTE — DIETITIAN INITIAL EVALUATION ADULT - ENERGY INTAKE
Spoke with pt RN. Pt with around ~40% breakfast consumed this morning. total assistance. RN observed pt able to wash food down with liquids, however notices pocketing with current diet on occasion. pt ability to consume food depends on pt alertness, waxes and weans per RN./Poor (<50%)

## 2023-02-18 NOTE — CONSULT NOTE ADULT - SUBJECTIVE AND OBJECTIVE BOX
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 Per wife, he has been more confused and hallucinating over the past 2 weeks. Patient complains of burning on urination and left flank pain. Of note, patient had sepsis secondary to renal stones in 2022 requiring bilateral stent placement, which were removed post stone work in 2022.     In the ED, he has been hypothermic as low as 92.6. HR, BP, and O2 WNL. WBC 10.8, Cr 0.74. U/A positive for nitrites and leuk esterase. CT abd/pelvis shows mild left hydroureteronephrosis with an obstructing 4mm stone in the proximal ureter. Unable to obtain thorough history secondary to altered mental status.     in PACU, patient has been hypothermic, continues to be altered with bradycardia. Patient subsequently became hypotensive to 80s. SICU consulted for hemodynamic monitoring and sepsis managment.     ----------  CODE STATUS:    ----------  ALLERGIES:  No Known Allergies    ----------  PAST MEDICAL & SURGICAL HISTORY:  H/O cerebellar ataxia  -diagnosed in 2019      History of hypotension      Anemia      Lacunar infarction  -chronic      Pneumonia, aspiration  -2022 (intubated for 7 days at [facility])      Adrenal insufficiency      No significant past surgical history        ----------  SOCIAL HISTORY:    ----------  FAMILY HISTORY:  FH: dementia (Mother)    FH: type 2 diabetes (Mother)    Family history of CVA (Father)      ----------  HOME MEDICATIONS:  ascorbic acid 500 mg oral tablet (30 Aug 2022 07:16)  Multiple Vitamins oral tablet (30 Aug 2022 07:16)  ocular lubricant ophthalmic solution (12 Sep 2022 11:14)    ----------    ----------  VITAL SIGNS:  T(C): 34.9 (23 @ 18:00), Max: 36.8 (23 @ 10:45)  HR: 80 (23 @ 18:00) (43 - 104)  BP: 104/56 (02-16-23 @ 18:00) (81/55 - 128/74)  ABP: --  ABP(mean): --  RR: 25 (23 @ 18:00) (16 - 25)  SpO2: 95% (23 @ 18:00) (91% - 100%)  CVP(mm Hg): --  ----------  MEDICATIONS  (STANDING):  ascorbic acid 500 milliGRAM(s) Oral daily  cefepime   IVPB 1000 milliGRAM(s) IV Intermittent every 12 hours  chlorhexidine 2% Cloths 1 Application(s) Topical <User Schedule>  heparin   Injectable 5000 Unit(s) SubCutaneous every 8 hours  lactated ringers. 1000 milliLiter(s) (125 mL/Hr) IV Continuous <Continuous>  midodrine. 5 milliGRAM(s) Oral three times a day  thiamine 100 milliGRAM(s) Oral daily    MEDICATIONS  (PRN):    ----------  NEURO    Exam: lethargic, arousal to pain stimuli      ----------  RESPIRATORY  Exam: Respirations grossly unlabored, b/l chest rise. No wheezes / rhonchi / strior / crackles.       ABG - ( 2023 15:30 )  pH, Arterial: 7.37  pH, Blood: x     /  pCO2: 52    /  pO2: 202   / HCO3: 30    / Base Excess: 3.7   /  SaO2: 99.8                  ----------  CARDIOVASCULAR    Exam: No murmurs / rubs / gallops. No CP or SOB. ____pitting edema b/l.     Cardiac Rhythm: NSR / ST on telemetry in sicu  Cardiac Rhythm: AF, rate controlled on telemetry in sicu.           midodrine. 5 milliGRAM(s) Oral three times a day    ----------  GI/NUTRITION    Exam: SNTND    Diet: NPO        ----------  GENITOURINARY/RENAL    Exam: Barnett catheter in place    ascorbic acid 500 milliGRAM(s) Oral daily  lactated ringers. 1000 milliLiter(s) IV Continuous <Continuous>  thiamine 100 milliGRAM(s) Oral daily       @ 07:01  -   @ 18:09  --------------------------------------------------------  IN: 1120 mL / OUT: 1100 mL / NET: 20 mL      Weight (kg): 72.6 ( @ 07:04)      138  |  104  |  12  ----------------------------<  119<H>  4.3   |  25  |  0.49<L>    Ca    9.8      2023 15:37  Phos  3.2       Mg     1.7         TPro  8.3  /  Alb  4.2  /  TBili  0.4  /  DBili  x   /  AST  11  /  ALT  11  /  AlkPhos  88  02-15  Urinalysis Basic - ( 2023 03:40 )    Color: Yellow / Appearance: Slightly Turbid / S.017 / pH: x  Gluc: x / Ketone: Negative  / Bili: Negative / Urobili: Negative   Blood: x / Protein: 100 mg/dL / Nitrite: Positive   Leuk Esterase: Large / RBC: 9 /hpf /  /HPF   Sq Epi: x / Non Sq Epi: 1 /hpf / Bacteria: Moderate        [x ] Barnett catheter, indication: urine output monitoring in critically ill patient  ----------  HEMATOLOGIC  Transfusion: [ ] No transfusion in past 24h.  [ ] PRBC	[ ] Platelets	[ ] FFP	[ ] Cryoprecipitate  VTE Prophylaxis: heparin   Injectable 5000 Unit(s) SubCutaneous every 8 hours    VTE Prophylaxis: No VTE ppx given pt is therapeutically anticoagulated / has had evidence of recent hemorrhage.                        12.1   .  )-----------( 187      ( 2023 15:37 )             36.2       ----------  INFECTIOUS DISEASES  cefepime   IVPB 1000 milliGRAM(s) IV Intermittent every 12 hours                          12.1   7.  )-----------( 187      ( 2023 15:37 )             36.2       ----------  ENDOCRINE    CAPILLARY BLOOD GLUCOSE      ----------  TUBES AND LINES:  [x] Peripheral IV  [ ] Central Venous Line	[ ] R	[ ] L	[ ] IJ	        [ ] Fem	[ ] SC	Placed:   [ ] Arterial Line		        [ ] R	[ ] L	[ ] Fem	[ ] Rad	[ ] Ax	Placed:   [ ] PICC:					[ ] Mediport  [ ] Urinary Catheter                                                                             Placed:   [ ] Chest Tube                       [ ] R [ ] L                   Output: __cc/24h    [x] Necessity of urinary, arterial, and venous catheters discussed with SICU team on rounds.    ----------  OTHER MEDICATIONS:   chlorhexidine 2% Cloths 1 Application(s) Topical <User Schedule>    ----------  IMAGING STUDIES:  PACS Image: Image(s) Available (23 @ 01:28)  PACS Image: Image(s) Available (23 @ 00:42)  PACS Image: Image(s) Available (23 @ 00:41)      
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 Per wife, he has been more confused and hallucinating over the past 2 weeks. Patient complains of burning on urination and left flank pain. Of note, patient had sepsis secondary to renal stones in July 2022 requiring bilateral stent placement, which were removed post stone work in August 2022.     In the ED, he has been hypothermic as low as 92.6. HR, BP, and O2 WNL. WBC 10.8, Cr 0.74. U/A positive for nitrites and leuk esterase. CT abd/pelvis shows mild left hydroureteronephrosis with an obstructing 4mm stone in the proximal ureter. Unable to obtain thorough history secondary to altered mental status.      (16 Feb 2023 05:40)    Podiatry consulted for left heel DTI.    PAST MEDICAL & SURGICAL HISTORY:  H/O cerebellar ataxia  -diagnosed in 2019      History of hypotension      Anemia      Lacunar infarction  -chronic      Pneumonia, aspiration  -april 2022 (intubated for 7 days at [facility])      Adrenal insufficiency      No significant past surgical history          MEDICATIONS  (STANDING):  ascorbic acid 500 milliGRAM(s) Oral daily  cefepime   IVPB 1000 milliGRAM(s) IV Intermittent every 8 hours  chlorhexidine 2% Cloths 1 Application(s) Topical <User Schedule>  coronavirus bivalent (EUA) Booster Vaccine (PFIZER) 0.3 milliLiter(s) IntraMuscular once  enoxaparin Injectable 40 milliGRAM(s) SubCutaneous every 24 hours  midodrine. 10 milliGRAM(s) Oral <User Schedule>  multivitamin 1 Tablet(s) Oral daily  mupirocin 2% Nasal 1 Application(s) Both Nostrils two times a day  polyethylene glycol 3350 17 Gram(s) Oral daily  senna 2 Tablet(s) Oral at bedtime  thiamine 100 milliGRAM(s) Oral daily    MEDICATIONS  (PRN):  acetaminophen     Tablet .. 975 milliGRAM(s) Oral every 6 hours PRN Mild Pain (1 - 3)      Allergies    No Known Allergies    Intolerances        VITALS:    Vital Signs Last 24 Hrs  T(C): 36.2 (18 Feb 2023 12:00), Max: 37 (18 Feb 2023 03:00)  T(F): 97.2 (18 Feb 2023 12:00), Max: 98.6 (18 Feb 2023 03:00)  HR: 74 (18 Feb 2023 12:00) (51 - 83)  BP: 119/83 (18 Feb 2023 12:00) (82/50 - 133/64)  BP(mean): 97 (18 Feb 2023 12:00) (61 - 97)  RR: 17 (18 Feb 2023 12:00) (10 - 21)  SpO2: 100% (18 Feb 2023 12:00) (93% - 100%)    Parameters below as of 18 Feb 2023 11:00  Patient On (Oxygen Delivery Method): room air        LABS:                          10.3   4.99  )-----------( 172      ( 18 Feb 2023 00:40 )             32.7       02-18    142  |  105  |  9   ----------------------------<  80  4.0   |  29  |  0.66    Ca    9.4      18 Feb 2023 00:39  Phos  2.5     02-18  Mg     1.8     02-18        CAPILLARY BLOOD GLUCOSE          PT/INR - ( 17 Feb 2023 00:41 )   PT: 13.4 sec;   INR: 1.16 ratio         PTT - ( 17 Feb 2023 00:41 )  PTT:30.3 sec    LOWER EXTREMITY PHYSICAL EXAM:    Vasular: DP 2/4 PT 0/4, B/L, CFT <3 seconds B/L, Temperature gradient WNL, B/L, bilat lower extremity edema noted.   Neuro: unable to assess  Musculoskeletal/Ortho: no gross deformities noted at this time  Skin: left heel DTI secondary to pressure present on admission, no signs of infection in feet bilat  
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 Per wife, he has been more confused and hallucinating over the past 2 weeks. Patient complains of burning on urination and left flank pain. Of note, patient had sepsis secondary to renal stones in July 2022 requiring bilateral stent placement, which were removed post stone work in August 2022.   In the ED, he has been hypothermic as low as 92.6. HR, BP, and O2 WNL. WBC 10.8, Cr 0.74. U/A positive for nitrites and leuk esterase. CT abd/pelvis shows mild left hydroureteronephrosis with an obstructing 4mm stone in the proximal ureter. Unable to obtain thorough history secondary to altered mental status.    (16 Feb 2023 05:40)     ID consulted for antibiotic management, s/p ureteral stent placement for septic obstructing stone on 2/16    REVIEW OF SYSTEMS  [  ] ROS unobtainable because:    [ x ] All other systems negative except as noted below    Constitutional:  [ ] fever [ ] chills  [ ] weight loss  [ ]night sweat  [ ]poor appetite/PO intake [ ]fatigue   Skin:  [ ] rash [ ] phlebitis	  Eyes: [ ] icterus [ ] pain  [ ] discharge	  ENMT: [ ] sore throat  [ ] thrush [ ] ulcers [ ] exudates [ ]anosmia  Respiratory: [ ] dyspnea [ ] hemoptysis [ ] cough [ ] sputum	  Cardiovascular:  [ ] chest pain [ ] palpitations [ ] edema	  Gastrointestinal:  [ ] nausea [ ] vomiting [ ] diarrhea [ ] constipation [ ] pain	  Genitourinary:  [ ] dysuria [ ] frequency [ ] hematuria [ ] discharge [ ] flank pain  [ ] incontinence  Musculoskeletal:  [ ] myalgias [ ] arthralgias [ ] arthritis  [ ] back pain  Neurological:  [ ] headache [ ] weakness [ ] seizures  [ ] confusion/altered mental status    prior hospital charts reviewed [V]  primary team notes reviewed [V]  other consultant notes reviewed [V]    PAST MEDICAL & SURGICAL HISTORY:  H/O cerebellar ataxia  -diagnosed in 2019    History of hypotension    Anemia    Lacunar infarction  -chronic    Pneumonia, aspiration  -april 2022 (intubated for 7 days at [facility])    Adrenal insufficiency    No significant past surgical history      FAMILY HISTORY:  FH: dementia (Mother)    FH: type 2 diabetes (Mother)    Family history of CVA (Father)      SOCIAL HISTORY:  No smoking hx    Allergies  No Known Allergies    ANTIMICROBIALS:  cefepime   IVPB 1000 every 8 hours    ANTIMICROBIALS (past 90 days):   MEDICATIONS  (STANDING):  cefepime   IVPB   100 mL/Hr IV Intermittent (02-16-23 @ 00:58)    cefepime   IVPB   100 mL/Hr IV Intermittent (02-16-23 @ 18:45)    cefepime   IVPB   100 mL/Hr IV Intermittent (02-18-23 @ 05:11)   100 mL/Hr IV Intermittent (02-17-23 @ 21:47)   100 mL/Hr IV Intermittent (02-17-23 @ 13:03)    vancomycin  IVPB   250 mL/Hr IV Intermittent (02-18-23 @ 00:45)      MEDICATIONS  (STANDING):  acetaminophen     Tablet .. 975 every 6 hours PRN  coronavirus bivalent (EUA) Booster Vaccine (PFIZER) 0.3 once  enoxaparin Injectable 40 every 24 hours  midodrine. 10 <User Schedule>  polyethylene glycol 3350 17 daily  senna 2 at bedtime      VITALS:  Vital Signs Last 24 Hrs  T(F): 97.2 (02-18-23 @ 12:00), Max: 98.8 (02-16-23 @ 21:00)  Vital Signs Last 24 Hrs  HR: 74 (02-18-23 @ 12:00) (51 - 83)  BP: 119/83 (02-18-23 @ 12:00) (82/50 - 133/64)  RR: 17 (02-18-23 @ 12:00)  SpO2: 100% (02-18-23 @ 12:00) (93% - 100%)  Wt(kg): --    PHYSICAL EXAM:  Constitutional: NAD  HEAD/EYES: anicteric, no conjunctival injection  ENT:  supple, no thrush  Cardiovascular:   +S1/S2  Respiratory:  +BS bilaterally  GI:  soft, +bowel sounds  :  +coronel  Musculoskeletal:  no synovitis  Neurologic: awake  Skin:  no rash, no phlebitis  Heme/Onc: no lymphadenopathy   Psychiatric:  calm    Labs:                        10.3   4.99  )-----------( 172      ( 18 Feb 2023 00:40 )             32.7     02-18    142  |  105  |  9   ----------------------------<  80  4.0   |  29  |  0.66    Ca    9.4      18 Feb 2023 00:39  Phos  2.5     02-18  Mg     1.8     02-18      WBC Trend:  WBC Count: 4.99 (02-18-23 @ 00:40)  WBC Count: 7.19 (02-17-23 @ 00:41)  WBC Count: 7.88 (02-16-23 @ 18:33)  WBC Count: 7.96 (02-16-23 @ 15:37)    Auto Neutrophil #: 8.62 K/uL (02-15-23 @ 21:53)  Auto Neutrophil #: 5.31 K/uL (09-09-22 @ 19:32)  Band Neutrophils %: 0.9 % (09-09-22 @ 19:32)  Auto Neutrophil #: 18.62 K/uL (09-01-22 @ 12:09)  Band Neutrophils %: 14.0 % (09-01-22 @ 12:09)    Auto Eosinophil %: 1.3 % (02-15-23 @ 21:53)    MICROBIOLOGY:  MRSA PCR Result.: Detected (02-17-23 @ 13:04)  MRSA PCR Result.: Detected (08-28-22 @ 19:08)  MRSA PCR Result.: NotDetec (04-23-22 @ 09:39)  MRSA PCR Result.: NotDetec (04-21-22 @ 00:50)    Culture - Blood (collected 16 Feb 2023 05:18)  Source: .Blood Blood-Peripheral  Preliminary Report:    No growth to date.    Culture - Urine (collected 16 Feb 2023 03:40)  Source: Clean Catch Clean Catch (Midstream)  Preliminary Report:    >100,000 CFU/ml Gram Negative Rods    Culture - Blood (collected 15 Feb 2023 21:42)  Source: .Blood Blood-Peripheral  Preliminary Report:    No growth to date.    Culture - Blood (collected 09 Sep 2022 19:32)  Source: .Blood Blood-Peripheral  Final Report:    No Growth Final    Culture - Blood (collected 09 Sep 2022 19:32)  Source: .Blood Blood-Peripheral  Final Report:    No Growth Final    Culture - Blood (collected 06 Sep 2022 07:51)  Source: .Blood Blood  Final Report:    No Growth Final    Culture - Blood (collected 06 Sep 2022 07:38)  Source: .Blood Blood  Final Report:    No Growth Final    Culture - Blood (collected 04 Sep 2022 13:16)  Source: .Blood Blood  Final Report:    No Growth Final    Culture - Blood (collected 04 Sep 2022 13:16)  Source: .Blood Blood  Final Report:    No Growth Final    Culture - Blood (collected 02 Sep 2022 06:51)  Source: .Blood Blood-Peripheral  Final Report:    No Growth Final    HIV-1/2 Combo Result: Nonreact (04-22-22 @ 09:20)  HIV-1/2 Combo Result: Nonreact (04-19-22 @ 09:16)    RADIOLOGY:  imaging below personally reviewed    < from: Xray Chest 1 View- PORTABLE-Urgent (Xray Chest 1 View- PORTABLE-Urgent .) (02.16.23 @ 01:28) >  IMPRESSION:  No focal lung consolidation  < end of copied text >

## 2023-02-18 NOTE — DIETITIAN INITIAL EVALUATION ADULT - PERTINENT LABORATORY DATA
02-18    142  |  105  |  9   ----------------------------<  80  4.0   |  29  |  0.66    Ca    9.4      18 Feb 2023 00:39  Phos  2.5     02-18  Mg     1.8     02-18    A1C with Estimated Average Glucose Result: 5.3 % (08-09-22 @ 16:44)  A1C with Estimated Average Glucose Result: 5.6 % (07-17-22 @ 09:25)  A1C with Estimated Average Glucose Result: 5.5 % (04-18-22 @ 09:56)

## 2023-02-18 NOTE — PROGRESS NOTE ADULT - ASSESSMENT
62 year old male s/p left ureteral stent, POD#2; c/b hypothermia, bradycardia and worsening mental status     -analgesia as needed  -f/u blood, kidney, and urine cultures  -continue cefepime  -monitor mental status  -DVT prophylaxis    Patient seen and examined by Dr. Hoenig    [facility]  [address] [place] [address], [address]  [place] [id number] [phone]

## 2023-02-18 NOTE — CONSULT NOTE ADULT - ASSESSMENT
61 y/o M PMHx progressive cerebellar ataxia, lacunar infarcts, chronic hypotension on midodrine, and nephrolithiasis, presented with AMS. Found to have L hydroureter with obsructing L ureteral stone, now s/p emergent stent placement by Urology on 2/16. Post-procedural course c/b hypothermia, AMS, and bradycardia, s/p transfer to SICU for close monitoring. ID consulted for further management.    Afebrile  WBC 5K  Urine Culture >100k GNRs  Blood Cultures no growth    Impression:  #Urosepsis, Obstructive Uropathy  S/p ureteral stent placement 2/16  Clinically improving, UCx growing GNRs    Recs:  - c/w cefepime 1g q8H pending urine culture results  - f/u urine culture speciation/sensitivities  - monitor temp, WBCs      Kyle Aponte MD  Infectious Disease Fellow  Available on [employer] Teams  Before 9AM or after 5PM: [phone]

## 2023-02-18 NOTE — DIETITIAN INITIAL EVALUATION ADULT - ORAL INTAKE PTA/DIET HISTORY
visited pt at bedside this morning. disoriented to time, place, situation and with garbled speech per nursing. no family at bedside. Per Dietitian Initial Assessment 8/29/23: "Pt reports good appetite and PO intake at home, not following any type of diet or restriction at home. Wife reports pt was on pureed diet until 3 weeks ago when he was upgraded to regular (spoke to ACP); states pt was "eating little" while on pureed diet. Pt confirms NKFA. Reports taking Multivitamin, Vitamin C, and Vitamin B1 PTA; states drinking Ensure High Protein 2xday."

## 2023-02-18 NOTE — PHYSICAL THERAPY INITIAL EVALUATION ADULT - PERTINENT HX OF CURRENT PROBLEM, REHAB EVAL
62y M w/ pmhx of UTI, cerebral ataxia, lacunar infraction, Chronic Hypotension on Midodrine and history of renal stones presents with altered mental status. CT abd pelvis showed 4mm L proximal ureteral septic stone. S/p L ureteral stent by urology 2/16. CTH- Stable exam. No acute intracranial hemorrhage, vasogenic edema or extra-axial collection. Stable mild ventriculomegaly and chronic   microvascular changes.

## 2023-02-18 NOTE — DIETITIAN INITIAL EVALUATION ADULT - REASON FOR ADMISSION
Calculus of kidney  .  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 Per wife, he has been more confused and hallucinating over the past 2 weeks. Patient complains of burning on urination and left flank pain. Of note, patient had sepsis secondary to renal stones in July 2022 requiring bilateral stent placement, which were removed post stone work in August 2022."

## 2023-02-18 NOTE — OCCUPATIONAL THERAPY INITIAL EVALUATION ADULT - ADDITIONAL COMMENTS
Pt poor historian; as per daughter Patient lives in pvt house with spouse and son, 4 steps to enter, no steps inside.  pt has son as his HHA 10 hrs. Pt has not been ambulatory since March 2022 2/2 multiple hospitalizations, non ambulatory since then, transfers from <>bed w/ assist from son. pt owns RW, cane ,commode, w/c, shower chair, hospital bed, received a claudia lift, however returned due to space issue.

## 2023-02-18 NOTE — CONSULT NOTE ADULT - ASSESSMENT
63 y/o male pt with left heel DTI  - pt seen and evaluated  - left heel deep tissue injury present on admission  - rec z flow boots in bed at all times  - leave left heel open to air and paint with betadine every other day  - will monitor periodically during this admission, reconsult sooner as needed 63 y/o male pt with left heel DTI  - pt seen and evaluated  - left heel deep tissue injury present on admission  - rec z flow boots in bed at all times  - rec painting left heel with betadine every other day and covering with allevyn foam  - will monitor periodically during this admission, reconsult sooner as needed

## 2023-02-18 NOTE — DIETITIAN INITIAL EVALUATION ADULT - ADD RECOMMEND
1) Will continue to monitor PO intake, weight, labs, skin, GI status and diet 2) Nutrition risk placed in chart 3) Consider adding Ensure Plus High Protein twice/daily to aid in meeting nutrient needs 4) Continue vitamin C, Multivitamin and thiamin per team. will aid in wound healing.

## 2023-02-18 NOTE — DIETITIAN INITIAL EVALUATION ADULT - NSFNSPHYEXAMSKINFT_GEN_A_CORE
per flow sheets:  sacral spine suspected deep tissue injury   left and right buttocks suspected deep tissue injury

## 2023-02-18 NOTE — DIETITIAN INITIAL EVALUATION ADULT - REASON INDICATOR FOR ASSESSMENT
consulted for pressure injury stage 2 or >. information obtained from RN, electronic medical record, previous RD notes

## 2023-02-18 NOTE — DIETITIAN NUTRITION RISK NOTIFICATION - TREATMENT: THE FOLLOWING DIET HAS BEEN RECOMMENDED
Diet, Regular (02-16-23 @ 09:07) [Active]      see dietitian initial evaluation for further recommendations

## 2023-02-18 NOTE — CONSULT NOTE ADULT - ATTENDING COMMENTS
62-year-old male with a past medical history most significant for cerebellar ataxia, chronic lacunar infarcts and leptomeningeal enhancement, nephrolithiasis who was admitted to the hospital due to altered mental status.    Patient reported by wife to have become more confused past 2 weeks prior to admission.  In addition, noted to be hallucinating.  Patient does complain of dysuria as well as flank pain.  Patient with history of nephrolithiasis causing sepsis most recently in July 2022.  Bilateral stent placement was done during this episode, removed in August 2022.    In the ED, hypothermic, otherwise hemodynamically stable.  Labs show leukocytosis of 10.1, CMP with creatinine within normal limits.  Urinalysis shows evidence for infection with pyuria, moderate bacteria positive nitrites and leukocyte esterase.  Blood cultures were obtained and have no growth to date.  Urine culture growing Pseudomonas.  Sensitivities pending.  CT abdomen pelvis shows mild left-sided hydroureteronephrosis with an obstructing calculus in the proximal ureter.  CT head with no acute intracranial findings.  Patient started on cefepime.    Impression  #Abnormal imaging of the abdomen, nephrolithiasis with left-sided hydroureteronephrosis  #Pyelonephritis/UTI, Pseudomonas growth  #Leukocytosis, resolved    Recommendations  Continue cefepime given Pseudomonas growth in urine culture  Urology evaluation given CT findings  Follow pending blood cultures  Follow positive urine culture for sensitivities of Pseudomonas  Follow fever curve and WBC count    Candido Forman MD  Division of Infectious Diseases

## 2023-02-18 NOTE — DIETITIAN INITIAL EVALUATION ADULT - PHYSCIAL ASSESSMENT
Per dietitian initial evaluation 8/29/22: "Wife reports pt was ~150 pounds approximately 2 months ago"  Current dosing weight 155.8 pounds.   RD will continue to monitor trends.  .  Limited Nutrition focused physical exam obtained, pt with difficulty following commands at time of writer visit.

## 2023-02-18 NOTE — DIETITIAN INITIAL EVALUATION ADULT - PERTINENT MEDS FT
MEDICATIONS  (STANDING):  ascorbic acid 500 milliGRAM(s) Oral daily  cefepime   IVPB 1000 milliGRAM(s) IV Intermittent every 8 hours  chlorhexidine 2% Cloths 1 Application(s) Topical <User Schedule>  coronavirus bivalent (EUA) Booster Vaccine (PFIZER) 0.3 milliLiter(s) IntraMuscular once  enoxaparin Injectable 40 milliGRAM(s) SubCutaneous every 24 hours  midodrine. 10 milliGRAM(s) Oral <User Schedule>  multivitamin 1 Tablet(s) Oral daily  mupirocin 2% Nasal 1 Application(s) Both Nostrils two times a day  polyethylene glycol 3350 17 Gram(s) Oral daily  senna 2 Tablet(s) Oral at bedtime  thiamine 100 milliGRAM(s) Oral daily    MEDICATIONS  (PRN):  acetaminophen     Tablet .. 975 milliGRAM(s) Oral every 6 hours PRN Mild Pain (1 - 3)

## 2023-02-18 NOTE — PHYSICAL THERAPY INITIAL EVALUATION ADULT - ADDITIONAL COMMENTS
Patient lives in pvt house with spouse and son, 4   steps to enter, no steps inside.  pt has son as his HHA 10 hrs. up unitl March2022, pt ambulated w/o AD.later multiple hospital admissions and admitted in rehab. Non ambulatory since then. Currently admitted from home. son assist with transfers bed/chair. pt owns rw,  standard cane , bed sidecommode, w/c, shower chair, hospital bed, received a claudia lift, however returned due to space issue.

## 2023-02-18 NOTE — PHYSICAL THERAPY INITIAL EVALUATION ADULT - NSPTDISCHREC_GEN_A_CORE
If pt. to d/c home, would recommend home with PT and assist for all functional mobility. DME- Vicente lift./[facility]-acute Rehab

## 2023-02-18 NOTE — OCCUPATIONAL THERAPY INITIAL EVALUATION ADULT - EATING, PREVIOUS LEVEL OF FUNCTION, OT EVAL
[Other Location: e.g. School (Enter Location, City,State)___] : at [unfilled], at the time of the visit. [Medical Office: ([facility])___] : at the medical office located in  [Verbal consent obtained from patient] : the patient, [unfilled] needed assist

## 2023-02-18 NOTE — PROGRESS NOTE ADULT - SUBJECTIVE AND OBJECTIVE BOX
SICU Daily Progress Note  =====================================================  Interval/Overnight Events:            Allergies: No Known Allergies      MEDICATIONS:   --------------------------------------------------------------------------------------  Neurologic Medications  acetaminophen     Tablet .. 975 milliGRAM(s) Oral every 6 hours PRN Mild Pain (1 - 3)    Respiratory Medications    Cardiovascular Medications  midodrine. 10 milliGRAM(s) Oral every 8 hours  phenylephrine    Infusion 0.1 MICROgram(s)/kG/Min IV Continuous <Continuous>    Gastrointestinal Medications  ascorbic acid 500 milliGRAM(s) Oral daily  lactated ringers. 1000 milliLiter(s) IV Continuous <Continuous>  multivitamin 1 Tablet(s) Oral daily  sodium phosphate 15 milliMole(s)/250 mL IVPB 15 milliMole(s) IV Intermittent once  thiamine 100 milliGRAM(s) Oral daily    Genitourinary Medications    Hematologic/Oncologic Medications  coronavirus bivalent (EUA) Booster Vaccine (PFIZER) 0.3 milliLiter(s) IntraMuscular once  enoxaparin Injectable 40 milliGRAM(s) SubCutaneous every 24 hours    Antimicrobial/Immunologic Medications  cefepime   IVPB 1000 milliGRAM(s) IV Intermittent every 8 hours    Endocrine/Metabolic Medications    Topical/Other Medications  chlorhexidine 2% Cloths 1 Application(s) Topical <User Schedule>    --------------------------------------------------------------------------------------    VITAL SIGNS, INS/OUTS (last 24 hours):  --------------------------------------------------------------------------------------  T(C): 37 (23 @ 03:00), Max: 37 (23 @ 05:00)  HR: 66 (23 @ 03:00) (51 - 104)  BP: 105/64 (23 @ 03:00) (82/50 - 114/73)  RR: 17 (23 @ 03:00) (10 - 24)  SpO2: 100% (23 @ 03:00) (93% - 100%)    23 @ 07:01  -  23 @ 07:00  --------------------------------------------------------  IN: 3795 mL / OUT: 3380 mL / NET: 415 mL    23 @ 07:01  -  23 @ 03:32  --------------------------------------------------------  IN: 4350 mL / OUT: 2485 mL / NET: 1865 mL      --------------------------------------------------------------------------------------    EXAM  NEUROLOGY  Exam: Normal, NAD, alert, oriented x3, no focal deficits.    HEENT  Exam: Normocephalic, atraumatic, EOMI.     RESPIRATORY  Exam: Normal expansion/effort.  Mechanical Ventilation:     CARDIOVASCULAR  Exam: Regular rate and rhythm.       GI/NUTRITION  Exam: Abdomen soft, Non-tender, Non-distended.     VASCULAR  Exam: Extremities warm, pink, well-perfused.     MUSCULOSKELETAL  Exam: All extremities moving spontaneously without limitations.     SKIN  Exam: Good skin turgor, no skin breakdown.       LABS  --------------------------------------------------------------------------------------                        10.3   4.99  )-----------( 172      ( 2023 00:40 )             32.7   -    142  |  105  |  9   ----------------------------<  80  4.0   |  29  |  0.66    Ca    9.4      2023 00:39  Phos  2.5       Mg     1.8     18    ABG - ( 2023 18:20 )  pH, Arterial: 7.40  pH, Blood: x     /  pCO2: 46    /  pO2: 227   / HCO3: 28    / Base Excess: 3.0   /  SaO2: 99.1            Urinalysis Basic - ( 2023 03:40 )    Color: Yellow / Appearance: Slightly Turbid / S.017 / pH: x  Gluc: x / Ketone: Negative  / Bili: Negative / Urobili: Negative   Blood: x / Protein: 100 mg/dL / Nitrite: Positive   Leuk Esterase: Large / RBC: 9 /hpf /  /HPF   Sq Epi: x / Non Sq Epi: 1 /hpf / Bacteria: Moderate    PT/INR - ( 2023 00:41 )   PT: 13.4 sec;   INR: 1.16 ratio         PTT - ( 2023 00:41 )  PTT:30.3 sec  --------------------------------------------------------------------------------------

## 2023-02-18 NOTE — PROGRESS NOTE ADULT - ASSESSMENT
62M s/p left ureteral stone with hypothermia requiring ureteral stent insertion  c/b hypothermia, bradycardia and worsening mental status     PLAN:    NEURO:  - A&Ox2, following commands  - tylenol prn for pain     RESPIRATORY:   - maintain Sat O2 >90%  - requiring NC    CARDIOVASCULAR:  - maintain MAP >65  - Increase midodrine to 10mg Q8h    GI/NUTRITION:  - Regular diet    GENITOURINARY/RENAL:  - Barnett in place  - Give 500cc LR bolus for soft pressures  - Maintain LR@100cc/hr IVF  - S/p L ureteral stent by urology    HEMATOLOGIC:  - HSQ DVT ppx    INFECTIOUS DISEASE: presented with urosepsis  - 2/15 Bcx NGTD, f/u repeat Bcx and Ucx  - monitor WBC  - c/w cefepime, increase dose to 1g q8h (2/16- )    ENDOCRINE:  - monitor glucose levels on BMP    Dispo: SICU

## 2023-02-19 LAB
ANION GAP SERPL CALC-SCNC: 11 MMOL/L — SIGNIFICANT CHANGE UP (ref 5–17)
BUN SERPL-MCNC: 9 MG/DL — SIGNIFICANT CHANGE UP (ref 7–23)
CALCIUM SERPL-MCNC: 9.6 MG/DL — SIGNIFICANT CHANGE UP (ref 8.4–10.5)
CHLORIDE SERPL-SCNC: 97 MMOL/L — SIGNIFICANT CHANGE UP (ref 96–108)
CO2 SERPL-SCNC: 27 MMOL/L — SIGNIFICANT CHANGE UP (ref 22–31)
CREAT SERPL-MCNC: 0.63 MG/DL — SIGNIFICANT CHANGE UP (ref 0.5–1.3)
EGFR: 108 ML/MIN/1.73M2 — SIGNIFICANT CHANGE UP
GLUCOSE SERPL-MCNC: 99 MG/DL — SIGNIFICANT CHANGE UP (ref 70–99)
HCT VFR BLD CALC: 35.8 % — LOW (ref 39–50)
HGB BLD-MCNC: 11.6 G/DL — LOW (ref 13–17)
MAGNESIUM SERPL-MCNC: 1.8 MG/DL — SIGNIFICANT CHANGE UP (ref 1.6–2.6)
MCHC RBC-ENTMCNC: 28.1 PG — SIGNIFICANT CHANGE UP (ref 27–34)
MCHC RBC-ENTMCNC: 32.4 GM/DL — SIGNIFICANT CHANGE UP (ref 32–36)
MCV RBC AUTO: 86.7 FL — SIGNIFICANT CHANGE UP (ref 80–100)
NRBC # BLD: 0 /100 WBCS — SIGNIFICANT CHANGE UP (ref 0–0)
PHOSPHATE SERPL-MCNC: 3 MG/DL — SIGNIFICANT CHANGE UP (ref 2.5–4.5)
PLATELET # BLD AUTO: 196 K/UL — SIGNIFICANT CHANGE UP (ref 150–400)
POTASSIUM SERPL-MCNC: 3.8 MMOL/L — SIGNIFICANT CHANGE UP (ref 3.5–5.3)
POTASSIUM SERPL-SCNC: 3.8 MMOL/L — SIGNIFICANT CHANGE UP (ref 3.5–5.3)
RBC # BLD: 4.13 M/UL — LOW (ref 4.2–5.8)
RBC # FLD: 14 % — SIGNIFICANT CHANGE UP (ref 10.3–14.5)
SODIUM SERPL-SCNC: 135 MMOL/L — SIGNIFICANT CHANGE UP (ref 135–145)
WBC # BLD: 5.27 K/UL — SIGNIFICANT CHANGE UP (ref 3.8–10.5)
WBC # FLD AUTO: 5.27 K/UL — SIGNIFICANT CHANGE UP (ref 3.8–10.5)

## 2023-02-19 PROCEDURE — 99232 SBSQ HOSP IP/OBS MODERATE 35: CPT

## 2023-02-19 PROCEDURE — 99231 SBSQ HOSP IP/OBS SF/LOW 25: CPT

## 2023-02-19 RX ORDER — MIDODRINE HYDROCHLORIDE 2.5 MG/1
10 TABLET ORAL
Refills: 0 | Status: DISCONTINUED | OUTPATIENT
Start: 2023-02-19 | End: 2023-02-21

## 2023-02-19 RX ORDER — CEFEPIME 1 G/1
1000 INJECTION, POWDER, FOR SOLUTION INTRAMUSCULAR; INTRAVENOUS EVERY 8 HOURS
Refills: 0 | Status: DISCONTINUED | OUTPATIENT
Start: 2023-02-19 | End: 2023-02-21

## 2023-02-19 RX ORDER — METOCLOPRAMIDE HCL 10 MG
10 TABLET ORAL ONCE
Refills: 0 | Status: COMPLETED | OUTPATIENT
Start: 2023-02-19 | End: 2023-02-19

## 2023-02-19 RX ORDER — POTASSIUM CHLORIDE 20 MEQ
40 PACKET (EA) ORAL ONCE
Refills: 0 | Status: DISCONTINUED | OUTPATIENT
Start: 2023-02-19 | End: 2023-02-19

## 2023-02-19 RX ORDER — POTASSIUM CHLORIDE 20 MEQ
40 PACKET (EA) ORAL ONCE
Refills: 0 | Status: COMPLETED | OUTPATIENT
Start: 2023-02-19 | End: 2023-02-19

## 2023-02-19 RX ORDER — MAGNESIUM SULFATE 500 MG/ML
2 VIAL (ML) INJECTION ONCE
Refills: 0 | Status: COMPLETED | OUTPATIENT
Start: 2023-02-19 | End: 2023-02-19

## 2023-02-19 RX ORDER — ONDANSETRON 8 MG/1
4 TABLET, FILM COATED ORAL ONCE
Refills: 0 | Status: COMPLETED | OUTPATIENT
Start: 2023-02-19 | End: 2023-02-19

## 2023-02-19 RX ORDER — SIMETHICONE 80 MG/1
80 TABLET, CHEWABLE ORAL EVERY 8 HOURS
Refills: 0 | Status: DISCONTINUED | OUTPATIENT
Start: 2023-02-19 | End: 2023-02-21

## 2023-02-19 RX ADMIN — Medication 10 MILLIGRAM(S): at 16:15

## 2023-02-19 RX ADMIN — ONDANSETRON 4 MILLIGRAM(S): 8 TABLET, FILM COATED ORAL at 16:15

## 2023-02-19 RX ADMIN — MUPIROCIN 1 APPLICATION(S): 20 OINTMENT TOPICAL at 05:05

## 2023-02-19 RX ADMIN — Medication 500 MILLIGRAM(S): at 11:11

## 2023-02-19 RX ADMIN — ENOXAPARIN SODIUM 40 MILLIGRAM(S): 100 INJECTION SUBCUTANEOUS at 11:12

## 2023-02-19 RX ADMIN — CHLORHEXIDINE GLUCONATE 1 APPLICATION(S): 213 SOLUTION TOPICAL at 05:05

## 2023-02-19 RX ADMIN — MUPIROCIN 1 APPLICATION(S): 20 OINTMENT TOPICAL at 18:11

## 2023-02-19 RX ADMIN — MIDODRINE HYDROCHLORIDE 10 MILLIGRAM(S): 2.5 TABLET ORAL at 05:05

## 2023-02-19 RX ADMIN — Medication 1 TABLET(S): at 11:10

## 2023-02-19 RX ADMIN — CEFEPIME 100 MILLIGRAM(S): 1 INJECTION, POWDER, FOR SOLUTION INTRAMUSCULAR; INTRAVENOUS at 13:18

## 2023-02-19 RX ADMIN — MIDODRINE HYDROCHLORIDE 10 MILLIGRAM(S): 2.5 TABLET ORAL at 11:10

## 2023-02-19 RX ADMIN — POLYETHYLENE GLYCOL 3350 17 GRAM(S): 17 POWDER, FOR SOLUTION ORAL at 11:10

## 2023-02-19 RX ADMIN — Medication 100 MILLIGRAM(S): at 11:10

## 2023-02-19 RX ADMIN — CEFEPIME 100 MILLIGRAM(S): 1 INJECTION, POWDER, FOR SOLUTION INTRAMUSCULAR; INTRAVENOUS at 22:39

## 2023-02-19 RX ADMIN — SIMETHICONE 80 MILLIGRAM(S): 80 TABLET, CHEWABLE ORAL at 20:02

## 2023-02-19 RX ADMIN — Medication 25 GRAM(S): at 02:04

## 2023-02-19 RX ADMIN — CEFEPIME 100 MILLIGRAM(S): 1 INJECTION, POWDER, FOR SOLUTION INTRAMUSCULAR; INTRAVENOUS at 05:03

## 2023-02-19 RX ADMIN — Medication 40 MILLIEQUIVALENT(S): at 05:05

## 2023-02-19 NOTE — PROGRESS NOTE ADULT - ASSESSMENT
63 y/o M PMHx progressive cerebellar ataxia, lacunar infarcts, chronic hypotension on midodrine, and nephrolithiasis, presented with AMS. Found to have L hydroureter with obsructing L ureteral stone, now s/p emergent stent placement by Urology on 2/16. Post-procedural course c/b hypothermia, AMS, and bradycardia, s/p transfer to SICU for close monitoring. ID consulted for further management.    Afebrile  WBC 5K  Urine Culture >100k pseudomonas (pan-sensitive)  Blood Cultures no growth    Impression:  #Urosepsis, Obstructive Uropathy  S/p ureteral stent placement 2/16  Clinically improving, UCx growing pan-sens pseudomonas    Recs:  - c/w cefepime 1g q8H   - f/u urology recs  - monitor temp, WBCs      Kyle Aponte MD  Infectious Disease Fellow  Available on [employer] Teams  Before 9AM or after 5PM: [phone]

## 2023-02-19 NOTE — PROGRESS NOTE ADULT - SUBJECTIVE AND OBJECTIVE BOX
24 HOUR EVENTS:  - ID consulted to follow abx  - IVL    NEURO  RASS (if intubated): 		[facility] ICU (if concern for delirium):  Exam: A&Ox1-2 (baseline per wife)  Meds: acetaminophen     Tablet .. 975 milliGRAM(s) Oral every 6 hours PRN Mild Pain (1 - 3)      RESPIRATORY  RR: 14 (02-19-23 @ 05:00) (11 - 21)  SpO2: 98% (02-19-23 @ 05:00) (93% - 100%)  Wt(kg): --  Exam: no increased WOB  Mechanical Ventilation:     Meds:     CARDIOVASCULAR  HR: 72 (02-19-23 @ 05:00) (50 - 83)  BP: 108/72 (02-19-23 @ 05:00) (98/57 - 139/71)  BP(mean): 84 (02-19-23 @ 05:00) (74 - 100)  ABP: --  ABP(mean): --  Wt(kg): --  CVP(cm H2O): --      Exam: appears well perfused  Cardiac Rhythm: sinus  Perfusion     [X ]Adequate   [ ]Inadequate  Mentation   [X ]Normal       [ ]Reduced  Extremities  [ X]Warm         [ ]Cool  Volume Status [ ]Hypervolemic [ X]Euvolemic [ ]Hypovolemic  Meds: midodrine. 10 milliGRAM(s) Oral <User Schedule>      GI/NUTRITION  Exam: soft nondistended  Diet: regular  Meds: polyethylene glycol 3350 17 Gram(s) Oral daily  senna 2 Tablet(s) Oral at bedtime      GENITOURINARY  I&O's Detail    02-17 @ 07:01  -  02-18 @ 07:00  --------------------------------------------------------  IN:    IV PiggyBack: 650 mL    Lactated Ringers: 2750 mL    Lactated Ringers: 250 mL    Lactated Ringers Bolus: 1500 mL    Oral Fluid: 240 mL  Total IN: 5390 mL    OUT:    Indwelling Catheter - Urethral (mL): 3510 mL  Total OUT: 3510 mL    Total NET: 1880 mL      02-18 @ 07:01  -  02-19 @ 05:41  --------------------------------------------------------  IN:    IV PiggyBack: 200 mL    Lactated Ringers: 250 mL    Oral Fluid: 1170 mL  Total IN: 1620 mL    OUT:    Indwelling Catheter - Urethral (mL): 2930 mL  Total OUT: 2930 mL    Total NET: -1310 mL          02-19    135  |  97  |  9   ----------------------------<  99  3.8   |  27  |  0.63    Ca    9.6      19 Feb 2023 00:42  Phos  3.0     02-19  Mg     1.8     02-19      Meds: ascorbic acid 500 milliGRAM(s) Oral daily  multivitamin 1 Tablet(s) Oral daily  potassium chloride   Solution 40 milliEquivalent(s) Oral once  thiamine 100 milliGRAM(s) Oral daily      HEMATOLOGIC  Meds: enoxaparin Injectable 40 milliGRAM(s) SubCutaneous every 24 hours                          11.6   5.27  )-----------( 196      ( 19 Feb 2023 00:42 )             35.8         INFECTIOUS DISEASES  T(C): 35.9 (02-19-23 @ 05:00), Max: 37.1 (02-18-23 @ 23:00)  Wt(kg): --  WBC Count: 5.27 K/uL (02-19 @ 00:42)    Recent Cultures:  Specimen Source: .Blood Blood-Peripheral, 02-16 @ 05:18; Results   No growth to date.; Gram Stain: --; Organism: --  Specimen Source: Clean Catch Clean Catch (Midstream), 02-16 @ 03:40; Results   >100,000 CFU/ml Pseudomonas aeruginosa; Gram Stain: --; Organism: Pseudomonas aeruginosa  Specimen Source: .Blood Blood-Peripheral, 02-15 @ 21:42; Results   No growth to date.; Gram Stain: --; Organism: --    Meds: cefepime   IVPB 1000 milliGRAM(s) IV Intermittent every 8 hours  coronavirus bivalent (EUA) Booster Vaccine (PFIZER) 0.3 milliLiter(s) IntraMuscular once      ENDOCRINE  Capillary Blood Glucose    Meds:     ACCESS DEVICES:  [ ] Peripheral IV  [ ] Central Venous Line		[ ] R	[ ] L	[ ] IJ	[ ] Fem	[ ] SC	Placed:   [ ] Arterial Line			[ ] R	[ ] L	[ ] Fem	[ ] Rad	[ ] Ax	Placed:   [ ] PICC:					[ ] Mediport  [ ] Urinary Catheter, Date Placed:   [ ] Necessity of urinary, arterial, and venous catheters discussed    OTHER MEDICATIONS:  chlorhexidine 2% Cloths 1 Application(s) Topical <User Schedule>  mupirocin 2% Nasal 1 Application(s) Both Nostrils two times a day      IMAGING:

## 2023-02-19 NOTE — PROGRESS NOTE ADULT - ASSESSMENT
62M s/p left ureteral stone with hypothermia requiring ureteral stent insertion with postop hypothermia, bradycardia and worsening mental status, now resolving.     PLAN:    NEURO:  - A&Ox2, following commands (at baseline per wife)  - tylenol prn for pain     RESPIRATORY:   - maintain Sat O2 >90%  - requiring NC    CARDIOVASCULAR:  - maintain MAP >65  - Increase midodrine to 10mg Q8h    GI/NUTRITION:  - Regular diet    GENITOURINARY/RENAL:  - Barnett in place  - IV locked  - S/p L ureteral stent by urology    HEMATOLOGIC:  - HSQ DVT ppx    INFECTIOUS DISEASE: presented with urosepsis  - 2/15 Bcx NGTD, f/u repeat Bcx and Ucx  - c/w cefepime    ENDOCRINE:  - monitor glucose levels on BMP    Dispo: SICU

## 2023-02-19 NOTE — PROGRESS NOTE ADULT - ASSESSMENT
62 year old male s/p left ureteral stent, POD#3; c/b hypothermia, bradycardia and worsening mental status     -analgesia as needed  -f/u blood, kidney, and urine cultures - blood-no growth so far, urine-pseudomonas  -continue cefepime  -monitor mental status  -DVT prophylaxis    Patient seen and examined by Dr. Hoenig

## 2023-02-19 NOTE — PROGRESS NOTE ADULT - SUBJECTIVE AND OBJECTIVE BOX
Follow Up:  pseudomonas UTI    Interval History/ROS:  No acute overnight events  UCx growing pseudomonas    Allergies  No Known Allergies      ANTIMICROBIALS:  cefepime   IVPB 1000 every 8 hours      OTHER MEDS:  MEDICATIONS  (STANDING):  acetaminophen     Tablet .. 975 every 6 hours PRN  coronavirus bivalent (EUA) Booster Vaccine (PFIZER) 0.3 once  enoxaparin Injectable 40 every 24 hours  midodrine. 10 <User Schedule>  polyethylene glycol 3350 17 daily  senna 2 at bedtime      Vital Signs Last 24 Hrs  T(C): 36.3 (19 Feb 2023 10:00), Max: 37.1 (18 Feb 2023 23:00)  T(F): 97.3 (19 Feb 2023 10:00), Max: 98.8 (18 Feb 2023 23:00)  HR: 67 (19 Feb 2023 11:00) (50 - 79)  BP: 96/67 (19 Feb 2023 11:00) (92/62 - 139/71)  BP(mean): 77 (19 Feb 2023 11:00) (66 - 100)  RR: 18 (19 Feb 2023 11:00) (11 - 21)  SpO2: 99% (19 Feb 2023 11:00) (95% - 100%)    Parameters below as of 19 Feb 2023 08:00  Patient On (Oxygen Delivery Method): room air        PHYSICAL EXAM:  Constitutional: NAD  HEAD/EYES: anicteric, no conjunctival injection  ENT:  supple, no thrush  Cardiovascular:   +S1/S2  Respiratory:  +BS bilaterally  GI:  soft, +bowel sounds  :  +coronel  Musculoskeletal:  no synovitis  Skin:  no rash, no phlebitis                        11.6   5.27  )-----------( 196      ( 19 Feb 2023 00:42 )             35.8       02-19    135  |  97  |  9   ----------------------------<  99  3.8   |  27  |  0.63    Ca    9.6      19 Feb 2023 00:42  Phos  3.0     02-19  Mg     1.8     02-19      MICROBIOLOGY:  Culture - Blood (collected 16 Feb 2023 05:18)  Source: .Blood Blood-Peripheral  Preliminary Report (17 Feb 2023 12:01):    No growth to date.    Culture - Urine (collected 16 Feb 2023 03:40)  Source: Clean Catch Clean Catch (Midstream)  Final Report (18 Feb 2023 23:28):    >100,000 CFU/ml Pseudomonas aeruginosa  Organism: Pseudomonas aeruginosa (18 Feb 2023 23:28)  Organism: Pseudomonas aeruginosa (18 Feb 2023 23:28)      -  Amikacin: S <=16      -  Aztreonam: S <=4      -  Cefepime: S <=2      -  Ceftazidime: S 8      -  Ciprofloxacin: S <=0.25      -  Gentamicin: S <=2      -  Imipenem: S <=1      -  Levofloxacin: S <=0.5      -  Meropenem: S <=1      -  Piperacillin/Tazobactam: S 16      -  Tobramycin: S <=2      Method Type: AGUSTIN    Culture - Blood (collected 15 Feb 2023 21:42)  Source: .Blood Blood-Peripheral  Preliminary Report (17 Feb 2023 02:03):    No growth to date.      RADIOLOGY:  < from: Xray Chest 1 View- PORTABLE-Urgent (Xray Chest 1 View- PORTABLE-Urgent .) (02.16.23 @ 01:28) >  IMPRESSION:  No focal lung consolidation  < end of copied text >

## 2023-02-19 NOTE — PROGRESS NOTE ADULT - SUBJECTIVE AND OBJECTIVE BOX
The patient was seen and examined at bedside.  No acute events overnight.    T(C): 36.1 (02-19-23 @ 08:00), Max: 37.1 (02-18-23 @ 23:00)  HR: 75 (02-19-23 @ 09:00) (50 - 83)  BP: 95/52 (02-19-23 @ 09:00) (92/62 - 139/71)  RR: 20 (02-19-23 @ 09:00) (11 - 21)  SpO2: 96% (02-19-23 @ 09:00) (93% - 100%)  Wt(kg): --    Physical Exam:    General: NAD  Abdomen: soft, non-tender, non-distended      02-18 @ 07:01  -  02-19 @ 07:00  --------------------------------------------------------  IN: 1620 mL / OUT: 3255 mL / NET: -1635 mL    02-19 @ 07:01  -  02-19 @ 09:33  --------------------------------------------------------  IN: 0 mL / OUT: 210 mL / NET: -210 mL      F - 1750cc                            11.6   5.27  )-----------( 196               35.8     135  |  97  |  9   ----------------------------<  99  3.8   |  27  |  0.63    Ca    9.6  Phos  3.0  Mg     1.8

## 2023-02-20 LAB
ANION GAP SERPL CALC-SCNC: 8 MMOL/L — SIGNIFICANT CHANGE UP (ref 5–17)
BUN SERPL-MCNC: 7 MG/DL — SIGNIFICANT CHANGE UP (ref 7–23)
CALCIUM SERPL-MCNC: 10.5 MG/DL — SIGNIFICANT CHANGE UP (ref 8.4–10.5)
CHLORIDE SERPL-SCNC: 95 MMOL/L — LOW (ref 96–108)
CK SERPL-CCNC: 459 U/L — HIGH (ref 30–200)
CO2 SERPL-SCNC: 32 MMOL/L — HIGH (ref 22–31)
CREAT SERPL-MCNC: 0.81 MG/DL — SIGNIFICANT CHANGE UP (ref 0.5–1.3)
EGFR: 100 ML/MIN/1.73M2 — SIGNIFICANT CHANGE UP
GLUCOSE SERPL-MCNC: 85 MG/DL — SIGNIFICANT CHANGE UP (ref 70–99)
HCT VFR BLD CALC: 41.7 % — SIGNIFICANT CHANGE UP (ref 39–50)
HGB BLD-MCNC: 13.2 G/DL — SIGNIFICANT CHANGE UP (ref 13–17)
MAGNESIUM SERPL-MCNC: 2.2 MG/DL — SIGNIFICANT CHANGE UP (ref 1.6–2.6)
MCHC RBC-ENTMCNC: 27.5 PG — SIGNIFICANT CHANGE UP (ref 27–34)
MCHC RBC-ENTMCNC: 31.7 GM/DL — LOW (ref 32–36)
MCV RBC AUTO: 86.9 FL — SIGNIFICANT CHANGE UP (ref 80–100)
NRBC # BLD: 0 /100 WBCS — SIGNIFICANT CHANGE UP (ref 0–0)
PHOSPHATE SERPL-MCNC: 3.3 MG/DL — SIGNIFICANT CHANGE UP (ref 2.5–4.5)
PLATELET # BLD AUTO: 196 K/UL — SIGNIFICANT CHANGE UP (ref 150–400)
POTASSIUM SERPL-MCNC: 4.6 MMOL/L — SIGNIFICANT CHANGE UP (ref 3.5–5.3)
POTASSIUM SERPL-SCNC: 4.6 MMOL/L — SIGNIFICANT CHANGE UP (ref 3.5–5.3)
RBC # BLD: 4.8 M/UL — SIGNIFICANT CHANGE UP (ref 4.2–5.8)
RBC # FLD: 14 % — SIGNIFICANT CHANGE UP (ref 10.3–14.5)
SODIUM SERPL-SCNC: 135 MMOL/L — SIGNIFICANT CHANGE UP (ref 135–145)
WBC # BLD: 5.94 K/UL — SIGNIFICANT CHANGE UP (ref 3.8–10.5)
WBC # FLD AUTO: 5.94 K/UL — SIGNIFICANT CHANGE UP (ref 3.8–10.5)

## 2023-02-20 PROCEDURE — 99231 SBSQ HOSP IP/OBS SF/LOW 25: CPT

## 2023-02-20 PROCEDURE — 99232 SBSQ HOSP IP/OBS MODERATE 35: CPT

## 2023-02-20 RX ADMIN — MIDODRINE HYDROCHLORIDE 10 MILLIGRAM(S): 2.5 TABLET ORAL at 11:08

## 2023-02-20 RX ADMIN — Medication 500 MILLIGRAM(S): at 11:08

## 2023-02-20 RX ADMIN — ENOXAPARIN SODIUM 40 MILLIGRAM(S): 100 INJECTION SUBCUTANEOUS at 12:16

## 2023-02-20 RX ADMIN — CHLORHEXIDINE GLUCONATE 1 APPLICATION(S): 213 SOLUTION TOPICAL at 05:20

## 2023-02-20 RX ADMIN — SENNA PLUS 2 TABLET(S): 8.6 TABLET ORAL at 22:24

## 2023-02-20 RX ADMIN — MIDODRINE HYDROCHLORIDE 10 MILLIGRAM(S): 2.5 TABLET ORAL at 17:23

## 2023-02-20 RX ADMIN — CEFEPIME 100 MILLIGRAM(S): 1 INJECTION, POWDER, FOR SOLUTION INTRAMUSCULAR; INTRAVENOUS at 22:24

## 2023-02-20 RX ADMIN — CEFEPIME 100 MILLIGRAM(S): 1 INJECTION, POWDER, FOR SOLUTION INTRAMUSCULAR; INTRAVENOUS at 05:32

## 2023-02-20 RX ADMIN — Medication 100 MILLIGRAM(S): at 11:08

## 2023-02-20 RX ADMIN — MUPIROCIN 1 APPLICATION(S): 20 OINTMENT TOPICAL at 05:36

## 2023-02-20 RX ADMIN — MIDODRINE HYDROCHLORIDE 10 MILLIGRAM(S): 2.5 TABLET ORAL at 05:20

## 2023-02-20 RX ADMIN — MUPIROCIN 1 APPLICATION(S): 20 OINTMENT TOPICAL at 21:00

## 2023-02-20 RX ADMIN — POLYETHYLENE GLYCOL 3350 17 GRAM(S): 17 POWDER, FOR SOLUTION ORAL at 11:08

## 2023-02-20 RX ADMIN — CEFEPIME 100 MILLIGRAM(S): 1 INJECTION, POWDER, FOR SOLUTION INTRAMUSCULAR; INTRAVENOUS at 13:01

## 2023-02-20 RX ADMIN — Medication 1 TABLET(S): at 11:08

## 2023-02-20 NOTE — PROGRESS NOTE ADULT - SUBJECTIVE AND OBJECTIVE BOX
HISTORY:    24 HOUR EVENTS:  -Urine cultures positive for Pseudomonas sensitive to Cipro     NEURO  Exam: AAOx2  Meds: acetaminophen     Tablet .. 975 milliGRAM(s) Oral every 6 hours PRN Mild Pain (1 - 3)      RESPIRATORY  RR: 9 (02-20-23 @ 01:00) (9 - 28)  SpO2: 97% (02-20-23 @ 01:00) (93% - 99%)  Wt(kg): --  Exam: in no respiratory distress    Meds:     CARDIOVASCULAR  HR: 72 (02-20-23 @ 01:00) (64 - 87)  BP: 112/67 (02-20-23 @ 01:00) (92/62 - 126/73)  BP(mean): 85 (02-20-23 @ 01:00) (66 - 93)  ABP: --  ABP(mean): --  Wt(kg): --  CVP(cm H2O): --      Exam:   Cardiac Rhythm: NSR  Perfusion     [x ]Adequate   [ ]Inadequate  Mentation   [x ]Normal       [ ]Reduced  Extremities  [x ]Warm         [ ]Cool  Volume Status [ ]Hypervolemic [x ]Euvolemic [ ]Hypovolemic  Meds: midodrine. 10 milliGRAM(s) Oral <User Schedule>      GI/NUTRITION  Exam: soft, NTND  Diet: RD  Meds: polyethylene glycol 3350 17 Gram(s) Oral daily  senna 2 Tablet(s) Oral at bedtime  simethicone 80 milliGRAM(s) Chew every 8 hours PRN Dyspepsia      GENITOURINARY  I&O's Detail    02-18 @ 07:01  -  02-19 @ 07:00  --------------------------------------------------------  IN:    IV PiggyBack: 200 mL    Lactated Ringers: 250 mL    Oral Fluid: 1170 mL  Total IN: 1620 mL    OUT:    Indwelling Catheter - Urethral (mL): 3255 mL  Total OUT: 3255 mL    Total NET: -1635 mL      02-19 @ 07:01  -  02-20 @ 01:08  --------------------------------------------------------  IN:    IV PiggyBack: 100 mL    Oral Fluid: 375 mL  Total IN: 475 mL    OUT:    Indwelling Catheter - Urethral (mL): 2230 mL  Total OUT: 2230 mL    Total NET: -1755 mL          02-19    135  |  97  |  9   ----------------------------<  99  3.8   |  27  |  0.63    Ca    9.6      19 Feb 2023 00:42  Phos  3.0     02-19  Mg     1.8     02-19      Meds: ascorbic acid 500 milliGRAM(s) Oral daily  multivitamin 1 Tablet(s) Oral daily  thiamine 100 milliGRAM(s) Oral daily      HEMATOLOGIC  Meds: enoxaparin Injectable 40 milliGRAM(s) SubCutaneous every 24 hours                          11.6   5.27  )-----------( 196      ( 19 Feb 2023 00:42 )             35.8         INFECTIOUS DISEASES  T(C): 36.6 (02-19-23 @ 23:00), Max: 37.1 (02-19-23 @ 19:00)  Wt(kg): --    Recent Cultures:  Specimen Source: .Blood Blood-Peripheral, 02-16 @ 05:18; Results   No growth to date.; Gram Stain: --; Organism: --  Specimen Source: Clean Catch Clean Catch (Midstream), 02-16 @ 03:40; Results   >100,000 CFU/ml Pseudomonas aeruginosa; Gram Stain: --; Organism: Pseudomonas aeruginosa  Specimen Source: .Blood Blood-Peripheral, 02-15 @ 21:42; Results   No growth to date.; Gram Stain: --; Organism: --    Meds: cefepime   IVPB 1000 milliGRAM(s) IV Intermittent every 8 hours  coronavirus bivalent (EUA) Booster Vaccine (PFIZER) 0.3 milliLiter(s) IntraMuscular once      ENDOCRINE  Capillary Blood Glucose    Meds:     ACCESS DEVICES:  [x ] Peripheral IV  [ ] Central Venous Line		[ ] R	[ ] L	[ ] IJ	[ ] Fem	[ ] SC	Placed:   [ ] Arterial Line			[ ] R	[ ] L	[ ] Fem	[ ] Rad	[ ] Ax	Placed:   [ ] PICC:					[ ] Mediport  x[ ] Urinary Catheter, Date Placed:   [ ] Necessity of urinary, arterial, and venous catheters discussed    OTHER MEDICATIONS:  chlorhexidine 2% Cloths 1 Application(s) Topical <User Schedule>  mupirocin 2% Nasal 1 Application(s) Both Nostrils two times a day      IMAGING: HISTORY:    24 HOUR EVENTS:  -Urine cultures positive for Pseudomonas sensitive to Cipro     NEURO  Exam: AAOx2 (at baseline per wife)  Meds: acetaminophen     Tablet .. 975 milliGRAM(s) Oral every 6 hours PRN Mild Pain (1 - 3)      RESPIRATORY  RR: 9 (02-20-23 @ 01:00) (9 - 28)  SpO2: 97% (02-20-23 @ 01:00) (93% - 99%)  Wt(kg): --  Exam: in no respiratory distress    Meds:     CARDIOVASCULAR  HR: 72 (02-20-23 @ 01:00) (64 - 87)  BP: 112/67 (02-20-23 @ 01:00) (92/62 - 126/73)  BP(mean): 85 (02-20-23 @ 01:00) (66 - 93)  ABP: --  ABP(mean): --  Wt(kg): --  CVP(cm H2O): --      Exam:   Cardiac Rhythm: NSR  Perfusion     [x ]Adequate   [ ]Inadequate  Mentation   [x ]Normal       [ ]Reduced  Extremities  [x ]Warm         [ ]Cool  Volume Status [ ]Hypervolemic [x ]Euvolemic [ ]Hypovolemic  Meds: midodrine. 10 milliGRAM(s) Oral <User Schedule>      GI/NUTRITION  Exam: soft, NTND  Diet: RD  Meds: polyethylene glycol 3350 17 Gram(s) Oral daily  senna 2 Tablet(s) Oral at bedtime  simethicone 80 milliGRAM(s) Chew every 8 hours PRN Dyspepsia      GENITOURINARY  I&O's Detail    02-18 @ 07:01  -  02-19 @ 07:00  --------------------------------------------------------  IN:    IV PiggyBack: 200 mL    Lactated Ringers: 250 mL    Oral Fluid: 1170 mL  Total IN: 1620 mL    OUT:    Indwelling Catheter - Urethral (mL): 3255 mL  Total OUT: 3255 mL    Total NET: -1635 mL      02-19 @ 07:01  -  02-20 @ 01:08  --------------------------------------------------------  IN:    IV PiggyBack: 100 mL    Oral Fluid: 375 mL  Total IN: 475 mL    OUT:    Indwelling Catheter - Urethral (mL): 2230 mL  Total OUT: 2230 mL    Total NET: -1755 mL          02-19    135  |  97  |  9   ----------------------------<  99  3.8   |  27  |  0.63    Ca    9.6      19 Feb 2023 00:42  Phos  3.0     02-19  Mg     1.8     02-19      Meds: ascorbic acid 500 milliGRAM(s) Oral daily  multivitamin 1 Tablet(s) Oral daily  thiamine 100 milliGRAM(s) Oral daily      HEMATOLOGIC  Meds: enoxaparin Injectable 40 milliGRAM(s) SubCutaneous every 24 hours                          11.6   5.27  )-----------( 196      ( 19 Feb 2023 00:42 )             35.8         INFECTIOUS DISEASES  T(C): 36.6 (02-19-23 @ 23:00), Max: 37.1 (02-19-23 @ 19:00)  Wt(kg): --    Recent Cultures:  Specimen Source: .Blood Blood-Peripheral, 02-16 @ 05:18; Results   No growth to date.; Gram Stain: --; Organism: --  Specimen Source: Clean Catch Clean Catch (Midstream), 02-16 @ 03:40; Results   >100,000 CFU/ml Pseudomonas aeruginosa; Gram Stain: --; Organism: Pseudomonas aeruginosa  Specimen Source: .Blood Blood-Peripheral, 02-15 @ 21:42; Results   No growth to date.; Gram Stain: --; Organism: --    Meds: cefepime   IVPB 1000 milliGRAM(s) IV Intermittent every 8 hours  coronavirus bivalent (EUA) Booster Vaccine (PFIZER) 0.3 milliLiter(s) IntraMuscular once      ENDOCRINE  Capillary Blood Glucose    Meds:     ACCESS DEVICES:  [x ] Peripheral IV  [ ] Central Venous Line		[ ] R	[ ] L	[ ] IJ	[ ] Fem	[ ] SC	Placed:   [ ] Arterial Line			[ ] R	[ ] L	[ ] Fem	[ ] Rad	[ ] Ax	Placed:   [ ] PICC:					[ ] Mediport  x[ ] Urinary Catheter, Date Placed:   [ ] Necessity of urinary, arterial, and venous catheters discussed    OTHER MEDICATIONS:  chlorhexidine 2% Cloths 1 Application(s) Topical <User Schedule>  mupirocin 2% Nasal 1 Application(s) Both Nostrils two times a day      IMAGING:

## 2023-02-20 NOTE — PROGRESS NOTE ADULT - SUBJECTIVE AND OBJECTIVE BOX
Subjective  pt seen and examined. no overnight events. no complaints.     Objective    Vital signs  T(F): , Max: 98.8 (02-19-23 @ 19:00)  HR: 80 (02-20-23 @ 07:00)  BP: 104/73 (02-20-23 @ 07:00)  SpO2: 97% (02-20-23 @ 07:00)  Wt(kg): --    Output     OUT:    Indwelling Catheter - Urethral (mL): 2930 mL  Total OUT: 2930 mL    Total NET: -2930 mL          Gen: NAD  Resp: no resp distress  Abd: s/nt/nd  : coronel in place with yellow urine    Labs                          13.2   5.94  )-----------( 196      ( 20 Feb 2023 00:53 )             41.7   02-20    135  |  95<L>  |  7   ----------------------------<  85  4.6   |  32<H>  |  0.81    Ca    10.5      20 Feb 2023 00:52  Phos  3.3     02-20  Mg     2.2     02-20        Urine Cx: Culture - Urine (02.16.23 @ 03:40)    -  Amikacin: S <=16    -  Aztreonam: S <=4    -  Cefepime: S <=2    -  Ceftazidime: S 8    -  Ciprofloxacin: S <=0.25    -  Gentamicin: S <=2    -  Imipenem: S <=1    -  Levofloxacin: S <=0.5    -  Meropenem: S <=1    -  Piperacillin/Tazobactam: S 16    -  Tobramycin: S <=2    Specimen Source: Clean Catch Clean Catch (Midstream)    Culture Results:   >100,000 CFU/ml Pseudomonas aeruginosa    Organism Identification: Pseudomonas aeruginosa    Organism: Pseudomonas aeruginosa    Method Type: AGUSTIN      Blood Cx: Culture - Blood (02.16.23 @ 05:18)    Specimen Source: .Blood Blood-Peripheral    Culture Results:   No growth to date.      Culture - Blood (02.15.23 @ 21:42)    Specimen Source: .Blood Blood-Peripheral    Culture Results:   No growth to date.

## 2023-02-20 NOTE — PROGRESS NOTE ADULT - ATTENDING COMMENTS
62M s/p left ureteral stone with hypothermia requiring ureteral stent insertion  c/b hypothermia, bradycardia, borderline hypotension  and worsening mental status     Awake, mental status at his base line  Saturating well, CXR not in fluid overload  Became hypotensive on not taking Midodrine dose last night resolved with fluid bolus. Timing of Midodrine adjusted to received night Midodrine dose in evening  PO   DC IVF, renal function electrolytes wnl  Abx Cefepime, urine culture GNR  BS controlled.  Pharm DVT ppx Lovenox, HCT platelets stable
Overnight: No acute events.  Patient remains afebrile.  Otherwise hemodynamically stable on room air.  Latest labs show no leukocytosis, CMP with renal function within normal limits.  Blood cultures from 2/16/2023 remain negative.  .    Impression  #Abnormal imaging of the abdomen, nephrolithiasis with left-sided hydroureteronephrosis  #Pyelonephritis/UTI, Pseudomonas growth  #Leukocytosis, resolved    Recommendations  Continue cefepime given Pseudomonas growth in urine culture  Urology evaluation given CT findings  Follow pending blood cultures  Follow positive urine culture for sensitivities of Pseudomonas  Anticipating 10-14 days of therapy  Follow fever curve and WBC count    Candido Forman MD  Division of Infectious Diseases
pt seen and examined  reviewed history, clinical course  agree with above assessment and plan- will follow.
62M s/p left ureteral stone with hypothermia requiring ureteral stent insertion  c/b hypothermia, bradycardia, borderline hypotension  and worsening mental status     Awake, mental status at his base line  Saturating well on RA  Hemodynamically stable. Transient hypothermia ? central  PO   Off IVF, renal function electrolytes wnl  Abx Cefepime for 10 day course, urine culture Pseudomonas sensitive to Cefepime  BS controlled.  Pharm DVT ppx Lovenox, HCT platelets stable
pt seen and examined  appears more comfortable  planning for transfer to the floor  cont abx
pt seen and examined  in good spirits  expecting d/c to floor
62M s/p left ureteral stone with hypothermia requiring ureteral stent insertion  c/b hypothermia, bradycardia, borderline hypotension  and worsening mental status     CT head no acute finding  Saturating well, CXR not in fluid overload  Borderline hypotensive, POCUS shows volume responsive, another fluid bolus ordered. Vasopressor support as needed. Increase Midodrine dose, pt on home Midodrine  PO when more awake  IVF , renal function electrolytes wnl  Abx Cefepime, all cultures neg sp far  BS controlled.  Pharm DVT ppx Lovenox, HCT platelets stable
SICU ATTENDING ATTESTATION    I have seen and examined this patient on multidisciplinary SICU rounds thismorning. I have reviewed all new labs, imaging and reports. I have participated in formulating the plan for the day, and have read and agree with the history, ROS, exam, assessment and plan as stated above, with my additions listed below:     POD 4 from ureteral stent placement for left obstructing stone with urosepsis.   Has recovered from sepsis.   Tyl only for pain.   room air.   on midodrine 10 TID for BP support, no pressors.   regular diet, bowel function  creatinine normal with good urine output (total 3L, may be post-obstructive diuresis). D/C coronel today.   hg stable. on lovenox for dvt ppx.   On cefipime for UCx --> klebsiella. BCx have been negative.   WBC 5, though has had several occurences of hypothermia. Will send procal.   No insulin coverage, Glucose well controlled.   Medically stable for floor transfer.     Total time spent in the care of this patient today (excluding procedures & teaching): 35 min                 Over 50% of the total time was spent in discussion and coordination of care with consulting services, dietary and rehab services.       Prema Schaefer M.D., M.S.  Dept of Trauma, Acute and Critical Care

## 2023-02-20 NOTE — PROGRESS NOTE ADULT - ASSESSMENT
62M s/p left ureteral stone with hypothermia requiring ureteral stent insertion with postop hypothermia, bradycardia and worsening mental status, now resolving.     PLAN:    NEURO:  - A&Ox2, following commands (at baseline per wife)  - tylenol prn for pain     RESPIRATORY:   - maintain Sat O2 >90%  - requiring NC    CARDIOVASCULAR:  - maintain MAP >65  - c/w midodrine 10mg TID    GI/NUTRITION:  - Regular diet  - c/w simethicone PRN  - c/w bowel regimen with senna and miralax  - c/w vitamin C, multivitamin and thiamine    GENITOURINARY/RENAL:  - Barnett in place  - IV locked  - S/p L ureteral stent by urology    HEMATOLOGIC:  - HSQ DVT ppx    INFECTIOUS DISEASE: presented with urosepsis  - 2/15 Bcx NGTD, f/u repeat Bcx and Ucx  - c/w cefepime; consider switching to cipro pending primary recs    ENDOCRINE:  - monitor glucose levels on BMP    HEME:  - c/w lovenox 40mg QD    Dispo: SICU; listed for floor 62M s/p left ureteral stone with hypothermia requiring ureteral stent insertion with postop hypothermia, bradycardia and worsening mental status, now resolving.     PLAN:    NEURO:  - A&Ox2, following commands (at baseline per wife)  - tylenol prn for pain     RESPIRATORY:   - maintain Sat O2 >90%  - requiring NC    CARDIOVASCULAR:  - maintain MAP >65  - c/w midodrine 10mg TID    GI/NUTRITION:  - Regular diet  - c/w simethicone PRN  - c/w bowel regimen with senna and miralax  - c/w vitamin C, multivitamin and thiamine    GENITOURINARY/RENAL:  - Dc Barnett, f/u TOV  - IV locked  - S/p L ureteral stent by urology    HEMATOLOGIC:  - HSQ DVT ppx    INFECTIOUS DISEASE: presented with urosepsis  - 2/15 Bcx NGTD, f/u repeat Bcx and Ucx  - c/w cefepime; consider switching to cipro pending primary recs    ENDOCRINE:  - monitor glucose levels on BMP    HEME:  - c/w lovenox 40mg QD    Dispo: listed for floor

## 2023-02-20 NOTE — PROGRESS NOTE ADULT - ASSESSMENT
62 year old male s/p left ureteral stent, POD#3; c/b hypothermia, bradycardia and worsening mental status     -analgesia as needed  -f/u blood, kidney, and urine cultures - blood-no growth so far, urine-pseudomonas  -continue cefepime  -monitor mental status  -DVT prophylaxis  -TOV on floor  -f/u mental status  -abx per id  -f/u id and podiatry  -dispo planning: rehab

## 2023-02-20 NOTE — PROGRESS NOTE ADULT - NUTRITIONAL ASSESSMENT
This patient has been assessed with a concern for Malnutrition and has been determined to have a diagnosis/diagnoses of Moderate protein-calorie malnutrition.    This patient is being managed with:   Diet Regular-  Soft and Bite Sized (SOFTBTSZ)  Supplement Feeding Modality:  Oral  Ensure Enlive Cans or Servings Per Day:  1       Frequency:  Three Times a day  Entered: Feb 19 2023  3:41PM

## 2023-02-21 ENCOUNTER — TRANSCRIPTION ENCOUNTER (OUTPATIENT)
Age: 63
End: 2023-02-21

## 2023-02-21 VITALS
HEART RATE: 74 BPM | DIASTOLIC BLOOD PRESSURE: 81 MMHG | SYSTOLIC BLOOD PRESSURE: 114 MMHG | OXYGEN SATURATION: 96 % | RESPIRATION RATE: 18 BRPM | TEMPERATURE: 97 F

## 2023-02-21 DIAGNOSIS — S91.002A UNSPECIFIED OPEN WOUND, LEFT ANKLE, INITIAL ENCOUNTER: ICD-10-CM

## 2023-02-21 LAB
ANION GAP SERPL CALC-SCNC: 12 MMOL/L — SIGNIFICANT CHANGE UP (ref 5–17)
BUN SERPL-MCNC: 10 MG/DL — SIGNIFICANT CHANGE UP (ref 7–23)
CALCIUM SERPL-MCNC: 10.4 MG/DL — SIGNIFICANT CHANGE UP (ref 8.4–10.5)
CHLORIDE SERPL-SCNC: 94 MMOL/L — LOW (ref 96–108)
CO2 SERPL-SCNC: 29 MMOL/L — SIGNIFICANT CHANGE UP (ref 22–31)
CREAT SERPL-MCNC: 0.55 MG/DL — SIGNIFICANT CHANGE UP (ref 0.5–1.3)
CULTURE RESULTS: SIGNIFICANT CHANGE UP
CULTURE RESULTS: SIGNIFICANT CHANGE UP
EGFR: 113 ML/MIN/1.73M2 — SIGNIFICANT CHANGE UP
GLUCOSE SERPL-MCNC: 97 MG/DL — SIGNIFICANT CHANGE UP (ref 70–99)
HCT VFR BLD CALC: 41.7 % — SIGNIFICANT CHANGE UP (ref 39–50)
HGB BLD-MCNC: 13.2 G/DL — SIGNIFICANT CHANGE UP (ref 13–17)
MCHC RBC-ENTMCNC: 28 PG — SIGNIFICANT CHANGE UP (ref 27–34)
MCHC RBC-ENTMCNC: 31.7 GM/DL — LOW (ref 32–36)
MCV RBC AUTO: 88.5 FL — SIGNIFICANT CHANGE UP (ref 80–100)
NRBC # BLD: 0 /100 WBCS — SIGNIFICANT CHANGE UP (ref 0–0)
PLATELET # BLD AUTO: 146 K/UL — LOW (ref 150–400)
POTASSIUM SERPL-MCNC: 4.3 MMOL/L — SIGNIFICANT CHANGE UP (ref 3.5–5.3)
POTASSIUM SERPL-SCNC: 4.3 MMOL/L — SIGNIFICANT CHANGE UP (ref 3.5–5.3)
RBC # BLD: 4.71 M/UL — SIGNIFICANT CHANGE UP (ref 4.2–5.8)
RBC # FLD: 13.9 % — SIGNIFICANT CHANGE UP (ref 10.3–14.5)
SODIUM SERPL-SCNC: 135 MMOL/L — SIGNIFICANT CHANGE UP (ref 135–145)
SPECIMEN SOURCE: SIGNIFICANT CHANGE UP
SPECIMEN SOURCE: SIGNIFICANT CHANGE UP
WBC # BLD: 5.8 K/UL — SIGNIFICANT CHANGE UP (ref 3.8–10.5)
WBC # FLD AUTO: 5.8 K/UL — SIGNIFICANT CHANGE UP (ref 3.8–10.5)

## 2023-02-21 PROCEDURE — 87186 SC STD MICRODIL/AGAR DIL: CPT

## 2023-02-21 PROCEDURE — 82330 ASSAY OF CALCIUM: CPT

## 2023-02-21 PROCEDURE — 85730 THROMBOPLASTIN TIME PARTIAL: CPT

## 2023-02-21 PROCEDURE — 87640 STAPH A DNA AMP PROBE: CPT

## 2023-02-21 PROCEDURE — 82565 ASSAY OF CREATININE: CPT

## 2023-02-21 PROCEDURE — 36415 COLL VENOUS BLD VENIPUNCTURE: CPT

## 2023-02-21 PROCEDURE — 96375 TX/PRO/DX INJ NEW DRUG ADDON: CPT

## 2023-02-21 PROCEDURE — 70450 CT HEAD/BRAIN W/O DYE: CPT | Mod: MA

## 2023-02-21 PROCEDURE — 97165 OT EVAL LOW COMPLEX 30 MIN: CPT

## 2023-02-21 PROCEDURE — 93005 ELECTROCARDIOGRAM TRACING: CPT

## 2023-02-21 PROCEDURE — 85014 HEMATOCRIT: CPT

## 2023-02-21 PROCEDURE — 87086 URINE CULTURE/COLONY COUNT: CPT

## 2023-02-21 PROCEDURE — 85610 PROTHROMBIN TIME: CPT

## 2023-02-21 PROCEDURE — 82803 BLOOD GASES ANY COMBINATION: CPT

## 2023-02-21 PROCEDURE — 83605 ASSAY OF LACTIC ACID: CPT

## 2023-02-21 PROCEDURE — 84132 ASSAY OF SERUM POTASSIUM: CPT

## 2023-02-21 PROCEDURE — 82947 ASSAY GLUCOSE BLOOD QUANT: CPT

## 2023-02-21 PROCEDURE — 74176 CT ABD & PELVIS W/O CONTRAST: CPT | Mod: MD

## 2023-02-21 PROCEDURE — 84100 ASSAY OF PHOSPHORUS: CPT

## 2023-02-21 PROCEDURE — 86850 RBC ANTIBODY SCREEN: CPT

## 2023-02-21 PROCEDURE — 80048 BASIC METABOLIC PNL TOTAL CA: CPT

## 2023-02-21 PROCEDURE — 87637 SARSCOV2&INF A&B&RSV AMP PRB: CPT

## 2023-02-21 PROCEDURE — 71045 X-RAY EXAM CHEST 1 VIEW: CPT

## 2023-02-21 PROCEDURE — 83735 ASSAY OF MAGNESIUM: CPT

## 2023-02-21 PROCEDURE — 87077 CULTURE AEROBIC IDENTIFY: CPT

## 2023-02-21 PROCEDURE — 86900 BLOOD TYPING SEROLOGIC ABO: CPT

## 2023-02-21 PROCEDURE — C1769: CPT

## 2023-02-21 PROCEDURE — 99238 HOSP IP/OBS DSCHRG MGMT 30/<: CPT

## 2023-02-21 PROCEDURE — 84295 ASSAY OF SERUM SODIUM: CPT

## 2023-02-21 PROCEDURE — 99232 SBSQ HOSP IP/OBS MODERATE 35: CPT

## 2023-02-21 PROCEDURE — 97162 PT EVAL MOD COMPLEX 30 MIN: CPT

## 2023-02-21 PROCEDURE — 86901 BLOOD TYPING SEROLOGIC RH(D): CPT

## 2023-02-21 PROCEDURE — 85018 HEMOGLOBIN: CPT

## 2023-02-21 PROCEDURE — 76000 FLUOROSCOPY <1 HR PHYS/QHP: CPT

## 2023-02-21 PROCEDURE — 81001 URINALYSIS AUTO W/SCOPE: CPT

## 2023-02-21 PROCEDURE — 99285 EMERGENCY DEPT VISIT HI MDM: CPT

## 2023-02-21 PROCEDURE — 85027 COMPLETE CBC AUTOMATED: CPT

## 2023-02-21 PROCEDURE — 82435 ASSAY OF BLOOD CHLORIDE: CPT

## 2023-02-21 PROCEDURE — 87040 BLOOD CULTURE FOR BACTERIA: CPT

## 2023-02-21 PROCEDURE — 87641 MR-STAPH DNA AMP PROBE: CPT

## 2023-02-21 PROCEDURE — C2617: CPT

## 2023-02-21 PROCEDURE — 82550 ASSAY OF CK (CPK): CPT

## 2023-02-21 PROCEDURE — 96374 THER/PROPH/DIAG INJ IV PUSH: CPT

## 2023-02-21 RX ORDER — SENNA PLUS 8.6 MG/1
2 TABLET ORAL
Qty: 0 | Refills: 0 | DISCHARGE
Start: 2023-02-21

## 2023-02-21 RX ORDER — POLYETHYLENE GLYCOL 3350 17 G/17G
17 POWDER, FOR SOLUTION ORAL
Qty: 0 | Refills: 0 | DISCHARGE
Start: 2023-02-21

## 2023-02-21 RX ORDER — ACETAMINOPHEN 500 MG
3 TABLET ORAL
Qty: 0 | Refills: 0 | DISCHARGE
Start: 2023-02-21

## 2023-02-21 RX ORDER — SIMETHICONE 80 MG/1
1 TABLET, CHEWABLE ORAL
Qty: 0 | Refills: 0 | DISCHARGE
Start: 2023-02-21

## 2023-02-21 RX ORDER — CIPROFLOXACIN LACTATE 400MG/40ML
1 VIAL (ML) INTRAVENOUS
Qty: 11 | Refills: 0
Start: 2023-02-21 | End: 2023-02-25

## 2023-02-21 RX ADMIN — Medication 500 MILLIGRAM(S): at 12:21

## 2023-02-21 RX ADMIN — ENOXAPARIN SODIUM 40 MILLIGRAM(S): 100 INJECTION SUBCUTANEOUS at 12:20

## 2023-02-21 RX ADMIN — Medication 1 TABLET(S): at 12:21

## 2023-02-21 RX ADMIN — CHLORHEXIDINE GLUCONATE 1 APPLICATION(S): 213 SOLUTION TOPICAL at 09:13

## 2023-02-21 RX ADMIN — CEFEPIME 100 MILLIGRAM(S): 1 INJECTION, POWDER, FOR SOLUTION INTRAMUSCULAR; INTRAVENOUS at 05:35

## 2023-02-21 RX ADMIN — CEFEPIME 100 MILLIGRAM(S): 1 INJECTION, POWDER, FOR SOLUTION INTRAMUSCULAR; INTRAVENOUS at 13:29

## 2023-02-21 RX ADMIN — MUPIROCIN 1 APPLICATION(S): 20 OINTMENT TOPICAL at 05:35

## 2023-02-21 RX ADMIN — Medication 100 MILLIGRAM(S): at 12:21

## 2023-02-21 RX ADMIN — POLYETHYLENE GLYCOL 3350 17 GRAM(S): 17 POWDER, FOR SOLUTION ORAL at 12:21

## 2023-02-21 RX ADMIN — MIDODRINE HYDROCHLORIDE 10 MILLIGRAM(S): 2.5 TABLET ORAL at 12:21

## 2023-02-21 RX ADMIN — MIDODRINE HYDROCHLORIDE 10 MILLIGRAM(S): 2.5 TABLET ORAL at 05:34

## 2023-02-21 NOTE — DISCHARGE NOTE NURSING/CASE MANAGEMENT/SOCIAL WORK - NSSCTYPOFSERV_GEN_ALL_CORE
Home RN, physical therapy and occupational therapy services.  RN will contact you to schedule a visit time.

## 2023-02-21 NOTE — PROGRESS NOTE ADULT - SUBJECTIVE AND OBJECTIVE BOX
UROLOGY PA PROGRESS NOTE:     Subjective:  no acute events overnight. voiding with PVR of 230, comfortable.     Objective:  Vital signs  T(F): , Max: 98.2 (02-21-23 @ 05:21)  HR: 82 (02-21-23 @ 05:21)  BP: 94/63 (02-21-23 @ 05:21)  SpO2: 99% (02-21-23 @ 05:21)  Wt(kg): --    Output     02-20 @ 07:01  -  02-21 @ 07:00  --------------------------------------------------------  IN: 1010 mL / OUT: 1285 mL / NET: -275 mL        Physical Exam:  Gen: NAD  Abd: soft, NT/ND  : voiding     Labs:  02-21  5.80  / 41.7  /x      02-20  5.94  / 41.7  /x                              13.2   5.80  )-----------( 146      ( 21 Feb 2023 07:13 )             41.7     02-20    135  |  95<L>  |  7   ----------------------------<  85  4.6   |  32<H>  |  0.81    Ca    10.5      20 Feb 2023 00:52  Phos  3.3     02-20  Mg     2.2     02-20

## 2023-02-21 NOTE — DISCHARGE NOTE PROVIDER - NSDCCPCAREPLAN_GEN_ALL_CORE_FT
PRINCIPAL DISCHARGE DIAGNOSIS  Diagnosis: Renal stone  Assessment and Plan of Treatment: follow up outpatient for definitive stone management and stent removal. you have a stent that will need to be removed in the future.   Call the office if you have fever greater than 101, difficulty urinating, pain not relieved with pain medication, nausea/vomiting.      SECONDARY DISCHARGE DIAGNOSES  Diagnosis: Acute UTI  Assessment and Plan of Treatment: complete entire course of antibiotics as prescribed. last dose 2/26    Diagnosis: Wound of left ankle  Assessment and Plan of Treatment:   B/L buttocks/sacral deep tissue injury in evolution, present on admission  B/L heel deep tissue injuries, present on admission   Recommendations:  1) continue turning and positioning q2 and PRN utilizing offloading assistive devices  2) continue with routine pericare daily and PRN soiling  3) encourage optimal nutrition  4) waffle cushion when oob to chair  5) B/L LE complete cair air fluidized boots to offload heels/feet  6) triad protective barrier cream to B/L buttocks/sacrum daily and PRN soiling  7) incontinence management - keep dry  8) podiatry for B/L heels treatment recommendations    Diagnosis: Hypotension  Assessment and Plan of Treatment: continue midodrine and all home medication     PRINCIPAL DISCHARGE DIAGNOSIS  Diagnosis: Renal stone  Assessment and Plan of Treatment: follow up outpatient for definitive stone management and stent removal. you have a stent that will need to be removed in the future.   Call the office if you have fever greater than 101, difficulty urinating, pain not relieved with pain medication, nausea/vomiting.      SECONDARY DISCHARGE DIAGNOSES  Diagnosis: Acute UTI  Assessment and Plan of Treatment: complete entire course of antibiotics as prescribed. last dose 2/26    Diagnosis: Wound of left ankle  Assessment and Plan of Treatment: B/L buttocks/sacral deep tissue injury in evolution, present on admission  B/L heel deep tissue injuries, present on admission   Recommendations:  1) continue turning and positioning q2 and PRN utilizing offloading assistive devices  2) continue with routine pericare daily and PRN soiling  3) encourage optimal nutrition  4) waffle cushion when oob to chair  5) B/L LE complete cair air fluidized boots to offload heels/feet  6) triad protective barrier cream to B/L buttocks/sacrum daily and PRN soiling  7) incontinence management - keep dry  8) podiatry for B/L heels treatment recommendations  - left heel deep tissue injury present on admission  - rec z flow boots in bed at all times  - rec painting left heel with betadine every other day and covering with allevyn foam  - outpatient podiatry follow up    Diagnosis: Hypotension  Assessment and Plan of Treatment: continue midodrine and all home medication

## 2023-02-21 NOTE — DISCHARGE NOTE PROVIDER - NSDCMRMEDTOKEN_GEN_ALL_CORE_FT
ascorbic acid 500 mg oral tablet: 1 tab(s) orally once a day  midodrine 5 mg oral tablet: 1 tab(s) orally 3 times a day  Multiple Vitamins oral tablet: 1 tab(s) orally once a day  ocular lubricant ophthalmic solution: 1 drop(s) to each affected eye 2 times a day  thiamine 100 mg oral tablet: 1 tab(s) orally once a day   acetaminophen 325 mg oral tablet: 3 tab(s) orally every 6 hours, As needed, Mild Pain (1 - 3)  ascorbic acid 500 mg oral tablet: 1 tab(s) orally once a day  Cipro 500 mg oral tablet: 1 tab(s) orally every 12 hours   midodrine 5 mg oral tablet: 1 tab(s) orally 3 times a day  Multiple Vitamins oral tablet: 1 tab(s) orally once a day  ocular lubricant ophthalmic solution: 1 drop(s) to each affected eye 2 times a day  polyethylene glycol 3350 oral powder for reconstitution: 17 gram(s) orally once a day, As Needed  senna leaf extract oral tablet: 2 tab(s) orally once a day (at bedtime), As Needed  simethicone 80 mg oral tablet, chewable: 1 tab(s) orally every 8 hours, As needed, Dyspepsia  thiamine 100 mg oral tablet: 1 tab(s) orally once a day

## 2023-02-21 NOTE — DISCHARGE NOTE PROVIDER - CARE PROVIDERS DIRECT ADDRESSES
[email or web address] ,[email or web address],[email or web address]

## 2023-02-21 NOTE — DISCHARGE NOTE NURSING/CASE MANAGEMENT/SOCIAL WORK - PATIENT PORTAL LINK FT
You can access the FollowMyHealth Patient Portal offered by [facility] by registering at the following website: [email or web address]. By joining [employer]’s FollowMyHealth portal, you will also be able to view your health information using other applications (apps) compatible with our system.

## 2023-02-21 NOTE — PROGRESS NOTE ADULT - ASSESSMENT
62 year old male s/p left ureteral stent, POD#4; c/b hypothermia, bradycardia and worsening mental status   - am labs   -f/u blood, kidney, and urine cultures - blood-no growth so far, urine-pseudomonas  -continue cefepime  -monitor mental status  -abx per id  -f/u id and podiatry  -dispo planning: rehab

## 2023-02-21 NOTE — DISCHARGE NOTE PROVIDER - PROVIDER TOKENS
PROVIDER:[TOKEN:[[id number]]] PROVIDER:[TOKEN:[[id number]]],PROVIDER:[TOKEN:[[id number]],FOLLOWUP:[1 month]] PROVIDER:[TOKEN:[[id number]],FOLLOWUP:[1 month]],PROVIDER:[TOKEN:[[id number]]]

## 2023-02-21 NOTE — DISCHARGE NOTE PROVIDER - CARE PROVIDER_API CALL
Abimael Daigle)  Urology  [address] [place] [address], [place] [address]  Phone: [phone]  Fax: [phone]  Follow Up Time:    Abimael Daigle)  Urology  [address], [address]  [place] [address]  Phone: [phone]  Fax: [phone]  Follow Up Time:     Antonette Mckay (DPM)  Surgery  [address] [facility] [address], [place] [address]  Phone: [phone]  Fax: [phone]  Follow Up Time: 1 month   Antonette Mckay (DPM)  Surgery  [address] [facility], [place] [address]  Phone: [phone]  Fax: [phone]  Follow Up Time: 1 month    Hoenig, David M (MD)  Urology  [facility]- Dept. of Urology, [address] [place] [address]  Phone: [phone]  Fax: [phone]  Follow Up Time:

## 2023-02-21 NOTE — PROGRESS NOTE ADULT - PROVIDER SPECIALTY LIST ADULT
Urology
Infectious Disease
SICU
SICU
Urology
Infectious Disease
SICU
SICU
Urology

## 2023-02-21 NOTE — PROGRESS NOTE ADULT - REASON FOR ADMISSION
septic stone

## 2023-02-21 NOTE — PROGRESS NOTE ADULT - SUBJECTIVE AND OBJECTIVE BOX
Follow Up:  nephrolithaisis, pyelo    Interval History/ROS:  Overnight: No acute events.  Patient remains afebrile.  Otherwise hemodynamically stable on room air.  Latest labs show no leukocytosis, BMP with renal function within normal limit.    Patient seen and examined.  Appears comfortable, no distress.    Allergies  No Known Allergies        ANTIMICROBIALS:  cefepime   IVPB 1000 every 8 hours      OTHER MEDS:  MEDICATIONS  (STANDING):  acetaminophen     Tablet .. 975 every 6 hours PRN  coronavirus bivalent (EUA) Booster Vaccine (PFIZER) 0.3 once  enoxaparin Injectable 40 every 24 hours  midodrine. 10 <User Schedule>  polyethylene glycol 3350 17 daily  senna 2 at bedtime  simethicone 80 every 8 hours PRN      Vital Signs Last 24 Hrs  T(C): 36.4 (21 Feb 2023 10:51), Max: 36.8 (21 Feb 2023 05:21)  T(F): 97.5 (21 Feb 2023 10:51), Max: 98.2 (21 Feb 2023 05:21)  HR: 83 (21 Feb 2023 10:51) (51 - 83)  BP: 109/77 (21 Feb 2023 10:51) (94/63 - 109/77)  BP(mean): --  RR: 18 (21 Feb 2023 10:51) (12 - 18)  SpO2: 95% (21 Feb 2023 10:51) (95% - 100%)    Parameters below as of 21 Feb 2023 10:51  Patient On (Oxygen Delivery Method): room air        PHYSICAL EXAM:  Constitutional: NAD  HEAD/EYES: anicteric, no conjunctival injection  ENT:  supple, no thrush  Cardiovascular:   +S1/S2  Respiratory:  +BS bilaterally  GI:  soft, +bowel sounds  :  +coronel  Musculoskeletal:  no synovitis  Skin:  no rash, no phlebitis                        13.2   5.80  )-----------( 146      ( 21 Feb 2023 07:13 )             41.7       02-21    135  |  94<L>  |  10  ----------------------------<  97  4.3   |  29  |  0.55    Ca    10.4      21 Feb 2023 07:12  Phos  3.3     02-20  Mg     2.2     02-20               Follow Up:  nephrolithaisis, pyelo    Interval History/ROS:  Overnight: No acute events.  Patient remains afebrile.  Otherwise hemodynamically stable on room air.  Latest labs show no leukocytosis, BMP with renal function within normal limit.    Patient seen and examined.  Appears comfortable, no distress.    Allergies  No Known Allergies    ANTIMICROBIALS:  cefepime   IVPB 1000 every 8 hours    OTHER MEDS:  MEDICATIONS  (STANDING):  acetaminophen     Tablet .. 975 every 6 hours PRN  coronavirus bivalent (EUA) Booster Vaccine (PFIZER) 0.3 once  enoxaparin Injectable 40 every 24 hours  midodrine. 10 <User Schedule>  polyethylene glycol 3350 17 daily  senna 2 at bedtime  simethicone 80 every 8 hours PRN      Vital Signs Last 24 Hrs  T(C): 36.4 (21 Feb 2023 10:51), Max: 36.8 (21 Feb 2023 05:21)  T(F): 97.5 (21 Feb 2023 10:51), Max: 98.2 (21 Feb 2023 05:21)  HR: 83 (21 Feb 2023 10:51) (51 - 83)  BP: 109/77 (21 Feb 2023 10:51) (94/63 - 109/77)  BP(mean): --  RR: 18 (21 Feb 2023 10:51) (12 - 18)  SpO2: 95% (21 Feb 2023 10:51) (95% - 100%)    Parameters below as of 21 Feb 2023 10:51  Patient On (Oxygen Delivery Method): room air    PHYSICAL EXAM:  Constitutional: NAD  HEAD/EYES: anicteric, no conjunctival injection  ENT:  supple, no thrush  Cardiovascular:   +S1/S2  Respiratory:  +BS bilaterally  GI:  soft, +bowel sounds  :  +coronel  Musculoskeletal:  no synovitis  Skin:  no rash, no phlebitis                        13.2   5.80  )-----------( 146      ( 21 Feb 2023 07:13 )             41.7       02-21    135  |  94<L>  |  10  ----------------------------<  97  4.3   |  29  |  0.55    Ca    10.4      21 Feb 2023 07:12  Phos  3.3     02-20  Mg     2.2     02-20

## 2023-02-21 NOTE — DISCHARGE NOTE PROVIDER - HOSPITAL COURSE
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 CT abd pelvis showed 4mm L proximal ureteral septic stone. underwent 2/16 left ureteral stent placement. urine cx growing pseudomonas, blood cxs negative. on cefepime.   post op in PACU, patient has been hypothermic, continues to be altered with bradycardia. Patient subsequently became hypotensive to 80s. SICU consulted for hemodynamic monitoring and sepsis managment.        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 CT abd pelvis showed 4mm L proximal ureteral septic stone. underwent 2/16 left ureteral stent placement. urine cx growing pseudomonas, blood cxs negative. on cefepime.   post op in PACU, patient has been hypothermic, continues to be altered with bradycardia. Patient subsequently became hypotensive to 80s. SICU consulted for hemodynamic monitoring and sepsis managment.   2/18 ID consulted. recommended cefepime. podiatry consulted for left heel ulcer.   2/20 WBC 5, though has had several occurrences of hypothermia. seems to be baseline and should have outpatient neuro follow up. pt transferred to floor. coronel removed. passed tov , PVR 230cc.   2/21- cleared for discharge. ID rec to complete a 10 day course with cipro from time of OR. last dose 2/26

## 2023-02-21 NOTE — PROGRESS NOTE ADULT - ASSESSMENT
63 y/o M PMHx progressive cerebellar ataxia, lacunar infarcts, chronic hypotension on midodrine, and nephrolithiasis, presented with AMS. Found to have L hydroureter with obsructing L ureteral stone, now s/p emergent stent placement by Urology on 2/16. Post-procedural course c/b hypothermia, AMS, and bradycardia, s/p transfer to SICU for close monitoring. ID consulted for further management.    Impression  #Abnormal imaging of the abdomen, nephrolithiasis with left-sided hydroureteronephrosis  #Pyelonephritis/UTI, Pseudomonas growth  #Leukocytosis, resolved    Recommendations  Continue cefepime given Pseudomonas growth in urine culture  Would complete 10 days of therapy from OR given clinical stability, eend date: 2/26/23  If otherwise ready for discharge, can switch to PO ciprofloxacin 500 mg BID  Follow fever curve and WBC count    Candido Forman MD  Division of Infectious Diseases   63 y/o M PMHx progressive cerebellar ataxia, lacunar infarcts, chronic hypotension on midodrine, and nephrolithiasis, presented with AMS. Found to have L hydroureter with obsructing L ureteral stone, now s/p emergent stent placement by Urology on 2/16. Post-procedural course c/b hypothermia, AMS, and bradycardia, s/p transfer to SICU for close monitoring. ID consulted for further management.    Impression  #Abnormal imaging of the abdomen, nephrolithiasis with left-sided hydroureteronephrosis  #Pyelonephritis/UTI, Pseudomonas growth  #Leukocytosis, resolved    Recommendations  Continue cefepime given Pseudomonas growth in urine culture  Would complete 10 days of therapy from OR given clinical stability, end date: 2/26/23  If otherwise ready for discharge, can switch to PO ciprofloxacin 500 mg BID  Follow fever curve and WBC count    ID to sign off. Please contact as issues arise.    Candido Forman MD  Division of Infectious Diseases

## 2023-02-21 NOTE — DISCHARGE NOTE NURSING/CASE MANAGEMENT/SOCIAL WORK - NSDCPEFALRISK_GEN_ALL_CORE
For information on Fall & Injury Prevention, visit: [email or web address] OR  [email or web address] OR  https://www.cdc.gov/steadi/patient.html

## 2023-02-21 NOTE — DISCHARGE NOTE PROVIDER - NSDCFUADDINST_GEN_ALL_CORE_FT
Medical Necessity Information: It is in your best interest to select a reason for this procedure from the list below. All of these items fulfill various CMS LCD requirements except the new and changing color options. Medical Necessity Clause: This procedure was medically necessary because the lesion that was treated was: Lab: [facility][note 3]0 [facility] Lab Facility: [address] Body Location Override (Optional - Billing Will Still Be Based On Selected Body Map Location If Applicable): Left Superior Scalp Detail Level: Detailed Was A Bandage Applied: Yes Size Of Lesion In Cm (Required): 0.7 X Size Of Lesion In Cm (Optional): 0 Anesthesia Type: 1% lidocaine without epinephrine and a 1:10 solution of 8.4% sodium bicarbonate Anesthesia Volume In Cc: 0.5 Hemostasis: Aluminum Chloride and Electrocautery Wound Care: Vaseline Path Notes (To The Dermatopathologist): 0.7 cm Render Path Notes In Note?: No Consent: It is our intent to remove the lesion in its entirety. Consent was obtained from the patient. The risks and benefits to therapy were discussed in detail. Specifically, the risks of infection, scarring, bleeding, prolonged wound healing, incomplete removal, allergy to anesthesia, nerve injury and recurrence were addressed. Prior to the procedure, the treatment site was clearly identified and confirmed by the patient. All components of Universal Protocol/PAUSE Rule completed. Post-Care Instructions: I reviewed with the patient in detail post-care instructions. Patient is to keep the biopsy site dry overnight, and then apply bacitracin twice daily until healed. Patient may apply hydrogen peroxide soaks to remove any crusting. Notification Instructions: Patient will be notified of biopsy results. However, patient instructed to call the office if not contacted within 2 weeks. Billing Type: [employer] It is common to have blood in the urine after your procedure.  It may be pink or even red; inform your doctor if you have a significant amount of clots in the urine or if you are unable to void at all.  -It is not uncommon to have some burning when you urinate, this will improve over the next few days.  -You have an internal stent (a hollow tube that runs from the kidney to your bladder) after your procedure, helping your kidney drain down to your bladder after your surgery.  Some patients do not notice that they have a stent, while others complain of the sensation of needing to urinate frequently, burning on urination, or even some back pain (especially when they go to urinate). These sensations usually improve gradually, some faster than others. This is not uncommon, but may initially warrant the use of the pain medication which you were prescribed.  While the stent is in place, your urine may continue to be bloody. This stent is temporary and must be removed/exchanged by your urologist.  -Provided that you are not restricted with fluids by your physician, you should drink 6-8 (8 oz.) glasses of fluid per day.  -You may resume your regular diet and regular medication regimen.    -You may shower or bathe.    -You may take over the counter pain medications such as Motrin and Tylenol as needed for pain.  Do not exceed 4 grams of Tylenol daily.  Each medication may be taken every 6 hours.  You may alternate these medications such that you take either one every 3 hours.  If you have severe pain that does not improve with the pain medication or you have persistent vomiting, call your doctor.  -You may have a prescription for an antibiotic when you go home.  Take this medication as instructed; if you miss one dose, resume taking it on your previous schedule until you have completed the entire course of treatment.  -As you have just underwent general anesthesia, you should refrain from driving, heavy lifting, smoking, alcohol consumption, or important decision making for the next 24 hours.  You may climb stairs and you may resume sexual activity.  -Call your physician if you have a fever over 101F.  -Make a follow up appointment for with your urologist when you arrive home (or the next business day).  -Call your urologist during normal business hours with any other routine questions.

## 2023-03-08 ENCOUNTER — APPOINTMENT (OUTPATIENT)
Dept: UROLOGY | Facility: CLINIC | Age: 63
End: 2023-03-08
Payer: MEDICAID

## 2023-03-08 DIAGNOSIS — N20.0 CALCULUS OF KIDNEY: ICD-10-CM

## 2023-03-08 PROCEDURE — 99214 OFFICE O/P EST MOD 30 MIN: CPT

## 2023-03-08 NOTE — HISTORY OF PRESENT ILLNESS
[FreeTextEntry1] : Pt is 63 yo male with recent hosp for sepsis, left prox ureteral stone, stented by Dr. Dobbs on left.\par \par Prior surgery for similar with Dr. Pilo pat URS 8/2022 after earlier stent.\par \par Now returns for further f/u after most recent stone.\par \par CT scan reviewed with pt and wife today: mult stones on right (at least 6) up to 10 mm in renal pelvis, 7 mm left renal, 7 mm left prox ureter.\par \par stent films reviewed as well.\par Stones visible on fluoroscopy [Urinary Incontinence] : urinary incontinence

## 2023-03-08 NOTE — ASSESSMENT
[FreeTextEntry1] : urine for culture in f/u\par \par I had long discussion with patient about their stones, and about options, risks, and benefits of all treatments.  Main options for definitive stone treatment include ESWL, URS, PCNL.  \par \par ESWL success best with smaller, less dense stones, and with short skin to stone distance and favorable location of the stone within the urinary tract, while URS is more successful treatment with multiple stones, more dense stones, or challenging body habitus or stone location.  PCNL is best option for larger, more dense and complex stones, and particularly those involving the lower pole.  Non-definitive stone treatment options for drainage, using either stents or nephrostomy, also reviewed: these are of lower immediate surgical risks, but incur multiple procedures to manage and may have their own complications and effects on quality of life.  Still, nephrostomy or nephroureteral catheter can allow maintenance of urinary system drainage without surgical risks, and management in office with exchanges (avoiding the anesthesia and testing which would be present with bilateral internal stent exchange).\par \par Risks of nontreatment with obstruction can lead to very high rate of renal function loss in stone-bearing kidney over the next months to years.\par \par In this patient's case, I recommend... bilateral URS with laser, possible cvac\par \par Risks/benefits/success/recovery expectations all reviewed at length, particularly with respect to patient's comorbidities, and inclusive of infection/sepsis, bleeding, need for secondary procedures or secondary stages such as embolization or open surgery, and even risks of death due to acute issues superimposed on comorbidities.\par \par Pt prefers to undergo: bilat URS with laser\par \par Will schedule.\par \par Urine culture, PST appt to schedule once surgery scheduled.\par

## 2023-03-12 LAB — BACTERIA UR CULT: NORMAL

## 2023-03-15 ENCOUNTER — OUTPATIENT (OUTPATIENT)
Dept: OUTPATIENT SERVICES | Facility: HOSPITAL | Age: 63
LOS: 1 days | End: 2023-03-15
Payer: MEDICARE

## 2023-03-15 VITALS
DIASTOLIC BLOOD PRESSURE: 70 MMHG | SYSTOLIC BLOOD PRESSURE: 110 MMHG | RESPIRATION RATE: 18 BRPM | OXYGEN SATURATION: 99 % | TEMPERATURE: 95 F | HEART RATE: 96 BPM | WEIGHT: 160.06 LBS | HEIGHT: 67 IN

## 2023-03-15 DIAGNOSIS — Z98.890 OTHER SPECIFIED POSTPROCEDURAL STATES: Chronic | ICD-10-CM

## 2023-03-15 DIAGNOSIS — N20.0 CALCULUS OF KIDNEY: ICD-10-CM

## 2023-03-15 DIAGNOSIS — Z01.818 ENCOUNTER FOR OTHER PREPROCEDURAL EXAMINATION: ICD-10-CM

## 2023-03-15 LAB
ANION GAP SERPL CALC-SCNC: 12 MMOL/L — SIGNIFICANT CHANGE UP (ref 5–17)
BUN SERPL-MCNC: 14 MG/DL — SIGNIFICANT CHANGE UP (ref 7–23)
CALCIUM SERPL-MCNC: 10.3 MG/DL — SIGNIFICANT CHANGE UP (ref 8.4–10.5)
CHLORIDE SERPL-SCNC: 99 MMOL/L — SIGNIFICANT CHANGE UP (ref 96–108)
CO2 SERPL-SCNC: 29 MMOL/L — SIGNIFICANT CHANGE UP (ref 22–31)
CREAT SERPL-MCNC: 0.7 MG/DL — SIGNIFICANT CHANGE UP (ref 0.5–1.3)
EGFR: 105 ML/MIN/1.73M2 — SIGNIFICANT CHANGE UP
GLUCOSE SERPL-MCNC: 130 MG/DL — HIGH (ref 70–99)
HCT VFR BLD CALC: 42.1 % — SIGNIFICANT CHANGE UP (ref 39–50)
HGB BLD-MCNC: 13.2 G/DL — SIGNIFICANT CHANGE UP (ref 13–17)
MCHC RBC-ENTMCNC: 27.8 PG — SIGNIFICANT CHANGE UP (ref 27–34)
MCHC RBC-ENTMCNC: 31.4 GM/DL — LOW (ref 32–36)
MCV RBC AUTO: 88.6 FL — SIGNIFICANT CHANGE UP (ref 80–100)
NRBC # BLD: 0 /100 WBCS — SIGNIFICANT CHANGE UP (ref 0–0)
PLATELET # BLD AUTO: 319 K/UL — SIGNIFICANT CHANGE UP (ref 150–400)
POTASSIUM SERPL-MCNC: 3.8 MMOL/L — SIGNIFICANT CHANGE UP (ref 3.5–5.3)
POTASSIUM SERPL-SCNC: 3.8 MMOL/L — SIGNIFICANT CHANGE UP (ref 3.5–5.3)
RBC # BLD: 4.75 M/UL — SIGNIFICANT CHANGE UP (ref 4.2–5.8)
RBC # FLD: 14.1 % — SIGNIFICANT CHANGE UP (ref 10.3–14.5)
SODIUM SERPL-SCNC: 140 MMOL/L — SIGNIFICANT CHANGE UP (ref 135–145)
WBC # BLD: 8.51 K/UL — SIGNIFICANT CHANGE UP (ref 3.8–10.5)
WBC # FLD AUTO: 8.51 K/UL — SIGNIFICANT CHANGE UP (ref 3.8–10.5)

## 2023-03-15 RX ORDER — THIAMINE MONONITRATE (VIT B1) 100 MG
1 TABLET ORAL
Qty: 30 | Refills: 0

## 2023-03-15 RX ORDER — ATORVASTATIN CALCIUM 80 MG/1
1 TABLET, FILM COATED ORAL
Qty: 0 | Refills: 0 | DISCHARGE

## 2023-03-15 NOTE — H&P PST ADULT - SKIN COMMENTS
patient unable to stand up; unable to assess buttocks, left ankle wrapped in gauze dressing - appears CDI

## 2023-03-15 NOTE — H&P PST ADULT - ASSESSMENT
DASI score: 2.74  DASI activity: needs assistance performing ADLs  Loose teeth or denture: denies loose teeth/dentures

## 2023-03-15 NOTE — H&P PST ADULT - ATTENDING COMMENTS
pt with left prox ureteral stone, bilateral renal stones  s/p emergency stent  for bilateral URS with laser, stents  cont on periop cipro  consent signed

## 2023-03-15 NOTE — H&P PST ADULT - NSICDXPASTMEDICALHX_GEN_ALL_CORE_FT
PAST MEDICAL HISTORY:  Adrenal insufficiency     Anemia     H/O cerebellar ataxia -diagnosed in 2019    H/O sepsis     History of hypotension     Lacunar infarction -chronic    Low body temperature     Pneumonia, aspiration -april 2022 (intubated for 7 days at [facility])    Pressure ulcer of buttock     Pressure ulcer of left heel

## 2023-03-15 NOTE — H&P PST ADULT - HISTORY OF PRESENT ILLNESS
62 year old male presents to PST prior to scheduled Cystoscopy, Bilateral Ureteroscopy with Laser, Stone Removal, Bilateral Stent on 3/23/23 with Dr. David Hoenig. 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    Patient was recently admitted on 2/16/23-2/21/23 with septic stone;  underwent left ureteral stent placement on 2/16, UTI treated with cefepime and cipro. Patient's wife reports a urine culture was performed 2 weeks ago with Dr. Hoenig. Patient is incontinent of urine; unable to perform urine culture at PST.  In addition, tympanic temp of 95.8 F; unable to obtain oral/axillary temperature.    Patient denies previous Covid19 infection/exposure,recent travel,fevers,chills,cough,SOB,loss of sense of smell/taste.                   62 year old male presents to PST prior to scheduled Cystoscopy, Bilateral Ureteroscopy with Laser, Stone Removal, Bilateral Stent on 3/23/23 with Dr. David Hoenig. 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    Patient was recently admitted on 2/16/23-2/21/23 with septic stone;  underwent left ureteral stent placement on 2/16, UTI treated with cefepime and cipro. Patient's wife reports a urine culture was performed 2 weeks ago with Dr. Hoenig. Patient is incontinent of urine; unable to perform urine culture at [facility]. Has bilateral renal and left ureteral calculi- for bilateral URS with laser, stents.    In addition, tympanic temp of 95.8 F; unable to obtain oral/axillary temperature.    Patient denies previous Covid19 infection/exposure,recent travel,fevers,chills,cough,SOB,loss of sense of smell/taste.

## 2023-03-15 NOTE — H&P PST ADULT - COMMENTS
tympanic temp; patient's wife reports he "runs low". Unable to obtain weight at PST; patient unable to stand.

## 2023-03-15 NOTE — H&P PST ADULT - PROBLEM SELECTOR PLAN 1
Cystoscopy, Bilateral Ureteroscopy with Laser, Stone Removal, Bilateral Stent on 3/23/23 with Dr. David Hoenig.  Pre-op instructions given. Questions answered.  Medical evaluation with PCP prior to surgery.  Labs: CBC, BMP.  Unable to obtain Urine Cx at [facility] d/t patient incontinence. Dr. Hoenig made aware.

## 2023-03-16 RX ORDER — AMOXICILLIN AND CLAVULANATE POTASSIUM 875; 125 MG/1; MG/1
875-125 TABLET, COATED ORAL
Qty: 10 | Refills: 0 | Status: ACTIVE | COMMUNITY
Start: 2023-03-16 | End: 1900-01-01

## 2023-03-22 ENCOUNTER — TRANSCRIPTION ENCOUNTER (OUTPATIENT)
Age: 63
End: 2023-03-22

## 2023-03-23 ENCOUNTER — OUTPATIENT (OUTPATIENT)
Dept: OUTPATIENT SERVICES | Facility: HOSPITAL | Age: 63
LOS: 1 days | End: 2023-03-23
Payer: MEDICAID

## 2023-03-23 ENCOUNTER — APPOINTMENT (OUTPATIENT)
Dept: UROLOGY | Facility: HOSPITAL | Age: 63
End: 2023-03-23

## 2023-03-23 ENCOUNTER — TRANSCRIPTION ENCOUNTER (OUTPATIENT)
Age: 63
End: 2023-03-23

## 2023-03-23 VITALS
SYSTOLIC BLOOD PRESSURE: 114 MMHG | OXYGEN SATURATION: 96 % | TEMPERATURE: 98 F | HEIGHT: 67 IN | HEART RATE: 115 BPM | DIASTOLIC BLOOD PRESSURE: 83 MMHG | RESPIRATION RATE: 18 BRPM | WEIGHT: 160.06 LBS

## 2023-03-23 DIAGNOSIS — N20.0 CALCULUS OF KIDNEY: ICD-10-CM

## 2023-03-23 DIAGNOSIS — Z98.890 OTHER SPECIFIED POSTPROCEDURAL STATES: Chronic | ICD-10-CM

## 2023-03-23 PROCEDURE — 80048 BASIC METABOLIC PNL TOTAL CA: CPT

## 2023-03-23 PROCEDURE — 52356 CYSTO/URETERO W/LITHOTRIPSY: CPT | Mod: 50,22

## 2023-03-23 PROCEDURE — 36415 COLL VENOUS BLD VENIPUNCTURE: CPT

## 2023-03-23 PROCEDURE — 85027 COMPLETE CBC AUTOMATED: CPT

## 2023-03-23 PROCEDURE — G0463: CPT

## 2023-03-23 PROCEDURE — 76000 FLUOROSCOPY <1 HR PHYS/QHP: CPT

## 2023-03-23 DEVICE — STENT URET 7FR 26CM: Type: IMPLANTABLE DEVICE | Status: FUNCTIONAL

## 2023-03-23 DEVICE — URETERAL CATH DUAL LUMEN 10FR 54CM: Type: IMPLANTABLE DEVICE | Status: FUNCTIONAL

## 2023-03-23 DEVICE — URETERAL CATH AXXCESS OPEN END 6FR 70CM: Type: IMPLANTABLE DEVICE | Status: FUNCTIONAL

## 2023-03-23 DEVICE — LASER FIBER SOLTIVE 200 BALL TIP: Type: IMPLANTABLE DEVICE | Status: FUNCTIONAL

## 2023-03-23 DEVICE — GUIDEWIRE SENSOR DUAL-FLEX NITINOL STRAIGHT .035" X 150CM: Type: IMPLANTABLE DEVICE | Status: FUNCTIONAL

## 2023-03-23 RX ORDER — PHENAZOPYRIDINE HCL 100 MG
100 TABLET ORAL EVERY 8 HOURS
Refills: 0 | Status: DISCONTINUED | OUTPATIENT
Start: 2023-03-23 | End: 2023-04-07

## 2023-03-23 RX ORDER — SODIUM CHLORIDE 9 MG/ML
3 INJECTION INTRAMUSCULAR; INTRAVENOUS; SUBCUTANEOUS EVERY 8 HOURS
Refills: 0 | Status: DISCONTINUED | OUTPATIENT
Start: 2023-03-23 | End: 2023-03-23

## 2023-03-23 RX ORDER — HEPARIN SODIUM 5000 [USP'U]/ML
5000 INJECTION INTRAVENOUS; SUBCUTANEOUS EVERY 8 HOURS
Refills: 0 | Status: DISCONTINUED | OUTPATIENT
Start: 2023-03-23 | End: 2023-04-07

## 2023-03-23 RX ORDER — PHENAZOPYRIDINE HCL 100 MG
1 TABLET ORAL
Qty: 9 | Refills: 0
Start: 2023-03-23 | End: 2023-03-25

## 2023-03-23 RX ORDER — LIDOCAINE HCL 20 MG/ML
0.2 VIAL (ML) INJECTION ONCE
Refills: 0 | Status: DISCONTINUED | OUTPATIENT
Start: 2023-03-23 | End: 2023-03-23

## 2023-03-23 RX ORDER — TAMSULOSIN HYDROCHLORIDE 0.4 MG/1
1 CAPSULE ORAL
Qty: 14 | Refills: 0
Start: 2023-03-23 | End: 2023-04-05

## 2023-03-23 RX ORDER — MIDODRINE HYDROCHLORIDE 2.5 MG/1
5 TABLET ORAL THREE TIMES A DAY
Refills: 0 | Status: DISCONTINUED | OUTPATIENT
Start: 2023-03-23 | End: 2023-04-07

## 2023-03-23 RX ORDER — TAMSULOSIN HYDROCHLORIDE 0.4 MG/1
0.4 CAPSULE ORAL AT BEDTIME
Refills: 0 | Status: DISCONTINUED | OUTPATIENT
Start: 2023-03-23 | End: 2023-04-07

## 2023-03-23 RX ORDER — SODIUM CHLORIDE 9 MG/ML
1000 INJECTION, SOLUTION INTRAVENOUS
Refills: 0 | Status: DISCONTINUED | OUTPATIENT
Start: 2023-03-23 | End: 2023-03-24

## 2023-03-23 RX ORDER — FENTANYL CITRATE 50 UG/ML
10 INJECTION INTRAVENOUS
Refills: 0 | Status: DISCONTINUED | OUTPATIENT
Start: 2023-03-23 | End: 2023-03-24

## 2023-03-23 RX ORDER — ASCORBIC ACID 60 MG
500 TABLET,CHEWABLE ORAL DAILY
Refills: 0 | Status: DISCONTINUED | OUTPATIENT
Start: 2023-03-23 | End: 2023-04-07

## 2023-03-23 RX ORDER — CEFAZOLIN SODIUM 1 G
2000 VIAL (EA) INJECTION ONCE
Refills: 0 | Status: COMPLETED | OUTPATIENT
Start: 2023-03-23 | End: 2023-03-23

## 2023-03-23 RX ORDER — CIPROFLOXACIN LACTATE 400MG/40ML
500 VIAL (ML) INTRAVENOUS EVERY 12 HOURS
Refills: 0 | Status: DISCONTINUED | OUTPATIENT
Start: 2023-03-23 | End: 2023-04-07

## 2023-03-23 RX ORDER — ONDANSETRON 8 MG/1
4 TABLET, FILM COATED ORAL ONCE
Refills: 0 | Status: DISCONTINUED | OUTPATIENT
Start: 2023-03-23 | End: 2023-03-24

## 2023-03-23 RX ORDER — CIPROFLOXACIN LACTATE 400MG/40ML
1 VIAL (ML) INTRAVENOUS
Qty: 14 | Refills: 0
Start: 2023-03-23 | End: 2023-03-29

## 2023-03-23 RX ADMIN — HEPARIN SODIUM 5000 UNIT(S): 5000 INJECTION INTRAVENOUS; SUBCUTANEOUS at 23:05

## 2023-03-23 RX ADMIN — FENTANYL CITRATE 10 MICROGRAM(S): 50 INJECTION INTRAVENOUS at 20:45

## 2023-03-23 RX ADMIN — SODIUM CHLORIDE 75 MILLILITER(S): 9 INJECTION, SOLUTION INTRAVENOUS at 21:05

## 2023-03-23 RX ADMIN — FENTANYL CITRATE 10 MICROGRAM(S): 50 INJECTION INTRAVENOUS at 20:30

## 2023-03-23 RX ADMIN — Medication 100 MILLIGRAM(S): at 21:05

## 2023-03-23 RX ADMIN — TAMSULOSIN HYDROCHLORIDE 0.4 MILLIGRAM(S): 0.4 CAPSULE ORAL at 21:04

## 2023-03-23 RX ADMIN — MIDODRINE HYDROCHLORIDE 5 MILLIGRAM(S): 2.5 TABLET ORAL at 21:37

## 2023-03-23 NOTE — PATIENT PROFILE ADULT - FALL HARM RISK - HARM RISK INTERVENTIONS

## 2023-03-23 NOTE — ASU DISCHARGE PLAN (ADULT/PEDIATRIC) - CARE PROVIDER_API CALL
Hoenig, David M (MD)  Urology  [facility]- Dept. of Urology, [address] [place] [address]  Phone: [phone]  Fax: [phone]  Follow Up Time:

## 2023-03-23 NOTE — ASU DISCHARGE PLAN (ADULT/PEDIATRIC) - ASU DC SPECIAL INSTRUCTIONSFT
STENT: You may have an internal stent (a hollow tube that runs from the kidney to your bladder) after your procedure, which helps urine drain from the kidney to your bladder. Some patients experience urinary frequency, burning, or even back pain (especially with urination). These sensations will gradually get better. Increasing your fluid intake can also improve these symptoms. While the stent is in place, your urine may continue to be bloody. This stent is temporary and must be removed by your urologist as an outpatient with in 3 months unless otherwise specified.   GENERAL: It is common to have blood in your urine after your procedure. It may be pink or even red; inform your doctor if you have a significant amount of clot in the urine or if you are unable to void at all. The urine may clear and then become bloody again especially as you are more physically active.  BATHING: You may shower or bathe.  DIET: You may resume your regular diet and regular medication regimen.  PAIN: You may take Tylenol (acetaminophen) 650-975mg and/or Motrin (ibuprofen) 400-600mg, both available over the counter, for pain every 6 hours as needed. Do not exceed 4000mg of Tylenol (acetaminophen) daily. You may alternate these medications such that you take one or the other every 3 hours for around the clock pain coverage. If you have a stent, the following medications may have been sent to your pharmacy for stent related discomfort: Flomax (tamsulosin) 0.4mg at bedtime until stent removed and Pyridium (phenasopyridine) 100mg every 8 hours as needed for kidney/bladder discomfort for max 3 days (Pyridium will make your urine orange).  ANTIBIOTICS: Please take the new antibiotic prescribed to you (ciprofloxacin) for 1 week. You may stop the Augmentin that was previously sent to you and do not take this further.   STOOL SOFTENERS: Do not allow yourself to become constipated as straining may cause bleeding. Take stool softeners or a laxative (ex. Miralax, Colace, Senokot, ExLax, etc), available over the counter, if needed.  ACTIVITY: No heavy lifting or strenuous exercise until you are evaluated at your post-operative appointment. Otherwise, you may return to your usual level of physical activity.  ANTICOAGULATION: If you are taking any blood thinning medications, please discuss with your urologist prior to restarting these medications unless otherwise specified.  FOLLOW-UP: If you did not already schedule your post-operative appointment, please call your urologist to schedule and follow-up appointment.  CALL YOUR UROLOGIST IF: You have any bleeding that does not stop, inability to void >8 hours, fever over 100.4 F, chills, persistent nausea/vomiting, changes in your incision concerning for infection, or if your pain is not controlled on your discharge pain medications.

## 2023-03-23 NOTE — BRIEF OPERATIVE NOTE - OPERATION/FINDINGS
Procedure: cystoscopy, bilateral ureteroscopy w/ laser lithotripsy, bilateral stent placement  Preop dx: bilateral renal stones  Postop dx: bilateral renal stones

## 2023-03-23 NOTE — PATIENT PROFILE ADULT - INTERNATIONAL TRAVEL
Video Education Report - ICR/CR    Name:  Nikhil Post     Date:  9/19/2018  MRN: [id number]     Session #:    Session Length: 40 min    Recommended Videos        []01 [employer] - Program Overview   34:22    []02 Overview of Pritikin Eating Plan   34:10    []03 Becoming a Pritikin    33:08     []04 Diseases of Our Time - Part 1   34:22    []05 Calorie Density     33:39   []06 Label Reading - Part 1    32:15   []07 Move it      32.54   []08 Healthy Minds, Bodies, Hearts   32:14   [x]09 Dining Out - Part 1    32:28   []10 Heart Disease Risk Reduction   12:74   []11 Metabolic Syndrome and Belly Fat  31:52   []12 Facts on Fat     35:29   []13 Diseases of Our Time - Part 2   33:07   []14 Biology of Weight Control   32:36   []15 Biomechanical Limitations   35:20   []16 Nutrition Action Plan    34:23        Comments:  Video completed,
No

## 2023-03-23 NOTE — ASU DISCHARGE PLAN (ADULT/PEDIATRIC) - NS MD DC FALL RISK RISK
For information on Fall & Injury Prevention, visit: [email or web address] OR  [email or web address] OR  https://www.cdc.gov/steadi/patient.html

## 2023-03-24 VITALS
RESPIRATION RATE: 16 BRPM | SYSTOLIC BLOOD PRESSURE: 101 MMHG | TEMPERATURE: 97 F | DIASTOLIC BLOOD PRESSURE: 71 MMHG | HEART RATE: 64 BPM | OXYGEN SATURATION: 98 %

## 2023-03-24 PROCEDURE — C1758: CPT

## 2023-03-24 PROCEDURE — 76000 FLUOROSCOPY <1 HR PHYS/QHP: CPT

## 2023-03-24 PROCEDURE — 52356 CYSTO/URETERO W/LITHOTRIPSY: CPT | Mod: 50

## 2023-03-24 PROCEDURE — C9399: CPT

## 2023-03-24 PROCEDURE — C1769: CPT

## 2023-03-24 PROCEDURE — C2625: CPT

## 2023-03-24 PROCEDURE — C1889: CPT

## 2023-03-24 RX ADMIN — Medication 100 MILLIGRAM(S): at 06:21

## 2023-03-24 RX ADMIN — HEPARIN SODIUM 5000 UNIT(S): 5000 INJECTION INTRAVENOUS; SUBCUTANEOUS at 06:22

## 2023-03-24 RX ADMIN — Medication 500 MILLIGRAM(S): at 06:22

## 2023-03-24 RX ADMIN — MIDODRINE HYDROCHLORIDE 5 MILLIGRAM(S): 2.5 TABLET ORAL at 06:21

## 2023-03-24 NOTE — PROGRESS NOTE ADULT - SUBJECTIVE AND OBJECTIVE BOX
UROLOGY DAILY PROGRESS NOTE:     Subjective: Patient seen and examined at bedside. No overnight events.       Objective:  Vital signs  T(F): , Max: 97.5 (03-23-23 @ 14:24)  HR: 65 (03-24-23 @ 06:00)  BP: 102/53 (03-24-23 @ 06:00)  SpO2: 96% (03-24-23 @ 06:00)  Wt(kg): --    I&O's Summary    23 Mar 2023 07:01  -  24 Mar 2023 07:00  --------------------------------------------------------  IN: 450 mL / OUT: 250 mL / NET: 200 mL    I&O's Detail    23 Mar 2023 07:01  -  24 Mar 2023 07:00  --------------------------------------------------------  IN:    Lactated Ringers: 450 mL  Total IN: 450 mL    OUT:    Voided (mL): 250 mL  Total OUT: 250 mL    Total NET: 200 mL          Gen: NAD  Pulm: No respiratory distress, no subcostal retractions  CV: RRR, no JVD  Abd: Soft, NT, ND    Labs:                Urine Cx:  
 Post op Check    Pt seen and examined without complaints. Pain is controlled. Denies SOB/CP/N/V.    Vital Signs Last 24 Hrs  T(C): 36 (23 Mar 2023 21:00), Max: 36.4 (23 Mar 2023 14:24)  T(F): 96.8 (23 Mar 2023 21:00), Max: 97.5 (23 Mar 2023 14:24)  HR: 86 (23 Mar 2023 21:30) (79 - 115)  BP: 108/75 (23 Mar 2023 21:30) (101/57 - 114/83)  BP(mean): 87 (23 Mar 2023 21:30) (74 - 88)  RR: 16 (23 Mar 2023 21:30) (16 - 18)  SpO2: 98% (23 Mar 2023 21:30) (96% - 100%)    Parameters below as of 23 Mar 2023 21:00  Patient On (Oxygen Delivery Method): room air        I&O's Summary    23 Mar 2023 07:01  -  23 Mar 2023 22:06  --------------------------------------------------------  IN: 300 mL / OUT: 250 mL / NET: 50 mL        Physical Exam  Gen: NAD, A&Ox3  Pulm: No respiratory distress, no subcostal retractions  CV: RRR, no JVD  Abd: Soft, NT, ND  Back: (-) CVAT  : voiding, urine watermelon

## 2023-03-24 NOTE — PROGRESS NOTE ADULT - ASSESSMENT
A/P: 62y Male s/p b/l URS, b/l stent placement  DVT prophylaxis/OOB  Incentive spirometry  Strict I&O's  Analgesia and antiemetics as needed  Diet  23 hour stay  continue cipro    
A/P: 62y Male s/p b/l URS, b/l stent placement  DVT prophylaxis/OOB  Incentive spirometry  Strict I&O's  Analgesia and antiemetics as needed  Diet  continue cipro  [facility]

## 2023-03-28 PROBLEM — R68.89 OTHER GENERAL SYMPTOMS AND SIGNS: Chronic | Status: ACTIVE | Noted: 2023-03-15

## 2023-03-28 PROBLEM — L89.309: Chronic | Status: ACTIVE | Noted: 2023-03-15

## 2023-03-28 PROBLEM — L89.629 PRESSURE ULCER OF LEFT HEEL, UNSPECIFIED STAGE: Chronic | Status: ACTIVE | Noted: 2023-03-15

## 2023-03-28 PROBLEM — Z86.19 PERSONAL HISTORY OF OTHER INFECTIOUS AND PARASITIC DISEASES: Chronic | Status: ACTIVE | Noted: 2023-03-15

## 2023-04-05 ENCOUNTER — APPOINTMENT (OUTPATIENT)
Dept: UROLOGY | Facility: CLINIC | Age: 63
End: 2023-04-05

## 2023-04-14 ENCOUNTER — NON-APPOINTMENT (OUTPATIENT)
Age: 63
End: 2023-04-14

## 2023-04-14 ENCOUNTER — INPATIENT (INPATIENT)
Facility: HOSPITAL | Age: 63
LOS: 13 days | Discharge: HOME HEALTH | DRG: 698 | End: 2023-04-28
Attending: INTERNAL MEDICINE | Admitting: INTERNAL MEDICINE
Payer: MEDICARE

## 2023-04-14 VITALS
WEIGHT: 160.06 LBS | DIASTOLIC BLOOD PRESSURE: 69 MMHG | TEMPERATURE: 98 F | RESPIRATION RATE: 20 BRPM | OXYGEN SATURATION: 96 % | HEART RATE: 94 BPM | SYSTOLIC BLOOD PRESSURE: 91 MMHG | HEIGHT: 67 IN

## 2023-04-14 DIAGNOSIS — R41.82 ALTERED MENTAL STATUS, UNSPECIFIED: ICD-10-CM

## 2023-04-14 DIAGNOSIS — Z86.79 PERSONAL HISTORY OF OTHER DISEASES OF THE CIRCULATORY SYSTEM: ICD-10-CM

## 2023-04-14 DIAGNOSIS — N39.0 URINARY TRACT INFECTION, SITE NOT SPECIFIED: ICD-10-CM

## 2023-04-14 DIAGNOSIS — Z98.890 OTHER SPECIFIED POSTPROCEDURAL STATES: Chronic | ICD-10-CM

## 2023-04-14 DIAGNOSIS — N13.30 UNSPECIFIED HYDRONEPHROSIS: ICD-10-CM

## 2023-04-14 DIAGNOSIS — Z86.69 PERSONAL HISTORY OF OTHER DISEASES OF THE NERVOUS SYSTEM AND SENSE ORGANS: ICD-10-CM

## 2023-04-14 LAB
ALBUMIN SERPL ELPH-MCNC: 3.9 G/DL — SIGNIFICANT CHANGE UP (ref 3.3–5)
ALP SERPL-CCNC: 91 U/L — SIGNIFICANT CHANGE UP (ref 40–120)
ALT FLD-CCNC: 22 U/L — SIGNIFICANT CHANGE UP (ref 10–45)
ANION GAP SERPL CALC-SCNC: 12 MMOL/L — SIGNIFICANT CHANGE UP (ref 5–17)
APPEARANCE UR: ABNORMAL
APTT BLD: 33.2 SEC — SIGNIFICANT CHANGE UP (ref 27.5–35.5)
AST SERPL-CCNC: 40 U/L — SIGNIFICANT CHANGE UP (ref 10–40)
BACTERIA # UR AUTO: NEGATIVE — SIGNIFICANT CHANGE UP
BASE EXCESS BLDV CALC-SCNC: 5.2 MMOL/L — HIGH (ref -2–3)
BASOPHILS # BLD AUTO: 0.02 K/UL — SIGNIFICANT CHANGE UP (ref 0–0.2)
BASOPHILS NFR BLD AUTO: 0.3 % — SIGNIFICANT CHANGE UP (ref 0–2)
BILIRUB SERPL-MCNC: 0.5 MG/DL — SIGNIFICANT CHANGE UP (ref 0.2–1.2)
BILIRUB UR-MCNC: NEGATIVE — SIGNIFICANT CHANGE UP
BUN SERPL-MCNC: 14 MG/DL — SIGNIFICANT CHANGE UP (ref 7–23)
CA-I SERPL-SCNC: 1.31 MMOL/L — SIGNIFICANT CHANGE UP (ref 1.15–1.33)
CALCIUM SERPL-MCNC: 10.4 MG/DL — SIGNIFICANT CHANGE UP (ref 8.4–10.5)
CHLORIDE BLDV-SCNC: 98 MMOL/L — SIGNIFICANT CHANGE UP (ref 96–108)
CHLORIDE SERPL-SCNC: 97 MMOL/L — SIGNIFICANT CHANGE UP (ref 96–108)
CO2 BLDV-SCNC: 33 MMOL/L — HIGH (ref 22–26)
CO2 SERPL-SCNC: 27 MMOL/L — SIGNIFICANT CHANGE UP (ref 22–31)
COLOR SPEC: ABNORMAL
CREAT SERPL-MCNC: 0.73 MG/DL — SIGNIFICANT CHANGE UP (ref 0.5–1.3)
DIFF PNL FLD: ABNORMAL
EGFR: 104 ML/MIN/1.73M2 — SIGNIFICANT CHANGE UP
EOSINOPHIL # BLD AUTO: 0.12 K/UL — SIGNIFICANT CHANGE UP (ref 0–0.5)
EOSINOPHIL NFR BLD AUTO: 1.5 % — SIGNIFICANT CHANGE UP (ref 0–6)
EPI CELLS # UR: 2 /HPF — SIGNIFICANT CHANGE UP
GAS PNL BLDV: 132 MMOL/L — LOW (ref 136–145)
GAS PNL BLDV: SIGNIFICANT CHANGE UP
GAS PNL BLDV: SIGNIFICANT CHANGE UP
GLUCOSE BLDV-MCNC: 100 MG/DL — HIGH (ref 70–99)
GLUCOSE SERPL-MCNC: 101 MG/DL — HIGH (ref 70–99)
GLUCOSE UR QL: NEGATIVE — SIGNIFICANT CHANGE UP
HCO3 BLDV-SCNC: 32 MMOL/L — HIGH (ref 22–29)
HCT VFR BLD CALC: 42.2 % — SIGNIFICANT CHANGE UP (ref 39–50)
HCT VFR BLDA CALC: 42 % — SIGNIFICANT CHANGE UP (ref 39–51)
HGB BLD CALC-MCNC: 13.9 G/DL — SIGNIFICANT CHANGE UP (ref 12.6–17.4)
HGB BLD-MCNC: 13.7 G/DL — SIGNIFICANT CHANGE UP (ref 13–17)
HYALINE CASTS # UR AUTO: 7 /LPF — HIGH (ref 0–2)
IMM GRANULOCYTES NFR BLD AUTO: 0.3 % — SIGNIFICANT CHANGE UP (ref 0–0.9)
INR BLD: 1.03 RATIO — SIGNIFICANT CHANGE UP (ref 0.88–1.16)
KETONES UR-MCNC: NEGATIVE — SIGNIFICANT CHANGE UP
LACTATE BLDV-MCNC: 2 MMOL/L — SIGNIFICANT CHANGE UP (ref 0.5–2)
LEUKOCYTE ESTERASE UR-ACNC: ABNORMAL
LYMPHOCYTES # BLD AUTO: 0.7 K/UL — LOW (ref 1–3.3)
LYMPHOCYTES # BLD AUTO: 8.8 % — LOW (ref 13–44)
MCHC RBC-ENTMCNC: 27.8 PG — SIGNIFICANT CHANGE UP (ref 27–34)
MCHC RBC-ENTMCNC: 32.5 GM/DL — SIGNIFICANT CHANGE UP (ref 32–36)
MCV RBC AUTO: 85.8 FL — SIGNIFICANT CHANGE UP (ref 80–100)
MONOCYTES # BLD AUTO: 0.46 K/UL — SIGNIFICANT CHANGE UP (ref 0–0.9)
MONOCYTES NFR BLD AUTO: 5.8 % — SIGNIFICANT CHANGE UP (ref 2–14)
NEUTROPHILS # BLD AUTO: 6.63 K/UL — SIGNIFICANT CHANGE UP (ref 1.8–7.4)
NEUTROPHILS NFR BLD AUTO: 83.3 % — HIGH (ref 43–77)
NITRITE UR-MCNC: NEGATIVE — SIGNIFICANT CHANGE UP
NRBC # BLD: 0 /100 WBCS — SIGNIFICANT CHANGE UP (ref 0–0)
PCO2 BLDV: 52 MMHG — SIGNIFICANT CHANGE UP (ref 42–55)
PH BLDV: 7.39 — SIGNIFICANT CHANGE UP (ref 7.32–7.43)
PH UR: 6.5 — SIGNIFICANT CHANGE UP (ref 5–8)
PLATELET # BLD AUTO: 239 K/UL — SIGNIFICANT CHANGE UP (ref 150–400)
PO2 BLDV: 34 MMHG — SIGNIFICANT CHANGE UP (ref 25–45)
POTASSIUM BLDV-SCNC: 4.3 MMOL/L — SIGNIFICANT CHANGE UP (ref 3.5–5.1)
POTASSIUM SERPL-MCNC: 4.2 MMOL/L — SIGNIFICANT CHANGE UP (ref 3.5–5.3)
POTASSIUM SERPL-SCNC: 4.2 MMOL/L — SIGNIFICANT CHANGE UP (ref 3.5–5.3)
PROT SERPL-MCNC: 8.3 G/DL — SIGNIFICANT CHANGE UP (ref 6–8.3)
PROT UR-MCNC: ABNORMAL
PROTHROM AB SERPL-ACNC: 11.8 SEC — SIGNIFICANT CHANGE UP (ref 10.5–13.4)
RAPID RVP RESULT: SIGNIFICANT CHANGE UP
RBC # BLD: 4.92 M/UL — SIGNIFICANT CHANGE UP (ref 4.2–5.8)
RBC # FLD: 14.4 % — SIGNIFICANT CHANGE UP (ref 10.3–14.5)
RBC CASTS # UR COMP ASSIST: 141 /HPF — HIGH (ref 0–4)
SAO2 % BLDV: 58.8 % — LOW (ref 67–88)
SARS-COV-2 RNA SPEC QL NAA+PROBE: SIGNIFICANT CHANGE UP
SODIUM SERPL-SCNC: 136 MMOL/L — SIGNIFICANT CHANGE UP (ref 135–145)
SP GR SPEC: 1.02 — SIGNIFICANT CHANGE UP (ref 1.01–1.02)
UROBILINOGEN FLD QL: NEGATIVE — SIGNIFICANT CHANGE UP
WBC # BLD: 7.95 K/UL — SIGNIFICANT CHANGE UP (ref 3.8–10.5)
WBC # FLD AUTO: 7.95 K/UL — SIGNIFICANT CHANGE UP (ref 3.8–10.5)
WBC UR QL: 2447 /HPF — HIGH (ref 0–5)

## 2023-04-14 PROCEDURE — 70450 CT HEAD/BRAIN W/O DYE: CPT | Mod: 26,MD

## 2023-04-14 PROCEDURE — 71045 X-RAY EXAM CHEST 1 VIEW: CPT | Mod: 26

## 2023-04-14 PROCEDURE — 99285 EMERGENCY DEPT VISIT HI MDM: CPT

## 2023-04-14 PROCEDURE — 74177 CT ABD & PELVIS W/CONTRAST: CPT | Mod: 26,MD

## 2023-04-14 RX ORDER — MIDODRINE HYDROCHLORIDE 2.5 MG/1
5 TABLET ORAL THREE TIMES A DAY
Refills: 0 | Status: DISCONTINUED | OUTPATIENT
Start: 2023-04-14 | End: 2023-04-20

## 2023-04-14 RX ORDER — VANCOMYCIN HCL 1 G
1000 VIAL (EA) INTRAVENOUS ONCE
Refills: 0 | Status: COMPLETED | OUTPATIENT
Start: 2023-04-14 | End: 2023-04-14

## 2023-04-14 RX ORDER — CEFEPIME 1 G/1
1000 INJECTION, POWDER, FOR SOLUTION INTRAMUSCULAR; INTRAVENOUS EVERY 8 HOURS
Refills: 0 | Status: DISCONTINUED | OUTPATIENT
Start: 2023-04-15 | End: 2023-04-20

## 2023-04-14 RX ORDER — ASCORBIC ACID 60 MG
1 TABLET,CHEWABLE ORAL
Qty: 0 | Refills: 0 | DISCHARGE

## 2023-04-14 RX ORDER — PIPERACILLIN AND TAZOBACTAM 4; .5 G/20ML; G/20ML
3.38 INJECTION, POWDER, LYOPHILIZED, FOR SOLUTION INTRAVENOUS ONCE
Refills: 0 | Status: COMPLETED | OUTPATIENT
Start: 2023-04-14 | End: 2023-04-14

## 2023-04-14 RX ORDER — HEPARIN SODIUM 5000 [USP'U]/ML
5000 INJECTION INTRAVENOUS; SUBCUTANEOUS EVERY 12 HOURS
Refills: 0 | Status: DISCONTINUED | OUTPATIENT
Start: 2023-04-14 | End: 2023-04-20

## 2023-04-14 RX ORDER — VANCOMYCIN HCL 1 G
VIAL (EA) INTRAVENOUS
Refills: 0 | Status: DISCONTINUED | OUTPATIENT
Start: 2023-04-14 | End: 2023-04-14

## 2023-04-14 RX ORDER — CEFEPIME 1 G/1
1000 INJECTION, POWDER, FOR SOLUTION INTRAMUSCULAR; INTRAVENOUS ONCE
Refills: 0 | Status: COMPLETED | OUTPATIENT
Start: 2023-04-14 | End: 2023-04-15

## 2023-04-14 RX ORDER — CEFEPIME 1 G/1
INJECTION, POWDER, FOR SOLUTION INTRAMUSCULAR; INTRAVENOUS
Refills: 0 | Status: DISCONTINUED | OUTPATIENT
Start: 2023-04-15 | End: 2023-04-20

## 2023-04-14 RX ORDER — SODIUM CHLORIDE 9 MG/ML
1000 INJECTION INTRAMUSCULAR; INTRAVENOUS; SUBCUTANEOUS ONCE
Refills: 0 | Status: COMPLETED | OUTPATIENT
Start: 2023-04-14 | End: 2023-04-14

## 2023-04-14 RX ADMIN — PIPERACILLIN AND TAZOBACTAM 200 GRAM(S): 4; .5 INJECTION, POWDER, LYOPHILIZED, FOR SOLUTION INTRAVENOUS at 15:17

## 2023-04-14 RX ADMIN — PIPERACILLIN AND TAZOBACTAM 25 GRAM(S): 4; .5 INJECTION, POWDER, LYOPHILIZED, FOR SOLUTION INTRAVENOUS at 20:57

## 2023-04-14 RX ADMIN — SODIUM CHLORIDE 1000 MILLILITER(S): 9 INJECTION INTRAMUSCULAR; INTRAVENOUS; SUBCUTANEOUS at 15:17

## 2023-04-14 RX ADMIN — Medication 250 MILLIGRAM(S): at 15:17

## 2023-04-14 NOTE — ED PROVIDER NOTE - ATTENDING CONTRIBUTION TO CARE
82 m hx of cerebellar ataxia, presenting w/ concern for uti as wife state he has had dark/cloudy urine and w/ increasing lethargy, pt on midodrine, not swallowing his food, no cp, no vomiting, dec po intake, On exam, pt is awake and alert in no distress, he is arousable to voice, intermittent command following, pt w/ soft abd, clear lungs, pt w/ mild suprapubic tenderness pt w/ limited movement of the LLE pt moving b;l arms and RLE, plan for labs imaging concern for possible infected stent, pt afebrile here. Plan for labs imaging and reassessment. Likely TBA. possible metabolic vs encephalopathy, vs multifactorial

## 2023-04-14 NOTE — CONSULT NOTE ADULT - SUBJECTIVE AND OBJECTIVE BOX
UROLOGY CONSULT NOTE    HPI:  This is a 62y old Male with PMHx of cerebellar ataxia, chronic lacunar infarcts, nephrolithiasis s/p bilateral ureteroscopy, laser lithotripsy, bilateral stents 3/23, who was brought into the ER by his wife for altered mental status.      PAST MEDICAL HISTORY    H/O cerebellar ataxia    History of hypotension    Anemia    Lacunar infarction    Pneumonia, aspiration    Adrenal insufficiency    Low body temperature    Pressure ulcer of left heel    Pressure ulcer of buttock    H/O sepsis        PAST SURGICAL HISTORY    No significant past surgical history    H/O cystoscopy        FAMILY HISTORY    FH: dementia (Mother)    FH: type 2 diabetes (Mother)    Family history of CVA (Father)        SOCIAL HISTORY    History of tobacco usage    HOME MEDICATIONS    acetaminophen 325 mg oral tablet: 3 tab(s) orally every 6 hours, As needed, Mild Pain (1 - 3) (23 Mar 2023 14:39)  ascorbic acid 500 mg oral tablet: 1 tab(s) orally once a day (23 Mar 2023 14:39)  Multiple Vitamins oral tablet: 1 tab(s) orally once a day (23 Mar 2023 14:39)      DRUG ALLERGIES    No Known Allergies      REVIEW OF SYSTEMS: Pertinent positives and negatives as stated in HPI, otherwise negative      VITAL SIGNS    T(F): 97.5, Max: 97.5 (04-14-23 @ 14:01)  HR: 94  BP: 91/69  RR: 20  SpO2: 96%    I's & O's        PHYSICAL EXAM    Gen: Well groomed, well dressed, well nourished  Pulm: CTA bilaterally  CV: S1S2, RRR  Abd: Soft, NT/ND  : Uncircumcised/Circumcised, no lesions.  No discharge or blood at urethral meatus.  Testes descended bilaterally.  Testes and epididymis nontender bilaterally.  Cremasteric reflex present bilaterally.  Ext: No edema present b/l      LABS:                        13.7   7.95  )-----------( 239               42.2     136  |  97  |  14  ----------------------------<  101  4.2   |  27  |  0.73    Ca    10.4    TPro  8.3  /  Alb  3.9  /  TBili  0.5  /  DBili  x   /  AST  40  /  ALT  22  /  AlkPhos  91    PT: 11.8 sec;   INR: 1.03 ratio  PTT:33.2 sec        Urine culture:     Blood culture:     IMAGING:       UROLOGY CONSULT NOTE    HPI:  This is a 62y old Male with PMHx of cerebellar ataxia, chronic lacunar infarcts, nephrolithiasis s/p bilateral ureteroscopy, laser lithotripsy, bilateral stents 3/23, who was brought into the ER by his wife for altered mental status.  History was obtained from the wife, who states that for the past 2 days, the patient was not himself.  He was more drowsy than usual and did not eat.  She did not check his temperature for a fever, but brought him into the ER for a suspected UTI.  States that he usually presents this way when he has one.  The patient appears comfortable without any complaints.      PAST MEDICAL HISTORY    H/O cerebellar ataxia    History of hypotension    Anemia    Lacunar infarction    Pneumonia, aspiration    Adrenal insufficiency    Low body temperature    Pressure ulcer of left heel    Pressure ulcer of buttock    H/O sepsis        PAST SURGICAL HISTORY    No significant past surgical history    H/O cystoscopy        FAMILY HISTORY    FH: dementia (Mother)    FH: type 2 diabetes (Mother)    Family history of CVA (Father)        SOCIAL HISTORY    History of tobacco usage    HOME MEDICATIONS    acetaminophen 325 mg oral tablet: 3 tab(s) orally every 6 hours, As needed, Mild Pain (1 - 3) (23 Mar 2023 14:39)  ascorbic acid 500 mg oral tablet: 1 tab(s) orally once a day (23 Mar 2023 14:39)  Multiple Vitamins oral tablet: 1 tab(s) orally once a day (23 Mar 2023 14:39)      DRUG ALLERGIES    No Known Allergies      REVIEW OF SYSTEMS: Pertinent positives and negatives as stated in HPI, otherwise negative      VITAL SIGNS    T(F): 97.5, Max: 97.5 (04-14-23 @ 14:01)  HR: 94  BP: 91/69  RR: 20  SpO2: 96%    I's & O's        PHYSICAL EXAM    Gen: Well groomed, well dressed, well nourished  Pulm: CTA bilaterally  CV: S1S2, RRR  Abd: Soft, NT/ND  : Uncircumcised/Circumcised, no lesions.  No discharge or blood at urethral meatus.  Testes descended bilaterally.  Testes and epididymis nontender bilaterally.  Cremasteric reflex present bilaterally.  Ext: No edema present b/l      LABS:                        13.7   7.95  )-----------( 239               42.2     136  |  97  |  14  ----------------------------<  101  4.2   |  27  |  0.73    Ca    10.4    TPro  8.3  /  Alb  3.9  /  TBili  0.5  /  DBili  x   /  AST  40  /  ALT  22  /  AlkPhos  91    PT: 11.8 sec;   INR: 1.03 ratio  PTT:33.2 sec        Urine culture:     Blood culture:     IMAGING:       UROLOGY CONSULT NOTE    HPI:  This is a 62y old Male with PMHx of cerebellar ataxia, chronic lacunar infarcts, nephrolithiasis s/p bilateral ureteroscopy, laser lithotripsy, bilateral stents 3/23, who was brought into the ER by his wife for altered mental status.  History was obtained from the wife, who states that for the past 2 days, the patient was not himself.  He was more drowsy than usual and did not eat.  She did not check his temperature for a fever, but brought him into the ER for a suspected UTI.  States that he usually presents this way when he has one.  The patient appears comfortable without any complaints.      PAST MEDICAL HISTORY    H/O cerebellar ataxia    History of hypotension    Anemia    Lacunar infarction    Pneumonia, aspiration    Adrenal insufficiency    Low body temperature    Pressure ulcer of left heel    Pressure ulcer of buttock    H/O sepsis        PAST SURGICAL HISTORY    No significant past surgical history    H/O cystoscopy        FAMILY HISTORY    FH: dementia (Mother)    FH: type 2 diabetes (Mother)    Family history of CVA (Father)        SOCIAL HISTORY    History of tobacco usage    HOME MEDICATIONS    acetaminophen 325 mg oral tablet: 3 tab(s) orally every 6 hours, As needed, Mild Pain (1 - 3) (23 Mar 2023 14:39)  ascorbic acid 500 mg oral tablet: 1 tab(s) orally once a day (23 Mar 2023 14:39)  Multiple Vitamins oral tablet: 1 tab(s) orally once a day (23 Mar 2023 14:39)      DRUG ALLERGIES    No Known Allergies      REVIEW OF SYSTEMS: Pertinent positives and negatives as stated in HPI, otherwise negative      VITAL SIGNS    T(F): 97.5, Max: 97.5 (04-14-23 @ 14:01)  HR: 94  BP: 91/69  RR: 20  SpO2: 96%    I's & O's        PHYSICAL EXAM    Gen: NAD  Abd: Soft, NT/ND  Back: no CVAT bilaterally  Ext: No edema present b/l      LABS:                        13.7   7.95  )-----------( 239               42.2     136  |  97  |  14  ----------------------------<  101  4.2   |  27  |  0.73    Ca    10.4    TPro  8.3  /  Alb  3.9  /  TBili  0.5  /  DBili  x   /  AST  40  /  ALT  22  /  AlkPhos  91    PT: 11.8 sec;   INR: 1.03 ratio  PTT:33.2 sec        Urine culture: pending    Blood culture: pending      IMAGING:    CT: Bilateral nephroureteral stents in place. Mild left hydroureteronephrosis   and associated enhancement of the renal pelvis and proximal ureter.   Diffuse bladder wall thickening. Recommend correlation with urinalysis   for urinary tract infection.    Large rectal stool burden.    7 mm right lower lobe pulmonary nodule. Follow-up chest CT in 6-12 months   is recommended

## 2023-04-14 NOTE — ED PROVIDER NOTE - PHYSICAL EXAMINATION
GENERAL: no acute distress, non-toxic appearing  HEAD: normocephalic, atraumatic  HEENT: normal conjunctiva, oral mucosa moist, neck supple  CARDIAC: regular rate and rhythm, normal S1 and S2,  no appreciable murmurs  PULM: clear to ascultation bilaterally, no crackles, rales, rhonchi, or wheezing  GI: abdomen nondistended, soft, no guarding or rebound tenderness  NEURO: alert and oriented x 0, moving all extremities   MSK: no visible deformities, no peripheral edema, calf tenderness/redness/swelling  SKIN: no visible rashes, dry, well-perfused  PSYCH: appropriate mood and affect

## 2023-04-14 NOTE — ED PROVIDER NOTE - CLINICAL SUMMARY MEDICAL DECISION MAKING FREE TEXT BOX
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 concern for uti/sepsis, Will eliminate other sources of infection with rvp, cxr.

## 2023-04-14 NOTE — ED ADULT NURSE REASSESSMENT NOTE - NS ED NURSE REASSESS COMMENT FT1
Report given by MEAGAN Nathan. Pt A&O x0. Pt awake, does not appear to be in any acute distress. VS documented. Pt admitted to medicine ([id number] 57d).

## 2023-04-14 NOTE — PATIENT PROFILE ADULT - FALL HARM RISK - HARM RISK INTERVENTIONS

## 2023-04-14 NOTE — ED ADULT NURSE NOTE - NSICDXPASTMEDICALHX_GEN_ALL_CORE_FT
PAST MEDICAL HISTORY:  Adrenal insufficiency     Anemia     H/O cerebellar ataxia -diagnosed in 2019    H/O sepsis     History of hypotension     Lacunar infarction -chronic    Low body temperature     Pneumonia, aspiration -april 2022 (intubated for 7 days at [facility])    Pressure ulcer of buttock     Pressure ulcer of left heel

## 2023-04-14 NOTE — ED PROVIDER NOTE - OBJECTIVE STATEMENT
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  Wife has noticed that patient has been less attentive, not talking.  Says that she acts like this when he has a UTI.  Patient recently had 2 ureteral stents placed for kidney stones.  Denies fevers, chills, nausea, vomiting.  History given by wife.

## 2023-04-14 NOTE — ED PROVIDER NOTE - NSICDXPASTMEDICALHX_GEN_ALL_CORE_FT
PAST MEDICAL HISTORY:  Adrenal insufficiency     Anemia     H/O cerebellar ataxia -diagnosed in 2019    H/O sepsis     History of hypotension     Lacunar infarction -chronic    Low body temperature     Pneumonia, aspiration -april 2022 (intubated for 7 days at [facility])    Pressure ulcer of buttock     Pressure ulcer of left heel

## 2023-04-14 NOTE — H&P ADULT - NSICDXPASTMEDICALHX_GEN_ALL_CORE_FT
PAST MEDICAL HISTORY:  Adrenal insufficiency     Anemia     H/O cerebellar ataxia -diagnosed in 2019    H/O sepsis     History of hypotension     Lacunar infarction -chronic    Low body temperature     Pneumonia, aspiration -april 2022 (intubated for 7 days at [facility])    Pressure ulcer of buttock     Pressure ulcer of left heel

## 2023-04-14 NOTE — ED ADULT TRIAGE NOTE - HAVE YOU RECEIVED AT LEAST TWO PFIZER AND/OR MODERNA VACCINATIONS (IN ANY COMBINATION) AND/OR ONE JOHNSON & JOHNSON VACCINATION?
PSYCHO-ONCOLOGY INTAKE    Diagnostic Interview - CPT 75966    Date: 11/18/2022  Site: [facility]    Established Patient - Audio Only Telehealth Visit     The patient location is: [facility] [address] [address]  The chief complaint leading to consultation is: anxiety  Visit type: Virtual visit with audio only (telephone)  Total time spent with patient: 26minutes       The reason for the audio only service rather than synchronous audio and video virtual visit was related to technical difficulties or patient preference/necessity.     Each patient to whom I provide medical services by telemedicine is:  (1) informed of the relationship between the physician and patient and the respective role of any other health care provider with respect to management of the patient; and (2) notified that they may decline to receive medical services by telemedicine and may withdraw from such care at any time. Patient verbally consented to receive this service via voice-only telephone call.  me.    Notes:    Referral Source: Maryann Perales MD   Oncologist:Maryann Perales MD   PCP: Carmella Galvez MD    Clinical status of patient: Outpatient    Alison Swann, a 45 y.o. female, seen for initial evaluation visit.    Alison Swann reviewed and agreed to informed consent and the limits of confidentiality.    Chief complaint/reason for encounter: adjustment to illness, depression, anxiety, and interpersonal    Medical/Surgical History:    Patient Active Problem List   Diagnosis    Ductal carcinoma in situ of left breast    Malignant neoplasm of upper-outer quadrant of left breast in female, estrogen receptor negative    Dehydration    Anemia associated with chemotherapy    Nausea and vomiting    BRCA2 gene mutation positive    Scoliosis    Attention Deficit Hyperactivity Disorder    Therapeutic Drug Monitoring    Postoperative Nausea And Vomiting    Macrocytic Anemia    Generalized Anxiety    Family H/O Diabetes  Mellitus    Fatigue    Hyponatremia    Elevated troponin    Diarrhea    Intravascular volume depletion    Hypomagnesemia    Hypokalemia    Anterior T wave inversion    Elevated LFTs    Colitis, indeterminate    Encounter for immunotherapy    Diarrhea due to drug    Pneumonitis    s/p RA-TLH/BSO    JA (acute kidney injury)    Adrenal crisis    Sepsis    Chest pain    Adrenal insufficiency       Health Behaviors:       ETOH Use: No (rare)       Tobacco Use: No   Illicit Drug Use:  No     Prescription Misuse:No   Caffeine: minimal   Exercise:The patient engages in little, if any physical activity due to recent reconstructive surgery. Prior to engaged in environmental activities.   Firearms:  No   Advanced directives: deferred     Family History:   Psychiatric illness: Suspected but undiagnosed     Alcohol/Drug Abuse: No     Suicide: No      Past Psychiatric History:   Inpatient treatment: No     Outpatient treatment: Yes; as teenager; during cancer treatment saw Dr. Mclain at [facility],    Prior substance abuse treatment: No     Suicide Attempts: Yes; 16 years old attempt; 4 months ago had thoughts denies plan or intent      Psychotropic Medications:  Current: Adderall, 10mg      Past: none    Current medications as per below, allergies reviewed in chart.    Current Outpatient Medications   Medication    dextroamphetamine-amphetamine 10 mg Tab    hydrocortisone (CORTEF) 10 MG Tab    ondansetron (ZOFRAN) 4 MG tablet    valACYclovir (VALTREX) 500 MG tablet     No current facility-administered medications for this visit.     Facility-Administered Medications Ordered in Other Visits   Medication Frequency    lactated ringers infusion Continuous    sodium chloride 0.9% flush 10 mL PRN              Social situation/Stressors: Alison Swann lives with her  in [place], [place].  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   The patient reports her  and sister are a great social support.  Alison Swann is spiritual, but not Restoration.  Alison Swann's hobbies include spending time with her  and learning.  Additional stressors: ongoing conflict with her father-in-law; having to lay off employees; financial stress; daughter possibly moving out of the country    Strengths:Able to vocalize needs, Motivation, readiness for change, Resources - social, interpersonal, monetary, and Interpersonal relationships and supports available - family, relatives, friends  Liabilities: Financial strain    Current Evaluation:     Mental Status Exam: Alison Swann arrived promptly for the assessment session.  The patient was fully cooperative throughout the interview and was an adequate historian   Appearance: age appropriate, appropriately  dressed, adequately  groomed  Behavior/Cooperation: friendly and cooperative  Speech: normal in rate, volume, and tone  Mood: euthymic  Affect: deferred  Thought Process: goal-directed, logical  Thought Content: normal, no suicidality, no homicidality, delusions, or paranoia;did not appear to be responding to internal stimuli during the interview.   Orientation: grossly intact  Memory: grossly intact  Attention Span/Concentration: Attends to interview without distraction; reports no difficulty  Fund of Knowledge: average  Estimate of Intelligence: average from verbal skills and history  Cognition: grossly intact  Insight: patient has awareness of illness; good insight into own behavior and behavior of others  Judgment: the patient's behavior is adequate to circumstances      History of present illness:    Oncology History   Malignant neoplasm of upper-outer quadrant of left breast in female,  estrogen receptor negative   1/19/2021 Cancer Staged    Staging form: Breast, AJCC 8th Edition  - Clinical stage from 1/19/2021: Stage IIIB (cT2, cN1, cM0, G3, ER-, AZ-, HER2-)       1/28/2021 Initial Diagnosis    Malignant neoplasm of upper-outer quadrant of left breast in female, estrogen receptor negative     2/18/2021 - 2/18/2021 Chemotherapy    Treatment Summary   Plan Name: OP BREAST DOSE-DENSE AC - DOXORUBICIN CYCLOPHOSPHAMIDE Q2W  Treatment Goal: Curative  Status: Inactive  Start Date:   End Date:   Provider: Jhon Suazo MD  Chemotherapy: DOXOrubicin chemo injection 96 mg, 60 mg/m2, Intravenous, Clinic/HOD 1 time, 0 of 4 cycles  cyclophosphamide (CYTOXAN) 600 mg/m2 = 965 mg in sodium chloride 0.9% 250 mL chemo infusion, 600 mg/m2, Intravenous, Clinic/HOD 1 time, 0 of 4 cycles       2/25/2021 - 7/6/2021 Chemotherapy    Treatment Summary   Plan Name: OP BREAST PACLITAXEL WEEKLY + CARBOPLATIN (AUC 5) Q3W FOLLOWED BY AC WITH PEMBROLIZUMAB FOLLOWED BY PEMBROLIZUMAB   Treatment Goal: Curative  Status: Inactive  Start Date: 2/25/2021  End Date: 7/6/2021  Provider: Sandra Sotelo MD  Chemotherapy: DOXOrubicin chemo injection 112 mg, 60 mg/m2 = 112 mg (100 % of original dose 60 mg/m2), Intravenous, Once, 3 of 4 cycles  Dose modification: 60 mg/m2 (original dose 60 mg/m2, Cycle 5)  Administration: 112 mg (5/25/2021), 112 mg (6/15/2021), 110 mg (7/6/2021)  CARBOplatin (PARAPLATIN) 605 mg in sodium chloride 0.9% 250 mL chemo infusion, 605 mg (100 % of original dose 603.5 mg), Intravenous, Clinic/HOD 1 time, 4 of 4 cycles  Dose modification:   (original dose 603.5 mg, Cycle 1)  Administration: 605 mg (2/25/2021), 630 mg (3/22/2021), 750 mg (4/13/2021), 750 mg (5/4/2021)  cyclophosphamide 600 mg/m2 = 1,100 mg in sodium chloride 0.9% 100 mL chemo infusion, 600 mg/m2 = 1,100 mg (100 % of original dose 600 mg/m2), Intravenous, Clinic/HOD 1 time, 3 of 4 cycles  Dose modification: 600 mg/m2 (original dose 600 mg/m2, Cycle  5), 600 mg/m2 (Cycle 8)  Administration: 1,100 mg (5/25/2021), 1,100 mg (6/15/2021), 1,100 mg (7/6/2021)  PACLitaxeL (TAXOL) 80 mg/m2 = 132 mg in sodium chloride 0.9% 250 mL chemo infusion, 80 mg/m2 = 132 mg (100 % of original dose 80 mg/m2), Intravenous, Clinic/HOD 1 time, 4 of 4 cycles  Dose modification: 80 mg/m2 (original dose 80 mg/m2, Cycle 1)  Administration: 132 mg (2/25/2021), 132 mg (3/4/2021), 132 mg (3/11/2021), 138 mg (3/22/2021), 138 mg (3/30/2021), 138 mg (4/6/2021), 138 mg (4/13/2021), 138 mg (4/20/2021), 144 mg (4/27/2021), 144 mg (5/4/2021), 144 mg (5/11/2021), 144 mg (5/18/2021)  pembrolizumab (KEYTRUDA) 200 mg in sodium chloride 0.9% 100 mL chemo infusion, 200 mg, Intravenous, Clinic/HOD 1 time, 7 of 18 cycles  Administration: 200 mg (2/25/2021), 200 mg (5/25/2021), 200 mg (3/22/2021), 200 mg (4/13/2021), 200 mg (5/4/2021), 200 mg (6/15/2021), 200 mg (7/6/2021)       4/15/2021 - 4/15/2021 Chemotherapy    Treatment Summary   Plan Name: OP BREAST PEMBROLIZUMAB Q3W PACLITAXEL CARBOPLATIN WEEKLY FOLLOWED BY PEMBROLIZUMAB DOXORUBICIN CYCLOPHOSPHAMIDE Q3W   Treatment Goal: Curative  Status: Inactive  Start Date:   End Date:   Provider: Jhon Suazo MD  Chemotherapy: CARBOplatin (PARAPLATIN) in sodium chloride 0.9% 250 mL chemo infusion,  (original dose ), Intravenous, Clinic/HOD 1 time, 0 of 3 cycles  Dose modification:   (Cycle 1)  cyclophosphamide in sodium chloride 0.9% chemo infusion, 600 mg/m2 = 965 mg (original dose ), Intravenous, Clinic/HOD 1 time, 0 of 1 cycle  Dose modification: 600 mg/m2 (Cycle 2)  DOXOrubicin (ADRIAMYCIN) in sodium chloride 0.9% 250 mL chemo infusion, 60 mg/m2 (original dose ), Intravenous, Clinic/HOD 1 time, 0 of 1 cycle  Dose modification: 60 mg/m2 (Cycle 2)  PACLitaxeL (TAXOL) in sodium chloride 0.9% 100 mL chemo infusion, 80 mg/m2 (original dose ), Intravenous, Clinic/HOD 1 time, 0 of 1 cycle  Dose modification: 80 mg/m2 (Cycle 1)  PEMEtrexed (ALIMTA) in sodium  chloride 0.9% chemo infusion, 500 mg/m2, Intravenous, Clinic/HOD 1 time, 0 of 33 cycles  pembrolizumab (KEYTRUDA) 200 mg in sodium chloride 0.9% 100 mL chemo infusion, 200 mg, Intravenous, Clinic/HOD 1 time, 0 of 35 cycles       11/19/2021 - 1/4/2022 Radiation Therapy    Treating physician: Maryann Perales  Total Dose: 50 Gy  Fractions: 25    DIAGNOSIS:  C50.412 - Malignant neoplasm of upper-outer quadrant of left female breast, Diagnosed 11/11/2021 (Active) Stage IIIB, T2, N1, M0, G3, HER2 Neg, ER Neg, DC Neg    This is a 44 y.o. y/o female with cT2,c N1 and M0 G3, (Stage IIIB), triple negative, Ki-67 79%, LEFT upper outer quadrant breast, BRCA2+, status post lumpectomy 1/19/21, with residual larisa disease, followed by  adjuvant chemo-immunotherapy with carbo-taxol and pembrolizumab, followed by AC with pembrolizumab, followed by adjuvant pemprolizumab complicated by ongoing, chronic colitis/diarrhea necessitating long-term prednisone.  Underwent bilateral mastectomy 9/29/21 with no residual carcinoma identified in breast of 14 sampled nodes (ypN0). She has completed adjuvant LEFT breast and regional larisa radiation therapy to a dose of 50Gy.    Treatment Summary  Course: C1 BREAST 2021    Treatment Site Ref. ID Energy Dose/Fx (Gy) #Fx Dose Correction (Gy) Total Dose (Gy) Start Date End Date Elapsed Days   3D Sclav_L rxsc 18X/6X 2 25 / 25 0 50 11/19/2021 1/4/2022 46   3D Breast L Breast_L 18X/6X 2 25 / 25 0 50 11/19/2021 1/4/2022 46     Tolerated well.  2 day treatment break at patient's request for patch of dry desquamation, and 5 day treatment break due to CoVID infection.  Otherwise, patient completed her course of therapy as prescribed.       PLAN: RTC 1 month for routine follow up. Continue current skin care/sun protection for now.  Follow up with other providers as directed.           Alison Swann has adjusted to illness fairly well primarily through active coping strategies, focus on work, and focus  on family. She has engaged in appropriate information gathering.  The patient has good family/friend support.  Her support system is coping well with the diagnosis/treatment/prognosis. Illness-related psychosocial stressors include financial strain  and absence from work.  The patient has a good partnership with her [facility] treatment team. The patient reports the following barriers to cancer care:none.     [facility] Distress thermometer:   DISTRESS SCREENING 10/12/2022 10/12/2022 8/15/2022 8/15/2022 6/1/2022 4/29/2022 4/29/2022   Distress Score 10 - Extreme Distress 0 - No Distress 0 - No Distress 2 2 4 4   Practical Problems Work/School None of these - None of these - - -   Family Problems None of these None of these - None of these - - -   Emotional Problems Fears;Worry None of these - None of these - - -   Spiritual / Anabaptism Concerns No No - No No - No   Physical Problems None of these None of these - None of these - - -   Other Problems - - - - - - -        Symptoms:   Mood: fatigue;  no prior; no SI/HI; [facility] Distress Screener= 10 (10/12/2022)  Anxiety: minimal anxiety, worry about external stressors specially her father in law, business, and finances; prior anxiety:however feels as if she is managing well over the last 3 months ;    Substance abuse: denied  Cognitive functioning: denied  Health behaviors: noncontributory  Sexual/Intimacy: denied  Sleep: Time in bed: 6;  Time asleep: 6; sleep interrupted by pain , no sleep onset difficulty , no EDS ,  pain from recent reconstructive surgery , (+) use of melatonin    Pain: Ms. Swann reports moderate pain. Onset of symptoms was 3 week ago with gradually improving course since that time. It was related to  surgery . The pain is rated mild on the BEST day andmoderate on the WORST DAY.  The pain is located in her chest and abdomen. The pain is described as aching, soreness, and throbbing. Symptoms are exacerbated by nothing in particular. Factors  which relieve the pain include acetaminophen. The patient is most bothered by night time .  The patient reports 2 hours of uninterrupted sleep per night.Symptoms interfere with daily activity, sleeping and work.       Assessment - Diagnosis - Goals:       ICD-10-CM ICD-9-CM   1. KIANA (generalized anxiety disorder)  F41.1 300.02   2. Malignant neoplasm of upper-outer quadrant of left breast in female, estrogen receptor negative  C50.412 174.4    Z17.1 V86.1         Plan:individual psychotherapy    Summary and Recommendations  Alison Swann is a 45 y.o. female referred by Maryann Perales MD for psychological evaluation and treatment.  Ms. Swann appears to be adequately coping with her diagnosis and proposed treatment course. External stressor appear to be contributing to her current anxiety and distress. Mood protective strategies during cancer treatment were discussed.  She is interested in individual therapy for cancer coping skills and will follow up with me for that purpose.     Return to clinic: 2 weeks    GOALS:   Relaxation exercises (instructions and options sent to patient)                  Julisa Dias Psy.D  Clinical Health Psychology Fellow      Favian Hollingsworth Psy.D.  Supervising Clinical Psychologist      This service was not originating from a related E/M service provided within the previous 7 days nor will  to an E/M service or procedure within the next 24 hours or my soonest available appointment.  Prevailing standard of care was able to be met in this audio-only visit.          Yes

## 2023-04-14 NOTE — ED ADULT NURSE NOTE - OBJECTIVE STATEMENT
1433 pt 62ym alert w/ wife providing pmhx, dx ataxia, per wife changed in mentation around 12noon, usually responds to questions/inquiries, today pt noted aware x 1, per wife  stent to be removed today from uro ofc, same s/sx when pt was presented w/ uti pending eval/

## 2023-04-14 NOTE — H&P ADULT - RESPIRATORY
normal/clear to auscultation bilaterally/no wheezes/no rales/no rhonchi 35 yo female with history of Anemia, Hormone Disturbance (taking spironolactone, metformin), Stress Incontinence (was taking oxybutynin), reports the above.  Patient states that she can not hold her urine for few years now, but, has been taking medications that was helping the the problem before and not medication is not helping anymore.  She is scheduled for : Anterior and Posterior Colporrhaphy with Transobturator Tape Sling Procedure, on 3/22/22

## 2023-04-14 NOTE — CONSULT NOTE ADULT - ASSESSMENT
62 year old male with altered mental status, s/p bilateral ureteroscopy and stents 3/23    -CT shows bilateral stents are in place - no acute  intervention at this time  -follow up urine cultures  -continue antibiotics  -case d/w Dr Hoenig 62 year old male with altered mental status, s/p bilateral ureteroscopy and stents 3/23    -CT shows bilateral stents are in place - no acute  intervention at this time  -follow up urine and blood cultures  -continue antibiotics  -case d/w Dr Hoenig

## 2023-04-14 NOTE — ED ADULT TRIAGE NOTE - CHIEF COMPLAINT QUOTE
kidney stents placed 3/24. has been lethargic and AMS since yesterday. wife states that patient gets these symptoms with UTIs

## 2023-04-15 LAB
ANION GAP SERPL CALC-SCNC: 14 MMOL/L — SIGNIFICANT CHANGE UP (ref 5–17)
BUN SERPL-MCNC: 10 MG/DL — SIGNIFICANT CHANGE UP (ref 7–23)
CALCIUM SERPL-MCNC: 9.5 MG/DL — SIGNIFICANT CHANGE UP (ref 8.4–10.5)
CHLORIDE SERPL-SCNC: 101 MMOL/L — SIGNIFICANT CHANGE UP (ref 96–108)
CO2 SERPL-SCNC: 21 MMOL/L — LOW (ref 22–31)
CREAT SERPL-MCNC: 0.61 MG/DL — SIGNIFICANT CHANGE UP (ref 0.5–1.3)
EGFR: 109 ML/MIN/1.73M2 — SIGNIFICANT CHANGE UP
GLUCOSE BLDC GLUCOMTR-MCNC: 84 MG/DL — SIGNIFICANT CHANGE UP (ref 70–99)
GLUCOSE BLDC GLUCOMTR-MCNC: 95 MG/DL — SIGNIFICANT CHANGE UP (ref 70–99)
GLUCOSE SERPL-MCNC: 83 MG/DL — SIGNIFICANT CHANGE UP (ref 70–99)
HCT VFR BLD CALC: 38.8 % — LOW (ref 39–50)
HGB BLD-MCNC: 12.7 G/DL — LOW (ref 13–17)
LACTATE SERPL-SCNC: 1.2 MMOL/L — SIGNIFICANT CHANGE UP (ref 0.5–2)
MCHC RBC-ENTMCNC: 28.1 PG — SIGNIFICANT CHANGE UP (ref 27–34)
MCHC RBC-ENTMCNC: 32.7 GM/DL — SIGNIFICANT CHANGE UP (ref 32–36)
MCV RBC AUTO: 85.8 FL — SIGNIFICANT CHANGE UP (ref 80–100)
NRBC # BLD: 0 /100 WBCS — SIGNIFICANT CHANGE UP (ref 0–0)
PLATELET # BLD AUTO: 211 K/UL — SIGNIFICANT CHANGE UP (ref 150–400)
POTASSIUM SERPL-MCNC: 3.6 MMOL/L — SIGNIFICANT CHANGE UP (ref 3.5–5.3)
POTASSIUM SERPL-SCNC: 3.6 MMOL/L — SIGNIFICANT CHANGE UP (ref 3.5–5.3)
RBC # BLD: 4.52 M/UL — SIGNIFICANT CHANGE UP (ref 4.2–5.8)
RBC # FLD: 14.5 % — SIGNIFICANT CHANGE UP (ref 10.3–14.5)
SODIUM SERPL-SCNC: 136 MMOL/L — SIGNIFICANT CHANGE UP (ref 135–145)
WBC # BLD: 7.39 K/UL — SIGNIFICANT CHANGE UP (ref 3.8–10.5)
WBC # FLD AUTO: 7.39 K/UL — SIGNIFICANT CHANGE UP (ref 3.8–10.5)

## 2023-04-15 PROCEDURE — 99222 1ST HOSP IP/OBS MODERATE 55: CPT | Mod: GC

## 2023-04-15 RX ORDER — SODIUM CHLORIDE 9 MG/ML
1000 INJECTION INTRAMUSCULAR; INTRAVENOUS; SUBCUTANEOUS
Refills: 0 | Status: DISCONTINUED | OUTPATIENT
Start: 2023-04-15 | End: 2023-04-16

## 2023-04-15 RX ORDER — SODIUM CHLORIDE 9 MG/ML
250 INJECTION INTRAMUSCULAR; INTRAVENOUS; SUBCUTANEOUS ONCE
Refills: 0 | Status: COMPLETED | OUTPATIENT
Start: 2023-04-15 | End: 2023-04-15

## 2023-04-15 RX ADMIN — CEFEPIME 100 MILLIGRAM(S): 1 INJECTION, POWDER, FOR SOLUTION INTRAMUSCULAR; INTRAVENOUS at 17:10

## 2023-04-15 RX ADMIN — CEFEPIME 100 MILLIGRAM(S): 1 INJECTION, POWDER, FOR SOLUTION INTRAMUSCULAR; INTRAVENOUS at 09:00

## 2023-04-15 RX ADMIN — HEPARIN SODIUM 5000 UNIT(S): 5000 INJECTION INTRAVENOUS; SUBCUTANEOUS at 06:03

## 2023-04-15 RX ADMIN — HEPARIN SODIUM 5000 UNIT(S): 5000 INJECTION INTRAVENOUS; SUBCUTANEOUS at 17:11

## 2023-04-15 RX ADMIN — SODIUM CHLORIDE 75 MILLILITER(S): 9 INJECTION INTRAMUSCULAR; INTRAVENOUS; SUBCUTANEOUS at 06:56

## 2023-04-15 RX ADMIN — CEFEPIME 100 MILLIGRAM(S): 1 INJECTION, POWDER, FOR SOLUTION INTRAMUSCULAR; INTRAVENOUS at 01:28

## 2023-04-15 RX ADMIN — SODIUM CHLORIDE 250 MILLILITER(S): 9 INJECTION INTRAMUSCULAR; INTRAVENOUS; SUBCUTANEOUS at 18:48

## 2023-04-15 RX ADMIN — SODIUM CHLORIDE 250 MILLILITER(S): 9 INJECTION INTRAMUSCULAR; INTRAVENOUS; SUBCUTANEOUS at 17:06

## 2023-04-15 NOTE — CONSULT NOTE ADULT - ATTENDING COMMENTS
Patient seen and examined  Case discussed with DR White    I agree with his history, exam and plans as noted above    Patient unable to provide history  exam difficult  Patient contracted - moans    Past UTIs with pseudomonas aeruginosa  Will send Blood cultures x2 sets  uA and culture  image with CT of abdomen and pelvis  start Cefepime 1 gm IV q 8hr    Edwin Amaro MD  Can be called via Teams  After 5pm/weekends [phone]

## 2023-04-15 NOTE — DIETITIAN INITIAL EVALUATION ADULT - ORAL NUTRITION SUPPLEMENTS
- Recommend adding Ensure Plus High Protein 2x/day (nutritionally similar to Ensure Enlive - currently unavailable from ; 350 kcal and 20 g protein in each).

## 2023-04-15 NOTE — DIETITIAN INITIAL EVALUATION ADULT - NSFNSNUTRCHEWSWALLOWFT_GEN_A_CORE
Consider formal swallowing evaluation with speech pathologist   Defer diet/fluid consistencies to medical team/SLP recommendations.

## 2023-04-15 NOTE — DIETITIAN INITIAL EVALUATION ADULT - CONTINUE CURRENT NUTRITION CARE PLAN
- Consider formal swallowing evaluation with speech pathologist;   Defer diet/fluid consistencies to medical team/SLP recommendations./yes

## 2023-04-15 NOTE — CONSULT NOTE ADULT - ASSESSMENT
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     -Encephalopathy, hypothermia, UA with pyuria   -Concern for sepsis 2/2 UTI  -Recent admission in Feb 2023 for pyelonephritis and L sided obstructive uropathy s/p ureteral stent, treated with 10 days of Cefepime (Pseudomonas in urine culture)  -Blood and urine cultures pending  -CT A/P with b/l ureteral stents, mild L sided hydroureteronephrosis and enhancement of the renal pelvis and proximal ureter with diffuse bladder wall thickening  -CT Head stable   -On Cefepime 1g IV Q8h  -ID consulted for recommendations    Overall  Sepsis, encephalopathy, hypothermia, pyelonephritis, ureteral stents   H/o Pseudomonas in urine culture from Feb 2023     Recommendations  Continue Cefepime 1g IV Q8h   F/u urine and blood cultures

## 2023-04-15 NOTE — DIETITIAN INITIAL EVALUATION ADULT - REASON INDICATOR FOR ASSESSMENT
Consult received for pressure injury stage 2/>   Information obtained from RN (pt non verbal/non arousal unable to interview at this time), electronic medical record

## 2023-04-15 NOTE — DIETITIAN INITIAL EVALUATION ADULT - SIGNS/SYMPTOMS
<25% meals consumption per RN  pt with multiple pressure injuries and suspected deep tissue injuries as per flowsheets

## 2023-04-15 NOTE — DIETITIAN INITIAL EVALUATION ADULT - ADD RECOMMEND
- MONITOR FOR MLN RISK; high risk for malnutrition   - Will continue to monitor PO intake, weight, labs, skin, GI status, diet.   - Nutrition care plan to remain consistent with pt goals of care  - RD remains available to review diet education and adjust diet recommendations as needed.

## 2023-04-15 NOTE — PROGRESS NOTE ADULT - ASSESSMENT
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  Wife has noticed that patient has been less attentive, not talking.  Says that she acts like this when he has a UTI.  Patient recently had 2 ureteral stents placed for kidney stones.  Denies fevers, chills, nausea, vomiting.  History given by wife.       Problem/Plan - 1:  ·  Problem: Frequent UTI.   ·  Plan: S/P IV Abxs . Awaiting urine C/S . No fever or Leucocytosis .   ID help appreciated . On cefepime .      Problem/Plan - 2:  ·  Problem: Hydroureteronephrosis.   ·  Plan: S/P Stents . Urology following.     Problem/Plan - 3:  ·  Problem: H/O hypotension.   ·  Plan: on Midodrine.     Problem/Plan - 4:  ·  Problem: H/O cerebellar ataxia.   ·  Plan: Supportive care.

## 2023-04-15 NOTE — DIETITIAN INITIAL EVALUATION ADULT - NSFNSPHYEXAMSKINFT_GEN_A_CORE
Skin as per flowsheets   Pressure Injury 1: Right:,buttocks, Stage III  Pressure Injury 2: sacrum, Stage II  Pressure Injury 3: Left:,lower,lateral,leg, Stage II  Pressure Injury 4: Left:,heel, Suspected deep tissue injury  Pressure Injury 5: Right:,heel, Suspected deep tissue injury    Noted dosing wt 141.5 pounds    96% IBW ( pounds)

## 2023-04-15 NOTE — DIETITIAN INITIAL EVALUATION ADULT - PERTINENT MEDS FT
MEDICATIONS  (STANDING):  cefepime   IVPB 1000 milliGRAM(s) IV Intermittent every 8 hours  cefepime   IVPB      heparin   Injectable 5000 Unit(s) SubCutaneous every 12 hours  midodrine. 5 milliGRAM(s) Oral three times a day  sodium chloride 0.9%. 1000 milliLiter(s) (75 mL/Hr) IV Continuous <Continuous>    MEDICATIONS  (PRN):

## 2023-04-15 NOTE — CONSULT NOTE ADULT - SUBJECTIVE AND OBJECTIVE BOX
Patient is a 63 yo Male who presents with a chief complaint of UTI     HPI:  63 yo M with a PMH of cerebellar ataxia, chronic lacunar infarcts, 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, chronic hypotension on midodrine, nephrolithiasis, recent admission in 2023 for pyelonephritis and L sided obstructive uropathy s/p ureteral stent, treated with 10 days of Cefepime (Pseudomonas in urine culture), who presented to the ED with acute encephalopathy concerning for UTI.     Admitted for sepsis with encephalopathy, hypothermia, and + UA with pyuria. Blood and urine cultures pending. ID consulted for recommendations.     REVIEW OF SYSTEMS  Unable to obtain   Encephalopathy      prior hospital charts reviewed [V]  primary team notes reviewed [V]  other consultant notes reviewed [V]    PAST MEDICAL & SURGICAL HISTORY:  H/O cerebellar ataxia  -diagnosed in     History of hypotension    Anemia    Lacunar infarction  -chronic    Pneumonia, aspiration  -2022 (intubated for 7 days at [facility])    Adrenal insufficiency    Low body temperature    Pressure ulcer of left heel    Pressure ulcer of buttock    H/O sepsis    H/O cystoscopy    SOCIAL HISTORY:  - Denied smoking/vaping/alcohol/recreational drug use    FAMILY HISTORY:  FH: dementia (Mother)    FH: type 2 diabetes (Mother)    Family history of CVA (Father)    Allergies  No Known Allergies    ANTIMICROBIALS:  cefepime   IVPB    cefepime   IVPB 1000 every 8 hours    ANTIMICROBIALS (past 90 days):  MEDICATIONS  (STANDING):    cefepime   IVPB   100 mL/Hr IV Intermittent (04-15-23 @ 01:28)    cefepime   IVPB   100 mL/Hr IV Intermittent (04-15-23 @ 09:00)    piperacillin/tazobactam IVPB.   200 mL/Hr IV Intermittent (23 @ 15:17)    piperacillin/tazobactam IVPB..   25 mL/Hr IV Intermittent (23 @ 20:57)    vancomycin  IVPB.   250 mL/Hr IV Intermittent (23 @ 15:17)    OTHER MEDS:   MEDICATIONS  (STANDING):  heparin   Injectable 5000 every 12 hours  midodrine. 5 three times a day    VITALS:  Vital Signs Last 24 Hrs  T(F): 98.2 (04-15-23 @ 05:23), Max: 98.5 (04-15-23 @ 02:30)    Vital Signs Last 24 Hrs  HR: 85 (04-15-23 @ 05:23) (60 - 94)  BP: 98/64 (04-15-23 @ 05:23) (90/59 - 108/72)  RR: 18 (04-15-23 @ 05:23)  SpO2: 96% (04-15-23 @ 05:23) (96% - 99%)  Wt(kg): --    EXAM:  General: Patient in no acute distress   HEENT: NCAT  CV: S1+S2  Lungs: No respiratory distress, CTAB  Abd: Soft, nontender, no guarding  Ext: No cyanosis  Neuro: Calm  Skin: No rash  IV: No phlebitis    Labs:                        13.7   7.95  )-----------( 239      ( 2023 14:56 )             42.2         136  |  97  |  14  ----------------------------<  101<H>  4.2   |  27  |  0.73    Ca    10.4      2023 14:56    TPro  8.3  /  Alb  3.9  /  TBili  0.5  /  DBili  x   /  AST  40  /  ALT  22  /  AlkPhos  91      WBC Trend:  WBC Count: 7.95 (23 @ 14:56)    Auto Neutrophil #: 6.63 K/uL (23 @ 14:56)  Auto Neutrophil #: 8.62 K/uL (02-15-23 @ 21:53)  Auto Neutrophil #: 5.31 K/uL (22 @ 19:32)  Band Neutrophils %: 0.9 % (22 @ 19:32)  Auto Neutrophil #: 18.62 K/uL (22 @ 12:09)    Creatine Trend:  Creatinine, Serum: 0.73 ()    Liver Biochemical Testing Trend:  Alanine Aminotransferase (ALT/SGPT): 22 ()  Alanine Aminotransferase (ALT/SGPT): 11 (02-15)  Alanine Aminotransferase (ALT/SGPT): 10 ()  Alanine Aminotransferase (ALT/SGPT): 7 *L* ()  Alanine Aminotransferase (ALT/SGPT): 8 *L* ()  Aspartate Aminotransferase (AST/SGOT): 40 (23 @ 14:56)  Aspartate Aminotransferase (AST/SGOT): 11 (02-15-23 @ 21:53)  Aspartate Aminotransferase (AST/SGOT): 13 (22 @ 19:32)  Aspartate Aminotransferase (AST/SGOT): 8 (22 @ 07:25)  Aspartate Aminotransferase (AST/SGOT): 17 (22 @ 06:59)  Bilirubin Total, Serum: 0.5 ()  Bilirubin Total, Serum: 0.4 (02-15)  Bilirubin Total, Serum: 0.4 ()  Bilirubin Total, Serum: 0.2 ()  Bilirubin Total, Serum: 0.3 ()    Trend LDH  22 @ 08:58  106    Auto Eosinophil %: 1.5 % (23 @ 14:56)    Urinalysis Basic - ( 2023 18:41 )    Color: Light Orange / Appearance: Turbid / S.023 / pH: x  Gluc: x / Ketone: Negative  / Bili: Negative / Urobili: Negative   Blood: x / Protein: 300 mg/dL / Nitrite: Negative   Leuk Esterase: Large / RBC: 141 /hpf / WBC 2447 /HPF   Sq Epi: x / Non Sq Epi: x / Bacteria: Negative    MICROBIOLOGY:    MRSA PCR Result.: Detected (23 @ 13:04)  MRSA PCR Result.: Detected (22 @ 19:08)  MRSA PCR Result.: NotDetec (22 @ 09:39)  MRSA PCR Result.: NotDetec (22 @ 00:50)    Culture - Blood (collected 2023 05:18)  Source: .Blood Blood-Peripheral  Final Report:    No Growth Final    Culture - Urine (collected 2023 03:40)  Source: Clean Catch Clean Catch (Midstream)  Final Report:    >100,000 CFU/ml Pseudomonas aeruginosa  Organism: Pseudomonas aeruginosa  Organism: Pseudomonas aeruginosa    Sensitivities:      Method Type: AGUSTIN      -  Amikacin: S <=16      -  Aztreonam: S <=4      -  Cefepime: S <=2      -  Ceftazidime: S 8      -  Ciprofloxacin: S <=0.25      -  Gentamicin: S <=2      -  Imipenem: S <=1      -  Levofloxacin: S <=0.5      -  Meropenem: S <=1      -  Piperacillin/Tazobactam: S 16      -  Tobramycin: S <=2    Culture - Blood (collected 15 Feb 2023 21:42)  Source: .Blood Blood-Peripheral  Final Report:    No Growth Final    Culture - Blood (collected 09 Sep 2022 19:32)  Source: .Blood Blood-Peripheral  Final Report:    No Growth Final    Culture - Blood (collected 09 Sep 2022 19:32)  Source: .Blood Blood-Peripheral  Final Report:    No Growth Final    Culture - Blood (collected 06 Sep 2022 07:51)  Source: .Blood Blood  Final Report:    No Growth Final    Culture - Blood (collected 06 Sep 2022 07:38)  Source: .Blood Blood  Final Report:    No Growth Final    Culture - Blood (collected 04 Sep 2022 13:16)  Source: .Blood Blood  Final Report:    No Growth Final    Culture - Blood (collected 04 Sep 2022 13:16)  Source: .Blood Blood  Final Report:    No Growth Final    Culture - Blood (collected 02 Sep 2022 06:51)  Source: .Blood Blood-Peripheral  Final Report:    No Growth Final    HIV-1/2 Combo Result: Nonreact (22 @ 09:20)  HIV-1/2 Combo Result: Nonreact (22 @ 09:16)    Rapid RVP Result: NotDetec ( @ 14:54)    Blood Gas Venous - Lactate: 2.0 ( @ 14:55)    RADIOLOGY:  imaging below personally reviewed    < from: CT Abdomen and Pelvis w/ IV Cont (23 @ 16:11) >  IMPRESSION:  Bilateral nephroureteral stents in place. Mild left hydroureteronephrosis   and associated enhancement of the renal pelvis and proximal ureter.   Diffuse bladder wall thickening. Recommend correlation with urinalysis   for urinary tract infection.    Large rectal stool burden.    7 mm right lower lobe pulmonary nodule. Follow-up chest CT in 6-12 months   is recommended    --- End of Report ---    < end of copied text >    < from: CT Head No Cont (23 @ 16:10) >  IMPRESSION:  NO EVIDENCE OF ACUTE INTRACRANIAL HEMORRHAGE OR MIDLINE SHIFT.  ATROPHY   WITH WHITE MATTER ISCHEMIC CHANGES.    --- End of Report ---    < end of copied text >    < from: Xray Chest 1 View- PORTABLE-Urgent (23 @ 15:00) >    IMPRESSION:  Clear lungs.    --- End of Report ---    < end of copied text >

## 2023-04-15 NOTE — DIETITIAN INITIAL EVALUATION ADULT - NSICDXPASTMEDICALHX_GEN_ALL_CORE_FT
PAST MEDICAL HISTORY:  Adrenal insufficiency     Anemia     H/O cerebellar ataxia -diagnosed in 2019    H/O sepsis     History of hypotension     Lacunar infarction -chronic    Low body temperature     Pneumonia, aspiration -april 2022 (intubated for 7 days at [facility])    Pressure ulcer of buttock     Pressure ulcer of left heel

## 2023-04-15 NOTE — DIETITIAN INITIAL EVALUATION ADULT - PERTINENT LABORATORY DATA
04-14    136  |  97  |  14  ----------------------------<  101<H>  4.2   |  27  |  0.73    Ca    10.4      14 Apr 2023 14:56    TPro  8.3  /  Alb  3.9  /  TBili  0.5  /  DBili  x   /  AST  40  /  ALT  22  /  AlkPhos  91  04-14      A1C with Estimated Average Glucose Result: 5.3 % (08-09-22 @ 16:44)  A1C with Estimated Average Glucose Result: 5.6 % (07-17-22 @ 09:25)  A1C with Estimated Average Glucose Result: 5.5 % (04-18-22 @ 09:56)

## 2023-04-15 NOTE — PROVIDER CONTACT NOTE (MEDICATION) - ASSESSMENT
Pt AOx1-2, opens eyes spontaneously, unable to take PO Midodrine currently- not cooperative enough to swallow crushed med. BP 98/64, other VSS. No acute distress noted. Pt to be started on IVF

## 2023-04-15 NOTE — DIETITIAN INITIAL EVALUATION ADULT - NSFNSGIIOFT_GEN_A_CORE
- RN denies pt with vomiting, diarrhea, or constipation at this time   - Last BM:4/14 as per flowsheets; not currently ordered for bowel regimen

## 2023-04-15 NOTE — PROGRESS NOTE ADULT - SUBJECTIVE AND OBJECTIVE BOX
Date of Service  : 04-15-23     INTERVAL HPI/OVERNIGHT EVENTS: Not eating.   Vital Signs Last 24 Hrs  T(C): 33.7 (15 Apr 2023 11:45), Max: 36.9 (15 Apr 2023 02:30)  T(F): 92.7 (15 Apr 2023 11:45), Max: 98.5 (15 Apr 2023 02:30)  HR: 98 (15 Apr 2023 11:45) (60 - 98)  BP: 94/76 (15 Apr 2023 11:45) (90/59 - 108/72)  BP(mean): 81 (2023 19:55) (81 - 84)  RR: 18 (15 Apr 2023 11:45) (16 - 20)  SpO2: 96% (15 Apr 2023 11:45) (96% - 99%)    Parameters below as of 15 Apr 2023 11:45  Patient On (Oxygen Delivery Method): room air      I&O's Summary    15 Apr 2023 07:01  -  15 Apr 2023 14:49  --------------------------------------------------------  IN: 0 mL / OUT: 0 mL / NET: 0 mL      MEDICATIONS  (STANDING):  cefepime   IVPB      cefepime   IVPB 1000 milliGRAM(s) IV Intermittent every 8 hours  heparin   Injectable 5000 Unit(s) SubCutaneous every 12 hours  midodrine. 5 milliGRAM(s) Oral three times a day  sodium chloride 0.9%. 1000 milliLiter(s) (75 mL/Hr) IV Continuous <Continuous>    MEDICATIONS  (PRN):    LABS:                        13.7   7.95  )-----------( 239      ( 2023 14:56 )             42.2     04-14    136  |  97  |  14  ----------------------------<  101<H>  4.2   |  27  |  0.73    Ca    10.4      2023 14:56    TPro  8.3  /  Alb  3.9  /  TBili  0.5  /  DBili  x   /  AST  40  /  ALT  22  /  AlkPhos  91  04-14    PT/INR - ( 2023 14:56 )   PT: 11.8 sec;   INR: 1.03 ratio         PTT - ( 2023 14:56 )  PTT:33.2 sec  Urinalysis Basic - ( 2023 18:41 )    Color: Light Orange / Appearance: Turbid / S.023 / pH: x  Gluc: x / Ketone: Negative  / Bili: Negative / Urobili: Negative   Blood: x / Protein: 300 mg/dL / Nitrite: Negative   Leuk Esterase: Large / RBC: 141 /hpf / WBC 2447 /HPF   Sq Epi: x / Non Sq Epi: x / Bacteria: Negative      CAPILLARY BLOOD GLUCOSE            Urinalysis Basic - ( 2023 18:41 )    Color: Light Orange / Appearance: Turbid / S.023 / pH: x  Gluc: x / Ketone: Negative  / Bili: Negative / Urobili: Negative   Blood: x / Protein: 300 mg/dL / Nitrite: Negative   Leuk Esterase: Large / RBC: 141 /hpf / WBC 2447 /HPF   Sq Epi: x / Non Sq Epi: x / Bacteria: Negative          Consultant(s) Notes Reviewed:  [x ] YES  [ ] NO    PHYSICAL EXAM:  GENERAL: NAD, well-groomed, well-developed,not in any distress ,  HEAD:  Atraumatic, Normocephalic  NECK: Supple, No JVD, Normal thyroid  NERVOUS SYSTEM:  Alert & non verbal   CHEST/LUNG: Good air entry bilateral with no  rales, rhonchi, wheezing, or rubs  HEART: Regular rate and rhythm; No murmurs, rubs, or gallops  ABDOMEN: Soft, Nontender, Nondistended; Bowel sounds present  EXTREMITIES:  2+ Peripheral Pulses, No clubbing, cyanosis, or edema    Care Discussed with Consultants/Other Providers [ x] YES  [ ] NO

## 2023-04-16 LAB
ANION GAP SERPL CALC-SCNC: 13 MMOL/L — SIGNIFICANT CHANGE UP (ref 5–17)
BUN SERPL-MCNC: 9 MG/DL — SIGNIFICANT CHANGE UP (ref 7–23)
CALCIUM SERPL-MCNC: 9 MG/DL — SIGNIFICANT CHANGE UP (ref 8.4–10.5)
CHLORIDE SERPL-SCNC: 104 MMOL/L — SIGNIFICANT CHANGE UP (ref 96–108)
CO2 SERPL-SCNC: 21 MMOL/L — LOW (ref 22–31)
CREAT SERPL-MCNC: 0.67 MG/DL — SIGNIFICANT CHANGE UP (ref 0.5–1.3)
CULTURE RESULTS: SIGNIFICANT CHANGE UP
EGFR: 106 ML/MIN/1.73M2 — SIGNIFICANT CHANGE UP
GLUCOSE BLDC GLUCOMTR-MCNC: 168 MG/DL — HIGH (ref 70–99)
GLUCOSE BLDC GLUCOMTR-MCNC: 231 MG/DL — HIGH (ref 70–99)
GLUCOSE BLDC GLUCOMTR-MCNC: 253 MG/DL — HIGH (ref 70–99)
GLUCOSE BLDC GLUCOMTR-MCNC: 66 MG/DL — LOW (ref 70–99)
GLUCOSE BLDC GLUCOMTR-MCNC: 71 MG/DL — SIGNIFICANT CHANGE UP (ref 70–99)
GLUCOSE BLDC GLUCOMTR-MCNC: 74 MG/DL — SIGNIFICANT CHANGE UP (ref 70–99)
GLUCOSE BLDC GLUCOMTR-MCNC: 82 MG/DL — SIGNIFICANT CHANGE UP (ref 70–99)
GLUCOSE BLDC GLUCOMTR-MCNC: 96 MG/DL — SIGNIFICANT CHANGE UP (ref 70–99)
GLUCOSE SERPL-MCNC: 64 MG/DL — LOW (ref 70–99)
HCT VFR BLD CALC: 37.3 % — LOW (ref 39–50)
HGB BLD-MCNC: 11.7 G/DL — LOW (ref 13–17)
MCHC RBC-ENTMCNC: 28.3 PG — SIGNIFICANT CHANGE UP (ref 27–34)
MCHC RBC-ENTMCNC: 31.4 GM/DL — LOW (ref 32–36)
MCV RBC AUTO: 90.1 FL — SIGNIFICANT CHANGE UP (ref 80–100)
NRBC # BLD: 0 /100 WBCS — SIGNIFICANT CHANGE UP (ref 0–0)
PLATELET # BLD AUTO: 219 K/UL — SIGNIFICANT CHANGE UP (ref 150–400)
POTASSIUM SERPL-MCNC: 3.6 MMOL/L — SIGNIFICANT CHANGE UP (ref 3.5–5.3)
POTASSIUM SERPL-SCNC: 3.6 MMOL/L — SIGNIFICANT CHANGE UP (ref 3.5–5.3)
RBC # BLD: 4.14 M/UL — LOW (ref 4.2–5.8)
RBC # FLD: 14.7 % — HIGH (ref 10.3–14.5)
SODIUM SERPL-SCNC: 138 MMOL/L — SIGNIFICANT CHANGE UP (ref 135–145)
SPECIMEN SOURCE: SIGNIFICANT CHANGE UP
WBC # BLD: 6.89 K/UL — SIGNIFICANT CHANGE UP (ref 3.8–10.5)
WBC # FLD AUTO: 6.89 K/UL — SIGNIFICANT CHANGE UP (ref 3.8–10.5)

## 2023-04-16 RX ORDER — SODIUM CHLORIDE 9 MG/ML
1000 INJECTION, SOLUTION INTRAVENOUS
Refills: 0 | Status: DISCONTINUED | OUTPATIENT
Start: 2023-04-16 | End: 2023-04-18

## 2023-04-16 RX ORDER — DEXTROSE 50 % IN WATER 50 %
25 SYRINGE (ML) INTRAVENOUS ONCE
Refills: 0 | Status: COMPLETED | OUTPATIENT
Start: 2023-04-16 | End: 2023-04-16

## 2023-04-16 RX ADMIN — HEPARIN SODIUM 5000 UNIT(S): 5000 INJECTION INTRAVENOUS; SUBCUTANEOUS at 17:22

## 2023-04-16 RX ADMIN — CEFEPIME 100 MILLIGRAM(S): 1 INJECTION, POWDER, FOR SOLUTION INTRAMUSCULAR; INTRAVENOUS at 17:17

## 2023-04-16 RX ADMIN — SODIUM CHLORIDE 60 MILLILITER(S): 9 INJECTION, SOLUTION INTRAVENOUS at 15:22

## 2023-04-16 RX ADMIN — CEFEPIME 100 MILLIGRAM(S): 1 INJECTION, POWDER, FOR SOLUTION INTRAMUSCULAR; INTRAVENOUS at 00:04

## 2023-04-16 RX ADMIN — CEFEPIME 100 MILLIGRAM(S): 1 INJECTION, POWDER, FOR SOLUTION INTRAMUSCULAR; INTRAVENOUS at 09:08

## 2023-04-16 RX ADMIN — HEPARIN SODIUM 5000 UNIT(S): 5000 INJECTION INTRAVENOUS; SUBCUTANEOUS at 06:04

## 2023-04-16 RX ADMIN — MIDODRINE HYDROCHLORIDE 5 MILLIGRAM(S): 2.5 TABLET ORAL at 12:36

## 2023-04-16 RX ADMIN — Medication 25 MILLILITER(S): at 06:43

## 2023-04-16 NOTE — SWALLOW BEDSIDE ASSESSMENT ADULT - H & P REVIEW
EDD VALDIVIA is a 62-year-old male past medical history of cerebellar ataxia dx 2019, chronic lacunar infarcts, 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, chronic hypotension on midodrine, renal stones, recent admission in Feb 2023 for pyelonephritis and L sided obstructive uropathy s/p ureteral stent, treated with 10 days of Cefepime (Pseudomonas in urine culture), who presented to the ED with acute encephalopathy concerning for UTI./yes

## 2023-04-16 NOTE — SWALLOW BEDSIDE ASSESSMENT ADULT - COMMENTS
Continued history:   -Known history of Pneumonia, aspiration -april 2022 (intubated for 7 days at [facility])  -4/15: RD note Poor (<50%)  pt barely eats anything per RN report. Consider formal swallowing evaluation with speech pathologist   -4/16: pt refused to eat ,unable to swallow po meds ,NPO and aspiration precautions maintained ,Poor oral intake within the last 24 hours    Swallow history:   April 2022 for clinical swallow evaluation with completion of MBS with recommendations for Puree and mildly thick liquids. Seen again in July 2022 with recommendations for minced and moist solid and thin liquids. Additional clinical swallow evaluations in September 2022 with initial recommendations for NPO w/ eventual advancement to puree and thin liquids. Continued history:   -Known history of Pneumonia, aspiration -April 2022 (intubated for 7 days at [facility])  -4/15: RD note;  Poor (<50%)  pt barely eats anything per RN report. Consider formal swallowing evaluation with speech pathologist   -4/16: pt refused to eat ,unable to swallow po meds ,NPO and aspiration precautions maintained ,Poor oral intake within the last 24 hours    Swallow history:   April 2022 for clinical swallow evaluation with completion of MBS with recommendations for Puree and mildly thick liquids. Seen again in July 2022 with recommendations for minced and moist solid and thin liquids. Additional clinical swallow evaluations in September 2022 with initial recommendations for NPO w/ eventual advancement to puree and thin liquids.

## 2023-04-16 NOTE — SWALLOW BEDSIDE ASSESSMENT ADULT - SWALLOW EVAL: DIAGNOSIS
Pt admitted for sepsis with encephalopathy, hypothermia, and + UA with pyuria. Pt admitted for sepsis with encephalopathy, hypothermia, and + UA with pyuria. Pt known to this service from prior admissions. Oropharyngeal swallow profile with reduced oral grading, awareness, control  and manipulation with consistent oral pocketing/holding leading to anterior loss in moderate amounts w/ suspected latency in the swallow trigger with inconsistent absent swallow trigger.

## 2023-04-16 NOTE — PROVIDER CONTACT NOTE (HYPOGLYCEMIA EVENT) - NS PROVIDER CONTACT RECOMMEND-HYPO
continue on IVF ,DEXTROSE IVP,Continue to monitor FS ,PA ordered dextrose 50% 25ml IVP x1 dose (4) no limitation

## 2023-04-16 NOTE — PROVIDER CONTACT NOTE (HYPOGLYCEMIA EVENT) - NS PROVIDER CONTACT BACKGROUND-HYPO
Age: 62y    Gender: Male    POCT Blood Glucose:  231 mg/dL (04-16-23 @ 06:59)  253 mg/dL (04-16-23 @ 06:57)  71 mg/dL (04-16-23 @ 06:31)  66 mg/dL (04-16-23 @ 06:29)  84 mg/dL (04-15-23 @ 23:52)  95 mg/dL (04-15-23 @ 18:01)      eMAR:dextrose 50% Injectable   25 milliLiter(s) IV Push (04-16-23 @ 06:43)    FS 66 repeated FS 71 ,Pt asymptomatic ,IVF ON ,pt not drinking or eating ,on aspiration precautions

## 2023-04-16 NOTE — PROGRESS NOTE ADULT - ASSESSMENT
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  Wife has noticed that patient has been less attentive, not talking.  Says that she acts like this when he has a UTI.  Patient recently had 2 ureteral stents placed for kidney stones.  Denies fevers, chills, nausea, vomiting.  History given by wife.       Problem/Plan - 1:  ·  Problem: Frequent UTI.   ·  Plan: S/P IV Abxs . Negative  urine C/S . No fever or Leucocytosis .   ID help appreciated . On cefepime .      Problem/Plan - 2:  ·  Problem: Hydroureteronephrosis.   ·  Plan: S/P Stents . Urology following.     Problem/Plan - 3:  ·  Problem: H/O hypotension.   ·  Plan: on Midodrine.     Problem/Plan - 4:  ·  Problem: H/O cerebellar ataxia.   ·  Plan: Supportive care.    Dispo ; DC planning pending S/S . D/W his wife in detail.

## 2023-04-16 NOTE — SWALLOW BEDSIDE ASSESSMENT ADULT - NS SPL SWALLOW CLINIC TRIAL FT
Patient p/w poor oral awareness and control with eventual anterior loss moderate amount w/ oral holding with SLP to removal oral residue/moderate -large amounts with oral care. Appreciated throat clearing / coughing with thin liquids via straw. Subsequent inconsistent latency in the swallow response with conservative textures or absent swallow response requiring SLP to remove oral residue.

## 2023-04-16 NOTE — SWALLOW BEDSIDE ASSESSMENT ADULT - SWALLOW EVAL: RECOMMENDED DIET
NPO and non oral means in the interim, if aligns w/ POC/GOC NPO and non oral means in the interim, if aligns w/ POC/GOC -- strict NPO, no oral medication

## 2023-04-16 NOTE — PROGRESS NOTE ADULT - SUBJECTIVE AND OBJECTIVE BOX
Date of Service  : 23 @ 13:49    INTERVAL HPI/OVERNIGHT EVENTS:  Vital Signs Last 24 Hrs  T(C): 36.3 (2023 05:04), Max: 37.2 (15 Apr 2023 20:47)  T(F): 97.4 (2023 05:04), Max: 99 (15 Apr 2023 20:47)  HR: 80 (2023 05:04) (80 - 110)  BP: 114/75 (2023 05:04) (83/59 - 114/75)  BP(mean): --  RR: 18 (2023 05:04) (18 - 18)  SpO2: 96% (2023 05:04) (93% - 98%)    Parameters below as of 2023 05:04  Patient On (Oxygen Delivery Method): room air      I&O's Summary    15 Apr 2023 07:01  -  2023 07:00  --------------------------------------------------------  IN: 1125 mL / OUT: 0 mL / NET: 1125 mL    2023 07:01  -  2023 13:49  --------------------------------------------------------  IN: 0 mL / OUT: 0 mL / NET: 0 mL      MEDICATIONS  (STANDING):  cefepime   IVPB      cefepime   IVPB 1000 milliGRAM(s) IV Intermittent every 8 hours  heparin   Injectable 5000 Unit(s) SubCutaneous every 12 hours  midodrine. 5 milliGRAM(s) Oral three times a day  sodium chloride 0.9%. 1000 milliLiter(s) (75 mL/Hr) IV Continuous <Continuous>    MEDICATIONS  (PRN):    LABS:                        11.7   6.89  )-----------( 219      ( 2023 07:19 )             37.3     04-16    138  |  104  |  9   ----------------------------<  64<L>  3.6   |  21<L>  |  0.67    Ca    9.0      2023 07:15    TPro  8.3  /  Alb  3.9  /  TBili  0.5  /  DBili  x   /  AST  40  /  ALT  22  /  AlkPhos  91  04-14    PT/INR - ( 2023 14:56 )   PT: 11.8 sec;   INR: 1.03 ratio         PTT - ( 2023 14:56 )  PTT:33.2 sec  Urinalysis Basic - ( 2023 18:41 )    Color: Light Orange / Appearance: Turbid / S.023 / pH: x  Gluc: x / Ketone: Negative  / Bili: Negative / Urobili: Negative   Blood: x / Protein: 300 mg/dL / Nitrite: Negative   Leuk Esterase: Large / RBC: 141 /hpf / WBC 2447 /HPF   Sq Epi: x / Non Sq Epi: x / Bacteria: Negative      CAPILLARY BLOOD GLUCOSE      POCT Blood Glucose.: 74 mg/dL (2023 12:47)  POCT Blood Glucose.: 168 mg/dL (2023 07:28)  POCT Blood Glucose.: 231 mg/dL (2023 06:59)  POCT Blood Glucose.: 253 mg/dL (2023 06:57)  POCT Blood Glucose.: 71 mg/dL (2023 06:31)  POCT Blood Glucose.: 66 mg/dL (2023 06:29)  POCT Blood Glucose.: 84 mg/dL (15 Apr 2023 23:52)  POCT Blood Glucose.: 95 mg/dL (15 Apr 2023 18:01)        Urinalysis Basic - ( 2023 18:41 )    Color: Light Orange / Appearance: Turbid / S.023 / pH: x  Gluc: x / Ketone: Negative  / Bili: Negative / Urobili: Negative   Blood: x / Protein: 300 mg/dL / Nitrite: Negative   Leuk Esterase: Large / RBC: 141 /hpf / WBC 2447 /HPF   Sq Epi: x / Non Sq Epi: x / Bacteria: Negative          Consultant(s) Notes Reviewed:  [x ] YES  [ ] NO    PHYSICAL EXAM:  GENERAL: NAD, ,not in any distress ,  HEAD:  Atraumatic, Normocephalic  NECK: Supple, No JVD, Normal thyroid  NERVOUS SYSTEM:  Alert   CHEST/LUNG: Good air entry bilateral with no  rales, rhonchi, wheezing, or rubs  HEART: Regular rate and rhythm; No murmurs, rubs, or gallops  ABDOMEN: Soft, Nontender, Nondistended; Bowel sounds present  EXTREMITIES:  2+ Peripheral Pulses, No clubbing, cyanosis, or edema    Care Discussed with Consultants/Other Providers [ x] YES  [ ] NO

## 2023-04-16 NOTE — SWALLOW BEDSIDE ASSESSMENT ADULT - ORAL PHASE
oral holding, pocketing to R side, anterior loss moderate amounts, reduced oral awareness/Decreased anterior-posterior movement of the bolus/Delayed oral transit time/Stasis in anterior sulcus/Stasis in lateral sulci

## 2023-04-17 LAB
ANION GAP SERPL CALC-SCNC: 14 MMOL/L — SIGNIFICANT CHANGE UP (ref 5–17)
BUN SERPL-MCNC: 8 MG/DL — SIGNIFICANT CHANGE UP (ref 7–23)
CALCIUM SERPL-MCNC: 9.2 MG/DL — SIGNIFICANT CHANGE UP (ref 8.4–10.5)
CHLORIDE SERPL-SCNC: 103 MMOL/L — SIGNIFICANT CHANGE UP (ref 96–108)
CO2 SERPL-SCNC: 22 MMOL/L — SIGNIFICANT CHANGE UP (ref 22–31)
CREAT SERPL-MCNC: 0.61 MG/DL — SIGNIFICANT CHANGE UP (ref 0.5–1.3)
EGFR: 109 ML/MIN/1.73M2 — SIGNIFICANT CHANGE UP
GLUCOSE BLDC GLUCOMTR-MCNC: 102 MG/DL — HIGH (ref 70–99)
GLUCOSE BLDC GLUCOMTR-MCNC: 114 MG/DL — HIGH (ref 70–99)
GLUCOSE BLDC GLUCOMTR-MCNC: 88 MG/DL — SIGNIFICANT CHANGE UP (ref 70–99)
GLUCOSE SERPL-MCNC: 87 MG/DL — SIGNIFICANT CHANGE UP (ref 70–99)
MAGNESIUM SERPL-MCNC: 1.7 MG/DL — SIGNIFICANT CHANGE UP (ref 1.6–2.6)
PHOSPHATE SERPL-MCNC: 1.8 MG/DL — LOW (ref 2.5–4.5)
POTASSIUM SERPL-MCNC: 3.5 MMOL/L — SIGNIFICANT CHANGE UP (ref 3.5–5.3)
POTASSIUM SERPL-SCNC: 3.5 MMOL/L — SIGNIFICANT CHANGE UP (ref 3.5–5.3)
SODIUM SERPL-SCNC: 139 MMOL/L — SIGNIFICANT CHANGE UP (ref 135–145)

## 2023-04-17 PROCEDURE — 99232 SBSQ HOSP IP/OBS MODERATE 35: CPT

## 2023-04-17 RX ORDER — CHLORHEXIDINE GLUCONATE 213 G/1000ML
1 SOLUTION TOPICAL DAILY
Refills: 0 | Status: DISCONTINUED | OUTPATIENT
Start: 2023-04-17 | End: 2023-04-20

## 2023-04-17 RX ADMIN — CEFEPIME 100 MILLIGRAM(S): 1 INJECTION, POWDER, FOR SOLUTION INTRAMUSCULAR; INTRAVENOUS at 10:21

## 2023-04-17 RX ADMIN — CEFEPIME 100 MILLIGRAM(S): 1 INJECTION, POWDER, FOR SOLUTION INTRAMUSCULAR; INTRAVENOUS at 00:04

## 2023-04-17 RX ADMIN — Medication 63.75 MILLIMOLE(S): at 21:15

## 2023-04-17 RX ADMIN — CHLORHEXIDINE GLUCONATE 1 APPLICATION(S): 213 SOLUTION TOPICAL at 14:09

## 2023-04-17 RX ADMIN — SODIUM CHLORIDE 60 MILLILITER(S): 9 INJECTION, SOLUTION INTRAVENOUS at 21:15

## 2023-04-17 RX ADMIN — HEPARIN SODIUM 5000 UNIT(S): 5000 INJECTION INTRAVENOUS; SUBCUTANEOUS at 18:35

## 2023-04-17 RX ADMIN — CEFEPIME 100 MILLIGRAM(S): 1 INJECTION, POWDER, FOR SOLUTION INTRAMUSCULAR; INTRAVENOUS at 23:48

## 2023-04-17 RX ADMIN — CEFEPIME 100 MILLIGRAM(S): 1 INJECTION, POWDER, FOR SOLUTION INTRAMUSCULAR; INTRAVENOUS at 18:35

## 2023-04-17 RX ADMIN — HEPARIN SODIUM 5000 UNIT(S): 5000 INJECTION INTRAVENOUS; SUBCUTANEOUS at 06:11

## 2023-04-17 NOTE — PROGRESS NOTE ADULT - ASSESSMENT
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  Wife has noticed that patient has been less attentive, not talking.  Says that she acts like this when he has a UTI.  Patient recently had 2 ureteral stents placed for kidney stones.  Denies fevers, chills, nausea, vomiting.  History given by wife.       Problem/Plan - 1:  ·  Problem: Frequent UTI.   ·  Plan: S/P IV Abxs . Negative  urine C/S . No fever or Leucocytosis .   ID help appreciated . S/P  cefepime .      Problem/Plan - 2:  ·  Problem: Hydroureteronephrosis.   ·  Plan: S/P Stents . Urology following.   Planning or procedureedure so will check TTE .      Problem/Plan - 3:  ·  Problem: H/O hypotension.   ·  Plan: on Midodrine.     Problem/Plan - 4:  ·  Problem: H/O cerebellar ataxia.   ·  Plan: Supportive care.    Dispo ; DC planning pending urology procedure .. D/W his wife in detail yesterday.

## 2023-04-17 NOTE — PROGRESS NOTE ADULT - ASSESSMENT
Patient seen at bedside non-communicative INAD    LOWER EXTREMITY PHYSICAL EXAM:    Vascular: DP/PT 1/4, B/L, CFT <3 seconds B/L, Temperature gradient wnl, B/L.   Neuro: Epicritic sensation unable to assess to the level of toes, B/L.  Musculoskeletal/Ortho: No signs of edema, erythema or drainage no signs of infection or cellulitis.  Dystrophic elongated toenails left foot  No signs of ulcerations both feet  Patient using prevalon boots  Debride nails left foot without incident  No need for wound care dressings  continue with prevalon boots  reconsult podiatry if any signs of inections

## 2023-04-17 NOTE — PROGRESS NOTE ADULT - ASSESSMENT
63 yo M with a PMH of cerebellar ataxia, chronic lacunar infarcts, 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, chronic hypotension on midodrine, nephrolithiasis, who presented to the ED with acute encephalopathy concerning for UTI.     -Encephalopathy, hypothermia, UA with pyuria   -Concern for sepsis 2/2 UTI  -Recent admission in Feb 2023 for pyelonephritis and L sided obstructive uropathy s/p ureteral stent, treated with 10 days of Cefepime (Pseudomonas in urine culture)  -Blood and urine cultures pending  -CT A/P with b/l ureteral stents, mild L sided hydroureteronephrosis and enhancement of the renal pelvis and proximal ureter with diffuse bladder wall thickening  -CT Head stable   -On Cefepime 1g IV Q8h  -ID consulted for recommendations    Overall  Sepsis, encephalopathy, hypothermia, pyelonephritis, ureteral stents   H/o Pseudomonas in urine culture from Feb 2023     Recommendations  Continue Cefepime 1g IV Q8h   F/u urine and blood cultures 63 yo M with a PMH of cerebellar ataxia, chronic lacunar infarcts, 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, chronic hypotension on midodrine, nephrolithiasis, who presented to the ED with acute encephalopathy concerning for UTI.     -Encephalopathy, hypothermia, UA with pyuria   -Concern for sepsis 2/2 UTI  -Recent admission in Feb 2023 for pyelonephritis and L sided obstructive uropathy s/p ureteral stent, treated with 10 days of Cefepime (Pseudomonas in urine culture)  -Blood and urine cultures pending  -CT A/P with b/l ureteral stents, mild L sided hydroureteronephrosis and enhancement of the renal pelvis and proximal ureter with diffuse bladder wall thickening  -CT Head stable   -On Cefepime 1g IV Q8h  -ID consulted for recommendations    Overall  Sepsis, encephalopathy, hypothermia, pyelonephritis, ureteral stents   H/o Pseudomonas in urine culture from Feb 2023     Recommendations  Continue Cefepime 1g IV Q8h   F/u urine and blood cultures  if they remain negative- complete 1 week of cefepime    call if questions arise  Edwin Amaro MD  Can be called via Teams  After 5pm/weekends [phone]

## 2023-04-17 NOTE — ADVANCED PRACTICE NURSE CONSULT - ASSESSMENT
Pt is in bed, opens eyes but non verbal at this time. Pt is on a low air loss & pressure redistribution surface. He is being turned & positioned as per nursing documentation. Pt is bedbound, incontinent of bladder & bowels. Pt w/ fair skin turgor. Pt w/ contractures x4. The following was noted:  Left lateral lower leg- Unstageable Pressure Injury measures 1.0cm(l) x 0.5cm(w) x 0.2cm(d) wound base w/ yellow slough & white fibrinous base. Wound w/ small amount thick serous drainage without odor noted. Surrounding tissue is new epitheliazed pink tissue. This wound is from prior to hospital admission.  Sacrum- area appears to be a resolving Pressure Injury of unknown stage as it shows contraction, reepitheliazation & scaring. Area measures 6cm(l) x 4cm(w) x 0cm (d) w/ 1 small open area which  measures 0.2cm(l) x 0.2cm(w) x 0.1cm (d) pink wound base without drainage noted.  Right Trochanter- Stage 4 Pressure Injury which measures 3.5cm(l) x 2.5cm(w) x 0.5cm(d) undermining circumfrentially 1cm(d) wound base is red w/ small amount serosang drainage without odor noted. Bone is palpable in base. Wound edges are rolled under in areas indicating this wound is not of new onset. Pt is in bed, opens eyes but non verbal at this time. Pt is on a low air loss & pressure redistribution surface. He is being turned & positioned as per nursing documentation. Pt is bedbound, incontinent of bladder & bowels. Pt w/ fair skin turgor. Pt w/ contractures x4. The following was noted:  Left lateral lower leg- Unstageable Pressure Injury measures 1.0cm(l) x 0.5cm(w) x 0.2cm(d) wound base w/ yellow slough & white fibrinous base. Wound w/ small amount thick serous drainage without odor noted. Surrounding tissue is new epitheliazed pink tissue. This wound is from prior to hospital admission.  Sacrum- area appears to be a resolving Pressure Injury of unknown stage as it shows contraction, reepitheliazation & scaring. Area measures 6cm(l) x 4cm(w) x 0cm (d) w/ 1 small open area which  measures 0.2cm(l) x 0.2cm(w) x 0.1cm (d) pink wound base without drainage noted.  Right Trochanter- Stage 4 Pressure Injury which measures 3.5cm(l) x 2.5cm(w) x 0.5cm(d) undermining circumfrentially 1cm(d) wound base is red w/ small amount serosang drainage without odor noted. Bone is palpable in base. Wound edges are rolled under in areas indicating this wound is not of new onset.  Bilateral heels w/ hyperpigmented & dry flaking skin.

## 2023-04-17 NOTE — PROGRESS NOTE ADULT - SUBJECTIVE AND OBJECTIVE BOX
INFECTIOUS DISEASES FOLLOW UP-- Leticia Amaro  [phone]    This is a follow up note for this  62yMale with  Altered mental status        ROS:  CONSTITUTIONAL:  No fever, good appetite  CARDIOVASCULAR:  No chest pain or palpitations  RESPIRATORY:  No dyspnea  GASTROINTESTINAL:  No nausea, vomiting, diarrhea, or abdominal pain  GENITOURINARY:  No dysuria  NEUROLOGIC:  No headache,     Allergies    No Known Allergies    Intolerances        ANTIBIOTICS/RELEVANT:  antimicrobials  cefepime   IVPB 1000 milliGRAM(s) IV Intermittent every 8 hours  cefepime   IVPB        immunologic:    OTHER:  chlorhexidine 2% Cloths 1 Application(s) Topical daily  dextrose 5% + sodium chloride 0.9%. 1000 milliLiter(s) IV Continuous <Continuous>  heparin   Injectable 5000 Unit(s) SubCutaneous every 12 hours  midodrine. 5 milliGRAM(s) Oral three times a day  sodium phosphate 15 milliMole(s)/250 mL IVPB 15 milliMole(s) IV Intermittent once      Objective:  Vital Signs Last 24 Hrs  T(C): 36.6 (17 Apr 2023 13:24), Max: 37.1 (17 Apr 2023 04:35)  T(F): 97.8 (17 Apr 2023 13:24), Max: 98.7 (17 Apr 2023 04:35)  HR: 67 (17 Apr 2023 13:24) (67 - 87)  BP: 106/70 (17 Apr 2023 13:24) (97/69 - 106/70)  BP(mean): --  RR: 18 (17 Apr 2023 13:24) (18 - 18)  SpO2: 98% (17 Apr 2023 13:24) (98% - 100%)    Parameters below as of 17 Apr 2023 13:24  Patient On (Oxygen Delivery Method): room air        PHYSICAL EXAM:  Constitutional:no acute distress  Eyes:THU, EOMI  Ear/Nose/Throat: no oral lesions, 	  Respiratory: clear BL  Cardiovascular: S1S2  Gastrointestinal:soft, (+) BS, no tenderness  Extremities:no e/e/c  No Lymphadenopathy  IV sites not inflammed.    LABS:                        11.7   6.89  )-----------( 219      ( 16 Apr 2023 07:19 )             37.3     04-17    139  |  103  |  8   ----------------------------<  87  3.5   |  22  |  0.61    Ca    9.2      17 Apr 2023 07:14  Phos  1.8     04-17  Mg     1.7     04-17            MICROBIOLOGY:            RECENT CULTURES:  04-14 @ 18:44  Clean Catch Clean Catch (Midstream)  --  --  --    <10,000 CFU/mL Normal Urogenital Leanna  --  04-14 @ 14:45  .Blood Blood-Peripheral  --  --  --    No growth to date.  --  04-14 @ 14:30  .Blood Blood-Peripheral  --  --  --    No growth to date.  --      RADIOLOGY & ADDITIONAL STUDIES: INFECTIOUS DISEASES FOLLOW UP-- Leticia Amaro  [phone]    This is a follow up note for this  62yMale with  Altered mental status        ROS:  CONSTITUTIONAL:  awake, non interacitive, contracted    Allergies    No Known Allergies    Intolerances        ANTIBIOTICS/RELEVANT:  antimicrobials  cefepime   IVPB 1000 milliGRAM(s) IV Intermittent every 8 hours  cefepime   IVPB        immunologic:    OTHER:  chlorhexidine 2% Cloths 1 Application(s) Topical daily  dextrose 5% + sodium chloride 0.9%. 1000 milliLiter(s) IV Continuous <Continuous>  heparin   Injectable 5000 Unit(s) SubCutaneous every 12 hours  midodrine. 5 milliGRAM(s) Oral three times a day  sodium phosphate 15 milliMole(s)/250 mL IVPB 15 milliMole(s) IV Intermittent once      Objective:  Vital Signs Last 24 Hrs  T(C): 36.6 (17 Apr 2023 13:24), Max: 37.1 (17 Apr 2023 04:35)  T(F): 97.8 (17 Apr 2023 13:24), Max: 98.7 (17 Apr 2023 04:35)  HR: 67 (17 Apr 2023 13:24) (67 - 87)  BP: 106/70 (17 Apr 2023 13:24) (97/69 - 106/70)  BP(mean): --  RR: 18 (17 Apr 2023 13:24) (18 - 18)  SpO2: 98% (17 Apr 2023 13:24) (98% - 100%)    Parameters below as of 17 Apr 2023 13:24  Patient On (Oxygen Delivery Method): room air        PHYSICAL EXAM:  Constitutional:no acute distress  Eyes:THU, EOMI  Ear/Nose/Throat: no oral lesions, 	  Respiratory: clear BL  Cardiovascular: S1S2  Gastrointestinal:soft, (+) BS, no tenderness  Extremities:no e/e/c  No Lymphadenopathy  IV sites not inflammed.    LABS:                        11.7   6.89  )-----------( 219      ( 16 Apr 2023 07:19 )             37.3     04-17    139  |  103  |  8   ----------------------------<  87  3.5   |  22  |  0.61    Ca    9.2      17 Apr 2023 07:14  Phos  1.8     04-17  Mg     1.7     04-17            MICROBIOLOGY:            RECENT CULTURES:  04-14 @ 18:44  Clean Catch Clean Catch (Midstream)  --  --  --    <10,000 CFU/mL Normal Urogenital Leanna  --  04-14 @ 14:45  .Blood Blood-Peripheral  --  --  --    No growth to date.  --  04-14 @ 14:30  .Blood Blood-Peripheral  --  --  --    No growth to date.  --      RADIOLOGY & ADDITIONAL STUDIES:  < from: CT Head No Cont (04.14.23 @ 16:10) >  IMPRESSION:  NO EVIDENCE OF ACUTE INTRACRANIAL HEMORRHAGE OR MIDLINE SHIFT.  ATROPHY   WITH WHITE MATTER ISCHEMIC CHANGES.    < end of copied text >

## 2023-04-17 NOTE — PROGRESS NOTE ADULT - SUBJECTIVE AND OBJECTIVE BOX
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  Wife has noticed that patient has been less attentive, not talking.  Says that she acts like this when he has a UTI.  Patient recently had 2 ureteral stents placed for kidney stones.  Denies fevers, chills, nausea, vomiting.  History given by wife. (14 Apr 2023 19:55)      PAST MEDICAL & SURGICAL HISTORY:  H/O cerebellar ataxia  -diagnosed in 2019      History of hypotension      Anemia      Lacunar infarction  -chronic      Pneumonia, aspiration  -april 2022 (intubated for 7 days at [facility])      Adrenal insufficiency      Low body temperature      Pressure ulcer of left heel      Pressure ulcer of buttock      H/O sepsis      H/O cystoscopy          MEDICATIONS  (STANDING):  cefepime   IVPB 1000 milliGRAM(s) IV Intermittent every 8 hours  cefepime   IVPB      chlorhexidine 2% Cloths 1 Application(s) Topical daily  dextrose 5% + sodium chloride 0.9%. 1000 milliLiter(s) (60 mL/Hr) IV Continuous <Continuous>  heparin   Injectable 5000 Unit(s) SubCutaneous every 12 hours  midodrine. 5 milliGRAM(s) Oral three times a day  sodium phosphate 15 milliMole(s)/250 mL IVPB 15 milliMole(s) IV Intermittent once    MEDICATIONS  (PRN):      Allergies    No Known Allergies    Intolerances        VITALS:    Vital Signs Last 24 Hrs  T(C): 37.1 (17 Apr 2023 04:35), Max: 37.1 (17 Apr 2023 04:35)  T(F): 98.7 (17 Apr 2023 04:35), Max: 98.7 (17 Apr 2023 04:35)  HR: 87 (17 Apr 2023 04:35) (68 - 98)  BP: 102/67 (17 Apr 2023 04:35) (97/69 - 117/62)  BP(mean): --  RR: 18 (17 Apr 2023 04:35) (18 - 18)  SpO2: 99% (17 Apr 2023 04:35) (96% - 100%)    Parameters below as of 17 Apr 2023 04:35  Patient On (Oxygen Delivery Method): room air        LABS:                          11.7   6.89  )-----------( 219      ( 16 Apr 2023 07:19 )             37.3       04-17    139  |  103  |  8   ----------------------------<  87  3.5   |  22  |  0.61    Ca    9.2      17 Apr 2023 07:14  Phos  1.8     04-17  Mg     1.7     04-17        CAPILLARY BLOOD GLUCOSE      POCT Blood Glucose.: 102 mg/dL (17 Apr 2023 12:14)  POCT Blood Glucose.: 88 mg/dL (17 Apr 2023 06:13)  POCT Blood Glucose.: 96 mg/dL (16 Apr 2023 23:40)  POCT Blood Glucose.: 82 mg/dL (16 Apr 2023 17:15)          LOWER EXTREMITY PHYSICAL EXAM:    Vascular: DP/PT 1/4, B/L, CFT <3 seconds B/L, Temperature gradient wnl, B/L.   Neuro: Epicritic sensation unable to assess to the level of toes, B/L.  Musculoskeletal/Ortho: No signs of edema, erythema or drainage no signs of infection or cellulitis.  Dystrophic elongated toenails left foot  No signs of ulcerations both feet  Patient using prevalon boots

## 2023-04-17 NOTE — ADVANCED PRACTICE NURSE CONSULT - REASON FOR CONSULT
Requested by staff to evaluate multiple areas on sacrum, left lateral lower leg & right trochanter. 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  Wife has noticed that patient has been less attentive, not talking.  Says that she acts like this when he has a UTI.  Patient recently had 2 ureteral stents placed for kidney stones.  Denies fevers, chills, nausea, vomiting.  History given by wife.

## 2023-04-17 NOTE — ADVANCED PRACTICE NURSE CONSULT - RECOMMEDATIONS
Recommendations  1) Left lower lateral leg- cleanse w/ NS, pat dry apply Medihoney paste & cover w/ bordered foam dressing e.g. Allevyn Change daily Medihoney will autolytically debride, provide moist wound healing & decrease bacterial load. Foam dressing will contain drainage.  2) Sacrum- Cleanse w/ NS, pat dry apply Triad Hydrophyllic dressing 2x/d & as needed after incontinence episodes Triad will seal & protect skin & provide moist wound healing  3) Right trochanter- Cleanse with NS, pat dry, Apply Medihoney paste & cover w/ bordered foam dressing e.g. Allevyn Change daily Medihoney will autolytically debride, provide moist wound healing & decrease bacterial load. Foam dressing will contain drainage.  4) Continue w/ turning & positioning, use of positioners  5) Sween 24 to dry intact skin 2x/d to hydrate skin  6) Continue w/ Complete Cair boots to off load heels & maintain proper positioning of feet off of footboard.  7) Nutrition consult  8) Continue to monitor skin  Discussed w/ Staff RN Aaron  Remain available as requested by staff

## 2023-04-17 NOTE — PROGRESS NOTE ADULT - SUBJECTIVE AND OBJECTIVE BOX
Date of Service  : 04-17-23     INTERVAL HPI/OVERNIGHT EVENTS: No new concerns.   Vital Signs Last 24 Hrs  T(C): 36.6 (17 Apr 2023 13:24), Max: 37.1 (17 Apr 2023 04:35)  T(F): 97.8 (17 Apr 2023 13:24), Max: 98.7 (17 Apr 2023 04:35)  HR: 67 (17 Apr 2023 13:24) (67 - 87)  BP: 106/70 (17 Apr 2023 13:24) (97/69 - 106/70)  BP(mean): --  RR: 18 (17 Apr 2023 13:24) (18 - 18)  SpO2: 98% (17 Apr 2023 13:24) (98% - 100%)    Parameters below as of 17 Apr 2023 13:24  Patient On (Oxygen Delivery Method): room air      I&O's Summary    16 Apr 2023 07:01  -  17 Apr 2023 07:00  --------------------------------------------------------  IN: 470 mL / OUT: 0 mL / NET: 470 mL      MEDICATIONS  (STANDING):  cefepime   IVPB      cefepime   IVPB 1000 milliGRAM(s) IV Intermittent every 8 hours  chlorhexidine 2% Cloths 1 Application(s) Topical daily  dextrose 5% + sodium chloride 0.9%. 1000 milliLiter(s) (60 mL/Hr) IV Continuous <Continuous>  heparin   Injectable 5000 Unit(s) SubCutaneous every 12 hours  midodrine. 5 milliGRAM(s) Oral three times a day  sodium phosphate 15 milliMole(s)/250 mL IVPB 15 milliMole(s) IV Intermittent once    MEDICATIONS  (PRN):    LABS:                        11.7   6.89  )-----------( 219      ( 16 Apr 2023 07:19 )             37.3     04-17    139  |  103  |  8   ----------------------------<  87  3.5   |  22  |  0.61    Ca    9.2      17 Apr 2023 07:14  Phos  1.8     04-17  Mg     1.7     04-17          CAPILLARY BLOOD GLUCOSE      POCT Blood Glucose.: 102 mg/dL (17 Apr 2023 12:14)  POCT Blood Glucose.: 88 mg/dL (17 Apr 2023 06:13)  POCT Blood Glucose.: 96 mg/dL (16 Apr 2023 23:40)              Consultant(s) Notes Reviewed:  [x ] YES  [ ] NO    PHYSICAL EXAM:  GENERAL: NAD, well-groomed, well-developed,not in any distress ,  HEAD:  Atraumatic, Normocephalic  NECK: Supple, No JVD, Normal thyroid  NERVOUS SYSTEM:  Alert & Oriented X0  CHEST/LUNG: Good air entry bilateral with no  rales, rhonchi, wheezing, or rubs  HEART: Regular rate and rhythm; No murmurs, rubs, or gallops  ABDOMEN: Soft, Nontender, Nondistended; Bowel sounds present  EXTREMITIES:  2+ Peripheral Pulses, No clubbing, cyanosis, or edema    Care Discussed with Consultants/Other Providers [ x] YES  [ ] NO

## 2023-04-18 LAB
GLUCOSE BLDC GLUCOMTR-MCNC: 81 MG/DL — SIGNIFICANT CHANGE UP (ref 70–99)
GLUCOSE BLDC GLUCOMTR-MCNC: 86 MG/DL — SIGNIFICANT CHANGE UP (ref 70–99)
GLUCOSE BLDC GLUCOMTR-MCNC: 95 MG/DL — SIGNIFICANT CHANGE UP (ref 70–99)
GLUCOSE BLDC GLUCOMTR-MCNC: 98 MG/DL — SIGNIFICANT CHANGE UP (ref 70–99)
HCT VFR BLD CALC: 35.2 % — LOW (ref 39–50)
HGB BLD-MCNC: 11.5 G/DL — LOW (ref 13–17)
MCHC RBC-ENTMCNC: 27.6 PG — SIGNIFICANT CHANGE UP (ref 27–34)
MCHC RBC-ENTMCNC: 32.7 GM/DL — SIGNIFICANT CHANGE UP (ref 32–36)
MCV RBC AUTO: 84.4 FL — SIGNIFICANT CHANGE UP (ref 80–100)
MRSA PCR RESULT.: DETECTED
NRBC # BLD: 0 /100 WBCS — SIGNIFICANT CHANGE UP (ref 0–0)
PHOSPHATE SERPL-MCNC: 3.2 MG/DL — SIGNIFICANT CHANGE UP (ref 2.5–4.5)
PLATELET # BLD AUTO: 202 K/UL — SIGNIFICANT CHANGE UP (ref 150–400)
RBC # BLD: 4.17 M/UL — LOW (ref 4.2–5.8)
RBC # FLD: 14.6 % — HIGH (ref 10.3–14.5)
S AUREUS DNA NOSE QL NAA+PROBE: DETECTED
SARS-COV-2 RNA SPEC QL NAA+PROBE: SIGNIFICANT CHANGE UP
WBC # BLD: 4.44 K/UL — SIGNIFICANT CHANGE UP (ref 3.8–10.5)
WBC # FLD AUTO: 4.44 K/UL — SIGNIFICANT CHANGE UP (ref 3.8–10.5)

## 2023-04-18 RX ORDER — SODIUM CHLORIDE 9 MG/ML
1000 INJECTION, SOLUTION INTRAVENOUS
Refills: 0 | Status: DISCONTINUED | OUTPATIENT
Start: 2023-04-18 | End: 2023-04-20

## 2023-04-18 RX ORDER — MUPIROCIN 20 MG/G
1 OINTMENT TOPICAL
Refills: 0 | Status: DISCONTINUED | OUTPATIENT
Start: 2023-04-18 | End: 2023-04-20

## 2023-04-18 RX ADMIN — HEPARIN SODIUM 5000 UNIT(S): 5000 INJECTION INTRAVENOUS; SUBCUTANEOUS at 17:38

## 2023-04-18 RX ADMIN — MUPIROCIN 1 APPLICATION(S): 20 OINTMENT TOPICAL at 18:58

## 2023-04-18 RX ADMIN — CEFEPIME 100 MILLIGRAM(S): 1 INJECTION, POWDER, FOR SOLUTION INTRAMUSCULAR; INTRAVENOUS at 17:35

## 2023-04-18 RX ADMIN — SODIUM CHLORIDE 60 MILLILITER(S): 9 INJECTION, SOLUTION INTRAVENOUS at 05:04

## 2023-04-18 RX ADMIN — CEFEPIME 100 MILLIGRAM(S): 1 INJECTION, POWDER, FOR SOLUTION INTRAMUSCULAR; INTRAVENOUS at 09:01

## 2023-04-18 RX ADMIN — SODIUM CHLORIDE 60 MILLILITER(S): 9 INJECTION, SOLUTION INTRAVENOUS at 23:02

## 2023-04-18 RX ADMIN — CHLORHEXIDINE GLUCONATE 1 APPLICATION(S): 213 SOLUTION TOPICAL at 17:34

## 2023-04-18 RX ADMIN — CEFEPIME 100 MILLIGRAM(S): 1 INJECTION, POWDER, FOR SOLUTION INTRAMUSCULAR; INTRAVENOUS at 23:01

## 2023-04-18 NOTE — CHART NOTE - NSCHARTNOTEFT_GEN_A_CORE
Nutrition Follow Up Note  Patient seen for: Nutrition Consult for Pressure Injuries     Chart reviewed, events noted. 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  Wife has noticed that patient has been less attentive, not talking.  Says that she acts like this when he has a UTI.  Patient recently had 2 ureteral stents placed for kidney stones.  Denies fevers, chills, nausea, vomiting."     Source: [] Patient       [x] Current Medical Record     [] RN        [] Family at bedside       [X] Other: Attempted to reach wife at via telephone however she was unavailable     -If unable to interview patient: [] Trach/Vent/BiPAP  [X] Disoriented/confused/inappropriate to interview- pt is non verbal     Diet Order:   Diet, NPO (04-16-23)    - Is current order appropriate/adequate? [] Yes  [X]  No: Inadequate Diet X 3 days    - Nutrition-related concerns:      - Assess by SLP, 4/16/23, recommendation per SLP "NPO, no oral medication."       - Hypothermia; bear hugger in place      - Unable to perform Nutrition Focused Physical Assessment secondary to no family present to provide consent; RD will reassess as    appropriate.      GI:  Last BM 4//16/23  Bowel Regimen? [] Yes   [X] No    Weights: Drug Dosing Weight: 64.2 kg/ 141.5 pounds (4/14/23)  -- Bed Scale weight obtained by RD during visit: 58.2 kg/ 128.3 pounds (4/18/23); Weight loss likely secondary to poor PO intakes/NPO status     Nutritionally Pertinent MEDICATIONS  (STANDING):  cefepime   IVPB  cefepime   IVPB  dextrose 5% + sodium chloride 0.9%.  midodrine.    Pertinent Labs: 04-18 @ 07:12: Na --, BUN --, Cr --, BG --, K+ --, Phos 3.2, Mg --, Alk Phos --, ALT/SGPT --, AST/SGOT --, HbA1c --      Finger Sticks:  POCT Blood Glucose.: 86 mg/dL (04-18 @ 06:39)  POCT Blood Glucose.: 81 mg/dL (04-18 @ 06:37)  POCT Blood Glucose.: 114 mg/dL (04-17 @ 23:48)  POCT Blood Glucose.: 102 mg/dL (04-17 @ 12:14)      Skin per nursing documentation:   Edema: -- No edema noted per flow sheets     Estimated Needs:   [X] no change since previous assessment: Based on dosing weight of 64.2 kg/ 141.5 pounds   30-35 kcal/kg= 7462-6372 kcal  1.2-1.4 gm/kg=76.92- 89.74 gm/kg pro   [] recalculated:     Previous Nutrition Diagnosis:   1.Inadequate Oral Intake  2. Increased Nutrient Needs  Nutrition Diagnosis is: [X] ongoing  [] resolved [] not applicable     New Nutrition Diagnosis: [X] Not applicable    Nutrition Care Plan:  [X] In Progress  [] Achieved  [] Not applicable    Nutrition Interventions:     Education Provided:       [] Yes:  [X] No: Pt not a candidate for education        Recommendations:      **If alternated means of nutrition are within pt and his family's GOC:  1. Patient at risk for refeeding syndrome, as tolerated, recommend Jevity 1.5 @ 10 mL/hr and advance by 10mL q12H until goal rate of 55mL/hr x24hrs is reached. Regimen at goal provides 1320 mL total volume, 1003 mL free water, 1980 kcal/d and 84 g pro/day (30.8 kcal/kg and 1.3 g/kg) based on current dosing weight 64.2 kg.   2. Trend electrolytes closely and replete as indicated   3. Add Multivitamin and Thiamine daily in setting of refeeding risk; add Vitamin C if medically appropriate for wound healing.    4 .RD remains available to adjust EN formulary, volume/rate as needed     ** If PO diet is restarted and deemed medically appropriate:    1. Recommend no therapeutic dietary restrictions   2. Defer diet/texture advancement to medical team/SLP as indicated    3. Consider adding multivitamin and vitamin C supplements if no medical contraindications to aid in wound healing.    4. Recommend adding Ensure Plus High Protein 2x/day (350 kcal and 20 g protein per serving ).    Monitoring and Evaluation:   Continue to monitor nutritional intake, tolerance to diet prescription, weights, labs, skin integrity    RD remains available upon request and will follow up per protocol  Kelsea Tafoya, MS,RDN, CDN, Pager # [phone] or TEAMS

## 2023-04-18 NOTE — PROGRESS NOTE ADULT - SUBJECTIVE AND OBJECTIVE BOX
Date of Service  : 04-18-23     INTERVAL HPI/OVERNIGHT EVENTS: No new concerns per staff  Vital Signs Last 24 Hrs  T(C): 37.6 (18 Apr 2023 12:45), Max: 37.6 (18 Apr 2023 12:45)  T(F): 99.6 (18 Apr 2023 12:45), Max: 99.6 (18 Apr 2023 12:45)  HR: 67 (18 Apr 2023 12:45) (54 - 77)  BP: 100/64 (18 Apr 2023 12:45) (100/64 - 111/75)  BP(mean): --  RR: 18 (18 Apr 2023 12:45) (18 - 18)  SpO2: 97% (18 Apr 2023 12:45) (97% - 100%)    Parameters below as of 18 Apr 2023 12:45  Patient On (Oxygen Delivery Method): room air      I&O's Summary    17 Apr 2023 07:01  -  18 Apr 2023 07:00  --------------------------------------------------------  IN: 720 mL / OUT: 0 mL / NET: 720 mL    18 Apr 2023 07:01  -  18 Apr 2023 18:23  --------------------------------------------------------  IN: 0 mL / OUT: 0 mL / NET: 0 mL      MEDICATIONS  (STANDING):  cefepime   IVPB      cefepime   IVPB 1000 milliGRAM(s) IV Intermittent every 8 hours  chlorhexidine 2% Cloths 1 Application(s) Topical daily  dextrose 5% + sodium chloride 0.9%. 1000 milliLiter(s) (60 mL/Hr) IV Continuous <Continuous>  heparin   Injectable 5000 Unit(s) SubCutaneous every 12 hours  midodrine. 5 milliGRAM(s) Oral three times a day  mupirocin 2% Nasal 1 Application(s) Both Nostrils two times a day    MEDICATIONS  (PRN):    LABS:                        11.5   4.44  )-----------( 202      ( 18 Apr 2023 07:17 )             35.2     04-17    139  |  103  |  8   ----------------------------<  87  3.5   |  22  |  0.61    Ca    9.2      17 Apr 2023 07:14  Phos  3.2     04-18  Mg     1.7     04-17          CAPILLARY BLOOD GLUCOSE      POCT Blood Glucose.: 95 mg/dL (18 Apr 2023 12:28)  POCT Blood Glucose.: 86 mg/dL (18 Apr 2023 06:39)  POCT Blood Glucose.: 81 mg/dL (18 Apr 2023 06:37)  POCT Blood Glucose.: 114 mg/dL (17 Apr 2023 23:48)              Consultant(s) Notes Reviewed:  [x ] YES  [ ] NO    PHYSICAL EXAM:  GENERAL: NAD, well-groomed, well-developed,not in any distress ,  HEAD:  Atraumatic, Normocephalic  NECK: Supple, No JVD, Normal thyroid  NERVOUS SYSTEM:  Alert   CHEST/LUNG: Good air entry bilateral with no  rales, rhonchi, wheezing, or rubs  HEART: Regular rate and rhythm; No murmurs, rubs, or gallops  ABDOMEN: Soft, Nontender, Nondistended; Bowel sounds present  EXTREMITIES:  2+ Peripheral Pulses, No clubbing, cyanosis, or edema    Care Discussed with Consultants/Other Providers [ x] YES  [ ] NO

## 2023-04-18 NOTE — PROGRESS NOTE ADULT - ASSESSMENT
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  Wife has noticed that patient has been less attentive, not talking.  Says that she acts like this when he has a UTI.  Patient recently had 2 ureteral stents placed for kidney stones.  Denies fevers, chills, nausea, vomiting.  History given by wife.       Problem/Plan - 1:  ·  Problem: Frequent UTI.   ·  Plan: S/P IV Abxs . Negative  urine C/S . No fever or Leucocytosis .   ID help appreciated . S/P  cefepime .      Problem/Plan - 2:  ·  Problem: Hydroureteronephrosis.   ·  Plan: S/P Stents . Urology following.   Planning or procedureedure so will check TTE .      Problem/Plan - 3:  ·  Problem: H/O hypotension.   ·  Plan: on Midodrine.     Problem/Plan - 4:  ·  Problem: H/O cerebellar ataxia.   ·  Plan: Supportive care.    Dispo ; DC planning pending urology procedure .

## 2023-04-18 NOTE — CONSULT NOTE ADULT - SUBJECTIVE AND OBJECTIVE BOX
Cardiovascular Disease Initial Evaluation  Date of service: 04-18-23 @ 08:30    CHIEF COMPLAINT: UTI    HISTORY OF PRESENT ILLNESS:  63 yo M with a PMH of cerebellar ataxia, chronic lacunar infarcts, 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, chronic hypotension on midodrine, nephrolithiasis, recent admission in Feb 2023 for pyelonephritis and L sided obstructive uropathy s/p ureteral stent, treated with 10 days of Cefepime (Pseudomonas in urine culture), who presented to the ED with acute encephalopathy concerning for UTI.     Admitted for sepsis with encephalopathy, hypothermia, and + UA with pyuria. Urology following an plaan for cystoscopy w/ bilateral ureteroscopy on Thursday 4/20. Cardiology consulted for pre-op risk stratification. Mr. Rojas denies chest pain or SOB.       Allergies    No Known Allergies    Intolerances    	    MEDICATIONS:  heparin   Injectable 5000 Unit(s) SubCutaneous every 12 hours  midodrine. 5 milliGRAM(s) Oral three times a day    cefepime   IVPB 1000 milliGRAM(s) IV Intermittent every 8 hours  cefepime   IVPB                chlorhexidine 2% Cloths 1 Application(s) Topical daily  dextrose 5% + sodium chloride 0.9%. 1000 milliLiter(s) IV Continuous <Continuous>      PAST MEDICAL & SURGICAL HISTORY:  H/O cerebellar ataxia  -diagnosed in 2019      History of hypotension      Anemia      Lacunar infarction  -chronic      Pneumonia, aspiration  -april 2022 (intubated for 7 days at [facility])      Adrenal insufficiency      Low body temperature      Pressure ulcer of left heel      Pressure ulcer of buttock      H/O sepsis      H/O cystoscopy          FAMILY HISTORY:  FH: dementia (Mother)    FH: type 2 diabetes (Mother)    Family history of CVA (Father)        SOCIAL HISTORY:    The patient is a nonsmoker       REVIEW OF SYSTEMS:  See HPI, otherwise complete 14 point review of systems negative    [x ] All others negative	  [ ] Unable to obtain    PHYSICAL EXAM:  T(C): 36.7 (04-18-23 @ 04:17), Max: 36.7 (04-18-23 @ 04:17)  HR: 77 (04-18-23 @ 04:17) (54 - 77)  BP: 111/74 (04-18-23 @ 04:17) (105/69 - 111/75)  RR: 18 (04-18-23 @ 04:17) (18 - 18)  SpO2: 98% (04-18-23 @ 04:17) (98% - 100%)  Wt(kg): --  I&O's Summary    17 Apr 2023 07:01  -  18 Apr 2023 07:00  --------------------------------------------------------  IN: 720 mL / OUT: 0 mL / NET: 720 mL        Appearance: No Acute Distress; resting comfortably  HEENT:  Normal oral mucosa, PERRL, EOMI	  Cardiovascular: Normal S1 S2, No JVD, No murmurs/rubs/gallops  Respiratory: Normal respiratory effort; Lungs clear to auscultation bilaterally  Gastrointestinal:  Soft, Non-tender, + BS	  Skin: No rashes, No ecchymoses, No cyanosis	  Neurologic: Non-focal; no weakness  Extremities: No clubbing, cyanosis or edema  Vascular: Peripheral pulses palpable 2+ bilaterally  Psychiatry: A & O x 3, Mood & affect appropriate    Laboratory Data:	 	    CBC Full  -  ( 18 Apr 2023 07:17 )  WBC Count : 4.44 K/uL  Hemoglobin : 11.5 g/dL  Hematocrit : 35.2 %  Platelet Count - Automated : 202 K/uL  Mean Cell Volume : 84.4 fl  Mean Cell Hemoglobin : 27.6 pg  Mean Cell Hemoglobin Concentration : 32.7 gm/dL  Auto Neutrophil # : x  Auto Lymphocyte # : x  Auto Monocyte # : x  Auto Eosinophil # : x  Auto Basophil # : x  Auto Neutrophil % : x  Auto Lymphocyte % : x  Auto Monocyte % : x  Auto Eosinophil % : x  Auto Basophil % : x    04-17    139  |  103  |  8   ----------------------------<  87  3.5   |  22  |  0.61    Ca    9.2      17 Apr 2023 07:14  Phos  3.2     04-18  Phos  1.8     04-17  Mg     1.7     04-17        proBNP:   Lipid Profile:   HgA1c:   TSH:       CARDIAC MARKERS:            Interpretation of Telemetry:  N/a	    ECG: Sinus rhythm  RADIOLOGY:  OTHER: 	    PREVIOUS DIAGNOSTIC TESTING:    [ x] Echocardiogram: 8/2022  1. Normal aortic root size. (Ao: 2.8 cm at the sinuses of  Valsalva).  2. Normal left atrium.  LA volume index = 16 cc/m2.  3. Normal left ventricular systolic function. No segmental  wall motion abnormalities. Endocardial visualization  enhanced with intravenous injection of Ultrasonic Enhancing  Agent (Lumason). LVEF calculated using biplane Hickman's  method was 74%.  4. Normal diastolic function  5. Normal right atrium.  6. Normal right ventricular size and function.  7. Normal tricuspid valve. Minimal tricuspid regurgitation.  8. Estimated pulmonary artery systolic pressure equals 16  mm Hg, assuming right atrial pressure equals 3  mm Hg,  consistent with normal pulmonary pressures.  9. No pericardial effusion seen.  [ ] Catheterization:  [ ] Stress Test:  	    Assessment:   63 yo M with a PMH of cerebellar ataxia, chronic lacunar infarcts, 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, chronic hypotension on midodrine, nephrolithiasis, recent admission in Feb 2023 for pyelonephritis and L sided obstructive uropathy s/p ureteral stent, treated with 10 days of Cefepime (Pseudomonas in urine culture), who presented to the ED with acute encephalopathy concerning for UTI.       Plan of Care:    #Pre-op risk stratification  - Plan fo urological procedure on 4/20  - EKG on admission shows sinus rhythm  - TTE from 8/2022 shows normal LV systolic function with no valvular or structural disease  - Pt displays no signs of pre-op coronary ischemia  - From a cardiac standpoint, Mr. Rojas is optimized to proceed with cystoscopy and ureteroscopy   - Pt is intermediate risk to desired procedure.     #Sepsis  - 2/2 UTI  - PT hypothermic   - Abx as per primary team    #Hypotension  - Continue Midodrine            72 minutes spent on total encounter; more than 50% of the visit was spent counseling and/or coordinating care by the attending physician.   	  Erik Kelly,  [facility]  Cardiovascular Diseases  [phone]     Cardiovascular Disease Initial Evaluation  Date of service: 04-18-23 @ 08:30    CHIEF COMPLAINT: UTI    HISTORY OF PRESENT ILLNESS:  63 yo M with a PMH of cerebellar ataxia, chronic lacunar infarcts, 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, chronic hypotension on midodrine, nephrolithiasis, recent admission in Feb 2023 for pyelonephritis and L sided obstructive uropathy s/p ureteral stent, treated with 10 days of Cefepime (Pseudomonas in urine culture), who presented to the ED with acute encephalopathy concerning for UTI.     Admitted for sepsis with encephalopathy, hypothermia, and + UA with pyuria. Urology following an plaan for cystoscopy w/ bilateral ureteroscopy on Thursday 4/20. Cardiology consulted for pre-op risk stratification. Mr. Rojas is in mild distress. Currently on heating blanket due to hypothermia. Pt lethargic.       Allergies    No Known Allergies    Intolerances    	    MEDICATIONS:  heparin   Injectable 5000 Unit(s) SubCutaneous every 12 hours  midodrine. 5 milliGRAM(s) Oral three times a day    cefepime   IVPB 1000 milliGRAM(s) IV Intermittent every 8 hours  cefepime   IVPB                chlorhexidine 2% Cloths 1 Application(s) Topical daily  dextrose 5% + sodium chloride 0.9%. 1000 milliLiter(s) IV Continuous <Continuous>      PAST MEDICAL & SURGICAL HISTORY:  H/O cerebellar ataxia  -diagnosed in 2019      History of hypotension      Anemia      Lacunar infarction  -chronic      Pneumonia, aspiration  -april 2022 (intubated for 7 days at [facility])      Adrenal insufficiency      Low body temperature      Pressure ulcer of left heel      Pressure ulcer of buttock      H/O sepsis      H/O cystoscopy          FAMILY HISTORY:  FH: dementia (Mother)    FH: type 2 diabetes (Mother)    Family history of CVA (Father)        SOCIAL HISTORY:    The patient is a nonsmoker       REVIEW OF SYSTEMS:  See HPI, otherwise complete 14 point review of systems negative    [x ] All others negative	  [ ] Unable to obtain    PHYSICAL EXAM:  T(C): 36.7 (04-18-23 @ 04:17), Max: 36.7 (04-18-23 @ 04:17)  HR: 77 (04-18-23 @ 04:17) (54 - 77)  BP: 111/74 (04-18-23 @ 04:17) (105/69 - 111/75)  RR: 18 (04-18-23 @ 04:17) (18 - 18)  SpO2: 98% (04-18-23 @ 04:17) (98% - 100%)  Wt(kg): --  I&O's Summary    17 Apr 2023 07:01  -  18 Apr 2023 07:00  --------------------------------------------------------  IN: 720 mL / OUT: 0 mL / NET: 720 mL        Appearance: No Acute Distress; resting comfortably  HEENT:  Normal oral mucosa, PERRL, EOMI	  Cardiovascular: Normal S1 S2, No JVD, No murmurs/rubs/gallops  Respiratory: Normal respiratory effort; Lungs clear to auscultation bilaterally  Gastrointestinal:  Soft, Non-tender, + BS	  Skin: No rashes, No ecchymoses, No cyanosis	  Neurologic: Non-focal; no weakness  Extremities: No clubbing, cyanosis or edema  Vascular: Peripheral pulses palpable 2+ bilaterally  Psychiatry: Lethargic    Laboratory Data:	 	    CBC Full  -  ( 18 Apr 2023 07:17 )  WBC Count : 4.44 K/uL  Hemoglobin : 11.5 g/dL  Hematocrit : 35.2 %  Platelet Count - Automated : 202 K/uL  Mean Cell Volume : 84.4 fl  Mean Cell Hemoglobin : 27.6 pg  Mean Cell Hemoglobin Concentration : 32.7 gm/dL  Auto Neutrophil # : x  Auto Lymphocyte # : x  Auto Monocyte # : x  Auto Eosinophil # : x  Auto Basophil # : x  Auto Neutrophil % : x  Auto Lymphocyte % : x  Auto Monocyte % : x  Auto Eosinophil % : x  Auto Basophil % : x    04-17    139  |  103  |  8   ----------------------------<  87  3.5   |  22  |  0.61    Ca    9.2      17 Apr 2023 07:14  Phos  3.2     04-18  Phos  1.8     04-17  Mg     1.7     04-17        proBNP:   Lipid Profile:   HgA1c:   TSH:       CARDIAC MARKERS:            Interpretation of Telemetry:  N/a	    ECG: Sinus rhythm  RADIOLOGY:  OTHER: 	    PREVIOUS DIAGNOSTIC TESTING:    [ x] Echocardiogram: 8/2022  1. Normal aortic root size. (Ao: 2.8 cm at the sinuses of  Valsalva).  2. Normal left atrium.  LA volume index = 16 cc/m2.  3. Normal left ventricular systolic function. No segmental  wall motion abnormalities. Endocardial visualization  enhanced with intravenous injection of Ultrasonic Enhancing  Agent (Lumason). LVEF calculated using biplane Hickman's  method was 74%.  4. Normal diastolic function  5. Normal right atrium.  6. Normal right ventricular size and function.  7. Normal tricuspid valve. Minimal tricuspid regurgitation.  8. Estimated pulmonary artery systolic pressure equals 16  mm Hg, assuming right atrial pressure equals 3  mm Hg,  consistent with normal pulmonary pressures.  9. No pericardial effusion seen.  [ ] Catheterization:  [ ] Stress Test:  	    Assessment:   63 yo M with a PMH of cerebellar ataxia, chronic lacunar infarcts, 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, chronic hypotension on midodrine, nephrolithiasis, recent admission in Feb 2023 for pyelonephritis and L sided obstructive uropathy s/p ureteral stent, treated with 10 days of Cefepime (Pseudomonas in urine culture), who presented to the ED with acute encephalopathy concerning for UTI.       Plan of Care:    #Pre-op risk stratification  - Plan fo urological procedure on 4/20  - EKG on admission shows sinus rhythm  - TTE from 8/2022 shows normal LV systolic function with no valvular or structural disease  - Pt displays no signs of pre-op coronary ischemia  - From a cardiac standpoint, Mr. Rojas is optimized to proceed with cystoscopy and ureteroscopy   - Pt is intermediate risk for desired procedure.     #Sepsis  - 2/2 UTI  - PT hypothermic   - Abx as per primary team    #Hypotension  - Continue Midodrine      72 minutes spent on total encounter; more than 50% of the visit was spent counseling and/or coordinating care by the attending physician.   	  Erik Kelly,  [facility]  Cardiovascular Diseases  [phone]

## 2023-04-18 NOTE — CHART NOTE - NSCHARTNOTEFT_GEN_A_CORE
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  -Known history of Pneumonia, aspiration -April 2022 (intubated for 7 days at [facility])  -4/15: RD note;  Poor (<50%)  pt barely eats anything per RN report. Consider formal swallowing evaluation with speech pathologist   -4/16: pt refused to eat ,unable to swallow po meds ,NPO and aspiration precautions maintained ,Poor oral intake within the last 24 hours    Swallow history:   April 2022 for clinical swallow evaluation with completion of MBS with recommendations for Puree and mildly thick liquids. Seen again in July 2022 with recommendations for minced and moist solid and thin liquids. Additional clinical swallow evaluations in September 2022 with initial recommendations for NPO w/ eventual advancement to puree and thin liquids.  Seen this admission with recommendations for NPO given poor oral stage of the swallow; please see report for details.       TODAY, patient seen for re-evaluation of swallow function as per request by team on 4/17. Pt remains NPO, WITHOUT non oral means. NAD. Less responsive than prior evaluation date, limited ability to maintain eye opening. No verbal output. Offered conservative trials of puree-applesauce via 1/2 tsp amount x2 following oral care and thermal-tactile stimuli to face via cold/wet towel. Oral phase marked by poor orientation/reception to a feeding task, poor ability to orally grade from tsp, anterior loss, anterior holding in sulci with limited to no attempt to transport from a-p therefore was removed by SLP. Pharyngeal phase not assessed given risk high risk for aspiration in presence of poor mentation and current swallow profile. Purposeful proactive rounding reinforced and 5 Ps addressed. Pt left in no distress.   Sign for NPO remains posted at [facility].     -GOAL- Pt to tolerate recommended textures during course w/ no s/sx of aspiration   -D/w MARIA DE JESUS [place] via phone     Impression: Patient p/w acute encephalopathy with history of dysphagia from prior admissions. Currently mentation remains barrier to safe and functional oral intake.     Recommendation:   -Npo with consideration for NGT in interim to optimize the patient   -RD consult   -Aspiration precautions   -Stringent oral care to reduce oral pathogens   -Palliative care consult to d/w pt/family GOC/POC moving forward   -SLP to remain on consult, plan to re-evaluate pending improvement in mentation   -Anticipate needs next level of care pending course and POC/GOC    Lindy Carrillo MS CCC-SLP Prefer teams   extension [phone]#

## 2023-04-18 NOTE — PROGRESS NOTE ADULT - SUBJECTIVE AND OBJECTIVE BOX
DAILY PROGRESS NOTE:         24 hr events:  pending tte     Objective:    Vital Signs Last 24 Hrs  T(C): 36.7 (18 Apr 2023 04:17), Max: 36.7 (18 Apr 2023 04:17)  T(F): 98.1 (18 Apr 2023 04:17), Max: 98.1 (18 Apr 2023 04:17)  HR: 77 (18 Apr 2023 04:17) (54 - 77)  BP: 111/74 (18 Apr 2023 04:17) (105/69 - 111/75)  BP(mean): --  RR: 18 (18 Apr 2023 04:17) (18 - 18)  SpO2: 98% (18 Apr 2023 04:17) (98% - 100%)    Parameters below as of 18 Apr 2023 04:17  Patient On (Oxygen Delivery Method): room air        I&O's Detail    17 Apr 2023 07:01  -  18 Apr 2023 07:00  --------------------------------------------------------  IN:    dextrose 5% + sodium chloride 0.9%: 720 mL  Total IN: 720 mL    OUT:    Oral Fluid: 0 mL  Total OUT: 0 mL    Total NET: 720 mL          Physical Exam:    General: NAD, well-nourished  Resp: Breathing comfortably on RA      Laboratory Results:      04-17    139  |  103  |  8   ----------------------------<  87  3.5   |  22  |  0.61    Ca    9.2      17 Apr 2023 07:14  Phos  1.8     04-17  Mg     1.7     04-17                         No

## 2023-04-18 NOTE — PROGRESS NOTE ADULT - ASSESSMENT
62 year old male with altered mental status, s/p bilateral ureteroscopy and stents 3/23    -CT shows bilateral stents are in place   -UCx, BCx both no growth   - Plan for cystoscopy w/ bilateral ureteroscopy on Thursday 4/20. Please provide medical optimization/clearance prior.   - Please order repeat COVID swab today.

## 2023-04-19 ENCOUNTER — TRANSCRIPTION ENCOUNTER (OUTPATIENT)
Age: 63
End: 2023-04-19

## 2023-04-19 LAB
ANION GAP SERPL CALC-SCNC: 10 MMOL/L — SIGNIFICANT CHANGE UP (ref 5–17)
BUN SERPL-MCNC: 7 MG/DL — SIGNIFICANT CHANGE UP (ref 7–23)
CALCIUM SERPL-MCNC: 9.2 MG/DL — SIGNIFICANT CHANGE UP (ref 8.4–10.5)
CHLORIDE SERPL-SCNC: 109 MMOL/L — HIGH (ref 96–108)
CO2 SERPL-SCNC: 23 MMOL/L — SIGNIFICANT CHANGE UP (ref 22–31)
CREAT SERPL-MCNC: 0.55 MG/DL — SIGNIFICANT CHANGE UP (ref 0.5–1.3)
CULTURE RESULTS: SIGNIFICANT CHANGE UP
CULTURE RESULTS: SIGNIFICANT CHANGE UP
EGFR: 113 ML/MIN/1.73M2 — SIGNIFICANT CHANGE UP
GLUCOSE BLDC GLUCOMTR-MCNC: 104 MG/DL — HIGH (ref 70–99)
GLUCOSE BLDC GLUCOMTR-MCNC: 106 MG/DL — HIGH (ref 70–99)
GLUCOSE BLDC GLUCOMTR-MCNC: 90 MG/DL — SIGNIFICANT CHANGE UP (ref 70–99)
GLUCOSE SERPL-MCNC: 112 MG/DL — HIGH (ref 70–99)
HCT VFR BLD CALC: 36.8 % — LOW (ref 39–50)
HGB BLD-MCNC: 12.1 G/DL — LOW (ref 13–17)
MCHC RBC-ENTMCNC: 28.1 PG — SIGNIFICANT CHANGE UP (ref 27–34)
MCHC RBC-ENTMCNC: 32.9 GM/DL — SIGNIFICANT CHANGE UP (ref 32–36)
MCV RBC AUTO: 85.6 FL — SIGNIFICANT CHANGE UP (ref 80–100)
NRBC # BLD: 0 /100 WBCS — SIGNIFICANT CHANGE UP (ref 0–0)
PLATELET # BLD AUTO: 163 K/UL — SIGNIFICANT CHANGE UP (ref 150–400)
POTASSIUM SERPL-MCNC: 3.2 MMOL/L — LOW (ref 3.5–5.3)
POTASSIUM SERPL-SCNC: 3.2 MMOL/L — LOW (ref 3.5–5.3)
RBC # BLD: 4.3 M/UL — SIGNIFICANT CHANGE UP (ref 4.2–5.8)
RBC # FLD: 14.7 % — HIGH (ref 10.3–14.5)
SODIUM SERPL-SCNC: 142 MMOL/L — SIGNIFICANT CHANGE UP (ref 135–145)
SPECIMEN SOURCE: SIGNIFICANT CHANGE UP
SPECIMEN SOURCE: SIGNIFICANT CHANGE UP
WBC # BLD: 5.02 K/UL — SIGNIFICANT CHANGE UP (ref 3.8–10.5)
WBC # FLD AUTO: 5.02 K/UL — SIGNIFICANT CHANGE UP (ref 3.8–10.5)

## 2023-04-19 PROCEDURE — 99232 SBSQ HOSP IP/OBS MODERATE 35: CPT

## 2023-04-19 RX ORDER — POTASSIUM CHLORIDE 20 MEQ
10 PACKET (EA) ORAL
Refills: 0 | Status: COMPLETED | OUTPATIENT
Start: 2023-04-19 | End: 2023-04-19

## 2023-04-19 RX ADMIN — Medication 100 MILLIEQUIVALENT(S): at 19:43

## 2023-04-19 RX ADMIN — CEFEPIME 100 MILLIGRAM(S): 1 INJECTION, POWDER, FOR SOLUTION INTRAMUSCULAR; INTRAVENOUS at 15:13

## 2023-04-19 RX ADMIN — MUPIROCIN 1 APPLICATION(S): 20 OINTMENT TOPICAL at 05:04

## 2023-04-19 RX ADMIN — CEFEPIME 100 MILLIGRAM(S): 1 INJECTION, POWDER, FOR SOLUTION INTRAMUSCULAR; INTRAVENOUS at 23:02

## 2023-04-19 RX ADMIN — MUPIROCIN 1 APPLICATION(S): 20 OINTMENT TOPICAL at 19:43

## 2023-04-19 RX ADMIN — SODIUM CHLORIDE 60 MILLILITER(S): 9 INJECTION, SOLUTION INTRAVENOUS at 05:03

## 2023-04-19 RX ADMIN — Medication 100 MILLIEQUIVALENT(S): at 20:48

## 2023-04-19 RX ADMIN — Medication 100 MILLIEQUIVALENT(S): at 17:38

## 2023-04-19 RX ADMIN — HEPARIN SODIUM 5000 UNIT(S): 5000 INJECTION INTRAVENOUS; SUBCUTANEOUS at 05:03

## 2023-04-19 RX ADMIN — SODIUM CHLORIDE 60 MILLILITER(S): 9 INJECTION, SOLUTION INTRAVENOUS at 19:43

## 2023-04-19 RX ADMIN — CEFEPIME 100 MILLIGRAM(S): 1 INJECTION, POWDER, FOR SOLUTION INTRAMUSCULAR; INTRAVENOUS at 09:28

## 2023-04-19 RX ADMIN — CHLORHEXIDINE GLUCONATE 1 APPLICATION(S): 213 SOLUTION TOPICAL at 15:14

## 2023-04-19 RX ADMIN — SODIUM CHLORIDE 60 MILLILITER(S): 9 INJECTION, SOLUTION INTRAVENOUS at 23:02

## 2023-04-19 RX ADMIN — HEPARIN SODIUM 5000 UNIT(S): 5000 INJECTION INTRAVENOUS; SUBCUTANEOUS at 18:41

## 2023-04-19 NOTE — PROGRESS NOTE ADULT - ASSESSMENT
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     -Encephalopathy, hypothermia, UA with pyuria   -Concern for sepsis 2/2 UTI  -Recent admission in Feb 2023 for pyelonephritis and L sided obstructive uropathy s/p ureteral stent, treated with 10 days of Cefepime (Pseudomonas in urine culture)  -Blood and urine cultures pending  -CT A/P with b/l ureteral stents, mild L sided hydroureteronephrosis and enhancement of the renal pelvis and proximal ureter with diffuse bladder wall thickening  -CT Head stable   -On Cefepime 1g IV Q8h      Overall  Sepsis, encephalopathy, hypothermia, pyelonephritis, ureteral stents   H/o Pseudomonas in urine culture from Feb 2023     Recommendations  Continue Cefepime 1g IV Q8h plans to complete 7-10days  (4/25 end)  F/u urine and blood cultures are no growth  plans for Urology to perform cystoscopy and ureteroscopy to evaluate further on Cefepime  the negative cultures this admission may reflect partial treatment of pyelonephritis    Edwin Amaro MD  Can be called via Teams  After 5pm/weekends [phone]

## 2023-04-19 NOTE — PROGRESS NOTE ADULT - SUBJECTIVE AND OBJECTIVE BOX
Date of Service  : 04-19-23     INTERVAL HPI/OVERNIGHT EVENTS: Today little more communicative.   Vital Signs Last 24 Hrs  T(C): 36.8 (19 Apr 2023 04:32), Max: 37.6 (18 Apr 2023 12:45)  T(F): 98.2 (19 Apr 2023 04:32), Max: 99.6 (18 Apr 2023 12:45)  HR: 60 (19 Apr 2023 04:32) (60 - 78)  BP: 116/72 (19 Apr 2023 04:32) (100/64 - 116/72)  BP(mean): --  RR: 18 (19 Apr 2023 04:32) (18 - 18)  SpO2: 97% (19 Apr 2023 04:32) (97% - 98%)    Parameters below as of 19 Apr 2023 04:32  Patient On (Oxygen Delivery Method): room air      I&O's Summary    18 Apr 2023 07:01  -  19 Apr 2023 07:00  --------------------------------------------------------  IN: 770 mL / OUT: 0 mL / NET: 770 mL    19 Apr 2023 07:01  -  19 Apr 2023 11:41  --------------------------------------------------------  IN: 0 mL / OUT: 0 mL / NET: 0 mL      MEDICATIONS  (STANDING):  cefepime   IVPB      cefepime   IVPB 1000 milliGRAM(s) IV Intermittent every 8 hours  chlorhexidine 2% Cloths 1 Application(s) Topical daily  dextrose 5% + sodium chloride 0.9%. 1000 milliLiter(s) (60 mL/Hr) IV Continuous <Continuous>  heparin   Injectable 5000 Unit(s) SubCutaneous every 12 hours  midodrine. 5 milliGRAM(s) Oral three times a day  mupirocin 2% Nasal 1 Application(s) Both Nostrils two times a day    MEDICATIONS  (PRN):    LABS:                        11.5   4.44  )-----------( 202      ( 18 Apr 2023 07:17 )             35.2       Phos  3.2     04-18          CAPILLARY BLOOD GLUCOSE      POCT Blood Glucose.: 90 mg/dL (19 Apr 2023 03:11)  POCT Blood Glucose.: 98 mg/dL (18 Apr 2023 20:31)  POCT Blood Glucose.: 95 mg/dL (18 Apr 2023 12:28)              Consultant(s) Notes Reviewed:  [x ] YES  [ ] NO    PHYSICAL EXAM:  GENERAL: Not in any distress ,  HEAD:  Atraumatic, Normocephalic  NECK: Supple, No JVD, Normal thyroid  NERVOUS SYSTEM:  Alert & communicative.   CHEST/LUNG: Good air entry bilateral with no  rales, rhonchi, wheezing, or rubs  HEART: Regular rate and rhythm; No murmurs, rubs, or gallops  ABDOMEN: Soft, Nontender, Nondistended; Bowel sounds present  EXTREMITIES:  2+ Peripheral Pulses, No clubbing, cyanosis, or edema  SKIN: No rashes or lesion  Care Discussed with Consultants/Other Providers [ x] YES  [ ] NO

## 2023-04-19 NOTE — PROGRESS NOTE ADULT - ASSESSMENT
62 year old male with altered mental status, s/p bilateral ureteroscopy and stents 3/23    -CT shows bilateral stents are in place   -UCx, BCx both no growth   - Plan for cystoscopy w/ bilateral ureteroscopy on Thursday 4/20.   - Cardiology eval 4/18. Optimization noted  - GIven recent TTE in 8/2022 will likely not need a repeat today.   - Please send repeat COVID today  - NPO at midnight  - Please send AM labs (CBC, BMP, coags)

## 2023-04-19 NOTE — PROGRESS NOTE ADULT - SUBJECTIVE AND OBJECTIVE BOX
DAILY PROGRESS NOTE:         24 hr events:  danielleon    Objective:    Vital Signs Last 24 Hrs  T(C): 36.8 (19 Apr 2023 04:32), Max: 37.6 (18 Apr 2023 12:45)  T(F): 98.2 (19 Apr 2023 04:32), Max: 99.6 (18 Apr 2023 12:45)  HR: 60 (19 Apr 2023 04:32) (60 - 78)  BP: 116/72 (19 Apr 2023 04:32) (100/64 - 116/72)  BP(mean): --  RR: 18 (19 Apr 2023 04:32) (18 - 18)  SpO2: 97% (19 Apr 2023 04:32) (97% - 98%)    Parameters below as of 19 Apr 2023 04:32  Patient On (Oxygen Delivery Method): room air        I&O's Detail    18 Apr 2023 07:01  -  19 Apr 2023 07:00  --------------------------------------------------------  IN:    dextrose 5% + sodium chloride 0.9%: 720 mL    IV PiggyBack: 50 mL  Total IN: 770 mL    OUT:    Oral Fluid: 0 mL  Total OUT: 0 mL    Total NET: 770 mL          Physical Exam:    General: NAD, well-nourished  Resp: Breathing comfortably on RA  CV: regular rate       Laboratory Results:                          11.5   4.44  )-----------( 202      ( 18 Apr 2023 07:17 )             35.2       Phos  3.2     04-18

## 2023-04-19 NOTE — PROGRESS NOTE ADULT - SUBJECTIVE AND OBJECTIVE BOX
INFECTIOUS DISEASES FOLLOW UP-- Leticia Amaro  [phone]    This is a follow up note for this  62yMale with  Altered mental status  slightly more interactive today      ROS:  CONSTITUTIONAL:  awake but non verbal  Allergies    No Known Allergies    Intolerances        ANTIBIOTICS/RELEVANT:  antimicrobials  cefepime   IVPB      cefepime   IVPB 1000 milliGRAM(s) IV Intermittent every 8 hours    immunologic:    OTHER:  chlorhexidine 2% Cloths 1 Application(s) Topical daily  dextrose 5% + sodium chloride 0.9%. 1000 milliLiter(s) IV Continuous <Continuous>  heparin   Injectable 5000 Unit(s) SubCutaneous every 12 hours  midodrine. 5 milliGRAM(s) Oral three times a day  mupirocin 2% Nasal 1 Application(s) Both Nostrils two times a day      Objective:  Vital Signs Last 24 Hrs  T(C): 36.2 (19 Apr 2023 12:27), Max: 36.8 (19 Apr 2023 04:32)  T(F): 97.1 (19 Apr 2023 12:27), Max: 98.2 (19 Apr 2023 04:32)  HR: 59 (19 Apr 2023 12:27) (59 - 78)  BP: 107/63 (19 Apr 2023 12:27) (103/66 - 116/72)  BP(mean): --  RR: 18 (19 Apr 2023 12:27) (18 - 18)  SpO2: 99% (19 Apr 2023 12:27) (97% - 99%)    Parameters below as of 19 Apr 2023 12:27  Patient On (Oxygen Delivery Method): room air        PHYSICAL EXAM:  Constitutional:no acute distress  Eyes:THU, EOMI  Ear/Nose/Throat: no oral lesions, 	  Respiratory: clear BL  Cardiovascular: S1S2  Gastrointestinal:soft, (+) BS, no tenderness appreciated  Extremities:no e/e/c  No Lymphadenopathy  IV sites not inflammed.    LABS:                        12.1   5.02  )-----------( 163      ( 19 Apr 2023 14:46 )             36.8     04-19    142  |  109<H>  |  7   ----------------------------<  112<H>  3.2<L>   |  23  |  0.55    Ca    9.2      19 Apr 2023 14:46  Phos  3.2     04-18            MICROBIOLOGY:            RECENT CULTURES:  04-14 @ 18:44  Clean Catch Clean Catch (Midstream)  --  --  --    <10,000 CFU/mL Normal Urogenital Leanna  --  04-14 @ 14:45  .Blood Blood-Peripheral  --  --  --    No growth to date.  --  04-14 @ 14:30  .Blood Blood-Peripheral  --  --  --    No growth to date.  --      RADIOLOGY & ADDITIONAL STUDIES:  < from: CT Abdomen and Pelvis w/ IV Cont (04.14.23 @ 16:11) >  IMPRESSION:  Bilateral nephroureteral stents in place. Mild left hydroureteronephrosis   and associated enhancement of the renal pelvis and proximal ureter.   Diffuse bladder wall thickening. Recommend correlation with urinalysis   for urinary tract infection.    Large rectal stool burden.    7 mm right lower lobe pulmonary nodule. Follow-up chest CT in 6-12 months   is recommended    < end of copied text >

## 2023-04-19 NOTE — PROGRESS NOTE ADULT - SUBJECTIVE AND OBJECTIVE BOX
Cardiovascular Disease Progress Note  Date of service: 04-19-23 @ 08:22    Overnight events: No acute events overnight.  Pt is more alert this morning. In no distress.   ROS unable to be completed 2/2 AMS>    Objective Findings:  T(C): 36.8 (04-19-23 @ 04:32), Max: 37.6 (04-18-23 @ 12:45)  HR: 60 (04-19-23 @ 04:32) (60 - 78)  BP: 116/72 (04-19-23 @ 04:32) (100/64 - 116/72)  RR: 18 (04-19-23 @ 04:32) (18 - 18)  SpO2: 97% (04-19-23 @ 04:32) (97% - 98%)  Wt(kg): --  Daily     Daily       Physical Exam:  Gen: NAD; Patient resting comfortably  HEENT: EOMI, Normocephalic/ atraumatic  CV: RRR, normal S1 + S2, no m/r/g  Lungs:  Normal respiratory effort; clear to auscultation bilaterally  Abd: soft, non-tender; bowel sounds present  Ext: No edema; warm and well perfused    Telemetry:  N/a    Laboratory Data:                        11.5   4.44  )-----------( 202 ( 18 Apr 2023 07:17 )             35.2       Phos  3.2     04-18                Inpatient Medications:  MEDICATIONS  (STANDING):  cefepime   IVPB      cefepime   IVPB 1000 milliGRAM(s) IV Intermittent every 8 hours  chlorhexidine 2% Cloths 1 Application(s) Topical daily  dextrose 5% + sodium chloride 0.9%. 1000 milliLiter(s) (60 mL/Hr) IV Continuous <Continuous>  heparin   Injectable 5000 Unit(s) SubCutaneous every 12 hours  midodrine. 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       Plan of Care:    #Pre-op risk stratification  - Plan fo urological procedure on 4/20  - EKG on admission shows sinus rhythm  - TTE from 8/2022 shows normal LV systolic function with no valvular or structural disease  - Pt displays no signs of pre-op coronary ischemia  - From a cardiac standpoint, Mr. Rojas is optimized to proceed with cystoscopy and ureteroscopy   - Pt is intermediate risk for desired procedure.     #Sepsis  - 2/2 UTI  - PT hypothermic   - Abx as per primary team    #Hypotension  - Continue Midodrine      #ACP (advance care planning)-  Advanced care planning was discussed.  Risks, benefits and alternatives of medical treatment and procedures were addressed. 30 additional minutes spent addressing advance care plans.        Over 25 minutes spent on total encounter; more than 50% of the visit was spent counseling and/or coordinating care by the attending physician.      Erik Kelly DO [facility]  Cardiovascular Disease  [phone]

## 2023-04-19 NOTE — PROGRESS NOTE ADULT - ASSESSMENT
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  Wife has noticed that patient has been less attentive, not talking.  Says that she acts like this when he has a UTI.  Patient recently had 2 ureteral stents placed for kidney stones.  Denies fevers, chills, nausea, vomiting.  History given by wife.       Problem/Plan - 1:  ·  Problem: Frequent UTI.   ·  Plan: S/P IV Abxs . Negative  urine C/S .   No fever or Leucocytosis .   ID help appreciated .   On  cefepime for 1 week .       Problem/Plan - 2:  ·  Problem: Hydroureteronephrosis.   ·  Plan: S/P Stents . Urology following.   Planning  cystoscopy w/ bilateral ureteroscopy on Thursday 4/20.      Problem/Plan - 3:  ·  Problem: H/O hypotension.   ·  Plan: on Midodrine.     Problem/Plan - 4:  ·  Problem: H/O cerebellar ataxia.   ·  Plan: Supportive care.     Problem/Plan - 5:  ·  Problem: H/O Dysphagia .   ·  Plan: S/S following. IVF.     Medically optimized for urology procedure .   D/W Urology team and will transfer to their service.

## 2023-04-20 ENCOUNTER — RESULT REVIEW (OUTPATIENT)
Age: 63
End: 2023-04-20

## 2023-04-20 ENCOUNTER — TRANSCRIPTION ENCOUNTER (OUTPATIENT)
Age: 63
End: 2023-04-20

## 2023-04-20 DIAGNOSIS — N20.0 CALCULUS OF KIDNEY: ICD-10-CM

## 2023-04-20 LAB
ANION GAP SERPL CALC-SCNC: 11 MMOL/L — SIGNIFICANT CHANGE UP (ref 5–17)
APTT BLD: 31.4 SEC — SIGNIFICANT CHANGE UP (ref 27.5–35.5)
BUN SERPL-MCNC: 6 MG/DL — LOW (ref 7–23)
CALCIUM SERPL-MCNC: 9.1 MG/DL — SIGNIFICANT CHANGE UP (ref 8.4–10.5)
CHLORIDE SERPL-SCNC: 107 MMOL/L — SIGNIFICANT CHANGE UP (ref 96–108)
CO2 SERPL-SCNC: 22 MMOL/L — SIGNIFICANT CHANGE UP (ref 22–31)
CREAT SERPL-MCNC: 0.52 MG/DL — SIGNIFICANT CHANGE UP (ref 0.5–1.3)
EGFR: 115 ML/MIN/1.73M2 — SIGNIFICANT CHANGE UP
GLUCOSE BLDC GLUCOMTR-MCNC: 104 MG/DL — HIGH (ref 70–99)
GLUCOSE BLDC GLUCOMTR-MCNC: 105 MG/DL — HIGH (ref 70–99)
GLUCOSE BLDC GLUCOMTR-MCNC: 84 MG/DL — SIGNIFICANT CHANGE UP (ref 70–99)
GLUCOSE BLDC GLUCOMTR-MCNC: 90 MG/DL — SIGNIFICANT CHANGE UP (ref 70–99)
GLUCOSE SERPL-MCNC: 91 MG/DL — SIGNIFICANT CHANGE UP (ref 70–99)
HCT VFR BLD CALC: 35.3 % — LOW (ref 39–50)
HGB BLD-MCNC: 11.5 G/DL — LOW (ref 13–17)
INR BLD: 1.13 RATIO — SIGNIFICANT CHANGE UP (ref 0.88–1.16)
MCHC RBC-ENTMCNC: 27.5 PG — SIGNIFICANT CHANGE UP (ref 27–34)
MCHC RBC-ENTMCNC: 32.6 GM/DL — SIGNIFICANT CHANGE UP (ref 32–36)
MCV RBC AUTO: 84.4 FL — SIGNIFICANT CHANGE UP (ref 80–100)
NRBC # BLD: 0 /100 WBCS — SIGNIFICANT CHANGE UP (ref 0–0)
PLATELET # BLD AUTO: 176 K/UL — SIGNIFICANT CHANGE UP (ref 150–400)
POTASSIUM SERPL-MCNC: 3.4 MMOL/L — LOW (ref 3.5–5.3)
POTASSIUM SERPL-SCNC: 3.4 MMOL/L — LOW (ref 3.5–5.3)
PROTHROM AB SERPL-ACNC: 13 SEC — SIGNIFICANT CHANGE UP (ref 10.5–13.4)
RBC # BLD: 4.18 M/UL — LOW (ref 4.2–5.8)
RBC # FLD: 14.6 % — HIGH (ref 10.3–14.5)
SODIUM SERPL-SCNC: 140 MMOL/L — SIGNIFICANT CHANGE UP (ref 135–145)
WBC # BLD: 4.33 K/UL — SIGNIFICANT CHANGE UP (ref 3.8–10.5)
WBC # FLD AUTO: 4.33 K/UL — SIGNIFICANT CHANGE UP (ref 3.8–10.5)

## 2023-04-20 PROCEDURE — 97530 THERAPEUTIC ACTIVITIES: CPT

## 2023-04-20 PROCEDURE — 84300 ASSAY OF URINE SODIUM: CPT

## 2023-04-20 PROCEDURE — 83935 ASSAY OF URINE OSMOLALITY: CPT

## 2023-04-20 PROCEDURE — 93970 EXTREMITY STUDY: CPT

## 2023-04-20 PROCEDURE — 82947 ASSAY GLUCOSE BLOOD QUANT: CPT

## 2023-04-20 PROCEDURE — 95711 VEEG 2-12 HR UNMONITORED: CPT

## 2023-04-20 PROCEDURE — 82550 ASSAY OF CK (CPK): CPT

## 2023-04-20 PROCEDURE — 85730 THROMBOPLASTIN TIME PARTIAL: CPT

## 2023-04-20 PROCEDURE — 62328 DX LMBR SPI PNXR W/FLUOR/CT: CPT

## 2023-04-20 PROCEDURE — 95700 EEG CONT REC W/VID EEG TECH: CPT

## 2023-04-20 PROCEDURE — 80307 DRUG TEST PRSMV CHEM ANLYZR: CPT

## 2023-04-20 PROCEDURE — 82962 GLUCOSE BLOOD TEST: CPT

## 2023-04-20 PROCEDURE — 87102 FUNGUS ISOLATION CULTURE: CPT

## 2023-04-20 PROCEDURE — 86038 ANTINUCLEAR ANTIBODIES: CPT

## 2023-04-20 PROCEDURE — 52332 CYSTOSCOPY AND TREATMENT: CPT | Mod: RT

## 2023-04-20 PROCEDURE — 83540 ASSAY OF IRON: CPT

## 2023-04-20 PROCEDURE — 0225U NFCT DS DNA&RNA 21 SARSCOV2: CPT

## 2023-04-20 PROCEDURE — 83735 ASSAY OF MAGNESIUM: CPT

## 2023-04-20 PROCEDURE — 99285 EMERGENCY DEPT VISIT HI MDM: CPT | Mod: 25

## 2023-04-20 PROCEDURE — 86617 LYME DISEASE ANTIBODY: CPT

## 2023-04-20 PROCEDURE — 81003 URINALYSIS AUTO W/O SCOPE: CPT

## 2023-04-20 PROCEDURE — 88108 CYTOPATH CONCENTRATE TECH: CPT

## 2023-04-20 PROCEDURE — 83550 IRON BINDING TEST: CPT

## 2023-04-20 PROCEDURE — 84132 ASSAY OF SERUM POTASSIUM: CPT

## 2023-04-20 PROCEDURE — 86362 MOG-IGG1 ANTB CBA EACH: CPT

## 2023-04-20 PROCEDURE — 74230 X-RAY XM SWLNG FUNCJ C+: CPT

## 2023-04-20 PROCEDURE — 86341 ISLET CELL ANTIBODY: CPT

## 2023-04-20 PROCEDURE — 83090 ASSAY OF HOMOCYSTEINE: CPT

## 2023-04-20 PROCEDURE — 82652 VIT D 1 25-DIHYDROXY: CPT

## 2023-04-20 PROCEDURE — 84105 ASSAY OF URINE PHOSPHORUS: CPT

## 2023-04-20 PROCEDURE — 86481 TB AG RESPONSE T-CELL SUSP: CPT

## 2023-04-20 PROCEDURE — 70450 CT HEAD/BRAIN W/O DYE: CPT

## 2023-04-20 PROCEDURE — 87483 CNS DNA AMP PROBE TYPE 12-25: CPT

## 2023-04-20 PROCEDURE — 84145 PROCALCITONIN (PCT): CPT

## 2023-04-20 PROCEDURE — 85014 HEMATOCRIT: CPT

## 2023-04-20 PROCEDURE — 84446 ASSAY OF VITAMIN E: CPT

## 2023-04-20 PROCEDURE — 85610 PROTHROMBIN TIME: CPT

## 2023-04-20 PROCEDURE — 87799 DETECT AGENT NOS DNA QUANT: CPT

## 2023-04-20 PROCEDURE — 83036 HEMOGLOBIN GLYCOSYLATED A1C: CPT

## 2023-04-20 PROCEDURE — 82607 VITAMIN B-12: CPT

## 2023-04-20 PROCEDURE — 0035U NEURO CSF PRION PRTN QUAL: CPT

## 2023-04-20 PROCEDURE — 83615 LACTATE (LD) (LDH) ENZYME: CPT

## 2023-04-20 PROCEDURE — 86140 C-REACTIVE PROTEIN: CPT

## 2023-04-20 PROCEDURE — 85652 RBC SED RATE AUTOMATED: CPT

## 2023-04-20 PROCEDURE — 82565 ASSAY OF CREATININE: CPT

## 2023-04-20 PROCEDURE — U0003: CPT

## 2023-04-20 PROCEDURE — 86235 NUCLEAR ANTIGEN ANTIBODY: CPT

## 2023-04-20 PROCEDURE — 86431 RHEUMATOID FACTOR QUANT: CPT

## 2023-04-20 PROCEDURE — 84100 ASSAY OF PHOSPHORUS: CPT

## 2023-04-20 PROCEDURE — 84484 ASSAY OF TROPONIN QUANT: CPT

## 2023-04-20 PROCEDURE — 84394 TOTAL TAU: CPT

## 2023-04-20 PROCEDURE — 84157 ASSAY OF PROTEIN OTHER: CPT

## 2023-04-20 PROCEDURE — 82435 ASSAY OF BLOOD CHLORIDE: CPT

## 2023-04-20 PROCEDURE — 99252 IP/OBS CONSLTJ NEW/EST SF 35: CPT

## 2023-04-20 PROCEDURE — 86789 WEST NILE VIRUS ANTIBODY: CPT

## 2023-04-20 PROCEDURE — 93005 ELECTROCARDIOGRAM TRACING: CPT

## 2023-04-20 PROCEDURE — 88300 SURGICAL PATH GROSS: CPT | Mod: 26

## 2023-04-20 PROCEDURE — 52352 CYSTOURETERO W/STONE REMOVE: CPT | Mod: 50

## 2023-04-20 PROCEDURE — P9045: CPT

## 2023-04-20 PROCEDURE — 80202 ASSAY OF VANCOMYCIN: CPT

## 2023-04-20 PROCEDURE — 81001 URINALYSIS AUTO W/SCOPE: CPT

## 2023-04-20 PROCEDURE — 93306 TTE W/DOPPLER COMPLETE: CPT

## 2023-04-20 PROCEDURE — 82803 BLOOD GASES ANY COMBINATION: CPT

## 2023-04-20 PROCEDURE — U0005: CPT

## 2023-04-20 PROCEDURE — 85025 COMPLETE CBC W/AUTO DIFF WBC: CPT

## 2023-04-20 PROCEDURE — 84443 ASSAY THYROID STIM HORMONE: CPT

## 2023-04-20 PROCEDURE — 82746 ASSAY OF FOLIC ACID SERUM: CPT

## 2023-04-20 PROCEDURE — 87040 BLOOD CULTURE FOR BACTERIA: CPT

## 2023-04-20 PROCEDURE — 36415 COLL VENOUS BLD VENIPUNCTURE: CPT

## 2023-04-20 PROCEDURE — 83921 ORGANIC ACID SINGLE QUANT: CPT

## 2023-04-20 PROCEDURE — 82390 ASSAY OF CERULOPLASMIN: CPT

## 2023-04-20 PROCEDURE — 71260 CT THORAX DX C+: CPT | Mod: MA

## 2023-04-20 PROCEDURE — 93308 TTE F-UP OR LMTD: CPT

## 2023-04-20 PROCEDURE — 97162 PT EVAL MOD COMPLEX 30 MIN: CPT

## 2023-04-20 PROCEDURE — 86900 BLOOD TYPING SEROLOGIC ABO: CPT

## 2023-04-20 PROCEDURE — 82570 ASSAY OF URINE CREATININE: CPT

## 2023-04-20 PROCEDURE — 82553 CREATINE MB FRACTION: CPT

## 2023-04-20 PROCEDURE — 72156 MRI NECK SPINE W/O & W/DYE: CPT

## 2023-04-20 PROCEDURE — 87086 URINE CULTURE/COLONY COUNT: CPT

## 2023-04-20 PROCEDURE — 86618 LYME DISEASE ANTIBODY: CPT

## 2023-04-20 PROCEDURE — 86376 MICROSOMAL ANTIBODY EACH: CPT

## 2023-04-20 PROCEDURE — 84436 ASSAY OF TOTAL THYROXINE: CPT

## 2023-04-20 PROCEDURE — 86255 FLUORESCENT ANTIBODY SCREEN: CPT

## 2023-04-20 PROCEDURE — 87389 HIV-1 AG W/HIV-1&-2 AB AG IA: CPT

## 2023-04-20 PROCEDURE — A9585: CPT

## 2023-04-20 PROCEDURE — 86036 ANCA SCREEN EACH ANTIBODY: CPT

## 2023-04-20 PROCEDURE — 97166 OT EVAL MOD COMPLEX 45 MIN: CPT

## 2023-04-20 PROCEDURE — 92610 EVALUATE SWALLOWING FUNCTION: CPT

## 2023-04-20 PROCEDURE — 84425 ASSAY OF VITAMIN B-1: CPT

## 2023-04-20 PROCEDURE — 87385 HISTOPLASMA CAPSUL AG IA: CPT

## 2023-04-20 PROCEDURE — 80048 BASIC METABOLIC PNL TOTAL CA: CPT

## 2023-04-20 PROCEDURE — 70553 MRI BRAIN STEM W/O & W/DYE: CPT

## 2023-04-20 PROCEDURE — 84166 PROTEIN E-PHORESIS/URINE/CSF: CPT

## 2023-04-20 PROCEDURE — 86901 BLOOD TYPING SEROLOGIC RH(D): CPT

## 2023-04-20 PROCEDURE — 87640 STAPH A DNA AMP PROBE: CPT

## 2023-04-20 PROCEDURE — 70496 CT ANGIOGRAPHY HEAD: CPT | Mod: MA

## 2023-04-20 PROCEDURE — 84480 ASSAY TRIIODOTHYRONINE (T3): CPT

## 2023-04-20 PROCEDURE — 71045 X-RAY EXAM CHEST 1 VIEW: CPT

## 2023-04-20 PROCEDURE — 84630 ASSAY OF ZINC: CPT

## 2023-04-20 PROCEDURE — 97535 SELF CARE MNGMENT TRAINING: CPT

## 2023-04-20 PROCEDURE — 82164 ANGIOTENSIN I ENZYME TEST: CPT

## 2023-04-20 PROCEDURE — 86780 TREPONEMA PALLIDUM: CPT

## 2023-04-20 PROCEDURE — 83520 IMMUNOASSAY QUANT NOS NONAB: CPT

## 2023-04-20 PROCEDURE — 92611 MOTION FLUOROSCOPY/SWALLOW: CPT

## 2023-04-20 PROCEDURE — 94002 VENT MGMT INPAT INIT DAY: CPT

## 2023-04-20 PROCEDURE — 87449 NOS EACH ORGANISM AG IA: CPT

## 2023-04-20 PROCEDURE — 85018 HEMOGLOBIN: CPT

## 2023-04-20 PROCEDURE — 82175 ASSAY OF ARSENIC: CPT

## 2023-04-20 PROCEDURE — 89051 BODY FLUID CELL COUNT: CPT

## 2023-04-20 PROCEDURE — 88185 FLOWCYTOMETRY/TC ADD-ON: CPT

## 2023-04-20 PROCEDURE — 82533 TOTAL CORTISOL: CPT

## 2023-04-20 PROCEDURE — 94003 VENT MGMT INPAT SUBQ DAY: CPT

## 2023-04-20 PROCEDURE — 95714 VEEG EA 12-26 HR UNMNTR: CPT

## 2023-04-20 PROCEDURE — 82525 ASSAY OF COPPER: CPT

## 2023-04-20 PROCEDURE — 83605 ASSAY OF LACTIC ACID: CPT

## 2023-04-20 PROCEDURE — 84207 ASSAY OF VITAMIN B-6: CPT

## 2023-04-20 PROCEDURE — 87529 HSV DNA AMP PROBE: CPT

## 2023-04-20 PROCEDURE — 70498 CT ANGIOGRAPHY NECK: CPT | Mod: MA

## 2023-04-20 PROCEDURE — 74177 CT ABD & PELVIS W/CONTRAST: CPT | Mod: MA

## 2023-04-20 PROCEDURE — 92526 ORAL FUNCTION THERAPY: CPT

## 2023-04-20 PROCEDURE — 86053 AQAPRN-4 ANTB FLO CYTMTRY EA: CPT

## 2023-04-20 PROCEDURE — 86850 RBC ANTIBODY SCREEN: CPT

## 2023-04-20 PROCEDURE — 84591 ASSAY OF NOS VITAMIN: CPT

## 2023-04-20 PROCEDURE — 82010 KETONE BODYS QUAN: CPT

## 2023-04-20 PROCEDURE — 97110 THERAPEUTIC EXERCISES: CPT

## 2023-04-20 PROCEDURE — 82728 ASSAY OF FERRITIN: CPT

## 2023-04-20 PROCEDURE — 87205 SMEAR GRAM STAIN: CPT

## 2023-04-20 PROCEDURE — 83930 ASSAY OF BLOOD OSMOLALITY: CPT

## 2023-04-20 PROCEDURE — 93321 DOPPLER ECHO F-UP/LMTD STD: CPT

## 2023-04-20 PROCEDURE — 83519 RIA NONANTIBODY: CPT

## 2023-04-20 PROCEDURE — 84540 ASSAY OF URINE/UREA-N: CPT

## 2023-04-20 PROCEDURE — 82945 GLUCOSE OTHER FLUID: CPT

## 2023-04-20 PROCEDURE — 86403 PARTICLE AGGLUT ANTBDY SCRN: CPT

## 2023-04-20 PROCEDURE — 86803 HEPATITIS C AB TEST: CPT

## 2023-04-20 PROCEDURE — 87070 CULTURE OTHR SPECIMN AEROBIC: CPT

## 2023-04-20 PROCEDURE — 86592 SYPHILIS TEST NON-TREP QUAL: CPT

## 2023-04-20 PROCEDURE — 80053 COMPREHEN METABOLIC PANEL: CPT

## 2023-04-20 PROCEDURE — 87116 MYCOBACTERIA CULTURE: CPT

## 2023-04-20 PROCEDURE — 87641 MR-STAPH DNA AMP PROBE: CPT

## 2023-04-20 PROCEDURE — 86788 WEST NILE VIRUS AB IGM: CPT

## 2023-04-20 PROCEDURE — 87476 LYME DIS DNA AMP PROBE: CPT

## 2023-04-20 PROCEDURE — 84133 ASSAY OF URINE POTASSIUM: CPT

## 2023-04-20 PROCEDURE — 94640 AIRWAY INHALATION TREATMENT: CPT

## 2023-04-20 PROCEDURE — 82140 ASSAY OF AMMONIA: CPT

## 2023-04-20 PROCEDURE — 85027 COMPLETE CBC AUTOMATED: CPT

## 2023-04-20 PROCEDURE — 84295 ASSAY OF SERUM SODIUM: CPT

## 2023-04-20 PROCEDURE — 82330 ASSAY OF CALCIUM: CPT

## 2023-04-20 PROCEDURE — 82436 ASSAY OF URINE CHLORIDE: CPT

## 2023-04-20 DEVICE — STENT URET 7FR 26CM
Type: IMPLANTABLE DEVICE | Site: BILATERAL | Status: NON-FUNCTIONAL
Removed: 2023-04-20

## 2023-04-20 DEVICE — GUIDEWIRE SENSOR DUAL-FLEX NITINOL STRAIGHT .035" X 150CM
Type: IMPLANTABLE DEVICE | Site: BILATERAL | Status: NON-FUNCTIONAL
Removed: 2023-04-20

## 2023-04-20 DEVICE — URETERAL CATH AXXCESS OPEN END 6FR 70CM
Type: IMPLANTABLE DEVICE | Site: BILATERAL | Status: NON-FUNCTIONAL
Removed: 2023-04-20

## 2023-04-20 DEVICE — STONE BASKET ZEROTIP NITINOL 4-WIRE 1.9FR 120CM X 12MM
Type: IMPLANTABLE DEVICE | Site: BILATERAL | Status: NON-FUNCTIONAL
Removed: 2023-04-20

## 2023-04-20 RX ORDER — MUPIROCIN 20 MG/G
1 OINTMENT TOPICAL
Refills: 0 | Status: COMPLETED | OUTPATIENT
Start: 2023-04-20 | End: 2023-04-25

## 2023-04-20 RX ORDER — HYDROMORPHONE HYDROCHLORIDE 2 MG/ML
0.5 INJECTION INTRAMUSCULAR; INTRAVENOUS; SUBCUTANEOUS
Refills: 0 | Status: DISCONTINUED | OUTPATIENT
Start: 2023-04-20 | End: 2023-04-20

## 2023-04-20 RX ORDER — ONDANSETRON 8 MG/1
4 TABLET, FILM COATED ORAL ONCE
Refills: 0 | Status: DISCONTINUED | OUTPATIENT
Start: 2023-04-20 | End: 2023-04-20

## 2023-04-20 RX ORDER — POTASSIUM CHLORIDE 20 MEQ
10 PACKET (EA) ORAL
Refills: 0 | Status: COMPLETED | OUTPATIENT
Start: 2023-04-20 | End: 2023-04-20

## 2023-04-20 RX ORDER — CHLORHEXIDINE GLUCONATE 213 G/1000ML
1 SOLUTION TOPICAL DAILY
Refills: 0 | Status: DISCONTINUED | OUTPATIENT
Start: 2023-04-20 | End: 2023-04-28

## 2023-04-20 RX ORDER — SODIUM CHLORIDE 9 MG/ML
1000 INJECTION, SOLUTION INTRAVENOUS
Refills: 0 | Status: DISCONTINUED | OUTPATIENT
Start: 2023-04-20 | End: 2023-04-28

## 2023-04-20 RX ORDER — MIDODRINE HYDROCHLORIDE 2.5 MG/1
5 TABLET ORAL THREE TIMES A DAY
Refills: 0 | Status: DISCONTINUED | OUTPATIENT
Start: 2023-04-20 | End: 2023-04-28

## 2023-04-20 RX ORDER — HEPARIN SODIUM 5000 [USP'U]/ML
5000 INJECTION INTRAVENOUS; SUBCUTANEOUS EVERY 12 HOURS
Refills: 0 | Status: DISCONTINUED | OUTPATIENT
Start: 2023-04-20 | End: 2023-04-28

## 2023-04-20 RX ORDER — CEFEPIME 1 G/1
1000 INJECTION, POWDER, FOR SOLUTION INTRAMUSCULAR; INTRAVENOUS EVERY 8 HOURS
Refills: 0 | Status: DISCONTINUED | OUTPATIENT
Start: 2023-04-20 | End: 2023-04-26

## 2023-04-20 RX ADMIN — Medication 100 MILLIEQUIVALENT(S): at 10:12

## 2023-04-20 RX ADMIN — MUPIROCIN 1 APPLICATION(S): 20 OINTMENT TOPICAL at 22:16

## 2023-04-20 RX ADMIN — Medication 100 MILLIEQUIVALENT(S): at 12:15

## 2023-04-20 RX ADMIN — CHLORHEXIDINE GLUCONATE 1 APPLICATION(S): 213 SOLUTION TOPICAL at 12:16

## 2023-04-20 RX ADMIN — SODIUM CHLORIDE 60 MILLILITER(S): 9 INJECTION, SOLUTION INTRAVENOUS at 05:33

## 2023-04-20 RX ADMIN — SODIUM CHLORIDE 60 MILLILITER(S): 9 INJECTION, SOLUTION INTRAVENOUS at 22:17

## 2023-04-20 RX ADMIN — CEFEPIME 100 MILLIGRAM(S): 1 INJECTION, POWDER, FOR SOLUTION INTRAMUSCULAR; INTRAVENOUS at 22:15

## 2023-04-20 RX ADMIN — HEPARIN SODIUM 5000 UNIT(S): 5000 INJECTION INTRAVENOUS; SUBCUTANEOUS at 05:36

## 2023-04-20 RX ADMIN — MIDODRINE HYDROCHLORIDE 5 MILLIGRAM(S): 2.5 TABLET ORAL at 20:43

## 2023-04-20 RX ADMIN — CHLORHEXIDINE GLUCONATE 1 APPLICATION(S): 213 SOLUTION TOPICAL at 22:14

## 2023-04-20 RX ADMIN — MUPIROCIN 1 APPLICATION(S): 20 OINTMENT TOPICAL at 05:36

## 2023-04-20 RX ADMIN — CEFEPIME 100 MILLIGRAM(S): 1 INJECTION, POWDER, FOR SOLUTION INTRAMUSCULAR; INTRAVENOUS at 08:36

## 2023-04-20 NOTE — PROGRESS NOTE ADULT - SUBJECTIVE AND OBJECTIVE BOX
Post op Check    Pt seen. He was sleeping comfortably in bed.      Vital Signs Last 24 Hrs  T(C): 33.6 (20 Apr 2023 21:35), Max: 36.8 (20 Apr 2023 12:14)  T(F): 92.5 (20 Apr 2023 21:35), Max: 98.2 (20 Apr 2023 12:14)  HR: 58 (20 Apr 2023 21:35) (56 - 72)  BP: 95/61 (20 Apr 2023 21:35) (90/58 - 150/74)  BP(mean): 75 (20 Apr 2023 21:15) (67 - 84)  RR: 18 (20 Apr 2023 21:35) (16 - 18)  SpO2: 100% (20 Apr 2023 21:35) (98% - 100%)    Parameters below as of 20 Apr 2023 21:35  Patient On (Oxygen Delivery Method): room air        I&O's Summary    19 Apr 2023 07:01  -  20 Apr 2023 07:00  --------------------------------------------------------  IN: 720 mL / OUT: 0 mL / NET: 720 mL    20 Apr 2023 07:01  -  20 Apr 2023 23:10  --------------------------------------------------------  IN: 50 mL / OUT: 0 mL / NET: 50 mL        Physical Exam  Gen: Sleeping  Pulm: No respiratory distress, no subcostal retractions  CV: No JVD                            11.5   4.33  )-----------( 176      ( 20 Apr 2023 06:54 )             35.3       04-20    140  |  107  |  6<L>  ----------------------------<  91  3.4<L>   |  22  |  0.52    Ca    9.1      20 Apr 2023 06:52

## 2023-04-20 NOTE — CONSULT NOTE ADULT - ASSESSMENT
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, chronic hypotension on midodrine, renal stones, presents with altered mental status, pt, s/p bilateral ureteroscopy and stents     Wound Consult requested to assist w/ management of Rt hip unstageable pressure injury  Sacral DTI  LLE wound    Buttocks/ Sacrum TRIAD BID and prn soiling        Continue w/ attends under pads and Pericare w/ condom cath maintenance as per protocol  Rt Hip - medihoney dressing QD  LLE- ALLEVYN foam QD  BLE elevation   Abx per Medicine/ ID  Moisturize intact skin w/ SWEEN cream BID  Nutrition Consult for optimization in pt w/ Inadequate PO intake, & Increased nutritional needs       encourage high quality protein, denice/ prosource, MVI & Vit C to promote wound healing  Continue turning and positioning w/ offloading assistive devices as per protocol  Waffle Cushion to chair when oob to chair  Continue w/ low air loss pressure redistribution bed surface   Pt will need Group 2 mattress on hospital bed and ROHO cushion for wheel chair upon discharge home  Care as per medicine, will follow w/ you  Upon discharge f/u as outpatient at [facility] [address] [place] [address] [phone]  Seen w/ attng and D/w team & RN  Thank you for this consult  Nikky De Santiago PA-C [id number]  Nights/ Weekends/ Holidays please call:  General Surgery Consult pager [phone]) for emergencies  Wound PT for multilayer leg wrapping or VAC issues (x [phone])   I spent 55minutes face to face w/ this pt of which more than 50% of the time was spent counseling & coordinating care of this pt.  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, chronic hypotension on midodrine, renal stones, presents with altered mental status, pt, s/p bilateral ureteroscopy and stents     Wound Consult requested to assist w/ management of;  Rt hip unstageable pressure injury  Sacral DTI  LLE wound    Buttocks/ Sacrum TRIAD BID and prn soiling        Continue w/ attends under pads and Pericare w/ condom cath maintenance as per protocol  Rt Hip - medihoney dressing QD  LLE- ALLEVYN foam QD  BLE elevation   Abx per Medicine/ ID  Moisturize intact skin w/ SWEEN cream BID  Nutrition Consult for optimization in pt w/ Inadequate PO intake, & Increased nutritional needs       encourage high quality protein, denice/ prosource, MVI & Vit C to promote wound healing  Continue turning and positioning w/ offloading assistive devices as per protocol  Waffle Cushion to chair when oob to chair  Continue w/ low air loss pressure redistribution bed surface   Pt will need Group 2 mattress on hospital bed and ROHO cushion for wheel chair upon discharge home  Care as per medicine, will follow w/ you  Upon discharge f/u as outpatient at [facility] [address] [place] [address] [phone]  Seen w/ attng and D/w team & RN  Thank you for this consult  Nikky De Santiago PA-C CWS [id number]  Nights/ Weekends/ Holidays please call:  General Surgery Consult pager [phone]) for emergencies  Wound PT for multilayer leg wrapping or VAC issues (x [phone])

## 2023-04-20 NOTE — PROGRESS NOTE ADULT - SUBJECTIVE AND OBJECTIVE BOX
Cardiovascular Disease Progress Note  Date of service: 04-20-23 @ 08:58    Overnight events: No acute events overnight. Pt is in no distress.   Otherwise review of systems negative    Objective Findings:  T(C): 36.7 (04-20-23 @ 08:18), Max: 36.7 (04-20-23 @ 08:18)  HR: 71 (04-20-23 @ 08:18) (54 - 71)  BP: 102/68 (04-20-23 @ 08:18) (102/68 - 115/76)  RR: 18 (04-20-23 @ 08:18) (18 - 18)  SpO2: 98% (04-20-23 @ 08:18) (98% - 99%)  Wt(kg): --  Daily     Daily       Physical Exam:  Gen: NAD; Patient resting comfortably  HEENT: EOMI, Normocephalic/ atraumatic  CV: RRR, normal S1 + S2, no m/r/g  Lungs:  Normal respiratory effort; clear to auscultation bilaterally  Abd: soft, non-tender; bowel sounds present  Ext: No edema; warm and well perfused    Telemetry: Na    Laboratory Data:                        11.5   4.33  )-----------( 176      ( 20 Apr 2023 06:54 )             35.3     04-20    140  |  107  |  6<L>  ----------------------------<  91  3.4<L>   |  22  |  0.52    Ca    9.1      20 Apr 2023 06:52      PT/INR - ( 20 Apr 2023 06:55 )   PT: 13.0 sec;   INR: 1.13 ratio         PTT - ( 20 Apr 2023 06:55 )  PTT:31.4 sec          Inpatient Medications:  MEDICATIONS  (STANDING):  cefepime   IVPB      cefepime   IVPB 1000 milliGRAM(s) IV Intermittent every 8 hours  chlorhexidine 2% Cloths 1 Application(s) Topical daily  dextrose 5% + sodium chloride 0.9%. 1000 milliLiter(s) (60 mL/Hr) IV Continuous <Continuous>  heparin   Injectable 5000 Unit(s) SubCutaneous every 12 hours  midodrine. 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       Plan of Care:    #Pre-op risk stratification  - Plan fo urological procedure on 4/20  - EKG on admission shows sinus rhythm  - TTE from 8/2022 shows normal LV systolic function with no valvular or structural disease  - Pt displays no signs of pre-op coronary ischemia  - From a cardiac standpoint, Mr. Rojas is optimized to proceed with cystoscopy and ureteroscopy   - Pt is intermediate risk for desired procedure.     #Sepsis  - 2/2 UTI  - PT hypothermic   - Abx as per primary team    #Hypotension  - Continue Midodrine            Over 25 minutes spent on total encounter; more than 50% of the visit was spent counseling and/or coordinating care by the attending physician.      Erik Kelly DO [facility]  Cardiovascular Disease  [phone]

## 2023-04-20 NOTE — PROGRESS NOTE ADULT - ASSESSMENT
62 year old male with altered mental status, s/p bilateral ureteroscopy and stents 3/23    -CT shows bilateral stents are in place   -UCx, BCx both no growth   - Plan for cystoscopy w/ bilateral ureteroscopy on Thursday 4/20.   - Cardiology eval 4/18. Optimization noted  - GIven recent TTE in 8/2022 will likely not need a repeat today.   - Please send repeat COVID today  - NPO at midnight    - dw Sppech and Swallow pt with dysphagia and has been npo for days. Will have to discuss with fam re: ngt and further feeding plans     - dw medicine and anesthesia regarding hypothermia and bradycardia, will proceed with surgery and sent tsh in am

## 2023-04-20 NOTE — PROGRESS NOTE ADULT - SUBJECTIVE AND OBJECTIVE BOX
Date of Service  : 04-20-23     INTERVAL HPI/OVERNIGHT EVENTS:  Hypothermic and bradycardic this am .   Vital Signs Last 24 Hrs  T(C): 36.3 (20 Apr 2023 15:06), Max: 36.8 (20 Apr 2023 12:14)  T(F): 97.3 (20 Apr 2023 14:32), Max: 98.2 (20 Apr 2023 12:14)  HR: 67 (20 Apr 2023 15:06) (54 - 71)  BP: 110/76 (20 Apr 2023 15:06) (102/68 - 115/76)  BP(mean): 84 (20 Apr 2023 15:06) (84 - 84)  RR: 18 (20 Apr 2023 15:06) (18 - 18)  SpO2: 99% (20 Apr 2023 15:06) (98% - 99%)    Parameters below as of 20 Apr 2023 14:32  Patient On (Oxygen Delivery Method): room air      I&O's Summary    19 Apr 2023 07:01  -  20 Apr 2023 07:00  --------------------------------------------------------  IN: 720 mL / OUT: 0 mL / NET: 720 mL    20 Apr 2023 07:01  -  20 Apr 2023 16:39  --------------------------------------------------------  IN: 0 mL / OUT: 0 mL / NET: 0 mL      MEDICATIONS  (STANDING):  cefepime   IVPB      cefepime   IVPB 1000 milliGRAM(s) IV Intermittent every 8 hours  chlorhexidine 2% Cloths 1 Application(s) Topical daily  dextrose 5% + sodium chloride 0.9%. 1000 milliLiter(s) (60 mL/Hr) IV Continuous <Continuous>  heparin   Injectable 5000 Unit(s) SubCutaneous every 12 hours  midodrine. 5 milliGRAM(s) Oral three times a day  mupirocin 2% Nasal 1 Application(s) Both Nostrils two times a day    MEDICATIONS  (PRN):  HYDROmorphone  Injectable 0.5 milliGRAM(s) IV Push every 10 minutes PRN Moderate Pain (4 - 6)  ondansetron Injectable 4 milliGRAM(s) IV Push once PRN Nausea and/or Vomiting    LABS:                        11.5   4.33  )-----------( 176      ( 20 Apr 2023 06:54 )             35.3     04-20    140  |  107  |  6<L>  ----------------------------<  91  3.4<L>   |  22  |  0.52    Ca    9.1      20 Apr 2023 06:52      PT/INR - ( 20 Apr 2023 06:55 )   PT: 13.0 sec;   INR: 1.13 ratio         PTT - ( 20 Apr 2023 06:55 )  PTT:31.4 sec    CAPILLARY BLOOD GLUCOSE      POCT Blood Glucose.: 84 mg/dL (20 Apr 2023 12:04)  POCT Blood Glucose.: 90 mg/dL (20 Apr 2023 05:51)  POCT Blood Glucose.: 104 mg/dL (20 Apr 2023 00:21)  POCT Blood Glucose.: 104 mg/dL (19 Apr 2023 19:04)            Consultant(s) Notes Reviewed:  [x ] YES  [ ] NO    PHYSICAL EXAM:  GENERAL: Not in any distress ,  HEAD:  Atraumatic, Normocephalic  NECK: Supple, No JVD, Normal thyroid  NERVOUS SYSTEM:  Alert   CHEST/LUNG: Good air entry bilateral with no  rales, rhonchi, wheezing, or rubs  HEART: Regular rate and rhythm; No murmurs, rubs, or gallops  ABDOMEN: Soft, Nontender, Nondistended; Bowel sounds present  EXTREMITIES:  2+ Peripheral Pulses, No clubbing, cyanosis, or edema      Care Discussed with Consultants/Other Providers [ x] YES  [ ] NO

## 2023-04-20 NOTE — PROGRESS NOTE ADULT - ASSESSMENT
62 year old male with altered mental status, s/p bilateral ureteroscopy and stents 3/23. He underwent cystoscopy w/ bilateral ureteroscopy, L stent removed and right stent exchanged on 4/20/2023.    UCx and BCx both no growth  Continue cefepime IV  DVT prophylaxis/OOB  Incentive spirometry  Strict I&O's  Analgesia and antiemetics as needed  NPO for dysphagia. On maintenance fluids  AM labs

## 2023-04-20 NOTE — PROGRESS NOTE ADULT - ASSESSMENT
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  Wife has noticed that patient has been less attentive, not talking.  Says that she acts like this when he has a UTI.  Patient recently had 2 ureteral stents placed for kidney stones.  Denies fevers, chills, nausea, vomiting.  History given by wife.       Problem/Plan - 1:  ·  Problem: Frequent UTI.   ·  Plan: S/P IV Abxs . Negative  urine C/S .   No fever or Leucocytosis .   ID help appreciated .   On  cefepime for 1 week .       Problem/Plan - 2:  ·  Problem: Hydroureteronephrosis.   ·  Plan: S/P Stents . Urology following.   Planning  cystoscopy w/ bilateral ureteroscopy on Thursday 4/20.      Problem/Plan - 3:  ·  Problem: H/O hypotension.   ·  Plan: on Midodrine.     Problem/Plan - 4:  ·  Problem: H/O cerebellar ataxia.   ·  Plan: Supportive care.     Problem/Plan - 5:  ·  Problem: H/O Dysphagia .   ·  Plan: S/S following. IVF.      Problem/Plan - 6:  ·  Problem: Decubitus Ulcer   .   ·  Plan: Wound care helping .     Problem/Plan - 7:  ·  Problem: Hypothermia & Bradycardia  .   ·  Plan: On Abxs. TSH and Free T4 ordered.     D/W urology resident this am and procedure cancelled.

## 2023-04-20 NOTE — BRIEF OPERATIVE NOTE - NSICDXBRIEFPROCEDURE_GEN_ALL_CORE_FT
PROCEDURES:  Bilateral ureteroscopy 20-Apr-2023 19:12:51  Afsaneh Gonzalez  Cystoscopy, with double-J ureteral stent removal 20-Apr-2023 19:14:11  Afsaneh Gonzalez  Cystoureteroscopy, with retrograde pyelogram and ureteral stent insertion 20-Apr-2023 19:14:50  Afsaneh Gonzalez

## 2023-04-20 NOTE — CONSULT NOTE ADULT - SUBJECTIVE AND OBJECTIVE BOX
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  Wife has noticed that patient has been less attentive, not talking.  Says that she acts like this when he has a UTI.  Patient recently had 2 ureteral stents placed for kidney stones.  Denies fevers, chills, nausea, vomiting.  History given by wife. (14 Apr 2023 19:55)        N/V/D,  BM/ Flatus,   NGT,     palp/ sob/dyspnea/ cp,       F/C/S  Wound consult requested by team to assist w/ management of      wound/ pressure injury.   Pt (unable to)  c/o pain, drainage, odor, color change,  or worsening swelling. Offloading and pericare initiated upon admission as pt Increasingly sedentary 2/2 to illness. Pt is Incontinent of urine & stool. (+)coronel/ ostomy.   No h/o bites, scratches, falls, trauma.  Pt seen by Wound RN  [employer]/  Kelly,TRIAD/ Aquacell/ medihoney/ Allevyn foam/ dakins/ Adaptic/ DSD recommended used at home/ while awaiting consult.  Appetite good/ decreased.  weight loss.  S&S / RD consult appreciated All questions asked and answered to pt's and family's expressed understanding and satisfaction.    Current Diet: Diet, NPO after Midnight:      NPO Start Date: 19-Apr-2023,   NPO Start Time: 23:59 (04-19-23 @ 08:02)  Diet, NPO (04-16-23 @ 18:21)      PAST MEDICAL & SURGICAL HISTORY:  H/O cerebellar ataxia  -diagnosed in 2019      History of hypotension      Anemia      Lacunar infarction  -chronic      Pneumonia, aspiration  -april 2022 (intubated for 7 days at [facility])      Adrenal insufficiency      Low body temperature      Pressure ulcer of left heel      Pressure ulcer of buttock      H/O sepsis      H/O cystoscopy          REVIEW OF SYSTEMS: Pt unable to offer  General/ Breast/ Skin/Vasc/ Neuro/ MSK: see HPI  All other systems negative    MEDICATIONS  (STANDING):  cefepime   IVPB      cefepime   IVPB 1000 milliGRAM(s) IV Intermittent every 8 hours  chlorhexidine 2% Cloths 1 Application(s) Topical daily  dextrose 5% + sodium chloride 0.9%. 1000 milliLiter(s) (60 mL/Hr) IV Continuous <Continuous>  heparin   Injectable 5000 Unit(s) SubCutaneous every 12 hours  midodrine. 5 milliGRAM(s) Oral three times a day  mupirocin 2% Nasal 1 Application(s) Both Nostrils two times a day    MEDICATIONS  (PRN):      Allergies    No Known Allergies    Intolerances        SOCIAL HISTORY:  / /single/ ; (+)HHA/ lives in SNF; Former smoker, No current/ Denies smoking, ETOH, drugs    FAMILY HISTORY:  FH: dementia (Mother)    FH: type 2 diabetes (Mother)    Family history of CVA (Father)     no h/o PVD or wound healing or skin/ significant problems    PHYSICAL EXAM:  Vital Signs Last 24 Hrs  T(C): 36.7 (20 Apr 2023 08:18), Max: 36.7 (20 Apr 2023 08:18)  T(F): 98.1 (20 Apr 2023 08:18), Max: 98.1 (20 Apr 2023 08:18)  HR: 71 (20 Apr 2023 08:18) (54 - 71)  BP: 102/68 (20 Apr 2023 08:18) (102/68 - 115/76)  BP(mean): --  RR: 18 (20 Apr 2023 08:18) (18 - 18)  SpO2: 98% (20 Apr 2023 08:18) (98% - 99%)    Parameters below as of 20 Apr 2023 08:18  Patient On (Oxygen Delivery Method): room air        NAD, Guarded but stable,  A&Ox3/ Alert/ Confused  cachectic/ thin, MO/ Obese, frail,  WD/ WN/ WG,  Disheveled  Total Care Sport/ Versa Care P500 / Envella Progressa bed     HEENT:  NC/AT, PERRL, EOMI, sclera clear, mucosa moist, throat clear, trachea midline, neck supple, trach  Respiratory: nonlabored w/ equal chest rise  Gastrointestinal: soft NT/ND (+)BS  (+)PEG (+)ostomy (+)NGT  : (+)coronel/ purewick/ condom cath  Neurology:  weakened strength & sensation grossly intact, paraesthesia  nonverbal, no follow commands, paraplegic  Psych: calm/ appropriate/ flat affect/ easily agitated/ restless/ anxious/ difficult to assess  Musculoskeletal:  limited stiff / p/FROM, no deformities/ contractures  Vascular: BLE equally warm/ cool,  no cyanosis, clubbing, edema nor acute ischemia           >LE //BLE edema equal           BLE DP/PT pulses palpable          BLE hemosiderin staining/ varicose veins  Skin:  moist w/ good turgor  thin, dry, pale, frail,  ecchymosis w/o hematoma  blistering  or serosanguinous drainage  No odor, erythema, increased warmth, tenderness, induration, fluctuance, nor crepitus    LABS/ CULTURES/ RADIOLOGY:                        11.5   4.33  )-----------( 176      ( 20 Apr 2023 06:54 )             35.3       140  |  107  |  6   ----------------------------<  91      [04-20-23 @ 06:52]  3.4   |  22  |  0.52        Ca     9.1     [04-20-23 @ 06:52]      Phos  3.2     [04-18-23 @ 07:12]      PT/INR: PT 13.0 , INR 1.13       [04-20-23 @ 06:55]  PTT: 31.4       [04-20-23 @ 06:55]              Culture - Blood (collected 04-14-23 @ 14:45)  Source: .Blood Blood-Peripheral  Final Report (04-19-23 @ 20:00):    No Growth Final    Culture - Blood (collected 04-14-23 @ 14:30)  Source: .Blood Blood-Peripheral  Final Report (04-19-23 @ 20:00):    No Growth Final                      A/P:    Wound Consult requested to assist w/ management of    BLE elevation & Compression  Consider SHARIF/PVR, Duplex, Xray, A/P BLE CT or MRI  Abx per Medicine/ ID  Moisturize intact skin w/ SWEEN cream BID  Nutrition Consult for optimization in pt w/ Severe Protein Calorie Malnutrition,        Inadequate PO intake, & Increased nutritional needs            encourage high quality protein, denice/ prosource, MVI & Vit C to promote wound healing  Hyperglycemia - improving w/ ADA diet and Lantus/ NPH & FS w/ ISS, consider Endo Consult, consider HgA1c  Anemia- improving w/ transfusions, Fe studies, protonix  Continue turning and positioning w/ offloading assistive devices as per protocol  Buttocks/ Sacrum Kelly/ TRIAD BID /[employer] ADVANCE TIW and prn soiling        Continue w/ attends under pads and Pericare w/ coronel/ condom cath maintenance / purewick care as per protocol  Waffle Cushion to chair when oob to chair  Continue w/ low air loss pressure redistribution bed surface   Pt will need Group 2 mattress on hospital bed and ROHO cushion for wheel chair upon discharge home  Care as per medicine, will follow w/ you/ remain available as requested  Upon discharge f/u as outpatient at [facility] [address] [place] [address] [phone]  Seen w/ attng & RN and D/w team & RN  Thank you for this consult  Nikky De Santiago PA-C [id number]  Nights/ Weekends/ Holidays please call:  General Surgery Consult pager ([phone]) for emergencies  Wound PT for multilayer leg wrapping or VAC issues (x [phone])      Wound SURGERY CONSULT NOTE    HPI:  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  Wife has noticed that patient has been less attentive, not talking.  Says that she acts like this when he has a UTI.  Patient recently had 2 ureteral stents placed for kidney stones.  Denies fevers, chills, nausea, vomiting.  History given by wife.   Wound consult requested by team to assist w/ management of multiple wounds.  Pt unable to c/o pain, drainage, odor, color change,  or worsening swelling. Offloading and pericare initiated upon admissions as pt sedentary 2/2 to illness. Pt is Incontinent of urine & stool. No h/o bites, scratches, falls, trauma.  Pt seen by Wound RN and TRIAD was recommended to sacral wound and  medihoney/ Allevyn foam to LLE and Rt hip wounds.  Appetite so/so w/o noted weight loss.  S&S / RD consult appreciated All questions asked and answered to pt's and family's expressed understanding and satisfaction.    Current Diet: Diet, NPO after Midnight:      NPO Start Date: 19-Apr-2023,   NPO Start Time: 23:59 (04-19-23 @ 08:02)  Diet, NPO (04-16-23 @ 18:21)      PAST MEDICAL & SURGICAL HISTORY:  H/O cerebellar ataxia  -diagnosed in 2019      History of hypotension      Anemia      Lacunar infarction  -chronic      Pneumonia, aspiration  -april 2022 (intubated for 7 days at [facility])      Adrenal insufficiency      Low body temperature      Pressure ulcer of left heel      Pressure ulcer of buttock      H/O sepsis      H/O cystoscopy          REVIEW OF SYSTEMS: Pt unable to offer  General/ Breast/ Skin/Vasc/ Neuro/ MSK: see HPI  All other systems negative    MEDICATIONS  (STANDING):  cefepime   IVPB      cefepime   IVPB 1000 milliGRAM(s) IV Intermittent every 8 hours  chlorhexidine 2% Cloths 1 Application(s) Topical daily  dextrose 5% + sodium chloride 0.9%. 1000 milliLiter(s) (60 mL/Hr) IV Continuous <Continuous>  heparin   Injectable 5000 Unit(s) SubCutaneous every 12 hours  midodrine. 5 milliGRAM(s) Oral three times a day  mupirocin 2% Nasal 1 Application(s) Both Nostrils two times a day    No Known Allergies      SOCIAL HISTORY:  ; (+)HHA/ lives in SNF; Former smoker, No current/ Denies smoking, ETOH, drugs    FAMILY HISTORY:  (+) dementia (Mother), type 2 diabetes (Mother), CVA (Father)       no h/o PVD or wound healing or skin problems    PHYSICAL EXAM:  Vital Signs Last 24 Hrs  T(C): 36.7 (20 Apr 2023 08:18), Max: 36.7 (20 Apr 2023 08:18)  T(F): 98.1 (20 Apr 2023 08:18), Max: 98.1 (20 Apr 2023 08:18)  HR: 71 (20 Apr 2023 08:18) (54 - 71)  BP: 102/68 (20 Apr 2023 08:18) (102/68 - 115/76)  BP(mean): --  RR: 18 (20 Apr 2023 08:18) (18 - 18)  SpO2: 98% (20 Apr 2023 08:18) (98% - 99%)    Parameters below as of 20 Apr 2023 08:18  Patient On (Oxygen Delivery Method): room air      NAD, A&Ox3, Confused, frail,  WD/ WN/ WG  Versa Care P500  bed  HEENT:  NC/AT, EOMI, sclera clear, mucosa moist, throat clear, trachea midline, neck supple  Respiratory: nonlabored w/ equal chest rise  Gastrointestinal: soft NT/ND   : (+) condom cath  Neurology:  weakened strength & sensation grossly intact  Psych: calm/ appropriate  Musculoskeletal:  ROM, no deformities/ contractures  Vascular: BLE equally warm,  no cyanosis, clubbing, nor acute ischemia        mild BLE edema equal        (+)  BLE DP pulses palpable       LLE w/ partial thickness wound w/ hypopigmented intact skin in periwound    no blistering, (+)scant serosanguinous drainage  No odor, erythema, increased warmth, tenderness, induration, fluctuance, nor crepitus  Skin:  moist w/ good turgor  Sacrum- DTI   newly healed-  hypopigmented and denuded skin w/ purple skin     in 6cm x 4cm x 0cm  Right Trochanter- unstageable  Pressure Injury    3cm x 2.5cm x 1.0cm w/ lip of undermining circumferentially      wound base is red w/ soft stringy slough     periwound skin hypopigmented intact skin     (+)serosanguinous drainage  No odor, erythema, increased warmth, tenderness, induration, fluctuance, nor crepitus    LABS/ CULTURES/ RADIOLOGY:                        11.5   4.33  )-----------( 176      ( 20 Apr 2023 06:54 )             35.3       140  |  107  |  6   ----------------------------<  91      [04-20-23 @ 06:52]  3.4   |  22  |  0.52        Ca     9.1     [04-20-23 @ 06:52]      Phos  3.2     [04-18-23 @ 07:12]      PT/INR: PT 13.0 , INR 1.13       [04-20-23 @ 06:55]  PTT: 31.4       [04-20-23 @ 06:55]    Culture - Blood (collected 04-14-23 @ 14:45)  Source: .Blood Blood-Peripheral  Final Report (04-19-23 @ 20:00):    No Growth Final    Culture - Blood (collected 04-14-23 @ 14:30)  Source: .Blood Blood-Peripheral  Final Report (04-19-23 @ 20:00):    No Growth Final       Wound SURGERY CONSULT NOTE    HPI:  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  Wife has noticed that patient has been less attentive, not talking.  Says that she acts like this when he has a UTI.  Patient recently had 2 ureteral stents placed for kidney stones.  Denies fevers, chills, nausea, vomiting.  History given by wife.   Wound consult requested by team to assist w/ management of multiple wounds.  Pt unable to c/o pain, drainage, odor, color change,  or worsening swelling. Offloading and pericare initiated upon admissions as pt sedentary 2/2 to illness. Pt is Incontinent of urine & stool. No h/o bites, scratches, falls, trauma.  Pt seen by Wound RN and TRIAD was recommended to sacral wound and  medihoney/ Allevyn foam to LLE and Rt hip wounds.  Appetite so/so w/o noted weight loss.  S&S / RD consult appreciated     Current Diet: Diet, NPO after Midnight:      NPO Start Date: 19-Apr-2023,   NPO Start Time: 23:59 (04-19-23 @ 08:02)  Diet, NPO (04-16-23 @ 18:21)      PAST MEDICAL & SURGICAL HISTORY:  H/O cerebellar ataxia  -diagnosed in 2019      History of hypotension      Anemia      Lacunar infarction  -chronic      Pneumonia, aspiration  -april 2022 (intubated for 7 days at [facility])      Adrenal insufficiency      Low body temperature      Pressure ulcer of left heel      Pressure ulcer of buttock      H/O sepsis      H/O cystoscopy          REVIEW OF SYSTEMS: Pt unable to offer  General/ Breast/ Skin/Vasc/ Neuro/ MSK: see HPI  All other systems negative    MEDICATIONS  (STANDING):  cefepime   IVPB      cefepime   IVPB 1000 milliGRAM(s) IV Intermittent every 8 hours  chlorhexidine 2% Cloths 1 Application(s) Topical daily  dextrose 5% + sodium chloride 0.9%. 1000 milliLiter(s) (60 mL/Hr) IV Continuous <Continuous>  heparin   Injectable 5000 Unit(s) SubCutaneous every 12 hours  midodrine. 5 milliGRAM(s) Oral three times a day  mupirocin 2% Nasal 1 Application(s) Both Nostrils two times a day    No Known Allergies      SOCIAL HISTORY:  ; (+)HHA/ lives in SNF; Former smoker, No current/ Denies smoking, ETOH, drugs    FAMILY HISTORY:  (+) dementia (Mother), type 2 diabetes (Mother), CVA (Father)       no h/o PVD or wound healing or skin problems    PHYSICAL EXAM:  Vital Signs Last 24 Hrs  T(C): 36.7 (20 Apr 2023 08:18), Max: 36.7 (20 Apr 2023 08:18)  T(F): 98.1 (20 Apr 2023 08:18), Max: 98.1 (20 Apr 2023 08:18)  HR: 71 (20 Apr 2023 08:18) (54 - 71)  BP: 102/68 (20 Apr 2023 08:18) (102/68 - 115/76)  BP(mean): --  RR: 18 (20 Apr 2023 08:18) (18 - 18)  SpO2: 98% (20 Apr 2023 08:18) (98% - 99%)    Parameters below as of 20 Apr 2023 08:18  Patient On (Oxygen Delivery Method): room air      NAD, A&Ox3, Confused, frail,  WD/ WN/ WG  Versa Care P500  bed  HEENT:  NC/AT, EOMI, sclera clear, mucosa moist, throat clear, trachea midline, neck supple  Respiratory: nonlabored w/ equal chest rise  Gastrointestinal: soft NT/ND   : (+) condom cath  Neurology:  weakened strength & sensation grossly intact  Psych: calm/ appropriate  Musculoskeletal:  ROM, no deformities/ contractures  Vascular: BLE equally warm,  no cyanosis, clubbing, nor acute ischemia        mild BLE edema equal        (+)  BLE DP pulses palpable       LLE w/ partial thickness wound w/ hypopigmented intact skin in periwound    no blistering, (+)scant serosanguinous drainage  No odor, erythema, increased warmth, tenderness, induration, fluctuance, nor crepitus  Skin:  moist w/ good turgor  Sacrum- DTI   newly healed-  hypopigmented and denuded skin w/ purple skin     in 6cm x 4cm x 0cm  Right Trochanter/hip- unstageable  Pressure Injury    3cm x 2.5cm x 1.0cm w/ lip of undermining circumferentially      wound base is red w/ soft stringy slough     periwound skin hypopigmented intact skin     (+)serosanguinous drainage  No odor, erythema, increased warmth, tenderness, induration, fluctuance, nor crepitus    LABS/ CULTURES/ RADIOLOGY:                        11.5   4.33  )-----------( 176      ( 20 Apr 2023 06:54 )             35.3       140  |  107  |  6   ----------------------------<  91      [04-20-23 @ 06:52]  3.4   |  22  |  0.52        Ca     9.1     [04-20-23 @ 06:52]      Phos  3.2     [04-18-23 @ 07:12]      PT/INR: PT 13.0 , INR 1.13       [04-20-23 @ 06:55]  PTT: 31.4       [04-20-23 @ 06:55]    Culture - Blood (collected 04-14-23 @ 14:45)  Source: .Blood Blood-Peripheral  Final Report (04-19-23 @ 20:00):    No Growth Final    Culture - Blood (collected 04-14-23 @ 14:30)  Source: .Blood Blood-Peripheral  Final Report (04-19-23 @ 20:00):    No Growth Final

## 2023-04-20 NOTE — PRE-OP CHECKLIST - ALLERGY BAND ON
no known allergies on phonation/Class IV (difficult) - the soft palate is not visible at all on phonation/Class III - visualization of the soft palate and the base of the uvula

## 2023-04-20 NOTE — PROGRESS NOTE ADULT - SUBJECTIVE AND OBJECTIVE BOX
Subjective  overnight events of hypothermia requiring heating blanket and bradycardia noted.   pt without complaints     Objective    Vital signs  T(F): , Max: 98.1 (04-20-23 @ 08:18)  HR: 71 (04-20-23 @ 08:18)  BP: 102/68 (04-20-23 @ 08:18)  SpO2: 98% (04-20-23 @ 08:18)  Wt(kg): --    Output     04-19 @ 07:01  -  04-20 @ 07:00  --------------------------------------------------------  IN: 720 mL / OUT: 0 mL / NET: 720 mL        Gen awake alert nad axox1  Abd flat soft ntnd   Back no cvat bl    coronel yellow     Labs      04-20 @ 06:54    WBC 4.33  / Hct 35.3  / SCr --       04-20 @ 06:52    WBC --    / Hct --    / SCr 0.52       Urine Cx: Culture - Urine (04.14.23 @ 18:44)    Specimen Source: Clean Catch Clean Catch (Midstream)   Culture Results:   <10,000 CFU/mL Normal Urogenital Leanna    Imaging  < from: CT Abdomen and Pelvis w/ IV Cont (04.14.23 @ 16:11) >  PROCEDURE:  CT of the Abdomen and Pelvis was performed.  Sagittal and coronal reformats were performed.    FINDINGS: Exam is mildly degraded by motion artifact.    LOWER CHEST: Mild bibasilar atelectasis. 7 mm right lower lobe nodule (3,   8). Stable mild bilateral hilar adenopathy.    LIVER: Within normal limits.  BILE DUCTS: Normal caliber.  GALLBLADDER: Cholelithiasis.  SPLEEN: Within normal limits.  PANCREAS: Within normal limits.  ADRENALS: Within normal limits.  KIDNEYS/URETERS: Bilateral nephroureteral stents in appropriate position.   Mild left hydroureteronephrosis and enhancement of the renal pelvis and   proximal ureter. Previously noted left ureteral and renal pelvic stones   no longer identified. Right lower pole collecting system fullness with   mineralization again noted. Previously noted right renal pelvic stone no  longer identified. Right renal hypodensities too small to characterize.    BLADDER: Diffuse wall thickening. Focus of air in the lumen may be   related to recent instrumentation.  REPRODUCTIVE ORGANS: Prostate not enlarged.    BOWEL: No bowel obstruction. Appendix is not visualized. No evidence of   inflammation in the pericecal region. Large rectal stool burden.  PERITONEUM: No ascites.  VESSELS: Atherosclerotic changes.  RETROPERITONEUM/LYMPH NODES: No lymphadenopathy.  ABDOMINAL WALL: Withinnormal limits.  BONES: Degenerative changes.    IMPRESSION:  Bilateral nephroureteral stents in place. Mild left hydroureteronephrosis   and associated enhancement of the renal pelvis and proximal ureter.   Diffuse bladder wall thickening. Recommend correlation with urinalysis   for urinary tract infection.    Large rectal stool burden.    7 mm right lower lobe pulmonary nodule. Follow-up chest CT in 6-12 months   is recommended    < end of copied text >

## 2023-04-21 ENCOUNTER — APPOINTMENT (OUTPATIENT)
Dept: UROLOGY | Facility: CLINIC | Age: 63
End: 2023-04-21

## 2023-04-21 LAB
ANION GAP SERPL CALC-SCNC: 6 MMOL/L — SIGNIFICANT CHANGE UP (ref 5–17)
BUN SERPL-MCNC: 7 MG/DL — SIGNIFICANT CHANGE UP (ref 7–23)
CALCIUM SERPL-MCNC: 8.1 MG/DL — LOW (ref 8.4–10.5)
CHLORIDE SERPL-SCNC: 107 MMOL/L — SIGNIFICANT CHANGE UP (ref 96–108)
CO2 SERPL-SCNC: 20 MMOL/L — LOW (ref 22–31)
CORTIS AM PEAK SERPL-MCNC: 11.4 UG/DL — SIGNIFICANT CHANGE UP (ref 6–18.4)
CREAT SERPL-MCNC: 0.78 MG/DL — SIGNIFICANT CHANGE UP (ref 0.5–1.3)
EGFR: 101 ML/MIN/1.73M2 — SIGNIFICANT CHANGE UP
GLUCOSE BLDC GLUCOMTR-MCNC: 107 MG/DL — HIGH (ref 70–99)
GLUCOSE BLDC GLUCOMTR-MCNC: 345 MG/DL — HIGH (ref 70–99)
GLUCOSE BLDC GLUCOMTR-MCNC: 74 MG/DL — SIGNIFICANT CHANGE UP (ref 70–99)
GLUCOSE BLDC GLUCOMTR-MCNC: 98 MG/DL — SIGNIFICANT CHANGE UP (ref 70–99)
GLUCOSE SERPL-MCNC: 299 MG/DL — HIGH (ref 70–99)
HCT VFR BLD CALC: 33.2 % — LOW (ref 39–50)
HGB BLD-MCNC: 11 G/DL — LOW (ref 13–17)
MCHC RBC-ENTMCNC: 28 PG — SIGNIFICANT CHANGE UP (ref 27–34)
MCHC RBC-ENTMCNC: 33.1 GM/DL — SIGNIFICANT CHANGE UP (ref 32–36)
MCV RBC AUTO: 84.5 FL — SIGNIFICANT CHANGE UP (ref 80–100)
NRBC # BLD: 0 /100 WBCS — SIGNIFICANT CHANGE UP (ref 0–0)
PLATELET # BLD AUTO: 146 K/UL — LOW (ref 150–400)
POTASSIUM SERPL-MCNC: 3.2 MMOL/L — LOW (ref 3.5–5.3)
POTASSIUM SERPL-SCNC: 3.2 MMOL/L — LOW (ref 3.5–5.3)
RBC # BLD: 3.93 M/UL — LOW (ref 4.2–5.8)
RBC # FLD: 14.7 % — HIGH (ref 10.3–14.5)
SODIUM SERPL-SCNC: 133 MMOL/L — LOW (ref 135–145)
T4 FREE SERPL-MCNC: 0.9 NG/DL — SIGNIFICANT CHANGE UP (ref 0.9–1.8)
TSH SERPL-MCNC: 5.32 UIU/ML — HIGH (ref 0.27–4.2)
WBC # BLD: 6.29 K/UL — SIGNIFICANT CHANGE UP (ref 3.8–10.5)
WBC # FLD AUTO: 6.29 K/UL — SIGNIFICANT CHANGE UP (ref 3.8–10.5)

## 2023-04-21 PROCEDURE — 99232 SBSQ HOSP IP/OBS MODERATE 35: CPT

## 2023-04-21 RX ORDER — POTASSIUM CHLORIDE 20 MEQ
10 PACKET (EA) ORAL
Refills: 0 | Status: COMPLETED | OUTPATIENT
Start: 2023-04-21 | End: 2023-04-21

## 2023-04-21 RX ADMIN — Medication 100 MILLIEQUIVALENT(S): at 12:08

## 2023-04-21 RX ADMIN — HEPARIN SODIUM 5000 UNIT(S): 5000 INJECTION INTRAVENOUS; SUBCUTANEOUS at 05:02

## 2023-04-21 RX ADMIN — Medication 100 MILLIEQUIVALENT(S): at 12:02

## 2023-04-21 RX ADMIN — MUPIROCIN 1 APPLICATION(S): 20 OINTMENT TOPICAL at 05:03

## 2023-04-21 RX ADMIN — MUPIROCIN 1 APPLICATION(S): 20 OINTMENT TOPICAL at 17:52

## 2023-04-21 RX ADMIN — CEFEPIME 100 MILLIGRAM(S): 1 INJECTION, POWDER, FOR SOLUTION INTRAMUSCULAR; INTRAVENOUS at 12:09

## 2023-04-21 RX ADMIN — HEPARIN SODIUM 5000 UNIT(S): 5000 INJECTION INTRAVENOUS; SUBCUTANEOUS at 17:52

## 2023-04-21 RX ADMIN — CEFEPIME 100 MILLIGRAM(S): 1 INJECTION, POWDER, FOR SOLUTION INTRAMUSCULAR; INTRAVENOUS at 22:44

## 2023-04-21 RX ADMIN — CHLORHEXIDINE GLUCONATE 1 APPLICATION(S): 213 SOLUTION TOPICAL at 12:08

## 2023-04-21 RX ADMIN — SODIUM CHLORIDE 60 MILLILITER(S): 9 INJECTION, SOLUTION INTRAVENOUS at 05:02

## 2023-04-21 RX ADMIN — CEFEPIME 100 MILLIGRAM(S): 1 INJECTION, POWDER, FOR SOLUTION INTRAMUSCULAR; INTRAVENOUS at 05:02

## 2023-04-21 RX ADMIN — Medication 100 MILLIEQUIVALENT(S): at 12:10

## 2023-04-21 NOTE — PROGRESS NOTE ADULT - ASSESSMENT
62 year old male with altered mental status, s/p bilateral ureteroscopy and stents 3/23. He underwent cystoscopy w/ bilateral ureteroscopy, L stent removed and right stent exchanged on 4/20/2023.    UCx and BCx both no growth  Continue cefepime IV  DVT prophylaxis/OOB  Incentive spirometry  Strict I&O's  Analgesia and antiemetics as needed  NPO for dysphagia. On maintenance fluids  dysphagia screening  AM labs  transfer back to medicine

## 2023-04-21 NOTE — PROGRESS NOTE ADULT - SUBJECTIVE AND OBJECTIVE BOX
Cardiovascular Disease Progress Note  Date of service: 04-21-23 @ 07:59    Overnight events: Pt is in no distress. Episodes of hypothermia overnight. Currently on warming blanket.    Otherwise review of systems negative    Objective Findings:  T(C): 36.5 (04-21-23 @ 04:52), Max: 36.8 (04-20-23 @ 12:14)  HR: 78 (04-21-23 @ 04:52) (58 - 78)  BP: 98/66 (04-21-23 @ 04:52) (90/58 - 150/74)  RR: 18 (04-21-23 @ 04:52) (16 - 18)  SpO2: 94% (04-21-23 @ 04:52) (94% - 100%)  Wt(kg): --  Daily Height in cm: 170.18 (20 Apr 2023 15:06)    Daily       Physical Exam:  Gen: NAD; Patient resting comfortably  HEENT: EOMI, Normocephalic/ atraumatic  CV: RRR, normal S1 + S2, no m/r/g  Lungs:  Normal respiratory effort; clear to auscultation bilaterally  Abd: soft, non-tender; bowel sounds present  Ext: No edema; warm and well perfused    Telemetry:  N/a    Laboratory Data:                        11.0   6.29  )-----------( 146      ( 21 Apr 2023 06:58 )             33.2     04-21    133<L>  |  107  |  7   ----------------------------<  299<H>  3.2<L>   |  20<L>  |  0.78    Ca    8.1<L>      21 Apr 2023 06:57      PT/INR - ( 20 Apr 2023 06:55 )   PT: 13.0 sec;   INR: 1.13 ratio         PTT - ( 20 Apr 2023 06:55 )  PTT:31.4 sec          Inpatient Medications:  MEDICATIONS  (STANDING):  cefepime   IVPB 1000 milliGRAM(s) IV Intermittent every 8 hours  chlorhexidine 2% Cloths 1 Application(s) Topical daily  dextrose 5% + sodium chloride 0.9%. 1000 milliLiter(s) (60 mL/Hr) IV Continuous <Continuous>  heparin   Injectable 5000 Unit(s) SubCutaneous every 12 hours  midodrine. 5 milliGRAM(s) Oral three times a day  mupirocin 2% Nasal 1 Application(s) Both Nostrils two times a day      Assessment: 61 yo M with a PMH of cerebellar ataxia, chronic lacunar infarcts, 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, chronic hypotension on midodrine, nephrolithiasis, recent admission in Feb 2023 for pyelonephritis, who presented to the ED with acute encephalopathy concerning for UTI.       Plan of Care:    #Post-op risk stratification  - s/p cystoscopy w/ bilateral ureteroscopy, L stent removed and right stent exchanged on 4/20/2023. Tolerated procedure well.   - EKG on admission shows sinus rhythm  - TTE from 8/2022 shows normal LV systolic function with no valvular or structural disease  - Pt displays no signs of post-op coronary ischemia  - Continue current management.   - Urology input appreciated.      #Sepsis  - 2/2 UTI  - PT hypothermic. Warming blanket in place.   - Abx as per primary team    #Hypotension  - Continue Midodrine      #ACP (advance care planning)-  Advanced care planning was discussed.  Risks, benefits and alternatives of medical treatment and procedures were addressed. 30 additional minutes spent addressing advance care plans.          Over 25 minutes spent on total encounter; more than 50% of the visit was spent counseling and/or coordinating care by the attending physician.      Erik Kelly DO [facility]  Cardiovascular Disease  [phone]

## 2023-04-21 NOTE — PROGRESS NOTE ADULT - SUBJECTIVE AND OBJECTIVE BOX
Subjective: pt seen and examined on am rounds. hypothermic overnight, improved with warming blanket    Objective    Vital signs  T(F): , Max: 98.2 (04-20-23 @ 12:14)  HR: 78 (04-21-23 @ 04:52)  BP: 98/66 (04-21-23 @ 04:52)  SpO2: 94% (04-21-23 @ 04:52)  Wt(kg): --    Output         Gen: NAD  Resp: no resp distress  Abd: s/nt/nd  : +stent string in place    Labs                        11.5   4.33  )-----------( 176      ( 20 Apr 2023 06:54 )             35.3     04-21    133<L>  |  107  |  7   ----------------------------<  299<H>  3.2<L>   |  20<L>  |  0.78    Ca    8.1<L>      21 Apr 2023 06:57              Urine Cx: Culture - Urine (04.14.23 @ 18:44)    Specimen Source: Clean Catch Clean Catch (Midstream)   Culture Results:   <10,000 CFU/mL Normal Urogenital Leanna

## 2023-04-21 NOTE — PROGRESS NOTE ADULT - ASSESSMENT
63 yo M with a PMH of cerebellar ataxia, chronic lacunar infarcts, 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, chronic hypotension on midodrine, nephrolithiasis, who presented to the ED with acute encephalopathy concerning for UTI.     -Encephalopathy, hypothermia, UA with pyuria   -Concern for sepsis 2/2 UTI  -Recent admission in Feb 2023 for pyelonephritis and L sided obstructive uropathy s/p ureteral stent, treated with 10 days of Cefepime (Pseudomonas in urine culture)  -Blood and urine cultures pending  -CT A/P with b/l ureteral stents, mild L sided hydroureteronephrosis and enhancement of the renal pelvis and proximal ureter with diffuse bladder wall thickening  -CT Head stable   -On Cefepime 1g IV Q8h      Overall  Sepsis, encephalopathy, hypothermia, pyelonephritis, ureteral stents   H/o Pseudomonas in urine culture from Feb 2023     Recommendations  Continue Cefepime 1g IV Q8h plans to complete 7-10days  (4/25 end)  F/u urine and blood cultures are no growth   Urology to perform cystoscopy and ureteroscopy to evaluate further on Cefepime- exchaanged right ureteral stent on 4/21  the negative cultures this admission may reflect partial treatment of pyelonephritis    Edwin Amaro MD  Can be called via Teams  After 5pm/weekends [phone]

## 2023-04-21 NOTE — PROGRESS NOTE ADULT - ATTENDING COMMENTS
pt seen and examined  d/w anesthesia and medicine  we took patient to our service for surgery- for bilat URS and removal stents
pt seen, notes reviewed  will cont to follow, with plan towards removal stento n strings early next week

## 2023-04-21 NOTE — PROGRESS NOTE ADULT - SUBJECTIVE AND OBJECTIVE BOX
INFECTIOUS DISEASES FOLLOW UP-- Leticia Prem  [phone]    This is a follow up note for this  62yMale with  Altered mental status  underwent ureteral stent exchange on the right side        ROS:  CONSTITUTIONAL:  No fever, unable to provide history  CARDIOVASCULAR:  No chest pain or palpitations  RESPIRATORY:  No dyspnea  GASTROINTESTINAL:  No nausea, vomiting, diarrhea, or abdominal pain  GENITOURINARY:  No dysuria  NEUROLOGIC:  No headache,     Allergies    No Known Allergies    Intolerances        ANTIBIOTICS/RELEVANT:  antimicrobials  cefepime   IVPB 1000 milliGRAM(s) IV Intermittent every 8 hours    immunologic:    OTHER:  chlorhexidine 2% Cloths 1 Application(s) Topical daily  dextrose 5% + sodium chloride 0.9%. 1000 milliLiter(s) IV Continuous <Continuous>  heparin   Injectable 5000 Unit(s) SubCutaneous every 12 hours  midodrine. 5 milliGRAM(s) Oral three times a day  mupirocin 2% Nasal 1 Application(s) Both Nostrils two times a day      Objective:  Vital Signs Last 24 Hrs  T(C): 37.2 (21 Apr 2023 14:14), Max: 37.2 (21 Apr 2023 14:14)  T(F): 98.9 (21 Apr 2023 14:14), Max: 98.9 (21 Apr 2023 14:14)  HR: 95 (21 Apr 2023 14:14) (58 - 95)  BP: 100/61 (21 Apr 2023 14:14) (90/58 - 150/74)  BP(mean): 75 (20 Apr 2023 21:15) (67 - 75)  RR: 18 (21 Apr 2023 14:14) (16 - 18)  SpO2: 96% (21 Apr 2023 14:14) (94% - 100%)    Parameters below as of 21 Apr 2023 14:14  Patient On (Oxygen Delivery Method): room air        PHYSICAL EXAM:  Constitutional:no acute distress, contracted in bed  Eyes:THU, EOMI  Ear/Nose/Throat: no oral lesions, 	  Respiratory: clear BL  Cardiovascular: S1S2  Gastrointestinal:soft, (+) BS, no tenderness  Extremities:no e/e/c  No Lymphadenopathy  IV sites not inflammed.    LABS:                        11.0   6.29  )-----------( 146      ( 21 Apr 2023 06:58 )             33.2     04-21    133<L>  |  107  |  7   ----------------------------<  299<H>  3.2<L>   |  20<L>  |  0.78    Ca    8.1<L>      21 Apr 2023 06:57      PT/INR - ( 20 Apr 2023 06:55 )   PT: 13.0 sec;   INR: 1.13 ratio         PTT - ( 20 Apr 2023 06:55 )  PTT:31.4 sec      MICROBIOLOGY:            RECENT CULTURES:  04-14 @ 18:44  Clean Catch Clean Catch (Midstream)  --  --  --    <10,000 CFU/mL Normal Urogenital Leanna  --      RADIOLOGY & ADDITIONAL STUDIES:    < from: CT Abdomen and Pelvis w/ IV Cont (04.14.23 @ 16:11) >    IMPRESSION:  Bilateral nephroureteral stents in place. Mild left hydroureteronephrosis   and associated enhancement of the renal pelvis and proximal ureter.   Diffuse bladder wall thickening. Recommend correlation with urinalysis   for urinary tract infection.    Large rectal stool burden.    7 mm right lower lobe pulmonary nodule. Follow-up chest CT in 6-12 months   is recommended    < end of copied text >

## 2023-04-22 LAB
ANION GAP SERPL CALC-SCNC: 8 MMOL/L — SIGNIFICANT CHANGE UP (ref 5–17)
BUN SERPL-MCNC: 7 MG/DL — SIGNIFICANT CHANGE UP (ref 7–23)
CALCIUM SERPL-MCNC: 8.9 MG/DL — SIGNIFICANT CHANGE UP (ref 8.4–10.5)
CHLORIDE SERPL-SCNC: 112 MMOL/L — HIGH (ref 96–108)
CO2 SERPL-SCNC: 22 MMOL/L — SIGNIFICANT CHANGE UP (ref 22–31)
CREAT SERPL-MCNC: 0.76 MG/DL — SIGNIFICANT CHANGE UP (ref 0.5–1.3)
EGFR: 102 ML/MIN/1.73M2 — SIGNIFICANT CHANGE UP
GLUCOSE BLDC GLUCOMTR-MCNC: 117 MG/DL — HIGH (ref 70–99)
GLUCOSE BLDC GLUCOMTR-MCNC: 85 MG/DL — SIGNIFICANT CHANGE UP (ref 70–99)
GLUCOSE BLDC GLUCOMTR-MCNC: 86 MG/DL — SIGNIFICANT CHANGE UP (ref 70–99)
GLUCOSE BLDC GLUCOMTR-MCNC: 96 MG/DL — SIGNIFICANT CHANGE UP (ref 70–99)
GLUCOSE SERPL-MCNC: 100 MG/DL — HIGH (ref 70–99)
HCT VFR BLD CALC: 36.6 % — LOW (ref 39–50)
HGB BLD-MCNC: 11.9 G/DL — LOW (ref 13–17)
MCHC RBC-ENTMCNC: 28.2 PG — SIGNIFICANT CHANGE UP (ref 27–34)
MCHC RBC-ENTMCNC: 32.5 GM/DL — SIGNIFICANT CHANGE UP (ref 32–36)
MCV RBC AUTO: 86.7 FL — SIGNIFICANT CHANGE UP (ref 80–100)
NRBC # BLD: 0 /100 WBCS — SIGNIFICANT CHANGE UP (ref 0–0)
PLATELET # BLD AUTO: 119 K/UL — LOW (ref 150–400)
POTASSIUM SERPL-MCNC: 3.7 MMOL/L — SIGNIFICANT CHANGE UP (ref 3.5–5.3)
POTASSIUM SERPL-SCNC: 3.7 MMOL/L — SIGNIFICANT CHANGE UP (ref 3.5–5.3)
RBC # BLD: 4.22 M/UL — SIGNIFICANT CHANGE UP (ref 4.2–5.8)
RBC # FLD: 15.1 % — HIGH (ref 10.3–14.5)
SODIUM SERPL-SCNC: 142 MMOL/L — SIGNIFICANT CHANGE UP (ref 135–145)
WBC # BLD: 5.84 K/UL — SIGNIFICANT CHANGE UP (ref 3.8–10.5)
WBC # FLD AUTO: 5.84 K/UL — SIGNIFICANT CHANGE UP (ref 3.8–10.5)

## 2023-04-22 PROCEDURE — 99231 SBSQ HOSP IP/OBS SF/LOW 25: CPT

## 2023-04-22 RX ADMIN — MUPIROCIN 1 APPLICATION(S): 20 OINTMENT TOPICAL at 17:48

## 2023-04-22 RX ADMIN — CHLORHEXIDINE GLUCONATE 1 APPLICATION(S): 213 SOLUTION TOPICAL at 13:52

## 2023-04-22 RX ADMIN — SODIUM CHLORIDE 60 MILLILITER(S): 9 INJECTION, SOLUTION INTRAVENOUS at 21:50

## 2023-04-22 RX ADMIN — MUPIROCIN 1 APPLICATION(S): 20 OINTMENT TOPICAL at 06:57

## 2023-04-22 RX ADMIN — CEFEPIME 100 MILLIGRAM(S): 1 INJECTION, POWDER, FOR SOLUTION INTRAMUSCULAR; INTRAVENOUS at 06:56

## 2023-04-22 RX ADMIN — HEPARIN SODIUM 5000 UNIT(S): 5000 INJECTION INTRAVENOUS; SUBCUTANEOUS at 17:48

## 2023-04-22 RX ADMIN — CEFEPIME 100 MILLIGRAM(S): 1 INJECTION, POWDER, FOR SOLUTION INTRAMUSCULAR; INTRAVENOUS at 13:52

## 2023-04-22 RX ADMIN — CEFEPIME 100 MILLIGRAM(S): 1 INJECTION, POWDER, FOR SOLUTION INTRAMUSCULAR; INTRAVENOUS at 21:50

## 2023-04-22 RX ADMIN — HEPARIN SODIUM 5000 UNIT(S): 5000 INJECTION INTRAVENOUS; SUBCUTANEOUS at 06:57

## 2023-04-22 NOTE — PROGRESS NOTE ADULT - SUBJECTIVE AND OBJECTIVE BOX
Date of Service  : 04-22-23     INTERVAL HPI/OVERNIGHT EVENTS: Still not eating or drinking.   Vital Signs Last 24 Hrs  T(C): 36.3 (22 Apr 2023 14:27), Max: 37 (22 Apr 2023 01:11)  T(F): 97.3 (22 Apr 2023 14:27), Max: 98.6 (22 Apr 2023 01:11)  HR: 62 (22 Apr 2023 14:27) (60 - 74)  BP: 110/70 (22 Apr 2023 14:27) (100/62 - 111/77)  BP(mean): --  RR: 18 (22 Apr 2023 14:27) (18 - 18)  SpO2: 99% (22 Apr 2023 14:27) (98% - 100%)    Parameters below as of 22 Apr 2023 14:27  Patient On (Oxygen Delivery Method): room air      I&O's Summary    22 Apr 2023 07:01  -  22 Apr 2023 15:43  --------------------------------------------------------  IN: 0 mL / OUT: 0 mL / NET: 0 mL      MEDICATIONS  (STANDING):  cefepime   IVPB 1000 milliGRAM(s) IV Intermittent every 8 hours  chlorhexidine 2% Cloths 1 Application(s) Topical daily  dextrose 5% + sodium chloride 0.9%. 1000 milliLiter(s) (60 mL/Hr) IV Continuous <Continuous>  heparin   Injectable 5000 Unit(s) SubCutaneous every 12 hours  midodrine. 5 milliGRAM(s) Oral three times a day  mupirocin 2% Nasal 1 Application(s) Both Nostrils two times a day    MEDICATIONS  (PRN):    LABS:                        11.9   5.84  )-----------( 119      ( 22 Apr 2023 07:04 )             36.6     04-22    142  |  112<H>  |  7   ----------------------------<  100<H>  3.7   |  22  |  0.76    Ca    8.9      22 Apr 2023 07:03          CAPILLARY BLOOD GLUCOSE      POCT Blood Glucose.: 117 mg/dL (22 Apr 2023 13:03)  POCT Blood Glucose.: 96 mg/dL (22 Apr 2023 06:17)  POCT Blood Glucose.: 85 mg/dL (22 Apr 2023 00:32)  POCT Blood Glucose.: 74 mg/dL (21 Apr 2023 17:27)          RADIOLOGY & ADDITIONAL TESTS:    Consultant(s) Notes Reviewed:  [x ] YES  [ ] NO    PHYSICAL EXAM:  GENERAL: NAD, well-groomed, well-developed,not in any distress ,  HEAD:  Atraumatic, Normocephalic  NECK: Supple, No JVD, Normal thyroid  NERVOUS SYSTEM:  Alert   CHEST/LUNG: Good air entry bilateral with no  rales, rhonchi, wheezing, or rubs  HEART: Regular rate and rhythm; No murmurs, rubs, or gallops  ABDOMEN: Soft, Nontender, Nondistended; Bowel sounds present  EXTREMITIES:  2+ Peripheral Pulses, No clubbing, cyanosis, or edema      Care Discussed with Consultants/Other Providers [ x] YES  [ ] NO

## 2023-04-22 NOTE — PROGRESS NOTE ADULT - ASSESSMENT
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  Wife has noticed that patient has been less attentive, not talking.  Says that she acts like this when he has a UTI.  Patient recently had 2 ureteral stents placed for kidney stones.  Denies fevers, chills, nausea, vomiting.  History given by wife.       Problem/Plan - 1:  ·  Problem: Frequent UTI.   ·  Plan: S/P IV Abxs . Negative  urine C/S .   No fever or Leucocytosis .   ID help appreciated .   On  cefepime for 1 week .       Problem/Plan - 2:  ·  Problem: Hydroureteronephrosis.   ·  Plan: S/P Stents . Urology following.   S/P   cystoscopy w/ bilateral ureteroscopy wit     Problem/Plan - 3:  ·  Problem: H/O hypotension.   ·  Plan: on Midodrine.     Problem/Plan - 4:  ·  Problem: H/O cerebellar ataxia.   ·  Plan: Supportive care.     Problem/Plan - 5:  ·  Problem: H/O Dysphagia .   ·  Plan: S/S following. IVF.      Problem/Plan - 6:  ·  Problem: Decubitus Ulcer   .   ·  Plan: Wound care helping .     Problem/Plan - 7:  ·  Problem: Hypothermia & Bradycardia  .   ·  Plan: On Abxs. TSH and Free T4 noted.      Problem/Plan - 8:  ·  Problem: Thrombocytopenia   .   ·  Plan: Hematology consulted.     D/W Wife and said he will eat at home once discharged so doesn't want peg etc.

## 2023-04-22 NOTE — PROGRESS NOTE ADULT - ASSESSMENT
62 year old male with altered mental status, s/p bilateral ureteroscopy and stents 3/23. He underwent cystoscopy w/ bilateral ureteroscopy, L stent removed and right stent exchanged on 4/20/2023 stable from urological perspective.   Will arrange transfer back to medicine for further issues:  hypothermia etiology  decreasing platelets  dysphagia     UCx and BCx both no growth  Continue cefepime IV per ID  DVT prophylaxis/OOB  Incentive spirometry  Strict I&O's  Analgesia and antiemetics as needed  NPO for dysphagia. On maintenance fluids

## 2023-04-22 NOTE — PROGRESS NOTE ADULT - SUBJECTIVE AND OBJECTIVE BOX
Subjective  hypothermia this am to 94 requiring warming blanket  pt seen and examined minimally verbal   Objective    Vital signs  T(F): , Max: 98.9 (04-21-23 @ 14:14)  HR: 60 (04-22-23 @ 07:00)  BP: 100/62 (04-22-23 @ 07:00)  SpO2: 100% (04-22-23 @ 07:00)  Wt(kg): --    Output       Gen awake alert nad axox0 nontoxic appearing   Abd  soft ntnd   Back no cvat bl    nonpalp bladder no suprapubic tenderness     Labs      04-22 @ 07:04    WBC 5.84  / Hct 36.6  / SCr --       04-22 @ 07:03    WBC --    / Hct --    / SCr 0.76       Urine Cx: Culture - Urine (04.14.23 @ 18:44)    Specimen Source: Clean Catch Clean Catch (Midstream)   Culture Results:   <10,000 CFU/mL Normal Urogenital Leanna

## 2023-04-23 DIAGNOSIS — E03.9 HYPOTHYROIDISM, UNSPECIFIED: ICD-10-CM

## 2023-04-23 DIAGNOSIS — D69.6 THROMBOCYTOPENIA, UNSPECIFIED: ICD-10-CM

## 2023-04-23 DIAGNOSIS — E27.40 UNSPECIFIED ADRENOCORTICAL INSUFFICIENCY: ICD-10-CM

## 2023-04-23 DIAGNOSIS — D63.8 ANEMIA IN OTHER CHRONIC DISEASES CLASSIFIED ELSEWHERE: ICD-10-CM

## 2023-04-23 DIAGNOSIS — L89.309 PRESSURE ULCER OF UNSPECIFIED BUTTOCK, UNSPECIFIED STAGE: ICD-10-CM

## 2023-04-23 LAB
ANION GAP SERPL CALC-SCNC: 9 MMOL/L — SIGNIFICANT CHANGE UP (ref 5–17)
BUN SERPL-MCNC: 6 MG/DL — LOW (ref 7–23)
CALCIUM SERPL-MCNC: 8.8 MG/DL — SIGNIFICANT CHANGE UP (ref 8.4–10.5)
CHLORIDE SERPL-SCNC: 112 MMOL/L — HIGH (ref 96–108)
CO2 SERPL-SCNC: 22 MMOL/L — SIGNIFICANT CHANGE UP (ref 22–31)
CREAT SERPL-MCNC: 0.69 MG/DL — SIGNIFICANT CHANGE UP (ref 0.5–1.3)
D DIMER BLD IA.RAPID-MCNC: 816 NG/ML DDU — HIGH
EGFR: 105 ML/MIN/1.73M2 — SIGNIFICANT CHANGE UP
FOLATE SERPL-MCNC: 18.4 NG/ML — SIGNIFICANT CHANGE UP
FOLATE SERPL-MCNC: >20 NG/ML — SIGNIFICANT CHANGE UP
GLUCOSE BLDC GLUCOMTR-MCNC: 106 MG/DL — HIGH (ref 70–99)
GLUCOSE BLDC GLUCOMTR-MCNC: 87 MG/DL — SIGNIFICANT CHANGE UP (ref 70–99)
GLUCOSE BLDC GLUCOMTR-MCNC: 90 MG/DL — SIGNIFICANT CHANGE UP (ref 70–99)
GLUCOSE BLDC GLUCOMTR-MCNC: 92 MG/DL — SIGNIFICANT CHANGE UP (ref 70–99)
GLUCOSE SERPL-MCNC: 83 MG/DL — SIGNIFICANT CHANGE UP (ref 70–99)
HAPTOGLOB SERPL-MCNC: 83 MG/DL — SIGNIFICANT CHANGE UP (ref 34–200)
HCT VFR BLD CALC: 34.8 % — LOW (ref 39–50)
HEPARIN-PF4 AB RESULT: <0.6 U/ML — SIGNIFICANT CHANGE UP (ref 0–0.9)
HGB BLD-MCNC: 11.1 G/DL — LOW (ref 13–17)
MCHC RBC-ENTMCNC: 27.5 PG — SIGNIFICANT CHANGE UP (ref 27–34)
MCHC RBC-ENTMCNC: 31.9 GM/DL — LOW (ref 32–36)
MCV RBC AUTO: 86.4 FL — SIGNIFICANT CHANGE UP (ref 80–100)
NRBC # BLD: 0 /100 WBCS — SIGNIFICANT CHANGE UP (ref 0–0)
PF4 HEPARIN CMPLX AB SER-ACNC: NEGATIVE — SIGNIFICANT CHANGE UP
PLATELET # BLD AUTO: 134 K/UL — LOW (ref 150–400)
POTASSIUM SERPL-MCNC: 3.5 MMOL/L — SIGNIFICANT CHANGE UP (ref 3.5–5.3)
POTASSIUM SERPL-SCNC: 3.5 MMOL/L — SIGNIFICANT CHANGE UP (ref 3.5–5.3)
RBC # BLD: 4.03 M/UL — LOW (ref 4.2–5.8)
RBC # FLD: 15 % — HIGH (ref 10.3–14.5)
SODIUM SERPL-SCNC: 143 MMOL/L — SIGNIFICANT CHANGE UP (ref 135–145)
VIT B12 SERPL-MCNC: >2000 PG/ML — HIGH (ref 232–1245)
VIT B12 SERPL-MCNC: >2000 PG/ML — HIGH (ref 232–1245)
WBC # BLD: 5.5 K/UL — SIGNIFICANT CHANGE UP (ref 3.8–10.5)
WBC # FLD AUTO: 5.5 K/UL — SIGNIFICANT CHANGE UP (ref 3.8–10.5)

## 2023-04-23 RX ORDER — LEVOTHYROXINE SODIUM 125 MCG
25 TABLET ORAL AT BEDTIME
Refills: 0 | Status: DISCONTINUED | OUTPATIENT
Start: 2023-04-23 | End: 2023-04-26

## 2023-04-23 RX ORDER — HYDROCORTISONE 20 MG
50 TABLET ORAL EVERY 8 HOURS
Refills: 0 | Status: DISCONTINUED | OUTPATIENT
Start: 2023-04-23 | End: 2023-04-26

## 2023-04-23 RX ADMIN — Medication 25 MICROGRAM(S): at 21:35

## 2023-04-23 RX ADMIN — MUPIROCIN 1 APPLICATION(S): 20 OINTMENT TOPICAL at 17:27

## 2023-04-23 RX ADMIN — SODIUM CHLORIDE 60 MILLILITER(S): 9 INJECTION, SOLUTION INTRAVENOUS at 21:34

## 2023-04-23 RX ADMIN — HEPARIN SODIUM 5000 UNIT(S): 5000 INJECTION INTRAVENOUS; SUBCUTANEOUS at 17:28

## 2023-04-23 RX ADMIN — MUPIROCIN 1 APPLICATION(S): 20 OINTMENT TOPICAL at 05:03

## 2023-04-23 RX ADMIN — HEPARIN SODIUM 5000 UNIT(S): 5000 INJECTION INTRAVENOUS; SUBCUTANEOUS at 05:04

## 2023-04-23 RX ADMIN — CEFEPIME 100 MILLIGRAM(S): 1 INJECTION, POWDER, FOR SOLUTION INTRAMUSCULAR; INTRAVENOUS at 21:34

## 2023-04-23 RX ADMIN — Medication 50 MILLIGRAM(S): at 13:31

## 2023-04-23 RX ADMIN — Medication 50 MILLIGRAM(S): at 21:34

## 2023-04-23 RX ADMIN — CEFEPIME 100 MILLIGRAM(S): 1 INJECTION, POWDER, FOR SOLUTION INTRAMUSCULAR; INTRAVENOUS at 05:03

## 2023-04-23 RX ADMIN — MIDODRINE HYDROCHLORIDE 5 MILLIGRAM(S): 2.5 TABLET ORAL at 05:03

## 2023-04-23 RX ADMIN — CEFEPIME 100 MILLIGRAM(S): 1 INJECTION, POWDER, FOR SOLUTION INTRAMUSCULAR; INTRAVENOUS at 13:32

## 2023-04-23 RX ADMIN — CHLORHEXIDINE GLUCONATE 1 APPLICATION(S): 213 SOLUTION TOPICAL at 13:32

## 2023-04-23 NOTE — PROGRESS NOTE ADULT - SUBJECTIVE AND OBJECTIVE BOX
expressive aphasic      VITAL SIGNS:    T99.5 P85 /72    PHYSICAL EXAM:     GENERAL: no acute distress  HEENT: NC/AT, EOMI, neck supple, MMM  RESPIRATORY: LCTAB/L, no rhonchi, rales, or wheezing  CARDIOVASCULAR: RRR, no murmurs, gallops, rubs  ABDOMINAL: soft, non-tender, non-distended, positive bowel sounds   EXTREMITIES: no clubbing, cyanosis, or edema  NEUROLOGICAL: alert and oriented x 3, non-focal  LYMPHATIC: lymphatic: cervical, supraclavicular, axilla, inguinal  SKIN: no rashes or lesions   MUSCULOSKELETAL: no gross joint deformity                          11.1   5.50  )-----------( 134      ( 23 Apr 2023 06:51 )             34.8     04-23    143  |  112<H>  |  6<L>  ----------------------------<  83  3.5   |  22  |  0.69    Ca    8.8      23 Apr 2023 06:50        MEDICATIONS  (STANDING):  cefepime   IVPB 1000 milliGRAM(s) IV Intermittent every 8 hours  chlorhexidine 2% Cloths 1 Application(s) Topical daily  dextrose 5% + sodium chloride 0.9%. 1000 milliLiter(s) (60 mL/Hr) IV Continuous <Continuous>  heparin   Injectable 5000 Unit(s) SubCutaneous every 12 hours  hydrocortisone sodium succinate Injectable 50 milliGRAM(s) IV Push every 8 hours  levothyroxine Injectable 25 MICROGram(s) IV Push at bedtime  midodrine. 5 milliGRAM(s) Oral three times a day  mupirocin 2% Nasal 1 Application(s) Both Nostrils two times a day

## 2023-04-23 NOTE — PROGRESS NOTE ADULT - ASSESSMENT
62 year old male with altered mental status, s/p bilateral ureteroscopy and stents 3/23. He underwent cystoscopy w/ bilateral ureteroscopy, L stent removed and right stent exchanged on 4/20/2023 stable from urological perspective.     UCx and BCx both no growth  Continue cefepime IV per ID  DVT prophylaxis/OOB  Incentive spirometry  Strict I&O's  Analgesia and antiemetics as needed  NPO for dysphagia. On maintenane fluids  c/w stent on string. do not remove at this time  c/w medical management

## 2023-04-23 NOTE — CONSULT NOTE ADULT - ASSESSMENT
Assessment  Hypotension/Hypothermia: 62y Male with history of adrenal insufficiency, apparently not on any steroids as out patient admitted with hypotension and hypothermia, AMS, NPO now on IV hydration.  Hypothyroidism: Subclinical, not on Synthroid, hypothermic.  Pressure wounds: On treatment, monitored.        Suhas Morelos MD  Cell:  [phone]  Office: [phone]

## 2023-04-23 NOTE — PROGRESS NOTE ADULT - SUBJECTIVE AND OBJECTIVE BOX
Date of Service  : 04-23-23     INTERVAL HPI/OVERNIGHT EVENTS: Seen and examined earlier today .   Vital Signs Last 24 Hrs  T(C): 36.9 (23 Apr 2023 14:05), Max: 37.6 (23 Apr 2023 04:21)  T(F): 98.5 (23 Apr 2023 14:05), Max: 99.6 (23 Apr 2023 04:21)  HR: 81 (23 Apr 2023 14:05) (78 - 81)  BP: 106/71 (23 Apr 2023 14:05) (101/70 - 111/74)  BP(mean): --  RR: 18 (23 Apr 2023 14:05) (18 - 18)  SpO2: 99% (23 Apr 2023 14:05) (97% - 99%)    Parameters below as of 23 Apr 2023 14:05  Patient On (Oxygen Delivery Method): room air      I&O's Summary    22 Apr 2023 07:01  -  23 Apr 2023 07:00  --------------------------------------------------------  IN: 100 mL / OUT: 1200 mL / NET: -1100 mL      MEDICATIONS  (STANDING):  cefepime   IVPB 1000 milliGRAM(s) IV Intermittent every 8 hours  chlorhexidine 2% Cloths 1 Application(s) Topical daily  dextrose 5% + sodium chloride 0.9%. 1000 milliLiter(s) (60 mL/Hr) IV Continuous <Continuous>  heparin   Injectable 5000 Unit(s) SubCutaneous every 12 hours  hydrocortisone sodium succinate Injectable 50 milliGRAM(s) IV Push every 8 hours  levothyroxine Injectable 25 MICROGram(s) IV Push at bedtime  midodrine. 5 milliGRAM(s) Oral three times a day  mupirocin 2% Nasal 1 Application(s) Both Nostrils two times a day    MEDICATIONS  (PRN):    LABS:                        11.1   5.50  )-----------( 134      ( 23 Apr 2023 06:51 )             34.8     04-23    143  |  112<H>  |  6<L>  ----------------------------<  83  3.5   |  22  |  0.69    Ca    8.8      23 Apr 2023 06:50          CAPILLARY BLOOD GLUCOSE      POCT Blood Glucose.: 87 mg/dL (23 Apr 2023 05:56)  POCT Blood Glucose.: 90 mg/dL (23 Apr 2023 00:09)  POCT Blood Glucose.: 86 mg/dL (22 Apr 2023 17:41)              Consultant(s) Notes Reviewed:  [x ] YES  [ ] NO    PHYSICAL EXAM:  GENERAL: NAD, well-groomed, well-developed,not in any distress ,  HEAD:  Atraumatic, Normocephalic  EYES: EOMI, PERRLA, conjunctiva and sclera clear  ENMT: No tonsillar erythema, exudates, or enlargement; Moist mucous membranes, Good dentition, No lesions  NECK: Supple, No JVD, Normal thyroid  NERVOUS SYSTEM:  Alert   CHEST/LUNG: Good air entry bilateral with no  rales, rhonchi, wheezing, or rubs  HEART: Regular rate and rhythm; No murmurs, rubs, or gallops  ABDOMEN: Soft, Nontender, Nondistended; Bowel sounds present  EXTREMITIES:  2+ Peripheral Pulses, No clubbing, cyanosis, or edema    Care Discussed with Consultants/Other Providers [ x] YES  [ ] NO

## 2023-04-23 NOTE — PROGRESS NOTE ADULT - SUBJECTIVE AND OBJECTIVE BOX
DAILY PROGRESS NOTE:         24 hr events:  DAILY PROGRESS NOTE:         24 hr events:      Objective:    Vital Signs Last 24 Hrs  T(C): 37.6 (23 Apr 2023 04:21), Max: 37.6 (23 Apr 2023 04:21)  T(F): 99.6 (23 Apr 2023 04:21), Max: 99.6 (23 Apr 2023 04:21)  HR: 81 (23 Apr 2023 04:21) (62 - 81)  BP: 107/70 (23 Apr 2023 04:21) (101/70 - 111/74)  BP(mean): --  RR: 18 (23 Apr 2023 04:21) (18 - 18)  SpO2: 97% (23 Apr 2023 04:21) (97% - 99%)    Parameters below as of 23 Apr 2023 04:21  Patient On (Oxygen Delivery Method): room air        I&O's Detail    22 Apr 2023 07:01  -  23 Apr 2023 07:00  --------------------------------------------------------  IN:    IV PiggyBack: 100 mL  Total IN: 100 mL    OUT:    Oral Fluid: 0 mL    Voided (mL): 1200 mL  Total OUT: 1200 mL    Total NET: -1100 mL          Physical Exam:    General: NAD, well-nourished  Resp: Breathing comfortably on RA  CV: regular rate   : condom catheter w/ cyu. stent on string in place     Laboratory Results:                          11.1   5.50  )-----------( 134      ( 23 Apr 2023 06:51 )             34.8     04-23    143  |  112<H>  |  6<L>  ----------------------------<  83  3.5   |  22  |  0.69    Ca    8.8      23 Apr 2023 06:50                            Objective:    Vital Signs Last 24 Hrs  T(C): 37.6 (23 Apr 2023 04:21), Max: 37.6 (23 Apr 2023 04:21)  T(F): 99.6 (23 Apr 2023 04:21), Max: 99.6 (23 Apr 2023 04:21)  HR: 81 (23 Apr 2023 04:21) (62 - 81)  BP: 107/70 (23 Apr 2023 04:21) (101/70 - 111/74)  BP(mean): --  RR: 18 (23 Apr 2023 04:21) (18 - 18)  SpO2: 97% (23 Apr 2023 04:21) (97% - 99%)    Parameters below as of 23 Apr 2023 04:21  Patient On (Oxygen Delivery Method): room air        I&O's Detail    22 Apr 2023 07:01  -  23 Apr 2023 07:00  --------------------------------------------------------  IN:    IV PiggyBack: 100 mL  Total IN: 100 mL    OUT:    Oral Fluid: 0 mL    Voided (mL): 1200 mL  Total OUT: 1200 mL    Total NET: -1100 mL          Physical Exam:    General: NAD, well-nourished  Resp: Breathing comfortably on RA  CV: regular rate   :  Psych: AOx3  Neuro: No focal deficits       Laboratory Results:                          11.1   5.50  )-----------( 134      ( 23 Apr 2023 06:51 )             34.8     04-23    143  |  112<H>  |  6<L>  ----------------------------<  83  3.5   |  22  |  0.69    Ca    8.8      23 Apr 2023 06:50

## 2023-04-23 NOTE — PROGRESS NOTE ADULT - ASSESSMENT
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  Wife has noticed that patient has been less attentive, not talking.  Says that she acts like this when he has a UTI.  Patient recently had 2 ureteral stents placed for kidney stones.  Denies fevers, chills, nausea, vomiting.  History given by wife.       Problem/Plan - 1:  ·  Problem: Frequent UTI.   ·  Plan: S/P IV Abxs . Negative  urine C/S .   No fever or Leucocytosis .   ID help appreciated .   On  cefepime for 1 week .       Problem/Plan - 2:  ·  Problem: Hydroureteronephrosis.   ·  Plan: S/P Stents . Urology following.   S/P   cystoscopy w/ bilateral ureteroscopy wit     Problem/Plan - 3:  ·  Problem: H/O hypotension.   ·  Plan: on Midodrine.     Problem/Plan - 4:  ·  Problem: H/O cerebellar ataxia.   ·  Plan: Supportive care.     Problem/Plan - 5:  ·  Problem: H/O Dysphagia .   ·  Plan: S/S following. IVF.      Problem/Plan - 6:  ·  Problem: Decubitus Ulcer   .   ·  Plan: Wound care helping .     Problem/Plan - 7:  ·  Problem: Hypothermia & Bradycardia  .   ·  Plan: On Abxs. TSH and Free T4 noted. Endo helping.      Problem/Plan - 8:  ·  Problem: Thrombocytopenia   .   ·  Plan: Hematology consult noted. D dimer high so will check CTA and doppler legs .     D/W Wife and said he will eat at home once discharged so doesn't want peg etc.

## 2023-04-23 NOTE — CONSULT NOTE ADULT - SUBJECTIVE AND OBJECTIVE BOX
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  Wife has noticed that patient has been less attentive, not talking.  Says that she acts like this when he has a UTI.  Patient recently had 2 ureteral stents placed for kidney stones.  Denies fevers, chills, nausea, vomiting.  History given by wife. (14 Apr 2023 19:55)  Patient has some history of adrenal insufficiency, he is non verbal now, could not get full history, NPO on thermal blanket.  PAST MEDICAL & SURGICAL HISTORY:  H/O cerebellar ataxia  -diagnosed in 2019      History of hypotension      Anemia      Lacunar infarction  -chronic      Pneumonia, aspiration  -april 2022 (intubated for 7 days at [facility])      Adrenal insufficiency      Low body temperature      Pressure ulcer of left heel      Pressure ulcer of buttock      H/O sepsis      H/O cystoscopy          FAMILY HISTORY:  FH: dementia (Mother)    FH: type 2 diabetes (Mother)    Family history of CVA (Father)        Social History:    Outpatient Medications:    MEDICATIONS  (STANDING):  cefepime   IVPB 1000 milliGRAM(s) IV Intermittent every 8 hours  chlorhexidine 2% Cloths 1 Application(s) Topical daily  dextrose 5% + sodium chloride 0.9%. 1000 milliLiter(s) (60 mL/Hr) IV Continuous <Continuous>  heparin   Injectable 5000 Unit(s) SubCutaneous every 12 hours  hydrocortisone sodium succinate Injectable 50 milliGRAM(s) IV Push every 8 hours  levothyroxine Injectable 25 MICROGram(s) IV Push at bedtime  midodrine. 5 milliGRAM(s) Oral three times a day  mupirocin 2% Nasal 1 Application(s) Both Nostrils two times a day    MEDICATIONS  (PRN):      Allergies    No Known Allergies    Intolerances      Review of Systems:  UNABLE TO OBTAIN  Cardiovascular: No chest pain, no palpitations  Respiratory: No wheezing, no cough  GI: No nausea, no vomiting  : No dysuria        ALL OTHER SYSTEMS REVIEWED AND NEGATIVE    PHYSICAL EXAM:  VITALS: T(C): 37.6 (04-23-23 @ 04:21)  T(F): 99.6 (04-23-23 @ 04:21), Max: 99.6 (04-23-23 @ 04:21)  HR: 81 (04-23-23 @ 04:21) (62 - 81)  BP: 107/70 (04-23-23 @ 04:21) (101/70 - 111/74)  RR:  (18 - 18)  SpO2:  (97% - 99%)  Wt(kg): --  THYROID: Normal size, no palpable nodules  RESPIRATORY: Clear to auscultation bilaterally.  CARDIOVASCULAR: Si S2, No murmurs;  GI: Soft, non distended.      POCT Blood Glucose.: 87 mg/dL (04-23-23 @ 05:56)  POCT Blood Glucose.: 90 mg/dL (04-23-23 @ 00:09)  POCT Blood Glucose.: 86 mg/dL (04-22-23 @ 17:41)  POCT Blood Glucose.: 117 mg/dL (04-22-23 @ 13:03)  POCT Blood Glucose.: 96 mg/dL (04-22-23 @ 06:17)  POCT Blood Glucose.: 85 mg/dL (04-22-23 @ 00:32)  POCT Blood Glucose.: 74 mg/dL (04-21-23 @ 17:27)  POCT Blood Glucose.: 98 mg/dL (04-21-23 @ 05:56)  POCT Blood Glucose.: 345 mg/dL (04-21-23 @ 05:53)  POCT Blood Glucose.: 107 mg/dL (04-21-23 @ 00:21)  POCT Blood Glucose.: 105 mg/dL (04-20-23 @ 19:52)  POCT Blood Glucose.: 84 mg/dL (04-20-23 @ 12:04)                            11.1   5.50  )-----------( 134      ( 23 Apr 2023 06:51 )             34.8       04-23    143  |  112<H>  |  6<L>  ----------------------------<  83  3.5   |  22  |  0.69    eGFR: 105    Ca    8.8      04-23        Thyroid Function Tests:  04-21 @ 06:56 TSH 5.32 FreeT4 0.9 T3 -- Anti TPO -- Anti Thyroglobulin Ab -- TSI --          Radiology:

## 2023-04-23 NOTE — CONSULT NOTE ADULT - ASSESSMENT
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  Wife has noticed that patient has been less attentive, not talking.  Says that she acts like this when he has a UTI.  Patient recently had 2 ureteral stents placed for kidney stones.  Denies fevers, chills, nausea, vomiting.  History given by wife.   Recent CBC showed progressive thrombocytopenia    R/O low grade DIC, HIT , immunologic

## 2023-04-23 NOTE — CONSULT NOTE ADULT - SUBJECTIVE AND OBJECTIVE BOX
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  Wife has noticed that patient has been less attentive, not talking.  Says that she acts like this when he has a UTI.  Patient recently had 2 ureteral stents placed for kidney stones.  Denies fevers, chills, nausea, vomiting.  History given by wife.   Recent CBC showed progressive thrombocytopenia    REVIEW OF SYSTEMS:  from chart  CONSTITUTIONAL: No weakness, fevers or chills, weight loss  EYES/ENT:  unable to evaluate  RESPIRATORY: No cough, wheezing, hemoptysis; No shortness of breath  CARDIOVASCULAR: No chest pain or palpitations  GASTROINTESTINAL: unable to evaluate  GENITOURINARY: No dysuria, frequency or hematuria  NEUROLOGICAL: No dizziness, numbness, or weakness  SKIN: No itching, burning, rashes, or lesions   All other review of systems is negative unless indicated above.    Allergies and Intolerances:        Allergies:  	No Known Allergies:     Home Medications:   * Patient Currently Takes Medications as of 14-Apr-2023 19:47 documented in Structured Notes  · 	midodrine 5 mg oral tablet: Last Dose Taken:  , 1 tab(s) orally 3 times a day    .    PAST MEDICAL HISTORY:  Adrenal insufficiency     Anemia     H/O cerebellar ataxia -diagnosed in 2019    H/O sepsis     History of hypotension     Lacunar infarction -chronic    Low body temperature     Pneumonia, aspiration -april 2022 (intubated for 7 days at [facility])    Pressure ulcer of buttock     Pressure ulcer of left heel.     PAST SURGICAL HISTORY:  H/O cystoscopy.     FAMILY HISTORY:  Father  Still living? Unknown  Family history of CVA, Age at diagnosis: Age Unknown    Mother  Still living? No  FH: dementia, Age at diagnosis: Age Unknown  FH: type 2 diabetes, Age at diagnosis: Age Unknown.    SOCIAL HISTORY  from [place]  · Substance use	No      Tobacco Screening:  · Core Measure Site	No    VITAL SIGNS:    T97.8 P78 Bp111/74 RR18    PHYSICAL EXAM:     GENERAL: no acute distress  HEENT: NC/AT, EOMI, neck supple, MMM  RESPIRATORY: LCTAB/L, no rhonchi, rales, or wheezing  CARDIOVASCULAR: RRR, no murmurs, gallops, rubs  ABDOMINAL: soft, non-tender, non-distended, positive bowel sounds   EXTREMITIES: no clubbing, cyanosis, or edema; wated & contracted  NEUROLOGICAL: alert and oriented x 3, non-focal  LYMPHATIC: lymphatic: cervical, supraclavicular, axilla, inguinal  SKIN: no rashes : healed decubitus ulcer  MUSCULOSKELETAL: no gross joint deformity; contracted                          11.9   5.84  )-----------( 119      ( 22 Apr 2023 07:04 )             36.6     04-22    142  |  112<H>  |  7   ----------------------------<  100<H>  3.7   |  22  |  0.76    Ca    8.9      22 Apr 2023 07:03        MEDICATIONS  (STANDING):  cefepime   IVPB 1000 milliGRAM(s) IV Intermittent every 8 hours  chlorhexidine 2% Cloths 1 Application(s) Topical daily  dextrose 5% + sodium chloride 0.9%. 1000 milliLiter(s) (60 mL/Hr) IV Continuous <Continuous>  heparin   Injectable 5000 Unit(s) SubCutaneous every 12 hours  midodrine. 5 milliGRAM(s) Oral three times a day  mupirocin 2% Nasal 1 Application(s) Both Nostrils two times a day

## 2023-04-24 LAB
ANION GAP SERPL CALC-SCNC: 10 MMOL/L — SIGNIFICANT CHANGE UP (ref 5–17)
BUN SERPL-MCNC: 8 MG/DL — SIGNIFICANT CHANGE UP (ref 7–23)
CALCIUM SERPL-MCNC: 8.8 MG/DL — SIGNIFICANT CHANGE UP (ref 8.4–10.5)
CHLORIDE SERPL-SCNC: 110 MMOL/L — HIGH (ref 96–108)
CO2 SERPL-SCNC: 23 MMOL/L — SIGNIFICANT CHANGE UP (ref 22–31)
CREAT SERPL-MCNC: 0.7 MG/DL — SIGNIFICANT CHANGE UP (ref 0.5–1.3)
EGFR: 105 ML/MIN/1.73M2 — SIGNIFICANT CHANGE UP
FSP PPP-MCNC: < 5 UG/ML — SIGNIFICANT CHANGE UP
GLUCOSE BLDC GLUCOMTR-MCNC: 103 MG/DL — HIGH (ref 70–99)
GLUCOSE BLDC GLUCOMTR-MCNC: 105 MG/DL — HIGH (ref 70–99)
GLUCOSE BLDC GLUCOMTR-MCNC: 106 MG/DL — HIGH (ref 70–99)
GLUCOSE BLDC GLUCOMTR-MCNC: 107 MG/DL — HIGH (ref 70–99)
GLUCOSE BLDC GLUCOMTR-MCNC: 109 MG/DL — HIGH (ref 70–99)
GLUCOSE SERPL-MCNC: 103 MG/DL — HIGH (ref 70–99)
HCT VFR BLD CALC: 34.4 % — LOW (ref 39–50)
HGB BLD-MCNC: 11.5 G/DL — LOW (ref 13–17)
MCHC RBC-ENTMCNC: 28.3 PG — SIGNIFICANT CHANGE UP (ref 27–34)
MCHC RBC-ENTMCNC: 33.4 GM/DL — SIGNIFICANT CHANGE UP (ref 32–36)
MCV RBC AUTO: 84.5 FL — SIGNIFICANT CHANGE UP (ref 80–100)
NRBC # BLD: 0 /100 WBCS — SIGNIFICANT CHANGE UP (ref 0–0)
PLATELET # BLD AUTO: 149 K/UL — LOW (ref 150–400)
POTASSIUM SERPL-MCNC: 3.2 MMOL/L — LOW (ref 3.5–5.3)
POTASSIUM SERPL-SCNC: 3.2 MMOL/L — LOW (ref 3.5–5.3)
RBC # BLD: 4.07 M/UL — LOW (ref 4.2–5.8)
RBC # FLD: 14.7 % — HIGH (ref 10.3–14.5)
SODIUM SERPL-SCNC: 143 MMOL/L — SIGNIFICANT CHANGE UP (ref 135–145)
SURGICAL PATHOLOGY STUDY: SIGNIFICANT CHANGE UP
T4 FREE SERPL-MCNC: 0.9 NG/DL — SIGNIFICANT CHANGE UP (ref 0.9–1.8)
TSH SERPL-MCNC: 1.43 UIU/ML — SIGNIFICANT CHANGE UP (ref 0.27–4.2)
WBC # BLD: 6.12 K/UL — SIGNIFICANT CHANGE UP (ref 3.8–10.5)
WBC # FLD AUTO: 6.12 K/UL — SIGNIFICANT CHANGE UP (ref 3.8–10.5)

## 2023-04-24 PROCEDURE — 93970 EXTREMITY STUDY: CPT | Mod: 26

## 2023-04-24 PROCEDURE — 71275 CT ANGIOGRAPHY CHEST: CPT | Mod: 26

## 2023-04-24 PROCEDURE — 93010 ELECTROCARDIOGRAM REPORT: CPT

## 2023-04-24 RX ORDER — POTASSIUM CHLORIDE 20 MEQ
10 PACKET (EA) ORAL
Refills: 0 | Status: COMPLETED | OUTPATIENT
Start: 2023-04-24 | End: 2023-04-24

## 2023-04-24 RX ADMIN — Medication 50 MILLIGRAM(S): at 22:36

## 2023-04-24 RX ADMIN — MIDODRINE HYDROCHLORIDE 5 MILLIGRAM(S): 2.5 TABLET ORAL at 05:06

## 2023-04-24 RX ADMIN — Medication 100 MILLIEQUIVALENT(S): at 12:42

## 2023-04-24 RX ADMIN — CHLORHEXIDINE GLUCONATE 1 APPLICATION(S): 213 SOLUTION TOPICAL at 12:44

## 2023-04-24 RX ADMIN — MIDODRINE HYDROCHLORIDE 5 MILLIGRAM(S): 2.5 TABLET ORAL at 17:01

## 2023-04-24 RX ADMIN — HEPARIN SODIUM 5000 UNIT(S): 5000 INJECTION INTRAVENOUS; SUBCUTANEOUS at 17:00

## 2023-04-24 RX ADMIN — CEFEPIME 100 MILLIGRAM(S): 1 INJECTION, POWDER, FOR SOLUTION INTRAMUSCULAR; INTRAVENOUS at 05:05

## 2023-04-24 RX ADMIN — Medication 25 MICROGRAM(S): at 22:36

## 2023-04-24 RX ADMIN — CEFEPIME 100 MILLIGRAM(S): 1 INJECTION, POWDER, FOR SOLUTION INTRAMUSCULAR; INTRAVENOUS at 16:58

## 2023-04-24 RX ADMIN — HEPARIN SODIUM 5000 UNIT(S): 5000 INJECTION INTRAVENOUS; SUBCUTANEOUS at 05:06

## 2023-04-24 RX ADMIN — MIDODRINE HYDROCHLORIDE 5 MILLIGRAM(S): 2.5 TABLET ORAL at 12:44

## 2023-04-24 RX ADMIN — CEFEPIME 100 MILLIGRAM(S): 1 INJECTION, POWDER, FOR SOLUTION INTRAMUSCULAR; INTRAVENOUS at 23:04

## 2023-04-24 RX ADMIN — MUPIROCIN 1 APPLICATION(S): 20 OINTMENT TOPICAL at 17:00

## 2023-04-24 RX ADMIN — Medication 50 MILLIGRAM(S): at 05:06

## 2023-04-24 RX ADMIN — SODIUM CHLORIDE 60 MILLILITER(S): 9 INJECTION, SOLUTION INTRAVENOUS at 22:35

## 2023-04-24 RX ADMIN — Medication 100 MILLIEQUIVALENT(S): at 15:31

## 2023-04-24 RX ADMIN — MUPIROCIN 1 APPLICATION(S): 20 OINTMENT TOPICAL at 05:06

## 2023-04-24 RX ADMIN — Medication 50 MILLIGRAM(S): at 13:54

## 2023-04-24 RX ADMIN — Medication 100 MILLIEQUIVALENT(S): at 13:53

## 2023-04-24 RX ADMIN — SODIUM CHLORIDE 60 MILLILITER(S): 9 INJECTION, SOLUTION INTRAVENOUS at 12:42

## 2023-04-24 NOTE — CHART NOTE - NSCHARTNOTEFT_GEN_A_CORE
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       RN report HR of 48    ICU Vital Signs Last 24 Hrs  T(C): 36.3 (24 Apr 2023 14:07), Max: 36.6 (23 Apr 2023 18:10)  T(F): 97.4 (24 Apr 2023 14:07), Max: 97.9 (23 Apr 2023 18:10)  HR: 46 (24 Apr 2023 14:07) (46 - 73)  BP: 112/59 (24 Apr 2023 14:07) (101/64 - 119/76)  BP(mean): --  ABP: --  ABP(mean): --  RR: 18 (24 Apr 2023 14:07) (18 - 18)  SpO2: 100% (24 Apr 2023 14:07) (97% - 100%)    O2 Parameters below as of 24 Apr 2023 14:07  Patient On (Oxygen Delivery Method): room air        EKG completed  HR 46 with SB with 1st Degree HB       Called Cardiology Dr. Krueger Reviewed chart and EKG Patient currently NPO not on any BP meds or  Beta Blocker at this time.  Patient is currently hypothermic on a Heating blanket. [id number] recommendations to continue with conservative management with no further recommendations. D./w Dr. Mccoy.         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       RN report HR of 48    ICU Vital Signs Last 24 Hrs  T(C): 36.3 (24 Apr 2023 14:07), Max: 36.6 (23 Apr 2023 18:10)  T(F): 97.4 (24 Apr 2023 14:07), Max: 97.9 (23 Apr 2023 18:10)  HR: 46 (24 Apr 2023 14:07) (46 - 73)  BP: 112/59 (24 Apr 2023 14:07) (101/64 - 119/76)  BP(mean): --  ABP: --  ABP(mean): --  RR: 18 (24 Apr 2023 14:07) (18 - 18)  SpO2: 100% (24 Apr 2023 14:07) (97% - 100%)    O2 Parameters below as of 24 Apr 2023 14:07  Patient On (Oxygen Delivery Method): room air        EKG completed  HR 46 with SB with 1st Degree HB       Called Cardiology Dr. Krueger Reviewed chart and EKG Patient currently NPO not on any BP meds or Beta Blocker at this time.  Patient is currently hypothermic on a Heating blanket. Cardiology recommendations to continue with conservative management with no further recommendations. D./w Dr. Mccoy.     Patient currently NPO with IVF  Patient with history of dysphagia in presence of acute encephalopathy  currently presenting with swallow profile that places patient at a high risk for aspiration as well as for reduced nutrition/hydration support. Disorders include reduced lingual strength/ROM/Rate of motion, suspected delay in trigger of the swallow reflex, concern for reduced hyo-laryngeal excursion, concern for reduced laryngeal closure. Notable oropharyngeal dysphagia appearing chronic and progressive in nature impacting safety efficiency of swallow function and the patient's ability to sustain nutrition PO. D/W Dr. Mccoy     Recommendation for palliative care at this time 2/2 family refusing PEG and is currently a full code. Dr Mccoy reports Wife said he will eat at home once discharged so doesn't want peg etc. No palliative consult at this time.  Dr. Mccoy to clarify pleasure feeds with family. Should patient/caregiver opt for PO comfort feeding, the least harmful PO diet would be puree with moderately thick liquids via tsp         will monitor  will endorse to night team       Nancy Hammonds NP  Internal Medicine

## 2023-04-24 NOTE — PROGRESS NOTE ADULT - SUBJECTIVE AND OBJECTIVE BOX
Cardiovascular Disease Progress Note  Date of service: 04-24-23 @ 09:44    Overnight events: No acute events overnight.  Pt is in no distress.   Otherwise review of systems negative    Objective Findings:  T(C): 36.6 (04-24-23 @ 00:20), Max: 36.9 (04-23-23 @ 14:05)  HR: 63 (04-24-23 @ 00:20) (63 - 81)  BP: 119/76 (04-24-23 @ 00:20) (101/64 - 119/76)  RR: 18 (04-24-23 @ 00:20) (18 - 18)  SpO2: 100% (04-24-23 @ 00:20) (97% - 100%)  Wt(kg): --  Daily     Daily       Physical Exam:  Gen: NAD; Patient resting comfortably  HEENT: EOMI, Normocephalic/ atraumatic  CV: RRR, normal S1 + S2, no m/r/g  Lungs:  Normal respiratory effort; clear to auscultation bilaterally  Abd: soft, non-tender; bowel sounds present  Ext: No edema; warm and well perfused    Telemetry: N/a    Laboratory Data:                        11.5   6.12  )-----------( 149      ( 24 Apr 2023 07:00 )             34.4     04-24    143  |  110<H>  |  8   ----------------------------<  103<H>  3.2<L>   |  23  |  0.70    Ca    8.8      24 Apr 2023 07:00                Inpatient Medications:  MEDICATIONS  (STANDING):  cefepime   IVPB 1000 milliGRAM(s) IV Intermittent every 8 hours  chlorhexidine 2% Cloths 1 Application(s) Topical daily  dextrose 5% + sodium chloride 0.9%. 1000 milliLiter(s) (60 mL/Hr) IV Continuous <Continuous>  heparin   Injectable 5000 Unit(s) SubCutaneous every 12 hours  hydrocortisone sodium succinate Injectable 50 milliGRAM(s) IV Push every 8 hours  levothyroxine Injectable 25 MICROGram(s) IV Push at bedtime  midodrine. 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       Plan of Care:    #Post-op risk stratification  - s/p cystoscopy w/ bilateral ureteroscopy, L stent removed and right stent exchanged on 4/20/2023. Tolerated procedure well.   - EKG on admission shows sinus rhythm  - TTE from 8/2022 shows normal LV systolic function with no valvular or structural disease  - Pt displays no signs of post-op coronary ischemia  - Continue current management.   - Urology input appreciated.      #Sepsis  - 2/2 UTI  - Abx as per primary team    #Hypotension  - Continue Midodrine      #ACP (advance care planning)-  Advanced care planning was discussed.  Risks, benefits and alternatives of medical treatment and procedures were addressed. 30 additional minutes spent addressing advance care plans.          Over 25 minutes spent on total encounter; more than 50% of the visit was spent counseling and/or coordinating care by the attending physician.      Erik Kelly,  [facility]  Cardiovascular Disease  [phone]

## 2023-04-24 NOTE — PROGRESS NOTE ADULT - SUBJECTIVE AND OBJECTIVE BOX
Date of Service  : 04-24-23 @ 11:22    INTERVAL HPI/OVERNIGHT EVENTS: I feel fine. I want to eat .   Vital Signs Last 24 Hrs  T(C): 36.6 (24 Apr 2023 00:20), Max: 36.9 (23 Apr 2023 14:05)  T(F): 97.8 (24 Apr 2023 00:20), Max: 98.5 (23 Apr 2023 14:05)  HR: 63 (24 Apr 2023 00:20) (63 - 81)  BP: 119/76 (24 Apr 2023 00:20) (101/64 - 119/76)  BP(mean): --  RR: 18 (24 Apr 2023 00:20) (18 - 18)  SpO2: 100% (24 Apr 2023 00:20) (97% - 100%)    Parameters below as of 24 Apr 2023 00:20  Patient On (Oxygen Delivery Method): room air      I&O's Summary    23 Apr 2023 07:01  -  24 Apr 2023 07:00  --------------------------------------------------------  IN: 340 mL / OUT: 1600 mL / NET: -1260 mL    24 Apr 2023 07:01  -  24 Apr 2023 11:22  --------------------------------------------------------  IN: 0 mL / OUT: 0 mL / NET: 0 mL      MEDICATIONS  (STANDING):  cefepime   IVPB 1000 milliGRAM(s) IV Intermittent every 8 hours  chlorhexidine 2% Cloths 1 Application(s) Topical daily  dextrose 5% + sodium chloride 0.9%. 1000 milliLiter(s) (60 mL/Hr) IV Continuous <Continuous>  heparin   Injectable 5000 Unit(s) SubCutaneous every 12 hours  hydrocortisone sodium succinate Injectable 50 milliGRAM(s) IV Push every 8 hours  levothyroxine Injectable 25 MICROGram(s) IV Push at bedtime  midodrine. 5 milliGRAM(s) Oral three times a day  mupirocin 2% Nasal 1 Application(s) Both Nostrils two times a day    MEDICATIONS  (PRN):    LABS:                        11.5   6.12  )-----------( 149      ( 24 Apr 2023 07:00 )             34.4     04-24    143  |  110<H>  |  8   ----------------------------<  103<H>  3.2<L>   |  23  |  0.70    Ca    8.8      24 Apr 2023 07:00          CAPILLARY BLOOD GLUCOSE      POCT Blood Glucose.: 109 mg/dL (24 Apr 2023 06:50)  POCT Blood Glucose.: 106 mg/dL (24 Apr 2023 00:28)  POCT Blood Glucose.: 106 mg/dL (23 Apr 2023 17:35)  POCT Blood Glucose.: 92 mg/dL (23 Apr 2023 12:52)            Consultant(s) Notes Reviewed:  [x ] YES  [ ] NO    PHYSICAL EXAM:  GENERAL: Not in any distress ,  HEAD:  Atraumatic, Normocephalic  EYES: EOMI, PERRLA, conjunctiva and sclera clear  ENMT: No tonsillar erythema, exudates, or enlargement; Moist mucous membranes, Good dentition, No lesions  NECK: Supple, No JVD, Normal thyroid  NERVOUS SYSTEM:  Alert & more communicative.   CHEST/LUNG: Good air entry bilateral with no  rales, rhonchi, wheezing, or rubs  HEART: Regular rate and rhythm; No murmurs, rubs, or gallops  ABDOMEN: Soft, Nontender, Nondistended; Bowel sounds present  EXTREMITIES:  2+ Peripheral Pulses, No clubbing, cyanosis, or edema    Care Discussed with Consultants/Other Providers [ x] YES  [ ] NO

## 2023-04-24 NOTE — PROGRESS NOTE ADULT - SUBJECTIVE AND OBJECTIVE BOX
Chief complaint  Patient is a 62y old  Male who presents with a chief complaint of likely UTI (24 Apr 2023 11:43)         Labs and Fingersticks  CAPILLARY BLOOD GLUCOSE      POCT Blood Glucose.: 103 mg/dL (24 Apr 2023 12:29)  POCT Blood Glucose.: 109 mg/dL (24 Apr 2023 06:50)  POCT Blood Glucose.: 106 mg/dL (24 Apr 2023 00:28)  POCT Blood Glucose.: 106 mg/dL (23 Apr 2023 17:35)      Anion Gap, Serum: 10 (04-24 @ 07:00)  Anion Gap, Serum: 9 (04-23 @ 06:50)      Calcium, Total Serum: 8.8 (04-24 @ 07:00)  Calcium, Total Serum: 8.8 (04-23 @ 06:50)          04-24    143  |  110<H>  |  8   ----------------------------<  103<H>  3.2<L>   |  23  |  0.70    Ca    8.8      24 Apr 2023 07:00                          11.5   6.12  )-----------( 149      ( 24 Apr 2023 07:00 )             34.4     Medications  MEDICATIONS  (STANDING):  cefepime   IVPB 1000 milliGRAM(s) IV Intermittent every 8 hours  chlorhexidine 2% Cloths 1 Application(s) Topical daily  dextrose 5% + sodium chloride 0.9%. 1000 milliLiter(s) (60 mL/Hr) IV Continuous <Continuous>  heparin   Injectable 5000 Unit(s) SubCutaneous every 12 hours  hydrocortisone sodium succinate Injectable 50 milliGRAM(s) IV Push every 8 hours  levothyroxine Injectable 25 MICROGram(s) IV Push at bedtime  midodrine. 5 milliGRAM(s) Oral three times a day  mupirocin 2% Nasal 1 Application(s) Both Nostrils two times a day  potassium chloride  10 mEq/100 mL IVPB 10 milliEquivalent(s) IV Intermittent every 1 hour      Physical Exam  General: Patient comfortable in bed   Vital Signs Last 12 Hrs  T(F): --  HR: --  BP: --  BP(mean): --  RR: --  SpO2: --    CVS: S1S2   Respiratory: No wheezing, no crepitations  GI: Abdomen soft, bowel sounds positive  Musculoskeletal:  moves all extremities  : Voiding        Chief complaint  Patient is a 62y old  Male who presents with a chief complaint of likely UTI (24 Apr 2023 11:43)         Labs and Fingersticks  CAPILLARY BLOOD GLUCOSE      POCT Blood Glucose.: 103 mg/dL (24 Apr 2023 12:29)  POCT Blood Glucose.: 109 mg/dL (24 Apr 2023 06:50)  POCT Blood Glucose.: 106 mg/dL (24 Apr 2023 00:28)  POCT Blood Glucose.: 106 mg/dL (23 Apr 2023 17:35)      Anion Gap, Serum: 10 (04-24 @ 07:00)  Anion Gap, Serum: 9 (04-23 @ 06:50)      Calcium, Total Serum: 8.8 (04-24 @ 07:00)  Calcium, Total Serum: 8.8 (04-23 @ 06:50)          04-24    143  |  110<H>  |  8   ----------------------------<  103<H>  3.2<L>   |  23  |  0.70    Ca    8.8      24 Apr 2023 07:00                          11.5   6.12  )-----------( 149      ( 24 Apr 2023 07:00 )             34.4     Medications  MEDICATIONS  (STANDING):  cefepime   IVPB 1000 milliGRAM(s) IV Intermittent every 8 hours  chlorhexidine 2% Cloths 1 Application(s) Topical daily  dextrose 5% + sodium chloride 0.9%. 1000 milliLiter(s) (60 mL/Hr) IV Continuous <Continuous>  heparin   Injectable 5000 Unit(s) SubCutaneous every 12 hours  hydrocortisone sodium succinate Injectable 50 milliGRAM(s) IV Push every 8 hours  levothyroxine Injectable 25 MICROGram(s) IV Push at bedtime  midodrine. 5 milliGRAM(s) Oral three times a day  mupirocin 2% Nasal 1 Application(s) Both Nostrils two times a day  potassium chloride  10 mEq/100 mL IVPB 10 milliEquivalent(s) IV Intermittent every 1 hour      Physical Exam  General: Patient comfortable in bed   Vital Signs Last 12 Hrs  T(F): --  HR: --  BP: --  BP(mean): --  RR: --  SpO2: --    CVS: S1S2   Respiratory: No wheezing, no crepitations  GI: Abdomen soft, bowel sounds positive  Musculoskeletal:  moves all extremities  : Voiding

## 2023-04-24 NOTE — PROGRESS NOTE ADULT - ASSESSMENT
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  Wife has noticed that patient has been less attentive, not talking.  Says that she acts like this when he has a UTI.  Patient recently had 2 ureteral stents placed for kidney stones.  Denies fevers, chills, nausea, vomiting.  History given by wife.       Problem/Plan - 1:  ·  Problem: Frequent UTI.   ·  Plan: S/P IV Abxs . Negative  urine C/S .   No fever or Leucocytosis .   ID help appreciated .   On  cefepime for 1 week .       Problem/Plan - 2:  ·  Problem: Hydroureteronephrosis.   ·  Plan: S/P Stents . Urology following.   S/P   cystoscopy w/ bilateral ureteroscopy wit     Problem/Plan - 3:  ·  Problem: H/O hypotension.   ·  Plan: on Midodrine.     Problem/Plan - 4:  ·  Problem: H/O cerebellar ataxia.   ·  Plan: Supportive care.     Problem/Plan - 5:  ·  Problem: H/O Dysphagia .   ·  Plan: S/S following. IVF. Wants to eat today .      Problem/Plan - 6:  ·  Problem: Decubitus Ulcer   .   ·  Plan: Wound care helping .     Problem/Plan - 7:  ·  Problem: Hypothermia & Bradycardia  .   ·  Plan: On Abxs. TSH and Free T4 noted. Endo helping.      Problem/Plan - 8:  ·  Problem: Thrombocytopenia   .   ·  Plan: Hematology consult noted. D dimer high so will check CTA and doppler legs .      Problem/Plan - 9:  ·  Problem: Hypokalemia   .   ·  Plan: Replacing.     D/W Wife and said he will eat at home once discharged so doesn't want peg etc.

## 2023-04-24 NOTE — CHART NOTE - NSCHARTNOTEFT_GEN_A_CORE
Nutrition Follow Up Note  Patient seen for: Nutrition Services Follow up     Chart reviewed, events noted. 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  Wife has noticed that patient has been less attentive, not talking.  Says that she acts like this when he has a UTI.  Patient recently had 2 ureteral stents placed for kidney stones.  Denies fevers, chills, nausea, vomiting."     Source: [] Patient       [x] Current Medical Record     [] RN        [] Family at bedside       [X] Other: Attempted to reach wife at via telephone however she was unavailable     -If unable to interview patient: [] Trach/Vent/BiPAP  [X] Disoriented/confused/inappropriate to interview- pt is non verbal     Diet Order:   Diet, NPO:   Except Medications (04-20-23 @ 23:34) [Active]    - Is current order appropriate/adequate? [] Yes  [X]  No: Inadequate Diet X 5 days (was ordered for soft and bite sizes on 4/20/23; order was discontinued on the same day)    - Nutrition-related concerns:      - Assess by SLP, 4/24/23, recommendations to remain NPO      - As per Internal Medicine note 4/24/23 Pt's wife  "said he will eat at home once discharged so doesn't want peg etc"      - Hypothermia; bear hugger in place      - Hypokalemia; potassium chloride ordered      GI:  Last BM 4//16/23  Bowel Regimen? [] Yes   [X] No    Weights: Drug Dosing Weight: 64.2 kg/ 141.5 pounds (4/14/23)  -- Bed Scale weight obtained by RD during visit: 57 kg/ 125.7 pounds (4/24/23); 58.2 kg/ 128.3 pounds (4/18/23);   -- Significant weight loss of 2.6 pounds/ 2%  noted X 1 week    Nutritionally Pertinent MEDICATIONS  (STANDING):  cefepime   IVPB 1000 milliGRAM(s) IV Intermittent every 8 hours  dextrose 5% + sodium chloride 0.9%. 1000 milliLiter(s) (60 mL/Hr) IV Continuous <Continuous>  heparin   Injectable 5000 Unit(s) SubCutaneous every 12 hours  levothyroxine Injectable 25 MICROGram(s) IV Push at bedtime  midodrine. 5 milliGRAM(s) Oral three times a day  potassium chloride  10 mEq/100 mL IVPB 10 milliEquivalent(s) IV Intermittent every 1 hour    Pertinent Labs: 04-24 @ 07:00: Na 143, BUN 8, Cr 0.70, <H>, K+ 3.2<L>, Phos --, Mg --, Alk Phos --, ALT/SGPT --, AST/SGOT --, HbA1c --      Finger Sticks:  POCT Blood Glucose.: 103 mg/dL (04-24-23 @ 12:29)  POCT Blood Glucose.: 109 mg/dL (04-24-23 @ 06:50)  POCT Blood Glucose.: 106 mg/dL (04-24-23 @ 00:28)  POCT Blood Glucose.: 106 mg/dL (04-23-23 @ 17:35)      Skin per nursing documentation:   -- Right Buttock-Stage 3  -- Sacrum Stage 2  --Left Lower Leg- Stage 2  -- Left Heel-DTI    Edema: -- +1 edema to right arm    Estimated Needs:   [] no change since previous assessment:   [X] Recalculated: Based on recent bed scale weight of 57 kg  30-35 kcal/kg= 3192-4568 kcal  1.2-1.4 gm/kg=68.4-79.8 gm/kg pro     Previous Nutrition Diagnosis:   1.Inadequate Oral Intake  2. Increased Nutrient Needs  Nutrition Diagnosis is: [X] ongoing  [] resolved [] not applicable     New Nutrition Diagnosis: Acute moderate malnutrition, related to decreased ability to consume sufficient energy as evidenced by 2% weight loss X 1 week and <75% of estimated energy needs for >/=7 days     Nutrition Care Plan:  [X] In Progress  [] Achieved  [] Not applicable    Nutrition Interventions:     Education Provided:       [] Yes:  [X] No: Pt not a candidate for education        Recommendations:      **If alternated means of nutrition are within pt and his family's GOC:  1. Patient at risk for refeeding syndrome, as tolerated, recommend Jevity 1.5 @ 10 mL/hr and advance by 10mL q12H until goal rate of 50mL/hr x24hrs is reached. Regimen at goal provides 1200 mL total volume, 912 mL free water, 1800 kcal/d and 77 g pro/day (31.5 kcal/kg and 1.35 g/kg) based on bed scale weight obtained 4/24/23 of 57 kg.    2. Trend electrolytes closely and replete as indicated   3. Add Multivitamin and Thiamine daily in setting of refeeding risk; add Vitamin C if medically appropriate for wound healing.    4 .RD remains available to adjust EN formulary, volume/rate as needed     ** If PO diet is restarted and deemed medically appropriate:    1. Recommend no therapeutic dietary restrictions   2. Defer diet/texture advancement to medical team/SLP as indicated    3. Consider adding multivitamin and vitamin C supplements if no medical contraindications to aid in wound healing.    4. Recommend adding Ensure Plus High Protein 2x/day (350 kcal and 20 g protein per serving ).    Monitoring and Evaluation:   Continue to monitor nutritional intake, tolerance to diet prescription, weights, labs, skin integrity    RD remains available upon request and will follow up per protocol  Kelsea Tafoya, MS,RDN, CDN, Pager # [phone] or TEAMS

## 2023-04-24 NOTE — PROGRESS NOTE ADULT - ASSESSMENT
Assessment  Hypotension/Hypothermia: 62y Male with history of adrenal insufficiency, apparently not on any steroids as out patient admitted with hypotension and hypothermia, AMS, NPO now on IV hydration.  Hypothyroidism: Subclinical, not on Synthroid, no Hx of thyroid disease, hypothermic.  Pressure wounds: On treatment, monitored  UTI: frequent hx of UTI's. on IV abx, hx of ureter stents.           Discussed plan and management with Dr Jethro Garcia NP - TEAMS  Suhas Morelos MD  Cell: [phone]  Office: [phone]              Assessment  Hypotension/Hypothermia: 62y Male with history of adrenal insufficiency, apparently not on any steroids as out patient admitted with hypotension and  hypothermia, AMS, NPO now on IV hydration.  Hypothyroidism: Subclinical, not on Synthroid, no Hx of thyroid disease, hypothermic.  Pressure wounds: On treatment, monitored  UTI: frequent hx of UTI's. on IV abx, hx of ureter stents.           Discussed plan and management with Dr Jethro Garcia NP - TEAMS  Suhas Morelos MD  Cell: [phone]  Office: [phone]

## 2023-04-24 NOTE — PROGRESS NOTE ADULT - SUBJECTIVE AND OBJECTIVE BOX
DAILY PROGRESS NOTE:         24 hr events:  alethea    Objective:    Vital Signs Last 24 Hrs  T(C): 36.6 (24 Apr 2023 00:20), Max: 36.9 (23 Apr 2023 14:05)  T(F): 97.8 (24 Apr 2023 00:20), Max: 98.5 (23 Apr 2023 14:05)  HR: 63 (24 Apr 2023 00:20) (63 - 81)  BP: 119/76 (24 Apr 2023 00:20) (101/64 - 119/76)  BP(mean): --  RR: 18 (24 Apr 2023 00:20) (18 - 18)  SpO2: 100% (24 Apr 2023 00:20) (97% - 100%)    Parameters below as of 24 Apr 2023 00:20  Patient On (Oxygen Delivery Method): room air        I&O's Detail    23 Apr 2023 07:01  -  24 Apr 2023 07:00  --------------------------------------------------------  IN:    IV PiggyBack: 100 mL    Oral Fluid: 240 mL  Total IN: 340 mL    OUT:    Incontinent per Condom Catheter (mL): 1200 mL    Voided (mL): 400 mL  Total OUT: 1600 mL    Total NET: -1260 mL      24 Apr 2023 07:01  -  24 Apr 2023 11:46  --------------------------------------------------------  IN:  Total IN: 0 mL    OUT:    Oral Fluid: 0 mL  Total OUT: 0 mL    Total NET: 0 mL          Physical Exam:    General: NAD  Resp: Breathing comfortably on RA  CV: regular rate       Laboratory Results:                          11.5   6.12  )-----------( 149      ( 24 Apr 2023 07:00 )             34.4     04-24    143  |  110<H>  |  8   ----------------------------<  103<H>  3.2<L>   |  23  |  0.70    Ca    8.8      24 Apr 2023 07:00

## 2023-04-24 NOTE — PROGRESS NOTE ADULT - SUBJECTIVE AND OBJECTIVE BOX
expressive aphasic    VITAL SIGNS:    T 97.8 P63 /76 RR18    PHYSICAL EXAM:     GENERAL: no acute distress  HEENT: NC/AT, EOMI, neck supple, MMM  RESPIRATORY: LCTAB/L, no rhonchi, rales, or wheezing  CARDIOVASCULAR: RRR, no murmurs, gallops, rubs  ABDOMINAL: soft, non-tender, non-distended, positive bowel sounds   EXTREMITIES: contracted  SKIN: no rashes or lesions; healed decubitus ulcer                        11.5   6.12  )-----------( 149      ( 24 Apr 2023 07:00 )             34.4     04-24    143  |  110<H>  |  8   ----------------------------<  103<H>  3.2<L>   |  23  |  0.70    Ca    8.8      24 Apr 2023 07:00        MEDICATIONS  (STANDING):  cefepime   IVPB 1000 milliGRAM(s) IV Intermittent every 8 hours  chlorhexidine 2% Cloths 1 Application(s) Topical daily  dextrose 5% + sodium chloride 0.9%. 1000 milliLiter(s) (60 mL/Hr) IV Continuous <Continuous>  heparin   Injectable 5000 Unit(s) SubCutaneous every 12 hours  hydrocortisone sodium succinate Injectable 50 milliGRAM(s) IV Push every 8 hours  levothyroxine Injectable 25 MICROGram(s) IV Push at bedtime  midodrine. 5 milliGRAM(s) Oral three times a day  mupirocin 2% Nasal 1 Application(s) Both Nostrils two times a day

## 2023-04-24 NOTE — PROGRESS NOTE ADULT - ASSESSMENT
62 year old male with altered mental status, s/p bilateral ureteroscopy and stents 3/23. He underwent cystoscopy w/ bilateral ureteroscopy, L stent removed and right stent exchanged on 4/20/2023 stable from urological perspective.     UCx and BCx both no growth  Continue cefepime IV per ID  DVT prophylaxis/OOB  Incentive spirometry  Strict I&O's  Analgesia and antiemetics as needed  NPO for dysphagia. On maintenane fluids  c/w stent on string. do not remove at this time. Plan for removal at bedside on 4/25. No cysto needed.

## 2023-04-24 NOTE — CHART NOTE - NSCHARTNOTEFT_GEN_A_CORE
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  -Known history of Pneumonia, aspiration -April 2022 (intubated for 7 days at [facility])  -4/15: RD note;  Poor (<50%)  pt barely eats anything per RN report. Consider formal swallowing evaluation with speech pathologist   -4/16: pt refused to eat ,unable to swallow po meds ,NPO and aspiration precautions maintained ,Poor oral intake within the last 24 hours  -4/24: Urology note; He underwent cystoscopy w/ bilateral ureteroscopy, L stent removed and right stent exchanged on 4/20/2023 stable from urological perspective.     Swallow history:   April 2022 for clinical swallow evaluation with completion of MBS with recommendations for Puree and mildly thick liquids. Seen again in July 2022 with recommendations for minced and moist solid and thin liquids. Additional clinical swallow evaluations in September 2022 with initial recommendations for NPO w/ eventual advancement to puree and thin liquids.  Seen this admission on multiple encounters with recommendations for NPO; please see report for details.       TODAY, patient seen for re-evaluation of swallow function as per request by NP 4/24. Pt remains NPO, WITHOUT non oral means. NAD. More responsive than prior intervention, however similar appearance as compared to initial evaluation. Oral phase marked by poor orientation/reception to a feeding task, however improved as trials progressed with anticipatory responses to tactile stimuli, reduced however functional oral grading/stripping bolus from tsp. Initially prolonged however adequate a-p transport, awareness and manipulation present however as trials progressed significant reduced oral awareness present leading to pocketing of bolus in lateral sulci in LARGE (tsp size) amounts with subsequent anterior loss, provided tactile cues with dry tsp to lingual dorsum (downward pressure to facilitate swallow/improve awareness) with limited benefit. Pharyngeal phase marked by suspected latency in the swallow trigger leading to eventual absent swallow trigger vs suspected significantly latent swallow trigger. Given swallow profile trials were terminated. Oral care provided with wet Toothette.     Impression; Patient with history of dysphagia in presence of acute encephalopathy  currently presenting with swallow profile that places patient at a high risk for aspiration as well as for reduced nutrition/hydration support. Disorders include reduced lingual strength/ROM/Rate of motion, suspected delay in trigger of the swallow reflex, concern for reduced hyo-laryngeal excursion, concern for reduced laryngeal closure. Notable oropharyngeal dysphagia appearing chronic and progressive in nature impacting safety efficiency of swallow function and the patient's ability to sustain nutrition PO.     -GOAL- Pt to tolerate recommended textures during course w/ no s/sx of aspiration   -GOAL - Pt/family/caregiver will demonstrate understanding and carryover of dysphagia management (safe swallow guidelines, dysphagia diet).   -D/w NP Nancy    Recommendation:   -Npo with consideration for NGT in interim to optimize the patient if within poc/goc; if WISH to pursue oral diet/comfort feeds given impairments in swallow safety and efficiency would complete conversation with family to establish goals of care  -Aspiration precautions   -Stringent oral care to reduce oral pathogens   -Palliative care consult to d/w pt/family GOC/POC moving forward   -SLP to remain on consult, pending POC/GOC  -Anticipate needs next level of care pending course and POC/GOC  -Should patient/caregiver opt for PO comfort feeding, the least harmful PO diet would be puree with moderately thick liquids via tsp      Lindy Carrillo MS CCC-SLP Pref[employer] teams   extension [phone]

## 2023-04-25 LAB
ANION GAP SERPL CALC-SCNC: 9 MMOL/L — SIGNIFICANT CHANGE UP (ref 5–17)
BUN SERPL-MCNC: 9 MG/DL — SIGNIFICANT CHANGE UP (ref 7–23)
CALCIUM SERPL-MCNC: 8.8 MG/DL — SIGNIFICANT CHANGE UP (ref 8.4–10.5)
CHLORIDE SERPL-SCNC: 112 MMOL/L — HIGH (ref 96–108)
CO2 SERPL-SCNC: 21 MMOL/L — LOW (ref 22–31)
CREAT SERPL-MCNC: 0.58 MG/DL — SIGNIFICANT CHANGE UP (ref 0.5–1.3)
EGFR: 111 ML/MIN/1.73M2 — SIGNIFICANT CHANGE UP
GLUCOSE BLDC GLUCOMTR-MCNC: 127 MG/DL — HIGH (ref 70–99)
GLUCOSE BLDC GLUCOMTR-MCNC: 131 MG/DL — HIGH (ref 70–99)
GLUCOSE BLDC GLUCOMTR-MCNC: 133 MG/DL — HIGH (ref 70–99)
GLUCOSE SERPL-MCNC: 131 MG/DL — HIGH (ref 70–99)
HCT VFR BLD CALC: 32.6 % — LOW (ref 39–50)
HGB BLD-MCNC: 11 G/DL — LOW (ref 13–17)
MAGNESIUM SERPL-MCNC: 1.6 MG/DL — SIGNIFICANT CHANGE UP (ref 1.6–2.6)
MCHC RBC-ENTMCNC: 28.1 PG — SIGNIFICANT CHANGE UP (ref 27–34)
MCHC RBC-ENTMCNC: 33.7 GM/DL — SIGNIFICANT CHANGE UP (ref 32–36)
MCV RBC AUTO: 83.2 FL — SIGNIFICANT CHANGE UP (ref 80–100)
NRBC # BLD: 0 /100 WBCS — SIGNIFICANT CHANGE UP (ref 0–0)
PLATELET # BLD AUTO: 167 K/UL — SIGNIFICANT CHANGE UP (ref 150–400)
POTASSIUM SERPL-MCNC: 3.1 MMOL/L — LOW (ref 3.5–5.3)
POTASSIUM SERPL-SCNC: 3.1 MMOL/L — LOW (ref 3.5–5.3)
RBC # BLD: 3.92 M/UL — LOW (ref 4.2–5.8)
RBC # FLD: 14.9 % — HIGH (ref 10.3–14.5)
SODIUM SERPL-SCNC: 142 MMOL/L — SIGNIFICANT CHANGE UP (ref 135–145)
WBC # BLD: 6.83 K/UL — SIGNIFICANT CHANGE UP (ref 3.8–10.5)
WBC # FLD AUTO: 6.83 K/UL — SIGNIFICANT CHANGE UP (ref 3.8–10.5)

## 2023-04-25 RX ORDER — POTASSIUM CHLORIDE 20 MEQ
10 PACKET (EA) ORAL
Refills: 0 | Status: COMPLETED | OUTPATIENT
Start: 2023-04-25 | End: 2023-04-25

## 2023-04-25 RX ORDER — MAGNESIUM SULFATE 500 MG/ML
1 VIAL (ML) INJECTION ONCE
Refills: 0 | Status: COMPLETED | OUTPATIENT
Start: 2023-04-25 | End: 2023-04-25

## 2023-04-25 RX ADMIN — Medication 25 MICROGRAM(S): at 21:53

## 2023-04-25 RX ADMIN — MIDODRINE HYDROCHLORIDE 5 MILLIGRAM(S): 2.5 TABLET ORAL at 12:34

## 2023-04-25 RX ADMIN — Medication 100 GRAM(S): at 14:41

## 2023-04-25 RX ADMIN — Medication 50 MILLIGRAM(S): at 21:53

## 2023-04-25 RX ADMIN — MUPIROCIN 1 APPLICATION(S): 20 OINTMENT TOPICAL at 05:13

## 2023-04-25 RX ADMIN — SODIUM CHLORIDE 60 MILLILITER(S): 9 INJECTION, SOLUTION INTRAVENOUS at 21:57

## 2023-04-25 RX ADMIN — Medication 100 MILLIEQUIVALENT(S): at 09:44

## 2023-04-25 RX ADMIN — Medication 50 MILLIGRAM(S): at 05:18

## 2023-04-25 RX ADMIN — MIDODRINE HYDROCHLORIDE 5 MILLIGRAM(S): 2.5 TABLET ORAL at 05:13

## 2023-04-25 RX ADMIN — HEPARIN SODIUM 5000 UNIT(S): 5000 INJECTION INTRAVENOUS; SUBCUTANEOUS at 18:33

## 2023-04-25 RX ADMIN — Medication 100 MILLIEQUIVALENT(S): at 12:31

## 2023-04-25 RX ADMIN — SODIUM CHLORIDE 60 MILLILITER(S): 9 INJECTION, SOLUTION INTRAVENOUS at 09:44

## 2023-04-25 RX ADMIN — MIDODRINE HYDROCHLORIDE 5 MILLIGRAM(S): 2.5 TABLET ORAL at 18:34

## 2023-04-25 RX ADMIN — CHLORHEXIDINE GLUCONATE 1 APPLICATION(S): 213 SOLUTION TOPICAL at 12:33

## 2023-04-25 RX ADMIN — MUPIROCIN 1 APPLICATION(S): 20 OINTMENT TOPICAL at 18:33

## 2023-04-25 RX ADMIN — Medication 100 MILLIEQUIVALENT(S): at 11:10

## 2023-04-25 RX ADMIN — CEFEPIME 100 MILLIGRAM(S): 1 INJECTION, POWDER, FOR SOLUTION INTRAMUSCULAR; INTRAVENOUS at 21:52

## 2023-04-25 RX ADMIN — HEPARIN SODIUM 5000 UNIT(S): 5000 INJECTION INTRAVENOUS; SUBCUTANEOUS at 05:13

## 2023-04-25 RX ADMIN — Medication 50 MILLIGRAM(S): at 14:42

## 2023-04-25 RX ADMIN — CEFEPIME 100 MILLIGRAM(S): 1 INJECTION, POWDER, FOR SOLUTION INTRAMUSCULAR; INTRAVENOUS at 05:14

## 2023-04-25 RX ADMIN — CEFEPIME 100 MILLIGRAM(S): 1 INJECTION, POWDER, FOR SOLUTION INTRAMUSCULAR; INTRAVENOUS at 14:42

## 2023-04-25 NOTE — PROGRESS NOTE ADULT - ASSESSMENT
62 year old male with altered mental status, s/p bilateral ureteroscopy and stents 3/23. He underwent cystoscopy w/ bilateral ureteroscopy, L stent removed and right stent exchanged on 4/20/2023 stable from urological perspective.     UCx and BCx both no growth  Continue w/ abx per ID and medicine. Can de-escalate likely given no growth in cultures  Stent is out. C/w condom catheter  No further w/u per urology. Will signoff at this time  If any issue arises, please repage for consultation  After d/c and have f/u with Dr Hoenig for ongoing urologic care  [facility]    [address] [place] [address], Entrance [place] [id number]  Phone: [phone]

## 2023-04-25 NOTE — PROGRESS NOTE ADULT - SUBJECTIVE AND OBJECTIVE BOX
DAILY PROGRESS NOTE:         24 hr events:  stent removed at bedside     Objective:    Vital Signs Last 24 Hrs  T(C): 36.6 (25 Apr 2023 04:52), Max: 36.6 (25 Apr 2023 04:52)  T(F): 97.9 (25 Apr 2023 04:52), Max: 97.9 (25 Apr 2023 04:52)  HR: 62 (25 Apr 2023 04:52) (46 - 62)  BP: 111/62 (25 Apr 2023 04:52) (111/62 - 119/69)  BP(mean): --  RR: 18 (25 Apr 2023 04:52) (18 - 18)  SpO2: 99% (25 Apr 2023 04:52) (99% - 100%)    Parameters below as of 25 Apr 2023 04:52  Patient On (Oxygen Delivery Method): room air        I&O's Detail    24 Apr 2023 07:01  -  25 Apr 2023 07:00  --------------------------------------------------------  IN:    dextrose 5% + sodium chloride 0.9%: 720 mL  Total IN: 720 mL    OUT:    Incontinent per Condom Catheter (mL): 850 mL    Oral Fluid: 0 mL  Total OUT: 850 mL    Total NET: -130 mL          Physical Exam:    General: NAD, appears malnourished.   Resp: Breathing comfortably on RA  : stent on string removed, condom catheter replaced    Laboratory Results:                          11.0   6.83  )-----------( 167      ( 25 Apr 2023 06:58 )             32.6     04-25    142  |  112<H>  |  9   ----------------------------<  131<H>  3.1<L>   |  21<L>  |  0.58    Ca    8.8      25 Apr 2023 06:57

## 2023-04-25 NOTE — PROGRESS NOTE ADULT - SUBJECTIVE AND OBJECTIVE BOX
Cardiovascular Disease Progress Note  Date of service: 04-25-23 @ 08:02    Overnight events: No acute events overnight.  Pt is in no distress. Bradycardic overnight.   Otherwise review of systems negative    Objective Findings:  T(C): 36.6 (04-25-23 @ 04:52), Max: 36.6 (04-25-23 @ 04:52)  HR: 62 (04-25-23 @ 04:52) (46 - 62)  BP: 111/62 (04-25-23 @ 04:52) (111/62 - 119/69)  RR: 18 (04-25-23 @ 04:52) (18 - 18)  SpO2: 99% (04-25-23 @ 04:52) (99% - 100%)  Wt(kg): --  Daily     Daily       Physical Exam:  Gen: NAD; Patient resting comfortably  HEENT: EOMI, Normocephalic/ atraumatic  CV: RRR, normal S1 + S2, no m/r/g  Lungs:  Normal respiratory effort; clear to auscultation bilaterally  Abd: soft, non-tender; bowel sounds present  Ext: No edema; warm and well perfused    Telemetry: N/a    Laboratory Data:                        11.0   6.83  )-----------( 167      ( 25 Apr 2023 06:58 )             32.6     04-25    142  |  112<H>  |  9   ----------------------------<  131<H>  3.1<L>   |  21<L>  |  0.58    Ca    8.8      25 Apr 2023 06:57                Inpatient Medications:  MEDICATIONS  (STANDING):  cefepime   IVPB 1000 milliGRAM(s) IV Intermittent every 8 hours  chlorhexidine 2% Cloths 1 Application(s) Topical daily  dextrose 5% + sodium chloride 0.9%. 1000 milliLiter(s) (60 mL/Hr) IV Continuous <Continuous>  heparin   Injectable 5000 Unit(s) SubCutaneous every 12 hours  hydrocortisone sodium succinate Injectable 50 milliGRAM(s) IV Push every 8 hours  levothyroxine Injectable 25 MICROGram(s) IV Push at bedtime  midodrine. 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       Plan of Care:    #Post-op risk stratification  - s/p cystoscopy w/ bilateral ureteroscopy, L stent removed and right stent exchanged on 4/20/2023. Tolerated procedure well.   - EKG on admission shows sinus rhythm  - TTE from 8/2022 shows normal LV systolic function with no valvular or structural disease  - Pt displays no signs of post-op coronary ischemia  - Continue current management.   - Urology input appreciated.      #Sinus bradycardia  - Repeat EKG shows sinus bradycardia with 1st degree AV block  - Avoid AV ibeth blocking agents  - Optimize electrolytes, maintain K>4, Mg >2  - Would continue to proceed with conservative management given patients comorbidities  - No indication for PPM at this time.    #Sepsis  - 2/2 UTI  - Abx as per primary team    #Hypotension  - Continue Midodrine                Over 25 minutes spent on total encounter; more than 50% of the visit was spent counseling and/or coordinating care by the attending physician.      Erik Kelly DO [facility]  Cardiovascular Disease  [phone]

## 2023-04-25 NOTE — PROGRESS NOTE ADULT - SUBJECTIVE AND OBJECTIVE BOX
Date of Service  : 04-25-23     INTERVAL HPI/OVERNIGHT EVENTS: I want to eat but tried and not swallowing.   Vital Signs Last 24 Hrs  T(C): 36.3 (25 Apr 2023 14:02), Max: 36.9 (25 Apr 2023 08:30)  T(F): 97.3 (25 Apr 2023 14:02), Max: 98.4 (25 Apr 2023 08:30)  HR: 45 (25 Apr 2023 14:02) (45 - 62)  BP: 131/75 (25 Apr 2023 14:02) (111/62 - 131/75)  BP(mean): --  RR: 18 (25 Apr 2023 14:02) (18 - 18)  SpO2: 99% (25 Apr 2023 14:02) (99% - 100%)    Parameters below as of 25 Apr 2023 14:02  Patient On (Oxygen Delivery Method): room air      I&O's Summary    24 Apr 2023 07:01  -  25 Apr 2023 07:00  --------------------------------------------------------  IN: 720 mL / OUT: 850 mL / NET: -130 mL    25 Apr 2023 07:01  -  25 Apr 2023 14:05  --------------------------------------------------------  IN: 200 mL / OUT: 0 mL / NET: 200 mL      MEDICATIONS  (STANDING):  cefepime   IVPB 1000 milliGRAM(s) IV Intermittent every 8 hours  chlorhexidine 2% Cloths 1 Application(s) Topical daily  dextrose 5% + sodium chloride 0.9%. 1000 milliLiter(s) (60 mL/Hr) IV Continuous <Continuous>  heparin   Injectable 5000 Unit(s) SubCutaneous every 12 hours  hydrocortisone sodium succinate Injectable 50 milliGRAM(s) IV Push every 8 hours  levothyroxine Injectable 25 MICROGram(s) IV Push at bedtime  magnesium sulfate  IVPB 1 Gram(s) IV Intermittent once  midodrine. 5 milliGRAM(s) Oral three times a day  mupirocin 2% Nasal 1 Application(s) Both Nostrils two times a day    MEDICATIONS  (PRN):    LABS:                        11.0   6.83  )-----------( 167      ( 25 Apr 2023 06:58 )             32.6     04-25    142  |  112<H>  |  9   ----------------------------<  131<H>  3.1<L>   |  21<L>  |  0.58    Ca    8.8      25 Apr 2023 06:57  Mg     1.6     04-25          CAPILLARY BLOOD GLUCOSE      POCT Blood Glucose.: 133 mg/dL (25 Apr 2023 12:18)  POCT Blood Glucose.: 127 mg/dL (25 Apr 2023 06:05)  POCT Blood Glucose.: 107 mg/dL (24 Apr 2023 23:51)  POCT Blood Glucose.: 105 mg/dL (24 Apr 2023 18:08)              Consultant(s) Notes Reviewed:  [x ] YES  [ ] NO    PHYSICAL EXAM:  GENERAL: NAD, well-groomed, well-developed,not in any distress ,  HEAD:  Atraumatic, Normocephalic  NECK: Supple, No JVD, Normal thyroid  NERVOUS SYSTEM:  Alert   CHEST/LUNG: Good air entry bilateral with no  rales, rhonchi, wheezing, or rubs  HEART: Regular rate and rhythm; No murmurs, rubs, or gallops  ABDOMEN: Soft, Nontender, Nondistended; Bowel sounds present  EXTREMITIES:  2+ Peripheral Pulses, No clubbing, cyanosis, or edema    Care Discussed with Consultants/Other Providers [ x] YES  [ ] NO

## 2023-04-25 NOTE — PROGRESS NOTE ADULT - ASSESSMENT
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  Wife has noticed that patient has been less attentive, not talking.  Says that she acts like this when he has a UTI.  Patient recently had 2 ureteral stents placed for kidney stones.  Denies fevers, chills, nausea, vomiting.  History given by wife.       Problem/Plan - 1:  ·  Problem: Frequent UTI.   ·  Plan: S/P IV Abxs . Negative  urine C/S .   No fever or Leucocytosis .   ID help appreciated .   On  cefepime for 1 week .       Problem/Plan - 2:  ·  Problem: Hydroureteronephrosis.   ·  Plan: S/P Stents . Urology following.   S/P   cystoscopy w/ bilateral ureteroscopy wit     Problem/Plan - 3:  ·  Problem: H/O hypotension.   ·  Plan: on Midodrine.     Problem/Plan - 4:  ·  Problem: H/O cerebellar ataxia.   ·  Plan: Supportive care.     Problem/Plan - 5:  ·  Problem: H/O Dysphagia .   ·  Plan: S/S following. IVF. Wants to eat today .      Problem/Plan - 6:  ·  Problem: Decubitus Ulcer   .   ·  Plan: Wound care helping .     Problem/Plan - 7:  ·  Problem: Hypothermia & Bradycardia  .   ·  Plan: On Abxs. TSH and Free T4 noted. Endo helping.      Problem/Plan - 8:  ·  Problem: Thrombocytopenia   .   ·  Plan: Hematology consult noted. D dimer high .CTA and doppler legs noted .      Problem/Plan - 9:  ·  Problem: Hypokalemia   .   ·  Plan: Replacing.     D/W Wife and said he will eat at home once discharged so doesn't want peg or NGTetc.

## 2023-04-25 NOTE — PROGRESS NOTE ADULT - SUBJECTIVE AND OBJECTIVE BOX
Chief complaint  Patient is a 62y old  Male who presents with a chief complaint of likely UTI (25 Apr 2023 11:50)         Labs and Fingersticks  CAPILLARY BLOOD GLUCOSE      POCT Blood Glucose.: 133 mg/dL (25 Apr 2023 12:18)  POCT Blood Glucose.: 127 mg/dL (25 Apr 2023 06:05)  POCT Blood Glucose.: 107 mg/dL (24 Apr 2023 23:51)  POCT Blood Glucose.: 105 mg/dL (24 Apr 2023 18:08)      Anion Gap, Serum: 9 (04-25 @ 06:57)  Anion Gap, Serum: 10 (04-24 @ 07:00)      Calcium, Total Serum: 8.8 (04-25 @ 06:57)  Calcium, Total Serum: 8.8 (04-24 @ 07:00)          04-25    142  |  112<H>  |  9   ----------------------------<  131<H>  3.1<L>   |  21<L>  |  0.58    Ca    8.8      25 Apr 2023 06:57  Mg     1.6     04-25                          11.0   6.83  )-----------( 167      ( 25 Apr 2023 06:58 )             32.6     Medications  MEDICATIONS  (STANDING):  cefepime   IVPB 1000 milliGRAM(s) IV Intermittent every 8 hours  chlorhexidine 2% Cloths 1 Application(s) Topical daily  dextrose 5% + sodium chloride 0.9%. 1000 milliLiter(s) (60 mL/Hr) IV Continuous <Continuous>  heparin   Injectable 5000 Unit(s) SubCutaneous every 12 hours  hydrocortisone sodium succinate Injectable 50 milliGRAM(s) IV Push every 8 hours  levothyroxine Injectable 25 MICROGram(s) IV Push at bedtime  magnesium sulfate  IVPB 1 Gram(s) IV Intermittent once  midodrine. 5 milliGRAM(s) Oral three times a day  mupirocin 2% Nasal 1 Application(s) Both Nostrils two times a day      Physical Exam  General: Patient comfortable in bed   Vital Signs Last 12 Hrs  T(F): 98.4 (04-25-23 @ 08:30), Max: 98.4 (04-25-23 @ 08:30)  HR: 62 (04-25-23 @ 04:52) (62 - 62)  BP: 111/62 (04-25-23 @ 04:52) (111/62 - 111/62)  BP(mean): --  RR: 18 (04-25-23 @ 04:52) (18 - 18)  SpO2: 99% (04-25-23 @ 04:52) (99% - 99%)    CVS: S1S2   Respiratory: No wheezing, no crepitations  GI: Abdomen soft, bowel sounds positive  Musculoskeletal:  moves all extremities  : Voiding        Chief complaint  Patient is a 62y old  Male who presents with a chief complaint of likely UTI (25 Apr 2023 11:50)     Labs and Fingersticks  CAPILLARY BLOOD GLUCOSE      POCT Blood Glucose.: 133 mg/dL (25 Apr 2023 12:18)  POCT Blood Glucose.: 127 mg/dL (25 Apr 2023 06:05)  POCT Blood Glucose.: 107 mg/dL (24 Apr 2023 23:51)  POCT Blood Glucose.: 105 mg/dL (24 Apr 2023 18:08)      Anion Gap, Serum: 9 (04-25 @ 06:57)  Anion Gap, Serum: 10 (04-24 @ 07:00)      Calcium, Total Serum: 8.8 (04-25 @ 06:57)  Calcium, Total Serum: 8.8 (04-24 @ 07:00)          04-25    142  |  112<H>  |  9   ----------------------------<  131<H>  3.1<L>   |  21<L>  |  0.58    Ca    8.8      25 Apr 2023 06:57  Mg     1.6     04-25                          11.0   6.83  )-----------( 167      ( 25 Apr 2023 06:58 )             32.6     Medications  MEDICATIONS  (STANDING):  cefepime   IVPB 1000 milliGRAM(s) IV Intermittent every 8 hours  chlorhexidine 2% Cloths 1 Application(s) Topical daily  dextrose 5% + sodium chloride 0.9%. 1000 milliLiter(s) (60 mL/Hr) IV Continuous <Continuous>  heparin   Injectable 5000 Unit(s) SubCutaneous every 12 hours  hydrocortisone sodium succinate Injectable 50 milliGRAM(s) IV Push every 8 hours  levothyroxine Injectable 25 MICROGram(s) IV Push at bedtime  magnesium sulfate  IVPB 1 Gram(s) IV Intermittent once  midodrine. 5 milliGRAM(s) Oral three times a day  mupirocin 2% Nasal 1 Application(s) Both Nostrils two times a day      Physical Exam  General: Patient comfortable in bed   Vital Signs Last 12 Hrs  T(F): 98.4 (04-25-23 @ 08:30), Max: 98.4 (04-25-23 @ 08:30)  HR: 62 (04-25-23 @ 04:52) (62 - 62)  BP: 111/62 (04-25-23 @ 04:52) (111/62 - 111/62)  BP(mean): --  RR: 18 (04-25-23 @ 04:52) (18 - 18)  SpO2: 99% (04-25-23 @ 04:52) (99% - 99%)    CVS: S1S2   Respiratory: No wheezing, no crepitations  GI: Abdomen soft, bowel sounds positive  Musculoskeletal:  moves all extremities  : Voiding

## 2023-04-25 NOTE — PROGRESS NOTE ADULT - SUBJECTIVE AND OBJECTIVE BOX
expressive aphasic    VITAL SIGNS:    T87.9 P62 /62 RR18    PHYSICAL EXAM:     GENERAL: no acute distress  HEENT: NC/AT, EOMI, neck supple, MMM  RESPIRATORY: LCTAB/L, no rhonchi, rales, or wheezing  CARDIOVASCULAR: RRR, no murmurs, gallops, rubs  ABDOMINAL: soft, non-tender, non-distended, positive bowel sounds   EXTREMITIES: no clubbing, cyanosis, or edema; contracted, decubuti healed                          11.0   6.83  )-----------( 167      ( 25 Apr 2023 06:58 )             32.6     04-25    142  |  112<H>  |  9   ----------------------------<  131<H>  3.1<L>   |  21<L>  |  0.58    Ca    8.8      25 Apr 2023 06:57  Mg     1.6     04-25        MEDICATIONS  (STANDING):  cefepime   IVPB 1000 milliGRAM(s) IV Intermittent every 8 hours  chlorhexidine 2% Cloths 1 Application(s) Topical daily  dextrose 5% + sodium chloride 0.9%. 1000 milliLiter(s) (60 mL/Hr) IV Continuous <Continuous>  heparin   Injectable 5000 Unit(s) SubCutaneous every 12 hours  hydrocortisone sodium succinate Injectable 50 milliGRAM(s) IV Push every 8 hours  levothyroxine Injectable 25 MICROGram(s) IV Push at bedtime  magnesium sulfate  IVPB 1 Gram(s) IV Intermittent once  midodrine. 5 milliGRAM(s) Oral three times a day  mupirocin 2% Nasal 1 Application(s) Both Nostrils two times a day  potassium chloride  10 mEq/100 mL IVPB 10 milliEquivalent(s) IV Intermittent every 1 hour

## 2023-04-25 NOTE — PROGRESS NOTE ADULT - ASSESSMENT
Assessment  Hypotension/Hypothermia: 62y Male with history of adrenal insufficiency, apparently not on any steroids as out patient admitted with hypotension and  hypothermia, AMS, NPO now on IV hydration. Started on IV cortef.   Hypothyroidism: Subclinical, not on Synthroid, no Hx of thyroid disease, hypothermic.  Pressure wounds: On treatment, monitored  UTI: frequent hx of UTI's. on IV abx, hx of ureter stents.           Discussed plan and management with Dr Jethro Garcia NP - TEAMS  Suhas Morelos MD  Cell: [phone]  Office: [phone]              Assessment  Hypotension/Hypothermia: 62y Male with history of adrenal insufficiency, apparently not on any steroids as out patient admitted with hypotension and  hypothermia, AMS, NPO now on IV hydration.  Started on IV cortef.   Hypothyroidism: Subclinical, not on Synthroid, no Hx of thyroid disease, hypothermic.  Pressure wounds: On treatment, monitored  UTI: frequent hx of UTI's. on IV abx, hx of ureter stents.           Discussed plan and management with Dr Jethro Garcia NP - TEAMS  Suhas Morelos MD  Cell: [phone]  Office: [phone]

## 2023-04-26 ENCOUNTER — TRANSCRIPTION ENCOUNTER (OUTPATIENT)
Age: 63
End: 2023-04-26

## 2023-04-26 DIAGNOSIS — I27.82 CHRONIC PULMONARY EMBOLISM: ICD-10-CM

## 2023-04-26 DIAGNOSIS — Z86.69 PERSONAL HISTORY OF OTHER DISEASES OF THE NERVOUS SYSTEM AND SENSE ORGANS: ICD-10-CM

## 2023-04-26 DIAGNOSIS — R13.10 DYSPHAGIA, UNSPECIFIED: ICD-10-CM

## 2023-04-26 DIAGNOSIS — N12 TUBULO-INTERSTITIAL NEPHRITIS, NOT SPECIFIED AS ACUTE OR CHRONIC: ICD-10-CM

## 2023-04-26 LAB
ANA TITR SER: NEGATIVE — SIGNIFICANT CHANGE UP
ANION GAP SERPL CALC-SCNC: 10 MMOL/L — SIGNIFICANT CHANGE UP (ref 5–17)
ANION GAP SERPL CALC-SCNC: 10 MMOL/L — SIGNIFICANT CHANGE UP (ref 5–17)
BUN SERPL-MCNC: 10 MG/DL — SIGNIFICANT CHANGE UP (ref 7–23)
BUN SERPL-MCNC: 11 MG/DL — SIGNIFICANT CHANGE UP (ref 7–23)
CALCIUM SERPL-MCNC: 8.8 MG/DL — SIGNIFICANT CHANGE UP (ref 8.4–10.5)
CALCIUM SERPL-MCNC: 8.8 MG/DL — SIGNIFICANT CHANGE UP (ref 8.4–10.5)
CHLORIDE SERPL-SCNC: 113 MMOL/L — HIGH (ref 96–108)
CHLORIDE SERPL-SCNC: 115 MMOL/L — HIGH (ref 96–108)
CO2 SERPL-SCNC: 20 MMOL/L — LOW (ref 22–31)
CO2 SERPL-SCNC: 20 MMOL/L — LOW (ref 22–31)
CREAT SERPL-MCNC: 0.61 MG/DL — SIGNIFICANT CHANGE UP (ref 0.5–1.3)
CREAT SERPL-MCNC: 0.62 MG/DL — SIGNIFICANT CHANGE UP (ref 0.5–1.3)
EGFR: 109 ML/MIN/1.73M2 — SIGNIFICANT CHANGE UP
EGFR: 109 ML/MIN/1.73M2 — SIGNIFICANT CHANGE UP
GLUCOSE BLDC GLUCOMTR-MCNC: 100 MG/DL — HIGH (ref 70–99)
GLUCOSE BLDC GLUCOMTR-MCNC: 114 MG/DL — HIGH (ref 70–99)
GLUCOSE BLDC GLUCOMTR-MCNC: 124 MG/DL — HIGH (ref 70–99)
GLUCOSE BLDC GLUCOMTR-MCNC: 131 MG/DL — HIGH (ref 70–99)
GLUCOSE BLDC GLUCOMTR-MCNC: 133 MG/DL — HIGH (ref 70–99)
GLUCOSE SERPL-MCNC: 123 MG/DL — HIGH (ref 70–99)
GLUCOSE SERPL-MCNC: 132 MG/DL — HIGH (ref 70–99)
HCT VFR BLD CALC: 31.5 % — LOW (ref 39–50)
HGB BLD-MCNC: 10.2 G/DL — LOW (ref 13–17)
MAGNESIUM SERPL-MCNC: 2.1 MG/DL — SIGNIFICANT CHANGE UP (ref 1.6–2.6)
MCHC RBC-ENTMCNC: 27.6 PG — SIGNIFICANT CHANGE UP (ref 27–34)
MCHC RBC-ENTMCNC: 32.4 GM/DL — SIGNIFICANT CHANGE UP (ref 32–36)
MCV RBC AUTO: 85.1 FL — SIGNIFICANT CHANGE UP (ref 80–100)
NRBC # BLD: 0 /100 WBCS — SIGNIFICANT CHANGE UP (ref 0–0)
PLATELET # BLD AUTO: 180 K/UL — SIGNIFICANT CHANGE UP (ref 150–400)
POTASSIUM SERPL-MCNC: 3.1 MMOL/L — LOW (ref 3.5–5.3)
POTASSIUM SERPL-MCNC: 4 MMOL/L — SIGNIFICANT CHANGE UP (ref 3.5–5.3)
POTASSIUM SERPL-SCNC: 3.1 MMOL/L — LOW (ref 3.5–5.3)
POTASSIUM SERPL-SCNC: 4 MMOL/L — SIGNIFICANT CHANGE UP (ref 3.5–5.3)
RBC # BLD: 3.7 M/UL — LOW (ref 4.2–5.8)
RBC # FLD: 14.8 % — HIGH (ref 10.3–14.5)
SODIUM SERPL-SCNC: 143 MMOL/L — SIGNIFICANT CHANGE UP (ref 135–145)
SODIUM SERPL-SCNC: 145 MMOL/L — SIGNIFICANT CHANGE UP (ref 135–145)
WBC # BLD: 7.37 K/UL — SIGNIFICANT CHANGE UP (ref 3.8–10.5)
WBC # FLD AUTO: 7.37 K/UL — SIGNIFICANT CHANGE UP (ref 3.8–10.5)

## 2023-04-26 RX ORDER — HYDROCORTISONE 20 MG
20 TABLET ORAL DAILY
Refills: 0 | Status: DISCONTINUED | OUTPATIENT
Start: 2023-04-27 | End: 2023-04-28

## 2023-04-26 RX ORDER — POTASSIUM CHLORIDE 20 MEQ
10 PACKET (EA) ORAL
Refills: 0 | Status: COMPLETED | OUTPATIENT
Start: 2023-04-26 | End: 2023-04-26

## 2023-04-26 RX ORDER — LEVOTHYROXINE SODIUM 125 MCG
50 TABLET ORAL DAILY
Refills: 0 | Status: DISCONTINUED | OUTPATIENT
Start: 2023-04-27 | End: 2023-04-28

## 2023-04-26 RX ORDER — HYDROCORTISONE 20 MG
10 TABLET ORAL DAILY
Refills: 0 | Status: DISCONTINUED | OUTPATIENT
Start: 2023-04-26 | End: 2023-04-28

## 2023-04-26 RX ORDER — LEVOTHYROXINE SODIUM 125 MCG
1 TABLET ORAL
Qty: 0 | Refills: 0 | DISCHARGE
Start: 2023-04-26

## 2023-04-26 RX ORDER — HYDROCORTISONE 20 MG
1 TABLET ORAL
Qty: 30 | Refills: 0
Start: 2023-04-26 | End: 2023-05-25

## 2023-04-26 RX ADMIN — CHLORHEXIDINE GLUCONATE 1 APPLICATION(S): 213 SOLUTION TOPICAL at 11:42

## 2023-04-26 RX ADMIN — Medication 100 MILLIEQUIVALENT(S): at 11:57

## 2023-04-26 RX ADMIN — MIDODRINE HYDROCHLORIDE 5 MILLIGRAM(S): 2.5 TABLET ORAL at 05:27

## 2023-04-26 RX ADMIN — Medication 10 MILLIGRAM(S): at 14:13

## 2023-04-26 RX ADMIN — HEPARIN SODIUM 5000 UNIT(S): 5000 INJECTION INTRAVENOUS; SUBCUTANEOUS at 17:23

## 2023-04-26 RX ADMIN — Medication 50 MILLIGRAM(S): at 05:28

## 2023-04-26 RX ADMIN — SODIUM CHLORIDE 60 MILLILITER(S): 9 INJECTION, SOLUTION INTRAVENOUS at 10:59

## 2023-04-26 RX ADMIN — SODIUM CHLORIDE 60 MILLILITER(S): 9 INJECTION, SOLUTION INTRAVENOUS at 14:14

## 2023-04-26 RX ADMIN — Medication 100 MILLIEQUIVALENT(S): at 12:52

## 2023-04-26 RX ADMIN — Medication 100 MILLIEQUIVALENT(S): at 10:58

## 2023-04-26 RX ADMIN — CEFEPIME 100 MILLIGRAM(S): 1 INJECTION, POWDER, FOR SOLUTION INTRAMUSCULAR; INTRAVENOUS at 05:27

## 2023-04-26 RX ADMIN — HEPARIN SODIUM 5000 UNIT(S): 5000 INJECTION INTRAVENOUS; SUBCUTANEOUS at 05:28

## 2023-04-26 NOTE — DISCHARGE NOTE PROVIDER - CONDITIONS AT DISCHARGE
Patient: Amaris Todd Date: 2022  : 1937 Attending: Karsten Pulido MD  84 year old female       Chief Complaint: Shortness of Breath LVEF: pending    Subjective: No chest pain, breathing still tachypneic. No edema.     Pertinent Reviewed:     Vitals Last Value 24 Hour Range  Temperature 97.9 °F (36.6 °C) Temp  Min: 97.9 °F (36.6 °C)  Max: 98.2 °F (36.8 °C)  Pulse 76 Pulse  Min: 76  Max: 85  Respiratory 16 Resp  Min: 16  Max: 18  Blood Pressure 129/78 BP  Min: 100/55  Max: 176/75  Arterial BP   No data recorded  Pulse Oximetry 94 % SpO2  Min: 92 %  Max: 97 %    Vitals Today Admission  Weight 71.4 kg (157 lb 6.5 oz) Weight: 71.4 kg (157 lb 6.5 oz)    Weight over the past 48 Hours:  Patient Vitals for the past 48 hrs:   Weight   22 1206 71.4 kg (157 lb 6.5 oz)        Intake/Output:    I/O last 3 completed shifts:  In: 400 [P.O.:400]  Out: 1000 [Urine:1000]      Intake/Output Summary (Last 24 hours) at 2022 0950  Last data filed at 2022 2228  Gross per 24 hour   Intake 400 ml   Output 1000 ml   Net -600 ml       Rhythm: Normal Sinus Rhythm    Medications/Infusions:  Scheduled:   • warfarin  9 mg Oral Once   • amLODIPine  5 mg Oral Daily   • atorvastatin  20 mg Oral Daily   • glipiZIDE  2.5 mg Oral QAM AC   • isosorbide mononitrate  30 mg Oral Daily   • losartan  50 mg Oral Daily   • metoPROLOL succinate  12.5 mg Oral Daily   • tamoxifen  20 mg Oral Daily   • WARFARIN - PHARMACIST MONITORED   Does not apply See Admin Instructions   • furosemide  40 mg Intravenous BID        Physical Exam:   General Appearance: alert, cooperative and mild distress  Heart: regular rate and rhythm and systolic murmur: 4/6  Lungs: poor inspiratory effort, mild tachypnea, scant crackles  Abdomen: soft, nondistended  Extremities: extremities normal, atraumatic, no cyanosis or edema  Pulses: +1 Bilateral Dorsalis Pedis  Neurological: Mental status: Alert, oriented, thought content appropriate    Laboratory  Results:    Recent Labs   Lab 01/07/22  0516 01/06/22  0624 01/06/22  0448 01/06/22  0437   WBC 10.1  --   --  9.9   HCT 26.9*  --   --  29.4*   HGB 8.4*  --   --  9.1*     --   --  329   INR 1.5  --  2.8  --    PTT  --   --  44*  --    SODIUM  --  128*  128*  --  125*   POTASSIUM  --  5.0  --  5.0   CHLORIDE  --  96*  --  92*   CO2  --  25  --  26   GLUCOSE  --  107*  --  115*   BUN  --  14  --  15   CREATININE  --  0.85  --  0.91     [unfilled]    Imaging:  XR CHEST PA OR AP 1 VIEW   Final Result by Lani Peña DO (01/06 0643)   1.    New bilateral pulmonary opacities and increased pulmonary   vasculature, correlate for congestion-interstitial vascular edema pattern.    Interval follow-up will be helpful to reassess.      Electronically Signed by: LANI PEÑA D.O.    Signed on: 1/6/2022 6:43 AM          [facility] EXTREMITY LOWER VENOUS DUPLEX    (Results Pending)              Assessment:  Acute on chronic diastolic heart failure  History of significant mitral stenosis  History of paroxysmal atrial fibrillation  History of coronary artery disease she had a cardiac work-up at [facility]  Diabetes mellitus  Frailty        Plan:   Course of IV Lasix   Intake and output   Daily weights   Monitor cr and electrolytes   Doing better - likely switch to PO tomorrow.     Repeat echocardiogram- read is pending  Follow-up at [facility] for potential mitral stenosis valvuloplasty and coronary intervention  Continue anticoagulation with Coumadin    Will follow.     [facility]        medically cleared per Dr. Mccoy

## 2023-04-26 NOTE — DISCHARGE NOTE PROVIDER - DETAILS OF MALNUTRITION DIAGNOSIS/DIAGNOSES
This patient has been assessed with a concern for Malnutrition and was treated during this hospitalization for the following Nutrition diagnosis/diagnoses:     -  04/24/2023: Moderate protein-calorie malnutrition

## 2023-04-26 NOTE — DISCHARGE NOTE NURSING/CASE MANAGEMENT/SOCIAL WORK - PATIENT PORTAL LINK FT
You can access the FollowMyHealth Patient Portal offered by [facility] by registering at the following website: [email or web address]. By joining [facility]’s FollowMyHealth portal, you will also be able to view your health information using other applications (apps) compatible with our system.

## 2023-04-26 NOTE — CONSULT NOTE ADULT - ASSESSMENT
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 Presents with AMS possibly 2nd to UTI. UA with pyuria. Recent admission 2/2023 for pyelonephritis and L obstructive uropathy s/p ureteral stent, s/p 10 days of Cefepime (UC + pseudomonas). CT A/P now with b/l ureteral stents, mild L hydroureteronephrosis, enhancement of renal pelvis and proximal ureter with bladder wall thickening, possible partial treatment of prior pyelonephritis per ID. S/p L ureteral stent removal and R stent exchange on 4/20 with urology. Course c/b thrombocytopenia elevated d-dimer of 816. CTA chest with no acute PE but notes small filling defect in RLL which may be chronic PE, mild RUL GGO. LE duplex neg for DVT. Pulmonary called to consult for CT chest findings.

## 2023-04-26 NOTE — DISCHARGE NOTE PROVIDER - NSDCCPCAREPLAN_GEN_ALL_CORE_FT
PRINCIPAL DISCHARGE DIAGNOSIS  Diagnosis: Frequent UTI  Assessment and Plan of Treatment: s/p Antibioitcs IV   ID following  HOME CARE INSTRUCTIONS  If you were prescribed antibiotics, take them exactly as your caregiver instructs you. Finish the medication even if you feel better after you have only taken some of the medication.  Drink enough water and fluids to keep your urine clear or pale yellow.  Avoid caffeine, tea, and carbonated beverages. They tend to irritate your bladder.  Empty your bladder often. Avoid holding urine for long periods of time.  After a bowel movement, women should cleanse from front to back. Use each tissue only once.  SEEK MEDICAL CARE IF:  You have back pain.  You develop a fever.  Your symptoms do not begin to resolve within 3 days.  SEEK IMMEDIATE MEDICAL CARE IF:  You have severe back pain or lower abdominal pain.  You develop chills.  You have nausea or vomiting.  You have continued burning or discomfort with urination.        SECONDARY DISCHARGE DIAGNOSES  Diagnosis: Adrenal insufficiency  Assessment and Plan of Treatment: suggest to start him on Hydrocortisone 50 mg IV q8hr for now, once stable will taper down    Diagnosis: Hydroureteronephrosis  Assessment and Plan of Treatment: underwent cystoscopy w/ bilateral ureteroscopy, L stent removed and right stent exchanged on 4/20/2023 stable from urological perspective. .    Diagnosis: H/O cerebellar ataxia  Assessment and Plan of Treatment: supportive care    Diagnosis: H/O hypotension  Assessment and Plan of Treatment:     Diagnosis: Dysphagia  Assessment and Plan of Treatment: speech and swallowing following - Aspiration Precuations   recommendations for NPO w/ eventual advancement to puree and thin liquids with understaning of risks and benifts for pleasure feeds   Family refusing PEG and NGT   Pleasure feeds when home    Diagnosis: Decubitus ulcer  Assessment and Plan of Treatment: Buttocks/ Sacrum TRIAD BID and prn soiling   Continue w/ attends under pads and Pericare  Rt Hip - medihoney dressing QD  LLE- ALLEVYN foam QD  BLE elevation   Moisturize intact skin w/ SWEEN cream BID  encourage high quality protein, denice/ prosource, MVI & Vit C to promote wound healing  Continue turning and positioning w/ offloading assistive devices as per protocol  Waffle Cushion to chair when oob to chair  Continue w/ low air loss pressure redistribution bed surface     PRINCIPAL DISCHARGE DIAGNOSIS  Diagnosis: Frequent UTI  Assessment and Plan of Treatment: HOME CARE INSTRUCTIONS  If you were prescribed antibiotics, take them exactly as your caregiver instructs you. Finish the medication even if you feel better after you have only taken some of the medication.  Drink enough water and fluids to keep your urine clear or pale yellow.  Avoid caffeine, tea, and carbonated beverages. They tend to irritate your bladder.  Empty your bladder often. Avoid holding urine for long periods of time.  After a bowel movement, women should cleanse from front to back. Use each tissue only once.  SEEK MEDICAL CARE IF:  You have back pain.  You develop a fever.  Your symptoms do not begin to resolve within 3 days.  SEEK IMMEDIATE MEDICAL CARE IF:  You have severe back pain or lower abdominal pain.  You develop chills.  You have nausea or vomiting.  You have continued burning or discomfort with urination.        SECONDARY DISCHARGE DIAGNOSES  Diagnosis: Hydroureteronephrosis  Assessment and Plan of Treatment: underwent cystoscopy w/ bilateral ureteroscopy, L stent removed and right stent exchanged on 4/20/2023 stable from urological perspective. .    Diagnosis: H/O cerebellar ataxia  Assessment and Plan of Treatment: supportive care    Diagnosis: Dysphagia  Assessment and Plan of Treatment: speech and swallowing following - Aspiration Precuations   recommendations for NPO w/ eventual advancement to puree and thin liquids with understaning of risks and benifts for pleasure feeds   Family refusing PEG and NGT   Pleasure feeds when home    Diagnosis: Adrenal insufficiency  Assessment and Plan of Treatment: continue steroids    Diagnosis: Decubitus ulcer  Assessment and Plan of Treatment: Buttocks/ Sacrum TRIAD BID and prn soiling   Continue w/ attends under pads and Pericare  Rt Hip - medihoney dressing QD  LLE- ALLEVYN foam QD  BLE elevation   Moisturize intact skin w/ SWEEN cream BID  encourage high quality protein, denice/ prosource, MVI & Vit C to promote wound healing  Continue turning and positioning w/ offloading assistive devices as per protocol  Waffle Cushion to chair when oob to chair  Continue w/ low air loss pressure redistribution bed surface

## 2023-04-26 NOTE — DISCHARGE NOTE PROVIDER - NSDCFUADDAPPT_GEN_ALL_CORE_FT
APPTS ARE READY TO BE MADE: [x ] YES    Best Family or Patient Contact (if needed):    Additional Information about above appointments (if needed):    1:   2:   3:     Other comments or requests:    APPTS ARE READY TO BE MADE: [x ] YES    Best Family or Patient Contact (if needed):    Additional Information about above appointments (if needed):    1:   2:   3:     Other comments or requests:   Services declined.

## 2023-04-26 NOTE — PROGRESS NOTE ADULT - SUBJECTIVE AND OBJECTIVE BOX
Cardiovascular Disease Progress Note  Date of service: 04-26-23 @ 10:22    Overnight events: No acute events overnight.  Pt is in no distress.   Otherwise review of systems negative    Objective Findings:  T(C): 36.9 (04-26-23 @ 05:35), Max: 36.9 (04-26-23 @ 05:35)  HR: 46 (04-26-23 @ 05:35) (45 - 82)  BP: 106/67 (04-26-23 @ 05:35) (105/70 - 131/75)  RR: 18 (04-26-23 @ 05:35) (17 - 18)  SpO2: 100% (04-26-23 @ 05:35) (99% - 100%)  Wt(kg): --  Daily     Daily       Physical Exam:  Gen: NAD; Patient resting comfortably  HEENT: EOMI, Normocephalic/ atraumatic  CV: RRR, normal S1 + S2, no m/r/g  Lungs:  Normal respiratory effort; clear to auscultation bilaterally  Abd: soft, non-tender; bowel sounds present  Ext: No edema; warm and well perfused    Telemetry: N/a    Laboratory Data:                        10.2   7.37  )-----------( 180      ( 26 Apr 2023 07:18 )             31.5     04-26    143  |  113<H>  |  10  ----------------------------<  132<H>  3.1<L>   |  20<L>  |  0.61    Ca    8.8      26 Apr 2023 07:17  Mg     2.1     04-26                Inpatient Medications:  MEDICATIONS  (STANDING):  cefepime   IVPB 1000 milliGRAM(s) IV Intermittent every 8 hours  chlorhexidine 2% Cloths 1 Application(s) Topical daily  dextrose 5% + sodium chloride 0.9%. 1000 milliLiter(s) (60 mL/Hr) IV Continuous <Continuous>  heparin   Injectable 5000 Unit(s) SubCutaneous every 12 hours  hydrocortisone sodium succinate Injectable 50 milliGRAM(s) IV Push every 8 hours  levothyroxine Injectable 25 MICROGram(s) IV Push at bedtime  midodrine. 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       Plan of Care:    #Post-op risk stratification  - s/p cystoscopy w/ bilateral ureteroscopy, L stent removed and right stent exchanged on 4/20/2023. Tolerated procedure well.   - EKG on admission shows sinus rhythm  - TTE from 8/2022 shows normal LV systolic function with no valvular or structural disease  - Pt displays no signs of post-op coronary ischemia  - Continue current management.   - Urology input appreciated.      #Sinus bradycardia  - Repeat EKG shows sinus bradycardia with 1st degree AV block  - Avoid AV ibeth blocking agents  - Optimize electrolytes, maintain K>4, Mg >2  - Would continue to proceed with conservative management given patients comorbidities  - No indication for PPM at this time.    #Sepsis  - 2/2 UTI  - Abx as per primary team    #Hypotension  - Continue Midodrine              Over 25 minutes spent on total encounter; more than 50% of the visit was spent counseling and/or coordinating care by the attending physician.      Erik Kelly DO [facility]  Cardiovascular Disease  [phone] Patient states she is having b/l LLE swelling, pain, and redness. patient also saying that she has been having fever and chils. Patient is short term resident at [facility] for rehab after being diagnosed with PE. Hx of venous insufficiency, PE, and HTN.

## 2023-04-26 NOTE — CONSULT NOTE ADULT - PROBLEM SELECTOR RECOMMENDATION 3
Continue IV hydration, monitor BP primary team FU
monitor  check folate, B12 level, EMILIA, HIT, FSP
Per hx  -Supportive care.

## 2023-04-26 NOTE — CONSULT NOTE ADULT - SUBJECTIVE AND OBJECTIVE BOX
Pulmonary Consult  04-26-23 @ 09:35    Patient is a 62y old  Male who presents with a chief complaint of likely UTI (25 Apr 2023 14:05)    HPI: 61 y/o M with PMH of cerebellar ataxia, chronic lacunar infarcts, 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, chronic hypotension on midodrine, renal stones, dysphagia s/p hx of aspiration PNA (intubated x 1 week at [facility]) in 4/2022. Presents with AMS possibly 2nd to UTI. UA with pyuria. Recent admission 2/2023 for pyelonephritis and L obstructive uropathy s/p ureteral stent, s/p 10 days of Cefepime (UC + pseudomonas). CT A/P now with b/l ureteral stents, mild L hydroureteronephrosis enhancement of renal pelvis and proximal ureter with bladder wall thickening, concern for partial treatment of prior pyelonephritis per ID. S/p L ureteral stent removal and R stent exchange on 4/20 with urology. Course c/b thrombocytopenia elevated d dimer of 816. CTA chest with no acute PE but notes small filling defect in RLL which may be chronic PE, mild RUL GGO. LE duplex neg for DVT. Pulmonary called to consult for CT chest findings.     ?FOLLOWING PRESENT  [ ] Hx of PE/DVT, [ ] Hx COPD, [ ] Hx of Asthma, [ ] Hx of Hospitalization, [ ]  Hx of BiPAP/CPAP use, [ ] Hx of AARON    No Known Allergies    PAST MEDICAL & SURGICAL HISTORY:  H/O cerebellar ataxia  -diagnosed in 2019    History of hypotension  Anemia  Lacunar infarction  -chronic  Pneumonia, aspiration  -april 2022 (intubated for 7 days at [facility])  Adrenal insufficiency  Low body temperature  Pressure ulcer of left heel  Pressure ulcer of buttock  H/O sepsis  H/O cystoscopy      FAMILY HISTORY:  FH: dementia (Mother)    FH: type 2 diabetes (Mother)    Family history of CVA (Father)    Social History: [  ] TOBACCO                  [  ] ETOH                                 [  ] IVDA/DRUGS    REVIEW OF SYSTEMS      General:	    Skin/Breast:  	  Ophthalmologic:  	  ENMT:	    Respiratory and Thorax:  	  Cardiovascular:	    Gastrointestinal:	    Genitourinary:	    Musculoskeletal:	    Neurological:	    Psychiatric:	    Hematology/Lymphatics:	    Endocrine:	    Allergic/Immunologic:	    MEDICATIONS  (STANDING):  cefepime   IVPB 1000 milliGRAM(s) IV Intermittent every 8 hours  chlorhexidine 2% Cloths 1 Application(s) Topical daily  dextrose 5% + sodium chloride 0.9%. 1000 milliLiter(s) (60 mL/Hr) IV Continuous <Continuous>  heparin   Injectable 5000 Unit(s) SubCutaneous every 12 hours  hydrocortisone sodium succinate Injectable 50 milliGRAM(s) IV Push every 8 hours  levothyroxine Injectable 25 MICROGram(s) IV Push at bedtime  midodrine. 5 milliGRAM(s) Oral three times a day    Vital Signs Last 24 Hrs  T(C): 36.9 (26 Apr 2023 05:35), Max: 36.9 (26 Apr 2023 05:35)  T(F): 98.5 (26 Apr 2023 05:35), Max: 98.5 (26 Apr 2023 05:35)  HR: 46 (26 Apr 2023 05:35) (45 - 82)  BP: 106/67 (26 Apr 2023 05:35) (105/70 - 131/75)  BP(mean): --  RR: 18 (26 Apr 2023 05:35) (17 - 18)  SpO2: 100% (26 Apr 2023 05:35) (99% - 100%)    Parameters below as of 26 Apr 2023 05:35  Patient On (Oxygen Delivery Method): room air    I&O's Summary    25 Apr 2023 07:01  -  26 Apr 2023 07:00  --------------------------------------------------------  IN: 1640 mL / OUT: 750 mL / NET: 890 mL    Physical Exam:   GENERAL: NAD, well-groomed, well-developed  HEENT: THU/   Atraumatic, Normocephalic  ENMT: No tonsillar erythema, exudates, or enlargement; Moist mucous membranes, Good dentition, No lesions  NECK: Supple, No JVD, Normal thyroid  CHEST/LUNG: Clear to auscultation bilaterally; No rales, rhonchi, wheezing, or rubs  CVS: Regular rate and rhythm; No murmurs, rubs, or gallops  GI: : Soft, Nontender, Nondistended; Bowel sounds present  NERVOUS SYSTEM:  Alert & Oriented X3, Good concentration; Motor Strength 5/5 B/L upper and lower extremities; DTRs 2+ intact and symmetric  EXTREMITIES:  2+ Peripheral Pulses, No clubbing, cyanosis, or edema  LYMPH: No lymphadenopathy noted  SKIN: No rashes or lesions  ENDOCRINOLOGY: No Thyromegaly  PSYCH: Appropriate    Labs:  COVID-19 PCR: NotDetec (18 Apr 2023 14:27)  SARS-CoV-2: NotDetec (14 Apr 2023 14:54)                              10.2   7.37  )-----------( 180      ( 26 Apr 2023 07:18 )             31.5                         11.0   6.83  )-----------( 167      ( 25 Apr 2023 06:58 )             32.6                         11.5   6.12  )-----------( 149      ( 24 Apr 2023 07:00 )             34.4                         11.1   5.50  )-----------( 134      ( 23 Apr 2023 06:51 )             34.8     04-26    143  |  113<H>  |  10  ----------------------------<  132<H>  3.1<L>   |  20<L>  |  0.61  04-25    142  |  112<H>  |  9   ----------------------------<  131<H>  3.1<L>   |  21<L>  |  0.58  04-24    143  |  110<H>  |  8   ----------------------------<  103<H>  3.2<L>   |  23  |  0.70  04-23    143  |  112<H>  |  6<L>  ----------------------------<  83  3.5   |  22  |  0.69    Ca    8.8      26 Apr 2023 07:17  Ca    8.8      25 Apr 2023 06:57  Mg     2.1     04-26  Mg     1.6     04-25      CAPILLARY BLOOD GLUCOSE  POCT Blood Glucose.: 133 mg/dL (26 Apr 2023 06:12)  POCT Blood Glucose.: 124 mg/dL (25 Apr 2023 23:59)  POCT Blood Glucose.: 131 mg/dL (25 Apr 2023 17:39)  POCT Blood Glucose.: 133 mg/dL (25 Apr 2023 12:18)        D-Dimer Assay, Quantitative: 816 ng/mL DDU (04-23 @ 06:51)    Studies    CT SCAN Chest < from: CT Angio Chest PE Protocol w/ IV Cont (04.24.23 @ 09:55) >    FINDINGS:    LUNGS AND AIRWAYS: Patent central airways. Interval resolution of right   lower lobe tree-in-bud opacities. Cluster of a few groundglass nodular   opacities in the right upper lobe, new since the prior study. Right lower   lobe 5 mm nodule (5:66), unchanged since the prior CT chest of 6/15/2022.  PLEURA: No pleural effusion.  MEDIASTINUM AND DAVID: Ill-defined 1.6 cm lymph node in the esophageal   recess (5:67), unchanged since the chest CT of September 21, 2019.   Prominent hilar lymph nodes are also unchanged.  VESSELS: Aortic and coronary artery calcifications. Thin linear filling   defect within the right lower lobar pulmonary artery (5:63-65) adjacent   to the vessel wall representing chronic pulmonary embolism or sequela of   prior PE. No acute pulmonary embolism.  HEART: Heart size is normal. No pericardial effusion.  CHEST WALL AND LOWER NECK: Minimal subcutaneous edema.  VISUALIZED UPPER ABDOMEN: Cholelithiasis. Partially visualized right   nephroureteral catheter with interval removal of left nephroureteral   catheter. Bilateral nonobstructing renal calculi.  BONES: Degenerative changes.    IMPRESSION:  No acute pulmonary embolism.    Thin small linear filling defect within the right lower lobe pulmonary   artery representing chronic pulmonary embolism or be sequela of old PE.    New right upper lobe cluster of small nodular groundglass opacities,   which may be infectious or inflammatory in etiology.    Right lower lobe 5 mm pulmonary nodule, unchanged since Sarah 15, 2022. 1   year follow-up noncontrast chest CT can be performed to ensure stability    --- End of Report ---          < end of copied text >    Venous Dopplers: LE: < from: [facility] Duplex Lower Ext Vein Scan, Bilat (04.24.23 @ 12:25) >    FINDINGS:    RIGHT:  Normal compressibility of the RIGHT common femoral, femoral and popliteal   veins.  Doppler examination shows normal spontaneous and phasic flow.  No RIGHT calf vein thrombosis is detected.    LEFT:  Normal compressibility of the LEFT common femoral, femoral and popliteal   veins.  Doppler examination shows normal spontaneous and phasic flow.  No LEFT calf vein thrombosis is detected.    IMPRESSION:  No evidence of deep venous thrombosis in either lower extremity.    < end of copied text >                       Pulmonary Consult  04-26-23 @ 09:35    Patient is a 62y old  Male who presents with a chief complaint of likely UTI (25 Apr 2023 14:05)    HPI: 61 y/o M with PMH of cerebellar ataxia, chronic lacunar infarcts, 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, chronic hypotension on midodrine, renal stones, dysphagia s/p hx of aspiration PNA (intubated x 1 week at [facility]) in 4/2022. Presents with AMS possibly 2nd to UTI. UA with pyuria. Recent admission 2/2023 for pyelonephritis and L obstructive uropathy s/p ureteral stent, s/p 10 days of Cefepime (UC + pseudomonas). CT A/P now with b/l ureteral stents, mild L hydroureteronephrosis enhancement of renal pelvis and proximal ureter with bladder wall thickening, concern for partial treatment of prior pyelonephritis per ID. S/p L ureteral stent removal and R stent exchange on 4/20 with urology. Course c/b thrombocytopenia elevated d dimer of 816. CTA chest with no acute PE but notes small filling defect in RLL which may be chronic PE, mild RUL GGO. LE duplex neg for DVT. Pulmonary called to consult for CT chest findings. Pt unable to participate in ROS. Spoke with wife for hx. Denies hx of smoking, lung disease, prior PE/DVT. No acute distress. O2 sats 99% on RA.     ?FOLLOWING PRESENT  [ no ] Hx of PE/DVT, [ no ] Hx COPD, [ no ] Hx of Asthma, [ yes ] Hx of Hospitalization, [ no ]  Hx of BiPAP/CPAP use, [ no ] Hx of AARON    No Known Allergies    PAST MEDICAL & SURGICAL HISTORY:  H/O cerebellar ataxia  -diagnosed in 2019  History of hypotension  Anemia  Lacunar infarction  -chronic  Pneumonia, aspiration  -april 2022 (intubated for 7 days at [facility])  Adrenal insufficiency  Low body temperature  Pressure ulcer of left heel  Pressure ulcer of buttock  H/O sepsis  H/O cystoscopy    FAMILY HISTORY:  FH: dementia (Mother)    FH: type 2 diabetes (Mother)    Family history of CVA (Father)    Social History: [ never smoker ] TOBACCO                  [ no ] ETOH                                 [ no ] IVDA/DRUGS    REVIEW OF SYSTEMS: unable to obtain    MEDICATIONS  (STANDING):  cefepime   IVPB 1000 milliGRAM(s) IV Intermittent every 8 hours  chlorhexidine 2% Cloths 1 Application(s) Topical daily  dextrose 5% + sodium chloride 0.9%. 1000 milliLiter(s) (60 mL/Hr) IV Continuous <Continuous>  heparin   Injectable 5000 Unit(s) SubCutaneous every 12 hours  hydrocortisone sodium succinate Injectable 50 milliGRAM(s) IV Push every 8 hours  levothyroxine Injectable 25 MICROGram(s) IV Push at bedtime  midodrine. 5 milliGRAM(s) Oral three times a day    Vital Signs Last 24 Hrs  T(C): 36.9 (26 Apr 2023 05:35), Max: 36.9 (26 Apr 2023 05:35)  T(F): 98.5 (26 Apr 2023 05:35), Max: 98.5 (26 Apr 2023 05:35)  HR: 46 (26 Apr 2023 05:35) (45 - 82)  BP: 106/67 (26 Apr 2023 05:35) (105/70 - 131/75)  BP(mean): --  RR: 18 (26 Apr 2023 05:35) (17 - 18)  SpO2: 100% (26 Apr 2023 05:35) (99% - 100%)    Parameters below as of 26 Apr 2023 05:35  Patient On (Oxygen Delivery Method): room air    I&O's Summary    25 Apr 2023 07:01  -  26 Apr 2023 07:00  --------------------------------------------------------  IN: 1640 mL / OUT: 750 mL / NET: 890 mL    Physical Exam:   GENERAL: NAD  HEENT: THU  ENMT: No tonsillar erythema, exudates, or enlargement  NECK: Supple, No JVD  CHEST/LUNG: Clear to auscultation bilaterally  CVS: Regular rate and rhythm  GI: : Soft, Nontender, Nondistended  NERVOUS SYSTEM:  Lethargic   EXTREMITIES:  2+ Peripheral Pulses, No clubbing, cyanosis, or edema  SKIN: No rashes or lesions  PSYCH: Appropriate    Labs:  COVID-19 PCR: NotDetec (18 Apr 2023 14:27)  SARS-CoV-2: NotDetec (14 Apr 2023 14:54)                              10.2   7.37  )-----------( 180      ( 26 Apr 2023 07:18 )             31.5                         11.0   6.83  )-----------( 167      ( 25 Apr 2023 06:58 )             32.6                         11.5   6.12  )-----------( 149      ( 24 Apr 2023 07:00 )             34.4                         11.1   5.50  )-----------( 134      ( 23 Apr 2023 06:51 )             34.8     04-26    143  |  113<H>  |  10  ----------------------------<  132<H>  3.1<L>   |  20<L>  |  0.61  04-25    142  |  112<H>  |  9   ----------------------------<  131<H>  3.1<L>   |  21<L>  |  0.58  04-24    143  |  110<H>  |  8   ----------------------------<  103<H>  3.2<L>   |  23  |  0.70  04-23    143  |  112<H>  |  6<L>  ----------------------------<  83  3.5   |  22  |  0.69    Ca    8.8      26 Apr 2023 07:17  Ca    8.8      25 Apr 2023 06:57  Mg     2.1     04-26  Mg     1.6     04-25      CAPILLARY BLOOD GLUCOSE  POCT Blood Glucose.: 133 mg/dL (26 Apr 2023 06:12)  POCT Blood Glucose.: 124 mg/dL (25 Apr 2023 23:59)  POCT Blood Glucose.: 131 mg/dL (25 Apr 2023 17:39)  POCT Blood Glucose.: 133 mg/dL (25 Apr 2023 12:18)    D-Dimer Assay, Quantitative: 816 ng/mL DDU (04-23 @ 06:51)    Studies    CT SCAN Chest < from: CT Angio Chest PE Protocol w/ IV Cont (04.24.23 @ 09:55) >    FINDINGS:    LUNGS AND AIRWAYS: Patent central airways. Interval resolution of right   lower lobe tree-in-bud opacities. Cluster of a few groundglass nodular   opacities in the right upper lobe, new since the prior study. Right lower   lobe 5 mm nodule (5:66), unchanged since the prior CT chest of 6/15/2022.  PLEURA: No pleural effusion.  MEDIASTINUM AND DAVID: Ill-defined 1.6 cm lymph node in the esophageal   recess (5:67), unchanged since the chest CT of September 21, 2019.   Prominent hilar lymph nodes are also unchanged.  VESSELS: Aortic and coronary artery calcifications. Thin linear filling   defect within the right lower lobar pulmonary artery (5:63-65) adjacent   to the vessel wall representing chronic pulmonary embolism or sequela of   prior PE. No acute pulmonary embolism.  HEART: Heart size is normal. No pericardial effusion.  CHEST WALL AND LOWER NECK: Minimal subcutaneous edema.  VISUALIZED UPPER ABDOMEN: Cholelithiasis. Partially visualized right   nephroureteral catheter with interval removal of left nephroureteral   catheter. Bilateral nonobstructing renal calculi.  BONES: Degenerative changes.    IMPRESSION:  No acute pulmonary embolism.    Thin small linear filling defect within the right lower lobe pulmonary   artery representing chronic pulmonary embolism or be sequela of old PE.    New right upper lobe cluster of small nodular groundglass opacities,   which may be infectious or inflammatory in etiology.    Right lower lobe 5 mm pulmonary nodule, unchanged since Sarah 15, 2022. 1   year follow-up noncontrast chest CT can be performed to ensure stability    --- End of Report ---          < end of copied text >    Venous Dopplers: LE: < from: VA Duplex Lower Ext Vein Scan, Bilat (04.24.23 @ 12:25) >    FINDINGS:    RIGHT:  Normal compressibility of the RIGHT common femoral, femoral and popliteal   veins.  Doppler examination shows normal spontaneous and phasic flow.  No RIGHT calf vein thrombosis is detected.    LEFT:  Normal compressibility of the LEFT common femoral, femoral and popliteal   veins.  Doppler examination shows normal spontaneous and phasic flow.  No LEFT calf vein thrombosis is detected.    IMPRESSION:  No evidence of deep venous thrombosis in either lower extremity.    < end of copied text >

## 2023-04-26 NOTE — CONSULT NOTE ADULT - PROBLEM SELECTOR RECOMMENDATION 6
see above
CT chest with mild GGO, favor respiratory motion > PNA  -Afebrile, no leukocytosis  -ABX per ID for UTI/pyelonephritis.

## 2023-04-26 NOTE — DISCHARGE NOTE PROVIDER - NSDCMRMEDTOKEN_GEN_ALL_CORE_FT
hydrocortisone 10 mg oral tablet: 1 tab(s) orally once a day at 3 PM Daily  hydrocortisone 20 mg oral tablet: 1 tab(s) orally once a day at 7 am daily  levothyroxine 50 mcg (0.05 mg) oral tablet: 1 tab(s) orally once a day  midodrine 5 mg oral tablet: 1 tab(s) orally 3 times a day

## 2023-04-26 NOTE — CONSULT NOTE ADULT - CONSULT REQUESTED DATE/TIME
15-Apr-2023 12:51
22-Apr-2023
18-Apr-2023 08:29
14-Apr-2023
23-Apr-2023 11:00
20-Apr-2023 12:20
26-Apr-2023 09:35

## 2023-04-26 NOTE — DISCHARGE NOTE PROVIDER - HOSPITAL COURSE
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  Wife has noticed that patient has been less attentive, not talking.  Says that she acts like this when he has a UTI.  Patient recently had 2 ureteral stents placed for kidney stones.  Denies fevers, chills, nausea, vomiting.  History given by wife.       Problem/Plan - 1:  ·  Problem: Frequent UTI.   ·  Plan: S/P IV Abxs . Negative  urine C/S .   No fever or Leucocytosis .   ID help appreciated .   On  cefepime for 1 week .       Problem/Plan - 2:  ·  Problem: Hydroureteronephrosis.   ·  Plan: S/P Stents . Urology following.   S/P   cystoscopy w/ bilateral ureteroscopy wit     Problem/Plan - 3:  ·  Problem: H/O hypotension.   ·  Plan: on Midodrine.     Problem/Plan - 4:  ·  Problem: H/O cerebellar ataxia.   ·  Plan: Supportive care.     Problem/Plan - 5:  ·  Problem: H/O Dysphagia .   ·  Plan: S/S following. IVF. Wants to eat today .      Problem/Plan - 6:  ·  Problem: Decubitus Ulcer   .   ·  Plan: Wound care helping .     Problem/Plan - 7:  ·  Problem: Hypothermia & Bradycardia  .   ·  Plan: On Abxs. TSH and Free T4 noted. Endo helping.      Problem/Plan - 8:  ·  Problem: Thrombocytopenia   .   ·  Plan: Hematology consult noted. D dimer high .CTA and doppler legs noted .      Problem/Plan - 9:  ·  Problem: Hypokalemia   .   ·  Plan: Replacing.     D/W Wife and said he will eat at home once discharged so doesn't want peg or NGTetc.       Discharge/Dispo/Med rec discussed with attending. Patient medically cleared for discharge with outpatient follow up with PCP     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  Wife has noticed that patient has been less attentive, not talking.  Says that she acts like this when he has a UTI.  Patient recently had 2 ureteral stents placed for kidney stones.  Denies fevers, chills, nausea, vomiting.  History given by wife.       Problem/Plan - 1:  ·  Problem: Frequent UTI.   ·  Plan: S/P IV Abxs . Negative  urine C/S .   No fever or Leucocytosis .   ID help appreciated .   On  cefepime for 1 week .       Problem/Plan - 2:  ·  Problem: Hydroureteronephrosis.   ·  Plan: S/P Stents . Urology following.   S/P   cystoscopy w/ bilateral ureteroscopy wit     Problem/Plan - 3:  ·  Problem: H/O hypotension.   ·  Plan: on Midodrine.     Problem/Plan - 4:  ·  Problem: H/O cerebellar ataxia.   ·  Plan: Supportive care.     Problem/Plan - 5:  ·  Problem: H/O Dysphagia .   ·  Plan: S/S following. IVF. Wants to eat today .      Problem/Plan - 6:  ·  Problem: Decubitus Ulcer   .   ·  Plan: Wound care helping .     Problem/Plan - 7:  ·  Problem: Hypothermia & Bradycardia  .   ·  Plan: On Abxs. TSH and Free T4 noted. Endo helping.      Problem/Plan - 8:  ·  Problem: Thrombocytopenia   .   ·  Plan: Hematology consult noted. D dimer high .CTA and doppler legs noted .      Problem/Plan - 9:  ·  Problem: Hypokalemia   .   ·  Plan: Replacing.     Problem: Adrenal insufficiency.   ·  Plan: Although am cortisol 11, in view of his clinical status suggest taper Cortef 20 mg in AM 7 am/ and 10 mg PM (3PM)  Will follow hemodynamics.      D/W Wife and said he will eat at home once discharged so doesn't want peg or NGTetc.       Discharge/Dispo/Med rec discussed with attending. Patient medically cleared for discharge with outpatient follow up with PCP     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  Wife has noticed that patient has been less attentive, not talking.  Says that she acts like this when he has a UTI.  Patient recently had 2 ureteral stents placed for kidney stones.  Denies fevers, chills, nausea, vomiting.  History given by wife.       Problem/Plan - 1:  ·  Problem: Frequent UTI.   ·  Plan: S/P IV Abxs . Negative  urine C/S .   No fever or Leucocytosis .   ID help appreciated .   On  cefepime for 1 week .       Problem/Plan - 2:  ·  Problem: Hydroureteronephrosis.   ·  Plan: S/P Stents . Urology following.   s/p cystoscopy w/ bilateral ureteroscopy, L stent removed and right stent exchanged on 4/20/2023. Tolerated procedure well.      Problem/Plan - 3:  ·  Problem: H/O hypotension.   ·  Plan: on Midodrine.     Problem/Plan - 4:  ·  Problem: H/O cerebellar ataxia.   ·  Plan: Supportive care.     Problem/Plan - 5:  ·  Problem: H/O Dysphagia .   ·  Plan: S/S following. IVF. Wants to eat today . Pleasure feeds.     Problem/Plan - 6:  ·  Problem: Decubitus Ulcer   .   ·  Plan: Wound care      Problem/Plan - 7:  ·  Problem: Hypothermia & Bradycardia  .   ·  Plan: On Abxs. TSH and Free T4 noted.      Problem/Plan - 8:  ·  Problem: Thrombocytopenia   .   ·  Plan: Hematology consult noted. D dimer high .CTA and doppler legs noted .      Problem/Plan - 9:  ·  Problem: Hypokalemia   .   ·  Plan: Replacing.     Problem: Adrenal insufficiency.   ·  Plan: Although am cortisol 11, in view of his clinical status suggest taper Cortef 20 mg in AM 7 am/ and 10 mg PM (3PM)  Will follow hemodynamics.      D/W Wife and said he will eat at home once discharged so doesn't want peg or NGT etc.       Discharge/Dispo/Med rec discussed with attending. Patient medically cleared for discharge with outpatient follow up with PCP

## 2023-04-26 NOTE — DISCHARGE NOTE PROVIDER - NSDCCAREPROVSEEN_GEN_ALL_CORE_FT
Yakelin Mccoy Darius, Yakelin Martinez, Mando Morelos, Suhas Wilkes, Austin Kelly, Erik Amaro, Leticia TROTTER

## 2023-04-26 NOTE — CONSULT NOTE ADULT - CONSULT REASON
altered mental status
thrombocytopenia
Multiple wounds
Pre-op risk stratfication
UTI
chronic PE, r/o PNA
Hypotension

## 2023-04-26 NOTE — CONSULT NOTE ADULT - PROBLEM SELECTOR RECOMMENDATION 4
Suggest to continue wound care, monitoring, FU primary team recommendations. .
continue present treatment
ABX per ID.

## 2023-04-26 NOTE — CONSULT NOTE ADULT - PROBLEM SELECTOR RECOMMENDATION 2
Will start on low dose IV synthroid, will get full thyroid panel and FU
continue antibiotic  ID f/u
S/S eval noted  -Family refusing PEG  -Aspiration precautions  -Oral care.

## 2023-04-26 NOTE — CONSULT NOTE ADULT - PROBLEM SELECTOR RECOMMENDATION 9
Although am cortisol 11, in view of his clinical status suggest to start him on Hydrocortisone 50 mg IV q8hr for now, once stable will taper down, FU
continue antibiotic  ID f/u
CTA chest with small linear filling defect within the RLL, ? chronic vs old PE   -LE duplex neg for DVT  -Per family, no hx of prior DVT/PE  -Suggest echocardiogram  -Normoxic, breathing comfortably on RA.

## 2023-04-26 NOTE — PROGRESS NOTE ADULT - SUBJECTIVE AND OBJECTIVE BOX
Chief complaint  Patient is a 62y old  Male who presents with a chief complaint of likely UTI (26 Apr 2023 12:28)         Labs and Fingersticks  CAPILLARY BLOOD GLUCOSE      POCT Blood Glucose.: 131 mg/dL (26 Apr 2023 12:14)  POCT Blood Glucose.: 133 mg/dL (26 Apr 2023 06:12)  POCT Blood Glucose.: 124 mg/dL (25 Apr 2023 23:59)  POCT Blood Glucose.: 131 mg/dL (25 Apr 2023 17:39)      Anion Gap, Serum: 10 (04-26 @ 07:17)  Anion Gap, Serum: 9 (04-25 @ 06:57)      Calcium, Total Serum: 8.8 (04-26 @ 07:17)  Calcium, Total Serum: 8.8 (04-25 @ 06:57)          04-26    143  |  113<H>  |  10  ----------------------------<  132<H>  3.1<L>   |  20<L>  |  0.61    Ca    8.8      26 Apr 2023 07:17  Mg     2.1     04-26                          10.2   7.37  )-----------( 180      ( 26 Apr 2023 07:18 )             31.5     Medications  MEDICATIONS  (STANDING):  chlorhexidine 2% Cloths 1 Application(s) Topical daily  dextrose 5% + sodium chloride 0.9%. 1000 milliLiter(s) (60 mL/Hr) IV Continuous <Continuous>  heparin   Injectable 5000 Unit(s) SubCutaneous every 12 hours  hydrocortisone 10 milliGRAM(s) Oral daily  levothyroxine Injectable 25 MICROGram(s) IV Push at bedtime  midodrine. 5 milliGRAM(s) Oral three times a day      Physical Exam  General: Patient comfortable in bed   Vital Signs Last 12 Hrs  T(F): 97.7 (04-26-23 @ 11:20), Max: 98.5 (04-26-23 @ 05:35)  HR: 41 (04-26-23 @ 11:20) (41 - 46)  BP: 104/60 (04-26-23 @ 11:20) (104/60 - 106/67)  BP(mean): --  RR: 18 (04-26-23 @ 11:20) (18 - 18)  SpO2: 98% (04-26-23 @ 11:20) (98% - 100%)    CVS: S1S2   Respiratory: No wheezing, no crepitations  GI: Abdomen soft, bowel sounds positive  Musculoskeletal:  moves all extremities  : Voiding

## 2023-04-26 NOTE — DISCHARGE NOTE PROVIDER - CARE PROVIDER_API CALL
Yolanda Peralta)  Internal Medicine  [address] [facility] [place] [address]  Phone: [phone]  Fax: [phone]  Follow Up Time:     Suhas Morelos)  EndocrinologyMetabDiabetes; Internal Medicine  [address] [place] [address]  Phone: [phone]  Fax: [phone]  Follow Up Time:

## 2023-04-26 NOTE — PROGRESS NOTE ADULT - ASSESSMENT
Assessment  Hypotension/Hypothermia: 62y Male with history of adrenal insufficiency, apparently not on any steroids as out patient admitted with hypotension and  hypothermia, AMS, s/p IVF hydration, Steroids tapered to PO.   Hypothyroidism: Subclinical, not on Synthroid, no Hx of thyroid disease, hypothermic. Low normal FT4  Pressure wounds: On treatment, monitored  UTI: frequent hx of UTI's. on IV abx, hx of ureter stents.           Discussed plan and management with Dr Jethro Garcia NP - TEAMS  Suhas Morelos MD  Cell: [phone]  Office: [phone]

## 2023-04-26 NOTE — PROGRESS NOTE ADULT - SUBJECTIVE AND OBJECTIVE BOX
expressive aphsice.    VITAL SIGNS:    T97,7 P41-80 /60 RR 16    PHYSICAL EXAM:     GENERAL: no acute distress  HEENT: NC/AT, EOMI, neck supple, MMM  RESPIRATORY: LCTAB/L, no rhonchi, rales, or wheezing  CARDIOVASCULAR: RRR, no murmurs, gallops, rubs  ABDOMINAL: soft, non-tender, non-distended, positive bowel sounds   EXTREMITIES: no clubbing, cyanosis, or edema  NEUROLOGICAL: alert and oriented x 3, non-focal                        10.2   7.37  )-----------( 180      ( 26 Apr 2023 07:18 )             31.5     04-26    143  |  113<H>  |  10  ----------------------------<  132<H>  3.1<L>   |  20<L>  |  0.61    Ca    8.8      26 Apr 2023 07:17  Mg     2.1     04-26        MEDICATIONS  (STANDING):  cefepime   IVPB 1000 milliGRAM(s) IV Intermittent every 8 hours  chlorhexidine 2% Cloths 1 Application(s) Topical daily  dextrose 5% + sodium chloride 0.9%. 1000 milliLiter(s) (60 mL/Hr) IV Continuous <Continuous>  heparin   Injectable 5000 Unit(s) SubCutaneous every 12 hours  hydrocortisone sodium succinate Injectable 50 milliGRAM(s) IV Push every 8 hours  levothyroxine Injectable 25 MICROGram(s) IV Push at bedtime  midodrine. 5 milliGRAM(s) Oral three times a day  potassium chloride  10 mEq/100 mL IVPB 10 milliEquivalent(s) IV Intermittent every 1 hour

## 2023-04-26 NOTE — PROGRESS NOTE ADULT - SUBJECTIVE AND OBJECTIVE BOX
Date of Service  : 04-26-23     INTERVAL HPI/OVERNIGHT EVENTS: No new concerns.   Vital Signs Last 24 Hrs  T(C): 36.1 (26 Apr 2023 17:39), Max: 36.9 (26 Apr 2023 05:35)  T(F): 97 (26 Apr 2023 17:39), Max: 98.5 (26 Apr 2023 05:35)  HR: 41 (26 Apr 2023 17:39) (41 - 82)  BP: 109/68 (26 Apr 2023 17:39) (104/60 - 109/68)  BP(mean): --  RR: 18 (26 Apr 2023 17:39) (17 - 18)  SpO2: 98% (26 Apr 2023 17:39) (98% - 100%)    Parameters below as of 26 Apr 2023 17:39  Patient On (Oxygen Delivery Method): room air      I&O's Summary    25 Apr 2023 07:01  -  26 Apr 2023 07:00  --------------------------------------------------------  IN: 1640 mL / OUT: 750 mL / NET: 890 mL    26 Apr 2023 07:01  -  26 Apr 2023 17:58  --------------------------------------------------------  IN: 0 mL / OUT: 200 mL / NET: -200 mL      MEDICATIONS  (STANDING):  chlorhexidine 2% Cloths 1 Application(s) Topical daily  dextrose 5% + sodium chloride 0.9%. 1000 milliLiter(s) (60 mL/Hr) IV Continuous <Continuous>  heparin   Injectable 5000 Unit(s) SubCutaneous every 12 hours  hydrocortisone 10 milliGRAM(s) Oral daily  midodrine. 5 milliGRAM(s) Oral three times a day    MEDICATIONS  (PRN):    LABS:                        10.2   7.37  )-----------( 180      ( 26 Apr 2023 07:18 )             31.5     04-26    145  |  115<H>  |  11  ----------------------------<  123<H>  4.0   |  20<L>  |  0.62    Ca    8.8      26 Apr 2023 14:58  Mg     2.1     04-26          CAPILLARY BLOOD GLUCOSE      POCT Blood Glucose.: 114 mg/dL (26 Apr 2023 17:41)  POCT Blood Glucose.: 131 mg/dL (26 Apr 2023 12:14)  POCT Blood Glucose.: 133 mg/dL (26 Apr 2023 06:12)  POCT Blood Glucose.: 124 mg/dL (25 Apr 2023 23:59)              Consultant(s) Notes Reviewed:  [x ] YES  [ ] NO    PHYSICAL EXAM:  GENERAL: NAD, well-groomed, well-developed,not in any distress ,  HEAD:  Atraumatic, Normocephalic  NECK: Supple, No JVD, Normal thyroid  NERVOUS SYSTEM:  Alert   CHEST/LUNG: Good air entry bilateral with no  rales, rhonchi, wheezing, or rubs  HEART: Regular rate and rhythm; No murmurs, rubs, or gallops  ABDOMEN: Soft, Nontender, Nondistended; Bowel sounds present  EXTREMITIES:  2+ Peripheral Pulses, No clubbing, cyanosis, or edema      Care Discussed with Consultants/Other Providers [ x] YES  [ ] NO

## 2023-04-26 NOTE — CONSULT NOTE ADULT - NS ATTEND AMEND GEN_ALL_CORE FT
Pt seen and examined with ACP.  Assessment and plan reviewed and discussed.  Agree with above.      I spent 55  minutes face to face w/ this pt of which more than 50% of the time was spent counseling & coordinating care of this pt.
pt seen and exmaind and history reviewed;    cta noted:  has stigmata of chr pe or residual pe ; however per his wife he has no underlying lung disease and never had pe of that she knows off; :  dopplers here are negative:   There isno acute pe:   for now would do echo to see for pa presures and rigth sided of heart; :  probably would not need ac;   dw team

## 2023-04-26 NOTE — PROGRESS NOTE ADULT - ASSESSMENT
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  Wife has noticed that patient has been less attentive, not talking.  Says that she acts like this when he has a UTI.  Patient recently had 2 ureteral stents placed for kidney stones.  Denies fevers, chills, nausea, vomiting.  History given by wife.       Problem/Plan - 1:  ·  Problem: Frequent UTI.   ·  Plan: S/P IV Abxs . Negative  urine C/S .   No fever or Leucocytosis .   ID help appreciated .   On  cefepime for 1 week .       Problem/Plan - 2:  ·  Problem: Hydroureteronephrosis.   ·  Plan: S/P Stents . Urology following.   S/P   cystoscopy w/ bilateral ureteroscopy wit     Problem/Plan - 3:  ·  Problem: H/O hypotension.   ·  Plan: on Midodrine.     Problem/Plan - 4:  ·  Problem: H/O cerebellar ataxia.   ·  Plan: Supportive care.     Problem/Plan - 5:  ·  Problem: H/O Dysphagia .   ·  Plan: S/S following. IVF. Wants to eat today .      Problem/Plan - 6:  ·  Problem: Decubitus Ulcer   .   ·  Plan: Wound care helping .     Problem/Plan - 7:  ·  Problem: Hypothermia & Bradycardia  .   ·  Plan: On Abxs. TSH and Free T4 noted. Endo helping.      Problem/Plan - 8:  ·  Problem: Thrombocytopenia   .   ·  Plan: Hematology consult noted. D dimer high .  CTA and doppler legs noted .      Problem/Plan - 9:  ·  Problem: Hypokalemia   .   ·  Plan: Replacing.      Problem/Plan - 10:  ·  Problem: Old PE and pulmonary nodule   .   ·  Plan: TTE pending.     < from: CT Angio Chest PE Protocol w/ IV Cont (04.24.23 @ 09:55) >  IMPRESSION:  No acute pulmonary embolism.    Thin small linear filling defect within the right lower lobe pulmonary   artery representing chronic pulmonary embolism or be sequela of old PE.    New right upper lobe cluster of small nodular groundglass opacities,   which may be infectious or inflammatory in etiology.    Right lower lobe 5 mm pulmonary nodule, unchanged since Sarah 15, 2022. 1   year follow-up noncontrast chest CT can be performed to ensure stability     Problem/Plan - 11:  ·  Problem: Adrenal Insufficiency with hypothyroidism .   ·  Plan: On Replacement .    D/W Wife and said "he will eat at home once discharged so doesn't want peg or NGTetc." Pleasure feeds per speech & swallow and she understand risk of aspiration

## 2023-04-27 LAB
GLUCOSE BLDC GLUCOMTR-MCNC: 90 MG/DL — SIGNIFICANT CHANGE UP (ref 70–99)
GLUCOSE BLDC GLUCOMTR-MCNC: 94 MG/DL — SIGNIFICANT CHANGE UP (ref 70–99)
GLUCOSE BLDC GLUCOMTR-MCNC: 95 MG/DL — SIGNIFICANT CHANGE UP (ref 70–99)

## 2023-04-27 PROCEDURE — 99232 SBSQ HOSP IP/OBS MODERATE 35: CPT

## 2023-04-27 PROCEDURE — 93308 TTE F-UP OR LMTD: CPT | Mod: 26

## 2023-04-27 RX ADMIN — HEPARIN SODIUM 5000 UNIT(S): 5000 INJECTION INTRAVENOUS; SUBCUTANEOUS at 18:35

## 2023-04-27 RX ADMIN — MIDODRINE HYDROCHLORIDE 5 MILLIGRAM(S): 2.5 TABLET ORAL at 11:52

## 2023-04-27 RX ADMIN — SODIUM CHLORIDE 60 MILLILITER(S): 9 INJECTION, SOLUTION INTRAVENOUS at 11:53

## 2023-04-27 RX ADMIN — CHLORHEXIDINE GLUCONATE 1 APPLICATION(S): 213 SOLUTION TOPICAL at 11:53

## 2023-04-27 RX ADMIN — Medication 10 MILLIGRAM(S): at 14:44

## 2023-04-27 RX ADMIN — Medication 50 MICROGRAM(S): at 05:34

## 2023-04-27 RX ADMIN — SODIUM CHLORIDE 60 MILLILITER(S): 9 INJECTION, SOLUTION INTRAVENOUS at 05:34

## 2023-04-27 RX ADMIN — MIDODRINE HYDROCHLORIDE 5 MILLIGRAM(S): 2.5 TABLET ORAL at 05:33

## 2023-04-27 RX ADMIN — HEPARIN SODIUM 5000 UNIT(S): 5000 INJECTION INTRAVENOUS; SUBCUTANEOUS at 05:34

## 2023-04-27 RX ADMIN — MIDODRINE HYDROCHLORIDE 5 MILLIGRAM(S): 2.5 TABLET ORAL at 18:34

## 2023-04-27 RX ADMIN — Medication 20 MILLIGRAM(S): at 06:06

## 2023-04-27 NOTE — PROGRESS NOTE ADULT - SUBJECTIVE AND OBJECTIVE BOX
[facility]-- WOUND TEAM -- FOLLOW UP NOTE  --------------------------------------------------------------------------------    24 hour events/subjective:          Diet:  Diet, NPO:   Except Medications (04-20-23 @ 23:34)      ROS: pt unable to offer    ALLERGIES & MEDICATIONS  --------------------------------------------------------------------------------    No Known Allergies      STANDING INPATIENT MEDICATIONS  chlorhexidine 2% Cloths 1 Application(s) Topical daily  dextrose 5% + sodium chloride 0.9%. 1000 milliLiter(s) IV Continuous <Continuous>  heparin   Injectable 5000 Unit(s) SubCutaneous every 12 hours  hydrocortisone 20 milliGRAM(s) Oral daily  hydrocortisone 10 milliGRAM(s) Oral daily  levothyroxine 50 MICROGram(s) Oral daily  midodrine. 5 milliGRAM(s) Oral three times a day        VITALS/PHYSICAL EXAM  --------------------------------------------------------------------------------  T(C): 36.2 (04-27-23 @ 12:41), Max: 36.3 (04-26-23 @ 19:57)  HR: 43 (04-27-23 @ 12:41) (41 - 49)  BP: 102/66 (04-27-23 @ 12:41) (101/62 - 109/68)  RR: 18 (04-27-23 @ 12:41) (18 - 18)  SpO2: 100% (04-27-23 @ 12:41) (98% - 100%)  Wt(kg): --        04-26-23 @ 07:01  -  04-27-23 @ 07:00  --------------------------------------------------------  IN: 600 mL / OUT: 850 mL / NET: -250 mL    04-27-23 @ 07:01  -  04-27-23 @ 15:10  --------------------------------------------------------  IN: 0 mL / OUT: 600 mL / NET: -600 mL            LABS/ CULTURES/ RADIOLOGY:              10.2   7.37  >-----------<  180      [04-26-23 @ 07:18]              31.5     145  |  115  |  11  ----------------------------<  123      [04-26-23 @ 14:58]  4.0   |  20  |  0.62        Ca     8.8     [04-26-23 @ 14:58]      Mg     2.1     [04-26-23 @ 07:17]      CAPILLARY BLOOD GLUCOSE  POCT Blood Glucose.: 94 mg/dL (27 Apr 2023 12:37)  POCT Blood Glucose.: 95 mg/dL (27 Apr 2023 06:12)  POCT Blood Glucose.: 100 mg/dL (26 Apr 2023 23:57)  POCT Blood Glucose.: 114 mg/dL (26 Apr 2023 17:41)     [facility]-- WOUND TEAM -- FOLLOW UP NOTE  --------------------------------------------------------------------------------    24 hour events/subjective:    currently afebrile  incontinent  requiring assist w/ ADLs  No noted odor or excess drainage from wounds  tolerating dressing changes      Diet:  Diet, NPO:   Except Medications (04-20-23 @ 23:34)      ROS: pt unable to offer    ALLERGIES & MEDICATIONS  --------------------------------------------------------------------------------    No Known Allergies      STANDING INPATIENT MEDICATIONS  chlorhexidine 2% Cloths 1 Application(s) Topical daily  dextrose 5% + sodium chloride 0.9%. 1000 milliLiter(s) IV Continuous <Continuous>  heparin   Injectable 5000 Unit(s) SubCutaneous every 12 hours  hydrocortisone 20 milliGRAM(s) Oral daily  hydrocortisone 10 milliGRAM(s) Oral daily  levothyroxine 50 MICROGram(s) Oral daily  midodrine. 5 milliGRAM(s) Oral three times a day        VITALS/PHYSICAL EXAM  --------------------------------------------------------------------------------  T(C): 36.2 (04-27-23 @ 12:41), Max: 36.3 (04-26-23 @ 19:57)  HR: 43 (04-27-23 @ 12:41) (41 - 49)  BP: 102/66 (04-27-23 @ 12:41) (101/62 - 109/68)  RR: 18 (04-27-23 @ 12:41) (18 - 18)  SpO2: 100% (04-27-23 @ 12:41) (98% - 100%)  Wt(kg): --        04-26-23 @ 07:01  -  04-27-23 @ 07:00  --------------------------------------------------------  IN: 600 mL / OUT: 850 mL / NET: -250 mL    04-27-23 @ 07:01  -  04-27-23 @ 15:10  --------------------------------------------------------  IN: 0 mL / OUT: 600 mL / NET: -600 mL    NAD, A&Ox3, Confused, frail,  WD/ WN/ WG  Versa Care P500  bed  HEENT:  NC/AT, EOMI, sclera clear, mucosa moist, throat clear, trachea midline, neck supple  Respiratory: nonlabored w/ equal chest rise  Gastrointestinal: soft NT/ND   : (+) condom cath  Neurology:  weakened strength & sensation grossly intact  Psych: calm/ appropriate  Musculoskeletal:  ROM, no deformities/ contractures  Vascular: BLE equally warm,  no cyanosis, clubbing, nor acute ischemia        mild BLE edema equal        (+)  BLE DP pulses palpable       LLE w/ partial thickness wound w/ hypopigmented intact skin in periwound    no blistering, (+)scant serosanguinous drainage  No odor, erythema, increased warmth, tenderness, induration, fluctuance, nor crepitus  Skin:  moist w/ good turgor  Sacrum- DTI   newly healed-  hypopigmented and denuded skin w/ purple skin     in 6cm x 4cm x 0cm  Right Trochanter/hip- unstageable  Pressure Injury    3cm x 2.5cm x 1.0cm w/ lip of undermining circumferentially      wound base is red w/ soft stringy slough     periwound skin hypopigmented intact skin     (+)serosanguinous drainage  No odor, erythema, increased warmth, tenderness, induration, fluctuance, nor crepitus          LABS/ CULTURES/ RADIOLOGY:              10.2   7.37  >-----------<  180      [04-26-23 @ 07:18]              31.5     145  |  115  |  11  ----------------------------<  123      [04-26-23 @ 14:58]  4.0   |  20  |  0.62        Ca     8.8     [04-26-23 @ 14:58]      Mg     2.1     [04-26-23 @ 07:17]      CAPILLARY BLOOD GLUCOSE  POCT Blood Glucose.: 94 mg/dL (27 Apr 2023 12:37)  POCT Blood Glucose.: 95 mg/dL (27 Apr 2023 06:12)  POCT Blood Glucose.: 100 mg/dL (26 Apr 2023 23:57)  POCT Blood Glucose.: 114 mg/dL (26 Apr 2023 17:41)

## 2023-04-27 NOTE — PROGRESS NOTE ADULT - ASSESSMENT
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  Wife has noticed that patient has been less attentive, not talking.  Says that she acts like this when he has a UTI.  Patient recently had 2 ureteral stents placed for kidney stones.  Denies fevers, chills, nausea, vomiting.  History given by wife.       Problem/Plan - 1:  ·  Problem: Frequent UTI.   ·  Plan: S/P IV Abxs . Negative  urine C/S .   No fever or Leucocytosis .   ID help appreciated .   On  cefepime for 1 week .       Problem/Plan - 2:  ·  Problem: Hydroureteronephrosis.   ·  Plan: S/P Stents . Urology following.   S/P   cystoscopy w/ bilateral ureteroscopy wit     Problem/Plan - 3:  ·  Problem: H/O hypotension.   ·  Plan: on Midodrine.     Problem/Plan - 4:  ·  Problem: H/O cerebellar ataxia.   ·  Plan: Supportive care.     Problem/Plan - 5:  ·  Problem: H/O Dysphagia .   ·  Plan: S/S following. IVF. Wants to eat today .      Problem/Plan - 6:  ·  Problem: Decubitus Ulcer   .   ·  Plan: Wound care helping .     Problem/Plan - 7:  ·  Problem: Hypothermia & Bradycardia  .   ·  Plan: On Abxs. TSH and Free T4 noted. Endo helping.      Problem/Plan - 8:  ·  Problem: Thrombocytopenia   .   ·  Plan: Hematology consult noted. D dimer high .  CTA and doppler legs noted .      Problem/Plan - 9:  ·  Problem: Hypokalemia   .   ·  Plan: Replacing.      Problem/Plan - 10:  ·  Problem: Old PE and pulmonary nodule   .   ·  Plan: TTE pending.     < from: CT Angio Chest PE Protocol w/ IV Cont (04.24.23 @ 09:55) >  IMPRESSION:  No acute pulmonary embolism.    Thin small linear filling defect within the right lower lobe pulmonary   artery representing chronic pulmonary embolism or be sequela of old PE.    New right upper lobe cluster of small nodular groundglass opacities,   which may be infectious or inflammatory in etiology.    Right lower lobe 5 mm pulmonary nodule, unchanged since Sarah 15, 2022. 1   year follow-up noncontrast chest CT can be performed to ensure stability     Problem/Plan - 11:  ·  Problem: Adrenal Insufficiency with hypothyroidism .   ·  Plan: On Replacement .    Dispo : DC planning home.                Nutritional Assessment:  · Nutritional Assessment	This patient has been assessed with a concern for Malnutrition and has been determined to have a diagnosis/diagnoses of Moderate protein-calorie malnutrition.    This patient is being managed with:   Diet NPO-  Except Medications  Entered: Apr 20 2023 11:34PM

## 2023-04-27 NOTE — PROGRESS NOTE ADULT - SUBJECTIVE AND OBJECTIVE BOX
Date of Service: 04-27-23 @ 16:15    Patient is a 62y old  Male who presents with a chief complaint of likely UTI (27 Apr 2023 15:10)    Any change in ROS:   O2 sats 98% on RA  No acute distress    MEDICATIONS  (STANDING):  chlorhexidine 2% Cloths 1 Application(s) Topical daily  dextrose 5% + sodium chloride 0.9%. 1000 milliLiter(s) (60 mL/Hr) IV Continuous <Continuous>  heparin   Injectable 5000 Unit(s) SubCutaneous every 12 hours  hydrocortisone 20 milliGRAM(s) Oral daily  hydrocortisone 10 milliGRAM(s) Oral daily  levothyroxine 50 MICROGram(s) Oral daily  midodrine. 5 milliGRAM(s) Oral three times a day    Vital Signs Last 24 Hrs  T(C): 36.2 (27 Apr 2023 12:41), Max: 36.3 (26 Apr 2023 19:57)  T(F): 97.1 (27 Apr 2023 12:41), Max: 97.4 (27 Apr 2023 05:06)  HR: 43 (27 Apr 2023 12:41) (41 - 49)  BP: 102/66 (27 Apr 2023 12:41) (101/62 - 109/68)  BP(mean): --  RR: 18 (27 Apr 2023 12:41) (18 - 18)  SpO2: 100% (27 Apr 2023 12:41) (98% - 100%)    Parameters below as of 27 Apr 2023 12:41  Patient On (Oxygen Delivery Method): room air    I&O's Summary    26 Apr 2023 07:01  -  27 Apr 2023 07:00  --------------------------------------------------------  IN: 600 mL / OUT: 850 mL / NET: -250 mL    27 Apr 2023 07:01  -  27 Apr 2023 16:15  --------------------------------------------------------  IN: 0 mL / OUT: 600 mL / NET: -600 mL    Physical Exam:   GENERAL: NAD  HEENT: THU  ENMT: No tonsillar erythema, exudates, or enlargement  NECK: Supple, No JVD  CHEST/LUNG: Clear to auscultation   CVS: Regular rate and rhythm  GI: : Soft, Nontender, Nondistended  NERVOUS SYSTEM:  Lethargic  EXTREMITIES:  2+ Peripheral Pulses, No clubbing, cyanosis, or edema  SKIN: No rashes or lesions  PSYCH: Appropriate    Labs:                              10.2   7.37  )-----------( 180      ( 26 Apr 2023 07:18 )             31.5                         11.0   6.83  )-----------( 167      ( 25 Apr 2023 06:58 )             32.6                         11.5   6.12  )-----------( 149      ( 24 Apr 2023 07:00 )             34.4     04-26    145  |  115<H>  |  11  ----------------------------<  123<H>  4.0   |  20<L>  |  0.62  04-26    143  |  113<H>  |  10  ----------------------------<  132<H>  3.1<L>   |  20<L>  |  0.61  04-25    142  |  112<H>  |  9   ----------------------------<  131<H>  3.1<L>   |  21<L>  |  0.58  04-24    143  |  110<H>  |  8   ----------------------------<  103<H>  3.2<L>   |  23  |  0.70    Ca    8.8      26 Apr 2023 14:58  Ca    8.8      26 Apr 2023 07:17  Mg     2.1     04-26      CAPILLARY BLOOD GLUCOSE  POCT Blood Glucose.: 94 mg/dL (27 Apr 2023 12:37)  POCT Blood Glucose.: 95 mg/dL (27 Apr 2023 06:12)  POCT Blood Glucose.: 100 mg/dL (26 Apr 2023 23:57)  POCT Blood Glucose.: 114 mg/dL (26 Apr 2023 17:41)    D-Dimer Assay, Quantitative: 816 ng/mL DDU (04-23 @ 06:51)    Studies    CT SCAN Chest   < from: CT Angio Chest PE Protocol w/ IV Cont (04.24.23 @ 09:55) >  FINDINGS:    LUNGS AND AIRWAYS: Patent central airways. Interval resolution of right   lower lobe tree-in-bud opacities. Cluster of a few groundglass nodular   opacities in the right upper lobe, new since the prior study. Right lower   lobe 5 mm nodule (5:66), unchanged since the prior CT chest of 6/15/2022.  PLEURA: No pleural effusion.  MEDIASTINUM AND DAVID: Ill-defined 1.6 cm lymph node in the esophageal   recess (5:67), unchanged since the chest CT of September 21, 2019.   Prominent hilar lymph nodes are also unchanged.  VESSELS: Aortic and coronary artery calcifications. Thin linear filling   defect within the right lower lobar pulmonary artery (5:63-65) adjacent   to the vessel wall representing chronic pulmonary embolism or sequela of   prior PE. No acute pulmonary embolism.  HEART: Heart size is normal. No pericardial effusion.  CHEST WALL AND LOWER NECK: Minimal subcutaneous edema.  VISUALIZED UPPER ABDOMEN: Cholelithiasis. Partially visualized right   nephroureteral catheter with interval removal of left nephroureteral   catheter. Bilateral nonobstructing renal calculi.  BONES: Degenerative changes.    IMPRESSION:  No acute pulmonary embolism.    Thin small linear filling defect within the right lower lobe pulmonary   artery representing chronic pulmonary embolism or be sequela of old PE.    New right upper lobe cluster of small nodular groundglass opacities,   which may be infectious or inflammatory in etiology.    Right lower lobe 5 mm pulmonary nodule, unchanged since Sarah 15, 2022. 1   year follow-up noncontrast chest CT can be performed to ensure stability    --- End of Report ---    < end of copied text >

## 2023-04-27 NOTE — PROGRESS NOTE ADULT - ASSESSMENT
{\rtf1\ntsevl08407\ansi\yownluh7957\ftnbj\uc1\deff0  {\fonttbl{\f0 \fnil Segoe UI;}{\f1 \fnil \fcharset0 Segoe UI;}{\f2 \fnil Times New Kosta;}}  {\colortbl ;\wct358\kcpie984\yqbi951 ;\red0\green0\blue0 ;\red0\green0\whlu117 ;\red0\green0\blue0 ;}  {\stylesheet{\f0\fs20 Normal;}{\cs1 Default Paragraph Font;}{\cs2\f0\fs16 Line Number;}{\cs3\f2\fs24\ul\cf3 Hyperlink;}}  {\*\revtbl{Unknown;}}  \xnlidh92139\qqgeac91062\sdvkp0796\kadha8270\zaogv2238\qfpfj2766\xltrhiz866\ezshjnk767\nogrowautofit\jimpib182\formshade\nofeaturethrottle1\dntblnsbdb\fet4\aendnotes\aftnnrlc\pgbrdrhead\pgbrdrfoot  \sectd\ruafiu52442\gdblej90385\guttersxn0\qqmreuck8183\jnplbnfr9288\zedtpiqb6012\cvthsgtw0311\xnhkbgj183\rrgroda003\sbkpage\pgncont\pgndec  \plain\plain\f0\fs24\ql\plain\f0\fs24\plain\f0\fs20\wbjj7823\hich\f0\dbch\f0\loch\f0\fs20 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, chronic hypotension on midodrine, renal   stones, presents with altered mental status, pt, s/p bilateral ureteroscopy and stents \par  \par  Wound Consult requested to assist w/ management of;\par  Rt hip unstageable pressure injury\par  Sacral DTI\par  LLE wound\par  \par  Buttocks/ Sacrum TRIAD BID and prn soiling \par       Continue w/ attends under pads and Pericare w/ condom cath maintenance as per protocol\par  Rt Hip - Aquacel dressing QD\par  LLE- ALLEVYN foam QD\par  BLE elevation \par  Abx per Medicine/ ID\par  Moisturize intact skin w/ SWEEN cream BID\par  Nutrition Consult for optimization in pt w/ Inadequate PO intake, & Increased nutritional needs\par       encourage high quality protein, denice/ prosource, MVI & Vit C to promote wound healing\par  Continue turning and positioning w/ offloading assistive devices as per protocol\par  Waffle Cushion to chair when oob to chair\par  Continue w/ low air loss pressure redistribution bed surface \par  Pt will need Group 2 mattress on hospital bed and ROHO cushion for wheel chair upon discharge home\par  Care as per medicine, will follow w/ you\par  Upon discharge f/u as outpatient at [facility] [address] [place] [address] [phone]\par  D/w team & RN & attng\par  Nikky De Santiago PA-C [id number]\par  Nights/ Weekends/ Holidays please call:\par  General Surgery Consult pager ([phone]) for emergencies\par  Wound PT for multilayer leg wrapping or VAC issues (x [phone]) \par   \plain\f1\fs20\fgmp0778\hich\f1\dbch\f1\loch\f1\cf2\fs20\strike\plain\f1\fs20\cvvn7601\hich\f1\dbch\f1\loch\f1\cf2\fs20\plain\f0\fs20\qydz1637\hich\f0\dbch\f0\loch\f0\fs20 I spent 35minutes face to face w/ this pt of which more than 50% of the time was   spent counseling & coordinating care of this pt. \par  }

## 2023-04-27 NOTE — PROGRESS NOTE ADULT - SUBJECTIVE AND OBJECTIVE BOX
expressive aphasic    VITAL SIGNS:    T97.1, P43 /66 RR18    PHYSICAL EXAM:     GENERAL: no acute distress  HEENT: NC/AT, EOMI, neck supple, MMM  RESPIRATORY: LCTAB/L, no rhonchi, rales, or wheezing  CARDIOVASCULAR: RRR, no murmurs, gallops, rubs  ABDOMINAL: soft, non-tender, non-distended, positive bowel sounds   EXTREMITIES: no clubbing, cyanosis, or edema' contracted                          10.2   7.37  )-----------( 180      ( 26 Apr 2023 07:18 )             31.5     04-26    145  |  115<H>  |  11  ----------------------------<  123<H>  4.0   |  20<L>  |  0.62    Ca    8.8      26 Apr 2023 14:58  Mg     2.1     04-26        MEDICATIONS  (STANDING):  chlorhexidine 2% Cloths 1 Application(s) Topical daily  dextrose 5% + sodium chloride 0.9%. 1000 milliLiter(s) (60 mL/Hr) IV Continuous <Continuous>  heparin   Injectable 5000 Unit(s) SubCutaneous every 12 hours  hydrocortisone 20 milliGRAM(s) Oral daily  hydrocortisone 10 milliGRAM(s) Oral daily  levothyroxine 50 MICROGram(s) Oral daily  midodrine. 5 milliGRAM(s) Oral three times a day

## 2023-04-27 NOTE — PROGRESS NOTE ADULT - NUTRITIONAL ASSESSMENT
This patient has been assessed with a concern for Malnutrition and has been determined to have a diagnosis/diagnoses of Moderate protein-calorie malnutrition.    This patient is being managed with:   Diet NPO-  Except Medications  Entered: Apr 20 2023 11:34PM  

## 2023-04-27 NOTE — PROGRESS NOTE ADULT - SUBJECTIVE AND OBJECTIVE BOX
Cardiovascular Disease Progress Note  Date of service: 04-27-23 @ 08:30    Overnight events: No acute events overnight.  Pt is in no distress  Otherwise review of systems negative    Objective Findings:  T(C): 36.3 (04-27-23 @ 05:06), Max: 36.5 (04-26-23 @ 11:20)  HR: 42 (04-27-23 @ 05:06) (41 - 49)  BP: 109/68 (04-27-23 @ 05:06) (101/62 - 109/68)  RR: 18 (04-27-23 @ 05:06) (18 - 18)  SpO2: 100% (04-27-23 @ 05:06) (98% - 100%)  Wt(kg): --  Daily     Daily       Physical Exam:  Gen: NAD; Patient resting comfortably  HEENT: EOMI, Normocephalic/ atraumatic  CV: RRR, normal S1 + S2, no m/r/g  Lungs:  Normal respiratory effort; clear to auscultation bilaterally  Abd: soft, non-tender; bowel sounds present  Ext: No edema; warm and well perfused    Telemetry:    Laboratory Data:                        10.2   7.37  )-----------( 180      ( 26 Apr 2023 07:18 )             31.5     04-26    145  |  115<H>  |  11  ----------------------------<  123<H>  4.0   |  20<L>  |  0.62    Ca    8.8      26 Apr 2023 14:58  Mg     2.1     04-26                Inpatient Medications:  MEDICATIONS  (STANDING):  chlorhexidine 2% Cloths 1 Application(s) Topical daily  dextrose 5% + sodium chloride 0.9%. 1000 milliLiter(s) (60 mL/Hr) IV Continuous <Continuous>  heparin   Injectable 5000 Unit(s) SubCutaneous every 12 hours  hydrocortisone 20 milliGRAM(s) Oral daily  hydrocortisone 10 milliGRAM(s) Oral daily  levothyroxine 50 MICROGram(s) Oral daily  midodrine. 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       Plan of Care:    #Post-op risk stratification  - s/p cystoscopy w/ bilateral ureteroscopy, L stent removed and right stent exchanged on 4/20/2023. Tolerated procedure well.   - EKG on admission shows sinus rhythm  - TTE from 8/2022 shows normal LV systolic function with no valvular or structural disease  - Pt displays no signs of post-op coronary ischemia  - Continue current management.   - Urology input appreciated.      #Sinus bradycardia  - Repeat EKG shows sinus bradycardia with 1st degree AV block  - Avoid AV ibeth blocking agents  - Optimize electrolytes, maintain K>4, Mg >2  - Would continue to proceed with conservative management given patients comorbidities  - No indication for PPM at this time.    #Sepsis  - 2/2 UTI  - Abx as per primary team    #Hypotension  - Continue Midodrine      #ACP (advance care planning)-  Advanced care planning was discussed with the patient.  Risks, benefits and alternatives of medical treatment and procedures were discussed in detail and all questions were answered. 30 additional minutes spent addressing advance care plans.          Over 25 minutes spent on total encounter; more than 50% of the visit was spent counseling and/or coordinating care by the attending physician.      Erik Kelly,  [facility]  Cardiovascular Disease  [phone]

## 2023-04-27 NOTE — PROGRESS NOTE ADULT - SUBJECTIVE AND OBJECTIVE BOX
Chief complaint  Patient is a 62y old  Male who presents with a chief complaint of likely UTI (27 Apr 2023 08:30)         Labs and Fingersticks  CAPILLARY BLOOD GLUCOSE      POCT Blood Glucose.: 94 mg/dL (27 Apr 2023 12:37)  POCT Blood Glucose.: 95 mg/dL (27 Apr 2023 06:12)  POCT Blood Glucose.: 100 mg/dL (26 Apr 2023 23:57)  POCT Blood Glucose.: 114 mg/dL (26 Apr 2023 17:41)      Anion Gap, Serum: 10 (04-26 @ 14:58)  Anion Gap, Serum: 10 (04-26 @ 07:17)      Calcium, Total Serum: 8.8 (04-26 @ 14:58)  Calcium, Total Serum: 8.8 (04-26 @ 07:17)          04-26    145  |  115<H>  |  11  ----------------------------<  123<H>  4.0   |  20<L>  |  0.62    Ca    8.8      26 Apr 2023 14:58  Mg     2.1     04-26                          10.2   7.37  )-----------( 180      ( 26 Apr 2023 07:18 )             31.5     Medications  MEDICATIONS  (STANDING):  chlorhexidine 2% Cloths 1 Application(s) Topical daily  dextrose 5% + sodium chloride 0.9%. 1000 milliLiter(s) (60 mL/Hr) IV Continuous <Continuous>  heparin   Injectable 5000 Unit(s) SubCutaneous every 12 hours  hydrocortisone 20 milliGRAM(s) Oral daily  hydrocortisone 10 milliGRAM(s) Oral daily  levothyroxine 50 MICROGram(s) Oral daily  midodrine. 5 milliGRAM(s) Oral three times a day      Physical Exam  General: Patient in bed   Vital Signs Last 12 Hrs  T(F): 97.1 (04-27-23 @ 12:41), Max: 97.4 (04-27-23 @ 05:06)  HR: 43 (04-27-23 @ 12:41) (42 - 43)  BP: 102/66 (04-27-23 @ 12:41) (102/66 - 109/68)  BP(mean): --  RR: 18 (04-27-23 @ 12:41) (18 - 18)  SpO2: 100% (04-27-23 @ 12:41) (100% - 100%)    CVS: S1S2   Respiratory: No wheezing, no crepitations  GI: Abdomen soft, bowel sounds positive  Musculoskeletal:  moves all extremities  : Voiding          Chief complaint    Patient is a 62y old  Male who presents with a chief complaint of likely UTI (27 Apr 2023 08:30)         Labs and Fingersticks  CAPILLARY BLOOD GLUCOSE      POCT Blood Glucose.: 94 mg/dL (27 Apr 2023 12:37)  POCT Blood Glucose.: 95 mg/dL (27 Apr 2023 06:12)  POCT Blood Glucose.: 100 mg/dL (26 Apr 2023 23:57)  POCT Blood Glucose.: 114 mg/dL (26 Apr 2023 17:41)      Anion Gap, Serum: 10 (04-26 @ 14:58)  Anion Gap, Serum: 10 (04-26 @ 07:17)      Calcium, Total Serum: 8.8 (04-26 @ 14:58)  Calcium, Total Serum: 8.8 (04-26 @ 07:17)          04-26    145  |  115<H>  |  11  ----------------------------<  123<H>  4.0   |  20<L>  |  0.62    Ca    8.8      26 Apr 2023 14:58  Mg     2.1     04-26                          10.2   7.37  )-----------( 180      ( 26 Apr 2023 07:18 )             31.5     Medications  MEDICATIONS  (STANDING):  chlorhexidine 2% Cloths 1 Application(s) Topical daily  dextrose 5% + sodium chloride 0.9%. 1000 milliLiter(s) (60 mL/Hr) IV Continuous <Continuous>  heparin   Injectable 5000 Unit(s) SubCutaneous every 12 hours  hydrocortisone 20 milliGRAM(s) Oral daily  hydrocortisone 10 milliGRAM(s) Oral daily  levothyroxine 50 MICROGram(s) Oral daily  midodrine. 5 milliGRAM(s) Oral three times a day      Physical Exam  General: Patient in bed   Vital Signs Last 12 Hrs  T(F): 97.1 (04-27-23 @ 12:41), Max: 97.4 (04-27-23 @ 05:06)  HR: 43 (04-27-23 @ 12:41) (42 - 43)  BP: 102/66 (04-27-23 @ 12:41) (102/66 - 109/68)  BP(mean): --  RR: 18 (04-27-23 @ 12:41) (18 - 18)  SpO2: 100% (04-27-23 @ 12:41) (100% - 100%)    CVS: S1S2   Respiratory: No wheezing, no crepitations  GI: Abdomen soft, bowel sounds positive  Musculoskeletal:  moves all extremities  : Voiding

## 2023-04-27 NOTE — PROGRESS NOTE ADULT - ASSESSMENT
Assessment  Hypotension/Hypothermia: 62y Male with history of adrenal insufficiency, apparently not on any steroids as out patient admitted with hypotension and  hypothermia, AMS, s/p IVF hydration, Steroids tapered to PO BID.   Hypothyroidism: Subclinical, not on Synthroid, no Hx of thyroid disease, hypothermic. Low normal FT4  Pressure wounds: On treatment, monitored  UTI: frequent hx of UTI's. on IV abx, hx of ureter stents.           Discussed plan and management with Dr Jethro Morelos MD  Cell: [phone]  Office: [phone]              Assessment  Hypotension/Hypothermia: 62y Male with history of adrenal insufficiency, apparently not on any steroids as out patient admitted with hypotension and   hypothermia, AMS, s/p IVF hydration, Steroids tapered to PO BID.   Hypothyroidism: Subclinical, not on Synthroid, no Hx of thyroid disease, hypothermic. Low normal FT4  Pressure wounds: On treatment, monitored  UTI: frequent hx of UTI's. on IV abx, hx of ureter stents.           Discussed plan and management with Dr Jethro Morelos MD  Cell: [phone]  Office: [phone]

## 2023-04-27 NOTE — PROGRESS NOTE ADULT - SUBJECTIVE AND OBJECTIVE BOX
Date of Service  : 04-27-23     INTERVAL HPI/OVERNIGHT EVENTS: No new concerns.   Vital Signs Last 24 Hrs  T(C): 36.2 (27 Apr 2023 12:41), Max: 36.3 (26 Apr 2023 19:57)  T(F): 97.1 (27 Apr 2023 12:41), Max: 97.4 (27 Apr 2023 05:06)  HR: 43 (27 Apr 2023 12:41) (42 - 49)  BP: 102/66 (27 Apr 2023 12:41) (101/62 - 109/68)  BP(mean): --  RR: 18 (27 Apr 2023 12:41) (18 - 18)  SpO2: 100% (27 Apr 2023 12:41) (100% - 100%)    Parameters below as of 27 Apr 2023 12:41  Patient On (Oxygen Delivery Method): room air      I&O's Summary    26 Apr 2023 07:01  -  27 Apr 2023 07:00  --------------------------------------------------------  IN: 600 mL / OUT: 850 mL / NET: -250 mL    27 Apr 2023 07:01  -  27 Apr 2023 18:49  --------------------------------------------------------  IN: 0 mL / OUT: 600 mL / NET: -600 mL      MEDICATIONS  (STANDING):  chlorhexidine 2% Cloths 1 Application(s) Topical daily  dextrose 5% + sodium chloride 0.9%. 1000 milliLiter(s) (60 mL/Hr) IV Continuous <Continuous>  heparin   Injectable 5000 Unit(s) SubCutaneous every 12 hours  hydrocortisone 10 milliGRAM(s) Oral daily  hydrocortisone 20 milliGRAM(s) Oral daily  levothyroxine 50 MICROGram(s) Oral daily  midodrine. 5 milliGRAM(s) Oral three times a day    MEDICATIONS  (PRN):    LABS:                        10.2   7.37  )-----------( 180      ( 26 Apr 2023 07:18 )             31.5     04-26    145  |  115<H>  |  11  ----------------------------<  123<H>  4.0   |  20<L>  |  0.62    Ca    8.8      26 Apr 2023 14:58  Mg     2.1     04-26          CAPILLARY BLOOD GLUCOSE      POCT Blood Glucose.: 90 mg/dL (27 Apr 2023 17:42)  POCT Blood Glucose.: 94 mg/dL (27 Apr 2023 12:37)  POCT Blood Glucose.: 95 mg/dL (27 Apr 2023 06:12)  POCT Blood Glucose.: 100 mg/dL (26 Apr 2023 23:57)              Consultant(s) Notes Reviewed:  [x ] YES  [ ] NO    PHYSICAL EXAM:  GENERAL: Not in any distress ,  HEAD:  Atraumatic, Normocephalic  NECK: Supple, No JVD, Normal thyroid  NERVOUS SYSTEM:  Alert   CHEST/LUNG: Good air entry bilateral with no  rales, rhonchi, wheezing, or rubs  HEART: Regular rate and rhythm; No murmurs, rubs, or gallops  ABDOMEN: Soft, Nontender, Nondistended; Bowel sounds present  EXTREMITIES:  2+ Peripheral Pulses, No clubbing, cyanosis, or edema    Care Discussed with Consultants/Other Providers [ x] YES  [ ] NO

## 2023-04-27 NOTE — PROGRESS NOTE ADULT - ASSESSMENT
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 Presents with AMS possibly 2nd to UTI. UA with pyuria. Recent admission 2/2023 for pyelonephritis and L obstructive uropathy s/p ureteral stent, s/p 10 days of Cefepime (UC + pseudomonas). CT A/P now with b/l ureteral stents, mild L hydroureteronephrosis, enhancement of renal pelvis and proximal ureter with bladder wall thickening, possible partial treatment of prior pyelonephritis per ID. S/p L ureteral stent removal and R stent exchange on 4/20 with urology. Course c/b thrombocytopenia elevated d-dimer of 816. CTA chest with no acute PE but notes small filling defect in RLL which may be chronic PE, mild RUL GGO. LE duplex neg for DVT. Pulmonary called to consult for CT chest findings.

## 2023-04-28 VITALS
HEART RATE: 81 BPM | DIASTOLIC BLOOD PRESSURE: 59 MMHG | TEMPERATURE: 98 F | OXYGEN SATURATION: 99 % | SYSTOLIC BLOOD PRESSURE: 97 MMHG | RESPIRATION RATE: 17 BRPM

## 2023-04-28 LAB
ANION GAP SERPL CALC-SCNC: 11 MMOL/L — SIGNIFICANT CHANGE UP (ref 5–17)
ANION GAP SERPL CALC-SCNC: 8 MMOL/L — SIGNIFICANT CHANGE UP (ref 5–17)
BUN SERPL-MCNC: 6 MG/DL — LOW (ref 7–23)
BUN SERPL-MCNC: 7 MG/DL — SIGNIFICANT CHANGE UP (ref 7–23)
CALCIUM SERPL-MCNC: 8.4 MG/DL — SIGNIFICANT CHANGE UP (ref 8.4–10.5)
CALCIUM SERPL-MCNC: 8.6 MG/DL — SIGNIFICANT CHANGE UP (ref 8.4–10.5)
CELL MATERIAL STONE EST-MCNT: SIGNIFICANT CHANGE UP
CHLORIDE SERPL-SCNC: 112 MMOL/L — HIGH (ref 96–108)
CHLORIDE SERPL-SCNC: 112 MMOL/L — HIGH (ref 96–108)
CO2 SERPL-SCNC: 21 MMOL/L — LOW (ref 22–31)
CO2 SERPL-SCNC: 22 MMOL/L — SIGNIFICANT CHANGE UP (ref 22–31)
CREAT SERPL-MCNC: 0.55 MG/DL — SIGNIFICANT CHANGE UP (ref 0.5–1.3)
CREAT SERPL-MCNC: 0.57 MG/DL — SIGNIFICANT CHANGE UP (ref 0.5–1.3)
EGFR: 112 ML/MIN/1.73M2 — SIGNIFICANT CHANGE UP
EGFR: 113 ML/MIN/1.73M2 — SIGNIFICANT CHANGE UP
GLUCOSE BLDC GLUCOMTR-MCNC: 101 MG/DL — HIGH (ref 70–99)
GLUCOSE BLDC GLUCOMTR-MCNC: 121 MG/DL — HIGH (ref 70–99)
GLUCOSE BLDC GLUCOMTR-MCNC: 85 MG/DL — SIGNIFICANT CHANGE UP (ref 70–99)
GLUCOSE SERPL-MCNC: 100 MG/DL — HIGH (ref 70–99)
GLUCOSE SERPL-MCNC: 90 MG/DL — SIGNIFICANT CHANGE UP (ref 70–99)
HCT VFR BLD CALC: 33.1 % — LOW (ref 39–50)
HGB BLD-MCNC: 10.9 G/DL — LOW (ref 13–17)
LABORATORY COMMENT REPORT: SIGNIFICANT CHANGE UP
MCHC RBC-ENTMCNC: 27.9 PG — SIGNIFICANT CHANGE UP (ref 27–34)
MCHC RBC-ENTMCNC: 32.9 GM/DL — SIGNIFICANT CHANGE UP (ref 32–36)
MCV RBC AUTO: 84.9 FL — SIGNIFICANT CHANGE UP (ref 80–100)
NIDUS STONE QN: SIGNIFICANT CHANGE UP
NRBC # BLD: 0 /100 WBCS — SIGNIFICANT CHANGE UP (ref 0–0)
PLATELET # BLD AUTO: 197 K/UL — SIGNIFICANT CHANGE UP (ref 150–400)
POTASSIUM SERPL-MCNC: 2.8 MMOL/L — CRITICAL LOW (ref 3.5–5.3)
POTASSIUM SERPL-MCNC: 3.5 MMOL/L — SIGNIFICANT CHANGE UP (ref 3.5–5.3)
POTASSIUM SERPL-SCNC: 2.8 MMOL/L — CRITICAL LOW (ref 3.5–5.3)
POTASSIUM SERPL-SCNC: 3.5 MMOL/L — SIGNIFICANT CHANGE UP (ref 3.5–5.3)
RBC # BLD: 3.9 M/UL — LOW (ref 4.2–5.8)
RBC # FLD: 15 % — HIGH (ref 10.3–14.5)
SODIUM SERPL-SCNC: 142 MMOL/L — SIGNIFICANT CHANGE UP (ref 135–145)
SODIUM SERPL-SCNC: 144 MMOL/L — SIGNIFICANT CHANGE UP (ref 135–145)
WBC # BLD: 6.83 K/UL — SIGNIFICANT CHANGE UP (ref 3.8–10.5)
WBC # FLD AUTO: 6.83 K/UL — SIGNIFICANT CHANGE UP (ref 3.8–10.5)

## 2023-04-28 PROCEDURE — 84439 ASSAY OF FREE THYROXINE: CPT

## 2023-04-28 PROCEDURE — 85025 COMPLETE CBC W/AUTO DIFF WBC: CPT

## 2023-04-28 PROCEDURE — 86022 PLATELET ANTIBODIES: CPT

## 2023-04-28 PROCEDURE — C1769: CPT

## 2023-04-28 PROCEDURE — C1889: CPT

## 2023-04-28 PROCEDURE — 81001 URINALYSIS AUTO W/SCOPE: CPT

## 2023-04-28 PROCEDURE — 93970 EXTREMITY STUDY: CPT

## 2023-04-28 PROCEDURE — 82330 ASSAY OF CALCIUM: CPT

## 2023-04-28 PROCEDURE — 71045 X-RAY EXAM CHEST 1 VIEW: CPT

## 2023-04-28 PROCEDURE — 88300 SURGICAL PATH GROSS: CPT

## 2023-04-28 PROCEDURE — 82365 CALCULUS SPECTROSCOPY: CPT

## 2023-04-28 PROCEDURE — 87641 MR-STAPH DNA AMP PROBE: CPT

## 2023-04-28 PROCEDURE — 83735 ASSAY OF MAGNESIUM: CPT

## 2023-04-28 PROCEDURE — 93005 ELECTROCARDIOGRAM TRACING: CPT

## 2023-04-28 PROCEDURE — 85730 THROMBOPLASTIN TIME PARTIAL: CPT

## 2023-04-28 PROCEDURE — 92526 ORAL FUNCTION THERAPY: CPT

## 2023-04-28 PROCEDURE — 83605 ASSAY OF LACTIC ACID: CPT

## 2023-04-28 PROCEDURE — 85018 HEMOGLOBIN: CPT

## 2023-04-28 PROCEDURE — 80053 COMPREHEN METABOLIC PANEL: CPT

## 2023-04-28 PROCEDURE — 96375 TX/PRO/DX INJ NEW DRUG ADDON: CPT

## 2023-04-28 PROCEDURE — C2625: CPT

## 2023-04-28 PROCEDURE — 82607 VITAMIN B-12: CPT

## 2023-04-28 PROCEDURE — 87640 STAPH A DNA AMP PROBE: CPT

## 2023-04-28 PROCEDURE — 85362 FIBRIN DEGRADATION PRODUCTS: CPT

## 2023-04-28 PROCEDURE — 84295 ASSAY OF SERUM SODIUM: CPT

## 2023-04-28 PROCEDURE — 76000 FLUOROSCOPY <1 HR PHYS/QHP: CPT

## 2023-04-28 PROCEDURE — 82746 ASSAY OF FOLIC ACID SERUM: CPT

## 2023-04-28 PROCEDURE — 36415 COLL VENOUS BLD VENIPUNCTURE: CPT

## 2023-04-28 PROCEDURE — 84100 ASSAY OF PHOSPHORUS: CPT

## 2023-04-28 PROCEDURE — 87040 BLOOD CULTURE FOR BACTERIA: CPT

## 2023-04-28 PROCEDURE — U0005: CPT

## 2023-04-28 PROCEDURE — 82962 GLUCOSE BLOOD TEST: CPT

## 2023-04-28 PROCEDURE — 85014 HEMATOCRIT: CPT

## 2023-04-28 PROCEDURE — 0225U NFCT DS DNA&RNA 21 SARSCOV2: CPT

## 2023-04-28 PROCEDURE — 82435 ASSAY OF BLOOD CHLORIDE: CPT

## 2023-04-28 PROCEDURE — 82803 BLOOD GASES ANY COMBINATION: CPT

## 2023-04-28 PROCEDURE — 93308 TTE F-UP OR LMTD: CPT

## 2023-04-28 PROCEDURE — 92610 EVALUATE SWALLOWING FUNCTION: CPT

## 2023-04-28 PROCEDURE — 70450 CT HEAD/BRAIN W/O DYE: CPT | Mod: MD

## 2023-04-28 PROCEDURE — 71275 CT ANGIOGRAPHY CHEST: CPT

## 2023-04-28 PROCEDURE — 85610 PROTHROMBIN TIME: CPT

## 2023-04-28 PROCEDURE — 82533 TOTAL CORTISOL: CPT

## 2023-04-28 PROCEDURE — 84443 ASSAY THYROID STIM HORMONE: CPT

## 2023-04-28 PROCEDURE — 99285 EMERGENCY DEPT VISIT HI MDM: CPT | Mod: 25

## 2023-04-28 PROCEDURE — U0003: CPT

## 2023-04-28 PROCEDURE — 74177 CT ABD & PELVIS W/CONTRAST: CPT | Mod: MD

## 2023-04-28 PROCEDURE — C1758: CPT

## 2023-04-28 PROCEDURE — 96374 THER/PROPH/DIAG INJ IV PUSH: CPT

## 2023-04-28 PROCEDURE — 85027 COMPLETE CBC AUTOMATED: CPT

## 2023-04-28 PROCEDURE — 82947 ASSAY GLUCOSE BLOOD QUANT: CPT

## 2023-04-28 PROCEDURE — 83010 ASSAY OF HAPTOGLOBIN QUANT: CPT

## 2023-04-28 PROCEDURE — 87086 URINE CULTURE/COLONY COUNT: CPT

## 2023-04-28 PROCEDURE — 84132 ASSAY OF SERUM POTASSIUM: CPT

## 2023-04-28 PROCEDURE — 85379 FIBRIN DEGRADATION QUANT: CPT

## 2023-04-28 PROCEDURE — 80048 BASIC METABOLIC PNL TOTAL CA: CPT

## 2023-04-28 PROCEDURE — 86038 ANTINUCLEAR ANTIBODIES: CPT

## 2023-04-28 RX ORDER — MIDODRINE HYDROCHLORIDE 2.5 MG/1
1 TABLET ORAL
Qty: 0 | Refills: 0 | DISCHARGE
Start: 2023-04-28

## 2023-04-28 RX ORDER — POTASSIUM CHLORIDE 20 MEQ
10 PACKET (EA) ORAL
Refills: 0 | Status: COMPLETED | OUTPATIENT
Start: 2023-04-28 | End: 2023-04-28

## 2023-04-28 RX ORDER — LEVOTHYROXINE SODIUM 125 MCG
1 TABLET ORAL
Qty: 30 | Refills: 0
Start: 2023-04-28 | End: 2023-05-27

## 2023-04-28 RX ADMIN — CHLORHEXIDINE GLUCONATE 1 APPLICATION(S): 213 SOLUTION TOPICAL at 12:25

## 2023-04-28 RX ADMIN — Medication 20 MILLIGRAM(S): at 07:05

## 2023-04-28 RX ADMIN — Medication 100 MILLIEQUIVALENT(S): at 12:25

## 2023-04-28 RX ADMIN — Medication 100 MILLIEQUIVALENT(S): at 11:13

## 2023-04-28 RX ADMIN — Medication 100 MILLIEQUIVALENT(S): at 14:00

## 2023-04-28 RX ADMIN — HEPARIN SODIUM 5000 UNIT(S): 5000 INJECTION INTRAVENOUS; SUBCUTANEOUS at 17:28

## 2023-04-28 RX ADMIN — Medication 50 MICROGRAM(S): at 06:22

## 2023-04-28 RX ADMIN — MIDODRINE HYDROCHLORIDE 5 MILLIGRAM(S): 2.5 TABLET ORAL at 17:28

## 2023-04-28 RX ADMIN — Medication 100 MILLIEQUIVALENT(S): at 10:11

## 2023-04-28 RX ADMIN — HEPARIN SODIUM 5000 UNIT(S): 5000 INJECTION INTRAVENOUS; SUBCUTANEOUS at 06:23

## 2023-04-28 RX ADMIN — MIDODRINE HYDROCHLORIDE 5 MILLIGRAM(S): 2.5 TABLET ORAL at 12:25

## 2023-04-28 RX ADMIN — Medication 10 MILLIGRAM(S): at 14:03

## 2023-04-28 RX ADMIN — MIDODRINE HYDROCHLORIDE 5 MILLIGRAM(S): 2.5 TABLET ORAL at 07:05

## 2023-04-28 NOTE — PROGRESS NOTE ADULT - PROBLEM SELECTOR PROBLEM 3
Acquired thrombocytopenia
H/O hypotension
History of cerebellar ataxia
H/O hypotension
History of cerebellar ataxia
H/O hypotension

## 2023-04-28 NOTE — PROVIDER CONTACT NOTE (OTHER) - ACTION/TREATMENT ORDERED:
ACP Phuong michaels. Place warm packs on pt and recheck in 1 hour
ACP aware, give midodrine in AM as ordered
no new orders ,PA Made aware
Rectal temp ordered, continue to monitor and notify of any changes
continue to monitor, continue bear hugger, will repeat temp after an hour
no new orders
no new orders ,NP made aware
bear hugger and brice to monitor pt and vitals

## 2023-04-28 NOTE — PROGRESS NOTE ADULT - PROBLEM SELECTOR PROBLEM 2
Acquired thrombocytopenia
Hypothyroidism
Acquired thrombocytopenia
Hypothyroidism
Acquired thrombocytopenia
Acquired thrombocytopenia
Dysphagia
Hypothyroidism
Hypothyroidism
Acquired thrombocytopenia
Hydroureteronephrosis
Hypothyroidism
Dysphagia

## 2023-04-28 NOTE — PROGRESS NOTE ADULT - SUBJECTIVE AND OBJECTIVE BOX
Date of Service: 04-28-23 @ 16:59    Patient is a 62y old  Male who presents with a chief complaint of likely UTI (28 Apr 2023 14:42)      Any change in ROS: more alert and awake:  today      MEDICATIONS  (STANDING):  chlorhexidine 2% Cloths 1 Application(s) Topical daily  dextrose 5% + sodium chloride 0.9%. 1000 milliLiter(s) (60 mL/Hr) IV Continuous <Continuous>  heparin   Injectable 5000 Unit(s) SubCutaneous every 12 hours  hydrocortisone 20 milliGRAM(s) Oral daily  hydrocortisone 10 milliGRAM(s) Oral daily  levothyroxine 50 MICROGram(s) Oral daily  midodrine. 5 milliGRAM(s) Oral three times a day    MEDICATIONS  (PRN):    Vital Signs Last 24 Hrs  T(C): 36.9 (28 Apr 2023 16:26), Max: 37.2 (28 Apr 2023 05:54)  T(F): 98.5 (28 Apr 2023 16:26), Max: 99 (28 Apr 2023 05:54)  HR: 81 (28 Apr 2023 16:26) (46 - 81)  BP: 97/59 (28 Apr 2023 16:26) (97/59 - 122/63)  BP(mean): --  RR: 17 (28 Apr 2023 16:26) (17 - 18)  SpO2: 99% (28 Apr 2023 16:26) (98% - 100%)    Parameters below as of 28 Apr 2023 16:26  Patient On (Oxygen Delivery Method): room air        I&O's Summary    27 Apr 2023 07:01  -  28 Apr 2023 07:00  --------------------------------------------------------  IN: 720 mL / OUT: 1900 mL / NET: -1180 mL          Physical Exam:   GENERAL: NAD, well-groomed, well-developed  HEENT: THU/   Atraumatic, Normocephalic  ENMT: No tonsillar erythema, exudates, or enlargement; Moist mucous membranes, Good dentition, No lesions  NECK: Supple, No JVD, Normal thyroid  CHEST/LUNG: Clear to auscultaion- no wheezing  CVS: Regular rate and rhythm; No murmurs, rubs, or gallops  GI: : Soft, Nontender, Nondistended; Bowel sounds present  NERVOUS SYSTEM:  Alert & awake  EXTREMITIES: -edema  LYMPH: No lymphadenopathy noted  SKIN: No rashes or lesions  ENDOCRINOLOGY: No Thyromegaly  PSYCH: Appropriate    Labs:                              10.9   6.83  )-----------( 197      ( 28 Apr 2023 06:51 )             33.1                         10.2   7.37  )-----------( 180      ( 26 Apr 2023 07:18 )             31.5                         11.0   6.83  )-----------( 167      ( 25 Apr 2023 06:58 )             32.6     04-28    142  |  112<H>  |  6<L>  ----------------------------<  100<H>  3.5   |  22  |  0.57  04-28    144  |  112<H>  |  7   ----------------------------<  90  2.8<LL>   |  21<L>  |  0.55  04-26    145  |  115<H>  |  11  ----------------------------<  123<H>  4.0   |  20<L>  |  0.62  04-26    143  |  113<H>  |  10  ----------------------------<  132<H>  3.1<L>   |  20<L>  |  0.61  04-25    142  |  112<H>  |  9   ----------------------------<  131<H>  3.1<L>   |  21<L>  |  0.58    Ca    8.4      28 Apr 2023 16:14  Ca    8.6      28 Apr 2023 06:47      CAPILLARY BLOOD GLUCOSE      POCT Blood Glucose.: 101 mg/dL (28 Apr 2023 12:58)  POCT Blood Glucose.: 85 mg/dL (28 Apr 2023 06:24)  POCT Blood Glucose.: 121 mg/dL (28 Apr 2023 00:23)  POCT Blood Glucose.: 90 mg/dL (27 Apr 2023 17:42)        rad< from: [facility] Duplex Lower Ext Vein Scan, Bilat (04.24.23 @ 12:25) >  [facility]     PROCEDURE DATE:  04/24/2023          INTERPRETATION:  CLINICAL INFORMATION: Ataxia    COMPARISON: A prior examination, dated 4/27/2022, showed no DVT    TECHNIQUE:Duplex sonography of the BILATERAL LOWER extremity veins with   color and spectral Doppler, with and without compression.    FINDINGS:    RIGHT:  Normal compressibility of the RIGHT common femoral, femoral and popliteal   veins.  Doppler examination shows normal spontaneous and phasic flow.  No RIGHT calf vein thrombosis is detected.    LEFT:  Normal compressibility of the LEFT common femoral, femoral and popliteal   veins.  Doppler examination shows normal spontaneous and phasic flow.  No LEFT calf vein thrombosis is detected.    IMPRESSION:  No evidence of deep venous thrombosis in either lower extremity.          --- End of Report ---            ROBERT NULL MD; Attending Radiologist  This document has been electronically signed. Apr 242023  1:53PM    < end of copied text >  < from: CT Angio Chest PE Protocol w/ IV Cont (04.24.23 @ 09:55) >  prior PE. No acute pulmonary embolism.  HEART: Heart size is normal. No pericardial effusion.  CHEST WALL AND LOWER NECK: Minimal subcutaneous edema.  VISUALIZED UPPER ABDOMEN: Cholelithiasis. Partially visualized right   nephroureteral catheter with interval removal of left nephroureteral   catheter. Bilateral nonobstructing renal calculi.  BONES: Degenerative changes.    IMPRESSION:  No acute pulmonary embolism.    Thin small linear filling defect within the right lower lobe pulmonary   artery representing chronic pulmonary embolism or be sequela of old PE.    New right upper lobe cluster of small nodular groundglass opacities,   which may be infectious or inflammatory in etiology.    Right lower lobe 5 mm pulmonary nodule, unchanged since Sarah 15, 2022. 1   year follow-up noncontrast chest CT can be performed to ensure stability    --- End of Report ---      < end of copied text >  < from: TTE W or WO Ultrasound Enhancing Agent (04.27.23 @ 07:16) >    TRANSTHORACIC ECHOCARDIOGRAM REPORT  ________________________________________________________________________________                                      _______       Pt. Name:       EDD VALDIVIA Study Date:    4/27/2023  MRN:            [id number]        YOB: 1960  Accession #:    [id number]         Age:           62 years  Account#:       [id number]      Gender:        M  Heart Rate:                       Height:        67.00 in (170.18 cm)  Rhythm:      Weight:        141.00 lb (63.96 kg)  Blood Pressure: 109/68 mmHg       BSA/BMI:       1.74 m² / 22.08 kg/m²  ________________________________________________________________________________________  Referring Physician:    [id number] Yakelin Mccoy  Interpreting Physician: Darryl Schuster MD  Primary Sonographer:    Asmita Chang RDCS    CPT:               ECHO TTE W/O CON F/U LTD - 94355.m  Indication(s):     Other pulmonary embolism without acute cor pulmonale - I26.99  Procedure:         Limited transthoracic echocardiogram.  Ordering Location: [facility]  Study Information: Image quality for this study is fair.    _______________________________________________________________________________________  CONCLUSIONS:      1. Sonographer's note: patient refused to complete the exam.   2. Normal left ventricular cavity size. The left ventricular systolic function is normal. Cannot exclude regional abnormalities, as all segments were not visualized. Normal septal motion.   3. The portions of the RV seen in the parasternal window appear normal in size and function.    ________________________________________________________________________________________  FINDINGS:  Left Ventricle:  Normal left ventricular cavity size. The left ventricular systolic function is normal. Left ventricular endocardium is not well visualized; however, the left ventricular systolic function appears grossly normal.     Right Ventricle:  The portions of the RV seen in the parasternal window appear normal in sizeand function.     Aortic Valve:  The aortic valve was not well visualized. There is mild calcification of the aortic valve leaflets.     Mitral Valve:  Structurally normal mitral valve with normal leaflet excursion. There is calcification of the mitral valve annulus.     Tricuspid Valve:  The tricuspid valve was not well visualized.     Pulmonic Valve:  The pulmonic valve was not well visualized.     Aorta:  The aortic root appears normal in size.     Pericardium:  No pericardial effusion seen.  ____________________________________________________________________  QUANTITATIVE DATA  Left Ventricle Measurements                         Indexed to BSA  IVSd (2D):   1.0 cm  LVPWd (2D):  0.6 cm  LVIDd (2D):  3.9 cm  LVIDs (2D):  2.2 cm  LV Mass:  89 g     51.0 g/m²       Left Atrium Measurements     LA Diam 2D: 3.50 cm       LVOT / RVOT/ Qp/Qs Data:  ________________________________________________________________________________________  Electronically signed by Darryl Schuster MD on 4/27/2023 at 5:56:38 PM    < end of copied text >  < from: TTE W or WO Ultrasound Enhancing Agent (04.27.23 @ 07:16) >    TRANSTHORACIC ECHOCARDIOGRAM REPORT  ________________________________________________________________________________                                      _______       Pt. Name:       EDD VALDIVIA Study Date:    4/27/2023  MRN:            [id number]        YOB: 1960  Accession #:    [id number]         Age:           62 years  Account#:       [id number]      Gender:        M  Heart Rate:                       Height:        67.00 in (170.18 cm)  Rhythm:      Weight:        141.00 lb (63.96 kg)  Blood Pressure: 109/68 mmHg       BSA/BMI:       1.74 m² / 22.08 kg/m²  ________________________________________________________________________________________  Referring Physician:    [id number] Yakelin Mccoy  Interpreting Physician: Darryl Schuster MD  Primary Sonographer:    Asmita Chang RD    CPT:               ECHO TTE W/O CON F/U LTD - 17359.m  Indication(s):     Other pulmonary embolism without acute cor pulmonale - I26.99  Procedure:         Limited transthoracic echocardiogram.  Ordering Location: [facility]  Study Information: Image quality for this study is fair.    _______________________________________________________________________________________  CONCLUSIONS:      1. Sonographer's note: patient refused to complete the exam.   2. Normal left ventricular cavity size. The left ventricular systolic function is normal. Cannot exclude regional abnormalities, as all segments were not visualized. Normal septal motion.   3. The portions of the RV seen in the parasternal window appear normal in size and function.    ________________________________________________________________________________________  FINDINGS:  Left Ventricle:  Normal left ventricular cavity size. The left ventricular systolic function is normal. Left ventricular endocardium is not well visualized; however, the left ventricular systolic function appears grossly normal.     Right Ventricle:  The portions of the RV seen in the parasternal window appear normal in sizeand function.     Aortic Valve:  The aortic valve was not well visualized. There is mild calcification of the aortic valve leaflets.     Mitral Valve:  Structurally normal mitral valve with normal leaflet excursion. There is calcification of the mitral valve annulus.     Tricuspid Valve:  The tricuspid valve was not well visualized.     Pulmonic Valve:  The pulmonic valve was not well visualized.     Aorta:  The aortic root appears normal in size.     Pericardium:  No pericardial effusion seen.  ____________________________________________________________________  QUANTITATIVE DATA  Left Ventricle Measurements                         Indexed to BSA  IVSd (2D):   1.0 cm  LVPWd (2D):  0.6 cm  LVIDd (2D):  3.9 cm  LVIDs (2D):  2.2 cm  LV Mass:  89 g     51.0 g/m²       Left Atrium Measurements     LA Diam 2D: 3.50 cm       LVOT / RVOT/ Qp/Qs Data:  ________________________________________________________________________________________  Electronically signed by Darryl Schuster MD on 4/27/2023 at 5:56:38 PM    < end of copied text >      D-Dimer Assay, Quantitative: 816 ng/mL DDU (04-23 @ 06:51)        RECENT CULTURES:        RESPIRATORY CULTURES:          Studies  Chest X-RAY  CT SCAN Chest   Venous Dopplers: LE:   CT Abdomen  Others

## 2023-04-28 NOTE — PROGRESS NOTE ADULT - PROBLEM SELECTOR PROBLEM 1
Adrenal insufficiency
Frequent UTI
Chronic pulmonary embolism
Frequent UTI
Adrenal insufficiency
Chronic pulmonary embolism
Frequent UTI
Frequent UTI
Adrenal insufficiency

## 2023-04-28 NOTE — PROVIDER CONTACT NOTE (OTHER) - RECOMMENDATIONS
Notify ACP
Notify NP, rectal temp?
Notify ACP
bear hugger and brice to monitor pt and vitals
continue on NPO and provider notification
continue to monitor and provider notification
continue to monitor and provider notification
continue to monitor,continue bear hugger

## 2023-04-28 NOTE — PROGRESS NOTE ADULT - SUBJECTIVE AND OBJECTIVE BOX
Cardiovascular Disease Progress Note  Date of service: 04-28-23 @ 09:44    Overnight events: No acute events overnight.  Pt is in no distress.   Otherwise review of systems negative    Objective Findings:  T(C): 37.2 (04-28-23 @ 05:54), Max: 37.2 (04-28-23 @ 05:54)  HR: 60 (04-28-23 @ 05:54) (43 - 60)  BP: 102/68 (04-28-23 @ 05:54) (102/66 - 122/63)  RR: 18 (04-28-23 @ 05:54) (18 - 18)  SpO2: 98% (04-28-23 @ 05:54) (98% - 100%)  Wt(kg): --  Daily     Daily       Physical Exam:  Gen: NAD; Patient resting comfortably  HEENT: EOMI, Normocephalic/ atraumatic  CV: RRR, normal S1 + S2, no m/r/g  Lungs:  Normal respiratory effort; clear to auscultation bilaterally  Abd: soft, non-tender; bowel sounds present  Ext: No edema; warm and well perfused    Telemetry: N/a    Laboratory Data:                        10.9   6.83  )-----------( 197      ( 28 Apr 2023 06:51 )             33.1     04-28    144  |  112<H>  |  7   ----------------------------<  90  2.8<LL>   |  21<L>  |  0.55    Ca    8.6      28 Apr 2023 06:47                Inpatient Medications:  MEDICATIONS  (STANDING):  chlorhexidine 2% Cloths 1 Application(s) Topical daily  dextrose 5% + sodium chloride 0.9%. 1000 milliLiter(s) (60 mL/Hr) IV Continuous <Continuous>  heparin   Injectable 5000 Unit(s) SubCutaneous every 12 hours  hydrocortisone 20 milliGRAM(s) Oral daily  hydrocortisone 10 milliGRAM(s) Oral daily  levothyroxine 50 MICROGram(s) Oral daily  midodrine. 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       Plan of Care:    #Post-op risk stratification  - s/p cystoscopy w/ bilateral ureteroscopy, L stent removed and right stent exchanged on 4/20/2023. Tolerated procedure well.   - EKG on admission shows sinus rhythm  - TTE from 8/2022 shows normal LV systolic function with no valvular or structural disease  - Pt displays no signs of post-op coronary ischemia  - Continue current management.   - Urology input appreciated.      #Sinus bradycardia  - Repeat EKG shows sinus bradycardia with 1st degree AV block  - Avoid AV ibeth blocking agents  - Optimize electrolytes, maintain K>4, Mg >2  - Would continue to proceed with conservative management given patients comorbidities  - No indication for PPM at this time.    #Sepsis  - 2/2 UTI  - Abx as per primary team    #Hypotension  - Continue Midodrine            Over 25 minutes spent on total encounter; more than 50% of the visit was spent counseling and/or coordinating care by the attending physician.      Erik Kelly DO [facility]  Cardiovascular Disease  [phone]

## 2023-04-28 NOTE — PROVIDER CONTACT NOTE (OTHER) - SITUATION
Received pt from ED, unable to get oral or axillary temp
rectal temp 93.2
Rectal temp 92.4
pt refused to eat ,unable to swallow po meds ,NPO and aspiration precautions maintained ,
Hypothermia T- 94.4 F (rectal)
Pt BP 90/59
heart rate 48 ,been bradycardic ,asymptomatic
pt NPO ,unable to take po meds ,pt on midodrine po ,aspiration precautions maintained

## 2023-04-28 NOTE — PROGRESS NOTE ADULT - PROBLEM SELECTOR PROBLEM 5
Anemia of chronic disease
Hydroureteronephrosis
Hydroureteronephrosis
H/O cerebellar ataxia

## 2023-04-28 NOTE — PROVIDER CONTACT NOTE (OTHER) - ASSESSMENT
Alert but confused, bear hugger in place
Pt AOx0-1, no change in mental status. Pt BP 90/59. Other VSS, temp 98.5. Pt alert, opens eyes spontaneously. pt has midodrine ordered for AM. pt with SBP 90-100s in ED
Pt AOx0-1, unable to get oral or axillary temp, pt feels cool to touch. Other VSS on RA
Pt nonverbal ,lethargic at times ,vss afebrile
alert, but confused to situation. vss except for temp. denies pain.
pt Aox1,nonverbal ,no s/s noted ,
PT Alert,nonverbal ,lethargic ,aspiration precations maintained
Pt AOx0-1, opens eyes spontaneously. No acute distress noted. Pt rectal temp 92.4, other VSS on RA. Pt on IV abx.

## 2023-04-28 NOTE — PROGRESS NOTE ADULT - SUBJECTIVE AND OBJECTIVE BOX
expressive aphasic.    VITAL SIGNS:    T99 P66 /68 RR 18    PHYSICAL EXAM:     GENERAL: no acute distress  HEENT: NC/AT, EOMI, neck supple, MMM  RESPIRATORY: LCTAB/L, no rhonchi, rales, or wheezing  CARDIOVASCULAR: RRR, no murmurs, gallops, rubs  ABDOMINAL: soft, non-tender, non-distended, positive bowel sounds   EXTREMITIES: no clubbing, cyanosis, or edema  NEUROLOGICAL: alert and oriented x 3, non-focal  LYMPHATIC: lymphatic: cervical, supraclavicular, axilla, inguinal  SKIN: no rashes or lesions   MUSCULOSKELETAL: no gross joint deformity                          10.9   6.83  )-----------( 197      ( 28 Apr 2023 06:51 )             33.1     04-28    144  |  112<H>  |  7   ----------------------------<  90  2.8<LL>   |  21<L>  |  0.55    Ca    8.6      28 Apr 2023 06:47        MEDICATIONS  (STANDING):  chlorhexidine 2% Cloths 1 Application(s) Topical daily  dextrose 5% + sodium chloride 0.9%. 1000 milliLiter(s) (60 mL/Hr) IV Continuous <Continuous>  heparin   Injectable 5000 Unit(s) SubCutaneous every 12 hours  hydrocortisone 20 milliGRAM(s) Oral daily  hydrocortisone 10 milliGRAM(s) Oral daily  levothyroxine 50 MICROGram(s) Oral daily  midodrine. 5 milliGRAM(s) Oral three times a day  potassium chloride  10 mEq/100 mL IVPB 10 milliEquivalent(s) IV Intermittent every 1 hour

## 2023-04-28 NOTE — PROGRESS NOTE ADULT - PROVIDER SPECIALTY LIST ADULT
Cardiology
Internal Medicine
Internal Medicine
Urology
Cardiology
Internal Medicine
Podiatry
Urology
Urology
Cardiology
Endocrinology
Infectious Disease
Internal Medicine
Pulmonology
Urology
Wound Care
Infectious Disease
Infectious Disease
Internal Medicine
Urology
Urology
Endocrinology
Pulmonology
Endocrinology
Heme/Onc
Heme/Onc
Endocrinology
Endocrinology
Heme/Onc

## 2023-04-28 NOTE — PROGRESS NOTE ADULT - PROBLEM SELECTOR PLAN 6
CT chest with mild GGO, favor respiratory motion > PNA  -Afebrile, no leukocytosis  -ABX per ID for UTI/pyelonephritis.
CT chest with mild GGO, favor respiratory motion > PNA  -Afebrile, no leukocytosis  -ABX per ID for UTI/pyelonephritis.

## 2023-04-28 NOTE — PROGRESS NOTE ADULT - SUBJECTIVE AND OBJECTIVE BOX
Chief complaint  Patient is a 62y old  Male who presents with a chief complaint of likely UTI (28 Apr 2023 11:35)         Labs and Fingersticks  CAPILLARY BLOOD GLUCOSE      POCT Blood Glucose.: 101 mg/dL (28 Apr 2023 12:58)  POCT Blood Glucose.: 85 mg/dL (28 Apr 2023 06:24)  POCT Blood Glucose.: 121 mg/dL (28 Apr 2023 00:23)  POCT Blood Glucose.: 90 mg/dL (27 Apr 2023 17:42)      Anion Gap, Serum: 11 (04-28 @ 06:47)  Anion Gap, Serum: 10 (04-26 @ 14:58)      Calcium, Total Serum: 8.6 (04-28 @ 06:47)  Calcium, Total Serum: 8.8 (04-26 @ 14:58)          04-28    144  |  112<H>  |  7   ----------------------------<  90  2.8<LL>   |  21<L>  |  0.55    Ca    8.6      28 Apr 2023 06:47                          10.9   6.83  )-----------( 197      ( 28 Apr 2023 06:51 )             33.1     Medications  MEDICATIONS  (STANDING):  chlorhexidine 2% Cloths 1 Application(s) Topical daily  dextrose 5% + sodium chloride 0.9%. 1000 milliLiter(s) (60 mL/Hr) IV Continuous <Continuous>  heparin   Injectable 5000 Unit(s) SubCutaneous every 12 hours  hydrocortisone 20 milliGRAM(s) Oral daily  hydrocortisone 10 milliGRAM(s) Oral daily  levothyroxine 50 MICROGram(s) Oral daily  midodrine. 5 milliGRAM(s) Oral three times a day      Physical Exam  General: Patient comfortable in bed   Vital Signs Last 12 Hrs  T(F): 97.6 (04-28-23 @ 11:41), Max: 99 (04-28-23 @ 05:54)  HR: 55 (04-28-23 @ 11:41) (55 - 60)  BP: 101/64 (04-28-23 @ 11:41) (101/64 - 102/68)  BP(mean): --  RR: 18 (04-28-23 @ 11:41) (18 - 18)  SpO2: 98% (04-28-23 @ 11:41) (98% - 98%)    CVS: S1S2   Respiratory: No wheezing, no crepitations  GI: Abdomen soft, bowel sounds positive  Musculoskeletal:  moves all extremities  : Voiding         Chief complaint  Patient is a 62y old  Male who presents with a chief complaint of likely UTI (28 Apr 2023 11:35)     Labs and Fingersticks  CAPILLARY BLOOD GLUCOSE      POCT Blood Glucose.: 101 mg/dL (28 Apr 2023 12:58)  POCT Blood Glucose.: 85 mg/dL (28 Apr 2023 06:24)  POCT Blood Glucose.: 121 mg/dL (28 Apr 2023 00:23)  POCT Blood Glucose.: 90 mg/dL (27 Apr 2023 17:42)      Anion Gap, Serum: 11 (04-28 @ 06:47)  Anion Gap, Serum: 10 (04-26 @ 14:58)      Calcium, Total Serum: 8.6 (04-28 @ 06:47)  Calcium, Total Serum: 8.8 (04-26 @ 14:58)          04-28    144  |  112<H>  |  7   ----------------------------<  90  2.8<LL>   |  21<L>  |  0.55    Ca    8.6      28 Apr 2023 06:47                          10.9   6.83  )-----------( 197      ( 28 Apr 2023 06:51 )             33.1     Medications  MEDICATIONS  (STANDING):  chlorhexidine 2% Cloths 1 Application(s) Topical daily  dextrose 5% + sodium chloride 0.9%. 1000 milliLiter(s) (60 mL/Hr) IV Continuous <Continuous>  heparin   Injectable 5000 Unit(s) SubCutaneous every 12 hours  hydrocortisone 20 milliGRAM(s) Oral daily  hydrocortisone 10 milliGRAM(s) Oral daily  levothyroxine 50 MICROGram(s) Oral daily  midodrine. 5 milliGRAM(s) Oral three times a day      Physical Exam  General: Patient comfortable in bed   Vital Signs Last 12 Hrs  T(F): 97.6 (04-28-23 @ 11:41), Max: 99 (04-28-23 @ 05:54)  HR: 55 (04-28-23 @ 11:41) (55 - 60)  BP: 101/64 (04-28-23 @ 11:41) (101/64 - 102/68)  BP(mean): --  RR: 18 (04-28-23 @ 11:41) (18 - 18)  SpO2: 98% (04-28-23 @ 11:41) (98% - 98%)    CVS: S1S2   Respiratory: No wheezing, no crepitations  GI: Abdomen soft, bowel sounds positive  Musculoskeletal:  moves all extremities  : Voiding

## 2023-04-28 NOTE — PROGRESS NOTE ADULT - ASSESSMENT
Assessment  Hypotension/Hypothermia: 62y Male with history of adrenal insufficiency, apparently not on any steroids as out patient admitted with hypotension and  hypothermia, with AMS, s/p IVF hydration, Steroids tapered to PO BID.   Hypothyroidism: Subclinical, not on Synthroid, no Hx of thyroid disease, hypothermia resolved Low normal FT4. Started on supplemental thyroxine.   Pressure wounds: On treatment, monitored  UTI: frequent hx of UTI's. on IV abx, hx of ureter stents.           Discussed plan and management with Dr Jethro Morelos MD  Cell: [phone]  Office: [phone]              Assessment  Hypotension/Hypothermia: 62y Male with history of adrenal insufficiency, apparently not on any steroids as out patient admitted with hypotension and  hypothermia, with AMS, s/p IVF hydration,  Steroids tapered to PO BID.   Hypothyroidism: Subclinical, not on Synthroid, no Hx of thyroid disease, hypothermia resolved Low normal FT4. Started on supplemental thyroxine.   Pressure wounds: On treatment, monitored  UTI: frequent hx of UTI's. on IV abx, hx of ureter stents.           Discussed plan and management with Dr Jethro Morelos MD  Cell: [phone]  Office: [phone]

## 2023-04-28 NOTE — PROGRESS NOTE ADULT - PROBLEM SELECTOR PLAN 5
continue to monitor
continue to monitor
Per urology.
supportive
continue to monitor
Per urology.
continue to monitor
continue to monitor

## 2023-04-28 NOTE — PROGRESS NOTE ADULT - REASON FOR ADMISSION
likely UTI

## 2023-04-28 NOTE — PROGRESS NOTE ADULT - PROBLEM SELECTOR PLAN 1
Although am cortisol 11, in view of his clinical status suggest to start him on Hydrocortisone 50 mg IV q8hr for now, once stable will taper down, FU.  Will follow hemodynamics
CTA chest with small linear filling defect within the RLL, ? chronic vs old PE   -LE duplex neg for DVT  -Per family, no hx of prior DVT/PE  - TTE ;echo reviewed:  rv size is normal : no sign pulm htn reported:   -Normoxic, breathing comfortably on RA.
continue antibiotic  ID f/u
continue antibiotic
continue antibiotic  ID f/u
Although am cortisol 11, in view of his clinical status suggest to start him on Hydrocortisone 50 mg IV q8hr for now, once stable will taper down, FU.  Will follow hemodynamics
CTA chest with small linear filling defect within the RLL, ? chronic vs old PE   -LE duplex neg for DVT  -Per family, no hx of prior DVT/PE  -F/u TTE  -Normoxic, breathing comfortably on RA.
continue antibiotic  urology f/u
Although am cortisol 11, in view of his clinical status suggest taper Cortef 20 mg in AM 7 am/ and 10 mg PM (3PM)
continue antibiotic  ID f/u
Although am cortisol 11, in view of his clinical status suggest taper Cortef 20 mg in AM 7 am/ and 10 mg PM (3PM)  Will follow hemodynamics
Although am cortisol 11, in view of his clinical status suggest taper Cortef 20 mg in AM 7 am/ and 10 mg PM (3PM)
continue antibiotic

## 2023-04-28 NOTE — PROGRESS NOTE ADULT - PROBLEM SELECTOR PROBLEM 6
Anemia of chronic disease
R/O Pneumonia
R/O Pneumonia
Urine Pregnancy Test Result: negative
Detail Level: None
Performed By: Alisia Mitchell

## 2023-04-28 NOTE — PROGRESS NOTE ADULT - PROBLEM SELECTOR PLAN 4
supportive
Suggest to continue wound care, monitoring, FU primary team recommendations
supportive
ABX per ID.
Suggest to continue wound care, monitoring, FU primary team recommendations
continue present treatment
ABX per ID.
supportive
Suggest to continue wound care, monitoring, FU primary team recommendations
Suggest to continue wound care, monitoring, FU primary team recommendations
supportive
Suggest to continue wound care, monitoring, FU primary team recommendations
supportive

## 2023-04-28 NOTE — PROVIDER CONTACT NOTE (OTHER) - NAME OF MD/NP/PA/DO NOTIFIED:
MARIA DE JESUS Baca
SUNIL Baca
Geoff Nassar NP
MARIA DE JESUS Baca
MD Johnny Du
YOSELIN Nassar
YOSELIN Ramos
acp Geoff alvarez to give ivp pain meds at this time as per acp ACP Geoff Nassar

## 2023-04-28 NOTE — PROVIDER CONTACT NOTE (OTHER) - DATE AND TIME:
15-Apr-2023 23:36
17-Apr-2023 01:21
17-Apr-2023 22:21
18-Apr-2023 00:30
27-Apr-2023 21:10
14-Apr-2023 21:55
15-Apr-2023 02:50
14-Apr-2023 21:20

## 2023-04-28 NOTE — PROGRESS NOTE ADULT - NS ATTEND AMEND GEN_ALL_CORE FT
He was seen, above reviewed, agree with plan as indicated.
Chart, labs, vitals, radiology reviewed. Above H&P reviewed and edited where appropriate. Agree with history and physical exam. Agree with assessment and plan. I reviewed the overnight course of events and discussed the care with the patient/ family.  All the decisions in assessment and plan are solely made by me..
seems comfortable::    echo report is still pending:   given his chr pe stigmta and if he has severe pulm htn , and given his condition of bed bound status,  it might be prudent to send him on some low dose ac for prophylaxis:   ken acp : await echo results:

## 2023-04-28 NOTE — PROGRESS NOTE ADULT - ASSESSMENT
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 Presents with AMS possibly 2nd to UTI. UA with pyuria. Recent admission 2/2023 for pyelonephritis and L obstructive uropathy s/p ureteral stent, s/p 10 days of Cefepime (UC + pseudomonas). CT A/P now with b/l ureteral stents, mild L hydroureteronephrosis, enhancement of renal pelvis and proximal ureter with bladder wall thickening, possible partial treatment of prior pyelonephritis per ID. S/p L ureteral stent removal and R stent exchange on 4/20 with urology. Course c/b thrombocytopenia elevated d-dimer of 816. CTA chest with no acute PE but notes small filling defect in RLL which may be chronic PE, mild RUL GGO. LE duplex neg for DVT. Pulmonary called to consult for CT chest findings.

## 2023-04-28 NOTE — PROGRESS NOTE ADULT - PROBLEM SELECTOR PROBLEM 4
H/O cerebellar ataxia
Pressure ulcer of buttock
H/O cerebellar ataxia
Pressure ulcer of buttock
Pyelonephritis
H/O cerebellar ataxia
H/O cerebellar ataxia
Pyelonephritis
H/O cerebellar ataxia
H/O hypotension
Pressure ulcer of buttock

## 2023-04-28 NOTE — PROVIDER CONTACT NOTE (OTHER) - BACKGROUND
Pt admitted with AMS. Hx of PNA, anemia, hypotension, cerebellar ataxia
admitted w/AMS 2/2 to UTI
Admitted for AMS, Hx of Sepsis, Pneumonia
Pt admitted with AMS.
admitted on 4/14 w ams.
admitted w/AMS secondary to UTI S/p iv abx
pt Admitted w/AMS 2/2 TO UTI
Pt admitted with AMS. Hx of chronic hypotension on midodrine at home

## 2023-04-28 NOTE — PROGRESS NOTE ADULT - PROBLEM SELECTOR PLAN 3
Per hx  -Supportive care.
Per hx  -Supportive care.
continue to monitor
Continue IV hydration, monitor BP primary team FU.
Continue IV hydration, monitor BP primary team FU.
continue present medication
Continue IV hydration, monitor BP primary team FU.
continue present medication
continue present treatment
continue present medication
continue present treatment
Continue IV hydration, monitor BP primary team FU.
Continue IV hydration, monitor BP primary team FU.

## 2023-04-28 NOTE — PROGRESS NOTE ADULT - PROBLEM SELECTOR PLAN 2
HIT antibody negative  continue to monitor
S/S eval noted  -Family refusing PEG  -Aspiration precautions  -Oral care.
Will start on low dose IV synthroid, will get full thyroid panel and FU.
continue to monitor
Continue 50 mcg synthroid PO
S/S eval noted  -Family refusing PEG  -Aspiration precautions  -Oral care.
prob secondary to chronic PE  continue to monitor
Continue 50 mcg synthroid PO
Will start on low dose IV synthroid  Continue 25 mcg IV daily.
continue to monitor
continue to monitor
Continue 50 mcg synthroid PO  FU repeat TFT's in 4-6 weeks outpatient.
as above  urology f/u

## 2023-05-17 ENCOUNTER — APPOINTMENT (OUTPATIENT)
Dept: UROLOGY | Facility: CLINIC | Age: 63
End: 2023-05-17

## 2023-05-28 ENCOUNTER — NON-APPOINTMENT (OUTPATIENT)
Age: 63
End: 2023-05-28

## 2023-06-27 ENCOUNTER — APPOINTMENT (OUTPATIENT)
Dept: UROLOGY | Facility: CLINIC | Age: 63
End: 2023-06-27

## 2023-06-27 DIAGNOSIS — N20.0 CALCULUS OF KIDNEY: ICD-10-CM

## 2023-06-27 DIAGNOSIS — N13.2 HYDRONEPHROSIS WITH RENAL AND URETERAL CALCULOUS OBSTRUCTION: ICD-10-CM

## 2023-06-27 DIAGNOSIS — N39.0 SEPSIS, UNSPECIFIED ORGANISM: ICD-10-CM

## 2023-06-27 DIAGNOSIS — A41.9 SEPSIS, UNSPECIFIED ORGANISM: ICD-10-CM

## 2023-07-21 NOTE — PRE-OP CHECKLIST - ALLERGIES REVIEWED
done Rituxan Pregnancy And Lactation Text: This medication is Pregnancy Category C and it isn't know if it is safe during pregnancy. It is unknown if this medication is excreted in breast milk but similar antibodies are known to be excreted.

## 2023-07-30 NOTE — ED ADULT NURSE NOTE - IS THE PATIENT ABLE TO BE SCREENED?
family @ bedside to withdraw care
post intubation, pt transferred to CT where CT reveled large subdural hemorrhage. Wife and daughter at bedside, [facility] ICU team, options discussed with family who state pt wishes would to be comfortable.  shannon wishes for pt to be terminally extubated in ED at this time RT called to bedside, pt extubated and brought ot isolation room where family can be with pt at this time. emotional support provided to family at this time. MD at bedside.
report given to RAFAELA Rain on 5 tower, pt to be transported to 5 tower. logistics called. family aware of POC.
trauma @ bedside
No

## 2023-08-16 NOTE — ED PROVIDER NOTE - CHIEF COMPLAINT
Discharge Instructions  Laceration (Cut)    You were seen today for a laceration (cut).  Your provider examined your laceration for any problems such a buried foreign body (like glass, a splinter, or gravel), or injury to blood vessels, tendons, and nerves.  Your provider may have also rinsed and/or scrubbed your laceration to help prevent an infection. It may not be possible to find all problems with your laceration on the first visit; occasionally foreign bodies or a tendon injury can go undetected.    Your laceration may have been closed in one of several ways:  No closure: many wounds will heal just fine without closure.  Stitches: regular stitches that require removal.  Staples: skin staples are often used in the scalp/head.  Wound adhesive (glue): skin glue can be used for certain lacerations and doesn t require removal.  Wound strips (aka Butterfly bandages or steri-strips): these are bandages that help to close a wound.  Absorbable stitches:  dissolving  stitches that go away on their own and usually don t require removal.    A small percentage of wounds will develop an infection regardless of how well the wound is cared for. Antibiotics are generally not indicated to prevent an infection so are only given for a small number of high-risk wounds. Some lacerations are too high risk to close, and are left open to heal because closure can increase the likelihood that an infection will develop.    Remember that all lacerations, no matter how expertly repaired, will cause scarring. We consider many factors, techniques, and materials, in our efforts to provide the best possible cosmetic outcome.    Generally, every Emergency Department visit should have a follow-up clinic visit with either a primary or a specialty clinic/provider. Please follow-up as instructed by your emergency provider today.     Return to the Emergency Department right away if:  You have more redness, swelling, pain, drainage (pus), a bad smell,  The patient is a 59y Male complaining of panic attack. or red streaking from your laceration as these symptoms could indicate an infection.  You have a fever of 100.4 F or more.  You have bleeding that you cannot stop at home. If your cut starts to bleed, hold pressure on the bleeding area with a clean cloth or put pressure over the bandage.  If the bleeding does not stop after using constant pressure for 30 minutes, you should return to the Emergency Department for further treatment.  An area past the laceration is cool, pale, or blue compared with the other side, or has a slower return of color when squeezed.  Your dressing seems too tight or starts to get uncomfortable or painful. For children, signs of a problem might be irritability or restlessness.  You have loss of normal function or use of an area, such as being unable to straighten or bend a finger normally.  You have a numb area past the laceration.    Return to the Emergency Department or see your regular provider if:  The laceration starts to come open.   You have something coming out of the cut or a feeling that there is something in the laceration.  Your wound will not heal, or keeps breaking open. There can always be glass, wood, dirt or other things in any wound.  They will not always show up, even on x-rays.  If a wound does not heal, this may be why, and it is important to follow-up with your regular provider.    Home Care:  Take your dressing off in 12-24 hours, or as instructed by your provider, to check your laceration. Remove the dressing sooner if it seems too tight or painful, or if it is getting numb, tingly, or pale past the dressing.  Gently wash your laceration 1-2 times daily with clean water and mild soap. It is okay to shower or run clean water over the laceration, but do not let the laceration soak in water (no swimming).  If your laceration was closed with wound adhesive or strips: pat it dry and leave it open to the air. For all other repairs: after you wash your laceration, or at least  2 times a day, apply antibiotic ointment (such as Neosporin  or Bacitracin ) to the laceration, then cover it with a Band-Aid  or gauze.  Keep the laceration clean. Wear gloves or other protective clothing if you are around dirt.    Follow-up for removal:  If your wound was closed with staples or regular stitches, they need to be removed according to the instructions and timeline specified by your provider today.  If your wound was closed with absorbable ( dissolving ) sutures, they should fall out, dissolve, or not be visible in about one week. If they are still visible, then they should be removed according to the instructions and timeline specified by your provider today.    Scars:  To help minimize scarring:  Wear sunscreen over the healed laceration when out in the sun.  Massage the area regularly once healed.  You may apply Vitamin E to the healed wound.  Wait. Scars improve in appearance over months and years.    If you were given a prescription for medicine here today, be sure to read all of the information (including the package insert) that comes with your prescription.  This will include important information about the medicine, its side effects, and any warnings that you need to know about.  The pharmacist who fills the prescription can provide more information and answer questions you may have about the medicine.  If you have questions or concerns that the pharmacist cannot address, please call or return to the Emergency Department.       Remember that you can always come back to the Emergency Department if you are not able to see your regular provider in the amount of time listed above, if you get any new symptoms, or if there is anything that worries you.      Discharge Instructions  Head Injury    You have been seen today for a head injury. Your evaluation included a history and physical examination. You may have had a CT (CAT) scan performed, though most head injuries do not require a scan. Based on  this evaluation, your provider today does not feel that your head injury is serious.    Generally, every Emergency Department visit should have a follow-up clinic visit with either a primary or a specialty clinic/provider. Please follow-up as instructed by your emergency provider today.  Return to the Emergency Department if:  You are confused or you are not acting right.  Your headache gets worse or you start to have a really bad headache even with your recommended treatment plan.  You vomit (throw up) more than once.  You have a seizure.  You have trouble walking.  You have weakness or paralysis (cannot move) in an arm or a leg.  You have blood or fluid coming from your ears or nose.  You have new symptoms or anything that worries you.    Sleeping:  It is okay for you to sleep, but someone should wake you up if instructed by your provider, and someone should check on you at your usual time to wake up.     Activity:  Do not drive for at least 24 hours.  Do not drive if you have dizzy spells or trouble concentrating, or remembering things.  Do not return to any contact sports until cleared by your regular provider.     MORE INFORMATION:    Concussion:  A concussion is a minor head injury that may cause temporary problems with the way the brain works. Although concussions are important, they are generally not an emergency or a reason that a person needs to be hospitalized. Some concussion symptoms include confusion, amnesia (forgetful), nausea (sick to your stomach) and vomiting (throwing up), dizziness, fatigue, memory or concentration problems, irritability and sleep problems. For most people, concussions are mild and temporary but some will have more severe and persistent symptoms that require on-going care and treatment.  CT Scans: Your evaluation today may have included a CT scan (CAT scan) to look for things like bleeding or a skull fracture (broken bone).  CT scans involve radiation and too many CT scans can  cause serious health problems like cancer, especially in children.  Because of this, your provider may not have ordered a CT scan today if they think you are at low risk for a serious or life threatening problem.    If you were given a prescription for medicine here today, be sure to read all of the information (including the package insert) that comes with your prescription.  This will include important information about the medicine, its side effects, and any warnings that you need to know about.  The pharmacist who fills the prescription can provide more information and answer questions you may have about the medicine.  If you have questions or concerns that the pharmacist cannot address, please call or return to the Emergency Department.     Remember that you can always come back to the Emergency Department if you are not able to see your regular provider in the amount of time listed above, if you get any new symptoms, or if there is anything that worries you.

## 2023-08-29 NOTE — ED ADULT TRIAGE NOTE - ARRIVAL FROM
Contacted patient. Discussed options that provider indicated. Patient states that he would like to go to ED. He will go to [facility].     Kaleigh Becerra, AGUSTÍNN, RN, PHN  Registered Nurse-Clinic Triage  [facility]  8/29/2023 at 8:35 AM     Home

## 2023-10-12 NOTE — ED ADULT TRIAGE NOTE - INTERNATIONAL TRAVEL
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

## 2024-01-05 NOTE — PRE-OP CHECKLIST - SIDE RAILS UP
done ADVANCED HEART FAILURE & TRANSPLANT- PROGRESS NOTE  *To reach the NS1 Team from 8am to 5pm, please call [phone].  ___________________________________________________________________________    Subjective:  - no complaints    Medications:  acetaminophen     Tablet .. 650 milliGRAM(s) Oral every 6 hours PRN  budesonide  80 MICROgram(s)/formoterol 4.5 MICROgram(s) Inhaler 1 Puff(s) Inhalation two times a day  buMETAnide 1 milliGRAM(s) Oral two times a day  chlorhexidine 4% Liquid 1 Application(s) Topical <User Schedule>  dextrose 5%. 1000 milliLiter(s) IV Continuous <Continuous>  dextrose 5%. 1000 milliLiter(s) IV Continuous <Continuous>  dextrose 50% Injectable 50 milliLiter(s) IV Push every 15 minutes  dextrose Oral Gel 15 Gram(s) Oral once PRN  fluconAZOLE   Tablet 100 milliGRAM(s) Oral <User Schedule>  glucagon  Injectable 1 milliGRAM(s) IntraMuscular once  heparin   Injectable 5000 Unit(s) SubCutaneous every 8 hours  hydrALAZINE 100 milliGRAM(s) Oral every 8 hours  insulin glargine Injectable (LANTUS) 35 Unit(s) SubCutaneous <User Schedule>  insulin lispro (ADMELOG) corrective regimen sliding scale   SubCutaneous three times a day before meals  insulin lispro (ADMELOG) corrective regimen sliding scale   SubCutaneous <User Schedule>  insulin lispro Injectable (ADMELOG) 20 Unit(s) SubCutaneous before breakfast  insulin lispro Injectable (ADMELOG) 17 Unit(s) SubCutaneous before dinner  insulin lispro Injectable (ADMELOG) 17 Unit(s) SubCutaneous before lunch  insulin regular Infusion 3 Unit(s)/Hr IV Continuous <Continuous>  lidocaine   4% Patch 3 Patch Transdermal daily  magnesium oxide 400 milliGRAM(s) Oral three times a day with meals  melatonin 5 milliGRAM(s) Oral at bedtime  mycophenolate mofetil 1000 milliGRAM(s) Oral every 12 hours  NIFEdipine XL 60 milliGRAM(s) Oral every 12 hours  OLANZapine 5 milliGRAM(s) Oral at bedtime  pantoprazole    Tablet 40 milliGRAM(s) Oral every 24 hours  polyethylene glycol 3350 17 Gram(s) Oral daily  predniSONE   Tablet 15 milliGRAM(s) Oral every 12 hours  senna 2 Tablet(s) Oral at bedtime  tacrolimus 5.5 milliGRAM(s) Oral <User Schedule>  trimethoprim   80 mG/sulfamethoxazole 400 mG 1 Tablet(s) Oral daily  valGANciclovir 450 milliGRAM(s) Oral every 12 hours    Vitals:  Vital Signs Last 24 Hours  T(C): 36.6 (24 @ 19:43), Max: 36.6 (24 @ 19:43)  HR: 99 (24 @ 19:43) (95 - 103)  BP: 129/80 (24 @ 19:43) (106/68 - 129/80)  RR: 18 (24 @ 19:43) (18 - 18)  SpO2: 97% (24 @ 19:43) (97% - 98%)    Weight in k.1 ( @ 08:36)    I&O's Summary    2024 07:01  -  2024 07:00  --------------------------------------------------------  IN: 1250 mL / OUT: 1340 mL / NET: -90 mL        Physical Exam  General: No distress. Comfortable.  Neck: Neck supple. JVP not elevated. No masses  Chest: Clear to auscultation bilaterally  CV: Normal S1 and S2.  Abdomen: Soft, non-distended, non-tender  Extremities: warm and perfused   Neurology: Alert and oriented times three.     Labs:                        11.6   16.03 )-----------( 212      ( 2024 07:06 )             34.1     -    133<L>  |  97  |  58<H>  ----------------------------<  121<H>  4.6   |  24  |  1.23    Ca    9.9      2024 07:05  Phos  3.5     -  Mg     2.0     -    TPro  6.4  /  Alb  4.0  /  TBili  0.3  /  DBili  x   /  AST  23  /  ALT  55<H>  /  AlkPhos  54           ADVANCED HEART FAILURE & TRANSPLANT- PROGRESS NOTE  *To reach the NS1 Team from 8am to 5pm, please call [phone].  ___________________________________________________________________________    Subjective:  - no complaints    Medications:  acetaminophen     Tablet .. 650 milliGRAM(s) Oral every 6 hours PRN  budesonide  80 MICROgram(s)/formoterol 4.5 MICROgram(s) Inhaler 1 Puff(s) Inhalation two times a day  buMETAnide 1 milliGRAM(s) Oral two times a day  chlorhexidine 4% Liquid 1 Application(s) Topical <User Schedule>  dextrose 5%. 1000 milliLiter(s) IV Continuous <Continuous>  dextrose 5%. 1000 milliLiter(s) IV Continuous <Continuous>  dextrose 50% Injectable 50 milliLiter(s) IV Push every 15 minutes  dextrose Oral Gel 15 Gram(s) Oral once PRN  fluconAZOLE   Tablet 100 milliGRAM(s) Oral <User Schedule>  glucagon  Injectable 1 milliGRAM(s) IntraMuscular once  heparin   Injectable 5000 Unit(s) SubCutaneous every 8 hours  hydrALAZINE 100 milliGRAM(s) Oral every 8 hours  insulin glargine Injectable (LANTUS) 35 Unit(s) SubCutaneous <User Schedule>  insulin lispro (ADMELOG) corrective regimen sliding scale   SubCutaneous three times a day before meals  insulin lispro (ADMELOG) corrective regimen sliding scale   SubCutaneous <User Schedule>  insulin lispro Injectable (ADMELOG) 20 Unit(s) SubCutaneous before breakfast  insulin lispro Injectable (ADMELOG) 17 Unit(s) SubCutaneous before dinner  insulin lispro Injectable (ADMELOG) 17 Unit(s) SubCutaneous before lunch  insulin regular Infusion 3 Unit(s)/Hr IV Continuous <Continuous>  lidocaine   4% Patch 3 Patch Transdermal daily  magnesium oxide 400 milliGRAM(s) Oral three times a day with meals  melatonin 5 milliGRAM(s) Oral at bedtime  mycophenolate mofetil 1000 milliGRAM(s) Oral every 12 hours  NIFEdipine XL 60 milliGRAM(s) Oral every 12 hours  OLANZapine 5 milliGRAM(s) Oral at bedtime  pantoprazole    Tablet 40 milliGRAM(s) Oral every 24 hours  polyethylene glycol 3350 17 Gram(s) Oral daily  predniSONE   Tablet 15 milliGRAM(s) Oral every 12 hours  senna 2 Tablet(s) Oral at bedtime  tacrolimus 5.5 milliGRAM(s) Oral <User Schedule>  trimethoprim   80 mG/sulfamethoxazole 400 mG 1 Tablet(s) Oral daily  valGANciclovir 450 milliGRAM(s) Oral every 12 hours    Vitals:  Vital Signs Last 24 Hours  T(C): 36.6 (24 @ 19:43), Max: 36.6 (24 @ 19:43)  HR: 99 (24 @ 19:43) (95 - 103)  BP: 129/80 (24 @ 19:43) (106/68 - 129/80)  RR: 18 (24 @ 19:43) (18 - 18)  SpO2: 97% (24 @ 19:43) (97% - 98%)    Weight in k.1 ( @ 08:36)    I&O's Summary    2024 07:01  -  2024 07:00  --------------------------------------------------------  IN: 1250 mL / OUT: 1340 mL / NET: -90 mL        Physical Exam  General: No distress. Comfortable.  Neck: Neck supple. JVP not elevated. No masses  Chest: Clear to auscultation bilaterally  CV: Normal S1 and S2.  Abdomen: Soft, non-distended, non-tender  Extremities: warm and perfused   Neurology: Alert and oriented times three.     Labs:                        11.6   16.03 )-----------( 212      ( 2024 07:06 )             34.1     -    133<L>  |  97  |  58<H>  ----------------------------<  121<H>  4.6   |  24  |  1.23    Ca    9.9      2024 07:05  Phos  3.5     -  Mg     2.0     -    TPro  6.4  /  Alb  4.0  /  TBili  0.3  /  DBili  x   /  AST  23  /  ALT  55<H>  /  AlkPhos  54

## 2024-02-09 NOTE — DISCHARGE NOTE PROVIDER - CARE PROVIDER_API CALL
Yolanda Peralta)  Internal Medicine  [id number] [place] [address]  Phone: [phone]  Fax: [phone]  Established Patient  Follow Up Time: 2 weeks   no

## 2024-03-07 ENCOUNTER — NON-APPOINTMENT (OUTPATIENT)
Age: 64
End: 2024-03-07

## 2024-06-05 NOTE — H&P ADULT - NSHPPOASURGSITEINCISION_GEN_ALL_CORE
no

## 2024-08-07 NOTE — PROGRESS NOTE ADULT - SUBJECTIVE AND OBJECTIVE BOX
Continuity of Care Form    Patient Name: Jay Anton   :  1991  MRN:  [id number]    Admit date:  2024  Discharge date:  ***    Code Status Order: No Order   Advance Directives:     Admitting Physician:  No admitting provider for patient encounter.  PCP: No primary care provider on file.    Discharging Nurse: ***  Discharging Hospital Unit/Room#: D05/D05  Discharging Unit Phone Number: ***    Emergency Contact:   No emergency contact information on file.    Past Surgical History:  No past surgical history on file.    Immunization History:     There is no immunization history on file for this patient.    Active Problems:  There is no problem list on file for this patient.      Isolation/Infection:   Isolation            No Isolation          Patient Infection Status       None to display            Nurse Assessment:  Last Vital Signs: BP (!) 133/95   Pulse 57   Temp 98.4 °F (36.9 °C)   Resp 16   Ht 1.93 m (6' 4\")   Wt 136.1 kg (300 lb)   SpO2 97%   BMI 36.52 kg/m²     Last documented pain score (0-10 scale): Pain Level: 6  Last Weight:   Wt Readings from Last 1 Encounters:   24 136.1 kg (300 lb)     Mental Status:  {IP PT MENTAL STATUS:}    IV Access:  { TASIA IV ACCESS:144004778}    Nursing Mobility/ADLs:  Walking   {CHP DME ADLs:754278652}  Transfer  {CHP DME ADLs:095715788}  Bathing  {CHP DME ADLs:960276477}  Dressing  {CHP DME ADLs:025894982}  Toileting  {CHP DME ADLs:061427253}  Feeding  {CHP DME ADLs:187599270}  Med Admin  {CHP DME ADLs:261879047}  Med Delivery   { TASIA MED Delivery:476163942}    Wound Care Documentation and Therapy:        Elimination:  Continence:   Bowel: {YES / NO:}  Bladder: {YES / NO:}  Urinary Catheter: {Urinary Catheter:884854902}   Colostomy/Ileostomy/Ileal Conduit: {YES / NO:}       Date of Last BM: ***  No intake or output data in the 24 hours ending 24 1429  No intake/output data recorded.    Safety Concerns:     { TASIA Safety    afberile    REVIEW OF SYSTEMS:  GEN: no fever,    no chills  RESP: no SOB,   no cough  CVS: no chest pain,   no palpitations  GI: no abdominal pain,   no nausea,   no vomiting,   no constipation,   no diarrhea  : no dysuria,   no frequency  NEURO: no headache,   no dizziness  PSYCH: no depression,   not anxious  Derm : no rash    MEDICATIONS  (STANDING):  ascorbic acid 500 milliGRAM(s) Oral daily  ciprofloxacin     Tablet 500 milliGRAM(s) Oral every 12 hours  enoxaparin Injectable 40 milliGRAM(s) SubCutaneous every 24 hours  insulin lispro (ADMELOG) corrective regimen sliding scale   SubCutaneous three times a day with meals  insulin lispro (ADMELOG) corrective regimen sliding scale   SubCutaneous at bedtime  lactated ringers Bolus 500 milliLiter(s) IV Bolus once  lactated ringers Bolus 500 milliLiter(s) IV Bolus once  lactated ringers. 1000 milliLiter(s) (50 mL/Hr) IV Continuous <Continuous>  midodrine. 5 milliGRAM(s) Oral three times a day  multivitamin 1 Tablet(s) Oral daily  polyethylene glycol 3350 17 Gram(s) Oral daily  potassium chloride    Tablet ER 20 milliEquivalent(s) Oral daily  senna 2 Tablet(s) Oral at bedtime  thiamine 100 milliGRAM(s) Oral daily    MEDICATIONS  (PRN):      Vital Signs Last 24 Hrs  T(C): 36.2 (2022 23:15), Max: 36.3 (2022 15:54)  T(F): 97.2 (2022 23:15), Max: 97.3 (2022 15:54)  HR: 36 (2022 23:15) (36 - 52)  BP: 108/72 (2022 23:15) (91/62 - 108/72)  BP(mean): --  RR: 18 (2022 23:15) (18 - 18)  SpO2: 98% (2022 23:15) (97% - 98%)    Parameters below as of 2022 23:15  Patient On (Oxygen Delivery Method): room air      CAPILLARY BLOOD GLUCOSE      POCT Blood Glucose.: 94 mg/dL (2022 21:29)  POCT Blood Glucose.: 121 mg/dL (2022 16:35)  POCT Blood Glucose.: 92 mg/dL (2022 12:06)  POCT Blood Glucose.: 104 mg/dL (2022 08:04)    I&O's Summary    2022 07:01  -  2022 06:23  --------------------------------------------------------  IN: 0 mL / OUT: 200 mL / NET: -200 mL        PHYSICAL EXAM:  HEAD:  Atraumatic, Normocephalic  NECK: Supple, No   JVD  CHEST/LUNG:   no     rales,     no,    rhonchi  HEART: Regular rate and rhythm;         murmur  ABDOMEN: Soft, Nontender, ;   EXTREMITIES:    no    edema  NEUROLOGY:  alert    LABS:                        10.9   9.01  )-----------( 174      ( 2022 00:02 )             33.5     07-    138  |  108  |  16  ----------------------------<  93  5.0   |  23  |  0.66    Ca    8.6      2022 10:54  Phos  2.5       Mg     1.8         TPro  5.2<L>  /  Alb  2.4<L>  /  TBili  0.2  /  DBili  x   /  AST  13  /  ALT  14  /  AlkPhos  38<L>  24          Urinalysis Basic - ( 2022 12:23 )    Color: Red / Appearance: Turbid / S.010 / pH: x  Gluc: x / Ketone: Negative  / Bili: Negative / Urobili: Negative   Blood: x / Protein: 30 mg/dL / Nitrite: Negative   Leuk Esterase: Moderate / RBC: >50 /hpf / WBC 30 /HPF   Sq Epi: x / Non Sq Epi: 2 / Bacteria: Negative              Thyroid Stimulating Hormone, Serum: 2.94 uIU/mL ( @ 07:17)          Consultant(s) Notes Reviewed:      Care Discussed with Consultants/Other Providers:

## 2025-01-24 NOTE — PATIENT PROFILE ADULT - FALL HARM RISK - ATTEMPT OOB
Please call Dr. Lobo with questions or concerns.  Drops the next 10 days and follow-up in about a month.  
No

## 2025-05-27 NOTE — PATIENT PROFILE ADULT - DO YOU LACK THE NECESSARY SUPPORT TO HELP YOU COPE WITH LIFE CHALLENGES?
Patient was seen on Sunday for congestion and was prescribed an antibiotic which mother has been giving. Mother states that patient is not getting better and continues to have fevers and congestion.   
Received call from  patient spouse calling for post hospital appointment and was told that needs to be seen prior to 5/31, no available appointments.  Patient is also asking about if the urinary catheter can bee removed.  Message routed to staff for further assistance as no available appointment prior to 5/31. Message routed to staff for further assistance.   
no

## 2025-06-03 NOTE — H&P ADULT - PROBLEM SELECTOR PLAN 3
Please see Medication PA telephone encounter from today, 6/3/25 for additional information.      Hx of cerebellar ataxia, chronic lacunar infarcts, leptomeningeal enhancements, all stable.

## 2025-07-16 NOTE — PROVIDER CONTACT NOTE (OTHER) - REASON
The Ekg interpreted by me in the absence of a cardiologist shows.  Atrial fibrillation with a ventricular rate of 87.  Left axis deviation.  Left bundle branch block.  Occasional PVC noted.  No specific ST or T wave abnormality.  No significant change from prior 7 - 3 - 25.      I only performed EKG interpretation and was not otherwise involved in the care of this patient.       Edgar Boss MD  07/16/25 0007    
Unable to get oral or axillary temp
rectal temp 93.2
unable to swallow po meds
Hypothermia T- 94.4 F (rectal)
Pt hypotensive BP 90/59
Rectal temp 92.4
heart rate 48
pt NPO ,UNABLE TO TAKE PO MEDS

## (undated) DEVICE — GLV 7.5 PROTEXIS (WHITE)

## (undated) DEVICE — POSITIONER FOAM EGG CRATE ULNAR 2PCS (PINK)

## (undated) DEVICE — WARMING BLANKET UPPER ADULT

## (undated) DEVICE — PACK CYSTO

## (undated) DEVICE — ACMI SELF-SEALING SEAL UP TO 7FR

## (undated) DEVICE — DRAPE EQUIPMENT BANDED BAG 30 X 30" (SHOWER CAP)

## (undated) DEVICE — TUBING RANGER FLUID IRRIGATION SET DISP

## (undated) DEVICE — GLV 8 PROTEXIS (WHITE)

## (undated) DEVICE — POSITIONER FOAM HEADREST (PINK)

## (undated) DEVICE — FOLEY TRAY 16FR 5CC LTX UMETER CLOSED

## (undated) DEVICE — DRAPE DRAINAGE BAG RELAX & GEMINI

## (undated) DEVICE — VENODYNE/SCD SLEEVE CALF LARGE

## (undated) DEVICE — TUBING SUCTION 20FT

## (undated) DEVICE — GOWN TRIMAX LG

## (undated) DEVICE — FOLEY HOLDER STATLOCK 2 WAY ADULT

## (undated) DEVICE — Device

## (undated) DEVICE — GLV 6.5 PROTEXIS (WHITE)

## (undated) DEVICE — SOL IRR POUR H2O 1500ML

## (undated) DEVICE — IRR BULB PATHFINDER + 10"

## (undated) DEVICE — SOL IRR BAG H2O 3000ML

## (undated) DEVICE — SOL IRR BAG NS 0.9% 3000ML

## (undated) DEVICE — GLV 7 PROTEXIS (WHITE)

## (undated) DEVICE — GLV 8 PROTEXIS (CREAM) NEU-THERA

## (undated) DEVICE — BOSTON SCIENTIFC PUMPING SYSTEM SAPS SINGLE ACTION 10CC

## (undated) DEVICE — ADAPTER CHECK FLO 9FR STERILE

## (undated) DEVICE — SPECIMEN CONTAINER 100ML

## (undated) DEVICE — PRESSURE INFUSOR BAG 3000ML

## (undated) DEVICE — SYR ASEPTO

## (undated) DEVICE — BAG URINE W METER 2L

## (undated) DEVICE — SOL IRR POUR NS 0.9% 500ML

## (undated) DEVICE — MEDICATION LABELS W MARKER

## (undated) DEVICE — OMNIPAQUE 300  30ML

## (undated) DEVICE — SYR LUER LOK 10CC

## (undated) DEVICE — ADAPTER CHECK FLO 3 WAY STOP COCK